# Patient Record
Sex: FEMALE | Race: WHITE | NOT HISPANIC OR LATINO
[De-identification: names, ages, dates, MRNs, and addresses within clinical notes are randomized per-mention and may not be internally consistent; named-entity substitution may affect disease eponyms.]

---

## 2017-05-15 ENCOUNTER — TRANSCRIPTION ENCOUNTER (OUTPATIENT)
Age: 52
End: 2017-05-15

## 2018-04-03 ENCOUNTER — APPOINTMENT (OUTPATIENT)
Dept: ORTHOPEDIC SURGERY | Facility: CLINIC | Age: 53
End: 2018-04-03

## 2018-08-03 ENCOUNTER — EMERGENCY (EMERGENCY)
Facility: HOSPITAL | Age: 53
LOS: 0 days | Discharge: HOME | End: 2018-08-04
Attending: FAMILY MEDICINE | Admitting: FAMILY MEDICINE
Payer: COMMERCIAL

## 2018-08-03 VITALS
SYSTOLIC BLOOD PRESSURE: 114 MMHG | OXYGEN SATURATION: 96 % | WEIGHT: 128.09 LBS | RESPIRATION RATE: 18 BRPM | DIASTOLIC BLOOD PRESSURE: 85 MMHG | TEMPERATURE: 99 F | HEART RATE: 82 BPM

## 2018-08-03 DIAGNOSIS — F10.129 ALCOHOL ABUSE WITH INTOXICATION, UNSPECIFIED: ICD-10-CM

## 2018-08-03 DIAGNOSIS — R11.2 NAUSEA WITH VOMITING, UNSPECIFIED: ICD-10-CM
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
## 2018-08-03 DIAGNOSIS — Z79.899 OTHER LONG TERM (CURRENT) DRUG THERAPY: ICD-10-CM

## 2018-08-03 DIAGNOSIS — F17.200 NICOTINE DEPENDENCE, UNSPECIFIED, UNCOMPLICATED: ICD-10-CM

## 2018-08-03 LAB
ABO RH CONFIRMATION: SIGNIFICANT CHANGE UP
ALBUMIN SERPL ELPH-MCNC: 3.4 G/DL — SIGNIFICANT CHANGE UP (ref 3.3–5)
ALP SERPL-CCNC: 127 U/L — HIGH (ref 40–120)
ALT FLD-CCNC: 55 U/L — SIGNIFICANT CHANGE UP (ref 12–78)
AMPHET UR-MCNC: NEGATIVE — SIGNIFICANT CHANGE UP
ANION GAP SERPL CALC-SCNC: 15 MMOL/L — SIGNIFICANT CHANGE UP (ref 5–17)
APPEARANCE UR: CLEAR — SIGNIFICANT CHANGE UP
APTT BLD: 24.4 SEC — LOW (ref 27.5–37.4)
AST SERPL-CCNC: 96 U/L — HIGH (ref 15–37)
BACTERIA # UR AUTO: ABNORMAL
BARBITURATES UR SCN-MCNC: NEGATIVE — SIGNIFICANT CHANGE UP
BASOPHILS # BLD AUTO: 0.04 K/UL — SIGNIFICANT CHANGE UP (ref 0–0.2)
BASOPHILS NFR BLD AUTO: 0.6 % — SIGNIFICANT CHANGE UP (ref 0–2)
BENZODIAZ UR-MCNC: NEGATIVE — SIGNIFICANT CHANGE UP
BILIRUB SERPL-MCNC: 0.5 MG/DL — SIGNIFICANT CHANGE UP (ref 0.2–1.2)
BILIRUB UR-MCNC: NEGATIVE — SIGNIFICANT CHANGE UP
BLD GP AB SCN SERPL QL: SIGNIFICANT CHANGE UP
BUN SERPL-MCNC: 13 MG/DL — SIGNIFICANT CHANGE UP (ref 7–23)
CALCIUM SERPL-MCNC: 8.8 MG/DL — SIGNIFICANT CHANGE UP (ref 8.5–10.1)
CHLORIDE SERPL-SCNC: 105 MMOL/L — SIGNIFICANT CHANGE UP (ref 96–108)
CO2 SERPL-SCNC: 23 MMOL/L — SIGNIFICANT CHANGE UP (ref 22–31)
COCAINE METAB.OTHER UR-MCNC: NEGATIVE — SIGNIFICANT CHANGE UP
COLOR SPEC: YELLOW — SIGNIFICANT CHANGE UP
CREAT SERPL-MCNC: 0.79 MG/DL — SIGNIFICANT CHANGE UP (ref 0.5–1.3)
DIFF PNL FLD: ABNORMAL
EOSINOPHIL # BLD AUTO: 0.04 K/UL — SIGNIFICANT CHANGE UP (ref 0–0.5)
EOSINOPHIL NFR BLD AUTO: 0.6 % — SIGNIFICANT CHANGE UP (ref 0–6)
EPI CELLS # UR: SIGNIFICANT CHANGE UP
ETHANOL SERPL-MCNC: 174 MG/DL — HIGH (ref 0–10)
ETHANOL SERPL-MCNC: 368 MG/DL — HIGH (ref 0–10)
GLUCOSE SERPL-MCNC: 152 MG/DL — HIGH (ref 70–99)
GLUCOSE UR QL: NEGATIVE MG/DL — SIGNIFICANT CHANGE UP
HCT VFR BLD CALC: 43.8 % — SIGNIFICANT CHANGE UP (ref 34.5–45)
HGB BLD-MCNC: 15.3 G/DL — SIGNIFICANT CHANGE UP (ref 11.5–15.5)
IMM GRANULOCYTES NFR BLD AUTO: 0.4 % — SIGNIFICANT CHANGE UP (ref 0–1.5)
INR BLD: 0.94 RATIO — SIGNIFICANT CHANGE UP (ref 0.88–1.16)
KETONES UR-MCNC: NEGATIVE — SIGNIFICANT CHANGE UP
LEUKOCYTE ESTERASE UR-ACNC: NEGATIVE — SIGNIFICANT CHANGE UP
LYMPHOCYTES # BLD AUTO: 3.22 K/UL — SIGNIFICANT CHANGE UP (ref 1–3.3)
LYMPHOCYTES # BLD AUTO: 46.5 % — HIGH (ref 13–44)
MCHC RBC-ENTMCNC: 31.5 PG — SIGNIFICANT CHANGE UP (ref 27–34)
MCHC RBC-ENTMCNC: 34.9 GM/DL — SIGNIFICANT CHANGE UP (ref 32–36)
MCV RBC AUTO: 90.1 FL — SIGNIFICANT CHANGE UP (ref 80–100)
METHADONE UR-MCNC: NEGATIVE — SIGNIFICANT CHANGE UP
MONOCYTES # BLD AUTO: 0.44 K/UL — SIGNIFICANT CHANGE UP (ref 0–0.9)
MONOCYTES NFR BLD AUTO: 6.4 % — SIGNIFICANT CHANGE UP (ref 2–14)
NEUTROPHILS # BLD AUTO: 3.15 K/UL — SIGNIFICANT CHANGE UP (ref 1.8–7.4)
NEUTROPHILS NFR BLD AUTO: 45.5 % — SIGNIFICANT CHANGE UP (ref 43–77)
NITRITE UR-MCNC: NEGATIVE — SIGNIFICANT CHANGE UP
NRBC # BLD: 0 /100 WBCS — SIGNIFICANT CHANGE UP (ref 0–0)
OPIATES UR-MCNC: NEGATIVE — SIGNIFICANT CHANGE UP
PCP SPEC-MCNC: SIGNIFICANT CHANGE UP
PCP UR-MCNC: NEGATIVE — SIGNIFICANT CHANGE UP
PH UR: 5 — SIGNIFICANT CHANGE UP (ref 5–8)
PLATELET # BLD AUTO: 326 K/UL — SIGNIFICANT CHANGE UP (ref 150–400)
POTASSIUM SERPL-MCNC: 3.4 MMOL/L — LOW (ref 3.5–5.3)
POTASSIUM SERPL-SCNC: 3.4 MMOL/L — LOW (ref 3.5–5.3)
PROT SERPL-MCNC: 7.7 GM/DL — SIGNIFICANT CHANGE UP (ref 6–8.3)
PROT UR-MCNC: 30 MG/DL
PROTHROM AB SERPL-ACNC: 10.1 SEC — SIGNIFICANT CHANGE UP (ref 9.8–12.7)
RBC # BLD: 4.86 M/UL — SIGNIFICANT CHANGE UP (ref 3.8–5.2)
RBC # FLD: 13 % — SIGNIFICANT CHANGE UP (ref 10.3–14.5)
RBC CASTS # UR COMP ASSIST: SIGNIFICANT CHANGE UP /HPF (ref 0–4)
SODIUM SERPL-SCNC: 143 MMOL/L — SIGNIFICANT CHANGE UP (ref 135–145)
SP GR SPEC: 1.01 — SIGNIFICANT CHANGE UP (ref 1.01–1.02)
THC UR QL: NEGATIVE — SIGNIFICANT CHANGE UP
TROPONIN I SERPL-MCNC: <0.015 NG/ML — SIGNIFICANT CHANGE UP (ref 0.01–0.04)
TYPE + AB SCN PNL BLD: SIGNIFICANT CHANGE UP
UROBILINOGEN FLD QL: NEGATIVE MG/DL — SIGNIFICANT CHANGE UP
WBC # BLD: 6.92 K/UL — SIGNIFICANT CHANGE UP (ref 3.8–10.5)
WBC # FLD AUTO: 6.92 K/UL — SIGNIFICANT CHANGE UP (ref 3.8–10.5)
WBC UR QL: SIGNIFICANT CHANGE UP

## 2018-08-03 PROCEDURE — 99285 EMERGENCY DEPT VISIT HI MDM: CPT

## 2018-08-03 PROCEDURE — 93010 ELECTROCARDIOGRAM REPORT: CPT

## 2018-08-03 RX ORDER — PANTOPRAZOLE SODIUM 20 MG/1
40 TABLET, DELAYED RELEASE ORAL ONCE
Qty: 0 | Refills: 0 | Status: COMPLETED | OUTPATIENT
Start: 2018-08-03 | End: 2018-08-03

## 2018-08-03 RX ORDER — SODIUM CHLORIDE 9 MG/ML
1000 INJECTION INTRAMUSCULAR; INTRAVENOUS; SUBCUTANEOUS ONCE
Qty: 0 | Refills: 0 | Status: COMPLETED | OUTPATIENT
Start: 2018-08-03 | End: 2018-08-03

## 2018-08-03 RX ORDER — THIAMINE MONONITRATE (VIT B1) 100 MG
100 TABLET ORAL ONCE
Qty: 0 | Refills: 0 | Status: COMPLETED | OUTPATIENT
Start: 2018-08-03 | End: 2018-08-03

## 2018-08-03 RX ORDER — ONDANSETRON 8 MG/1
4 TABLET, FILM COATED ORAL ONCE
Qty: 0 | Refills: 0 | Status: COMPLETED | OUTPATIENT
Start: 2018-08-03 | End: 2018-08-03

## 2018-08-03 RX ADMIN — SODIUM CHLORIDE 1000 MILLILITER(S): 9 INJECTION INTRAMUSCULAR; INTRAVENOUS; SUBCUTANEOUS at 17:08

## 2018-08-03 RX ADMIN — ONDANSETRON 4 MILLIGRAM(S): 8 TABLET, FILM COATED ORAL at 17:08

## 2018-08-03 RX ADMIN — SODIUM CHLORIDE 1000 MILLILITER(S): 9 INJECTION INTRAMUSCULAR; INTRAVENOUS; SUBCUTANEOUS at 17:32

## 2018-08-03 RX ADMIN — Medication 100 MILLIGRAM(S): at 17:08

## 2018-08-03 RX ADMIN — PANTOPRAZOLE SODIUM 40 MILLIGRAM(S): 20 TABLET, DELAYED RELEASE ORAL at 17:08

## 2018-08-03 RX ADMIN — Medication 1 MILLIGRAM(S): at 17:08

## 2018-08-03 NOTE — SBIRT NOTE. - NSSBIRTSERVICES_GEN_A_ED_FT
Provided SBIRT services: Full screen positive. Referral to Treatment Performed. Screening results were reviewed with the patient and patient was provided information about healthy guidelines and potential negative consequences associated with level of risk. Motivation and readiness to reduce or stop use was discussed and goals and activities to make changes were suggested/offered.    Referral for complete assessment and level of care determination at a certified treatment facility was completed by contacting the treatment facility via phone, [facility], bed available pending medical clearance.  Dr. Henderson aware of details regarding potential transfer/admit with a bed waiting.    Audit Score: 36  DAST Score: 0

## 2018-08-03 NOTE — ED ADULT TRIAGE NOTE - CHIEF COMPLAINT QUOTE
pt presents to ED with complaints of relapse of ETOH pt was clean for 6 weeks went to [place] for rehab and relapsed 2 days ago tearful

## 2018-08-03 NOTE — ED ADULT NURSE NOTE - INTERVENTIONS DEFINITIONS
Provide visual cue, wrist band, yellow gown, etc./Monitor gait and stability/Review medications for side effects contributing to fall risk/[place] to call system/Instruct patient to call for assistance/Physically safe environment: no spills, clutter or unnecessary equipment/Stretcher in lowest position, wheels locked, appropriate side rails in place/Monitor for mental status changes and reorient to person, place, and time/Reinforce activity limits and safety measures with patient and family/Call bell, personal items and telephone within reach/Provide visual clues: red socks

## 2018-08-03 NOTE — ED PROVIDER NOTE - OBJECTIVE STATEMENT
52 y/o female with a PMHx of alcohol abuse, anxiety and depression on Lexapro presents to the ED c/o since n/v due to alcohol withdrawal 2 night ago. +seizure (2 nights ago due to alcohol withdrawal) +n/v/d, +abd pain. Denies LOC. During pt's seizure there was no biting of her tongue. Pt reports she had the seizure after she started drinking again. She states that her vomit was all red. Pt denies having had wine or grape juice to cause the vomit to be red. Pt states her diarrhea was brown. Pt had a pint of alcohol today. Former smoker. Pt uses vape. Denies suicidal ideation. She reports that she has been in rehab 2 times since April. Pt wants to get back into a rehab program. She reports  called the ambulance. Pt states that she needs to go back to work next week or else she will lose her job again. Pt works as a mammographer. Pt's last emesis episode was 2 hours ago. Pt's rehab was in [place] and she states that while she was drunk in [place] she fell and scraped her chin. The longest the pt has been sober is 46 days. Pt's  states that his wife has been drinking since 7/27/18 1/5th of vodka every 6 hours.   PCP: Dr. Karen Gross

## 2018-08-03 NOTE — ED ADULT NURSE NOTE - OBJECTIVE STATEMENT
pt presents with alcohol intoxication. as per pt, she was in alcohol rehab and was discharged 3 days ago. was sober for 45 days while there. has started drinking upon [place] at the airport; approx 1 pint vodka per day. states that shes upset to come home because shes worried that her  is leaving her. pt now tearful.

## 2018-08-03 NOTE — ED PROVIDER NOTE - MEDICAL DECISION MAKING DETAILS
Pt with hx of alcohol abuse s/p rehab x3 here with alcohol intoxication wanting to return to detox rehab. Pt also having vomiting. Plan for labs, IV fluids, Thymin, Zofran, Protonics and SBIRT.

## 2018-08-03 NOTE — ED PROVIDER NOTE - PROGRESS NOTE DETAILS
Joanneibe Statement (Attending): I Tameka Lemus attest that this documentation has been prepared under the direction and in the presence of Doctor Carmen Henderson Called [facility] and spoke to Clarissa who stated that they are short staffed and cannot take the pt tonight. Requested that we recall at 8 am. Santiago YOUNG CC:  Pt seen this AM by Case Management.  Pt declines transfer to . [place], has outpt appt. 8/7 Tuesday w/ addiction specialist.  Pt clinically stable, no active withdrawal, will D/C.

## 2018-08-03 NOTE — ED ADULT NURSE NOTE - ED STAT RN HANDOFF DETAILS
Pt received from prior RN sleeping and awaiting placement and transfer at this time. Pt easily awakened.  Will cont to monitor.

## 2018-08-04 VITALS
TEMPERATURE: 98 F | DIASTOLIC BLOOD PRESSURE: 79 MMHG | SYSTOLIC BLOOD PRESSURE: 119 MMHG | OXYGEN SATURATION: 95 % | RESPIRATION RATE: 18 BRPM | HEART RATE: 94 BPM

## 2018-08-04 RX ADMIN — Medication 25 MILLIGRAM(S): at 03:23

## 2018-08-04 NOTE — ED ADULT NURSE REASSESSMENT NOTE - NS ED NURSE REASSESS COMMENT FT1
Pt seen by  and reports that she is going to follow up outpt. Dr. Boateng confirms that pt is to be d/c at this time.  Pt ambulating with steady gait. Tolerated po intake. IVL removed. Pt being picked up by family.

## 2018-08-04 NOTE — ED ADULT NURSE REASSESSMENT NOTE - NS ED NURSE REASSESS COMMENT FT1
Case management contacted and is aware of case.  Pt currently resting comfortably in bed.  Declines breakfast at this time.

## 2018-08-09 ENCOUNTER — INPATIENT (INPATIENT)
Facility: HOSPITAL | Age: 53
LOS: 4 days | Discharge: NURSING, REHAB OR LONG-TERM CARE | End: 2018-08-14
Attending: HOSPITALIST | Admitting: HOSPITALIST
Payer: COMMERCIAL

## 2018-08-09 VITALS — WEIGHT: 130.07 LBS | HEIGHT: 67 IN

## 2018-08-09 PROBLEM — F10.10 ALCOHOL ABUSE, UNCOMPLICATED: Chronic | Status: ACTIVE | Noted: 2018-08-07

## 2018-08-09 LAB
ALBUMIN SERPL ELPH-MCNC: 3 G/DL — LOW (ref 3.3–5)
ALBUMIN SERPL ELPH-MCNC: 3.3 G/DL — SIGNIFICANT CHANGE UP (ref 3.3–5)
ALP SERPL-CCNC: 115 U/L — SIGNIFICANT CHANGE UP (ref 40–120)
ALP SERPL-CCNC: 122 U/L — HIGH (ref 40–120)
ALT FLD-CCNC: 38 U/L — SIGNIFICANT CHANGE UP (ref 12–78)
ALT FLD-CCNC: 44 U/L — SIGNIFICANT CHANGE UP (ref 12–78)
ANION GAP SERPL CALC-SCNC: 11 MMOL/L — SIGNIFICANT CHANGE UP (ref 5–17)
ANION GAP SERPL CALC-SCNC: 15 MMOL/L — SIGNIFICANT CHANGE UP (ref 5–17)
APPEARANCE UR: CLEAR — SIGNIFICANT CHANGE UP
AST SERPL-CCNC: 58 U/L — HIGH (ref 15–37)
AST SERPL-CCNC: 68 U/L — HIGH (ref 15–37)
BASOPHILS # BLD AUTO: 0.04 K/UL — SIGNIFICANT CHANGE UP (ref 0–0.2)
BASOPHILS NFR BLD AUTO: 0.5 % — SIGNIFICANT CHANGE UP (ref 0–2)
BILIRUB DIRECT SERPL-MCNC: 0.2 MG/DL — SIGNIFICANT CHANGE UP (ref 0–0.2)
BILIRUB INDIRECT FLD-MCNC: 0.8 MG/DL — SIGNIFICANT CHANGE UP (ref 0.2–1)
BILIRUB SERPL-MCNC: 0.9 MG/DL — SIGNIFICANT CHANGE UP (ref 0.2–1.2)
BILIRUB SERPL-MCNC: 1 MG/DL — SIGNIFICANT CHANGE UP (ref 0.2–1.2)
BILIRUB UR-MCNC: NEGATIVE — SIGNIFICANT CHANGE UP
BUN SERPL-MCNC: 11 MG/DL — SIGNIFICANT CHANGE UP (ref 7–23)
BUN SERPL-MCNC: 15 MG/DL — SIGNIFICANT CHANGE UP (ref 7–23)
CALCIUM SERPL-MCNC: 8 MG/DL — LOW (ref 8.5–10.1)
CALCIUM SERPL-MCNC: 8.4 MG/DL — LOW (ref 8.5–10.1)
CHLORIDE SERPL-SCNC: 102 MMOL/L — SIGNIFICANT CHANGE UP (ref 96–108)
CHLORIDE SERPL-SCNC: 98 MMOL/L — SIGNIFICANT CHANGE UP (ref 96–108)
CO2 SERPL-SCNC: 23 MMOL/L — SIGNIFICANT CHANGE UP (ref 22–31)
CO2 SERPL-SCNC: 25 MMOL/L — SIGNIFICANT CHANGE UP (ref 22–31)
COLOR SPEC: YELLOW — SIGNIFICANT CHANGE UP
CREAT SERPL-MCNC: 0.6 MG/DL — SIGNIFICANT CHANGE UP (ref 0.5–1.3)
CREAT SERPL-MCNC: 0.7 MG/DL — SIGNIFICANT CHANGE UP (ref 0.5–1.3)
DIFF PNL FLD: ABNORMAL
EOSINOPHIL # BLD AUTO: 0.05 K/UL — SIGNIFICANT CHANGE UP (ref 0–0.5)
EOSINOPHIL NFR BLD AUTO: 0.6 % — SIGNIFICANT CHANGE UP (ref 0–6)
ETHANOL SERPL-MCNC: <10 MG/DL — SIGNIFICANT CHANGE UP (ref 0–10)
GLUCOSE SERPL-MCNC: 112 MG/DL — HIGH (ref 70–99)
GLUCOSE SERPL-MCNC: 88 MG/DL — SIGNIFICANT CHANGE UP (ref 70–99)
GLUCOSE UR QL: NEGATIVE MG/DL — SIGNIFICANT CHANGE UP
HCT VFR BLD CALC: 37.9 % — SIGNIFICANT CHANGE UP (ref 34.5–45)
HGB BLD-MCNC: 13.4 G/DL — SIGNIFICANT CHANGE UP (ref 11.5–15.5)
IMM GRANULOCYTES NFR BLD AUTO: 0.9 % — SIGNIFICANT CHANGE UP (ref 0–1.5)
KETONES UR-MCNC: ABNORMAL
LEUKOCYTE ESTERASE UR-ACNC: NEGATIVE — SIGNIFICANT CHANGE UP
LYMPHOCYTES # BLD AUTO: 1.07 K/UL — SIGNIFICANT CHANGE UP (ref 1–3.3)
LYMPHOCYTES # BLD AUTO: 13.9 % — SIGNIFICANT CHANGE UP (ref 13–44)
MAGNESIUM SERPL-MCNC: 1.6 MG/DL — SIGNIFICANT CHANGE UP (ref 1.6–2.6)
MAGNESIUM SERPL-MCNC: 1.6 MG/DL — SIGNIFICANT CHANGE UP (ref 1.6–2.6)
MCHC RBC-ENTMCNC: 31.8 PG — SIGNIFICANT CHANGE UP (ref 27–34)
MCHC RBC-ENTMCNC: 35.4 GM/DL — SIGNIFICANT CHANGE UP (ref 32–36)
MCV RBC AUTO: 90 FL — SIGNIFICANT CHANGE UP (ref 80–100)
MONOCYTES # BLD AUTO: 0.44 K/UL — SIGNIFICANT CHANGE UP (ref 0–0.9)
MONOCYTES NFR BLD AUTO: 5.7 % — SIGNIFICANT CHANGE UP (ref 2–14)
NEUTROPHILS # BLD AUTO: 6.05 K/UL — SIGNIFICANT CHANGE UP (ref 1.8–7.4)
NEUTROPHILS NFR BLD AUTO: 78.4 % — HIGH (ref 43–77)
NITRITE UR-MCNC: NEGATIVE — SIGNIFICANT CHANGE UP
PH UR: 6.5 — SIGNIFICANT CHANGE UP (ref 5–8)
PHOSPHATE SERPL-MCNC: 2.5 MG/DL — SIGNIFICANT CHANGE UP (ref 2.5–4.5)
PHOSPHATE SERPL-MCNC: 2.8 MG/DL — SIGNIFICANT CHANGE UP (ref 2.5–4.5)
PLATELET # BLD AUTO: 131 K/UL — LOW (ref 150–400)
POTASSIUM SERPL-MCNC: 3.9 MMOL/L — SIGNIFICANT CHANGE UP (ref 3.5–5.3)
POTASSIUM SERPL-MCNC: 4 MMOL/L — SIGNIFICANT CHANGE UP (ref 3.5–5.3)
POTASSIUM SERPL-SCNC: 3.9 MMOL/L — SIGNIFICANT CHANGE UP (ref 3.5–5.3)
POTASSIUM SERPL-SCNC: 4 MMOL/L — SIGNIFICANT CHANGE UP (ref 3.5–5.3)
PROT SERPL-MCNC: 7 GM/DL — SIGNIFICANT CHANGE UP (ref 6–8.3)
PROT SERPL-MCNC: 7.7 GM/DL — SIGNIFICANT CHANGE UP (ref 6–8.3)
PROT UR-MCNC: NEGATIVE MG/DL — SIGNIFICANT CHANGE UP
RBC # BLD: 4.21 M/UL — SIGNIFICANT CHANGE UP (ref 3.8–5.2)
RBC # FLD: 12.9 % — SIGNIFICANT CHANGE UP (ref 10.3–14.5)
SODIUM SERPL-SCNC: 136 MMOL/L — SIGNIFICANT CHANGE UP (ref 135–145)
SODIUM SERPL-SCNC: 138 MMOL/L — SIGNIFICANT CHANGE UP (ref 135–145)
SP GR SPEC: 1.01 — SIGNIFICANT CHANGE UP (ref 1.01–1.02)
TROPONIN I SERPL-MCNC: <0.015 NG/ML — SIGNIFICANT CHANGE UP (ref 0.01–0.04)
UROBILINOGEN FLD QL: NEGATIVE MG/DL — SIGNIFICANT CHANGE UP
WBC # BLD: 7.72 K/UL — SIGNIFICANT CHANGE UP (ref 3.8–10.5)
WBC # FLD AUTO: 7.72 K/UL — SIGNIFICANT CHANGE UP (ref 3.8–10.5)

## 2018-08-09 PROCEDURE — 99285 EMERGENCY DEPT VISIT HI MDM: CPT

## 2018-08-09 PROCEDURE — 93010 ELECTROCARDIOGRAM REPORT: CPT

## 2018-08-09 PROCEDURE — 71045 X-RAY EXAM CHEST 1 VIEW: CPT | Mod: 26

## 2018-08-09 RX ORDER — NICOTINE POLACRILEX 2 MG
1 GUM BUCCAL DAILY
Qty: 0 | Refills: 0 | Status: DISCONTINUED | OUTPATIENT
Start: 2018-08-09 | End: 2018-08-14

## 2018-08-09 RX ORDER — ENOXAPARIN SODIUM 100 MG/ML
30 INJECTION SUBCUTANEOUS EVERY 24 HOURS
Qty: 0 | Refills: 0 | Status: DISCONTINUED | OUTPATIENT
Start: 2018-08-09 | End: 2018-08-14

## 2018-08-09 RX ORDER — SODIUM CHLORIDE 9 MG/ML
1000 INJECTION INTRAMUSCULAR; INTRAVENOUS; SUBCUTANEOUS
Qty: 0 | Refills: 0 | Status: COMPLETED | OUTPATIENT
Start: 2018-08-09 | End: 2018-08-10

## 2018-08-09 RX ORDER — ONDANSETRON 8 MG/1
4 TABLET, FILM COATED ORAL EVERY 6 HOURS
Qty: 0 | Refills: 0 | Status: DISCONTINUED | OUTPATIENT
Start: 2018-08-09 | End: 2018-08-14

## 2018-08-09 RX ORDER — SODIUM CHLORIDE 9 MG/ML
2000 INJECTION INTRAMUSCULAR; INTRAVENOUS; SUBCUTANEOUS ONCE
Qty: 0 | Refills: 0 | Status: COMPLETED | OUTPATIENT
Start: 2018-08-09 | End: 2018-08-09

## 2018-08-09 RX ORDER — DOCUSATE SODIUM 100 MG
100 CAPSULE ORAL THREE TIMES A DAY
Qty: 0 | Refills: 0 | Status: DISCONTINUED | OUTPATIENT
Start: 2018-08-09 | End: 2018-08-14

## 2018-08-09 RX ORDER — THIAMINE MONONITRATE (VIT B1) 100 MG
100 TABLET ORAL DAILY
Qty: 0 | Refills: 0 | Status: DISCONTINUED | OUTPATIENT
Start: 2018-08-09 | End: 2018-08-14

## 2018-08-09 RX ORDER — IBUPROFEN 200 MG
400 TABLET ORAL EVERY 6 HOURS
Qty: 0 | Refills: 0 | Status: DISCONTINUED | OUTPATIENT
Start: 2018-08-09 | End: 2018-08-14

## 2018-08-09 RX ORDER — ESCITALOPRAM OXALATE 10 MG/1
20 TABLET, FILM COATED ORAL DAILY
Qty: 0 | Refills: 0 | Status: DISCONTINUED | OUTPATIENT
Start: 2018-08-10 | End: 2018-08-14

## 2018-08-09 RX ORDER — FOLIC ACID 0.8 MG
1 TABLET ORAL DAILY
Qty: 0 | Refills: 0 | Status: DISCONTINUED | OUTPATIENT
Start: 2018-08-09 | End: 2018-08-14

## 2018-08-09 RX ORDER — SODIUM CHLORIDE 9 MG/ML
1000 INJECTION, SOLUTION INTRAVENOUS
Qty: 0 | Refills: 0 | Status: DISCONTINUED | OUTPATIENT
Start: 2018-08-09 | End: 2018-08-10

## 2018-08-09 RX ADMIN — Medication 1 MILLIGRAM(S): at 15:52

## 2018-08-09 RX ADMIN — SODIUM CHLORIDE 2000 MILLILITER(S): 9 INJECTION INTRAMUSCULAR; INTRAVENOUS; SUBCUTANEOUS at 10:19

## 2018-08-09 RX ADMIN — Medication 2 MILLIGRAM(S): at 10:23

## 2018-08-09 RX ADMIN — SODIUM CHLORIDE 2000 MILLILITER(S): 9 INJECTION INTRAMUSCULAR; INTRAVENOUS; SUBCUTANEOUS at 13:04

## 2018-08-09 RX ADMIN — ENOXAPARIN SODIUM 30 MILLIGRAM(S): 100 INJECTION SUBCUTANEOUS at 15:54

## 2018-08-09 RX ADMIN — SODIUM CHLORIDE 100 MILLILITER(S): 9 INJECTION, SOLUTION INTRAVENOUS at 20:59

## 2018-08-09 RX ADMIN — Medication 1 MILLIGRAM(S): at 21:00

## 2018-08-09 NOTE — ED PROVIDER NOTE - NS ED ROS FT
Constitutional: No fever or chills  Eyes: No visual changes  HEENT: No throat pain  CV: No chest pain. +diaphoresis   Resp: No SOB no cough  GI: No abd pain. No vomiting. +ND.   : No dysuria  MSK: No musculoskeletal pain  Skin: No rash  Neuro: No headache. +Shaky +weakness

## 2018-08-09 NOTE — ED PROVIDER NOTE - OBJECTIVE STATEMENT
54 y/o female presents to the ED s/p ETOH relapse, last drink being 2 days ago now with signs of withdrawal. Pt is c/o shakiness, weakness, diaphoresis, ND. Pt recently d/c from rehab last week. Normally drinks 1pint of vodka a day. States she is experiencing the same sx as prior withdrawal. Pt reports 2 seizures at home since leaving rehab, no LOC or head trauma. Denies CP, SOB Vomiting. Denies SI/HI. Pt states she is prepared to be admitted to the hospital today.

## 2018-08-09 NOTE — ED PROVIDER NOTE - PHYSICAL EXAMINATION
Constitutional: mild distress AAOx3  Eyes: PERRLA EOMI  Head: Normocephalic atraumatic  Mouth: MMM  Cardiac: Tachycardic rate   Resp: Lungs CTAB  GI: Abd s/nt/nd  Neuro: CN2-12 intact. +Mild tremor  Skin: No rashes Constitutional: mild distress AAOx3  Eyes: PERRLA EOMI  Head: Normocephalic atraumatic  Mouth: MMM  Cardiac: Tachycardic rate   Resp: Lungs CTAB  GI: Abd s/nt/nd  Neuro: CN2-12 intact. +Mild tremor  Skin: No rashes  psych: no si/hi

## 2018-08-09 NOTE — PATIENT PROFILE ADULT. - NS TRANSFER PATIENT BELONGINGS
Clothing/Cell Phone/PDA (specify)/pocket book Clothing/pocket book with glasses papers and notebook, patient states she lost cell phone prior to admission.

## 2018-08-09 NOTE — H&P ADULT - HISTORY OF PRESENT ILLNESS
54 y/o female presents to the ED s/p ETOH relapse, last drink being 2 days ago now with signs of withdrawal. Pt is c/o shakiness, weakness, diaphoresis, ND. Pt recently d/c from rehab last week. Normally drinks 1pint of vodka a day. States she is experiencing the same sx as prior withdrawal. Pt reports 2 seizures at home since leaving rehab, no LOC or head trauma. Denies CP, SOB Vomiting. Denies SI/HI. Pt states she is prepared to be admitted to the hospital today. 52 y/o female presents to the ED  for ETOH related withdrawl symptoms. Patient states she was recently at rehab for ETOH use, discharged last week.  last drink being 2 days ago. In Er patient c/o  shakiness, weakness, diaphoresis. Normally drinks 1pint of vodka a day. Pt reports 2 seizures at home since leaving rehab, no LOC or head trauma.  Denies CP, fevers, chills, hallucinations, n/v/d.

## 2018-08-09 NOTE — ED PROVIDER NOTE - NS_ ATTENDINGSCRIBEDETAILS _ED_A_ED_FT
I, Navjot Goodson MD,  performed the initial face to face bedside interview with this patient regarding history of present illness, review of symptoms and relevant past medical, social and family history.  I completed an independent physical examination.  I was the initial provider who evaluated this patient.  The history, relevant review of systems, past medical and surgical history, medical decision making, and physical examination was documented by the scribe in my presence and I attest to the accuracy of the documentation.

## 2018-08-09 NOTE — H&P ADULT - NSHPLABSRESULTS_GEN_ALL_CORE
Urinalysis Basic - ( 09 Aug 2018 12:11 )    Color: Yellow / Appearance: Clear / S.010 / pH: x  Gluc: x / Ketone: Moderate  / Bili: Negative / Urobili: Negative mg/dL   Blood: x / Protein: Negative mg/dL / Nitrite: Negative   Leuk Esterase: Negative / RBC: x / WBC x   Sq Epi: x / Non Sq Epi: x / Bacteria: x    09 Aug 2018 10:15    136    |  98     |  15     ----------------------------<  112    3.9     |  23     |  0.70     Ca    8.4        09 Aug 2018 10:15  Phos  2.8       09 Aug 2018 10:15  Mg     1.6       09 Aug 2018 10:15    TPro  7.7    /  Alb  3.3    /  TBili  0.9    /  DBili  x      /  AST  68     /  ALT  44     /  AlkPhos  122    09 Aug 2018 10:15  LIVER FUNCTIONS - ( 09 Aug 2018 10:15 )  Alb: 3.3 g/dL / Pro: 7.7 gm/dL / ALK PHOS: 122 U/L / ALT: 44 U/L / AST: 68 U/L / GGT: x         CBC Full  -  ( 09 Aug 2018 10:15 )  WBC Count : 7.72 K/uL  Hemoglobin : 13.4 g/dL  Hematocrit : 37.9 %  Platelet Count - Automated : 131 K/uL  Mean Cell Volume : 90.0 fl  Mean Cell Hemoglobin : 31.8 pg  Mean Cell Hemoglobin Concentration : 35.4 gm/dL  Auto Neutrophil # : 6.05 K/uL  Auto Lymphocyte # : 1.07 K/uL  Auto Monocyte # : 0.44 K/uL  Auto Eosinophil # : 0.05 K/uL  Auto Basophil # : 0.04 K/uL  Auto Neutrophil % : 78.4 %  Auto Lymphocyte % : 13.9 %  Auto Monocyte % : 5.7 %  Auto Eosinophil % : 0.6 %  Auto Basophil % : 0.5 %  CARDIAC MARKERS ( 09 Aug 2018 10:15 )  <0.015 ng/mL / x     / x     / x     / x

## 2018-08-09 NOTE — H&P ADULT - ASSESSMENT
52 y/o female presents to the ED  for ETOH related withdrawl symptoms. Patient states she was recently at rehab for ETOH use, discharged last week.  last drink being 2 days ago. In Er patient c/o  shakiness, weakness, diaphoresis. Normally drinks 1pint of vodka a day. Pt reports 2 seizures at home since leaving rehab, no LOC or head trauma.  Denies CP, fevers, chills, hallucinations, n/v/d.     Admit to medsurg  Start standing ativan 1mg TID w/ CIWA for breakthrough  will give one bag of banana bag  overnight fluids for 12 hours.     dvt proph- sc lovenox

## 2018-08-09 NOTE — ED PROVIDER NOTE - MEDICAL DECISION MAKING DETAILS
54y/o female hx of ETOH abuse, recent detox, now with etoh intake again 2 days ago. Normally drinks 1 pint of vodka a day. Presents with Sx of withdrawal . Pt feels shaky, ND, sx similar to prior withdrawal. No other sx. Concern for ETOH withdraw. Plan for labs, sx control, NERY gutiérrez, admit. 54y/o female hx of ETOH abuse, recent detox, now with etoh intake again 2 days ago. Normally drinks 1 pint of vodka a day. Presents with Sx of withdrawal . Pt feels shaky, ND, sx similar to prior withdrawal. No other sx. Concern for ETOH withdraw. Plan for labs, sx control, CIWA protocol, admit.

## 2018-08-09 NOTE — H&P ADULT - NSHPPHYSICALEXAM_GEN_ALL_CORE
Vital Signs Last 24 Hrs  T(C): 36.8 (09 Aug 2018 09:13), Max: 36.8 (09 Aug 2018 09:13)  T(F): 98.2 (09 Aug 2018 09:13), Max: 98.2 (09 Aug 2018 09:13)  HR: 133 (09 Aug 2018 09:13) (133 - 133)  BP: 137/96 (09 Aug 2018 09:13) (137/96 - 137/96)  BP(mean): --  RR: 18 (09 Aug 2018 09:13) (18 - 18)  SpO2: 98% (09 Aug 2018 09:13) (98% - 98%)    HEENT:   pupils equal and reactive, EOMI, no oropharyngeal lesions, erythema, exudates, oral thrush    NECK:   supple, no carotid bruits, no palpable lymph nodes, no thyromegaly    CV:  +S1, +S2, regular, no murmurs or rubs    RESP:   lungs clear to auscultation bilaterally, no wheezing, rales, rhonchi, good air entry bilaterally    BREAST:  not examined    GI:  abdomen soft, non-tender, non-distended, normal BS, no bruits, no abdominal masses, no palpable masses    RECTAL:  not examined    :  not examined    MSK:   normal muscle tone, no atrophy, no rigidity, no contractions    EXT:   no clubbing, no cyanosis, no edema, no calf pain, swelling or erythema    VASCULAR:  pulses equal and symmetric in the upper and lower extremities    NEURO:  AAOX3, no focal neurological deficits, follows all commands, able to move extremities spontaneously    SKIN:  no ulcers, lesions or rashes

## 2018-08-10 DIAGNOSIS — F32.9 MAJOR DEPRESSIVE DISORDER, SINGLE EPISODE, UNSPECIFIED: ICD-10-CM

## 2018-08-10 DIAGNOSIS — F10.10 ALCOHOL ABUSE, UNCOMPLICATED: ICD-10-CM

## 2018-08-10 LAB
ANION GAP SERPL CALC-SCNC: 11 MMOL/L — SIGNIFICANT CHANGE UP (ref 5–17)
BASOPHILS # BLD AUTO: 0.02 K/UL — SIGNIFICANT CHANGE UP (ref 0–0.2)
BASOPHILS NFR BLD AUTO: 0.4 % — SIGNIFICANT CHANGE UP (ref 0–2)
BUN SERPL-MCNC: 13 MG/DL — SIGNIFICANT CHANGE UP (ref 7–23)
CALCIUM SERPL-MCNC: 8.5 MG/DL — SIGNIFICANT CHANGE UP (ref 8.5–10.1)
CHLORIDE SERPL-SCNC: 103 MMOL/L — SIGNIFICANT CHANGE UP (ref 96–108)
CO2 SERPL-SCNC: 23 MMOL/L — SIGNIFICANT CHANGE UP (ref 22–31)
CREAT SERPL-MCNC: 0.67 MG/DL — SIGNIFICANT CHANGE UP (ref 0.5–1.3)
EOSINOPHIL # BLD AUTO: 0.08 K/UL — SIGNIFICANT CHANGE UP (ref 0–0.5)
EOSINOPHIL NFR BLD AUTO: 1.7 % — SIGNIFICANT CHANGE UP (ref 0–6)
GLUCOSE SERPL-MCNC: 135 MG/DL — HIGH (ref 70–99)
HCT VFR BLD CALC: 36.7 % — SIGNIFICANT CHANGE UP (ref 34.5–45)
HGB BLD-MCNC: 12.7 G/DL — SIGNIFICANT CHANGE UP (ref 11.5–15.5)
IMM GRANULOCYTES NFR BLD AUTO: 0.7 % — SIGNIFICANT CHANGE UP (ref 0–1.5)
LYMPHOCYTES # BLD AUTO: 1.25 K/UL — SIGNIFICANT CHANGE UP (ref 1–3.3)
LYMPHOCYTES # BLD AUTO: 27.3 % — SIGNIFICANT CHANGE UP (ref 13–44)
MAGNESIUM SERPL-MCNC: 1.8 MG/DL — SIGNIFICANT CHANGE UP (ref 1.6–2.6)
MCHC RBC-ENTMCNC: 31.4 PG — SIGNIFICANT CHANGE UP (ref 27–34)
MCHC RBC-ENTMCNC: 34.6 GM/DL — SIGNIFICANT CHANGE UP (ref 32–36)
MCV RBC AUTO: 90.6 FL — SIGNIFICANT CHANGE UP (ref 80–100)
MONOCYTES # BLD AUTO: 0.36 K/UL — SIGNIFICANT CHANGE UP (ref 0–0.9)
MONOCYTES NFR BLD AUTO: 7.9 % — SIGNIFICANT CHANGE UP (ref 2–14)
NEUTROPHILS # BLD AUTO: 2.84 K/UL — SIGNIFICANT CHANGE UP (ref 1.8–7.4)
NEUTROPHILS NFR BLD AUTO: 62 % — SIGNIFICANT CHANGE UP (ref 43–77)
NRBC # BLD: 0 /100 WBCS — SIGNIFICANT CHANGE UP (ref 0–0)
PHOSPHATE SERPL-MCNC: 2.7 MG/DL — SIGNIFICANT CHANGE UP (ref 2.5–4.5)
PLATELET # BLD AUTO: 104 K/UL — LOW (ref 150–400)
POTASSIUM SERPL-MCNC: 3.6 MMOL/L — SIGNIFICANT CHANGE UP (ref 3.5–5.3)
POTASSIUM SERPL-SCNC: 3.6 MMOL/L — SIGNIFICANT CHANGE UP (ref 3.5–5.3)
RBC # BLD: 4.05 M/UL — SIGNIFICANT CHANGE UP (ref 3.8–5.2)
RBC # FLD: 12.9 % — SIGNIFICANT CHANGE UP (ref 10.3–14.5)
SODIUM SERPL-SCNC: 137 MMOL/L — SIGNIFICANT CHANGE UP (ref 135–145)
WBC # BLD: 4.58 K/UL — SIGNIFICANT CHANGE UP (ref 3.8–10.5)
WBC # FLD AUTO: 4.58 K/UL — SIGNIFICANT CHANGE UP (ref 3.8–10.5)

## 2018-08-10 PROCEDURE — 93010 ELECTROCARDIOGRAM REPORT: CPT

## 2018-08-10 RX ADMIN — Medication 1 MILLIGRAM(S): at 06:30

## 2018-08-10 RX ADMIN — Medication 1 MILLIGRAM(S): at 21:03

## 2018-08-10 RX ADMIN — Medication 100 MILLIGRAM(S): at 11:05

## 2018-08-10 RX ADMIN — Medication 1 MILLIGRAM(S): at 11:05

## 2018-08-10 RX ADMIN — ESCITALOPRAM OXALATE 20 MILLIGRAM(S): 10 TABLET, FILM COATED ORAL at 11:04

## 2018-08-10 RX ADMIN — ENOXAPARIN SODIUM 30 MILLIGRAM(S): 100 INJECTION SUBCUTANEOUS at 13:03

## 2018-08-10 RX ADMIN — SODIUM CHLORIDE 80 MILLILITER(S): 9 INJECTION INTRAMUSCULAR; INTRAVENOUS; SUBCUTANEOUS at 06:30

## 2018-08-10 RX ADMIN — Medication 1 MILLIGRAM(S): at 13:03

## 2018-08-10 NOTE — BEHAVIORAL HEALTH ASSESSMENT NOTE - HPI (INCLUDE ILLNESS QUALITY, SEVERITY, DURATION, TIMING, CONTEXT, MODIFYING FACTORS, ASSOCIATED SIGNS AND SYMPTOMS)
PT is a53 DARBY with lng hx of EtOH abuse, multiple detox and rehabs, last one 1 week ago in [place], pt is not sure about the name and hx pf depression./anxiety, on lexapro 20mg , followed by Maya Hanson NP from [facility] [place]. PT also has therapist Kimi Denny   PT morgan not have their phone numbers.   Reportedly, pt expressed suicidal thought to her . PT denies having SI, she is future oriented , she wants help  and she wants to live.   When intoxicated she felt hopeless and said that she can not go like this , because she is relapsing all the time.     To-day she denies being depressed or anxious Slept well, appetite is good. Denies SI and HI. Denies PI.   NO Ah and VH.   PT is interested  in being refereed to another rehab program.

## 2018-08-10 NOTE — BEHAVIORAL HEALTH ASSESSMENT NOTE - SUMMARY
53 YOWMW with EtOH abuse and dependance and depression on lexapro,  currently in detox, multiple detox and rahabs. most recent one 1 week ago in [place]. Relapsed few days later,   PT is not a imminent risk to harm self and others and she does not need inpatient psychiatry treatment,.    She is interested in cee to rehab.   Continue lexapro 20mg po qd.

## 2018-08-10 NOTE — BEHAVIORAL HEALTH ASSESSMENT NOTE - NSBHCHARTREVIEWVS_PSY_A_CORE FT
Vital Signs Last 24 Hrs  T(C): 36.7 (10 Aug 2018 10:50), Max: 36.7 (09 Aug 2018 21:00)  T(F): 98.1 (10 Aug 2018 10:50), Max: 98.1 (10 Aug 2018 10:50)  HR: 94 (10 Aug 2018 10:50) (80 - 107)  BP: 101/68 (10 Aug 2018 10:50) (101/68 - 127/88)  BP(mean): --  RR: 19 (10 Aug 2018 10:50) (18 - 19)  SpO2: 97% (10 Aug 2018 10:50) (95% - 100%)

## 2018-08-10 NOTE — BEHAVIORAL HEALTH ASSESSMENT NOTE - RISK ASSESSMENT
Pt is not at imminent risk to harm self and others,. Long term she is EtOH abuser, por comitance and noncompliance with EtOH treatement, impulsive, gets depressed and hopeless when intoxicated.  She si at low to moderate long term risk. Protective factors include supportive family and future oriented.

## 2018-08-10 NOTE — BEHAVIORAL HEALTH ASSESSMENT NOTE - NSBHCHARTREVIEWLAB_PSY_A_CORE FT
13.4   7.72  )-----------( 131      ( 09 Aug 2018 10:15 )             37.9   08-10    137  |  103  |  13  ----------------------------<  135<H>  3.6   |  23  |  0.67    Ca    8.5      10 Aug 2018 09:25  Phos  2.7     08-10  Mg     1.8     08-10    TPro  7.0  /  Alb  3.0<L>  /  TBili  1.0  /  DBili  0.2  /  AST  58<H>  /  ALT  38  /  AlkPhos  115  08-09

## 2018-08-10 NOTE — PROGRESS NOTE ADULT - ASSESSMENT
54 y/o female presents to the ED  for ETOH related withdrawl symptoms. Patient states she was recently at rehab for ETOH use, discharged last week.  last drink being 2 days ago. In Er patient c/o  shakiness, weakness, diaphoresis. Normally drinks 1pint of vodka a day. Pt reports 2 seizures at home since leaving rehab, no LOC or head trauma.  Denies CP, fevers, chills, hallucinations, n/v/d.         A:  ETOH abuse      P:  Continue with ativan protocol  Fall / seizure precuations  MVI/Thiamine/Folate for suspected etoh related thiamine / vit deficiency  SW consult for rehab upon discharge  dvt px    totaltime during encounter: 35 min

## 2018-08-10 NOTE — BEHAVIORAL HEALTH ASSESSMENT NOTE - DETAILS
per pt had 2 withdrawal seizures brother , uncles Pt-s  has a gun that is locked and pt has no access

## 2018-08-10 NOTE — PROGRESS NOTE ADULT - SUBJECTIVE AND OBJECTIVE BOX
Patient is a 53y old  Female who presents with a chief complaint of etoh related withdrawls (09 Aug 2018 12:47)      SUBJECTIVE:   HPI:  8/8: feels some anxiety but denies A/V hallucinations. Had some Nausea thsi am        REVIEW OF SYSTEMS:    CONSTITUTIONAL: No weakness, fevers or chills  EYES/ENT: No visual changes;  No vertigo or throat pain   NECK: No pain or stiffness  RESPIRATORY: No cough, wheezing, hemoptysis; No shortness of breath  CARDIOVASCULAR: No chest pain or palpitations  GASTROINTESTINAL: No abdominal or epigastric pain. No nausea, vomiting, or hematemesis; No diarrhea or constipation. No melena or hematochezia.  GENITOURINARY: No dysuria, frequency or hematuria  NEUROLOGICAL: No numbness or weakness  SKIN: No itching, burning, rashes, or lesions   All other review of systems is negative unless indicated above        Vital Signs Last 24 Hrs  T(C): 37.1 (10 Aug 2018 12:50), Max: 37.1 (10 Aug 2018 12:50)  T(F): 98.7 (10 Aug 2018 12:50), Max: 98.7 (10 Aug 2018 12:50)  HR: 100 (10 Aug 2018 12:50) (80 - 100)  BP: 114/82 (10 Aug 2018 12:50) (101/68 - 127/88)  BP(mean): --  RR: 20 (10 Aug 2018 12:50) (18 - 20)  SpO2: 98% (10 Aug 2018 12:50) (96% - 100%)    I&O's Summary    09 Aug 2018 07:01  -  10 Aug 2018 07:00  --------------------------------------------------------  IN: 50 mL / OUT: 0 mL / NET: 50 mL        CAPILLARY BLOOD GLUCOSE          PHYSICAL EXAM:    Constitutional: NAD, awake and alert, well-developed  HEENT: PERR, EOMI, Normal Hearing, MMM  Neck: Soft and supple, No LAD, No JVD  Respiratory: Breath sounds are clear bilaterally, No wheezing, rales or rhonchi  Cardiovascular: S1 and S2, regular rate and rhythm, no Murmurs, gallops or rubs  Gastrointestinal: Bowel Sounds present, soft, nontender, nondistended, no guarding, no rebound  Extremities: No peripheral edema  Vascular: 2+ peripheral pulses  Neurological: A/O x 3, no focal deficits  Musculoskeletal: 5/5 strength b/l upper and lower extremities  Skin: No rashes    MEDICATIONS:  MEDICATIONS  (STANDING):  docusate sodium 100 milliGRAM(s) Oral three times a day  enoxaparin Injectable 30 milliGRAM(s) SubCutaneous every 24 hours  escitalopram 20 milliGRAM(s) Oral daily  folic acid 1 milliGRAM(s) Oral daily  LORazepam     Tablet 1 milliGRAM(s) Oral three times a day  multivitamin/thiamine/folic acid in sodium chloride 0.9% 1000 milliLiter(s) (100 mL/Hr) IV Continuous <Continuous>  nicotine -  14 mG/24Hr(s) Patch 1 patch Transdermal daily  thiamine 100 milliGRAM(s) Oral daily      LABS: All Labs Reviewed:                        12.7   4.58  )-----------( 104      ( 10 Aug 2018 09:25 )             36.7     08-10    137  |  103  |  13  ----------------------------<  135<H>  3.6   |  23  |  0.67    Ca    8.5      10 Aug 2018 09:25  Phos  2.7     08-10  Mg     1.8     08-10    TPro  7.0  /  Alb  3.0<L>  /  TBili  1.0  /  DBili  0.2  /  AST  58<H>  /  ALT  38  /  AlkPhos  115  08-09      CARDIAC MARKERS ( 09 Aug 2018 10:15 )  <0.015 ng/mL / x     / x     / x     / x              Blood Culture:     RADIOLOGY/EKG:    reviewed

## 2018-08-11 RX ORDER — LANOLIN ALCOHOL/MO/W.PET/CERES
3 CREAM (GRAM) TOPICAL AT BEDTIME
Qty: 0 | Refills: 0 | Status: DISCONTINUED | OUTPATIENT
Start: 2018-08-11 | End: 2018-08-14

## 2018-08-11 RX ADMIN — Medication 1 MILLIGRAM(S): at 21:48

## 2018-08-11 RX ADMIN — Medication 400 MILLIGRAM(S): at 08:05

## 2018-08-11 RX ADMIN — Medication 100 MILLIGRAM(S): at 13:20

## 2018-08-11 RX ADMIN — Medication 1 PATCH: at 11:54

## 2018-08-11 RX ADMIN — Medication 3 MILLIGRAM(S): at 21:48

## 2018-08-11 RX ADMIN — ENOXAPARIN SODIUM 30 MILLIGRAM(S): 100 INJECTION SUBCUTANEOUS at 14:52

## 2018-08-11 RX ADMIN — Medication 1 MILLIGRAM(S): at 11:53

## 2018-08-11 RX ADMIN — ESCITALOPRAM OXALATE 20 MILLIGRAM(S): 10 TABLET, FILM COATED ORAL at 11:54

## 2018-08-11 RX ADMIN — Medication 100 MILLIGRAM(S): at 11:54

## 2018-08-11 RX ADMIN — Medication 1 MILLIGRAM(S): at 13:20

## 2018-08-11 RX ADMIN — Medication 1 MILLIGRAM(S): at 05:56

## 2018-08-11 NOTE — PROGRESS NOTE ADULT - SUBJECTIVE AND OBJECTIVE BOX
Patient is a 53y old  Female who presents with a chief complaint of etoh related withdrawls (09 Aug 2018 12:47)      SUBJECTIVE:   HPI:  8/8: feels some anxiety but denies A/V hallucinations. Had some Nausea thsi am  8/11: no complaints.         REVIEW OF SYSTEMS:    CONSTITUTIONAL: No weakness, fevers or chills  EYES/ENT: No visual changes;  No vertigo or throat pain   NECK: No pain or stiffness  RESPIRATORY: No cough, wheezing, hemoptysis; No shortness of breath  CARDIOVASCULAR: No chest pain or palpitations  GASTROINTESTINAL: No abdominal or epigastric pain. No nausea, vomiting, or hematemesis; No diarrhea or constipation. No melena or hematochezia.  GENITOURINARY: No dysuria, frequency or hematuria  NEUROLOGICAL: No numbness or weakness  SKIN: No itching, burning, rashes, or lesions   All other review of systems is negative unless indicated above        ICU Vital Signs Last 24 Hrs  T(C): 36.7 (11 Aug 2018 10:54), Max: 36.8 (10 Aug 2018 20:27)  T(F): 98.1 (11 Aug 2018 10:54), Max: 98.2 (10 Aug 2018 20:27)  HR: 92 (11 Aug 2018 10:54) (89 - 110)  BP: 105/67 (11 Aug 2018 10:54) (103/79 - 124/92)  BP(mean): --  ABP: --  ABP(mean): --  RR: 18 (11 Aug 2018 10:54) (18 - 18)  SpO2: 96% (11 Aug 2018 10:54) (95% - 99%)        CAPILLARY BLOOD GLUCOSE          PHYSICAL EXAM:    Constitutional: NAD, awake and alert, well-developed  HEENT: PERR, EOMI, Normal Hearing, MMM  Neck: Soft and supple, No LAD, No JVD  Respiratory: Breath sounds are clear bilaterally, No wheezing, rales or rhonchi  Cardiovascular: S1 and S2, regular rate and rhythm, no Murmurs, gallops or rubs  Gastrointestinal: Bowel Sounds present, soft, nontender, nondistended, no guarding, no rebound  Extremities: No peripheral edema  Vascular: 2+ peripheral pulses  Neurological: A/O x 3, no focal deficits  Musculoskeletal: 5/5 strength b/l upper and lower extremities  Skin: No rashes    MEDICATIONS:  MEDICATIONS  (STANDING):  docusate sodium 100 milliGRAM(s) Oral three times a day  enoxaparin Injectable 30 milliGRAM(s) SubCutaneous every 24 hours  escitalopram 20 milliGRAM(s) Oral daily  folic acid 1 milliGRAM(s) Oral daily  LORazepam     Tablet 1 milliGRAM(s) Oral three times a day  multivitamin/thiamine/folic acid in sodium chloride 0.9% 1000 milliLiter(s) (100 mL/Hr) IV Continuous <Continuous>  nicotine -  14 mG/24Hr(s) Patch 1 patch Transdermal daily  thiamine 100 milliGRAM(s) Oral daily      LABS: All Labs Reviewed:                        12.7   4.58  )-----------( 104      ( 10 Aug 2018 09:25 )             36.7     08-10    137  |  103  |  13  ----------------------------<  135<H>  3.6   |  23  |  0.67    Ca    8.5      10 Aug 2018 09:25  Phos  2.7     08-10  Mg     1.8     08-10    TPro  7.0  /  Alb  3.0<L>  /  TBili  1.0  /  DBili  0.2  /  AST  58<H>  /  ALT  38  /  AlkPhos  115  08-09      CARDIAC MARKERS ( 09 Aug 2018 10:15 )  <0.015 ng/mL / x     / x     / x     / x              Blood Culture:     RADIOLOGY/EKG:    reviewed

## 2018-08-11 NOTE — PROGRESS NOTE ADULT - ASSESSMENT
52 y/o female presents to the ED  for ETOH related withdrawl symptoms. Patient states she was recently at rehab for ETOH use, discharged last week.  last drink being 2 days ago. In Er patient c/o  shakiness, weakness, diaphoresis. Normally drinks 1pint of vodka a day. Pt reports 2 seizures at home since leaving rehab, no LOC or head trauma.  Denies CP, fevers, chills, hallucinations, n/v/d.         A:  ETOH abuse      P:  Continue with ativan protocol. Plan for inpt rehab monday Fall / seizure precuations  MVI/Thiamine/Folate for suspected etoh related thiamine / vit deficiency  SW consult for rehab upon discharge  dvt px    totaltime during encounter: 35 min

## 2018-08-12 ENCOUNTER — TRANSCRIPTION ENCOUNTER (OUTPATIENT)
Age: 53
End: 2018-08-12

## 2018-08-12 LAB
AMYLASE P1 CFR SERPL: 55 U/L — SIGNIFICANT CHANGE UP (ref 25–115)
HCT VFR BLD CALC: 41.3 % — SIGNIFICANT CHANGE UP (ref 34.5–45)
HGB BLD-MCNC: 14.1 G/DL — SIGNIFICANT CHANGE UP (ref 11.5–15.5)
LIDOCAIN IGE QN: 480 U/L — HIGH (ref 73–393)
MCHC RBC-ENTMCNC: 31.2 PG — SIGNIFICANT CHANGE UP (ref 27–34)
MCHC RBC-ENTMCNC: 34.1 GM/DL — SIGNIFICANT CHANGE UP (ref 32–36)
MCV RBC AUTO: 91.4 FL — SIGNIFICANT CHANGE UP (ref 80–100)
NRBC # BLD: 0 /100 WBCS — SIGNIFICANT CHANGE UP (ref 0–0)
PLATELET # BLD AUTO: 132 K/UL — LOW (ref 150–400)
RBC # BLD: 4.52 M/UL — SIGNIFICANT CHANGE UP (ref 3.8–5.2)
RBC # FLD: 12.7 % — SIGNIFICANT CHANGE UP (ref 10.3–14.5)
WBC # BLD: 6.08 K/UL — SIGNIFICANT CHANGE UP (ref 3.8–10.5)
WBC # FLD AUTO: 6.08 K/UL — SIGNIFICANT CHANGE UP (ref 3.8–10.5)

## 2018-08-12 PROCEDURE — 71100 X-RAY EXAM RIBS UNI 2 VIEWS: CPT | Mod: 26,LT

## 2018-08-12 RX ORDER — FOLIC ACID 0.8 MG
1 TABLET ORAL
Qty: 0 | Refills: 0 | DISCHARGE
Start: 2018-08-12

## 2018-08-12 RX ORDER — NICOTINE POLACRILEX 2 MG
1 GUM BUCCAL
Qty: 0 | Refills: 0 | COMMUNITY
Start: 2018-08-12

## 2018-08-12 RX ORDER — THIAMINE MONONITRATE (VIT B1) 100 MG
1 TABLET ORAL
Qty: 0 | Refills: 0 | COMMUNITY
Start: 2018-08-12

## 2018-08-12 RX ORDER — SODIUM CHLORIDE 9 MG/ML
1000 INJECTION INTRAMUSCULAR; INTRAVENOUS; SUBCUTANEOUS
Qty: 0 | Refills: 0 | Status: DISCONTINUED | OUTPATIENT
Start: 2018-08-12 | End: 2018-08-14

## 2018-08-12 RX ORDER — ESCITALOPRAM OXALATE 10 MG/1
1 TABLET, FILM COATED ORAL
Qty: 0 | Refills: 0 | COMMUNITY
Start: 2018-08-12

## 2018-08-12 RX ADMIN — Medication 1 MILLIGRAM(S): at 11:13

## 2018-08-12 RX ADMIN — Medication 100 MILLIGRAM(S): at 14:13

## 2018-08-12 RX ADMIN — Medication 100 MILLIGRAM(S): at 11:13

## 2018-08-12 RX ADMIN — Medication 1 MILLIGRAM(S): at 21:30

## 2018-08-12 RX ADMIN — Medication 100 MILLIGRAM(S): at 05:24

## 2018-08-12 RX ADMIN — Medication 100 MILLIGRAM(S): at 21:30

## 2018-08-12 RX ADMIN — Medication 1 MILLIGRAM(S): at 05:23

## 2018-08-12 RX ADMIN — Medication 3 MILLIGRAM(S): at 21:30

## 2018-08-12 RX ADMIN — Medication 1 MILLIGRAM(S): at 14:13

## 2018-08-12 RX ADMIN — SODIUM CHLORIDE 100 MILLILITER(S): 9 INJECTION INTRAMUSCULAR; INTRAVENOUS; SUBCUTANEOUS at 16:05

## 2018-08-12 RX ADMIN — ENOXAPARIN SODIUM 30 MILLIGRAM(S): 100 INJECTION SUBCUTANEOUS at 14:13

## 2018-08-12 RX ADMIN — ESCITALOPRAM OXALATE 20 MILLIGRAM(S): 10 TABLET, FILM COATED ORAL at 11:13

## 2018-08-12 NOTE — DISCHARGE NOTE ADULT - PATIENT PORTAL LINK FT
You can access the [facility] Portal, offered by [facility], by registering with the following website: [email or web address]

## 2018-08-12 NOTE — DISCHARGE NOTE ADULT - MEDICATION SUMMARY - MEDICATIONS TO TAKE
I will START or STAY ON the medications listed below when I get home from the hospital:    escitalopram 20 mg oral tablet  -- 1 tab(s) by mouth once a day  -- Indication: For Depression, unspecified depression type    nicotine 14 mg/24 hr transdermal film, extended release  -- 1 patch by transdermal patch once a day  -- Indication: For smoking    folic acid 1 mg oral tablet  -- 1 tab(s) by mouth once a day  -- Indication: For supplement    thiamine 100 mg oral tablet  -- 1 tab(s) by mouth once a day  -- Indication: For supplement

## 2018-08-12 NOTE — DISCHARGE NOTE ADULT - HOSPITAL COURSE
· Subjective and Objective: 	  Patient is a 53y old  Female who presents with a chief complaint of etoh related withdrawls       SUBJECTIVE:   HPI:  8/8: feels some anxiety but denies A/V hallucinations. Had some Nausea this am  8/11: no complaints.   8/12: sustained fall several days ago PTA-> today c/o left rib pain.   REVIEW OF SYSTEMS:    CONSTITUTIONAL: No weakness, fevers or chills  EYES/ENT: No visual changes;  No vertigo or throat pain   NECK: No pain or stiffness  RESPIRATORY: No cough, wheezing, hemoptysis; No shortness of breath  CARDIOVASCULAR: No chest pain or palpitations  GASTROINTESTINAL: No abdominal or epigastric pain. No nausea, vomiting, or hematemesis; No diarrhea or constipation. No melena or hematochezia.  GENITOURINARY: No dysuria, frequency or hematuria  NEUROLOGICAL: No numbness or weakness  SKIN: No itching, burning, rashes, or lesions   All other review of systems is negative unless indicated above  ICU Vital Signs Last 24 Hrs  T(C): 36.7 (12 Aug 2018 11:19), Max: 37.1 (11 Aug 2018 20:45)  T(F): 98.1 (12 Aug 2018 11:19), Max: 98.7 (11 Aug 2018 20:45)  HR: 97 (12 Aug 2018 14:03) (86 - 106)  BP: 107/68 (12 Aug 2018 14:03) (91/60 - 123/73)  BP(mean): --  ABP: --  ABP(mean): --  RR: 18 (12 Aug 2018 14:03) (16 - 18)  SpO2: 97% (12 Aug 2018 14:03) (96% - 99%)    PHYSICAL EXAM:    Constitutional: NAD, awake and alert, well-developed  HEENT: PERR, EOMI, Normal Hearing, MMM  Neck: Soft and supple, No LAD, No JVD  Respiratory: Breath sounds are clear bilaterally, No wheezing, rales or rhonchi  Cardiovascular: S1 and S2, regular rate and rhythm, no Murmurs, gallops or rubs  Gastrointestinal: Bowel Sounds present, soft, nontender, nondistended, no guarding, no rebound  Extremities: No peripheral edema  Vascular: 2+ peripheral pulses  Neurological: A/O x 3, no focal deficits  Musculoskeletal: 5/5 strength b/l upper and lower extremities  Skin: No rashes; small resolving 1cm bruise at L breast.     MEDICATIONS:  RADIOLOGY/EKG:    reviewed  · Assessment		  54 y/o female presents to the ED  for ETOH related withdrawl symptoms. Patient states she was recently at rehab for ETOH use, discharged last week.  last drink being 2 days ago. In Er patient c/o  shakiness, weakness, diaphoresis. Normally drinks 1pint of vodka a day. Pt reports 2 seizures at home since leaving rehab, no LOC or head trauma.  Denies CP, fevers, chills, hallucinations, n/v/d.         A:  ETOH abuse  Rib/abd pain s/p fall      P:  Continue with ativan protocol. Plan for inpt rehab monday  Fall / seizure precautions  MVI/Thiamine/Folate for suspected etoh related thiamine / vit deficiency  SW consult for rehab upon discharge  Obtain amylase/lipase. Rib xray and CBC. Incentive spirometer. Tylenol prn for pain  dvt px    total time during encounter: 35 min

## 2018-08-12 NOTE — PROGRESS NOTE ADULT - ASSESSMENT
54 y/o female presents to the ED  for ETOH related withdrawl symptoms. Patient states she was recently at rehab for ETOH use, discharged last week.  last drink being 2 days ago. In Er patient c/o  shakiness, weakness, diaphoresis. Normally drinks 1pint of vodka a day. Pt reports 2 seizures at home since leaving rehab, no LOC or head trauma.  Denies CP, fevers, chills, hallucinations, n/v/d.         A:  ETOH abuse  Rib/abd pain s/p fall      P:  Continue with ativan protocol. Plan for inpt rehab monday  Fall / seizure precuations  MVI/Thiamine/Folate for suspected etoh related thiamine / vit deficiency  SW consult for rehab upon discharge  Obtain amylase/lipase. Rib xray and CBC. Incentive spirometer. Tylenol prn for pain  dvt px    totaltime during encounter: 35 min

## 2018-08-12 NOTE — DISCHARGE NOTE ADULT - CARE PLAN
Principal Discharge DX:	Alcohol abuse  Goal:	inpt rehab  Assessment and plan of treatment:	meds as indicated

## 2018-08-12 NOTE — PROGRESS NOTE ADULT - SUBJECTIVE AND OBJECTIVE BOX
Patient is a 53y old  Female who presents with a chief complaint of etoh related withdrawls (09 Aug 2018 12:47)      SUBJECTIVE:   HPI:  8/8: feels some anxiety but denies A/V hallucinations. Had some Nausea thsi am  8/11: no complaints.   8/12: sustained fall several days ago PTA-> today c/o left rib pain.         REVIEW OF SYSTEMS:    CONSTITUTIONAL: No weakness, fevers or chills  EYES/ENT: No visual changes;  No vertigo or throat pain   NECK: No pain or stiffness  RESPIRATORY: No cough, wheezing, hemoptysis; No shortness of breath  CARDIOVASCULAR: No chest pain or palpitations  GASTROINTESTINAL: No abdominal or epigastric pain. No nausea, vomiting, or hematemesis; No diarrhea or constipation. No melena or hematochezia.  GENITOURINARY: No dysuria, frequency or hematuria  NEUROLOGICAL: No numbness or weakness  SKIN: No itching, burning, rashes, or lesions   All other review of systems is negative unless indicated above      ICU Vital Signs Last 24 Hrs  T(C): 36.7 (12 Aug 2018 11:19), Max: 37.1 (11 Aug 2018 20:45)  T(F): 98.1 (12 Aug 2018 11:19), Max: 98.7 (11 Aug 2018 20:45)  HR: 88 (12 Aug 2018 11:19) (86 - 107)  BP: 101/69 (12 Aug 2018 11:19) (91/60 - 123/73)  BP(mean): --  ABP: --  ABP(mean): --  RR: 16 (12 Aug 2018 11:19) (16 - 18)  SpO2: 98% (12 Aug 2018 11:19) (96% - 99%)        CAPILLARY BLOOD GLUCOSE          PHYSICAL EXAM:    Constitutional: NAD, awake and alert, well-developed  HEENT: PERR, EOMI, Normal Hearing, MMM  Neck: Soft and supple, No LAD, No JVD  Respiratory: Breath sounds are clear bilaterally, No wheezing, rales or rhonchi  Cardiovascular: S1 and S2, regular rate and rhythm, no Murmurs, gallops or rubs  Gastrointestinal: Bowel Sounds present, soft, nontender, nondistended, no guarding, no rebound  Extremities: No peripheral edema  Vascular: 2+ peripheral pulses  Neurological: A/O x 3, no focal deficits  Musculoskeletal: 5/5 strength b/l upper and lower extremities  Skin: No rashes; small resolving 1cm bruise at L breast.     MEDICATIONS:  MEDICATIONS  (STANDING):  docusate sodium 100 milliGRAM(s) Oral three times a day  enoxaparin Injectable 30 milliGRAM(s) SubCutaneous every 24 hours  escitalopram 20 milliGRAM(s) Oral daily  folic acid 1 milliGRAM(s) Oral daily  LORazepam     Tablet 1 milliGRAM(s) Oral three times a day  multivitamin/thiamine/folic acid in sodium chloride 0.9% 1000 milliLiter(s) (100 mL/Hr) IV Continuous <Continuous>  nicotine -  14 mG/24Hr(s) Patch 1 patch Transdermal daily  thiamine 100 milliGRAM(s) Oral daily      LABS: All Labs Reviewed:                        12.7   4.58  )-----------( 104      ( 10 Aug 2018 09:25 )             36.7     08-10    137  |  103  |  13  ----------------------------<  135<H>  3.6   |  23  |  0.67    Ca    8.5      10 Aug 2018 09:25  Phos  2.7     08-10  Mg     1.8     08-10    TPro  7.0  /  Alb  3.0<L>  /  TBili  1.0  /  DBili  0.2  /  AST  58<H>  /  ALT  38  /  AlkPhos  115  08-09      CARDIAC MARKERS ( 09 Aug 2018 10:15 )  <0.015 ng/mL / x     / x     / x     / x              Blood Culture:     RADIOLOGY/EKG:    reviewed

## 2018-08-13 DIAGNOSIS — F33.41 MAJOR DEPRESSIVE DISORDER, RECURRENT, IN PARTIAL REMISSION: ICD-10-CM

## 2018-08-13 PROCEDURE — 99232 SBSQ HOSP IP/OBS MODERATE 35: CPT

## 2018-08-13 RX ORDER — PANTOPRAZOLE SODIUM 20 MG/1
40 TABLET, DELAYED RELEASE ORAL
Qty: 0 | Refills: 0 | Status: DISCONTINUED | OUTPATIENT
Start: 2018-08-13 | End: 2018-08-14

## 2018-08-13 RX ADMIN — Medication 100 MILLIGRAM(S): at 20:48

## 2018-08-13 RX ADMIN — Medication 1 MILLIGRAM(S): at 20:47

## 2018-08-13 RX ADMIN — SODIUM CHLORIDE 100 MILLILITER(S): 9 INJECTION INTRAMUSCULAR; INTRAVENOUS; SUBCUTANEOUS at 05:26

## 2018-08-13 RX ADMIN — Medication 100 MILLIGRAM(S): at 05:26

## 2018-08-13 RX ADMIN — ESCITALOPRAM OXALATE 20 MILLIGRAM(S): 10 TABLET, FILM COATED ORAL at 12:04

## 2018-08-13 RX ADMIN — Medication 1 MILLIGRAM(S): at 12:04

## 2018-08-13 RX ADMIN — Medication 3 MILLIGRAM(S): at 20:48

## 2018-08-13 RX ADMIN — Medication 1 MILLIGRAM(S): at 05:26

## 2018-08-13 RX ADMIN — Medication 100 MILLIGRAM(S): at 12:04

## 2018-08-13 RX ADMIN — ENOXAPARIN SODIUM 30 MILLIGRAM(S): 100 INJECTION SUBCUTANEOUS at 15:29

## 2018-08-13 RX ADMIN — Medication 100 MILLIGRAM(S): at 14:06

## 2018-08-13 RX ADMIN — Medication 400 MILLIGRAM(S): at 07:01

## 2018-08-13 RX ADMIN — Medication 400 MILLIGRAM(S): at 05:27

## 2018-08-13 RX ADMIN — Medication 1 MILLIGRAM(S): at 14:06

## 2018-08-13 NOTE — PROGRESS NOTE BEHAVIORAL HEALTH - NSBHCHARTREVIEWVS_PSY_A_CORE FT
Vital Signs Last 24 Hrs  T(C): 36.4 (13 Aug 2018 11:25), Max: 37 (12 Aug 2018 21:00)  T(F): 97.5 (13 Aug 2018 11:25), Max: 98.6 (12 Aug 2018 21:00)  HR: 99 (13 Aug 2018 11:25) (87 - 104)  BP: 114/78 (13 Aug 2018 11:25) (97/67 - 114/78)  BP(mean): --  RR: 18 (13 Aug 2018 11:25) (16 - 18)  SpO2: 99% (13 Aug 2018 11:25) (98% - 99%)

## 2018-08-13 NOTE — PROGRESS NOTE ADULT - SUBJECTIVE AND OBJECTIVE BOX
CHIEF COMPLAINT/Diagnosis: etoh related withdralws/ fall    SUBJECTIVE: no complaints    REVIEW OF SYSTEMS:    CONSTITUTIONAL: No weakness, fevers or chills  EYES/ENT: No visual changes;  No vertigo or throat pain   NECK: No pain or stiffness  RESPIRATORY: No cough, wheezing, hemoptysis; No shortness of breath  CARDIOVASCULAR: No chest pain or palpitations  GASTROINTESTINAL: No abdominal or epigastric pain. No nausea, vomiting, or hematemesis; No diarrhea or constipation. No melena or hematochezia.  GENITOURINARY: No dysuria, frequency or hematuria  NEUROLOGICAL: No numbness or weakness  SKIN: No itching, burning, rashes, or lesions   All other review of systems is negative unless indicated above    Vital Signs Last 24 Hrs  T(C): 36.4 (13 Aug 2018 11:25), Max: 37 (12 Aug 2018 21:00)  T(F): 97.5 (13 Aug 2018 11:25), Max: 98.6 (12 Aug 2018 21:00)  HR: 99 (13 Aug 2018 11:25) (87 - 104)  BP: 114/78 (13 Aug 2018 11:25) (97/67 - 114/78)  BP(mean): --  RR: 18 (13 Aug 2018 11:25) (16 - 18)  SpO2: 99% (13 Aug 2018 11:25) (98% - 99%)    I&O's Summary      CAPILLARY BLOOD GLUCOSE          PHYSICAL EXAM:    Constitutional: NAD, awake and alert, well-developed  HEENT: PERR, EOMI, Normal Hearing, MMM  Neck: Soft and supple, No LAD, No JVD  Respiratory: Breath sounds are clear bilaterally, No wheezing, rales or rhonchi  Cardiovascular: S1 and S2, regular rate and rhythm, no Murmurs, gallops or rubs  Gastrointestinal: Bowel Sounds present, soft, nontender, nondistended, no guarding, no rebound  Extremities: No peripheral edema  Vascular: 2+ peripheral pulses  Neurological: A/O x 3, no focal deficits  Musculoskeletal: 5/5 strength b/l upper and lower extremities  Skin: No rashes    MEDICATIONS:  MEDICATIONS  (STANDING):  docusate sodium 100 milliGRAM(s) Oral three times a day  enoxaparin Injectable 30 milliGRAM(s) SubCutaneous every 24 hours  escitalopram 20 milliGRAM(s) Oral daily  folic acid 1 milliGRAM(s) Oral daily  LORazepam     Tablet 1 milliGRAM(s) Oral three times a day  melatonin 3 milliGRAM(s) Oral at bedtime  nicotine -  14 mG/24Hr(s) Patch 1 patch Transdermal daily  sodium chloride 0.9%. 1000 milliLiter(s) (100 mL/Hr) IV Continuous <Continuous>  thiamine 100 milliGRAM(s) Oral daily      LABS: All Labs Reviewed:                        14.1   6.08  )-----------( 132      ( 12 Aug 2018 12:26 )             41.3       Assessment	  54 y/o female presents to the ED  for ETOH related withdrawl symptoms.  In Er patient c/o  shakiness, weakness, diaphoresis. Normally drinks 1pint of vodka a day. Pt reports 2 seizures at home since leaving rehab, no LOC or head trauma.        Continue with ativan protocol. Plan for inpt rehab to [facility] when bed available  Fall / seizure precuations  MVI/Thiamine/Folate for suspected etoh related thiamine / vit deficiency  rib pain, related to fall; c/w prn pain meds    dvt proph- sc lovenox

## 2018-08-13 NOTE — PROGRESS NOTE BEHAVIORAL HEALTH - SUMMARY
53 YOWMW with EtOH abuse and depression on lexapro,  currently in detox, multiple detox and rahabs. most recent one 1 week prior to admission  in [place]. Relapsed few days later,   PT is not a imminent risk to harm self and others and she does not need inpatient psychiatry treatment,.    She is interested in going to rehab.   Continue lexapro 20mg po qd.

## 2018-08-13 NOTE — PROGRESS NOTE BEHAVIORAL HEALTH - NSBHFUPINTERVALHXFT_PSY_A_CORE
PT reports feeling better. She agreed to start rehab in [facility]. Mod is "good". Appetite good, sleep good. NO SI, HI< AH and Vh.

## 2018-08-14 VITALS
DIASTOLIC BLOOD PRESSURE: 65 MMHG | OXYGEN SATURATION: 97 % | RESPIRATION RATE: 17 BRPM | TEMPERATURE: 97 F | HEART RATE: 99 BPM | SYSTOLIC BLOOD PRESSURE: 104 MMHG

## 2018-08-14 LAB
ALBUMIN SERPL ELPH-MCNC: 3.1 G/DL — LOW (ref 3.3–5)
ALP SERPL-CCNC: 122 U/L — HIGH (ref 40–120)
ALT FLD-CCNC: 66 U/L — SIGNIFICANT CHANGE UP (ref 12–78)
AMYLASE P1 CFR SERPL: 51 U/L — SIGNIFICANT CHANGE UP (ref 25–115)
ANION GAP SERPL CALC-SCNC: 8 MMOL/L — SIGNIFICANT CHANGE UP (ref 5–17)
AST SERPL-CCNC: 77 U/L — HIGH (ref 15–37)
BILIRUB SERPL-MCNC: 0.5 MG/DL — SIGNIFICANT CHANGE UP (ref 0.2–1.2)
BUN SERPL-MCNC: 10 MG/DL — SIGNIFICANT CHANGE UP (ref 7–23)
CALCIUM SERPL-MCNC: 8.9 MG/DL — SIGNIFICANT CHANGE UP (ref 8.5–10.1)
CHLORIDE SERPL-SCNC: 105 MMOL/L — SIGNIFICANT CHANGE UP (ref 96–108)
CO2 SERPL-SCNC: 27 MMOL/L — SIGNIFICANT CHANGE UP (ref 22–31)
CREAT SERPL-MCNC: 0.73 MG/DL — SIGNIFICANT CHANGE UP (ref 0.5–1.3)
GLUCOSE SERPL-MCNC: 85 MG/DL — SIGNIFICANT CHANGE UP (ref 70–99)
LIDOCAIN IGE QN: 398 U/L — HIGH (ref 73–393)
POTASSIUM SERPL-MCNC: 4.3 MMOL/L — SIGNIFICANT CHANGE UP (ref 3.5–5.3)
POTASSIUM SERPL-SCNC: 4.3 MMOL/L — SIGNIFICANT CHANGE UP (ref 3.5–5.3)
PROT SERPL-MCNC: 7.5 GM/DL — SIGNIFICANT CHANGE UP (ref 6–8.3)
SODIUM SERPL-SCNC: 140 MMOL/L — SIGNIFICANT CHANGE UP (ref 135–145)

## 2018-08-14 RX ADMIN — Medication 100 MILLIGRAM(S): at 14:24

## 2018-08-14 RX ADMIN — PANTOPRAZOLE SODIUM 40 MILLIGRAM(S): 20 TABLET, DELAYED RELEASE ORAL at 05:14

## 2018-08-14 RX ADMIN — Medication 1 MILLIGRAM(S): at 14:24

## 2018-08-14 RX ADMIN — Medication 100 MILLIGRAM(S): at 05:14

## 2018-08-14 RX ADMIN — ESCITALOPRAM OXALATE 20 MILLIGRAM(S): 10 TABLET, FILM COATED ORAL at 12:15

## 2018-08-14 RX ADMIN — Medication 1 MILLIGRAM(S): at 12:15

## 2018-08-14 RX ADMIN — Medication 1 MILLIGRAM(S): at 05:14

## 2018-08-14 RX ADMIN — Medication 100 MILLIGRAM(S): at 12:15

## 2018-08-14 NOTE — PROGRESS NOTE ADULT - SUBJECTIVE AND OBJECTIVE BOX
CHIEF COMPLAINT/Diagnosis: etoh withdralw/ abuse and dependence    SUBJECTIVE: no complaints    REVIEW OF SYSTEMS:    CONSTITUTIONAL: No weakness, fevers or chills  EYES/ENT: No visual changes;  No vertigo or throat pain   NECK: No pain or stiffness  RESPIRATORY: No cough, wheezing, hemoptysis; No shortness of breath  CARDIOVASCULAR: No chest pain or palpitations  GASTROINTESTINAL: No abdominal or epigastric pain. No nausea, vomiting, or hematemesis; No diarrhea or constipation. No melena or hematochezia.  GENITOURINARY: No dysuria, frequency or hematuria  NEUROLOGICAL: No numbness or weakness  SKIN: No itching, burning, rashes, or lesions   All other review of systems is negative unless indicated above    Vital Signs Last 24 Hrs  T(C): 36.3 (14 Aug 2018 12:26), Max: 36.9 (13 Aug 2018 17:10)  T(F): 97.4 (14 Aug 2018 12:26), Max: 98.4 (13 Aug 2018 17:10)  HR: 99 (14 Aug 2018 12:26) (91 - 99)  BP: 104/65 (14 Aug 2018 12:26) (101/68 - 108/74)  BP(mean): --  RR: 17 (14 Aug 2018 12:26) (17 - 18)  SpO2: 97% (14 Aug 2018 12:26) (97% - 98%)    I&O's Summary      CAPILLARY BLOOD GLUCOSE          PHYSICAL EXAM:    Constitutional: NAD, awake and alert, well-developed  HEENT: PERR, EOMI, Normal Hearing, MMM  Neck: Soft and supple, No LAD, No JVD  Respiratory: Breath sounds are clear bilaterally, No wheezing, rales or rhonchi  Cardiovascular: S1 and S2, regular rate and rhythm, no Murmurs, gallops or rubs  Gastrointestinal: Bowel Sounds present, soft, nontender, nondistended, no guarding, no rebound  Extremities: No peripheral edema  Vascular: 2+ peripheral pulses  Neurological: A/O x 3, no focal deficits  Musculoskeletal: 5/5 strength b/l upper and lower extremities  Skin: No rashes    MEDICATIONS:  MEDICATIONS  (STANDING):  docusate sodium 100 milliGRAM(s) Oral three times a day  enoxaparin Injectable 30 milliGRAM(s) SubCutaneous every 24 hours  escitalopram 20 milliGRAM(s) Oral daily  folic acid 1 milliGRAM(s) Oral daily  LORazepam     Tablet 1 milliGRAM(s) Oral three times a day  melatonin 3 milliGRAM(s) Oral at bedtime  nicotine -  14 mG/24Hr(s) Patch 1 patch Transdermal daily  pantoprazole    Tablet 40 milliGRAM(s) Oral before breakfast  sodium chloride 0.9%. 1000 milliLiter(s) (100 mL/Hr) IV Continuous <Continuous>  thiamine 100 milliGRAM(s) Oral daily      LABS: All Labs Reviewed:    08-14    140  |  105  |  10  ----------------------------<  85  4.3   |  27  |  0.73    Ca    8.9      14 Aug 2018 07:29    TPro  7.5  /  Alb  3.1<L>  /  TBili  0.5  /  DBili  x   /  AST  77<H>  /  ALT  66  /  AlkPhos  122<H>  08-14        Assessment	  52 y/o female presents to the ED  for ETOH related withdrawl symptoms.  In Er patient c/o  shakiness, weakness, diaphoresis. Normally drinks 1pint of vodka a day. Pt reports 2 seizures at home since leaving rehab, no LOC or head trauma.        Continue with ativan protocol. Plan for inpt rehab to [facility] when bed available  Fall / seizure precuations  MVI/Thiamine/Folate for suspected etoh related thiamine / vit deficiency  rib pain, related to fall; c/w prn pain meds    dvt proph- sc lovenox    Patient is for discharge today to rehab for etoh [facility]

## 2018-08-21 DIAGNOSIS — E51.8 OTHER MANIFESTATIONS OF THIAMINE DEFICIENCY: ICD-10-CM

## 2018-08-21 DIAGNOSIS — F33.41 MAJOR DEPRESSIVE DISORDER, RECURRENT, IN PARTIAL REMISSION: ICD-10-CM

## 2018-08-21 DIAGNOSIS — F10.229 ALCOHOL DEPENDENCE WITH INTOXICATION, UNSPECIFIED: ICD-10-CM

## 2018-08-21 DIAGNOSIS — F10.239 ALCOHOL DEPENDENCE WITH WITHDRAWAL, UNSPECIFIED: ICD-10-CM
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
## 2018-09-10 ENCOUNTER — APPOINTMENT (OUTPATIENT)
Dept: OBGYN | Facility: CLINIC | Age: 53
End: 2018-09-10
Payer: COMMERCIAL

## 2018-09-10 VITALS
SYSTOLIC BLOOD PRESSURE: 110 MMHG | DIASTOLIC BLOOD PRESSURE: 70 MMHG | HEIGHT: 67 IN | WEIGHT: 134.25 LBS | BODY MASS INDEX: 21.07 KG/M2

## 2018-09-10 DIAGNOSIS — Z87.891 PERSONAL HISTORY OF NICOTINE DEPENDENCE: ICD-10-CM

## 2018-09-10 DIAGNOSIS — Z85.828 PERSONAL HISTORY OF OTHER MALIGNANT NEOPLASM OF SKIN: ICD-10-CM

## 2018-09-10 DIAGNOSIS — Z86.59 PERSONAL HISTORY OF OTHER MENTAL AND BEHAVIORAL DISORDERS: ICD-10-CM

## 2018-09-10 DIAGNOSIS — Z82.49 FAMILY HISTORY OF ISCHEMIC HEART DISEASE AND OTHER DISEASES OF THE CIRCULATORY SYSTEM: ICD-10-CM

## 2018-09-10 DIAGNOSIS — Z78.9 OTHER SPECIFIED HEALTH STATUS: ICD-10-CM

## 2018-09-10 DIAGNOSIS — Z82.3 FAMILY HISTORY OF STROKE: ICD-10-CM

## 2018-09-10 PROCEDURE — 82270 OCCULT BLOOD FECES: CPT

## 2018-09-10 PROCEDURE — 99386 PREV VISIT NEW AGE 40-64: CPT

## 2018-09-11 LAB — HPV HIGH+LOW RISK DNA PNL CVX: NOT DETECTED

## 2018-09-14 LAB — CYTOLOGY CVX/VAG DOC THIN PREP: NORMAL

## 2018-12-31 ENCOUNTER — EMERGENCY (EMERGENCY)
Facility: HOSPITAL | Age: 53
LOS: 0 days | Discharge: HOME | End: 2019-01-01
Attending: EMERGENCY MEDICINE | Admitting: EMERGENCY MEDICINE
Payer: COMMERCIAL

## 2018-12-31 VITALS — WEIGHT: 128.97 LBS | HEIGHT: 67 IN
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
## 2018-12-31 DIAGNOSIS — R19.7 DIARRHEA, UNSPECIFIED: ICD-10-CM

## 2018-12-31 DIAGNOSIS — F10.129 ALCOHOL ABUSE WITH INTOXICATION, UNSPECIFIED: ICD-10-CM

## 2018-12-31 DIAGNOSIS — R10.32 LEFT LOWER QUADRANT PAIN: ICD-10-CM

## 2018-12-31 DIAGNOSIS — R10.30 LOWER ABDOMINAL PAIN, UNSPECIFIED: ICD-10-CM

## 2018-12-31 LAB
ALBUMIN SERPL ELPH-MCNC: 3.4 G/DL — SIGNIFICANT CHANGE UP (ref 3.3–5)
ALP SERPL-CCNC: 155 U/L — HIGH (ref 40–120)
ALT FLD-CCNC: 71 U/L — SIGNIFICANT CHANGE UP (ref 12–78)
ANION GAP SERPL CALC-SCNC: 9 MMOL/L — SIGNIFICANT CHANGE UP (ref 5–17)
AST SERPL-CCNC: 136 U/L — HIGH (ref 15–37)
BASOPHILS # BLD AUTO: 0.02 K/UL — SIGNIFICANT CHANGE UP (ref 0–0.2)
BASOPHILS NFR BLD AUTO: 0.3 % — SIGNIFICANT CHANGE UP (ref 0–2)
BILIRUB SERPL-MCNC: 0.2 MG/DL — SIGNIFICANT CHANGE UP (ref 0.2–1.2)
BUN SERPL-MCNC: 18 MG/DL — SIGNIFICANT CHANGE UP (ref 7–23)
CALCIUM SERPL-MCNC: 8.3 MG/DL — LOW (ref 8.5–10.1)
CHLORIDE SERPL-SCNC: 107 MMOL/L — SIGNIFICANT CHANGE UP (ref 96–108)
CO2 SERPL-SCNC: 25 MMOL/L — SIGNIFICANT CHANGE UP (ref 22–31)
CREAT SERPL-MCNC: 0.73 MG/DL — SIGNIFICANT CHANGE UP (ref 0.5–1.3)
EOSINOPHIL # BLD AUTO: 0.06 K/UL — SIGNIFICANT CHANGE UP (ref 0–0.5)
EOSINOPHIL NFR BLD AUTO: 0.9 % — SIGNIFICANT CHANGE UP (ref 0–6)
ETHANOL SERPL-MCNC: 335 MG/DL — HIGH (ref 0–10)
GLUCOSE SERPL-MCNC: 133 MG/DL — HIGH (ref 70–99)
HCT VFR BLD CALC: 38.2 % — SIGNIFICANT CHANGE UP (ref 34.5–45)
HGB BLD-MCNC: 13.1 G/DL — SIGNIFICANT CHANGE UP (ref 11.5–15.5)
IMM GRANULOCYTES NFR BLD AUTO: 0.3 % — SIGNIFICANT CHANGE UP (ref 0–1.5)
LIDOCAIN IGE QN: 517 U/L — HIGH (ref 73–393)
LYMPHOCYTES # BLD AUTO: 2.88 K/UL — SIGNIFICANT CHANGE UP (ref 1–3.3)
LYMPHOCYTES # BLD AUTO: 43.6 % — SIGNIFICANT CHANGE UP (ref 13–44)
MCHC RBC-ENTMCNC: 30.5 PG — SIGNIFICANT CHANGE UP (ref 27–34)
MCHC RBC-ENTMCNC: 34.3 GM/DL — SIGNIFICANT CHANGE UP (ref 32–36)
MCV RBC AUTO: 89 FL — SIGNIFICANT CHANGE UP (ref 80–100)
MONOCYTES # BLD AUTO: 0.7 K/UL — SIGNIFICANT CHANGE UP (ref 0–0.9)
MONOCYTES NFR BLD AUTO: 10.6 % — SIGNIFICANT CHANGE UP (ref 2–14)
NEUTROPHILS # BLD AUTO: 2.93 K/UL — SIGNIFICANT CHANGE UP (ref 1.8–7.4)
NEUTROPHILS NFR BLD AUTO: 44.3 % — SIGNIFICANT CHANGE UP (ref 43–77)
NRBC # BLD: 0 /100 WBCS — SIGNIFICANT CHANGE UP (ref 0–0)
PLATELET # BLD AUTO: 163 K/UL — SIGNIFICANT CHANGE UP (ref 150–400)
POTASSIUM SERPL-MCNC: 4.8 MMOL/L — SIGNIFICANT CHANGE UP (ref 3.5–5.3)
POTASSIUM SERPL-SCNC: 4.8 MMOL/L — SIGNIFICANT CHANGE UP (ref 3.5–5.3)
PROT SERPL-MCNC: 7.8 GM/DL — SIGNIFICANT CHANGE UP (ref 6–8.3)
RBC # BLD: 4.29 M/UL — SIGNIFICANT CHANGE UP (ref 3.8–5.2)
RBC # FLD: 14.9 % — HIGH (ref 10.3–14.5)
SODIUM SERPL-SCNC: 141 MMOL/L — SIGNIFICANT CHANGE UP (ref 135–145)
WBC # BLD: 6.61 K/UL — SIGNIFICANT CHANGE UP (ref 3.8–10.5)
WBC # FLD AUTO: 6.61 K/UL — SIGNIFICANT CHANGE UP (ref 3.8–10.5)

## 2018-12-31 PROCEDURE — 93010 ELECTROCARDIOGRAM REPORT: CPT

## 2018-12-31 PROCEDURE — 99285 EMERGENCY DEPT VISIT HI MDM: CPT | Mod: 25

## 2018-12-31 PROCEDURE — 76857 US EXAM PELVIC LIMITED: CPT | Mod: 26

## 2018-12-31 PROCEDURE — 76830 TRANSVAGINAL US NON-OB: CPT | Mod: 26

## 2018-12-31 RX ORDER — MORPHINE SULFATE 50 MG/1
4 CAPSULE, EXTENDED RELEASE ORAL ONCE
Qty: 0 | Refills: 0 | Status: DISCONTINUED | OUTPATIENT
Start: 2018-12-31 | End: 2018-12-31

## 2018-12-31 RX ORDER — SODIUM CHLORIDE 9 MG/ML
1000 INJECTION INTRAMUSCULAR; INTRAVENOUS; SUBCUTANEOUS ONCE
Qty: 0 | Refills: 0 | Status: COMPLETED | OUTPATIENT
Start: 2018-12-31 | End: 2018-12-31

## 2018-12-31 RX ORDER — ONDANSETRON 8 MG/1
4 TABLET, FILM COATED ORAL ONCE
Qty: 0 | Refills: 0 | Status: COMPLETED | OUTPATIENT
Start: 2018-12-31 | End: 2018-12-31

## 2018-12-31 RX ORDER — SODIUM CHLORIDE 9 MG/ML
3 INJECTION INTRAMUSCULAR; INTRAVENOUS; SUBCUTANEOUS ONCE
Qty: 0 | Refills: 0 | Status: COMPLETED | OUTPATIENT
Start: 2018-12-31 | End: 2018-12-31

## 2018-12-31 RX ADMIN — SODIUM CHLORIDE 3 MILLILITER(S): 9 INJECTION INTRAMUSCULAR; INTRAVENOUS; SUBCUTANEOUS at 22:23

## 2018-12-31 RX ADMIN — SODIUM CHLORIDE 1000 MILLILITER(S): 9 INJECTION INTRAMUSCULAR; INTRAVENOUS; SUBCUTANEOUS at 23:23

## 2018-12-31 RX ADMIN — MORPHINE SULFATE 4 MILLIGRAM(S): 50 CAPSULE, EXTENDED RELEASE ORAL at 22:23

## 2018-12-31 RX ADMIN — ONDANSETRON 4 MILLIGRAM(S): 8 TABLET, FILM COATED ORAL at 22:23

## 2018-12-31 RX ADMIN — SODIUM CHLORIDE 1000 MILLILITER(S): 9 INJECTION INTRAMUSCULAR; INTRAVENOUS; SUBCUTANEOUS at 22:22

## 2018-12-31 RX ADMIN — MORPHINE SULFATE 4 MILLIGRAM(S): 50 CAPSULE, EXTENDED RELEASE ORAL at 22:38

## 2018-12-31 NOTE — ED ADULT TRIAGE NOTE - NS ED NURSE BANDS TYPE
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

## 2018-12-31 NOTE — ED PROVIDER NOTE - OBJECTIVE STATEMENT
54 y/o female with a PMHx of alcohol abuse presents to the ED c/o L sided pelvic pain x2 weeks, worsening over the past 3 days. Pt states the pain is sharp, rated 10/10 in severity, and radiates around to the rectum. Pt states pain improves when she pushes into the painful area, but remains constant and not exacerbated with movement. Pt reports h/o hemorrhoids; states 2 hemorrhoids were banded 3 weeks ago. At time of eval, pt reports intermittent constipation and diarrhea over the past few weeks, weight loss, constant urgency to defecate. Denies hematuria, dysuria, coughing, SOB, fever. Pt is a recovering alcoholic, relapse 2 hours ago. Pt is post-menopausal. PCP: Dr. Karen Cowan.

## 2018-12-31 NOTE — ED PROVIDER NOTE - GASTROINTESTINAL, MLM
Abdomen is soft, nontender. Pt reports positive focal area of pain to L lateral pelvis/inguinal area but no focal tenderness there, no mass/hernia. Bowel sounds positive. No CVA tenderness.

## 2018-12-31 NOTE — ED PROVIDER NOTE - MEDICAL DECISION MAKING DETAILS
54 y/o female +alcoholic, 2 weeks of L LLQ/pelvic/groin pain, worse x2-3 days. No nausea, vomiting, fever, chills, urine complaints. No focal tenderness on exam. Plan for labs including urine, lipase, alcohol, IV fluids, IV morphine/Zofran, CT abdomen/pelvis, pelvic sono. Observe, monitor, and reassess.

## 2018-12-31 NOTE — ED ADULT NURSE NOTE - CHIEF COMPLAINT QUOTE
left pelvic pain x 2 weeks. Took Motrin with no relief. (+) LLQ pain. denies nausea, vomiting, fever

## 2018-12-31 NOTE — ED PROVIDER NOTE - PROGRESS NOTE DETAILS
Dr. Boateng:  EtOH 335, lipase 517, LFTs elevated, WBC normal.  Case signed out to Dr. SAUNDERS to f/u [facility] A/P & pelvic u/s, reassess, dispo. pt with negative ct, continues to have pain will give toradol

## 2018-12-31 NOTE — ED STATDOCS - PROGRESS NOTE DETAILS
RASHEED Scribe for Attending Claus: 52 y/o F with a PMHx of alcohol abuse presents to the ED c/o severe L pelvic pain radiates to rectum x two weeks; worsening pain x 3 days and today is in severe pain. Pt states she does not regularly see her PMD.  PMD . FMHx: Mother w/ pancreatic CA. Will send patient to the main section of the department for further evaluation.

## 2018-12-31 NOTE — ED ADULT NURSE NOTE - OBJECTIVE STATEMENT
Pt presented to ED c/o left pelvic pain radiating down to left side of groin x 2-3 days. Pt has been self-medicating with Motrin and alcohol, history of being a recovering alcoholic. Denies fever, chills, diarrhea, abd pain, dysuria, hematuria. Maintained NPO at this time, pending CT. Pain medication to be given with fluid bolus.

## 2019-01-01 VITALS
HEART RATE: 95 BPM | RESPIRATION RATE: 16 BRPM | OXYGEN SATURATION: 100 % | SYSTOLIC BLOOD PRESSURE: 100 MMHG | DIASTOLIC BLOOD PRESSURE: 59 MMHG | TEMPERATURE: 97 F

## 2019-01-01 LAB
APPEARANCE UR: CLEAR — SIGNIFICANT CHANGE UP
APTT BLD: 27.5 SEC — SIGNIFICANT CHANGE UP (ref 27.5–36.3)
BACTERIA # UR AUTO: ABNORMAL
BILIRUB UR-MCNC: NEGATIVE — SIGNIFICANT CHANGE UP
COLOR SPEC: YELLOW — SIGNIFICANT CHANGE UP
DIFF PNL FLD: ABNORMAL
EPI CELLS # UR: SIGNIFICANT CHANGE UP
GLUCOSE UR QL: NEGATIVE MG/DL — SIGNIFICANT CHANGE UP
INR BLD: 0.89 RATIO — SIGNIFICANT CHANGE UP (ref 0.88–1.16)
KETONES UR-MCNC: NEGATIVE — SIGNIFICANT CHANGE UP
LEUKOCYTE ESTERASE UR-ACNC: NEGATIVE — SIGNIFICANT CHANGE UP
NITRITE UR-MCNC: NEGATIVE — SIGNIFICANT CHANGE UP
PH UR: 5 — SIGNIFICANT CHANGE UP (ref 5–8)
PROT UR-MCNC: 15 MG/DL
PROTHROM AB SERPL-ACNC: 9.8 SEC — LOW (ref 10–12.9)
RBC CASTS # UR COMP ASSIST: SIGNIFICANT CHANGE UP /HPF (ref 0–4)
SP GR SPEC: 1.01 — SIGNIFICANT CHANGE UP (ref 1.01–1.02)
UROBILINOGEN FLD QL: NEGATIVE MG/DL — SIGNIFICANT CHANGE UP
WBC UR QL: SIGNIFICANT CHANGE UP

## 2019-01-01 PROCEDURE — 74177 CT ABD & PELVIS W/CONTRAST: CPT | Mod: 26

## 2019-01-01 RX ORDER — KETOROLAC TROMETHAMINE 30 MG/ML
30 SYRINGE (ML) INJECTION ONCE
Qty: 0 | Refills: 0 | Status: DISCONTINUED | OUTPATIENT
Start: 2019-01-01 | End: 2019-01-01

## 2019-01-01 RX ADMIN — Medication 30 MILLIGRAM(S): at 01:21

## 2019-01-01 RX ADMIN — Medication 30 MILLIGRAM(S): at 01:36

## 2019-02-19 ENCOUNTER — INPATIENT (INPATIENT)
Facility: HOSPITAL | Age: 54
LOS: 4 days | Discharge: HOME | End: 2019-02-24
Payer: COMMERCIAL

## 2019-02-19 VITALS — WEIGHT: 126.1 LBS | HEIGHT: 66 IN

## 2019-02-19 LAB
ALBUMIN SERPL ELPH-MCNC: 3.6 G/DL — SIGNIFICANT CHANGE UP (ref 3.3–5)
ALP SERPL-CCNC: 119 U/L — SIGNIFICANT CHANGE UP (ref 40–120)
ALT FLD-CCNC: 72 U/L — SIGNIFICANT CHANGE UP (ref 12–78)
ANION GAP SERPL CALC-SCNC: 14 MMOL/L — SIGNIFICANT CHANGE UP (ref 5–17)
APTT BLD: 25.9 SEC — LOW (ref 27.5–36.3)
AST SERPL-CCNC: 155 U/L — HIGH (ref 15–37)
BILIRUB SERPL-MCNC: 0.4 MG/DL — SIGNIFICANT CHANGE UP (ref 0.2–1.2)
BLD GP AB SCN SERPL QL: SIGNIFICANT CHANGE UP
BUN SERPL-MCNC: 14 MG/DL — SIGNIFICANT CHANGE UP (ref 7–23)
CALCIUM SERPL-MCNC: 8.3 MG/DL — LOW (ref 8.5–10.1)
CHLORIDE SERPL-SCNC: 101 MMOL/L — SIGNIFICANT CHANGE UP (ref 96–108)
CO2 SERPL-SCNC: 23 MMOL/L — SIGNIFICANT CHANGE UP (ref 22–31)
CREAT SERPL-MCNC: 0.74 MG/DL — SIGNIFICANT CHANGE UP (ref 0.5–1.3)
ETHANOL SERPL-MCNC: 172 MG/DL — HIGH (ref 0–10)
GLUCOSE SERPL-MCNC: 82 MG/DL — SIGNIFICANT CHANGE UP (ref 70–99)
HCT VFR BLD CALC: 33.1 % — LOW (ref 34.5–45)
HCT VFR BLD CALC: 33.3 % — LOW (ref 34.5–45)
HGB BLD-MCNC: 11.1 G/DL — LOW (ref 11.5–15.5)
HGB BLD-MCNC: 11.2 G/DL — LOW (ref 11.5–15.5)
INR BLD: 0.91 RATIO — SIGNIFICANT CHANGE UP (ref 0.88–1.16)
MCHC RBC-ENTMCNC: 31.6 PG — SIGNIFICANT CHANGE UP (ref 27–34)
MCHC RBC-ENTMCNC: 31.6 PG — SIGNIFICANT CHANGE UP (ref 27–34)
MCHC RBC-ENTMCNC: 33.5 GM/DL — SIGNIFICANT CHANGE UP (ref 32–36)
MCHC RBC-ENTMCNC: 33.6 GM/DL — SIGNIFICANT CHANGE UP (ref 32–36)
MCV RBC AUTO: 94.1 FL — SIGNIFICANT CHANGE UP (ref 80–100)
MCV RBC AUTO: 94.3 FL — SIGNIFICANT CHANGE UP (ref 80–100)
NRBC # BLD: 0 /100 WBCS — SIGNIFICANT CHANGE UP (ref 0–0)
NRBC # BLD: 0 /100 WBCS — SIGNIFICANT CHANGE UP (ref 0–0)
PLATELET # BLD AUTO: 73 K/UL — LOW (ref 150–400)
PLATELET # BLD AUTO: 79 K/UL — LOW (ref 150–400)
POTASSIUM SERPL-MCNC: 3.8 MMOL/L — SIGNIFICANT CHANGE UP (ref 3.5–5.3)
POTASSIUM SERPL-SCNC: 3.8 MMOL/L — SIGNIFICANT CHANGE UP (ref 3.5–5.3)
PROT SERPL-MCNC: 7.4 GM/DL — SIGNIFICANT CHANGE UP (ref 6–8.3)
PROTHROM AB SERPL-ACNC: 10.1 SEC — SIGNIFICANT CHANGE UP (ref 10–12.9)
RBC # BLD: 3.51 M/UL — LOW (ref 3.8–5.2)
RBC # BLD: 3.54 M/UL — LOW (ref 3.8–5.2)
RBC # FLD: 15.5 % — HIGH (ref 10.3–14.5)
RBC # FLD: 15.7 % — HIGH (ref 10.3–14.5)
SODIUM SERPL-SCNC: 138 MMOL/L — SIGNIFICANT CHANGE UP (ref 135–145)
TYPE + AB SCN PNL BLD: SIGNIFICANT CHANGE UP
WBC # BLD: 5.4 K/UL — SIGNIFICANT CHANGE UP (ref 3.8–10.5)
WBC # BLD: 6.09 K/UL — SIGNIFICANT CHANGE UP (ref 3.8–10.5)
WBC # FLD AUTO: 5.4 K/UL — SIGNIFICANT CHANGE UP (ref 3.8–10.5)
WBC # FLD AUTO: 6.09 K/UL — SIGNIFICANT CHANGE UP (ref 3.8–10.5)

## 2019-02-19 PROCEDURE — 99285 EMERGENCY DEPT VISIT HI MDM: CPT

## 2019-02-19 RX ORDER — THIAMINE MONONITRATE (VIT B1) 100 MG
100 TABLET ORAL DAILY
Qty: 0 | Refills: 0 | Status: COMPLETED | OUTPATIENT
Start: 2019-02-19 | End: 2019-02-22

## 2019-02-19 RX ORDER — PANTOPRAZOLE SODIUM 20 MG/1
8 TABLET, DELAYED RELEASE ORAL
Qty: 80 | Refills: 0 | Status: DISCONTINUED | OUTPATIENT
Start: 2019-02-19 | End: 2019-02-20

## 2019-02-19 RX ORDER — SODIUM CHLORIDE 9 MG/ML
1000 INJECTION INTRAMUSCULAR; INTRAVENOUS; SUBCUTANEOUS ONCE
Qty: 0 | Refills: 0 | Status: COMPLETED | OUTPATIENT
Start: 2019-02-19 | End: 2019-02-19

## 2019-02-19 RX ORDER — SODIUM CHLORIDE 9 MG/ML
1000 INJECTION INTRAMUSCULAR; INTRAVENOUS; SUBCUTANEOUS
Qty: 0 | Refills: 0 | Status: DISCONTINUED | OUTPATIENT
Start: 2019-02-19 | End: 2019-02-21

## 2019-02-19 RX ORDER — HYDROXYZINE HCL 10 MG
50 TABLET ORAL DAILY
Qty: 0 | Refills: 0 | Status: DISCONTINUED | OUTPATIENT
Start: 2019-02-19 | End: 2019-02-24

## 2019-02-19 RX ORDER — LANOLIN ALCOHOL/MO/W.PET/CERES
0 CREAM (GRAM) TOPICAL
Qty: 0 | Refills: 0 | COMMUNITY

## 2019-02-19 RX ORDER — OCTREOTIDE ACETATE 200 UG/ML
50 INJECTION, SOLUTION INTRAVENOUS; SUBCUTANEOUS
Qty: 500 | Refills: 0 | Status: DISCONTINUED | OUTPATIENT
Start: 2019-02-19 | End: 2019-02-20

## 2019-02-19 RX ORDER — FOLIC ACID 0.8 MG
1 TABLET ORAL DAILY
Qty: 0 | Refills: 0 | Status: DISCONTINUED | OUTPATIENT
Start: 2019-02-19 | End: 2019-02-24

## 2019-02-19 RX ORDER — ESCITALOPRAM OXALATE 10 MG/1
20 TABLET, FILM COATED ORAL DAILY
Qty: 0 | Refills: 0 | Status: DISCONTINUED | OUTPATIENT
Start: 2019-02-19 | End: 2019-02-24

## 2019-02-19 RX ADMIN — SODIUM CHLORIDE 2000 MILLILITER(S): 9 INJECTION INTRAMUSCULAR; INTRAVENOUS; SUBCUTANEOUS at 08:59

## 2019-02-19 RX ADMIN — Medication 1 MILLIGRAM(S): at 13:44

## 2019-02-19 RX ADMIN — PANTOPRAZOLE SODIUM 10 MG/HR: 20 TABLET, DELAYED RELEASE ORAL at 22:18

## 2019-02-19 RX ADMIN — SODIUM CHLORIDE 1000 MILLILITER(S): 9 INJECTION INTRAMUSCULAR; INTRAVENOUS; SUBCUTANEOUS at 10:30

## 2019-02-19 RX ADMIN — OCTREOTIDE ACETATE 10 MICROGRAM(S)/HR: 200 INJECTION, SOLUTION INTRAVENOUS; SUBCUTANEOUS at 22:18

## 2019-02-19 RX ADMIN — Medication 1 MILLIGRAM(S): at 15:44

## 2019-02-19 RX ADMIN — Medication 2 MILLIGRAM(S): at 20:54

## 2019-02-19 NOTE — H&P ADULT - HISTORY OF PRESENT ILLNESS
52 y/o F with PMH of etoh abuse w/ withdrawal seizures, p/w rectal bleeding. Patient states she has had rectal bleeding on and off for last year. She has had 7 hemorrhoids banded previously, and she saw colorectal today who stated she will be scheduled for more hemorrhoid surgeries soon, but referred to ED due to significant bleeding. Patient states that for the last 3 weeks or so, she has been bleeding with almost every bowel movement. States that toilet bowl has bright red bleed, and sometimes she even notices blood clots when she wipes. States she has also been dizzy and lightheaded occasionally, but denies LOC. Denies hematochezia, but does feel nausea at times. +Intermittent lower abdominal pain. States she had flu about 3 weeks ago, and lost 13 lbs while she was sick, but gained 5 lbs back since then.     Patient states she drinks a pint of vodka a day, and last drink was last night. Feels tremulous, but denies HA, tactile / auditory / visual hallucinations, diaphoresis Patient states she is going through a tough time due to divorce, but denies wanting to hurt herself, denies any suicide intent.     PSH:      Social Hx: tobacco - denies, etoh - 1 pink of vodka daily, drugs - denies, lives at home with family     Family Hx: Mother - pancreatic cancer, father -  of heart disease at 61 y/o

## 2019-02-19 NOTE — ED ADULT TRIAGE NOTE - CHIEF COMPLAINT QUOTE
patient reports h/o rectal bleeding x many years worse recently.  sent by dr romero for blood work.  hemorrhoid was injected this morning.  patient had colonoscopy on 2/8/2019,  saw pcp on thursday, blood work obtained, results not known, however patient was started on Iron supplements.  patient reports diarrhea, denies fever, +chills and morgan, denies cp

## 2019-02-19 NOTE — ED ADULT NURSE REASSESSMENT NOTE - NS ED NURSE REASSESS COMMENT FT1
report given to Rimma CAMPBELL. vss. iv medications infusing as ordered. no s/s of acute distress noted. awaiting transport

## 2019-02-19 NOTE — H&P ADULT - ASSESSMENT
54 y/o F with PMH of etoh abuse w/ withdrawal seizures, p/w rectal bleeding    *Hematochezia w/ anemia - r/o GI bleed vs hemorrhoids  -Trend H/H  and transfuse PRN as per guidlines  -IVF  -NPO  -Protonix drip  -Octreotide drip given h/o heavy etoh use  -GI consult  -Iron panel / ferritin / B12 / folate   -Bedrest    *Etoh withdrawal  -MVI / Thiamine folic acid  -Seizure / fall precautions  -CIWA protocol     *Transaminitis  -Likely 2/2 etoh use  -Counseled on etoh cessation  -Hepatitis panel  -Lipid panel  -Advised patient that it needs to be followed up    Thrombocytopenia  -Possibly 2/2 bone marrow suppression from etoh abuse. Will need to monitor closely, especially in the setting of GI bleed.  -If thrombocytopenia stays stable, can f/u outpatient for further work up    *DVT ppx  -SCDs

## 2019-02-19 NOTE — ED ADULT NURSE REASSESSMENT NOTE - NS ED NURSE REASSESS COMMENT FT1
Patient discharged however noted to be having tremors from alcohol withdrawal. Escalated concern to Dr. WRIGHT and to Kaylin EPPS. Patient is planning on driving home. Advised she needs to find a ride home. Will medicate her with ativan and watch her until able to find a ride.

## 2019-02-19 NOTE — ED ADULT NURSE NOTE - NSIMPLEMENTINTERV_GEN_ALL_ED
Implemented All Universal Safety Interventions:  [place] to call system. Call bell, personal items and telephone within reach. Instruct patient to call for assistance. Room bathroom lighting operational. Non-slip footwear when patient is off stretcher. Physically safe environment: no spills, clutter or unnecessary equipment. Stretcher in lowest position, wheels locked, appropriate side rails in place.

## 2019-02-19 NOTE — ED PROVIDER NOTE - OBJECTIVE STATEMENT
54 y/o female with a PMHx of alcohol abuse presents to the ED c/o rectal bleeding and generalized weakness today. Pt has a hemorrhoid that is being treated by her GI. Went to her GI today, who sent her to the ED for concern of loss of blood and evaluation of crit levels. In ED, pt state she has had some generalized weakness today. Denies dizziness, chest pain, SOB. Last drink was 1 week ago. Former Smoker. NKDA. GI: Dr. Sandoval.

## 2019-02-19 NOTE — ED PROVIDER NOTE - PROGRESS NOTE DETAILS
patient with worsening ciwa score - feels shaky and weak and wants to stay in hospital. will admit for alcohol withdrawal. Navjot Goodson M.D., Attending Physician

## 2019-02-20 DIAGNOSIS — F10.10 ALCOHOL ABUSE, UNCOMPLICATED: ICD-10-CM

## 2019-02-20 DIAGNOSIS — Z01.810 ENCOUNTER FOR PREPROCEDURAL CARDIOVASCULAR EXAMINATION: ICD-10-CM

## 2019-02-20 LAB
ALBUMIN SERPL ELPH-MCNC: 3.2 G/DL — LOW (ref 3.3–5)
ALP SERPL-CCNC: 112 U/L — SIGNIFICANT CHANGE UP (ref 40–120)
ALT FLD-CCNC: 62 U/L — SIGNIFICANT CHANGE UP (ref 12–78)
ANION GAP SERPL CALC-SCNC: 11 MMOL/L — SIGNIFICANT CHANGE UP (ref 5–17)
APTT BLD: 25.6 SEC — LOW (ref 27.5–36.3)
AST SERPL-CCNC: 101 U/L — HIGH (ref 15–37)
BASOPHILS # BLD AUTO: 0.01 K/UL — SIGNIFICANT CHANGE UP (ref 0–0.2)
BASOPHILS NFR BLD AUTO: 0.2 % — SIGNIFICANT CHANGE UP (ref 0–2)
BILIRUB SERPL-MCNC: 1.2 MG/DL — SIGNIFICANT CHANGE UP (ref 0.2–1.2)
BUN SERPL-MCNC: 12 MG/DL — SIGNIFICANT CHANGE UP (ref 7–23)
CALCIUM SERPL-MCNC: 8.4 MG/DL — LOW (ref 8.5–10.1)
CHLORIDE SERPL-SCNC: 96 MMOL/L — SIGNIFICANT CHANGE UP (ref 96–108)
CO2 SERPL-SCNC: 24 MMOL/L — SIGNIFICANT CHANGE UP (ref 22–31)
CREAT SERPL-MCNC: 0.66 MG/DL — SIGNIFICANT CHANGE UP (ref 0.5–1.3)
EOSINOPHIL # BLD AUTO: 0.08 K/UL — SIGNIFICANT CHANGE UP (ref 0–0.5)
EOSINOPHIL NFR BLD AUTO: 1.5 % — SIGNIFICANT CHANGE UP (ref 0–6)
FERRITIN SERPL-MCNC: 136 NG/ML — SIGNIFICANT CHANGE UP (ref 15–150)
FOLATE SERPL-MCNC: 10.3 NG/ML — SIGNIFICANT CHANGE UP
GLUCOSE SERPL-MCNC: 130 MG/DL — HIGH (ref 70–99)
HCT VFR BLD CALC: 31.8 % — LOW (ref 34.5–45)
HGB BLD-MCNC: 10.7 G/DL — LOW (ref 11.5–15.5)
IMM GRANULOCYTES NFR BLD AUTO: 0.6 % — SIGNIFICANT CHANGE UP (ref 0–1.5)
INR BLD: 0.93 RATIO — SIGNIFICANT CHANGE UP (ref 0.88–1.16)
LYMPHOCYTES # BLD AUTO: 0.93 K/UL — LOW (ref 1–3.3)
LYMPHOCYTES # BLD AUTO: 17.8 % — SIGNIFICANT CHANGE UP (ref 13–44)
MAGNESIUM SERPL-MCNC: 1.8 MG/DL — SIGNIFICANT CHANGE UP (ref 1.6–2.6)
MCHC RBC-ENTMCNC: 31.7 PG — SIGNIFICANT CHANGE UP (ref 27–34)
MCHC RBC-ENTMCNC: 33.6 GM/DL — SIGNIFICANT CHANGE UP (ref 32–36)
MCV RBC AUTO: 94.1 FL — SIGNIFICANT CHANGE UP (ref 80–100)
MONOCYTES # BLD AUTO: 0.47 K/UL — SIGNIFICANT CHANGE UP (ref 0–0.9)
MONOCYTES NFR BLD AUTO: 9 % — SIGNIFICANT CHANGE UP (ref 2–14)
NEUTROPHILS # BLD AUTO: 3.7 K/UL — SIGNIFICANT CHANGE UP (ref 1.8–7.4)
NEUTROPHILS NFR BLD AUTO: 70.9 % — SIGNIFICANT CHANGE UP (ref 43–77)
NRBC # BLD: 0 /100 WBCS — SIGNIFICANT CHANGE UP (ref 0–0)
PHOSPHATE SERPL-MCNC: 3.3 MG/DL — SIGNIFICANT CHANGE UP (ref 2.5–4.5)
PLATELET # BLD AUTO: 51 K/UL — LOW (ref 150–400)
POTASSIUM SERPL-MCNC: 4.9 MMOL/L — SIGNIFICANT CHANGE UP (ref 3.5–5.3)
POTASSIUM SERPL-SCNC: 4.9 MMOL/L — SIGNIFICANT CHANGE UP (ref 3.5–5.3)
PROT SERPL-MCNC: 6.8 GM/DL — SIGNIFICANT CHANGE UP (ref 6–8.3)
PROTHROM AB SERPL-ACNC: 10.3 SEC — SIGNIFICANT CHANGE UP (ref 10–12.9)
RBC # BLD: 3.38 M/UL — LOW (ref 3.8–5.2)
RBC # FLD: 14.9 % — HIGH (ref 10.3–14.5)
SODIUM SERPL-SCNC: 131 MMOL/L — LOW (ref 135–145)
VIT B12 SERPL-MCNC: 522 PG/ML — SIGNIFICANT CHANGE UP (ref 232–1245)
WBC # BLD: 5.22 K/UL — SIGNIFICANT CHANGE UP (ref 3.8–10.5)
WBC # FLD AUTO: 5.22 K/UL — SIGNIFICANT CHANGE UP (ref 3.8–10.5)

## 2019-02-20 PROCEDURE — 99223 1ST HOSP IP/OBS HIGH 75: CPT

## 2019-02-20 PROCEDURE — 93010 ELECTROCARDIOGRAM REPORT: CPT

## 2019-02-20 RX ORDER — PANTOPRAZOLE SODIUM 20 MG/1
40 TABLET, DELAYED RELEASE ORAL
Qty: 0 | Refills: 0 | Status: DISCONTINUED | OUTPATIENT
Start: 2019-02-20 | End: 2019-02-24

## 2019-02-20 RX ADMIN — Medication 20 MILLIGRAM(S): at 11:51

## 2019-02-20 RX ADMIN — Medication 1 TABLET(S): at 11:52

## 2019-02-20 RX ADMIN — Medication 1 MILLIGRAM(S): at 11:52

## 2019-02-20 RX ADMIN — PANTOPRAZOLE SODIUM 40 MILLIGRAM(S): 20 TABLET, DELAYED RELEASE ORAL at 18:07

## 2019-02-20 RX ADMIN — ESCITALOPRAM OXALATE 20 MILLIGRAM(S): 10 TABLET, FILM COATED ORAL at 11:51

## 2019-02-20 RX ADMIN — Medication 20 MILLIGRAM(S): at 18:02

## 2019-02-20 RX ADMIN — Medication 2 MILLIGRAM(S): at 13:32

## 2019-02-20 RX ADMIN — OCTREOTIDE ACETATE 10 MICROGRAM(S)/HR: 200 INJECTION, SOLUTION INTRAVENOUS; SUBCUTANEOUS at 08:39

## 2019-02-20 RX ADMIN — Medication 2 MILLIGRAM(S): at 01:08

## 2019-02-20 RX ADMIN — PANTOPRAZOLE SODIUM 10 MG/HR: 20 TABLET, DELAYED RELEASE ORAL at 08:35

## 2019-02-20 RX ADMIN — SODIUM CHLORIDE 100 MILLILITER(S): 9 INJECTION INTRAMUSCULAR; INTRAVENOUS; SUBCUTANEOUS at 08:45

## 2019-02-20 RX ADMIN — Medication 2 MILLIGRAM(S): at 22:32

## 2019-02-20 RX ADMIN — Medication 100 MILLIGRAM(S): at 11:51

## 2019-02-20 RX ADMIN — Medication 1.5 MILLIGRAM(S): at 21:07

## 2019-02-20 RX ADMIN — Medication 2 MILLIGRAM(S): at 18:06

## 2019-02-20 NOTE — CONSULT NOTE ADULT - SUBJECTIVE AND OBJECTIVE BOX
PCP:    REQUESTING PHYSICIAN:    REASON FOR CONSULT:    CHIEF COMPLAINT:    HPI:  52 y/o F with PMH of etoh abuse w/ withdrawal seizures, p/w rectal bleeding. Patient states she has had rectal bleeding on and off for last year. She has had 7 hemorrhoids banded previously, and she saw colorectal today who stated she will be scheduled for more hemorrhoid surgeries soon, but referred to ED due to significant bleeding. Patient states that for the last 3 weeks or so, she has been bleeding with almost every bowel movement. . States she has also been dizzy and lightheaded occasionally, but denies LOC.   Patient states she drinks a pint of vodka a day, and last drink was last night. Feels tremulous, but denies HA, tactile / auditory / visual hallucinations, diaphoresis   Cardiology requested to evaluate cardiac status prior to EGD. Pt reports mildly increased dyspnea with exertion lately. She is followed by a local FP and saw her recently. She denies chest pain. She had a cardiac workup >5 years ago which was negative. She denies palpitations at this time.     Social Hx: tobacco - denies, etoh - 1 pink of vodka daily, drugs - denies, lives at home with family     Family Hx: Mother - pancreatic cancer, father -  of heart disease at 59 y/o (2019 21:17)      PAST MEDICAL & SURGICAL HISTORY:  Alcohol abuse  No significant past surgical history      Allergies    No Known Allergies    Intolerances        SOCIAL HISTORY:    FAMILY HISTORY:  No pertinent family history in first degree relatives      MEDICATIONS:  MEDICATIONS  (STANDING):  escitalopram 20 milliGRAM(s) Oral daily  folic acid 1 milliGRAM(s) Oral daily  LORazepam   Injectable 2 milliGRAM(s) IV Push every 4 hours  LORazepam   Injectable 1.5 milliGRAM(s) IV Push every 4 hours  LORazepam   Injectable   IV Push   multivitamin 1 Tablet(s) Oral daily  octreotide  Infusion 50 MICROgram(s)/Hr (10 mL/Hr) IV Continuous <Continuous>  pantoprazole Infusion 8 mG/Hr (10 mL/Hr) IV Continuous <Continuous>  sodium chloride 0.9%. 1000 milliLiter(s) (100 mL/Hr) IV Continuous <Continuous>  thiamine 100 milliGRAM(s) Oral daily    MEDICATIONS  (PRN):  artificial  tears Solution 1 Drop(s) Both EYES two times a day PRN for dry eyes  hydrOXYzine hydrochloride 50 milliGRAM(s) Oral daily PRN Anxiety  LORazepam   Injectable 2 milliGRAM(s) IV Push every 1 hour PRN Symptom-triggered: each CIWA -Ar score 8 or GREATER      REVIEW OF SYSTEMS:    CONSTITUTIONAL: No weakness, fevers or chills  EYES/ENT: No visual changes;  No vertigo or throat pain   NECK: No pain or stiffness  RESPIRATORY: mild shortness of breath  CARDIOVASCULAR: No chest pain or palpitations  GASTROINTESTINAL: No abdominal or epigastric pain. No nausea, vomiting, or hematemesis; No diarrhea or constipation. No melena or hematochezia.  GENITOURINARY: No dysuria, frequency or hematuria  NEUROLOGICAL: No numbness or weakness  SKIN: No itching, burning, rashes, or lesions   All other review of systems is negative unless indicated above    Vital Signs Last 24 Hrs  T(C): 36.9 (2019 08:31), Max: 37 (2019 01:25)  T(F): 98.5 (2019 08:31), Max: 98.6 (2019 01:25)  HR: 90 (2019 08:31) (88 - 108)  BP: 130/83 (2019 08:31) (117/84 - 140/83)  BP(mean): --  RR: 20 (2019 08:31) (18 - 28)  SpO2: 99% (2019 08:31) (94% - 100%)    I&O's Summary      PHYSICAL EXAM:    Constitutional: NAD, awake and alert, well-developed  HEENT: PERR, EOMI,  No oral cyananosis.  Neck:  supple,  No JVD  Respiratory: Breath sounds are clear bilaterally, No wheezing, rales or rhonchi  Cardiovascular: S1 and S2, regular rate and rhythm, no Murmurs, gallops or rubs  Gastrointestinal: Bowel Sounds present, soft, nontender.   Extremities: No peripheral edema. No clubbing or cyanosis.  Vascular: 2+ peripheral pulses  Neurological: A/O x 3, no focal deficits  Musculoskeletal: no calf tenderness.  Skin: No rashes.      LABS: All Labs Reviewed:                        10.7   5.22  )-----------( 51       ( 2019 05:34 )             31.8                         11.1   6.09  )-----------( 73       ( 2019 22:29 )             33.1                         11.2   5.40  )-----------( 79       ( 2019 08:57 )             33.3     2019 05:34    131    |  96     |  12     ----------------------------<  130    4.9     |  24     |  0.66   2019 08:57    138    |  101    |  14     ----------------------------<  82     3.8     |  23     |  0.74     Ca    8.4        2019 05:34  Ca    8.3        2019 08:57  Phos  3.3       2019 05:34  Mg     1.8       2019 05:34    TPro  6.8    /  Alb  3.2    /  TBili  1.2    /  DBili  x      /  AST  101    /  ALT  62     /  AlkPhos  112    2019 05:34  TPro  7.4    /  Alb  3.6    /  TBili  0.4    /  DBili  x      /  AST  155    /  ALT  72     /  AlkPhos  119    2019 08:57    PT/INR - ( 2019 05:34 )   PT: 10.3 sec;   INR: 0.93 ratio         PTT - ( 2019 05:34 )  PTT:25.6 sec      Blood Culture:         RADIOLOGY/EKG: NSR with PVC. No acute change from previous ECG in .

## 2019-02-20 NOTE — CONSULT NOTE ADULT - PROBLEM SELECTOR RECOMMENDATION 2
No absolute cardiac contraindication for planned endoscopic procedure. Should have updated cardiac workup as opt. Will follow prn.

## 2019-02-20 NOTE — CONSULT NOTE ADULT - SUBJECTIVE AND OBJECTIVE BOX
Patient is a 53y old  Female who presents with a chief complaint of rectal bleeding (2019 21:17)    HPI:  54 y/o F with PMH of etoh abuse w/ withdrawal seizures, p/w rectal bleeding. Patient states she has had rectal bleeding on and off for last year. She has had 7 hemorrhoids banded previously, and she saw colorectal today who stated she will be scheduled for more hemorrhoid surgeries soon, but referred to ED due to significant bleeding. Patient states that for the last 3 weeks or so, she has been bleeding with almost every bowel movement. States that toilet bowl has bright red bleed, and sometimes she even notices blood clots when she wipes. States she has also been dizzy and lightheaded occasionally, but denies LOC. Denies hematochezia, but does feel nausea at times. +Intermittent lower abdominal pain. States she had flu about 3 weeks ago, and lost 13 lbs while she was sick, but gained 5 lbs back since then.     Patient states she drinks a pint of vodka a day, and last drink was last night. Feels tremulous, but denies HA, tactile / auditory / visual hallucinations, diaphoresis Patient states she is going through a tough time due to divorce, but denies wanting to hurt herself, denies any suicide intent.     2019-  Pt reports persistent "bright red" rectal bleeding for the past several months 2/2 hemorrhoids.   Last BM was yesterday AM prior to seeing Dr. Radha Sandoval for hemorrhoidal injections.   At this time, pt reports the stool was bright red, which is typical due to her hemorrhoids.   Pt reports persistent, lower abdominal cramping for the past several months.   Recent colonoscopy was Dr. Guevara on 2019 did not note any active colitis per Dr. Sandoval.  Denies any n/v, melena.     PSH:      Social Hx: tobacco - denies, etoh - 1 pink of vodka daily, drugs - denies, lives at home with family     Family Hx: Mother - pancreatic cancer, father -  of heart disease at 61 y/o (2019 21:17)    PAST MEDICAL & SURGICAL HISTORY:  Alcohol abuse  No significant past surgical history    MEDICATIONS  (STANDING):  escitalopram 20 milliGRAM(s) Oral daily  folic acid 1 milliGRAM(s) Oral daily  LORazepam   Injectable 2 milliGRAM(s) IV Push every 4 hours  LORazepam   Injectable 1.5 milliGRAM(s) IV Push every 4 hours  LORazepam   Injectable   IV Push   multivitamin 1 Tablet(s) Oral daily  octreotide  Infusion 50 MICROgram(s)/Hr (10 mL/Hr) IV Continuous <Continuous>  pantoprazole Infusion 8 mG/Hr (10 mL/Hr) IV Continuous <Continuous>  sodium chloride 0.9%. 1000 milliLiter(s) (100 mL/Hr) IV Continuous <Continuous>  thiamine 100 milliGRAM(s) Oral daily    MEDICATIONS  (PRN):  artificial  tears Solution 1 Drop(s) Both EYES two times a day PRN for dry eyes  hydrOXYzine hydrochloride 50 milliGRAM(s) Oral daily PRN Anxiety  LORazepam   Injectable 2 milliGRAM(s) IV Push every 1 hour PRN Symptom-triggered: each CIWA -Ar score 8 or GREATER    Allergies  No Known Allergies    FAMILY HISTORY:  No pertinent family history in first degree relatives    REVIEW OF SYSTEMS:  CONSTITUTIONAL: + weakness/fatigue.  No fevers or chills  RESPIRATORY: No cough, wheezing, hemoptysis; No shortness of breath  CARDIOVASCULAR: No chest pain or palpitations  GASTROINTESTINAL: Per HPI.   GENITOURINARY: No dysuria, frequency or hematuria  NEUROLOGICAL: No numbness or weakness  SKIN: No itching, burning, rashes, or lesions   PSYCH: Normal mood and affect  All other review of systems is negative unless indicated above.  Vital Signs Last 24 Hrs  T(C): 36.9 (2019 08:31), Max: 37 (2019 01:25)  T(F): 98.5 (2019 08:31), Max: 98.6 (2019 01:25)  HR: 90 (2019 08:31) (88 - 108)  BP: 130/83 (2019 08:31) (117/84 - 140/83)  BP(mean): --  RR: 20 (2019 08:31) (18 - 28)  SpO2: 99% (2019 08:31) (94% - 100%)    PHYSICAL EXAM:  Constitutional: NAD, well-developed  Respiratory: CTAB  Cardiovascular: S1 and S2, RRR, no M/G/R  Gastrointestinal: mild lower abdominal cramping, ND, +BS.   Extremities: No peripheral edema  Vascular: 2+ peripheral pulses  Neurological: A/O x 3, no focal deficits  Psychiatric: Normal mood, normal affect  Skin: No rashes    LABS:                        10.7   5.22  )-----------( 51       ( 2019 05:34 )             31.8         131<L>  |  96  |  12  ----------------------------<  130<H>  4.9   |  24  |  0.66    Ca    8.4<L>      2019 05:34  Phos  3.3       Mg     1.8         TPro  6.8  /  Alb  3.2<L>  /  TBili  1.2  /  DBili  x   /  AST  101<H>  /  ALT  62  /  AlkPhos  112      PT/INR - ( 2019 05:34 )   PT: 10.3 sec;   INR: 0.93 ratio         PTT - ( 2019 05:34 )  PTT:25.6 sec  LIVER FUNCTIONS - ( 2019 05:34 )  Alb: 3.2 g/dL / Pro: 6.8 gm/dL / ALK PHOS: 112 U/L / ALT: 62 U/L / AST: 101 U/L / GGT: x             RADIOLOGY & ADDITIONAL STUDIES: Patient is a 53y old  Female who presents with a chief complaint of rectal bleeding (2019 21:17)    HPI:  54 y/o F with PMH of etoh abuse w/ withdrawal seizures, p/w rectal bleeding. Patient states she has had rectal bleeding on and off for last year. She has had 7 hemorrhoids banded previously, and she saw colorectal today who stated she will be scheduled for more hemorrhoid surgeries soon, but referred to ED due to significant bleeding. Patient states that for the last 3 weeks or so, she has been bleeding with almost every bowel movement. States that toilet bowl has bright red bleed, and sometimes she even notices blood clots when she wipes. States she has also been dizzy and lightheaded occasionally, but denies LOC. Denies hematochezia, but does feel nausea at times. +Intermittent lower abdominal pain. States she had flu about 3 weeks ago, and lost 13 lbs while she was sick, but gained 5 lbs back since then.     Patient states she drinks a pint of vodka a day, and last drink was last night. Feels tremulous, but denies HA, tactile / auditory / visual hallucinations, diaphoresis Patient states she is going through a tough time due to divorce, but denies wanting to hurt herself, denies any suicide intent.     2019-  Pt reports persistent "bright red" rectal bleeding for the past several months 2/2 hemorrhoids.   Last BM that was bloody was yesterday AM prior to seeing Dr. Radha Sandoval for hemorrhoidal injections.   Small BM just now-no rectal bleeding noted.   At this time, pt reports the stool was bright red, which is typical due to her hemorrhoids.   Pt reports persistent, lower abdominal cramping for the past several months.   Recent colonoscopy was Dr. Guevara on 2019 did not note any active colitis per Dr. Sandoval.  Denies any n/v, melena.     PSH:      Social Hx: tobacco - denies, etoh - 1 pink of vodka daily, drugs - denies, lives at home with family     Family Hx: Mother - pancreatic cancer, father -  of heart disease at 59 y/o (2019 21:17)    PAST MEDICAL & SURGICAL HISTORY:  Alcohol abuse  No significant past surgical history    MEDICATIONS  (STANDING):  escitalopram 20 milliGRAM(s) Oral daily  folic acid 1 milliGRAM(s) Oral daily  LORazepam   Injectable 2 milliGRAM(s) IV Push every 4 hours  LORazepam   Injectable 1.5 milliGRAM(s) IV Push every 4 hours  LORazepam   Injectable   IV Push   multivitamin 1 Tablet(s) Oral daily  octreotide  Infusion 50 MICROgram(s)/Hr (10 mL/Hr) IV Continuous <Continuous>  pantoprazole Infusion 8 mG/Hr (10 mL/Hr) IV Continuous <Continuous>  sodium chloride 0.9%. 1000 milliLiter(s) (100 mL/Hr) IV Continuous <Continuous>  thiamine 100 milliGRAM(s) Oral daily    MEDICATIONS  (PRN):  artificial  tears Solution 1 Drop(s) Both EYES two times a day PRN for dry eyes  hydrOXYzine hydrochloride 50 milliGRAM(s) Oral daily PRN Anxiety  LORazepam   Injectable 2 milliGRAM(s) IV Push every 1 hour PRN Symptom-triggered: each CIWA -Ar score 8 or GREATER    Allergies  No Known Allergies    FAMILY HISTORY:  No pertinent family history in first degree relatives    REVIEW OF SYSTEMS:  CONSTITUTIONAL: + weakness/fatigue.  No fevers or chills  RESPIRATORY: No cough, wheezing, hemoptysis; No shortness of breath  CARDIOVASCULAR: No chest pain or palpitations  GASTROINTESTINAL: Per HPI.   GENITOURINARY: No dysuria, frequency or hematuria  NEUROLOGICAL: No numbness or weakness  SKIN: No itching, burning, rashes, or lesions   PSYCH: Normal mood and affect  All other review of systems is negative unless indicated above.  Vital Signs Last 24 Hrs  T(C): 36.9 (2019 08:31), Max: 37 (2019 01:25)  T(F): 98.5 (2019 08:31), Max: 98.6 (2019 01:25)  HR: 90 (2019 08:31) (88 - 108)  BP: 130/83 (2019 08:31) (117/84 - 140/83)  BP(mean): --  RR: 20 (2019 08:31) (18 - 28)  SpO2: 99% (2019 08:31) (94% - 100%)    PHYSICAL EXAM:  Constitutional: NAD, well-developed  Respiratory: CTAB  Cardiovascular: S1 and S2, RRR, no M/G/R  Gastrointestinal: mild lower abdominal cramping, ND, +BS.   Extremities: No peripheral edema  Vascular: 2+ peripheral pulses  Neurological: A/O x 3, no focal deficits  Psychiatric: Normal mood, normal affect  Skin: No rashes    LABS:                        10.7   5.22  )-----------( 51       ( 2019 05:34 )             31.8     -    131<L>  |  96  |  12  ----------------------------<  130<H>  4.9   |  24  |  0.66    Ca    8.4<L>      2019 05:34  Phos  3.3     02-20  Mg     1.8         TPro  6.8  /  Alb  3.2<L>  /  TBili  1.2  /  DBili  x   /  AST  101<H>  /  ALT  62  /  AlkPhos  112  02-20    PT/INR - ( 2019 05:34 )   PT: 10.3 sec;   INR: 0.93 ratio         PTT - ( 2019 05:34 )  PTT:25.6 sec  LIVER FUNCTIONS - ( 2019 05:34 )  Alb: 3.2 g/dL / Pro: 6.8 gm/dL / ALK PHOS: 112 U/L / ALT: 62 U/L / AST: 101 U/L / GGT: x             RADIOLOGY & ADDITIONAL STUDIES:

## 2019-02-20 NOTE — CONSULT NOTE ADULT - ASSESSMENT
52 y/o female admitted with weakness/fatigue 2/2 hematochezia.   Pt with extensive history of hemorrhoids, and ETOH abuse.   Reports drinking 1 pint of vodka per day, last drink was Monday night.     Impression:  Hematochezia likely due to hemorrhoids, doubt colitis as recent colonoscopy in 2/2019 did not show active colitis.     Plan:  Start clears now and advance diet later tonight if tolerating.   Will d/c IV PPI and 54 y/o female admitted with weakness/fatigue 2/2 hematochezia.   Pt with extensive history of hemorrhoids, and ETOH abuse.   Reports drinking 1 pint of vodka per day, last drink was Monday night.     Impression:  Hematochezia likely due to hemorrhoids, doubt colitis as recent colonoscopy in 2/2019 did not show active colitis.     Plan:  Start clears now and advance diet later tonight if tolerating.   Will d/c IV PPI and octreotide drip as I do not feel as though this is an UGIB, but patient at risk for due to ETOH abuse.    Start PO PPI and bentyl now.   CIWA protocol, ETOH cessation.  Continue IVFs, and transfuse if needed.   Do not feel as though pt will need a repeat colonoscopy at this time, however if she continues to bleed and h/h continues to drop will need to reconsider.   Pt will f/u next week with Dr. Sandoval for hemorrhoidectomy vs. banding?  Dr. Goodson to see patient tomorrow and I will resume care on Friday.   Discussed with pt, nurse, Dr. Sandoval, Dr. Watts.

## 2019-02-21 LAB
CHOLEST SERPL-MCNC: 302 MG/DL — HIGH (ref 10–199)
HCT VFR BLD CALC: 33.4 % — LOW (ref 34.5–45)
HCV AB S/CO SERPL IA: 0.19 S/CO — SIGNIFICANT CHANGE UP (ref 0–0.79)
HCV AB SERPL-IMP: SIGNIFICANT CHANGE UP
HDLC SERPL-MCNC: 144 MG/DL — SIGNIFICANT CHANGE UP
HGB BLD-MCNC: 11.4 G/DL — LOW (ref 11.5–15.5)
IRON SATN MFR SERPL: 254 UG/DL — HIGH (ref 30–160)
IRON SATN MFR SERPL: SIGNIFICANT CHANGE UP % (ref 14–50)
LIPID PNL WITH DIRECT LDL SERPL: 140 MG/DL — HIGH
MCHC RBC-ENTMCNC: 32 PG — SIGNIFICANT CHANGE UP (ref 27–34)
MCHC RBC-ENTMCNC: 34.1 GM/DL — SIGNIFICANT CHANGE UP (ref 32–36)
MCV RBC AUTO: 93.8 FL — SIGNIFICANT CHANGE UP (ref 80–100)
NRBC # BLD: 0 /100 WBCS — SIGNIFICANT CHANGE UP (ref 0–0)
PLATELET # BLD AUTO: 78 K/UL — LOW (ref 150–400)
RBC # BLD: 3.56 M/UL — LOW (ref 3.8–5.2)
RBC # FLD: 14.6 % — HIGH (ref 10.3–14.5)
TIBC SERPL-MCNC: SIGNIFICANT CHANGE UP UG/DL (ref 220–430)
TOTAL CHOLESTEROL/HDL RATIO MEASUREMENT: 2.1 RATIO — LOW (ref 3.3–7.1)
TRIGL SERPL-MCNC: 90 MG/DL — SIGNIFICANT CHANGE UP (ref 10–149)
UIBC SERPL-MCNC: <20 UG/DL — LOW (ref 110–370)
WBC # BLD: 4.93 K/UL — SIGNIFICANT CHANGE UP (ref 3.8–10.5)
WBC # FLD AUTO: 4.93 K/UL — SIGNIFICANT CHANGE UP (ref 3.8–10.5)

## 2019-02-21 RX ADMIN — Medication 1.5 MILLIGRAM(S): at 10:59

## 2019-02-21 RX ADMIN — PANTOPRAZOLE SODIUM 40 MILLIGRAM(S): 20 TABLET, DELAYED RELEASE ORAL at 17:40

## 2019-02-21 RX ADMIN — Medication 1.5 MILLIGRAM(S): at 17:40

## 2019-02-21 RX ADMIN — Medication 1 TABLET(S): at 11:04

## 2019-02-21 RX ADMIN — Medication 1.5 MILLIGRAM(S): at 01:21

## 2019-02-21 RX ADMIN — Medication 1 MILLIGRAM(S): at 23:05

## 2019-02-21 RX ADMIN — ESCITALOPRAM OXALATE 20 MILLIGRAM(S): 10 TABLET, FILM COATED ORAL at 11:02

## 2019-02-21 RX ADMIN — Medication 1 MILLIGRAM(S): at 11:04

## 2019-02-21 RX ADMIN — Medication 20 MILLIGRAM(S): at 04:35

## 2019-02-21 RX ADMIN — PANTOPRAZOLE SODIUM 40 MILLIGRAM(S): 20 TABLET, DELAYED RELEASE ORAL at 04:35

## 2019-02-21 RX ADMIN — Medication 20 MILLIGRAM(S): at 17:40

## 2019-02-21 RX ADMIN — Medication 20 MILLIGRAM(S): at 11:01

## 2019-02-21 RX ADMIN — Medication 2 MILLIGRAM(S): at 04:34

## 2019-02-21 RX ADMIN — Medication 100 MILLIGRAM(S): at 11:02

## 2019-02-22 LAB
ANION GAP SERPL CALC-SCNC: 6 MMOL/L — SIGNIFICANT CHANGE UP (ref 5–17)
BUN SERPL-MCNC: 7 MG/DL — SIGNIFICANT CHANGE UP (ref 7–23)
CALCIUM SERPL-MCNC: 9.1 MG/DL — SIGNIFICANT CHANGE UP (ref 8.5–10.1)
CHLORIDE SERPL-SCNC: 104 MMOL/L — SIGNIFICANT CHANGE UP (ref 96–108)
CO2 SERPL-SCNC: 29 MMOL/L — SIGNIFICANT CHANGE UP (ref 22–31)
CREAT SERPL-MCNC: 0.78 MG/DL — SIGNIFICANT CHANGE UP (ref 0.5–1.3)
GLUCOSE SERPL-MCNC: 96 MG/DL — SIGNIFICANT CHANGE UP (ref 70–99)
HCT VFR BLD CALC: 34.3 % — LOW (ref 34.5–45)
HGB BLD-MCNC: 11.1 G/DL — LOW (ref 11.5–15.5)
MCHC RBC-ENTMCNC: 31 PG — SIGNIFICANT CHANGE UP (ref 27–34)
MCHC RBC-ENTMCNC: 32.4 GM/DL — SIGNIFICANT CHANGE UP (ref 32–36)
MCV RBC AUTO: 95.8 FL — SIGNIFICANT CHANGE UP (ref 80–100)
NRBC # BLD: 0 /100 WBCS — SIGNIFICANT CHANGE UP (ref 0–0)
PLATELET # BLD AUTO: 69 K/UL — LOW (ref 150–400)
POTASSIUM SERPL-MCNC: 4 MMOL/L — SIGNIFICANT CHANGE UP (ref 3.5–5.3)
POTASSIUM SERPL-SCNC: 4 MMOL/L — SIGNIFICANT CHANGE UP (ref 3.5–5.3)
RBC # BLD: 3.58 M/UL — LOW (ref 3.8–5.2)
RBC # FLD: 14.8 % — HIGH (ref 10.3–14.5)
SODIUM SERPL-SCNC: 139 MMOL/L — SIGNIFICANT CHANGE UP (ref 135–145)
WBC # BLD: 4.88 K/UL — SIGNIFICANT CHANGE UP (ref 3.8–10.5)
WBC # FLD AUTO: 4.88 K/UL — SIGNIFICANT CHANGE UP (ref 3.8–10.5)

## 2019-02-22 RX ADMIN — Medication 20 MILLIGRAM(S): at 18:26

## 2019-02-22 RX ADMIN — Medication 20 MILLIGRAM(S): at 11:27

## 2019-02-22 RX ADMIN — Medication 20 MILLIGRAM(S): at 06:54

## 2019-02-22 RX ADMIN — Medication 1 MILLIGRAM(S): at 11:27

## 2019-02-22 RX ADMIN — PANTOPRAZOLE SODIUM 40 MILLIGRAM(S): 20 TABLET, DELAYED RELEASE ORAL at 18:26

## 2019-02-22 RX ADMIN — Medication 1 MILLIGRAM(S): at 18:26

## 2019-02-22 RX ADMIN — Medication 100 MILLIGRAM(S): at 11:27

## 2019-02-22 RX ADMIN — PANTOPRAZOLE SODIUM 40 MILLIGRAM(S): 20 TABLET, DELAYED RELEASE ORAL at 06:54

## 2019-02-22 RX ADMIN — ESCITALOPRAM OXALATE 20 MILLIGRAM(S): 10 TABLET, FILM COATED ORAL at 11:27

## 2019-02-22 RX ADMIN — Medication 1 TABLET(S): at 11:27

## 2019-02-23 LAB
HAV IGM SER-ACNC: SIGNIFICANT CHANGE UP
HBV CORE IGM SER-ACNC: SIGNIFICANT CHANGE UP
HBV SURFACE AG SER-ACNC: SIGNIFICANT CHANGE UP
HCT VFR BLD CALC: 34.1 % — LOW (ref 34.5–45)
HCV AB S/CO SERPL IA: 0.2 S/CO — SIGNIFICANT CHANGE UP (ref 0–0.79)
HCV AB SERPL-IMP: SIGNIFICANT CHANGE UP
HGB BLD-MCNC: 11.3 G/DL — LOW (ref 11.5–15.5)
MCHC RBC-ENTMCNC: 32 PG — SIGNIFICANT CHANGE UP (ref 27–34)
MCHC RBC-ENTMCNC: 33.1 GM/DL — SIGNIFICANT CHANGE UP (ref 32–36)
MCV RBC AUTO: 96.6 FL — SIGNIFICANT CHANGE UP (ref 80–100)
NRBC # BLD: 0 /100 WBCS — SIGNIFICANT CHANGE UP (ref 0–0)
PLATELET # BLD AUTO: 90 K/UL — LOW (ref 150–400)
RBC # BLD: 3.53 M/UL — LOW (ref 3.8–5.2)
RBC # FLD: 14.8 % — HIGH (ref 10.3–14.5)
WBC # BLD: 4.63 K/UL — SIGNIFICANT CHANGE UP (ref 3.8–10.5)
WBC # FLD AUTO: 4.63 K/UL — SIGNIFICANT CHANGE UP (ref 3.8–10.5)

## 2019-02-23 RX ADMIN — PANTOPRAZOLE SODIUM 40 MILLIGRAM(S): 20 TABLET, DELAYED RELEASE ORAL at 05:45

## 2019-02-23 RX ADMIN — PANTOPRAZOLE SODIUM 40 MILLIGRAM(S): 20 TABLET, DELAYED RELEASE ORAL at 17:02

## 2019-02-23 RX ADMIN — Medication 1 MILLIGRAM(S): at 10:35

## 2019-02-23 RX ADMIN — Medication 1 MILLIGRAM(S): at 17:02

## 2019-02-23 RX ADMIN — Medication 20 MILLIGRAM(S): at 17:00

## 2019-02-23 RX ADMIN — Medication 20 MILLIGRAM(S): at 05:45

## 2019-02-23 RX ADMIN — ESCITALOPRAM OXALATE 20 MILLIGRAM(S): 10 TABLET, FILM COATED ORAL at 10:35

## 2019-02-23 RX ADMIN — Medication 20 MILLIGRAM(S): at 10:34

## 2019-02-23 RX ADMIN — Medication 1 TABLET(S): at 10:35

## 2019-02-23 NOTE — PROGRESS NOTE ADULT - SUBJECTIVE AND OBJECTIVE BOX
INTERVAL HPI/OVERNIGHT EVENTS:   52 y/o F with PMH of etoh abuse w/ withdrawal seizures, p/w rectal bleeding. Patient states she has had rectal bleeding on and off for last year. She has had 7 hemorrhoids banded previously, and she saw colorectal today who stated she will be scheduled for more hemorrhoid surgeries soon, but referred to ED due to significant bleeding. Patient states that for the last 3 weeks or so, she has been bleeding with almost every bowel movement. States that toilet bowl has bright red bleed, and sometimes she even notices blood clots when she wipes. States she has also been dizzy and lightheaded occasionally, but denies LOC. Denies hematochezia, but does feel nausea at times. +Intermittent lower abdominal pain. States she had flu about 3 weeks ago, and lost 13 lbs while she was sick, but gained 5 lbs back since then.     Patient states she drinks a pint of vodka a day, and last drink was last night. Feels tremulous, but denies HA, tactile / auditory / visual hallucinations, diaphoresis Patient states she is going through a tough time due to divorce, but denies wanting to hurt herself, denies any suicide intent.     2/20/19- Patient seen and examined at bedside. States she feels well, no complaints at this time.  2/21/19- Patient seen and examined at bedside. Patient anxious, tremulous, withdrawing. Denies any CP, SOB, palpitations, abd pain, blood in stools  2/22/19- Patient seen and examined at bedside. States she feels well, less tremulous today, no complaints at this time. No more blood in stools or melena.  2/23/19- Patient seen and examined at bedside. States she feels well, no tremors, denies blood in stool.    MEDICATIONS  (STANDING):  dicyclomine 20 milliGRAM(s) Oral three times a day before meals  escitalopram 20 milliGRAM(s) Oral daily  folic acid 1 milliGRAM(s) Oral daily  LORazepam     Tablet   Oral   LORazepam     Tablet 1 milliGRAM(s) Oral every 12 hours  multivitamin 1 Tablet(s) Oral daily  pantoprazole    Tablet 40 milliGRAM(s) Oral two times a day    MEDICATIONS  (PRN):  artificial  tears Solution 1 Drop(s) Both EYES two times a day PRN for dry eyes  hydrOXYzine hydrochloride 50 milliGRAM(s) Oral daily PRN Anxiety  LORazepam   Injectable 2 milliGRAM(s) IV Push every 1 hour PRN Symptom-triggered: each CIWA -Ar score 8 or GREATER      Allergies    No Known Allergies    Intolerances      ROS:  CONSTITUTIONAL: No weakness, fevers or chills  EYES/ENT: No visual changes;  No vertigo or throat pain   NECK: No pain or stiffness  RESPIRATORY: No cough, wheezing, hemoptysis; No shortness of breath  CARDIOVASCULAR: No chest pain or palpitations  GASTROINTESTINAL: No abdominal or epigastric pain. No nausea, vomiting, or hematemesis  GENITOURINARY: No dysuria, frequency or hematuria  NEUROLOGICAL: No numbness or weakness  SKIN: No itching, burning, rashes, or lesions   All other review of systems is negative unless indicated above.    Vital Signs Last 24 Hrs  T(C): 37.1 (23 Feb 2019 17:18), Max: 37.1 (22 Feb 2019 19:13)  T(F): 98.7 (23 Feb 2019 17:18), Max: 98.8 (22 Feb 2019 19:13)  HR: 101 (23 Feb 2019 17:18) (76 - 101)  BP: 130/79 (23 Feb 2019 17:18) (100/69 - 130/79)  BP(mean): --  RR: 17 (23 Feb 2019 17:18) (16 - 18)  SpO2: 97% (23 Feb 2019 17:18) (97% - 99%)      Physical Exam:  General: WN/WD NAD, tremulous  Neurology: A&Ox3, nonfocal, DELA CRUZ x 4  Respiratory: CTA B/L  CV: RRR, S1S2, no murmurs, rubs or gallops  Abdominal: Soft, NT, ND +BS  Extremities: No edema, + peripheral pulses      LABS:                        11.3   4.63  )-----------( 90       ( 23 Feb 2019 05:30 )             34.1     02-22    139  |  104  |  7   ----------------------------<  96  4.0   |  29  |  0.78    Ca    9.1      22 Feb 2019 06:35            RADIOLOGY & ADDITIONAL TESTS:
INTERVAL HPI/OVERNIGHT EVENTS:  52 y/o F with PMH of etoh abuse w/ withdrawal seizures, p/w rectal bleeding. Patient states she has had rectal bleeding on and off for last year. She has had 7 hemorrhoids banded previously, and she saw colorectal today who stated she will be scheduled for more hemorrhoid surgeries soon, but referred to ED due to significant bleeding. Patient states that for the last 3 weeks or so, she has been bleeding with almost every bowel movement. States that toilet bowl has bright red bleed, and sometimes she even notices blood clots when she wipes. States she has also been dizzy and lightheaded occasionally, but denies LOC. Denies hematochezia, but does feel nausea at times. +Intermittent lower abdominal pain. States she had flu about 3 weeks ago, and lost 13 lbs while she was sick, but gained 5 lbs back since then.     Patient states she drinks a pint of vodka a day, and last drink was last night. Feels tremulous, but denies HA, tactile / auditory / visual hallucinations, diaphoresis Patient states she is going through a tough time due to divorce, but denies wanting to hurt herself, denies any suicide intent.     2/20/19- Patient seen and examined at bedside. States she feels well, no complaints at this time.  2/21/19- Patient seen and examined at bedside. Patient anxious, tremulous, withdrawing. Denies any CP, SOB, palpitations, abd pain, blood in stools  2/22/19- Patient seen and examined at bedside. States she feels well, less tremulous today, no complaints at this time. No more blood in stools or melena.    MEDICATIONS  (STANDING):  dicyclomine 20 milliGRAM(s) Oral three times a day before meals  escitalopram 20 milliGRAM(s) Oral daily  folic acid 1 milliGRAM(s) Oral daily  LORazepam     Tablet   Oral   LORazepam     Tablet 1 milliGRAM(s) Oral every 6 hours  multivitamin 1 Tablet(s) Oral daily  pantoprazole    Tablet 40 milliGRAM(s) Oral two times a day    MEDICATIONS  (PRN):  artificial  tears Solution 1 Drop(s) Both EYES two times a day PRN for dry eyes  hydrOXYzine hydrochloride 50 milliGRAM(s) Oral daily PRN Anxiety  LORazepam   Injectable 2 milliGRAM(s) IV Push every 1 hour PRN Symptom-triggered: each CIWA -Ar score 8 or GREATER      Allergies    No Known Allergies    Intolerances      ROS:  CONSTITUTIONAL: No weakness, fevers or chills  EYES/ENT: No visual changes;  No vertigo or throat pain   NECK: No pain or stiffness  RESPIRATORY: No cough, wheezing, hemoptysis; No shortness of breath  CARDIOVASCULAR: No chest pain or palpitations  GASTROINTESTINAL: No abdominal or epigastric pain. No nausea, vomiting, or hematemesis  GENITOURINARY: No dysuria, frequency or hematuria  NEUROLOGICAL: No numbness or weakness  SKIN: No itching, burning, rashes, or lesions   All other review of systems is negative unless indicated above.    Vital Signs Last 24 Hrs  T(C): 36.7 (22 Feb 2019 10:52), Max: 36.9 (21 Feb 2019 17:16)  T(F): 98 (22 Feb 2019 10:52), Max: 98.4 (21 Feb 2019 17:16)  HR: 88 (22 Feb 2019 10:52) (78 - 112)  BP: 94/72 (22 Feb 2019 10:52) (93/52 - 106/81)  BP(mean): --  RR: 18 (22 Feb 2019 10:52) (16 - 18)  SpO2: 97% (22 Feb 2019 10:52) (96% - 99%)    02-21 @ 07:01  -  02-22 @ 07:00  --------------------------------------------------------  IN: 480 mL / OUT: 0 mL / NET: 480 mL        Physical Exam:  General: WN/WD NAD, tremulous  Neurology: A&Ox3, nonfocal, DELA CRUZ x 4  Respiratory: CTA B/L  CV: RRR, S1S2, no murmurs, rubs or gallops  Abdominal: Soft, NT, ND +BS  Extremities: No edema, + peripheral pulses      LABS:                        11.1   4.88  )-----------( 69       ( 22 Feb 2019 06:35 )             34.3     02-22    139  |  104  |  7   ----------------------------<  96  4.0   |  29  |  0.78    Ca    9.1      22 Feb 2019 06:35            RADIOLOGY & ADDITIONAL TESTS:
INTERVAL HPI/OVERNIGHT EVENTS:    54 y/o F with PMH of etoh abuse w/ withdrawal seizures, p/w rectal bleeding. Patient states she has had rectal bleeding on and off for last year. She has had 7 hemorrhoids banded previously, and she saw colorectal today who stated she will be scheduled for more hemorrhoid surgeries soon, but referred to ED due to significant bleeding. Patient states that for the last 3 weeks or so, she has been bleeding with almost every bowel movement. States that toilet bowl has bright red bleed, and sometimes she even notices blood clots when she wipes. States she has also been dizzy and lightheaded occasionally, but denies LOC. Denies hematochezia, but does feel nausea at times. +Intermittent lower abdominal pain. States she had flu about 3 weeks ago, and lost 13 lbs while she was sick, but gained 5 lbs back since then.     Patient states she drinks a pint of vodka a day, and last drink was last night. Feels tremulous, but denies HA, tactile / auditory / visual hallucinations, diaphoresis Patient states she is going through a tough time due to divorce, but denies wanting to hurt herself, denies any suicide intent.     2/20/19- Patient seen and examined at bedside. States she feels well, no complaints at this time.  2/21/19- Patient seen and examined at bedside. Patient anxious, tremulous, withdrawing. Denies any CP, SOB, palpitations, abd pain, blood in stools    MEDICATIONS  (STANDING):  dicyclomine 20 milliGRAM(s) Oral three times a day before meals  escitalopram 20 milliGRAM(s) Oral daily  folic acid 1 milliGRAM(s) Oral daily  LORazepam   Injectable 1.5 milliGRAM(s) IV Push every 4 hours  LORazepam   Injectable 1 milliGRAM(s) IV Push every 4 hours  LORazepam   Injectable   IV Push   multivitamin 1 Tablet(s) Oral daily  pantoprazole    Tablet 40 milliGRAM(s) Oral two times a day  sodium chloride 0.9%. 1000 milliLiter(s) (100 mL/Hr) IV Continuous <Continuous>  thiamine 100 milliGRAM(s) Oral daily    MEDICATIONS  (PRN):  artificial  tears Solution 1 Drop(s) Both EYES two times a day PRN for dry eyes  hydrOXYzine hydrochloride 50 milliGRAM(s) Oral daily PRN Anxiety  LORazepam   Injectable 2 milliGRAM(s) IV Push every 1 hour PRN Symptom-triggered: each CIWA -Ar score 8 or GREATER      Allergies    No Known Allergies    Intolerances      ROS:  CONSTITUTIONAL: No weakness, fevers or chills  EYES/ENT: No visual changes;  No vertigo or throat pain   NECK: No pain or stiffness  RESPIRATORY: No cough, wheezing, hemoptysis; No shortness of breath  CARDIOVASCULAR: No chest pain or palpitations  GASTROINTESTINAL: No abdominal or epigastric pain. No nausea, vomiting, or hematemesis  GENITOURINARY: No dysuria, frequency or hematuria  NEUROLOGICAL: No numbness or weakness  SKIN: No itching, burning, rashes, or lesions   All other review of systems is negative unless indicated above.    Vital Signs Last 24 Hrs  T(C): 36.8 (21 Feb 2019 15:10), Max: 37.4 (20 Feb 2019 17:01)  T(F): 98.3 (21 Feb 2019 15:10), Max: 99.4 (20 Feb 2019 17:01)  HR: 112 (21 Feb 2019 15:10) (92 - 112)  BP: 93/52 (21 Feb 2019 15:10) (93/52 - 134/90)  BP(mean): --  RR: 16 (21 Feb 2019 15:10) (16 - 20)  SpO2: 96% (21 Feb 2019 15:10) (96% - 99%)    02-20 @ 07:01  -  02-21 @ 07:00  --------------------------------------------------------  IN: 20 mL / OUT: 0 mL / NET: 20 mL        Physical Exam:  General: WN/WD NAD, tremulous  Neurology: A&Ox3, nonfocal, DELA CRUZ x 4  Respiratory: CTA B/L  CV: RRR, S1S2, no murmurs, rubs or gallops  Abdominal: Soft, NT, ND +BS  Extremities: No edema, + peripheral pulses      LABS:                        11.4   4.93  )-----------( 78       ( 21 Feb 2019 09:06 )             33.4     02-20    131<L>  |  96  |  12  ----------------------------<  130<H>  4.9   |  24  |  0.66    Ca    8.4<L>      20 Feb 2019 05:34  Phos  3.3     02-20  Mg     1.8     02-20    TPro  6.8  /  Alb  3.2<L>  /  TBili  1.2  /  DBili  x   /  AST  101<H>  /  ALT  62  /  AlkPhos  112  02-20    PT/INR - ( 20 Feb 2019 05:34 )   PT: 10.3 sec;   INR: 0.93 ratio         PTT - ( 20 Feb 2019 05:34 )  PTT:25.6 sec      RADIOLOGY & ADDITIONAL TESTS:
INTERVAL HPI/OVERNIGHT EVENTS:  52 y/o F with PMH of etoh abuse w/ withdrawal seizures, p/w rectal bleeding. Patient states she has had rectal bleeding on and off for last year. She has had 7 hemorrhoids banded previously, and she saw colorectal today who stated she will be scheduled for more hemorrhoid surgeries soon, but referred to ED due to significant bleeding. Patient states that for the last 3 weeks or so, she has been bleeding with almost every bowel movement. States that toilet bowl has bright red bleed, and sometimes she even notices blood clots when she wipes. States she has also been dizzy and lightheaded occasionally, but denies LOC. Denies hematochezia, but does feel nausea at times. +Intermittent lower abdominal pain. States she had flu about 3 weeks ago, and lost 13 lbs while she was sick, but gained 5 lbs back since then.     Patient states she drinks a pint of vodka a day, and last drink was last night. Feels tremulous, but denies HA, tactile / auditory / visual hallucinations, diaphoresis Patient states she is going through a tough time due to divorce, but denies wanting to hurt herself, denies any suicide intent.     2/20/19- Patient seen and examined at bedside. States she feels well, no complaints at this time.    MEDICATIONS  (STANDING):  dicyclomine 20 milliGRAM(s) Oral three times a day before meals  escitalopram 20 milliGRAM(s) Oral daily  folic acid 1 milliGRAM(s) Oral daily  LORazepam   Injectable 2 milliGRAM(s) IV Push every 4 hours  LORazepam   Injectable 1.5 milliGRAM(s) IV Push every 4 hours  LORazepam   Injectable   IV Push   multivitamin 1 Tablet(s) Oral daily  pantoprazole    Tablet 40 milliGRAM(s) Oral two times a day  sodium chloride 0.9%. 1000 milliLiter(s) (100 mL/Hr) IV Continuous <Continuous>  thiamine 100 milliGRAM(s) Oral daily    MEDICATIONS  (PRN):  artificial  tears Solution 1 Drop(s) Both EYES two times a day PRN for dry eyes  hydrOXYzine hydrochloride 50 milliGRAM(s) Oral daily PRN Anxiety  LORazepam   Injectable 2 milliGRAM(s) IV Push every 1 hour PRN Symptom-triggered: each CIWA -Ar score 8 or GREATER      Allergies    No Known Allergies    Intolerances      ROS:  CONSTITUTIONAL: No weakness, fevers or chills  EYES/ENT: No visual changes;  No vertigo or throat pain   NECK: No pain or stiffness  RESPIRATORY: No cough, wheezing, hemoptysis; No shortness of breath  CARDIOVASCULAR: No chest pain or palpitations  GASTROINTESTINAL: No abdominal or epigastric pain. No nausea, vomiting, or hematemesis.  GENITOURINARY: No dysuria, frequency or hematuria  NEUROLOGICAL: No numbness or weakness  SKIN: No itching, burning, rashes, or lesions   All other review of systems is negative unless indicated above.    Vital Signs Last 24 Hrs  T(C): 37.4 (20 Feb 2019 13:23), Max: 37.4 (20 Feb 2019 13:23)  T(F): 99.3 (20 Feb 2019 13:23), Max: 99.3 (20 Feb 2019 13:23)  HR: 102 (20 Feb 2019 13:23) (88 - 108)  BP: 117/86 (20 Feb 2019 13:23) (117/84 - 140/83)  BP(mean): --  RR: 18 (20 Feb 2019 13:23) (18 - 20)  SpO2: 98% (20 Feb 2019 13:23) (95% - 100%)    02-20 @ 07:01  -  02-20 @ 15:46  --------------------------------------------------------  IN: 20 mL / OUT: 0 mL / NET: 20 mL      Physical Exam:  General: WN/WD NAD  Neurology: A&Ox3, nonfocal, DELA CRUZ x 4  Respiratory: CTA B/L  CV: RRR, S1S2, no murmurs, rubs or gallops  Abdominal: Soft, NT, ND +BS  Extremities: No edema, + peripheral pulses      LABS:                        10.7   5.22  )-----------( 51       ( 20 Feb 2019 05:34 )             31.8     02-20    131<L>  |  96  |  12  ----------------------------<  130<H>  4.9   |  24  |  0.66    Ca    8.4<L>      20 Feb 2019 05:34  Phos  3.3     02-20  Mg     1.8     02-20    TPro  6.8  /  Alb  3.2<L>  /  TBili  1.2  /  DBili  x   /  AST  101<H>  /  ALT  62  /  AlkPhos  112  02-20    PT/INR - ( 20 Feb 2019 05:34 )   PT: 10.3 sec;   INR: 0.93 ratio         PTT - ( 20 Feb 2019 05:34 )  PTT:25.6 sec      RADIOLOGY & ADDITIONAL TESTS:
Patient is a 53y old  Female who presents with a chief complaint of rectal bleeding (20 Feb 2019 15:45)      Subective:  Shaky. No bleeding.    PAST MEDICAL & SURGICAL HISTORY:  Alcohol abuse  No significant past surgical history      MEDICATIONS  (STANDING):  dicyclomine 20 milliGRAM(s) Oral three times a day before meals  escitalopram 20 milliGRAM(s) Oral daily  folic acid 1 milliGRAM(s) Oral daily  LORazepam   Injectable 1.5 milliGRAM(s) IV Push every 4 hours  LORazepam   Injectable 1 milliGRAM(s) IV Push every 4 hours  LORazepam   Injectable   IV Push   multivitamin 1 Tablet(s) Oral daily  pantoprazole    Tablet 40 milliGRAM(s) Oral two times a day  sodium chloride 0.9%. 1000 milliLiter(s) (100 mL/Hr) IV Continuous <Continuous>  thiamine 100 milliGRAM(s) Oral daily    MEDICATIONS  (PRN):  artificial  tears Solution 1 Drop(s) Both EYES two times a day PRN for dry eyes  hydrOXYzine hydrochloride 50 milliGRAM(s) Oral daily PRN Anxiety  LORazepam   Injectable 2 milliGRAM(s) IV Push every 1 hour PRN Symptom-triggered: each CIWA -Ar score 8 or GREATER      REVIEW OF SYSTEMS:    RESPIRATORY: No shortness of breath  CARDIOVASCULAR: No chest pain  All other review of systems is negative unless indicated above.    Vital Signs Last 24 Hrs  T(C): 37.2 (21 Feb 2019 11:15), Max: 37.4 (20 Feb 2019 13:23)  T(F): 98.9 (21 Feb 2019 11:15), Max: 99.4 (20 Feb 2019 17:01)  HR: 94 (21 Feb 2019 11:15) (92 - 104)  BP: 112/85 (21 Feb 2019 11:15) (108/82 - 134/90)  BP(mean): --  RR: 16 (21 Feb 2019 11:15) (16 - 20)  SpO2: 98% (21 Feb 2019 11:15) (96% - 99%)    PHYSICAL EXAM:    Constitutional: NAD, well-developed  Respiratory: CTAB  Cardiovascular: S1 and S2, RRR  Gastrointestinal: BS+, soft, NT/ND  Extremities: No peripheral edema  Psychiatric: Normal mood, normal affect    LABS:                        11.4   4.93  )-----------( 78       ( 21 Feb 2019 09:06 )             33.4     02-20    131<L>  |  96  |  12  ----------------------------<  130<H>  4.9   |  24  |  0.66    Ca    8.4<L>      20 Feb 2019 05:34  Phos  3.3     02-20  Mg     1.8     02-20    TPro  6.8  /  Alb  3.2<L>  /  TBili  1.2  /  DBili  x   /  AST  101<H>  /  ALT  62  /  AlkPhos  112  02-20    PT/INR - ( 20 Feb 2019 05:34 )   PT: 10.3 sec;   INR: 0.93 ratio         PTT - ( 20 Feb 2019 05:34 )  PTT:25.6 sec  LIVER FUNCTIONS - ( 20 Feb 2019 05:34 )  Alb: 3.2 g/dL / Pro: 6.8 gm/dL / ALK PHOS: 112 U/L / ALT: 62 U/L / AST: 101 U/L / GGT: x             RADIOLOGY & ADDITIONAL STUDIES:
Patient is a 53y old  Female who presents with a chief complaint of rectal bleeding (2019 15:58)    HPI:  52 y/o F with PMH of etoh abuse w/ withdrawal seizures, p/w rectal bleeding. Patient states she has had rectal bleeding on and off for last year. She has had 7 hemorrhoids banded previously, and she saw colorectal today who stated she will be scheduled for more hemorrhoid surgeries soon, but referred to ED due to significant bleeding. Patient states that for the last 3 weeks or so, she has been bleeding with almost every bowel movement. States that toilet bowl has bright red bleed, and sometimes she even notices blood clots when she wipes. States she has also been dizzy and lightheaded occasionally, but denies LOC. Denies hematochezia, but does feel nausea at times. +Intermittent lower abdominal pain. States she had flu about 3 weeks ago, and lost 13 lbs while she was sick, but gained 5 lbs back since then.     Patient states she drinks a pint of vodka a day, and last drink was last night. Feels tremulous, but denies HA, tactile / auditory / visual hallucinations, diaphoresis Patient states she is going through a tough time due to divorce, but denies wanting to hurt herself, denies any suicide intent.     2019-  Pt reports feeling fine, no complaints besides unsteady during ambulation.    Still denies any rectal bleeding.   CIWA protocol in place.   Denies any abdominal pain, n/v.     PSH:      Social Hx: tobacco - denies, etoh - 1 pink of vodka daily, drugs - denies, lives at home with family     Family Hx: Mother - pancreatic cancer, father -  of heart disease at 59 y/o (2019 21:17)    PAST MEDICAL & SURGICAL HISTORY:  Alcohol abuse  No significant past surgical history    MEDICATIONS  (STANDING):  dicyclomine 20 milliGRAM(s) Oral three times a day before meals  escitalopram 20 milliGRAM(s) Oral daily  folic acid 1 milliGRAM(s) Oral daily  LORazepam   Injectable 1 milliGRAM(s) IV Push every 4 hours  LORazepam   Injectable   IV Push   LORazepam   Injectable 0.5 milliGRAM(s) IV Push every 4 hours  multivitamin 1 Tablet(s) Oral daily  pantoprazole    Tablet 40 milliGRAM(s) Oral two times a day  thiamine 100 milliGRAM(s) Oral daily    MEDICATIONS  (PRN):  artificial  tears Solution 1 Drop(s) Both EYES two times a day PRN for dry eyes  hydrOXYzine hydrochloride 50 milliGRAM(s) Oral daily PRN Anxiety  LORazepam   Injectable 2 milliGRAM(s) IV Push every 1 hour PRN Symptom-triggered: each CIWA -Ar score 8 or GREATER    Allergies  No Known Allergies    FAMILY HISTORY:  No pertinent family history in first degree relatives    REVIEW OF SYSTEMS:  CONSTITUTIONAL: No weakness, fevers or chills, mild shakiness while ambulating.   RESPIRATORY: No cough, wheezing, hemoptysis; No shortness of breath  CARDIOVASCULAR: No chest pain or palpitations  GASTROINTESTINAL: Per HPI.     Vital Signs Last 24 Hrs  T(C): 36.8 (2019 06:49), Max: 37.2 (2019 11:15)  T(F): 98.3 (2019 06:49), Max: 98.9 (2019 11:15)  HR: 78 (2019 06:49) (78 - 112)  BP: 100/70 (2019 06:49) (93/52 - 116/83)  BP(mean): --  RR: 16 (2019 06:49) (16 - 18)  SpO2: 99% (2019 06:49) (96% - 99%)    PHYSICAL EXAM:  Constitutional: NAD, well-developed  Respiratory: CTAB  Cardiovascular: S1 and S2, RRR, no M/G/R  Gastrointestinal: BS+, soft, NT/ND    LABS:                        11.1   4.88  )-----------( 69       ( 2019 06:35 )             34.3     02-    139  |  104  |  7   ----------------------------<  96  4.0   |  29  |  0.78    Ca    9.1      2019 06:35            RADIOLOGY & ADDITIONAL STUDIES:

## 2019-02-23 NOTE — PROGRESS NOTE ADULT - ASSESSMENT
52 y/o F with PMH of etoh abuse w/ withdrawal seizures, p/w rectal bleeding    *Hematochezia w/ anemia - r/o GI bleed vs hemorrhoids  -No more bleeding  -advance diet to regular  -Protonix   -Octreotide drip given h/o heavy etoh use- stopped by GI  -GI consult  -Iron panel / ferritin / B12 / folate     *Etoh withdrawal  -MVI / Thiamine folic acid  -Seizure / fall precautions  -CIWA protocol- highest score 4 past 24 hours  -changed to PO ativan taper from IV- last dose 2/24 AM    *Transaminitis- improving  -Likely 2/2 etoh use  -Counseled on etoh cessation  -Hepatitis panel- neg  -Lipid panel  -Advised patient that it needs to be followed up    Thrombocytopenia  -Possibly 2/2 bone marrow suppression from etoh abuse. Will need to monitor closely, especially in the setting of GI bleed.  -If thrombocytopenia stays stable, can f/u outpatient for further work up- Plt 90k today, improving    *DVT ppx  -SCDs
54 y/o F with PMH of etoh abuse w/ withdrawal seizures, p/w rectal bleeding    *Hematochezia w/ anemia - r/o GI bleed vs hemorrhoids  -Trend H/H and transfuse PRN as per guidlines  -advance diet to regular  -Protonix   -Octreotide drip given h/o heavy etoh use- stopped by GI  -GI consult  -Iron panel / ferritin / B12 / folate     *Etoh withdrawal  -MVI / Thiamine folic acid  -Seizure / fall precautions  -CIWA protocol- highest score 5 past 24 hours  -changed to PO ativan taper from IV    *Transaminitis  -Likely 2/2 etoh use  -Counseled on etoh cessation  -Hepatitis panel  -Lipid panel  -Advised patient that it needs to be followed up    Thrombocytopenia  -Possibly 2/2 bone marrow suppression from etoh abuse. Will need to monitor closely, especially in the setting of GI bleed.  -If thrombocytopenia stays stable, can f/u outpatient for further work up    *DVT ppx  -SCDs
52 y/o F with PMH of etoh abuse w/ withdrawal seizures, p/w rectal bleeding    *Hematochezia w/ anemia - r/o GI bleed vs hemorrhoids  -Trend H/H and transfuse PRN as per guidlines  -IVF  -advance diet to clears  -Protonix   -Octreotide drip given h/o heavy etoh use- stopped by GI  -GI consult  -Iron panel / ferritin / B12 / folate     *Etoh withdrawal  -MVI / Thiamine folic acid  -Seizure / fall precautions  -CIWA protocol     *Transaminitis  -Likely 2/2 etoh use  -Counseled on etoh cessation  -Hepatitis panel  -Lipid panel  -Advised patient that it needs to be followed up    Thrombocytopenia  -Possibly 2/2 bone marrow suppression from etoh abuse. Will need to monitor closely, especially in the setting of GI bleed.  -If thrombocytopenia stays stable, can f/u outpatient for further work up    *DVT ppx  -SCDs
52 y/o F with PMH of etoh abuse w/ withdrawal seizures, p/w rectal bleeding    *Hematochezia w/ anemia - r/o GI bleed vs hemorrhoids  -Trend H/H and transfuse PRN as per guidlines  -advance diet to regular  -Protonix   -Octreotide drip given h/o heavy etoh use- stopped by GI  -GI consult  -Iron panel / ferritin / B12 / folate     *Etoh withdrawal  -MVI / Thiamine folic acid  -Seizure / fall precautions  -CIWA protocol- highest score 8 past 24 hours    *Transaminitis  -Likely 2/2 etoh use  -Counseled on etoh cessation  -Hepatitis panel  -Lipid panel  -Advised patient that it needs to be followed up    Thrombocytopenia  -Possibly 2/2 bone marrow suppression from etoh abuse. Will need to monitor closely, especially in the setting of GI bleed.  -If thrombocytopenia stays stable, can f/u outpatient for further work up    *DVT ppx  -SCDs
52 y/o female admitted with weakness/fatigue 2/2 hematochezia.   Pt with extensive history of hemorrhoids, and ETOH abuse.   Reports drinking 1 pint of vodka per day, last drink was Monday night.     Impression:  Hematochezia likely due to hemorrhoids, doubt colitis as recent colonoscopy in 2/2019 did not show active colitis.     Plan:  No rectal bleeding overnight.  Tolerating regular diet.   Continue PO PPI/bentyl.  H/H stable.  Thrombocytopenia in the setting of ETOH abuse ?bone marrow suppression, follow inpt vs. outpt?  CIWA protocol, ETOH cessation.  Pt will f/u next week with Dr. Radha Sandoval for hemorrhoidectomy vs. banding?  Discussed with pt, nurse.
Imp:  Probably hemorrhoidal bleeding    Rec:  Advance diet

## 2019-02-24 ENCOUNTER — TRANSCRIPTION ENCOUNTER (OUTPATIENT)
Age: 54
End: 2019-02-24

## 2019-02-24 VITALS
TEMPERATURE: 98 F | HEART RATE: 85 BPM | OXYGEN SATURATION: 99 % | SYSTOLIC BLOOD PRESSURE: 97 MMHG | DIASTOLIC BLOOD PRESSURE: 69 MMHG | RESPIRATION RATE: 16 BRPM

## 2019-02-24 RX ORDER — FOLIC ACID 0.8 MG
1 TABLET ORAL
Qty: 0 | Refills: 0 | DISCHARGE
Start: 2019-02-24

## 2019-02-24 RX ORDER — PANTOPRAZOLE SODIUM 20 MG/1
1 TABLET, DELAYED RELEASE ORAL
Qty: 30 | Refills: 0 | OUTPATIENT
Start: 2019-02-24 | End: 2019-03-10

## 2019-02-24 RX ADMIN — Medication 20 MILLIGRAM(S): at 05:12

## 2019-02-24 RX ADMIN — Medication 1 TABLET(S): at 10:40

## 2019-02-24 RX ADMIN — Medication 1 MILLIGRAM(S): at 05:11

## 2019-02-24 RX ADMIN — Medication 20 MILLIGRAM(S): at 10:39

## 2019-02-24 RX ADMIN — ESCITALOPRAM OXALATE 20 MILLIGRAM(S): 10 TABLET, FILM COATED ORAL at 10:40

## 2019-02-24 RX ADMIN — Medication 1 MILLIGRAM(S): at 10:40

## 2019-02-24 RX ADMIN — PANTOPRAZOLE SODIUM 40 MILLIGRAM(S): 20 TABLET, DELAYED RELEASE ORAL at 05:12

## 2019-02-24 NOTE — DISCHARGE NOTE ADULT - PLAN OF CARE
resolution -Likely 2/2 hemorrhoids  -Follow up with GI within 1 week for further workup (hemorrhoidectomy vs. banding) -Recommend abstinence from alcohol  -Resources given for outpatient rehab  -Follow up with PCP within 2 weeks of discharge -Likely 2/2 EtOh abuse, advised EtOH cessation  -Improving, follow up with PCP for further workup -Likely 2/2 hemorrhoids  -Follow up with GI within 1 week for further workup (hemorrhoidectomy vs. banding)  -If feeling lightheaded, dizzy, noticing dark stools, blood in stool, please call GI or come to ED

## 2019-02-24 NOTE — DISCHARGE NOTE ADULT - CARE PLAN
Principal Discharge DX:	GI bleed  Goal:	resolution  Assessment and plan of treatment:	-Likely 2/2 hemorrhoids  -Follow up with GI within 1 week for further workup (hemorrhoidectomy vs. banding)  Secondary Diagnosis:	Alcohol abuse  Assessment and plan of treatment:	-Recommend abstinence from alcohol  -Resources given for outpatient rehab  -Follow up with PCP within 2 weeks of discharge  Secondary Diagnosis:	Thrombocytopenia  Assessment and plan of treatment:	-Likely 2/2 EtOh abuse, advised EtOH cessation  -Improving, follow up with PCP for further workup  Secondary Diagnosis:	Elevated liver enzymes  Assessment and plan of treatment:	-Likely 2/2 EtOh abuse, advised EtOH cessation  -Improving, follow up with PCP for further workup Principal Discharge DX:	GI bleed  Goal:	resolution  Assessment and plan of treatment:	-Likely 2/2 hemorrhoids  -Follow up with GI within 1 week for further workup (hemorrhoidectomy vs. banding)  -If feeling lightheaded, dizzy, noticing dark stools, blood in stool, please call GI or come to ED  Secondary Diagnosis:	Alcohol abuse  Assessment and plan of treatment:	-Recommend abstinence from alcohol  -Resources given for outpatient rehab  -Follow up with PCP within 2 weeks of discharge  Secondary Diagnosis:	Thrombocytopenia  Assessment and plan of treatment:	-Likely 2/2 EtOh abuse, advised EtOH cessation  -Improving, follow up with PCP for further workup  Secondary Diagnosis:	Elevated liver enzymes  Assessment and plan of treatment:	-Likely 2/2 EtOh abuse, advised EtOH cessation  -Improving, follow up with PCP for further workup

## 2019-02-24 NOTE — DISCHARGE NOTE ADULT - MEDICATION SUMMARY - MEDICATIONS TO TAKE
I will START or STAY ON the medications listed below when I get home from the hospital:    escitalopram 20 mg oral tablet  -- 1 tab(s) by mouth once a day  -- Indication: For home med    hydrOXYzine hydrochloride 50 mg oral tablet  -- orally once a day (at bedtime)  -- Indication: For home  med    dicyclomine 10 mg oral capsule  -- 2 cap(s) by mouth 3 times a day (before meals)  -- Indication: For gi cramping    lysine 1000 mg oral tablet  -- 1 tab(s) by mouth once a day  -- Indication: For home med    ocular lubricant ophthalmic solution  -- 1 drop(s) to each affected eye 2 times a day, As Needed - for dry eyes  -- Indication: For home med    pantoprazole 40 mg oral delayed release tablet  -- 1 tab(s) by mouth 2 times a day  -- Indication: For gi bleed    Multiple Vitamins oral tablet  -- 1 tab(s) by mouth once a day  -- Indication: For vitmain    folic acid 1 mg oral tablet  -- 1 tab(s) by mouth once a day  -- Indication: For vitamin

## 2019-02-24 NOTE — DISCHARGE NOTE ADULT - PATIENT PORTAL LINK FT
You can access the [facility] Portal, offered by [facility], by registering with the following website: [email or web address]

## 2019-02-24 NOTE — DISCHARGE NOTE ADULT - PROVIDER TOKENS
PROVIDER:[TOKEN:[[id number]],FOLLOWUP:[2 weeks]],FREE:[LAST:[Dr. Boswell- ],PHONE:[(   )    -],FAX:[(   )    -],FOLLOWUP:[1 week]]

## 2019-02-24 NOTE — DISCHARGE NOTE ADULT - HOSPITAL COURSE
54 y/o F with PMH of etoh abuse w/ withdrawal seizures, p/w rectal bleeding. Patient states she has had rectal bleeding on and off for last year. She has had 7 hemorrhoids banded previously, and she saw colorectal today who stated she will be scheduled for more hemorrhoid surgeries soon, but referred to ED due to significant bleeding. Patient states that for the last 3 weeks or so, she has been bleeding with almost every bowel movement. States that toilet bowl has bright red bleed, and sometimes she even notices blood clots when she wipes. States she has also been dizzy and lightheaded occasionally, but denies LOC. Denies hematochezia, but does feel nausea at times. +Intermittent lower abdominal pain. States she had flu about 3 weeks ago, and lost 13 lbs while she was sick, but gained 5 lbs back since then.     Patient states she drinks a pint of vodka a day, and last drink was last night. Feels tremulous, but denies HA, tactile / auditory / visual hallucinations, diaphoresis Patient states she is going through a tough time due to divorce, but denies wanting to hurt herself, denies any suicide intent.     2/20/19- Patient seen and examined at bedside. States she feels well, no complaints at this time.  2/21/19- Patient seen and examined at bedside. Patient anxious, tremulous, withdrawing. Denies any CP, SOB, palpitations, abd pain, blood in stools  2/22/19- Patient seen and examined at bedside. States she feels well, less tremulous today, no complaints at this time. No more blood in stools or melena.  2/23/19- Patient seen and examined at bedside. States she feels well, no tremors, denies blood in stool.  2/24/19- Patient seen and examined at bedside. States she feels well, wants to go home, no more bleeding.    Physical Exam:  General: WN/WD NAD, tremulous  Neurology: A&Ox3, nonfocal, DELA CRUZ x 4  Respiratory: CTA B/L  CV: RRR, S1S2, no murmurs, rubs or gallops  Abdominal: Soft, NT, ND +BS  Extremities: No edema, + peripheral pulses    54 y/o F with PMH of etoh abuse w/ withdrawal seizures, p/w rectal bleeding    *Hematochezia w/ anemia - r/o GI bleed vs hemorrhoids  -No more bleeding  -advance diet to regular  -Protonix   -Octreotide drip given h/o heavy etoh use- stopped by GI  -GI consult  -Iron panel / ferritin / B12 / folate     *Etoh withdrawal  -MVI / Thiamine folic acid  -Seizure / fall precautions  -CIWA protocol- highest score 1 past 24 hours  -changed to PO ativan taper from IV- last dose 2/24 AM- resources given for outpatient rehab follow up    *Transaminitis- improving  -Likely 2/2 etoh use  -Counseled on etoh cessation  -Hepatitis panel- neg  -Lipid panel  -Advised patient that it needs to be followed up    Thrombocytopenia  -Possibly 2/2 bone marrow suppression from etoh abuse. Will need to monitor closely, especially in the setting of GI bleed.  -If thrombocytopenia stays stable, can f/u outpatient for further work up- Plt 90k- improving    Total time spent on discharge: 38 minutes

## 2019-02-24 NOTE — DISCHARGE NOTE ADULT - CARE PROVIDER_API CALL
Karen Gross)  Family Medicine  [address] [place] [address]  Phone: [phone]  Fax: [phone]  Follow Up Time: 2 weeks    Dr. Sacha COUGHLIN,   Phone: (   )    -  Fax: (   )    -  Follow Up Time: 1 week

## 2019-02-28 DIAGNOSIS — D64.9 ANEMIA, UNSPECIFIED: ICD-10-CM

## 2019-02-28 DIAGNOSIS — Z63.5 DISRUPTION OF FAMILY BY SEPARATION AND DIVORCE: ICD-10-CM

## 2019-02-28 DIAGNOSIS — D69.6 THROMBOCYTOPENIA, UNSPECIFIED: ICD-10-CM

## 2019-02-28 DIAGNOSIS — K64.9 UNSPECIFIED HEMORRHOIDS: ICD-10-CM
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
## 2019-02-28 DIAGNOSIS — F10.230 ALCOHOL DEPENDENCE WITH WITHDRAWAL, UNCOMPLICATED: ICD-10-CM

## 2019-02-28 DIAGNOSIS — K92.1 MELENA: ICD-10-CM

## 2019-02-28 SDOH — SOCIAL STABILITY - SOCIAL INSECURITY: DISRUPTION OF FAMILY BY SEPARATION AND DIVORCE: Z63.5

## 2019-03-11 ENCOUNTER — EMERGENCY (EMERGENCY)
Facility: HOSPITAL | Age: 54
LOS: 0 days | Discharge: HOME | End: 2019-03-11
Attending: EMERGENCY MEDICINE | Admitting: EMERGENCY MEDICINE
Payer: COMMERCIAL

## 2019-03-11 VITALS — WEIGHT: 126.1 LBS | HEIGHT: 67 IN

## 2019-03-11 VITALS
TEMPERATURE: 98 F | RESPIRATION RATE: 17 BRPM | OXYGEN SATURATION: 97 % | SYSTOLIC BLOOD PRESSURE: 125 MMHG | HEART RATE: 102 BPM | DIASTOLIC BLOOD PRESSURE: 83 MMHG

## 2019-03-11 DIAGNOSIS — R20.0 ANESTHESIA OF SKIN: ICD-10-CM

## 2019-03-11 DIAGNOSIS — F10.129 ALCOHOL ABUSE WITH INTOXICATION, UNSPECIFIED: ICD-10-CM

## 2019-03-11 DIAGNOSIS — F32.9 MAJOR DEPRESSIVE DISORDER, SINGLE EPISODE, UNSPECIFIED: ICD-10-CM

## 2019-03-11 DIAGNOSIS — Z79.899 OTHER LONG TERM (CURRENT) DRUG THERAPY: ICD-10-CM
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
## 2019-03-11 LAB
ALBUMIN SERPL ELPH-MCNC: 4 G/DL — SIGNIFICANT CHANGE UP (ref 3.3–5)
ALP SERPL-CCNC: 171 U/L — HIGH (ref 40–120)
ALT FLD-CCNC: 118 U/L — HIGH (ref 12–78)
ANION GAP SERPL CALC-SCNC: 17 MMOL/L — SIGNIFICANT CHANGE UP (ref 5–17)
AST SERPL-CCNC: 264 U/L — HIGH (ref 15–37)
BASOPHILS # BLD AUTO: 0.03 K/UL — SIGNIFICANT CHANGE UP (ref 0–0.2)
BASOPHILS NFR BLD AUTO: 0.4 % — SIGNIFICANT CHANGE UP (ref 0–2)
BILIRUB SERPL-MCNC: 0.3 MG/DL — SIGNIFICANT CHANGE UP (ref 0.2–1.2)
BUN SERPL-MCNC: 14 MG/DL — SIGNIFICANT CHANGE UP (ref 7–23)
CALCIUM SERPL-MCNC: 8.5 MG/DL — SIGNIFICANT CHANGE UP (ref 8.5–10.1)
CHLORIDE SERPL-SCNC: 102 MMOL/L — SIGNIFICANT CHANGE UP (ref 96–108)
CO2 SERPL-SCNC: 20 MMOL/L — LOW (ref 22–31)
CREAT SERPL-MCNC: 0.69 MG/DL — SIGNIFICANT CHANGE UP (ref 0.5–1.3)
EOSINOPHIL # BLD AUTO: 0.1 K/UL — SIGNIFICANT CHANGE UP (ref 0–0.5)
EOSINOPHIL NFR BLD AUTO: 1.3 % — SIGNIFICANT CHANGE UP (ref 0–6)
ETHANOL SERPL-MCNC: 416 MG/DL — HIGH (ref 0–10)
GLUCOSE SERPL-MCNC: 67 MG/DL — LOW (ref 70–99)
HCT VFR BLD CALC: 39.4 % — SIGNIFICANT CHANGE UP (ref 34.5–45)
HGB BLD-MCNC: 13.1 G/DL — SIGNIFICANT CHANGE UP (ref 11.5–15.5)
IMM GRANULOCYTES NFR BLD AUTO: 0.5 % — SIGNIFICANT CHANGE UP (ref 0–1.5)
INR BLD: 0.89 RATIO — SIGNIFICANT CHANGE UP (ref 0.88–1.16)
LYMPHOCYTES # BLD AUTO: 2.64 K/UL — SIGNIFICANT CHANGE UP (ref 1–3.3)
LYMPHOCYTES # BLD AUTO: 34.6 % — SIGNIFICANT CHANGE UP (ref 13–44)
MCHC RBC-ENTMCNC: 31.9 PG — SIGNIFICANT CHANGE UP (ref 27–34)
MCHC RBC-ENTMCNC: 33.2 GM/DL — SIGNIFICANT CHANGE UP (ref 32–36)
MCV RBC AUTO: 95.9 FL — SIGNIFICANT CHANGE UP (ref 80–100)
MONOCYTES # BLD AUTO: 0.39 K/UL — SIGNIFICANT CHANGE UP (ref 0–0.9)
MONOCYTES NFR BLD AUTO: 5.1 % — SIGNIFICANT CHANGE UP (ref 2–14)
NEUTROPHILS # BLD AUTO: 4.43 K/UL — SIGNIFICANT CHANGE UP (ref 1.8–7.4)
NEUTROPHILS NFR BLD AUTO: 58.1 % — SIGNIFICANT CHANGE UP (ref 43–77)
NRBC # BLD: 0 /100 WBCS — SIGNIFICANT CHANGE UP (ref 0–0)
PLATELET # BLD AUTO: 199 K/UL — SIGNIFICANT CHANGE UP (ref 150–400)
POTASSIUM SERPL-MCNC: 3.9 MMOL/L — SIGNIFICANT CHANGE UP (ref 3.5–5.3)
POTASSIUM SERPL-SCNC: 3.9 MMOL/L — SIGNIFICANT CHANGE UP (ref 3.5–5.3)
PROT SERPL-MCNC: 8.2 GM/DL — SIGNIFICANT CHANGE UP (ref 6–8.3)
PROTHROM AB SERPL-ACNC: 9.9 SEC — LOW (ref 10–12.9)
RBC # BLD: 4.11 M/UL — SIGNIFICANT CHANGE UP (ref 3.8–5.2)
RBC # FLD: 15.9 % — HIGH (ref 10.3–14.5)
SODIUM SERPL-SCNC: 139 MMOL/L — SIGNIFICANT CHANGE UP (ref 135–145)
WBC # BLD: 7.63 K/UL — SIGNIFICANT CHANGE UP (ref 3.8–10.5)
WBC # FLD AUTO: 7.63 K/UL — SIGNIFICANT CHANGE UP (ref 3.8–10.5)

## 2019-03-11 PROCEDURE — 70450 CT HEAD/BRAIN W/O DYE: CPT | Mod: 26

## 2019-03-11 PROCEDURE — 99285 EMERGENCY DEPT VISIT HI MDM: CPT

## 2019-03-11 RX ORDER — SODIUM CHLORIDE 9 MG/ML
1000 INJECTION INTRAMUSCULAR; INTRAVENOUS; SUBCUTANEOUS ONCE
Qty: 0 | Refills: 0 | Status: COMPLETED | OUTPATIENT
Start: 2019-03-11 | End: 2019-03-11

## 2019-03-11 RX ORDER — SODIUM CHLORIDE 9 MG/ML
3 INJECTION INTRAMUSCULAR; INTRAVENOUS; SUBCUTANEOUS ONCE
Qty: 0 | Refills: 0 | Status: COMPLETED | OUTPATIENT
Start: 2019-03-11 | End: 2019-03-11

## 2019-03-11 RX ADMIN — SODIUM CHLORIDE 1000 MILLILITER(S): 9 INJECTION INTRAMUSCULAR; INTRAVENOUS; SUBCUTANEOUS at 15:32

## 2019-03-11 RX ADMIN — SODIUM CHLORIDE 3 MILLILITER(S): 9 INJECTION INTRAMUSCULAR; INTRAVENOUS; SUBCUTANEOUS at 15:32

## 2019-03-11 NOTE — ED STATDOCS - OBJECTIVE STATEMENT
54 y/o female with a PMHx of depression, EtOH abuse presents to the ED c/o right hand weakness x2 hours. Denies any trauma or causes. Did not drink today. Family hx of stroke. No other complaints at this time.

## 2019-03-11 NOTE — ED ADULT NURSE NOTE - OBJECTIVE STATEMENT
pt presents to ED c/o L hand numbness and weakness for the last few hours. states she drove herself to work and then turned right around and decided to come to ER. 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 stroke r/o done before patient came to main. pt with 2+ bilateral radial pulses. L hand  > R hand . CT of head negative. labs back. hx of ETOH abuse. pt currently sleeping in leyva spot on stretcher. no active distress. will ctm

## 2019-03-11 NOTE — ED STATDOCS - MUSCULOSKELETAL, MLM
range of motion is not limited and there is no muscle tenderness. Unable to extend wrist of right hand. Pulses equal b/l.

## 2019-03-11 NOTE — ED ADULT TRIAGE NOTE - CHIEF COMPLAINT QUOTE
c/o numbness and unable to move right hand for past hour, pt states she is unable to move fingers, pt evaluate by dr rice in triage to r/o stroke, which was r/o, pt states she drinks alcohol, last drink at 5 minutes prior to arrival to ER HX: etoh abuse,

## 2019-03-11 NOTE — ED PROVIDER NOTE - CLINICAL SUMMARY MEDICAL DECISION MAKING FREE TEXT BOX
CT head negative.  Pt intoxicated based on exam and labs, with isolated right radial nerve palsy likely related to drinking and immobilization.  Wrist splinted (repeat exam improving, able to extend wrist), and f/u with neurology.  Pt ambulated well, clinically sober prior to discharge.

## 2019-03-11 NOTE — ED STATDOCS - NS_ATTENDINGSCRIBE_ED_ALL_ED
[facility] Preoperative Instructions Surgery Date: Friday 9/28/18          Time of Arrival: 11:00 a.m. 
 
1. On the day of your surgery, please report to the Surgical Services Registration Desk and sign in at your designated time. The Surgery Center is located to the right of the Emergency Room. 2. You must have someone with you to drive you home. You should not drive a car for 24 hours following surgery. Please make arrangements for a friend or family member to stay with you for the first 24 hours after your surgery. 3. Do not have anything to eat or drink (including water, gum, mints, coffee, juice) after midnight. ?This may not apply to medications prescribed by your physician. ?(Please note below the special instructions with medications to take the morning of your procedure.) 4. We recommend you do not drink any alcoholic beverages for 24 hours before and after your surgery. 5. Contact your surgeons office for instructions on the following medications: non-steroidal anti-inflammatory drugs (i.e. Advil, Aleve), vitamins, and supplements. (Some surgeons will want you to stop these medications prior to surgery and others may allow you to take them) **If you are currently taking Plavix, Coumadin, Aspirin and/or other blood-thinning agents, contact your surgeon for instructions. ** Your surgeon will partner with the physician prescribing these medications to determine if it is safe to stop or if you need to continue taking. Please do not stop taking these medications without instructions from your surgeon 6. Wear comfortable clothes. Wear glasses instead of contacts. Do not bring any money or jewelry. Please bring picture ID, insurance card, and any prearranged co-payment or hospital payment. Do not wear make-up, particularly mascara the morning of your surgery. Do not wear nail polish, particularly if you are having foot /hand surgery.   Wear your hair loose or down, no ponytails, buns, mariposa pins or clips. All body piercings must be removed. Please shower with antibacterial soap for three consecutive days before and on the morning of surgery, but do not apply any lotions, powders or deodorants after the shower on the day of surgery. Please use a fresh towels after each shower. Please sleep in clean clothes and change bed linens the night before surgery. Please do not shave for 48 hours prior to surgery. Shaving of the face is acceptable. 7. You should understand that if you do not follow these instructions your surgery may be cancelled. If your physical condition changes (I.e. fever, cold or flu) please contact your surgeon as soon as possible. 8. It is important that you be on time. If a situation occurs where you may be late, please call [phone] (OR Holding Area). 9. If you have any questions and or problems, please call [phone] (Pre-admission Testing). 10. Your surgery time may be subject to change. You will receive a phone call the evening prior if your time changes. 11.  If having outpatient surgery, you must have someone to drive you here, stay with you during the duration of your stay, and to drive you home at time of discharge. 12.   In an effort to improve the efficiency, privacy, and safety for all of our Pre-op patients visitors are not allowed in the Holding area. Once you arrive and are registered your family/visitors will be asked to remain in the waiting room. The Pre-op staff will get you from the Surgical Waiting Area and will explain to you and your family/visitors that the Pre-op phase is beginning. The staff will answer any questions and provide instructions for tracking of the patient, by use of the existing tracking number and color-coded status board in the waiting room.   At this time the staff will also ask for your designated spokesperson information in the event that the physician or staff need to provide an update or obtain any pertinent information. The designated spokesperson will be notified if the physician needs to speak to family during the pre-operative phase. If at any time your family/visitors has questions or concerns they may approach the volunteer desk in the waiting area for assistance. Special Instructions: None. MEDICATIONS TO TAKE THE MORNING OF SURGERY WITH A SIP OF WATER: None. I understand a pre-operative phone call will be made to verify my surgery time. In the event that I am not available, I give permission for a message to be left on my answering service and/or with another person? Yes 
 
 
 
 ___________________      __________   _________ 
  (Signature of Patient)             (Witness)                (Date and Time) I personally performed the service described in the documentation recorded by the scribe in my presence, and it accurately and completely records my words and actions.

## 2019-03-11 NOTE — ED PROVIDER NOTE - OBJECTIVE STATEMENT
52 y/o female with a PMHx of depression, EtOH abuse presents to the ED c/o right hand weakness x2 hours. Denies any trauma or causes. Did not drink today. Family hx of stroke. No other complaints at this time.

## 2019-03-11 NOTE — ED ADULT NURSE NOTE - NSIMPLEMENTINTERV_GEN_ALL_ED
Implemented All Universal Safety Interventions:  [place] to call system. Call bell, personal items and telephone within reach. Instruct patient to call for assistance. Room bathroom lighting operational. Non-slip footwear when patient is off stretcher. Physically safe environment: no spills, clutter or unnecessary equipment. Stretcher in lowest position, wheels locked, appropriate side rails in place.

## 2019-03-14 ENCOUNTER — INPATIENT (INPATIENT)
Facility: HOSPITAL | Age: 54
LOS: 4 days | Discharge: PSYCHIATRIC OR SUBSTANCE USE FACILITY | End: 2019-03-19
Attending: INTERNAL MEDICINE | Admitting: FAMILY MEDICINE
Payer: COMMERCIAL

## 2019-03-14 VITALS
WEIGHT: 149.91 LBS | SYSTOLIC BLOOD PRESSURE: 149 MMHG | OXYGEN SATURATION: 90 % | RESPIRATION RATE: 20 BRPM | HEART RATE: 121 BPM | TEMPERATURE: 98 F | HEIGHT: 66 IN | DIASTOLIC BLOOD PRESSURE: 85 MMHG

## 2019-03-14 LAB
ADD ON TEST-SPECIMEN IN LAB: SIGNIFICANT CHANGE UP
ALBUMIN SERPL ELPH-MCNC: 3.7 G/DL — SIGNIFICANT CHANGE UP (ref 3.3–5)
ALP SERPL-CCNC: 184 U/L — HIGH (ref 40–120)
ALT FLD-CCNC: 144 U/L — HIGH (ref 12–78)
ANION GAP SERPL CALC-SCNC: 12 MMOL/L — SIGNIFICANT CHANGE UP (ref 5–17)
APAP SERPL-MCNC: < 2 UG/ML (ref 10–30)
AST SERPL-CCNC: 303 U/L — HIGH (ref 15–37)
BASOPHILS # BLD AUTO: 0.03 K/UL — SIGNIFICANT CHANGE UP (ref 0–0.2)
BASOPHILS NFR BLD AUTO: 0.7 % — SIGNIFICANT CHANGE UP (ref 0–2)
BILIRUB SERPL-MCNC: 0.3 MG/DL — SIGNIFICANT CHANGE UP (ref 0.2–1.2)
BUN SERPL-MCNC: 11 MG/DL — SIGNIFICANT CHANGE UP (ref 7–23)
CALCIUM SERPL-MCNC: 8.4 MG/DL — LOW (ref 8.5–10.1)
CHLORIDE SERPL-SCNC: 102 MMOL/L — SIGNIFICANT CHANGE UP (ref 96–108)
CO2 SERPL-SCNC: 27 MMOL/L — SIGNIFICANT CHANGE UP (ref 22–31)
CREAT SERPL-MCNC: 0.6 MG/DL — SIGNIFICANT CHANGE UP (ref 0.5–1.3)
EOSINOPHIL # BLD AUTO: 0.12 K/UL — SIGNIFICANT CHANGE UP (ref 0–0.5)
EOSINOPHIL NFR BLD AUTO: 2.7 % — SIGNIFICANT CHANGE UP (ref 0–6)
ETHANOL SERPL-MCNC: 375 MG/DL — HIGH (ref 0–10)
GLUCOSE SERPL-MCNC: 102 MG/DL — HIGH (ref 70–99)
HCT VFR BLD CALC: 41.6 % — SIGNIFICANT CHANGE UP (ref 34.5–45)
HGB BLD-MCNC: 13.7 G/DL — SIGNIFICANT CHANGE UP (ref 11.5–15.5)
IMM GRANULOCYTES NFR BLD AUTO: 0.4 % — SIGNIFICANT CHANGE UP (ref 0–1.5)
LYMPHOCYTES # BLD AUTO: 2.04 K/UL — SIGNIFICANT CHANGE UP (ref 1–3.3)
LYMPHOCYTES # BLD AUTO: 45.3 % — HIGH (ref 13–44)
MAGNESIUM SERPL-MCNC: 2.2 MG/DL — SIGNIFICANT CHANGE UP (ref 1.6–2.6)
MANUAL SMEAR VERIFICATION: SIGNIFICANT CHANGE UP
MCHC RBC-ENTMCNC: 31.5 PG — SIGNIFICANT CHANGE UP (ref 27–34)
MCHC RBC-ENTMCNC: 32.9 GM/DL — SIGNIFICANT CHANGE UP (ref 32–36)
MCV RBC AUTO: 95.6 FL — SIGNIFICANT CHANGE UP (ref 80–100)
MONOCYTES # BLD AUTO: 0.38 K/UL — SIGNIFICANT CHANGE UP (ref 0–0.9)
MONOCYTES NFR BLD AUTO: 8.4 % — SIGNIFICANT CHANGE UP (ref 2–14)
NEUTROPHILS # BLD AUTO: 1.91 K/UL — SIGNIFICANT CHANGE UP (ref 1.8–7.4)
NEUTROPHILS NFR BLD AUTO: 42.5 % — LOW (ref 43–77)
NRBC # BLD: 0 /100 WBCS — SIGNIFICANT CHANGE UP (ref 0–0)
PLAT MORPH BLD: NORMAL — SIGNIFICANT CHANGE UP
PLATELET # BLD AUTO: 108 K/UL — LOW (ref 150–400)
POTASSIUM SERPL-MCNC: 3.4 MMOL/L — LOW (ref 3.5–5.3)
POTASSIUM SERPL-SCNC: 3.4 MMOL/L — LOW (ref 3.5–5.3)
PROT SERPL-MCNC: 7.9 GM/DL — SIGNIFICANT CHANGE UP (ref 6–8.3)
RBC # BLD: 4.35 M/UL — SIGNIFICANT CHANGE UP (ref 3.8–5.2)
RBC # FLD: 15.6 % — HIGH (ref 10.3–14.5)
RBC BLD AUTO: NORMAL — SIGNIFICANT CHANGE UP
SALICYLATES SERPL-MCNC: <1.7 MG/DL — LOW (ref 2.8–20)
SODIUM SERPL-SCNC: 141 MMOL/L — SIGNIFICANT CHANGE UP (ref 135–145)
WBC # BLD: 4.5 K/UL — SIGNIFICANT CHANGE UP (ref 3.8–10.5)
WBC # FLD AUTO: 4.5 K/UL — SIGNIFICANT CHANGE UP (ref 3.8–10.5)

## 2019-03-14 PROCEDURE — 93010 ELECTROCARDIOGRAM REPORT: CPT | Mod: 76

## 2019-03-14 PROCEDURE — 99285 EMERGENCY DEPT VISIT HI MDM: CPT

## 2019-03-14 RX ORDER — CALCIUM GLUCONATE 100 MG/ML
1 VIAL (ML) INTRAVENOUS ONCE
Qty: 0 | Refills: 0 | Status: COMPLETED | OUTPATIENT
Start: 2019-03-14 | End: 2019-03-14

## 2019-03-14 RX ORDER — MAGNESIUM SULFATE 500 MG/ML
2 VIAL (ML) INJECTION ONCE
Qty: 0 | Refills: 0 | Status: COMPLETED | OUTPATIENT
Start: 2019-03-14 | End: 2019-03-14

## 2019-03-14 RX ORDER — SODIUM CHLORIDE 9 MG/ML
1000 INJECTION INTRAMUSCULAR; INTRAVENOUS; SUBCUTANEOUS ONCE
Qty: 0 | Refills: 0 | Status: COMPLETED | OUTPATIENT
Start: 2019-03-14 | End: 2019-03-14

## 2019-03-14 RX ADMIN — Medication 1 GRAM(S): at 23:30

## 2019-03-14 RX ADMIN — SODIUM CHLORIDE 1000 MILLILITER(S): 9 INJECTION INTRAMUSCULAR; INTRAVENOUS; SUBCUTANEOUS at 22:13

## 2019-03-14 RX ADMIN — Medication 200 GRAM(S): at 23:20

## 2019-03-14 RX ADMIN — Medication 50 GRAM(S): at 22:12

## 2019-03-14 RX ADMIN — SODIUM CHLORIDE 1000 MILLILITER(S): 9 INJECTION INTRAMUSCULAR; INTRAVENOUS; SUBCUTANEOUS at 23:20

## 2019-03-14 RX ADMIN — Medication 2 GRAM(S): at 23:20

## 2019-03-14 NOTE — ED ADULT NURSE NOTE - NSIMPLEMENTINTERV_GEN_ALL_ED
Implemented All Fall Risk Interventions:  [place] to call system. Call bell, personal items and telephone within reach. Instruct patient to call for assistance. Room bathroom lighting operational. Non-slip footwear when patient is off stretcher. Physically safe environment: no spills, clutter or unnecessary equipment. Stretcher in lowest position, wheels locked, appropriate side rails in place. Provide visual cue, wrist band, yellow gown, etc. Monitor gait and stability. Monitor for mental status changes and reorient to person, place, and time. Review medications for side effects contributing to fall risk. Reinforce activity limits and safety measures with patient and family.

## 2019-03-14 NOTE — ED ADULT NURSE NOTE - OBJECTIVE STATEMENT
pt to ed after taking a full bottle of home medication and drinking alcohol expressing suicidal ideation pt to ed after taking a full bottle of home medication and drinking alcohol expressing suicidal ideation. Pt states having hx of depression that has become worse lately. Pt denies trying to hurt herself in the past. Denies HI. PT complains of not getting along with her  and son saying that "they do not love her". Pt admits to drinking daily. Pt cooperative. PT is a&ox3, but drowsy. OVerall Appearance is well. Pt placed on 11 for safety. Will continue to monitor.

## 2019-03-14 NOTE — ED PROVIDER NOTE - PROGRESS NOTE DETAILS
Demetrio TALLEY for Dr. Leal: Spoke with [facility] toxicology fellow who states that due to increased QTC will give 2g magnesium and 1g calcium since low calcium. If QTC is not improved recommends admission for 24hrs.

## 2019-03-14 NOTE — ED PROVIDER NOTE - MUSCULOSKELETAL, MLM
Spine appears normal, range of motion is not limited, no muscle or joint tenderness. +right wrist splint in place

## 2019-03-14 NOTE — ED ADULT NURSE REASSESSMENT NOTE - NS ED NURSE REASSESS COMMENT FT1
pt remains droswy but responsive. Hr rate in 130's. Yuko Batista aware. Pt administered fluids , magnesium and calcium gluconate as per order. Pt remains on 1:1. PT resting safe and comfortable. Will continue to monitor.

## 2019-03-14 NOTE — ED PROVIDER NOTE - CLINICAL SUMMARY MEDICAL DECISION MAKING FREE TEXT BOX
Pt with drug overdose with intent of suicide.  Displaying anticholinergic syndrome, dry mouth, dilated pupils, confusion, and tachycardia, urinary retention; as well as alcohol intoxication.  Did also have prolonged QTc to 611.  Discussed with toxicology, given Magnesium, Calcium with improvement in QTc.  However pt still anticholinergic, and now displaying some alcohol withdrawal with CIWA 8.  Given IVF, ativan.  Admit to tele after discussion with Dr. Chavez.

## 2019-03-14 NOTE — ED PROVIDER NOTE - CARE PLAN
Principal Discharge DX:	Drug overdose  Secondary Diagnosis:	Anticholinergic syndrome  Secondary Diagnosis:	Alcohol intoxication  Secondary Diagnosis:	Alcohol withdrawal

## 2019-03-14 NOTE — ED ADULT NURSE NOTE - ED STAT RN HANDOFF DETAILS
Recvd care of pt from SHANNAN GONZÁLES. Pt awake, alert & oriented, IV fluids continuing & pt provided with menu. Pt has no complaints, VSS, will monitor.

## 2019-03-14 NOTE — ED PROVIDER NOTE - OBJECTIVE STATEMENT
52 y/o female with PMHX of EtOH abuse with hx of withdrawal seizure and depression on Lexapro (20mg daily, did not take it today's dose) presents to the ED for SI with attempt today. Pt states she took 30 mirtazapine and 0.5 pint of vodka. Pt states she does not want to live, because she hates her life and her  and son don't care about her. Sees psychiatrist but has not been to see her recently. Pt was seen recently in the ED and dx with right wrist radial nerve palsy.

## 2019-03-15 DIAGNOSIS — R09.89 OTHER SPECIFIED SYMPTOMS AND SIGNS INVOLVING THE CIRCULATORY AND RESPIRATORY SYSTEMS: ICD-10-CM

## 2019-03-15 DIAGNOSIS — Z98.891 HISTORY OF UTERINE SCAR FROM PREVIOUS SURGERY: Chronic | ICD-10-CM

## 2019-03-15 DIAGNOSIS — Z98.890 OTHER SPECIFIED POSTPROCEDURAL STATES: Chronic | ICD-10-CM

## 2019-03-15 LAB
ALBUMIN SERPL ELPH-MCNC: 3.9 G/DL — SIGNIFICANT CHANGE UP (ref 3.3–5)
ALP SERPL-CCNC: 162 U/L — HIGH (ref 40–120)
ALT FLD-CCNC: 118 U/L — HIGH (ref 12–78)
AMPHET UR-MCNC: NEGATIVE — SIGNIFICANT CHANGE UP
ANION GAP SERPL CALC-SCNC: 11 MMOL/L — SIGNIFICANT CHANGE UP (ref 5–17)
APPEARANCE UR: CLEAR — SIGNIFICANT CHANGE UP
AST SERPL-CCNC: 199 U/L — HIGH (ref 15–37)
BACTERIA # UR AUTO: ABNORMAL
BARBITURATES UR SCN-MCNC: NEGATIVE — SIGNIFICANT CHANGE UP
BENZODIAZ UR-MCNC: NEGATIVE — SIGNIFICANT CHANGE UP
BILIRUB DIRECT SERPL-MCNC: 0.3 MG/DL — HIGH (ref 0–0.2)
BILIRUB INDIRECT FLD-MCNC: 0.7 MG/DL — SIGNIFICANT CHANGE UP (ref 0.2–1)
BILIRUB SERPL-MCNC: 1 MG/DL — SIGNIFICANT CHANGE UP (ref 0.2–1.2)
BILIRUB UR-MCNC: NEGATIVE — SIGNIFICANT CHANGE UP
BUN SERPL-MCNC: 7 MG/DL — SIGNIFICANT CHANGE UP (ref 7–23)
CALCIUM SERPL-MCNC: 8.7 MG/DL — SIGNIFICANT CHANGE UP (ref 8.5–10.1)
CHLORIDE SERPL-SCNC: 102 MMOL/L — SIGNIFICANT CHANGE UP (ref 96–108)
CK SERPL-CCNC: 221 U/L — HIGH (ref 26–192)
CK SERPL-CCNC: 230 U/L — HIGH (ref 26–192)
CO2 SERPL-SCNC: 28 MMOL/L — SIGNIFICANT CHANGE UP (ref 22–31)
COCAINE METAB.OTHER UR-MCNC: NEGATIVE — SIGNIFICANT CHANGE UP
COLOR SPEC: YELLOW — SIGNIFICANT CHANGE UP
CREAT SERPL-MCNC: 0.6 MG/DL — SIGNIFICANT CHANGE UP (ref 0.5–1.3)
D DIMER BLD IA.RAPID-MCNC: 825 NG/ML DDU — HIGH
DIFF PNL FLD: ABNORMAL
EPI CELLS # UR: ABNORMAL
ETHANOL SERPL-MCNC: <10 MG/DL — SIGNIFICANT CHANGE UP (ref 0–10)
GLUCOSE SERPL-MCNC: 103 MG/DL — HIGH (ref 70–99)
GLUCOSE UR QL: NEGATIVE MG/DL — SIGNIFICANT CHANGE UP
KETONES UR-MCNC: NEGATIVE — SIGNIFICANT CHANGE UP
LEUKOCYTE ESTERASE UR-ACNC: NEGATIVE — SIGNIFICANT CHANGE UP
MAGNESIUM SERPL-MCNC: 2 MG/DL — SIGNIFICANT CHANGE UP (ref 1.6–2.6)
METHADONE UR-MCNC: NEGATIVE — SIGNIFICANT CHANGE UP
NITRITE UR-MCNC: NEGATIVE — SIGNIFICANT CHANGE UP
NT-PROBNP SERPL-SCNC: 155 PG/ML — HIGH (ref 0–125)
OPIATES UR-MCNC: NEGATIVE — SIGNIFICANT CHANGE UP
PCP SPEC-MCNC: SIGNIFICANT CHANGE UP
PCP UR-MCNC: NEGATIVE — SIGNIFICANT CHANGE UP
PH UR: 6 — SIGNIFICANT CHANGE UP (ref 5–8)
PHOSPHATE SERPL-MCNC: 2.9 MG/DL — SIGNIFICANT CHANGE UP (ref 2.5–4.5)
POTASSIUM SERPL-MCNC: 3.5 MMOL/L — SIGNIFICANT CHANGE UP (ref 3.5–5.3)
POTASSIUM SERPL-SCNC: 3.5 MMOL/L — SIGNIFICANT CHANGE UP (ref 3.5–5.3)
PROT SERPL-MCNC: 7.7 GM/DL — SIGNIFICANT CHANGE UP (ref 6–8.3)
PROT UR-MCNC: 100 MG/DL
RBC CASTS # UR COMP ASSIST: ABNORMAL /HPF (ref 0–4)
SODIUM SERPL-SCNC: 141 MMOL/L — SIGNIFICANT CHANGE UP (ref 135–145)
SP GR SPEC: 1.01 — SIGNIFICANT CHANGE UP (ref 1.01–1.02)
THC UR QL: NEGATIVE — SIGNIFICANT CHANGE UP
TROPONIN I SERPL-MCNC: <0.015 NG/ML — SIGNIFICANT CHANGE UP (ref 0.01–0.04)
TROPONIN I SERPL-MCNC: <0.015 NG/ML — SIGNIFICANT CHANGE UP (ref 0.01–0.04)
UROBILINOGEN FLD QL: NEGATIVE MG/DL — SIGNIFICANT CHANGE UP
WBC UR QL: SIGNIFICANT CHANGE UP

## 2019-03-15 PROCEDURE — 76700 US EXAM ABDOM COMPLETE: CPT | Mod: 26

## 2019-03-15 PROCEDURE — 72125 CT NECK SPINE W/O DYE: CPT | Mod: 26

## 2019-03-15 PROCEDURE — 93010 ELECTROCARDIOGRAM REPORT: CPT

## 2019-03-15 PROCEDURE — 70551 MRI BRAIN STEM W/O DYE: CPT | Mod: 26

## 2019-03-15 PROCEDURE — 71045 X-RAY EXAM CHEST 1 VIEW: CPT | Mod: 26

## 2019-03-15 PROCEDURE — 71275 CT ANGIOGRAPHY CHEST: CPT | Mod: 26

## 2019-03-15 RX ORDER — POTASSIUM CHLORIDE 20 MEQ
40 PACKET (EA) ORAL ONCE
Qty: 0 | Refills: 0 | Status: COMPLETED | OUTPATIENT
Start: 2019-03-15 | End: 2019-03-15

## 2019-03-15 RX ORDER — SODIUM CHLORIDE 9 MG/ML
1000 INJECTION INTRAMUSCULAR; INTRAVENOUS; SUBCUTANEOUS
Qty: 0 | Refills: 0 | Status: COMPLETED | OUTPATIENT
Start: 2019-03-15 | End: 2019-03-15

## 2019-03-15 RX ORDER — HYDROXYZINE HCL 10 MG
0 TABLET ORAL
Qty: 0 | Refills: 0 | COMMUNITY

## 2019-03-15 RX ORDER — ONDANSETRON 8 MG/1
4 TABLET, FILM COATED ORAL EVERY 6 HOURS
Qty: 0 | Refills: 0 | Status: DISCONTINUED | OUTPATIENT
Start: 2019-03-15 | End: 2019-03-19

## 2019-03-15 RX ORDER — FOLIC ACID 0.8 MG
1 TABLET ORAL DAILY
Qty: 0 | Refills: 0 | Status: DISCONTINUED | OUTPATIENT
Start: 2019-03-15 | End: 2019-03-16

## 2019-03-15 RX ORDER — PANTOPRAZOLE SODIUM 20 MG/1
40 TABLET, DELAYED RELEASE ORAL
Qty: 0 | Refills: 0 | Status: DISCONTINUED | OUTPATIENT
Start: 2019-03-15 | End: 2019-03-19

## 2019-03-15 RX ORDER — DEXTROSE MONOHYDRATE, SODIUM CHLORIDE, AND POTASSIUM CHLORIDE 50; .745; 4.5 G/1000ML; G/1000ML; G/1000ML
1000 INJECTION, SOLUTION INTRAVENOUS
Qty: 0 | Refills: 0 | Status: COMPLETED | OUTPATIENT
Start: 2019-03-15 | End: 2019-03-16

## 2019-03-15 RX ORDER — THIAMINE MONONITRATE (VIT B1) 100 MG
100 TABLET ORAL ONCE
Qty: 0 | Refills: 0 | Status: DISCONTINUED | OUTPATIENT
Start: 2019-03-15 | End: 2019-03-16

## 2019-03-15 RX ORDER — CALCIUM CARBONATE 500(1250)
3 TABLET ORAL EVERY 6 HOURS
Qty: 0 | Refills: 0 | Status: DISCONTINUED | OUTPATIENT
Start: 2019-03-15 | End: 2019-03-19

## 2019-03-15 RX ORDER — SENNA PLUS 8.6 MG/1
2 TABLET ORAL AT BEDTIME
Qty: 0 | Refills: 0 | Status: DISCONTINUED | OUTPATIENT
Start: 2019-03-15 | End: 2019-03-19

## 2019-03-15 RX ORDER — LANOLIN ALCOHOL/MO/W.PET/CERES
1 CREAM (GRAM) TOPICAL
Qty: 0 | Refills: 0 | COMMUNITY

## 2019-03-15 RX ORDER — SODIUM CHLORIDE 9 MG/ML
1000 INJECTION INTRAMUSCULAR; INTRAVENOUS; SUBCUTANEOUS ONCE
Qty: 0 | Refills: 0 | Status: COMPLETED | OUTPATIENT
Start: 2019-03-15 | End: 2019-03-15

## 2019-03-15 RX ORDER — THIAMINE MONONITRATE (VIT B1) 100 MG
100 TABLET ORAL DAILY
Qty: 0 | Refills: 0 | Status: DISCONTINUED | OUTPATIENT
Start: 2019-03-15 | End: 2019-03-16

## 2019-03-15 RX ADMIN — Medication 2 MILLIGRAM(S): at 12:15

## 2019-03-15 RX ADMIN — Medication 1 MILLIGRAM(S): at 01:37

## 2019-03-15 RX ADMIN — SODIUM CHLORIDE 150 MILLILITER(S): 9 INJECTION INTRAMUSCULAR; INTRAVENOUS; SUBCUTANEOUS at 06:50

## 2019-03-15 RX ADMIN — Medication 2 MILLIGRAM(S): at 17:22

## 2019-03-15 RX ADMIN — SODIUM CHLORIDE 1000 MILLILITER(S): 9 INJECTION INTRAMUSCULAR; INTRAVENOUS; SUBCUTANEOUS at 01:39

## 2019-03-15 RX ADMIN — Medication 1 TABLET(S): at 14:04

## 2019-03-15 RX ADMIN — Medication 100 MILLIGRAM(S): at 17:20

## 2019-03-15 RX ADMIN — Medication 40 MILLIEQUIVALENT(S): at 14:05

## 2019-03-15 RX ADMIN — Medication 1 MILLIGRAM(S): at 22:56

## 2019-03-15 RX ADMIN — DEXTROSE MONOHYDRATE, SODIUM CHLORIDE, AND POTASSIUM CHLORIDE 100 MILLILITER(S): 50; .745; 4.5 INJECTION, SOLUTION INTRAVENOUS at 21:42

## 2019-03-15 RX ADMIN — ONDANSETRON 4 MILLIGRAM(S): 8 TABLET, FILM COATED ORAL at 13:09

## 2019-03-15 RX ADMIN — Medication 1 MILLIGRAM(S): at 14:05

## 2019-03-15 RX ADMIN — Medication 2 MILLIGRAM(S): at 21:15

## 2019-03-15 NOTE — H&P ADULT - NSICDXPASTSURGICALHX_GEN_ALL_CORE_FT
PAST SURGICAL HISTORY:  H/O basal cell carcinoma excision     H/O:      History of ear, nose, and throat (ENT) surgery

## 2019-03-15 NOTE — H&P ADULT - ASSESSMENT
54 y/o female with PMHX of EtOH abuse with hx of withdrawal seizure and depression on Lexapro, h/o BCC and SCC on the skin, former smoker  presents to the  for SI with attempt today. as per chart pt stated she took 30 mirtazapine and 0.5 pint of vodka, pt stated she does not want to live, because she hates her life and her  and son don't care about her, she going through divorce.  Sees psychiatrist but has not been to see her recently. Pt was seen recently in the ED and dx with right wrist radial nerve palsy.  Patient reports fall approximately 3 days ago ( pt unsure, poor historian) , tripped, hit her head, after that reports bilateral arm weakness, numbness in the right hand . Patient feels nauseous, + palpitations, dizziness, generalized weakness. Denies cp, abdominal pain, fever, cough, chills, urinary or BM problems.    In ED -  T(F): 98.5 Max: 98.5  HR: 122 (121 - 128)BP: 140/92  (139/89 - 149/85)RR: 20(18 - 20)SpO2: 94%  (90% - 94%)  EKG - sinus tachy 148 with  with T wave inversion in lateral leads CT head on 3/11/19 was negative , cbc wnl, Cr 0.6, K 3.4, , ,  alcohol level 375 s/p 2 L NS ativan, calcium gluconate 1 gm and Magnesium 2 gm in ED with improvement of QT to 521    * Suicidal attempt  * Depression   * Tachycardia, Prolonged QT likely due to drug intoxication ( mirtazapine overdose)   - psychiatry consult  - will hold SSRI due to prolonged QT  - serial troponin, EKG   - cardiology evaluation   - tele monitoring     * ETOH abuse with h/o withdrawal seizure in acute intoxication  * Thiamine deficiency due to ETOH abuse  - [facility] protocol  - start standing ativan  - monitor  - IV fluids, vitamins    * Recent Fall   * Bilateral arm weakness and numbness in right hand  - CT head 3/11/19- neg  - CT cervical spine - C5C6 disk osteophyte with narrowing neural foramen  - ortho spine Dr. Grant consult  - MRI brain to r/o stroke     * Transaminitis   * Hepatic steatosis  - check hepatitis panel  - US abd - noted    Advanced directives  - spend 15 min  - FULL code    DVT proph - IPC  IMPROVE VTE Individual Risk Assessment    RISK                                                                Points    [  ] Previous VTE                                                  3    [  ] Thrombophilia                                               2    [  ] Lower limb paralysis                                      2        (unable to hold up >15 seconds)      [  ] Current Cancer                                              2         (within 6 months)    [  ] Immobilization > 24 hrs                                1    [  ] ICU/CCU stay > 24 hours                              1    [  ] Age > 60                                                      1    IMPROVE VTE Score ______0___    Time spend 77 minutes

## 2019-03-15 NOTE — ED ADULT NURSE REASSESSMENT NOTE - NS ED NURSE REASSESS COMMENT FT1
Pt remains sinus tach on monitor, 's. Dr. Barnhart made aware. 0.9NS 150ml/hr ordered and administered to pt. Dr. Barnhart Instructed to monitor pt and HR. PT remains on 1:1. Pt safe and comfortable w/o distress.

## 2019-03-15 NOTE — ED ADULT NURSE REASSESSMENT NOTE - NS ED NURSE REASSESS COMMENT FT1
PT remains on 1:1. Alert and responsive. Urine sample obtained and sent to [facility]. Pt voided approximitely 300ML . No complaints of pain or discomfort. No distress. Pt  in sinus tach on monitor. Hr in 120's. Dr. Leal aware.

## 2019-03-15 NOTE — ED ADULT NURSE REASSESSMENT NOTE - NS ED NURSE REASSESS COMMENT FT1
Hospitalist phone called and message left to have diet order placed. Pt resting safe and comfortable. W/o distress.

## 2019-03-15 NOTE — H&P ADULT - NSICDXPASTMEDICALHX_GEN_ALL_CORE_FT
PAST MEDICAL HISTORY:  Alcohol abuse     Depression     History of basal cell carcinoma excision     Squamous cell skin cancer     Withdrawal seizures

## 2019-03-15 NOTE — H&P ADULT - HISTORY OF PRESENT ILLNESS
52 y/o female with PMHX of EtOH abuse with hx of withdrawal seizure and depression on Lexapro (20mg daily, did not take it today's dose) presents to the ED for SI with attempt today. Pt states she took 30 mirtazapine and 0.5 pint of vodka. Pt states she does not want to live, because she hates her life and her  and son don't care about her. Sees psychiatrist but has not been to see her recently. Pt was seen recently in the ED and dx with right wrist radial nerve palsy. 54 y/o female with PMHX of EtOH abuse with hx of withdrawal seizure and depression on Lexapro, h/o BCC and SCC on the skin, former smoker  presents to the  for SI with attempt today. as per chart pt stated she took 30 mirtazapine and 0.5 pint of vodka, pt stated she does not want to live, because she hates her life and her  and son don't care about her, she going through divorce.  Sees psychiatrist but has not been to see her recently. Pt was seen recently in the ED and dx with right wrist radial nerve palsy.  Patient reports fall approximately 3 days ago ( pt unsure, poor historian) , tripped, hit her head, after that reports bilateral arm weakness, numbness in the right hand . Patient feels nauseous, + palpitations, dizziness, generalized weakness. Denies cp, abdominal pain, fever, cough, chills, urinary or BM problems.    In ED -  T(F): 98.5 Max: 98.5  HR: 122 (121 - 128)BP: 140/92  (139/89 - 149/85)RR: 20(18 - 20)SpO2: 94%  (90% - 94%)  EKG - sinus tachy 148 with  with T wave inversion in lateral leads CT head on 3/11/19 was negative , cbc wnl, Cr 0.6, K 3.4, , ,  alcohol level 375 s/p 2 L NS ativan, calcium gluconate 1 gm and Magnesium 2 gm in ED with improvement of QT to 521

## 2019-03-15 NOTE — H&P ADULT - NSHPREVIEWOFSYSTEMS_GEN_ALL_CORE
REVIEW OF SYSTEMS:    CONSTITUTIONAL: + weakness, no fevers or chills  EYES/ENT: No visual changes;  No vertigo or throat pain   NECK: No pain or stiffness  RESPIRATORY: No cough, wheezing, hemoptysis; No shortness of breath  CARDIOVASCULAR: No chest pain , + palpitations  GASTROINTESTINAL: No abdominal or epigastric pain. + nausea, + vomiting, no hematemesis; No diarrhea or constipation. No melena or hematochezia.  GENITOURINARY: No dysuria, frequency or hematuria  NEUROLOGICAL: +  numbness , +  weakness  SKIN: No itching, burning, rashes, or lesions   All other review of systems is negative unless indicated above.

## 2019-03-15 NOTE — PROVIDER CONTACT NOTE (OTHER) - NAME OF MD/NP/PA/DO NOTIFIED:
Cardio consult Dr. Gonzalez left msg with Yuly/office
SHELIA Grant. [phone]. He asked I SHELIA ortho resident. Paged & LM on VM to please see PT.

## 2019-03-16 DIAGNOSIS — F10.10 ALCOHOL ABUSE, UNCOMPLICATED: ICD-10-CM

## 2019-03-16 DIAGNOSIS — F32.2 MAJOR DEPRESSIVE DISORDER, SINGLE EPISODE, SEVERE WITHOUT PSYCHOTIC FEATURES: ICD-10-CM

## 2019-03-16 LAB
ALBUMIN SERPL ELPH-MCNC: 3.4 G/DL — SIGNIFICANT CHANGE UP (ref 3.3–5)
ALP SERPL-CCNC: 153 U/L — HIGH (ref 40–120)
ALT FLD-CCNC: 96 U/L — HIGH (ref 12–78)
AMMONIA BLD-MCNC: 43 UMOL/L — HIGH (ref 11–32)
ANION GAP SERPL CALC-SCNC: 11 MMOL/L — SIGNIFICANT CHANGE UP (ref 5–17)
AST SERPL-CCNC: 156 U/L — HIGH (ref 15–37)
BILIRUB SERPL-MCNC: 0.9 MG/DL — SIGNIFICANT CHANGE UP (ref 0.2–1.2)
BUN SERPL-MCNC: 8 MG/DL — SIGNIFICANT CHANGE UP (ref 7–23)
CALCIUM SERPL-MCNC: 8.7 MG/DL — SIGNIFICANT CHANGE UP (ref 8.5–10.1)
CHLORIDE SERPL-SCNC: 104 MMOL/L — SIGNIFICANT CHANGE UP (ref 96–108)
CHOLEST SERPL-MCNC: 264 MG/DL — HIGH (ref 10–199)
CK SERPL-CCNC: 200 U/L — HIGH (ref 26–192)
CO2 SERPL-SCNC: 26 MMOL/L — SIGNIFICANT CHANGE UP (ref 22–31)
CREAT SERPL-MCNC: 0.56 MG/DL — SIGNIFICANT CHANGE UP (ref 0.5–1.3)
GLUCOSE SERPL-MCNC: 126 MG/DL — HIGH (ref 70–99)
HAV IGM SER-ACNC: SIGNIFICANT CHANGE UP
HBV CORE IGM SER-ACNC: SIGNIFICANT CHANGE UP
HBV SURFACE AG SER-ACNC: SIGNIFICANT CHANGE UP
HCV AB S/CO SERPL IA: 0.2 S/CO — SIGNIFICANT CHANGE UP (ref 0–0.79)
HCV AB SERPL-IMP: SIGNIFICANT CHANGE UP
HDLC SERPL-MCNC: 158 MG/DL — SIGNIFICANT CHANGE UP
LIPID PNL WITH DIRECT LDL SERPL: 93 MG/DL — SIGNIFICANT CHANGE UP
MAGNESIUM SERPL-MCNC: 2.1 MG/DL — SIGNIFICANT CHANGE UP (ref 1.6–2.6)
PHOSPHATE SERPL-MCNC: 3.3 MG/DL — SIGNIFICANT CHANGE UP (ref 2.5–4.5)
POTASSIUM SERPL-MCNC: 3.7 MMOL/L — SIGNIFICANT CHANGE UP (ref 3.5–5.3)
POTASSIUM SERPL-SCNC: 3.7 MMOL/L — SIGNIFICANT CHANGE UP (ref 3.5–5.3)
PROT SERPL-MCNC: 7.1 GM/DL — SIGNIFICANT CHANGE UP (ref 6–8.3)
SODIUM SERPL-SCNC: 141 MMOL/L — SIGNIFICANT CHANGE UP (ref 135–145)
TOTAL CHOLESTEROL/HDL RATIO MEASUREMENT: 1.7 RATIO — LOW (ref 3.3–7.1)
TRIGL SERPL-MCNC: 67 MG/DL — SIGNIFICANT CHANGE UP (ref 10–149)
TROPONIN I SERPL-MCNC: <0.015 NG/ML — SIGNIFICANT CHANGE UP (ref 0.01–0.04)
TSH SERPL-MCNC: 2.19 UU/ML — SIGNIFICANT CHANGE UP (ref 0.34–4.82)

## 2019-03-16 PROCEDURE — 99253 IP/OBS CNSLTJ NEW/EST LOW 45: CPT

## 2019-03-16 PROCEDURE — 73562 X-RAY EXAM OF KNEE 3: CPT | Mod: 26,RT

## 2019-03-16 PROCEDURE — 93970 EXTREMITY STUDY: CPT | Mod: 26

## 2019-03-16 RX ORDER — DEXMEDETOMIDINE HYDROCHLORIDE IN 0.9% SODIUM CHLORIDE 4 UG/ML
68 INJECTION INTRAVENOUS ONCE
Qty: 0 | Refills: 0 | Status: COMPLETED | OUTPATIENT
Start: 2019-03-16 | End: 2019-03-16

## 2019-03-16 RX ORDER — SODIUM CHLORIDE 9 MG/ML
1000 INJECTION, SOLUTION INTRAVENOUS
Qty: 0 | Refills: 0 | Status: DISCONTINUED | OUTPATIENT
Start: 2019-03-16 | End: 2019-03-17

## 2019-03-16 RX ORDER — DEXMEDETOMIDINE HYDROCHLORIDE IN 0.9% SODIUM CHLORIDE 4 UG/ML
0.7 INJECTION INTRAVENOUS
Qty: 200 | Refills: 0 | Status: DISCONTINUED | OUTPATIENT
Start: 2019-03-16 | End: 2019-03-17

## 2019-03-16 RX ADMIN — DEXMEDETOMIDINE HYDROCHLORIDE IN 0.9% SODIUM CHLORIDE 11.9 MICROGRAM(S)/KG/HR: 4 INJECTION INTRAVENOUS at 20:30

## 2019-03-16 RX ADMIN — DEXMEDETOMIDINE HYDROCHLORIDE IN 0.9% SODIUM CHLORIDE 106.08 MICROGRAM(S): 4 INJECTION INTRAVENOUS at 01:52

## 2019-03-16 RX ADMIN — DEXTROSE MONOHYDRATE, SODIUM CHLORIDE, AND POTASSIUM CHLORIDE 100 MILLILITER(S): 50; .745; 4.5 INJECTION, SOLUTION INTRAVENOUS at 20:41

## 2019-03-16 RX ADMIN — Medication 2 MILLIGRAM(S): at 22:34

## 2019-03-16 RX ADMIN — Medication 2 MILLIGRAM(S): at 00:06

## 2019-03-16 RX ADMIN — Medication 2 MILLIGRAM(S): at 02:06

## 2019-03-16 RX ADMIN — Medication 1 MILLIGRAM(S): at 01:17

## 2019-03-16 RX ADMIN — SODIUM CHLORIDE 125 MILLILITER(S): 9 INJECTION, SOLUTION INTRAVENOUS at 09:30

## 2019-03-16 RX ADMIN — DEXMEDETOMIDINE HYDROCHLORIDE IN 0.9% SODIUM CHLORIDE 11.9 MICROGRAM(S)/KG/HR: 4 INJECTION INTRAVENOUS at 11:00

## 2019-03-16 RX ADMIN — DEXMEDETOMIDINE HYDROCHLORIDE IN 0.9% SODIUM CHLORIDE 11.9 MICROGRAM(S)/KG/HR: 4 INJECTION INTRAVENOUS at 06:39

## 2019-03-16 RX ADMIN — Medication 2 MILLIGRAM(S): at 06:48

## 2019-03-16 RX ADMIN — DEXMEDETOMIDINE HYDROCHLORIDE IN 0.9% SODIUM CHLORIDE 11.9 MICROGRAM(S)/KG/HR: 4 INJECTION INTRAVENOUS at 01:48

## 2019-03-16 NOTE — PROGRESS NOTE ADULT - ASSESSMENT
Patient with hx. of alcoholisms with hx. withdrawal seizure  now in severe withdrawal  will give additional ativan  leave on precedex  hydration thiamine, folate  will need psych consult for possible suicide attempt when through dts  ortho evaluating right hand weakness, difficult to further access while in [facility] dts  will transfer to ccu

## 2019-03-16 NOTE — BEHAVIORAL HEALTH ASSESSMENT NOTE - NSBHADMITIPSTRENGTH_PSY_A_CORE
Attempting to realize his/her potential/In good physical health/Has access to housing/residential stability/Knowledge of medications

## 2019-03-16 NOTE — CDI QUERY NOTE - NSCDIOTHERTXTBX_GEN_ALL_CORE_HH
Dear Dr. Ny                                                                                           03-16-19 @ 12:37      Clinical documentation indicates that this patient has __hallucinations__________.  In order to accurately capture the diagnosis to the greatest degree of specificity reflecting the patient’s actual severity of illness, the documentation in this patient’s medical record requires additional clarification.  Please include more specific documentation, either known or suspected, of the acuity, type and underlying cause of this condition in your Progress Note and/or Discharge Summary.    CLINICAL INDICATORS:  history of alcohol abuse documented as in withdrawal, presented with mirtazapine overdose.  As per medical record: "Gen pt in bed agitated and very restless...Neuro/Psych: Constant visual and auditory hallucinations present. Oriented to place but not time and date. + confabulations."    A. metabolic encephalopathy present  B. metabolic encephalopathy not present  C. other/not clinically significant    Present on Admission:  Was the severity of the condition present on admission?  If so, please document in the chart that “(the condition) was present on admission.”    In responding to this request, please exercise your independent professional judgment.  The fact that a question is asked does not imply that any particular answer is desired or expected.    Documentation clarification is required for compliance, accuracy in coding and billing, and reporting severity of illness, quality data   and risk of mortality.

## 2019-03-16 NOTE — PROVIDER CONTACT NOTE (CHANGE IN STATUS NOTIFICATION) - BACKGROUND
Patient previously scoring 3-4 on CIWA scale. 2 standing doses of Ativan given during the day. Additional standing dose of Ativan given at 9PM.

## 2019-03-16 NOTE — PROVIDER CONTACT NOTE (CHANGE IN STATUS NOTIFICATION) - ASSESSMENT
Patient became increasingly restless around 11PM. 1mg IVP given at this time for a score of 10 with no relief of symptoms. As of 11:30PM, patient scoring 23.

## 2019-03-16 NOTE — CHART NOTE - NSCHARTNOTEFT_GEN_A_CORE
Notified by RN, pt with high CIWA and progressively worsening    54 y/o F with hx of alcohol abuse with withdrawal seizure and depression was brought to the hospital for suicidal ideation with attempt. Pt swallowed 30 mirtazapine and drank 0.5 pint of Vodka. Pt DRISS on admission was <10.   Pt was seen and examined. Pt is very agitated, hallucinating visual and auditory, severe tremors and restlessness. Pt scored a 9 on CIWA and an hour later scored 26. Pt has received a total of 9mg ativan (Po/IV) since 5pm yesterday and still scoring a 28 on CIWA.     Vital Signs Last 24 Hrs  T(C): 37.1 (15 Mar 2019 21:00), Max: 37.4 (15 Mar 2019 17:18)  T(F): 98.7 (15 Mar 2019 21:00), Max: 99.4 (15 Mar 2019 17:18)  HR: 130 (15 Mar 2019 21:00) (101 - 130)  BP: 131/82 (15 Mar 2019 21:00) (130/90 - 154/82)  RR: 18 (15 Mar 2019 21:00) (16 - 20)  SpO2: 95% (15 Mar 2019 21:00) (94% - 96%)    Gen pt in bed agitated and very restless  Pulm: cta b/l  CVS: tachycardic  Extre/Muscu: Restless, tremors.   Neuro/Psych: Constant visual and auditory hallucinations present. Oriented to place but not time and date. + confabulations.    A/P:   Alcohol Withdrawal  - Continue CIWA protocol   - Case discussed with intensivist (Dr. Sandy). Intensivist will evaluate pt.   - Continue to monitor pt clinically and vitals.     d/w Dr. Sandy, Intensivist and Dr. Sandoval, PGY3

## 2019-03-16 NOTE — PROVIDER CONTACT NOTE (CHANGE IN STATUS NOTIFICATION) - SITUATION
Patient admitted s/p suicide attempt and detox. CIWA protocol is already in progress. 1:1 for safety.

## 2019-03-16 NOTE — PATIENT PROFILE ADULT - NSASFALLNEEDSASSISTWITH_GEN_A_NUR
Chief Complaint:  Aarti Douglass is here today for   Chief Complaint   Patient presents with   • Physical     No concerns         Medications: medications verified, no change  Refills needed today?  NO  denies Latex allergy or sensitivity  Tobacco history: verified  Health Maintenance Due   Topic Date Due   • DTaP/Tdap/Td Vaccine (2 - Td) 05/23/2018     Patient is due for topics as listed above, she wishes to discuss with provider.  Health Maintenance Summary     Topic Due On Due Status Completed On    MAMMOGRAM - BREAST CANCER SCREENING Oct 4, 2019 Not Due Oct 4, 2017    Colorectal Cancer Screening - Colonoscopy Oct 7, 2019 Not Due Oct 7, 2009    Pap Smear - Cervical Cancer Screening  Jun 19, 2020 Not Due Jun 19, 2015    Immunization - TDAP Pregnancy  Hidden     IMMUNIZATION - DTaP/Tdap/Td May 23, 2018 Overdue May 23, 2008    Immunization-Influenza Sep 1, 2018 Not Due     Depression Screening Jun 27, 2019 Not Due Jun 27, 2018    Hepatitis C Screening  Completed Jun 22, 2016          Patient would like communication of their results via:      [facility]      Order for 3-D Mammogram placed per MD's instructions.   standing/walking/toileting

## 2019-03-16 NOTE — BEHAVIORAL HEALTH ASSESSMENT NOTE - HPI (INCLUDE ILLNESS QUALITY, SEVERITY, DURATION, TIMING, CONTEXT, MODIFYING FACTORS, ASSOCIATED SIGNS AND SYMPTOMS)
Patient a 52 y/o   female, domiciled with , and 13 y/o son, employed as a Mammography Tech for 25 years, currently employed at [employer], Past psychiatric hx of MDD, ETOH abuse, medically has S/P BCC and SCC was admitted at CCU for suspected OD on 30 Mirtazapine Pills.    Patient endorsed that she is depressed for more than 5 years, and is under care of PCP who prescribes her Anti-depressants and takes Lexapro 20 mg with Remeron 45 mg HS was admitted due to suspected OD on Remeron pills. As per patient she endorsed that she took 3 pills only. She is under 1:1 for safety and is depressed as her personal situation is getting clumsy, and worse. She is upset as her  filed for divorce and her relation with her son is OK now, but continues to mention as her situation is getting worse personally and seems is her greatest trigger for relapse of ETOH. She was in [facility], from January' 2019, but she relapsed as her  filed for divorce recently and was unable to handle her pressure leading to relapse, and drank 1/2 pint of Vodka and OD on pills. Currently, depressed , has no SI, but continues to feel depressed with good sleep/appetite. She denied A/h, denied DT, had a seizure from alcohol withdrawal and also has tremors/shakes from alcohol. She is willing to go for in-patient admission for stability as she feels that her meds are not working and also would like to go for Out-patient therapy psychiatrically. She denied A/H or paranoid beliefs or any perceptual; experiences.

## 2019-03-16 NOTE — PROGRESS NOTE ADULT - SUBJECTIVE AND OBJECTIVE BOX
Critical care consult     asked to see patient by FP resident for DTS.       HPI:  54 y/o female with PMHX of EtOH abuse , hx of withdrawal seizure and depression on Lexapro,   presented to the  on 3/14  SI with attempt today. As per chart pt stated she took 30 mirtazapine and 0.5 pint of vodka   Patient had stated in ED she had fallen a few days earlier and had weakness in right hand, felt to be possible radial nerve Palsy  In ED had proponged qt which improved,  Had negative MRI of head  CT cervical spine negative  had sinus tach, slightly elevated ddimer, ct was negative  In ed had alcohol level of 375.  Over course of today has had increasing tremulous, confusion   as had increasing doses of ativan    In ED -  T(F): 98.5 Max: 98.5  HR: 122 (121 - 128)BP: 140/92  (139/89 - 149/85)RR: 20(18 - 20)SpO2: 94%  (90% - 94%)  EKG - sinus tachy 148 with  with T wave inversion in lateral leads CT head on 3/11/19 was negative , cbc wnl, Cr 0.6, K 3.4, , ,  alcohol level 375 s/p 2 L NS ativan, calcium gluconate 1 gm and Magnesium 2 gm in ED with improvement of QT to 521 (15 Mar 2019 10:07)      PAST MEDICAL & SURGICAL HISTORY:  Squamous cell skin cancer  History of basal cell carcinoma excision  Withdrawal seizures  Depression  Alcohol abuse  H/O:   H/O basal cell carcinoma excision  History of ear, nose, and throat (ENT) surgery      FAMILY HISTORY:  Family history of heart attack (Father)  FH: pancreatic cancer (Mother)      MEDICATIONS  (STANDING):  folic acid 1 milliGRAM(s) Oral daily  LORazepam     Tablet   Oral   LORazepam     Tablet 2 milliGRAM(s) Oral every 4 hours  LORazepam     Tablet 1.5 milliGRAM(s) Oral every 4 hours  multivitamin 1 Tablet(s) Oral daily  pantoprazole    Tablet 40 milliGRAM(s) Oral before breakfast  sodium chloride 0.9% with potassium chloride 20 mEq/L 1000 milliLiter(s) (100 mL/Hr) IV Continuous <Continuous>  thiamine 100 milliGRAM(s) Oral daily  thiamine Injectable 100 milliGRAM(s) IntraMuscular once    MEDICATIONS  (PRN):  calcium carbonate    500 mG (Tums) Chewable 3 Tablet(s) Chew every 6 hours PRN Dyspepsia  LORazepam     Tablet 1 milliGRAM(s) Oral every 2 hours PRN CIWA-Ar score increase by 2 points and a total score of 7 or less  LORazepam   Injectable 1 milliGRAM(s) IV Push every 1 hour PRN CIWA-Ar score 8 or greater  ondansetron Injectable 4 milliGRAM(s) IV Push every 6 hours PRN Nausea  senna 2 Tablet(s) Oral at bedtime PRN Constipation    ROS - cant obtain, patient is confused, no specific complaints      Vital Signs Last 24 Hrs  T(C): 37.1 (15 Mar 2019 21:00), Max: 37.4 (15 Mar 2019 17:18)  T(F): 98.7 (15 Mar 2019 21:00), Max: 99.4 (15 Mar 2019 17:18)  HR: 130 (15 Mar 2019 21:00) (101 - 130)  BP: 131/82 (15 Mar 2019 21:00) (130/90 - 154/82)  BP(mean): --  RR: 18 (15 Mar 2019 21:00) (16 - 20)  SpO2: 95% (15 Mar 2019 21:00) (94% - 96%)  Daily     Daily     PHYSICAL EXAM:    General:   anxious, agitated    Neuro:    confused, tremulous, confabulating,  HEENT:  No JVD,  anicteric    Cardiovascular:  Regular rate and rhythm, with normal S1 and S2.     Lungs:  Lungs clear. no rales, no wheezing, .    Abdomen:  Normoactive bowel sounds. Soft, flat, non-tender, and non-distended.  No hepatosplenomegaly, positive bowel sounds    Skin:  Warm, dry, well-perfused. No rashes or other lesions.     Extremities:   warm, weakness right hand, difficult to access.    LABS:                        13.7   4.50  )-----------( 108      ( 14 Mar 2019 20:06 )             41.6     03-15    141  |  102  |  7   ----------------------------<  103<H>  3.5   |  28  |  0.60    Ca    8.7      15 Mar 2019 11:58  Phos  2.9     03-15  Mg     2.0     03-15    TPro  7.7  /  Alb  3.9  /  TBili  1.0  /  DBili  0.3<H>  /  AST  199<H>  /  ALT  118<H>  /  AlkPhos  162<H>  03-15      Urinalysis Basic - ( 15 Mar 2019 01:47 )    Color: Yellow / Appearance: Clear / S.015 / pH: x  Gluc: x / Ketone: Negative  / Bili: Negative / Urobili: Negative mg/dL   Blood: x / Protein: 100 mg/dL / Nitrite: Negative   Leuk Esterase: Negative / RBC: 3-5 /HPF / WBC 3-5   Sq Epi: x / Non Sq Epi: Moderate / Bacteria: Occasional Critical care consult     asked to see patient by FP resident for DTS.       HPI:  54 y/o female with PMHX of EtOH abuse , hx of withdrawal seizure and depression on Lexapro,   presented to the  on 3/14  SI with attempt today. As per chart pt stated she took 30 mirtazapine and 0.5 pint of vodka   Patient had stated in ED she had fallen a few days earlier and had weakness in right hand, felt to be possible radial nerve Palsy  In ED had proponged qt which improved,  Had negative MRI of head  CT cervical spine negative  had sinus tach, slightly elevated ddimer, ct was negative  In ed had alcohol level of 375.  Over course of today has had increasing tremulous, confusion   as had increasing doses of ativan    In ED -  T(F): 98.5 Max: 98.5  HR: 122 (121 - 128)BP: 140/92  (139/89 - 149/85)RR: 20(18 - 20)SpO2: 94%  (90% - 94%)  EKG - sinus tachy 148 with  with T wave inversion in lateral leads CT head on 3/11/19 was negative , cbc wnl, Cr 0.6, K 3.4, , ,  alcohol level 375 s/p 2 L NS ativan, calcium gluconate 1 gm and Magnesium 2 gm in ED with improvement of QT to 521 (15 Mar 2019 10:07)      PAST MEDICAL & SURGICAL HISTORY:  Squamous cell skin cancer  History of basal cell carcinoma excision  Withdrawal seizures  Depression  Alcohol abuse  H/O:   H/O basal cell carcinoma excision  History of ear, nose, and throat (ENT) surgery      FAMILY HISTORY:  Family history of heart attack (Father)  FH: pancreatic cancer (Mother)      MEDICATIONS  (STANDING):  folic acid 1 milliGRAM(s) Oral daily  LORazepam     Tablet   Oral   LORazepam     Tablet 2 milliGRAM(s) Oral every 4 hours  LORazepam     Tablet 1.5 milliGRAM(s) Oral every 4 hours  multivitamin 1 Tablet(s) Oral daily  pantoprazole    Tablet 40 milliGRAM(s) Oral before breakfast  sodium chloride 0.9% with potassium chloride 20 mEq/L 1000 milliLiter(s) (100 mL/Hr) IV Continuous <Continuous>  thiamine 100 milliGRAM(s) Oral daily  thiamine Injectable 100 milliGRAM(s) IntraMuscular once    MEDICATIONS  (PRN):  calcium carbonate    500 mG (Tums) Chewable 3 Tablet(s) Chew every 6 hours PRN Dyspepsia  LORazepam     Tablet 1 milliGRAM(s) Oral every 2 hours PRN CIWA-Ar score increase by 2 points and a total score of 7 or less  LORazepam   Injectable 1 milliGRAM(s) IV Push every 1 hour PRN CIWA-Ar score 8 or greater  ondansetron Injectable 4 milliGRAM(s) IV Push every 6 hours PRN Nausea  senna 2 Tablet(s) Oral at bedtime PRN Constipation    ROS - cant obtain, patient is confused, no specific complaints      Vital Signs Last 24 Hrs  T(C): 37.1 (15 Mar 2019 21:00), Max: 37.4 (15 Mar 2019 17:18)  T(F): 98.7 (15 Mar 2019 21:00), Max: 99.4 (15 Mar 2019 17:18)  HR: 130 (15 Mar 2019 21:00) (101 - 130)  BP: 131/82 (15 Mar 2019 21:00) (130/90 - 154/82)  BP(mean): --  RR: 18 (15 Mar 2019 21:00) (16 - 20)  SpO2: 95% (15 Mar 2019 21:00) (94% - 96%)  Daily     Daily     PHYSICAL EXAM:    General:   anxious, agitated    Neuro:    confused, tremulous, confabulating,  HEENT:  No JVD,  anicteric    Cardiovascular:  Regular rate and rhythm, with normal S1 and S2.     Lungs:  Lungs clear. no rales, no wheezing, .    Abdomen:  Normoactive bowel sounds. Soft, flat, non-tender, and non-distended.  No hepatosplenomegaly, positive bowel sounds    Skin:  Warm, dry, well-perfused. No rashes or other lesions.     Extremities:   warm, weakness right hand, difficult to access. ecchymotic right knee    LABS:                        13.7   4.50  )-----------( 108      ( 14 Mar 2019 20:06 )             41.6     03-15    141  |  102  |  7   ----------------------------<  103<H>  3.5   |  28  |  0.60    Ca    8.7      15 Mar 2019 11:58  Phos  2.9     03-15  Mg     2.0     03-15    TPro  7.7  /  Alb  3.9  /  TBili  1.0  /  DBili  0.3<H>  /  AST  199<H>  /  ALT  118<H>  /  AlkPhos  162<H>  03-15      Urinalysis Basic - ( 15 Mar 2019 01:47 )    Color: Yellow / Appearance: Clear / S.015 / pH: x  Gluc: x / Ketone: Negative  / Bili: Negative / Urobili: Negative mg/dL   Blood: x / Protein: 100 mg/dL / Nitrite: Negative   Leuk Esterase: Negative / RBC: 3-5 /HPF / WBC 3-5   Sq Epi: x / Non Sq Epi: Moderate / Bacteria: Occasional

## 2019-03-16 NOTE — BEHAVIORAL HEALTH ASSESSMENT NOTE - SUMMARY
Patient a 54 y/o   female, domiciled with , and 15 y/o son, employed as a [employer] for 25 years, currently employed at [employer], Past psychiatric hx of MDD, ETOH abuse, medically has S/P BCC and SCC was admitted at CCU for suspected OD on 30 Mirtazapine Pills.    Patient endorsed that she is depressed for more than 5 years, and is under care of PCP who prescribes her Anti-depressants and takes Lexapro 20 mg with Remeron 45 mg HS was admitted due to suspected OD on Remeron pills. As per patient she endorsed that she took 3 pills only. She is under 1:1 for safety and is depressed as her personal situation is getting clumsy, and worse. She is upset as her  filed for divorce and her relation with her son is OK now, but continues to mention as her situation is getting worse personally and seems is her greatest trigger for relapse of ETOH. She was in [facility], from January' 2019, but she relapsed as her  filed for divorce recently and was unable to handle her pressure leading to relapse, and drank 1/2 pint of Vodka and OD on pills. Currently, depressed , has no SI, but continues to feel depressed with good sleep/appetite. She denied A/h, denied DT, had a seizure from alcohol withdrawal and also has tremors/shakes from alcohol. She is willing to go for in-patient admission for stability as she feels that her meds are not working and also would like to go for Out-patient therapy psychiatrically. She denied A/H or paranoid beliefs or any perceptual; experiences.    Plan: Need admission in-patient Psych once medically cleared         Rehab--as per SW referral

## 2019-03-16 NOTE — CONSULT NOTE ADULT - ASSESSMENT
A/P: 53y Female with R radial nerve palsy  Pain control  WBAT with assistive devices as needed  FU Labs/imaging  FU MRI C Spine to r/o any myelopathy or spinal cord pathology  SCDs  PT/OT  Cont care per primary team  No plan for orthopedic surgery intervention at this time  Discussed with attending  Will FU MRI results
Prolonged QT interval - resolved.  Close monitoring for now.  No arrythmias noted on tele.  No further workup needed at this time.     Suicidal attempt- Management per primary team.     b/l arm weakness- Management per primary team.     Other medical issues- Management per primary team.   Thank you for allowing me to participate in the care of this patient. Please feel free to contact me with any questions.

## 2019-03-16 NOTE — CONSULT NOTE ADULT - SUBJECTIVE AND OBJECTIVE BOX
Patient is a 53y Female who presents c/o bilateral upper extremity weakness. Pt states she had a fall one week ago and has had weakness in her arms since. Pt admitted to  for suicide attempt by OD. On exam, Pt believes she is in her home and is confused. Pt was recently evaluated in ED a few days before admission and diagnosed with R radial nerve palsy likely 2/2 compression from passing out/intoxication. Denies pain/injury elsewhere. Pt also states she has weakness when she walks. Denies numbness/tingling/paresthesias. Denies bowel/bladder incontinence. No other complaints at this time.    PAST MEDICAL & SURGICAL HISTORY:  Squamous cell skin cancer  History of basal cell carcinoma excision  Withdrawal seizures  Depression  Alcohol abuse  H/O:   H/O basal cell carcinoma excision  History of ear, nose, and throat (ENT) surgery      HEALTH ISSUES - PROBLEM Dx:  Suspected pulmonary embolism          MEDICATIONS  (STANDING):  folic acid 1 milliGRAM(s) Oral daily  LORazepam     Tablet   Oral   LORazepam     Tablet 2 milliGRAM(s) Oral every 4 hours  LORazepam     Tablet 1.5 milliGRAM(s) Oral every 4 hours  multivitamin 1 Tablet(s) Oral daily  pantoprazole    Tablet 40 milliGRAM(s) Oral before breakfast  sodium chloride 0.9% with potassium chloride 20 mEq/L 1000 milliLiter(s) IV Continuous <Continuous>  thiamine 100 milliGRAM(s) Oral daily  thiamine Injectable 100 milliGRAM(s) IntraMuscular once      Allergies    No Known Allergies    Intolerances                          13.7   4.50  )-----------( 108      ( 14 Mar 2019 20:06 )             41.6       15 Mar 2019 11:58    141    |  102    |  7      ----------------------------<  103    3.5     |  28     |  0.60     Ca    8.7        15 Mar 2019 11:58  Phos  2.9       15 Mar 2019 11:58  Mg     2.0       15 Mar 2019 11:58    TPro  7.7    /  Alb  3.9    /  TBili  1.0    /  DBili  0.3    /  AST  199    /  ALT  118    /  AlkPhos  162    15 Mar 2019 11:58          Urinalysis Basic - ( 15 Mar 2019 01:47 )    Color: Yellow / Appearance: Clear / S.015 / pH: x  Gluc: x / Ketone: Negative  / Bili: Negative / Urobili: Negative mg/dL   Blood: x / Protein: 100 mg/dL / Nitrite: Negative   Leuk Esterase: Negative / RBC: 3-5 /HPF / WBC 3-5   Sq Epi: x / Non Sq Epi: Moderate / Bacteria: Occasional        Vital Signs Last 24 Hrs  T(C): 37.1 (03-15-19 @ 21:00), Max: 37.4 (03-15-19 @ 17:18)  T(F): 98.7 (03-15-19 @ 21:00), Max: 99.4 (03-15-19 @ 17:18)  HR: 130 (03-15-19 @ 21:00) (101 - 130)  BP: 131/82 (03-15-19 @ 21:00) (130/90 - 154/82)  BP(mean): --  RR: 18 (03-15-19 @ 21:00) (16 - 20)  SpO2: 95% (03-15-19 @ 21:00) (94% - 96%)    Gen: NAD    Spine PE:  Skin intact  No gross deformity  No midline TTP C/T/L/S spine  No bony step offs  No paraspinal muscle ttp/hypertonicity   Negative clonus  Negative babinski  Negative carvalho      Motor:                   C5                C6              C7               C8           T1   R            5/5                4/5            5/5             5/5          5/5  L             5/5               5/5             5/5             5/5          5/5                L2             L3             L4               L5            S1  R         5/5           5/5          5/5             5/5           5/5  L          5/5          5/5           5/5             5/5           5/5    Sensory:            C5         C6         C7      C8       T1        (0=absent, 1=impaired, 2=normal, NT=not testable)  R         2            2           2        2         2  L          2            2           2        2         2               L2          L3         L4      L5       S1         (0=absent, 1=impaired, 2=normal, NT=not testable)  R         2            2            2        2        2  L          2            2           2        2         2    Imaging: CT Csp: Small disc osteophyte complex at C5-C6  MRI C Spine: Pending
Patient is a 53y old  Female who presents with a chief complaint of " I felt 3 days ago" my arm is weak.       HPI:  52 y/o female with PMHX of EtOH abuse with hx of withdrawal seizure and depression on Lexapro, h/o BCC and SCC on the skin, former smoker  presents to the  for SI with attempt.    as per chart pt stated she took 30 mirtazapine and 0.5 pint of vodka, pt stated she does not want to live, because she hates her life and her  and son don't care about her, she going through divorce.  Sees psychiatrist but has not been to see her recently.   Patient reports fall approximately 3 days ago ( pt unsure, poor historian) , tripped, hit her head, after that reports bilateral arm weakness, numbness in the right hand .      In ED -  T(F): 98.5 Max: 98.5  HR: 122 (121 - 128)BP: 140/92  (139/89 - 149/85)RR: 20(18 - 20)SpO2: 94%  (90% - 94%)  EKG - sinus tachy 148 with  with T wave inversion in lateral leads CT head on 3/11/19 was negative , cbc wnl, Cr 0.6, K 3.4, , ,  alcohol level 375 s/p 2 L NS ativan, calcium gluconate 1 gm and Magnesium 2 gm in ED with improvement of QT to 521.     Cardiology team consulted for evaluation for prolonged QT interval.  Pt denies any prior cardiac history.  She denies any CP or pressure or SOB.    Currently tremulous and on 1:1 supervision and denies taking any remeron and she asked me where I was getting that info from.       PAST MEDICAL & SURGICAL HISTORY:  Squamous cell skin cancer  History of basal cell carcinoma excision  Withdrawal seizures  Depression  Alcohol abuse  H/O:   H/O basal cell carcinoma excision  History of ear, nose, and throat (ENT) surgery      MEDICATIONS  (STANDING):  folic acid 1 milliGRAM(s) Oral daily  LORazepam     Tablet   Oral   LORazepam     Tablet 2 milliGRAM(s) Oral every 4 hours  multivitamin 1 Tablet(s) Oral daily  pantoprazole    Tablet 40 milliGRAM(s) Oral before breakfast  sodium chloride 0.9% with potassium chloride 20 mEq/L 1000 milliLiter(s) (100 mL/Hr) IV Continuous <Continuous>  thiamine 100 milliGRAM(s) Oral daily  thiamine Injectable 100 milliGRAM(s) IntraMuscular once    MEDICATIONS  (PRN):  calcium carbonate    500 mG (Tums) Chewable 3 Tablet(s) Chew every 6 hours PRN Dyspepsia  LORazepam     Tablet 1 milliGRAM(s) Oral every 2 hours PRN CIWA-Ar score increase by 2 points and a total score of 7 or less  LORazepam   Injectable 1 milliGRAM(s) IV Push every 1 hour PRN CIWA-Ar score 8 or greater  ondansetron Injectable 4 milliGRAM(s) IV Push every 6 hours PRN Nausea  senna 2 Tablet(s) Oral at bedtime PRN Constipation      FAMILY HISTORY:  Family history of heart attack (Father)  FH: pancreatic cancer (Mother)      SOCIAL HISTORY:  quit 5 yrs ago    REVIEW OF SYSTEMS:  CONSTITUTIONAL:    No fatigue, malaise, lethargy.  No fever or chills.  HEENT:  Eyes:  No visual changes.     ENT:  No epistaxis.  No sinus pain.    RESPIRATORY:  No cough.  No wheeze.  No hemoptysis.  No shortness of breath.  CARDIOVASCULAR:  No chest pains.  No palpitations. No shortness of breath, No orthopnea or PND.  GASTROINTESTINAL:  No abdominal pain.  No nausea or vomiting.    GENITOURINARY:    No hematuria.    MUSCULOSKELETAL:  No musculoskeletal pain.  No joint swelling.  No arthritis.  NEUROLOGICAL: c/o b/l UE weakness and unable to eat food   PSYCHIATRIC:  No confusion        Vital Signs Last 24 Hrs  T(C): 37.1 (15 Mar 2019 13:44), Max: 37.1 (15 Mar 2019 13:44)  T(F): 98.7 (15 Mar 2019 13:44), Max: 98.7 (15 Mar 2019 13:44)  HR: 106 (15 Mar 2019 13:44) (101 - 128)  BP: 154/82 (15 Mar 2019 13:44) (130/90 - 154/82)  BP(mean): --  RR: 16 (15 Mar 2019 13:44) (16 - 20)  SpO2: 95% (15 Mar 2019 13:44) (90% - 96%)    PHYSICAL EXAM-    Constitutional: no acute distress    Head: Head is normocephalic and atraumatic.      Neck: No jugular venous distention. No audible carotid bruits.     Cardiovascular: Regular rate and rhythm without S3, S4. No murmurs or rubs are appreciated.      Respiratory: Breathsounds are normal. No rales. No wheezing.    Abdomen: Soft, nontender, nondistended with positive bowel sounds.      Extremity: No tenderness. No  pitting edema     Neurologic: The patient is alert and oriented.      Skin: No rash, no obvious lesions noted.      Psychiatric: The patient appears to be emotionally stable.      INTERPRETATION OF TELEMETRY: sinus rythm.    ECG: Sinus rythm , normal axis, no ST T changes. Prolonged QT interval noted in the initial EKG but EKG from midnight showed normal QT interval.     I&O's Detail      LABS:                        13.7   4.50  )-----------( 108      ( 14 Mar 2019 20:06 )             41.6     03-15    141  |  102  |  7   ----------------------------<  103<H>  3.5   |  28  |  0.60    Ca    8.7      15 Mar 2019 11:58  Phos  2.9     03-15  Mg     2.0     03-15    TPro  7.7  /  Alb  3.9  /  TBili  1.0  /  DBili  0.3<H>  /  AST  199<H>  /  ALT  118<H>  /  AlkPhos  162<H>  03-15    CARDIAC MARKERS ( 15 Mar 2019 11:58 )  <0.015 ng/mL / x     / 230 U/L / x     / x            Urinalysis Basic - ( 15 Mar 2019 01:47 )    Color: Yellow / Appearance: Clear / S.015 / pH: x  Gluc: x / Ketone: Negative  / Bili: Negative / Urobili: Negative mg/dL   Blood: x / Protein: 100 mg/dL / Nitrite: Negative   Leuk Esterase: Negative / RBC: 3-5 /HPF / WBC 3-5   Sq Epi: x / Non Sq Epi: Moderate / Bacteria: Occasional      I&O's Summary    BNPSerum Pro-Brain Natriuretic Peptide: 155 pg/mL (03-15 @ 11:58)    RADIOLOGY & ADDITIONAL STUDIES:  < from: Xray Chest 1 View-PORTABLE IMMEDIATE (03.15.19 @ 11:48) >  Impression:    No acute pulmonary process demonstrated.                      JAZLYN MENDEZ M.D., ATTENDING RADIOLOGIST  This document has been electronically signed. Mar 15 2019 11:50AM        < end of copied text >

## 2019-03-16 NOTE — PROVIDER CONTACT NOTE (CHANGE IN STATUS NOTIFICATION) - RECOMMENDATIONS
Patient worsening at a rapid rate. Change PO doses of Ativan to IVP and transfer to higher level of care.

## 2019-03-17 RX ORDER — ENOXAPARIN SODIUM 100 MG/ML
40 INJECTION SUBCUTANEOUS EVERY 24 HOURS
Qty: 0 | Refills: 0 | Status: DISCONTINUED | OUTPATIENT
Start: 2019-03-17 | End: 2019-03-19

## 2019-03-17 RX ORDER — THIAMINE MONONITRATE (VIT B1) 100 MG
100 TABLET ORAL DAILY
Qty: 0 | Refills: 0 | Status: DISCONTINUED | OUTPATIENT
Start: 2019-03-17 | End: 2019-03-19

## 2019-03-17 RX ORDER — FOLIC ACID 0.8 MG
1 TABLET ORAL DAILY
Qty: 0 | Refills: 0 | Status: DISCONTINUED | OUTPATIENT
Start: 2019-03-17 | End: 2019-03-19

## 2019-03-17 RX ADMIN — Medication 2 MILLIGRAM(S): at 22:20

## 2019-03-17 RX ADMIN — PANTOPRAZOLE SODIUM 40 MILLIGRAM(S): 20 TABLET, DELAYED RELEASE ORAL at 09:05

## 2019-03-17 RX ADMIN — ENOXAPARIN SODIUM 40 MILLIGRAM(S): 100 INJECTION SUBCUTANEOUS at 09:04

## 2019-03-17 RX ADMIN — Medication 100 MILLIGRAM(S): at 09:03

## 2019-03-17 RX ADMIN — Medication 1 MILLIGRAM(S): at 09:04

## 2019-03-17 RX ADMIN — Medication 2 MILLIGRAM(S): at 09:04

## 2019-03-17 RX ADMIN — DEXMEDETOMIDINE HYDROCHLORIDE IN 0.9% SODIUM CHLORIDE 11.9 MICROGRAM(S)/KG/HR: 4 INJECTION INTRAVENOUS at 06:26

## 2019-03-17 RX ADMIN — DEXMEDETOMIDINE HYDROCHLORIDE IN 0.9% SODIUM CHLORIDE 11.9 MICROGRAM(S)/KG/HR: 4 INJECTION INTRAVENOUS at 01:30

## 2019-03-17 RX ADMIN — Medication 2 MILLIGRAM(S): at 13:46

## 2019-03-17 RX ADMIN — Medication 2 MILLIGRAM(S): at 17:40

## 2019-03-17 NOTE — PROGRESS NOTE ADULT - SUBJECTIVE AND OBJECTIVE BOX
24 Hr Events:  Pt seen and examined. Pt states she is feeling better, pain well controlled. No acute complaints. No acute events overnight.     Vital Signs Last 24 Hrs  T(C): 37.3 (17 Mar 2019 06:41), Max: 37.3 (17 Mar 2019 06:41)  T(F): 99.2 (17 Mar 2019 06:41), Max: 99.2 (17 Mar 2019 06:41)  HR: 78 (17 Mar 2019 06:00) (70 - 84)  BP: 120/93 (17 Mar 2019 05:00) (93/60 - 157/90)  BP(mean): 100 (17 Mar 2019 05:00) (67 - 107)  RR: 16 (17 Mar 2019 06:00) (12 - 23)  SpO2: 99% (17 Mar 2019 06:00) (99% - 100%)    Gen: NAD    Spine PE:  Skin intact  No gross deformity  No midline TTP C/T/L/S spine  No bony step offs  No paraspinal muscle ttp/hypertonicity   Negative clonus  Negative babinski  Negative carvalho      Motor:                   C5                C6              C7               C8           T1   R            5/5                4/5            5/5             5/5          5/5  L             5/5               5/5             5/5             5/5          5/5                L2             L3             L4               L5            S1  R         5/5           5/5          5/5             5/5           5/5  L          5/5          5/5           5/5             5/5           5/5    Sensory:            C5         C6         C7      C8       T1        (0=absent, 1=impaired, 2=normal, NT=not testable)  R         2            2           2        2         2  L          2            2           2        2         2               L2          L3         L4      L5       S1         (0=absent, 1=impaired, 2=normal, NT=not testable)  R         2            2            2        2        2  L          2            2           2        2         2    Imaging: CT Csp: Small disc osteophyte complex at C5-C6  MRI C Spine: Pending

## 2019-03-17 NOTE — PROGRESS NOTE ADULT - ASSESSMENT
IMP:    52 y/o female EtOH abuse with hx of withdrawal seizure and depression admitted with DT and SA  Resolved encephalopathy related to ETOH WD/DT  Possible thiamine def    Plan:    DC precedex gtt  DC banana bag  PO diet  Cont with ativan today  CO  Psych follow up--will need inpt admission   OOB  DVT prophy--LMWH and SCD    CCU care--d/w CCU staff and pt-- All concerns addressed

## 2019-03-17 NOTE — PROGRESS NOTE ADULT - SUBJECTIVE AND OBJECTIVE BOX
CC:  DT and SA    HPI:    52 y/o female EtOH abuse with hx of withdrawal seizure and depression on Lexapro, h/o BCC and SCC on the skin, former smoker  presents to the  for SA--admitted to CCU for DT treatment    3/17:  CCU D # 1.  Pt seen and examined in CCU--awake and alert--calm--admits to SA.        PMH:  As above.     PSH:  As above.     FH: Non Contributory other than those listed in HPI    Social History:      MEDICATIONS  (STANDING):  folic acid 1 milliGRAM(s) Oral daily  LORazepam   Injectable 2 milliGRAM(s) IV Push every 4 hours  LORazepam   Injectable 2 milliGRAM(s) IV Push once  pantoprazole    Tablet 40 milliGRAM(s) Oral before breakfast  thiamine 100 milliGRAM(s) Oral daily    MEDICATIONS  (PRN):  calcium carbonate    500 mG (Tums) Chewable 3 Tablet(s) Chew every 6 hours PRN Dyspepsia  ondansetron Injectable 4 milliGRAM(s) IV Push every 6 hours PRN Nausea  senna 2 Tablet(s) Oral at bedtime PRN Constipation      Allergies: NKDA    ROS:  SEE BELOW        ICU Vital Signs Last 24 Hrs  T(C): 37.3 (17 Mar 2019 06:41), Max: 37.3 (17 Mar 2019 06:41)  T(F): 99.2 (17 Mar 2019 06:41), Max: 99.2 (17 Mar 2019 06:41)  HR: 78 (17 Mar 2019 06:00) (70 - 84)  BP: 120/93 (17 Mar 2019 05:00) (93/60 - 157/90)  BP(mean): 100 (17 Mar 2019 05:00) (67 - 107)  ABP: --  ABP(mean): --  RR: 16 (17 Mar 2019 06:00) (12 - 23)  SpO2: 99% (17 Mar 2019 06:00) (99% - 100%)          I&O's Summary    16 Mar 2019 07:01  -  17 Mar 2019 07:00  --------------------------------------------------------  IN: 3091.4 mL / OUT: 1750 mL / NET: 1341.4 mL        Physical Exam:  SEE BELOW        03-16    141  |  104  |  8   ----------------------------<  126<H>  3.7   |  26  |  0.56    Ca    8.7      16 Mar 2019 06:29  Phos  3.3     03-16  Mg     2.1     03-16    TPro  7.1  /  Alb  3.4  /  TBili  0.9  /  DBili  x   /  AST  156<H>  /  ALT  96<H>  /  AlkPhos  153<H>  03-16      CARDIAC MARKERS ( 16 Mar 2019 06:29 )  <0.015 ng/mL / x     / 200 U/L / x     / x      CARDIAC MARKERS ( 15 Mar 2019 18:44 )  <0.015 ng/mL / x     / 221 U/L / x     / x      CARDIAC MARKERS ( 15 Mar 2019 11:58 )  <0.015 ng/mL / x     / 230 U/L / x     / x                    DVT Prophylaxis:                                                            Contraindication:     Advanced Directives:    Discussed with:    Visit Information:  Time spent excluding procedure:  30 cc mins    ** Time is exclusive of billed procedures and/or teaching and/or routine family updates.

## 2019-03-17 NOTE — PROGRESS NOTE ADULT - ASSESSMENT
A/P: 53y Female with R radial nerve palsy  Pain control  WBAT with assistive devices as needed  FU Labs/imaging  FU MRI C Spine to r/o any myelopathy or spinal cord pathology  SCDs  PT/OT  Cont care per primary team  No plan for orthopedic surgery intervention at this time  Discussed with attending  Will FU MRI results

## 2019-03-18 ENCOUNTER — TRANSCRIPTION ENCOUNTER (OUTPATIENT)
Age: 54
End: 2019-03-18

## 2019-03-18 LAB
ANION GAP SERPL CALC-SCNC: 6 MMOL/L — SIGNIFICANT CHANGE UP (ref 5–17)
BUN SERPL-MCNC: 11 MG/DL — SIGNIFICANT CHANGE UP (ref 7–23)
CALCIUM SERPL-MCNC: 8.8 MG/DL — SIGNIFICANT CHANGE UP (ref 8.5–10.1)
CHLORIDE SERPL-SCNC: 100 MMOL/L — SIGNIFICANT CHANGE UP (ref 96–108)
CO2 SERPL-SCNC: 28 MMOL/L — SIGNIFICANT CHANGE UP (ref 22–31)
CREAT SERPL-MCNC: 0.77 MG/DL — SIGNIFICANT CHANGE UP (ref 0.5–1.3)
GLUCOSE SERPL-MCNC: 107 MG/DL — HIGH (ref 70–99)
HCT VFR BLD CALC: 39.9 % — SIGNIFICANT CHANGE UP (ref 34.5–45)
HGB BLD-MCNC: 13.1 G/DL — SIGNIFICANT CHANGE UP (ref 11.5–15.5)
MAGNESIUM SERPL-MCNC: 1.8 MG/DL — SIGNIFICANT CHANGE UP (ref 1.6–2.6)
MCHC RBC-ENTMCNC: 32 PG — SIGNIFICANT CHANGE UP (ref 27–34)
MCHC RBC-ENTMCNC: 32.8 GM/DL — SIGNIFICANT CHANGE UP (ref 32–36)
MCV RBC AUTO: 97.6 FL — SIGNIFICANT CHANGE UP (ref 80–100)
NRBC # BLD: 0 /100 WBCS — SIGNIFICANT CHANGE UP (ref 0–0)
PHOSPHATE SERPL-MCNC: 4 MG/DL — SIGNIFICANT CHANGE UP (ref 2.5–4.5)
PLATELET # BLD AUTO: 89 K/UL — LOW (ref 150–400)
POTASSIUM SERPL-MCNC: 3.6 MMOL/L — SIGNIFICANT CHANGE UP (ref 3.5–5.3)
POTASSIUM SERPL-SCNC: 3.6 MMOL/L — SIGNIFICANT CHANGE UP (ref 3.5–5.3)
RBC # BLD: 4.09 M/UL — SIGNIFICANT CHANGE UP (ref 3.8–5.2)
RBC # FLD: 14.7 % — HIGH (ref 10.3–14.5)
SODIUM SERPL-SCNC: 134 MMOL/L — LOW (ref 135–145)
WBC # BLD: 4.34 K/UL — SIGNIFICANT CHANGE UP (ref 3.8–10.5)
WBC # FLD AUTO: 4.34 K/UL — SIGNIFICANT CHANGE UP (ref 3.8–10.5)

## 2019-03-18 PROCEDURE — 72141 MRI NECK SPINE W/O DYE: CPT | Mod: 26

## 2019-03-18 PROCEDURE — 99232 SBSQ HOSP IP/OBS MODERATE 35: CPT

## 2019-03-18 RX ORDER — IBUPROFEN 200 MG
400 TABLET ORAL ONCE
Qty: 0 | Refills: 0 | Status: COMPLETED | OUTPATIENT
Start: 2019-03-18 | End: 2019-03-18

## 2019-03-18 RX ADMIN — Medication 1 MILLIGRAM(S): at 23:14

## 2019-03-18 RX ADMIN — Medication 1 MILLIGRAM(S): at 17:52

## 2019-03-18 RX ADMIN — Medication 400 MILLIGRAM(S): at 22:20

## 2019-03-18 RX ADMIN — Medication 400 MILLIGRAM(S): at 21:53

## 2019-03-18 RX ADMIN — Medication 100 MILLIGRAM(S): at 10:11

## 2019-03-18 RX ADMIN — Medication 2 MILLIGRAM(S): at 06:25

## 2019-03-18 RX ADMIN — ENOXAPARIN SODIUM 40 MILLIGRAM(S): 100 INJECTION SUBCUTANEOUS at 10:10

## 2019-03-18 RX ADMIN — Medication 2 MILLIGRAM(S): at 02:08

## 2019-03-18 RX ADMIN — Medication 1 MILLIGRAM(S): at 10:11

## 2019-03-18 RX ADMIN — PANTOPRAZOLE SODIUM 40 MILLIGRAM(S): 20 TABLET, DELAYED RELEASE ORAL at 10:11

## 2019-03-18 NOTE — DISCHARGE NOTE PROVIDER - CARE PROVIDER_API CALL
Karen Gross)  Family Medicine  [address] [place] [address]  Phone: [phone]  Fax: [phone]  Follow Up Time:

## 2019-03-18 NOTE — PHYSICAL THERAPY INITIAL EVALUATION ADULT - PLANNED THERAPY INTERVENTIONS, PT EVAL
Retina ATTACHED and doing well today. gait training/bed mobility training/strengthening/transfer training

## 2019-03-18 NOTE — DISCHARGE NOTE PROVIDER - NSDCFUADDINST_GEN_ALL_CORE_FT
Will need to f/u with PMD once discharged from .  Additional f/u needs to be determined by psychiatry team.

## 2019-03-18 NOTE — CHART NOTE - NSCHARTNOTEFT_GEN_A_CORE
Pt NVI pre procedure  Pt NVI post procedure  She was placed in a plaster wrist splint placed in intrinsic plus hand position  To help with radial nerve palsy, better  strength with intrinsic plus hand position  Tolerated procedure well

## 2019-03-18 NOTE — PROGRESS NOTE ADULT - SUBJECTIVE AND OBJECTIVE BOX
52 y/o female EtOH abuse with hx of withdrawal seizure and depression on Lexapro, h/o BCC and SCC on the skin, former smoker.    Admitted 3/15 to CCU for treatment of DT's and 'anticholinergic symptoms' following suicide attempt having taken 30 tabs of Mirtazapine and ingested large volume of alcohol.    Pt was treated with ativan/precedex and doing well thus far.      Evaluated by psychiatry who suggest admission to  when medically clear to do so.    At present pts symptoms are well controlled and hemodynamics and respiratory function are reasonable.    Case d/w pt, mother, and  and all questions answered.      PAST MEDICAL & SURGICAL HISTORY:  Squamous cell skin cancer  History of basal cell carcinoma excision  Withdrawal seizures  Depression  Alcohol abuse  H/O:   H/O basal cell carcinoma excision  History of ear, nose, and throat (ENT) surgery      Allergies    No Known Allergies        ICU Vital Signs Last 24 Hrs  T(C): 36.8 (17 Mar 2019 23:00), Max: 37.6 (17 Mar 2019 15:49)  T(F): 98.2 (17 Mar 2019 23:00), Max: 99.6 (17 Mar 2019 15:49)  HR: 104 (18 Mar 2019 12:00) (81 - 138)  BP: 95/74 (18 Mar 2019 12:00) (92/72 - 127/87)  BP(mean): 79 (18 Mar 2019 12:00) (67 - 98)  ABP: --  ABP(mean): --  RR: 18 (18 Mar 2019 12:00) (17 - 26)  SpO2: 100% (18 Mar 2019 12:00) (93% - 100%)          I&O's Summary    17 Mar 2019 07:  -  18 Mar 2019 07:00  --------------------------------------------------------  IN: 888 mL / OUT: 0 mL / NET: -1162 mL    18 Mar 2019 07:  -  18 Mar 2019 13:35  --------------------------------------------------------  IN: 250 mL / OUT: 0 mL / NET: 250 mL                              13.1   4.34  )-----------( 89       ( 18 Mar 2019 06:36 )             39.9       -18    134<L>  |  100  |  11  ----------------------------<  107<H>  3.6   |  28  |  0.77    Ca    8.8      18 Mar 2019 06:36  Phos  4.0     03-18  Mg     1.8           MEDICATIONS  (STANDING):  enoxaparin Injectable 40 milliGRAM(s) SubCutaneous every 24 hours  folic acid 1 milliGRAM(s) Oral daily  LORazepam     Tablet 1 milliGRAM(s) Oral every 6 hours  pantoprazole    Tablet 40 milliGRAM(s) Oral before breakfast  thiamine 100 milliGRAM(s) Oral daily    MEDICATIONS  (PRN):  calcium carbonate    500 mG (Tums) Chewable 3 Tablet(s) Chew every 6 hours PRN Dyspepsia  ondansetron Injectable 4 milliGRAM(s) IV Push every 6 hours PRN Nausea  senna 2 Tablet(s) Oral at bedtime PRN Constipation          DVT Prophylaxis: lovenox    Advanced Directives:  FULL  Discussed with:  - (pt cannot make own decisions due to suicide attempt)    Visit Information: SSQ    ** Time is exclusive of billed procedures and/or teaching and/or routine family updates.

## 2019-03-18 NOTE — PROGRESS NOTE ADULT - ASSESSMENT
52 y/o female EtOH abuse with hx of withdrawal seizure and depression admitted with DTs and suicide attempt (Mirtazapine OD and alcohol ingestion)   Possible thiamine def due to chronic alcohol abuse  anticholinergic symptoms from Mirtazapine overdose now resolved  Resolved encephalopathy related to ETOH WD/DT  hemodynamics and respiratory function reasonable.    Plan     DC precedex gtt  DC banana bag  PO diet  Cont with ativan today  CO  Psych follow up--will need inpt admission   OOB  DVT prophy--LMWH and SCD 54 y/o female EtOH abuse with hx of withdrawal seizure and depression admitted with DTs and suicide attempt (Mirtazapine OD and alcohol ingestion)   Possible thiamine def due to chronic alcohol abuse  anticholinergic symptoms from Mirtazapine overdose now resolved  Resolved encephalopathy related to ETOH WD/DT  hemodynamics and respiratory function reasonable.    Plan for discharge to [facility] when bed available.  in the meantime,   continue constant observation due to suicide attempt  ativan 1mg PO q6h  psych f/u as appropriate  OOB, mobilize  diet  PT eval  MRI C-spine/ortho f/u as appropriate  DVT prophylaxis   supportive care 54 y/o female EtOH abuse with hx of withdrawal seizure and depression admitted with DTs and suicide attempt (Mirtazapine OD and alcohol ingestion)   Possible thiamine def due to chronic alcohol abuse  anticholinergic symptoms from Mirtazapine overdose now resolved  Resolved encephalopathy related to ETOH WD/DT  hemodynamics and respiratory function reasonable.    Plan for discharge to [facility] when bed available.  in the meantime,   continue constant observation due to suicide attempt  ativan 1mg PO q6h  psych f/u as appropriate  OOB, mobilize  diet  PT eval  MRI C-spine/ortho f/u as appropriate  DVT prophylaxis   supportive care    Case d/w Dr CHRISSY Mcknight of the hospitalist service who will assume care for the patient.

## 2019-03-18 NOTE — PHYSICAL THERAPY INITIAL EVALUATION ADULT - PERTINENT HX OF CURRENT PROBLEM, REHAB EVAL
54 y/o female EtOH abuse with hx of withdrawal seizure and depression on Lexapro, h/o BCC and SCC on the skin, former smoker  presents to the  for SA--admitted to CCU for DT treatment.

## 2019-03-18 NOTE — PHYSICAL THERAPY INITIAL EVALUATION ADULT - GENERAL OBSERVATIONS, REHAB EVAL
Pt rec'd sleeping in bed with 1:1 CO present, easily aroused to verbal and tactile stim. Pt with no c/o pain, but endorses general fatigue and weakness in her extremities. Pt cooperative with PT.

## 2019-03-18 NOTE — DISCHARGE NOTE PROVIDER - HOSPITAL COURSE
52 y/o female EtOH abuse with hx of withdrawal seizure and depression on Lexapro, h/o BCC and SCC on the skin, former smoker.        Admitted 3/15 to CCU for treatment of DT's and 'anticholinergic symptoms' following suicide attempt having taken 30 tabs of Mirtazapine and ingested large volume of alcohol.        Pt was treated with ativan/precedex and doing well thus far.          Evaluated by psychiatry who suggest admission to  when medically clear to do so.        Hemodynamics and respiratory function reasonable. 52 y/o female EtOH abuse with hx of withdrawal seizure and depression on Lexapro, h/o BCC and SCC on the skin, former smoker.        Admitted 3/15 to CCU for treatment of DT's and 'anticholinergic symptoms' following suicide attempt having taken 30 tabs of Mirtazapine and ingested large volume of alcohol.        Pt was treated with ativan/precedex and doing well thus far.          Evaluated by psychiatry who suggest admission to  when medically clear to do so.        Hemodynamics and respiratory function reasonable.        Tolerating PO diet and mobilization.        No overt evidence of withdrawal at this time. 54 y/o female EtOH abuse with hx of withdrawal seizure and depression on Lexapro, h/o BCC and SCC on the skin, former smoker.        Admitted 3/15 to CCU for treatment of DT's and 'anticholinergic symptoms' following suicide attempt having taken 30 tabs of Mirtazapine and ingested large volume of alcohol.        Pt was treated with ativan/precedex and doing well thus far.          Evaluated by psychiatry who suggest admission to  when medically clear to do so.        Hemodynamics and respiratory function reasonable.        Tolerating PO diet and mobilization.        No overt evidence of withdrawal at this time.                Time for this discharge > 30 minutes.

## 2019-03-18 NOTE — PROGRESS NOTE ADULT - SUBJECTIVE AND OBJECTIVE BOX
Pt S/E at bedside, no acute events overnight, pain controlled. Pt remains in CCU for monitoring for ETOH withdrawal/DTs. No new complaints.    Vital Signs Last 24 Hrs  T(C): 36.8 (17 Mar 2019 23:00), Max: 37.6 (17 Mar 2019 15:49)  T(F): 98.2 (17 Mar 2019 23:00), Max: 99.6 (17 Mar 2019 15:49)  HR: 87 (18 Mar 2019 04:00) (81 - 138)  BP: 101/76 (18 Mar 2019 04:00) (92/73 - 142/78)  BP(mean): 80 (18 Mar 2019 04:00) (67 - 98)  RR: 18 (18 Mar 2019 04:00) (17 - 26)  SpO2: 97% (18 Mar 2019 04:00) (93% - 100%)    Gen: NAD, Awake/alert    Spine PE:  Skin intact  No gross deformity  No midline TTP C/T/L/S spine  No bony step offs  No paraspinal muscle ttp/hypertonicity   Negative clonus  Negative babinski  Negative carvalho      Motor:                   C5                C6              C7               C8           T1   R            5/5                4/5            5/5             5/5          5/5  L             5/5               5/5             5/5             5/5          5/5                L2             L3             L4               L5            S1  R         5/5           5/5          5/5             5/5           5/5  L          5/5          5/5           5/5             5/5           5/5    Sensory:            C5         C6         C7      C8       T1        (0=absent, 1=impaired, 2=normal, NT=not testable)  R         2            2           2        2         2  L          2            2           2        2         2               L2          L3         L4      L5       S1         (0=absent, 1=impaired, 2=normal, NT=not testable)  R         2            2            2        2        2  L          2            2           2        2         2

## 2019-03-18 NOTE — PROGRESS NOTE ADULT - ASSESSMENT
53F with R radial nerve palsy  Pain control  WBAT with assistive devices as needed  FU MRI C Spine  SCDs  PT/OT  Cont care per primary team  No plan for orthopedic surgery intervention at this time  Discussed with attending

## 2019-03-19 ENCOUNTER — TRANSCRIPTION ENCOUNTER (OUTPATIENT)
Age: 54
End: 2019-03-19

## 2019-03-19 ENCOUNTER — INPATIENT (INPATIENT)
Facility: HOSPITAL | Age: 54
LOS: 8 days | Discharge: HOME | End: 2019-03-28
Attending: PSYCHIATRY & NEUROLOGY | Admitting: PSYCHIATRY & NEUROLOGY
Payer: COMMERCIAL

## 2019-03-19 VITALS
RESPIRATION RATE: 16 BRPM | OXYGEN SATURATION: 94 % | DIASTOLIC BLOOD PRESSURE: 67 MMHG | SYSTOLIC BLOOD PRESSURE: 94 MMHG

## 2019-03-19 VITALS — RESPIRATION RATE: 14 BRPM | HEIGHT: 67 IN | TEMPERATURE: 98 F | WEIGHT: 130.07 LBS

## 2019-03-19 DIAGNOSIS — Z98.891 HISTORY OF UTERINE SCAR FROM PREVIOUS SURGERY: Chronic | ICD-10-CM

## 2019-03-19 DIAGNOSIS — Z98.890 OTHER SPECIFIED POSTPROCEDURAL STATES: Chronic | ICD-10-CM

## 2019-03-19 PROBLEM — F32.9 MAJOR DEPRESSIVE DISORDER, SINGLE EPISODE, UNSPECIFIED: Chronic | Status: ACTIVE | Noted: 2019-03-14

## 2019-03-19 PROBLEM — F19.239 OTHER PSYCHOACTIVE SUBSTANCE DEPENDENCE WITH WITHDRAWAL, UNSPECIFIED: Chronic | Status: ACTIVE | Noted: 2019-03-15

## 2019-03-19 PROBLEM — C44.92 SQUAMOUS CELL CARCINOMA OF SKIN, UNSPECIFIED: Chronic | Status: ACTIVE | Noted: 2019-03-15

## 2019-03-19 LAB
ANION GAP SERPL CALC-SCNC: 4 MMOL/L — LOW (ref 5–17)
BUN SERPL-MCNC: 14 MG/DL — SIGNIFICANT CHANGE UP (ref 7–23)
CALCIUM SERPL-MCNC: 8.4 MG/DL — LOW (ref 8.5–10.1)
CHLORIDE SERPL-SCNC: 102 MMOL/L — SIGNIFICANT CHANGE UP (ref 96–108)
CO2 SERPL-SCNC: 29 MMOL/L — SIGNIFICANT CHANGE UP (ref 22–31)
CREAT SERPL-MCNC: 0.76 MG/DL — SIGNIFICANT CHANGE UP (ref 0.5–1.3)
GLUCOSE SERPL-MCNC: 99 MG/DL — SIGNIFICANT CHANGE UP (ref 70–99)
HCT VFR BLD CALC: 38.3 % — SIGNIFICANT CHANGE UP (ref 34.5–45)
HGB BLD-MCNC: 12.5 G/DL — SIGNIFICANT CHANGE UP (ref 11.5–15.5)
MAGNESIUM SERPL-MCNC: 1.9 MG/DL — SIGNIFICANT CHANGE UP (ref 1.6–2.6)
MCHC RBC-ENTMCNC: 32 PG — SIGNIFICANT CHANGE UP (ref 27–34)
MCHC RBC-ENTMCNC: 32.6 GM/DL — SIGNIFICANT CHANGE UP (ref 32–36)
MCV RBC AUTO: 98 FL — SIGNIFICANT CHANGE UP (ref 80–100)
NRBC # BLD: 0 /100 WBCS — SIGNIFICANT CHANGE UP (ref 0–0)
PHOSPHATE SERPL-MCNC: 4.3 MG/DL — SIGNIFICANT CHANGE UP (ref 2.5–4.5)
PLATELET # BLD AUTO: 103 K/UL — LOW (ref 150–400)
POTASSIUM SERPL-MCNC: 3.9 MMOL/L — SIGNIFICANT CHANGE UP (ref 3.5–5.3)
POTASSIUM SERPL-SCNC: 3.9 MMOL/L — SIGNIFICANT CHANGE UP (ref 3.5–5.3)
RBC # BLD: 3.91 M/UL — SIGNIFICANT CHANGE UP (ref 3.8–5.2)
RBC # FLD: 14.8 % — HIGH (ref 10.3–14.5)
SODIUM SERPL-SCNC: 135 MMOL/L — SIGNIFICANT CHANGE UP (ref 135–145)
WBC # BLD: 4.57 K/UL — SIGNIFICANT CHANGE UP (ref 3.8–10.5)
WBC # FLD AUTO: 4.57 K/UL — SIGNIFICANT CHANGE UP (ref 3.8–10.5)

## 2019-03-19 PROCEDURE — 99232 SBSQ HOSP IP/OBS MODERATE 35: CPT

## 2019-03-19 RX ORDER — FOLIC ACID 0.8 MG
1 TABLET ORAL DAILY
Qty: 0 | Refills: 0 | Status: DISCONTINUED | OUTPATIENT
Start: 2019-03-19 | End: 2019-03-28

## 2019-03-19 RX ORDER — PANTOPRAZOLE SODIUM 20 MG/1
40 TABLET, DELAYED RELEASE ORAL
Qty: 0 | Refills: 0 | Status: DISCONTINUED | OUTPATIENT
Start: 2019-03-19 | End: 2019-03-28

## 2019-03-19 RX ORDER — THIAMINE MONONITRATE (VIT B1) 100 MG
100 TABLET ORAL DAILY
Qty: 0 | Refills: 0 | Status: DISCONTINUED | OUTPATIENT
Start: 2019-03-19 | End: 2019-03-28

## 2019-03-19 RX ORDER — POLYETHYLENE GLYCOL 3350 17 G/17G
17 POWDER, FOR SOLUTION ORAL DAILY
Qty: 0 | Refills: 0 | Status: DISCONTINUED | OUTPATIENT
Start: 2019-03-19 | End: 2019-03-19

## 2019-03-19 RX ORDER — IBUPROFEN 200 MG
1 TABLET ORAL
Qty: 0 | Refills: 0 | COMMUNITY
Start: 2019-03-19

## 2019-03-19 RX ORDER — ESCITALOPRAM OXALATE 10 MG/1
20 TABLET, FILM COATED ORAL DAILY
Qty: 0 | Refills: 0 | Status: DISCONTINUED | OUTPATIENT
Start: 2019-03-19 | End: 2019-03-20

## 2019-03-19 RX ORDER — HALOPERIDOL DECANOATE 100 MG/ML
5 INJECTION INTRAMUSCULAR ONCE
Qty: 0 | Refills: 0 | Status: DISCONTINUED | OUTPATIENT
Start: 2019-03-19 | End: 2019-03-28

## 2019-03-19 RX ORDER — DIPHENHYDRAMINE HCL 50 MG
25 CAPSULE ORAL ONCE
Qty: 0 | Refills: 0 | Status: DISCONTINUED | OUTPATIENT
Start: 2019-03-19 | End: 2019-03-28

## 2019-03-19 RX ORDER — THIAMINE MONONITRATE (VIT B1) 100 MG
1 TABLET ORAL
Qty: 0 | Refills: 0 | COMMUNITY
Start: 2019-03-19

## 2019-03-19 RX ORDER — IBUPROFEN 200 MG
400 TABLET ORAL EVERY 8 HOURS
Qty: 0 | Refills: 0 | Status: DISCONTINUED | OUTPATIENT
Start: 2019-03-19 | End: 2019-03-23

## 2019-03-19 RX ORDER — CALCIUM CARBONATE 500(1250)
1 TABLET ORAL EVERY 6 HOURS
Qty: 0 | Refills: 0 | Status: DISCONTINUED | OUTPATIENT
Start: 2019-03-19 | End: 2019-03-28

## 2019-03-19 RX ORDER — CALCIUM CARBONATE 500(1250)
3 TABLET ORAL
Qty: 0 | Refills: 0 | DISCHARGE
Start: 2019-03-19

## 2019-03-19 RX ORDER — IBUPROFEN 200 MG
600 TABLET ORAL EVERY 4 HOURS
Qty: 0 | Refills: 0 | Status: DISCONTINUED | OUTPATIENT
Start: 2019-03-19 | End: 2019-03-19

## 2019-03-19 RX ORDER — LANOLIN ALCOHOL/MO/W.PET/CERES
3 CREAM (GRAM) TOPICAL AT BEDTIME
Qty: 0 | Refills: 0 | Status: DISCONTINUED | OUTPATIENT
Start: 2019-03-19 | End: 2019-03-22

## 2019-03-19 RX ORDER — FOLIC ACID 0.8 MG
1 TABLET ORAL
Qty: 0 | Refills: 0 | COMMUNITY
Start: 2019-03-19

## 2019-03-19 RX ADMIN — Medication 400 MILLIGRAM(S): at 20:37

## 2019-03-19 RX ADMIN — ENOXAPARIN SODIUM 40 MILLIGRAM(S): 100 INJECTION SUBCUTANEOUS at 09:46

## 2019-03-19 RX ADMIN — PANTOPRAZOLE SODIUM 40 MILLIGRAM(S): 20 TABLET, DELAYED RELEASE ORAL at 09:45

## 2019-03-19 RX ADMIN — Medication 1 MILLIGRAM(S): at 09:45

## 2019-03-19 RX ADMIN — Medication 600 MILLIGRAM(S): at 11:34

## 2019-03-19 RX ADMIN — Medication 1 MILLIGRAM(S): at 06:08

## 2019-03-19 RX ADMIN — Medication 400 MILLIGRAM(S): at 21:00

## 2019-03-19 RX ADMIN — POLYETHYLENE GLYCOL 3350 17 GRAM(S): 17 POWDER, FOR SOLUTION ORAL at 11:35

## 2019-03-19 RX ADMIN — Medication 3 MILLIGRAM(S): at 20:38

## 2019-03-19 RX ADMIN — Medication 100 MILLIGRAM(S): at 09:46

## 2019-03-19 NOTE — PROGRESS NOTE BEHAVIORAL HEALTH - NSBHCHARTREVIEWVS_PSY_A_CORE FT
ICU Vital Signs Last 24 Hrs  T(C): 36.4 (19 Mar 2019 09:34), Max: 36.7 (18 Mar 2019 17:04)  T(F): 97.5 (19 Mar 2019 09:34), Max: 98 (18 Mar 2019 17:04)  HR: 98 (19 Mar 2019 11:00) (89 - 106)  BP: 94/67 (19 Mar 2019 15:00) (93/65 - 110/60)  BP(mean): 81 (19 Mar 2019 11:00) (71 - 81)  ABP: --  ABP(mean): --  RR: 16 (19 Mar 2019 15:00) (16 - 18)  SpO2: 94% (19 Mar 2019 15:00) (94% - 99%)
ICU Vital Signs Last 24 Hrs  T(C): 36.8 (17 Mar 2019 23:00), Max: 37.6 (17 Mar 2019 15:49)  T(F): 98.2 (17 Mar 2019 23:00), Max: 99.6 (17 Mar 2019 15:49)  HR: 104 (18 Mar 2019 12:00) (81 - 138)  BP: 95/74 (18 Mar 2019 12:00) (92/72 - 127/87)  BP(mean): 79 (18 Mar 2019 12:00) (67 - 98)  ABP: --  ABP(mean): --  RR: 18 (18 Mar 2019 12:00) (17 - 26)  SpO2: 100% (18 Mar 2019 12:00) (93% - 100%)

## 2019-03-19 NOTE — PROGRESS NOTE ADULT - ASSESSMENT
# Suicidal Attempt - OD on Mirtazapine and ETOH.   - s/p ICU management for DT's, completed detox now on prn ativan.   - 1:1 and inpatient psych transfer today.     # Constipation - added daily Miralax.     # Prolonged Qtc - normalized.   - appreciate cardiac eval.     # Right radial nerve palsy  - injury 2/2 fall prior to arrival.   - seen by Ortho, WBAT w/ assistive devices as needed.   - continue splint for comfort.     Dispo: transfer to .     Discussed w/ RN and SW.

## 2019-03-19 NOTE — PROGRESS NOTE BEHAVIORAL HEALTH - SUMMARY
Patient a 54 y/o   female, domiciled with , and 13 y/o son, employed as a [employer] Tech for 25 years, currently employed at [employer], Past psychiatric hx of MDD, ETOH abuse, medically has S/P BCC and SCC was admitted at CCU for suspected OD on 30 Mirtazapine Pills.    Patient endorsed that she is depressed for more than 5 years, and is under care of PCP who prescribes her Anti-depressants and takes Lexapro 20 mg with Remeron 45 mg HS was admitted due to suspected OD on Remeron pills. As per patient she endorsed that she took 3 pills only. She is under 1:1 for safety and is depressed as her personal situation is getting clumsy, and worse. She is upset as her  filed for divorce and her relation with her son is OK now, but continues to mention as her situation is getting worse personally and seems is her greatest trigger for relapse of ETOH. She was in [facility], from January' 2019, but she relapsed as her  filed for divorce recently and was unable to handle her pressure leading to relapse, and drank 1/2 pint of Vodka and OD on pills. Currently, depressed , has no SI, but continues to feel depressed with good sleep/appetite. She denied A/h, denied DT, had a seizure from alcohol withdrawal and also has tremors/shakes from alcohol. She is willing to go for in-patient admission for stability as she feels that her meds are not working and also would like to go for Out-patient therapy psychiatrically. She denied A/H or paranoid beliefs or any perceptual; experiences. (Copy and pasted from 3/16  Note)    Pt is feeling better, while on detox protocol.  Has been receiving 2 mg q4h ARC which was discontinued today.  Her HR is in 100's  Plan: Pt to be tx to in pt psych once medically cleared and off Benzo and scoring for 24 hr.  Continue 1 to 1         Rehab--as per  referral
Patient a 54 y/o   female, domiciled with , and 13 y/o son, employed as a [employer] Tech for 25 years, currently employed at [employer], Past psychiatric hx of MDD, ETOH abuse, medically has S/P BCC and SCC was admitted at CCU for suspected OD on 30 Mirtazapine Pills.    Patient endorsed that she is depressed for more than 5 years, and is under care of PCP who prescribes her Anti-depressants and takes Lexapro 20 mg with Remeron 45 mg HS was admitted due to suspected OD on Remeron pills. As per patient she endorsed that she took 3 pills only. She is under 1:1 for safety and is depressed as her personal situation is getting clumsy, and worse. She is upset as her  filed for divorce and her relation with her son is OK now, but continues to mention as her situation is getting worse personally and seems is her greatest trigger for relapse of ETOH. She was in [facility], from January' 2019, but she relapsed as her  filed for divorce recently and was unable to handle her pressure leading to relapse, and drank 1/2 pint of Vodka and OD on pills. Currently, depressed , has no SI, but continues to feel depressed with good sleep/appetite. She denied A/h, denied DT, had a seizure from alcohol withdrawal and also has tremors/shakes from alcohol. She is willing to go for in-patient admission for stability as she feels that her meds are not working and also would like to go for Out-patient therapy psychiatrically. She denied A/H or paranoid beliefs or any perceptual; experiences.  (above copy and past from admission  omega)  Plan: Need admission in-patient Psych once medically cleared        3/19.  Pt to be transferred to in pt psych s/p SA of adequate means for safety and meds adjustment.  Pt is Voluntary admission to , to Dr Levine.

## 2019-03-19 NOTE — PROGRESS NOTE ADULT - ASSESSMENT
Prolonged QT interval - resolved.  Close monitoring for now.  No arrythmias noted on tele.  No further workup needed at this time.   Pt asymptomatic.   In ccu for observation for DT.    Suicidal attempt- Management per primary team. on 1:1 observation.     b/l arm weakness- pt denies any symptoms now.    Other medical issues- Management per primary team.   Thank you for allowing me to participate in the care of this patient. Please feel free to contact me with any questions.

## 2019-03-19 NOTE — PATIENT PROFILE BEHAVIORAL HEALTH - FAMILY HISTORY
Mother  Still living? Unknown  FH: pancreatic cancer, Age at diagnosis: Age Unknown     Father  Still living? Unknown  Family history of heart attack, Age at diagnosis: Age Unknown

## 2019-03-19 NOTE — DISCHARGE NOTE NURSING/CASE MANAGEMENT/SOCIAL WORK - NSDCPEWEB_GEN_ALL_CORE
[place] website --- [email or web address] website --- [email or web address]

## 2019-03-19 NOTE — DISCHARGE NOTE NURSING/CASE MANAGEMENT/SOCIAL WORK - NSDCPEEMAIL_GEN_ALL_CORE
[facility] email [email or web address]

## 2019-03-19 NOTE — PROGRESS NOTE BEHAVIORAL HEALTH - PRIMARY DX
Current severe episode of major depressive disorder without psychotic features without prior episode
Current severe episode of major depressive disorder without psychotic features without prior episode

## 2019-03-19 NOTE — PROGRESS NOTE BEHAVIORAL HEALTH - NSBHFUPINTERVALHXFT_PSY_A_CORE
Pt reevaluated on CCU unit for psych admission.  Pt detox completed today and is agreeable to transfer to in psych on Voluntary status s/p OD Remeron x 30 pills  in SA.  Pt mood is reported as better since detox from Alcohol and is denying acute psychiatric symptoms at this time, but verbalizes multiple stressors.  Pt report no symptoms of withdrawal and last dose of Ativan was 6am, 1 mg and HR is <100.  Pt only c/o is pain from tendon injurt and fall for which she was relieved with Motrin.   Pt will be maintained on low CIWA on 5N to ensure safety from withdrawal syndrome.  Pt to be transferred to 5N today to Dr Levine.
Pt evaluated in private are in CCU, endorsing depression and SI with attempt on admission.  Reports feeling better and calmer while on Ativan.  Precedex has been d/c yesterday and Ativan was d/c today after receiving 6mg today and 12 mg yesterday (was ordered ATC).  HR in 100's.  Mood is improved denies SI/HI no evidence of psychotic symptoms or stacey.  Pt agreeable to 5N admission (once off Ativan and not scoring for 24hr..) s/p SA of adequate means.

## 2019-03-19 NOTE — PATIENT PROFILE BEHAVIORAL HEALTH - NSALCOHOLQUITREADY_GEN_A_CORE_SD
Patient back from CT. Patient ambulatory with steady gait to bathroom to obtain urine sample. thinking about quitting

## 2019-03-19 NOTE — PROGRESS NOTE ADULT - SUBJECTIVE AND OBJECTIVE BOX
Pt S/E at bedside, no acute events overnight, pain controlled. Pt remains in CCU for monitoring for ETOH withdrawal/DTs. No new complaints. Pt placed in splint.     Vital Signs Last 24 Hrs  T(C): 36.2 (19 Mar 2019 06:00), Max: 36.7 (18 Mar 2019 17:04)  T(F): 97.1 (19 Mar 2019 06:00), Max: 98 (18 Mar 2019 17:04)  HR: 89 (19 Mar 2019 06:00) (89 - 106)  BP: 93/65 (19 Mar 2019 04:00) (92/72 - 108/64)  BP(mean): 71 (19 Mar 2019 04:00) (69 - 87)  RR: 27 (18 Mar 2019 14:00) (18 - 27)  SpO2: 98% (19 Mar 2019 06:00) (93% - 100%)    Gen: NAD, Awake/alert    Spine PE:  Skin intact  No gross deformity  No midline TTP C/T/L/S spine  No bony step offs  No paraspinal muscle ttp/hypertonicity   Negative clonus  Negative babinski  Negative carvalho      Motor:                   C5                C6              C7               C8           T1   R            5/5                4/5            5/5             5/5          5/5  L             5/5               5/5             5/5             5/5          5/5                L2             L3             L4               L5            S1  R         5/5           5/5          5/5             5/5           5/5  L          5/5          5/5           5/5             5/5           5/5    Sensory:            C5         C6         C7      C8       T1        (0=absent, 1=impaired, 2=normal, NT=not testable)  R         2            2           2        2         2  L          2            2           2        2         2               L2          L3         L4      L5       S1         (0=absent, 1=impaired, 2=normal, NT=not testable)  R         2            2            2        2        2  L          2            2           2        2         2

## 2019-03-19 NOTE — PROGRESS NOTE ADULT - SUBJECTIVE AND OBJECTIVE BOX
CC: suicidal attempt    Subj: seen this AM in CCU, + constipation otherwise denies tremors or withdrawal. Amendable to inpatient psych for prior suicidal attempt. On 1:1.     ROS: neg unless stated above.       Vital Signs Last 24 Hrs  T(C): 36.4 (19 Mar 2019 09:34), Max: 36.7 (18 Mar 2019 17:04)  T(F): 97.5 (19 Mar 2019 09:34), Max: 98 (18 Mar 2019 17:04)  HR: 98 (19 Mar 2019 11:00) (89 - 106)  BP: 94/67 (19 Mar 2019 15:00) (93/65 - 110/60)  BP(mean): 81 (19 Mar 2019 11:00) (71 - 81)  RR: 16 (19 Mar 2019 15:00) (16 - 18)  SpO2: 94% (19 Mar 2019 15:00) (94% - 99%)    PHYSICAL EXAM:  Constitutional: NAD, awake and alert, well-developed  HEENT: PERR, EOMI, Normal Hearing, MMM  Neck: Soft and supple, No LAD, No JVD  Respiratory: Breath sounds are clear bilaterally, No wheezing, rales or rhonchi  Cardiovascular: S1 and S2, regular rate and rhythm, no Murmurs, gallops or rubs  Gastrointestinal: Bowel Sounds present, soft, nontender, nondistended, no guarding, no rebound  Extremities: No peripheral edema, right wrist in splint.   Vascular: 2+ peripheral pulses  Neurological: A/O x 3, no focal deficits  Musculoskeletal: 5/5 strength b/l upper and lower extremities  Skin: No rashes    MEDICATIONS  (STANDING):  enoxaparin Injectable 40 milliGRAM(s) SubCutaneous every 24 hours  folic acid 1 milliGRAM(s) Oral daily  pantoprazole    Tablet 40 milliGRAM(s) Oral before breakfast  polyethylene glycol 3350 17 Gram(s) Oral daily  thiamine 100 milliGRAM(s) Oral daily      LABS: All Labs Reviewed:                        12.5   4.57  )-----------( 103      ( 19 Mar 2019 06:14 )             38.3     03-19    135  |  102  |  14  ----------------------------<  99  3.9   |  29  |  0.76    Ca    8.4<L>      19 Mar 2019 06:14  Phos  4.3     03-19  Mg     1.9     03-19

## 2019-03-19 NOTE — PROGRESS NOTE ADULT - SUBJECTIVE AND OBJECTIVE BOX
Patient is a 53y old  Female who presents with a chief complaint of " I felt 3 days ago" my arm is weak.       HPI:  54 y/o female with PMHX of EtOH abuse with hx of withdrawal seizure and depression on Lexapro, h/o BCC and SCC on the skin, former smoker  presents to the  for SI with attempt.    as per chart pt stated she took 30 mirtazapine and 0.5 pint of vodka, pt stated she does not want to live, because she hates her life and her  and son don't care about her, she going through divorce.  Sees psychiatrist but has not been to see her recently.   Patient reports fall approximately 3 days ago ( pt unsure, poor historian) , tripped, hit her head, after that reports bilateral arm weakness, numbness in the right hand .      In ED -  T(F): 98.5 Max: 98.5  HR: 122 (121 - 128)BP: 140/92  (139/89 - 149/85)RR: 20(18 - 20)SpO2: 94%  (90% - 94%)  EKG - sinus tachy 148 with  with T wave inversion in lateral leads CT head on 3/11/19 was negative , cbc wnl, Cr 0.6, K 3.4, , ,  alcohol level 375 s/p 2 L NS ativan, calcium gluconate 1 gm and Magnesium 2 gm in ED with improvement of QT to 521.     Cardiology team consulted for evaluation for prolonged QT interval.  Pt denies any prior cardiac history.  She denies any CP or pressure or SOB.    3/19- pt seen and examined by me today.  Pt denies any symptoms now. On 1:1 observation.       PAST MEDICAL & SURGICAL HISTORY:  Squamous cell skin cancer  History of basal cell carcinoma excision  Withdrawal seizures  Depression  Alcohol abuse  H/O:   H/O basal cell carcinoma excision  History of ear, nose, and throat (ENT) surgery      MEDICATIONS  (STANDING):  folic acid 1 milliGRAM(s) Oral daily  LORazepam     Tablet   Oral   LORazepam     Tablet 2 milliGRAM(s) Oral every 4 hours  multivitamin 1 Tablet(s) Oral daily  pantoprazole    Tablet 40 milliGRAM(s) Oral before breakfast  sodium chloride 0.9% with potassium chloride 20 mEq/L 1000 milliLiter(s) (100 mL/Hr) IV Continuous <Continuous>  thiamine 100 milliGRAM(s) Oral daily  thiamine Injectable 100 milliGRAM(s) IntraMuscular once    MEDICATIONS  (PRN):  calcium carbonate    500 mG (Tums) Chewable 3 Tablet(s) Chew every 6 hours PRN Dyspepsia  LORazepam     Tablet 1 milliGRAM(s) Oral every 2 hours PRN CIWA-Ar score increase by 2 points and a total score of 7 or less  LORazepam   Injectable 1 milliGRAM(s) IV Push every 1 hour PRN CIWA-Ar score 8 or greater  ondansetron Injectable 4 milliGRAM(s) IV Push every 6 hours PRN Nausea  senna 2 Tablet(s) Oral at bedtime PRN Constipation      FAMILY HISTORY:  Family history of heart attack (Father)  FH: pancreatic cancer (Mother)      SOCIAL HISTORY:  quit 5 yrs ago    REVIEW OF SYSTEMS:  CONSTITUTIONAL:    No fatigue, malaise, lethargy.  No fever or chills.  HEENT:  Eyes:  No visual changes.     ENT:  No epistaxis.  No sinus pain.    RESPIRATORY:  No cough.  No wheeze.  No hemoptysis.  No shortness of breath.  CARDIOVASCULAR:  No chest pains.  No palpitations. No shortness of breath, No orthopnea or PND.  GASTROINTESTINAL:  No abdominal pain.  No nausea or vomiting.    GENITOURINARY:    No hematuria.    MUSCULOSKELETAL:  No musculoskeletal pain.  No joint swelling.  No arthritis.  PSYCHIATRIC:  No confusion        Vital Signs Last 24 Hrs  T(C): 37.1 (15 Mar 2019 13:44), Max: 37.1 (15 Mar 2019 13:44)  T(F): 98.7 (15 Mar 2019 13:44), Max: 98.7 (15 Mar 2019 13:44)  HR: 106 (15 Mar 2019 13:44) (101 - 128)  BP: 154/82 (15 Mar 2019 13:44) (130/90 - 154/82)  BP(mean): --  RR: 16 (15 Mar 2019 13:44) (16 - 20)  SpO2: 95% (15 Mar 2019 13:44) (90% - 96%)    PHYSICAL EXAM-    Constitutional: no acute distress    Head: Head is normocephalic and atraumatic.      Neck: No jugular venous distention. No audible carotid bruits.     Cardiovascular: Regular rate and rhythm without S3, S4. No murmurs or rubs are appreciated.      Respiratory: Breathsounds are normal. No rales. No wheezing.    Abdomen: Soft, nontender, nondistended with positive bowel sounds.      Extremity: No tenderness. No  pitting edema     Neurologic: The patient is alert and oriented.      Skin: No rash, no obvious lesions noted.      Psychiatric: The patient appears to be emotionally stable.      INTERPRETATION OF TELEMETRY: sinus rythm.    ECG: Sinus rythm , normal axis, no ST T changes. Prolonged QT interval noted in the initial EKG but EKG from midnight showed normal QT interval.     I&O's Detail      LABS:                        13.7   4.50  )-----------( 108      ( 14 Mar 2019 20:06 )             41.6     -15    141  |  102  |  7   ----------------------------<  103<H>  3.5   |  28  |  0.60    Ca    8.7      15 Mar 2019 11:58  Phos  2.9     03-15  Mg     2.0     03-15    TPro  7.7  /  Alb  3.9  /  TBili  1.0  /  DBili  0.3<H>  /  AST  199<H>  /  ALT  118<H>  /  AlkPhos  162<H>  03-15    CARDIAC MARKERS ( 15 Mar 2019 11:58 )  <0.015 ng/mL / x     / 230 U/L / x     / x            Urinalysis Basic - ( 15 Mar 2019 01:47 )    Color: Yellow / Appearance: Clear / S.015 / pH: x  Gluc: x / Ketone: Negative  / Bili: Negative / Urobili: Negative mg/dL   Blood: x / Protein: 100 mg/dL / Nitrite: Negative   Leuk Esterase: Negative / RBC: 3-5 /HPF / WBC 3-5   Sq Epi: x / Non Sq Epi: Moderate / Bacteria: Occasional      I&O's Summary    BNPSerum Pro-Brain Natriuretic Peptide: 155 pg/mL (03-15 @ 11:58)    RADIOLOGY & ADDITIONAL STUDIES:  < from: Xray Chest 1 View-PORTABLE IMMEDIATE (03.15.19 @ 11:48) >  Impression:    No acute pulmonary process demonstrated.                      JAZLYN MENDEZ M.D., ATTENDING RADIOLOGIST  This document has been electronically signed. Mar 15 2019 11:50AM        < end of copied text >

## 2019-03-19 NOTE — DISCHARGE NOTE NURSING/CASE MANAGEMENT/SOCIAL WORK - NSDCDPATPORTLINK_GEN_ALL_CORE
You can access the [facility] Patient Portal, offered by [facility], by registering with the following website: [email or web address] [facility]

## 2019-03-19 NOTE — PROGRESS NOTE ADULT - SUBJECTIVE AND OBJECTIVE BOX
52 y/o female EtOH abuse with hx of withdrawal seizure and depression on Lexapro, h/o BCC and SCC on the skin, former smoker.    Admitted 3/15 to CCU for treatment of DT's and 'anticholinergic symptoms' following suicide attempt having taken 30 tabs of Mirtazapine and ingested large volume of alcohol.    Pt was treated with ativan/precedex and doing well thus far.      Evaluated by psychiatry who suggest admission to  when medically clear to do so.    At present pts symptoms are well controlled and hemodynamics and respiratory function are reasonable.    Case d/w pt, mother, and  and all questions answered.    3/19: continues to do well - MRI c-spine with only minimal abnormalities.  Hemodynamics and respiratory function reasonable, tolerating diet and mobilization.  No evidence of active withdrawal at this time and offers no new complaints.      Allergies    No Known Allergies        ICU Vital Signs Last 24 Hrs  T(C): 36.4 (19 Mar 2019 09:34), Max: 36.7 (18 Mar 2019 17:04)  T(F): 97.5 (19 Mar 2019 09:34), Max: 98 (18 Mar 2019 17:04)  HR: 98 (19 Mar 2019 11:00) (89 - 106)  BP: 104/74 (19 Mar 2019 11:00) (93/65 - 110/60)  BP(mean): 81 (19 Mar 2019 11:00) (71 - 87)  ABP: --  ABP(mean): --  RR: 18 (19 Mar 2019 11:00) (16 - 18)  SpO2: 98% (19 Mar 2019 11:00) (95% - 99%)          I&O's Summary    18 Mar 2019 07:01  -  19 Mar 2019 07:00  --------------------------------------------------------  IN: 750 mL / OUT: 0 mL / NET: 750 mL    19 Mar 2019 07:01  -  19 Mar 2019 14:51  --------------------------------------------------------  IN: 708 mL / OUT: 400 mL / NET: 308 mL                              12.5   4.57  )-----------( 103      ( 19 Mar 2019 06:14 )             38.3       03-19    135  |  102  |  14  ----------------------------<  99  3.9   |  29  |  0.76    Ca    8.4<L>      19 Mar 2019 06:14  Phos  4.3     03-19  Mg     1.9     03-19        MEDICATIONS  (STANDING):  enoxaparin Injectable 40 milliGRAM(s) SubCutaneous every 24 hours  folic acid 1 milliGRAM(s) Oral daily  pantoprazole    Tablet 40 milliGRAM(s) Oral before breakfast  polyethylene glycol 3350 17 Gram(s) Oral daily  thiamine 100 milliGRAM(s) Oral daily    MEDICATIONS  (PRN):  calcium carbonate    500 mG (Tums) Chewable 3 Tablet(s) Chew every 6 hours PRN Dyspepsia  ibuprofen  Tablet. 600 milliGRAM(s) Oral every 4 hours PRN Mild Pain (1 - 3)  LORazepam     Tablet 1 milliGRAM(s) Oral two times a day PRN Anxiety  ondansetron Injectable 4 milliGRAM(s) IV Push every 6 hours PRN Nausea  senna 2 Tablet(s) Oral at bedtime PRN Constipation    Review of Systems:   · Negative General Symptoms	no fever; no chills	  · Negative Skin Symptoms	no rash; no itching	  · Negative Ophthalmologic Symptoms	no diplopia; no photophobia	  · Ophthalmologic Symptoms	diplopia; photophobia	  · Negative ENMT Symptoms	no dysphagia	  · Negative Respiratory and Thorax Symptoms	no wheezing; no dyspnea; no cough	  · Negative Cardiovascular Symptoms	no chest pain; no palpitations	  · Negative Gastrointestinal Symptoms	no nausea; no vomiting; no diarrhea; no change in bowel habits	  · Negative General Genitourinary Symptoms	no dysuria	  · Negative Musculoskeletal Symptoms	no muscle weakness	  · Negative Neurological Symptoms	no headache; no loss of consciousness	  · Psychiatric Symptoms	suicidal ideation; depression	  · Negative Hematology Symptoms	no gum bleeding; no nose bleeding	  · Negative Allergic Reactions	no urticaria	  · Additional ROS	ALL other ROS are NEGATIVE.	    Physical Exam:   · Constitutional	detailed exam	  · Constitutional Details	no distress	  · Eyes	detailed exam	  · Eyes Details	PERRL; EOMI	  · ENMT	detailed exam	  · ENMT Details	mouth	  · Mouth	moist	  · Neck	detailed exam	  · Neck Details	supple; no JVD	  · Back	detailed exam	  · Back Details	normal shape	  · Respiratory	detailed exam	  · Respiratory Details	airway patent; breath sounds equal; good air movement; respirations non-labored	  · Cardiovascular	detailed exam	  · Cardiovascular Details	regular rate and rhythm	  · Gastrointestinal	detailed exam	  · GI Normal	soft; nontender; no distention; bowel sounds normal	  · Extremities	detailed exam	  · Extremities Details	no cyanosis; no pedal edema	  · Vascular	Equal and normal pulses (carotid, femoral, dorsalis pedis)	  · Neurological	detailed exam	  · Neurological Details	alert and oriented x 3	  · Cranial Nerve	NO CN II-XII deficits	  · Motor	DELA CRUZ spont, RIGHT radial n palsy with weakness (previously diagnosed)	  · Sensory	Grossly intact	  · Skin	detailed exam	  · Skin Details	warm and dry	  · Musculoskeletal	detailed exam	  · Musculoskeletal Details	normal strength	  · Psychiatric	detailed exam	  · Psychiatric Details	normal affect; normal behavior	        DVT Prophylaxis: lovenox    Advanced Directives: FULL  Discussed with: pt /family    Visit Information: Discharge > 30 min    ** Time is exclusive of billed procedures and/or teaching and/or routine family updates.

## 2019-03-19 NOTE — PROGRESS NOTE BEHAVIORAL HEALTH - NSBHATTESTSEENBY_PSY_A_CORE
NP with telephonic supervision from Attending Psychiatrist
NP with telephonic supervision from Attending Psychiatrist

## 2019-03-19 NOTE — PROGRESS NOTE BEHAVIORAL HEALTH - NSBHCHARTREVIEWINVESTIGATE_PSY_A_CORE FT
Ventricular Rate 114 BPM    Atrial Rate 114 BPM    P-R Interval 156 ms    QRS Duration 72 ms    Q-T Interval 338 ms    QTC Calculation(Bezet) 465 ms    P Axis 36 degrees    R Axis 26 degrees    T Axis 21 degrees    Diagnosis Line Sinus tachycardia  Nonspecific ST abnormality  Abnormal ECG  When compared with ECG of 14-MAR-2019 21:31,  Nonspecific T wave abnormality now evident in Inferior leads  Confirmed by Dominguez YOUNG, Dilip ([id number]) on 3/15/2019 9:26:43 PM
Ventricular Rate 114 BPM    Atrial Rate 114 BPM    P-R Interval 156 ms    QRS Duration 72 ms    Q-T Interval 338 ms    QTC Calculation(Bezet) 465 ms    P Axis 36 degrees    R Axis 26 degrees    T Axis 21 degrees    Diagnosis Line Sinus tachycardia  Nonspecific ST abnormality  Abnormal ECG  When compared with ECG of 14-MAR-2019 21:31,  Nonspecific T wave abnormality now evident in Inferior leads  Confirmed by Dominguez YOUNG, Dilip ([id number]) on 3/15/2019 9:26:43 PM

## 2019-03-19 NOTE — PATIENT PROFILE BEHAVIORAL HEALTH - PMH
Alcohol abuse    Depression    History of basal cell carcinoma excision    Squamous cell skin cancer    Withdrawal seizures

## 2019-03-19 NOTE — PROGRESS NOTE BEHAVIORAL HEALTH - RISK ASSESSMENT
Pt is a elevated suciide risk following intentional OD of psych meds and substance abuse and maladaptive coping skills.  Pt is able to work and is willing to engage in tx and abstinence from substances
Pt is a mod risk secondary to stressors, divorce and recent SA.  Pt has comorbid alcohol abuse with dependence and has 15 yo child at home.  Protective factors, are Pt is willing to get treatment and to in pt psych admission.  Pt is employed.

## 2019-03-19 NOTE — PROGRESS NOTE BEHAVIORAL HEALTH - NSBHCHARTREVIEWLAB_PSY_A_CORE FT
Basic Metabolic Panel in AM (03.19.19 @ 06:14)    Sodium, Serum: 135 mmol/L    Potassium, Serum: 3.9 mmol/L    Chloride, Serum: 102 mmol/L    Carbon Dioxide, Serum: 29 mmol/L    Anion Gap, Serum: 4 mmol/L    Blood Urea Nitrogen, Serum: 14 mg/dL    Creatinine, Serum: 0.76 mg/dL    Glucose, Serum: 99 mg/dL    Calcium, Total Serum: 8.4 mg/dL
Basic Metabolic Panel (03.18.19 @ 06:36)    Sodium, Serum: 134 mmol/L    Potassium, Serum: 3.6 mmol/L    Chloride, Serum: 100 mmol/L    Carbon Dioxide, Serum: 28 mmol/L    Anion Gap, Serum: 6 mmol/L    Blood Urea Nitrogen, Serum: 11 mg/dL    Creatinine, Serum: 0.77 mg/dL    Glucose, Serum: 107 mg/dL

## 2019-03-19 NOTE — PROGRESS NOTE ADULT - ASSESSMENT
52 y/o female EtOH abuse with hx of withdrawal seizure and depression admitted with DTs and suicide attempt (Mirtazapine OD and alcohol ingestion)   Possible thiamine def due to chronic alcohol abuse  anticholinergic symptoms from Mirtazapine overdose now resolved  Resolved encephalopathy related to ETOH WD/DT  hemodynamics and respiratory function reasonable.  Tolerating PO diet and mobilization.  No evidence of active withdrawal at this time.    Plan for discharge to 5N Psychiatry unit when bed available.  continue constant observation due to suicide attempt  ativan 1mg po BID PRN anxiety  psych f/u as appropriate  OOB, mobilize  diet  PT eval  DVT prophylaxis   supportive care

## 2019-03-19 NOTE — PROGRESS NOTE ADULT - ASSESSMENT
53F with R radial nerve palsy  Pain control  WBAT with assistive devices as needed  SCDs  PT/OT  Cont care per primary team  No plan for orthopedic surgery intervention at this time  Discussed with attending

## 2019-03-20 ENCOUNTER — APPOINTMENT (OUTPATIENT)
Dept: NEUROLOGY | Facility: CLINIC | Age: 54
End: 2019-03-20

## 2019-03-20 DIAGNOSIS — F32.2 MAJOR DEPRESSIVE DISORDER, SINGLE EPISODE, SEVERE WITHOUT PSYCHOTIC FEATURES: ICD-10-CM

## 2019-03-20 DIAGNOSIS — F10.10 ALCOHOL ABUSE, UNCOMPLICATED: ICD-10-CM

## 2019-03-20 LAB
ALBUMIN SERPL ELPH-MCNC: 3.1 G/DL — LOW (ref 3.3–5)
ALP SERPL-CCNC: 160 U/L — HIGH (ref 40–120)
ALT FLD-CCNC: 88 U/L — HIGH (ref 12–78)
ANION GAP SERPL CALC-SCNC: 9 MMOL/L — SIGNIFICANT CHANGE UP (ref 5–17)
AST SERPL-CCNC: 97 U/L — HIGH (ref 15–37)
BILIRUB DIRECT SERPL-MCNC: 0.1 MG/DL — SIGNIFICANT CHANGE UP (ref 0–0.2)
BILIRUB INDIRECT FLD-MCNC: 0.2 MG/DL — SIGNIFICANT CHANGE UP (ref 0.2–1)
BILIRUB SERPL-MCNC: 0.3 MG/DL — SIGNIFICANT CHANGE UP (ref 0.2–1.2)
BUN SERPL-MCNC: 13 MG/DL — SIGNIFICANT CHANGE UP (ref 7–23)
CALCIUM SERPL-MCNC: 8.9 MG/DL — SIGNIFICANT CHANGE UP (ref 8.5–10.1)
CHLORIDE SERPL-SCNC: 103 MMOL/L — SIGNIFICANT CHANGE UP (ref 96–108)
CHOLEST SERPL-MCNC: 212 MG/DL — HIGH (ref 10–199)
CO2 SERPL-SCNC: 26 MMOL/L — SIGNIFICANT CHANGE UP (ref 22–31)
CREAT SERPL-MCNC: 0.67 MG/DL — SIGNIFICANT CHANGE UP (ref 0.5–1.3)
GLUCOSE SERPL-MCNC: 108 MG/DL — HIGH (ref 70–99)
HBA1C BLD-MCNC: 4.8 % — SIGNIFICANT CHANGE UP (ref 4–5.6)
HDLC SERPL-MCNC: 93 MG/DL — SIGNIFICANT CHANGE UP
LIPID PNL WITH DIRECT LDL SERPL: 101 MG/DL — HIGH
MAGNESIUM SERPL-MCNC: 2.1 MG/DL — SIGNIFICANT CHANGE UP (ref 1.6–2.6)
PHOSPHATE SERPL-MCNC: 3.7 MG/DL — SIGNIFICANT CHANGE UP (ref 2.5–4.5)
POTASSIUM SERPL-MCNC: 4.1 MMOL/L — SIGNIFICANT CHANGE UP (ref 3.5–5.3)
POTASSIUM SERPL-SCNC: 4.1 MMOL/L — SIGNIFICANT CHANGE UP (ref 3.5–5.3)
PROT SERPL-MCNC: 7.3 GM/DL — SIGNIFICANT CHANGE UP (ref 6–8.3)
SODIUM SERPL-SCNC: 138 MMOL/L — SIGNIFICANT CHANGE UP (ref 135–145)
TOTAL CHOLESTEROL/HDL RATIO MEASUREMENT: 2.3 RATIO — LOW (ref 3.3–7.1)
TRIGL SERPL-MCNC: 90 MG/DL — SIGNIFICANT CHANGE UP (ref 10–149)
TSH SERPL-MCNC: 4.49 UU/ML — SIGNIFICANT CHANGE UP (ref 0.34–4.82)

## 2019-03-20 PROCEDURE — 99232 SBSQ HOSP IP/OBS MODERATE 35: CPT

## 2019-03-20 RX ORDER — NICOTINE POLACRILEX 2 MG
2 GUM BUCCAL
Qty: 0 | Refills: 0 | Status: DISCONTINUED | OUTPATIENT
Start: 2019-03-20 | End: 2019-03-21

## 2019-03-20 RX ORDER — MIRTAZAPINE 45 MG/1
7.5 TABLET, ORALLY DISINTEGRATING ORAL AT BEDTIME
Qty: 0 | Refills: 0 | Status: DISCONTINUED | OUTPATIENT
Start: 2019-03-20 | End: 2019-03-28

## 2019-03-20 RX ORDER — ESCITALOPRAM OXALATE 10 MG/1
10 TABLET, FILM COATED ORAL DAILY
Qty: 0 | Refills: 0 | Status: DISCONTINUED | OUTPATIENT
Start: 2019-03-21 | End: 2019-03-21

## 2019-03-20 RX ADMIN — Medication 400 MILLIGRAM(S): at 22:00

## 2019-03-20 RX ADMIN — MIRTAZAPINE 7.5 MILLIGRAM(S): 45 TABLET, ORALLY DISINTEGRATING ORAL at 20:56

## 2019-03-20 RX ADMIN — Medication 400 MILLIGRAM(S): at 08:51

## 2019-03-20 RX ADMIN — PANTOPRAZOLE SODIUM 40 MILLIGRAM(S): 20 TABLET, DELAYED RELEASE ORAL at 08:44

## 2019-03-20 RX ADMIN — Medication 100 MILLIGRAM(S): at 08:45

## 2019-03-20 RX ADMIN — Medication 400 MILLIGRAM(S): at 14:30

## 2019-03-20 RX ADMIN — Medication 400 MILLIGRAM(S): at 13:33

## 2019-03-20 RX ADMIN — Medication 400 MILLIGRAM(S): at 21:00

## 2019-03-20 RX ADMIN — ESCITALOPRAM OXALATE 20 MILLIGRAM(S): 10 TABLET, FILM COATED ORAL at 08:45

## 2019-03-20 RX ADMIN — Medication 1 MILLIGRAM(S): at 08:45

## 2019-03-20 RX ADMIN — Medication 2 MILLIGRAM(S): at 20:57

## 2019-03-20 RX ADMIN — Medication 400 MILLIGRAM(S): at 09:45

## 2019-03-20 NOTE — PROGRESS NOTE BEHAVIORAL HEALTH - NSBHFUPSTRENGTHS_PSY_A_CORE
Able to set and pursue goals/Financial stability/Attempting to realize his/her potential/Intelligent/Motivated and ready for change/Has access to housing/residential stability/Steady employment

## 2019-03-20 NOTE — PROGRESS NOTE BEHAVIORAL HEALTH - NSBHADDHXPSYSOCFT_PSY_A_CORE
Patient reports living in the house with her  during the impending divorce is a significant stressor. Per Patient, she and her  jointly own the home, which will be sold when the divorce proceeding are complete. Patient states she would like to move out of the home and rent a small apartment when discharged from the hospital. Per Patient her son Hansel is 14 years of age and is a 8th grader at [employer] in [place]. Per Patient Her son will remain w/ dad in the marital home  for the time being. 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 Per Patient she enjoys working at her current place of employment and is concerned about the possibility of jeopardizing her employment there due to this hospitalization.

## 2019-03-20 NOTE — PROGRESS NOTE BEHAVIORAL HEALTH - NSBHFUPINTERVALHXFT_PSY_A_CORE
Patient was admitted to medicine s/p suicide attempt via overdose with Remeron x 30 pills taken with 0.5 pint of vodka. Patient cleared by medicine transfer from CCU to Inpatient Psychiatry 5N via Voluntary legal status. Patient seen in privacy in day room, she reports improved mood but is noted w/ tears at times, she rates her current mood as a 6/10 w/ 10 representing the best mood possible, denies diurnal mood variation. Patient reports several stressors at home which include; upcoming divorce proceedings w/ her , Patient currently resides w/ soon to be ex- which is a significant stressor for her. Patient states she would like to move out of the home and rent a small apartment. 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 Pt w/ c/o of generalized pain from a tendon injury s/p fall. No s/s of withdrawal, CIWA protocol in place, not scoring since transfer to  on 3.19. Patient currently denies any thoughts of suicidal ideations/plan/intent. She reports hopeful for the future and cites her son as a reason to live. Patient currently denies any thoughts of homicidal ideation/plan/intent. Patient denies any psychotic symptoms including paranoia, ideas of reference, thought insertion/broadcasting, or auditory/visual/olfactory/tactile/gustatory hallucinations, none elicited. Patient was admitted to medicine 3.15.19 s/p suicide attempt via overdose with Remeron x 30 pills taken with 0.5 pint of vodka. Patient cleared by medicine for transfer from CCU to Inpatient Psychiatry 5N via Voluntary legal status on 3.19.   Today patient seen in privacy in day room, she reports improved mood, but is noted w/ tears at times. Patient rates her current mood as a 6/10 w/ 10 representing the best mood possible, denies diurnal mood variation. Patient reports continued anxiety, with symptoms of nervousness and impaired sleep. Patient endorses having multiple stressors at home which include divorce proceedings w/ her , Patient reports after returning from [facility] in January her  filed for divorce. Patient reports with the news of husbands plans for divorce she relapsed and began to use alcohol again. Patient currently resides w/ her soon to be ex- which is reported as a significant stressor for her. Patient states she would like to move out of the home and rent a small apartment. 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 She reports leaving her prior place of employment after 4-5 months as she found it intolerable. Currently no s/s of withdrawal noted, CIWA protocol in place, Patient not scoring since she was transfer to  on 3.19. Patient currently denies any thoughts of suicidal ideations/plan/intent. She reports hopeful for the future and cites her son as a reason to live. Patient currently denies any thoughts of homicidal ideation/plan/intent. Patient denies any psychotic symptoms including paranoia, ideas of reference, thought insertion/broadcasting, or auditory/visual/olfactory/tactile/gustatory hallucinations, none elicited. Patient denies manic symptoms including elevated mood, increased irritability, mood lability, distractibility, grandiosity, pressured speech, increase in goal-directed activity, or decreased need for sleep.

## 2019-03-20 NOTE — PROGRESS NOTE BEHAVIORAL HEALTH - NSBHCHARTREVIEWVS_PSY_A_CORE FT
Vital Signs Last 24 Hrs  T(C): 36.9 (20 Mar 2019 07:33), Max: 36.9 (19 Mar 2019 16:45)  T(F): 98.4 (20 Mar 2019 07:33), Max: 98.4 (19 Mar 2019 16:45)  HR: 96 (20 Mar 2019 05:51) (92 - 96)  BP: 110/76 (20 Mar 2019 05:51) (94/67 - 110/76)  BP(mean): --  RR: 16 (20 Mar 2019 07:33) (14 - 16)  SpO2: 100% (20 Mar 2019 07:33) (94% - 100%)

## 2019-03-20 NOTE — PROGRESS NOTE BEHAVIORAL HEALTH - NSBHFUPADDHPIFT_PSY_A_CORE
Patient reports hx of prior treatment with therapist "Gustavo" at [facility] in [place], [place]. Per Patient her PCP prescribed her Lexapro and Remeron.

## 2019-03-20 NOTE — PROGRESS NOTE BEHAVIORAL HEALTH - SUMMARY
Patient a 54 y/o   female, domiciled with , and 15 y/o son, employed as a "[employer] Tech for 25 years, currently employed at [employer], Past psychiatric hx of MDD, ETOH abuse, medically has S/P BCC and SCC was admitted at CCU for suspected OD on 30 Mirtazapine Pills.    Patient endorsed that she is depressed for more than 5 years, and is under care of PCP who prescribes her Anti-depressants and takes Lexapro 20 mg with Remeron 45 mg HS was admitted due to suspected OD on Remeron pills. As per patient she endorsed that she took 3 pills only. She is under 1:1 for safety and is depressed as her personal situation is getting clumsy, and worse. She is upset as her  filed for divorce and her relation with her son is OK now, but continues to mention as her situation is getting worse personally and seems is her greatest trigger for relapse of ETOH. She was in [facility], from January' 2019, but she relapsed as her  filed for divorce recently and was unable to handle her pressure leading to relapse, and drank 1/2 pint of Vodka and OD on pills. Currently, depressed , has no SI, but continues to feel depressed with good sleep/appetite. She denied A/h, denied DT, had a seizure from alcohol withdrawal and also has tremors/shakes from alcohol. She is willing to go for in-patient admission for stability as she feels that her meds are not working and also would like to go for Out-patient therapy psychiatrically. She denied A/H or paranoid beliefs or any perceptual; experiences.  (above copy and past from admission  omega)  Plan: Need admission in-patient Psych once medically cleared  3/19.  Pt to be transferred to in pt psych s/p SA of adequate means for safety and meds adjustment.  Pt is Voluntary admission to , to Dr Levine.  Plan: 1. Low CIWA with symptom triggered PRN ativan  2. Re-start Lexapro at 10 mg for depression, re-check sodium level. Patient a 54 y/o   female, domiciled with , and 13 y/o son, employed as a [employer] Tech for 25 years, currently employed at [employer], Past psychiatric hx of MDD, ETOH abuse, medically has S/P BCC and SCC was admitted at CCU for suspected OD on 30 Mirtazapine Pills.    Patient endorsed that she is depressed for more than 5 years, and is under care of PCP who prescribes her Anti-depressants and takes Lexapro 20 mg with Remeron 45 mg HS was admitted due to suspected OD on Remeron pills. As per patient she endorsed that she took 3 pills only. She is under 1:1 for safety and is depressed as her personal situation is getting clumsy, and worse. She is upset as her  filed for divorce and her relation with her son is OK now, but continues to mention as her situation is getting worse personally and seems is her greatest trigger for relapse of ETOH. She was in [facility], from January' 2019, but she relapsed as her  filed for divorce recently and was unable to handle her pressure leading to relapse, and drank 1/2 pint of Vodka and OD on pills. Currently, depressed , has no SI, but continues to feel depressed with good sleep/appetite. She denied A/h, denied DT, had a seizure from alcohol withdrawal and also has tremors/shakes from alcohol. She is willing to go for in-patient admission for stability as she feels that her meds are not working and also would like to go for Out-patient therapy psychiatrically. She denied A/H or paranoid beliefs or any perceptual; experiences.  (above copy and past from admission  omega)  Plan: Need admission in-patient Psych once medically cleared  3/19.  Pt to be transferred to in pt psych s/p SA of adequate means for safety and meds adjustment.  Pt is Voluntary admission to , to Dr Levine.  Plan: 1. Low CIWA with symptom triggered PRN ativan  2. Re-start Lexapro at 10 mg for depression, re-check sodium level.  3. Restart Mirtazapine 7.5 mg po hs for anxiety, mood and sleep disturbances

## 2019-03-20 NOTE — PROGRESS NOTE BEHAVIORAL HEALTH - PROBLEM SELECTOR PLAN 1
Plan: 1. Re-start Lexapro 10 mg po daily  2. Patient encouraged to attend therapy groups  3. Disposition planning ongoing Plan: 1. Re-start Lexapro 10 mg po daily  2. Patient encouraged to attend therapy groups  3. Disposition planning ongoing  4. Restart Mirtazapine 7.5 mg po hs for anxiety, mood and sleep disturbances  5. Patient encouraged to attend therapy groups

## 2019-03-20 NOTE — PROGRESS NOTE BEHAVIORAL HEALTH - PROBLEM SELECTOR PLAN 2
Plan: 1. [facility] protocol for continued assessment for s/s of complicated withdrawal  2. Psychoeducation provided regarding the importance of refraining from alcohol use  3. Patient encouraged to attend NA/AA groups when offered on the unit Plan: 1. [facility] protocol for continued assessment for s/s of complicated withdrawal  2. Psychoeducation provided regarding the importance of refraining from alcohol use  3. Patient encouraged to attend NA/AA groups when offered on the unit.

## 2019-03-20 NOTE — PROGRESS NOTE BEHAVIORAL HEALTH - NSBHCHARTREVIEWINVESTIGATE_PSY_A_CORE FT
Ventricular Rate 114 BPM    Atrial Rate 114 BPM    P-R Interval 156 ms    QRS Duration 72 ms    Q-T Interval 338 ms    QTC Calculation(Bezet) 465 ms    P Axis 36 degrees    R Axis 26 degrees    T Axis 21 degrees    Diagnosis Line Sinus tachycardia  Nonspecific ST abnormality  Abnormal ECG  When compared with ECG of 14-MAR-2019 21:31,  Nonspecific T wave abnormality now evident in Inferior leads  Confirmed by Dominguez YOUNG, Dilip ([id number]) on 3/15/2019 9:26:43 PM < from: 12 Lead ECG (03.15.19 @ 17:07) >    Ventricular Rate 114 BPM    Atrial Rate 114 BPM    P-R Interval 156 ms    QRS Duration 72 ms    Q-T Interval 338 ms    QTC Calculation(Bezet) 465 ms    P Axis 36 degrees    R Axis 26 degrees    T Axis 21 degrees    Diagnosis Line Sinus tachycardia  Nonspecific ST abnormality  Abnormal ECG  When compared with ECG of 14-MAR-2019 21:31,  Nonspecific T wave abnormality now evident in Inferior leads  Confirmed by Dominguez YOUNG, Dilip ([id number]) on 3/15/2019 9:26:43 PM    < end of copied text >

## 2019-03-20 NOTE — H&P ADULT - HISTORY OF PRESENT ILLNESS
This is a pleasant 54 y/o female EtOH abuse with hx of withdrawal seizure and depression on Lexapro, h/o BCC and SCC on the skin admitted to the ICU for DT's in the setting of suicidal overdose. Patient reported overdosing on 30 tabs of Mirtazapine along with ETOH abuse in an attempt to commit suicide. She was found to have prolonged Qtc >600 and received medical management in the ICU with improvement in EKG QTC and resolution of DT's with Ativan/ PrecedexOf note, patient had mild right radial nerve injury from falling forward at home, no fractures noted and was seen by Orthopedic. She has been transferred out of the ICU unto 5N inpatient psychiatry.    Patient seen on 5N. No complaints, participated in group therapy today. No longer constipated, add normal BM today. No CP/ SOB.     ROS: neg unless stated above.     PAST MEDICAL HISTORY:  Alcohol abuse   Depression   History of basal cell carcinoma excision   Squamous cell skin cancer   Withdrawal seizures.     PAST SURGICAL HISTORY:  H/O basal cell carcinoma excision   H/O:    History of ear, nose, and throat (ENT) surgery.     FAMILY HISTORY:  Father  Still living? Unknown  Family history of heart attack, Age at diagnosis: Age Unknown    Mother  Still living? Unknown  FH: pancreatic cancer, Age at diagnosis: Age Unknown.    NKDA  Social Hx: lives at home with 14 year old son. Former smoker, no IVDU.       Vital Signs Last 24 Hrs  T(C): 36.9 (20 Mar 2019 07:33), Max: 36.9 (19 Mar 2019 16:45)  T(F): 98.4 (20 Mar 2019 07:33), Max: 98.4 (19 Mar 2019 16:45)  HR: 96 (20 Mar 2019 05:51) (92 - 96)  BP: 110/76 (20 Mar 2019 05:51) (100/60 - 110/76)  BP(mean): --  RR: 16 (20 Mar 2019 07:33) (14 - 16)  SpO2: 100% (20 Mar 2019 07:33) (100% - 100%)    PHYSICAL EXAM:  Constitutional: NAD, awake and alert, well-developed middle aged female.   HEENT: PERR, EOMI, Normal Hearing, MMM  Neck: Soft and supple, No LAD, No JVD  Respiratory: Breath sounds are clear bilaterally, No wheezing, rales or rhonchi  Cardiovascular: S1 and S2, regular rate and rhythm, no Murmurs, gallops or rubs  Gastrointestinal: Bowel Sounds present, soft, nontender, nondistended, no guarding, no rebound  Extremities: No peripheral edema  Vascular: 2+ peripheral pulses  Neurological: A/O x 3, no focal deficits  Musculoskeletal: 5/5 strength b/l upper and lower extremities  Skin: No rashes    MEDICATIONS  (STANDING):  folic acid 1 milliGRAM(s) Oral daily  ibuprofen  Tablet. 400 milliGRAM(s) Oral every 8 hours  mirtazapine 7.5 milliGRAM(s) Oral at bedtime  nicotine  Polacrilex Gum 2 milliGRAM(s) Oral every 2 hours  pantoprazole    Tablet 40 milliGRAM(s) Oral before breakfast  thiamine 100 milliGRAM(s) Oral daily      LABS: All Labs Reviewed:                        12.5   4.57  )-----------( 103      ( 19 Mar 2019 06:14 )             38.3     03-20    138  |  103  |  13  ----------------------------<  108<H>  4.1   |  26  |  0.67    Ca    8.9      20 Mar 2019 07:30  Phos  3.7     03-20  Mg     2.1     -20    TPro  7.3  /  Alb  3.1<L>  /  TBili  0.3  /  DBili  0.1  /  AST  97<H>  /  ALT  88<H>  /  AlkPhos  160<H>  -      Assessment and Plan:   · Assessment		  # Suicidal Attempt - OD on Mirtazapine and ETOH.   - s/p ICU management for DT's, completed detox now management per psych. a    # Constipation - added daily Miralax and now resolved.     # Prolonged Qtc - normalized.   - seen by cardiologist.     # Right radial nerve palsy  - injury 2/2 fall prior to arrival.   - seen by Ortho, WBAT w/ assistive devices as needed.   - continue splint for comfort as needed.     Dispo: admitted to N inpatient psychiatry.

## 2019-03-21 PROCEDURE — 99232 SBSQ HOSP IP/OBS MODERATE 35: CPT

## 2019-03-21 RX ORDER — NICOTINE POLACRILEX 2 MG
2 GUM BUCCAL
Qty: 0 | Refills: 0 | Status: DISCONTINUED | OUTPATIENT
Start: 2019-03-21 | End: 2019-03-28

## 2019-03-21 RX ORDER — VENLAFAXINE HCL 75 MG
37.5 CAPSULE, EXT RELEASE 24 HR ORAL DAILY
Qty: 0 | Refills: 0 | Status: DISCONTINUED | OUTPATIENT
Start: 2019-03-22 | End: 2019-03-24

## 2019-03-21 RX ADMIN — Medication 400 MILLIGRAM(S): at 11:30

## 2019-03-21 RX ADMIN — Medication 400 MILLIGRAM(S): at 22:06

## 2019-03-21 RX ADMIN — PANTOPRAZOLE SODIUM 40 MILLIGRAM(S): 20 TABLET, DELAYED RELEASE ORAL at 09:34

## 2019-03-21 RX ADMIN — Medication 400 MILLIGRAM(S): at 21:38

## 2019-03-21 RX ADMIN — MIRTAZAPINE 7.5 MILLIGRAM(S): 45 TABLET, ORALLY DISINTEGRATING ORAL at 21:38

## 2019-03-21 RX ADMIN — Medication 400 MILLIGRAM(S): at 10:39

## 2019-03-21 RX ADMIN — ESCITALOPRAM OXALATE 10 MILLIGRAM(S): 10 TABLET, FILM COATED ORAL at 10:39

## 2019-03-21 RX ADMIN — Medication 1 MILLIGRAM(S): at 10:39

## 2019-03-21 NOTE — PROGRESS NOTE BEHAVIORAL HEALTH - NSBHFUPINTERVALHXFT_PSY_A_CORE
Today patient seen in privacy in day room, she is calm but w/ tears at times. Patient rates her current mood as a 6/10 w/ 10 representing the best mood possible, denies diurnal mood variation. Patient reports continued anxiety, with symptoms of nervousness. Sleep was improved overnight, appetite is "fair". No symptoms suggestive of stacey, no psychotic symptoms. Patient currently denies any thoughts of suicidal ideations/plan/intent. Currently denies any thoughts of homicidal ideation/plan/intent. Patient is visible on the unit, social w/ peers.   Discontinued Lexapro, started added venlafaxine XR 37.5 mg, risk and benefits discussed w/ patient. Tolerating mirtazapine 7.5 mg po at bedtime for anxiety, mood and sleep disturbances w/o side effects or adverse reactions. No s/s of withdrawal, not scoring on CIWA.

## 2019-03-21 NOTE — PROGRESS NOTE BEHAVIORAL HEALTH - PROBLEM SELECTOR PLAN 2
Plan: 1. WA protocol   2. Psychoeducation provided regarding the importance of refraining from alcohol use  3. Patient encouraged to attend NA/AA groups on 5N when offered.   3. Patient encouraged to attend NA/AA groups when offered on the unit.

## 2019-03-21 NOTE — PROGRESS NOTE BEHAVIORAL HEALTH - NSBHCHARTREVIEWVS_PSY_A_CORE FT
Vital Signs Last 24 Hrs  T(C): 36.9 (20 Mar 2019 07:33), Max: 36.9 (19 Mar 2019 16:45)  T(F): 98.4 (20 Mar 2019 07:33), Max: 98.4 (19 Mar 2019 16:45)  HR: 96 (20 Mar 2019 05:51) (92 - 96)  BP: 110/76 (20 Mar 2019 05:51) (94/67 - 110/76)  BP(mean): --  RR: 16 (20 Mar 2019 07:33) (14 - 16)  SpO2: 100% (20 Mar 2019 07:33) (94% - 100%) 3.21.19 Orthostatics:  Sitting 100/73 , Standing 114/77     Vital Signs Last 24 Hrs  T(C): 37.1 (21 Mar 2019 07:22), Max: 37.1 (21 Mar 2019 07:22)  T(F): 98.7 (21 Mar 2019 07:22), Max: 98.7 (21 Mar 2019 07:22)  HR: 103 (21 Mar 2019 05:46) (103 - 103)  BP: 93/77 (21 Mar 2019 05:46) (93/77 - 93/77)  RR: 16 (21 Mar 2019 07:22) (16 - 16)  SpO2: 99% (21 Mar 2019 07:22) (99% - 99%)

## 2019-03-21 NOTE — CHART NOTE - NSCHARTNOTEFT_GEN_A_CORE
SHELIA called in concurrent review w/ Pao FAROOQ of Count includes the [facility] (ph. [phone]). Authorized for 5 days 3/22-3/26 with next review scheduled at 10:30am 3/26. Pao FAROOQ to call psych SW for review. SW to provide updates on pt.'s medication management for mood and coping skills during next review.

## 2019-03-21 NOTE — PROGRESS NOTE BEHAVIORAL HEALTH - PROBLEM SELECTOR PLAN 1
Plan: 1. Lexapro discontinued, added Venlafaxine XR 37.5 mg,   2. Patient encouraged to attend therapy groups as tolerated.  3. Disposition planning ongoing  4. Restart Mirtazapine 7.5 mg po hs for anxiety, mood and sleep disturbances  5. Patient encouraged to attend therapy groups

## 2019-03-21 NOTE — PROGRESS NOTE BEHAVIORAL HEALTH - SUMMARY
Patient a 54 y/o   female, domiciled with , and 15 y/o son, employed as a [employer] Tech for 25 years, currently employed at [employer], Past psychiatric hx of MDD, ETOH abuse, medically has S/P BCC and SCC was admitted at CCU for suspected OD on 30 Mirtazapine Pills.    Patient endorsed that she is depressed for more than 5 years, and is under care of PCP who prescribes her Anti-depressants and takes Lexapro 20 mg with Remeron 45 mg HS was admitted due to suspected OD on Remeron pills. As per patient she endorsed that she took 3 pills only. She is under 1:1 for safety and is depressed as her personal situation is getting clumsy, and worse. She is upset as her  filed for divorce and her relation with her son is OK now, but continues to mention as her situation is getting worse personally and seems is her greatest trigger for relapse of ETOH. She was in [facility], from January' 2019, but she relapsed as her  filed for divorce recently and was unable to handle her pressure leading to relapse, and drank 1/2 pint of Vodka and OD on pills. Currently, depressed , has no SI, but continues to feel depressed with good sleep/appetite. She denied A/h, denied DT, had a seizure from alcohol withdrawal and also has tremors/shakes from alcohol. She is willing to go for in-patient admission for stability as she feels that her meds are not working and also would like to go for Out-patient therapy psychiatrically. She denied A/H or paranoid beliefs or any perceptual; experiences (above copy and past from admission [facility]).     Today patient seen in privacy in day room, she is calm but w/ tears at times. Patient rates her current mood as a 6/10 w/ 10 representing the best mood possible, denies diurnal mood variation. Patient reports continued anxiety, with symptoms of nervousness. Sleep was improved overnight, appetite is "fair". No symptoms suggestive of stacey, no psychotic symptoms. Patient currently denies any thoughts of suicidal ideations/plan/intent. Currently denies any thoughts of homicidal ideation/plan/intent. Patient is visible on the unit, social w/ peers.     3.21 Discontinued Lexapro, added Venlafaxine XR 37.5 mg, risk and benefits discussed w/ patient. Tolerating mirtazapine 7.5 mg po at bedtime for anxiety, mood and sleep disturbances w/o side effects or adverse reactions. No s/s of withdrawal, not scoring on CIWA.

## 2019-03-21 NOTE — PROGRESS NOTE BEHAVIORAL HEALTH - NSBHCHARTREVIEWINVESTIGATE_PSY_A_CORE FT
< from: 12 Lead ECG (03.15.19 @ 17:07) >    Ventricular Rate 114 BPM    Atrial Rate 114 BPM    P-R Interval 156 ms    QRS Duration 72 ms    Q-T Interval 338 ms    QTC Calculation(Bezet) 465 ms    P Axis 36 degrees    R Axis 26 degrees    T Axis 21 degrees    Diagnosis Line Sinus tachycardia  Nonspecific ST abnormality  Abnormal ECG  When compared with ECG of 14-MAR-2019 21:31,  Nonspecific T wave abnormality now evident in Inferior leads  Confirmed by Dominguez YOUNG, Dilip ([id number]) on 3/15/2019 9:26:43 PM    < end of copied text >

## 2019-03-22 DIAGNOSIS — Z87.891 PERSONAL HISTORY OF NICOTINE DEPENDENCE: ICD-10-CM

## 2019-03-22 DIAGNOSIS — F32.9 MAJOR DEPRESSIVE DISORDER, SINGLE EPISODE, UNSPECIFIED: ICD-10-CM

## 2019-03-22 DIAGNOSIS — F10.229 ALCOHOL DEPENDENCE WITH INTOXICATION, UNSPECIFIED: ICD-10-CM

## 2019-03-22 DIAGNOSIS — G56.31 LESION OF RADIAL NERVE, RIGHT UPPER LIMB: ICD-10-CM

## 2019-03-22 DIAGNOSIS — T43.022A POISONING BY TETRACYCLIC ANTIDEPRESSANTS, INTENTIONAL SELF-HARM, INITIAL ENCOUNTER: ICD-10-CM

## 2019-03-22 DIAGNOSIS — K76.0 FATTY (CHANGE OF) LIVER, NOT ELSEWHERE CLASSIFIED: ICD-10-CM

## 2019-03-22 DIAGNOSIS — K59.00 CONSTIPATION, UNSPECIFIED: ICD-10-CM
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
## 2019-03-22 DIAGNOSIS — F10.231 ALCOHOL DEPENDENCE WITH WITHDRAWAL DELIRIUM: ICD-10-CM

## 2019-03-22 DIAGNOSIS — I45.81 LONG QT SYNDROME: ICD-10-CM

## 2019-03-22 DIAGNOSIS — R00.0 TACHYCARDIA, UNSPECIFIED: ICD-10-CM

## 2019-03-22 DIAGNOSIS — E51.9 THIAMINE DEFICIENCY, UNSPECIFIED: ICD-10-CM

## 2019-03-22 DIAGNOSIS — Z85.89 PERSONAL HISTORY OF MALIGNANT NEOPLASM OF OTHER ORGANS AND SYSTEMS: ICD-10-CM

## 2019-03-22 LAB
ANION GAP SERPL CALC-SCNC: 10 MMOL/L — SIGNIFICANT CHANGE UP (ref 5–17)
BUN SERPL-MCNC: 17 MG/DL — SIGNIFICANT CHANGE UP (ref 7–23)
CALCIUM SERPL-MCNC: 9.1 MG/DL — SIGNIFICANT CHANGE UP (ref 8.5–10.1)
CHLORIDE SERPL-SCNC: 106 MMOL/L — SIGNIFICANT CHANGE UP (ref 96–108)
CO2 SERPL-SCNC: 22 MMOL/L — SIGNIFICANT CHANGE UP (ref 22–31)
CREAT SERPL-MCNC: 0.71 MG/DL — SIGNIFICANT CHANGE UP (ref 0.5–1.3)
GLUCOSE SERPL-MCNC: 132 MG/DL — HIGH (ref 70–99)
POTASSIUM SERPL-MCNC: 3.9 MMOL/L — SIGNIFICANT CHANGE UP (ref 3.5–5.3)
POTASSIUM SERPL-SCNC: 3.9 MMOL/L — SIGNIFICANT CHANGE UP (ref 3.5–5.3)
SODIUM SERPL-SCNC: 138 MMOL/L — SIGNIFICANT CHANGE UP (ref 135–145)

## 2019-03-22 PROCEDURE — 99232 SBSQ HOSP IP/OBS MODERATE 35: CPT

## 2019-03-22 RX ADMIN — Medication 400 MILLIGRAM(S): at 09:09

## 2019-03-22 RX ADMIN — Medication 2 MILLIGRAM(S): at 18:31

## 2019-03-22 RX ADMIN — Medication 2 MILLIGRAM(S): at 09:10

## 2019-03-22 RX ADMIN — Medication 2 MILLIGRAM(S): at 12:37

## 2019-03-22 RX ADMIN — Medication 100 MILLIGRAM(S): at 09:09

## 2019-03-22 RX ADMIN — PANTOPRAZOLE SODIUM 40 MILLIGRAM(S): 20 TABLET, DELAYED RELEASE ORAL at 09:09

## 2019-03-22 RX ADMIN — Medication 400 MILLIGRAM(S): at 21:06

## 2019-03-22 RX ADMIN — Medication 400 MILLIGRAM(S): at 10:00

## 2019-03-22 RX ADMIN — Medication 1 MILLIGRAM(S): at 09:09

## 2019-03-22 RX ADMIN — Medication 2 MILLIGRAM(S): at 21:06

## 2019-03-22 RX ADMIN — Medication 37.5 MILLIGRAM(S): at 09:09

## 2019-03-22 RX ADMIN — MIRTAZAPINE 7.5 MILLIGRAM(S): 45 TABLET, ORALLY DISINTEGRATING ORAL at 21:06

## 2019-03-22 RX ADMIN — Medication 400 MILLIGRAM(S): at 22:24

## 2019-03-22 NOTE — PROGRESS NOTE BEHAVIORAL HEALTH - NSBHCHARTREVIEWVS_PSY_A_CORE FT
Vital Signs Last 24 Hrs  T(C): 36.7 (22 Mar 2019 09:35), Max: 37.1 (21 Mar 2019 22:20)  T(F): 98 (22 Mar 2019 09:35), Max: 98.7 (21 Mar 2019 22:20)  HR: 92 (22 Mar 2019 05:35) (92 - 92)  BP: 119/86 (22 Mar 2019 05:35) (119/86 - 119/86)  BP(mean): --  RR: 14 (22 Mar 2019 09:35) (14 - 16)  SpO2: 100% (22 Mar 2019 09:35) (100% - 100%)

## 2019-03-22 NOTE — PROGRESS NOTE BEHAVIORAL HEALTH - SUMMARY
Patient a 52 y/o   female, domiciled with , and 15 y/o son, employed as a [employer] Tech for 25 years, currently employed at [employer], Past psychiatric hx of MDD, ETOH abuse, medically has S/P BCC and SCC was admitted at CCU for suspected OD on 30 Mirtazapine Pills.    Patient endorsed that she is depressed for more than 5 years, and is under care of PCP who prescribes her Anti-depressants and takes Lexapro 20 mg with Remeron 45 mg HS was admitted due to suspected OD on Remeron pills. As per patient she endorsed that she took 3 pills only. She is under 1:1 for safety and is depressed as her personal situation is getting clumsy, and worse. She is upset as her  filed for divorce and her relation with her son is OK now, but continues to mention as her situation is getting worse personally and seems is her greatest trigger for relapse of ETOH. She was in [facility], from January' 2019, but she relapsed as her  filed for divorce recently and was unable to handle her pressure leading to relapse, and drank 1/2 pint of Vodka and OD on pills. Currently, depressed , has no SI, but continues to feel depressed with good sleep/appetite. She denied A/h, denied DT, had a seizure from alcohol withdrawal and also has tremors/shakes from alcohol. She is willing to go for in-patient admission for stability as she feels that her meds are not working and also would like to go for Out-patient therapy psychiatrically. She denied A/H or paranoid beliefs or any perceptual; experiences (above copy and past from admission [facility]).     Today patient seen in privacy in day room, she is calm but w/ tears at times. Patient rates her current mood as a 6/10 w/ 10 representing the best mood possible, denies diurnal mood variation. Patient reports continued anxiety, with symptoms of nervousness. Sleep was improved overnight, appetite is "fair". No symptoms suggestive of stacey, no psychotic symptoms. Patient currently denies any thoughts of suicidal ideations/plan/intent. Currently denies any thoughts of homicidal ideation/plan/intent. Patient is visible on the unit, social w/ peers.     Continue increasing Efferox XR as tolerated.

## 2019-03-22 NOTE — PROGRESS NOTE BEHAVIORAL HEALTH - NSBHFUPINTERVALHXFT_PSY_A_CORE
Pt continues ot be depressed with tearfulness. Dressed causally and visible on the unit. Cooperative. Mood is depressed, pt denies having SI and HI at this time. Denies Ah and Vh. Tolerates low dose of effectors well and agrees to Continue up-titrating the dose .   MEDICATIONS  (STANDING):  folic acid 1 milliGRAM(s) Oral daily  ibuprofen  Tablet. 400 milliGRAM(s) Oral every 8 hours  mirtazapine 7.5 milliGRAM(s) Oral at bedtime  pantoprazole    Tablet 40 milliGRAM(s) Oral before breakfast  thiamine 100 milliGRAM(s) Oral daily  venlafaxine 37.5 milliGRAM(s) Oral daily    MEDICATIONS  (PRN):  calcium carbonate    500 mG (Tums) Chewable 1 Tablet(s) Chew every 6 hours PRN Heartburn  diphenhydrAMINE   Injectable 25 milliGRAM(s) IntraMuscular once PRN Agitation  haloperidol    Injectable 5 milliGRAM(s) IntraMuscular once PRN Agitation  LORazepam     Tablet 2 milliGRAM(s) Oral every 2 hours PRN Symptom-triggered 2 point increase in CIWA-Ar  LORazepam   Injectable 1 milliGRAM(s) IntraMuscular once PRN Agitation  nicotine  Polacrilex Gum 2 milliGRAM(s) Oral every 2 hours PRN nicotine cravings

## 2019-03-23 RX ORDER — IBUPROFEN 200 MG
400 TABLET ORAL EVERY 6 HOURS
Qty: 0 | Refills: 0 | Status: DISCONTINUED | OUTPATIENT
Start: 2019-03-23 | End: 2019-03-28

## 2019-03-23 RX ADMIN — MIRTAZAPINE 7.5 MILLIGRAM(S): 45 TABLET, ORALLY DISINTEGRATING ORAL at 21:11

## 2019-03-23 RX ADMIN — Medication 2 MILLIGRAM(S): at 17:10

## 2019-03-23 RX ADMIN — Medication 2 MILLIGRAM(S): at 13:13

## 2019-03-23 RX ADMIN — Medication 37.5 MILLIGRAM(S): at 09:11

## 2019-03-23 RX ADMIN — Medication 100 MILLIGRAM(S): at 09:11

## 2019-03-23 RX ADMIN — Medication 2 MILLIGRAM(S): at 06:01

## 2019-03-23 RX ADMIN — Medication 2 MILLIGRAM(S): at 21:11

## 2019-03-23 RX ADMIN — PANTOPRAZOLE SODIUM 40 MILLIGRAM(S): 20 TABLET, DELAYED RELEASE ORAL at 06:01

## 2019-03-23 RX ADMIN — Medication 400 MILLIGRAM(S): at 06:01

## 2019-03-23 RX ADMIN — Medication 2 MILLIGRAM(S): at 09:12

## 2019-03-23 RX ADMIN — Medication 1 MILLIGRAM(S): at 09:11

## 2019-03-23 NOTE — PROGRESS NOTE BEHAVIORAL HEALTH - NSBHFUPINTERVALHXFT_PSY_A_CORE
Pt attending groups for insight and coping skills  Reviewed the labs and pt with gradual thrombocytopenia possibly from the Motrin .  Pt was also asking for the Motrin to be changed to prn as she was hoping to decrease the use of the medication .

## 2019-03-23 NOTE — PROGRESS NOTE BEHAVIORAL HEALTH - SUMMARY
Patient a 54 y/o   female, domiciled with , and 13 y/o son, employed as a [employer] Tech for 25 years, currently employed at [employer], Past psychiatric hx of MDD, ETOH abuse, medically has S/P BCC and SCC was admitted at CCU for suspected OD on 30 Mirtazapine Pills.    Patient endorsed that she is depressed for more than 5 years, and is under care of PCP who prescribes her Anti-depressants and takes Lexapro 20 mg with Remeron 45 mg HS was admitted due to suspected OD on Remeron pills. As per patient she endorsed that she took 3 pills only. She is under 1:1 for safety and is depressed as her personal situation is getting clumsy, and worse. She is upset as her  filed for divorce and her relation with her son is OK now, but continues to mention as her situation is getting worse personally and seems is her greatest trigger for relapse of ETOH. She was in [facility], from January' 2019, but she relapsed as her  filed for divorce recently and was unable to handle her pressure leading to relapse, and drank 1/2 pint of Vodka and OD on pills. Currently, depressed , has no SI, but continues to feel depressed with good sleep/appetite. She denied A/h, denied DT, had a seizure from alcohol withdrawal and also has tremors/shakes from alcohol. She is willing to go for in-patient admission for stability as she feels that her meds are not working and also would like to go for Out-patient therapy psychiatrically. She denied A/H or paranoid beliefs or any perceptual; experiences (above copy and past from admission [facility]).     Today patient seen in privacy in day room, she is calm but w/ tears at times. Patient rates her current mood as a 6/10 w/ 10 representing the best mood possible, denies diurnal mood variation. Patient reports continued anxiety, with symptoms of nervousness. Sleep was improved overnight, appetite is "fair". No symptoms suggestive of stacey, no psychotic symptoms. Patient currently denies any thoughts of suicidal ideations/plan/intent. Currently denies any thoughts of homicidal ideation/plan/intent. Patient is visible on the unit, social w/ peers.     Continue increasing Efferox XR as tolerated.

## 2019-03-23 NOTE — PROGRESS NOTE BEHAVIORAL HEALTH - SUMMARY
Patient a 54 y/o   female, domiciled with , and 13 y/o son, employed as a [employer] Tech for 25 years, currently employed at [employer], Past psychiatric hx of MDD, ETOH abuse, medically has S/P BCC and SCC was admitted at CCU for suspected OD on 30 Mirtazapine Pills.    Patient endorsed that she is depressed for more than 5 years, and is under care of PCP who prescribes her Anti-depressants and takes Lexapro 20 mg with Remeron 45 mg HS was admitted due to suspected OD on Remeron pills. As per patient she endorsed that she took 3 pills only. She is under 1:1 for safety and is depressed as her personal situation is getting clumsy, and worse. She is upset as her  filed for divorce and her relation with her son is OK now, but continues to mention as her situation is getting worse personally and seems is her greatest trigger for relapse of ETOH. She was in [facility], from January' 2019, but she relapsed as her  filed for divorce recently and was unable to handle her pressure leading to relapse, and drank 1/2 pint of Vodka and OD on pills. Currently, depressed , has no SI, but continues to feel depressed with good sleep/appetite. She denied A/h, denied DT, had a seizure from alcohol withdrawal and also has tremors/shakes from alcohol. She is willing to go for in-patient admission for stability as she feels that her meds are not working and also would like to go for Out-patient therapy psychiatrically. She denied A/H or paranoid beliefs or any perceptual; experiences (above copy and past from admission [facility]).     Today patient seen in privacy in day room, she is calm but w/ tears at times. Patient rates her current mood as a 6/10 w/ 10 representing the best mood possible, denies diurnal mood variation. Patient reports continued anxiety, with symptoms of nervousness. Sleep was improved overnight, appetite is "fair". No symptoms suggestive of stacey, no psychotic symptoms. Patient currently denies any thoughts of suicidal ideations/plan/intent. Currently denies any thoughts of homicidal ideation/plan/intent. Patient is visible on the unit, social w/ peers.     Continue increasing Efferox XR as tolerated.

## 2019-03-23 NOTE — ED PROVIDER NOTE - SKIN, MLM
March 24, 2019      To Whom It May Concern:      Tylor Mcgarry was seen in our Emergency Department today, 03/24/19.  I expect his condition to improve over the next few days.  He may return to work when improved but no sooner than 3/25/2019.    Sincerely,        Agusto Chambers RN         Skin normal color for race, warm, dry and intact. No evidence of rash.

## 2019-03-23 NOTE — PROGRESS NOTE BEHAVIORAL HEALTH - NSBHCHARTREVIEWVS_PSY_A_CORE FT
Vital Signs Last 24 Hrs  T(C): 36.9 (23 Mar 2019 07:32), Max: 37.1 (22 Mar 2019 21:47)  T(F): 98.5 (23 Mar 2019 07:32), Max: 98.7 (22 Mar 2019 21:47)  HR: 95 (23 Mar 2019 06:05) (95 - 109)  BP: 111/77 (23 Mar 2019 06:05) (111/77 - 113/71)  BP(mean): --  RR: 16 (23 Mar 2019 07:32) (16 - 16)  SpO2: 99% (23 Mar 2019 07:32) (99% - 99%)

## 2019-03-24 RX ORDER — VENLAFAXINE HCL 75 MG
75 CAPSULE, EXT RELEASE 24 HR ORAL DAILY
Qty: 0 | Refills: 0 | Status: DISCONTINUED | OUTPATIENT
Start: 2019-03-24 | End: 2019-03-25

## 2019-03-24 RX ADMIN — Medication 2 MILLIGRAM(S): at 09:14

## 2019-03-24 RX ADMIN — Medication 1 MILLIGRAM(S): at 09:14

## 2019-03-24 RX ADMIN — Medication 37.5 MILLIGRAM(S): at 09:14

## 2019-03-24 RX ADMIN — Medication 100 MILLIGRAM(S): at 09:14

## 2019-03-24 RX ADMIN — Medication 2 MILLIGRAM(S): at 11:25

## 2019-03-24 RX ADMIN — Medication 2 MILLIGRAM(S): at 17:55

## 2019-03-24 RX ADMIN — PANTOPRAZOLE SODIUM 40 MILLIGRAM(S): 20 TABLET, DELAYED RELEASE ORAL at 09:14

## 2019-03-24 RX ADMIN — MIRTAZAPINE 7.5 MILLIGRAM(S): 45 TABLET, ORALLY DISINTEGRATING ORAL at 22:07

## 2019-03-24 NOTE — PROGRESS NOTE BEHAVIORAL HEALTH - NSBHCHARTREVIEWVS_PSY_A_CORE FT
Vital Signs Last 24 Hrs  T(C): 36.9 (24 Mar 2019 07:23), Max: 36.9 (24 Mar 2019 07:23)  T(F): 98.4 (24 Mar 2019 07:23), Max: 98.4 (24 Mar 2019 07:23)  HR: --  BP: --  BP(mean): --  RR: 16 (24 Mar 2019 07:23) (16 - 16)  SpO2: 99% (24 Mar 2019 07:23) (99% - 99%)

## 2019-03-24 NOTE — PROGRESS NOTE BEHAVIORAL HEALTH - NSBHCHARTREVIEWLAB_PSY_A_CORE FT
Basic Metabolic Panel in AM (03.19.19 @ 06:14)    Sodium, Serum: 135 mmol/L    Potassium, Serum: 3.9 mmol/L    Chloride, Serum: 102 mmol/L    Carbon Dioxide, Serum: 29 mmol/L    Anion Gap, Serum: 4 mmol/L    Blood Urea Nitrogen, Serum: 14 mg/dL    Creatinine, Serum: 0.76 mg/dL    Glucose, Serum: 99 mg/dL    Calcium, Total Serum: 8.4 mg/dL
03-22    138  |  106  |  17  ----------------------------<  132<H>  3.9   |  22  |  0.71    Ca    9.1      22 Mar 2019 07:36
Basic Metabolic Panel in AM (03.19.19 @ 06:14)    Sodium, Serum: 135 mmol/L    Potassium, Serum: 3.9 mmol/L    Chloride, Serum: 102 mmol/L    Carbon Dioxide, Serum: 29 mmol/L    Anion Gap, Serum: 4 mmol/L    Blood Urea Nitrogen, Serum: 14 mg/dL    Creatinine, Serum: 0.76 mg/dL    Glucose, Serum: 99 mg/dL    Calcium, Total Serum: 8.4 mg/dL
03-22    138  |  106  |  17  ----------------------------<  132<H>  3.9   |  22  |  0.71    Ca    9.1      22 Mar 2019 07:36

## 2019-03-24 NOTE — PROGRESS NOTE BEHAVIORAL HEALTH - SUMMARY
Patient a 52 y/o   female, domiciled with , and 15 y/o son, employed as a [employer] Tech for 25 years, currently employed at [employer], Past psychiatric hx of MDD, ETOH abuse, medically has S/P BCC and SCC was admitted at CCU for suspected OD on 30 Mirtazapine Pills.    Patient endorsed that she is depressed for more than 5 years, and is under care of PCP who prescribes her Anti-depressants and takes Lexapro 20 mg with Remeron 45 mg HS was admitted due to suspected OD on Remeron pills. As per patient she endorsed that she took 3 pills only. She is under 1:1 for safety and is depressed as her personal situation is getting clumsy, and worse. She is upset as her  filed for divorce and her relation with her son is OK now, but continues to mention as her situation is getting worse personally and seems is her greatest trigger for relapse of ETOH. She was in [facility], from January' 2019, but she relapsed as her  filed for divorce recently and was unable to handle her pressure leading to relapse, and drank 1/2 pint of Vodka and OD on pills. Currently, depressed , has no SI, but continues to feel depressed with good sleep/appetite. She denied A/h, denied DT, had a seizure from alcohol withdrawal and also has tremors/shakes from alcohol. She is willing to go for in-patient admission for stability as she feels that her meds are not working and also would like to go for Out-patient therapy psychiatrically. She denied A/H or paranoid beliefs or any perceptual; experiences (above copy and past from admission [facility]).     Today patient seen in privacy in day room, she is calm but w/ tears at times. Patient rates her current mood as a 6/10 w/ 10 representing the best mood possible, denies diurnal mood variation. Patient reports continued anxiety, with symptoms of nervousness. Sleep was improved overnight, appetite is "fair". No symptoms suggestive of stacey, no psychotic symptoms. Patient currently denies any thoughts of suicidal ideations/plan/intent. Currently denies any thoughts of homicidal ideation/plan/intent. Patient is visible on the unit, social w/ peers.     Continue increasing Efferox XR as tolerated.

## 2019-03-24 NOTE — PROGRESS NOTE BEHAVIORAL HEALTH - NSBHFUPINTERVALHXFT_PSY_A_CORE
Pt with good eye contact Alert .  She denies any side effects from the Effexor and was feeling less anxious. Pt still willing to have increase in the medication to cover for depressive mood .  Reviewed the medication  with her and pt willing to have an increase in the dose and preferred to take the medication in single dose in the morning.  She denies any suicidal ideation or plan

## 2019-03-25 PROCEDURE — 99232 SBSQ HOSP IP/OBS MODERATE 35: CPT

## 2019-03-25 RX ORDER — VENLAFAXINE HCL 75 MG
112.5 CAPSULE, EXT RELEASE 24 HR ORAL DAILY
Qty: 0 | Refills: 0 | Status: DISCONTINUED | OUTPATIENT
Start: 2019-03-25 | End: 2019-03-26

## 2019-03-25 RX ADMIN — MIRTAZAPINE 7.5 MILLIGRAM(S): 45 TABLET, ORALLY DISINTEGRATING ORAL at 21:32

## 2019-03-25 RX ADMIN — Medication 2 MILLIGRAM(S): at 21:33

## 2019-03-25 RX ADMIN — PANTOPRAZOLE SODIUM 40 MILLIGRAM(S): 20 TABLET, DELAYED RELEASE ORAL at 09:19

## 2019-03-25 RX ADMIN — Medication 100 MILLIGRAM(S): at 09:19

## 2019-03-25 RX ADMIN — Medication 2 MILLIGRAM(S): at 12:59

## 2019-03-25 RX ADMIN — Medication 75 MILLIGRAM(S): at 09:19

## 2019-03-25 RX ADMIN — Medication 2 MILLIGRAM(S): at 09:20

## 2019-03-25 RX ADMIN — Medication 1 MILLIGRAM(S): at 09:18

## 2019-03-25 RX ADMIN — Medication 2 MILLIGRAM(S): at 19:25

## 2019-03-25 NOTE — PROGRESS NOTE BEHAVIORAL HEALTH - NSBHFUPINTERVALHXFT_PSY_A_CORE
Pt states that her depression is slowly improving, she is less tearful. denies crying yesterday. Denies anxiety. tolerates well Effexor. No side effects reported or observed. Sleep and appetite are better. Denies SI, HI< Ah, VH, PI. Compliant with treatments.   MEDICATIONS  (STANDING):  folic acid 1 milliGRAM(s) Oral daily  mirtazapine 7.5 milliGRAM(s) Oral at bedtime  pantoprazole    Tablet 40 milliGRAM(s) Oral before breakfast  thiamine 100 milliGRAM(s) Oral daily  venlafaxine .5 milliGRAM(s) Oral daily    MEDICATIONS  (PRN):  calcium carbonate    500 mG (Tums) Chewable 1 Tablet(s) Chew every 6 hours PRN Heartburn  diphenhydrAMINE   Injectable 25 milliGRAM(s) IntraMuscular once PRN Agitation  haloperidol    Injectable 5 milliGRAM(s) IntraMuscular once PRN Agitation  ibuprofen  Tablet. 400 milliGRAM(s) Oral every 6 hours PRN Moderate Pain (4 - 6)  nicotine  Polacrilex Gum 2 milliGRAM(s) Oral every 2 hours PRN nicotine cravings

## 2019-03-25 NOTE — PROGRESS NOTE BEHAVIORAL HEALTH - SUMMARY
Patient a 52 y/o   female, domiciled with , and 13 y/o son, employed as a [employer] Tech for 25 years, currently employed at [employer], Past psychiatric hx of MDD, ETOH abuse, medically has S/P BCC and SCC was admitted at CCU for suspected OD on 30 Mirtazapine Pills.    Patient endorsed that she is depressed for more than 5 years, and is under care of PCP who prescribes her Anti-depressants and takes Lexapro 20 mg with Remeron 45 mg HS was admitted due to suspected OD on Remeron pills. As per patient she endorsed that she took 3 pills only. She is under 1:1 for safety and is depressed as her personal situation is getting clumsy, and worse. She is upset as her  filed for divorce and her relation with her son is OK now, but continues to mention as her situation is getting worse personally and seems is her greatest trigger for relapse of ETOH. She was in [facility], from January' 2019, but she relapsed as her  filed for divorce recently and was unable to handle her pressure leading to relapse, and drank 1/2 pint of Vodka and OD on pills. Currently, depressed , has no SI, but continues to feel depressed with good sleep/appetite. She denied A/h, denied DT, had a seizure from alcohol withdrawal and also has tremors/shakes from alcohol. She is willing to go for in-patient admission for stability as she feels that her meds are not working and also would like to go for Out-patient therapy psychiatrically. She denied A/H or paranoid beliefs or any perceptual; experiences (above copy and past from [facility]).       Continue increasing Efferox XR as tolerated.

## 2019-03-25 NOTE — PROGRESS NOTE BEHAVIORAL HEALTH - NSBHCHARTREVIEWVS_PSY_A_CORE FT
Vital Signs Last 24 Hrs  T(C): 36.9 (25 Mar 2019 08:36), Max: 36.9 (25 Mar 2019 08:36)  T(F): 98.5 (25 Mar 2019 08:36), Max: 98.5 (25 Mar 2019 08:36)  HR: --85  BP: --118/75   BP(mean): --  RR: 14 (25 Mar 2019 08:36) (14 - 14)  SpO2: 100% (25 Mar 2019 08:36) (100% - 100%)

## 2019-03-25 NOTE — PROGRESS NOTE BEHAVIORAL HEALTH - PROBLEM SELECTOR PLAN 1
Plan: 1. Lexapro discontinued, increased the  Venlafaxine .5 mg,   2. Patient encouraged to attend therapy groups as tolerated.  3. Disposition planning ongoing  4. Restart Mirtazapine 7.5 mg po hs for anxiety, mood and sleep disturbances  5. Patient encouraged to attend therapy groups

## 2019-03-26 PROCEDURE — 99232 SBSQ HOSP IP/OBS MODERATE 35: CPT

## 2019-03-26 RX ORDER — NICOTINE POLACRILEX 2 MG
1 GUM BUCCAL
Qty: 0 | Refills: 0 | COMMUNITY
Start: 2019-03-26

## 2019-03-26 RX ORDER — VENLAFAXINE HCL 75 MG
1 CAPSULE, EXT RELEASE 24 HR ORAL
Qty: 15 | Refills: 0
Start: 2019-03-26 | End: 2019-04-09

## 2019-03-26 RX ORDER — ESCITALOPRAM OXALATE 10 MG/1
1 TABLET, FILM COATED ORAL
Qty: 15 | Refills: 0
Start: 2019-03-26 | End: 2019-04-09

## 2019-03-26 RX ORDER — MIRTAZAPINE 45 MG/1
1 TABLET, ORALLY DISINTEGRATING ORAL
Qty: 15 | Refills: 0
Start: 2019-03-26 | End: 2019-04-09

## 2019-03-26 RX ORDER — VENLAFAXINE HCL 75 MG
150 CAPSULE, EXT RELEASE 24 HR ORAL DAILY
Qty: 0 | Refills: 0 | Status: DISCONTINUED | OUTPATIENT
Start: 2019-03-26 | End: 2019-03-28

## 2019-03-26 RX ADMIN — Medication 2 MILLIGRAM(S): at 08:20

## 2019-03-26 RX ADMIN — MIRTAZAPINE 7.5 MILLIGRAM(S): 45 TABLET, ORALLY DISINTEGRATING ORAL at 21:02

## 2019-03-26 RX ADMIN — Medication 100 MILLIGRAM(S): at 09:11

## 2019-03-26 RX ADMIN — Medication 1 MILLIGRAM(S): at 09:11

## 2019-03-26 RX ADMIN — Medication 2 MILLIGRAM(S): at 18:53

## 2019-03-26 RX ADMIN — Medication 112.5 MILLIGRAM(S): at 09:11

## 2019-03-26 RX ADMIN — PANTOPRAZOLE SODIUM 40 MILLIGRAM(S): 20 TABLET, DELAYED RELEASE ORAL at 06:30

## 2019-03-26 RX ADMIN — Medication 2 MILLIGRAM(S): at 21:03

## 2019-03-26 RX ADMIN — Medication 2 MILLIGRAM(S): at 12:59

## 2019-03-26 NOTE — DISCHARGE NOTE BEHAVIORAL HEALTH - NSBHDCSUICFCTRSFT_PSY_A_CORE
Pt is a mod risk secondary to stressors, divorce and recent SA.  Pt has comorbid alcohol use with dependence and has 13 yo child at home.

## 2019-03-26 NOTE — DISCHARGE NOTE BEHAVIORAL HEALTH - HPI (INCLUDE ILLNESS QUALITY, SEVERITY, DURATION, TIMING, CONTEXT, MODIFYING FACTORS, ASSOCIATED SIGNS AND SYMPTOMS)
HPI (include illness quality, severity, duration, timing, context, modifying factors, associated signs and symptoms): PT is a53 DARBY with lng hx of EtOH abuse, multiple detox and rehabs, last one 1 week ago in [place], pt is not sure about the name and hx pf depression./anxiety, on lexapro 20mg , followed by Maya Hanson NP from [facility]. PT also has therapist Kimi Denny   	PT morgan not have their phone numbers.   	Reportedly, pt expressed suicidal thought to her . PT denies having SI, she is future oriented , she wants help  and she wants to live.   	When intoxicated she felt hopeless and said that she can not go like this , because she is relapsing all the time.     	To-day she denies being depressed or anxious Slept well, appetite is good. Denies SI and HI. Denies PI.   	NO Ah and VH.   PT is interested  in being refereed to another rehab program. PT is a53 DARBY with lng hx of EtOH abuse, multiple detox and rehabs, last one 1 week ago in [place], pt is not sure about the name and hx pf depression./anxiety, on lexapro 20mg , followed by Maya Hanson NP from [facility]. PT also has therapist Kimi ?   	PT does not have their phone numbers.   	Reportedly, pt expressed suicidal thought to her . PT denies having SI, she is future oriented , she wants help  and she wants to live.   	When intoxicated she felt hopeless and said that she can not go like this , because she is relapsing all the time.     	To-day she denies being depressed or anxious Slept well, appetite is good. Denies SI and HI. Denies PI.   	NO Ah and VH.   PT is interested  in being refereed to another rehab program. PT is a53 DARBY with lng hx of EtOH abuse, multiple detox and rehabs, last one 1 week ago in [place], pt is not sure about the name and hx pf depression./anxiety, on lexapro 20mg , followed by Maya Hanson NP from [facility]. PT also has therapist Kimi ?   	PT does not have their phone numbers.   	Reportedly, pt expressed suicidal thought to her . PT denies having SI, she is future oriented , she wants help  and she wants to live.   	When intoxicated she felt hopeless and said that she can not go like this , because she is relapsing all the time.     	To-day she denies being depressed or anxious Slept well, appetite is good. Denies SI and HI. Denies PI.   	NO Ah and VH.   PT is interested  in being refereed to another rehab program.      During the hospitalization pt completed detox with CIWA protocol and responded well to meds change. She tolerated Effexor XR increase very well and no side effect have been reported or observed.

## 2019-03-26 NOTE — DISCHARGE NOTE BEHAVIORAL HEALTH - NSBHDCALCOHOLREFERFT_PSY_A_CORE
Pt has been referred to [facility] for alcohol treatment.  See above for contact information and appointment.

## 2019-03-26 NOTE — DISCHARGE NOTE BEHAVIORAL HEALTH - MEDICATION SUMMARY - MEDICATIONS TO TAKE
I will START or STAY ON the medications listed below when I get home from the hospital:    escitalopram 20 mg oral tablet  -- 1 tab(s) by mouth once a day  -- Indication: For Current severe episode of major depressive disorder without psychotic features, unspecified whether recurrent    mirtazapine 7.5 mg oral tablet  -- 1 tab(s) by mouth once a day (at bedtime)  -- Indication: For Current severe episode of major depressive disorder without psychotic features, unspecified whether recurrent    Effexor  mg oral capsule, extended release  -- 1 cap(s) by mouth once a day  -- Indication: For Current severe episode of major depressive disorder without psychotic features, unspecified whether recurrent    nicotine  -- 1  buccally    PT can contineu using her own chewing gum     -- Indication: For nicotine adiction I will START or STAY ON the medications listed below when I get home from the hospital:    escitalopram 20 mg oral tablet  -- 1 tab(s) by mouth once a day  -- Indication: For Current severe episode of major depressive disorder without psychotic features, unspecified whether recurrent    mirtazapine 7.5 mg oral tablet  -- 1 tab(s) by mouth once a day (at bedtime)  -- Indication: For Current severe episode of major depressive disorder without psychotic features, unspecified whether recurrent    Effexor  mg oral capsule, extended release  -- 1 cap(s) by mouth once a day  -- Indication: For Current severe episode of major depressive disorder without psychotic features, unspecified whether recurrent    nicotine  -- 1  buccally    PT can continue using her own chewing gum     -- Indication: For nicotine addiction I will START or STAY ON the medications listed below when I get home from the hospital:    mirtazapine 7.5 mg oral tablet  -- 1 tab(s) by mouth once a day (at bedtime)  -- Indication: For Current severe episode of major depressive disorder without psychotic features, unspecified whether recurrent    Effexor  mg oral capsule, extended release  -- 1 cap(s) by mouth once a day  -- Indication: For Current severe episode of major depressive disorder without psychotic features, unspecified whether recurrent

## 2019-03-26 NOTE — DISCHARGE NOTE BEHAVIORAL HEALTH - NSBHDCSWCOMMENTSFT_PSY_A_CORE
Pt educated on the signs and symptoms of depression and addiction, relapse prevention and the need for continuing in appropriate level of psychiatric and alcohol dependence treatment upon discharge from . Pt also educated to continue taking the above prescribed medication unless directed otherwise by her outpatient provider.

## 2019-03-26 NOTE — DISCHARGE NOTE BEHAVIORAL HEALTH - NSBHDCCRISISPROB2FT_PSY_A_CORE
I have the urge to relapse in my alcohol dependence  Talk to a trusted friend, family member, or my sponsor and ask for help.  Go to an AA meeting  Bring my feelings and concerns to treatment at [facility].

## 2019-03-26 NOTE — DISCHARGE NOTE BEHAVIORAL HEALTH - NSBHDCPURPOSE1FT_PSY_A_CORE
Pt has an appt for intake for mental health and substance abuse treatment on Monday,  4/1/19 at 11:00AM with Pao. Please bring photo ID, Insurance card and intake fee of $150. Intake fee will be reimbursed once insurance payment is received by [facility]. Pt has an appt for intake for mental health and substance abuse treatment on Friday, 3/29/19 at 12:45PM with Abhilahs. Please bring photo ID, Insurance card and intake fee of $150. Intake fee will be reimbursed once insurance payment is received by [facility].

## 2019-03-26 NOTE — DISCHARGE NOTE BEHAVIORAL HEALTH - NSBHDCCRISISPLAN1FT_PSY_A_CORE
Talk to a trusted friend or family member and ask for help  Call the Suicide Hotline at [phone]  Call 911 or go to my nearest hospital emergency room

## 2019-03-26 NOTE — DISCHARGE NOTE BEHAVIORAL HEALTH - MEDICATION SUMMARY - MEDICATIONS TO STOP TAKING
I will STOP taking the medications listed below when I get home from the hospital:    escitalopram 20 mg oral tablet  -- 1 tab(s) by mouth once a day    mirtazapine 7.5 mg oral tablet  -- 1 tab(s) by mouth once a day (at bedtime)    nicotine  -- 1  buccally    PT can contineu using her own chewing gum    Effexor  mg oral capsule, extended release  -- 1 cap(s) by mouth once a day I will STOP taking the medications listed below when I get home from the hospital:    escitalopram 20 mg oral tablet  -- 1 tab(s) by mouth once a day    mirtazapine 7.5 mg oral tablet  -- 1 tab(s) by mouth once a day (at bedtime)    nicotine  -- 1  buccally    PT can continue using her own chewing gum    Effexor  mg oral capsule, extended release  -- 1 cap(s) by mouth once a day I will STOP taking the medications listed below when I get home from the hospital:    escitalopram 20 mg oral tablet  -- 1 tab(s) by mouth once a day    nicotine  -- 1  buccally    PT can contineu using her own chewing gum    Effexor  mg oral capsule, extended release  -- 1 cap(s) by mouth once a day

## 2019-03-26 NOTE — CHART NOTE - NSCHARTNOTEFT_GEN_A_CORE
Met with pt this AM for discharge planning. 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 Dr. Levine agreeable. Pt denying any suicidal thinking, feel the medicine is helping her. She still feels a bit depressed and anxious, but has a lot on her plate. Pt is requesting alternative referral for treatment as most of the people in her group at [facility] are in their 20's and she has little in common with them. Explored options, pt agreeable with referral to [facility]. Called her insurance co. and [facility] is a preferred provider. Made referral and scheduled intake appt for Mon. 4/1/2019 at 11:00 with Ascencion TYLER Tel. [phone]/ fax. [phone],

## 2019-03-26 NOTE — DISCHARGE NOTE BEHAVIORAL HEALTH - NSBHDCSUBSTHXFT_PSY_A_CORE
Pt has struggled with alcohol dependence for years, actively participates in treatment and attends AA

## 2019-03-26 NOTE — DISCHARGE NOTE BEHAVIORAL HEALTH - NSTOBACCOREFERRAL_GEN_A_CS
Patient declined information/NON SMOKER Pt declined referral for smoking cessation at time of admission and at discharge./Patient declined information Patient declined information/Currently non smoker.  Former smoker but not in past 30 days.  Pt was prescribed smoking cessation gum while on the unit and prescribed at discharge to prevent returning to smoking.

## 2019-03-26 NOTE — DISCHARGE NOTE BEHAVIORAL HEALTH - NSBHDCTHERAPYFT_PSY_A_CORE
Pt provided with individual, group therapies, milieu therapy, psychoeducational groups related to her depression and alcohol dependence as well as safety and discharge planning.

## 2019-03-26 NOTE — DISCHARGE NOTE BEHAVIORAL HEALTH - NSBHDCADDR1FT_A_CORE
[address]  [place] [id number] [address]  [place]  [address]

## 2019-03-26 NOTE — PROGRESS NOTE BEHAVIORAL HEALTH - NSBHCHARTREVIEWVS_PSY_A_CORE FT
Vital Signs Last 24 Hrs  T(C): 36.8 (26 Mar 2019 07:32), Max: 36.8 (26 Mar 2019 07:32)  T(F): 98.2 (26 Mar 2019 07:32), Max: 98.2 (26 Mar 2019 07:32)  HR: -- 99 sit to 103 stand   BP: -- 100/82 sit ajhd 106/87 stand  BP(mean): --  RR: 16 (26 Mar 2019 07:32) (16 - 16)  SpO2: 99% (26 Mar 2019 07:32) (99% - 99%)

## 2019-03-26 NOTE — DISCHARGE NOTE BEHAVIORAL HEALTH - NSBHDCMEDSFT_PSY_A_CORE
Remeron 7.5 mg HS  Effexor XR 150mg  Patient educated to not stop any medication unless otherwise told to do so by outpatient provider  Positive response to meds Remeron 7.5 mg HS  Effexor XR 150mg  Patient educated to not stop any medication unless otherwise told to do so by outpatient provider  Positive response to meds. NO side effects reported or observed.

## 2019-03-26 NOTE — PROGRESS NOTE BEHAVIORAL HEALTH - SUMMARY
Patient a 54 y/o   female, domiciled with , and 13 y/o son, employed as a [employer] for 25 years, currently employed at [employer], Past psychiatric hx of MDD, ETOH abuse, medically has S/P BCC and SCC was admitted at CCU for suspected OD on 30 Mirtazapine Pills.    Patient endorsed that she is depressed for more than 5 years, and is under care of PCP who prescribes her Anti-depressants and takes Lexapro 20 mg with Remeron 45 mg HS was admitted due to suspected OD on Remeron pills. As per patient she endorsed that she took 3 pills only. She is under 1:1 for safety and is depressed as her personal situation is getting clumsy, and worse. She is upset as her  filed for divorce and her relation with her son is OK now, but continues to mention as her situation is getting worse personally and seems is her greatest trigger for relapse of ETOH. She was in [facility], from January' 2019, but she relapsed as her  filed for divorce recently and was unable to handle her pressure leading to relapse, and drank 1/2 pint of Vodka and OD on pills. Currently, depressed , has no SI, but continues to feel depressed with good sleep/appetite. She denied A/h, denied DT, had a seizure from alcohol withdrawal and also has tremors/shakes from alcohol. She is willing to go for in-patient admission for stability as she feels that her meds are not working and also would like to go for Out-patient therapy psychiatrically. She denied A/H or paranoid beliefs or any perceptual; experiences (above copy and past from admission  [facility]).       Continue increasing Efferox XR as tolerated.  d/c planning

## 2019-03-26 NOTE — PROGRESS NOTE BEHAVIORAL HEALTH - NSBHFUPINTERVALHXFT_PSY_A_CORE
Pt states that her depression is slowly improving, denies crying spells.  Denies anxiety. Tolerates well Effexor. Agree to increase it to 150mg prior to d/c. No side effects reported or observed. Sleep and appetite are better. Denies SI, HI< Ah, VH, PI. Compliant with treatments.   MEDICATIONS  (STANDING):  folic acid 1 milliGRAM(s) Oral daily  mirtazapine 7.5 milliGRAM(s) Oral at bedtime  pantoprazole    Tablet 40 milliGRAM(s) Oral before breakfast  thiamine 100 milliGRAM(s) Oral daily  venlafaxine .5 milliGRAM(s) Oral daily    MEDICATIONS  (PRN):  calcium carbonate    500 mG (Tums) Chewable 1 Tablet(s) Chew every 6 hours PRN Heartburn  diphenhydrAMINE   Injectable 25 milliGRAM(s) IntraMuscular once PRN Agitation  haloperidol    Injectable 5 milliGRAM(s) IntraMuscular once PRN Agitation  ibuprofen  Tablet. 400 milliGRAM(s) Oral every 6 hours PRN Moderate Pain (4 - 6)  nicotine  Polacrilex Gum 2 milliGRAM(s) Oral every 2 hours PRN nicotine cravings

## 2019-03-26 NOTE — DISCHARGE NOTE BEHAVIORAL HEALTH - NSBHDCSUICSAFETYFT_PSY_A_CORE
Advised to return to hospital or go to nearest ED or call 911 or [phone] [phone] or [phone] hotlines for any severe, worsening or persistent symptoms including suicidal/homicidal ideations, intent or plans. Patient verbalized understanding of instructions.

## 2019-03-26 NOTE — DISCHARGE NOTE BEHAVIORAL HEALTH - NSBHDCRESPONSEFT_PSY_A_CORE
good response to treatment Good response to treatment. There is reduction of symptom  " towards the end pt was not anxious and not depressed . No insomnia and no SI.

## 2019-03-26 NOTE — DISCHARGE NOTE BEHAVIORAL HEALTH - NSBHDCCONDITIONFT_PSY_A_CORE
Pt states that her depression better. Today her mood is good without diurnal variations or crying spells.  Denies anxiety. Tolerates well Effexor. No side effects reported or observed. Sleep is good and appetite is good. Denies SI, HI, AH, VH, PI. Compliant with treatments.

## 2019-03-26 NOTE — DISCHARGE NOTE BEHAVIORAL HEALTH - CONDITIONS AT DISCHARGE
Patient alert, oriented x3, pleasant and cooperative.  Pt denies S/H IIP currently.  Nurse and  reviewed discharge plan with patient who agrees and accepts plan.  Pt provided with a copy of discharge paperwork.  Pt appropriately dressed for discharge and is transported home by family to ensure safe arrival home.

## 2019-03-27 PROCEDURE — 99232 SBSQ HOSP IP/OBS MODERATE 35: CPT

## 2019-03-27 RX ADMIN — MIRTAZAPINE 7.5 MILLIGRAM(S): 45 TABLET, ORALLY DISINTEGRATING ORAL at 20:51

## 2019-03-27 RX ADMIN — PANTOPRAZOLE SODIUM 40 MILLIGRAM(S): 20 TABLET, DELAYED RELEASE ORAL at 09:05

## 2019-03-27 RX ADMIN — Medication 1 MILLIGRAM(S): at 09:05

## 2019-03-27 RX ADMIN — Medication 2 MILLIGRAM(S): at 12:38

## 2019-03-27 RX ADMIN — Medication 2 MILLIGRAM(S): at 09:06

## 2019-03-27 RX ADMIN — Medication 100 MILLIGRAM(S): at 09:05

## 2019-03-27 RX ADMIN — Medication 150 MILLIGRAM(S): at 09:04

## 2019-03-27 RX ADMIN — Medication 2 MILLIGRAM(S): at 16:22

## 2019-03-27 RX ADMIN — Medication 2 MILLIGRAM(S): at 20:50

## 2019-03-27 NOTE — PROGRESS NOTE BEHAVIORAL HEALTH - SUMMARY
Patient a 54 y/o   female, domiciled with , and 13 y/o son, employed as a [employer] for 25 years, currently employed at [employer], Past psychiatric hx of MDD, ETOH abuse, medically has S/P BCC and SCC was admitted at CCU for suspected OD on 30 Mirtazapine Pills.    Patient endorsed that she is depressed for more than 5 years, and is under care of PCP who prescribes her Anti-depressants and takes Lexapro 20 mg with Remeron 45 mg HS was admitted due to suspected OD on Remeron pills. As per patient she endorsed that she took 3 pills only. She is under 1:1 for safety and is depressed as her personal situation is getting clumsy, and worse. She is upset as her  filed for divorce and her relation with her son is OK now, but continues to mention as her situation is getting worse personally and seems is her greatest trigger for relapse of ETOH. She was in [facility], from January' 2019, but she relapsed as her  filed for divorce recently and was unable to handle her pressure leading to relapse, and drank 1/2 pint of Vodka and OD on pills. Currently, depressed , has no SI, but continues to feel depressed with good sleep/appetite. She denied A/h, denied DT, had a seizure from alcohol withdrawal and also has tremors/shakes from alcohol. She is willing to go for in-patient admission for stability as she feels that her meds are not working and also would like to go for Out-patient therapy psychiatrically. She denied A/H or paranoid beliefs or any perceptual; experiences (above copy and past from admission  [facility]).     Continue  Efferox XR as tolerated.  PT is not at imminent risk to harm self and others and he can be d/c-ed.   d/c planning

## 2019-03-27 NOTE — PROGRESS NOTE BEHAVIORAL HEALTH - NSBHCHARTREVIEWVS_PSY_A_CORE FT
Vital Signs Last 24 Hrs  T(C): 36.8 (27 Mar 2019 07:32), Max: 36.8 (27 Mar 2019 07:32)  T(F): 98.3 (27 Mar 2019 07:32), Max: 98.3 (27 Mar 2019 07:32)  BP and HR : 114/76 sit with HR 80 and 99/74 stand with 95.     RR: 16 (27 Mar 2019 07:32) (16 - 16)  SpO2: 99% (27 Mar 2019 07:32) (99% - 99%)

## 2019-03-27 NOTE — CHART NOTE - NSCHARTNOTEFT_GEN_A_CORE
Met with pt to review discharge plan. She states she works long hours and cannot keep the appt on Monday morning. She asked if I could refer her to the [place] office of [facility] and she requested a Friday intake appt if possible as she is off this Friday. Called [facility] in [place] and was able to schedule an intake appt for Friday 3/29/19 at 12:45 PM with Abhilash. Will fax d/c clinical upon pt discharge. Offered pt referral for smoking cessation treatment. She stated even tho she is using the nicorette gum, she quit smoking 5 yrs ago and occasionally vapes and occasionally uses nicorette gum, but does not want or need ref for smoking cessation.

## 2019-03-27 NOTE — PROGRESS NOTE BEHAVIORAL HEALTH - NSBHFUPINTERVALHXFT_PSY_A_CORE
Pt states that her depression better. Today her mood is good without diurnal variations or crying spells.  Denies anxiety. Tolerates well Effexor. No side effects reported or observed. Sleep is good and appetite is good. Denies SI, HI, AH, VH, PI. Compliant with treatments.     MEDICATIONS  (STANDING):  folic acid 1 milliGRAM(s) Oral daily  mirtazapine 7.5 milliGRAM(s) Oral at bedtime  pantoprazole    Tablet 40 milliGRAM(s) Oral before breakfast  thiamine 100 milliGRAM(s) Oral daily  venlafaxine  milliGRAM(s) Oral daily    MEDICATIONS  (PRN):  calcium carbonate    500 mG (Tums) Chewable 1 Tablet(s) Chew every 6 hours PRN Heartburn  diphenhydrAMINE   Injectable 25 milliGRAM(s) IntraMuscular once PRN Agitation  haloperidol    Injectable 5 milliGRAM(s) IntraMuscular once PRN Agitation  ibuprofen  Tablet. 400 milliGRAM(s) Oral every 6 hours PRN Moderate Pain (4 - 6)  nicotine  Polacrilex Gum 2 milliGRAM(s) Oral every 2 hours PRN nicotine cravings

## 2019-03-27 NOTE — PROGRESS NOTE BEHAVIORAL HEALTH - PROBLEM SELECTOR PLAN 1
Plan: 1. Effexor XR 150mgf PO QD   2. Patient encouraged to attend therapy groups as tolerated.  3. Disposition planning ongoing  4. Restart Mirtazapine 7.5 mg po hs for anxiety, mood and sleep disturbances  5. Patient encouraged to attend therapy groups

## 2019-03-28 VITALS — RESPIRATION RATE: 16 BRPM | TEMPERATURE: 98 F | OXYGEN SATURATION: 100 %

## 2019-03-28 RX ADMIN — Medication 1 MILLIGRAM(S): at 09:27

## 2019-03-28 RX ADMIN — Medication 100 MILLIGRAM(S): at 09:27

## 2019-03-28 RX ADMIN — Medication 150 MILLIGRAM(S): at 09:27

## 2019-03-28 RX ADMIN — Medication 2 MILLIGRAM(S): at 08:20

## 2019-03-28 RX ADMIN — Medication 2 MILLIGRAM(S): at 11:19

## 2019-03-28 NOTE — PROGRESS NOTE BEHAVIORAL HEALTH - PRIMARY DX
Current severe episode of major depressive disorder without psychotic features without prior episode

## 2019-03-28 NOTE — PROGRESS NOTE BEHAVIORAL HEALTH - NS ED BHA REVIEW OF ED CHART AVAILABLE INVESTIGATIONS REVIEWED
Yes
None available
Yes
None available
None available

## 2019-03-28 NOTE — PROGRESS NOTE BEHAVIORAL HEALTH - ESTIMATED DISCHARGE DATE
25-Mar-2019
31-Mar-2019
25-Mar-2019
25-Mar-2019
31-Mar-2019
25-Mar-2019

## 2019-03-28 NOTE — PROGRESS NOTE BEHAVIORAL HEALTH - SUMMARY
Patient a 54 y/o   female, domiciled with , and 13 y/o son, employed as a [employer] for 25 years, currently employed at [employer], Past psychiatric hx of MDD, ETOH abuse, medically has S/P BCC and SCC was admitted at CCU for suspected OD on 30 Mirtazapine Pills.    Patient endorsed that she is depressed for more than 5 years, and is under care of PCP who prescribes her Anti-depressants and takes Lexapro 20 mg with Remeron 45 mg HS was admitted due to suspected OD on Remeron pills. As per patient she endorsed that she took 3 pills only. She is under 1:1 for safety and is depressed as her personal situation is getting clumsy, and worse. She is upset as her  filed for divorce and her relation with her son is OK now, but continues to mention as her situation is getting worse personally and seems is her greatest trigger for relapse of ETOH. She was in [facility], from January' 2019, but she relapsed as her  filed for divorce recently and was unable to handle her pressure leading to relapse, and drank 1/2 pint of Vodka and OD on pills. Currently, depressed , has no SI, but continues to feel depressed with good sleep/appetite. She denied A/h, denied DT, had a seizure from alcohol withdrawal and also has tremors/shakes from alcohol. She is willing to go for in-patient admission for stability as she feels that her meds are not working and also would like to go for Out-patient therapy psychiatrically. She denied A/H or paranoid beliefs or any perceptual; experiences (above copy and past from admission  [facility]).     Continue  Efferox XR as tolerated.  PT is not at imminent risk to harm self and others and he can be d/c-ed.   d/c planning. Pt can be d/mehrdad today.

## 2019-03-28 NOTE — PROGRESS NOTE BEHAVIORAL HEALTH - RISK ASSESSMENT
Pt is a mod risk secondary to stressors, divorce and recent SA.  Pt has comorbid alcohol use with dependence and has 13 yo child at home.  Protective factors, are Pt is willing to get treatment and to in pt psych admission.  Pt is employed.
Pt is a mod risk secondary to stressors, divorce and recent SA.  Pt has comorbid alcohol use with dependence and has 15 yo child at home.  Protective factors, are Pt is willing to get treatment and to in pt psych admission.  Pt is employed.
Pt is a mod risk secondary to stressors, divorce and recent SA.  Pt has comorbid alcohol use with dependence and has 13 yo child at home.  Protective factors, are Pt is willing to get treatment and to in pt psych admission.  Pt is employed.
Pt is a mod risk secondary to stressors, divorce and recent SA.  Pt has comorbid alcohol use with dependence and has 15 yo child at home.  Protective factors, are Pt is willing to get treatment and to in pt psych admission.  Pt is employed.
Pt is a mod risk secondary to stressors, divorce and recent SA.  Pt has comorbid alcohol use with dependence and has 15 yo child at home.  Protective factors, are Pt is willing to get treatment and to in pt psych admission.  Pt is employed.
Pt is a mod risk secondary to stressors, divorce and recent SA.  Pt has comorbid alcohol use with dependence and has 13 yo child at home.  Protective factors, are Pt is willing to get treatment and to in pt psych admission.  Pt is employed.

## 2019-03-28 NOTE — PROGRESS NOTE BEHAVIORAL HEALTH - NS ED BHA MED ROS SKIN
No complaints
Yes
Yes
No complaints
No complaints

## 2019-03-28 NOTE — PROGRESS NOTE BEHAVIORAL HEALTH - NSBHLEGALSTATUS_PSY_A_CORE
9.13 (Voluntary)

## 2019-03-28 NOTE — PROGRESS NOTE BEHAVIORAL HEALTH - NSBHFUPINTERVALHXFT_PSY_A_CORE
PT is improved, verbalizes feeling good and being ready for d/c.   No interval changes.   Pt states that her depression improved. Today her mood is "good" without diurnal variations or crying spells.  Denies anxiety. Tolerates well Effexor. No side effects reported or observed. Sleep is good and appetite is good. Denies SI, HI, AH, VH, PI. Compliant with treatments.     MEDICATIONS  (STANDING):  folic acid 1 milliGRAM(s) Oral daily  mirtazapine 7.5 milliGRAM(s) Oral at bedtime  pantoprazole    Tablet 40 milliGRAM(s) Oral before breakfast  thiamine 100 milliGRAM(s) Oral daily  venlafaxine  milliGRAM(s) Oral daily    MEDICATIONS  (PRN):  calcium carbonate    500 mG (Tums) Chewable 1 Tablet(s) Chew every 6 hours PRN Heartburn  diphenhydrAMINE   Injectable 25 milliGRAM(s) IntraMuscular once PRN Agitation  haloperidol    Injectable 5 milliGRAM(s) IntraMuscular once PRN Agitation  ibuprofen  Tablet. 400 milliGRAM(s) Oral every 6 hours PRN Moderate Pain (4 - 6)  nicotine  Polacrilex Gum 2 milliGRAM(s) Oral every 2 hours PRN nicotine cravings

## 2019-03-28 NOTE — PROGRESS NOTE BEHAVIORAL HEALTH - PROBLEM SELECTOR PROBLEM 1
Current severe episode of major depressive disorder without psychotic features, unspecified whether recurrent

## 2019-03-28 NOTE — PROGRESS NOTE BEHAVIORAL HEALTH - PRN MEDS
Nicotine Gun
Nicotine Gum
Nicotine Gun
Nicotine Gum
Nicotine Gum

## 2019-03-28 NOTE — PROGRESS NOTE BEHAVIORAL HEALTH - AFFECT QUALITY
Depressed/Anxious
Anxious/Depressed
Depressed/Anxious

## 2019-03-28 NOTE — PROGRESS NOTE BEHAVIORAL HEALTH - SECONDARY DX1
ETOH abuse

## 2019-03-28 NOTE — PROGRESS NOTE BEHAVIORAL HEALTH - PROBLEM SELECTOR PROBLEM 2
Alcohol abuse

## 2019-03-28 NOTE — PROGRESS NOTE BEHAVIORAL HEALTH - NSBHATTESTSEENBY_PSY_A_CORE
attending Psychiatrist without NP/Trainee
NP with telephonic supervision from Attending Psychiatrist
attending Psychiatrist without NP/Trainee
NP with telephonic supervision from Attending Psychiatrist

## 2019-03-28 NOTE — PROGRESS NOTE BEHAVIORAL HEALTH - NSBHCHARTREVIEWVS_PSY_A_CORE FT
Vital Signs Last 24 Hrs  T(C): 36.7 (28 Mar 2019 07:39), Max: 36.7 (28 Mar 2019 07:39)  T(F): 98.1 (28 Mar 2019 07:39), Max: 98.1 (28 Mar 2019 07:39)    RR: 16 (28 Mar 2019 07:39) (16 - 16)  SpO2: 100% (28 Mar 2019 07:39) (100% - 100%)

## 2019-03-28 NOTE — PROGRESS NOTE BEHAVIORAL HEALTH - THOUGHT CONTENT
Unremarkable/Hopelessness
Hopelessness/Unremarkable
Unremarkable/Hopelessness
Hopelessness/Unremarkable
Suicidality/Unremarkable
Unremarkable/Hopelessness

## 2019-03-28 NOTE — PROGRESS NOTE BEHAVIORAL HEALTH - NSBHADMITIPOBSFT_PSY_A_CORE
Not indicated
pt is not at imminent risk on inpatient unit.
Not indicated

## 2019-03-28 NOTE — PROGRESS NOTE BEHAVIORAL HEALTH - NSBHFUPINTERVALCCFT_PSY_A_CORE
"I am ok"    Patient was admitted to medicine 3.15.19 s/p suicide attempt via overdose with Remeron x 30 pills taken with 0.5 pint of vodka. Patient cleared by medicine for transfer from CCU to Inpatient Psychiatry 5[facility] via Voluntary legal status on 3.19.
"I feel alright"
"I feel better"
"I feel good".
"I feel good". "I feel ready to go home".
"when am I going to go home?"
"I'm actually feeling a little bit better"
"I am motivated for treatment"
"I am ok"    Patient was admitted to medicine 3.15.19 s/p suicide attempt via overdose with Remeron x 30 pills taken with 0.5 pint of vodka. Patient cleared by medicine for transfer from CCU to Inpatient Psychiatry 5[facility] via Voluntary legal status on 3.19.
"I'm actually feeling a little bit better"

## 2019-03-29 NOTE — CHART NOTE - NSCHARTNOTEFT_GEN_A_CORE
Patient arrived home safely.  Denies suicidal ideation and has picked up her medication.  Down in lobby now to get a letter to return to work on Monday.

## 2019-04-02 DIAGNOSIS — K59.00 CONSTIPATION, UNSPECIFIED: ICD-10-CM

## 2019-04-02 DIAGNOSIS — T43.022A POISONING BY TETRACYCLIC ANTIDEPRESSANTS, INTENTIONAL SELF-HARM, INITIAL ENCOUNTER: ICD-10-CM

## 2019-04-02 DIAGNOSIS — G56.31 LESION OF RADIAL NERVE, RIGHT UPPER LIMB: ICD-10-CM

## 2019-04-02 DIAGNOSIS — F10.231 ALCOHOL DEPENDENCE WITH WITHDRAWAL DELIRIUM: ICD-10-CM

## 2019-04-02 DIAGNOSIS — F33.41 MAJOR DEPRESSIVE DISORDER, RECURRENT, IN PARTIAL REMISSION: ICD-10-CM

## 2019-04-02 DIAGNOSIS — Z79.899 OTHER LONG TERM (CURRENT) DRUG THERAPY: ICD-10-CM

## 2019-04-02 DIAGNOSIS — Z87.891 PERSONAL HISTORY OF NICOTINE DEPENDENCE: ICD-10-CM

## 2019-04-02 DIAGNOSIS — I45.81 LONG QT SYNDROME: ICD-10-CM

## 2019-08-02 ENCOUNTER — APPOINTMENT (OUTPATIENT)
Dept: ELECTROPHYSIOLOGY | Facility: CLINIC | Age: 54
End: 2019-08-02

## 2019-08-20 ENCOUNTER — APPOINTMENT (OUTPATIENT)
Dept: ELECTROPHYSIOLOGY | Facility: CLINIC | Age: 54
End: 2019-08-20
Payer: COMMERCIAL

## 2019-08-20 ENCOUNTER — NON-APPOINTMENT (OUTPATIENT)
Age: 54
End: 2019-08-20

## 2019-08-20 VITALS
DIASTOLIC BLOOD PRESSURE: 92 MMHG | HEART RATE: 112 BPM | SYSTOLIC BLOOD PRESSURE: 123 MMHG | BODY MASS INDEX: 21.03 KG/M2 | WEIGHT: 134 LBS | HEIGHT: 67 IN

## 2019-08-20 DIAGNOSIS — Z80.0 FAMILY HISTORY OF MALIGNANT NEOPLASM OF DIGESTIVE ORGANS: ICD-10-CM

## 2019-08-20 DIAGNOSIS — Z82.49 FAMILY HISTORY OF ISCHEMIC HEART DISEASE AND OTHER DISEASES OF THE CIRCULATORY SYSTEM: ICD-10-CM

## 2019-08-20 DIAGNOSIS — Z78.9 OTHER SPECIFIED HEALTH STATUS: ICD-10-CM

## 2019-08-20 PROCEDURE — 99205 OFFICE O/P NEW HI 60 MIN: CPT

## 2019-08-20 RX ORDER — HYDROXYZINE HYDROCHLORIDE 50 MG/1
50 TABLET ORAL
Refills: 0 | Status: DISCONTINUED | COMMUNITY
End: 2019-08-20

## 2019-08-20 RX ORDER — ESCITALOPRAM OXALATE 20 MG/1
20 TABLET, FILM COATED ORAL
Refills: 0 | Status: DISCONTINUED | COMMUNITY
End: 2019-08-20

## 2019-09-06 ENCOUNTER — OUTPATIENT (OUTPATIENT)
Dept: OUTPATIENT SERVICES | Facility: HOSPITAL | Age: 54
LOS: 1 days | End: 2019-09-06
Payer: COMMERCIAL

## 2019-09-06 DIAGNOSIS — R55 SYNCOPE AND COLLAPSE: ICD-10-CM

## 2019-09-06 DIAGNOSIS — Z98.890 OTHER SPECIFIED POSTPROCEDURAL STATES: Chronic | ICD-10-CM

## 2019-09-06 DIAGNOSIS — Z98.891 HISTORY OF UTERINE SCAR FROM PREVIOUS SURGERY: Chronic | ICD-10-CM

## 2019-09-06 PROCEDURE — 93660 TILT TABLE EVALUATION: CPT

## 2019-09-06 PROCEDURE — 93660 TILT TABLE EVALUATION: CPT | Mod: 26

## 2019-09-07 DIAGNOSIS — R55 SYNCOPE AND COLLAPSE: ICD-10-CM

## 2019-09-23 NOTE — DISCUSSION/SUMMARY
[Not Responding to Treatment] : not responding to treatment [Orthostatic Hypotension] : orthostatic hypotension [Situational Syncope] : situational syncope [Syncope of Unknown Origin] : syncope of unknown origin [Vasovagal Syncope] : vasovagal syncope [Patient] : the patient [de-identified] : Tilt Table Test

## 2019-09-23 NOTE — ASSESSMENT
[FreeTextEntry1] : This is a 54 year old woman with sinus tachycardia and episodes of near syncope. \par Holter monitor demonstrates inappropriate sinus tachycardia. \par \par

## 2019-09-23 NOTE — PHYSICAL EXAM
[General Appearance - Well Developed] : well developed [Normal Appearance] : normal appearance [Well Groomed] : well groomed [General Appearance - Well Nourished] : well nourished [No Deformities] : no deformities [General Appearance - In No Acute Distress] : no acute distress [Normal Conjunctiva] : the conjunctiva exhibited no abnormalities [Eyelids - No Xanthelasma] : the eyelids demonstrated no xanthelasmas [Normal Oral Mucosa] : normal oral mucosa [No Oral Pallor] : no oral pallor [No Oral Cyanosis] : no oral cyanosis [Normal Jugular Venous A Waves Present] : normal jugular venous A waves present [Normal Jugular Venous V Waves Present] : normal jugular venous V waves present [No Jugular Venous Fischer A Waves] : no jugular venous fischer A waves [Heart Rate And Rhythm] : heart rate and rhythm were normal [Heart Sounds] : normal S1 and S2 [Murmurs] : no murmurs present [Respiration, Rhythm And Depth] : normal respiratory rhythm and effort [Exaggerated Use Of Accessory Muscles For Inspiration] : no accessory muscle use [Auscultation Breath Sounds / Voice Sounds] : lungs were clear to auscultation bilaterally [Abdomen Soft] : soft [Abdomen Tenderness] : non-tender [Abdomen Mass (___ Cm)] : no abdominal mass palpated [Abnormal Walk] : normal gait [Gait - Sufficient For Exercise Testing] : the gait was sufficient for exercise testing [Nail Clubbing] : no clubbing of the fingernails [Cyanosis, Localized] : no localized cyanosis [Petechial Hemorrhages (___cm)] : no petechial hemorrhages [Skin Color & Pigmentation] : normal skin color and pigmentation [] : no rash [No Venous Stasis] : no venous stasis [Skin Lesions] : no skin lesions [No Skin Ulcers] : no skin ulcer [No Xanthoma] : no  xanthoma was observed [Oriented To Time, Place, And Person] : oriented to person, place, and time [Affect] : the affect was normal [Mood] : the mood was normal [No Anxiety] : not feeling anxious

## 2019-09-23 NOTE — HISTORY OF PRESENT ILLNESS
[FreeTextEntry1] : This is a 54 year old woman who presents for evaluation of syncope, tachycardia and palpitations.   She reports her pulse is constantly elevated associated with tightness in her chest. She reports 3 episodes of near syncope and one episode of syncope about a month ago. She was standing felt lightheaded lost consciousness briefly without injury. \par She was admitted with a psychiatric admission Mar 2019. \par \par Holter monitor demonstrated sinus to sinus tachycardia 78 to 163 BPM average heart rate 110 Bpm. Mean heart rates throughout monitor ( Both during sleep and wake) elevated.

## 2019-10-04 ENCOUNTER — APPOINTMENT (OUTPATIENT)
Dept: ELECTROPHYSIOLOGY | Facility: CLINIC | Age: 54
End: 2019-10-04

## 2019-11-20 ENCOUNTER — APPOINTMENT (OUTPATIENT)
Dept: OBGYN | Facility: CLINIC | Age: 54
End: 2019-11-20
Payer: COMMERCIAL

## 2019-11-20 VITALS — SYSTOLIC BLOOD PRESSURE: 150 MMHG | DIASTOLIC BLOOD PRESSURE: 80 MMHG

## 2019-11-20 PROCEDURE — 99396 PREV VISIT EST AGE 40-64: CPT

## 2019-11-20 NOTE — HISTORY OF PRESENT ILLNESS
[___ Year(s) Ago] : [unfilled] year(s) ago [Good] : being in good health [Last Mammogram ___] : Last Mammogram was [unfilled] [Last Colonoscopy ___] : Last colonoscopy [unfilled] [Last Pap ___] : Last cervical pap smear was [unfilled] [Postmenopausal] : is postmenopausal [Sexually Active] : is sexually active

## 2019-11-20 NOTE — CHIEF COMPLAINT
[Annual Visit] : annual visit [FreeTextEntry1] : C/O lumps, pea size and large lump in right mass when she squeezes her breast while sitting, noticed yesterday, last mammo 2017. Hx of dense breasts.\par Getting . In a new relationship, very SA currently. Former pt of Ritika Mckeon.\par Hx of etoh abuse, still sober. SHe also had psych admission 3/19. On effexor now and doing well.\par No vb, not taking vit d except in MVI

## 2019-11-20 NOTE — PHYSICAL EXAM
[Awake] : awake [Alert] : alert [Acute Distress] : no acute distress [Mass] : no breast mass [Nipple Discharge] : no nipple discharge [Axillary LAD] : no axillary lymphadenopathy [Soft] : soft [Breast Palpation Diffuse Fibrous Tissue Bilateral] : fibrocystic changes [Tender] : non tender [Oriented x3] : oriented to person, place, and time [Normal] : uterus [No Bleeding] : there was no active vaginal bleeding [Pap Obtained] : a Pap smear was performed [Normal Position] : in a normal position [Tenderness] : nontender [Enlarged ___ wks] : not enlarged [Mass ___ cm] : no uterine mass was palpated [Uterine Adnexae] : were not tender and not enlarged [Adnexa Tenderness] : were not tender [Ovarian Mass (___ Cm)] : there were no adnexal masses

## 2019-11-21 LAB — HPV HIGH+LOW RISK DNA PNL CVX: NOT DETECTED

## 2019-11-24 ENCOUNTER — FORM ENCOUNTER (OUTPATIENT)
Age: 54
End: 2019-11-24

## 2019-11-25 ENCOUNTER — APPOINTMENT (OUTPATIENT)
Dept: MAMMOGRAPHY | Facility: CLINIC | Age: 54
End: 2019-11-25
Payer: COMMERCIAL

## 2019-11-25 ENCOUNTER — APPOINTMENT (OUTPATIENT)
Dept: ULTRASOUND IMAGING | Facility: CLINIC | Age: 54
End: 2019-11-25
Payer: COMMERCIAL

## 2019-11-25 ENCOUNTER — APPOINTMENT (OUTPATIENT)
Dept: RADIOLOGY | Facility: CLINIC | Age: 54
End: 2019-11-25
Payer: COMMERCIAL

## 2019-11-25 ENCOUNTER — OUTPATIENT (OUTPATIENT)
Dept: OUTPATIENT SERVICES | Facility: HOSPITAL | Age: 54
LOS: 1 days | End: 2019-11-25
Payer: COMMERCIAL

## 2019-11-25 DIAGNOSIS — Z98.891 HISTORY OF UTERINE SCAR FROM PREVIOUS SURGERY: Chronic | ICD-10-CM

## 2019-11-25 DIAGNOSIS — Z98.890 OTHER SPECIFIED POSTPROCEDURAL STATES: Chronic | ICD-10-CM

## 2019-11-25 DIAGNOSIS — Z00.8 ENCOUNTER FOR OTHER GENERAL EXAMINATION: ICD-10-CM

## 2019-11-25 PROCEDURE — 77063 BREAST TOMOSYNTHESIS BI: CPT | Mod: 26

## 2019-11-25 PROCEDURE — 77067 SCR MAMMO BI INCL CAD: CPT

## 2019-11-25 PROCEDURE — 77080 DXA BONE DENSITY AXIAL: CPT | Mod: 26

## 2019-11-25 PROCEDURE — 76641 ULTRASOUND BREAST COMPLETE: CPT

## 2019-11-25 PROCEDURE — 77067 SCR MAMMO BI INCL CAD: CPT | Mod: 26

## 2019-11-25 PROCEDURE — 77063 BREAST TOMOSYNTHESIS BI: CPT

## 2019-11-25 PROCEDURE — 76641 ULTRASOUND BREAST COMPLETE: CPT | Mod: 26,50

## 2019-11-25 PROCEDURE — 77080 DXA BONE DENSITY AXIAL: CPT

## 2019-12-16 ENCOUNTER — NON-APPOINTMENT (OUTPATIENT)
Age: 54
End: 2019-12-16

## 2019-12-16 ENCOUNTER — APPOINTMENT (OUTPATIENT)
Dept: ELECTROPHYSIOLOGY | Facility: CLINIC | Age: 54
End: 2019-12-16
Payer: COMMERCIAL

## 2019-12-16 VITALS
HEIGHT: 67 IN | SYSTOLIC BLOOD PRESSURE: 138 MMHG | DIASTOLIC BLOOD PRESSURE: 100 MMHG | HEART RATE: 120 BPM | OXYGEN SATURATION: 97 % | BODY MASS INDEX: 20.4 KG/M2 | WEIGHT: 130 LBS

## 2019-12-16 PROCEDURE — 93000 ELECTROCARDIOGRAM COMPLETE: CPT

## 2019-12-16 PROCEDURE — 99214 OFFICE O/P EST MOD 30 MIN: CPT

## 2019-12-16 NOTE — PHYSICAL EXAM
[Normal Appearance] : normal appearance [General Appearance - Well Developed] : well developed [Well Groomed] : well groomed [General Appearance - Well Nourished] : well nourished [No Deformities] : no deformities [General Appearance - In No Acute Distress] : no acute distress [Normal Conjunctiva] : the conjunctiva exhibited no abnormalities [Eyelids - No Xanthelasma] : the eyelids demonstrated no xanthelasmas [Normal Oral Mucosa] : normal oral mucosa [No Oral Pallor] : no oral pallor [No Oral Cyanosis] : no oral cyanosis [Normal Jugular Venous A Waves Present] : normal jugular venous A waves present [Normal Jugular Venous V Waves Present] : normal jugular venous V waves present [No Jugular Venous Fischer A Waves] : no jugular venous fischer A waves [Respiration, Rhythm And Depth] : normal respiratory rhythm and effort [Auscultation Breath Sounds / Voice Sounds] : lungs were clear to auscultation bilaterally [Exaggerated Use Of Accessory Muscles For Inspiration] : no accessory muscle use [Heart Rate And Rhythm] : heart rate and rhythm were normal [Heart Sounds] : normal S1 and S2 [Abdomen Soft] : soft [Murmurs] : no murmurs present [Abdomen Tenderness] : non-tender [Abdomen Mass (___ Cm)] : no abdominal mass palpated [Abnormal Walk] : normal gait [Gait - Sufficient For Exercise Testing] : the gait was sufficient for exercise testing [Nail Clubbing] : no clubbing of the fingernails [Cyanosis, Localized] : no localized cyanosis [Petechial Hemorrhages (___cm)] : no petechial hemorrhages [Skin Color & Pigmentation] : normal skin color and pigmentation [] : no rash [No Venous Stasis] : no venous stasis [Skin Lesions] : no skin lesions [No Skin Ulcers] : no skin ulcer [No Xanthoma] : no  xanthoma was observed [Oriented To Time, Place, And Person] : oriented to person, place, and time [Mood] : the mood was normal [Affect] : the affect was normal [No Anxiety] : not feeling anxious

## 2019-12-28 NOTE — ASSESSMENT
[FreeTextEntry1] : This is a 54 year old woman with sinus tachycardia and episodes of near syncope. Tilt demonstrated sinus tachycardia. \par Holter monitor demonstrates inappropriate sinus tachycardia. \par \par

## 2019-12-28 NOTE — HISTORY OF PRESENT ILLNESS
[FreeTextEntry1] : This is a 54 year old woman who presents for evaluation of syncope, tachycardia and palpitations.   She reports her pulse is constantly elevated associated with tightness in her chest. She reports 3 episodes of near syncope and one episode of syncope about a month ago. She was standing felt lightheaded lost consciousness briefly without injury. \par \par Tilt table test on Sept 6 2019 was negative for syncope she had an initial heart rate of 93 that increased to 112 and peaked at 124 bpm. \par \par Holter monitor demonstrated sinus to sinus tachycardia 78 to 163 BPM average heart rate 110 Bpm. Mean heart rates throughout monitor ( Both during sleep and wake) elevated.

## 2020-02-04 ENCOUNTER — APPOINTMENT (OUTPATIENT)
Dept: ELECTROPHYSIOLOGY | Facility: CLINIC | Age: 55
End: 2020-02-04
Payer: COMMERCIAL

## 2020-02-04 ENCOUNTER — NON-APPOINTMENT (OUTPATIENT)
Age: 55
End: 2020-02-04

## 2020-02-04 VITALS
DIASTOLIC BLOOD PRESSURE: 85 MMHG | HEART RATE: 89 BPM | OXYGEN SATURATION: 100 % | BODY MASS INDEX: 21.97 KG/M2 | WEIGHT: 140 LBS | HEIGHT: 67 IN | SYSTOLIC BLOOD PRESSURE: 127 MMHG

## 2020-02-04 PROCEDURE — 93000 ELECTROCARDIOGRAM COMPLETE: CPT

## 2020-02-04 PROCEDURE — 99214 OFFICE O/P EST MOD 30 MIN: CPT

## 2020-02-15 ENCOUNTER — TRANSCRIPTION ENCOUNTER (OUTPATIENT)
Age: 55
End: 2020-02-15

## 2020-02-19 NOTE — ASSESSMENT
[FreeTextEntry1] : This is a 54 year old woman with sinus tachycardia and episodes of near syncope. Tilt demonstrated sinus tachycardia. \par \par She is tolerating atenolol with control of symptoms. \par Will continue current dose. \par \par \par

## 2020-02-19 NOTE — PHYSICAL EXAM
[General Appearance - Well Developed] : well developed [Normal Appearance] : normal appearance [Well Groomed] : well groomed [No Deformities] : no deformities [General Appearance - Well Nourished] : well nourished [General Appearance - In No Acute Distress] : no acute distress [Normal Conjunctiva] : the conjunctiva exhibited no abnormalities [Normal Oral Mucosa] : normal oral mucosa [Eyelids - No Xanthelasma] : the eyelids demonstrated no xanthelasmas [No Oral Pallor] : no oral pallor [No Oral Cyanosis] : no oral cyanosis [Normal Jugular Venous A Waves Present] : normal jugular venous A waves present [Normal Jugular Venous V Waves Present] : normal jugular venous V waves present [No Jugular Venous Fischer A Waves] : no jugular venous fischer A waves [Respiration, Rhythm And Depth] : normal respiratory rhythm and effort [Exaggerated Use Of Accessory Muscles For Inspiration] : no accessory muscle use [Auscultation Breath Sounds / Voice Sounds] : lungs were clear to auscultation bilaterally [Heart Rate And Rhythm] : heart rate and rhythm were normal [Heart Sounds] : normal S1 and S2 [Murmurs] : no murmurs present [Abdomen Soft] : soft [Abdomen Tenderness] : non-tender [Abdomen Mass (___ Cm)] : no abdominal mass palpated [Abnormal Walk] : normal gait [Gait - Sufficient For Exercise Testing] : the gait was sufficient for exercise testing [Cyanosis, Localized] : no localized cyanosis [Nail Clubbing] : no clubbing of the fingernails [Petechial Hemorrhages (___cm)] : no petechial hemorrhages [Skin Color & Pigmentation] : normal skin color and pigmentation [] : no rash [No Venous Stasis] : no venous stasis [Skin Lesions] : no skin lesions [No Skin Ulcers] : no skin ulcer [No Xanthoma] : no  xanthoma was observed [Oriented To Time, Place, And Person] : oriented to person, place, and time [Affect] : the affect was normal [Mood] : the mood was normal [No Anxiety] : not feeling anxious

## 2020-02-19 NOTE — HISTORY OF PRESENT ILLNESS
[FreeTextEntry1] : This is a 54 year old woman who presents for evaluation of syncope, tachycardia and palpitations.   She reports her pulse is constantly elevated associated with tightness in her chest. She reports 3 episodes of near syncope and one episode of syncope about a month ago. She was standing felt lightheaded lost consciousness briefly without injury. \par \par Tilt table test on Sept 6 2019 was negative for syncope she had an initial heart rate of 93 that increased to 112 and peaked at 124 bpm. \par \par Holter monitor demonstrated sinus to sinus tachycardia 78 to 163 BPM average heart rate 110 Bpm. Mean heart rates throughout monitor ( Both during sleep and wake) elevated. \par \par She was started on atenolol and has had control of her symptoms. ABRAHAN GOODWIN  denies chest pain, chest pressure, shortness of breath, lightheadedness, dizziness, palpitations, syncope, presyncope, orthopnea, PND, or edema.

## 2020-02-24 ENCOUNTER — TRANSCRIPTION ENCOUNTER (OUTPATIENT)
Age: 55
End: 2020-02-24

## 2020-03-14 ENCOUNTER — EMERGENCY (EMERGENCY)
Facility: HOSPITAL | Age: 55
LOS: 0 days | Discharge: HOME | End: 2020-03-15
Attending: EMERGENCY MEDICINE
Payer: COMMERCIAL

## 2020-03-14 VITALS
HEART RATE: 116 BPM | SYSTOLIC BLOOD PRESSURE: 174 MMHG | HEIGHT: 67 IN | WEIGHT: 134.92 LBS | OXYGEN SATURATION: 95 % | TEMPERATURE: 98 F | DIASTOLIC BLOOD PRESSURE: 108 MMHG | RESPIRATION RATE: 20 BRPM

## 2020-03-14 DIAGNOSIS — Z98.890 OTHER SPECIFIED POSTPROCEDURAL STATES: Chronic | ICD-10-CM

## 2020-03-14 DIAGNOSIS — Z87.891 PERSONAL HISTORY OF NICOTINE DEPENDENCE: ICD-10-CM

## 2020-03-14 DIAGNOSIS — Z85.828 PERSONAL HISTORY OF OTHER MALIGNANT NEOPLASM OF SKIN: ICD-10-CM

## 2020-03-14 DIAGNOSIS — Z98.891 HISTORY OF UTERINE SCAR FROM PREVIOUS SURGERY: Chronic | ICD-10-CM

## 2020-03-14 DIAGNOSIS — F41.9 ANXIETY DISORDER, UNSPECIFIED: ICD-10-CM

## 2020-03-14 DIAGNOSIS — M79.632 PAIN IN LEFT FOREARM: ICD-10-CM

## 2020-03-14 DIAGNOSIS — F32.9 MAJOR DEPRESSIVE DISORDER, SINGLE EPISODE, UNSPECIFIED: ICD-10-CM

## 2020-03-14 DIAGNOSIS — R20.2 PARESTHESIA OF SKIN: ICD-10-CM

## 2020-03-14 DIAGNOSIS — M79.89 OTHER SPECIFIED SOFT TISSUE DISORDERS: ICD-10-CM

## 2020-03-14 PROCEDURE — 71045 X-RAY EXAM CHEST 1 VIEW: CPT

## 2020-03-14 PROCEDURE — 99284 EMERGENCY DEPT VISIT MOD MDM: CPT

## 2020-03-14 PROCEDURE — 93971 EXTREMITY STUDY: CPT | Mod: LT

## 2020-03-14 PROCEDURE — 99284 EMERGENCY DEPT VISIT MOD MDM: CPT | Mod: 25

## 2020-03-14 NOTE — ED ADULT TRIAGE NOTE - CHIEF COMPLAINT QUOTE
pt states left arm swelling and numbness to left forearm and fingers 30mins PTA, pt denies taking any medications PTA, left radial pulse present and strong +2, capillary refill less than two seconds

## 2020-03-15 VITALS
TEMPERATURE: 98 F | HEART RATE: 100 BPM | OXYGEN SATURATION: 98 % | SYSTOLIC BLOOD PRESSURE: 134 MMHG | DIASTOLIC BLOOD PRESSURE: 94 MMHG | RESPIRATION RATE: 18 BRPM

## 2020-03-15 PROCEDURE — 71045 X-RAY EXAM CHEST 1 VIEW: CPT | Mod: 26

## 2020-03-15 PROCEDURE — 93971 EXTREMITY STUDY: CPT | Mod: 26,LT

## 2020-03-15 RX ORDER — IBUPROFEN 200 MG
600 TABLET ORAL ONCE
Refills: 0 | Status: COMPLETED | OUTPATIENT
Start: 2020-03-15 | End: 2020-03-15

## 2020-03-15 RX ADMIN — Medication 600 MILLIGRAM(S): at 01:02

## 2020-03-15 NOTE — ED PROVIDER NOTE - CLINICAL SUMMARY MEDICAL DECISION MAKING FREE TEXT BOX
localized arm pain and swelling, likely localized in the brachioradialis muscle based on exam.  patient very anxious she has a blood clot, without clear risk factors.  will US the arm.  Will xray chest r/o compressive mass.  Will give motrin

## 2020-03-15 NOTE — ED ADULT NURSE NOTE - OBJECTIVE STATEMENT
pt p/w c/o left forearm swelling associated with numbness.  neurologically intact, neurovascular intact, +2 pulses.  no redness, heat, or loss of function noted. no trauma to affected area.

## 2020-03-15 NOTE — ED PROVIDER NOTE - NSFOLLOWUPINSTRUCTIONS_ED_ALL_ED_FT
Follow up with your Medical Doctor Monday  Rest, elevate the arm and use Ice  Ibuprofen 600mg every 6 hours as needed for the pain and inflammation  Return to the ER for any increased pain or swelling, change in color or temperature of the arm, or any other concerning symptoms

## 2020-03-15 NOTE — ED ADULT NURSE NOTE - NSFALLRSKINDICATORS_ED_ALL_ED
no
The Caprini score indicates this patient is at risk for a VTE event (score 3-5).  Most surgical patients in this group would benefit from pharmacologic prophylaxis. The surgical team will determine the balance between VTE risk and bleeding risk

## 2020-03-15 NOTE — ED PROVIDER NOTE - PATIENT PORTAL LINK FT
You can access the FollowMyHealth Patient Portal offered by [facility] by registering at the following website: [email or web address]. By joining [employer]’s FollowMyHealth portal, you will also be able to view your health information using other applications (apps) compatible with our system.

## 2020-03-15 NOTE — ED PROVIDER NOTE - OBJECTIVE STATEMENT
55 yo female with h/o anxiety/depression (on effexor), palpitations (on atenolol) c/o sudden onset of pain and swelling in the left forearm.  Patient reports that she was just watching tv when she noticed the forearm on the left arm had localized swelling and pain.  Symptoms started about 30min PTA and she took nothing for the pain.  No change with ROM.  Feels tingling in the entire hand (but also reports decades of intermittent numbness in the hands or feet and has been worked up and 'ruled out' for ALS and MS).  She c/o over a month of a 'sinus infection', was on steroids and abx but continues to feel congestion.  Went to  3 weeks ago, had a negative chest xray at that time.  Has had subjective fevers on and off for that time.  No recent travel.  No injuries/trauma.  No heavy lifting.  No statins.  nonsmoker, drinker in recovery.  Family hx CAD and CVAs

## 2020-03-15 NOTE — ED PROVIDER NOTE - MUSCULOSKELETAL, MLM
Spine appears normal, range of motion is not limited, Localized swelling to left forearm in the area of the brachioradialis.  Tender to palpation, no ecchymosis, no erythema.  FROM to arm, elbow, hand, fingers.  Radial 2+, brisk cap refill.

## 2020-07-02 NOTE — PROGRESS NOTE BEHAVIORAL HEALTH - NS ED B[facility] MED ROS GENITOURINARY
Post-Care Instructions: I reviewed with the patient in detail post-care instructions. Patient is to wear sunprotection, and avoid picking at any of the treated lesions. Pt may apply Vaseline to crusted or scabbing areas.
Detail Level: Detailed
No complaints
Consent: The patient's consent was obtained including but not limited to risks of crusting, scabbing, blistering, scarring, darker or lighter pigmentary change, recurrence, incomplete removal and infection.
Render Post-Care Instructions In Note?: no
Duration Of Freeze Thaw-Cycle (Seconds): 0
No complaints

## 2020-11-19 ENCOUNTER — NON-APPOINTMENT (OUTPATIENT)
Age: 55
End: 2020-11-19

## 2020-11-19 ENCOUNTER — APPOINTMENT (OUTPATIENT)
Dept: ELECTROPHYSIOLOGY | Facility: CLINIC | Age: 55
End: 2020-11-19
Payer: COMMERCIAL

## 2020-11-19 VITALS
SYSTOLIC BLOOD PRESSURE: 118 MMHG | HEIGHT: 67 IN | DIASTOLIC BLOOD PRESSURE: 81 MMHG | OXYGEN SATURATION: 99 % | HEART RATE: 102 BPM | BODY MASS INDEX: 22.76 KG/M2 | WEIGHT: 145 LBS

## 2020-11-19 PROCEDURE — 93000 ELECTROCARDIOGRAM COMPLETE: CPT

## 2020-11-19 PROCEDURE — 99214 OFFICE O/P EST MOD 30 MIN: CPT

## 2020-11-19 NOTE — ASSESSMENT
[FreeTextEntry1] : This is a 55 year old woman with sinus tachycardia and episodes of near syncope. Tilt demonstrated sinus tachycardia. \par \par She is tolerating atenolol with control of symptoms. \par Will continue current dose. \par \par Discussed tobacco cessation. \par \par

## 2020-11-19 NOTE — HISTORY OF PRESENT ILLNESS
[FreeTextEntry1] : This is a 55 year old woman who presents for evaluation of syncope, tachycardia and palpitations.   She reports her pulse is constantly elevated associated with tightness in her chest. She reports 3 episodes of near syncope and one episode of syncope about a month ago. She was standing felt lightheaded lost consciousness briefly without injury. \par \par Tilt table test on 2019 was negative for syncope she had an initial heart rate of 93 that increased to 112 and peaked at 124 bpm. \par \par Holter monitor demonstrated sinus to sinus tachycardia 78 to 163 BPM average heart rate 110 Bpm. Mean heart rates throughout monitor ( Both during sleep and wake) elevated. \par \par She has been under a great deal of stress due to the recent death of her mother. She was started on atenolol and has had control of her symptoms.\par \tony Has started smoking since her mothers . \par \par ABRAHAN GOODWIN  denies chest pain, chest pressure, shortness of breath, lightheadedness, dizziness, palpitations, syncope, presyncope, orthopnea, PND, or edema.

## 2020-11-19 NOTE — PHYSICAL EXAM
[General Appearance - Well Developed] : well developed [Normal Appearance] : normal appearance [Well Groomed] : well groomed [General Appearance - Well Nourished] : well nourished [No Deformities] : no deformities [General Appearance - In No Acute Distress] : no acute distress [Normal Conjunctiva] : the conjunctiva exhibited no abnormalities [Eyelids - No Xanthelasma] : the eyelids demonstrated no xanthelasmas [Normal Oral Mucosa] : normal oral mucosa [No Oral Pallor] : no oral pallor [No Oral Cyanosis] : no oral cyanosis [Normal Jugular Venous A Waves Present] : normal jugular venous A waves present [Normal Jugular Venous V Waves Present] : normal jugular venous V waves present [No Jugular Venous Fischer A Waves] : no jugular venous fischer A waves [Respiration, Rhythm And Depth] : normal respiratory rhythm and effort [Exaggerated Use Of Accessory Muscles For Inspiration] : no accessory muscle use [Auscultation Breath Sounds / Voice Sounds] : lungs were clear to auscultation bilaterally [Heart Rate And Rhythm] : heart rate and rhythm were normal [Heart Sounds] : normal S1 and S2 [Murmurs] : no murmurs present [Abdomen Soft] : soft [Abdomen Tenderness] : non-tender [Abdomen Mass (___ Cm)] : no abdominal mass palpated [Abnormal Walk] : normal gait [Gait - Sufficient For Exercise Testing] : the gait was sufficient for exercise testing [Nail Clubbing] : no clubbing of the fingernails [Cyanosis, Localized] : no localized cyanosis [Petechial Hemorrhages (___cm)] : no petechial hemorrhages [Skin Color & Pigmentation] : normal skin color and pigmentation [] : no rash [No Venous Stasis] : no venous stasis [Skin Lesions] : no skin lesions [No Skin Ulcers] : no skin ulcer [No Xanthoma] : no  xanthoma was observed [Oriented To Time, Place, And Person] : oriented to person, place, and time [Affect] : the affect was normal [Mood] : the mood was normal [No Anxiety] : not feeling anxious

## 2021-01-20 NOTE — PATIENT PROFILE ADULT - NSASFALLATTEMPTOOB_GEN_A_NUR
Odomzo Counseling- I discussed with the patient the risks of Odomzo including but not limited to nausea, vomiting, diarrhea, constipation, weight loss, changes in the sense of taste, decreased appetite, muscle spasms, and hair loss.  The patient verbalized understanding of the proper use and possible adverse effects of Odomzo.  All of the patient's questions and concerns were addressed. yes

## 2021-01-31 ENCOUNTER — OUTPATIENT (OUTPATIENT)
Dept: OUTPATIENT SERVICES | Facility: HOSPITAL | Age: 56
LOS: 1 days | End: 2021-01-31
Payer: COMMERCIAL

## 2021-01-31 DIAGNOSIS — Z98.891 HISTORY OF UTERINE SCAR FROM PREVIOUS SURGERY: Chronic | ICD-10-CM

## 2021-01-31 DIAGNOSIS — Z20.828 CONTACT WITH AND (SUSPECTED) EXPOSURE TO OTHER VIRAL COMMUNICABLE DISEASES: ICD-10-CM

## 2021-01-31 DIAGNOSIS — Z98.890 OTHER SPECIFIED POSTPROCEDURAL STATES: Chronic | ICD-10-CM

## 2021-01-31 LAB — SARS-COV-2 RNA SPEC QL NAA+PROBE: SIGNIFICANT CHANGE UP

## 2021-01-31 PROCEDURE — C9803: CPT

## 2021-01-31 PROCEDURE — U0005: CPT

## 2021-01-31 PROCEDURE — U0003: CPT

## 2021-02-01 DIAGNOSIS — Z20.828 CONTACT WITH AND (SUSPECTED) EXPOSURE TO OTHER VIRAL COMMUNICABLE DISEASES: ICD-10-CM

## 2021-02-08 ENCOUNTER — OUTPATIENT (OUTPATIENT)
Dept: OUTPATIENT SERVICES | Facility: HOSPITAL | Age: 56
LOS: 1 days | End: 2021-02-08
Payer: COMMERCIAL

## 2021-02-08 DIAGNOSIS — Z98.890 OTHER SPECIFIED POSTPROCEDURAL STATES: Chronic | ICD-10-CM

## 2021-02-08 DIAGNOSIS — Z98.891 HISTORY OF UTERINE SCAR FROM PREVIOUS SURGERY: Chronic | ICD-10-CM

## 2021-02-08 DIAGNOSIS — Z20.828 CONTACT WITH AND (SUSPECTED) EXPOSURE TO OTHER VIRAL COMMUNICABLE DISEASES: ICD-10-CM

## 2021-02-08 LAB — SARS-COV-2 RNA SPEC QL NAA+PROBE: SIGNIFICANT CHANGE UP

## 2021-02-08 PROCEDURE — U0003: CPT

## 2021-02-08 PROCEDURE — C9803: CPT

## 2021-02-08 PROCEDURE — U0005: CPT

## 2021-02-09 DIAGNOSIS — Z20.828 CONTACT WITH AND (SUSPECTED) EXPOSURE TO OTHER VIRAL COMMUNICABLE DISEASES: ICD-10-CM

## 2021-02-16 ENCOUNTER — OUTPATIENT (OUTPATIENT)
Dept: OUTPATIENT SERVICES | Facility: HOSPITAL | Age: 56
LOS: 1 days | End: 2021-02-16
Payer: COMMERCIAL

## 2021-02-16 DIAGNOSIS — Z20.828 CONTACT WITH AND (SUSPECTED) EXPOSURE TO OTHER VIRAL COMMUNICABLE DISEASES: ICD-10-CM

## 2021-02-16 DIAGNOSIS — Z98.890 OTHER SPECIFIED POSTPROCEDURAL STATES: Chronic | ICD-10-CM

## 2021-02-16 DIAGNOSIS — Z98.891 HISTORY OF UTERINE SCAR FROM PREVIOUS SURGERY: Chronic | ICD-10-CM

## 2021-02-16 LAB — SARS-COV-2 RNA SPEC QL NAA+PROBE: SIGNIFICANT CHANGE UP

## 2021-02-16 PROCEDURE — U0005: CPT

## 2021-02-16 PROCEDURE — U0003: CPT

## 2021-02-16 PROCEDURE — C9803: CPT

## 2021-02-17 DIAGNOSIS — Z20.828 CONTACT WITH AND (SUSPECTED) EXPOSURE TO OTHER VIRAL COMMUNICABLE DISEASES: ICD-10-CM

## 2021-06-01 ENCOUNTER — TRANSCRIPTION ENCOUNTER (OUTPATIENT)
Age: 56
End: 2021-06-01

## 2021-06-03 NOTE — PATIENT PROFILE BEHAVIORAL HEALTH - PSH
Full Full H/O basal cell carcinoma excision    H/O:     History of ear, nose, and throat (ENT) surgery Full

## 2021-06-21 NOTE — PATIENT PROFILE ADULT - ABILITY TO HEAR (WITH HEARING AID OR HEARING APPLIANCE IF NORMALLY USED):
What Type Of Note Output Would You Prefer (Optional)?: Bullet Format Hpi Title: Evaluation of Skin Lesions Adequate: hears normal conversation without difficulty How Severe Are Your Spot(S)?: mild Have Your Spot(S) Been Treated In The Past?: has not been treated Additional History: Pt present for FBSE with concerns of HS flares

## 2021-08-16 ENCOUNTER — EMERGENCY (EMERGENCY)
Facility: HOSPITAL | Age: 56
LOS: 0 days | Discharge: HOME | End: 2021-08-16
Attending: EMERGENCY MEDICINE
Payer: COMMERCIAL

## 2021-08-16 VITALS
SYSTOLIC BLOOD PRESSURE: 93 MMHG | HEART RATE: 91 BPM | TEMPERATURE: 98 F | OXYGEN SATURATION: 95 % | WEIGHT: 139.99 LBS | RESPIRATION RATE: 17 BRPM | HEIGHT: 67 IN | DIASTOLIC BLOOD PRESSURE: 69 MMHG

## 2021-08-16 DIAGNOSIS — F32.9 MAJOR DEPRESSIVE DISORDER, SINGLE EPISODE, UNSPECIFIED: ICD-10-CM

## 2021-08-16 DIAGNOSIS — Z20.822 CONTACT WITH AND (SUSPECTED) EXPOSURE TO COVID-19: ICD-10-CM

## 2021-08-16 DIAGNOSIS — F10.129 ALCOHOL ABUSE WITH INTOXICATION, UNSPECIFIED: ICD-10-CM

## 2021-08-16 DIAGNOSIS — Z98.891 HISTORY OF UTERINE SCAR FROM PREVIOUS SURGERY: ICD-10-CM

## 2021-08-16 DIAGNOSIS — Z98.890 OTHER SPECIFIED POSTPROCEDURAL STATES: Chronic | ICD-10-CM

## 2021-08-16 DIAGNOSIS — Z85.828 PERSONAL HISTORY OF OTHER MALIGNANT NEOPLASM OF SKIN: ICD-10-CM

## 2021-08-16 DIAGNOSIS — Z98.891 HISTORY OF UTERINE SCAR FROM PREVIOUS SURGERY: Chronic | ICD-10-CM
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
## 2021-08-16 DIAGNOSIS — R29.898 OTHER SYMPTOMS AND SIGNS INVOLVING THE MUSCULOSKELETAL SYSTEM: ICD-10-CM

## 2021-08-16 LAB
ALBUMIN SERPL ELPH-MCNC: 3.4 G/DL — SIGNIFICANT CHANGE UP (ref 3.3–5)
ALP SERPL-CCNC: 429 U/L — HIGH (ref 40–120)
ALT FLD-CCNC: 207 U/L — HIGH (ref 12–78)
ANION GAP SERPL CALC-SCNC: 13 MMOL/L — SIGNIFICANT CHANGE UP (ref 5–17)
AST SERPL-CCNC: 588 U/L — HIGH (ref 15–37)
BASOPHILS # BLD AUTO: 0.05 K/UL — SIGNIFICANT CHANGE UP (ref 0–0.2)
BASOPHILS NFR BLD AUTO: 0.7 % — SIGNIFICANT CHANGE UP (ref 0–2)
BILIRUB SERPL-MCNC: 0.6 MG/DL — SIGNIFICANT CHANGE UP (ref 0.2–1.2)
BUN SERPL-MCNC: 19 MG/DL — SIGNIFICANT CHANGE UP (ref 7–23)
CALCIUM SERPL-MCNC: 8.5 MG/DL — SIGNIFICANT CHANGE UP (ref 8.5–10.1)
CHLORIDE SERPL-SCNC: 101 MMOL/L — SIGNIFICANT CHANGE UP (ref 96–108)
CO2 SERPL-SCNC: 23 MMOL/L — SIGNIFICANT CHANGE UP (ref 22–31)
CREAT SERPL-MCNC: 0.95 MG/DL — SIGNIFICANT CHANGE UP (ref 0.5–1.3)
EOSINOPHIL # BLD AUTO: 0.2 K/UL — SIGNIFICANT CHANGE UP (ref 0–0.5)
EOSINOPHIL NFR BLD AUTO: 2.8 % — SIGNIFICANT CHANGE UP (ref 0–6)
ETHANOL SERPL-MCNC: 399 MG/DL — HIGH (ref 0–10)
GLUCOSE SERPL-MCNC: 93 MG/DL — SIGNIFICANT CHANGE UP (ref 70–99)
HCT VFR BLD CALC: 38.9 % — SIGNIFICANT CHANGE UP (ref 34.5–45)
HGB BLD-MCNC: 13.7 G/DL — SIGNIFICANT CHANGE UP (ref 11.5–15.5)
IMM GRANULOCYTES NFR BLD AUTO: 1.7 % — HIGH (ref 0–1.5)
LACTATE SERPL-SCNC: 2 MMOL/L — SIGNIFICANT CHANGE UP (ref 0.7–2)
LIDOCAIN IGE QN: 494 U/L — HIGH (ref 73–393)
LYMPHOCYTES # BLD AUTO: 2.58 K/UL — SIGNIFICANT CHANGE UP (ref 1–3.3)
LYMPHOCYTES # BLD AUTO: 36.1 % — SIGNIFICANT CHANGE UP (ref 13–44)
MCHC RBC-ENTMCNC: 35.2 GM/DL — SIGNIFICANT CHANGE UP (ref 32–36)
MCHC RBC-ENTMCNC: 36.2 PG — HIGH (ref 27–34)
MCV RBC AUTO: 102.9 FL — HIGH (ref 80–100)
MONOCYTES # BLD AUTO: 0.73 K/UL — SIGNIFICANT CHANGE UP (ref 0–0.9)
MONOCYTES NFR BLD AUTO: 10.2 % — SIGNIFICANT CHANGE UP (ref 2–14)
NEUTROPHILS # BLD AUTO: 3.46 K/UL — SIGNIFICANT CHANGE UP (ref 1.8–7.4)
NEUTROPHILS NFR BLD AUTO: 48.5 % — SIGNIFICANT CHANGE UP (ref 43–77)
PLATELET # BLD AUTO: 146 K/UL — LOW (ref 150–400)
POTASSIUM SERPL-MCNC: 3.5 MMOL/L — SIGNIFICANT CHANGE UP (ref 3.5–5.3)
POTASSIUM SERPL-SCNC: 3.5 MMOL/L — SIGNIFICANT CHANGE UP (ref 3.5–5.3)
PROT SERPL-MCNC: 7.9 GM/DL — SIGNIFICANT CHANGE UP (ref 6–8.3)
RBC # BLD: 3.78 M/UL — LOW (ref 3.8–5.2)
RBC # FLD: 13.2 % — SIGNIFICANT CHANGE UP (ref 10.3–14.5)
SARS-COV-2 RNA SPEC QL NAA+PROBE: SIGNIFICANT CHANGE UP
SODIUM SERPL-SCNC: 137 MMOL/L — SIGNIFICANT CHANGE UP (ref 135–145)
TROPONIN I SERPL-MCNC: <0.015 NG/ML — SIGNIFICANT CHANGE UP (ref 0.01–0.04)
WBC # BLD: 7.14 K/UL — SIGNIFICANT CHANGE UP (ref 3.8–10.5)
WBC # FLD AUTO: 7.14 K/UL — SIGNIFICANT CHANGE UP (ref 3.8–10.5)

## 2021-08-16 PROCEDURE — 84484 ASSAY OF TROPONIN QUANT: CPT

## 2021-08-16 PROCEDURE — 85025 COMPLETE CBC W/AUTO DIFF WBC: CPT

## 2021-08-16 PROCEDURE — 71045 X-RAY EXAM CHEST 1 VIEW: CPT | Mod: 26

## 2021-08-16 PROCEDURE — 82962 GLUCOSE BLOOD TEST: CPT

## 2021-08-16 PROCEDURE — U0005: CPT

## 2021-08-16 PROCEDURE — 71045 X-RAY EXAM CHEST 1 VIEW: CPT

## 2021-08-16 PROCEDURE — 36415 COLL VENOUS BLD VENIPUNCTURE: CPT

## 2021-08-16 PROCEDURE — 83605 ASSAY OF LACTIC ACID: CPT

## 2021-08-16 PROCEDURE — 80053 COMPREHEN METABOLIC PANEL: CPT

## 2021-08-16 PROCEDURE — U0003: CPT

## 2021-08-16 PROCEDURE — 80307 DRUG TEST PRSMV CHEM ANLYZR: CPT

## 2021-08-16 PROCEDURE — 83690 ASSAY OF LIPASE: CPT

## 2021-08-16 PROCEDURE — 99285 EMERGENCY DEPT VISIT HI MDM: CPT

## 2021-08-16 PROCEDURE — 70450 CT HEAD/BRAIN W/O DYE: CPT

## 2021-08-16 PROCEDURE — 70450 CT HEAD/BRAIN W/O DYE: CPT | Mod: 26,MA

## 2021-08-16 PROCEDURE — 99285 EMERGENCY DEPT VISIT HI MDM: CPT | Mod: 25

## 2021-08-16 RX ORDER — SODIUM CHLORIDE 9 MG/ML
1000 INJECTION INTRAMUSCULAR; INTRAVENOUS; SUBCUTANEOUS ONCE
Refills: 0 | Status: COMPLETED | OUTPATIENT
Start: 2021-08-16 | End: 2021-08-16

## 2021-08-16 RX ADMIN — SODIUM CHLORIDE 2000 MILLILITER(S): 9 INJECTION INTRAMUSCULAR; INTRAVENOUS; SUBCUTANEOUS at 17:53

## 2021-08-16 NOTE — ED PROVIDER NOTE - NSICDXFAMILYHX_GEN_ALL_CORE_FT
FAMILY HISTORY:  Father  Still living? Unknown  Family history of heart attack, Age at diagnosis: Age Unknown    Mother  Still living? Unknown  FH: pancreatic cancer, Age at diagnosis: Age Unknown

## 2021-08-16 NOTE — ED PROVIDER NOTE - PATIENT PORTAL LINK FT
You can access the FollowMyHealth Patient Portal offered by [facility] by registering at the following website: [email or web address]. By joining [employer]’s FollowMyHealth portal, you will also be able to view your health information using other applications (apps) compatible with our system.

## 2021-08-16 NOTE — ED PROVIDER NOTE - OBJECTIVE STATEMENT
57 y/o female with a PMHx of ETOH abuse, depression, withdrawal seizures presents to the ED s/p drinking episode with slurred speech. Pt think she is having a stroke. MD Flores triaged her on arrival. No code stroke to be called at this time. Pt is c/o about lower extremity weakness below the knees to both legs, believes this started this morning, admits to drinking PTA. No other acute complaints at this time.

## 2021-08-16 NOTE — ED ADULT TRIAGE NOTE - CHIEF COMPLAINT QUOTE
Patient comes in with alcohol intoxication and inability to move her legs from the knee down. Patient moving legs upon arrival to triage. Patient thinks shes having a stroke. MD Flores assessed patient at triage, no code stroke to be called at this time. Patient comes in with slurred speech s/p drinking alcoholic beverages today, inability to move her legs from the knee down, and diarrhea. Patient moving legs upon arrival to triage. Patient thinks shes having a stroke. Denies chest pain, sob. MD Flores assessed patient at triage, no code stroke to be called at this time.

## 2021-08-16 NOTE — ED PROVIDER NOTE - NSFOLLOWUPINSTRUCTIONS_ED_ALL_ED_FT
Alcohol Use Disorder    WHAT YOU NEED TO KNOW:    Alcohol use disorder (AUD) is problem drinking. AUD includes alcohol abuse and alcohol dependency.     DISCHARGE INSTRUCTIONS:    Seek care immediately if:     Your heart is beating faster than usual.      You have hallucinations.      You cannot remember what happens while you are drinking.      You have seizures.    Contact your healthcare provider if:     You are anxious and have nausea.      Your hands are shaky and you are sweating heavily.      You have questions or concerns about your condition or care.    Follow up with your healthcare provider as directed: Do not try to stop drinking on your own. Your healthcare provider may need to help you withdraw from alcohol safely. He may need to admit you to the hospital. You may also need any of the following treatments:    Medicines to decrease your craving for alcohol      Support groups such as Alcoholics Anonymous       Therapy from a psychiatrist or psychologist       Admission to an inpatient facility for treatment for severe AUD    Interested in discussing options to reduce your alcohol or drug use?      [facility]: [phone]   [facility] Substance Abuse Services: [phone], option #2   Methadone Maintenance & Ambulatory Opiate Detox: [phone]  [facility]: [phone]  [facility]: [phone]  [facility]: [phone]    [facility]: [phone], option #2   [facility]: [phone]    [facility]: [phone]    [facility] Central Intake: [phone]  [facility] Chemical Dependency/Ancillary Withdrawal: [phone]  [facility] Methadone Maintenance: [phone]    [facility]: [phone]  [facility] Addiction Treatment Services: [phone]    [facility]: 0-515-6-HOPENY    [facility] Office of Alcoholism and Substance Abuse Services (OASAS): https://www.oasas.[place].gov/providerdirectory/  [facility] for Addiction Services and Psychotherapy Interventions Research (CASPIR)  www.[facility].org     Interested in discussing options to reduce your tobacco use?    [facility] for Tobacco Control:  [phone]  [facility] QUITLINE: 3-530-OC-QUITS ([phone])    Interested in learning more about substance use?      http://rethinkingdrinking.niaaa.nih.gov   https://www.drugabuse.gov/patients-families     Learn more about opioid overdose prevention programs in [facility]:  http://www.[facility].ny.[facility]/diseases/aids/general/opioid_overdose_prevention/

## 2021-08-16 NOTE — ED PROVIDER NOTE - CLINICAL SUMMARY MEDICAL DECISION MAKING FREE TEXT BOX
Story and exam is not consistent wt acute stroke. Patient is out of window for TPA in any event. Patient is able to move LE without issue. Pt is noted to have high alcohol level. CT head is  unremarkable, will evaluate for possible infection, likely related to intoxication, anticipate discharge one sober. Story and exam is not consistent wt acute stroke. Patient is out of window for TPA in any event. Patient is able to move LE without issue. Pt is intoxicated as well. CT head is  unremarkable, will evaluate for possible infection, likely related to intoxication, anticipate discharge once sober.

## 2021-08-16 NOTE — ED ADULT NURSE NOTE - OBJECTIVE STATEMENT
PT to ED, ETOH , c/o b/l LE weakness and difficulty walking. PT denies pain and distress. PT a&ox4. GCS 15. Denies SOB and chest pain.

## 2021-08-16 NOTE — ED PROVIDER NOTE - NEUROLOGICAL, MLM
Alert and oriented, no focal deficits, no motor or sensory deficits. Symmetric smile. No slurring of words.

## 2021-08-16 NOTE — ED ADULT NURSE REASSESSMENT NOTE - NS ED NURSE REASSESS COMMENT FT1
pt left AMA. observed removing her IV and ambulating to exit. dr abarca aware. pt aggressive and refusing assistance from staff at this time.

## 2021-08-16 NOTE — ED PROVIDER NOTE - PROGRESS NOTE DETAILS
Pt ambulating on reassessment.  Reports that symptoms are to bilateral lower extremities.  No other acute neuro symptoms.  Is able to walk with  stable gait.  Difficult to elicit DTRs to b/l LEs.  Discussed possibility of Guillain-[place], but patient has otherwise good strength.  Ambulating without issue.  Wants to leave and is clinically sober and displays adequate decision making capacity.  Understands risks and would like to s/o AMA.  Eloped prior to signing papers after pulling IV.

## 2021-08-16 NOTE — ED ADULT NURSE NOTE - CHIEF COMPLAINT QUOTE
Patient comes in with slurred speech s/p drinking alcoholic beverages today, inability to move her legs from the knee down, and diarrhea. Patient moving legs upon arrival to triage. Patient thinks shes having a stroke. Denies chest pain, sob. MD Flores assessed patient at triage, no code stroke to be called at this time.

## 2021-09-04 ENCOUNTER — RX RENEWAL (OUTPATIENT)
Age: 56
End: 2021-09-04

## 2021-09-23 ENCOUNTER — APPOINTMENT (OUTPATIENT)
Dept: FAMILY MEDICINE | Facility: CLINIC | Age: 56
End: 2021-09-23
Payer: COMMERCIAL

## 2021-09-23 VITALS
BODY MASS INDEX: 21.82 KG/M2 | DIASTOLIC BLOOD PRESSURE: 90 MMHG | TEMPERATURE: 97.2 F | OXYGEN SATURATION: 97 % | HEIGHT: 67 IN | SYSTOLIC BLOOD PRESSURE: 142 MMHG | HEART RATE: 81 BPM | WEIGHT: 139 LBS

## 2021-09-23 PROCEDURE — G0008: CPT

## 2021-09-23 PROCEDURE — 99213 OFFICE O/P EST LOW 20 MIN: CPT | Mod: 25

## 2021-09-23 PROCEDURE — 90686 IIV4 VACC NO PRSV 0.5 ML IM: CPT

## 2021-09-23 NOTE — PLAN
[FreeTextEntry1] : BPs reviewed today and within normal range. Advised to continue medication , diet , exercise and BP monitoring and followups.\par Advised to continue with effexor , PE form completed for DMV\par Advised she is due for PE and and fasting labs , Flu shot administered today

## 2021-09-23 NOTE — HISTORY OF PRESENT ILLNESS
[de-identified] : Patient is here for BP and Anxiety followup.   FOrm completion for DMV \par And she is requesting Flu shot today \par

## 2021-11-03 ENCOUNTER — RESULT CHARGE (OUTPATIENT)
Age: 56
End: 2021-11-03

## 2021-11-05 ENCOUNTER — LABORATORY RESULT (OUTPATIENT)
Age: 56
End: 2021-11-05

## 2021-11-05 ENCOUNTER — APPOINTMENT (OUTPATIENT)
Dept: FAMILY MEDICINE | Facility: CLINIC | Age: 56
End: 2021-11-05
Payer: COMMERCIAL

## 2021-11-05 ENCOUNTER — NON-APPOINTMENT (OUTPATIENT)
Age: 56
End: 2021-11-05

## 2021-11-05 VITALS
SYSTOLIC BLOOD PRESSURE: 130 MMHG | DIASTOLIC BLOOD PRESSURE: 82 MMHG | TEMPERATURE: 97.9 F | BODY MASS INDEX: 21.97 KG/M2 | OXYGEN SATURATION: 98 % | HEART RATE: 89 BPM | WEIGHT: 140 LBS | HEIGHT: 67 IN

## 2021-11-05 DIAGNOSIS — F41.9 ANXIETY DISORDER, UNSPECIFIED: ICD-10-CM

## 2021-11-05 DIAGNOSIS — Z63.5 DISRUPTION OF FAMILY BY SEPARATION AND DIVORCE: ICD-10-CM

## 2021-11-05 DIAGNOSIS — F10.21 ALCOHOL DEPENDENCE, IN REMISSION: ICD-10-CM

## 2021-11-05 PROCEDURE — 36415 COLL VENOUS BLD VENIPUNCTURE: CPT

## 2021-11-05 PROCEDURE — 99396 PREV VISIT EST AGE 40-64: CPT | Mod: 25

## 2021-11-05 PROCEDURE — 93000 ELECTROCARDIOGRAM COMPLETE: CPT

## 2021-11-05 RX ORDER — VENLAFAXINE HYDROCHLORIDE 150 MG/1
150 CAPSULE, EXTENDED RELEASE ORAL DAILY
Refills: 0 | Status: DISCONTINUED | COMMUNITY
End: 2021-11-05

## 2021-11-05 SDOH — SOCIAL STABILITY - SOCIAL INSECURITY: DISRUPTION OF FAMILY BY SEPARATION AND DIVORCE: Z63.5

## 2021-11-07 PROBLEM — Z63.5 DIVORCED: Status: ACTIVE | Noted: 2021-11-07

## 2021-11-07 PROBLEM — F10.21 HISTORY OF ALCOHOLISM: Status: RESOLVED | Noted: 2021-11-07 | Resolved: 2021-11-07

## 2021-11-07 NOTE — REVIEW OF SYSTEMS
[Negative] : Heme/Lymph [Hearing Loss] : hearing loss [Postnasal Drip] : postnasal drip [Dizziness] : dizziness [FreeTextEntry4] : tinnitus [de-identified] : vertigo

## 2021-11-07 NOTE — HISTORY OF PRESENT ILLNESS
[de-identified] : Patient is here for yearly physical. She is UTD with ENT for her ongoing Vertigo and Tinnitus , she has been diagosed with some hearing loss and is scheduled to undergo PT for BPPV \par She is requesting PE form completion for job application and requires viral screening  . She is Not  UTD with all preventative testing , dentist, Optho (glasses)  and Derm .\par Patient is eating healthy  and exercising . Patient is Not Covid Vaccinated. \par Patient is fasting for physical labs today\par

## 2021-11-07 NOTE — HEALTH RISK ASSESSMENT
[Good] : ~his/her~  mood as  good [No] : No [No falls in past year] : Patient reported no falls in the past year [Patient reported mammogram was normal] : Patient reported mammogram was normal [Patient reported PAP Smear was normal] : Patient reported PAP Smear was normal [Patient reported bone density results were normal] : Patient reported bone density results were normal [Patient reported colonoscopy was normal] : Patient reported colonoscopy was normal [# of Members in Household ___] :  household currently consist of [unfilled] member(s) [Employed] : employed [College] : College [] :  [Significant Other] : lives with significant other [# Of Children ___] : has [unfilled] children [Sexually Active] : sexually active [Feels Safe at Home] : Feels safe at home [Reports changes in hearing] : Reports changes in hearing [Reports normal functional visual acuity (ie: able to read med bottle)] : Reports normal functional visual acuity [Smoke Detector] : smoke detector [Carbon Monoxide Detector] : carbon monoxide detector [Safety elements used in home] : safety elements used in home [Seat Belt] :  uses seat belt [Sunscreen] : uses sunscreen [de-identified] : Member of AA [] : No [de-identified] : active daily  [de-identified] : healthy diet  [Reports changes in vision] : Reports no changes in vision [Reports changes in dental health] : Reports no changes in dental health [MammogramDate] : 2020 [PapSmearDate] : 2020 [BoneDensityDate] : 2019  [ColonoscopyComments] : hemmorhoidectomy  [ColonoscopyDate] : 9/2020 [FreeTextEntry2] : [facility]  [de-identified] : tinnitus ,vertigo

## 2021-11-11 ENCOUNTER — FORM ENCOUNTER (OUTPATIENT)
Age: 56
End: 2021-11-11

## 2021-11-15 ENCOUNTER — RESULT REVIEW (OUTPATIENT)
Age: 56
End: 2021-11-15

## 2021-11-15 LAB
25(OH)D3 SERPL-MCNC: 12.7 NG/ML
ALBUMIN SERPL ELPH-MCNC: 3.8 G/DL
ALP BLD-CCNC: 748 U/L
ALP BLD-CCNC: 749 IU/L
ALP BONE CFR SERPL: 27 %
ALP INTEST CFR SERPL: 3 %
ALP LIVER CFR SERPL: 70 %
ALT SERPL-CCNC: 45 U/L
ANION GAP SERPL CALC-SCNC: 16 MMOL/L
AST SERPL-CCNC: 195 U/L
BASOPHILS # BLD AUTO: 0.07 K/UL
BASOPHILS NFR BLD AUTO: 0.7 %
BILIRUB SERPL-MCNC: 1 MG/DL
BUN SERPL-MCNC: 7 MG/DL
CALCIUM SERPL-MCNC: 9.3 MG/DL
CHLORIDE SERPL-SCNC: 97 MMOL/L
CHOLEST SERPL-MCNC: 277 MG/DL
CO2 SERPL-SCNC: 24 MMOL/L
CREAT SERPL-MCNC: 0.44 MG/DL
EOSINOPHIL # BLD AUTO: 0.16 K/UL
EOSINOPHIL NFR BLD AUTO: 1.6 %
ESTIMATED AVERAGE GLUCOSE: 117 MG/DL
FOLATE SERPL-MCNC: 5.6 NG/ML
GLUCOSE SERPL-MCNC: 108 MG/DL
HBA1C MFR BLD HPLC: 5.7 %
HBV SURFACE AB SER QL: REACTIVE
HCT VFR BLD CALC: 40.1 %
HDLC SERPL-MCNC: 30 MG/DL
HEPATITIS A IGG ANTIBODY: NONREACTIVE
HGB BLD-MCNC: 12.6 G/DL
IMM GRANULOCYTES NFR BLD AUTO: 0.7 %
LDLC SERPL CALC-MCNC: 198 MG/DL
LYMPHOCYTES # BLD AUTO: 1.31 K/UL
LYMPHOCYTES NFR BLD AUTO: 13.4 %
M TB IFN-G BLD-IMP: NEGATIVE
MAN DIFF?: NORMAL
MCHC RBC-ENTMCNC: 31.4 GM/DL
MCHC RBC-ENTMCNC: 34.1 PG
MCV RBC AUTO: 108.4 FL
MEV IGG FLD QL IA: 169 AU/ML
MEV IGG+IGM SER-IMP: POSITIVE
MONOCYTES # BLD AUTO: 0.74 K/UL
MONOCYTES NFR BLD AUTO: 7.6 %
MUV AB SER-ACNC: POSITIVE
MUV IGG SER QL IA: 29.5 AU/ML
NEUTROPHILS # BLD AUTO: 7.4 K/UL
NEUTROPHILS NFR BLD AUTO: 76 %
NONHDLC SERPL-MCNC: 246 MG/DL
PLATELET # BLD AUTO: 211 K/UL
POTASSIUM SERPL-SCNC: 5.1 MMOL/L
PROT SERPL-MCNC: 7.5 G/DL
QUANTIFERON TB PLUS MITOGEN MINUS NIL: 3.87 IU/ML
QUANTIFERON TB PLUS NIL: 0.05 IU/ML
QUANTIFERON TB PLUS TB1 MINUS NIL: 0 IU/ML
QUANTIFERON TB PLUS TB2 MINUS NIL: 0 IU/ML
RBC # BLD: 3.7 M/UL
RBC # FLD: 13.5 %
RUBV IGG FLD-ACNC: 10.8 INDEX
RUBV IGG SER-IMP: POSITIVE
SODIUM SERPL-SCNC: 138 MMOL/L
T3RU NFR SERPL: 1.1 TBI
TRIGL SERPL-MCNC: 241 MG/DL
TSH SERPL-ACNC: 3.32 UIU/ML
VIT B12 SERPL-MCNC: 479 PG/ML
VZV AB TITR SER: POSITIVE
VZV IGG SER IF-ACNC: 1206 INDEX
WBC # FLD AUTO: 9.75 K/UL

## 2021-11-16 ENCOUNTER — FORM ENCOUNTER (OUTPATIENT)
Age: 56
End: 2021-11-16

## 2021-11-16 ENCOUNTER — NON-APPOINTMENT (OUTPATIENT)
Age: 56
End: 2021-11-16

## 2021-11-23 ENCOUNTER — OUTPATIENT (OUTPATIENT)
Dept: OUTPATIENT SERVICES | Facility: HOSPITAL | Age: 56
LOS: 1 days | End: 2021-11-23
Payer: COMMERCIAL

## 2021-11-23 ENCOUNTER — APPOINTMENT (OUTPATIENT)
Dept: CT IMAGING | Facility: CLINIC | Age: 56
End: 2021-11-23
Payer: COMMERCIAL

## 2021-11-23 DIAGNOSIS — Z98.890 OTHER SPECIFIED POSTPROCEDURAL STATES: Chronic | ICD-10-CM

## 2021-11-23 DIAGNOSIS — Z98.891 HISTORY OF UTERINE SCAR FROM PREVIOUS SURGERY: Chronic | ICD-10-CM

## 2021-11-23 DIAGNOSIS — Z00.8 ENCOUNTER FOR OTHER GENERAL EXAMINATION: ICD-10-CM

## 2021-11-23 DIAGNOSIS — R79.89 OTHER SPECIFIED ABNORMAL FINDINGS OF BLOOD CHEMISTRY: ICD-10-CM

## 2021-11-23 PROCEDURE — 74177 CT ABD & PELVIS W/CONTRAST: CPT | Mod: 26

## 2021-11-23 PROCEDURE — 74177 CT ABD & PELVIS W/CONTRAST: CPT

## 2021-11-29 ENCOUNTER — NON-APPOINTMENT (OUTPATIENT)
Age: 56
End: 2021-11-29

## 2021-11-30 ENCOUNTER — NON-APPOINTMENT (OUTPATIENT)
Age: 56
End: 2021-11-30

## 2021-12-21 ENCOUNTER — APPOINTMENT (OUTPATIENT)
Dept: ELECTROPHYSIOLOGY | Facility: CLINIC | Age: 56
End: 2021-12-21

## 2021-12-27 ENCOUNTER — APPOINTMENT (OUTPATIENT)
Dept: FAMILY MEDICINE | Facility: CLINIC | Age: 56
End: 2021-12-27
Payer: COMMERCIAL

## 2021-12-27 ENCOUNTER — NON-APPOINTMENT (OUTPATIENT)
Age: 56
End: 2021-12-27

## 2021-12-27 PROCEDURE — 99213 OFFICE O/P EST LOW 20 MIN: CPT | Mod: 95

## 2021-12-29 ENCOUNTER — OUTPATIENT (OUTPATIENT)
Dept: OUTPATIENT SERVICES | Facility: HOSPITAL | Age: 56
LOS: 1 days | End: 2021-12-29
Payer: COMMERCIAL

## 2021-12-29 ENCOUNTER — APPOINTMENT (OUTPATIENT)
Dept: RADIOLOGY | Facility: CLINIC | Age: 56
End: 2021-12-29
Payer: COMMERCIAL

## 2021-12-29 DIAGNOSIS — Z98.891 HISTORY OF UTERINE SCAR FROM PREVIOUS SURGERY: Chronic | ICD-10-CM

## 2021-12-29 DIAGNOSIS — Z98.890 OTHER SPECIFIED POSTPROCEDURAL STATES: Chronic | ICD-10-CM

## 2021-12-29 DIAGNOSIS — Z00.8 ENCOUNTER FOR OTHER GENERAL EXAMINATION: ICD-10-CM

## 2021-12-29 PROCEDURE — 71046 X-RAY EXAM CHEST 2 VIEWS: CPT | Mod: 26

## 2021-12-29 PROCEDURE — 71046 X-RAY EXAM CHEST 2 VIEWS: CPT

## 2021-12-29 NOTE — PLAN
[FreeTextEntry1] : Counseled on symptomatic management (rest, hydrate, Tylenol/Advil as needed, nasal saline spray, warm tea, raw honey). F/U if no improvement noted.\par

## 2021-12-29 NOTE — HISTORY OF PRESENT ILLNESS
[Home] : at home, [unfilled] , at the time of the visit. [Medical Office: ([facility])___] : at the medical office located in  [Verbal consent obtained from patient] : the patient, [unfilled] [FreeTextEntry8] : Patient presenting over telehealth with respiratory symptoms. \par Ongoing fevers for 1 week. Max Temp 102. Has taken Motrin. Wet cough. No chest pain or shortness of breath \par Not vaccinated for COVID. Tested negative for COVID. Patient quit smoking 8 years ago.

## 2021-12-29 NOTE — PHYSICAL EXAM
[No JVD] : no jugular venous distention [No Lymphadenopathy] : no lymphadenopathy [No Respiratory Distress] : no respiratory distress  [No Accessory Muscle Use] : no accessory muscle use [Normal] : affect was normal and insight and judgment were intact

## 2021-12-30 NOTE — PROGRESS NOTE BEHAVIORAL HEALTH - NSBHFUPTYPE_PSY_A_CORE
Inpatient
Inpatient-On Service Note
Inpatient
soft/nontender/no distention/no masses palpable/bowel sounds normal

## 2022-01-01 NOTE — DISCHARGE NOTE PROVIDER - NSDCCPCAREPLAN_GEN_ALL_CORE_FT
Circumcision wnl, post cares discussed with mother, hand out is given and explained; discharge education completed, follow up discussed in 7 days with PCP, questions answered. Parents got an Rx for donor milk and will by buying some and supplementing until mother's milk comes in.   PRINCIPAL DISCHARGE DIAGNOSIS  Diagnosis: Drug overdose  Assessment and Plan of Treatment: abstinence      SECONDARY DISCHARGE DIAGNOSES  Diagnosis: Attempted suicide  Assessment and Plan of Treatment: psychiatric counselling and admission to psychiatric [facility]    Diagnosis: Delirium tremens  Assessment and Plan of Treatment: abstinence from alcohol    Diagnosis: Alcohol withdrawal  Assessment and Plan of Treatment: abstinence from alcohol    Diagnosis: Alcohol intoxication  Assessment and Plan of Treatment: abstinence from alcohol    Diagnosis: Anticholinergic syndrome  Assessment and Plan of Treatment:

## 2022-01-04 ENCOUNTER — NON-APPOINTMENT (OUTPATIENT)
Age: 57
End: 2022-01-04

## 2022-01-10 NOTE — PROGRESS NOTE BEHAVIORAL HEALTH - NSBHFUPVIOL_PSY_A_CORE
January 10, 2022     Patient: Kush Wallace   YOB: 1975   Date of Visit: 1/10/2022       To Whom it May Concern:    Kush Wallace was seen in my clinic on 1/10/2022 at 3:00 pm.    Please excuse Kush for his absence from work on the date listed above to be able to make his appointment.    Per CDC guidelines, it is recommended that they remain off work until:    24 hours have passed since last fever without the use of fever-reducing medications and respiratory and other symptoms have improved (e.g. cough, shortness of breath) and 5 days since symptoms first appeared.     The earliest return to work date is 01/14/2022  but may change based on results of COVID-19 test if it was completed.       Sincerely,         Yuli Hernandez CNP    Medical information is confidential and cannot be disclosed without the written consent of the patient or his representative.      
none known
none known

## 2022-01-24 RX ORDER — AZITHROMYCIN 500 MG/1
0 TABLET, FILM COATED ORAL
Qty: 0 | Refills: 0 | DISCHARGE
Start: 2022-01-24 | End: 2022-01-28

## 2022-01-25 ENCOUNTER — OUTPATIENT (OUTPATIENT)
Dept: OUTPATIENT SERVICES | Facility: HOSPITAL | Age: 57
LOS: 1 days | End: 2022-01-25
Payer: COMMERCIAL

## 2022-01-25 ENCOUNTER — APPOINTMENT (OUTPATIENT)
Dept: RADIOLOGY | Facility: CLINIC | Age: 57
End: 2022-01-25
Payer: COMMERCIAL

## 2022-01-25 DIAGNOSIS — Z98.891 HISTORY OF UTERINE SCAR FROM PREVIOUS SURGERY: Chronic | ICD-10-CM

## 2022-01-25 DIAGNOSIS — Z98.890 OTHER SPECIFIED POSTPROCEDURAL STATES: Chronic | ICD-10-CM

## 2022-01-25 DIAGNOSIS — Z20.828 CONTACT WITH AND (SUSPECTED) EXPOSURE TO OTHER VIRAL COMMUNICABLE DISEASES: ICD-10-CM

## 2022-01-25 PROCEDURE — 71046 X-RAY EXAM CHEST 2 VIEWS: CPT

## 2022-01-25 PROCEDURE — 71046 X-RAY EXAM CHEST 2 VIEWS: CPT | Mod: 26

## 2022-02-04 ENCOUNTER — INPATIENT (INPATIENT)
Facility: HOSPITAL | Age: 57
LOS: 3 days | Discharge: HOME | DRG: 432 | End: 2022-02-08
Attending: INTERNAL MEDICINE | Admitting: FAMILY MEDICINE
Payer: COMMERCIAL

## 2022-02-04 VITALS
TEMPERATURE: 98 F | SYSTOLIC BLOOD PRESSURE: 127 MMHG | RESPIRATION RATE: 16 BRPM | OXYGEN SATURATION: 98 % | HEART RATE: 113 BPM | DIASTOLIC BLOOD PRESSURE: 76 MMHG

## 2022-02-04 DIAGNOSIS — Z98.890 OTHER SPECIFIED POSTPROCEDURAL STATES: Chronic | ICD-10-CM

## 2022-02-04 DIAGNOSIS — D64.9 ANEMIA, UNSPECIFIED: ICD-10-CM

## 2022-02-04 DIAGNOSIS — Z98.891 HISTORY OF UTERINE SCAR FROM PREVIOUS SURGERY: Chronic | ICD-10-CM

## 2022-02-04 LAB
ADD ON TEST-SPECIMEN IN LAB: SIGNIFICANT CHANGE UP
ALBUMIN SERPL ELPH-MCNC: 2 G/DL — LOW (ref 3.3–5)
ALP SERPL-CCNC: 615 U/L — HIGH (ref 40–120)
ALT FLD-CCNC: 33 U/L — SIGNIFICANT CHANGE UP (ref 12–78)
ANION GAP SERPL CALC-SCNC: 8 MMOL/L — SIGNIFICANT CHANGE UP (ref 5–17)
APPEARANCE UR: CLEAR — SIGNIFICANT CHANGE UP
APTT BLD: 41 SEC — HIGH (ref 27.5–35.5)
AST SERPL-CCNC: 148 U/L — HIGH (ref 15–37)
BACTERIA # UR AUTO: NEGATIVE — SIGNIFICANT CHANGE UP
BASOPHILS # BLD AUTO: 0.07 K/UL — SIGNIFICANT CHANGE UP (ref 0–0.2)
BASOPHILS NFR BLD AUTO: 0.6 % — SIGNIFICANT CHANGE UP (ref 0–2)
BILIRUB SERPL-MCNC: 1.5 MG/DL — HIGH (ref 0.2–1.2)
BILIRUB UR-MCNC: NEGATIVE — SIGNIFICANT CHANGE UP
BUN SERPL-MCNC: 5 MG/DL — LOW (ref 7–23)
CALCIUM SERPL-MCNC: 8.4 MG/DL — LOW (ref 8.5–10.1)
CHLORIDE SERPL-SCNC: 103 MMOL/L — SIGNIFICANT CHANGE UP (ref 96–108)
CO2 SERPL-SCNC: 24 MMOL/L — SIGNIFICANT CHANGE UP (ref 22–31)
COLOR SPEC: YELLOW — SIGNIFICANT CHANGE UP
CREAT SERPL-MCNC: 0.46 MG/DL — LOW (ref 0.5–1.3)
DIFF PNL FLD: ABNORMAL
EOSINOPHIL # BLD AUTO: 0.1 K/UL — SIGNIFICANT CHANGE UP (ref 0–0.5)
EOSINOPHIL NFR BLD AUTO: 0.9 % — SIGNIFICANT CHANGE UP (ref 0–6)
EPI CELLS # UR: SIGNIFICANT CHANGE UP
FLUAV AG NPH QL: SIGNIFICANT CHANGE UP
FLUBV AG NPH QL: SIGNIFICANT CHANGE UP
GLUCOSE SERPL-MCNC: 117 MG/DL — HIGH (ref 70–99)
GLUCOSE UR QL: NEGATIVE — SIGNIFICANT CHANGE UP
HCT VFR BLD CALC: 23.6 % — LOW (ref 34.5–45)
HCT VFR BLD CALC: 24.6 % — LOW (ref 34.5–45)
HGB BLD-MCNC: 6.9 G/DL — CRITICAL LOW (ref 11.5–15.5)
HGB BLD-MCNC: 7.2 G/DL — LOW (ref 11.5–15.5)
IMM GRANULOCYTES NFR BLD AUTO: 0.5 % — SIGNIFICANT CHANGE UP (ref 0–1.5)
INR BLD: 1.18 RATIO — HIGH (ref 0.88–1.16)
KETONES UR-MCNC: NEGATIVE — SIGNIFICANT CHANGE UP
LACTATE SERPL-SCNC: 1.2 MMOL/L — SIGNIFICANT CHANGE UP (ref 0.7–2)
LEUKOCYTE ESTERASE UR-ACNC: ABNORMAL
LIDOCAIN IGE QN: 266 U/L — SIGNIFICANT CHANGE UP (ref 73–393)
LYMPHOCYTES # BLD AUTO: 1.35 K/UL — SIGNIFICANT CHANGE UP (ref 1–3.3)
LYMPHOCYTES # BLD AUTO: 12.1 % — LOW (ref 13–44)
MCHC RBC-ENTMCNC: 29.2 GM/DL — LOW (ref 32–36)
MCHC RBC-ENTMCNC: 29.3 GM/DL — LOW (ref 32–36)
MCHC RBC-ENTMCNC: 29.7 PG — SIGNIFICANT CHANGE UP (ref 27–34)
MCHC RBC-ENTMCNC: 29.8 PG — SIGNIFICANT CHANGE UP (ref 27–34)
MCV RBC AUTO: 101.7 FL — HIGH (ref 80–100)
MCV RBC AUTO: 101.7 FL — HIGH (ref 80–100)
MONOCYTES # BLD AUTO: 0.98 K/UL — HIGH (ref 0–0.9)
MONOCYTES NFR BLD AUTO: 8.8 % — SIGNIFICANT CHANGE UP (ref 2–14)
NEUTROPHILS # BLD AUTO: 8.6 K/UL — HIGH (ref 1.8–7.4)
NEUTROPHILS NFR BLD AUTO: 77.1 % — HIGH (ref 43–77)
NITRITE UR-MCNC: NEGATIVE — SIGNIFICANT CHANGE UP
PH UR: 8 — SIGNIFICANT CHANGE UP (ref 5–8)
PLATELET # BLD AUTO: 205 K/UL — SIGNIFICANT CHANGE UP (ref 150–400)
PLATELET # BLD AUTO: 223 K/UL — SIGNIFICANT CHANGE UP (ref 150–400)
POTASSIUM SERPL-MCNC: 3.5 MMOL/L — SIGNIFICANT CHANGE UP (ref 3.5–5.3)
POTASSIUM SERPL-SCNC: 3.5 MMOL/L — SIGNIFICANT CHANGE UP (ref 3.5–5.3)
PROT SERPL-MCNC: 7.2 GM/DL — SIGNIFICANT CHANGE UP (ref 6–8.3)
PROT UR-MCNC: SIGNIFICANT CHANGE UP
PROTHROM AB SERPL-ACNC: 13.7 SEC — HIGH (ref 10.6–13.6)
RBC # BLD: 2.32 M/UL — LOW (ref 3.8–5.2)
RBC # BLD: 2.42 M/UL — LOW (ref 3.8–5.2)
RBC # FLD: 17.3 % — HIGH (ref 10.3–14.5)
RBC # FLD: 17.4 % — HIGH (ref 10.3–14.5)
RBC CASTS # UR COMP ASSIST: SIGNIFICANT CHANGE UP /HPF (ref 0–4)
RSV RNA NPH QL NAA+NON-PROBE: SIGNIFICANT CHANGE UP
SARS-COV-2 RNA SPEC QL NAA+PROBE: SIGNIFICANT CHANGE UP
SODIUM SERPL-SCNC: 135 MMOL/L — SIGNIFICANT CHANGE UP (ref 135–145)
SP GR SPEC: 1.01 — SIGNIFICANT CHANGE UP (ref 1.01–1.02)
UROBILINOGEN FLD QL: 1
WBC # BLD: 11.16 K/UL — HIGH (ref 3.8–10.5)
WBC # BLD: 11.75 K/UL — HIGH (ref 3.8–10.5)
WBC # FLD AUTO: 11.16 K/UL — HIGH (ref 3.8–10.5)
WBC # FLD AUTO: 11.75 K/UL — HIGH (ref 3.8–10.5)
WBC UR QL: SIGNIFICANT CHANGE UP

## 2022-02-04 PROCEDURE — 82728 ASSAY OF FERRITIN: CPT

## 2022-02-04 PROCEDURE — 36415 COLL VENOUS BLD VENIPUNCTURE: CPT

## 2022-02-04 PROCEDURE — 88312 SPECIAL STAINS GROUP 1: CPT

## 2022-02-04 PROCEDURE — 86708 HEPATITIS A ANTIBODY: CPT

## 2022-02-04 PROCEDURE — 86923 COMPATIBILITY TEST ELECTRIC: CPT

## 2022-02-04 PROCEDURE — 85027 COMPLETE CBC AUTOMATED: CPT

## 2022-02-04 PROCEDURE — 99223 1ST HOSP IP/OBS HIGH 75: CPT

## 2022-02-04 PROCEDURE — 83550 IRON BINDING TEST: CPT

## 2022-02-04 PROCEDURE — 86900 BLOOD TYPING SEROLOGIC ABO: CPT

## 2022-02-04 PROCEDURE — 74177 CT ABD & PELVIS W/CONTRAST: CPT | Mod: 26,MA

## 2022-02-04 PROCEDURE — 87635 SARS-COV-2 COVID-19 AMP PRB: CPT

## 2022-02-04 PROCEDURE — 87040 BLOOD CULTURE FOR BACTERIA: CPT

## 2022-02-04 PROCEDURE — 86901 BLOOD TYPING SEROLOGIC RH(D): CPT

## 2022-02-04 PROCEDURE — 82607 VITAMIN B-12: CPT

## 2022-02-04 PROCEDURE — 86704 HEP B CORE ANTIBODY TOTAL: CPT

## 2022-02-04 PROCEDURE — 82746 ASSAY OF FOLIC ACID SERUM: CPT

## 2022-02-04 PROCEDURE — 76705 ECHO EXAM OF ABDOMEN: CPT | Mod: 26

## 2022-02-04 PROCEDURE — 84443 ASSAY THYROID STIM HORMONE: CPT

## 2022-02-04 PROCEDURE — 86255 FLUORESCENT ANTIBODY SCREEN: CPT

## 2022-02-04 PROCEDURE — 86706 HEP B SURFACE ANTIBODY: CPT

## 2022-02-04 PROCEDURE — 86850 RBC ANTIBODY SCREEN: CPT

## 2022-02-04 PROCEDURE — 80053 COMPREHEN METABOLIC PANEL: CPT

## 2022-02-04 PROCEDURE — 80048 BASIC METABOLIC PNL TOTAL CA: CPT

## 2022-02-04 PROCEDURE — 93010 ELECTROCARDIOGRAM REPORT: CPT

## 2022-02-04 PROCEDURE — 84466 ASSAY OF TRANSFERRIN: CPT

## 2022-02-04 PROCEDURE — 88305 TISSUE EXAM BY PATHOLOGIST: CPT

## 2022-02-04 PROCEDURE — 74183 MRI ABD W/O CNTR FLWD CNTR: CPT

## 2022-02-04 PROCEDURE — 87493 C DIFF AMPLIFIED PROBE: CPT

## 2022-02-04 PROCEDURE — 83540 ASSAY OF IRON: CPT

## 2022-02-04 PROCEDURE — 82105 ALPHA-FETOPROTEIN SERUM: CPT

## 2022-02-04 PROCEDURE — 87507 IADNA-DNA/RNA PROBE TQ 12-25: CPT

## 2022-02-04 PROCEDURE — 99285 EMERGENCY DEPT VISIT HI MDM: CPT

## 2022-02-04 PROCEDURE — 36430 TRANSFUSION BLD/BLD COMPNT: CPT

## 2022-02-04 PROCEDURE — 87086 URINE CULTURE/COLONY COUNT: CPT

## 2022-02-04 PROCEDURE — 81001 URINALYSIS AUTO W/SCOPE: CPT

## 2022-02-04 PROCEDURE — A9579: CPT

## 2022-02-04 PROCEDURE — 83605 ASSAY OF LACTIC ACID: CPT

## 2022-02-04 PROCEDURE — P9016: CPT

## 2022-02-04 RX ORDER — CHOLECALCIFEROL (VITAMIN D3) 125 MCG
2000 CAPSULE ORAL DAILY
Refills: 0 | Status: DISCONTINUED | OUTPATIENT
Start: 2022-02-04 | End: 2022-02-08

## 2022-02-04 RX ORDER — ATENOLOL 25 MG/1
25 TABLET ORAL DAILY
Refills: 0 | Status: DISCONTINUED | OUTPATIENT
Start: 2022-02-04 | End: 2022-02-08

## 2022-02-04 RX ORDER — ONDANSETRON 8 MG/1
4 TABLET, FILM COATED ORAL EVERY 8 HOURS
Refills: 0 | Status: DISCONTINUED | OUTPATIENT
Start: 2022-02-04 | End: 2022-02-08

## 2022-02-04 RX ORDER — LANOLIN ALCOHOL/MO/W.PET/CERES
3 CREAM (GRAM) TOPICAL AT BEDTIME
Refills: 0 | Status: DISCONTINUED | OUTPATIENT
Start: 2022-02-04 | End: 2022-02-08

## 2022-02-04 RX ORDER — VENLAFAXINE HCL 75 MG
150 CAPSULE, EXT RELEASE 24 HR ORAL DAILY
Refills: 0 | Status: DISCONTINUED | OUTPATIENT
Start: 2022-02-04 | End: 2022-02-08

## 2022-02-04 RX ORDER — FERROUS SULFATE 325(65) MG
325 TABLET ORAL DAILY
Refills: 0 | Status: DISCONTINUED | OUTPATIENT
Start: 2022-02-04 | End: 2022-02-06

## 2022-02-04 RX ORDER — ENOXAPARIN SODIUM 100 MG/ML
40 INJECTION SUBCUTANEOUS DAILY
Refills: 0 | Status: DISCONTINUED | OUTPATIENT
Start: 2022-02-04 | End: 2022-02-04

## 2022-02-04 NOTE — ED ADULT TRIAGE NOTE - CHIEF COMPLAINT QUOTE
pt presents to ED due top distended abdomen elevated bilirubin sent by her MD pt states she stopped drinking 2 years ago was dx with COVID in december and has been sick since

## 2022-02-04 NOTE — ED ADULT NURSE REASSESSMENT NOTE - NS ED NURSE REASSESS COMMENT FT1
Received pt from prior RN, pt is a&0x4, airway patent, does not show any s/s of respiratory distress, breathing is unlabored, color Is pink but sclera around the eyes is yellow, pt abd is distended pt reports abd distention for "2 months", GCS is 15, vs are WNL, cardiac monitor reads NS

## 2022-02-04 NOTE — ED ADULT NURSE REASSESSMENT NOTE - NS ED NURSE REASSESS COMMENT FT1
Break coverage for RN with pt alert and oriented and awaiting further dispo at this time.  Denies any needs at this time.

## 2022-02-04 NOTE — H&P ADULT - NSHPSOCIALHISTORY_GEN_ALL_CORE
Lives at home  Former smoker  Former EtOH abuse Lives at home  Smokes a few cigarettes weekly  Former EtOH abuse for many years, denies current use Lives at home    Smokes a few cigarettes weekly  Former EtOH abuse for many years, denies current use

## 2022-02-04 NOTE — ED PROVIDER NOTE - CARE PLAN
1 Principal Discharge DX:	Anemia  Secondary Diagnosis:	Cirrhosis  Secondary Diagnosis:	Cholelithiasis  Secondary Diagnosis:	Abdominal pain

## 2022-02-04 NOTE — H&P ADULT - NSICDXPASTMEDICALHX_GEN_ALL_CORE_FT
PAST MEDICAL HISTORY:  Alcohol abuse     Depression     Hemorrhoids     History of basal cell carcinoma excision     Squamous cell skin cancer     Withdrawal seizures      PAST MEDICAL HISTORY:  2019 novel coronavirus disease (COVID-19)     Alcohol abuse     Depression     Hemorrhoids     History of basal cell carcinoma excision     Squamous cell skin cancer     Withdrawal seizures

## 2022-02-04 NOTE — ED PROVIDER NOTE - OBJECTIVE STATEMENT
55 y/o female with PMHx of squamous cell skin CA, withdraw seizures, depression, hemorrhoids, EtOH abuse presents to the ED for abd pain. Pt tested positive for COVID on 12/29/21 and was put on cpap and steroids for cough. Pt went to f/u with PCP Dr. Grijalva today . Pt states she has diarrhea, distended abd, decreased PO intake, and nausea. Pt quit EtOH use 2 years ago. Pt denies any hx of cirrhosis. Pt had a CT scan done which showed a fatty liver and UA which showed bilirubin present. No other complaints at this time. 57 y/o female with PMHx of squamous cell skin CA, withdraw seizures, depression, hemorrhoids, EtOH abuse presents to the ED for abd pain. Pt tested positive for COVID on 12/29/21 and was put on zpack and steroids for cough. Pt went to f/u with PCP Dr. Grijalva today . Pt states she has diarrhea, distended abd, decreased PO intake, and nausea. Pt quit EtOH use 2 years ago. Pt denies any hx of cirrhosis. No other complaints at this time.

## 2022-02-04 NOTE — H&P ADULT - HISTORY OF PRESENT ILLNESS
55 y/o F PMHx significant for alcohol abuse (c/b withdrawal seizures), basal cell carcinoma, depression, hemorrhoids (s/p hemorrhoidectomy), squamous cell skin cancer, and COVID-19 diagnosed on 12/29/2021who was referred to  by her PCP for further evaluation and management of abnormal outpatient labs (elevated bilirubin), and ongoing profuse watery diarrhea associated with abdominal distention. Of note the patient recently completed a course of antibiotics => "ZPack and steroids" for outpatient management of a sinus infection. In the ED the patient c/o generalized abdominal distention and mild pain to palpation. She also states that she has had decreased appetite and weight loss over the past few months.  Labs => WBC 11.16, Hgb/Hct 7.2/24.6, , BUN/Cr 5/0.46, Alb 2.0, TBili 1.5, , . CT ABD/Pelvis => Cirrhosis and portal hypertension. Small amount of ascites. Small bilateral pleural effusions.

## 2022-02-04 NOTE — ED PROVIDER NOTE - NS_ATTENDINGSCRIBE_ED_ALL_ED
0 I personally performed the service described in the documentation recorded by the scribe in my presence, and it accurately and completely records my words and actions.

## 2022-02-04 NOTE — H&P ADULT - NSICDXFAMILYHX_GEN_ALL_CORE_FT
FAMILY HISTORY:  Father  Still living? Unknown  Family history of heart attack, Age at diagnosis: Age Unknown    Mother  Still living? Unknown  FH: pancreatic cancer, Age at diagnosis: Age Unknown     FAMILY HISTORY:  Father  Still living? Unknown  Family history of CVA, Age at diagnosis: Age Unknown  Family history of heart attack, Age at diagnosis: Age Unknown    Mother  Still living? Unknown  FH: pancreatic cancer, Age at diagnosis: Age Unknown

## 2022-02-04 NOTE — PATIENT PROFILE ADULT - FALL HARM RISK - HARM RISK INTERVENTIONS

## 2022-02-04 NOTE — ED ADULT NURSE NOTE - OBJECTIVE STATEMENT
Pt presented to the ER with c/o abdominal pain. Pt noted to have a distended abdominal and elevated bilirubin per PCP. Pt stopped drinking 2 years ago. Pt noted to have jaundice in her eyes. Pt denies any SOB, N/V or diarrhea .

## 2022-02-04 NOTE — H&P ADULT - NSHPPHYSICALEXAM_GEN_ALL_CORE
Vital Signs Last 24 Hrs  T(C): 36.9 (04 Feb 2022 14:57), Max: 36.9 (04 Feb 2022 14:57)  T(F): 98.5 (04 Feb 2022 14:57), Max: 98.5 (04 Feb 2022 14:57)  HR: 90 (04 Feb 2022 14:57) (90 - 113)  BP: 103/72 (04 Feb 2022 14:57) (103/72 - 127/76)  BP(mean): 82 (04 Feb 2022 14:57) (82 - 92)  RR: 23 (04 Feb 2022 14:57) (16 - 23)  SpO2: 99% (04 Feb 2022 14:57) (98% - 99%)

## 2022-02-04 NOTE — H&P ADULT - ASSESSMENT
57 y/o F PMHx significant for alcohol abuse (c/b withdrawal seizures), basal cell carcinoma, depression, hemorrhoids (s/p hemorrhoidectomy), squamous cell skin cancer, and COVID-19 diagnosed on 12/29/2021who was referred to  by her PCP for further evaluation and management of abnormal outpatient labs (elevated bilirubin), and ongoing profuse watery diarrhea associated with abdominal distention. Of note the patient recently completed a course of antibiotics => "ZPack and steroids" for outpatient management of a sinus infection. In the ED the patient c/o generalized abdominal distention and mild pain to palpation. She also states that she has had decreased appetite and weight loss over the past few months.    #Abdominal Pain/Distention  #Profuse Watery Diarrhea  ~admit to Medicine  ~f/u w/ GI evaluation   ~contact isolation  ~f/u stool studies including c. diff, and GI PCR  ~f/u PAN C+S    #Acute Anemia   ~patient with Hemorrhoidal Bleeding  ~Surgery consultation appreciated  ~serial CBCs  ~transfuse prn    #Depression  ~cont. Effexor XR 150mg po daily    #Hypertension  ~cont. Atenolol 25mg po daily    #Vte ppx  ~SCDs for now as patient w/ notable anemia

## 2022-02-04 NOTE — ED PROVIDER NOTE - NSICDXPASTMEDICALHX_GEN_ALL_CORE_FT
PAST MEDICAL HISTORY:  Alcohol abuse     Depression     History of basal cell carcinoma excision     Squamous cell skin cancer     Withdrawal seizures       Hemorrhoids

## 2022-02-05 LAB
ALBUMIN SERPL ELPH-MCNC: 1.8 G/DL — LOW (ref 3.3–5)
ALP SERPL-CCNC: 541 U/L — HIGH (ref 40–120)
ALT FLD-CCNC: 29 U/L — SIGNIFICANT CHANGE UP (ref 12–78)
ANION GAP SERPL CALC-SCNC: 6 MMOL/L — SIGNIFICANT CHANGE UP (ref 5–17)
APPEARANCE UR: CLEAR — SIGNIFICANT CHANGE UP
AST SERPL-CCNC: 145 U/L — HIGH (ref 15–37)
BILIRUB SERPL-MCNC: 1.7 MG/DL — HIGH (ref 0.2–1.2)
BILIRUB UR-MCNC: NEGATIVE — SIGNIFICANT CHANGE UP
BUN SERPL-MCNC: 5 MG/DL — LOW (ref 7–23)
C DIFF BY PCR RESULT: SIGNIFICANT CHANGE UP
C DIFF TOX GENS STL QL NAA+PROBE: SIGNIFICANT CHANGE UP
CALCIUM SERPL-MCNC: 8.2 MG/DL — LOW (ref 8.5–10.1)
CHLORIDE SERPL-SCNC: 105 MMOL/L — SIGNIFICANT CHANGE UP (ref 96–108)
CO2 SERPL-SCNC: 25 MMOL/L — SIGNIFICANT CHANGE UP (ref 22–31)
COLOR SPEC: YELLOW — SIGNIFICANT CHANGE UP
CREAT SERPL-MCNC: 0.39 MG/DL — LOW (ref 0.5–1.3)
CULTURE RESULTS: SIGNIFICANT CHANGE UP
CULTURE RESULTS: SIGNIFICANT CHANGE UP
DIFF PNL FLD: ABNORMAL
FERRITIN SERPL-MCNC: 59 NG/ML — SIGNIFICANT CHANGE UP (ref 15–150)
FOLATE SERPL-MCNC: 8.2 NG/ML — SIGNIFICANT CHANGE UP
GLUCOSE SERPL-MCNC: 94 MG/DL — SIGNIFICANT CHANGE UP (ref 70–99)
GLUCOSE UR QL: NEGATIVE — SIGNIFICANT CHANGE UP
HAV IGM SER-ACNC: SIGNIFICANT CHANGE UP
HBV CORE IGM SER-ACNC: SIGNIFICANT CHANGE UP
HBV SURFACE AG SER-ACNC: SIGNIFICANT CHANGE UP
HCT VFR BLD CALC: 24.9 % — LOW (ref 34.5–45)
HCT VFR BLD CALC: 26.2 % — LOW (ref 34.5–45)
HCV AB S/CO SERPL IA: 0.17 S/CO — SIGNIFICANT CHANGE UP (ref 0–0.99)
HCV AB SERPL-IMP: SIGNIFICANT CHANGE UP
HGB BLD-MCNC: 7.6 G/DL — LOW (ref 11.5–15.5)
HGB BLD-MCNC: 8.1 G/DL — LOW (ref 11.5–15.5)
IRON SATN MFR SERPL: 11 % — LOW (ref 14–50)
IRON SATN MFR SERPL: 26 UG/DL — LOW (ref 30–160)
KETONES UR-MCNC: ABNORMAL
LEUKOCYTE ESTERASE UR-ACNC: NEGATIVE — SIGNIFICANT CHANGE UP
MCHC RBC-ENTMCNC: 29.7 PG — SIGNIFICANT CHANGE UP (ref 27–34)
MCHC RBC-ENTMCNC: 30.1 PG — SIGNIFICANT CHANGE UP (ref 27–34)
MCHC RBC-ENTMCNC: 30.5 GM/DL — LOW (ref 32–36)
MCHC RBC-ENTMCNC: 30.9 GM/DL — LOW (ref 32–36)
MCV RBC AUTO: 97.3 FL — SIGNIFICANT CHANGE UP (ref 80–100)
MCV RBC AUTO: 97.4 FL — SIGNIFICANT CHANGE UP (ref 80–100)
NITRITE UR-MCNC: NEGATIVE — SIGNIFICANT CHANGE UP
PH UR: 7 — SIGNIFICANT CHANGE UP (ref 5–8)
PLATELET # BLD AUTO: 177 K/UL — SIGNIFICANT CHANGE UP (ref 150–400)
PLATELET # BLD AUTO: 194 K/UL — SIGNIFICANT CHANGE UP (ref 150–400)
POTASSIUM SERPL-MCNC: 4.3 MMOL/L — SIGNIFICANT CHANGE UP (ref 3.5–5.3)
POTASSIUM SERPL-SCNC: 4.3 MMOL/L — SIGNIFICANT CHANGE UP (ref 3.5–5.3)
PROT SERPL-MCNC: 6.6 GM/DL — SIGNIFICANT CHANGE UP (ref 6–8.3)
PROT UR-MCNC: SIGNIFICANT CHANGE UP
RBC # BLD: 2.56 M/UL — LOW (ref 3.8–5.2)
RBC # BLD: 2.69 M/UL — LOW (ref 3.8–5.2)
RBC # FLD: 18.7 % — HIGH (ref 10.3–14.5)
RBC # FLD: 18.8 % — HIGH (ref 10.3–14.5)
SODIUM SERPL-SCNC: 136 MMOL/L — SIGNIFICANT CHANGE UP (ref 135–145)
SP GR SPEC: 1 — LOW (ref 1.01–1.02)
SPECIMEN SOURCE: SIGNIFICANT CHANGE UP
SPECIMEN SOURCE: SIGNIFICANT CHANGE UP
TIBC SERPL-MCNC: 240 UG/DL — SIGNIFICANT CHANGE UP (ref 220–430)
TRANSFERRIN SERPL-MCNC: 191 MG/DL — LOW (ref 200–360)
TSH SERPL-MCNC: 5 UU/ML — HIGH (ref 0.34–4.82)
UIBC SERPL-MCNC: 215 UG/DL — SIGNIFICANT CHANGE UP (ref 110–370)
UROBILINOGEN FLD QL: 4
VIT B12 SERPL-MCNC: 908 PG/ML — SIGNIFICANT CHANGE UP (ref 232–1245)
WBC # BLD: 10.2 K/UL — SIGNIFICANT CHANGE UP (ref 3.8–10.5)
WBC # BLD: 9.07 K/UL — SIGNIFICANT CHANGE UP (ref 3.8–10.5)
WBC # FLD AUTO: 10.2 K/UL — SIGNIFICANT CHANGE UP (ref 3.8–10.5)
WBC # FLD AUTO: 9.07 K/UL — SIGNIFICANT CHANGE UP (ref 3.8–10.5)

## 2022-02-05 PROCEDURE — 99233 SBSQ HOSP IP/OBS HIGH 50: CPT

## 2022-02-05 PROCEDURE — 99223 1ST HOSP IP/OBS HIGH 75: CPT

## 2022-02-05 RX ORDER — IRON SUCROSE 20 MG/ML
200 INJECTION, SOLUTION INTRAVENOUS
Refills: 0 | Status: DISCONTINUED | OUTPATIENT
Start: 2022-02-05 | End: 2022-02-08

## 2022-02-05 RX ADMIN — Medication 325 MILLIGRAM(S): at 10:30

## 2022-02-05 RX ADMIN — IRON SUCROSE 200 MILLIGRAM(S): 20 INJECTION, SOLUTION INTRAVENOUS at 21:21

## 2022-02-05 RX ADMIN — Medication 3 MILLIGRAM(S): at 21:22

## 2022-02-05 RX ADMIN — ATENOLOL 25 MILLIGRAM(S): 25 TABLET ORAL at 10:30

## 2022-02-05 RX ADMIN — Medication 2000 UNIT(S): at 10:30

## 2022-02-05 RX ADMIN — Medication 150 MILLIGRAM(S): at 10:30

## 2022-02-05 NOTE — DIETITIAN INITIAL EVALUATION ADULT. - ADD RECOMMEND
1) advance po diet to regular when feasible and encourage protein enriched foods with all meals 2) monitor daily weights track trends 3) MVI w/minerals daily and Thiamine, Folic acid for history of ETOH 4) monitor lytes and hydration replete prn 5) Ensure enlive 8oz po TID (in between meals)

## 2022-02-05 NOTE — DIETITIAN INITIAL EVALUATION ADULT. - PERTINENT MEDS FT
MEDICATIONS  (STANDING):  ATENolol  Tablet 25 milliGRAM(s) Oral daily  cholecalciferol 2000 Unit(s) Oral daily  ferrous    sulfate 325 milliGRAM(s) Oral daily  venlafaxine XR. 150 milliGRAM(s) Oral daily    MEDICATIONS  (PRN):  aluminum hydroxide/magnesium hydroxide/simethicone Suspension 30 milliLiter(s) Oral every 4 hours PRN Dyspepsia  artificial  tears Solution 1 Drop(s) Both EYES four times a day PRN Dry Eyes  melatonin 3 milliGRAM(s) Oral at bedtime PRN Insomnia  ondansetron Injectable 4 milliGRAM(s) IV Push every 8 hours PRN Nausea and/or Vomiting

## 2022-02-05 NOTE — DIETITIAN NUTRITION RISK NOTIFICATION - ADDITIONAL COMMENTS/DIETITIAN RECOMMENDATIONS
· Additional Recommendations  1) advance po diet to regular when feasible and encourage protein enriched foods with all meals 2) monitor daily weights track trends 3) MVI w/minerals daily and Thiamine, Folic acid for history of ETOH 4) monitor lytes and hydration replete prn 5) Ensure enlive 8oz po TID (in between meals)

## 2022-02-05 NOTE — DIETITIAN INITIAL EVALUATION ADULT. - MALNUTRITION
Severe protein-calorie malnutrition in context of acute illness related to inability to consume adequate nutrition to meet needs 2/2 altered GI AEB significant weight loss x 2 months (9%) and po intake <50% x > 5 days

## 2022-02-05 NOTE — DIETITIAN INITIAL EVALUATION ADULT. - PHYSCIAL ASSESSMENT
underweight Jesus scale- 22  Skin intact  Last BM documented- none since admission x 1 day (no bowel meds at this time)

## 2022-02-05 NOTE — PROGRESS NOTE ADULT - SUBJECTIVE AND OBJECTIVE BOX
History of Present Illness:   57 y/o F PMHx significant for alcohol abuse (c/b withdrawal seizures), basal cell carcinoma, depression, hemorrhoids (s/p hemorrhoidectomy), squamous cell skin cancer, cirrhosis, and COVID-19 diagnosed on 12/29/2021who was referred to  by her PCP for further evaluation and management of abnormal outpatient labs (elevated bilirubin), and ongoing profuse watery diarrhea associated with abdominal distention. Of note the patient recently completed a course of antibiotics => "ZPack and steroids" for outpatient management of a sinus infection. In the ED the patient c/o generalized abdominal distention and mild pain to palpation. She also states that she has had decreased appetite and weight loss over the past few months.  Labs => WBC 11.16, Hgb/Hct 7.2/24.6, , BUN/Cr 5/0.46, Alb 2.0, TBili 1.5, , .   CT ABD/Pelvis => Cirrhosis and portal hypertension. Small amount of ascites. Small bilateral pleural effusions.      2.5: no distress, diarrhea better today               REVIEW OF SYSTEMS:    CONSTITUTIONAL: No weakness, No fevers or chills  ENT: No ear ache, No sorethroat  NECK: No pain, No stiffness  RESPIRATORY: No cough, No wheezing, No hemoptysis; No dyspnea  CARDIOVASCULAR: No chest pain, No palpitations  GASTROINTESTINAL: No abd pain, No nausea, No vomiting, No hematemesis, No diarrhea or constipation. No melena, No hematochezia.  GENITOURINARY: No dysuria, No  hematuria  NEUROLOGICAL: No diplopia, No paresthesia, No motor dysfunction  MUSCULOSKELETAL: No arthralgia, No myalgia  SKIN: No rashes, or lesions   PSYCH: no anxiety, no suicidal ideation    All other review of systems is negative unless indicated above    Vital Signs Last 24 Hrs  T(C): 37 (05 Feb 2022 10:12), Max: 37.4 (05 Feb 2022 00:17)  T(F): 98.6 (05 Feb 2022 10:12), Max: 99.4 (05 Feb 2022 00:17)  HR: 101 (05 Feb 2022 10:12) (88 - 101)  BP: 108/63 (05 Feb 2022 10:12) (102/64 - 115/73)  BP(mean): 85 (04 Feb 2022 21:41) (82 - 85)  RR: 18 (05 Feb 2022 10:12) (18 - 23)  SpO2: 96% (05 Feb 2022 10:12) (96% - 99%)    PHYSICAL EXAM:    GENERAL: NAD  HEENT:  NC/AT, EOMI, PERRLA, No scleral icterus, Moist mucous membranes  NECK: Supple, No JVD  CNS:  Alert & Oriented X3, Motor Strength 5/5 B/L upper and lower extremities; DTRs 2+ intact   LUNG: Normal Breath sounds, Clear to auscultation bilaterally, No rales, No rhonchi, No wheezing  HEART: RRR; No murmurs, No rubs  ABDOMEN: +BS, ST/ND/NT  GENITOURINARY: Voiding, Bladder not distended  EXTREMITIES:  2+ Peripheral Pulses, No clubbing, No cyanosis, No tibial edema  MUSCULOSKELTAL: Joints normal ROM, No TTP, No effusion  VAGINAL: deferred  SKIN: no rashes  RECTAL: deferred, not indicated  BREAST: deferred                          8.1    10.20 )-----------( 194      ( 05 Feb 2022 06:30 )             26.2     02-05    136  |  105  |  5<L>  ----------------------------<  94  4.3   |  25  |  0.39<L>    Ca    8.2<L>      05 Feb 2022 06:30    TPro  6.6  /  Alb  1.8<L>  /  TBili  1.7<H>  /  DBili  x   /  AST  145<H>  /  ALT  29  /  AlkPhos  541<H>  02-05    Vancomycin levels:   Cultures:     MEDICATIONS  (STANDING):  ATENolol  Tablet 25 milliGRAM(s) Oral daily  cholecalciferol 2000 Unit(s) Oral daily  ferrous    sulfate 325 milliGRAM(s) Oral daily  venlafaxine XR. 150 milliGRAM(s) Oral daily    MEDICATIONS  (PRN):  aluminum hydroxide/magnesium hydroxide/simethicone Suspension 30 milliLiter(s) Oral every 4 hours PRN Dyspepsia  artificial  tears Solution 1 Drop(s) Both EYES four times a day PRN Dry Eyes  melatonin 3 milliGRAM(s) Oral at bedtime PRN Insomnia  ondansetron Injectable 4 milliGRAM(s) IV Push every 8 hours PRN Nausea and/or Vomiting      all labs reviewed  all imaging reviewed    A/P:      #Abdominal Pain/Distention:  likely due to cirrhosis/ascites    #Diarrhea: improving  f/u Cdiff, f/u Stool Cx    #Acute Anemia   ~patient with Hemorrhoidal Bleeding  ~Surgery consultation appreciated  ~serial CBCs  ~transfuse prn  Hb stable    #Depression  ~cont. Effexor XR 150mg po daily    #Hypertension  ~cont. Atenolol 25mg po daily    #Vte ppx  ~SCDs for now as patient w/ notable anemia   History of Present Illness:   57 y/o F PMHx significant for alcohol abuse (c/b withdrawal seizures), basal cell carcinoma, depression, hemorrhoids (s/p hemorrhoidectomy), squamous cell skin cancer, cirrhosis, and COVID-19 diagnosed on 12/29/2021who was referred to  by her PCP for further evaluation and management of abnormal outpatient labs (elevated bilirubin), and ongoing profuse watery diarrhea associated with abdominal distention. Of note the patient recently completed a course of antibiotics => "ZPack and steroids" for outpatient management of a sinus infection. In the ED the patient c/o generalized abdominal distention and mild pain to palpation. She also states that she has had decreased appetite and weight loss over the past few months.  Labs => WBC 11.16, Hgb/Hct 7.2/24.6, , BUN/Cr 5/0.46, Alb 2.0, TBili 1.5, , .   CT ABD/Pelvis => Cirrhosis and portal hypertension. Small amount of ascites. Small bilateral pleural effusions.      2.5: no distress, diarrhea better today               REVIEW OF SYSTEMS:    CONSTITUTIONAL: No weakness, No fevers or chills  ENT: No ear ache, No sorethroat  NECK: No pain, No stiffness  RESPIRATORY: No cough, No wheezing, No hemoptysis; No dyspnea  CARDIOVASCULAR: No chest pain, No palpitations  GASTROINTESTINAL: No abd pain, No nausea, No vomiting, No hematemesis, No diarrhea or constipation. No melena, No hematochezia.  GENITOURINARY: No dysuria, No  hematuria  NEUROLOGICAL: No diplopia, No paresthesia, No motor dysfunction  MUSCULOSKELETAL: No arthralgia, No myalgia  SKIN: No rashes, or lesions   PSYCH: no anxiety, no suicidal ideation    All other review of systems is negative unless indicated above    Vital Signs Last 24 Hrs  T(C): 37 (05 Feb 2022 10:12), Max: 37.4 (05 Feb 2022 00:17)  T(F): 98.6 (05 Feb 2022 10:12), Max: 99.4 (05 Feb 2022 00:17)  HR: 101 (05 Feb 2022 10:12) (88 - 101)  BP: 108/63 (05 Feb 2022 10:12) (102/64 - 115/73)  BP(mean): 85 (04 Feb 2022 21:41) (82 - 85)  RR: 18 (05 Feb 2022 10:12) (18 - 23)  SpO2: 96% (05 Feb 2022 10:12) (96% - 99%)    PHYSICAL EXAM:    GENERAL: NAD  HEENT:  NC/AT, EOMI, PERRLA, No scleral icterus, Moist mucous membranes  NECK: Supple, No JVD  CNS:  Alert & Oriented X3, Motor Strength 5/5 B/L upper and lower extremities; DTRs 2+ intact   LUNG: Normal Breath sounds, Clear to auscultation bilaterally, No rales, No rhonchi, No wheezing  HEART: RRR; No murmurs, No rubs  ABDOMEN: +BS, ST/ND/NT  GENITOURINARY: Voiding, Bladder not distended  EXTREMITIES:  2+ Peripheral Pulses, No clubbing, No cyanosis, No tibial edema  MUSCULOSKELTAL: Joints normal ROM, No TTP, No effusion  VAGINAL: deferred  SKIN: no rashes  RECTAL: deferred, not indicated  BREAST: deferred                          8.1    10.20 )-----------( 194      ( 05 Feb 2022 06:30 )             26.2     02-05    136  |  105  |  5<L>  ----------------------------<  94  4.3   |  25  |  0.39<L>    Ca    8.2<L>      05 Feb 2022 06:30    TPro  6.6  /  Alb  1.8<L>  /  TBili  1.7<H>  /  DBili  x   /  AST  145<H>  /  ALT  29  /  AlkPhos  541<H>  02-05    Vancomycin levels:   Cultures:     MEDICATIONS  (STANDING):  ATENolol  Tablet 25 milliGRAM(s) Oral daily  cholecalciferol 2000 Unit(s) Oral daily  ferrous    sulfate 325 milliGRAM(s) Oral daily  venlafaxine XR. 150 milliGRAM(s) Oral daily    MEDICATIONS  (PRN):  aluminum hydroxide/magnesium hydroxide/simethicone Suspension 30 milliLiter(s) Oral every 4 hours PRN Dyspepsia  artificial  tears Solution 1 Drop(s) Both EYES four times a day PRN Dry Eyes  melatonin 3 milliGRAM(s) Oral at bedtime PRN Insomnia  ondansetron Injectable 4 milliGRAM(s) IV Push every 8 hours PRN Nausea and/or Vomiting      all labs reviewed  all imaging reviewed    A/P:      #Abdominal Pain/Distention:  likely due to cirrhosis/ascites    #Diarrhea: improving  f/u Cdiff, f/u Stool Cx    #Acute Anemia of blood loss  Iron def anemia  ~patient with Hemorrhoidal Bleeding  s/p 2u RBC  ~Surgery consultation appreciated  ~serial CBCs  ~transfuse prn  Hb stable  start IV Iron supplements    # Severe protein calorie malnutrition   #Depression  ~cont. Effexor XR 150mg po daily    #Hypertension  ~cont. Atenolol 25mg po daily    #Vte ppx  ~SCDs for now as patient w/ notable anemia

## 2022-02-05 NOTE — CONSULT NOTE ADULT - SUBJECTIVE AND OBJECTIVE BOX
Pt is a 56F presenting with a PMH of ETOH abuse, depression, Anemia, + COVID , recent sinus infection ,  CC of Abd pain and distension > 2 mos. Pt referred To ED from her primary care MD who she saw today. + diarrhea. No Fever, Pt often feels cold/chills since she has been anemic. Pain is a dull pain across upper abdomen R> L. Decreased appetite and weight loss over the past few mos + cough and solid food intolerance limiting diet to primarily protein shakes and liquids. No CP or dyspnea. Pt saw Dr Byers for ? tachycardia and place  did not follow up due to COVID related illness.     Allergies:    Zithromax (Unknown)    Intolerances    morphine (Nausea)      PAST MEDICAL & SURGICAL HISTORY:  Alcohol abuse    Depression    Withdrawal seizures    History of basal cell carcinoma excision    Squamous cell skin cancer    Hemorrhoids    History of ear, nose, and throat (ENT) surgery    H/O basal cell carcinoma excision    H/O:     Home Medications:  atenolol 25 mg oral tablet: 1 tab(s) orally once a day (2022 17:24)  Azithromycin 5 Day Dose Pack 250 mg oral tablet: ******Course Completed**** (2022 17:24)  cholecalciferol 50 mcg (2000 intl units) oral tablet: 1 tab(s) orally once a day (2022 17:24)  ferrous sulfate 325 mg (65 mg elemental iron) oral tablet: 1 tab(s) orally once a day (2022 17:24)  MethylPREDNISolone Dose Pack 4 mg oral tablet: *****Course Completed**** (2022 17:24)  Refresh ophthalmic solution: 1 drop(s) in each eye 4 times a day, As Needed - for dry eyes (2022 17:24)    Social Hx; Smokes a few cigarettes weekly, ETOH abuse for many years, denies current use, had a few "slip ups" but has not used ETOH regularly x 2 years    FAMILY HISTORY:  FH: pancreatic cancer (Mother)    Family history of heart attack (Father)      PE:     Gen: NAD    Vital Signs Last 24 Hrs  T(C): 36.9 (2022 14:57), Max: 36.9 (2022 14:57)  T(F): 98.5 (2022 14:57), Max: 98.5 (2022 14:57)  HR: 90 (2022 14:57) (90 - 113)  BP: 103/72 (2022 14:57) (103/72 - 127/76)  BP(mean): 82 (2022 14:57) (82 - 92)  RR: 23 (2022 14:57) (16 - 23)  SpO2: 99% (2022 14:57) (98% - 99%)    Eyes: PERRL/EOMI + Scleral Icterus    ENT: No erythema, exudates    Heart: S1S2 RRR    Lungs: CTA B/L    Abdomen: distended, + BS, soft, <ild tenderness to palpation of upper abdomen R>L, no rebound or guarding    Extremeties: No edema, NT                               7.2    11.16 )-----------( 223      ( 2022 13:36 )             24.6         135  |  103  |  5<L>  ----------------------------<  117<H>  3.5   |  24  |  0.46<L>    Ca    8.4<L>      2022 13:36    TPro  7.2  /  Alb  2.0<L>  /  TBili  1.5<H>  /  DBili  x   /  AST  148<H>  /  ALT  33  /  AlkPhos  615<H>  -        LIVER FUNCTIONS - ( 2022 13:36 )  Alb: 2.0 g/dL / Pro: 7.2 gm/dL / ALK PHOS: 615 U/L / ALT: 33 U/L / AST: 148 U/L / GGT: x           PT/INR - ( 2022 13:36 )   PT: 13.7 sec;   INR: 1.18 ratio         PTT - ( 2022 13:36 )  PTT:41.0 sec      < from: CT Abdomen and Pelvis w/ IV Cont (22 @ 14:44) >    ACC: [id number] EXAM:  CT ABDOMEN AND PELVIS IC                          PROCEDURE DATE:  2022          INTERPRETATION:  CLINICAL INFORMATION: Abdominal pain, worsening ascites    COMPARISON: 2021, 2019    CONTRAST/COMPLICATIONS:  IV Contrast: Omnipaque 350  Oral Contrast: NONE  Complications: None reported at time of study completion    PROCEDURE:  CT of the Abdomen and Pelvis was performed.  Sagittal and coronal reformats were performed.    FINDINGS:  LOWER CHEST:Small bilateral pleural effusions, left side greater than   right.    LIVER: Enlarged liver with morphologic changes suspect for cirrhosis.   Multiple areas of ill-defined low-density throughout the liver, most   likely representing steatosis. No focal lesion.  BILE DUCTS: Normal caliber.  GALLBLADDER: Cholelithiasis.  SPLEEN: Within normal limits.  PANCREAS: Within normal limits.  ADRENALS: 1.6 x 1.3 cm indeterminate left adrenal nodule, unchanged.  KIDNEYS/URETERS: Within normal limits.    BLADDER: Within normal limits.  REPRODUCTIVE ORGANS: Uterus and adnexa within normal limits.    BOWEL: No bowel obstruction. Appendix is normal. Small hiatal hernia.  PERITONEUM: Small amount of abdominal/pelvic ascites.  VESSELS: Multiple small vessels in the anterior abdomen likely   representing portosystemic shunts. Hemorrhoidal varices noted (2; 104).  RETROPERITONEUM/LYMPH NODES: No lymphadenopathy.  ABDOMINAL WALL: Within normal limits.  BONES: No lytic or blastic bony lesion.    IMPRESSION:  Cirrhosis and portal hypertension.  Small amount of ascites.  Small bilateral pleural effusions.          < end of copied text >    < from: US Abdomen Upper Quadrant Right (22 @ 14:32) >    ACC: [id number] EXAM:  US ABDOMEN RT UPR QUADRANT                          PROCEDURE DATE:  2022          INTERPRETATION:  CLINICAL INFORMATION: Right upper quadrant abdominal   pain.    COMPARISON: CT abdomen/pelvis 2021.    TECHNIQUE: Sonography of the right upper quadrant.    FINDINGS:    Liver: Increased in size measuring 19.3 cm. Increased echotexture of the   hepatic parenchyma suggests hepatic steatosis. Nodular hepatic   parenchymal contour may indicate underlying hepatocellular   disease/cirrhosis. No focal hepatic masses.  Bile ducts: No intrahepatic biliary ductal dilatation. Common bile duct   measures 5 mm.  Gallbladder: Gallstones. Sludge. Gallbladder wall thickening identified   measuring up to 5 mm, which may be related to the presence of   intraperitoneal fluid. Clinical correlation is suggested.  Pancreas: Echogenic.  Right kidney: 11.3 cm. No hydronephrosis. No renal calculi. No   space-occupying lesion.  Ascites: Small volume.  IVC: Visualized portions are within normal limits.  Note made of left pleural effusion.    IMPRESSION: Gallstones. Gallbladder sludge. Gallbladder wall thickening   identified measuring up to 5 mm. Intraperitoneal free fluid. The liver is   increased in size measuring 19.3 cm. Increased echotexture of the hepatic   parenchyma suggests hepatic steatosis. Nodular hepatic parenchymal   contour may indicate underlying hepatocellular disease/cirrhosis.    --- End of Report ---            GREGORIO THOMAS MD; Attending Radiologist  This document has been electronically signed. 2022  2:51PM    < end of copied text >    A/P:   Admit to Medicine  Cirrhosis, Portal HTN, ascites  Anemia  Abdominal pain, distension  diarrhea, food intolerance, weight loss  scleral Icterus  Tachycardia of unknown etiology  GB wall thickening likely related to Intraperitoneal fluid vs acute Susanne  GI, cardiology evaluations  consider underlying malignancy  No acute Surgical Intervention  Above as discussed with Dr Alvarado      
Patient is a 56y old  Female who presents with a chief complaint of Nausea, Abdominal pain, Diarrhea, Decreased PO intake (2022 19:05)      HPI:  55 y/o F PMHx significant for alcohol abuse (c/b withdrawal seizures), basal cell carcinoma, depression, hemorrhoids (s/p hemorrhoidectomy), squamous cell skin cancer, and COVID-19 diagnosed on 2021 who was referred to  by her PCP for further evaluation and management of abnormal outpatient labs (elevated bilirubin), and ongoing profuse watery diarrhea associated with abdominal distention. Of note the patient recently completed a course of antibiotics => "ZPack and steroids" for outpatient management of a sinus infection. In the ED the patient c/o generalized abdominal distention and mild pain to palpation. She also states that she has had decreased appetite and weight loss over the past few months.  Labs => WBC 11.16, Hgb/Hct 7.2/24.6, , BUN/Cr 5/0.46, Alb 2.0, TBili 1.5, , . CT ABD/Pelvis => Cirrhosis and portal hypertension. Small amount of ascites. Small bilateral pleural effusions.  She states she is feeling ok this am  Still with diarrhea and decreased appetite  Last colonoscopy about 18mths ago with ?Dr Cartwright      PAST MEDICAL & SURGICAL HISTORY:  Alcohol abuse    Depression    Withdrawal seizures    History of basal cell carcinoma excision    Squamous cell skin cancer    Hemorrhoids    2019 novel coronavirus disease (COVID-19)    History of ear, nose, and throat (ENT) surgery    H/O basal cell carcinoma excision    H/O:         MEDICATIONS  (STANDING):  ATENolol  Tablet 25 milliGRAM(s) Oral daily  cholecalciferol 2000 Unit(s) Oral daily  ferrous    sulfate 325 milliGRAM(s) Oral daily  venlafaxine XR. 150 milliGRAM(s) Oral daily    MEDICATIONS  (PRN):  aluminum hydroxide/magnesium hydroxide/simethicone Suspension 30 milliLiter(s) Oral every 4 hours PRN Dyspepsia  artificial  tears Solution 1 Drop(s) Both EYES four times a day PRN Dry Eyes  melatonin 3 milliGRAM(s) Oral at bedtime PRN Insomnia  ondansetron Injectable 4 milliGRAM(s) IV Push every 8 hours PRN Nausea and/or Vomiting      Allergies    Zithromax (Unknown)    Intolerances    morphine (Nausea)      SOCIAL HISTORY:  hx etoh abuse  she says nothing significant last few months  heavy up until 2 years ago     FAMILY HISTORY:  FH: pancreatic cancer (Mother)    Family history of heart attack (Father)    Family history of CVA (Father)        REVIEW OF SYSTEMS:    CONSTITUTIONAL: as above  EYES/ENT: No visual changes;  No vertigo or throat pain   NECK: No pain or stiffness  RESPIRATORY: No cough, wheezing, hemoptysis; No shortness of breath  CARDIOVASCULAR: No chest pain or palpitations  GASTROINTESTINAL: as above  GENITOURINARY: No dysuria, frequency or hematuria  NEUROLOGICAL: No numbness or weakness  SKIN: No itching, burning, rashes, or lesions   PSYCH: Normal mood and affect  All other review of systems is negative unless indicated above.    Vital Signs Last 24 Hrs  T(C): 37 (2022 02:28), Max: 37.4 (2022 00:17)  T(F): 98.6 (2022 02:28), Max: 99.4 (2022 00:17)  HR: 94 (2022 02:28) (88 - 113)  BP: 114/70 (2022 02:28) (102/64 - 127/76)  BP(mean): 85 (2022 21:41) (82 - 92)  RR: 18 (2022 02:28) (16 - 23)  SpO2: 96% (2022 02:28) (96% - 99%)    PHYSICAL EXAM:  Constitutional: NAD  HEENT: MMM  Neck: No LAD  Respiratory: CTAB  Cardiovascular: S1 and S2, RRR, no M/G/R  Gastrointestinal: BS+, soft, NT/ND  Extremities: No peripheral edema  Vascular: 2+ peripheral pulses  Neurological: A/O x 3, no focal deficits  Psychiatric: Normal mood, normal affect  Skin: No rashes    LABS:                        8.1    10.20 )-----------( 194      ( 2022 06:30 )             26.2     02-    136  |  105  |  5<L>  ----------------------------<  94  4.3   |  25  |  0.39<L>    Ca    8.2<L>      2022 06:30    TPro  6.6  /  Alb  1.8<L>  /  TBili  1.7<H>  /  DBili  x   /  AST  145<H>  /  ALT  29  /  AlkPhos  541<H>  02-05    PT/INR - ( 2022 13:36 )   PT: 13.7 sec;   INR: 1.18 ratio         PTT - ( 2022 13:36 )  PTT:41.0 sec  LIVER FUNCTIONS - ( 2022 06:30 )  Alb: 1.8 g/dL / Pro: 6.6 gm/dL / ALK PHOS: 541 U/L / ALT: 29 U/L / AST: 145 U/L / GGT: x             RADIOLOGY & ADDITIONAL STUDIES:

## 2022-02-05 NOTE — DIETITIAN INITIAL EVALUATION ADULT. - OTHER INFO
55 y/o F PMHx significant for alcohol abuse (c/b withdrawal seizures), basal cell carcinoma, depression, hemorrhoids (s/p hemorrhoidectomy), squamous cell skin cancer, and COVID-19 diagnosed on 12/29/2021who was referred to  by her PCP for further evaluation and management of abnormal outpatient labs (elevated bilirubin), and ongoing profuse watery diarrhea associated with abdominal distention. Of note the patient recently completed a course of antibiotics => "ZPack and steroids" for outpatient management of a sinus infection. In the ED the patient c/o generalized abdominal distention and mild pain to palpation. She also states that she has had decreased appetite and weight loss over the past few months. As per GI evaluation: Anemia-eventual egd/colonoscopy evaluate for varices - likely as outpt pending clinical course. Pt reports po intake poor x 2 months resulting in a significant weight loss of 9% of total body weight. Currently tolerating clear liquid diet consumed 100% of meal. Stated she is hungry and would like diet advanced. +Diarrhea x 2 months (pending CDIFF results). Pt consumes ensure at home due to poor intake. Criteria met for severe malnutrition. Will continue to monitor and follow up prn. See below for recommendations.

## 2022-02-05 NOTE — DIETITIAN INITIAL EVALUATION ADULT. - PERTINENT LABORATORY DATA
02-05    136  |  105  |  5<L>  ----------------------------<  94  4.3   |  25  |  0.39<L>    Ca    8.2<L>      05 Feb 2022 06:30    TPro  6.6  /  Alb  1.8<L>  /  TBili  1.7<H>  /  DBili  x   /  AST  145<H>  /  ALT  29  /  AlkPhos  541<H>  02-05  BMI: BMI (kg/m2): 21.9 (02-04-22 @ 12:30)  HbA1c:   Glucose: POCT Blood Glucose.: 99 mg/dL (08-16-21 @ 15:49)    BP: 108/63 (02-05-22 @ 10:12) (102/64 - 127/76)  Lipid Panel:

## 2022-02-05 NOTE — CONSULT NOTE ADULT - ASSESSMENT
57yo female with diarrhea for months (since covid) likely etoh cirrhosis    diarrhea - r/o infectious, cdiff given recent abx  ?due to cvoid  ?due to hx chronic etoh use - though she denies recent use    cirrhosis - likely due to etoh  will need to rule out other causes of chronic liver disease - will send serologic evaluation    anemia - eventual egd/colonoscopy evaluate for varices - likely as outpt pending clinical course    dr raymundo to assume care in Am

## 2022-02-06 LAB
ANION GAP SERPL CALC-SCNC: 7 MMOL/L — SIGNIFICANT CHANGE UP (ref 5–17)
BILIRUB SERPL-MCNC: 1.3 MG/DL — HIGH (ref 0.2–1.2)
BUN SERPL-MCNC: 4 MG/DL — LOW (ref 7–23)
CALCIUM SERPL-MCNC: 8.1 MG/DL — LOW (ref 8.5–10.1)
CHLORIDE SERPL-SCNC: 107 MMOL/L — SIGNIFICANT CHANGE UP (ref 96–108)
CO2 SERPL-SCNC: 25 MMOL/L — SIGNIFICANT CHANGE UP (ref 22–31)
CREAT SERPL-MCNC: 0.42 MG/DL — LOW (ref 0.5–1.3)
FERRITIN SERPL-MCNC: 66 NG/ML — SIGNIFICANT CHANGE UP (ref 15–150)
GLUCOSE SERPL-MCNC: 91 MG/DL — SIGNIFICANT CHANGE UP (ref 70–99)
HCT VFR BLD CALC: 27.9 % — LOW (ref 34.5–45)
HGB BLD-MCNC: 8.5 G/DL — LOW (ref 11.5–15.5)
INR BLD: 1.36 RATIO — HIGH (ref 0.88–1.16)
MCHC RBC-ENTMCNC: 29.7 PG — SIGNIFICANT CHANGE UP (ref 27–34)
MCHC RBC-ENTMCNC: 30.5 GM/DL — LOW (ref 32–36)
MCV RBC AUTO: 97.6 FL — SIGNIFICANT CHANGE UP (ref 80–100)
MELD SCORE WITH DIALYSIS: 24 POINTS — SIGNIFICANT CHANGE UP
MELD SCORE WITHOUT DIALYSIS: 11 POINTS — SIGNIFICANT CHANGE UP
PLATELET # BLD AUTO: 202 K/UL — SIGNIFICANT CHANGE UP (ref 150–400)
POTASSIUM SERPL-MCNC: 3.7 MMOL/L — SIGNIFICANT CHANGE UP (ref 3.5–5.3)
POTASSIUM SERPL-SCNC: 3.7 MMOL/L — SIGNIFICANT CHANGE UP (ref 3.5–5.3)
PROTHROM AB SERPL-ACNC: 15.7 SEC — HIGH (ref 10.6–13.6)
RBC # BLD: 2.86 M/UL — LOW (ref 3.8–5.2)
RBC # FLD: 18.2 % — HIGH (ref 10.3–14.5)
SODIUM SERPL-SCNC: 139 MMOL/L — SIGNIFICANT CHANGE UP (ref 135–145)
TRANSFERRIN SERPL-MCNC: 219 MG/DL — SIGNIFICANT CHANGE UP (ref 200–360)
WBC # BLD: 8.59 K/UL — SIGNIFICANT CHANGE UP (ref 3.8–10.5)
WBC # FLD AUTO: 8.59 K/UL — SIGNIFICANT CHANGE UP (ref 3.8–10.5)

## 2022-02-06 PROCEDURE — 99232 SBSQ HOSP IP/OBS MODERATE 35: CPT

## 2022-02-06 RX ADMIN — Medication 3 MILLIGRAM(S): at 21:22

## 2022-02-06 RX ADMIN — Medication 2000 UNIT(S): at 09:46

## 2022-02-06 RX ADMIN — Medication 325 MILLIGRAM(S): at 09:46

## 2022-02-06 RX ADMIN — IRON SUCROSE 200 MILLIGRAM(S): 20 INJECTION, SOLUTION INTRAVENOUS at 21:22

## 2022-02-06 RX ADMIN — Medication 150 MILLIGRAM(S): at 09:46

## 2022-02-06 NOTE — PROGRESS NOTE ADULT - NUTRITIONAL ASSESSMENT
This patient has been assessed with a concern for Malnutrition and has been determined to have a diagnosis/diagnoses of Severe protein-calorie malnutrition.    This patient is being managed with:   Diet Low Fiber-  Entered: Feb 6 2022 12:55PM

## 2022-02-06 NOTE — PROGRESS NOTE ADULT - SUBJECTIVE AND OBJECTIVE BOX
History of Present Illness:   57 y/o F PMHx significant for alcohol abuse (c/b withdrawal seizures), basal cell carcinoma, depression, hemorrhoids (s/p hemorrhoidectomy), squamous cell skin cancer, cirrhosis, and COVID-19 diagnosed on 12/29/2021who was referred to  by her PCP for further evaluation and management of abnormal outpatient labs (elevated bilirubin), and ongoing profuse watery diarrhea associated with abdominal distention. Of note the patient recently completed a course of antibiotics => "ZPack and steroids" for outpatient management of a sinus infection. In the ED the patient c/o generalized abdominal distention and mild pain to palpation. She also states that she has had decreased appetite and weight loss over the past few months.  Labs => WBC 11.16, Hgb/Hct 7.2/24.6, , BUN/Cr 5/0.46, Alb 2.0, TBili 1.5, , .   CT ABD/Pelvis => Cirrhosis and portal hypertension. Small amount of ascites. Small bilateral pleural effusions.      2.5: no distress, diarrhea better today  2.6: still has some diarrhea                REVIEW OF SYSTEMS:    CONSTITUTIONAL: No weakness, No fevers or chills  ENT: No ear ache, No sorethroat  NECK: No pain, No stiffness  RESPIRATORY: No cough, No wheezing, No hemoptysis; No dyspnea  CARDIOVASCULAR: No chest pain, No palpitations  GASTROINTESTINAL: No abd pain, No nausea, No vomiting, No hematemesis, + diarrhea or constipation. No melena, No hematochezia.  GENITOURINARY: No dysuria, No  hematuria  NEUROLOGICAL: No diplopia, No paresthesia, No motor dysfunction  MUSCULOSKELETAL: No arthralgia, No myalgia  SKIN: No rashes, or lesions   PSYCH: no anxiety, no suicidal ideation    All other review of systems is negative unless indicated above    Vital Signs Last 24 Hrs  T(C): 37.1 (06 Feb 2022 09:29), Max: 37.1 (06 Feb 2022 09:29)  T(F): 98.7 (06 Feb 2022 09:29), Max: 98.7 (06 Feb 2022 09:29)  HR: 97 (06 Feb 2022 09:29) (84 - 97)  BP: 100/66 (06 Feb 2022 09:29) (100/66 - 110/72)  RR: 18 (06 Feb 2022 09:29) (18 - 18)  SpO2: 96% (06 Feb 2022 09:29) (96% - 97%)    PHYSICAL EXAM:    GENERAL: NAD  HEENT:  NC/AT, EOMI, PERRLA, No scleral icterus, Moist mucous membranes  NECK: Supple, No JVD  CNS:  Alert & Oriented X3, Motor Strength 5/5 B/L upper and lower extremities; DTRs 2+ intact   LUNG: Normal Breath sounds, Clear to auscultation bilaterally, No rales, No rhonchi, No wheezing  HEART: RRR; No murmurs, No rubs  ABDOMEN: +BS, ST/ND/NT  GENITOURINARY: Voiding, Bladder not distended  EXTREMITIES:  2+ Peripheral Pulses, No clubbing, No cyanosis, No tibial edema  MUSCULOSKELTAL: Joints normal ROM, No TTP, No effusion  VAGINAL: deferred  SKIN: no rashes  RECTAL: deferred, not indicated  BREAST: deferred               Vital Signs Last 24 Hrs  T(C): 37.1 (06 Feb 2022 09:29), Max: 37.1 (06 Feb 2022 09:29)  T(F): 98.7 (06 Feb 2022 09:29), Max: 98.7 (06 Feb 2022 09:29)  HR: 97 (06 Feb 2022 09:29) (84 - 97)  BP: 100/66 (06 Feb 2022 09:29) (100/66 - 110/72)  BP(mean): --  RR: 18 (06 Feb 2022 09:29) (18 - 18)  SpO2: 96% (06 Feb 2022 09:29) (96% - 97%)    MEDICATIONS  (STANDING):  ATENolol  Tablet 25 milliGRAM(s) Oral daily  cholecalciferol 2000 Unit(s) Oral daily  iron sucrose Injectable 200 milliGRAM(s) IV Push <User Schedule>  venlafaxine XR. 150 milliGRAM(s) Oral daily      Labs:                        8.5    8.59  )-----------( 202      ( 06 Feb 2022 06:26 )             27.9     02-06    139  |  107  |  4<L>  ----------------------------<  91  3.7   |  25  |  0.42<L>    Ca    8.1<L>      06 Feb 2022 06:26    TPro  x   /  Alb  x   /  TBili  1.3<H>  /  DBili  x   /  AST  x   /  ALT  x   /  AlkPhos  x   02-06           Cultures:     A/P:      #Abdominal Pain/Distention:  likely due to cirrhosis/ascites    #Diarrhea: unclear etiology  Cdiff neg, Stool PCR negative  for colonoscopy    #Acute Anemia of blood loss  Iron def anemia  ~patient with Hemorrhoidal Bleeding  s/p 2u RBC  GI eval noted: for EGD and Colonoscopy   Hb stable  start IV Iron supplements    # Severe protein calorie malnutrition     #Cirrhosis:   for EGD to eval for esophageal varices     #Depression  ~cont. Effexor XR 150mg po daily    #Hypertension  ~cont. Atenolol 25mg po daily    #Vte ppx  ~SCDs for now as patient w/ notable anemia

## 2022-02-06 NOTE — PROGRESS NOTE ADULT - SUBJECTIVE AND OBJECTIVE BOX
Patient is a 56y old  Female who presents with a chief complaint of Nausea, Abdominal pain, Diarrhea, Decreased PO intake (2022 13:05)      Subective:  No complaints    PAST MEDICAL & SURGICAL HISTORY:  Alcohol abuse    Depression    Withdrawal seizures    History of basal cell carcinoma excision    Squamous cell skin cancer    Hemorrhoids    2019 novel coronavirus disease (COVID-19)    History of ear, nose, and throat (ENT) surgery    H/O basal cell carcinoma excision    H/O:         MEDICATIONS  (STANDING):  ATENolol  Tablet 25 milliGRAM(s) Oral daily  cholecalciferol 2000 Unit(s) Oral daily  iron sucrose Injectable 200 milliGRAM(s) IV Push <User Schedule>  venlafaxine XR. 150 milliGRAM(s) Oral daily    MEDICATIONS  (PRN):  aluminum hydroxide/magnesium hydroxide/simethicone Suspension 30 milliLiter(s) Oral every 4 hours PRN Dyspepsia  artificial  tears Solution 1 Drop(s) Both EYES four times a day PRN Dry Eyes  melatonin 3 milliGRAM(s) Oral at bedtime PRN Insomnia  ondansetron Injectable 4 milliGRAM(s) IV Push every 8 hours PRN Nausea and/or Vomiting      REVIEW OF SYSTEMS:    RESPIRATORY: No shortness of breath  CARDIOVASCULAR: No chest pain  All other review of systems is negative unless indicated above.    Vital Signs Last 24 Hrs  T(C): 37.1 (2022 09:29), Max: 37.1 (2022 09:29)  T(F): 98.7 (2022 09:29), Max: 98.7 (2022 09:29)  HR: 97 (2022 09:29) (84 - 97)  BP: 100/66 (2022 09:29) (100/66 - 110/72)  BP(mean): --  RR: 18 (2022 09:29) (18 - 18)  SpO2: 96% (2022 09:29) (96% - 97%)    PHYSICAL EXAM:    Constitutional: NAD, well-developed  Respiratory: CTAB  Cardiovascular: S1 and S2, RRR  Gastrointestinal: BS+, soft, NT/ND  Extremities: No peripheral edema  Psychiatric: Normal mood, normal affect    LABS:                        8.5    8.59  )-----------( 202      ( 2022 06:26 )             27.9     02-    139  |  107  |  4<L>  ----------------------------<  91  3.7   |  25  |  0.42<L>    Ca    8.1<L>      2022 06:26    TPro  x   /  Alb  x   /  TBili  1.3<H>  /  DBili  x   /  AST  x   /  ALT  x   /  AlkPhos  x       PT/INR - ( 2022 06:26 )   PT: 15.7 sec;   INR: 1.36 ratio         PTT - ( 2022 13:36 )  PTT:41.0 sec  LIVER FUNCTIONS - ( 2022 06:30 )  Alb: 1.8 g/dL / Pro: 6.6 gm/dL / ALK PHOS: 541 U/L / ALT: 29 U/L / AST: 145 U/L / GGT: x             RADIOLOGY & ADDITIONAL STUDIES:

## 2022-02-07 LAB
AFP-TM SERPL-MCNC: 3.8 NG/ML — SIGNIFICANT CHANGE UP
HAV IGG SER QL IA: SIGNIFICANT CHANGE UP
HBV CORE AB SER-ACNC: SIGNIFICANT CHANGE UP
HBV SURFACE AB SER-ACNC: REACTIVE
SARS-COV-2 RNA SPEC QL NAA+PROBE: SIGNIFICANT CHANGE UP

## 2022-02-07 PROCEDURE — 74183 MRI ABD W/O CNTR FLWD CNTR: CPT | Mod: 26

## 2022-02-07 PROCEDURE — 99232 SBSQ HOSP IP/OBS MODERATE 35: CPT

## 2022-02-07 RX ORDER — SOD SULF/SODIUM/NAHCO3/KCL/PEG
2000 SOLUTION, RECONSTITUTED, ORAL ORAL
Refills: 0 | Status: COMPLETED | OUTPATIENT
Start: 2022-02-07 | End: 2022-02-07

## 2022-02-07 RX ADMIN — Medication 150 MILLIGRAM(S): at 09:54

## 2022-02-07 RX ADMIN — ATENOLOL 25 MILLIGRAM(S): 25 TABLET ORAL at 09:54

## 2022-02-07 RX ADMIN — Medication 2000 MILLILITER(S): at 16:07

## 2022-02-07 RX ADMIN — Medication 2000 UNIT(S): at 09:56

## 2022-02-07 RX ADMIN — IRON SUCROSE 200 MILLIGRAM(S): 20 INJECTION, SOLUTION INTRAVENOUS at 21:57

## 2022-02-07 RX ADMIN — Medication 3 MILLIGRAM(S): at 21:57

## 2022-02-07 NOTE — PROVIDER CONTACT NOTE (OTHER) - SITUATION
Informed surgery blue team of consult
notified Dr Leggett's office of admission
Informed Jaye at answering service of consult
answering service aware of consult.

## 2022-02-07 NOTE — PROGRESS NOTE ADULT - SUBJECTIVE AND OBJECTIVE BOX
Patient is a 56y old  Female who presents with a chief complaint of Nausea, Abdominal pain, Diarrhea, Decreased PO intake (2022 13:18)      Subective:  No complaiknts  Feels good    PAST MEDICAL & SURGICAL HISTORY:  Alcohol abuse    Depression    Withdrawal seizures    History of basal cell carcinoma excision    Squamous cell skin cancer    Hemorrhoids    2019 novel coronavirus disease (COVID-19)    History of ear, nose, and throat (ENT) surgery    H/O basal cell carcinoma excision    H/O:         MEDICATIONS  (STANDING):  ATENolol  Tablet 25 milliGRAM(s) Oral daily  cholecalciferol 2000 Unit(s) Oral daily  iron sucrose Injectable 200 milliGRAM(s) IV Push <User Schedule>  polyethylene glycol/electrolyte Solution 2000 milliLiter(s) Oral once  venlafaxine XR. 150 milliGRAM(s) Oral daily    MEDICATIONS  (PRN):  aluminum hydroxide/magnesium hydroxide/simethicone Suspension 30 milliLiter(s) Oral every 4 hours PRN Dyspepsia  artificial  tears Solution 1 Drop(s) Both EYES four times a day PRN Dry Eyes  melatonin 3 milliGRAM(s) Oral at bedtime PRN Insomnia  ondansetron Injectable 4 milliGRAM(s) IV Push every 8 hours PRN Nausea and/or Vomiting      REVIEW OF SYSTEMS:    RESPIRATORY: No shortness of breath  CARDIOVASCULAR: No chest pain  All other review of systems is negative unless indicated above.    Vital Signs Last 24 Hrs  T(C): 37.2 (2022 08:15), Max: 37.2 (2022 08:15)  T(F): 98.9 (2022 08:15), Max: 98.9 (2022 08:15)  HR: 106 (2022 08:15) (65 - 106)  BP: 113/64 (2022 08:15) (103/67 - 113/64)  BP(mean): --  RR: 17 (2022 08:15) (16 - 18)  SpO2: 97% (2022 08:15) (97% - 97%)    PHYSICAL EXAM:    Constitutional: NAD, well-developed  Respiratory: CTAB  Cardiovascular: S1 and S2, RRR  Gastrointestinal: BS+, soft, NT/ND  Extremities: No peripheral edema  Psychiatric: Normal mood, normal affect    LABS:                        8.5    8.59  )-----------( 202      ( 2022 06:26 )             27.9     -    139  |  107  |  4<L>  ----------------------------<  91  3.7   |  25  |  0.42<L>    Ca    8.1<L>      2022 06:26    TPro  x   /  Alb  x   /  TBili  1.3<H>  /  DBili  x   /  AST  x   /  ALT  x   /  AlkPhos  x       PT/INR - ( 2022 06:26 )   PT: 15.7 sec;   INR: 1.36 ratio               RADIOLOGY & ADDITIONAL STUDIES:

## 2022-02-07 NOTE — PROGRESS NOTE ADULT - ASSESSMENT
Imp:  Alcohol related cirrhosis  Anemia, probably iron defeciency, although MCV high-normal  Elevated alk phos    Rec:  MRI/MRCP given high alk phos to r/o CBD stones  Tentative plan for EGD and colonoscopy tuesday to eval cirrhosis, screen for varices
Imp:  Alcohol related liver disease  Diarrhea  anemia    Plan:  EGD and colonoscopy tomorrow. Possible banding. Risks and benefits reviewed.

## 2022-02-07 NOTE — PROGRESS NOTE ADULT - SUBJECTIVE AND OBJECTIVE BOX
History of Present Illness:   55 y/o F PMHx significant for alcohol abuse (c/b withdrawal seizures), basal cell carcinoma, depression, hemorrhoids (s/p hemorrhoidectomy), squamous cell skin cancer, cirrhosis, and COVID-19 diagnosed on 12/29/2021who was referred to  by her PCP for further evaluation and management of abnormal outpatient labs (elevated bilirubin), and ongoing profuse watery diarrhea associated with abdominal distention. Of note the patient recently completed a course of antibiotics => "ZPack and steroids" for outpatient management of a sinus infection. In the ED the patient c/o generalized abdominal distention and mild pain to palpation. She also states that she has had decreased appetite and weight loss over the past few months.  Labs => WBC 11.16, Hgb/Hct 7.2/24.6, , BUN/Cr 5/0.46, Alb 2.0, TBili 1.5, , .   CT ABD/Pelvis => Cirrhosis and portal hypertension. Small amount of ascites. Small bilateral pleural effusions.      2.5: no distress, diarrhea better today  2.6: still has some diarrhea   2.7: no distress, reported diarrhea this am  on clears                REVIEW OF SYSTEMS:    CONSTITUTIONAL: No weakness, No fevers or chills  ENT: No ear ache, No sorethroat  NECK: No pain, No stiffness  RESPIRATORY: No cough, No wheezing, No hemoptysis; No dyspnea  CARDIOVASCULAR: No chest pain, No palpitations  GASTROINTESTINAL: No abd pain, No nausea, No vomiting, No hematemesis, + diarrhea or constipation. No melena, No hematochezia.  GENITOURINARY: No dysuria, No  hematuria  NEUROLOGICAL: No diplopia, No paresthesia, No motor dysfunction  MUSCULOSKELETAL: No arthralgia, No myalgia  SKIN: No rashes, or lesions   PSYCH: no anxiety, no suicidal ideation    All other review of systems is negative unless indicated above    Vital Signs Last 24 Hrs  T(C): 37.2 (07 Feb 2022 08:15), Max: 37.2 (07 Feb 2022 08:15)  T(F): 98.9 (07 Feb 2022 08:15), Max: 98.9 (07 Feb 2022 08:15)  HR: 106 (07 Feb 2022 08:15) (94 - 106)  BP: 113/64 (07 Feb 2022 08:15) (108/68 - 113/64)  RR: 17 (07 Feb 2022 08:15) (16 - 18)  SpO2: 97% (07 Feb 2022 08:15) (97% - 97%)    PHYSICAL EXAM:    GENERAL: NAD  HEENT:  NC/AT, EOMI, PERRLA, No scleral icterus, Moist mucous membranes  NECK: Supple, No JVD  CNS:  Alert & Oriented X3, Motor Strength 5/5 B/L upper and lower extremities; DTRs 2+ intact   LUNG: Normal Breath sounds, Clear to auscultation bilaterally, No rales, No rhonchi, No wheezing  HEART: RRR; No murmurs, No rubs  ABDOMEN: +BS, ST/ND/NT  GENITOURINARY: Voiding, Bladder not distended  EXTREMITIES:  2+ Peripheral Pulses, No clubbing, No cyanosis, No tibial edema  MUSCULOSKELTAL: Joints normal ROM, No TTP, No effusion  VAGINAL: deferred  SKIN: no rashes  RECTAL: deferred, not indicated  BREAST: deferred                 MEDICATIONS  (STANDING):  ATENolol  Tablet 25 milliGRAM(s) Oral daily  cholecalciferol 2000 Unit(s) Oral daily  iron sucrose Injectable 200 milliGRAM(s) IV Push <User Schedule>  venlafaxine XR. 150 milliGRAM(s) Oral daily      Labs:                        8.5    8.59  )-----------( 202      ( 06 Feb 2022 06:26 )             27.9     02-06    139  |  107  |  4<L>  ----------------------------<  91  3.7   |  25  |  0.42<L>    Ca    8.1<L>      06 Feb 2022 06:26    TPro  x   /  Alb  x   /  TBili  1.3<H>  /  DBili  x   /  AST  x   /  ALT  x   /  AlkPhos  x   02-06           Cultures:     A/P:      #Abdominal Pain/Distention:  likely due to cirrhosis/ascites    #Diarrhea: unclear etiology  Cdiff neg, Stool PCR negative  for colonoscopy  on clears     #Acute Anemia of blood loss  Iron def anemia  ~patient with Hemorrhoidal Bleeding  s/p 2u RBC  GI eval noted: for EGD and Colonoscopy   Hb stable  start IV Iron supplements x 5days    # Severe protein calorie malnutrition     #Cirrhosis:   EGD to eval for esophageal varices    #Depression  ~cont. Effexor XR 150mg po daily    #Hypertension  ~cont. Atenolol 25mg po daily    #Vte ppx  ~SCDs for now as patient w/ notable anemia

## 2022-02-07 NOTE — PROGRESS NOTE ADULT - NUTRITIONAL ASSESSMENT
This patient has been assessed with a concern for Malnutrition and has been determined to have a diagnosis/diagnoses of Severe protein-calorie malnutrition.    This patient is being managed with:   Diet NPO after Midnight-     NPO Start Date: 07-Feb-2022   NPO Start Time: 23:59  Except Medications  Entered: Feb 7 2022 10:51AM    Diet Clear Liquid-  Entered: Feb 7 2022 10:50AM

## 2022-02-08 ENCOUNTER — TRANSCRIPTION ENCOUNTER (OUTPATIENT)
Age: 57
End: 2022-02-08

## 2022-02-08 ENCOUNTER — RESULT REVIEW (OUTPATIENT)
Age: 57
End: 2022-02-08

## 2022-02-08 VITALS
SYSTOLIC BLOOD PRESSURE: 112 MMHG | RESPIRATION RATE: 18 BRPM | DIASTOLIC BLOOD PRESSURE: 76 MMHG | OXYGEN SATURATION: 99 % | TEMPERATURE: 98 F | HEART RATE: 95 BPM

## 2022-02-08 LAB
ALBUMIN SERPL ELPH-MCNC: 1.7 G/DL — LOW (ref 3.3–5)
ALP SERPL-CCNC: 434 U/L — HIGH (ref 40–120)
ALT FLD-CCNC: 30 U/L — SIGNIFICANT CHANGE UP (ref 12–78)
ANION GAP SERPL CALC-SCNC: 8 MMOL/L — SIGNIFICANT CHANGE UP (ref 5–17)
AST SERPL-CCNC: 147 U/L — HIGH (ref 15–37)
BILIRUB SERPL-MCNC: 1.2 MG/DL — SIGNIFICANT CHANGE UP (ref 0.2–1.2)
BUN SERPL-MCNC: 3 MG/DL — LOW (ref 7–23)
CALCIUM SERPL-MCNC: 8 MG/DL — LOW (ref 8.5–10.1)
CHLORIDE SERPL-SCNC: 110 MMOL/L — HIGH (ref 96–108)
CO2 SERPL-SCNC: 22 MMOL/L — SIGNIFICANT CHANGE UP (ref 22–31)
CREAT SERPL-MCNC: 0.43 MG/DL — LOW (ref 0.5–1.3)
GLUCOSE SERPL-MCNC: 101 MG/DL — HIGH (ref 70–99)
HCT VFR BLD CALC: 27.9 % — LOW (ref 34.5–45)
HGB BLD-MCNC: 8.3 G/DL — LOW (ref 11.5–15.5)
MCHC RBC-ENTMCNC: 29.7 GM/DL — LOW (ref 32–36)
MCHC RBC-ENTMCNC: 29.7 PG — SIGNIFICANT CHANGE UP (ref 27–34)
MCV RBC AUTO: 100 FL — SIGNIFICANT CHANGE UP (ref 80–100)
MITOCHONDRIA AB SER-ACNC: SIGNIFICANT CHANGE UP
PLATELET # BLD AUTO: 204 K/UL — SIGNIFICANT CHANGE UP (ref 150–400)
POTASSIUM SERPL-MCNC: 3.3 MMOL/L — LOW (ref 3.5–5.3)
POTASSIUM SERPL-SCNC: 3.3 MMOL/L — LOW (ref 3.5–5.3)
PROT SERPL-MCNC: 6.2 GM/DL — SIGNIFICANT CHANGE UP (ref 6–8.3)
RBC # BLD: 2.79 M/UL — LOW (ref 3.8–5.2)
RBC # FLD: 18.1 % — HIGH (ref 10.3–14.5)
SMOOTH MUSCLE AB SER-ACNC: ABNORMAL
SODIUM SERPL-SCNC: 140 MMOL/L — SIGNIFICANT CHANGE UP (ref 135–145)
WBC # BLD: 9.22 K/UL — SIGNIFICANT CHANGE UP (ref 3.8–10.5)
WBC # FLD AUTO: 9.22 K/UL — SIGNIFICANT CHANGE UP (ref 3.8–10.5)

## 2022-02-08 PROCEDURE — 88305 TISSUE EXAM BY PATHOLOGIST: CPT | Mod: 26

## 2022-02-08 PROCEDURE — 88312 SPECIAL STAINS GROUP 1: CPT | Mod: 26

## 2022-02-08 PROCEDURE — 99239 HOSP IP/OBS DSCHRG MGMT >30: CPT

## 2022-02-08 RX ORDER — CHOLESTYRAMINE 4 G/9G
4 POWDER, FOR SUSPENSION ORAL DAILY
Refills: 0 | Status: DISCONTINUED | OUTPATIENT
Start: 2022-02-08 | End: 2022-02-08

## 2022-02-08 RX ORDER — CHOLESTYRAMINE 4 G/9G
4 POWDER, FOR SUSPENSION ORAL
Qty: 240 | Refills: 0
Start: 2022-02-08

## 2022-02-08 RX ORDER — CHOLESTYRAMINE 4 G/9G
4 POWDER, FOR SUSPENSION ORAL
Qty: 240 | Refills: 0
Start: 2022-02-08 | End: 2022-03-09

## 2022-02-08 RX ORDER — SPIRONOLACTONE 25 MG/1
1 TABLET, FILM COATED ORAL
Qty: 30 | Refills: 0
Start: 2022-02-08

## 2022-02-08 RX ORDER — FERROUS SULFATE 325(65) MG
1 TABLET ORAL
Qty: 0 | Refills: 0 | DISCHARGE

## 2022-02-08 RX ADMIN — Medication 2000 UNIT(S): at 09:37

## 2022-02-08 RX ADMIN — CHOLESTYRAMINE 4 GRAM(S): 4 POWDER, FOR SUSPENSION ORAL at 09:37

## 2022-02-08 RX ADMIN — Medication 150 MILLIGRAM(S): at 09:37

## 2022-02-08 RX ADMIN — ATENOLOL 25 MILLIGRAM(S): 25 TABLET ORAL at 09:37

## 2022-02-08 NOTE — DISCHARGE NOTE PROVIDER - DETAILS OF MALNUTRITION DIAGNOSIS/DIAGNOSES
This patient has been assessed with a concern for Malnutrition and was treated during this hospitalization for the following Nutrition diagnosis/diagnoses:     -  02/05/2022: Severe protein-calorie malnutrition

## 2022-02-08 NOTE — PROGRESS NOTE ADULT - REASON FOR ADMISSION
Nausea, Abdominal pain, Diarrhea, Decreased PO intake

## 2022-02-08 NOTE — DISCHARGE NOTE PROVIDER - CARE PROVIDER_API CALL
Brian Goodson (MD)  Gastroenterology; Internal Medicine  [address] [facility], [address] [place] [address]  Phone: [phone]  Fax: [phone]  Follow Up Time: 1 week

## 2022-02-08 NOTE — DISCHARGE NOTE PROVIDER - HOSPITAL COURSE
History of Present Illness:   55 y/o F PMHx significant for alcohol abuse (c/b withdrawal seizures), basal cell carcinoma, depression, hemorrhoids (s/p hemorrhoidectomy), squamous cell skin cancer, cirrhosis, and COVID-19 diagnosed on 12/29/2021who was referred to  by her PCP for further evaluation and management of abnormal outpatient labs (elevated bilirubin), and ongoing profuse watery diarrhea associated with abdominal distention. Of note the patient recently completed a course of antibiotics => "ZPack and steroids" for outpatient management of a sinus infection. In the ED the patient c/o generalized abdominal distention and mild pain to palpation. She also states that she has had decreased appetite and weight loss over the past few months.  Labs => WBC 11.16, Hgb/Hct 7.2/24.6, , BUN/Cr 5/0.46, Alb 2.0, TBili 1.5, , .   CT ABD/Pelvis => Cirrhosis and portal hypertension. Small amount of ascites. Small bilateral pleural effusions.      2.5: no distress, diarrhea better today  2.6: still has some diarrhea   2.7: no distress, reported diarrhea this am  on clears   2.8: no distress, s/p EGD/Colonoscopy        REVIEW OF SYSTEMS:    CONSTITUTIONAL: No weakness, No fevers or chills  ENT: No ear ache, No sorethroat  NECK: No pain, No stiffness  RESPIRATORY: No cough, No wheezing, No hemoptysis; No dyspnea  CARDIOVASCULAR: No chest pain, No palpitations  GASTROINTESTINAL: No abd pain, No nausea, No vomiting, No hematemesis, + diarrhea or constipation. No melena, No hematochezia.  GENITOURINARY: No dysuria, No  hematuria  NEUROLOGICAL: No diplopia, No paresthesia, No motor dysfunction  MUSCULOSKELETAL: No arthralgia, No myalgia  SKIN: No rashes, or lesions   PSYCH: no anxiety, no suicidal ideation    All other review of systems is negative unless indicated above    Vital Signs Last 24 Hrs  T(C): 36.8 (08 Feb 2022 09:15), Max: 37 (07 Feb 2022 23:00)  T(F): 98.3 (08 Feb 2022 09:15), Max: 98.6 (07 Feb 2022 23:00)  HR: 95 (08 Feb 2022 09:15) (95 - 106)  BP: 112/76 (08 Feb 2022 09:15) (103/63 - 112/76)  BP(mean): --  RR: 18 (08 Feb 2022 09:15) (17 - 18)  SpO2: 99% (08 Feb 2022 09:15) (98% - 99%)    PHYSICAL EXAM:    GENERAL: NAD  HEENT:  NC/AT, EOMI, PERRLA, No scleral icterus, Moist mucous membranes  NECK: Supple, No JVD  CNS:  Alert & Oriented X3, Motor Strength 5/5 B/L upper and lower extremities; DTRs 2+ intact   LUNG: Normal Breath sounds, Clear to auscultation bilaterally, No rales, No rhonchi, No wheezing  HEART: RRR; No murmurs, No rubs  ABDOMEN: +BS, ST/ND/NT  GENITOURINARY: Voiding, Bladder not distended  EXTREMITIES:  2+ Peripheral Pulses, No clubbing, No cyanosis, No tibial edema  MUSCULOSKELTAL: Joints normal ROM, No TTP, No effusion  VAGINAL: deferred  SKIN: no rashes  RECTAL: deferred, not indicated  BREAST: deferred    A/P:      #Abdominal Pain/Distention:  likely due to cirrhosis/ascites    #Diarrhea: unclear etiology  C diff neg, Stool PCR negative  for colonoscopy  on clears     #Acute Anemia of blood loss  Iron def anemia  ~patient with Hemorrhoidal Bleeding  s/p 2u RBC  GI eval noted: s/p EGD/Colonoscopy, no varices in esophagus, no bleeding source found, no etiology for diarrhea   Questran recommended   Hb stable  s/p IV Iron supplements x 5days    # Moderate protein calorie malnutrition     #Cirrhosis:   no varices on EGD  outpatient f/u  start Aldactone for ascites     #Depression  ~cont. Effexor XR 150mg po daily    #Hypertension  ~cont. Atenolol 25mg po daily

## 2022-02-08 NOTE — DISCHARGE NOTE PROVIDER - NSDCCPCAREPLAN_GEN_ALL_CORE_FT
PRINCIPAL DISCHARGE DIAGNOSIS  Diagnosis: Anemia  Assessment and Plan of Treatment:       SECONDARY DISCHARGE DIAGNOSES  Diagnosis: Cirrhosis  Assessment and Plan of Treatment:     Diagnosis: Abdominal pain  Assessment and Plan of Treatment:

## 2022-02-08 NOTE — DISCHARGE NOTE NURSING/CASE MANAGEMENT/SOCIAL WORK - NSDCPEFALRISK_GEN_ALL_CORE
For information on Fall & Injury Prevention, visit: [email or web address] OR  [email or web address] OR  https://www.cdc.gov/steadi/patient.html

## 2022-02-08 NOTE — DISCHARGE NOTE PROVIDER - NSDCMRMEDTOKEN_GEN_ALL_CORE_FT
Aldactone 25 mg oral tablet: 1 tab(s) orally once a day   atenolol 25 mg oral tablet: 1 tab(s) orally once a day  cholecalciferol 50 mcg (2000 intl units) oral tablet: 1 tab(s) orally once a day  cholestyramine 4 g/4.8 g oral powder for reconstitution: 4 gram(s) orally 2 times a day   Effexor  mg oral capsule, extended release: 1 cap(s) orally once a day  Refresh ophthalmic solution: 1 drop(s) in each eye 4 times a day, As Needed - for dry eyes

## 2022-02-08 NOTE — PROGRESS NOTE ADULT - SUBJECTIVE AND OBJECTIVE BOX
EGD:  Portal gastropathy  small hiatal hernia    Colonoscopy  Diverticulosis  Hemorrhoids    Rec:  Advance diet  Add questran for diarrhea  Outpt follow up

## 2022-02-09 ENCOUNTER — NON-APPOINTMENT (OUTPATIENT)
Age: 57
End: 2022-02-09

## 2022-02-09 LAB — ANA TITR SER: NEGATIVE — SIGNIFICANT CHANGE UP

## 2022-02-10 LAB
CULTURE RESULTS: SIGNIFICANT CHANGE UP
CULTURE RESULTS: SIGNIFICANT CHANGE UP
SPECIMEN SOURCE: SIGNIFICANT CHANGE UP
SPECIMEN SOURCE: SIGNIFICANT CHANGE UP

## 2022-02-11 DIAGNOSIS — I10 ESSENTIAL (PRIMARY) HYPERTENSION: ICD-10-CM

## 2022-02-11 DIAGNOSIS — F10.10 ALCOHOL ABUSE, UNCOMPLICATED: ICD-10-CM

## 2022-02-11 DIAGNOSIS — F32.A DEPRESSION, UNSPECIFIED: ICD-10-CM

## 2022-02-11 DIAGNOSIS — K64.9 UNSPECIFIED HEMORRHOIDS: ICD-10-CM

## 2022-02-11 DIAGNOSIS — E43 UNSPECIFIED SEVERE PROTEIN-CALORIE MALNUTRITION: ICD-10-CM

## 2022-02-11 DIAGNOSIS — D62 ACUTE POSTHEMORRHAGIC ANEMIA: ICD-10-CM

## 2022-02-11 DIAGNOSIS — K70.31 ALCOHOLIC CIRRHOSIS OF LIVER WITH ASCITES: ICD-10-CM

## 2022-02-11 DIAGNOSIS — D50.9 IRON DEFICIENCY ANEMIA, UNSPECIFIED: ICD-10-CM

## 2022-02-11 DIAGNOSIS — R19.7 DIARRHEA, UNSPECIFIED: ICD-10-CM

## 2022-02-11 DIAGNOSIS — Z86.16 PERSONAL HISTORY OF COVID-19: ICD-10-CM

## 2022-02-11 DIAGNOSIS — K76.6 PORTAL HYPERTENSION: ICD-10-CM

## 2022-03-10 PROBLEM — U07.1 COVID-19: Chronic | Status: ACTIVE | Noted: 2022-02-04

## 2022-03-10 PROBLEM — K64.9 UNSPECIFIED HEMORRHOIDS: Chronic | Status: ACTIVE | Noted: 2022-02-04

## 2022-03-21 ENCOUNTER — OUTPATIENT (OUTPATIENT)
Dept: OUTPATIENT SERVICES | Facility: HOSPITAL | Age: 57
LOS: 1 days | End: 2022-03-21
Payer: COMMERCIAL

## 2022-03-21 ENCOUNTER — APPOINTMENT (OUTPATIENT)
Dept: ULTRASOUND IMAGING | Facility: CLINIC | Age: 57
End: 2022-03-21
Payer: COMMERCIAL

## 2022-03-21 DIAGNOSIS — Z00.00 ENCOUNTER FOR GENERAL ADULT MEDICAL EXAMINATION WITHOUT ABNORMAL FINDINGS: ICD-10-CM

## 2022-03-21 DIAGNOSIS — Z98.890 OTHER SPECIFIED POSTPROCEDURAL STATES: Chronic | ICD-10-CM

## 2022-03-21 DIAGNOSIS — Z98.891 HISTORY OF UTERINE SCAR FROM PREVIOUS SURGERY: Chronic | ICD-10-CM

## 2022-03-21 PROCEDURE — 76981 USE PARENCHYMA: CPT

## 2022-03-21 PROCEDURE — 76700 US EXAM ABDOM COMPLETE: CPT | Mod: 26

## 2022-03-21 PROCEDURE — 76700 US EXAM ABDOM COMPLETE: CPT

## 2022-03-22 NOTE — PHYSICAL THERAPY INITIAL EVALUATION ADULT - SITTING BALANCE: DYNAMIC
good minus Terbinafine Counseling: Patient counseling regarding adverse effects of terbinafine including but not limited to headache, diarrhea, rash, upset stomach, liver function test abnormalities, itching, taste/smell disturbance, nausea, abdominal pain, and flatulence.  There is a rare possibility of liver failure that can occur when taking terbinafine.  The patient understands that a baseline LFT and kidney function test may be required. The patient verbalized understanding of the proper use and possible adverse effects of terbinafine.  All of the patient's questions and concerns were addressed. Peng Advancement Flap Text: The defect edges were debeveled with a #15 scalpel blade.  Given the location of the defect, shape of the defect and the proximity to free margins a Peng advancement flap was deemed most appropriate.  Using a sterile surgical marker, an appropriate advancement flap was drawn incorporating the defect and placing the expected incisions within the relaxed skin tension lines where possible. The area thus outlined was incised deep to adipose tissue with a #15 scalpel blade.  The skin margins were undermined to an appropriate distance in all directions utilizing iris scissors.

## 2022-03-23 NOTE — PROGRESS NOTE ADULT - RS GEN PE MLT RESP DETAILS PC
good air movement/breath sounds equal/airway patent
If you are a smoker, it is important for your health to stop smoking. Please be aware that second hand smoke is also harmful.
breath sounds equal/good air movement/respirations non-labored/airway patent

## 2022-04-28 NOTE — PROGRESS NOTE ADULT - NSHPATTENDINGPLANDISCUSS_GEN_ALL_CORE
patient, nursing, , 
patient, nursing, , 
patient, nursing staff
There are no Wet Read(s) to document.

## 2022-05-27 ENCOUNTER — OUTPATIENT (OUTPATIENT)
Dept: OUTPATIENT SERVICES | Facility: HOSPITAL | Age: 57
LOS: 1 days | End: 2022-05-27

## 2022-05-27 ENCOUNTER — APPOINTMENT (OUTPATIENT)
Dept: ULTRASOUND IMAGING | Facility: CLINIC | Age: 57
End: 2022-05-27
Payer: COMMERCIAL

## 2022-05-27 ENCOUNTER — OUTPATIENT (OUTPATIENT)
Dept: OUTPATIENT SERVICES | Facility: HOSPITAL | Age: 57
LOS: 1 days | End: 2022-05-27
Payer: COMMERCIAL

## 2022-05-27 ENCOUNTER — APPOINTMENT (OUTPATIENT)
Dept: RADIOLOGY | Facility: CLINIC | Age: 57
End: 2022-05-27
Payer: COMMERCIAL

## 2022-05-27 DIAGNOSIS — Z98.890 OTHER SPECIFIED POSTPROCEDURAL STATES: Chronic | ICD-10-CM

## 2022-05-27 DIAGNOSIS — Z98.891 HISTORY OF UTERINE SCAR FROM PREVIOUS SURGERY: Chronic | ICD-10-CM

## 2022-05-27 DIAGNOSIS — Z00.8 ENCOUNTER FOR OTHER GENERAL EXAMINATION: ICD-10-CM

## 2022-05-27 DIAGNOSIS — M25.571 PAIN IN RIGHT ANKLE AND JOINTS OF RIGHT FOOT: ICD-10-CM

## 2022-05-27 PROCEDURE — 73600 X-RAY EXAM OF ANKLE: CPT | Mod: 26,RT

## 2022-05-27 PROCEDURE — 73600 X-RAY EXAM OF ANKLE: CPT

## 2022-05-27 PROCEDURE — 76700 US EXAM ABDOM COMPLETE: CPT | Mod: 26

## 2022-05-27 PROCEDURE — 76700 US EXAM ABDOM COMPLETE: CPT

## 2022-05-31 ENCOUNTER — APPOINTMENT (OUTPATIENT)
Dept: ELECTROPHYSIOLOGY | Facility: CLINIC | Age: 57
End: 2022-05-31

## 2022-05-31 ENCOUNTER — NON-APPOINTMENT (OUTPATIENT)
Age: 57
End: 2022-05-31

## 2022-05-31 VITALS
BODY MASS INDEX: 20.25 KG/M2 | RESPIRATION RATE: 14 BRPM | OXYGEN SATURATION: 98 % | DIASTOLIC BLOOD PRESSURE: 80 MMHG | WEIGHT: 129 LBS | HEART RATE: 99 BPM | SYSTOLIC BLOOD PRESSURE: 115 MMHG | HEIGHT: 67 IN

## 2022-05-31 DIAGNOSIS — R00.0 TACHYCARDIA, UNSPECIFIED: ICD-10-CM

## 2022-05-31 PROCEDURE — 93000 ELECTROCARDIOGRAM COMPLETE: CPT

## 2022-05-31 PROCEDURE — 99214 OFFICE O/P EST MOD 30 MIN: CPT

## 2022-05-31 RX ORDER — PANTOPRAZOLE SODIUM 40 MG/10ML
40 INJECTION, POWDER, FOR SOLUTION INTRAVENOUS
Refills: 0 | Status: ACTIVE | COMMUNITY

## 2022-05-31 RX ORDER — SPIRONOLACTONE 25 MG/1
25 TABLET ORAL
Qty: 90 | Refills: 0 | Status: ACTIVE | COMMUNITY

## 2022-06-28 PROBLEM — R00.0 SINUS TACHYCARDIA SEEN ON CARDIAC MONITOR: Status: ACTIVE | Noted: 2019-08-20

## 2022-06-28 NOTE — ASSESSMENT
[FreeTextEntry1] : This is a 56 year old woman with sinus tachycardia and episodes of near syncope. Tilt demonstrated sinus tachycardia. \par \par She is tolerating atenolol with control of symptoms. \par Will continue current dose. \par \par Notes from Dr. Flores and Hospital discharge reviewed.

## 2022-06-28 NOTE — HISTORY OF PRESENT ILLNESS
[FreeTextEntry1] : This is a 56 year old woman who presents for evaluation of syncope, tachycardia and palpitations.   She reports her pulse is constantly elevated associated with tightness in her chest. She reports 3 episodes of near syncope and one episode of syncope about a month ago. She was standing felt lightheaded lost consciousness briefly without injury. \par \par Tilt table test on Sept 6 2019 was negative for syncope she had an initial heart rate of 93 that increased to 112 and peaked at 124 bpm. \par \par Holter monitor demonstrated sinus to sinus tachycardia 78 to 163 BPM average heart rate 110 Bpm. Mean heart rates throughout monitor ( Both during sleep and wake) elevated. \par \par \par \par ABRAHAN GOODWIN  denies chest pain, chest pressure, shortness of breath, lightheadedness, dizziness, palpitations, syncope, presyncope, orthopnea, PND, or edema.

## 2022-07-10 ENCOUNTER — INPATIENT (INPATIENT)
Facility: HOSPITAL | Age: 57
LOS: 2 days | Discharge: HOME | DRG: 369 | End: 2022-07-13
Attending: HOSPITALIST | Admitting: INTERNAL MEDICINE
Payer: COMMERCIAL

## 2022-07-10 VITALS
TEMPERATURE: 98 F | WEIGHT: 139.99 LBS | HEIGHT: 67 IN | HEART RATE: 113 BPM | RESPIRATION RATE: 18 BRPM | SYSTOLIC BLOOD PRESSURE: 98 MMHG | OXYGEN SATURATION: 100 % | DIASTOLIC BLOOD PRESSURE: 45 MMHG

## 2022-07-10 DIAGNOSIS — Z98.890 OTHER SPECIFIED POSTPROCEDURAL STATES: Chronic | ICD-10-CM

## 2022-07-10 DIAGNOSIS — Z98.891 HISTORY OF UTERINE SCAR FROM PREVIOUS SURGERY: Chronic | ICD-10-CM

## 2022-07-10 DIAGNOSIS — K92.2 GASTROINTESTINAL HEMORRHAGE, UNSPECIFIED: ICD-10-CM

## 2022-07-10 LAB
ALBUMIN SERPL ELPH-MCNC: 2.3 G/DL — LOW (ref 3.3–5)
ALP SERPL-CCNC: 206 U/L — HIGH (ref 40–120)
ALT FLD-CCNC: 48 U/L — SIGNIFICANT CHANGE UP (ref 12–78)
ANION GAP SERPL CALC-SCNC: 12 MMOL/L — SIGNIFICANT CHANGE UP (ref 5–17)
APTT BLD: 27.4 SEC — LOW (ref 27.5–35.5)
AST SERPL-CCNC: 133 U/L — HIGH (ref 15–37)
BASOPHILS # BLD AUTO: 0.02 K/UL — SIGNIFICANT CHANGE UP (ref 0–0.2)
BASOPHILS NFR BLD AUTO: 0.1 % — SIGNIFICANT CHANGE UP (ref 0–2)
BILIRUB SERPL-MCNC: 0.9 MG/DL — SIGNIFICANT CHANGE UP (ref 0.2–1.2)
BUN SERPL-MCNC: 25 MG/DL — HIGH (ref 7–23)
CALCIUM SERPL-MCNC: 9 MG/DL — SIGNIFICANT CHANGE UP (ref 8.5–10.1)
CHLORIDE SERPL-SCNC: 102 MMOL/L — SIGNIFICANT CHANGE UP (ref 96–108)
CO2 SERPL-SCNC: 20 MMOL/L — LOW (ref 22–31)
CREAT SERPL-MCNC: 0.86 MG/DL — SIGNIFICANT CHANGE UP (ref 0.5–1.3)
EGFR: 78 ML/MIN/1.73M2 — SIGNIFICANT CHANGE UP
EOSINOPHIL # BLD AUTO: 0.15 K/UL — SIGNIFICANT CHANGE UP (ref 0–0.5)
EOSINOPHIL NFR BLD AUTO: 0.9 % — SIGNIFICANT CHANGE UP (ref 0–6)
GLUCOSE SERPL-MCNC: 126 MG/DL — HIGH (ref 70–99)
HCT VFR BLD CALC: 13.4 % — CRITICAL LOW (ref 34.5–45)
HCT VFR BLD CALC: 26.9 % — LOW (ref 34.5–45)
HGB BLD-MCNC: 4 G/DL — CRITICAL LOW (ref 11.5–15.5)
HGB BLD-MCNC: 8.7 G/DL — LOW (ref 11.5–15.5)
IMM GRANULOCYTES NFR BLD AUTO: 1.9 % — HIGH (ref 0–1.5)
INR BLD: 1.5 RATIO — HIGH (ref 0.88–1.16)
LIDOCAIN IGE QN: 407 U/L — HIGH (ref 73–393)
LYMPHOCYTES # BLD AUTO: 18.4 % — SIGNIFICANT CHANGE UP (ref 13–44)
LYMPHOCYTES # BLD AUTO: 3.14 K/UL — SIGNIFICANT CHANGE UP (ref 1–3.3)
MCHC RBC-ENTMCNC: 29 PG — SIGNIFICANT CHANGE UP (ref 27–34)
MCHC RBC-ENTMCNC: 29.1 PG — SIGNIFICANT CHANGE UP (ref 27–34)
MCHC RBC-ENTMCNC: 29.9 GM/DL — LOW (ref 32–36)
MCHC RBC-ENTMCNC: 32.3 GM/DL — SIGNIFICANT CHANGE UP (ref 32–36)
MCV RBC AUTO: 90 FL — SIGNIFICANT CHANGE UP (ref 80–100)
MCV RBC AUTO: 97.1 FL — SIGNIFICANT CHANGE UP (ref 80–100)
MONOCYTES # BLD AUTO: 2 K/UL — HIGH (ref 0–0.9)
MONOCYTES NFR BLD AUTO: 11.7 % — SIGNIFICANT CHANGE UP (ref 2–14)
NEUTROPHILS # BLD AUTO: 11.41 K/UL — HIGH (ref 1.8–7.4)
NEUTROPHILS NFR BLD AUTO: 67 % — SIGNIFICANT CHANGE UP (ref 43–77)
PLATELET # BLD AUTO: 179 K/UL — SIGNIFICANT CHANGE UP (ref 150–400)
PLATELET # BLD AUTO: 276 K/UL — SIGNIFICANT CHANGE UP (ref 150–400)
POTASSIUM SERPL-MCNC: 3.5 MMOL/L — SIGNIFICANT CHANGE UP (ref 3.5–5.3)
POTASSIUM SERPL-SCNC: 3.5 MMOL/L — SIGNIFICANT CHANGE UP (ref 3.5–5.3)
PROT SERPL-MCNC: 6.3 GM/DL — SIGNIFICANT CHANGE UP (ref 6–8.3)
PROTHROM AB SERPL-ACNC: 17.5 SEC — HIGH (ref 10.5–13.4)
RBC # BLD: 1.38 M/UL — LOW (ref 3.8–5.2)
RBC # BLD: 2.99 M/UL — LOW (ref 3.8–5.2)
RBC # FLD: 14.9 % — HIGH (ref 10.3–14.5)
RBC # FLD: 17.3 % — HIGH (ref 10.3–14.5)
SODIUM SERPL-SCNC: 134 MMOL/L — LOW (ref 135–145)
TROPONIN I, HIGH SENSITIVITY RESULT: 4.28 NG/L — SIGNIFICANT CHANGE UP
WBC # BLD: 17.05 K/UL — HIGH (ref 3.8–10.5)
WBC # BLD: 9.41 K/UL — SIGNIFICANT CHANGE UP (ref 3.8–10.5)
WBC # FLD AUTO: 17.05 K/UL — HIGH (ref 3.8–10.5)
WBC # FLD AUTO: 9.41 K/UL — SIGNIFICANT CHANGE UP (ref 3.8–10.5)

## 2022-07-10 PROCEDURE — 80076 HEPATIC FUNCTION PANEL: CPT

## 2022-07-10 PROCEDURE — 36430 TRANSFUSION BLD/BLD COMPNT: CPT

## 2022-07-10 PROCEDURE — 43255 EGD CONTROL BLEEDING ANY: CPT | Mod: GC

## 2022-07-10 PROCEDURE — 85025 COMPLETE CBC W/AUTO DIFF WBC: CPT

## 2022-07-10 PROCEDURE — 93005 ELECTROCARDIOGRAM TRACING: CPT

## 2022-07-10 PROCEDURE — 71045 X-RAY EXAM CHEST 1 VIEW: CPT | Mod: 26

## 2022-07-10 PROCEDURE — 84100 ASSAY OF PHOSPHORUS: CPT

## 2022-07-10 PROCEDURE — C1889: CPT

## 2022-07-10 PROCEDURE — 99254 IP/OBS CNSLTJ NEW/EST MOD 60: CPT | Mod: 25

## 2022-07-10 PROCEDURE — 86923 COMPATIBILITY TEST ELECTRIC: CPT

## 2022-07-10 PROCEDURE — 83550 IRON BINDING TEST: CPT

## 2022-07-10 PROCEDURE — 99291 CRITICAL CARE FIRST HOUR: CPT

## 2022-07-10 PROCEDURE — 76700 US EXAM ABDOM COMPLETE: CPT

## 2022-07-10 PROCEDURE — 85027 COMPLETE CBC AUTOMATED: CPT

## 2022-07-10 PROCEDURE — P9016: CPT

## 2022-07-10 PROCEDURE — 80307 DRUG TEST PRSMV CHEM ANLYZR: CPT

## 2022-07-10 PROCEDURE — 93975 VASCULAR STUDY: CPT

## 2022-07-10 PROCEDURE — 83540 ASSAY OF IRON: CPT

## 2022-07-10 PROCEDURE — 83735 ASSAY OF MAGNESIUM: CPT

## 2022-07-10 PROCEDURE — 80048 BASIC METABOLIC PNL TOTAL CA: CPT

## 2022-07-10 PROCEDURE — 80053 COMPREHEN METABOLIC PANEL: CPT

## 2022-07-10 PROCEDURE — 82728 ASSAY OF FERRITIN: CPT

## 2022-07-10 PROCEDURE — 99285 EMERGENCY DEPT VISIT HI MDM: CPT

## 2022-07-10 PROCEDURE — 36415 COLL VENOUS BLD VENIPUNCTURE: CPT

## 2022-07-10 PROCEDURE — 93010 ELECTROCARDIOGRAM REPORT: CPT

## 2022-07-10 RX ORDER — SODIUM CHLORIDE 9 MG/ML
1000 INJECTION INTRAMUSCULAR; INTRAVENOUS; SUBCUTANEOUS ONCE
Refills: 0 | Status: COMPLETED | OUTPATIENT
Start: 2022-07-10 | End: 2022-07-10

## 2022-07-10 RX ORDER — CEFTRIAXONE 500 MG/1
1000 INJECTION, POWDER, FOR SOLUTION INTRAMUSCULAR; INTRAVENOUS EVERY 24 HOURS
Refills: 0 | Status: DISCONTINUED | OUTPATIENT
Start: 2022-07-10 | End: 2022-07-13

## 2022-07-10 RX ORDER — OCTREOTIDE ACETATE 200 UG/ML
50 INJECTION, SOLUTION INTRAVENOUS; SUBCUTANEOUS ONCE
Refills: 0 | Status: COMPLETED | OUTPATIENT
Start: 2022-07-10 | End: 2022-07-10

## 2022-07-10 RX ORDER — OCTREOTIDE ACETATE 200 UG/ML
50 INJECTION, SOLUTION INTRAVENOUS; SUBCUTANEOUS
Qty: 500 | Refills: 0 | Status: DISCONTINUED | OUTPATIENT
Start: 2022-07-10 | End: 2022-07-12

## 2022-07-10 RX ORDER — CHOLECALCIFEROL (VITAMIN D3) 125 MCG
1 CAPSULE ORAL
Qty: 0 | Refills: 0 | DISCHARGE

## 2022-07-10 RX ORDER — PANTOPRAZOLE SODIUM 20 MG/1
40 TABLET, DELAYED RELEASE ORAL ONCE
Refills: 0 | Status: COMPLETED | OUTPATIENT
Start: 2022-07-10 | End: 2022-07-10

## 2022-07-10 RX ORDER — CHLORHEXIDINE GLUCONATE 213 G/1000ML
1 SOLUTION TOPICAL
Refills: 0 | Status: DISCONTINUED | OUTPATIENT
Start: 2022-07-10 | End: 2022-07-13

## 2022-07-10 RX ORDER — ONDANSETRON 8 MG/1
4 TABLET, FILM COATED ORAL ONCE
Refills: 0 | Status: COMPLETED | OUTPATIENT
Start: 2022-07-10 | End: 2022-07-10

## 2022-07-10 RX ADMIN — SODIUM CHLORIDE 1000 MILLILITER(S): 9 INJECTION INTRAMUSCULAR; INTRAVENOUS; SUBCUTANEOUS at 11:41

## 2022-07-10 RX ADMIN — SODIUM CHLORIDE 1000 MILLILITER(S): 9 INJECTION INTRAMUSCULAR; INTRAVENOUS; SUBCUTANEOUS at 08:25

## 2022-07-10 RX ADMIN — PANTOPRAZOLE SODIUM 40 MILLIGRAM(S): 20 TABLET, DELAYED RELEASE ORAL at 08:26

## 2022-07-10 RX ADMIN — OCTREOTIDE ACETATE 10 MICROGRAM(S)/HR: 200 INJECTION, SOLUTION INTRAVENOUS; SUBCUTANEOUS at 22:49

## 2022-07-10 RX ADMIN — OCTREOTIDE ACETATE 50 MICROGRAM(S): 200 INJECTION, SOLUTION INTRAVENOUS; SUBCUTANEOUS at 14:16

## 2022-07-10 RX ADMIN — ONDANSETRON 4 MILLIGRAM(S): 8 TABLET, FILM COATED ORAL at 08:26

## 2022-07-10 RX ADMIN — CEFTRIAXONE 100 MILLIGRAM(S): 500 INJECTION, POWDER, FOR SOLUTION INTRAMUSCULAR; INTRAVENOUS at 11:33

## 2022-07-10 RX ADMIN — OCTREOTIDE ACETATE 10 MICROGRAM(S)/HR: 200 INJECTION, SOLUTION INTRAVENOUS; SUBCUTANEOUS at 16:51

## 2022-07-10 NOTE — ED PROVIDER NOTE - NSICDXFAMILYHX_GEN_ALL_CORE_FT
FAMILY HISTORY:  Father  Still living? Unknown  Family history of CVA, Age at diagnosis: Age Unknown  Family history of heart attack, Age at diagnosis: Age Unknown    Mother  Still living? Unknown  FH: pancreatic cancer, Age at diagnosis: Age Unknown

## 2022-07-10 NOTE — H&P ADULT - ASSESSMENT
Problems  -GI Bleed  -Anemia  -Hypotension  -Elevated INR    Plan:  -Volume resuscitation w/ PRBC transfusion for hypotension. Holding anti-hypertensives  -Recent EGD 2/22 w/ portal gastropathy. Ordered for Octreotide IVP followed by gtt  -Protonix 40mg IVP BID  -NPO  -Dr. Sullivan consulted, plan for EGD today.  -Severe Anemia Hgb 4.0. Will transfuse 2u PRBC  -Elevated INR likely secondary to Cirrhosis  -Empiric Ceftriaxone  -DVT PPX: SCD    Discussed case w/ Intensivist Dr. Garcia    Critical care time: 40 minutes including time spent reviewing chart, ordering tests/labs, discussing with interdisciplinary time. Not including time spent performing procedures

## 2022-07-10 NOTE — H&P ADULT - HISTORY OF PRESENT ILLNESS
Patient is a 57y old  Female who presents with a chief complaint of hematemsis    HPI:  58 yo M pmhx alcohol abuse (c/b withdrawal seizures), basal cell carcinoma, depression, hemorrhoids (s/p hemorrhoidectomy), squamous cell skin cancer, cirrhosis presented to ED with hematemesis Patient states started vomiting blood and having dark stools for past week. Initially attributed it to eating beets and pizza she had Friday but vomiting continued over weekend worsening overnight with associated weakness. Emesis described as dark with streaks of bright red. Admits to chest pain friday for which she took 'baby aspirin' at home. Previously seen at  for diarrhea 2/22 by Dr. Goodson. EGD demonstrated portal gastropathy and hiatal hernia. Colonoscopy showed diverticulosis and hemorrhoids    In ED patient noted to be hypotensive to 80's systolic and tachycardic. Feels symptoms improving w/ blood transfusion.

## 2022-07-10 NOTE — ED PROVIDER NOTE - CONSTITUTIONAL, MLM
pale, Well appearing, awake, alert, oriented to person, place, time/situation and in no apparent distress. normal...

## 2022-07-10 NOTE — ED PROVIDER NOTE - NSICDXPASTMEDICALHX_GEN_ALL_CORE_FT
PAST MEDICAL HISTORY:  2019 novel coronavirus disease (COVID-19)     Alcohol abuse     Depression     Hemorrhoids     History of basal cell carcinoma excision     Squamous cell skin cancer     Withdrawal seizures

## 2022-07-10 NOTE — H&P ADULT - NSHPPHYSICALEXAM_GEN_ALL_CORE
Gen: Palle, ill appearing  CV: Tachycardic. +S1S2  Resp: CTA  GI: Soft, NT, ND  Extremites; Warm, dry, no edema  Skin: Well perfused

## 2022-07-10 NOTE — PATIENT PROFILE ADULT - FALL HARM RISK - HARM RISK INTERVENTIONS
Assistance with ambulation/Communicate Risk of Fall with Harm to all staff/Monitor gait and stability/Provide patient with walking aids - walker, cane, crutches/Reinforce activity limits and safety measures with patient and family/Review medications for side effects contributing to fall risk/Sit up slowly, dangle for a short time, stand at bedside before walking/Tailored Fall Risk Interventions/Use of alarms - bed, chair and/or voice tab/Visual Cue: Yellow wristband and red socks/Bed in lowest position, wheels locked, appropriate side rails in place/Call bell, personal items and telephone in reach/Instruct patient to call for assistance before getting out of bed or chair/Non-slip footwear when patient is out of bed/[place] to call system/Physically safe environment - no spills, clutter or unnecessary equipment/Purposeful Proactive Rounding/Room/bathroom lighting operational, light cord in reach

## 2022-07-10 NOTE — H&P ADULT - NSHPLABSRESULTS_GEN_ALL_CORE
4.0    17.05 )-----------( 276      ( 10 Jul 2022 07:59 )             13.4       07-10    134<L>  |  102  |  25<H>  ----------------------------<  126<H>  3.5   |  20<L>  |  0.86    Ca    9.0      10 Jul 2022 07:59    TPro  6.3  /  Alb  2.3<L>  /  TBili  0.9  /  DBili  x   /  AST  133<H>  /  ALT  48  /  AlkPhos  206<H>  07-10          PT/INR - ( 10 Jul 2022 07:59 )   PT: 17.5 sec;   INR: 1.50 ratio         PTT - ( 10 Jul 2022 07:59 )  PTT:27.4 sec

## 2022-07-10 NOTE — ED PROVIDER NOTE - OBJECTIVE STATEMENT
57-year-old female presents the ED with over a week of coffee-ground emesis and melena.  She has a history of alcoholic cirrhosis.  She did not use anticoagulation.  She had an episode of chest tightness yesterday which she took 181 mg aspirin 4.  She is complaining of dizziness and lightheadedness.  She also shortness of breath.  Denies history of varices.  Does not currently drink alcohol.  No fevers no abdominal pain no LOC

## 2022-07-10 NOTE — ED PROVIDER NOTE - PROGRESS NOTE DETAILS
Demetrio Cr for attending Dr. Anders: icu is coming to see patient I paged GI. Patient is consented for blood

## 2022-07-10 NOTE — CONSULT NOTE ADULT - SUBJECTIVE AND OBJECTIVE BOX
Patient is a 57y old  Female who presents with a chief complaint of     HPI:    cirrhosis with hematemsis, low hgb.     rescucitate, ppi, octreotide bolus and drip (50mcg), ceftriaxone,   going to icu, discussed [facility] ICU          PAST MEDICAL & SURGICAL HISTORY:  Alcohol abuse      Depression      Withdrawal seizures      History of basal cell carcinoma excision      Squamous cell skin cancer      Hemorrhoids      2019 novel coronavirus disease (COVID-19)      History of ear, nose, and throat (ENT) surgery      H/O basal cell carcinoma excision      H/O:           MEDICATIONS  (STANDING):  chlorhexidine 4% Liquid 1 Application(s) Topical <User Schedule>  octreotide  Infusion 50 MICROgram(s)/Hr (10 mL/Hr) IV Continuous <Continuous>  octreotide  Injectable 50 MICROGram(s) IV Push once    MEDICATIONS  (PRN):      Allergies    Zithromax (Unknown)    Intolerances    morphine (Nausea)      SOCIAL HISTORY:    FAMILY HISTORY:  FH: pancreatic cancer (Mother)    Family history of heart attack (Father)    Family history of CVA (Father)        REVIEW OF SYSTEMS:    CONSTITUTIONAL: No weakness, fevers or chills  EYES/ENT: No visual changes;  No vertigo or throat pain   NECK: No pain or stiffness  RESPIRATORY: No cough, wheezing, hemoptysis; No shortness of breath  CARDIOVASCULAR: No chest pain or palpitations  GASTROINTESTINAL: No abdominal or epigastric pain. No nausea, vomiting, or hematemesis; No diarrhea or constipation. No melena or hematochezia.  GENITOURINARY: No dysuria, frequency or hematuria  NEUROLOGICAL: No numbness or weakness  SKIN: No itching, burning, rashes, or lesions   PSYCH: Normal mood and affect  All other review of systems is negative unless indicated above.    Vital Signs Last 24 Hrs  T(C): 36.7 (10 Jul 2022 09:45), Max: 36.8 (10 Jul 2022 07:34)  T(F): 98 (10 Jul 2022 09:45), Max: 98.3 (10 Jul 2022 07:34)  HR: 110 (10 Jul 2022 09:45) (110 - 113)  BP: 99/59 (10 Jul 2022 09:45) (98/45 - 99/59)  BP(mean): 72 (10 Jul 2022 09:45) (58 - 72)  RR: 18 (10 Jul 2022 09:45) (18 - 18)  SpO2: 100% (10 Jul 2022 09:45) (100% - 100%)    Parameters below as of 10 Jul 2022 09:45  Patient On (Oxygen Delivery Method): room air        PHYSICAL EXAM:    Constitutional: No acute distress, well-developed, non-toxic appearing  HEENT: masked, good phonation, not icteric  Neck: supple, no lymphadenopathy  Respiratory: clear to ascultation bilaterally, no wheezing  Cardiovascular: S1 and S2, regular rate and rhythm, no murmurs rubs or gallops  Gastrointestinal: soft, non-tender, non-distended, +bowel sounds, no rebound or guarding, no surgical scars, no drains  Extremities: No peripheral edema, no cyanosis or clubbing  Vascular: 2+ peripheral pulses, no venous stasis  Neurological: A/O x 3, no focal deficits, no asterixis  Psychiatric: Normal mood, normal affect  Skin: No rashes, not jaundiced    LABS:                        4.0    17.05 )-----------( 276      ( 10 Jul 2022 07:59 )             13.4     07-10    134<L>  |  102  |  25<H>  ----------------------------<  126<H>  3.5   |  20<L>  |  0.86    Ca    9.0      10 Jul 2022 07:59    TPro  6.3  /  Alb  2.3<L>  /  TBili  0.9  /  DBili  x   /  AST  133<H>  /  ALT  48  /  AlkPhos  206<H>  07-10    PT/INR - ( 10 Jul 2022 07:59 )   PT: 17.5 sec;   INR: 1.50 ratio         PTT - ( 10 Jul 2022 07:59 )  PTT:27.4 sec  LIVER FUNCTIONS - ( 10 Jul 2022 07:59 )  Alb: 2.3 g/dL / Pro: 6.3 gm/dL / ALK PHOS: 206 U/L / ALT: 48 U/L / AST: 133 U/L / GGT: x             RADIOLOGY & ADDITIONAL STUDIES:

## 2022-07-10 NOTE — ED ADULT NURSE NOTE - OBJECTIVE STATEMENT
Pt received with report of dark stool and dark emesis over past two weeks. Pt reports that she has been increasingly fatigued and dyspneic with exertion. Pt pale with nail beds and lips also pale. Pt reports hx of anemia in past. Hx of cirrhosis. Report given to floor and pt to be transferred.  Pt has first unit pRBC infusing at this time with ICU RN at bedside for transport.

## 2022-07-10 NOTE — ED PROVIDER NOTE - CLINICAL SUMMARY MEDICAL DECISION MAKING FREE TEXT BOX
Adult female with suspected upper GI bleed anemic to 4.0.  Tachycardic with a soft blood pressure but not hypotensive.  To be admitted to the ICU for further work-up.  Consulting GI for endoscopy.  Patient is not on anticoagulation.  She is receiving PRBCs and Protonix.

## 2022-07-10 NOTE — BRIEF OPERATIVE NOTE - COMMENTS
Continue octreotide drip at 50 mcg/hr  1 g ceftriaxone daily  F/u cbc, do not over transfuse (keep around 7-8) to not increase portal pressures.   Clears today ok.   Dr. Goodson to resume care tomorrow.

## 2022-07-10 NOTE — PROGRESS NOTE ADULT - ASSESSMENT
57F PMH alcoholic cirrhosis and portal gastropathy now with upper GI bleed, acute blood loss anemia and hypotension.     No hematemesis episode since 1 am per patient  Received 2 units PRBCs, BP low normal but MAP>70 and mentation normal, no longer lightheaded       Plan:    ICU   Transfuse 1 more PRBC as d/w GI  EGD in pm today   Octreotide drip   Ceftriaxone   PPI   NPO   Trend H/H       DVT ppx: SCDs  Nutrition: npo  Central line: no  Arterial line: no  Delaney: no  Restraints: no  Activity: bedrest    Code status: full     Disposition: ICU

## 2022-07-10 NOTE — ED ADULT TRIAGE NOTE - CHIEF COMPLAINT QUOTE
pt presents to ED due to complaints of vomiting blood x 1 week progressively worsening over the week pt states shes had a hx 4 mos ago for GI bleeding and was admitted and had a full work up with negative findings pt feels weak and nearly passing out due vomiting up blood

## 2022-07-11 LAB
ALBUMIN SERPL ELPH-MCNC: 2.3 G/DL — LOW (ref 3.3–5)
ALP SERPL-CCNC: 167 U/L — HIGH (ref 40–120)
ALT FLD-CCNC: 63 U/L — SIGNIFICANT CHANGE UP (ref 12–78)
ANION GAP SERPL CALC-SCNC: 6 MMOL/L — SIGNIFICANT CHANGE UP (ref 5–17)
AST SERPL-CCNC: 177 U/L — HIGH (ref 15–37)
BILIRUB SERPL-MCNC: 1.4 MG/DL — HIGH (ref 0.2–1.2)
BUN SERPL-MCNC: 14 MG/DL — SIGNIFICANT CHANGE UP (ref 7–23)
CALCIUM SERPL-MCNC: 8.1 MG/DL — LOW (ref 8.5–10.1)
CHLORIDE SERPL-SCNC: 107 MMOL/L — SIGNIFICANT CHANGE UP (ref 96–108)
CO2 SERPL-SCNC: 26 MMOL/L — SIGNIFICANT CHANGE UP (ref 22–31)
CREAT SERPL-MCNC: 0.61 MG/DL — SIGNIFICANT CHANGE UP (ref 0.5–1.3)
EGFR: 103 ML/MIN/1.73M2 — SIGNIFICANT CHANGE UP
GLUCOSE SERPL-MCNC: 102 MG/DL — HIGH (ref 70–99)
HCT VFR BLD CALC: 25.3 % — LOW (ref 34.5–45)
HGB BLD-MCNC: 8.4 G/DL — LOW (ref 11.5–15.5)
MAGNESIUM SERPL-MCNC: 1.9 MG/DL — SIGNIFICANT CHANGE UP (ref 1.6–2.6)
MCHC RBC-ENTMCNC: 29.7 PG — SIGNIFICANT CHANGE UP (ref 27–34)
MCHC RBC-ENTMCNC: 33.2 GM/DL — SIGNIFICANT CHANGE UP (ref 32–36)
MCV RBC AUTO: 89.4 FL — SIGNIFICANT CHANGE UP (ref 80–100)
PHOSPHATE SERPL-MCNC: 3.5 MG/DL — SIGNIFICANT CHANGE UP (ref 2.5–4.5)
PLATELET # BLD AUTO: 177 K/UL — SIGNIFICANT CHANGE UP (ref 150–400)
POTASSIUM SERPL-MCNC: 5 MMOL/L — SIGNIFICANT CHANGE UP (ref 3.5–5.3)
POTASSIUM SERPL-SCNC: 5 MMOL/L — SIGNIFICANT CHANGE UP (ref 3.5–5.3)
PROT SERPL-MCNC: 6.1 GM/DL — SIGNIFICANT CHANGE UP (ref 6–8.3)
RBC # BLD: 2.83 M/UL — LOW (ref 3.8–5.2)
RBC # FLD: 15.5 % — HIGH (ref 10.3–14.5)
SODIUM SERPL-SCNC: 139 MMOL/L — SIGNIFICANT CHANGE UP (ref 135–145)
WBC # BLD: 9.01 K/UL — SIGNIFICANT CHANGE UP (ref 3.8–10.5)
WBC # FLD AUTO: 9.01 K/UL — SIGNIFICANT CHANGE UP (ref 3.8–10.5)

## 2022-07-11 PROCEDURE — 99232 SBSQ HOSP IP/OBS MODERATE 35: CPT

## 2022-07-11 RX ADMIN — OCTREOTIDE ACETATE 10 MICROGRAM(S)/HR: 200 INJECTION, SOLUTION INTRAVENOUS; SUBCUTANEOUS at 22:07

## 2022-07-11 RX ADMIN — CHLORHEXIDINE GLUCONATE 1 APPLICATION(S): 213 SOLUTION TOPICAL at 11:02

## 2022-07-11 RX ADMIN — OCTREOTIDE ACETATE 10 MICROGRAM(S)/HR: 200 INJECTION, SOLUTION INTRAVENOUS; SUBCUTANEOUS at 11:53

## 2022-07-11 RX ADMIN — CEFTRIAXONE 100 MILLIGRAM(S): 500 INJECTION, POWDER, FOR SOLUTION INTRAMUSCULAR; INTRAVENOUS at 11:01

## 2022-07-11 NOTE — PROGRESS NOTE ADULT - ASSESSMENT
Imp:  S/P variceal bleed    Rec:  Cont octreotide and abx  check abdominal sono with dopplers  Clears today, soft diet tomorrow

## 2022-07-11 NOTE — PROGRESS NOTE ADULT - ASSESSMENT
IMP:    58 y/o female with h/o alcohol abuse (c/b withdrawal seizures), basal cell carcinoma, depression, hemorrhoids (s/p hemorrhoidectomy), squamous cell skin cancer and cirrhosis presents with several days of coffee ground emesis and dark stool found to have variceal bleeding S/P Banding X 2.   Acute blood loss anemia secondary to above  Stable H/H    Plan:    Oct gtt for total of 5 days as per Dr Galeana  CTX for total of 5 days  Clears  OOB  Abd sono as per Dr Goodson  DVT prophy--SCD     Stable for floor tx-- d/w ICU staff on multi disciplinary rounds and pt-- All concerns addressed including but not limited to diagnosis, treatment plan and overall prognosis  Pt signed out and care tx to hospitalist service.  Voicemail left on admit phone at [phone]

## 2022-07-11 NOTE — PROGRESS NOTE ADULT - NEGATIVE GASTROINTESTINAL SYMPTOMS
Prior Authorization Form:      DEMOGRAPHICS:                     Patient Name:  Tiara Murphy  Patient :  1978            Insurance:  Payor: [employer] / Plan: [employer] LISA - EXCHANGE / Product Type: *No Product type* /   Insurance ID Number:    Payor/Plan Subscr  Sex Relation Sub. Ins. ID Effective Group Num   1.  [facility] 1978 Female Self [id number] 20 [id number]                                   [address]         DIAGNOSIS & PROCEDURE:                       Procedure/Operation: EGD & Colonoscopy           CPT Code: 074418 & 25240    Diagnosis:  abd pain & diarrhea    ICD10 Code: K10.9 & K52.9    Location:  [facility]    Surgeon:  Zion Alvares INFORMATION:                          Date: 21    Time: 9am              Anesthesia:  MAC/TIVA                                                       Status:  Outpatient        Special Comments:  N/A       Electronically signed by Ritika Casanova MA on 2021 at 8:58 AM no nausea/no vomiting/no abdominal pain/no jaundice

## 2022-07-11 NOTE — DIETITIAN INITIAL EVALUATION ADULT - OTHER INFO
56 yo M pmhx alcohol abuse (c/b withdrawal seizures), basal cell carcinoma, depression, hemorrhoids (s/p hemorrhoidectomy), squamous cell skin cancer, cirrhosis presented to ED with hematemesis Patient states started vomiting blood and having dark stools for past week. Initially attributed it to eating beets and pizza she had Friday but vomiting continued over weekend worsening overnight with associated weakness. Emesis described as dark with streaks of bright red. Admits to chest pain friday for which she took 'baby aspirin' at home. Previously seen at  for diarrhea 2/22 by Dr. Goodson. EGD demonstrated portal gastropathy and hiatal hernia. Colonoscopy showed diverticulosis and hemorrhoids 58 yo M pmhx alcohol abuse (c/b withdrawal seizures), basal cell carcinoma, depression, hemorrhoids (s/p hemorrhoidectomy), squamous cell skin cancer, cirrhosis presented to ED with hematemesis Patient states started vomiting blood and having dark stools for past week. Initially attributed it to eating beets and pizza she had Friday but vomiting continued over weekend worsening overnight with associated weakness. Emesis described as dark with streaks of bright red. Admits to chest pain friday for which she took 'baby aspirin' at home. Previously seen at  for diarrhea 2/22 by Dr. Goodson. EGD demonstrated portal gastropathy and hiatal hernia. Colonoscopy showed diverticulosis and hemorrhoids    This RD visited with pt.  Pt reports minimal  Intake for past 2 days, now on clear liquids.  Pt admitted in February for GI bleed, shows weight gain from that time.    UBW on 12/5         146 lbs   on 2/5 pt weighed   133#   current wt 7/11     139 #  Wt loss from  12/5, clinically insignificant  Elevated LFTs  Pt w/  nausea and vomiting in past few days, resolved, she is now hungry, n/v resolved  Pt shows overall moderate muscle wasting, with severe fat wasting at triceps  Patient meets criteria for moderate protein-calorie malnutrition in context of acute on chronic condition  aeb decreased PO intake x 2 days, moderate and severe muscle wasting (see malnutrition assessment).  Pt on Ensure Max, she drinks this at home.    56 yo M pmhx alcohol abuse (c/b withdrawal seizures), basal cell carcinoma, depression, hemorrhoids (s/p hemorrhoidectomy), squamous cell skin cancer, cirrhosis presented to ED with hematemesis Patient states started vomiting blood and having dark stools for past week. Initially attributed it to eating beets and pizza she had Friday but vomiting continued over weekend worsening overnight with associated weakness. Emesis described as dark with streaks of bright red. Admits to chest pain friday for which she took 'baby aspirin' at home. Previously seen at  for diarrhea 2/22 by Dr. Goodson. EGD demonstrated portal gastropathy and hiatal hernia. Colonoscopy showed diverticulosis and hemorrhoids    This RD visited with pt.  Pt reports minimal  Intake for past 2 days, now on clear liquids.  Pt admitted in February for GI bleed, shows weight gain from that time.    UBW on 12/5         146 lbs   on 2/5 pt weighed   133#   current wt 7/11     139 #  Wt loss from  12/5, clinically insignificant  Elevated LFTs  Pt w/  nausea and vomiting in past few days, resolved, she is now hungry, n/v resolved  Pt shows overall moderate muscle wasting, with severe fat wasting at triceps  Patient meets criteria for moderate protein-calorie malnutrition in context of acute on chronic condition  aeb decreased PO intake x 2 days, moderate and severe muscle wasting (see malnutrition assessment).

## 2022-07-11 NOTE — DIETITIAN INITIAL EVALUATION ADULT - NAME AND PHONE
Makenna Bedoya RDN, CDN, [facility]      [phone]   [email or web address]

## 2022-07-11 NOTE — DIETITIAN INITIAL EVALUATION ADULT - MALNUTRITION
P· Malnutrition  Moderate protein-calorie malnutrition in context of acute illness related to inability to consume adequate nutrition to meet needs 2/2 altered GI AEB moderate and severe muscle wasting, decreased PO intake x 2 days

## 2022-07-11 NOTE — DIETITIAN INITIAL EVALUATION ADULT - ADD RECOMMEND
ADAT. Record PO intake in EMR after each meal (nursing.) Pt receives Ensure Max.  Weekly weight. Tray set-up, open all containers, encourage po intake Monitor PO intake, tolerance, labs and weight.  ADAT. Record PO intake in EMR after each meal (nursing.)  Weekly weight. Tray set-up, open all containers, encourage po intake Monitor PO intake, tolerance, labs and weight.

## 2022-07-11 NOTE — DIETITIAN INITIAL EVALUATION ADULT - PERTINENT MEDS FT
MEDICATIONS  (STANDING):  cefTRIAXone   IVPB 1000 milliGRAM(s) IV Intermittent every 24 hours  chlorhexidine 4% Liquid 1 Application(s) Topical <User Schedule>  octreotide  Infusion 50 MICROgram(s)/Hr (10 mL/Hr) IV Continuous <Continuous>    MEDICATIONS  (PRN):

## 2022-07-11 NOTE — PROGRESS NOTE ADULT - RESPIRATORY
airway patent/breath sounds equal
clear to auscultation bilaterally/no wheezes/no respiratory distress/breath sounds equal/good air movement

## 2022-07-11 NOTE — PROGRESS NOTE ADULT - ASSESSMENT
ASSESSMENT   58 yo female with PMHx alcohol abuse (c/b withdrawal seizures) quit 1 year ago, hx cirrhosis with ascites, basal cell carcinoma, depression, hemorrhoids (s/p hemorrhoidectomy), squamous cell skin cancer presented to ED with hematemesis and melena x 1 week with associated weakness and SOB. Admitted for:    1. Acute blood loss anemia 2/2  2. Upper GIB due to esophageal varices, portal HTN gastropathy  3. hx cirrhosis with ascites  4. hx ETOH abuse    PLAN  Neuro: no active issues  CV: Monitor BP, maintain MAP >65.   Pulm: on room air with sat >94%  GI: cont octreotide infusion @50/hr x 5 days, cont clear liquid diet. f/u abd US to evaluate liver for thrombosis as pt had no varices noted on prior EGD in Feb 2022. on ceftriaxone 1g qd #2 for SBP prophylaxis. s/p EGD with banding of esophageal varices. Monitor H/H, goal hgb 7-8. Avoid over transfusion to avoid increased portal pressure. Nutrition consulted for protein calorie malnutrition.  ID: on ceftriaxone 1gm qd for SBP ppx  Heme: hold chemical DVT ppx in setting of UGIB, cont SCDs. PT eval    Will discuss with Dr. Cordero    *THIS NOTE IS FOR ACADEMIC PURPOSES ONLY*

## 2022-07-11 NOTE — DIETITIAN INITIAL EVALUATION ADULT - PERTINENT LABORATORY DATA
07-11    139  |  107  |  14  ----------------------------<  102<H>  5.0   |  26  |  0.61    Ca    8.1<L>      11 Jul 2022 05:39  Phos  3.5     07-11  Mg     1.9     07-11    TPro  6.1  /  Alb  2.3<L>  /  TBili  1.4<H>  /  DBili  x   /  AST  177<H>  /  ALT  63  /  AlkPhos  167<H>  07-11

## 2022-07-12 LAB
ALBUMIN SERPL ELPH-MCNC: 2.2 G/DL — LOW (ref 3.3–5)
ALP SERPL-CCNC: 153 U/L — HIGH (ref 40–120)
ALT FLD-CCNC: 87 U/L — HIGH (ref 12–78)
ANION GAP SERPL CALC-SCNC: 6 MMOL/L — SIGNIFICANT CHANGE UP (ref 5–17)
AST SERPL-CCNC: 209 U/L — HIGH (ref 15–37)
BILIRUB DIRECT SERPL-MCNC: 0.6 MG/DL — HIGH (ref 0–0.3)
BILIRUB INDIRECT FLD-MCNC: 0.6 MG/DL — SIGNIFICANT CHANGE UP (ref 0.2–1)
BILIRUB SERPL-MCNC: 1.2 MG/DL — SIGNIFICANT CHANGE UP (ref 0.2–1.2)
BUN SERPL-MCNC: 6 MG/DL — LOW (ref 7–23)
CALCIUM SERPL-MCNC: 8.4 MG/DL — LOW (ref 8.5–10.1)
CHLORIDE SERPL-SCNC: 103 MMOL/L — SIGNIFICANT CHANGE UP (ref 96–108)
CO2 SERPL-SCNC: 25 MMOL/L — SIGNIFICANT CHANGE UP (ref 22–31)
CREAT SERPL-MCNC: 0.63 MG/DL — SIGNIFICANT CHANGE UP (ref 0.5–1.3)
EGFR: 102 ML/MIN/1.73M2 — SIGNIFICANT CHANGE UP
GLUCOSE SERPL-MCNC: 151 MG/DL — HIGH (ref 70–99)
HCT VFR BLD CALC: 25.3 % — LOW (ref 34.5–45)
HGB BLD-MCNC: 8.1 G/DL — LOW (ref 11.5–15.5)
MAGNESIUM SERPL-MCNC: 1.9 MG/DL — SIGNIFICANT CHANGE UP (ref 1.6–2.6)
MCHC RBC-ENTMCNC: 29.2 PG — SIGNIFICANT CHANGE UP (ref 27–34)
MCHC RBC-ENTMCNC: 32 GM/DL — SIGNIFICANT CHANGE UP (ref 32–36)
MCV RBC AUTO: 91.3 FL — SIGNIFICANT CHANGE UP (ref 80–100)
PHOSPHATE SERPL-MCNC: 2.8 MG/DL — SIGNIFICANT CHANGE UP (ref 2.5–4.5)
PLATELET # BLD AUTO: 160 K/UL — SIGNIFICANT CHANGE UP (ref 150–400)
POTASSIUM SERPL-MCNC: 3.8 MMOL/L — SIGNIFICANT CHANGE UP (ref 3.5–5.3)
POTASSIUM SERPL-SCNC: 3.8 MMOL/L — SIGNIFICANT CHANGE UP (ref 3.5–5.3)
PROT SERPL-MCNC: 5.8 GM/DL — LOW (ref 6–8.3)
RBC # BLD: 2.77 M/UL — LOW (ref 3.8–5.2)
RBC # FLD: 15.7 % — HIGH (ref 10.3–14.5)
SODIUM SERPL-SCNC: 134 MMOL/L — LOW (ref 135–145)
WBC # BLD: 7.96 K/UL — SIGNIFICANT CHANGE UP (ref 3.8–10.5)
WBC # FLD AUTO: 7.96 K/UL — SIGNIFICANT CHANGE UP (ref 3.8–10.5)

## 2022-07-12 PROCEDURE — 93975 VASCULAR STUDY: CPT | Mod: 26

## 2022-07-12 PROCEDURE — 76700 US EXAM ABDOM COMPLETE: CPT | Mod: 26,59

## 2022-07-12 PROCEDURE — 99232 SBSQ HOSP IP/OBS MODERATE 35: CPT

## 2022-07-12 RX ORDER — PANTOPRAZOLE SODIUM 20 MG/1
40 TABLET, DELAYED RELEASE ORAL
Refills: 0 | Status: DISCONTINUED | OUTPATIENT
Start: 2022-07-12 | End: 2022-07-13

## 2022-07-12 RX ORDER — PHENOL/SODIUM PHENOLATE
1 AEROSOL, SPRAY (ML) MUCOUS MEMBRANE
Refills: 0 | Status: DISCONTINUED | OUTPATIENT
Start: 2022-07-12 | End: 2022-07-13

## 2022-07-12 RX ORDER — BENZOCAINE AND MENTHOL 5; 1 G/100ML; G/100ML
1 LIQUID ORAL EVERY 4 HOURS
Refills: 0 | Status: DISCONTINUED | OUTPATIENT
Start: 2022-07-12 | End: 2022-07-12

## 2022-07-12 RX ORDER — SUCRALFATE 1 G
1 TABLET ORAL
Refills: 0 | Status: DISCONTINUED | OUTPATIENT
Start: 2022-07-12 | End: 2022-07-13

## 2022-07-12 RX ADMIN — Medication 1 GRAM(S): at 17:10

## 2022-07-12 RX ADMIN — OCTREOTIDE ACETATE 10 MICROGRAM(S)/HR: 200 INJECTION, SOLUTION INTRAVENOUS; SUBCUTANEOUS at 10:14

## 2022-07-12 RX ADMIN — CHLORHEXIDINE GLUCONATE 1 APPLICATION(S): 213 SOLUTION TOPICAL at 06:22

## 2022-07-12 RX ADMIN — CEFTRIAXONE 100 MILLIGRAM(S): 500 INJECTION, POWDER, FOR SOLUTION INTRAMUSCULAR; INTRAVENOUS at 10:13

## 2022-07-12 NOTE — PROGRESS NOTE ADULT - ASSESSMENT
ASSESSMENT/PLAN    Upper GIB from Esophageal Varices with associated Acute blood loss anemia and Hypotension POA  Liver Cirrhosis from Prior EtOH abuse with associated coagulopathy and elevated LFTs  Former EtOH abuse (in remission)  -Admitted to ICU (7/10). Downgraded from ICU (7/11)  -s/p EGD with esophageal banding (7/10)  -Hgb stable in 8s. Continue to monitor and transfuse accordingly  -Rocephin for SBP prophylaxis  -Elevated LFTs. Continue to monitor. Ab US pending  - GI recs  -PPI  -Clear liquid diet. Advance as tolerated.    ASSESSMENT/PLAN    Upper GIB from Esophageal Varices with associated Acute blood loss anemia and Hypotension POA  Liver Cirrhosis from Prior EtOH abuse with associated coagulopathy and elevated LFTs  Former EtOH abuse (in remission)  -Admitted to ICU (7/10). Downgraded from ICU (7/11)  -s/p EGD with esophageal banding (7/10)  -Hgb stable in 8s. Continue to monitor and transfuse accordingly  -Rocephin for SBP prophylaxis  -Elevated LFTs. Continue to monitor. Ab US pending  - GI recs  -PPI  -Clear liquid diet. Advance as per GI    DVT Prophylaxis: SCDs in the setting of acute GIB bleed

## 2022-07-12 NOTE — PROGRESS NOTE ADULT - NUTRITIONAL ASSESSMENT
This patient has been assessed with a concern for Malnutrition and has been determined to have a diagnosis/diagnoses of Moderate protein-calorie malnutrition.    This patient is being managed with:   Diet Clear Liquid-  Entered: Jul 10 2022  4:26PM    
This patient has been assessed with a concern for Malnutrition and has been determined to have a diagnosis/diagnoses of Moderate protein-calorie malnutrition.    This patient is being managed with:   Diet Soft and Bite Sized-  Entered: Jul 12 2022  1:03PM

## 2022-07-13 ENCOUNTER — TRANSCRIPTION ENCOUNTER (OUTPATIENT)
Age: 57
End: 2022-07-13

## 2022-07-13 ENCOUNTER — NON-APPOINTMENT (OUTPATIENT)
Age: 57
End: 2022-07-13

## 2022-07-13 VITALS
SYSTOLIC BLOOD PRESSURE: 99 MMHG | TEMPERATURE: 99 F | HEART RATE: 94 BPM | OXYGEN SATURATION: 97 % | RESPIRATION RATE: 18 BRPM | DIASTOLIC BLOOD PRESSURE: 58 MMHG

## 2022-07-13 LAB
ADD ON TEST-SPECIMEN IN LAB: SIGNIFICANT CHANGE UP
ALBUMIN SERPL ELPH-MCNC: 2.1 G/DL — LOW (ref 3.3–5)
ALP SERPL-CCNC: 197 U/L — HIGH (ref 40–120)
ALT FLD-CCNC: 78 U/L — SIGNIFICANT CHANGE UP (ref 12–78)
ANION GAP SERPL CALC-SCNC: 4 MMOL/L — LOW (ref 5–17)
AST SERPL-CCNC: 155 U/L — HIGH (ref 15–37)
BASOPHILS # BLD AUTO: 0.06 K/UL — SIGNIFICANT CHANGE UP (ref 0–0.2)
BASOPHILS NFR BLD AUTO: 0.8 % — SIGNIFICANT CHANGE UP (ref 0–2)
BILIRUB DIRECT SERPL-MCNC: 0.5 MG/DL — HIGH (ref 0–0.3)
BILIRUB INDIRECT FLD-MCNC: 0.5 MG/DL — SIGNIFICANT CHANGE UP (ref 0.2–1)
BILIRUB SERPL-MCNC: 1 MG/DL — SIGNIFICANT CHANGE UP (ref 0.2–1.2)
BUN SERPL-MCNC: 5 MG/DL — LOW (ref 7–23)
CALCIUM SERPL-MCNC: 8.1 MG/DL — LOW (ref 8.5–10.1)
CHLORIDE SERPL-SCNC: 105 MMOL/L — SIGNIFICANT CHANGE UP (ref 96–108)
CO2 SERPL-SCNC: 27 MMOL/L — SIGNIFICANT CHANGE UP (ref 22–31)
CREAT SERPL-MCNC: 0.51 MG/DL — SIGNIFICANT CHANGE UP (ref 0.5–1.3)
EGFR: 107 ML/MIN/1.73M2 — SIGNIFICANT CHANGE UP
EOSINOPHIL # BLD AUTO: 0.19 K/UL — SIGNIFICANT CHANGE UP (ref 0–0.5)
EOSINOPHIL NFR BLD AUTO: 2.5 % — SIGNIFICANT CHANGE UP (ref 0–6)
FERRITIN SERPL-MCNC: 28 NG/ML — SIGNIFICANT CHANGE UP (ref 15–150)
GLUCOSE SERPL-MCNC: 130 MG/DL — HIGH (ref 70–99)
HCT VFR BLD CALC: 26.5 % — LOW (ref 34.5–45)
HGB BLD-MCNC: 8.3 G/DL — LOW (ref 11.5–15.5)
IMM GRANULOCYTES NFR BLD AUTO: 0.8 % — SIGNIFICANT CHANGE UP (ref 0–1.5)
IRON SATN MFR SERPL: 13 UG/DL — LOW (ref 30–160)
IRON SATN MFR SERPL: 4 % — LOW (ref 14–50)
LYMPHOCYTES # BLD AUTO: 1.23 K/UL — SIGNIFICANT CHANGE UP (ref 1–3.3)
LYMPHOCYTES # BLD AUTO: 16.3 % — SIGNIFICANT CHANGE UP (ref 13–44)
MCHC RBC-ENTMCNC: 29 PG — SIGNIFICANT CHANGE UP (ref 27–34)
MCHC RBC-ENTMCNC: 31.3 GM/DL — LOW (ref 32–36)
MCV RBC AUTO: 92.7 FL — SIGNIFICANT CHANGE UP (ref 80–100)
MONOCYTES # BLD AUTO: 0.64 K/UL — SIGNIFICANT CHANGE UP (ref 0–0.9)
MONOCYTES NFR BLD AUTO: 8.5 % — SIGNIFICANT CHANGE UP (ref 2–14)
NEUTROPHILS # BLD AUTO: 5.38 K/UL — SIGNIFICANT CHANGE UP (ref 1.8–7.4)
NEUTROPHILS NFR BLD AUTO: 71.1 % — SIGNIFICANT CHANGE UP (ref 43–77)
PLATELET # BLD AUTO: 170 K/UL — SIGNIFICANT CHANGE UP (ref 150–400)
POTASSIUM SERPL-MCNC: 3.6 MMOL/L — SIGNIFICANT CHANGE UP (ref 3.5–5.3)
POTASSIUM SERPL-SCNC: 3.6 MMOL/L — SIGNIFICANT CHANGE UP (ref 3.5–5.3)
PROT SERPL-MCNC: 6 GM/DL — SIGNIFICANT CHANGE UP (ref 6–8.3)
RBC # BLD: 2.86 M/UL — LOW (ref 3.8–5.2)
RBC # FLD: 15.8 % — HIGH (ref 10.3–14.5)
SODIUM SERPL-SCNC: 136 MMOL/L — SIGNIFICANT CHANGE UP (ref 135–145)
TIBC SERPL-MCNC: 330 UG/DL — SIGNIFICANT CHANGE UP (ref 220–430)
UIBC SERPL-MCNC: 317 UG/DL — SIGNIFICANT CHANGE UP (ref 110–370)
WBC # BLD: 7.56 K/UL — SIGNIFICANT CHANGE UP (ref 3.8–10.5)
WBC # FLD AUTO: 7.56 K/UL — SIGNIFICANT CHANGE UP (ref 3.8–10.5)

## 2022-07-13 PROCEDURE — 99239 HOSP IP/OBS DSCHRG MGMT >30: CPT

## 2022-07-13 RX ORDER — ATENOLOL 25 MG/1
1 TABLET ORAL
Qty: 0 | Refills: 0 | DISCHARGE

## 2022-07-13 RX ORDER — POLYETHYLENE GLYCOL 3350 17 G/17G
17 POWDER, FOR SOLUTION ORAL DAILY
Refills: 0 | Status: DISCONTINUED | OUTPATIENT
Start: 2022-07-13 | End: 2022-07-13

## 2022-07-13 RX ORDER — PANTOPRAZOLE SODIUM 20 MG/1
1 TABLET, DELAYED RELEASE ORAL
Qty: 30 | Refills: 0
Start: 2022-07-13 | End: 2022-08-11

## 2022-07-13 RX ORDER — IRON SUCROSE 20 MG/ML
200 INJECTION, SOLUTION INTRAVENOUS ONCE
Refills: 0 | Status: COMPLETED | OUTPATIENT
Start: 2022-07-13 | End: 2022-07-13

## 2022-07-13 RX ORDER — SUCRALFATE 1 G
10 TABLET ORAL
Qty: 1200 | Refills: 0
Start: 2022-07-13 | End: 2022-08-11

## 2022-07-13 RX ORDER — POLYETHYLENE GLYCOL 3350 17 G/17G
17 POWDER, FOR SOLUTION ORAL
Qty: 510 | Refills: 0
Start: 2022-07-13 | End: 2022-08-11

## 2022-07-13 RX ORDER — CIPROFLOXACIN LACTATE 400MG/40ML
1 VIAL (ML) INTRAVENOUS
Qty: 6 | Refills: 0
Start: 2022-07-13 | End: 2022-07-15

## 2022-07-13 RX ADMIN — IRON SUCROSE 200 MILLIGRAM(S): 20 INJECTION, SOLUTION INTRAVENOUS at 13:24

## 2022-07-13 RX ADMIN — Medication 1 GRAM(S): at 00:05

## 2022-07-13 RX ADMIN — POLYETHYLENE GLYCOL 3350 17 GRAM(S): 17 POWDER, FOR SOLUTION ORAL at 11:21

## 2022-07-13 RX ADMIN — CHLORHEXIDINE GLUCONATE 1 APPLICATION(S): 213 SOLUTION TOPICAL at 05:55

## 2022-07-13 RX ADMIN — Medication 1 GRAM(S): at 11:21

## 2022-07-13 RX ADMIN — Medication 1 GRAM(S): at 04:43

## 2022-07-13 RX ADMIN — Medication 1 SPRAY(S): at 11:23

## 2022-07-13 RX ADMIN — CEFTRIAXONE 100 MILLIGRAM(S): 500 INJECTION, POWDER, FOR SOLUTION INTRAMUSCULAR; INTRAVENOUS at 11:22

## 2022-07-13 RX ADMIN — PANTOPRAZOLE SODIUM 40 MILLIGRAM(S): 20 TABLET, DELAYED RELEASE ORAL at 05:54

## 2022-07-13 NOTE — PROGRESS NOTE ADULT - ASSESSMENT
58 y/o female presents with a chief complaint of Hematemesis (12 Jul 2022 13:54). PMHX: cirrhosis 2/2 ETOH abuse, basal cell carcinoma, depression, hemorrhoids (s/p hemorrhoidectomy), squamous cell skin cancer, presented to [facility] ED with hematemesis and c/o dark stools X1 week. F/w active GIB w/ Hgb 4.0; s/p EGD  with portal HTN gastropathy, column varices s/p cauterization and banding x2 (2/2022). H&H stable 8/26 today. s/p abd sono (07.12.22 @ 12:58)which demonstrated: Cholelithiasis, Gallbladder wall thickening and intramural edema, which is nonspecific in the setting of cirrhosis, Hepatomegaly, Hepatic steatosis. Mild splenomegaly, Trace perihepatic ascites.      Assessment/Plan: GIB, now resolved s/p esophageal variceal banding     -c/w Soft diet as tolerated  -PETH urine still pending  -Start Miralax 17gm po daily for constipation  -c/w Pantoprazole 40mg po daily  -Will need f/u EGD in ~2 weeks-office will schedule  -Okay to d/c and f/u outpatient      Above d/w GI attending Dr. Keith Biswas, MSN, NP-C

## 2022-07-13 NOTE — DISCHARGE NOTE PROVIDER - CARE PROVIDERS DIRECT ADDRESSES
,DirectAddress_Unknown,DirectAddress_Unknown ,DirectAddress_Unknown,DirectAddress_Unknown,[email or web address]

## 2022-07-13 NOTE — DISCHARGE NOTE PROVIDER - NSDCCPCAREPLAN_GEN_ALL_CORE_FT
PRINCIPAL DISCHARGE DIAGNOSIS  Diagnosis: Upper GI bleed  Assessment and Plan of Treatment: You had a bleed in your upper GI tract. Continue to take medications as prescribed and recommened on discharge summary and follow up closely with your gastroenterologist and primary medical doctor.   Avoid any medications classified as NSAIDS (examples: ibuprofen, aleve, motrin, naproxen)       PRINCIPAL DISCHARGE DIAGNOSIS  Diagnosis: Upper GI bleed  Assessment and Plan of Treatment: You had a bleed in your upper GI tract. Continue to take medications as prescribed and recommened on discharge summary and follow up closely with your gastroenterologist and primary medical doctor.   Avoid any medications classified as NSAIDS (examples: ibuprofen, aleve, motrin, naproxen)  Two home medications held at discharge include aldactone (diurectic) and atenolol (heart rate medication). This is because your blood pressure was still running on the low side. Follow up with your primary medical doctor and heart doctor to decide on when to restart those medications.      SECONDARY DISCHARGE DIAGNOSES  Diagnosis: Acute anemia  Assessment and Plan of Treatment: From the GI bleed you may have lost some iron stores. Those labs are pending at the time of discharge. Follow up with your primary medical doctor on the results to decide on wheter to resume oral iron supplementation.

## 2022-07-13 NOTE — PROGRESS NOTE ADULT - SUBJECTIVE AND OBJECTIVE BOX
56 y/o female presents with a chief complaint of Hematemesis (2022 13:54). PMHX: cirrhosis 2/2 ETOH abuse, basal cell carcinoma, depression, hemorrhoids (s/p hemorrhoidectomy), squamous cell skin cancer, presented to [facility] ED with hematemesis and c/o dark stools X1 week. s/p EGD 2022 by Dr. Goodson which demonstrated portal gastropathy and hiatal hernia and colonoscopy which demonstrated diverticulosis and hemorrhoids. Hospital course w/ admit to ICU 2/2 active GIB w/ Hgb 4.0; s/p EGD with portal HTN gastropathy, column varices s/p cauterization and banding x2. H&H stable  today. No NV or dysphagia. Tolerating soft diet. No BM since Friday, although + flatus. s/p abd sono (22 @ 12:58)which demonstrated: Cholelithiasis, Gallbladder wall thickening and intramural edema, which is nonspecific in the setting of cirrhosis, Hepatomegaly, Hepatic steatosis.  Trace nodularity of the liver   surface, compatible with cirrhosis. Mild splenomegaly, Trace perihepatic ascites, and Normal duplex ultrasound of the portal veins, hepatic veins and hepatic artery.  No evidence of portal vein or hepatic vein thrombosis. s/p MR Abdomen w/wo IV Cont (22 @ 10:02) demonstrated: Nodular contour of the liver again noted, likely representing cirrhosis. No suspicious hepatic lesion is identified suggest a hepatocellular carcinoma. Splenomegaly, varices, and ascites, indicative of portal hypertension and small gallstones. No fever or chills.         PAST MEDICAL & SURGICAL HISTORY:    Alcohol abuse      Depression      Withdrawal seizures      History of basal cell carcinoma excision      Squamous cell skin cancer      Hemorrhoids      2019 novel coronavirus disease (COVID-19)      History of ear, nose, and throat (ENT) surgery      H/O basal cell carcinoma excision      H/O:           MEDICATIONS  (STANDING):  cefTRIAXone   IVPB 1000 milliGRAM(s) IV Intermittent every 24 hours  chlorhexidine 4% Liquid 1 Application(s) Topical <User Schedule>  pantoprazole    Tablet 40 milliGRAM(s) Oral before breakfast  sucralfate suspension 1 Gram(s) Oral four times a day    MEDICATIONS  (PRN):  phenol 1.4% (CHLORASEPTIC) Oral Spray 1 Spray(s) Topical every 2 hours PRN sore throat      REVIEW OF SYSTEMS:    RESPIRATORY: No shortness of breath  CARDIOVASCULAR: No chest pain  All other review of systems is negative unless indicated above.    Vital Signs Last 24 Hrs  T(C): 36.8 (2022 04:00), Max: 37.7 (2022 20:00)  T(F): 98.2 (2022 04:00), Max: 99.9 (2022 20:00)  HR: 72 (2022 04:00) (72 - 101)  BP: 109/67 (2022 04:00) (101/62 - 119/79)  BP(mean): 75 (:00) (75 - 86)  RR: 20 (:00) (14 - 21)  SpO2: 98% (:00) (96% - 99%)    Parameters below as of 2022 04:00  Patient On (Oxygen Delivery Method): room air        PHYSICAL EXAM:    Constitutional: NAD, well-developed  Respiratory: CTAB  Cardiovascular: S1 and S2, RRR  Gastrointestinal: BS+, soft, NT/ND, mild abd distention, + hepatomegaly.  Extremities: No peripheral edema  Psychiatric: Normal mood, normal affect    LABS:                        8.3    7.56  )-----------( 170      ( 2022 05:46 )             26.5     07-13    136  |  105  |  5<L>  ----------------------------<  130<H>  3.6   |  27  |  0.51    Ca    8.1<L>      2022 05:46  Phos  2.8     07-12  Mg     1.9     07-12    TPro  6.0  /  Alb  2.1<L>  /  TBili  1.0  /  DBili  0.5<H>  /  AST  155<H>  /  ALT  78  /  AlkPhos  197<H>  07-13      LIVER FUNCTIONS - ( 2022 05:46 )  Alb: 2.1 g/dL / Pro: 6.0 gm/dL / ALK PHOS: 197 U/L / ALT: 78 U/L / AST: 155 U/L / GGT: x             RADIOLOGY & ADDITIONAL STUDIES:
CHIEF COMPLAINT: Weakness/Fatigue/Melena/Hematemesis    SUBJECTIVE: Tolerating clears. Weakness/fatigue improved. Denies fever, chills, headache, chest pain, SOB, nausea, vomiting.     SIGNIFICANT INTERVAL EVENTS/OVERNIGHT EVENTS:  7/10: Admitted to ICU with Active GIB with Hgb 4.0; s/p EGD with portal HTN gastropathy, column varices s/p cauterization and banding x2  7/11: Downgraded from ICU    Review of Systems: 14 Point review of systems reviewed and reported as negative unless otherwise stated in HPI    FROM H&P:  "56 yo M pmhx alcohol abuse (c/b withdrawal seizures), basal cell carcinoma, depression, hemorrhoids (s/p hemorrhoidectomy), squamous cell skin cancer, cirrhosis presented to ED with hematemesis Patient states started vomiting blood and having dark stools for past week. Initially attributed it to eating beets and pizza she had Friday but vomiting continued over weekend worsening overnight with associated weakness. Emesis described as dark with streaks of bright red. Admits to chest pain friday for which she took 'baby aspirin' at home. Previously seen at  for diarrhea 2/22 by Dr. Goodson. EGD demonstrated portal gastropathy and hiatal hernia. Colonoscopy showed diverticulosis and hemorrhoids    In ED patient noted to be hypotensive to 80's systolic and tachycardic. Feels symptoms improving w/ blood transfusion. "    PHYSICAL EXAM:    T(C): 36.7 (07-12-22 @ 08:00), Max: 37.6 (07-11-22 @ 17:00)  HR: 89 (07-12-22 @ 12:00) (84 - 100)  BP: 119/79 (07-12-22 @ 12:00) (94/68 - 120/70)  RR: 21 (07-12-22 @ 12:00) (15 - 25)  SpO2: 98% (07-12-22 @ 12:00) (97% - 100%)    General: AAOx3; NAD  Head: AT/NC  ENT: Moist Mucous Membranes; No Injury  Eyes: EOMI; PERRL  Neck: Non-tender; No JVD  CVS: RRR, S1&S2, No murmur, No edema  Respiratory: Lungs CTA B/L; Normal Respiratory Effort  Abdomen/GI: Soft, non-tender, non-distended, no guarding, no rebound, normal bowel sounds  : No bladder distention, No Delaney  Extremities: No cyanosis, No clubbing, No edema  MSK: No CVA tenderness, Normal ROM, No injury  Neuro: AAOx3, CNII-XII grossly intact, non-focal  Psych: Appropriate, Cooperative,  Skin: Clean, Dry and Intact      LABS:                          8.1    7.96  )-----------( 160      ( 12 Jul 2022 05:45 )             25.3     07-12    134<L>  |  103  |  6<L>  ----------------------------<  151<H>  3.8   |  25  |  0.63    Ca    8.4<L>      12 Jul 2022 05:45  Phos  2.8     07-12  Mg     1.9     07-12    TPro  5.8<L>  /  Alb  2.2<L>  /  TBili  1.2  /  DBili  0.6<H>  /  AST  209<H>  /  ALT  87<H>  /  AlkPhos  153<H>  07-12    COVID-19 PCR: NotDetec (07 Feb 2022 11:40)    CAPILLARY BLOOD GLUCOSE    Troponin I, High Sensitivity Result: 4.28: Hemoglobin: 8.1 g/dL (07.12.22 @ 05:45)   Hemoglobin: 8.4 g/dL (07.11.22 @ 05:39)   Hemoglobin: 8.7 g/dL (07.10.22 @ 17:34)   Hemoglobin: 4.0:           EKG:  < from: 12 Lead ECG (07.10.22 @ 11:51) >  Diagnosis Line Sinus tachycardia  Otherwise normal ECG  When compared with ECG of 04-FEB-2022 16:10,  No significant change was found    < end of copied text >          PROCEDURES:  7/10: EGD with Portal HTN gastropathy + columnar varices s/p cauterization and banding x 2      I personally reviewed labs, imaging, ekg, orders and vitals.    Discussed case with:  [X]RN  []CM/SW  [X]Patient  []Family  []Specialist:        MEDICATIONS  (STANDING):  cefTRIAXone   IVPB 1000 milliGRAM(s) IV Intermittent every 24 hours  chlorhexidine 4% Liquid 1 Application(s) Topical <User Schedule>  pantoprazole    Tablet 40 milliGRAM(s) Oral before breakfast  sucralfate suspension 1 Gram(s) Oral four times a day    MEDICATIONS  (PRN):    
Patient is a 57y old  Female who presents with a chief complaint of Hematemesis (2022 12:17)      Subective:  Seen earlier today, feeling good and no further bleeding  Denies alcohol but notes that she takes a nyqil type medication for sleep and isn't sure if it has alcohol    PAST MEDICAL & SURGICAL HISTORY:  Alcohol abuse      Depression      Withdrawal seizures      History of basal cell carcinoma excision      Squamous cell skin cancer      Hemorrhoids      2019 novel coronavirus disease (COVID-19)      History of ear, nose, and throat (ENT) surgery      H/O basal cell carcinoma excision      H/O:           MEDICATIONS  (STANDING):  cefTRIAXone   IVPB 1000 milliGRAM(s) IV Intermittent every 24 hours  chlorhexidine 4% Liquid 1 Application(s) Topical <User Schedule>  octreotide  Infusion 50 MICROgram(s)/Hr (10 mL/Hr) IV Continuous <Continuous>    MEDICATIONS  (PRN):      REVIEW OF SYSTEMS:    RESPIRATORY: No shortness of breath  CARDIOVASCULAR: No chest pain  All other review of systems is negative unless indicated above.    Vital Signs Last 24 Hrs  T(C): 37.4 (2022 13:00), Max: 37.4 (2022 13:00)  T(F): 99.4 (2022 13:00), Max: 99.4 (2022 13:00)  HR: 84 (2022 15:00) (75 - 102)  BP: 101/62 (2022 15:00) (94/52 - 115/65)  BP(mean): 71 (2022 15:00) (62 - 80)  RR: 15 (2022 15:00) (15 - 31)  SpO2: 100% (2022 15:00) (92% - 100%)    Parameters below as of 2022 12:00  Patient On (Oxygen Delivery Method): room air        PHYSICAL EXAM:    Constitutional: NAD, well-developed  Respiratory: CTAB  Cardiovascular: S1 and S2, RRR  Gastrointestinal: BS+, soft, NT/ND  Extremities: No peripheral edema  Psychiatric: Normal mood, normal affect    LABS:                        8.4    9.01  )-----------( 177      ( 2022 05:39 )             25.3     07-11    139  |  107  |  14  ----------------------------<  102<H>  5.0   |  26  |  0.61    Ca    8.1<L>      2022 05:39  Phos  3.5       Mg     1.9         TPro  6.1  /  Alb  2.3<L>  /  TBili  1.4<H>  /  DBili  x   /  AST  177<H>  /  ALT  63  /  AlkPhos  167<H>      PT/INR - ( 10 Jul 2022 07:59 )   PT: 17.5 sec;   INR: 1.50 ratio         PTT - ( 10 Jul 2022 07:59 )  PTT:27.4 sec  LIVER FUNCTIONS - ( 2022 05:39 )  Alb: 2.3 g/dL / Pro: 6.1 gm/dL / ALK PHOS: 167 U/L / ALT: 63 U/L / AST: 177 U/L / GGT: x             RADIOLOGY & ADDITIONAL STUDIES:
Patient is a 57y old  Female who presents with a chief complaint of Hematemesis (2022 15:56)      Subective:  Feels good  No bleeding    PAST MEDICAL & SURGICAL HISTORY:  Alcohol abuse      Depression      Withdrawal seizures      History of basal cell carcinoma excision      Squamous cell skin cancer      Hemorrhoids      2019 novel coronavirus disease (COVID-19)      History of ear, nose, and throat (ENT) surgery      H/O basal cell carcinoma excision      H/O:           MEDICATIONS  (STANDING):  cefTRIAXone   IVPB 1000 milliGRAM(s) IV Intermittent every 24 hours  chlorhexidine 4% Liquid 1 Application(s) Topical <User Schedule>    MEDICATIONS  (PRN):      REVIEW OF SYSTEMS:    RESPIRATORY: No shortness of breath  CARDIOVASCULAR: No chest pain  All other review of systems is negative unless indicated above.    Vital Signs Last 24 Hrs  T(C): 36.7 (2022 08:00), Max: 37.6 (2022 17:00)  T(F): 98 (2022 08:00), Max: 99.7 (2022 17:00)  HR: 89 (2022 12:00) (84 - 100)  BP: 119/79 (2022 12:00) (94/59 - 120/70)  BP(mean): 86 (2022 12:00) (67 - 86)  RR: 21 (2022 12:00) (15 - 25)  SpO2: 98% (2022 12:00) (97% - 100%)    Parameters below as of 2022 12:00  Patient On (Oxygen Delivery Method): room air        PHYSICAL EXAM:    Constitutional: NAD, well-developed  Respiratory: CTAB  Cardiovascular: S1 and S2, RRR  Gastrointestinal: BS+, soft, NT/ND  Extremities: No peripheral edema  Psychiatric: Normal mood, normal affect    LABS:                        8.1    7.96  )-----------( 160      ( 2022 05:45 )             25.3     07-12    134<L>  |  103  |  6<L>  ----------------------------<  151<H>  3.8   |  25  |  0.63    Ca    8.4<L>      2022 05:45  Phos  2.8     07-12  Mg     1.9     07-12    TPro  5.8<L>  /  Alb  2.2<L>  /  TBili  1.2  /  DBili  0.6<H>  /  AST  209<H>  /  ALT  87<H>  /  AlkPhos  153<H>        LIVER FUNCTIONS - ( 2022 05:45 )  Alb: 2.2 g/dL / Pro: 5.8 gm/dL / ALK PHOS: 153 U/L / ALT: 87 U/L / AST: 209 U/L / GGT: x             RADIOLOGY & ADDITIONAL STUDIES:
Patient is a 57y old  Female who presents with a chief complaint of GI bleeding (10 Jul 2022 10:56)    24 hour events: presented with hematemesis, lightheadedness, weakness  found to have a Hb of 4  hypotensive  admitted to ICU    PAST MEDICAL & SURGICAL HISTORY:  Alcohol abuse      Depression      Withdrawal seizures      History of basal cell carcinoma excision      Squamous cell skin cancer      Hemorrhoids      2019 novel coronavirus disease (COVID-19)      History of ear, nose, and throat (ENT) surgery      H/O basal cell carcinoma excision      H/O:           Allergies    Zithromax (Unknown)    Intolerances    morphine (Nausea)    REVIEW OF SYSTEMS: SEE BELOW       ICU Vital Signs Last 24 Hrs  T(C): 37.2 (10 Jul 2022 14:10), Max: 37.6 (10 Jul 2022 12:17)  T(F): 99 (10 Jul 2022 14:10), Max: 99.6 (10 Jul 2022 12:17)  HR: 95 (10 Jul 2022 14:10) (95 - 113)  BP: 106/65 (10 Jul 2022 14:10) (98/45 - 116/75)  BP(mean): 75 (10 Jul 2022 14:10) (58 - 75)  ABP: --  ABP(mean): --  RR: 27 (10 Jul 2022 14:10) (14 - 27)  SpO2: 98% (10 Jul 2022 14:10) (98% - 100%)    O2 Parameters below as of 10 Jul 2022 14:10  Patient On (Oxygen Delivery Method): room air            CAPILLARY BLOOD GLUCOSE          I&O's Summary    10 Jul 2022 07:01  -  10 Jul 2022 14:55  --------------------------------------------------------  IN: 690 mL / OUT: 0 mL / NET: 690 mL            MEDICATIONS  (STANDING):  cefTRIAXone   IVPB 1000 milliGRAM(s) IV Intermittent every 24 hours  chlorhexidine 4% Liquid 1 Application(s) Topical <User Schedule>  octreotide  Infusion 50 MICROgram(s)/Hr (10 mL/Hr) IV Continuous <Continuous>      MEDICATIONS  (PRN):      PHYSICAL EXAM: SEE BELOW                          4.0    17.05 )-----------( 276      ( 10 Jul 2022 07:59 )             13.4       07-10    134<L>  |  102  |  25<H>  ----------------------------<  126<H>  3.5   |  20<L>  |  0.86    Ca    9.0      10 Jul 2022 07:59    TPro  6.3  /  Alb  2.3<L>  /  TBili  0.9  /  DBili  x   /  AST  133<H>  /  ALT  48  /  AlkPhos  206<H>  0710          PT/INR - ( 10 Jul 2022 07:59 )   PT: 17.5 sec;   INR: 1.50 ratio         PTT - ( 10 Jul 2022 07:59 )  PTT:27.4 sec            
Patient is a 57y old  Female who presents with a chief complaint of Hematemesis (2022 12:17)    BRIEF HOSPITAL COURSE: 58 yo female with PMHx alcohol abuse (c/b withdrawal seizures) quit 1 year ago, hx cirrhosis with ascites, basal cell carcinoma, depression, hemorrhoids (s/p hemorrhoidectomy), squamous cell skin cancer, presented to ED with hematemesis and melena x 1 week with associated weakness and SOB x 3 days ago.  Patient states started vomiting with coffee ground appearance and having dark stools for past week. Initially attributed it to eating beets she had Friday but vomiting continued over weekend worsening overnight with associated weakness. Emesis described as dark with streaks of bright red. Previously seen at  for diarrhea 2022 by Dr. Goodson. EGD demonstrated portal gastropathy and hiatal hernia. Colonoscopy showed diverticulosis and hemorrhoids  In ED patient noted to be hypotensive to 80's systolic and tachycardic, Hgb ~4.     Events last 24 hours: Pt assessed at bedside during IDRs, feeling better. tolerating clear liquids without nausea, abd pain. No episodes melena or emesis overnight. s/p 3 units pRBCs with appropriate response in H/H. s/p EGD with Dr. Littlejohn 7/10 revealing portal HTN gastropathy and large column varices with eradication and banding x 2.     PAST MEDICAL & SURGICAL HISTORY:  Alcohol abuse  Depression  Withdrawal seizures  History of basal cell carcinoma excision  Squamous cell skin cancer  Hemorrhoids  2019 novel coronavirus disease (COVID-19)  History of ear, nose, and throat (ENT) surgery  H/O basal cell carcinoma excision  H/O:     Medications:  cefTRIAXone   IVPB 1000 milliGRAM(s) IV Intermittent every 24 hours  octreotide  Infusion 50 MICROgram(s)/Hr IV Continuous <Continuous>  chlorhexidine 4% Liquid 1 Application(s) Topical <User Schedule>      ICU Vital Signs Last 24 Hrs  T(C): 36.8 (2022 08:00), Max: 37.4 (10 Jul 2022 13:02)  T(F): 98.2 (2022 08:00), Max: 99.3 (10 Jul 2022 13:02)  HR: 83 (2022 11:00) (75 - 102)  BP: 100/59 (2022 11:00) (94/52 - 115/65)  BP(mean): 69 (2022 11:00) (62 - 80)  ABP: --  ABP(mean): --  RR: 18 (2022 11:00) (16 - 31)  SpO2: 95% (2022 11:00) (92% - 100%)    O2 Parameters below as of 2022 08:00  Patient On (Oxygen Delivery Method): room air    I&O's Detail    10 Jul 2022 07:01  -  2022 07:00  --------------------------------------------------------  IN:    Octreotide: 141 mL    Oral Fluid: 60 mL    PRBCs (Packed Red Blood Cells): 1051 mL  Total IN: 1252 mL    OUT:    Voided (mL): 650 mL  Total OUT: 650 mL    Total NET: 602 mL      2022 07:01  -  2022 12:19  --------------------------------------------------------  IN:  Total IN: 0 mL    OUT:    Voided (mL): 825 mL  Total OUT: 825 mL    Total NET: -825 mL    LABS:                        8.4    9.01  )-----------( 177      ( 2022 05:39 )             25.3     11    139  |  107  |  14  ----------------------------<  102<H>  5.0   |  26  |  0.61    Ca    8.1<L>      2022 05:39  Phos  3.5       Mg     1.9         TPro  6.1  /  Alb  2.3<L>  /  TBili  1.4<H>  /  DBili  x   /  AST  177<H>  /  ALT  63  /  AlkPhos  167<H>  11    PT/INR - ( 10 Jul 2022 07:59 )   PT: 17.5 sec;   INR: 1.50 ratio    PTT - ( 10 Jul 2022 07:59 )  PTT:27.4 sec    CULTURES:    Physical Examination:  General: No acute distress.  comfortable in bed  HEENT: Pupils equal, reactive to light.  Symmetric.  PULM: Clear to auscultation bilaterally, no wheezing  NECK: Supple, no lymphadenopathy, trachea midline  CVS: Regular rate and rhythm, no murmurs  ABD: Soft, globular, appears mildly distended, nontender, normoactive bowel sounds  EXT: No edema b/l, calves nontender  SKIN: Warm and well perfused, no rashes noted.  NEURO: Alert, oriented, interactive, nonfocal    RADIOLOGY:       
  Hospital D # 1  ICU # 1    CC: GIB    HPI:    56 y/o female with h/o alcohol abuse (c/b withdrawal seizures), basal cell carcinoma, depression, hemorrhoids (s/p hemorrhoidectomy), squamous cell skin cancer and cirrhosis presented to ED with several days of coffee grounds and dark stool. + weakness and SOB.   Emesis described as dark with streaks of bright red. Admits to chest pain friday for which she took 'baby aspirin' at home. Previously seen at  for diarrhea 2/22 by Dr. Goodson. EGD demonstrated portal gastropathy and hiatal hernia. Colonoscopy showed diverticulosis and hemorrhoids    7/11:  Pt seen and examined in ICU during IDR.  S/P EGD and banding X 2.  H/H stable.  On clears.  Awake and alert.  Tm 99.6  WBC 9  Hb 8.4      PMH:  As above.     PSH:  As above.     FH: Non Contributory other than those listed in HPI    Social History:  As above     MEDICATIONS  (STANDING):  cefTRIAXone   IVPB 1000 milliGRAM(s) IV Intermittent every 24 hours  chlorhexidine 4% Liquid 1 Application(s) Topical <User Schedule>  octreotide  Infusion 50 MICROgram(s)/Hr (10 mL/Hr) IV Continuous <Continuous>    MEDICATIONS  (PRN):      Allergies: NKDA    ROS:  SEE BELOW    Height (cm): 170.2 (07-10 @ 13:02)  Weight (kg): 63 (07-10 @ 12:17)  BMI (kg/m2): 21.7 (07-10 @ 13:02)    ICU Vital Signs Last 24 Hrs  T(C): 36.8 (11 Jul 2022 08:00), Max: 37.6 (10 Jul 2022 12:17)  T(F): 98.2 (11 Jul 2022 08:00), Max: 99.6 (10 Jul 2022 12:17)  HR: 87 (11 Jul 2022 09:00) (75 - 109)  BP: 109/66 (11 Jul 2022 09:00) (94/52 - 116/75)  BP(mean): 77 (11 Jul 2022 09:00) (62 - 80)  ABP: --  ABP(mean): --  RR: 28 (11 Jul 2022 09:00) (14 - 31)  SpO2: 98% (11 Jul 2022 09:00) (92% - 100%)    O2 Parameters below as of 11 Jul 2022 08:00  Patient On (Oxygen Delivery Method): room air                I&O's Summary    10 Jul 2022 07:01  -  11 Jul 2022 07:00  --------------------------------------------------------  IN: 1252 mL / OUT: 650 mL / NET: 602 mL    11 Jul 2022 07:01  -  11 Jul 2022 09:58  --------------------------------------------------------  IN: 0 mL / OUT: 825 mL / NET: -825 mL        Physical Exam:  SEE BELOW                          8.4    9.01  )-----------( 177      ( 11 Jul 2022 05:39 )             25.3       07-11    139  |  107  |  14  ----------------------------<  102<H>  5.0   |  26  |  0.61    Ca    8.1<L>      11 Jul 2022 05:39  Phos  3.5     07-11  Mg     1.9     07-11    TPro  6.1  /  Alb  2.3<L>  /  TBili  1.4<H>  /  DBili  x   /  AST  177<H>  /  ALT  63  /  AlkPhos  167<H>  07-11                    DVT Prophylaxis:                                                            Contraindication:     Advanced Directives:    Discussed with:    Visit Information:  Time spent excluding procedure:      ** Time is exclusive of billed procedures and/or teaching and/or routine family updates.

## 2022-07-13 NOTE — DISCHARGE NOTE PROVIDER - HOSPITAL COURSE
FROM H&P:    "58 yo M pmhx alcohol abuse (c/b withdrawal seizures), basal cell carcinoma, depression, hemorrhoids (s/p hemorrhoidectomy), squamous cell skin cancer, cirrhosis presented to ED with hematemesis Patient states started vomiting blood and having dark stools for past week. Initially attributed it to eating beets and pizza she had Friday but vomiting continued over weekend worsening overnight with associated weakness. Emesis described as dark with streaks of bright red. Admits to chest pain friday for which she took 'baby aspirin' at home. Previously seen at  for diarrhea 2/22 by Dr. Goodson. EGD demonstrated portal gastropathy and hiatal hernia. Colonoscopy showed diverticulosis and hemorrhoids    In ED patient noted to be hypotensive to 80's systolic and tachycardic. Feels symptoms improving w/ blood transfusion."    Upper GIB from Esophageal Varices with associated Acute blood loss anemia and Hypotension POA  Liver Cirrhosis from Prior EtOH abuse with associated coagulopathy and elevated LFTs  Former EtOH abuse (in remission)  -Admitted to ICU (7/10). Downgraded from ICU (7/11)  -s/p EGD with esophageal banding (7/10)  -Hgb stable in 8s. Continue to monitor and transfuse accordingly  -Rocephin for SBP prophylaxis  -Elevated LFTs. Continue to monitor. Ab US pending  - GI recs  -PPI  -Clear liquid diet. Advance as per GI    DVT Prophylaxis: SCDs in the setting of acute GIB bleed    Hgb remained stable. GI cleared for discharge. To complete 7 days for SBP prophylaxis (changed to PO at discharge). Blood pressure soft so atenolol and aldactone continue to be held at discharge. Discharge home in stable condition and close outpatient follow up.     PHYSICAL EXAM:    T(C): 37.3 (07-13-22 @ 08:00), Max: 37.7 (07-12-22 @ 20:00)  HR: 76 (07-13-22 @ 08:00) (72 - 101)  BP: 105/66 (07-13-22 @ 08:00) (101/62 - 119/79)  RR: 19 (07-13-22 @ 08:00) (14 - 21)  SpO2: 96% (07-13-22 @ 08:00) (96% - 99%)  General: AAOx3; NAD  Head: AT/NC  ENT: Moist Mucous Membranes; No Injury  Eyes: EOMI; PERRL  Neck: Non-tender; No JVD  CVS: RRR, S1&S2, No murmur, No edema  Respiratory: Lungs CTA B/L; Normal Respiratory Effort  Abdomen/GI: Soft, non-tender, non-distended, no guarding, no rebound, normal bowel sounds  : No bladder distention, No Delaney  Extremities: No cyanosis, No clubbing, No edema  MSK: No CVA tenderness, Normal ROM, No injury  Neuro: AAOx3, CNII-XII grossly intact, non-focal  Psych: Appropriate, Cooperative,  Skin: Clean, Dry and Intact  Discharge Management: 39 minutes  Date of Discharge/Service: 7/13/2022

## 2022-07-13 NOTE — DISCHARGE NOTE NURSING/CASE MANAGEMENT/SOCIAL WORK - [email or web address]_GEN_ALL_CORE
For information on Fall & Injury Prevention, visit: [email or web address]  [email or web address] OR  https://www.cdc.gov/steadi/patient.html

## 2022-07-13 NOTE — DISCHARGE NOTE PROVIDER - DETAILS OF MALNUTRITION DIAGNOSIS/DIAGNOSES
This patient has been assessed with a concern for Malnutrition and was treated during this hospitalization for the following Nutrition diagnosis/diagnoses:     -  07/11/2022: Moderate protein-calorie malnutrition

## 2022-07-13 NOTE — PROGRESS NOTE ADULT - PROVIDER SPECIALTY LIST ADULT
Critical Care
Hospitalist
Gastroenterology
Critical Care
Critical Care

## 2022-07-13 NOTE — DISCHARGE NOTE PROVIDER - PROVIDER TOKENS
PROVIDER:[TOKEN:[[id number]],FOLLOWUP:[2 weeks],ESTABLISHEDPATIENT:[T]],PROVIDER:[TOKEN:[[id number]],FOLLOWUP:[1 week],ESTABLISHEDPATIENT:[T]] PROVIDER:[TOKEN:[[id number]],FOLLOWUP:[2 weeks],ESTABLISHEDPATIENT:[T]],PROVIDER:[TOKEN:[[id number]],FOLLOWUP:[1 week],ESTABLISHEDPATIENT:[T]],PROVIDER:[TOKEN:[[id number]],FOLLOWUP:[2 weeks],ESTABLISHEDPATIENT:[T]]

## 2022-07-13 NOTE — DISCHARGE NOTE PROVIDER - NSDCDCMDCOMP_GEN_ALL_CORE
This document is complete and the patient is ready for discharge. Unable to assess due to medical condition

## 2022-07-13 NOTE — DISCHARGE NOTE PROVIDER - CARE PROVIDER_API CALL
Brian Goodson)  Gastroenterology; Internal Medicine  [address], [address]  [place] [address]  Phone: [phone]  Fax: [phone]  Established Patient  Follow Up Time: 2 weeks    Karen Gross)  Family Medicine  [address] [place] [address]  Phone: [phone]  Fax: [phone]  Established Patient  Follow Up Time: 1 week   Brian Goodson)  Gastroenterology; Internal Medicine  [address], [address]  [place] [address]  Phone: [phone]  Fax: [phone]  Established Patient  Follow Up Time: 2 weeks    Karen Gross)  Family Medicine  [address] [place] [address]  Phone: [phone]  Fax: [phone]  Established Patient  Follow Up Time: 1 week    Adan Byers)  Cardiac Electrophysiology; Cardiovascular Disease  [address] [place] [address]  Phone: [phone]  Fax: [phone]  Established Patient  Follow Up Time: 2 weeks

## 2022-07-13 NOTE — DISCHARGE NOTE NURSING/CASE MANAGEMENT/SOCIAL WORK - PATIENT PORTAL LINK FT
You can access the FollowMyHealth Patient Portal offered by [facility] by registering at the following website: [email or web address]. By joining [facility]’s FollowMyHealth portal, you will also be able to view your health information using other applications (apps) compatible with our system.

## 2022-07-13 NOTE — DISCHARGE NOTE PROVIDER - NSDCMRMEDTOKEN_GEN_ALL_CORE_FT
cholecalciferol 125 mcg (5000 intl units) oral capsule: 1 cap(s) orally once a day  ciprofloxacin 500 mg oral tablet: 1 tab(s) orally every 12 hours: next dose morning 7/14/2022  Effexor  mg oral capsule, extended release: 1 cap(s) orally once a day  ferrous sulfate 325 mg (65 mg elemental iron) oral tablet: 1 tab(s) orally once a day  pantoprazole 40 mg oral delayed release tablet: 1 tab(s) orally once a day (before a meal)  polyethylene glycol 3350 oral powder for reconstitution: 17 gram(s) orally once a day; Hold if greater than 2 bowel movements in 24 hours  Refresh ophthalmic solution: 1 drop(s) in each eye 4 times a day, As Needed - for dry eyes  sucralfate 1 g/10 mL oral suspension: 10 milliliter(s) orally 4 times a day  ursodiol 300 mg oral capsule: 1 cap(s) orally 2 times a day   cholecalciferol 125 mcg (5000 intl units) oral capsule: 1 cap(s) orally once a day  ciprofloxacin 500 mg oral tablet: 1 tab(s) orally every 12 hours: next dose morning 2022  Effexor  mg oral capsule, extended release: 1 cap(s) orally once a day  ferrous sulfate 325 mg (65 mg elemental iron) oral tablet: 1 tab(s) orally once a day  Hospitalization Letter: Patient Dinah Flores ( 1965) underwent medical care at [facility]. She was admitted on 7/10/2022 and subqeuently discharged on 2022 in stable condition. Kindly excuse her from any work/activities missed. She is medically cleared to return to work without restrictions on 2022.   pantoprazole 40 mg oral delayed release tablet: 1 tab(s) orally once a day (before a meal)  polyethylene glycol 3350 oral powder for reconstitution: 17 gram(s) orally once a day; Hold if greater than 2 bowel movements in 24 hours  Refresh ophthalmic solution: 1 drop(s) in each eye 4 times a day, As Needed - for dry eyes  sucralfate 1 g/10 mL oral suspension: 10 milliliter(s) orally 4 times a day  ursodiol 300 mg oral capsule: 1 cap(s) orally 2 times a day

## 2022-07-16 LAB — ETHYL GLUCURONIDE UR QL SCN: NEGATIVE — SIGNIFICANT CHANGE UP

## 2022-07-19 DIAGNOSIS — F32.A DEPRESSION, UNSPECIFIED: ICD-10-CM

## 2022-07-19 DIAGNOSIS — F10.11 ALCOHOL ABUSE, IN REMISSION: ICD-10-CM

## 2022-07-19 DIAGNOSIS — K31.89 OTHER DISEASES OF STOMACH AND DUODENUM: ICD-10-CM

## 2022-07-19 DIAGNOSIS — K76.6 PORTAL HYPERTENSION: ICD-10-CM

## 2022-07-19 DIAGNOSIS — Z86.73 PERSONAL HISTORY OF TRANSIENT ISCHEMIC ATTACK (TIA), AND CEREBRAL INFARCTION WITHOUT RESIDUAL DEFICITS: ICD-10-CM

## 2022-07-19 DIAGNOSIS — I85.11 SECONDARY ESOPHAGEAL VARICES WITH BLEEDING: ICD-10-CM

## 2022-07-19 DIAGNOSIS — E44.0 MODERATE PROTEIN-CALORIE MALNUTRITION: ICD-10-CM

## 2022-07-19 DIAGNOSIS — I95.9 HYPOTENSION, UNSPECIFIED: ICD-10-CM

## 2022-07-19 DIAGNOSIS — K70.31 ALCOHOLIC CIRRHOSIS OF LIVER WITH ASCITES: ICD-10-CM

## 2022-07-19 DIAGNOSIS — Z86.16 PERSONAL HISTORY OF COVID-19: ICD-10-CM

## 2022-07-19 DIAGNOSIS — Z85.07 PERSONAL HISTORY OF MALIGNANT NEOPLASM OF PANCREAS: ICD-10-CM

## 2022-07-19 DIAGNOSIS — D62 ACUTE POSTHEMORRHAGIC ANEMIA: ICD-10-CM

## 2022-07-19 DIAGNOSIS — Z85.828 PERSONAL HISTORY OF OTHER MALIGNANT NEOPLASM OF SKIN: ICD-10-CM

## 2022-07-19 DIAGNOSIS — Z41.8 ENCOUNTER FOR OTHER PROCEDURES FOR PURPOSES OTHER THAN REMEDYING HEALTH STATE: ICD-10-CM

## 2022-08-12 ENCOUNTER — NON-APPOINTMENT (OUTPATIENT)
Age: 57
End: 2022-08-12

## 2022-08-16 ENCOUNTER — OUTPATIENT (OUTPATIENT)
Dept: OUTPATIENT SERVICES | Facility: HOSPITAL | Age: 57
LOS: 1 days | Discharge: HOME | End: 2022-08-16
Payer: COMMERCIAL

## 2022-08-16 ENCOUNTER — RESULT REVIEW (OUTPATIENT)
Age: 57
End: 2022-08-16

## 2022-08-16 VITALS
HEIGHT: 67 IN | RESPIRATION RATE: 21 BRPM | DIASTOLIC BLOOD PRESSURE: 81 MMHG | WEIGHT: 130.07 LBS | SYSTOLIC BLOOD PRESSURE: 125 MMHG | TEMPERATURE: 97 F | OXYGEN SATURATION: 100 % | HEART RATE: 70 BPM

## 2022-08-16 DIAGNOSIS — Z98.890 OTHER SPECIFIED POSTPROCEDURAL STATES: Chronic | ICD-10-CM

## 2022-08-16 DIAGNOSIS — K70.30 ALCOHOLIC CIRRHOSIS OF LIVER WITHOUT ASCITES: ICD-10-CM

## 2022-08-16 DIAGNOSIS — Z98.891 HISTORY OF UTERINE SCAR FROM PREVIOUS SURGERY: Chronic | ICD-10-CM

## 2022-08-16 PROCEDURE — 88305 TISSUE EXAM BY PATHOLOGIST: CPT | Mod: 26

## 2022-08-16 PROCEDURE — 88312 SPECIAL STAINS GROUP 1: CPT | Mod: 26

## 2022-08-16 PROCEDURE — 88312 SPECIAL STAINS GROUP 1: CPT

## 2022-08-16 PROCEDURE — 88305 TISSUE EXAM BY PATHOLOGIST: CPT

## 2022-08-16 RX ORDER — CHOLECALCIFEROL (VITAMIN D3) 125 MCG
1 CAPSULE ORAL
Qty: 0 | Refills: 0 | DISCHARGE

## 2022-08-16 NOTE — ASU PATIENT PROFILE, ADULT - FALL HARM RISK - HARM RISK INTERVENTIONS

## 2022-08-18 DIAGNOSIS — F17.210 NICOTINE DEPENDENCE, CIGARETTES, UNCOMPLICATED: ICD-10-CM

## 2022-08-18 DIAGNOSIS — K31.7 POLYP OF STOMACH AND DUODENUM: ICD-10-CM

## 2022-08-18 DIAGNOSIS — Z80.0 FAMILY HISTORY OF MALIGNANT NEOPLASM OF DIGESTIVE ORGANS: ICD-10-CM

## 2022-08-18 DIAGNOSIS — K70.30 ALCOHOLIC CIRRHOSIS OF LIVER WITHOUT ASCITES: ICD-10-CM

## 2022-08-18 DIAGNOSIS — Z85.820 PERSONAL HISTORY OF MALIGNANT MELANOMA OF SKIN: ICD-10-CM

## 2022-08-18 DIAGNOSIS — I10 ESSENTIAL (PRIMARY) HYPERTENSION: ICD-10-CM

## 2022-08-18 DIAGNOSIS — K57.90 DIVERTICULOSIS OF INTESTINE, PART UNSPECIFIED, WITHOUT PERFORATION OR ABSCESS WITHOUT BLEEDING: ICD-10-CM

## 2022-08-18 DIAGNOSIS — Z88.5 ALLERGY STATUS TO NARCOTIC AGENT: ICD-10-CM

## 2022-08-18 DIAGNOSIS — Z88.1 ALLERGY STATUS TO OTHER ANTIBIOTIC AGENTS STATUS: ICD-10-CM

## 2022-08-18 DIAGNOSIS — K29.80 DUODENITIS WITHOUT BLEEDING: ICD-10-CM

## 2022-08-18 DIAGNOSIS — Z80.1 FAMILY HISTORY OF MALIGNANT NEOPLASM OF TRACHEA, BRONCHUS AND LUNG: ICD-10-CM

## 2022-10-05 NOTE — H&P ADULT - NSHPSOURCEINFORD_GEN_ALL_CORE
Chart(s)/Patient Preparation Of Recipient Site - Flap: The eschar was removed surgically with sharp dissection to facilitate appropriate wound healing of the following adjacent tissue rearrangement.

## 2022-12-07 ENCOUNTER — NON-APPOINTMENT (OUTPATIENT)
Age: 57
End: 2022-12-07

## 2023-04-24 NOTE — ED PROVIDER NOTE - NS HIV RISK FACTOR YES
Declined Consent (Spinal Accessory)/Introductory Paragraph: The rationale for Mohs was explained to the patient and consent was obtained. The risks, benefits and alternatives to therapy were discussed in detail. Specifically, the risks of damage to the spinal accessory nerve, infection, scarring, bleeding, prolonged wound healing, incomplete removal, allergy to anesthesia, and recurrence were addressed. Prior to the procedure, the treatment site was clearly identified and confirmed by the patient. All components of Universal Protocol/PAUSE Rule completed.

## 2023-05-08 ENCOUNTER — INPATIENT (INPATIENT)
Facility: HOSPITAL | Age: 58
LOS: 2 days | Discharge: HOME | DRG: 377 | End: 2023-05-11
Attending: INTERNAL MEDICINE | Admitting: STUDENT IN AN ORGANIZED HEALTH CARE EDUCATION/TRAINING PROGRAM
Payer: COMMERCIAL

## 2023-05-08 VITALS — OXYGEN SATURATION: 98 % | RESPIRATION RATE: 16 BRPM | HEART RATE: 106 BPM | WEIGHT: 130.07 LBS | HEIGHT: 66 IN

## 2023-05-08 DIAGNOSIS — S20.222A CONTUSION OF LEFT BACK WALL OF THORAX, INITIAL ENCOUNTER: ICD-10-CM

## 2023-05-08 DIAGNOSIS — Z98.891 HISTORY OF UTERINE SCAR FROM PREVIOUS SURGERY: Chronic | ICD-10-CM
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
## 2023-05-08 DIAGNOSIS — Z88.5 ALLERGY STATUS TO NARCOTIC AGENT: ICD-10-CM

## 2023-05-08 DIAGNOSIS — Z98.891 HISTORY OF UTERINE SCAR FROM PREVIOUS SURGERY: ICD-10-CM

## 2023-05-08 DIAGNOSIS — K70.30 ALCOHOLIC CIRRHOSIS OF LIVER WITHOUT ASCITES: ICD-10-CM
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
## 2023-05-08 DIAGNOSIS — F10.10 ALCOHOL ABUSE, UNCOMPLICATED: ICD-10-CM

## 2023-05-08 DIAGNOSIS — K92.0 HEMATEMESIS: ICD-10-CM
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
## 2023-05-08 DIAGNOSIS — Z98.890 OTHER SPECIFIED POSTPROCEDURAL STATES: Chronic | ICD-10-CM

## 2023-05-08 DIAGNOSIS — I85.11 SECONDARY ESOPHAGEAL VARICES WITH BLEEDING: ICD-10-CM

## 2023-05-08 DIAGNOSIS — R00.0 TACHYCARDIA, UNSPECIFIED: ICD-10-CM

## 2023-05-08 DIAGNOSIS — I95.9 HYPOTENSION, UNSPECIFIED: ICD-10-CM

## 2023-05-08 DIAGNOSIS — Z85.828 PERSONAL HISTORY OF OTHER MALIGNANT NEOPLASM OF SKIN: ICD-10-CM

## 2023-05-08 DIAGNOSIS — Z86.16 PERSONAL HISTORY OF COVID-19: ICD-10-CM
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
## 2023-05-08 DIAGNOSIS — Z88.8 ALLERGY STATUS TO OTHER DRUGS, MEDICAMENTS AND BIOLOGICAL SUBSTANCES: ICD-10-CM

## 2023-05-08 DIAGNOSIS — Z98.890 OTHER SPECIFIED POSTPROCEDURAL STATES: ICD-10-CM

## 2023-05-08 DIAGNOSIS — F32.A DEPRESSION, UNSPECIFIED: ICD-10-CM

## 2023-05-08 DIAGNOSIS — Z63.4 DISAPPEARANCE AND DEATH OF FAMILY MEMBER: ICD-10-CM

## 2023-05-08 PROCEDURE — 99291 CRITICAL CARE FIRST HOUR: CPT

## 2023-05-08 PROCEDURE — 93010 ELECTROCARDIOGRAM REPORT: CPT

## 2023-05-08 SDOH — SOCIAL STABILITY - SOCIAL INSECURITY: DISSAPEARANCE AND DEATH OF FAMILY MEMBER: Z63.4

## 2023-05-08 NOTE — ED ADULT TRIAGE NOTE - CHIEF COMPLAINT QUOTE
pt ambulatory to ED c/o bloody emesis. pt states her stool has been dark x2 weeks, had dark emesis today. recent abx for vertigo, endorses current dizziness, "heart is racing". EKG in progress

## 2023-05-09 DIAGNOSIS — K92.2 GASTROINTESTINAL HEMORRHAGE, UNSPECIFIED: ICD-10-CM

## 2023-05-09 LAB
ADD ON TEST-SPECIMEN IN LAB: SIGNIFICANT CHANGE UP
ALBUMIN SERPL ELPH-MCNC: 2.3 G/DL — LOW (ref 3.3–5)
ALBUMIN SERPL ELPH-MCNC: 2.5 G/DL — LOW (ref 3.3–5)
ALP SERPL-CCNC: 224 U/L — HIGH (ref 40–120)
ALP SERPL-CCNC: 258 U/L — HIGH (ref 40–120)
ALT FLD-CCNC: 32 U/L — SIGNIFICANT CHANGE UP (ref 12–78)
ALT FLD-CCNC: 38 U/L — SIGNIFICANT CHANGE UP (ref 12–78)
ANION GAP SERPL CALC-SCNC: 13 MMOL/L — SIGNIFICANT CHANGE UP (ref 5–17)
ANION GAP SERPL CALC-SCNC: 5 MMOL/L — SIGNIFICANT CHANGE UP (ref 5–17)
APPEARANCE UR: CLEAR — SIGNIFICANT CHANGE UP
APTT BLD: 30.6 SEC — SIGNIFICANT CHANGE UP (ref 27.5–35.5)
AST SERPL-CCNC: 53 U/L — HIGH (ref 15–37)
AST SERPL-CCNC: 65 U/L — HIGH (ref 15–37)
BASOPHILS # BLD AUTO: 0.06 K/UL — SIGNIFICANT CHANGE UP (ref 0–0.2)
BASOPHILS NFR BLD AUTO: 0.5 % — SIGNIFICANT CHANGE UP (ref 0–2)
BILIRUB DIRECT SERPL-MCNC: 0.4 MG/DL — HIGH (ref 0–0.3)
BILIRUB INDIRECT FLD-MCNC: 0.3 MG/DL — SIGNIFICANT CHANGE UP (ref 0.2–1)
BILIRUB SERPL-MCNC: 0.7 MG/DL — SIGNIFICANT CHANGE UP (ref 0.2–1.2)
BILIRUB SERPL-MCNC: 1 MG/DL — SIGNIFICANT CHANGE UP (ref 0.2–1.2)
BILIRUB UR-MCNC: NEGATIVE — SIGNIFICANT CHANGE UP
BLD GP AB SCN SERPL QL: SIGNIFICANT CHANGE UP
BUN SERPL-MCNC: 39 MG/DL — HIGH (ref 7–23)
BUN SERPL-MCNC: 41 MG/DL — HIGH (ref 7–23)
CALCIUM SERPL-MCNC: 7.7 MG/DL — LOW (ref 8.5–10.1)
CALCIUM SERPL-MCNC: 8.4 MG/DL — LOW (ref 8.5–10.1)
CHLORIDE SERPL-SCNC: 108 MMOL/L — SIGNIFICANT CHANGE UP (ref 96–108)
CHLORIDE SERPL-SCNC: 109 MMOL/L — HIGH (ref 96–108)
CO2 SERPL-SCNC: 21 MMOL/L — LOW (ref 22–31)
CO2 SERPL-SCNC: 26 MMOL/L — SIGNIFICANT CHANGE UP (ref 22–31)
COLOR SPEC: YELLOW — SIGNIFICANT CHANGE UP
CREAT SERPL-MCNC: 0.55 MG/DL — SIGNIFICANT CHANGE UP (ref 0.5–1.3)
CREAT SERPL-MCNC: 0.73 MG/DL — SIGNIFICANT CHANGE UP (ref 0.5–1.3)
DIFF PNL FLD: NEGATIVE — SIGNIFICANT CHANGE UP
EGFR: 106 ML/MIN/1.73M2 — SIGNIFICANT CHANGE UP
EGFR: 95 ML/MIN/1.73M2 — SIGNIFICANT CHANGE UP
EOSINOPHIL # BLD AUTO: 0.06 K/UL — SIGNIFICANT CHANGE UP (ref 0–0.5)
EOSINOPHIL NFR BLD AUTO: 0.5 % — SIGNIFICANT CHANGE UP (ref 0–6)
ETHANOL SERPL-MCNC: <10 MG/DL — SIGNIFICANT CHANGE UP (ref 0–10)
GLUCOSE SERPL-MCNC: 138 MG/DL — HIGH (ref 70–99)
GLUCOSE SERPL-MCNC: 94 MG/DL — SIGNIFICANT CHANGE UP (ref 70–99)
GLUCOSE UR QL: NEGATIVE — SIGNIFICANT CHANGE UP
HCG SERPL-ACNC: 3 MIU/ML — SIGNIFICANT CHANGE UP
HCT VFR BLD CALC: 28.9 % — LOW (ref 34.5–45)
HCT VFR BLD CALC: 34.5 % — SIGNIFICANT CHANGE UP (ref 34.5–45)
HGB BLD-MCNC: 11.1 G/DL — LOW (ref 11.5–15.5)
HGB BLD-MCNC: 9.5 G/DL — LOW (ref 11.5–15.5)
IMM GRANULOCYTES NFR BLD AUTO: 2.6 % — HIGH (ref 0–0.9)
INR BLD: 1.33 RATIO — HIGH (ref 0.88–1.16)
KETONES UR-MCNC: ABNORMAL
LACTATE SERPL-SCNC: 1.3 MMOL/L — SIGNIFICANT CHANGE UP (ref 0.7–2)
LACTATE SERPL-SCNC: 2.8 MMOL/L — HIGH (ref 0.7–2)
LEUKOCYTE ESTERASE UR-ACNC: NEGATIVE — SIGNIFICANT CHANGE UP
LIDOCAIN IGE QN: 230 U/L — SIGNIFICANT CHANGE UP (ref 73–393)
LYMPHOCYTES # BLD AUTO: 18.8 % — SIGNIFICANT CHANGE UP (ref 13–44)
LYMPHOCYTES # BLD AUTO: 2.44 K/UL — SIGNIFICANT CHANGE UP (ref 1–3.3)
MAGNESIUM SERPL-MCNC: 1.8 MG/DL — SIGNIFICANT CHANGE UP (ref 1.6–2.6)
MAGNESIUM SERPL-MCNC: 1.9 MG/DL — SIGNIFICANT CHANGE UP (ref 1.6–2.6)
MCHC RBC-ENTMCNC: 32.2 GM/DL — SIGNIFICANT CHANGE UP (ref 32–36)
MCHC RBC-ENTMCNC: 32.8 PG — SIGNIFICANT CHANGE UP (ref 27–34)
MCHC RBC-ENTMCNC: 32.9 GM/DL — SIGNIFICANT CHANGE UP (ref 32–36)
MCHC RBC-ENTMCNC: 33.8 PG — SIGNIFICANT CHANGE UP (ref 27–34)
MCV RBC AUTO: 102.1 FL — HIGH (ref 80–100)
MCV RBC AUTO: 102.8 FL — HIGH (ref 80–100)
MONOCYTES # BLD AUTO: 1.32 K/UL — HIGH (ref 0–0.9)
MONOCYTES NFR BLD AUTO: 10.2 % — SIGNIFICANT CHANGE UP (ref 2–14)
NEUTROPHILS # BLD AUTO: 8.77 K/UL — HIGH (ref 1.8–7.4)
NEUTROPHILS NFR BLD AUTO: 67.4 % — SIGNIFICANT CHANGE UP (ref 43–77)
NITRITE UR-MCNC: NEGATIVE — SIGNIFICANT CHANGE UP
PH UR: 5 — SIGNIFICANT CHANGE UP (ref 5–8)
PHOSPHATE SERPL-MCNC: 2.6 MG/DL — SIGNIFICANT CHANGE UP (ref 2.5–4.5)
PHOSPHATE SERPL-MCNC: 3.6 MG/DL — SIGNIFICANT CHANGE UP (ref 2.5–4.5)
PLATELET # BLD AUTO: 148 K/UL — LOW (ref 150–400)
PLATELET # BLD AUTO: 232 K/UL — SIGNIFICANT CHANGE UP (ref 150–400)
POTASSIUM SERPL-MCNC: 5.1 MMOL/L — SIGNIFICANT CHANGE UP (ref 3.5–5.3)
POTASSIUM SERPL-MCNC: 5.2 MMOL/L — SIGNIFICANT CHANGE UP (ref 3.5–5.3)
POTASSIUM SERPL-SCNC: 5.1 MMOL/L — SIGNIFICANT CHANGE UP (ref 3.5–5.3)
POTASSIUM SERPL-SCNC: 5.2 MMOL/L — SIGNIFICANT CHANGE UP (ref 3.5–5.3)
PROT SERPL-MCNC: 6.1 GM/DL — SIGNIFICANT CHANGE UP (ref 6–8.3)
PROT SERPL-MCNC: 6.7 GM/DL — SIGNIFICANT CHANGE UP (ref 6–8.3)
PROT UR-MCNC: NEGATIVE — SIGNIFICANT CHANGE UP
PROTHROM AB SERPL-ACNC: 15.5 SEC — HIGH (ref 10.5–13.4)
RBC # BLD: 2.81 M/UL — LOW (ref 3.8–5.2)
RBC # BLD: 3.38 M/UL — LOW (ref 3.8–5.2)
RBC # FLD: 15 % — HIGH (ref 10.3–14.5)
RBC # FLD: 15.3 % — HIGH (ref 10.3–14.5)
SODIUM SERPL-SCNC: 140 MMOL/L — SIGNIFICANT CHANGE UP (ref 135–145)
SODIUM SERPL-SCNC: 142 MMOL/L — SIGNIFICANT CHANGE UP (ref 135–145)
SP GR SPEC: 1.01 — SIGNIFICANT CHANGE UP (ref 1.01–1.02)
TROPONIN I, HIGH SENSITIVITY RESULT: 3.71 NG/L — SIGNIFICANT CHANGE UP
UROBILINOGEN FLD QL: 1
WBC # BLD: 12.99 K/UL — HIGH (ref 3.8–10.5)
WBC # BLD: 8.68 K/UL — SIGNIFICANT CHANGE UP (ref 3.8–10.5)
WBC # FLD AUTO: 12.99 K/UL — HIGH (ref 3.8–10.5)
WBC # FLD AUTO: 8.68 K/UL — SIGNIFICANT CHANGE UP (ref 3.8–10.5)

## 2023-05-09 PROCEDURE — 74177 CT ABD & PELVIS W/CONTRAST: CPT | Mod: 26,MA

## 2023-05-09 PROCEDURE — C9113: CPT

## 2023-05-09 PROCEDURE — 80048 BASIC METABOLIC PNL TOTAL CA: CPT

## 2023-05-09 PROCEDURE — 76705 ECHO EXAM OF ABDOMEN: CPT | Mod: 26

## 2023-05-09 PROCEDURE — 83605 ASSAY OF LACTIC ACID: CPT

## 2023-05-09 PROCEDURE — 71260 CT THORAX DX C+: CPT | Mod: 26

## 2023-05-09 PROCEDURE — 99223 1ST HOSP IP/OBS HIGH 75: CPT

## 2023-05-09 PROCEDURE — 84100 ASSAY OF PHOSPHORUS: CPT

## 2023-05-09 PROCEDURE — 76705 ECHO EXAM OF ABDOMEN: CPT

## 2023-05-09 PROCEDURE — 83735 ASSAY OF MAGNESIUM: CPT

## 2023-05-09 PROCEDURE — 85027 COMPLETE CBC AUTOMATED: CPT

## 2023-05-09 PROCEDURE — 36415 COLL VENOUS BLD VENIPUNCTURE: CPT

## 2023-05-09 PROCEDURE — 70450 CT HEAD/BRAIN W/O DYE: CPT | Mod: 26,MA

## 2023-05-09 PROCEDURE — 80076 HEPATIC FUNCTION PANEL: CPT

## 2023-05-09 PROCEDURE — 72125 CT NECK SPINE W/O DYE: CPT | Mod: 26,MA

## 2023-05-09 PROCEDURE — 80307 DRUG TEST PRSMV CHEM ANLYZR: CPT

## 2023-05-09 RX ORDER — SODIUM CHLORIDE 9 MG/ML
1000 INJECTION INTRAMUSCULAR; INTRAVENOUS; SUBCUTANEOUS ONCE
Refills: 0 | Status: COMPLETED | OUTPATIENT
Start: 2023-05-09 | End: 2023-05-09

## 2023-05-09 RX ORDER — CARVEDILOL PHOSPHATE 80 MG/1
6.25 CAPSULE, EXTENDED RELEASE ORAL EVERY 12 HOURS
Refills: 0 | Status: DISCONTINUED | OUTPATIENT
Start: 2023-05-09 | End: 2023-05-11

## 2023-05-09 RX ORDER — CEFTRIAXONE 500 MG/1
1000 INJECTION, POWDER, FOR SOLUTION INTRAMUSCULAR; INTRAVENOUS ONCE
Refills: 0 | Status: COMPLETED | OUTPATIENT
Start: 2023-05-09 | End: 2023-05-09

## 2023-05-09 RX ORDER — SPIRONOLACTONE 25 MG/1
25 TABLET, FILM COATED ORAL DAILY
Refills: 0 | Status: DISCONTINUED | OUTPATIENT
Start: 2023-05-09 | End: 2023-05-11

## 2023-05-09 RX ORDER — URSODIOL 250 MG/1
1 TABLET, FILM COATED ORAL
Qty: 0 | Refills: 0 | DISCHARGE

## 2023-05-09 RX ORDER — ONDANSETRON 8 MG/1
4 TABLET, FILM COATED ORAL ONCE
Refills: 0 | Status: COMPLETED | OUTPATIENT
Start: 2023-05-09 | End: 2023-05-09

## 2023-05-09 RX ORDER — OCTREOTIDE ACETATE 200 UG/ML
50 INJECTION, SOLUTION INTRAVENOUS; SUBCUTANEOUS
Qty: 500 | Refills: 0 | Status: DISCONTINUED | OUTPATIENT
Start: 2023-05-09 | End: 2023-05-10

## 2023-05-09 RX ORDER — FERROUS SULFATE 325(65) MG
1 TABLET ORAL
Qty: 0 | Refills: 0 | DISCHARGE

## 2023-05-09 RX ORDER — PANTOPRAZOLE SODIUM 20 MG/1
1 TABLET, DELAYED RELEASE ORAL
Qty: 0 | Refills: 0 | DISCHARGE

## 2023-05-09 RX ORDER — VENLAFAXINE HCL 75 MG
150 CAPSULE, EXT RELEASE 24 HR ORAL DAILY
Refills: 0 | Status: DISCONTINUED | OUTPATIENT
Start: 2023-05-09 | End: 2023-05-11

## 2023-05-09 RX ORDER — CARVEDILOL PHOSPHATE 80 MG/1
0 CAPSULE, EXTENDED RELEASE ORAL
Qty: 0 | Refills: 0 | DISCHARGE

## 2023-05-09 RX ORDER — PANTOPRAZOLE SODIUM 20 MG/1
80 TABLET, DELAYED RELEASE ORAL ONCE
Refills: 0 | Status: COMPLETED | OUTPATIENT
Start: 2023-05-09 | End: 2023-05-09

## 2023-05-09 RX ORDER — PANTOPRAZOLE SODIUM 20 MG/1
8 TABLET, DELAYED RELEASE ORAL
Qty: 80 | Refills: 0 | Status: DISCONTINUED | OUTPATIENT
Start: 2023-05-09 | End: 2023-05-10

## 2023-05-09 RX ORDER — SODIUM CHLORIDE 9 MG/ML
1000 INJECTION INTRAMUSCULAR; INTRAVENOUS; SUBCUTANEOUS
Refills: 0 | Status: DISCONTINUED | OUTPATIENT
Start: 2023-05-09 | End: 2023-05-10

## 2023-05-09 RX ORDER — CEFTRIAXONE 500 MG/1
1000 INJECTION, POWDER, FOR SOLUTION INTRAMUSCULAR; INTRAVENOUS ONCE
Refills: 0 | Status: DISCONTINUED | OUTPATIENT
Start: 2023-05-09 | End: 2023-05-09

## 2023-05-09 RX ORDER — FERROUS FUMARATE 350(115)MG
1 TABLET ORAL
Refills: 0 | DISCHARGE

## 2023-05-09 RX ORDER — URSODIOL 250 MG/1
300 TABLET, FILM COATED ORAL
Refills: 0 | Status: DISCONTINUED | OUTPATIENT
Start: 2023-05-09 | End: 2023-05-09

## 2023-05-09 RX ADMIN — SODIUM CHLORIDE 100 MILLILITER(S): 9 INJECTION INTRAMUSCULAR; INTRAVENOUS; SUBCUTANEOUS at 17:06

## 2023-05-09 RX ADMIN — PANTOPRAZOLE SODIUM 10 MG/HR: 20 TABLET, DELAYED RELEASE ORAL at 00:42

## 2023-05-09 RX ADMIN — PANTOPRAZOLE SODIUM 80 MILLIGRAM(S): 20 TABLET, DELAYED RELEASE ORAL at 00:40

## 2023-05-09 RX ADMIN — SODIUM CHLORIDE 1000 MILLILITER(S): 9 INJECTION INTRAMUSCULAR; INTRAVENOUS; SUBCUTANEOUS at 00:59

## 2023-05-09 RX ADMIN — OCTREOTIDE ACETATE 10 MICROGRAM(S)/HR: 200 INJECTION, SOLUTION INTRAVENOUS; SUBCUTANEOUS at 17:02

## 2023-05-09 RX ADMIN — OCTREOTIDE ACETATE 10 MICROGRAM(S)/HR: 200 INJECTION, SOLUTION INTRAVENOUS; SUBCUTANEOUS at 05:57

## 2023-05-09 RX ADMIN — PANTOPRAZOLE SODIUM 10 MG/HR: 20 TABLET, DELAYED RELEASE ORAL at 10:55

## 2023-05-09 RX ADMIN — ONDANSETRON 4 MILLIGRAM(S): 8 TABLET, FILM COATED ORAL at 00:40

## 2023-05-09 RX ADMIN — CEFTRIAXONE 1000 MILLIGRAM(S): 500 INJECTION, POWDER, FOR SOLUTION INTRAMUSCULAR; INTRAVENOUS at 05:51

## 2023-05-09 RX ADMIN — SODIUM CHLORIDE 100 MILLILITER(S): 9 INJECTION INTRAMUSCULAR; INTRAVENOUS; SUBCUTANEOUS at 16:09

## 2023-05-09 RX ADMIN — SODIUM CHLORIDE 100 MILLILITER(S): 9 INJECTION INTRAMUSCULAR; INTRAVENOUS; SUBCUTANEOUS at 05:52

## 2023-05-09 NOTE — H&P ADULT - NSHPPHYSICALEXAM_GEN_ALL_CORE
T(C): 37.1 (05-09-23 @ 08:52), Max: 37.1 (05-09-23 @ 08:52)  HR: 91 (05-09-23 @ 08:52) (88 - 106)  BP: 111/75 (05-09-23 @ 08:52) (92/61 - 122/78)  RR: 18 (05-09-23 @ 08:52) (16 - 19)  SpO2: 94% (05-09-23 @ 08:52) (94% - 98%)    CONSTITUTIONAL: Well groomed, no apparent distress  EYES: PERRLA and symmetric, EOMI, No conjunctival or scleral injection, non-icteric  ENMT: Oral mucosa with moist membranes. Normal dentition; no pharyngeal injection or exudates             NECK: Supple, symmetric and without tracheal deviation   RESP: No respiratory distress, no use of accessory muscles; CTA b/l, no WRR  CV: RRR, +S1S2, no MRG; no JVD; no peripheral edema  GI: Soft, NT, ND, no rebound, no guarding; no palpable masses; no hepatosplenomegaly; no hernia palpated  LYMPH: No cervical LAD or tenderness; no axillary LAD or tenderness; no inguinal LAD or tenderness  MSK: Normal gait; No digital clubbing or cyanosis; examination of the (head/neck/spine/ribs/pelvis, RUE, LUE, RLE, LLE) without misalignment,            Normal ROM without pain, no spinal tenderness, normal muscle strength/tone  SKIN: No rashes or ulcers noted; no subcutaneous nodules or induration palpable  NEURO: CN II-XII intact; normal reflexes in upper and lower extremities, sensation intact in upper and lower extremities b/l to light touch   PSYCH: Appropriate insight/judgment; A+O x 3, mood and affect appropriate, recent/remote memory intact

## 2023-05-09 NOTE — H&P ADULT - ASSESSMENT
57 year old female with hematemesis    1. hematemesis due to varices   - HB stable   - will keep Npo  - NS at 100  - protonix drip  - oceteotride drip   - CIWA scale   - continue spironolactone  - continue coreg     2. depression  - continue effexor    dvt ppx : SCD    code: FULL

## 2023-05-09 NOTE — ED ADULT NURSE REASSESSMENT NOTE - NS ED NURSE REASSESS COMMENT FT1
Received HOC from overnight RN, pt resting comfortably in stretcher at this time on pantoprazole and octreotide gtt.

## 2023-05-09 NOTE — PATIENT PROFILE ADULT - FALL HARM RISK - HARM RISK INTERVENTIONS
Assistance with ambulation/Assistance OOB with selected safe patient handling equipment/Communicate Risk of Fall with Harm to all staff/Monitor gait and stability/Reinforce activity limits and safety measures with patient and family/Sit up slowly, dangle for a short time, stand at bedside before walking/Tailored Fall Risk Interventions/Visual Cue: Yellow wristband and red socks/Bed in lowest position, wheels locked, appropriate side rails in place/Call bell, personal items and telephone in reach/Instruct patient to call for assistance before getting out of bed or chair/Non-slip footwear when patient is out of bed/[place] to call system/Physically safe environment - no spills, clutter or unnecessary equipment/Purposeful Proactive Rounding/Room/bathroom lighting operational, light cord in reach

## 2023-05-09 NOTE — PATIENT PROFILE ADULT - FUNCTIONAL ASSESSMENT - DAILY ACTIVITY SCORE.
Psychotherapy Provided: Family Therapy     Length of time in session: 90 minutes, follow up in 1 week    Goals addressed in session: Goal 1     Pain:  No    none    0    Current suicide risk : Low       DATA:  Saritha Pabon and his girlfriend were in session  Had their baby two weeks ago  Have been navigating [facility] and working supportively together- child was in NICU for a week  Discussed some  areas of concern - mostly girlfriend who is very emotional  Explored concept of expectations and thoughts versus feelings  Saritha Pabon is very blunt and direct , girlfriend wants more affirmation  She took the risk to state that she wants them to stay together but is holding onto old resentments from statements he has made in past  Suggested ways that they could address concerns and make some adjustments to support the other partner  Observed that they are working together more collaboratively and with less tension this week  ASSESSMENT:  Jose Alfredo Callejas presents as  calm, attentive and relaxed  His  mood is euthymic with no signs of depression or elevated mood or manic process  His speech is normal in rate, volume and articulation and language skills are intact  Suicidal or self injurious ideas or impulses are denied, as are assaultive or homicidal ideas or intentions  He  denies hallucinations and delusions and there is no apparent thought disorder  Associations are intact , thinking is  generally logical, and thought content is appropriate  His cognitive functioning, based on vocabulary and fund of knowledge, is intact and age appropriate and he  is fully oriented  There are signs of anxiety  There are no attentional or hyperactive difficulties  Insight and judgement appear intact  PLAN:   Saritha Pabon and his girlfriend will focus on reframing suggested language as discussed during session              [facility] Therapist Treatment Plan [facility]: Diagnosis and Treatment Plan explained to Domenica ying understanding diagnosis and is agreeable to Treatment Plan  Yes   Virtual Regular Visit      Assessment/Plan:    Problem List Items Addressed This Visit        Other    Adjustment disorder with anxiety - Primary               Reason for visit is   Chief Complaint   Patient presents with    Follow-up    Relationship Issues        [facility]    Provider [facility] [address]  [address]  [facility]  [phone]      Recent Visits  No visits were found meeting these conditions  Showing recent visits within past 7 days and meeting all other requirements     Today's Visits  Date Type Provider Dept   03/31/21 Wade Kuo [facility] Therapist [place]   Showing today's visits and meeting all other requirements     Future Appointments  No visits were found meeting these conditions  Showing future appointments within next 150 days and meeting all other requirements        The patient was identified by name and date of birth  Chichi Merrill was informed that this is a telemedicine visit and that the visit is being conducted through [employer] and patient was informed that this is a secure, HIPAA-compliant platform  He agrees to proceed     My office door was closed  No one else was in the room  He acknowledged consent and understanding of privacy and security of the video platform  The patient has agreed to participate and understands they can discontinue the visit at any time  Patient is aware this is a billable service  Kj Fairbanks is a 30 y o  male   See above for data   HPI     No past medical history on file  No past surgical history on file  No current outpatient medications on file  No current facility-administered medications for this visit           Not on File    Review of Systems    Video Exam    There were no vitals filed for this visit  Physical Exam     I spent 45 minutes directly with the patient during this visit      Magui acknowledges that he has consented to an online visit or consultation  He understands that the online visit is based solely on information provided by him, and that, in the absence of a face-to-face physical evaluation by the physician, the diagnosis he receives is both limited and provisional in terms of accuracy and completeness  This is not intended to replace a full medical face-to-face evaluation by the physician  Chichi Merrill understands and accepts these terms  24

## 2023-05-09 NOTE — ED PROVIDER NOTE - CLINICAL SUMMARY MEDICAL DECISION MAKING FREE TEXT BOX
pt with ho cirrhosis, alcohol use disorder with withdrawls including seizures in icu, esophageal varices and internal hemorrhoids with rupture requiring icu stay and  blood transfusion pw dark vomitus tonight. will get labs, including inr, and type and screen, troponin. pan scan as pt fell the other day and has a bump onher head and brusiing of left flank r/o retroperitoneal blor traumatic injury, ich etc    0400 pt with cts negative for traumatic injury, admit to hospitalist spoke to bari will gomez culture as pt with tachy and low bp 90/60's, guiaic positive, octreotide for esophageal varices,will also cover empirically with ceftriaxone for gm neg sepsis. admit to zena Devlin DO

## 2023-05-09 NOTE — PHARMACOTHERAPY INTERVENTION NOTE - COMMENTS
Med history complete, reviewed medications and allergies with patient via phone and confirmed medication list with doctor first med profile, all medication related questions answered

## 2023-05-09 NOTE — ED PROVIDER NOTE - PHYSICAL EXAMINATION
Gen:  Wechronically ill appearing  Head:  NC, occipital area with tender swelling  HEENT: pupils perrl,no pharyngeal erythema, uvula midline  Cardiac: S1S2, tachy no murmur  Abd: Softly distended, ruq ttp  Resp: No distress, CTA   musculoskeletal:: no deformities, no swelling, strength +5/+5  Skin: warm and dry as visualized, no rashes, left flank and butt with purple bruising  Neuro: LANIE, aao x 4  Psych:alert, cooperative, appropriate mood and affect for situation

## 2023-05-09 NOTE — H&P ADULT - HISTORY OF PRESENT ILLNESS
57 year old female with pmhx of ETOH abuse / varices / depression who presents with vomitting blood    Patient claims that she has history of varices and follows up with Dr Littlejohn. She is not supposed to be drinking ETOH however her brother recently passed so she was drinking a few sips here and there. Her last endoscopy was 1 year ago with Dr Baldwin     She also has been having black tarry stools and vomitting dark coca cola colored blood. She denies any light headedness. The last time she vomitted was last night. She has been having some small bouts of diarrhea. She deneis any fever / abdominal pain.    Patient denies any drug use.     In the ER she was place don protonix drip / octeotride.     She will need admission for vomitting blood.

## 2023-05-09 NOTE — ED ADULT NURSE NOTE - NSIMPLEMENTINTERV_GEN_ALL_ED
Implemented All Fall Risk Interventions:  [place] to call system. Call bell, personal items and telephone within reach. Instruct patient to call for assistance. Room bathroom lighting operational. Non-slip footwear when patient is off stretcher. Physically safe environment: no spills, clutter or unnecessary equipment. Stretcher in lowest position, wheels locked, appropriate side rails in place. Provide visual cue, wrist band, yellow gown, etc. Monitor gait and stability. Monitor for mental status changes and reorient to person, place, and time. Review medications for side effects contributing to fall risk. Reinforce activity limits and safety measures with patient and family.

## 2023-05-09 NOTE — ED PROVIDER NOTE - OBJECTIVE STATEMENT
57-year-old female complaining dark blackish colored in the cyst tonight.  Patient with history of esophageal varices with variceal bleed requiring blood transfusion history of alcohol use with alcohol withdrawal in the ICU with seizures currently denies drinking and no evidence of alcohol on her breath tonight she is awake alert and oriented with nausea.  She denies diarrhea but says that her stool is black she also takes an iron supplement.  She states that she fell a couple days ago she has a bump on her occipital lobe and she has a left flank and left buttocks bruise she denies loss of consciousness.  Her  was at the bedside for the history and physical

## 2023-05-09 NOTE — ED PROVIDER NOTE - CRITICAL CARE ATTENDING CONTRIBUTION TO CARE
direct patient care (not related to procedure), additional history taking, interpretation of diagnostic studies, documentation, consultation with other physicians, consult w/ pt's family directly relating to pts condition  B Claus JUNE

## 2023-05-10 LAB
ANION GAP SERPL CALC-SCNC: 6 MMOL/L — SIGNIFICANT CHANGE UP (ref 5–17)
BUN SERPL-MCNC: 25 MG/DL — HIGH (ref 7–23)
CALCIUM SERPL-MCNC: 7.7 MG/DL — LOW (ref 8.5–10.1)
CHLORIDE SERPL-SCNC: 110 MMOL/L — HIGH (ref 96–108)
CO2 SERPL-SCNC: 26 MMOL/L — SIGNIFICANT CHANGE UP (ref 22–31)
CREAT SERPL-MCNC: 0.61 MG/DL — SIGNIFICANT CHANGE UP (ref 0.5–1.3)
CULTURE RESULTS: SIGNIFICANT CHANGE UP
EGFR: 103 ML/MIN/1.73M2 — SIGNIFICANT CHANGE UP
GLUCOSE SERPL-MCNC: 102 MG/DL — HIGH (ref 70–99)
MAGNESIUM SERPL-MCNC: 2.1 MG/DL — SIGNIFICANT CHANGE UP (ref 1.6–2.6)
PHOSPHATE SERPL-MCNC: 3.2 MG/DL — SIGNIFICANT CHANGE UP (ref 2.5–4.5)
POTASSIUM SERPL-MCNC: 4.1 MMOL/L — SIGNIFICANT CHANGE UP (ref 3.5–5.3)
POTASSIUM SERPL-SCNC: 4.1 MMOL/L — SIGNIFICANT CHANGE UP (ref 3.5–5.3)
SODIUM SERPL-SCNC: 142 MMOL/L — SIGNIFICANT CHANGE UP (ref 135–145)
SPECIMEN SOURCE: SIGNIFICANT CHANGE UP

## 2023-05-10 PROCEDURE — 99232 SBSQ HOSP IP/OBS MODERATE 35: CPT

## 2023-05-10 RX ORDER — PANTOPRAZOLE SODIUM 20 MG/1
40 TABLET, DELAYED RELEASE ORAL
Refills: 0 | Status: DISCONTINUED | OUTPATIENT
Start: 2023-05-10 | End: 2023-05-11

## 2023-05-10 RX ADMIN — CARVEDILOL PHOSPHATE 6.25 MILLIGRAM(S): 80 CAPSULE, EXTENDED RELEASE ORAL at 10:07

## 2023-05-10 RX ADMIN — SPIRONOLACTONE 25 MILLIGRAM(S): 25 TABLET, FILM COATED ORAL at 10:07

## 2023-05-10 RX ADMIN — CARVEDILOL PHOSPHATE 6.25 MILLIGRAM(S): 80 CAPSULE, EXTENDED RELEASE ORAL at 22:18

## 2023-05-10 RX ADMIN — Medication 150 MILLIGRAM(S): at 10:07

## 2023-05-10 RX ADMIN — PANTOPRAZOLE SODIUM 40 MILLIGRAM(S): 20 TABLET, DELAYED RELEASE ORAL at 22:19

## 2023-05-10 NOTE — PROGRESS NOTE ADULT - SUBJECTIVE AND OBJECTIVE BOX
57 year old female with pmhx of ETOH abuse / varices / depression who presents with vomitting blood    Patient claims that she has history of varices and follows up with Dr Littlejohn. She is not supposed to be drinking ETOH however her brother recently passed so she was drinking a few sips here and there. Her last endoscopy was 1 year ago with Dr Baldwin     5/10 : Patient has no more episodes of vomitting. Will place on liquid diet and advance.

## 2023-05-10 NOTE — PROGRESS NOTE ADULT - ASSESSMENT
57 year old female with hematemesis    1. hematemesis due to varices   - HB stable  - liquid diet will advance  - discontinue octreotide  - DC protonix drip  - started PO protonix     2. depression  - continue effexor    dvt ppx : SCD    code: FULL

## 2023-05-11 ENCOUNTER — TRANSCRIPTION ENCOUNTER (OUTPATIENT)
Age: 58
End: 2023-05-11

## 2023-05-11 VITALS
RESPIRATION RATE: 18 BRPM | HEART RATE: 73 BPM | OXYGEN SATURATION: 97 % | SYSTOLIC BLOOD PRESSURE: 103 MMHG | TEMPERATURE: 98 F | DIASTOLIC BLOOD PRESSURE: 69 MMHG

## 2023-05-11 LAB
HCT VFR BLD CALC: 26.6 % — LOW (ref 34.5–45)
HGB BLD-MCNC: 8.9 G/DL — LOW (ref 11.5–15.5)
MCHC RBC-ENTMCNC: 33.5 GM/DL — SIGNIFICANT CHANGE UP (ref 32–36)
MCHC RBC-ENTMCNC: 34 PG — SIGNIFICANT CHANGE UP (ref 27–34)
MCV RBC AUTO: 101.5 FL — HIGH (ref 80–100)
PLATELET # BLD AUTO: 104 K/UL — LOW (ref 150–400)
RBC # BLD: 2.62 M/UL — LOW (ref 3.8–5.2)
RBC # FLD: 14.7 % — HIGH (ref 10.3–14.5)
WBC # BLD: 6.62 K/UL — SIGNIFICANT CHANGE UP (ref 3.8–10.5)
WBC # FLD AUTO: 6.62 K/UL — SIGNIFICANT CHANGE UP (ref 3.8–10.5)

## 2023-05-11 PROCEDURE — 99239 HOSP IP/OBS DSCHRG MGMT >30: CPT

## 2023-05-11 RX ORDER — CARVEDILOL PHOSPHATE 80 MG/1
1 CAPSULE, EXTENDED RELEASE ORAL
Refills: 0 | DISCHARGE

## 2023-05-11 RX ORDER — CARVEDILOL PHOSPHATE 80 MG/1
1 CAPSULE, EXTENDED RELEASE ORAL
Qty: 60 | Refills: 0
Start: 2023-05-11

## 2023-05-11 RX ORDER — PANTOPRAZOLE SODIUM 20 MG/1
1 TABLET, DELAYED RELEASE ORAL
Qty: 60 | Refills: 0
Start: 2023-05-11

## 2023-05-11 RX ADMIN — SPIRONOLACTONE 25 MILLIGRAM(S): 25 TABLET, FILM COATED ORAL at 10:03

## 2023-05-11 RX ADMIN — CARVEDILOL PHOSPHATE 6.25 MILLIGRAM(S): 80 CAPSULE, EXTENDED RELEASE ORAL at 10:03

## 2023-05-11 RX ADMIN — Medication 150 MILLIGRAM(S): at 10:03

## 2023-05-11 RX ADMIN — PANTOPRAZOLE SODIUM 40 MILLIGRAM(S): 20 TABLET, DELAYED RELEASE ORAL at 10:03

## 2023-05-11 NOTE — DISCHARGE NOTE NURSING/CASE MANAGEMENT/SOCIAL WORK - PATIENT PORTAL LINK FT
You can access the FollowMyHealth Patient Portal offered by [facility] by registering at the following website: [email or web address]. By joining [email or web address]’s FollowMyHealth portal, you will also be able to view your health information using other applications (apps) compatible with our system.

## 2023-05-11 NOTE — DISCHARGE NOTE PROVIDER - HOSPITAL COURSE
57 year old female with pmhx of ETOH abuse / varices / depression who presents with vomitting blood    Patient claims that she has history of varices and follows up with Dr Littlejohn. She is not supposed to be drinking ETOH however her brother recently passed so she was drinking a few sips here and there. Her last endoscopy was 1 year ago with Dr Baldwin     5/10 : Patient has no more episodes of vomitting. Will place on liquid diet and advance   57 year old female with pmhx of ETOH abuse / varices / depression who presents with vomitting blood    Patient claims that she has history of varices and follows up with Dr Littlejohn. She is not supposed to be drinking ETOH however her brother recently passed so she was drinking a few sips here and there. Her last endoscopy was 1 year ago with Dr Baldwin     5/10 : Patient has no more episodes of vomitting. Will place on liquid diet and advance    Patient during her stay had no events of hematemesis. She was placed on protonix drip and kept npo.    Her diet was advanced to liquids than solids.     Her hb remained stable.    She will be discharged on protonix bid.        57 year old female with pmhx of ETOH abuse / varices / depression who presents with vomitting blood    Patient claims that she has history of varices and follows up with Dr Littlejohn. She is not supposed to be drinking ETOH however her brother recently passed so she was drinking a few sips here and there. Her last endoscopy was 1 year ago with Dr Baldwin     5/10 : Patient has no more episodes of vomitting. Will place on liquid diet and advance    Patient during her stay had no events of hematemesis. She was placed on protonix drip and kept npo.    Her diet was advanced to liquids than solids.     Her hb remained stable.    She will be discharged on protonix bid.     Discharge took 45 minutes   57 year old female with pmhx of ETOH abuse / varices / depression who presents with vomitting blood which was present upon admission     Patient claims that she has history of varices due to her ETOH abuse and follows up with Dr Littlejohn. She is not supposed to be drinking ETOH however her brother recently passed so she was drinking a few sips here and there. Her last endoscopy was 1 year ago with Dr Baldwin     5/10 : Patient has no more episodes of vomitting. Will place on liquid diet and advance    Patient during her stay had no events of hematemesis. She was placed on protonix drip and kept npo.    Her diet was advanced to liquids than solids.     Her hb remained stable.    She will be discharged on protonix bid.     Discharge took 45 minutes

## 2023-05-11 NOTE — DISCHARGE NOTE PROVIDER - NSDCMRMEDTOKEN_GEN_ALL_CORE_FT
Subjective   Chief Complaint   Patient presents with   • Annual Exam   • Dyspareunia   • Urinary Incontinence     Sri Alegre is a 55 y.o. year old  menopausal female presenting to be seen for her annual exam.  There has not been vaginal bleeding in the last 12 months.  Hot flashes and night sweats are not a significant problem.  She is having some urinary leakage.  She thought this only happens little late 80s.  Discussed that could be combination of prior pregnancies it even though  delivery.  Family history and activity plus post menopausal status.    [place]  [place] about 8 years ago.  She and her  may have been on the house but was can cause quite a bit of money to remodel.  She has now made a bit on house and then [facility] Dr. Julio Rubio he's retired.  [employer]  [place] and She Can Get in [place].  Wants to Get a Bigger House Because She Has a Grandchild Coming    SEXUAL Hx:  She is sexually active.  Vaginal dryness is a problem.  She has tried over-the-counter medications are not effective.  She tried Coconut oil suppositories or friend former college roommate mentioned not effective.  She had been prescribed an Estring but has not brought her cell to put this in yet.  She did bring that to the office today so we'll show her how to use this.  Discussed that if she is uncomfortable placing this or removing it that we could try Vagifem or estrogen cream 2-3 times weekly.    HEALTH Hx:  She exercises regularly: yes.  She wears her seat belt:yes.  She has concerns about domestic violence: no.  She has noticed changes in height: no.              Calcium intake is not adequate    The following portions of the patient's history were reviewed and updated as appropriate:problem list, current medications, allergies, past family history, past medical history, past social history and past surgical  "history.    Smoking status: Never Smoker                                                              Smokeless tobacco: Never Used                        Review of Systems   Her bowels and bladder are normal     Objective   /70   Ht 167.6 cm (66\")   Wt 66.7 kg (147 lb)   BMI 23.73 kg/m²      General:  well developed; well nourished  no acute distress  appears stated age   Skin:  No suspicious lesions seen   Thyroid: not examined   Breasts:  Examined in supine position  Symmetric without masses or skin dimpling  Nipples normal without inversion, lesions or discharge  There are no palpable axillary nodes   Abdomen: soft, non-tender; no masses  no umbilical or inginual hernias are present  no hepato-splenomegaly   Pelvis: Clinical staff was present for exam  External genitalia:  normal appearance of the external genitalia including Bartholin's and [place]'s glands.  :  urethral meatus normal;  Vaginal:  atrophic mucosal changes are present;  Cervix:  normal appearance.  Uterus:  normal size, shape and consistency.  Adnexa:  non palpable bilaterally.  Rectal:  digital rectal exam not performed; anus visually normal appearing.   Strong Kegel response upon request  I placed the Estring in the vagina 4.  I tried to explain to her how to remove and replace this.  If she is not interested in that would rather leave it in and 3 months and then see if she can remove it and replace it at that time.  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  Mention that she could googled this morning her maybe a you tube video         Assessment   1. Dyspareunia associated with vaginal atrophy we will try this Estring for 3 months.  Options discussed.  2. Stress urinary incontinence which is mild currently discussed timed voiding and Kegel exercises whenever needed she gave a, strong response today.  3. Strong family history " breast cancer.  She had been on tamoxifen but this was stopped because it was only for preventive measures and may been causing some vaginal atrophy  4. Upper respiratory infection.  Suggested over-the-counter Zyrtec or Claritin, Flonase nasal spray and Mucinex.  We'll call and Tessalon Perles     Plan   1. Calcium discussed  1200 mg daily in divided doses ideally in diet  2. Regular weight bearing exercise  3. Breast self awareness, mammograms discussed alternating with MRI.  4. Call for any problems with the Estring we could call in either permanent cream or Vagifem    New Medications Ordered This Visit   Medications   • estradiol (ESTRING) 2 MG vaginal ring     Sig: Insert 2 mg into the vagina Every 3 (Three) Months. follow package directions     Dispense:  1 each     Refill:  2   • benzonatate (TESSALON PERLES) 100 MG capsule     Sig: Take 1 capsule by mouth 3 (Three) Times a Day As Needed for Cough.     Dispense:  30 capsule     Refill:  1           This note was electronically signed.    Salvador Cheng M.D.  October 11, 2018   carvedilol 6.25 mg oral tablet: 1 tab(s) orally once a day Patient states she ran out of this medication about 2 weeks ago  ferrous sulfate 324 mg (65 mg elemental iron) oral tablet: 1 tab(s) orally once a day  fluticasone 50 mcg/inh inhalation powder: 1 spray(s) inhaled once a day  methylPREDNISolone 4 mg oral tablet: orally 1 week dose pack, course complete  spironolactone 25 mg oral tablet: 1 tab(s) orally once a day  venlafaxine 150 mg oral tablet, extended release: 1 tab(s) orally once a day   carvedilol 6.25 mg oral tablet: 1 tab(s) orally once a day Patient states she ran out of this medication about 2 weeks ago  ferrous sulfate 324 mg (65 mg elemental iron) oral tablet: 1 tab(s) orally once a day  fluticasone 50 mcg/inh inhalation powder: 1 spray(s) inhaled once a day  pantoprazole 40 mg oral delayed release tablet: 1 tab(s) orally 2 times a day  spironolactone 25 mg oral tablet: 1 tab(s) orally once a day  venlafaxine 150 mg oral tablet, extended release: 1 tab(s) orally once a day

## 2023-05-11 NOTE — CHART NOTE - NSCHARTNOTEFT_GEN_A_CORE
Patient was admitted from 5/9/23 - 5/11/23.     She can return to work on 5/15/23.    If you have any questions feel free to call    Dr Niko Hanks  [phone]

## 2023-05-11 NOTE — DISCHARGE NOTE NURSING/CASE MANAGEMENT/SOCIAL WORK - NSDCPEFALRISK_GEN_ALL_CORE
For information on Fall & Injury Prevention, visit: [email or web address] OR  [email or web address] OR  https://www.cdc.gov/steadi/patient.html

## 2023-05-11 NOTE — DISCHARGE NOTE PROVIDER - NSDCCAREPROVSEEN_GEN_ALL_CORE_FT
57 year old female with pmhx of ETOH abuse / varices / depression who presents with vomitting blood    Patient claims that she has history of varices and follows up with Dr Littlejohn. She is not supposed to be drinking ETOH however her brother recently passed so she was drinking a few sips here and there. Her last endoscopy was 1 year ago with Dr Baldwin     Patient was treated for hematemesis.     Patient during her stay was never seen to have hematemesis.     She was placed on protonix and kept NPO and her diet was advanced.     Patient was continued on effexor for depression    She was continued on her spirnolactone / coreg due to her history of liver cirrhosis / esophageal varices.     She is overall improved and stable for DC    Discharge took 45

## 2023-05-11 NOTE — CDI QUERY NOTE - NSCDIOTHERTXTBX_GEN_ALL_CORE_HH
Per PN 5/10" 57 year old female with pmhx of ETOH abuse / varices / depression who presents with vomitting blood"  "hematemesis due to varices "    Per ER provider: " pt with ho cirrhosis, alcohol use disorder "   " Patient with history of esophageal varices with variceal bleed "  "pan culture as pt with tachy and low bp 90/60's, guiaic positive, octreotide for esophageal varices"     Please clarify the etiology of the esophageal varices  - Esophageal Varices due to alcoholic cirrhosis of the liver  - Esophageal Varices not related to alcoholic cirrhosis of the liver  - Other etiology of Esophageal Varices please clarify

## 2023-06-27 NOTE — DISCHARGE NOTE NURSING/CASE MANAGEMENT/SOCIAL WORK - PATIENT PORTAL LINK FT
You can access the FollowMyHealth Patient Portal offered by [facility] by registering at the following website: [email or web address]. By joining [employer]s FollowMyHealth portal, you will also be able to view your health information using other applications (apps) compatible with our system. Negative

## 2023-09-08 ENCOUNTER — APPOINTMENT (OUTPATIENT)
Age: 58
End: 2023-09-08

## 2023-10-07 ENCOUNTER — INPATIENT (INPATIENT)
Facility: HOSPITAL | Age: 58
LOS: 5 days | Discharge: HOME | DRG: 432 | End: 2023-10-13
Attending: FAMILY MEDICINE | Admitting: INTERNAL MEDICINE
Payer: COMMERCIAL

## 2023-10-07 VITALS
RESPIRATION RATE: 19 BRPM | HEIGHT: 68 IN | HEART RATE: 119 BPM | WEIGHT: 119.93 LBS | OXYGEN SATURATION: 99 % | SYSTOLIC BLOOD PRESSURE: 123 MMHG | DIASTOLIC BLOOD PRESSURE: 84 MMHG | TEMPERATURE: 98 F

## 2023-10-07 DIAGNOSIS — Z88.5 ALLERGY STATUS TO NARCOTIC AGENT: ICD-10-CM

## 2023-10-07 DIAGNOSIS — K70.30 ALCOHOLIC CIRRHOSIS OF LIVER WITHOUT ASCITES: ICD-10-CM

## 2023-10-07 DIAGNOSIS — K92.2 GASTROINTESTINAL HEMORRHAGE, UNSPECIFIED: ICD-10-CM

## 2023-10-07 DIAGNOSIS — K72.90 HEPATIC FAILURE, UNSPECIFIED WITHOUT COMA: ICD-10-CM

## 2023-10-07 DIAGNOSIS — E43 UNSPECIFIED SEVERE PROTEIN-CALORIE MALNUTRITION: ICD-10-CM

## 2023-10-07 DIAGNOSIS — Z88.1 ALLERGY STATUS TO OTHER ANTIBIOTIC AGENTS STATUS: ICD-10-CM

## 2023-10-07 DIAGNOSIS — K76.6 PORTAL HYPERTENSION: ICD-10-CM

## 2023-10-07 DIAGNOSIS — D68.4 ACQUIRED COAGULATION FACTOR DEFICIENCY: ICD-10-CM

## 2023-10-07 DIAGNOSIS — Z85.828 PERSONAL HISTORY OF OTHER MALIGNANT NEOPLASM OF SKIN: ICD-10-CM

## 2023-10-07 DIAGNOSIS — I95.9 HYPOTENSION, UNSPECIFIED: ICD-10-CM

## 2023-10-07 DIAGNOSIS — Z23 ENCOUNTER FOR IMMUNIZATION: ICD-10-CM

## 2023-10-07 DIAGNOSIS — D62 ACUTE POSTHEMORRHAGIC ANEMIA: ICD-10-CM

## 2023-10-07 DIAGNOSIS — Z98.891 HISTORY OF UTERINE SCAR FROM PREVIOUS SURGERY: Chronic | ICD-10-CM

## 2023-10-07 DIAGNOSIS — Z98.890 OTHER SPECIFIED POSTPROCEDURAL STATES: Chronic | ICD-10-CM

## 2023-10-07 DIAGNOSIS — F32.A DEPRESSION, UNSPECIFIED: ICD-10-CM

## 2023-10-07 DIAGNOSIS — F10.21 ALCOHOL DEPENDENCE, IN REMISSION: ICD-10-CM

## 2023-10-07 DIAGNOSIS — I85.11 SECONDARY ESOPHAGEAL VARICES WITH BLEEDING: ICD-10-CM

## 2023-10-07 DIAGNOSIS — K31.89 OTHER DISEASES OF STOMACH AND DUODENUM: ICD-10-CM

## 2023-10-07 DIAGNOSIS — Z86.16 PERSONAL HISTORY OF COVID-19: ICD-10-CM

## 2023-10-07 LAB
ALBUMIN SERPL ELPH-MCNC: 2.6 G/DL — LOW (ref 3.3–5)
ALP SERPL-CCNC: 441 U/L — HIGH (ref 40–120)
ALT FLD-CCNC: 49 U/L — SIGNIFICANT CHANGE UP (ref 12–78)
ANION GAP SERPL CALC-SCNC: 8 MMOL/L — SIGNIFICANT CHANGE UP (ref 5–17)
APTT BLD: 33.8 SEC — SIGNIFICANT CHANGE UP (ref 24.5–35.6)
APTT BLD: 35.8 SEC — HIGH (ref 24.5–35.6)
AST SERPL-CCNC: 137 U/L — HIGH (ref 15–37)
BASOPHILS # BLD AUTO: 0.04 K/UL — SIGNIFICANT CHANGE UP (ref 0–0.2)
BASOPHILS NFR BLD AUTO: 0.5 % — SIGNIFICANT CHANGE UP (ref 0–2)
BILIRUB SERPL-MCNC: 3.3 MG/DL — HIGH (ref 0.2–1.2)
BLD GP AB SCN SERPL QL: SIGNIFICANT CHANGE UP
BUN SERPL-MCNC: 30 MG/DL — HIGH (ref 7–23)
CALCIUM SERPL-MCNC: 9.1 MG/DL — SIGNIFICANT CHANGE UP (ref 8.5–10.1)
CHLORIDE SERPL-SCNC: 102 MMOL/L — SIGNIFICANT CHANGE UP (ref 96–108)
CO2 SERPL-SCNC: 28 MMOL/L — SIGNIFICANT CHANGE UP (ref 22–31)
CREAT SERPL-MCNC: 0.74 MG/DL — SIGNIFICANT CHANGE UP (ref 0.5–1.3)
EGFR: 93 ML/MIN/1.73M2 — SIGNIFICANT CHANGE UP
EOSINOPHIL # BLD AUTO: 0.04 K/UL — SIGNIFICANT CHANGE UP (ref 0–0.5)
EOSINOPHIL NFR BLD AUTO: 0.5 % — SIGNIFICANT CHANGE UP (ref 0–6)
GLUCOSE SERPL-MCNC: 151 MG/DL — HIGH (ref 70–99)
HCT VFR BLD CALC: 28.6 % — LOW (ref 34.5–45)
HCT VFR BLD CALC: 29.3 % — LOW (ref 34.5–45)
HCT VFR BLD CALC: 29.5 % — LOW (ref 34.5–45)
HGB BLD-MCNC: 10 G/DL — LOW (ref 11.5–15.5)
HGB BLD-MCNC: 10.1 G/DL — LOW (ref 11.5–15.5)
HGB BLD-MCNC: 10.2 G/DL — LOW (ref 11.5–15.5)
IMM GRANULOCYTES NFR BLD AUTO: 0.8 % — SIGNIFICANT CHANGE UP (ref 0–0.9)
INR BLD: 1.38 RATIO — HIGH (ref 0.85–1.18)
INR BLD: 1.51 RATIO — HIGH (ref 0.85–1.18)
LYMPHOCYTES # BLD AUTO: 1.62 K/UL — SIGNIFICANT CHANGE UP (ref 1–3.3)
LYMPHOCYTES # BLD AUTO: 18.8 % — SIGNIFICANT CHANGE UP (ref 13–44)
MCHC RBC-ENTMCNC: 33 PG — SIGNIFICANT CHANGE UP (ref 27–34)
MCHC RBC-ENTMCNC: 33.6 PG — SIGNIFICANT CHANGE UP (ref 27–34)
MCHC RBC-ENTMCNC: 34.5 GM/DL — SIGNIFICANT CHANGE UP (ref 32–36)
MCHC RBC-ENTMCNC: 34.6 GM/DL — SIGNIFICANT CHANGE UP (ref 32–36)
MCHC RBC-ENTMCNC: 35 GM/DL — SIGNIFICANT CHANGE UP (ref 32–36)
MCHC RBC-ENTMCNC: 36.2 PG — HIGH (ref 27–34)
MCV RBC AUTO: 105 FL — HIGH (ref 80–100)
MCV RBC AUTO: 95.5 FL — SIGNIFICANT CHANGE UP (ref 80–100)
MCV RBC AUTO: 96 FL — SIGNIFICANT CHANGE UP (ref 80–100)
MONOCYTES # BLD AUTO: 0.99 K/UL — HIGH (ref 0–0.9)
MONOCYTES NFR BLD AUTO: 11.5 % — SIGNIFICANT CHANGE UP (ref 2–14)
NEUTROPHILS # BLD AUTO: 5.88 K/UL — SIGNIFICANT CHANGE UP (ref 1.8–7.4)
NEUTROPHILS NFR BLD AUTO: 67.9 % — SIGNIFICANT CHANGE UP (ref 43–77)
PLATELET # BLD AUTO: 115 K/UL — LOW (ref 150–400)
PLATELET # BLD AUTO: 80 K/UL — LOW (ref 150–400)
PLATELET # BLD AUTO: 86 K/UL — LOW (ref 150–400)
POTASSIUM SERPL-MCNC: 4 MMOL/L — SIGNIFICANT CHANGE UP (ref 3.5–5.3)
POTASSIUM SERPL-SCNC: 4 MMOL/L — SIGNIFICANT CHANGE UP (ref 3.5–5.3)
PROT SERPL-MCNC: 6.8 GM/DL — SIGNIFICANT CHANGE UP (ref 6–8.3)
PROTHROM AB SERPL-ACNC: 15.4 SEC — HIGH (ref 9.5–13)
PROTHROM AB SERPL-ACNC: 16.9 SEC — HIGH (ref 9.5–13)
RBC # BLD: 2.79 M/UL — LOW (ref 3.8–5.2)
RBC # BLD: 2.98 M/UL — LOW (ref 3.8–5.2)
RBC # BLD: 3.09 M/UL — LOW (ref 3.8–5.2)
RBC # FLD: 13.4 % — SIGNIFICANT CHANGE UP (ref 10.3–14.5)
RBC # FLD: 20.2 % — HIGH (ref 10.3–14.5)
RBC # FLD: 20.8 % — HIGH (ref 10.3–14.5)
SODIUM SERPL-SCNC: 138 MMOL/L — SIGNIFICANT CHANGE UP (ref 135–145)
WBC # BLD: 6.06 K/UL — SIGNIFICANT CHANGE UP (ref 3.8–10.5)
WBC # BLD: 6.23 K/UL — SIGNIFICANT CHANGE UP (ref 3.8–10.5)
WBC # BLD: 8.64 K/UL — SIGNIFICANT CHANGE UP (ref 3.8–10.5)
WBC # FLD AUTO: 6.06 K/UL — SIGNIFICANT CHANGE UP (ref 3.8–10.5)
WBC # FLD AUTO: 6.23 K/UL — SIGNIFICANT CHANGE UP (ref 3.8–10.5)
WBC # FLD AUTO: 8.64 K/UL — SIGNIFICANT CHANGE UP (ref 3.8–10.5)

## 2023-10-07 PROCEDURE — 93010 ELECTROCARDIOGRAM REPORT: CPT

## 2023-10-07 PROCEDURE — 36430 TRANSFUSION BLD/BLD COMPNT: CPT

## 2023-10-07 PROCEDURE — 85027 COMPLETE CBC AUTOMATED: CPT

## 2023-10-07 PROCEDURE — 90471 IMMUNIZATION ADMIN: CPT

## 2023-10-07 PROCEDURE — 83735 ASSAY OF MAGNESIUM: CPT

## 2023-10-07 PROCEDURE — 76700 US EXAM ABDOM COMPLETE: CPT

## 2023-10-07 PROCEDURE — 76700 US EXAM ABDOM COMPLETE: CPT | Mod: 26,59

## 2023-10-07 PROCEDURE — P9059: CPT

## 2023-10-07 PROCEDURE — 80076 HEPATIC FUNCTION PANEL: CPT

## 2023-10-07 PROCEDURE — 93975 VASCULAR STUDY: CPT

## 2023-10-07 PROCEDURE — 84100 ASSAY OF PHOSPHORUS: CPT

## 2023-10-07 PROCEDURE — 99291 CRITICAL CARE FIRST HOUR: CPT

## 2023-10-07 PROCEDURE — 93975 VASCULAR STUDY: CPT | Mod: 26

## 2023-10-07 PROCEDURE — C9113: CPT

## 2023-10-07 PROCEDURE — 90686 IIV4 VACC NO PRSV 0.5 ML IM: CPT

## 2023-10-07 PROCEDURE — P9016: CPT

## 2023-10-07 PROCEDURE — 80048 BASIC METABOLIC PNL TOTAL CA: CPT

## 2023-10-07 PROCEDURE — 36415 COLL VENOUS BLD VENIPUNCTURE: CPT

## 2023-10-07 PROCEDURE — 85730 THROMBOPLASTIN TIME PARTIAL: CPT

## 2023-10-07 PROCEDURE — C1889: CPT

## 2023-10-07 PROCEDURE — 85610 PROTHROMBIN TIME: CPT

## 2023-10-07 RX ORDER — OXYCODONE HYDROCHLORIDE 5 MG/1
5 TABLET ORAL ONCE
Refills: 0 | Status: DISCONTINUED | OUTPATIENT
Start: 2023-10-07 | End: 2023-10-07

## 2023-10-07 RX ORDER — PREGABALIN 225 MG/1
1000 CAPSULE ORAL DAILY
Refills: 0 | Status: DISCONTINUED | OUTPATIENT
Start: 2023-10-07 | End: 2023-10-13

## 2023-10-07 RX ORDER — CEFTRIAXONE 500 MG/1
1000 INJECTION, POWDER, FOR SOLUTION INTRAMUSCULAR; INTRAVENOUS ONCE
Refills: 0 | Status: COMPLETED | OUTPATIENT
Start: 2023-10-07 | End: 2023-10-07

## 2023-10-07 RX ORDER — OCTREOTIDE ACETATE 200 UG/ML
50 INJECTION, SOLUTION INTRAVENOUS; SUBCUTANEOUS
Qty: 500 | Refills: 0 | Status: DISCONTINUED | OUTPATIENT
Start: 2023-10-07 | End: 2023-10-09

## 2023-10-07 RX ORDER — SODIUM CHLORIDE 9 MG/ML
1000 INJECTION, SOLUTION INTRAVENOUS
Refills: 0 | Status: DISCONTINUED | OUTPATIENT
Start: 2023-10-07 | End: 2023-10-07

## 2023-10-07 RX ORDER — CEFTRIAXONE 500 MG/1
1000 INJECTION, POWDER, FOR SOLUTION INTRAMUSCULAR; INTRAVENOUS ONCE
Refills: 0 | Status: DISCONTINUED | OUTPATIENT
Start: 2023-10-07 | End: 2023-10-07

## 2023-10-07 RX ORDER — FENTANYL CITRATE 50 UG/ML
50 INJECTION INTRAVENOUS
Refills: 0 | Status: DISCONTINUED | OUTPATIENT
Start: 2023-10-07 | End: 2023-10-07

## 2023-10-07 RX ORDER — PANTOPRAZOLE SODIUM 20 MG/1
40 TABLET, DELAYED RELEASE ORAL ONCE
Refills: 0 | Status: COMPLETED | OUTPATIENT
Start: 2023-10-07 | End: 2023-10-07

## 2023-10-07 RX ORDER — CEFTRIAXONE 500 MG/1
1000 INJECTION, POWDER, FOR SOLUTION INTRAMUSCULAR; INTRAVENOUS EVERY 24 HOURS
Refills: 0 | Status: DISCONTINUED | OUTPATIENT
Start: 2023-10-08 | End: 2023-10-10

## 2023-10-07 RX ORDER — ONDANSETRON 8 MG/1
4 TABLET, FILM COATED ORAL ONCE
Refills: 0 | Status: COMPLETED | OUTPATIENT
Start: 2023-10-07 | End: 2023-10-07

## 2023-10-07 RX ORDER — OCTREOTIDE ACETATE 200 UG/ML
50 INJECTION, SOLUTION INTRAVENOUS; SUBCUTANEOUS ONCE
Refills: 0 | Status: COMPLETED | OUTPATIENT
Start: 2023-10-07 | End: 2023-10-07

## 2023-10-07 RX ORDER — CEFTRIAXONE 500 MG/1
1000 INJECTION, POWDER, FOR SOLUTION INTRAMUSCULAR; INTRAVENOUS EVERY 24 HOURS
Refills: 0 | Status: DISCONTINUED | OUTPATIENT
Start: 2023-10-07 | End: 2023-10-07

## 2023-10-07 RX ORDER — PANTOPRAZOLE SODIUM 20 MG/1
40 TABLET, DELAYED RELEASE ORAL DAILY
Refills: 0 | Status: DISCONTINUED | OUTPATIENT
Start: 2023-10-08 | End: 2023-10-09

## 2023-10-07 RX ORDER — SODIUM CHLORIDE 9 MG/ML
1000 INJECTION INTRAMUSCULAR; INTRAVENOUS; SUBCUTANEOUS
Refills: 0 | Status: DISCONTINUED | OUTPATIENT
Start: 2023-10-07 | End: 2023-10-08

## 2023-10-07 RX ORDER — FOLIC ACID 0.8 MG
1 TABLET ORAL DAILY
Refills: 0 | Status: COMPLETED | OUTPATIENT
Start: 2023-10-07 | End: 2023-10-10

## 2023-10-07 RX ADMIN — PANTOPRAZOLE SODIUM 40 MILLIGRAM(S): 20 TABLET, DELAYED RELEASE ORAL at 10:01

## 2023-10-07 RX ADMIN — Medication 1 MILLIGRAM(S): at 18:33

## 2023-10-07 RX ADMIN — OXYCODONE HYDROCHLORIDE 5 MILLIGRAM(S): 5 TABLET ORAL at 13:49

## 2023-10-07 RX ADMIN — CEFTRIAXONE 1000 MILLIGRAM(S): 500 INJECTION, POWDER, FOR SOLUTION INTRAMUSCULAR; INTRAVENOUS at 10:02

## 2023-10-07 RX ADMIN — OCTREOTIDE ACETATE 10 MICROGRAM(S)/HR: 200 INJECTION, SOLUTION INTRAVENOUS; SUBCUTANEOUS at 10:14

## 2023-10-07 RX ADMIN — OXYCODONE HYDROCHLORIDE 5 MILLIGRAM(S): 5 TABLET ORAL at 14:00

## 2023-10-07 RX ADMIN — PREGABALIN 1000 MICROGRAM(S): 225 CAPSULE ORAL at 18:33

## 2023-10-07 RX ADMIN — ONDANSETRON 4 MILLIGRAM(S): 8 TABLET, FILM COATED ORAL at 14:00

## 2023-10-07 RX ADMIN — SODIUM CHLORIDE 100 MILLILITER(S): 9 INJECTION INTRAMUSCULAR; INTRAVENOUS; SUBCUTANEOUS at 12:52

## 2023-10-07 RX ADMIN — OCTREOTIDE ACETATE 50 MICROGRAM(S): 200 INJECTION, SOLUTION INTRAVENOUS; SUBCUTANEOUS at 10:10

## 2023-10-07 NOTE — PATIENT PROFILE ADULT - FALL HARM RISK - HARM RISK INTERVENTIONS
Assistance with ambulation/Assistance OOB with selected safe patient handling equipment/Communicate Risk of Fall with Harm to all staff/Monitor gait and stability/Reinforce activity limits and safety measures with patient and family/Sit up slowly, dangle for a short time, stand at bedside before walking/Tailored Fall Risk Interventions/Visual Cue: Yellow wristband and red socks/Bed in lowest position, wheels locked, appropriate side rails in place/Call bell, personal items and telephone in reach/Instruct patient to call for assistance before getting out of bed or chair/Non-slip footwear when patient is out of bed/[place] to call system/Physically safe environment - no spills, clutter or unnecessary equipment/Purposeful Proactive Rounding/Room/bathroom lighting operational, light cord in reach

## 2023-10-07 NOTE — PROGRESS NOTE ADULT - ASSESSMENT
A/P: 58 female with known ETOh cirrhosis who presented with an hematemesis from a variceal bleed      Plan:  ICU  NPO  CBC at 22:00  Octotride  Protonix daily  Rocephin  Abdominal US  NS at 100  MELD labs daily  Venodynes

## 2023-10-07 NOTE — ED ADULT TRIAGE NOTE - CHIEF COMPLAINT QUOTE
Pt presents to ED c/o abdominal pain, nausea, vomiting blood and dark stools since last night. Denies use of blood thinner. Endorses lightheadedness at this time. Pt actively vomiting in triage.

## 2023-10-07 NOTE — ED ADULT TRIAGE NOTE - GLASGOW COMA SCALE: BEST VERBAL RESPONSE, MLM
Patient attended Phase 2 Cardiac Rehab Exercise Session. Further documentation will be scanned into the medical record upon discharge.  
(V5) oriented

## 2023-10-07 NOTE — PROGRESS NOTE ADULT - SUBJECTIVE AND OBJECTIVE BOX
HPI: 58 F with alcohol related cirrhosis with history of variceal bleed in 2022 who presents with episodes of hematemesis and melena beginning at 1800 on 10/6 on her way home from work. Patient taken to the OR an was banded.  Patient now in the ICU on a Octotride drip        PAST MEDICAL & SURGICAL HISTORY:  Alcohol abuse      Depression      Withdrawal seizures      History of basal cell carcinoma excision      Squamous cell skin cancer      Hemorrhoids      2019 novel coronavirus disease (COVID-19)      History of ear, nose, and throat (ENT) surgery      H/O basal cell carcinoma excision      H/O:           FAMILY HISTORY:  FH: pancreatic cancer (Mother)    Family history of heart attack (Father)    Family history of CVA (Father)        Social Hx:    Allergies    Zithromax (Unknown)    Intolerances    morphine (Nausea)      Height (cm): 172.7 (10-07 @ 07:41)  Weight (kg): 54.4 (10-07 @ 07:41)  BMI (kg/m2): 18.2 (10-07 @ 07:41)    ICU Vital Signs Last 24 Hrs  T(C): 37.2 (07 Oct 2023 14:19), Max: 37.2 (07 Oct 2023 14:19)  T(F): 98.9 (07 Oct 2023 14:19), Max: 98.9 (07 Oct 2023 14:19)  HR: 95 (07 Oct 2023 15:00) (77 - 120)  BP: 129/87 (07 Oct 2023 15:00) (93/73 - 129/87)  BP(mean): 100 (07 Oct 2023 15:00) (81 - 100)  ABP: --  ABP(mean): --  RR: 19 (07 Oct 2023 15:00) (16 - 25)  SpO2: 95% (07 Oct 2023 15:00) (95% - 100%)    O2 Parameters below as of 07 Oct 2023 14:19  Patient On (Oxygen Delivery Method): room air                I&O's Summary    07 Oct 2023 07:01  -  07 Oct 2023 16:14  --------------------------------------------------------  IN: 1190 mL / OUT: 0 mL / NET: 1190 mL                              10.0   6.06  )-----------( 86       ( 07 Oct 2023 14:33 )             28.6       10-07    138  |  102  |  30<H>  ----------------------------<  151<H>  4.0   |  28  |  0.74    Ca    9.1      07 Oct 2023 09:01    TPro  6.8  /  Alb  2.6<L>  /  TBili  3.3<H>  /  DBili  x   /  AST  137<H>  /  ALT  49  /  AlkPhos  441<H>  10-07                Urinalysis Basic - ( 07 Oct 2023 09:01 )    Color: x / Appearance: x / SG: x / pH: x  Gluc: 151 mg/dL / Ketone: x  / Bili: x / Urobili: x   Blood: x / Protein: x / Nitrite: x   Leuk Esterase: x / RBC: x / WBC x   Sq Epi: x / Non Sq Epi: x / Bacteria: x        MEDICATIONS  (STANDING):  cyanocobalamin Injectable 1000 MICROGram(s) SubCutaneous daily  folic acid Injectable 1 milliGRAM(s) IV Push daily  octreotide  Infusion 50 MICROgram(s)/Hr (10 mL/Hr) IV Continuous <Continuous>  pantoprazole  Injectable 40 milliGRAM(s) IV Push daily  sodium chloride 0.9%. 1000 milliLiter(s) (100 mL/Hr) IV Continuous <Continuous>    MEDICATIONS  (PRN):      DVT Prophylaxis: V    Advanced Directives:  Discussed with:    Visit Information: 30 min    ** Time is exclusive of billed procedures and/or teaching and/or routine family updates.

## 2023-10-07 NOTE — ED PROVIDER NOTE - CRITICAL CARE ATTENDING CONTRIBUTION TO CARE
Upon my evaluation, this patient had a high probability of imminent or life-threatening deterioration due to variceal bleed , which required my direct attention, intervention, and personal management.  The patient has a  medical condition that impairs one or more vital organ systems.  Frequent personal assessment and adjustment of medical interventions was performed.      I have personally provided 35 minutes of critical care time exclusive of time spent on separately billable procedures. Time includes review of laboratory data, radiology results, discussion with consultants, patient and family; monitoring for potential decompensation, as well as time spent retrieving data and reviewing the chart and documenting the visit. Interventions were performed as documented above.

## 2023-10-07 NOTE — H&P ADULT - NSHPPHYSICALEXAM_GEN_ALL_CORE
PE:    Adult lying in bed  No JVD trachea midline  Normocephalic, atraumatic  S1S2+  CTA B/L  Abd soft NTND  No leg swelling/edema noted  Awake and alert  Skin pink, warm

## 2023-10-07 NOTE — H&P ADULT - HISTORY OF PRESENT ILLNESS
ICU Admit    S:    Pt seen and examined  PMHx of hemorrhoids, SCC, BCC, depression, EtOH abuse, and withdrawal seizures presents to the ED c/o abd pain and n/v.   Pt reports multiple episodes of bright red blood with black stools. History of variceal bleeds in the past.  Patient was previously a drinker but no longer drinks.  Patient follows with Dr. Goodson.  No fevers or chills.    10/7 AM: Hematemesis x 3. Blood ordered. Seen by GI, plan for emergent EGD.

## 2023-10-07 NOTE — ED PROVIDER NOTE - OBJECTIVE STATEMENT
59 y/o female w/ a PMHx of hemorrhoids, SCC, BCC, depression, EtOH abuse, and withdrawal seizures presents to the ED c/o abd pain and n/v. Pt reports multiple episodes of bright red blood with black stools. History of variceal bleeds in the past.  Patient was previously a drinker but no longer drinks.  Patient follows with Dr. Goodson.  No fevers or chills.

## 2023-10-07 NOTE — H&P ADULT - NSHPROSALLOTHERNEGRD_GEN_ALL_CORE
Orders for admission, patient is aware of plan and ready to go upstairs. Any questions, please call ED RN Mary Webster at extension [phone].      Patient Covid vaccination status: Fully vaccinated and immunocompromised     COVID Test Ordered in ED: Rapid SARS-CoV-2 by PCR    COVID Suspicion at Admission: Low clinical suspicion for COVID    Running Infusions:  None    Mental Status/LOC at time of transport: A&Ox4    Other pertinent information:   CIWA score: N/A   NIH score:  N/A All other review of systems negative, except as noted in HPI

## 2023-10-07 NOTE — H&P ADULT - ASSESSMENT
A:    58F  HD # 1  FULL CODE    Here for:    1. UGIB  2. ABLA  3. Liver cirrhosis  4. Coagulopathy    This patient requires critical care for support of one or more vital organ systems with a high probability of imminent or life threatening deterioration in his/her condition    P:    Neuro: GCS 15. Monitor for delirium.  Continue to optimize pain control. Serial Neurologic assessments.    HEENT: No issues.    CV: Continue hemodynamic monitoring    Pulm: Pulmonary toilet.  Continue incentive spirometer.  Chest PT.  Encourage OOB to chair and ambulation. Nebs. f/u ABG, CXR.    GI/Nutrition: Cont diet, bowel regimen.    /Renal: Monitor UOP. Monitor BMP.  Replete Lytes as needed.    HEME- Chemical and mechanical DVT ppx. f/u CBC. f/u coags as needed.    ID:  Cont abx. f/u Cx's.    Lines/Tubes:     Endo: Maintain euglycemia.    Skin:  Cont skin care, pressure ulcer prevention.    Dispo: Cont critical care.    TOTAL CRITICAL CARE TIME:   (EXCLUSIVE of any non bundled procedures)    Note: This time spent INCLUDES time spent directly as this patient's bedside with evaluation, review of chart including review of laboratory and imaging studies, interpretation of vital signs and cardiac output measurements, any necessary ventilator management, and time spent discussing plan of care with patient and family, including goals of care discussion.   A:    58F  HD # 1  FULL CODE    Here for:    1. UGIB  2. ABLA  3. Liver cirrhosis  4. Coagulopathy    This patient requires critical care for support of one or more vital organ systems with a high probability of imminent or life threatening deterioration in his/her condition    P:    UGIB, active  Hx of varices and variceal bleeding  Liver cirrhosis 2/2 alcohol, former drinker    This is a highly morbid condition and Pt requires active resuscitation and EGD    2u pRBC now  2u FFP for coagulopathy  Trend CBC, INR  GI consult; to EGD urgently  Octreotide  PPI  NPO  IVF  I/O's  Coags abnormal suspect 2/2 liver failure; giving plasma now, trend      Dispo: Admit to critical care. Ongoing active resuscitation. Urgent EGD. Trend endpoints.     TOTAL CRITICAL CARE TIME: 110 minutes  (EXCLUSIVE of any non bundled procedures)    Note: This time spent INCLUDES time spent directly as this patient's bedside with evaluation, review of chart including review of laboratory and imaging studies, interpretation of vital signs and cardiac output measurements, any necessary ventilator management, and time spent discussing plan of care with patient and family, including goals of care discussion.   A:    58F  HD # 1  FULL CODE    Here for:    1. UGIB  2. ABLA  3. Liver cirrhosis  4. Coagulopathy    This patient requires critical care for support of one or more vital organ systems with a high probability of imminent or life threatening deterioration in his/her condition    P:    UGIB, active  Hx of varices and variceal bleeding  Liver cirrhosis 2/2 alcohol, former drinker    This is a highly morbid condition and Pt requires active resuscitation and EGD    2u pRBC now  2u FFP for coagulopathy  Trend CBC, INR  GI consult; to EGD urgently  Octreotide  Empiric CTX  PPI  NPO  IVF  I/O's  Coags abnormal suspect 2/2 liver failure; giving plasma now, trend    Dispo: Admit to critical care. Ongoing active resuscitation. Urgent EGD. Trend endpoints.     TOTAL CRITICAL CARE TIME: 110 minutes  (EXCLUSIVE of any non bundled procedures)    Note: This time spent INCLUDES time spent directly as this patient's bedside with evaluation, review of chart including review of laboratory and imaging studies, interpretation of vital signs and cardiac output measurements, any necessary ventilator management, and time spent discussing plan of care with patient and family, including goals of care discussion.

## 2023-10-07 NOTE — H&P ADULT - NSHPLABSRESULTS_GEN_ALL_CORE
LABS:    CBC Full  -  ( 07 Oct 2023 09:01 )  WBC Count : 8.64 K/uL  RBC Count : 2.79 M/uL  Hemoglobin : 10.1 g/dL  Hematocrit : 29.3 %  Platelet Count - Automated : 115 K/uL  Mean Cell Volume : 105.0 fl  Mean Cell Hemoglobin : 36.2 pg  Mean Cell Hemoglobin Concentration : 34.5 gm/dL  Auto Neutrophil # : 5.88 K/uL  Auto Lymphocyte # : 1.62 K/uL  Auto Monocyte # : 0.99 K/uL  Auto Eosinophil # : 0.04 K/uL  Auto Basophil # : 0.04 K/uL  Auto Neutrophil % : 67.9 %  Auto Lymphocyte % : 18.8 %  Auto Monocyte % : 11.5 %  Auto Eosinophil % : 0.5 %  Auto Basophil % : 0.5 %    10-07    138  |  102  |  30<H>  ----------------------------<  151<H>  4.0   |  28  |  0.74    Ca    9.1      07 Oct 2023 09:01    TPro  6.8  /  Alb  2.6<L>  /  TBili  3.3<H>  /  DBili  x   /  AST  137<H>  /  ALT  49  /  AlkPhos  441<H>  10-07    PT/INR - ( 07 Oct 2023 09:01 )   PT: 16.9 sec;   INR: 1.51 ratio         PTT - ( 07 Oct 2023 09:01 )  PTT:35.8 sec  Urinalysis Basic - ( 07 Oct 2023 09:01 )    Color: x / Appearance: x / SG: x / pH: x  Gluc: 151 mg/dL / Ketone: x  / Bili: x / Urobili: x   Blood: x / Protein: x / Nitrite: x   Leuk Esterase: x / RBC: x / WBC x   Sq Epi: x / Non Sq Epi: x / Bacteria: x      CAPILLARY BLOOD GLUCOSE            LIVER FUNCTIONS - ( 07 Oct 2023 09:01 )  Alb: 2.6 g/dL / Pro: 6.8 gm/dL / ALK PHOS: 441 U/L / ALT: 49 U/L / AST: 137 U/L / GGT: x

## 2023-10-07 NOTE — CONSULT NOTE ADULT - ASSESSMENT
1. alcohol related cirrhosis with hematemesis with history of variceal bleed (MELD-na 16)    Recommendation  1. NPO for EGD   2. octreotide and ppi gtt  3. Ceftriaxone 1 g q24  4. Recommend us abdomen with doppler  5. Trend LFTs and INR daily   6. Check utox

## 2023-10-07 NOTE — ED PROVIDER NOTE - CLINICAL SUMMARY MEDICAL DECISION MAKING FREE TEXT BOX
Patient with concern for variceal bleed discussed with GI Marvelicer will start on Protonix octreotide and ceftriaxone.  Plan for admission.

## 2023-10-07 NOTE — ED PROVIDER NOTE - PHYSICAL EXAMINATION
GEN - NAD; well appearing; A+O x3  HEAD - NC/AT    EYES - EOMI, + conjunctival pallor, no scleral icterus  ENT -   mucous membranes  moist , no discharge  NECK - Neck supple  PULM - CTA b/l,  symmetric breath sounds  COR -  RRR, S1 S2, no murmurs  ABD - ND, NT, soft, no guarding, no rebound, no masses    BACK - no CVA tenderness, nontender spine     EXTREMS -no edema, no deformity, warm and well perfused   SKIN -pale appearing  NEUROLOGIC - alert, sensation nl, motor 5/5 RUE/LUE/RLE/LLE

## 2023-10-07 NOTE — CONSULT NOTE ADULT - SUBJECTIVE AND OBJECTIVE BOX
HPI: 58 F with alcohol related cirrhosis with history of variceal bleed in 2022 who presents with episodes of hematemesis and melena beginning at 1800 on 10/6. Symptoms began without inciting factor and persisted prompting ED visit. Follows with Dr. Goodson as outpatient.     Currently hemodynamically stable though tachycardic 120s. Hgb 10.1, INR 1.51, Plt 115, BUN/ Cr 30/0.74, Tbili 3.3, alkP 441,     PAST MEDICAL & SURGICAL HISTORY:  Alcohol abuse      Depression      Withdrawal seizures      History of basal cell carcinoma excision      Squamous cell skin cancer      Hemorrhoids      2019 novel coronavirus disease (COVID-19)      History of ear, nose, and throat (ENT) surgery      H/O basal cell carcinoma excision      H/O:           Home Medications:  ferrous sulfate 324 mg (65 mg elemental iron) oral tablet: 1 tab(s) orally once a day (09 May 2023 11:31)  fluticasone 50 mcg/inh inhalation powder: 1 spray(s) inhaled once a day (09 May 2023 11:29)  spironolactone 25 mg oral tablet: 1 tab(s) orally once a day (09 May 2023 11:28)  venlafaxine 150 mg oral tablet, extended release: 1 tab(s) orally once a day (09 May 2023 11:30)      MEDICATIONS  (STANDING):  octreotide  Infusion 50 MICROgram(s)/Hr (10 mL/Hr) IV Continuous <Continuous>    MEDICATIONS  (PRN):      Allergies    Zithromax (Unknown)    Intolerances    morphine (Nausea)      SOCIAL HISTORY:    FAMILY HISTORY:  FH: pancreatic cancer (Mother)    Family history of heart attack (Father)    Family history of CVA (Father)        ROS  As above  Otherwise unremarkable    Vital Signs Last 24 Hrs  T(C): 36.9 (07 Oct 2023 07:41), Max: 36.9 (07 Oct 2023 07:41)  T(F): 98.4 (07 Oct 2023 07:41), Max: 98.4 (07 Oct 2023 07:41)  HR: 120 (07 Oct 2023 10:28) (119 - 120)  BP: 109/67 (07 Oct 2023 10:28) (109/67 - 123/84)  BP(mean): 94 (07 Oct 2023 07:41) (94 - 94)  RR: 19 (07 Oct 2023 07:41) (19 - 19)  SpO2: 99% (07 Oct 2023 07:41) (99% - 99%)    Parameters below as of 07 Oct 2023 07:41  Patient On (Oxygen Delivery Method): room air        Constitutional: NAD,  Respiratory: CTAB  Cardiovascular: S1 and S2, RRR  Gastrointestinal: BS+, soft, NT/ND  Extremities: No peripheral edema  Psychiatric: Normal mood, normal affect  Skin: No rashes    LABS:                        10.1   8.64  )-----------( 115      ( 07 Oct 2023 09:01 )             29.3     10-07    138  |  102  |  30<H>  ----------------------------<  151<H>  4.0   |  28  |  0.74    Ca    9.1      07 Oct 2023 09:01    TPro  6.8  /  Alb  2.6<L>  /  TBili  3.3<H>  /  DBili  x   /  AST  137<H>  /  ALT  49  /  AlkPhos  441<H>  1007    PT/INR - ( 07 Oct 2023 09:01 )   PT: 16.9 sec;   INR: 1.51 ratio         PTT - ( 07 Oct 2023 09: )  PTT:35.8 sec  LIVER FUNCTIONS - ( 07 Oct 2023 09: )  Alb: 2.6 g/dL / Pro: 6.8 gm/dL / ALK PHOS: 441 U/L / ALT: 49 U/L / AST: 137 U/L / GGT: x             RADIOLOGY & ADDITIONAL STUDIES:

## 2023-10-07 NOTE — ED ADULT NURSE NOTE - OBJECTIVE STATEMENT
Patient presents to ED c/o vomiting blood since around 6pm yesterday and dark stool starting this morning. Pt endorses abdominal pain prior to vomiting. Denies chest pain, fever, SOB. Endorses weakness. Pt hx of esophageal varices, states that she has had blood transfusions in the past. Endores hx of cirrhosis as well.

## 2023-10-08 LAB
ANION GAP SERPL CALC-SCNC: 3 MMOL/L — LOW (ref 5–17)
APTT BLD: 35.1 SEC — SIGNIFICANT CHANGE UP (ref 24.5–35.6)
BILIRUB SERPL-MCNC: 1.6 MG/DL — HIGH (ref 0.2–1.2)
BUN SERPL-MCNC: 21 MG/DL — SIGNIFICANT CHANGE UP (ref 7–23)
CALCIUM SERPL-MCNC: 8.4 MG/DL — LOW (ref 8.5–10.1)
CHLORIDE SERPL-SCNC: 106 MMOL/L — SIGNIFICANT CHANGE UP (ref 96–108)
CO2 SERPL-SCNC: 32 MMOL/L — HIGH (ref 22–31)
CREAT SERPL-MCNC: 0.5 MG/DL — SIGNIFICANT CHANGE UP (ref 0.5–1.3)
EGFR: 107 ML/MIN/1.73M2 — SIGNIFICANT CHANGE UP
GLUCOSE SERPL-MCNC: 150 MG/DL — HIGH (ref 70–99)
HCT VFR BLD CALC: 27.1 % — LOW (ref 34.5–45)
HGB BLD-MCNC: 9.1 G/DL — LOW (ref 11.5–15.5)
INR BLD: 1.34 RATIO — HIGH (ref 0.85–1.18)
MAGNESIUM SERPL-MCNC: 1.8 MG/DL — SIGNIFICANT CHANGE UP (ref 1.6–2.6)
MCHC RBC-ENTMCNC: 32.9 PG — SIGNIFICANT CHANGE UP (ref 27–34)
MCHC RBC-ENTMCNC: 33.6 GM/DL — SIGNIFICANT CHANGE UP (ref 32–36)
MCV RBC AUTO: 97.8 FL — SIGNIFICANT CHANGE UP (ref 80–100)
MELD SCORE WITH DIALYSIS: 25 POINTS — SIGNIFICANT CHANGE UP
MELD SCORE WITHOUT DIALYSIS: 11 POINTS — SIGNIFICANT CHANGE UP
PHOSPHATE SERPL-MCNC: 3.3 MG/DL — SIGNIFICANT CHANGE UP (ref 2.5–4.5)
PLATELET # BLD AUTO: 79 K/UL — LOW (ref 150–400)
POTASSIUM SERPL-MCNC: 4.8 MMOL/L — SIGNIFICANT CHANGE UP (ref 3.5–5.3)
POTASSIUM SERPL-SCNC: 4.8 MMOL/L — SIGNIFICANT CHANGE UP (ref 3.5–5.3)
PROTHROM AB SERPL-ACNC: 15 SEC — HIGH (ref 9.5–13)
RBC # BLD: 2.77 M/UL — LOW (ref 3.8–5.2)
RBC # FLD: 21.1 % — HIGH (ref 10.3–14.5)
SODIUM SERPL-SCNC: 141 MMOL/L — SIGNIFICANT CHANGE UP (ref 135–145)
WBC # BLD: 6.97 K/UL — SIGNIFICANT CHANGE UP (ref 3.8–10.5)
WBC # FLD AUTO: 6.97 K/UL — SIGNIFICANT CHANGE UP (ref 3.8–10.5)

## 2023-10-08 PROCEDURE — 99232 SBSQ HOSP IP/OBS MODERATE 35: CPT

## 2023-10-08 PROCEDURE — 99233 SBSQ HOSP IP/OBS HIGH 50: CPT

## 2023-10-08 RX ADMIN — OCTREOTIDE ACETATE 10 MICROGRAM(S)/HR: 200 INJECTION, SOLUTION INTRAVENOUS; SUBCUTANEOUS at 20:35

## 2023-10-08 RX ADMIN — PREGABALIN 1000 MICROGRAM(S): 225 CAPSULE ORAL at 12:51

## 2023-10-08 RX ADMIN — OCTREOTIDE ACETATE 10 MICROGRAM(S)/HR: 200 INJECTION, SOLUTION INTRAVENOUS; SUBCUTANEOUS at 03:39

## 2023-10-08 RX ADMIN — PANTOPRAZOLE SODIUM 40 MILLIGRAM(S): 20 TABLET, DELAYED RELEASE ORAL at 10:48

## 2023-10-08 RX ADMIN — OCTREOTIDE ACETATE 10 MICROGRAM(S)/HR: 200 INJECTION, SOLUTION INTRAVENOUS; SUBCUTANEOUS at 12:51

## 2023-10-08 RX ADMIN — CEFTRIAXONE 1000 MILLIGRAM(S): 500 INJECTION, POWDER, FOR SOLUTION INTRAMUSCULAR; INTRAVENOUS at 10:48

## 2023-10-08 RX ADMIN — Medication 1 MILLIGRAM(S): at 10:49

## 2023-10-08 NOTE — PROGRESS NOTE ADULT - SUBJECTIVE AND OBJECTIVE BOX
HPI: 58 F with alcohol related cirrhosis with history of variceal bleed in 2022 who presents with episodes of hematemesis and melena beginning at 1800 on 10/6 on her way home from work. Patient taken to the OR an was banded.  Patient now in the ICU on a Octotride drip    10/8: No Bleeding, no pain, no bloody DBMs       PAST MEDICAL & SURGICAL HISTORY:  Alcohol abuse      Depression      Withdrawal seizures      History of basal cell carcinoma excision      Squamous cell skin cancer      Hemorrhoids      2019 novel coronavirus disease (COVID-19)      History of ear, nose, and throat (ENT) surgery      H/O basal cell carcinoma excision      H/O:           FAMILY HISTORY:  FH: pancreatic cancer (Mother)    Family history of heart attack (Father)    Family history of CVA (Father)        Social Hx:    Allergies    Zithromax (Unknown)    Intolerances    morphine (Nausea)          ICU Vital Signs Last 24 Hrs  T(C): 36.4 (08 Oct 2023 06:00), Max: 37.2 (07 Oct 2023 14:19)  T(F): 97.5 (08 Oct 2023 06:00), Max: 98.9 (07 Oct 2023 14:19)  HR: 75 (08 Oct 2023 08:00) (72 - 120)  BP: 94/64 (08 Oct 2023 08:00) (93/73 - 134/90)  BP(mean): 74 (08 Oct 2023 08:00) (72 - 104)  ABP: --  ABP(mean): --  RR: 14 (08 Oct 2023 08:00) (14 - 25)  SpO2: 93% (08 Oct 2023 08:00) (91% - 100%)    O2 Parameters below as of 08 Oct 2023 08:00  Patient On (Oxygen Delivery Method): room air                I&O's Summary    07 Oct 2023 07:01  -  08 Oct 2023 07:00  --------------------------------------------------------  IN: 1969 mL / OUT: 450 mL / NET: 1519 mL                              9.1    6.97  )-----------( 79       ( 08 Oct 2023 05:32 )             27.1       10    141  |  106  |  21  ----------------------------<  150<H>  4.8   |  32<H>  |  0.50    Ca    8.4<L>      08 Oct 2023 05:32  Phos  3.3     10-08  Mg     1.8     10-08    TPro  x   /  Alb  x   /  TBili  1.6<H>  /  DBili  x   /  AST  x   /  ALT  x   /  AlkPhos  x   10-08                Urinalysis Basic - ( 08 Oct 2023 05:32 )    Color: x / Appearance: x / SG: x / pH: x  Gluc: 150 mg/dL / Ketone: x  / Bili: x / Urobili: x   Blood: x / Protein: x / Nitrite: x   Leuk Esterase: x / RBC: x / WBC x   Sq Epi: x / Non Sq Epi: x / Bacteria: x        MEDICATIONS  (STANDING):  cefTRIAXone Injectable. 1000 milliGRAM(s) IV Push every 24 hours  cyanocobalamin Injectable 1000 MICROGram(s) SubCutaneous daily  folic acid Injectable 1 milliGRAM(s) IV Push daily  octreotide  Infusion 50 MICROgram(s)/Hr (10 mL/Hr) IV Continuous <Continuous>  pantoprazole  Injectable 40 milliGRAM(s) IV Push daily    MEDICATIONS  (PRN):      DVT Prophylaxis: V    Advanced Directives:  Discussed with:    Visit Information:    ** Time is exclusive of billed procedures and/or teaching and/or routine family updates.

## 2023-10-08 NOTE — PROGRESS NOTE ADULT - ASSESSMENT
A/P: 58 female with known ETOh cirrhosis who presented with an hematemesis from a variceal bleed      Plan:  ICU  Clears  Continue Octotride  Protonix daily  Rocephin  Abdominal US noted  MELD labs daily  T-Bili improving  OOB  Venodynes

## 2023-10-08 NOTE — PROGRESS NOTE ADULT - SUBJECTIVE AND OBJECTIVE BOX
Patient is a 58y old  Female who presents with a chief complaint of UGIB (08 Oct 2023 10:12)      HPI:  ICU Admit    S:  pt feeling ok  is hungry    PAST MEDICAL & SURGICAL HISTORY:  Alcohol abuse      Depression      Withdrawal seizures      History of basal cell carcinoma excision      Squamous cell skin cancer      Hemorrhoids      2019 novel coronavirus disease (COVID-19)      History of ear, nose, and throat (ENT) surgery      H/O basal cell carcinoma excision      H/O:           MEDICATIONS  (STANDING):  cefTRIAXone Injectable. 1000 milliGRAM(s) IV Push every 24 hours  cyanocobalamin Injectable 1000 MICROGram(s) SubCutaneous daily  folic acid Injectable 1 milliGRAM(s) IV Push daily  octreotide  Infusion 50 MICROgram(s)/Hr (10 mL/Hr) IV Continuous <Continuous>  pantoprazole  Injectable 40 milliGRAM(s) IV Push daily    MEDICATIONS  (PRN):      Allergies    Zithromax (Unknown)    Intolerances    morphine (Nausea)      REVIEW OF SYSTEMS:    CONSTITUTIONAL: No weakness, fevers or chills  RESPIRATORY: No cough, wheezing, hemoptysis; No shortness of breath  CARDIOVASCULAR: No chest pain or palpitations  GASTROINTESTINAL: decreased melena  All other review of systems is negative unless indicated above.    Vital Signs Last 24 Hrs  T(C): 36.4 (08 Oct 2023 06:00), Max: 37.2 (07 Oct 2023 14:19)  T(F): 97.5 (08 Oct 2023 06:00), Max: 98.9 (07 Oct 2023 14:19)  HR: 75 (08 Oct 2023 08:00) (72 - 107)  BP: 94/64 (08 Oct 2023 08:00) (93/73 - 134/90)  BP(mean): 74 (08 Oct 2023 08:00) (72 - 104)  RR: 14 (08 Oct 2023 08:00) (14 - 25)  SpO2: 93% (08 Oct 2023 08:00) (91% - 100%)    Parameters below as of 08 Oct 2023 08:00  Patient On (Oxygen Delivery Method): room air        PHYSICAL EXAM:  Constitutional: NAD  Respiratory: CTAB  Cardiovascular: S1 and S2, RRR, no M/G/R  Gastrointestinal: BS+, soft, NT/ND      LABS:                        9.1    6.97  )-----------( 79       ( 08 Oct 2023 05:32 )             27.1     10-08    141  |  106  |  21  ----------------------------<  150<H>  4.8   |  32<H>  |  0.50    Ca    8.4<L>      08 Oct 2023 05:32  Phos  3.3     10-08  Mg     1.8     10-    TPro  x   /  Alb  x   /  TBili  1.6<H>  /  DBili  x   /  AST  x   /  ALT  x   /  AlkPhos  x   10-08    PT/INR - ( 08 Oct 2023 05:32 )   PT: 15.0 sec;   INR: 1.34 ratio         PTT - ( 08 Oct 2023 05:32 )  PTT:35.1 sec  LIVER FUNCTIONS - ( 07 Oct 2023 09:01 )  Alb: 2.6 g/dL / Pro: 6.8 gm/dL / ALK PHOS: 441 U/L / ALT: 49 U/L / AST: 137 U/L / GGT: x             RADIOLOGY & ADDITIONAL STUDIES:

## 2023-10-08 NOTE — PROGRESS NOTE ADULT - ASSESSMENT
57yo female with ugi bleed  s/p band    h/h down a bit this am but no signs of significant active bleeding  will start clear liquid diet  continue octreotide for now and anticipate d/c tomorrow if stable

## 2023-10-09 LAB
ADD ON TEST-SPECIMEN IN LAB: SIGNIFICANT CHANGE UP
ALBUMIN SERPL ELPH-MCNC: 2.3 G/DL — LOW (ref 3.3–5)
ALP SERPL-CCNC: 263 U/L — HIGH (ref 40–120)
ALT FLD-CCNC: 92 U/L — HIGH (ref 12–78)
AMPHETAMINES UR QL SCN: NEGATIVE NG/ML — SIGNIFICANT CHANGE UP
ANION GAP SERPL CALC-SCNC: 6 MMOL/L — SIGNIFICANT CHANGE UP (ref 5–17)
AST SERPL-CCNC: 331 U/L — HIGH (ref 15–37)
BARBITURATES UR QL SCN: NEGATIVE NG/ML — SIGNIFICANT CHANGE UP
BARBITURATES UR-MCNC: NEGATIVE NG/ML — SIGNIFICANT CHANGE UP
BENZODIAZ UR QL: NEGATIVE NG/ML — SIGNIFICANT CHANGE UP
BILIRUB DIRECT SERPL-MCNC: 0.9 MG/DL — HIGH (ref 0–0.3)
BILIRUB INDIRECT FLD-MCNC: 0.5 MG/DL — SIGNIFICANT CHANGE UP (ref 0.2–1)
BILIRUB SERPL-MCNC: 1.4 MG/DL — HIGH (ref 0.2–1.2)
BUN SERPL-MCNC: 13 MG/DL — SIGNIFICANT CHANGE UP (ref 7–23)
BZE UR QL: NEGATIVE NG/ML — SIGNIFICANT CHANGE UP
CALCIUM SERPL-MCNC: 8.1 MG/DL — LOW (ref 8.5–10.1)
CANNABINOIDS UR QL SCN: NEGATIVE NG/ML — SIGNIFICANT CHANGE UP
CHLORIDE SERPL-SCNC: 105 MMOL/L — SIGNIFICANT CHANGE UP (ref 96–108)
CO2 SERPL-SCNC: 27 MMOL/L — SIGNIFICANT CHANGE UP (ref 22–31)
CREAT SERPL-MCNC: 0.54 MG/DL — SIGNIFICANT CHANGE UP (ref 0.5–1.3)
EGFR: 105 ML/MIN/1.73M2 — SIGNIFICANT CHANGE UP
ETHANOL UR-MCNC: NEGATIVE % — SIGNIFICANT CHANGE UP
GLUCOSE SERPL-MCNC: 137 MG/DL — HIGH (ref 70–99)
HCT VFR BLD CALC: 27.8 % — LOW (ref 34.5–45)
HGB BLD-MCNC: 9.5 G/DL — LOW (ref 11.5–15.5)
MAGNESIUM SERPL-MCNC: 1.7 MG/DL — SIGNIFICANT CHANGE UP (ref 1.6–2.6)
MCHC RBC-ENTMCNC: 33.2 PG — SIGNIFICANT CHANGE UP (ref 27–34)
MCHC RBC-ENTMCNC: 34.2 GM/DL — SIGNIFICANT CHANGE UP (ref 32–36)
MCV RBC AUTO: 97.2 FL — SIGNIFICANT CHANGE UP (ref 80–100)
METHADONE UR QL SCN: NEGATIVE NG/ML — SIGNIFICANT CHANGE UP
METHAQUALONE UR QL: NEGATIVE NG/ML — SIGNIFICANT CHANGE UP
METHAQUALONE UR-MCNC: NEGATIVE NG/ML — SIGNIFICANT CHANGE UP
OPIATES UR QL: NEGATIVE NG/ML — SIGNIFICANT CHANGE UP
PCP UR QL: NEGATIVE NG/ML — SIGNIFICANT CHANGE UP
PHOSPHATE SERPL-MCNC: 1.8 MG/DL — LOW (ref 2.5–4.5)
PLATELET # BLD AUTO: 85 K/UL — LOW (ref 150–400)
POTASSIUM SERPL-MCNC: 3.5 MMOL/L — SIGNIFICANT CHANGE UP (ref 3.5–5.3)
POTASSIUM SERPL-SCNC: 3.5 MMOL/L — SIGNIFICANT CHANGE UP (ref 3.5–5.3)
PROPOXYPH UR QL: NEGATIVE NG/ML — SIGNIFICANT CHANGE UP
PROT SERPL-MCNC: 6 GM/DL — SIGNIFICANT CHANGE UP (ref 6–8.3)
RBC # BLD: 2.86 M/UL — LOW (ref 3.8–5.2)
RBC # FLD: 20 % — HIGH (ref 10.3–14.5)
SODIUM SERPL-SCNC: 138 MMOL/L — SIGNIFICANT CHANGE UP (ref 135–145)
WBC # BLD: 5.78 K/UL — SIGNIFICANT CHANGE UP (ref 3.8–10.5)
WBC # FLD AUTO: 5.78 K/UL — SIGNIFICANT CHANGE UP (ref 3.8–10.5)

## 2023-10-09 PROCEDURE — 99232 SBSQ HOSP IP/OBS MODERATE 35: CPT

## 2023-10-09 RX ORDER — SUCRALFATE 1 G
1 TABLET ORAL
Refills: 0 | Status: DISCONTINUED | OUTPATIENT
Start: 2023-10-09 | End: 2023-10-13

## 2023-10-09 RX ORDER — BENZOCAINE AND MENTHOL 5; 1 G/100ML; G/100ML
1 LIQUID ORAL ONCE
Refills: 0 | Status: COMPLETED | OUTPATIENT
Start: 2023-10-09 | End: 2023-10-09

## 2023-10-09 RX ORDER — ACETAMINOPHEN 500 MG
650 TABLET ORAL EVERY 6 HOURS
Refills: 0 | Status: DISCONTINUED | OUTPATIENT
Start: 2023-10-09 | End: 2023-10-09

## 2023-10-09 RX ORDER — CARVEDILOL PHOSPHATE 80 MG/1
3.12 CAPSULE, EXTENDED RELEASE ORAL EVERY 12 HOURS
Refills: 0 | Status: DISCONTINUED | OUTPATIENT
Start: 2023-10-09 | End: 2023-10-10

## 2023-10-09 RX ORDER — PANTOPRAZOLE SODIUM 20 MG/1
40 TABLET, DELAYED RELEASE ORAL
Refills: 0 | Status: DISCONTINUED | OUTPATIENT
Start: 2023-10-09 | End: 2023-10-13

## 2023-10-09 RX ADMIN — PANTOPRAZOLE SODIUM 40 MILLIGRAM(S): 20 TABLET, DELAYED RELEASE ORAL at 08:27

## 2023-10-09 RX ADMIN — CARVEDILOL PHOSPHATE 3.12 MILLIGRAM(S): 80 CAPSULE, EXTENDED RELEASE ORAL at 21:11

## 2023-10-09 RX ADMIN — Medication 1 GRAM(S): at 18:01

## 2023-10-09 RX ADMIN — Medication 1 GRAM(S): at 23:06

## 2023-10-09 RX ADMIN — PREGABALIN 1000 MICROGRAM(S): 225 CAPSULE ORAL at 11:31

## 2023-10-09 RX ADMIN — CEFTRIAXONE 1000 MILLIGRAM(S): 500 INJECTION, POWDER, FOR SOLUTION INTRAMUSCULAR; INTRAVENOUS at 10:03

## 2023-10-09 RX ADMIN — Medication 650 MILLIGRAM(S): at 05:42

## 2023-10-09 RX ADMIN — OCTREOTIDE ACETATE 10 MICROGRAM(S)/HR: 200 INJECTION, SOLUTION INTRAVENOUS; SUBCUTANEOUS at 01:01

## 2023-10-09 RX ADMIN — BENZOCAINE AND MENTHOL 1 LOZENGE: 5; 1 LIQUID ORAL at 01:23

## 2023-10-09 RX ADMIN — Medication 1 MILLIGRAM(S): at 11:32

## 2023-10-09 NOTE — PROGRESS NOTE ADULT - SUBJECTIVE AND OBJECTIVE BOX
CHIEF COMPLAINT/INTERVAL HISTORY:    Patient is a 58y old  Female who presents with a chief complaint of UGIB (09 Oct 2023 08:00)      Patient transferred from ICU admitted for upper GI bleed secondary to variceal bleed      ICU Vital Signs Last 24 Hrs  T(C): 36.4 (08 Oct 2023 20:22), Max: 37.1 (08 Oct 2023 18:00)  T(F): 97.6 (08 Oct 2023 20:22), Max: 98.8 (08 Oct 2023 18:00)  HR: 88 (08 Oct 2023 20:22) (75 - 88)  BP: 129/78 (08 Oct 2023 20:22) (98/63 - 129/78)  BP(mean): 90 (08 Oct 2023 20:00) (74 - 90)  ABP: --  ABP(mean): --  RR: 18 (08 Oct 2023 20:22) (17 - 27)  SpO2: 97% (08 Oct 2023 20:22) (87% - 100%)    O2 Parameters below as of 08 Oct 2023 20:22  Patient On (Oxygen Delivery Method): room air              MEDICATIONS  (STANDING):  carvedilol 3.125 milliGRAM(s) Oral every 12 hours  cefTRIAXone Injectable. 1000 milliGRAM(s) IV Push every 24 hours  cyanocobalamin Injectable 1000 MICROGram(s) SubCutaneous daily  folic acid Injectable 1 milliGRAM(s) IV Push daily  pantoprazole    Tablet 40 milliGRAM(s) Oral before breakfast    MEDICATIONS  (PRN):  acetaminophen     Tablet .. 650 milliGRAM(s) Oral every 6 hours PRN Temp greater or equal to 38C (100.4F), Mild Pain (1 - 3)        PHYSICAL EXAM:    GENERAL: NAD, well-groomed, well-developed  NERVOUS SYSTEM:  Alert & Oriented X3, Motor Strength 5/5 B/L upper and lower extremities; DTRs 2+ intact and symmetric  CHEST/LUNG: Clear to auscultation bilaterally; No rales, rhonchi, wheezing, or rubs  HEART: Regular rate and rhythm; No murmurs, rubs, or gallops  ABDOMEN: Soft, Nontender, Nondistended; Bowel sounds present  EXTREMITIES:  2+ Peripheral Pulses, No clubbing, cyanosis, or edema    LABS:                        9.1    6.97  )-----------( 79       ( 08 Oct 2023 05:32 )             27.1     10-08    141  |  106  |  21  ----------------------------<  150<H>  4.8   |  32<H>  |  0.50    Ca    8.4<L>      08 Oct 2023 05:32  Phos  3.3     10-08  Mg     1.8     10-08    TPro  x   /  Alb  x   /  TBili  1.6<H>  /  DBili  x   /  AST  x   /  ALT  x   /  AlkPhos  x   10-08    PT/INR - ( 08 Oct 2023 05:32 )   PT: 15.0 sec;   INR: 1.34 ratio          Assessment     UGIB  Variceal bleed  Livercirrhosis      Plan  trend HH  PPI  empiric abx

## 2023-10-09 NOTE — PROGRESS NOTE ADULT - ASSESSMENT
Imp:  S/P upper GI bleed, presumably variceal    Rec:  Advance diet  D/C octreotide  Start carvedilol  Add carafate x 10 days (post banding)  Repeat LFTs to ensure coming down  Discussed need for outpatient follow up, repeat EGD etc.

## 2023-10-09 NOTE — PROGRESS NOTE ADULT - SUBJECTIVE AND OBJECTIVE BOX
Patient is a 58y old  Female who presents with a chief complaint of UGIB (09 Oct 2023 08:41)      Subective:  Feels good today, no complaints    PAST MEDICAL & SURGICAL HISTORY:  Alcohol abuse      Depression      Withdrawal seizures      History of basal cell carcinoma excision      Squamous cell skin cancer      Hemorrhoids      2019 novel coronavirus disease (COVID-19)      History of ear, nose, and throat (ENT) surgery      H/O basal cell carcinoma excision      H/O:           MEDICATIONS  (STANDING):  carvedilol 3.125 milliGRAM(s) Oral every 12 hours  cefTRIAXone Injectable. 1000 milliGRAM(s) IV Push every 24 hours  cyanocobalamin Injectable 1000 MICROGram(s) SubCutaneous daily  folic acid Injectable 1 milliGRAM(s) IV Push daily  pantoprazole    Tablet 40 milliGRAM(s) Oral before breakfast    MEDICATIONS  (PRN):  acetaminophen     Tablet .. 650 milliGRAM(s) Oral every 6 hours PRN Temp greater or equal to 38C (100.4F), Mild Pain (1 - 3)      REVIEW OF SYSTEMS:    RESPIRATORY: No shortness of breath  CARDIOVASCULAR: No chest pain  All other review of systems is negative unless indicated above.    Vital Signs Last 24 Hrs  T(C): 36.7 (09 Oct 2023 10:00), Max: 37.1 (08 Oct 2023 18:00)  T(F): 98 (09 Oct 2023 10:00), Max: 98.8 (08 Oct 2023 18:00)  HR: 83 (09 Oct 2023 10:00) (75 - 94)  BP: 99/62 (09 Oct 2023 10:00) (94/57 - 129/78)  BP(mean): 90 (08 Oct 2023 20:00) (76 - 90)  RR: 18 (09 Oct 2023 10:00) (17 - 27)  SpO2: 95% (09 Oct 2023 10:00) (93% - 100%)    Parameters below as of 09 Oct 2023 10:00  Patient On (Oxygen Delivery Method): room air        PHYSICAL EXAM:    Constitutional: NAD, well-developed  Respiratory: CTAB  Cardiovascular: S1 and S2, RRR  Gastrointestinal: BS+, soft, NT/ND  Extremities: No peripheral edema  Psychiatric: Normal mood, normal affect    LABS:                        9.5    5.78  )-----------( 85       ( 09 Oct 2023 08:41 )             27.8     10-09    138  |  105  |  13  ----------------------------<  137<H>  3.5   |  27  |  0.54    Ca    8.1<L>      09 Oct 2023 08:41  Phos  1.8     10-09  Mg     1.7     10-09    TPro  x   /  Alb  x   /  TBili  1.6<H>  /  DBili  x   /  AST  x   /  ALT  x   /  AlkPhos  x   10-08    PT/INR - ( 08 Oct 2023 05:32 )   PT: 15.0 sec;   INR: 1.34 ratio         PTT - ( 08 Oct 2023 05:32 )  PTT:35.1 sec      RADIOLOGY & ADDITIONAL STUDIES:

## 2023-10-10 ENCOUNTER — TRANSCRIPTION ENCOUNTER (OUTPATIENT)
Age: 58
End: 2023-10-10

## 2023-10-10 LAB
ALBUMIN SERPL ELPH-MCNC: 2.2 G/DL — LOW (ref 3.3–5)
ALP SERPL-CCNC: 300 U/L — HIGH (ref 40–120)
ALT FLD-CCNC: 78 U/L — SIGNIFICANT CHANGE UP (ref 12–78)
AST SERPL-CCNC: 217 U/L — HIGH (ref 15–37)
BILIRUB DIRECT SERPL-MCNC: 1 MG/DL — HIGH (ref 0–0.3)
BILIRUB INDIRECT FLD-MCNC: 0.5 MG/DL — SIGNIFICANT CHANGE UP (ref 0.2–1)
BILIRUB SERPL-MCNC: 1.5 MG/DL — HIGH (ref 0.2–1.2)
HCT VFR BLD CALC: 27.7 % — LOW (ref 34.5–45)
HGB BLD-MCNC: 9.3 G/DL — LOW (ref 11.5–15.5)
MCHC RBC-ENTMCNC: 33.3 PG — SIGNIFICANT CHANGE UP (ref 27–34)
MCHC RBC-ENTMCNC: 33.6 GM/DL — SIGNIFICANT CHANGE UP (ref 32–36)
MCV RBC AUTO: 99.3 FL — SIGNIFICANT CHANGE UP (ref 80–100)
PLATELET # BLD AUTO: 78 K/UL — LOW (ref 150–400)
PROT SERPL-MCNC: 5.9 GM/DL — LOW (ref 6–8.3)
RBC # BLD: 2.79 M/UL — LOW (ref 3.8–5.2)
RBC # FLD: 19.7 % — HIGH (ref 10.3–14.5)
WBC # BLD: 6.01 K/UL — SIGNIFICANT CHANGE UP (ref 3.8–10.5)
WBC # FLD AUTO: 6.01 K/UL — SIGNIFICANT CHANGE UP (ref 3.8–10.5)

## 2023-10-10 PROCEDURE — 99233 SBSQ HOSP IP/OBS HIGH 50: CPT

## 2023-10-10 RX ORDER — SODIUM CHLORIDE 9 MG/ML
500 INJECTION INTRAMUSCULAR; INTRAVENOUS; SUBCUTANEOUS
Refills: 0 | Status: DISCONTINUED | OUTPATIENT
Start: 2023-10-10 | End: 2023-10-13

## 2023-10-10 RX ORDER — SUCRALFATE 1 G
10 TABLET ORAL
Qty: 1200 | Refills: 0
Start: 2023-10-10 | End: 2023-11-08

## 2023-10-10 RX ORDER — INFLUENZA VIRUS VACCINE 15; 15; 15; 15 UG/.5ML; UG/.5ML; UG/.5ML; UG/.5ML
0.5 SUSPENSION INTRAMUSCULAR ONCE
Refills: 0 | Status: COMPLETED | OUTPATIENT
Start: 2023-10-10 | End: 2023-10-13

## 2023-10-10 RX ORDER — MIDODRINE HYDROCHLORIDE 2.5 MG/1
5 TABLET ORAL THREE TIMES A DAY
Refills: 0 | Status: DISCONTINUED | OUTPATIENT
Start: 2023-10-10 | End: 2023-10-11

## 2023-10-10 RX ADMIN — MIDODRINE HYDROCHLORIDE 5 MILLIGRAM(S): 2.5 TABLET ORAL at 17:15

## 2023-10-10 RX ADMIN — Medication 1 GRAM(S): at 17:15

## 2023-10-10 RX ADMIN — Medication 1 MILLIGRAM(S): at 09:56

## 2023-10-10 RX ADMIN — PREGABALIN 1000 MICROGRAM(S): 225 CAPSULE ORAL at 09:57

## 2023-10-10 RX ADMIN — Medication 1 GRAM(S): at 05:53

## 2023-10-10 RX ADMIN — PANTOPRAZOLE SODIUM 40 MILLIGRAM(S): 20 TABLET, DELAYED RELEASE ORAL at 05:53

## 2023-10-10 RX ADMIN — SODIUM CHLORIDE 100 MILLILITER(S): 9 INJECTION INTRAMUSCULAR; INTRAVENOUS; SUBCUTANEOUS at 08:54

## 2023-10-10 RX ADMIN — Medication 1 GRAM(S): at 11:46

## 2023-10-10 NOTE — DISCHARGE NOTE PROVIDER - CARE PROVIDER_API CALL
Brian Goodson  Gastroenterology  [address] [facility] [address], [place] [address]  Phone: [phone]  Fax: [phone]  Follow Up Time: 2 weeks

## 2023-10-10 NOTE — PROGRESS NOTE ADULT - SUBJECTIVE AND OBJECTIVE BOX
CHIEF COMPLAINT/INTERVAL HISTORY:    Patient is a 58y old  Female who presents with a chief complaint of UGIB (09 Oct 2023 08:00)      Patient transferred from ICU admitted for upper GI bleed secondary to variceal bleed  orthostatic today    Vital Signs Last 24 Hrs  T(C): 36.7 (10 Oct 2023 08:28), Max: 37.5 (09 Oct 2023 20:49)  T(F): 98.1 (10 Oct 2023 08:28), Max: 99.5 (09 Oct 2023 20:49)  HR: 82 (10 Oct 2023 08:28) (82 - 94)  BP: 84/58 (10 Oct 2023 10:52) (83/61 - 130/59)  BP(mean): --  RR: 16 (10 Oct 2023 08:28) (16 - 18)  SpO2: 98% (10 Oct 2023 08:28) (93% - 98%)    Parameters below as of 10 Oct 2023 08:28  Patient On (Oxygen Delivery Method): room air        MEDICATIONS  (STANDING):  cyanocobalamin Injectable 1000 MICROGram(s) SubCutaneous daily  influenza   Vaccine 0.5 milliLiter(s) IntraMuscular once  pantoprazole    Tablet 40 milliGRAM(s) Oral before breakfast  sodium chloride 0.9%. 500 milliLiter(s) (100 mL/Hr) IV Continuous <Continuous>  sucralfate suspension 1 Gram(s) Oral four times a day          PHYSICAL EXAM:    GENERAL: NAD, well-groomed, well-developed  NERVOUS SYSTEM:  Alert & Oriented X3, Motor Strength 5/5 B/L upper and lower extremities; DTRs 2+ intact and symmetric  CHEST/LUNG: Clear to auscultation bilaterally; No rales, rhonchi, wheezing, or rubs  HEART: Regular rate and rhythm; No murmurs, rubs, or gallops  ABDOMEN: Soft, Nontender, Nondistended; Bowel sounds present  EXTREMITIES:  2+ Peripheral Pulses, No clubbing, cyanosis, or edema    LABS:                                   9.3    6.01  )-----------( 78       ( 10 Oct 2023 08:10 )             27.7   10-09    138  |  105  |  13  ----------------------------<  137<H>  3.5   |  27  |  0.54    Ca    8.1<L>      09 Oct 2023 08:41  Phos  1.8     10-09  Mg     1.7     10-09    TPro  5.9<L>  /  Alb  2.2<L>  /  TBili  1.5<H>  /  DBili  1.0<H>  /  AST  217<H>  /  ALT  78  /  AlkPhos  300<H>  10-10          Assessment     UGIB  Variceal bleed  Liver cirrhosis  Iatrogenic hypotension      dc Coreg  IVF might need midodrine  plus low dose Propranolol on dc

## 2023-10-10 NOTE — PROGRESS NOTE ADULT - ASSESSMENT
Imp:  S/P UGI bleed and variceal banding    Rec:  OK for d/c by me off coreg (hypotension)  F/U with me [id number]  Cont ppi and carafate

## 2023-10-10 NOTE — PROGRESS NOTE ADULT - SUBJECTIVE AND OBJECTIVE BOX
Patient is a 58y old  Female who presents with a chief complaint of UGIB (10 Oct 2023 07:00)      Subective:  No complaints    PAST MEDICAL & SURGICAL HISTORY:  Alcohol abuse      Depression      Withdrawal seizures      History of basal cell carcinoma excision      Squamous cell skin cancer      Hemorrhoids      2019 novel coronavirus disease (COVID-19)      History of ear, nose, and throat (ENT) surgery      H/O basal cell carcinoma excision      H/O:           MEDICATIONS  (STANDING):  cyanocobalamin Injectable 1000 MICROGram(s) SubCutaneous daily  influenza   Vaccine 0.5 milliLiter(s) IntraMuscular once  pantoprazole    Tablet 40 milliGRAM(s) Oral before breakfast  sodium chloride 0.9%. 500 milliLiter(s) (100 mL/Hr) IV Continuous <Continuous>  sucralfate suspension 1 Gram(s) Oral four times a day    MEDICATIONS  (PRN):      REVIEW OF SYSTEMS:    RESPIRATORY: No shortness of breath  CARDIOVASCULAR: No chest pain  All other review of systems is negative unless indicated above.    Vital Signs Last 24 Hrs  T(C): 36.7 (10 Oct 2023 08:28), Max: 37.5 (09 Oct 2023 20:49)  T(F): 98.1 (10 Oct 2023 08:28), Max: 99.5 (09 Oct 2023 20:49)  HR: 82 (10 Oct 2023 08:28) (82 - 94)  BP: 83/61 (10 Oct 2023 08:28) (83/61 - 130/59)  BP(mean): --  RR: 16 (10 Oct 2023 08:28) (16 - 18)  SpO2: 98% (10 Oct 2023 08:28) (93% - 98%)    Parameters below as of 10 Oct 2023 08:28  Patient On (Oxygen Delivery Method): room air        PHYSICAL EXAM:    Constitutional: NAD, well-developed  Respiratory: CTAB  Cardiovascular: S1 and S2, RRR  Gastrointestinal: BS+, soft, NT/ND  Extremities: No peripheral edema  Psychiatric: Normal mood, normal affect    LABS:                        9.3    6.01  )-----------( 78       ( 10 Oct 2023 08:10 )             27.7     10-09    138  |  105  |  13  ----------------------------<  137<H>  3.5   |  27  |  0.54    Ca    8.1<L>      09 Oct 2023 08:41  Phos  1.8     10-  Mg     1.7     10-09    TPro  5.9<L>  /  Alb  2.2<L>  /  TBili  1.5<H>  /  DBili  1.0<H>  /  AST  217<H>  /  ALT  78  /  AlkPhos  300<H>  10-10      LIVER FUNCTIONS - ( 10 Oct 2023 08:10 )  Alb: 2.2 g/dL / Pro: 5.9 gm/dL / ALK PHOS: 300 U/L / ALT: 78 U/L / AST: 217 U/L / GGT: x             RADIOLOGY & ADDITIONAL STUDIES:

## 2023-10-10 NOTE — DISCHARGE NOTE PROVIDER - ATTENDING DISCHARGE PHYSICAL EXAMINATION:
Vital Signs Last 24 Hrs  T(C): 37.1 (13 Oct 2023 15:55), Max: 37.5 (13 Oct 2023 05:28)  T(F): 98.8 (13 Oct 2023 15:55), Max: 99.5 (13 Oct 2023 05:28)  HR: 81 (13 Oct 2023 15:55) (78 - 90)  BP: 100/59 (13 Oct 2023 15:55) (87/56 - 106/69)  BP(mean): --  RR: 17 (13 Oct 2023 15:55) (16 - 18)  SpO2: 95% (13 Oct 2023 15:55) (95% - 98%)    Parameters below as of 13 Oct 2023 15:55  Patient On (Oxygen Delivery Method): room air    CONSTITUTIONAL: not well-developed,  no apparent distress  EYES: EOMI, No conjunctival or scleral injection, non-icteric  ENMT: Oral mucosa with moist membranes. Normal dentition; no pharyngeal injection or exudates             NECK: Supple, symmetric and without tracheal deviation   RESP: No respiratory distress, no use of accessory muscles; CTA b/l, no WRR  CV: RRR, +S1S2, no MRG; no JVD; no peripheral edema  GI: Soft, NT, distended, no rebound, hepatosplenomegaly  LYMPH: No cervical LAD   PSYCH: Appropriate insight/judgment; A+O x 3, mood and affect appropriate, recent/remote memory intact

## 2023-10-10 NOTE — DISCHARGE NOTE PROVIDER - NSDCMRMEDTOKEN_GEN_ALL_CORE_FT
carvedilol 6.25 mg oral tablet: 1 tab(s) orally once a day Patient states she ran out of this medication about 2 weeks ago  ferrous sulfate 324 mg (65 mg elemental iron) oral tablet: 1 tab(s) orally once a day  fluticasone 50 mcg/inh inhalation powder: 1 spray(s) inhaled once a day  pantoprazole 40 mg oral delayed release tablet: 1 tab(s) orally 2 times a day  spironolactone 25 mg oral tablet: 1 tab(s) orally once a day  sucralfate 1 g/10 mL oral suspension: 10 milliliter(s) orally 4 times a day  venlafaxine 150 mg oral tablet, extended release: 1 tab(s) orally once a day   ferrous sulfate 324 mg (65 mg elemental iron) oral tablet: 1 tab(s) orally once a day  fluticasone 50 mcg/inh inhalation powder: 1 spray(s) inhaled once a day  midodrine 10 mg oral tablet: 1 tab(s) orally 3 times a day  Multiple Vitamins with Minerals oral tablet: 1 tab(s) orally once a day  pantoprazole 40 mg oral delayed release tablet: 1 tab(s) orally 2 times a day  propranolol 10 mg oral tablet: 1 tab(s) orally 2 times a day  spironolactone 25 mg oral tablet: 1 tab(s) orally once a day  sucralfate 1 g/10 mL oral suspension: 10 milliliter(s) orally 4 times a day  thiamine 100 mg oral tablet: 1 tab(s) orally once a day  venlafaxine 150 mg oral tablet, extended release: 1 tab(s) orally once a day

## 2023-10-10 NOTE — DISCHARGE NOTE PROVIDER - DETAILS OF MALNUTRITION DIAGNOSIS/DIAGNOSES
This patient has been assessed with a concern for Malnutrition and was treated during this hospitalization for the following Nutrition diagnosis/diagnoses:     -  10/11/2023: Severe protein-calorie malnutrition

## 2023-10-10 NOTE — DISCHARGE NOTE PROVIDER - HOSPITAL COURSE
PMHx of hemorrhoids, SCC, BCC, depression, EtOH abuse, and withdrawal seizures presents to the ED c/o abd pain and n/v.   Pt reports multiple episodes of bright red blood with black stools. History of variceal bleeds in the past.  Patient was previously a drinker but no longer drinks.        UGIB  Variceal bleed  Liver cirrhosis    · Operative Findings  One column of varices with white nipple sign s/p two bands with complete eradiction.    Specimens/Blood Loss/IV/Output/Protocol/VTE:   Specimens/Blood Loss/IV/Output/Protocol/VTE:  · Specimens  none  · Estimated Blood Loss  1 milliLiter(s)      Other Details/Condition Post op/Disposition:  · Comments/Other Details  Continue octreotide drip at 50 mcg/hr  1 g ceftriaxone daily  F/u cbc, do not over transfuse (keep around 7-8) to not increase portal pressures.       Plan  op f/up with GI           PMHx of hemorrhoids, SCC, BCC, depression, EtOH abuse, and withdrawal seizures presents to the ED c/o abd pain and n/v.   Pt reports multiple episodes of bright red blood with black stools. History of variceal bleeds in the past.  Patient was previously a drinker but no longer drinks.        UGIB  Variceal bleed  Liver cirrhosis    · Operative Findings  One column of varices with white nipple sign s/p two bands with complete eradiction.    Specimens/Blood Loss/IV/Output/Protocol/VTE:   Specimens/Blood Loss/IV/Output/Protocol/VTE:  · Specimens  none  · Estimated Blood Loss  1 milliLiter(s)      Other Details/Condition Post op/Disposition:  · Comments/Other Details  Continue octreotide drip at 50 mcg/hr  1 g ceftriaxone daily  F/u cbc, do not over transfuse (keep around 7-8) to not increase portal pressures.       Plan  op f/up with GI in the next week and repeat EGD for banding in 5 weeks.    MELD Score without Dialysis: 11 points (10.08.23 @ 05:32)   Patient was not well-developed and was found to have severe protein-calorie

## 2023-10-10 NOTE — DISCHARGE NOTE PROVIDER - NSDCCPCAREPLAN_GEN_ALL_CORE_FT
PRINCIPAL DISCHARGE DIAGNOSIS  Diagnosis: Upper GI bleed  Assessment and Plan of Treatment: Carafate added f/up with Dr Goodson

## 2023-10-11 PROCEDURE — 99232 SBSQ HOSP IP/OBS MODERATE 35: CPT

## 2023-10-11 RX ORDER — MIDODRINE HYDROCHLORIDE 2.5 MG/1
10 TABLET ORAL THREE TIMES A DAY
Refills: 0 | Status: DISCONTINUED | OUTPATIENT
Start: 2023-10-11 | End: 2023-10-13

## 2023-10-11 RX ORDER — MULTIVIT-MIN/FERROUS GLUCONATE 9 MG/15 ML
1 LIQUID (ML) ORAL DAILY
Refills: 0 | Status: CANCELLED | OUTPATIENT
Start: 2023-10-11 | End: 2023-10-13

## 2023-10-11 RX ORDER — THIAMINE MONONITRATE (VIT B1) 100 MG
100 TABLET ORAL DAILY
Refills: 0 | Status: CANCELLED | OUTPATIENT
Start: 2023-10-11 | End: 2023-10-13

## 2023-10-11 RX ADMIN — MIDODRINE HYDROCHLORIDE 10 MILLIGRAM(S): 2.5 TABLET ORAL at 11:24

## 2023-10-11 RX ADMIN — Medication 1 GRAM(S): at 11:24

## 2023-10-11 RX ADMIN — Medication 1 GRAM(S): at 17:35

## 2023-10-11 RX ADMIN — Medication 1 GRAM(S): at 22:18

## 2023-10-11 RX ADMIN — MIDODRINE HYDROCHLORIDE 5 MILLIGRAM(S): 2.5 TABLET ORAL at 05:28

## 2023-10-11 RX ADMIN — MIDODRINE HYDROCHLORIDE 10 MILLIGRAM(S): 2.5 TABLET ORAL at 17:34

## 2023-10-11 RX ADMIN — Medication 1 GRAM(S): at 05:30

## 2023-10-11 RX ADMIN — PANTOPRAZOLE SODIUM 40 MILLIGRAM(S): 20 TABLET, DELAYED RELEASE ORAL at 05:29

## 2023-10-11 RX ADMIN — PREGABALIN 1000 MICROGRAM(S): 225 CAPSULE ORAL at 11:25

## 2023-10-11 NOTE — DIETITIAN NUTRITION RISK NOTIFICATION - ADDITIONAL COMMENTS/DIETITIAN RECOMMENDATIONS
1) C/w regular diet and will add Ensure + HP shake TID to optimize nutritional needs (provides 350 kcal, 20g protein/ shake)   2) Encourage protein-rich foods, maximize food preferences  3) Closely monitor bowel movements, maintain adequate hydration  4) Suggest to provide 100 mg thiamine, 1 mg folic acid, MVI w/ minerals daily to ensure 100% RDA met 2/2 ETOH abuse and malnutrition   5) Monitor daily lytes/min and replete prn   6) Obtain weekly wt to track/trend changes   7) Confirm goals of care regarding nutrition support   8) F/u w/ GI upon d/c for further management   RD will continue to monitor PO intake/tolerance, labs, hydration, and wt prn.

## 2023-10-11 NOTE — PROGRESS NOTE ADULT - SUBJECTIVE AND OBJECTIVE BOX
CHIEF COMPLAINT/INTERVAL HISTORY:    Patient is a 58y old  Female who presents with a chief complaint of UGIB (09 Oct 2023 08:00)      Patient transferred from ICU admitted for upper GI bleed secondary to variceal bleed  orthostatic started 10/10; ivf given and low dose Mido started, Coreg was dc she remains hypotensive    Vital Signs Last 24 Hrs  T(C): 37.5 (11 Oct 2023 08:18), Max: 37.5 (11 Oct 2023 08:18)  T(F): 99.5 (11 Oct 2023 08:18), Max: 99.5 (11 Oct 2023 08:18)  HR: 86 (11 Oct 2023 08:18) (86 - 98)  BP: 87/52 (11 Oct 2023 08:18) (84/58 - 98/67)  BP(mean): --  RR: 18 (11 Oct 2023 08:18) (18 - 18)  SpO2: 96% (11 Oct 2023 08:18) (93% - 96%)    Parameters below as of 11 Oct 2023 08:18  Patient On (Oxygen Delivery Method): room air          MEDICATIONS  (STANDING):  cyanocobalamin Injectable 1000 MICROGram(s) SubCutaneous daily  influenza   Vaccine 0.5 milliLiter(s) IntraMuscular once  midodrine. 10 milliGRAM(s) Oral three times a day  pantoprazole    Tablet 40 milliGRAM(s) Oral before breakfast  sodium chloride 0.9%. 500 milliLiter(s) (100 mL/Hr) IV Continuous <Continuous>  sucralfate suspension 1 Gram(s) Oral four times a day        PHYSICAL EXAM:    GENERAL: NAD, well-groomed, well-developed  NERVOUS SYSTEM:  Alert & Oriented X3, Motor Strength 5/5 B/L upper and lower extremities; DTRs 2+ intact and symmetric  CHEST/LUNG: Clear to auscultation bilaterally; No rales, rhonchi, wheezing, or rubs  HEART: Regular rate and rhythm; No murmurs, rubs, or gallops  ABDOMEN: Soft, Nontender, Nondistended; Bowel sounds present  EXTREMITIES:  2+ Peripheral Pulses, No clubbing, cyanosis, or edema    LABS:                                              9.3    6.01  )-----------( 78       ( 10 Oct 2023 08:10 )             27.7       TPro  5.9<L>  /  Alb  2.2<L>  /  TBili  1.5<H>  /  DBili  1.0<H>  /  AST  217<H>  /  ALT  78  /  AlkPhos  300<H>  10-10    Operative Findings:  · Operative Findings	One column of varices with white nipple sign s/p two bands with complete eradiction.          Assessment     UGIB  Variceal bleed  Liver cirrhosis  Iatrogenic hypotension      off Coreg  IVF   started on midodrine  5 increase to 10  need BB to reduce portal htn but at this time not possible

## 2023-10-11 NOTE — DIETITIAN INITIAL EVALUATION ADULT - OTHER INFO
59 y/o female w/ a PMHx of hemorrhoids, SCC, BCC, depression, EtOH abuse, and withdrawal seizures presents to the ED c/o abd pain and n/v. Pt reports multiple episodes of bright red blood with black stools. History of variceal bleeds in the past. Patient was previously a drinker but no longer drinks. Patient follows with Dr. Goodson.  FULL CODE.    Pt known to nutr services. Upon RD visit, consumed 100% breakfast tray (bagel); on regular diet. Endorses continued poor to fair appetite since admission. Wt hx reviewed: 133# on 2/5/22; 139# on 7/11/22. Bed scale wt of 130# taken by RD on 10/10 (mild edema may be skewing wt appearance); 9# wt loss/ 6.47% - not clinically significant. NFPE reveals moderate to severe muscle/fat wasting - appeared thin. Pt willing to trial Ensure + HP shake TID to optimize nutritional needs (provides 350 kcal, 20g protein/ shake). D/t h/o etoh abuse, suggest adding in addition to folic acid 100 mg thiamine and MVI w/ minerals daily to ensure 100% RDA met. See additional recommendations below.

## 2023-10-11 NOTE — DIETITIAN INITIAL EVALUATION ADULT - PERTINENT MEDS FT
MEDICATIONS  (STANDING):  cyanocobalamin Injectable 1000 MICROGram(s) SubCutaneous daily  influenza   Vaccine 0.5 milliLiter(s) IntraMuscular once  midodrine. 10 milliGRAM(s) Oral three times a day  pantoprazole    Tablet 40 milliGRAM(s) Oral before breakfast  propranolol 10 milliGRAM(s) Oral two times a day  sodium chloride 0.9%. 500 milliLiter(s) (100 mL/Hr) IV Continuous <Continuous>  sucralfate suspension 1 Gram(s) Oral four times a day    MEDICATIONS  (PRN):

## 2023-10-11 NOTE — DIETITIAN INITIAL EVALUATION ADULT - ORAL INTAKE PTA/DIET HISTORY
Lives at home w/ boyfriend, denies difficulties w/ food procurement at home. Reports of "so-so" appetite PTA. Consumes minimally during the day however consumes bigger meal at night w/ her boyfriend; meeting <75% ENN chronically. C/o N/V and abdominal pain w/ blood stools 1 day PTA. H/o etoh abuse.

## 2023-10-11 NOTE — DIETITIAN INITIAL EVALUATION ADULT - ADD RECOMMEND
1) C/w regular diet and will add Ensure + HP shake TID to optimize nutritional needs (provides 350 kcal, 20g protein/ shake)   2) Encourage protein-rich foods, maximize food preferences  3) Closely monitor bowel movements, maintain adequate hydration  4) Suggest to provide 100 mg thiamine, 1 mg folic acid, MVI w/ minerals daily to ensure 100% RDA met 2/2 ETOH abuse and malnutrition   5) Monitor daily lytes/min and replete prn   6) Obtain weekly wt to track/trend changes   7) Confirm goals of care regarding nutrition support   RD will continue to monitor PO intake/tolerance, labs, hydration, and wt prn.  1) C/w regular diet and will add Ensure + HP shake TID to optimize nutritional needs (provides 350 kcal, 20g protein/ shake)   2) Encourage protein-rich foods, maximize food preferences  3) Closely monitor bowel movements, maintain adequate hydration  4) Suggest to provide 100 mg thiamine, 1 mg folic acid, MVI w/ minerals daily to ensure 100% RDA met 2/2 ETOH abuse and malnutrition   5) Monitor daily lytes/min and replete prn   6) Obtain weekly wt to track/trend changes   7) Confirm goals of care regarding nutrition support   8) F/u w/ GI upon d/c for further management   RD will continue to monitor PO intake/tolerance, labs, hydration, and wt prn.

## 2023-10-11 NOTE — DIETITIAN INITIAL EVALUATION ADULT - PERTINENT LABORATORY DATA
TPro  5.9<L>  /  Alb  2.2<L>  /  TBili  1.5<H>  /  DBili  1.0<H>  /  AST  217<H>  /  ALT  78  /  AlkPhos  300<H>  10-10

## 2023-10-12 LAB
ALBUMIN SERPL ELPH-MCNC: 2.4 G/DL — LOW (ref 3.3–5)
ALP SERPL-CCNC: 374 U/L — HIGH (ref 40–120)
ALT FLD-CCNC: 55 U/L — SIGNIFICANT CHANGE UP (ref 12–78)
ANION GAP SERPL CALC-SCNC: 4 MMOL/L — LOW (ref 5–17)
AST SERPL-CCNC: 109 U/L — HIGH (ref 15–37)
BILIRUB DIRECT SERPL-MCNC: 1.1 MG/DL — HIGH (ref 0–0.3)
BILIRUB INDIRECT FLD-MCNC: 0.7 MG/DL — SIGNIFICANT CHANGE UP (ref 0.2–1)
BILIRUB SERPL-MCNC: 1.8 MG/DL — HIGH (ref 0.2–1.2)
BUN SERPL-MCNC: 7 MG/DL — SIGNIFICANT CHANGE UP (ref 7–23)
CALCIUM SERPL-MCNC: 8.1 MG/DL — LOW (ref 8.5–10.1)
CHLORIDE SERPL-SCNC: 103 MMOL/L — SIGNIFICANT CHANGE UP (ref 96–108)
CO2 SERPL-SCNC: 27 MMOL/L — SIGNIFICANT CHANGE UP (ref 22–31)
CREAT SERPL-MCNC: 0.45 MG/DL — LOW (ref 0.5–1.3)
CULTURE RESULTS: SIGNIFICANT CHANGE UP
CULTURE RESULTS: SIGNIFICANT CHANGE UP
EGFR: 110 ML/MIN/1.73M2 — SIGNIFICANT CHANGE UP
GLUCOSE SERPL-MCNC: 105 MG/DL — HIGH (ref 70–99)
HCT VFR BLD CALC: 28.1 % — LOW (ref 34.5–45)
HGB BLD-MCNC: 9.4 G/DL — LOW (ref 11.5–15.5)
MCHC RBC-ENTMCNC: 33.5 GM/DL — SIGNIFICANT CHANGE UP (ref 32–36)
MCHC RBC-ENTMCNC: 33.5 PG — SIGNIFICANT CHANGE UP (ref 27–34)
MCV RBC AUTO: 100 FL — SIGNIFICANT CHANGE UP (ref 80–100)
PLATELET # BLD AUTO: 106 K/UL — LOW (ref 150–400)
POTASSIUM SERPL-MCNC: 3.4 MMOL/L — LOW (ref 3.5–5.3)
POTASSIUM SERPL-SCNC: 3.4 MMOL/L — LOW (ref 3.5–5.3)
PROT SERPL-MCNC: 6 GM/DL — SIGNIFICANT CHANGE UP (ref 6–8.3)
RBC # BLD: 2.81 M/UL — LOW (ref 3.8–5.2)
RBC # FLD: 18.6 % — HIGH (ref 10.3–14.5)
SODIUM SERPL-SCNC: 134 MMOL/L — LOW (ref 135–145)
SPECIMEN SOURCE: SIGNIFICANT CHANGE UP
SPECIMEN SOURCE: SIGNIFICANT CHANGE UP
WBC # BLD: 8 K/UL — SIGNIFICANT CHANGE UP (ref 3.8–10.5)
WBC # FLD AUTO: 8 K/UL — SIGNIFICANT CHANGE UP (ref 3.8–10.5)

## 2023-10-12 PROCEDURE — 99233 SBSQ HOSP IP/OBS HIGH 50: CPT

## 2023-10-12 RX ORDER — POTASSIUM CHLORIDE 20 MEQ
20 PACKET (EA) ORAL THREE TIMES A DAY
Refills: 0 | Status: DISCONTINUED | OUTPATIENT
Start: 2023-10-12 | End: 2023-10-13

## 2023-10-12 RX ORDER — MAGNESIUM OXIDE 400 MG ORAL TABLET 241.3 MG
400 TABLET ORAL
Refills: 0 | Status: DISCONTINUED | OUTPATIENT
Start: 2023-10-12 | End: 2023-10-13

## 2023-10-12 RX ADMIN — MAGNESIUM OXIDE 400 MG ORAL TABLET 400 MILLIGRAM(S): 241.3 TABLET ORAL at 22:20

## 2023-10-12 RX ADMIN — Medication 1 GRAM(S): at 05:09

## 2023-10-12 RX ADMIN — MIDODRINE HYDROCHLORIDE 10 MILLIGRAM(S): 2.5 TABLET ORAL at 05:09

## 2023-10-12 RX ADMIN — Medication 1 GRAM(S): at 22:16

## 2023-10-12 RX ADMIN — PREGABALIN 1000 MICROGRAM(S): 225 CAPSULE ORAL at 11:53

## 2023-10-12 RX ADMIN — Medication 1 GRAM(S): at 17:05

## 2023-10-12 RX ADMIN — Medication 1 GRAM(S): at 11:53

## 2023-10-12 RX ADMIN — PANTOPRAZOLE SODIUM 40 MILLIGRAM(S): 20 TABLET, DELAYED RELEASE ORAL at 05:09

## 2023-10-12 RX ADMIN — MIDODRINE HYDROCHLORIDE 10 MILLIGRAM(S): 2.5 TABLET ORAL at 17:05

## 2023-10-12 RX ADMIN — Medication 20 MILLIEQUIVALENT(S): at 22:16

## 2023-10-12 NOTE — PROGRESS NOTE ADULT - ASSESSMENT
Imp:  BP improved by holding coreg and with midodrine    Rec:  OK for d/c by me  Emphasized need for follow up to schedule another EGD and banding

## 2023-10-12 NOTE — PROGRESS NOTE ADULT - ASSESSMENT
UGIB  Variceal bleed  Liver cirrhosis  Iatrogenic hypotension      off Coreg  IVF   started on midodrine  5 increase to 10  need BB to reduce portal htn but at this time not possible  GI: OK for d/c, Emphasized need for follow up to schedule another EGD and banding   UGIB  Variceal bleed  Liver cirrhosis  Iatrogenic hypotension  -Patient did not feel comfortable being discharged on 10/12 as she still feels weak      off Coreg  IVF   started on midodrine  5 increase to 10  need BB to reduce portal htn but at this time not possible  GI: OK for d/c, Emphasized need for follow up to schedule another EGD and banding    Disposition: Discharge planning likely dc 10/13

## 2023-10-12 NOTE — PROGRESS NOTE ADULT - SUBJECTIVE AND OBJECTIVE BOX
Patient is a 58y old  Female who presents with a chief complaint of Gastrointestinal hemorrhage     (11 Oct 2023 14:45)      Subective:  coreg held for low BP which is better now    PAST MEDICAL & SURGICAL HISTORY:  Alcohol abuse      Depression      Withdrawal seizures      History of basal cell carcinoma excision      Squamous cell skin cancer      Hemorrhoids      2019 novel coronavirus disease (COVID-19)      History of ear, nose, and throat (ENT) surgery      H/O basal cell carcinoma excision      H/O:           MEDICATIONS  (STANDING):  cyanocobalamin Injectable 1000 MICROGram(s) SubCutaneous daily  influenza   Vaccine 0.5 milliLiter(s) IntraMuscular once  midodrine. 10 milliGRAM(s) Oral three times a day  pantoprazole    Tablet 40 milliGRAM(s) Oral before breakfast  propranolol 10 milliGRAM(s) Oral two times a day  sodium chloride 0.9%. 500 milliLiter(s) (100 mL/Hr) IV Continuous <Continuous>  sucralfate suspension 1 Gram(s) Oral four times a day    MEDICATIONS  (PRN):      REVIEW OF SYSTEMS:    RESPIRATORY: No shortness of breath  CARDIOVASCULAR: No chest pain  All other review of systems is negative unless indicated above.    Vital Signs Last 24 Hrs  T(C): 36.8 (11 Oct 2023 20:48), Max: 37.7 (11 Oct 2023 15:16)  T(F): 98.2 (11 Oct 2023 20:48), Max: 99.9 (11 Oct 2023 15:16)  HR: 90 (12 Oct 2023 05:15) (86 - 100)  BP: 107/65 (12 Oct 2023 05:15) (87/52 - 107/65)  BP(mean): --  RR: 18 (12 Oct 2023 05:15) (18 - 18)  SpO2: 95% (12 Oct 2023 05:15) (95% - 96%)    Parameters below as of 12 Oct 2023 05:15  Patient On (Oxygen Delivery Method): room air        PHYSICAL EXAM:    Constitutional: NAD, well-developed  Respiratory: CTAB  Cardiovascular: S1 and S2, RRR  Gastrointestinal: BS+, soft, NT/ND  Extremities: No peripheral edema  Psychiatric: Normal mood, normal affect    LABS:                RADIOLOGY & ADDITIONAL STUDIES:

## 2023-10-12 NOTE — PROGRESS NOTE ADULT - SUBJECTIVE AND OBJECTIVE BOX
Patient is a 58y old  Female who presents with a chief complaint of UGIB (09 Oct 2023 08:00)      Patient transferred from ICU admitted for upper GI bleed secondary to variceal bleed  orthostatic started 10/10; ivf given and low dose Mido started, Coreg was dc she remains hypotensive      10/12/23 Patient is a 58y old  Female who presents with a chief complaint of UGIB (09 Oct 2023 08:00)      Patient transferred from ICU admitted for upper GI bleed secondary to variceal bleed  orthostatic started 10/10; ivf given and low dose Mido started, Coreg was dc she remains hypotensive      10/12/23  Patient seen and examined at bedside. States she has been getting up to go the bathroom and reports no sob or dizziness with this.  Does feel tired, but no cp, no n/v.  States that BM's are still dark and a little tarry with no bright red blood.  Lives at home with boyfriend who is supportive.  States has not drank alcohol in over a year.  Patient is a 58y old  Female who presents with a chief complaint of UGIB (09 Oct 2023 08:00)      Patient transferred from ICU admitted for upper GI bleed secondary to variceal bleed  orthostatic started 10/10; ivf given and low dose Mido started, Coreg was dc she remains hypotensive      10/12/23  Patient seen and examined at bedside. States she has been getting up to go the bathroom and reports no sob or dizziness with this.  Does feel tired, but no cp, no n/v.  States that BM's are still dark and a little tarry with no bright red blood.  Lives at home with boyfriend who is supportive.  States has not drank alcohol in over a year.     T(C): 37 (10-12-23 @ 08:28), Max: 37.7 (10-11-23 @ 15:16)  HR: 80 (10-12-23 @ 12:00) (80 - 100)  BP: 105/70 (10-12-23 @ 12:00) (90/52 - 107/65)  RR: 18 (10-12-23 @ 08:28) (18 - 18)  SpO2: 98% (10-12-23 @ 08:28) (95% - 98%)    CONSTITUTIONAL: Well groomed, no apparent distress  EYES: EOMI, No conjunctival or scleral injection, non-icteric  ENMT: Oral mucosa with moist membranes. Normal dentition; no pharyngeal injection or exudates             NECK: Supple, symmetric and without tracheal deviation   RESP: No respiratory distress, no use of accessory muscles; CTA b/l, no WRR  CV: RRR, +S1S2, no MRG; no JVD; no peripheral edema  GI: Soft, NT, distended, no rebound, hepatosplenomegaly  LYMPH: No cervical LAD   PSYCH: Appropriate insight/judgment; A+O x 3, mood and affect appropriate, recent/remote memory intact   Patient is a 58y old  Female who presents with a chief complaint of UGIB (09 Oct 2023 08:00)      Patient transferred from ICU admitted for upper GI bleed secondary to variceal bleed  orthostatic started 10/10; ivf given and low dose Mido started, Coreg was dc she remains hypotensive      10/12/23  Patient seen and examined at bedside. States she has been getting up to go the bathroom and reports no sob or dizziness with this.  Does feel tired, but no cp, no n/v.  States that BM's are still dark and a little tarry with no bright red blood.  Lives at home with boyfriend who is supportive.  States has not drank alcohol in over a year.     T(C): 37 (10-12-23 @ 08:28), Max: 37.7 (10-11-23 @ 15:16)  HR: 80 (10-12-23 @ 12:00) (80 - 100)  BP: 105/70 (10-12-23 @ 12:00) (90/52 - 107/65)  RR: 18 (10-12-23 @ 08:28) (18 - 18)  SpO2: 98% (10-12-23 @ 08:28) (95% - 98%)    CONSTITUTIONAL: Well groomed, no apparent distress  EYES: EOMI, No conjunctival or scleral injection, non-icteric  ENMT: Oral mucosa with moist membranes. Normal dentition; no pharyngeal injection or exudates             NECK: Supple, symmetric and without tracheal deviation   RESP: No respiratory distress, no use of accessory muscles; CTA b/l, no WRR  CV: RRR, +S1S2, no MRG; no JVD; no peripheral edema  GI: Soft, NT, distended, no rebound, hepatosplenomegaly  LYMPH: No cervical LAD   PSYCH: Appropriate insight/judgment; A+O x 3, mood and affect appropriate, recent/remote memory intact                          9.4    8.00  )-----------( 106      ( 12 Oct 2023 08:41 )             28.1   10-12    134<L>  |  103  |  7   ----------------------------<  105<H>  3.4<L>   |  27  |  0.45<L>    Ca    8.1<L>      12 Oct 2023 08:41    TPro  6.0  /  Alb  2.4<L>  /  TBili  1.8<H>  /  DBili  1.1<H>  /  AST  109<H>  /  ALT  55  /  AlkPhos  374<H>  10-12

## 2023-10-13 ENCOUNTER — TRANSCRIPTION ENCOUNTER (OUTPATIENT)
Age: 58
End: 2023-10-13

## 2023-10-13 VITALS
RESPIRATION RATE: 17 BRPM | HEART RATE: 81 BPM | TEMPERATURE: 99 F | OXYGEN SATURATION: 95 % | DIASTOLIC BLOOD PRESSURE: 59 MMHG | SYSTOLIC BLOOD PRESSURE: 100 MMHG

## 2023-10-13 LAB
ANION GAP SERPL CALC-SCNC: 6 MMOL/L — SIGNIFICANT CHANGE UP (ref 5–17)
BUN SERPL-MCNC: 5 MG/DL — LOW (ref 7–23)
CALCIUM SERPL-MCNC: 8.2 MG/DL — LOW (ref 8.5–10.1)
CHLORIDE SERPL-SCNC: 105 MMOL/L — SIGNIFICANT CHANGE UP (ref 96–108)
CO2 SERPL-SCNC: 26 MMOL/L — SIGNIFICANT CHANGE UP (ref 22–31)
CREAT SERPL-MCNC: 0.44 MG/DL — LOW (ref 0.5–1.3)
EGFR: 111 ML/MIN/1.73M2 — SIGNIFICANT CHANGE UP
GLUCOSE SERPL-MCNC: 93 MG/DL — SIGNIFICANT CHANGE UP (ref 70–99)
MAGNESIUM SERPL-MCNC: 1.8 MG/DL — SIGNIFICANT CHANGE UP (ref 1.6–2.6)
POTASSIUM SERPL-MCNC: 3.9 MMOL/L — SIGNIFICANT CHANGE UP (ref 3.5–5.3)
POTASSIUM SERPL-SCNC: 3.9 MMOL/L — SIGNIFICANT CHANGE UP (ref 3.5–5.3)
SODIUM SERPL-SCNC: 137 MMOL/L — SIGNIFICANT CHANGE UP (ref 135–145)

## 2023-10-13 PROCEDURE — 99238 HOSP IP/OBS DSCHRG MGMT 30/<: CPT

## 2023-10-13 RX ORDER — THIAMINE MONONITRATE (VIT B1) 100 MG
1 TABLET ORAL
Qty: 30 | Refills: 0
Start: 2023-10-13 | End: 2023-11-11

## 2023-10-13 RX ORDER — SPIRONOLACTONE 25 MG/1
1 TABLET, FILM COATED ORAL
Qty: 30 | Refills: 0
Start: 2023-10-13

## 2023-10-13 RX ORDER — MULTIVIT-MIN/FERROUS GLUCONATE 9 MG/15 ML
1 LIQUID (ML) ORAL
Qty: 30 | Refills: 0
Start: 2023-10-13 | End: 2023-11-11

## 2023-10-13 RX ORDER — SPIRONOLACTONE 25 MG/1
1 TABLET, FILM COATED ORAL
Qty: 30 | Refills: 0
Start: 2023-10-13 | End: 2023-11-11

## 2023-10-13 RX ORDER — MIDODRINE HYDROCHLORIDE 2.5 MG/1
1 TABLET ORAL
Qty: 30 | Refills: 0
Start: 2023-10-13

## 2023-10-13 RX ORDER — PROPRANOLOL HCL 160 MG
1 CAPSULE, EXTENDED RELEASE 24HR ORAL
Qty: 60 | Refills: 0
Start: 2023-10-13

## 2023-10-13 RX ADMIN — MIDODRINE HYDROCHLORIDE 10 MILLIGRAM(S): 2.5 TABLET ORAL at 11:42

## 2023-10-13 RX ADMIN — MAGNESIUM OXIDE 400 MG ORAL TABLET 400 MILLIGRAM(S): 241.3 TABLET ORAL at 11:42

## 2023-10-13 RX ADMIN — PANTOPRAZOLE SODIUM 40 MILLIGRAM(S): 20 TABLET, DELAYED RELEASE ORAL at 05:34

## 2023-10-13 RX ADMIN — Medication 20 MILLIEQUIVALENT(S): at 05:35

## 2023-10-13 RX ADMIN — Medication 20 MILLIEQUIVALENT(S): at 11:42

## 2023-10-13 RX ADMIN — INFLUENZA VIRUS VACCINE 0.5 MILLILITER(S): 15; 15; 15; 15 SUSPENSION INTRAMUSCULAR at 11:44

## 2023-10-13 RX ADMIN — MAGNESIUM OXIDE 400 MG ORAL TABLET 400 MILLIGRAM(S): 241.3 TABLET ORAL at 09:11

## 2023-10-13 RX ADMIN — PREGABALIN 1000 MICROGRAM(S): 225 CAPSULE ORAL at 09:11

## 2023-10-13 RX ADMIN — Medication 1 GRAM(S): at 11:44

## 2023-10-13 RX ADMIN — Medication 1 GRAM(S): at 05:35

## 2023-10-13 RX ADMIN — MIDODRINE HYDROCHLORIDE 10 MILLIGRAM(S): 2.5 TABLET ORAL at 05:34

## 2023-10-13 NOTE — PROGRESS NOTE ADULT - NUTRITIONAL ASSESSMENT
This patient has been assessed with a concern for Malnutrition and has been determined to have a diagnosis/diagnoses of Severe protein-calorie malnutrition.    This patient is being managed with:   Diet Regular-  Supplement Feeding Modality:  Oral  Ensure Plus High Protein Cans or Servings Per Day:  1       Frequency:  Three Times a day  Entered: Oct 11 2023  3:11PM    Diet Regular-  Entered: Oct  9 2023  7:59AM    The following pending diet order is being considered for treatment of Severe protein-calorie malnutrition:null
This patient has been assessed with a concern for Malnutrition and has been determined to have a diagnosis/diagnoses of Severe protein-calorie malnutrition.    This patient is being managed with:   Diet Regular-  Supplement Feeding Modality:  Oral  Ensure Plus High Protein Cans or Servings Per Day:  1       Frequency:  Three Times a day  Entered: Oct 11 2023  3:11PM    Diet Regular-  Entered: Oct  9 2023  7:59AM    The following pending diet order is being considered for treatment of Severe protein-calorie malnutrition:null

## 2023-10-13 NOTE — PROGRESS NOTE ADULT - SUBJECTIVE AND OBJECTIVE BOX
58y old  Female who presents with a chief complaint of UGIB (09 Oct 2023 08:00)      Patient transferred from ICU admitted for upper GI bleed secondary to variceal bleed  orthostatic started 10/10; ivf given and low dose Mido started, Coreg was dc she remains hypotensive      10/12/23  Patient seen and examined at bedside. States she has been getting up to go the bathroom and reports no sob or dizziness with this.  Does feel tired, but no cp, no n/v.  States that BM's are still dark and a little tarry with no bright red blood.  Lives at home with boyfriend who is supportive.  States has not drank alcohol in over a year.     10/13  Patient eager to go home. States she feels improvement in her strength. No current dizziness, but states she has vertigo often and always has a sense of fogginess in her head, with no vertiginous symptoms currently. no cp, no sob, no n/v    Vital Signs Last 24 Hrs  T(C): 37.1 (13 Oct 2023 15:55), Max: 37.5 (13 Oct 2023 05:28)  T(F): 98.8 (13 Oct 2023 15:55), Max: 99.5 (13 Oct 2023 05:28)  HR: 81 (13 Oct 2023 15:55) (78 - 90)  BP: 100/59 (13 Oct 2023 15:55) (87/56 - 106/69)  BP(mean): --  RR: 17 (13 Oct 2023 15:55) (16 - 18)  SpO2: 95% (13 Oct 2023 15:55) (95% - 98%)    Parameters below as of 13 Oct 2023 15:55  Patient On (Oxygen Delivery Method): room air    CONSTITUTIONAL: Well groomed, no apparent distress  EYES: EOMI, No conjunctival or scleral injection, non-icteric  ENMT: Oral mucosa with moist membranes. Normal dentition; no pharyngeal injection or exudates             NECK: Supple, symmetric and without tracheal deviation   RESP: No respiratory distress, no use of accessory muscles; CTA b/l, no WRR  CV: RRR, +S1S2, no MRG; no JVD; no peripheral edema  GI: Soft, NT, distended, no rebound, hepatosplenomegaly  LYMPH: No cervical LAD   PSYCH: Appropriate insight/judgment; A+O x 3, mood and affect appropriate, recent/remote memory intact                          9.4    8.00  )-----------( 106      ( 12 Oct 2023 08:41 )             28.1     10-13    137  |  105  |  5<L>  ----------------------------<  93  3.9   |  26  |  0.44<L>    Ca    8.2<L>      13 Oct 2023 07:24  Mg     1.8     10-13    TPro  6.0  /  Alb  2.4<L>  /  TBili  1.8<H>  /  DBili  1.1<H>  /  AST  109<H>  /  ALT  55  /  AlkPhos  374<H>  10-12

## 2023-10-13 NOTE — DISCHARGE NOTE NURSING/CASE MANAGEMENT/SOCIAL WORK - PATIENT PORTAL LINK FT
You can access the FollowMyHealth Patient Portal offered by [facility] by registering at the following website: [email or web address]. By joining [email or web address]’s FollowMyHealth portal, you will also be able to view your health information using other applications (apps) compatible with our system.

## 2023-10-13 NOTE — DISCHARGE NOTE NURSING/CASE MANAGEMENT/SOCIAL WORK - NSDCVIVACCINE_GEN_ALL_CORE_FT
influenza, injectable, quadrivalent, preservative free; 13-Oct-2023 11:44; Kristina Renee (RN); [employer]; [id number] (Exp. Date: 13-Jun-2024); IntraMuscular; Deltoid Left.; 0.5 milliLiter(s); VIS (VIS Published: 06-Aug-2021, VIS Presented: 13-Oct-2023);

## 2023-10-13 NOTE — PROGRESS NOTE ADULT - PROVIDER SPECIALTY LIST ADULT
Gastroenterology
Hospitalist
Gastroenterology
Gastroenterology
Hospitalist
Gastroenterology
Gastroenterology
Hospitalist
Critical Care
Critical Care

## 2023-10-13 NOTE — PROGRESS NOTE ADULT - ASSESSMENT
UGIB  Variceal bleed  Liver cirrhosis  Iatrogenic hypotension  -Patient did not feel comfortable being discharged on 10/12 as she still feels weak      off Coreg  IVF   started on midodrine  5 increase to 10  Discharge on propranolol  GI: OK for d/c, Emphasized need for follow up to schedule another EGD and banding in 5 weeks    Disposition: Discharge planning dc today

## 2023-10-25 NOTE — CDI QUERY NOTE - NSCDIOTHERTXTBX_GEN_ALL_CORE_HH
There is conflicting documentation regarding the patient's nutrition status.  The patient received a nutrition assessment by a Registered Dietician on 10-11-23.  The documentation of this assessment states: Severe protein-calorie malnutrition.  Chart documentation also states the patient is well developed.       Can the nutrition diagnosis and criteria be clarified, after study?    - Patient was not well-developed and was found to have severe protein-calorie malnutrition  (please also refer to the Dietician Assessment for further details)  -Other (please specify)  -Not clinically significant      Chart Documentation:    Dietitian Nutrition Risk Notification 10-11-23 @ 15:09  Upon Nutritional Assessment by the Registered Dietitian Your Patient was Determined to Meet Criteria/Has Evidence of the Following Diagnosis:  Severe protein-calorie malnutrition    Progress Note Adult Hospitalist Attending 10-11-23 @ 10:07  GENERAL: NAD, well-groomed, well-developed    Progress Note Adult Hospitalist Attending 10-10-23 @ 13:22  GENERAL: NAD, well-groomed, well-developed
This patient was admitted with alcoholic cirrhosis and found to have esophageal varices. Your clarification is needed regarding the etiology of the varices:    Discharge Note Provider 10-10-23 @ 07:00  Pt reports multiple episodes of bright red blood with black stools. History of variceal bleeds in the past.  Patient was previously a drinker but no longer drinks.      Progress Note Adult Hospitalist Attending 10-13-23 @ 19:09  Patient transferred from ICU admitted for upper GI bleed secondary to variceal bleed    Can the etiology of the variceal Bleed be specified?  -Variceal Bleed secondary to past alcohol use.  -Variceal Bleed unrelated to past alcohol use.  -Other, please specify:

## 2023-10-25 NOTE — CHART NOTE - NSCHARTNOTEFT_GEN_A_CORE
EGD  - two columns of nonbleeding esophageal varices s/p banding with eradication  - Blood and clot in stomach sunctioned and irrigated with no active bleeding visualized  - Suspected gastric varices without bleeding  - Normal duodenum    Recommendation  1. NPO  2. Continue ceftriaxone, PPI and octreotide gtt  3. Monitor for overt bleeding; if rebleds recommend IR consultation  4. daily MELD labs, US abdomen with doppler and Utox
Will transfer to med Surg    Called into the Hospitalist at 4:50PM    Awaiting a call back
Variceal Bleed secondary to past alcohol use.

## 2023-11-09 ENCOUNTER — INPATIENT (INPATIENT)
Facility: HOSPITAL | Age: 58
LOS: 6 days | Discharge: HOME | DRG: 347 | End: 2023-11-16
Attending: INTERNAL MEDICINE | Admitting: INTERNAL MEDICINE
Payer: COMMERCIAL

## 2023-11-09 VITALS — HEIGHT: 68 IN | WEIGHT: 130.07 LBS

## 2023-11-09 DIAGNOSIS — Z98.890 OTHER SPECIFIED POSTPROCEDURAL STATES: Chronic | ICD-10-CM

## 2023-11-09 DIAGNOSIS — Z98.891 HISTORY OF UTERINE SCAR FROM PREVIOUS SURGERY: Chronic | ICD-10-CM

## 2023-11-09 DIAGNOSIS — K92.2 GASTROINTESTINAL HEMORRHAGE, UNSPECIFIED: ICD-10-CM

## 2023-11-09 LAB
ALBUMIN SERPL ELPH-MCNC: 1.9 G/DL — LOW (ref 3.3–5)
ALBUMIN SERPL ELPH-MCNC: 1.9 G/DL — LOW (ref 3.3–5)
ALP SERPL-CCNC: 219 U/L — HIGH (ref 40–120)
ALP SERPL-CCNC: 219 U/L — HIGH (ref 40–120)
ALT FLD-CCNC: 23 U/L — SIGNIFICANT CHANGE UP (ref 12–78)
ALT FLD-CCNC: 23 U/L — SIGNIFICANT CHANGE UP (ref 12–78)
AMMONIA BLD-MCNC: 14 UMOL/L — SIGNIFICANT CHANGE UP (ref 11–32)
AMMONIA BLD-MCNC: 14 UMOL/L — SIGNIFICANT CHANGE UP (ref 11–32)
ANION GAP SERPL CALC-SCNC: 16 MMOL/L — SIGNIFICANT CHANGE UP (ref 5–17)
ANION GAP SERPL CALC-SCNC: 16 MMOL/L — SIGNIFICANT CHANGE UP (ref 5–17)
ANION GAP SERPL CALC-SCNC: 9 MMOL/L — SIGNIFICANT CHANGE UP (ref 5–17)
ANION GAP SERPL CALC-SCNC: 9 MMOL/L — SIGNIFICANT CHANGE UP (ref 5–17)
ANISOCYTOSIS BLD QL: SIGNIFICANT CHANGE UP
ANISOCYTOSIS BLD QL: SIGNIFICANT CHANGE UP
APTT BLD: 30.4 SEC — SIGNIFICANT CHANGE UP (ref 24.5–35.6)
APTT BLD: 30.4 SEC — SIGNIFICANT CHANGE UP (ref 24.5–35.6)
AST SERPL-CCNC: 60 U/L — HIGH (ref 15–37)
AST SERPL-CCNC: 60 U/L — HIGH (ref 15–37)
BASOPHILS # BLD AUTO: 0 K/UL — SIGNIFICANT CHANGE UP (ref 0–0.2)
BASOPHILS # BLD AUTO: 0 K/UL — SIGNIFICANT CHANGE UP (ref 0–0.2)
BASOPHILS NFR BLD AUTO: 0 % — SIGNIFICANT CHANGE UP (ref 0–2)
BASOPHILS NFR BLD AUTO: 0 % — SIGNIFICANT CHANGE UP (ref 0–2)
BILIRUB SERPL-MCNC: 1.9 MG/DL — HIGH (ref 0.2–1.2)
BILIRUB SERPL-MCNC: 1.9 MG/DL — HIGH (ref 0.2–1.2)
BLD GP AB SCN SERPL QL: SIGNIFICANT CHANGE UP
BLD GP AB SCN SERPL QL: SIGNIFICANT CHANGE UP
BUN SERPL-MCNC: 13 MG/DL — SIGNIFICANT CHANGE UP (ref 7–23)
CALCIUM SERPL-MCNC: 6.8 MG/DL — LOW (ref 8.5–10.1)
CALCIUM SERPL-MCNC: 6.8 MG/DL — LOW (ref 8.5–10.1)
CALCIUM SERPL-MCNC: 7.5 MG/DL — LOW (ref 8.5–10.1)
CALCIUM SERPL-MCNC: 7.5 MG/DL — LOW (ref 8.5–10.1)
CHLORIDE SERPL-SCNC: 103 MMOL/L — SIGNIFICANT CHANGE UP (ref 96–108)
CHLORIDE SERPL-SCNC: 103 MMOL/L — SIGNIFICANT CHANGE UP (ref 96–108)
CHLORIDE SERPL-SCNC: 99 MMOL/L — SIGNIFICANT CHANGE UP (ref 96–108)
CHLORIDE SERPL-SCNC: 99 MMOL/L — SIGNIFICANT CHANGE UP (ref 96–108)
CO2 SERPL-SCNC: 17 MMOL/L — LOW (ref 22–31)
CO2 SERPL-SCNC: 17 MMOL/L — LOW (ref 22–31)
CO2 SERPL-SCNC: 20 MMOL/L — LOW (ref 22–31)
CO2 SERPL-SCNC: 20 MMOL/L — LOW (ref 22–31)
CREAT SERPL-MCNC: 0.92 MG/DL — SIGNIFICANT CHANGE UP (ref 0.5–1.3)
CREAT SERPL-MCNC: 0.92 MG/DL — SIGNIFICANT CHANGE UP (ref 0.5–1.3)
CREAT SERPL-MCNC: 0.96 MG/DL — SIGNIFICANT CHANGE UP (ref 0.5–1.3)
CREAT SERPL-MCNC: 0.96 MG/DL — SIGNIFICANT CHANGE UP (ref 0.5–1.3)
DACRYOCYTES BLD QL SMEAR: SLIGHT — SIGNIFICANT CHANGE UP
DACRYOCYTES BLD QL SMEAR: SLIGHT — SIGNIFICANT CHANGE UP
EGFR: 68 ML/MIN/1.73M2 — SIGNIFICANT CHANGE UP
EGFR: 68 ML/MIN/1.73M2 — SIGNIFICANT CHANGE UP
EGFR: 71 ML/MIN/1.73M2 — SIGNIFICANT CHANGE UP
EGFR: 71 ML/MIN/1.73M2 — SIGNIFICANT CHANGE UP
ELLIPTOCYTES BLD QL SMEAR: SLIGHT — SIGNIFICANT CHANGE UP
ELLIPTOCYTES BLD QL SMEAR: SLIGHT — SIGNIFICANT CHANGE UP
EOSINOPHIL # BLD AUTO: 0 K/UL — SIGNIFICANT CHANGE UP (ref 0–0.5)
EOSINOPHIL # BLD AUTO: 0 K/UL — SIGNIFICANT CHANGE UP (ref 0–0.5)
EOSINOPHIL NFR BLD AUTO: 0 % — SIGNIFICANT CHANGE UP (ref 0–6)
EOSINOPHIL NFR BLD AUTO: 0 % — SIGNIFICANT CHANGE UP (ref 0–6)
FIBRINOGEN PPP-MCNC: 238 MG/DL — SIGNIFICANT CHANGE UP (ref 200–435)
FIBRINOGEN PPP-MCNC: 238 MG/DL — SIGNIFICANT CHANGE UP (ref 200–435)
GLUCOSE SERPL-MCNC: 126 MG/DL — HIGH (ref 70–99)
GLUCOSE SERPL-MCNC: 126 MG/DL — HIGH (ref 70–99)
GLUCOSE SERPL-MCNC: 253 MG/DL — HIGH (ref 70–99)
GLUCOSE SERPL-MCNC: 253 MG/DL — HIGH (ref 70–99)
HCT VFR BLD CALC: 13.3 % — CRITICAL LOW (ref 34.5–45)
HCT VFR BLD CALC: 13.3 % — CRITICAL LOW (ref 34.5–45)
HCT VFR BLD CALC: 17 % — CRITICAL LOW (ref 34.5–45)
HCT VFR BLD CALC: 17 % — CRITICAL LOW (ref 34.5–45)
HCT VFR BLD CALC: 21.7 % — LOW (ref 34.5–45)
HCT VFR BLD CALC: 21.7 % — LOW (ref 34.5–45)
HGB BLD-MCNC: 3.9 G/DL — CRITICAL LOW (ref 11.5–15.5)
HGB BLD-MCNC: 3.9 G/DL — CRITICAL LOW (ref 11.5–15.5)
HGB BLD-MCNC: 5.4 G/DL — CRITICAL LOW (ref 11.5–15.5)
HGB BLD-MCNC: 5.4 G/DL — CRITICAL LOW (ref 11.5–15.5)
HGB BLD-MCNC: 7 G/DL — CRITICAL LOW (ref 11.5–15.5)
HGB BLD-MCNC: 7 G/DL — CRITICAL LOW (ref 11.5–15.5)
HYPOCHROMIA BLD QL: SIGNIFICANT CHANGE UP
HYPOCHROMIA BLD QL: SIGNIFICANT CHANGE UP
INR BLD: 1.41 RATIO — HIGH (ref 0.85–1.18)
INR BLD: 1.41 RATIO — HIGH (ref 0.85–1.18)
LACTATE SERPL-SCNC: 3.1 MMOL/L — HIGH (ref 0.7–2)
LACTATE SERPL-SCNC: 3.1 MMOL/L — HIGH (ref 0.7–2)
LACTATE SERPL-SCNC: 6.7 MMOL/L — CRITICAL HIGH (ref 0.7–2)
LACTATE SERPL-SCNC: 6.7 MMOL/L — CRITICAL HIGH (ref 0.7–2)
LIDOCAIN IGE QN: 82 U/L — HIGH (ref 13–75)
LIDOCAIN IGE QN: 82 U/L — HIGH (ref 13–75)
LYMPHOCYTES # BLD AUTO: 13 % — SIGNIFICANT CHANGE UP (ref 13–44)
LYMPHOCYTES # BLD AUTO: 13 % — SIGNIFICANT CHANGE UP (ref 13–44)
LYMPHOCYTES # BLD AUTO: 2.06 K/UL — SIGNIFICANT CHANGE UP (ref 1–3.3)
LYMPHOCYTES # BLD AUTO: 2.06 K/UL — SIGNIFICANT CHANGE UP (ref 1–3.3)
MAGNESIUM SERPL-MCNC: 1.9 MG/DL — SIGNIFICANT CHANGE UP (ref 1.6–2.6)
MAGNESIUM SERPL-MCNC: 1.9 MG/DL — SIGNIFICANT CHANGE UP (ref 1.6–2.6)
MANUAL SMEAR VERIFICATION: SIGNIFICANT CHANGE UP
MANUAL SMEAR VERIFICATION: SIGNIFICANT CHANGE UP
MCHC RBC-ENTMCNC: 25.5 PG — LOW (ref 27–34)
MCHC RBC-ENTMCNC: 25.5 PG — LOW (ref 27–34)
MCHC RBC-ENTMCNC: 27.8 PG — SIGNIFICANT CHANGE UP (ref 27–34)
MCHC RBC-ENTMCNC: 27.8 PG — SIGNIFICANT CHANGE UP (ref 27–34)
MCHC RBC-ENTMCNC: 28.5 PG — SIGNIFICANT CHANGE UP (ref 27–34)
MCHC RBC-ENTMCNC: 28.5 PG — SIGNIFICANT CHANGE UP (ref 27–34)
MCHC RBC-ENTMCNC: 29.3 GM/DL — LOW (ref 32–36)
MCHC RBC-ENTMCNC: 29.3 GM/DL — LOW (ref 32–36)
MCHC RBC-ENTMCNC: 31.8 GM/DL — LOW (ref 32–36)
MCHC RBC-ENTMCNC: 31.8 GM/DL — LOW (ref 32–36)
MCHC RBC-ENTMCNC: 32.3 GM/DL — SIGNIFICANT CHANGE UP (ref 32–36)
MCHC RBC-ENTMCNC: 32.3 GM/DL — SIGNIFICANT CHANGE UP (ref 32–36)
MCV RBC AUTO: 86.9 FL — SIGNIFICANT CHANGE UP (ref 80–100)
MCV RBC AUTO: 86.9 FL — SIGNIFICANT CHANGE UP (ref 80–100)
MCV RBC AUTO: 87.6 FL — SIGNIFICANT CHANGE UP (ref 80–100)
MCV RBC AUTO: 87.6 FL — SIGNIFICANT CHANGE UP (ref 80–100)
MCV RBC AUTO: 88.2 FL — SIGNIFICANT CHANGE UP (ref 80–100)
MCV RBC AUTO: 88.2 FL — SIGNIFICANT CHANGE UP (ref 80–100)
MICROCYTES BLD QL: SLIGHT — SIGNIFICANT CHANGE UP
MICROCYTES BLD QL: SLIGHT — SIGNIFICANT CHANGE UP
MONOCYTES # BLD AUTO: 0.48 K/UL — SIGNIFICANT CHANGE UP (ref 0–0.9)
MONOCYTES # BLD AUTO: 0.48 K/UL — SIGNIFICANT CHANGE UP (ref 0–0.9)
MONOCYTES NFR BLD AUTO: 3 % — SIGNIFICANT CHANGE UP (ref 2–14)
MONOCYTES NFR BLD AUTO: 3 % — SIGNIFICANT CHANGE UP (ref 2–14)
NEUTROPHILS # BLD AUTO: 13.34 K/UL — HIGH (ref 1.8–7.4)
NEUTROPHILS # BLD AUTO: 13.34 K/UL — HIGH (ref 1.8–7.4)
NEUTROPHILS NFR BLD AUTO: 84 % — HIGH (ref 43–77)
NEUTROPHILS NFR BLD AUTO: 84 % — HIGH (ref 43–77)
NRBC # BLD: 1 /100 — HIGH (ref 0–0)
NRBC # BLD: 1 /100 — HIGH (ref 0–0)
NRBC # BLD: SIGNIFICANT CHANGE UP /100 WBCS (ref 0–0)
NRBC # BLD: SIGNIFICANT CHANGE UP /100 WBCS (ref 0–0)
NT-PROBNP SERPL-SCNC: 425 PG/ML — HIGH (ref 0–125)
NT-PROBNP SERPL-SCNC: 425 PG/ML — HIGH (ref 0–125)
PHOSPHATE SERPL-MCNC: 2.9 MG/DL — SIGNIFICANT CHANGE UP (ref 2.5–4.5)
PHOSPHATE SERPL-MCNC: 2.9 MG/DL — SIGNIFICANT CHANGE UP (ref 2.5–4.5)
PLAT MORPH BLD: NORMAL — SIGNIFICANT CHANGE UP
PLAT MORPH BLD: NORMAL — SIGNIFICANT CHANGE UP
PLATELET # BLD AUTO: 173 K/UL — SIGNIFICANT CHANGE UP (ref 150–400)
PLATELET # BLD AUTO: 173 K/UL — SIGNIFICANT CHANGE UP (ref 150–400)
PLATELET # BLD AUTO: 176 K/UL — SIGNIFICANT CHANGE UP (ref 150–400)
PLATELET # BLD AUTO: 176 K/UL — SIGNIFICANT CHANGE UP (ref 150–400)
PLATELET # BLD AUTO: 327 K/UL — SIGNIFICANT CHANGE UP (ref 150–400)
PLATELET # BLD AUTO: 327 K/UL — SIGNIFICANT CHANGE UP (ref 150–400)
POIKILOCYTOSIS BLD QL AUTO: SIGNIFICANT CHANGE UP
POIKILOCYTOSIS BLD QL AUTO: SIGNIFICANT CHANGE UP
POLYCHROMASIA BLD QL SMEAR: SLIGHT — SIGNIFICANT CHANGE UP
POLYCHROMASIA BLD QL SMEAR: SLIGHT — SIGNIFICANT CHANGE UP
POTASSIUM SERPL-MCNC: 3.7 MMOL/L — SIGNIFICANT CHANGE UP (ref 3.5–5.3)
POTASSIUM SERPL-MCNC: 3.7 MMOL/L — SIGNIFICANT CHANGE UP (ref 3.5–5.3)
POTASSIUM SERPL-MCNC: 3.8 MMOL/L — SIGNIFICANT CHANGE UP (ref 3.5–5.3)
POTASSIUM SERPL-MCNC: 3.8 MMOL/L — SIGNIFICANT CHANGE UP (ref 3.5–5.3)
POTASSIUM SERPL-SCNC: 3.7 MMOL/L — SIGNIFICANT CHANGE UP (ref 3.5–5.3)
POTASSIUM SERPL-SCNC: 3.7 MMOL/L — SIGNIFICANT CHANGE UP (ref 3.5–5.3)
POTASSIUM SERPL-SCNC: 3.8 MMOL/L — SIGNIFICANT CHANGE UP (ref 3.5–5.3)
POTASSIUM SERPL-SCNC: 3.8 MMOL/L — SIGNIFICANT CHANGE UP (ref 3.5–5.3)
PROT SERPL-MCNC: 5.8 GM/DL — LOW (ref 6–8.3)
PROT SERPL-MCNC: 5.8 GM/DL — LOW (ref 6–8.3)
PROTHROM AB SERPL-ACNC: 15.8 SEC — HIGH (ref 9.5–13)
PROTHROM AB SERPL-ACNC: 15.8 SEC — HIGH (ref 9.5–13)
RAPID RVP RESULT: SIGNIFICANT CHANGE UP
RAPID RVP RESULT: SIGNIFICANT CHANGE UP
RBC # BLD: 1.53 M/UL — LOW (ref 3.8–5.2)
RBC # BLD: 1.53 M/UL — LOW (ref 3.8–5.2)
RBC # BLD: 1.94 M/UL — LOW (ref 3.8–5.2)
RBC # BLD: 1.94 M/UL — LOW (ref 3.8–5.2)
RBC # BLD: 2.46 M/UL — LOW (ref 3.8–5.2)
RBC # BLD: 2.46 M/UL — LOW (ref 3.8–5.2)
RBC # FLD: 15.7 % — HIGH (ref 10.3–14.5)
RBC # FLD: 15.7 % — HIGH (ref 10.3–14.5)
RBC # FLD: 16.3 % — HIGH (ref 10.3–14.5)
RBC # FLD: 16.3 % — HIGH (ref 10.3–14.5)
RBC # FLD: 19.3 % — HIGH (ref 10.3–14.5)
RBC # FLD: 19.3 % — HIGH (ref 10.3–14.5)
RBC BLD AUTO: ABNORMAL
RBC BLD AUTO: ABNORMAL
SARS-COV-2 RNA SPEC QL NAA+PROBE: SIGNIFICANT CHANGE UP
SARS-COV-2 RNA SPEC QL NAA+PROBE: SIGNIFICANT CHANGE UP
SODIUM SERPL-SCNC: 132 MMOL/L — LOW (ref 135–145)
TARGETS BLD QL SMEAR: SLIGHT — SIGNIFICANT CHANGE UP
TARGETS BLD QL SMEAR: SLIGHT — SIGNIFICANT CHANGE UP
TROPONIN I, HIGH SENSITIVITY RESULT: 5.13 NG/L — SIGNIFICANT CHANGE UP
TROPONIN I, HIGH SENSITIVITY RESULT: 5.13 NG/L — SIGNIFICANT CHANGE UP
TROPONIN I, HIGH SENSITIVITY RESULT: 5.29 NG/L — SIGNIFICANT CHANGE UP
TROPONIN I, HIGH SENSITIVITY RESULT: 5.29 NG/L — SIGNIFICANT CHANGE UP
WBC # BLD: 15.88 K/UL — HIGH (ref 3.8–10.5)
WBC # BLD: 15.88 K/UL — HIGH (ref 3.8–10.5)
WBC # BLD: 7.74 K/UL — SIGNIFICANT CHANGE UP (ref 3.8–10.5)
WBC # BLD: 7.74 K/UL — SIGNIFICANT CHANGE UP (ref 3.8–10.5)
WBC # BLD: 7.97 K/UL — SIGNIFICANT CHANGE UP (ref 3.8–10.5)
WBC # BLD: 7.97 K/UL — SIGNIFICANT CHANGE UP (ref 3.8–10.5)
WBC # FLD AUTO: 15.88 K/UL — HIGH (ref 3.8–10.5)
WBC # FLD AUTO: 15.88 K/UL — HIGH (ref 3.8–10.5)
WBC # FLD AUTO: 7.74 K/UL — SIGNIFICANT CHANGE UP (ref 3.8–10.5)
WBC # FLD AUTO: 7.74 K/UL — SIGNIFICANT CHANGE UP (ref 3.8–10.5)
WBC # FLD AUTO: 7.97 K/UL — SIGNIFICANT CHANGE UP (ref 3.8–10.5)
WBC # FLD AUTO: 7.97 K/UL — SIGNIFICANT CHANGE UP (ref 3.8–10.5)

## 2023-11-09 PROCEDURE — 84100 ASSAY OF PHOSPHORUS: CPT

## 2023-11-09 PROCEDURE — C9113: CPT

## 2023-11-09 PROCEDURE — C1889: CPT

## 2023-11-09 PROCEDURE — 74177 CT ABD & PELVIS W/CONTRAST: CPT | Mod: 26,MA

## 2023-11-09 PROCEDURE — 71045 X-RAY EXAM CHEST 1 VIEW: CPT | Mod: 26

## 2023-11-09 PROCEDURE — 93306 TTE W/DOPPLER COMPLETE: CPT

## 2023-11-09 PROCEDURE — 85027 COMPLETE CBC AUTOMATED: CPT

## 2023-11-09 PROCEDURE — 88312 SPECIAL STAINS GROUP 1: CPT

## 2023-11-09 PROCEDURE — 83605 ASSAY OF LACTIC ACID: CPT

## 2023-11-09 PROCEDURE — 88304 TISSUE EXAM BY PATHOLOGIST: CPT

## 2023-11-09 PROCEDURE — 85610 PROTHROMBIN TIME: CPT

## 2023-11-09 PROCEDURE — 80048 BASIC METABOLIC PNL TOTAL CA: CPT

## 2023-11-09 PROCEDURE — 85730 THROMBOPLASTIN TIME PARTIAL: CPT

## 2023-11-09 PROCEDURE — 84484 ASSAY OF TROPONIN QUANT: CPT

## 2023-11-09 PROCEDURE — P9016: CPT

## 2023-11-09 PROCEDURE — 86901 BLOOD TYPING SEROLOGIC RH(D): CPT

## 2023-11-09 PROCEDURE — 86850 RBC ANTIBODY SCREEN: CPT

## 2023-11-09 PROCEDURE — P9059: CPT

## 2023-11-09 PROCEDURE — 93010 ELECTROCARDIOGRAM REPORT: CPT

## 2023-11-09 PROCEDURE — 86900 BLOOD TYPING SEROLOGIC ABO: CPT

## 2023-11-09 PROCEDURE — 83735 ASSAY OF MAGNESIUM: CPT

## 2023-11-09 PROCEDURE — 93306 TTE W/DOPPLER COMPLETE: CPT | Mod: 26

## 2023-11-09 PROCEDURE — 36415 COLL VENOUS BLD VENIPUNCTURE: CPT

## 2023-11-09 PROCEDURE — 99291 CRITICAL CARE FIRST HOUR: CPT

## 2023-11-09 PROCEDURE — 88305 TISSUE EXAM BY PATHOLOGIST: CPT

## 2023-11-09 PROCEDURE — C9290: CPT

## 2023-11-09 PROCEDURE — 80053 COMPREHEN METABOLIC PANEL: CPT

## 2023-11-09 PROCEDURE — 85384 FIBRINOGEN ACTIVITY: CPT

## 2023-11-09 PROCEDURE — 86923 COMPATIBILITY TEST ELECTRIC: CPT

## 2023-11-09 PROCEDURE — 36430 TRANSFUSION BLD/BLD COMPNT: CPT

## 2023-11-09 RX ORDER — OCTREOTIDE ACETATE 200 UG/ML
50 INJECTION, SOLUTION INTRAVENOUS; SUBCUTANEOUS ONCE
Refills: 0 | Status: COMPLETED | OUTPATIENT
Start: 2023-11-09 | End: 2023-11-09

## 2023-11-09 RX ORDER — PANTOPRAZOLE SODIUM 20 MG/1
8 TABLET, DELAYED RELEASE ORAL
Qty: 80 | Refills: 0 | Status: DISCONTINUED | OUTPATIENT
Start: 2023-11-09 | End: 2023-11-10

## 2023-11-09 RX ORDER — PANTOPRAZOLE SODIUM 20 MG/1
80 TABLET, DELAYED RELEASE ORAL ONCE
Refills: 0 | Status: COMPLETED | OUTPATIENT
Start: 2023-11-09 | End: 2023-11-09

## 2023-11-09 RX ORDER — SPIRONOLACTONE 25 MG/1
1 TABLET, FILM COATED ORAL
Qty: 0 | Refills: 0 | DISCHARGE

## 2023-11-09 RX ORDER — PIPERACILLIN AND TAZOBACTAM 4; .5 G/20ML; G/20ML
3.38 INJECTION, POWDER, LYOPHILIZED, FOR SOLUTION INTRAVENOUS ONCE
Refills: 0 | Status: COMPLETED | OUTPATIENT
Start: 2023-11-09 | End: 2023-11-09

## 2023-11-09 RX ORDER — ACETAMINOPHEN 500 MG
650 TABLET ORAL ONCE
Refills: 0 | Status: COMPLETED | OUTPATIENT
Start: 2023-11-09 | End: 2023-11-09

## 2023-11-09 RX ORDER — FENTANYL CITRATE 50 UG/ML
25 INJECTION INTRAVENOUS ONCE
Refills: 0 | Status: DISCONTINUED | OUTPATIENT
Start: 2023-11-09 | End: 2023-11-09

## 2023-11-09 RX ORDER — FERROUS SULFATE 325(65) MG
1 TABLET ORAL
Refills: 0 | DISCHARGE

## 2023-11-09 RX ORDER — SODIUM CHLORIDE 9 MG/ML
2000 INJECTION INTRAMUSCULAR; INTRAVENOUS; SUBCUTANEOUS ONCE
Refills: 0 | Status: COMPLETED | OUTPATIENT
Start: 2023-11-09 | End: 2023-11-09

## 2023-11-09 RX ORDER — CEFTRIAXONE 500 MG/1
2000 INJECTION, POWDER, FOR SOLUTION INTRAMUSCULAR; INTRAVENOUS EVERY 24 HOURS
Refills: 0 | Status: DISCONTINUED | OUTPATIENT
Start: 2023-11-09 | End: 2023-11-09

## 2023-11-09 RX ORDER — OCTREOTIDE ACETATE 200 UG/ML
50 INJECTION, SOLUTION INTRAVENOUS; SUBCUTANEOUS
Qty: 500 | Refills: 0 | Status: DISCONTINUED | OUTPATIENT
Start: 2023-11-09 | End: 2023-11-10

## 2023-11-09 RX ORDER — VENLAFAXINE HCL 75 MG
1 CAPSULE, EXT RELEASE 24 HR ORAL
Refills: 0 | DISCHARGE

## 2023-11-09 RX ORDER — CEFTRIAXONE 500 MG/1
2000 INJECTION, POWDER, FOR SOLUTION INTRAMUSCULAR; INTRAVENOUS EVERY 24 HOURS
Refills: 0 | Status: DISCONTINUED | OUTPATIENT
Start: 2023-11-09 | End: 2023-11-12

## 2023-11-09 RX ORDER — SOD SULF/SODIUM/NAHCO3/KCL/PEG
2000 SOLUTION, RECONSTITUTED, ORAL ORAL ONCE
Refills: 0 | Status: COMPLETED | OUTPATIENT
Start: 2023-11-09 | End: 2023-11-09

## 2023-11-09 RX ORDER — FLUTICASONE PROPIONATE 220 MCG
1 AEROSOL WITH ADAPTER (GRAM) INHALATION
Refills: 0 | DISCHARGE

## 2023-11-09 RX ORDER — THIAMINE MONONITRATE (VIT B1) 100 MG
100 TABLET ORAL DAILY
Refills: 0 | Status: DISCONTINUED | OUTPATIENT
Start: 2023-11-09 | End: 2023-11-16

## 2023-11-09 RX ADMIN — OCTREOTIDE ACETATE 10 MICROGRAM(S)/HR: 200 INJECTION, SOLUTION INTRAVENOUS; SUBCUTANEOUS at 10:56

## 2023-11-09 RX ADMIN — Medication 650 MILLIGRAM(S): at 07:15

## 2023-11-09 RX ADMIN — OCTREOTIDE ACETATE 10 MICROGRAM(S)/HR: 200 INJECTION, SOLUTION INTRAVENOUS; SUBCUTANEOUS at 18:16

## 2023-11-09 RX ADMIN — CEFTRIAXONE 2000 MILLIGRAM(S): 500 INJECTION, POWDER, FOR SOLUTION INTRAMUSCULAR; INTRAVENOUS at 21:30

## 2023-11-09 RX ADMIN — FENTANYL CITRATE 25 MICROGRAM(S): 50 INJECTION INTRAVENOUS at 11:00

## 2023-11-09 RX ADMIN — SODIUM CHLORIDE 2000 MILLILITER(S): 9 INJECTION INTRAMUSCULAR; INTRAVENOUS; SUBCUTANEOUS at 07:15

## 2023-11-09 RX ADMIN — PANTOPRAZOLE SODIUM 80 MILLIGRAM(S): 20 TABLET, DELAYED RELEASE ORAL at 07:15

## 2023-11-09 RX ADMIN — OCTREOTIDE ACETATE 50 MICROGRAM(S): 200 INJECTION, SOLUTION INTRAVENOUS; SUBCUTANEOUS at 10:56

## 2023-11-09 RX ADMIN — Medication 2000 MILLILITER(S): at 19:45

## 2023-11-09 RX ADMIN — PANTOPRAZOLE SODIUM 10 MG/HR: 20 TABLET, DELAYED RELEASE ORAL at 08:39

## 2023-11-09 RX ADMIN — PIPERACILLIN AND TAZOBACTAM 200 GRAM(S): 4; .5 INJECTION, POWDER, LYOPHILIZED, FOR SOLUTION INTRAVENOUS at 08:12

## 2023-11-09 RX ADMIN — FENTANYL CITRATE 25 MICROGRAM(S): 50 INJECTION INTRAVENOUS at 09:51

## 2023-11-09 RX ADMIN — PANTOPRAZOLE SODIUM 10 MG/HR: 20 TABLET, DELAYED RELEASE ORAL at 18:15

## 2023-11-09 NOTE — H&P ADULT - NSHPLABSRESULTS_GEN_ALL_CORE
Lab Results:  CBC  CBC Full  -  ( 09 Nov 2023 06:43 )  WBC Count : 15.88 K/uL  RBC Count : 1.53 M/uL  Hemoglobin : 3.9 g/dL  Hematocrit : 13.3 %  Platelet Count - Automated : 327 K/uL  Mean Cell Volume : 86.9 fl  Mean Cell Hemoglobin : 25.5 pg  Mean Cell Hemoglobin Concentration : 29.3 gm/dL  Auto Neutrophil # : 13.34 K/uL  Auto Lymphocyte # : 2.06 K/uL  Auto Monocyte # : 0.48 K/uL  Auto Eosinophil # : 0.00 K/uL  Auto Basophil # : 0.00 K/uL  Auto Neutrophil % : 84.0 %  Auto Lymphocyte % : 13.0 %  Auto Monocyte % : 3.0 %  Auto Eosinophil % : 0.0 %  Auto Basophil % : 0.0 %    .		Differential:	[] Automated		[] Manual  Chemistry                        3.9    15.88 )-----------( 327      ( 09 Nov 2023 06:43 )             13.3     11-09    132<L>  |  99  |  13  ----------------------------<  126<H>  3.7   |  17<L>  |  0.92    Ca    7.5<L>      09 Nov 2023 06:43    TPro  5.8<L>  /  Alb  1.9<L>  /  TBili  1.9<H>  /  DBili  x   /  AST  60<H>  /  ALT  23  /  AlkPhos  219<H>  11-09    LIVER FUNCTIONS - ( 09 Nov 2023 06:43 )  Alb: 1.9 g/dL / Pro: 5.8 gm/dL / ALK PHOS: 219 U/L / ALT: 23 U/L / AST: 60 U/L / GGT: x           PT/INR - ( 09 Nov 2023 06:43 )   PT: 15.8 sec;   INR: 1.41 ratio         PTT - ( 09 Nov 2023 06:43 )  PTT:30.4 sec  Urinalysis Basic - ( 09 Nov 2023 06:43 )    Color: x / Appearance: x / SG: x / pH: x  Gluc: 126 mg/dL / Ketone: x  / Bili: x / Urobili: x   Blood: x / Protein: x / Nitrite: x   Leuk Esterase: x / RBC: x / WBC x   Sq Epi: x / Non Sq Epi: x / Bacteria: x      MICROBIOLOGY/CULTURES:  Pending     RADIOLOGY RESULTS:  < from: CT Abdomen and Pelvis w/ IV Cont (11.09.23 @ 07:40) >    FINDINGS:  LOWER CHEST: Lower esophageal varices..    LIVER: Cirrhosis.  BILE DUCTS: Normal caliber.  GALLBLADDER: Cholelithiasis.  SPLEEN: Within normal limits.  PANCREAS: Within normal limits.  ADRENALS: Stable bilateral adenomas measuring 1.3 cm on the right and 1.5   cm on the left.  KIDNEYS/URETERS: Within normal limits.    BLADDER: Within normal limits.  REPRODUCTIVE ORGANS: Uterus and adnexa within normal limits.    BOWEL: No evidence for active GI bleed. No bowel obstruction. Mild   sigmoid diverticulosis. Appendix is normal.  PERITONEUM: Mild volume ascites.  VESSELS: Atherosclerotic changes. Patent portal, superior mesenteric, and   splenic veins. Left abdominal collateral vessels.  RETROPERITONEUM/LYMPH NODES: No lymphadenopathy.  ABDOMINAL WALL: Within normal limits.  BONES: Mild chronic T8 compression deformity. Straightening of the lumbar   lordosis. Mild degenerative change at L4-5..    < end of copied text >

## 2023-11-09 NOTE — ED ADULT NURSE NOTE - NS ED NURSE TRANSPORT WITH
ICU RN W/ PATIENT DURING TRANSPORT/Cardiac Monitor/Defib/ACLS/Rescue Kit/O2/BVM/IV pump/Blood Products

## 2023-11-09 NOTE — ED ADULT NURSE NOTE - OBJECTIVE STATEMENT
The patient is a 58y Female complaining of rectal bleeding. Pt has a hx of varriceal bleed. Pt endorses bright red blood in rectum. Pt denies CP. Pt endorses dizzessiness SOB.

## 2023-11-09 NOTE — H&P ADULT - HISTORY OF PRESENT ILLNESS
59 yo F PMH ETOH cirrhosis; recent variceal bleed/UGIB,  hx of alcohol abuse- Reports no alcohol since prior to last hospitalization  p/w 3 days of BPBPR, abdominal distention, dizziness,  lightheadedness, chest pain; cough. Pt states she was at home bleeding when she went to the bathroom (even when she urinated), pretty consistently for 3 days. Pt states she knew it was hemorrhoids and "thought that it would stop like it has in the past." Pt started to feel CP and dizziness and then went to the ER     In the ER the pt was noted to have a hbg 3.9 and was hypotensive with SBP in 80-90's. CT scan with no active bleed noted  Pt was given 2 units of PRBC's with ER and ICU staff.  No bleeding reported or noted from below since admission to the ICU     GI c/s on patient and recommended starting octreotide gtt and protonix gtt as well and will plan for colonoscopy and EGD tm AM      OF NOTE   Had an upper GI bleed last month -- esophageal varices banded  However, this time there's no N/V. Blood is bright red but also with some brown stool.    Will admit to the ICU for LGIB ABLA and hemorraghic shock

## 2023-11-09 NOTE — PHARMACOTHERAPY INTERVENTION NOTE - COMMENTS
Med history complete, reviewed medications and allergies with patient and confirmed medication list with doctor first med profile, all medication related questions answered.    Patient states not currently taking any medications. Per prior visit patient was started on sucralfate, midodrine, propranolol, and spironolactone. Patient confused on which medications should be continued for maintenance, and is looking to be reevaluated. Patient stopped taking venlafaxine on her own stating that she feels okay without the effexor.

## 2023-11-09 NOTE — ED ADULT TRIAGE NOTE - CHIEF COMPLAINT QUOTE
complains of ongoing bright red rectal bleeding x 3 days, worse over past day. recent admit for variceal gi bleed. history of hemmorhoids, and rectal prolapse. thinks bleeding is due to hemmorhoids but unsure.

## 2023-11-09 NOTE — CONSULT NOTE ADULT - ASSESSMENT
Imp:  GI bleed  CT prelim shows esophageal and rectal varices but no active bleeding  I favor hemorrhoidal bleeding vs rectal variceal bleeding    Rec:  Octreotide  Protonix  Clears today  EGD and colon tomorrow. Risks and benefits reviewed, patient agreeable  Antibiotics should be started for prophylaxis -- will defer to primary team

## 2023-11-09 NOTE — ED ADULT TRIAGE NOTE - HEIGHT IN CM
Subjective:  HPI                    Objective:  System    Physical Exam    General      ROS    Assessment/Plan:    DISCHARGE REPORT    Progress reporting period is from 10-16-18 to 11-6-18 .     SUBJECTIVE      Current Pain level: 0/10            ;   ,     The subjective and objective information are from the last SOAP note on this patient.    OBJECTIVE         ASSESSMENT/PLAN  Updated problem list and treatment plan: Diagnosis 1: shoulder pain/impingement **  Pain -  manual therapy, self management, education, directional preference exercise and home program  Decreased ROM/flexibility - manual therapy, therapeutic exercise and home program  Decreased strength - therapeutic exercise, therapeutic activities and home program  Impaired muscle performance - neuro re-education and home program  Decreased function - therapeutic activities and home program  Impaired posture - neuro re-education and home program  STG/LTGs have been met or progress has been made towards goals:    Assessment of Progress: The patient's condition is improving.  The patient has not returned to therapy. Current status is unknown.  Self Management Plans:  Patient is independent in a home treatment program.  Patient is independent in self management of symptoms.    Emiliano continues to require the following intervention to meet STG and LTG's:   We will discharge this patient from PT.  Patient returned to work and doing well.   Recommendations:  This patient is ready to be discharged from therapy and continue their home treatment program.    Please refer to the daily flowsheet for treatment today, total treatment time and time spent performing 1:1 timed codes.     172.72

## 2023-11-09 NOTE — PATIENT PROFILE ADULT - FALL HARM RISK - HARM RISK INTERVENTIONS
Assistance with ambulation/Assistance OOB with selected safe patient handling equipment/Communicate Risk of Fall with Harm to all staff/Monitor gait and stability/Reinforce activity limits and safety measures with patient and family/Sit up slowly, dangle for a short time, stand at bedside before walking/Tailored Fall Risk Interventions/Visual Cue: Yellow wristband and red socks/Bed in lowest position, wheels locked, appropriate side rails in place/Call bell, personal items and telephone in reach/Instruct patient to call for assistance before getting out of bed or chair/Non-slip footwear when patient is out of bed/[place] to call system/Physically safe environment - no spills, clutter or unnecessary equipment/Purposeful Proactive Rounding/Room/bathroom lighting operational, light cord in reach

## 2023-11-09 NOTE — ED PROVIDER NOTE - OBJECTIVE STATEMENT
Patient is a 59 yo female with hx of cirrhosis; recent variceal bleed/UGIB; hx of alcohol abuse; with 3 days of bright red blood per rectum; abdominal distention; dizziness; lightheadedness; (+) sob; chest pain; palpitations; cough; required my immediate response.

## 2023-11-09 NOTE — H&P ADULT - NSHPPHYSICALEXAM_GEN_ALL_CORE
GENERAL: NAD, well-groomed, well-developed  HEAD:  Atraumatic, Normocephalic  EYES: EOMI, PERRLA, conjunctiva and sclera clear  ENMT: ; Dry mucous membranes, Good dentition, No lesions  NECK: Supple, No JVD, Normal thyroid  NERVOUS SYSTEM:  Alert & Oriented X3, Good concentration; Motor Strength 5/5 B/L upper and lower extremities; DTRs 2+ intact and symmetric  CHEST/LUNG: CTA bilaterally; No rales, rhonchi, wheezing, or rubs  HEART: Regular rate and rhythm; No murmurs, rubs, or gallops  ABDOMEN: + distended abdomen, + tender to palpation throughout mostly in +LLQ and epigastric area   EXTREMITIES:  2+ Peripheral Pulses, No clubbing, cyanosis, or edema  SKIN: pale throughout   SKIN: No rashes or lesions

## 2023-11-09 NOTE — H&P ADULT - ASSESSMENT
***This patient requires critical care for support of one or more vital organ systems with a high probability of imminent or life threatening deterioration in his/her condition        ====================ASSESSMENT ==============  1. LGIB likely 2/2 hemorrhoids   2. ABLA severe hemorrhagic shock   3. Abdominal ascites 2/2 ETOH cirrhosis   4. lactic acidosis   5. esophageal varices   6. R/o SBP ?    Plan:  ====================== NEUROLOGY=====================    ==================== RESPIRATORY======================  Mechanical Ventilation:      ====================CARDIOVASCULAR==================    ===================HEMATOLOGIC/ONC ===================    ===================== RENAL =========================  Continue monitoring urine output    ==================== GASTROINTESTINAL===================  pantoprazole Infusion 8 mG/Hr (10 mL/Hr) IV Continuous <Continuous>  polyethylene glycol/electrolyte Solution 2000 milliLiter(s) Oral Once  thiamine 100 milliGRAM(s) Oral daily    =======================    ENDOCRINE  =====================  octreotide  Infusion 50 MICROgram(s)/Hr (10 mL/Hr) IV Continuous <Continuous>  octreotide  Injectable 50 MICROGram(s) IV Push Once    ========================INFECTIOUS DISEASE================      TOTAL CRITICAL CARE TIME: 75 minutes (EXCLUSIVE of any non bundled procedures)    Critical Care time: 75 mins assessing presenting problems of acute illness that poses high probability of life threatening deterioration or end organ damage/dysfunction.  Medical decision making including Initiating plan of care, reviewing data, reviewing radiology, direct patient bedside evaluation and interpretation of vital signs, any necessary ventilator management , discussion with multidisciplinary team, discussing goals of care with patient/family, all non inclusive of procedures        ***This patient requires critical care for support of one or more vital organ systems with a high probability of imminent or life threatening deterioration in his/her condition        ====================ASSESSMENT ==============  1. LGIB likely 2/2 hemorrhoids   2. ABLA severe hemorrhagic shock   3. Abdominal ascites 2/2 ETOH cirrhosis   4. lactic acidosis   5. esophageal varices   6. R/o SBP ?    Plan:    -Pt is awake alert and oriented X4 at this time   -No active issues with mental status, pt does have dizziness with is consistent with her severe anemia   -Pt does have low grade fever and admits to having fever at home, will give tylenol   -Pt is SpO2 well on RA at this time JORDIN   -Severe acute blood loss anemia is noted on labs with hbg 3.9 baseline seems to be in 9-10 range based on previous admissions)   -LA 6.7 lkely 2/2 to blood loss and hypotension   -Pending 2 units PRBC and then repeat CBC and LA for 1500, pt will likely nee another unit   -Pt states that she was bleeding continuously  for about 3 days BRBPR and states she though it would stop and she knew it was her hemorrhoids  -will continue to monitor CBC's q6h and resuscitate with blood products as needed   -CT abdomen without active bleed, but noted to have cirrhosis and ascites with distended abdomen on CT, varices with portal hypertension reported as well   -Pt does have history of previous UGIB 2/2 to bleeding varices with recent variceal bands, cirrhosis with pulmonary HTN and hepatic ascites 2/2 to ETOH abuse   -Pt was seen by GI and there is a plan for EGD and colonoscopy for tm   -pt was started on octreotide gtt and Protonix gtt as well as per GI team   -Pt approved to be started on CLD and prep ordered for colonoscopy tm   -it appears pt is on spironolactone and propanolol for varices and ascites at home, will hold at this time, until Pt's BP is stable   -Hold all chemical DVT ppx and will order SCD's    -with fevers at home and on admission will assess if there is a window to do diagnostic tap to r/o  SBP   -PPx Ceftriaxone ordered, will trend fever curve and WBC       TOTAL CRITICAL CARE TIME: 90 minutes (EXCLUSIVE of any non bundled procedures)    Critical Care time: 90 mins assessing presenting problems of acute illness that poses high probability of life threatening deterioration or end organ damage/dysfunction.  Medical decision making including Initiating plan of care, reviewing data, reviewing radiology, direct patient bedside evaluation and interpretation of vital signs, any necessary ventilator management , discussion with multidisciplinary team, discussing goals of care with patient/family, all non inclusive of procedures     DATE OF ENTRY OF THIS NOTE IS EQUAL TO THE DATE OF SERVICES RENDERED ****

## 2023-11-09 NOTE — H&P ADULT - NSHPREVIEWOFSYSTEMS_GEN_ALL_CORE
CONSTITUTIONAL: + fever at home, + weakness + fatigue + dizziness   EYES: No  visual disturbances  ENMT:  No sinus or throat pain  NECK: No pain or stiffness  RESPIRATORY: + dry cough, NO wheezing, chills or hemoptysis; No shortness of breath  CARDIOVASCULAR: No chest pain, palpitations,  leg swelling  GASTROINTESTINAL: + abdominal pain epigastric and left side. No nausea, vomiting, or hematemesis; +BRBPR X3 days  GENITOURINARY: No dysuria, frequency, hematuria, or incontinence  NEUROLOGICAL: No headaches, memory loss, loss of strength, numbness, or tremors  MUSCULOSKELETAL: No joint pain or swelling; No muscle, back, or extremity pain

## 2023-11-09 NOTE — ED ADULT NURSE REASSESSMENT NOTE - NS ED NURSE REASSESS COMMENT FT1
Report received from SHANNAN Gamboa. Pt resting comfortably in bed, continues to c/o abdominal pain radiating up to her chest and jaw, pt states "I've had this pain for weeks. I came with it. I normally take tylenol which I already got but it still hurts. I think I want something else right now." MD Nguyen made aware at this time, awaiting ICU C/S.

## 2023-11-09 NOTE — PROGRESS NOTE ADULT - ASSESSMENT
57 y/o female PMH alcoholic cirrhosis with ascites, portal HTN      Now admitted for:   GI bleed   Acute blood loss anemia   Hypovolemia       Plan:     Neuro - daily reorientation. Denied abd pain later in the day     CV - BP improved with fluid resus, avoid antihypertensives     Resp - aspiration precautions     GI - PPI and octreotide drips, clears today, EGD and colonoscopy tomorrow. Patient has a hx hemorrhoidal bleeding. Prophylactic ceftriaxone. Abd exam was benign when I examined her and ascites is minimal, hold off on paracentesis     Renal - monitor renal fn, replete lytes prn     ID - prophylactic ceftriaxone     Heme - received 2 PRBCs with improvement in Hb, will give 2 more along with a unit of FFP, repeat labs in pm       Patient critically ill

## 2023-11-09 NOTE — H&P ADULT - NSHPPOADEEPVENOUSTHROMB_GEN_A_CORE
First Episode Psychosis Program    Incoming/Outgoing Email  [facility]    Correspondence with: Perla, Joanie Anderson; Janel Thomson  Cc: Anil Velasquez  Subject: Faby Nair and Kaitlynn, Perla Mckeon       Please call or EPIC message with any questions or concerns.    KAYLEE Mary, [facility]  [phone]            
no

## 2023-11-10 LAB
ALBUMIN SERPL ELPH-MCNC: 1.8 G/DL — LOW (ref 3.3–5)
ALBUMIN SERPL ELPH-MCNC: 1.8 G/DL — LOW (ref 3.3–5)
ALP SERPL-CCNC: 167 U/L — HIGH (ref 40–120)
ALP SERPL-CCNC: 167 U/L — HIGH (ref 40–120)
ALT FLD-CCNC: 33 U/L — SIGNIFICANT CHANGE UP (ref 12–78)
ALT FLD-CCNC: 33 U/L — SIGNIFICANT CHANGE UP (ref 12–78)
ANION GAP SERPL CALC-SCNC: 7 MMOL/L — SIGNIFICANT CHANGE UP (ref 5–17)
ANION GAP SERPL CALC-SCNC: 7 MMOL/L — SIGNIFICANT CHANGE UP (ref 5–17)
ANION GAP SERPL CALC-SCNC: 8 MMOL/L — SIGNIFICANT CHANGE UP (ref 5–17)
ANION GAP SERPL CALC-SCNC: 8 MMOL/L — SIGNIFICANT CHANGE UP (ref 5–17)
APTT BLD: 31.2 SEC — SIGNIFICANT CHANGE UP (ref 24.5–35.6)
APTT BLD: 31.2 SEC — SIGNIFICANT CHANGE UP (ref 24.5–35.6)
AST SERPL-CCNC: 154 U/L — HIGH (ref 15–37)
AST SERPL-CCNC: 154 U/L — HIGH (ref 15–37)
BILIRUB SERPL-MCNC: 2.6 MG/DL — HIGH (ref 0.2–1.2)
BILIRUB SERPL-MCNC: 2.6 MG/DL — HIGH (ref 0.2–1.2)
BUN SERPL-MCNC: 7 MG/DL — SIGNIFICANT CHANGE UP (ref 7–23)
BUN SERPL-MCNC: 7 MG/DL — SIGNIFICANT CHANGE UP (ref 7–23)
BUN SERPL-MCNC: 8 MG/DL — SIGNIFICANT CHANGE UP (ref 7–23)
BUN SERPL-MCNC: 8 MG/DL — SIGNIFICANT CHANGE UP (ref 7–23)
CALCIUM SERPL-MCNC: 7.1 MG/DL — LOW (ref 8.5–10.1)
CALCIUM SERPL-MCNC: 7.1 MG/DL — LOW (ref 8.5–10.1)
CALCIUM SERPL-MCNC: 7.3 MG/DL — LOW (ref 8.5–10.1)
CALCIUM SERPL-MCNC: 7.3 MG/DL — LOW (ref 8.5–10.1)
CHLORIDE SERPL-SCNC: 106 MMOL/L — SIGNIFICANT CHANGE UP (ref 96–108)
CHLORIDE SERPL-SCNC: 106 MMOL/L — SIGNIFICANT CHANGE UP (ref 96–108)
CHLORIDE SERPL-SCNC: 109 MMOL/L — HIGH (ref 96–108)
CHLORIDE SERPL-SCNC: 109 MMOL/L — HIGH (ref 96–108)
CO2 SERPL-SCNC: 21 MMOL/L — LOW (ref 22–31)
CO2 SERPL-SCNC: 21 MMOL/L — LOW (ref 22–31)
CO2 SERPL-SCNC: 23 MMOL/L — SIGNIFICANT CHANGE UP (ref 22–31)
CO2 SERPL-SCNC: 23 MMOL/L — SIGNIFICANT CHANGE UP (ref 22–31)
CREAT SERPL-MCNC: 0.59 MG/DL — SIGNIFICANT CHANGE UP (ref 0.5–1.3)
CREAT SERPL-MCNC: 0.59 MG/DL — SIGNIFICANT CHANGE UP (ref 0.5–1.3)
CREAT SERPL-MCNC: 0.71 MG/DL — SIGNIFICANT CHANGE UP (ref 0.5–1.3)
CREAT SERPL-MCNC: 0.71 MG/DL — SIGNIFICANT CHANGE UP (ref 0.5–1.3)
EGFR: 103 ML/MIN/1.73M2 — SIGNIFICANT CHANGE UP
EGFR: 103 ML/MIN/1.73M2 — SIGNIFICANT CHANGE UP
EGFR: 97 ML/MIN/1.73M2 — SIGNIFICANT CHANGE UP
EGFR: 97 ML/MIN/1.73M2 — SIGNIFICANT CHANGE UP
GLUCOSE SERPL-MCNC: 94 MG/DL — SIGNIFICANT CHANGE UP (ref 70–99)
GLUCOSE SERPL-MCNC: 94 MG/DL — SIGNIFICANT CHANGE UP (ref 70–99)
GLUCOSE SERPL-MCNC: 98 MG/DL — SIGNIFICANT CHANGE UP (ref 70–99)
GLUCOSE SERPL-MCNC: 98 MG/DL — SIGNIFICANT CHANGE UP (ref 70–99)
HCT VFR BLD CALC: 24.6 % — LOW (ref 34.5–45)
HCT VFR BLD CALC: 24.6 % — LOW (ref 34.5–45)
HCT VFR BLD CALC: 25.7 % — LOW (ref 34.5–45)
HCT VFR BLD CALC: 25.7 % — LOW (ref 34.5–45)
HGB BLD-MCNC: 8.4 G/DL — LOW (ref 11.5–15.5)
INR BLD: 1.37 RATIO — HIGH (ref 0.85–1.18)
INR BLD: 1.37 RATIO — HIGH (ref 0.85–1.18)
LACTATE SERPL-SCNC: 1.5 MMOL/L — SIGNIFICANT CHANGE UP (ref 0.7–2)
LACTATE SERPL-SCNC: 1.5 MMOL/L — SIGNIFICANT CHANGE UP (ref 0.7–2)
MAGNESIUM SERPL-MCNC: 1.9 MG/DL — SIGNIFICANT CHANGE UP (ref 1.6–2.6)
MAGNESIUM SERPL-MCNC: 1.9 MG/DL — SIGNIFICANT CHANGE UP (ref 1.6–2.6)
MCHC RBC-ENTMCNC: 28.6 PG — SIGNIFICANT CHANGE UP (ref 27–34)
MCHC RBC-ENTMCNC: 28.6 PG — SIGNIFICANT CHANGE UP (ref 27–34)
MCHC RBC-ENTMCNC: 29.2 PG — SIGNIFICANT CHANGE UP (ref 27–34)
MCHC RBC-ENTMCNC: 29.2 PG — SIGNIFICANT CHANGE UP (ref 27–34)
MCHC RBC-ENTMCNC: 32.7 GM/DL — SIGNIFICANT CHANGE UP (ref 32–36)
MCHC RBC-ENTMCNC: 32.7 GM/DL — SIGNIFICANT CHANGE UP (ref 32–36)
MCHC RBC-ENTMCNC: 34.1 GM/DL — SIGNIFICANT CHANGE UP (ref 32–36)
MCHC RBC-ENTMCNC: 34.1 GM/DL — SIGNIFICANT CHANGE UP (ref 32–36)
MCV RBC AUTO: 85.4 FL — SIGNIFICANT CHANGE UP (ref 80–100)
MCV RBC AUTO: 85.4 FL — SIGNIFICANT CHANGE UP (ref 80–100)
MCV RBC AUTO: 87.4 FL — SIGNIFICANT CHANGE UP (ref 80–100)
MCV RBC AUTO: 87.4 FL — SIGNIFICANT CHANGE UP (ref 80–100)
PHOSPHATE SERPL-MCNC: 2 MG/DL — LOW (ref 2.5–4.5)
PHOSPHATE SERPL-MCNC: 2 MG/DL — LOW (ref 2.5–4.5)
PHOSPHATE SERPL-MCNC: 2.7 MG/DL — SIGNIFICANT CHANGE UP (ref 2.5–4.5)
PHOSPHATE SERPL-MCNC: 2.7 MG/DL — SIGNIFICANT CHANGE UP (ref 2.5–4.5)
PLATELET # BLD AUTO: 156 K/UL — SIGNIFICANT CHANGE UP (ref 150–400)
PLATELET # BLD AUTO: 156 K/UL — SIGNIFICANT CHANGE UP (ref 150–400)
PLATELET # BLD AUTO: 157 K/UL — SIGNIFICANT CHANGE UP (ref 150–400)
PLATELET # BLD AUTO: 157 K/UL — SIGNIFICANT CHANGE UP (ref 150–400)
POTASSIUM SERPL-MCNC: 3.1 MMOL/L — LOW (ref 3.5–5.3)
POTASSIUM SERPL-MCNC: 3.1 MMOL/L — LOW (ref 3.5–5.3)
POTASSIUM SERPL-MCNC: 3.5 MMOL/L — SIGNIFICANT CHANGE UP (ref 3.5–5.3)
POTASSIUM SERPL-MCNC: 3.5 MMOL/L — SIGNIFICANT CHANGE UP (ref 3.5–5.3)
POTASSIUM SERPL-SCNC: 3.1 MMOL/L — LOW (ref 3.5–5.3)
POTASSIUM SERPL-SCNC: 3.1 MMOL/L — LOW (ref 3.5–5.3)
POTASSIUM SERPL-SCNC: 3.5 MMOL/L — SIGNIFICANT CHANGE UP (ref 3.5–5.3)
POTASSIUM SERPL-SCNC: 3.5 MMOL/L — SIGNIFICANT CHANGE UP (ref 3.5–5.3)
PROT SERPL-MCNC: 5.2 GM/DL — LOW (ref 6–8.3)
PROT SERPL-MCNC: 5.2 GM/DL — LOW (ref 6–8.3)
PROTHROM AB SERPL-ACNC: 15.3 SEC — HIGH (ref 9.5–13)
PROTHROM AB SERPL-ACNC: 15.3 SEC — HIGH (ref 9.5–13)
RBC # BLD: 2.88 M/UL — LOW (ref 3.8–5.2)
RBC # BLD: 2.88 M/UL — LOW (ref 3.8–5.2)
RBC # BLD: 2.94 M/UL — LOW (ref 3.8–5.2)
RBC # BLD: 2.94 M/UL — LOW (ref 3.8–5.2)
RBC # FLD: 15.1 % — HIGH (ref 10.3–14.5)
RBC # FLD: 15.1 % — HIGH (ref 10.3–14.5)
RBC # FLD: 15.9 % — HIGH (ref 10.3–14.5)
RBC # FLD: 15.9 % — HIGH (ref 10.3–14.5)
SODIUM SERPL-SCNC: 137 MMOL/L — SIGNIFICANT CHANGE UP (ref 135–145)
WBC # BLD: 6.2 K/UL — SIGNIFICANT CHANGE UP (ref 3.8–10.5)
WBC # BLD: 6.2 K/UL — SIGNIFICANT CHANGE UP (ref 3.8–10.5)
WBC # BLD: 6.79 K/UL — SIGNIFICANT CHANGE UP (ref 3.8–10.5)
WBC # BLD: 6.79 K/UL — SIGNIFICANT CHANGE UP (ref 3.8–10.5)
WBC # FLD AUTO: 6.2 K/UL — SIGNIFICANT CHANGE UP (ref 3.8–10.5)
WBC # FLD AUTO: 6.2 K/UL — SIGNIFICANT CHANGE UP (ref 3.8–10.5)
WBC # FLD AUTO: 6.79 K/UL — SIGNIFICANT CHANGE UP (ref 3.8–10.5)
WBC # FLD AUTO: 6.79 K/UL — SIGNIFICANT CHANGE UP (ref 3.8–10.5)

## 2023-11-10 PROCEDURE — 88305 TISSUE EXAM BY PATHOLOGIST: CPT | Mod: 26

## 2023-11-10 PROCEDURE — 99291 CRITICAL CARE FIRST HOUR: CPT

## 2023-11-10 PROCEDURE — 88312 SPECIAL STAINS GROUP 1: CPT | Mod: 26

## 2023-11-10 RX ORDER — POTASSIUM PHOSPHATE, MONOBASIC POTASSIUM PHOSPHATE, DIBASIC 236; 224 MG/ML; MG/ML
15 INJECTION, SOLUTION INTRAVENOUS ONCE
Refills: 0 | Status: COMPLETED | OUTPATIENT
Start: 2023-11-10 | End: 2023-11-10

## 2023-11-10 RX ORDER — POTASSIUM CHLORIDE 20 MEQ
40 PACKET (EA) ORAL ONCE
Refills: 0 | Status: COMPLETED | OUTPATIENT
Start: 2023-11-10 | End: 2023-11-10

## 2023-11-10 RX ORDER — POTASSIUM CHLORIDE 20 MEQ
10 PACKET (EA) ORAL
Refills: 0 | Status: COMPLETED | OUTPATIENT
Start: 2023-11-10 | End: 2023-11-10

## 2023-11-10 RX ADMIN — POTASSIUM PHOSPHATE, MONOBASIC POTASSIUM PHOSPHATE, DIBASIC 62.5 MILLIMOLE(S): 236; 224 INJECTION, SOLUTION INTRAVENOUS at 08:01

## 2023-11-10 RX ADMIN — Medication 40 MILLIEQUIVALENT(S): at 21:27

## 2023-11-10 RX ADMIN — OCTREOTIDE ACETATE 10 MICROGRAM(S)/HR: 200 INJECTION, SOLUTION INTRAVENOUS; SUBCUTANEOUS at 15:33

## 2023-11-10 RX ADMIN — OCTREOTIDE ACETATE 10 MICROGRAM(S)/HR: 200 INJECTION, SOLUTION INTRAVENOUS; SUBCUTANEOUS at 04:21

## 2023-11-10 RX ADMIN — Medication 100 MILLIEQUIVALENT(S): at 08:01

## 2023-11-10 RX ADMIN — Medication 100 MILLIEQUIVALENT(S): at 06:41

## 2023-11-10 RX ADMIN — PANTOPRAZOLE SODIUM 10 MG/HR: 20 TABLET, DELAYED RELEASE ORAL at 15:33

## 2023-11-10 RX ADMIN — PANTOPRAZOLE SODIUM 10 MG/HR: 20 TABLET, DELAYED RELEASE ORAL at 04:21

## 2023-11-10 RX ADMIN — Medication 100 MILLIEQUIVALENT(S): at 09:07

## 2023-11-10 RX ADMIN — CEFTRIAXONE 2000 MILLIGRAM(S): 500 INJECTION, POWDER, FOR SOLUTION INTRAMUSCULAR; INTRAVENOUS at 21:27

## 2023-11-10 NOTE — PROGRESS NOTE ADULT - ASSESSMENT
Assessment/Plan  59 yo F PMH ETOH cirrhosis; recent variceal bleed/UGIB,  hx of alcohol abuse remains in ICU for active management of   1. Hemm shock -- resolved  2. GI bleed suspected LGIB  3. ABLA  4. TEREZA    Neuro: At baseline mental status, non-focal.  Cardio: Hemm shock state resolved with blood products, now HD stable, maintaining MAP > 65. Non-tachycardic. Formal TTE reviewed, normal EF 60-65%   Pulm: Maintaing o2 sat > 92 % on minimal O2 requiremetns.    Assessment/Plan  57 yo F PMH ETOH cirrhosis; recent variceal bleed/UGIB,  hx of alcohol abuse remains in ICU for active management of   1. Hemm shock -- resolved  2. GI bleed suspected LGIB  3. ABLA  4. TEREZA    Neuro: At baseline mental status, non-focal.  Cardio: Hemm shock state resolved with blood products, now HD stable, maintaining MAP > 65. Non-tachycardic. Formal TTE reviewed, normal EF 60-65%   Pulm: Maintaing o2 sat > 92 % on minimal O2 requirements  GI: Presented with days of BRB consistent with LGI bleed. CTA negative for active bleed, known esophageal varices seen. Protonix and ocreotide gtt. Currently on bowel prep, plan for EGD in colonscopy in AM  Renal: TEREZA with Scr 0.96 (baseline around 0.45) likely prerenal in setting of hemm shock. S/p blood products. Strict I/o's, monitor uop. Avoid nephrotoxic agents.  Heme: ABLA with original Hb 3.3 s/p 4 units of PRBC and 1 FFP with repeat HB 7.0. Ordered another unit of PRBC. Will taransfuse for goal Hb > 7. Q 6 hour CBC. Fibrinogen level sent off in setting of cirrhosis, wnl. Multiple episodes of large bowel movements only blood tinged. Mechanical SCD  ID: CTX for SBP prophylaxis  Endo: No active issues  dispo: Remains in ICU for active management of LGI bleed        Time spent on this patient encounter, which includes documenting this note in the electronic medical record, was 58 minutes including assessing the presenting problems with associated risks, reviewing the medical record to prepare for the encounter, and meeting face to face with patient to obtain additional history. I have also performed an appropriate physical exam, made interventions listed and ordered and interpreted appropriate diagnostic studies as documented. To improve communication and patient safety, I have coordinated care with the multidisciplinary team including the bedside nurse, appropriate attending of record and consultants as needed.   Date of entry of this note is equal to the date of services rendered     Assessment/Plan  59 yo F PMH ETOH cirrhosis; recent variceal bleed/UGIB,  hx of alcohol abuse remains in ICU for active management of   1. Hemm shock -- resolved  2. GI bleed suspected LGIB  3. ABLA  4. TEREZA    Neuro: At baseline mental status, non-focal.  Cardio: Hemm shock state resolved with blood products, now HD stable, maintaining MAP > 65. Non-tachycardic. Formal TTE reviewed, normal EF 60-65%. Lactate cleared, monitoring end points of perfusion   Pulm: Maintaing o2 sat > 92 % on minimal O2 requirements  GI: Presented with days of BRB consistent with LGI bleed. CTA negative for active bleed, known esophageal varices seen. Protonix and ocreotide gtt. Currently on bowel prep, plan for EGD in colonscopy in AM  Renal: TEREZA with Scr 0.96 (baseline around 0.45) likely prerenal in setting of hemm shock. S/p blood products. Strict I/o's, monitor uop. Avoid nephrotoxic agents.  Heme: ABLA with original Hb 3.3 s/p 4 units of PRBC and 1 FFP with repeat HB 7.0. Ordered another unit of PRBC. Will taransfuse for goal Hb > 7. Q 6 hour CBC. Fibrinogen level sent off in setting of cirrhosis, wnl. Multiple episodes of large bowel movements only blood tinged. Mechanical SCD  ID: CTX for SBP prophylaxis  Endo: No active issues  dispo: Remains in ICU for active management of LGI bleed        Time spent on this patient encounter, which includes documenting this note in the electronic medical record, was 58 minutes including assessing the presenting problems with associated risks, reviewing the medical record to prepare for the encounter, and meeting face to face with patient to obtain additional history. I have also performed an appropriate physical exam, made interventions listed and ordered and interpreted appropriate diagnostic studies as documented. To improve communication and patient safety, I have coordinated care with the multidisciplinary team including the bedside nurse, appropriate attending of record and consultants as needed.   Date of entry of this note is equal to the date of services rendered

## 2023-11-10 NOTE — PROGRESS NOTE ADULT - ASSESSMENT
57 y/o female PMH alcoholic cirrhosis with ascites, portal HTN      Now admitted for:   GI bleed   Acute blood loss anemia   Hypovolemia       Plan:     Neuro - daily reorientation    CV - BP improved with fluid resus, avoid antihypertensives     Resp - aspiration precautions     GI - PPI and octreotide drips, NPO today for EGD, colonoscopy. Prophylactic ceftriaxone. Abd exam benign and ascites is minimal, hold off on paracentesis     Renal - monitor renal fn, replete lytes prn     ID - prophylactic ceftriaxone     Heme - received 5 PRBCs and 1 FFP so far, Hb is stable today, INR improved        Patient at risk for decompensation  59 y/o female PMH alcoholic cirrhosis with ascites, portal HTN      Now admitted for:   GI bleed   Acute blood loss anemia   Hypovolemia       Plan:     Neuro - daily reorientation    CV - BP improved with fluid resus, avoid antihypertensives     Resp - aspiration precautions     GI - PPI and octreotide drips, NPO today for EGD, colonoscopy. Prophylactic ceftriaxone. Abd exam benign and ascites is minimal, hold off on paracentesis     Renal - monitor renal fn, replete lytes prn     ID - prophylactic ceftriaxone     Heme - received 5 PRBCs and 1 FFP so far, Hb is stable today, INR improved        Patient at risk for decompensation       NO MEDICAL CONTRAINDICATION FOR EGD, COLONOSCOPY TODAY

## 2023-11-11 LAB
ANION GAP SERPL CALC-SCNC: 8 MMOL/L — SIGNIFICANT CHANGE UP (ref 5–17)
ANION GAP SERPL CALC-SCNC: 8 MMOL/L — SIGNIFICANT CHANGE UP (ref 5–17)
BUN SERPL-MCNC: 6 MG/DL — LOW (ref 7–23)
BUN SERPL-MCNC: 6 MG/DL — LOW (ref 7–23)
CALCIUM SERPL-MCNC: 7.2 MG/DL — LOW (ref 8.5–10.1)
CALCIUM SERPL-MCNC: 7.2 MG/DL — LOW (ref 8.5–10.1)
CHLORIDE SERPL-SCNC: 109 MMOL/L — HIGH (ref 96–108)
CHLORIDE SERPL-SCNC: 109 MMOL/L — HIGH (ref 96–108)
CO2 SERPL-SCNC: 19 MMOL/L — LOW (ref 22–31)
CO2 SERPL-SCNC: 19 MMOL/L — LOW (ref 22–31)
CREAT SERPL-MCNC: 0.64 MG/DL — SIGNIFICANT CHANGE UP (ref 0.5–1.3)
CREAT SERPL-MCNC: 0.64 MG/DL — SIGNIFICANT CHANGE UP (ref 0.5–1.3)
EGFR: 101 ML/MIN/1.73M2 — SIGNIFICANT CHANGE UP
EGFR: 101 ML/MIN/1.73M2 — SIGNIFICANT CHANGE UP
GLUCOSE SERPL-MCNC: 102 MG/DL — HIGH (ref 70–99)
GLUCOSE SERPL-MCNC: 102 MG/DL — HIGH (ref 70–99)
HCT VFR BLD CALC: 24.8 % — LOW (ref 34.5–45)
HCT VFR BLD CALC: 24.8 % — LOW (ref 34.5–45)
HGB BLD-MCNC: 8.2 G/DL — LOW (ref 11.5–15.5)
HGB BLD-MCNC: 8.2 G/DL — LOW (ref 11.5–15.5)
MAGNESIUM SERPL-MCNC: 1.9 MG/DL — SIGNIFICANT CHANGE UP (ref 1.6–2.6)
MAGNESIUM SERPL-MCNC: 1.9 MG/DL — SIGNIFICANT CHANGE UP (ref 1.6–2.6)
MCHC RBC-ENTMCNC: 28.9 PG — SIGNIFICANT CHANGE UP (ref 27–34)
MCHC RBC-ENTMCNC: 28.9 PG — SIGNIFICANT CHANGE UP (ref 27–34)
MCHC RBC-ENTMCNC: 33.1 GM/DL — SIGNIFICANT CHANGE UP (ref 32–36)
MCHC RBC-ENTMCNC: 33.1 GM/DL — SIGNIFICANT CHANGE UP (ref 32–36)
MCV RBC AUTO: 87.3 FL — SIGNIFICANT CHANGE UP (ref 80–100)
MCV RBC AUTO: 87.3 FL — SIGNIFICANT CHANGE UP (ref 80–100)
PHOSPHATE SERPL-MCNC: 2.4 MG/DL — LOW (ref 2.5–4.5)
PHOSPHATE SERPL-MCNC: 2.4 MG/DL — LOW (ref 2.5–4.5)
PLATELET # BLD AUTO: 163 K/UL — SIGNIFICANT CHANGE UP (ref 150–400)
PLATELET # BLD AUTO: 163 K/UL — SIGNIFICANT CHANGE UP (ref 150–400)
POTASSIUM SERPL-MCNC: 4 MMOL/L — SIGNIFICANT CHANGE UP (ref 3.5–5.3)
POTASSIUM SERPL-MCNC: 4 MMOL/L — SIGNIFICANT CHANGE UP (ref 3.5–5.3)
POTASSIUM SERPL-SCNC: 4 MMOL/L — SIGNIFICANT CHANGE UP (ref 3.5–5.3)
POTASSIUM SERPL-SCNC: 4 MMOL/L — SIGNIFICANT CHANGE UP (ref 3.5–5.3)
RBC # BLD: 2.84 M/UL — LOW (ref 3.8–5.2)
RBC # BLD: 2.84 M/UL — LOW (ref 3.8–5.2)
RBC # FLD: 16 % — HIGH (ref 10.3–14.5)
RBC # FLD: 16 % — HIGH (ref 10.3–14.5)
SODIUM SERPL-SCNC: 136 MMOL/L — SIGNIFICANT CHANGE UP (ref 135–145)
SODIUM SERPL-SCNC: 136 MMOL/L — SIGNIFICANT CHANGE UP (ref 135–145)
WBC # BLD: 6.31 K/UL — SIGNIFICANT CHANGE UP (ref 3.8–10.5)
WBC # BLD: 6.31 K/UL — SIGNIFICANT CHANGE UP (ref 3.8–10.5)
WBC # FLD AUTO: 6.31 K/UL — SIGNIFICANT CHANGE UP (ref 3.8–10.5)
WBC # FLD AUTO: 6.31 K/UL — SIGNIFICANT CHANGE UP (ref 3.8–10.5)

## 2023-11-11 PROCEDURE — 99233 SBSQ HOSP IP/OBS HIGH 50: CPT

## 2023-11-11 PROCEDURE — 99222 1ST HOSP IP/OBS MODERATE 55: CPT

## 2023-11-11 RX ORDER — SPIRONOLACTONE 25 MG/1
50 TABLET, FILM COATED ORAL DAILY
Refills: 0 | Status: DISCONTINUED | OUTPATIENT
Start: 2023-11-11 | End: 2023-11-16

## 2023-11-11 RX ORDER — ONDANSETRON 8 MG/1
4 TABLET, FILM COATED ORAL EVERY 8 HOURS
Refills: 0 | Status: DISCONTINUED | OUTPATIENT
Start: 2023-11-11 | End: 2023-11-16

## 2023-11-11 RX ORDER — FUROSEMIDE 40 MG
20 TABLET ORAL DAILY
Refills: 0 | Status: DISCONTINUED | OUTPATIENT
Start: 2023-11-11 | End: 2023-11-16

## 2023-11-11 RX ORDER — TRAMADOL HYDROCHLORIDE 50 MG/1
25 TABLET ORAL ONCE
Refills: 0 | Status: DISCONTINUED | OUTPATIENT
Start: 2023-11-11 | End: 2023-11-11

## 2023-11-11 RX ADMIN — TRAMADOL HYDROCHLORIDE 25 MILLIGRAM(S): 50 TABLET ORAL at 22:24

## 2023-11-11 RX ADMIN — Medication 100 MILLIGRAM(S): at 09:29

## 2023-11-11 RX ADMIN — Medication 20 MILLIGRAM(S): at 13:36

## 2023-11-11 RX ADMIN — CEFTRIAXONE 2000 MILLIGRAM(S): 500 INJECTION, POWDER, FOR SOLUTION INTRAMUSCULAR; INTRAVENOUS at 22:24

## 2023-11-11 NOTE — CONSULT NOTE ADULT - ASSESSMENT
59 yo F w/ cirrhosis, h/o hemorrhoids s/p banding x 8+ & hemorrhoidectomy of 2 piles, who is admitted with GIB likely 2/2 Grade III hemorrhoids that would benefit from resection prior to discharge    -Recommend medical clearance for hemorrhoidectomy, likely Monday 11/13  -Bowel regimen  -High fiber diet  -Tranfuse PRN    Seen & discussed with Dr. Jose 59 yo F w/ cirrhosis, h/o hemorrhoids s/p banding x 8+ & hemorrhoidectomy of 2 piles, who is admitted with GIB likely 2/2 Grade III hemorrhoids that would benefit from resection prior to discharge    -Recommend medical clearance for hemorrhoidectomy  -Bowel regimen  -High fiber diet  -Tranfuse PRN    Seen & discussed with Dr. Jose

## 2023-11-11 NOTE — CONSULT NOTE ADULT - SUBJECTIVE AND OBJECTIVE BOX
HPI:  Patient is a 59 yo female with hx of cirrhosis; recent variceal bleed/UGIB; hx of alcohol abuse; with 3 days of bright red blood per rectum; abdominal distention; dizziness; lightheadedness; (+) sob; chest pain; palpitations; cough; required my immediate response.  --------------  Had an upper GI bleed last month -- esophageal varices banded  However, this time there's no N/V. Blood is bright red but also with some brown stool.  Patient feels this is similar to hemorrhoidal bleeding she had years ago.  Reports no alcohol since prior to last hospitalization    PAST MEDICAL & SURGICAL HISTORY:  Alcohol abuse      Depression      Withdrawal seizures      History of basal cell carcinoma excision      Squamous cell skin cancer      Hemorrhoids      2019 novel coronavirus disease (COVID-19)      History of ear, nose, and throat (ENT) surgery      H/O basal cell carcinoma excision      H/O:           Home Medications:  ferrous sulfate 324 mg (65 mg elemental iron) oral tablet: 1 tab(s) orally once a day (09 May 2023 11:31)  fluticasone 50 mcg/inh inhalation powder: 1 spray(s) inhaled once a day (09 May 2023 11:29)  spironolactone 25 mg oral tablet: 1 tab(s) orally once a day (09 May 2023 11:28)  venlafaxine 150 mg oral tablet, extended release: 1 tab(s) orally once a day (09 May 2023 11:30)      MEDICATIONS  (STANDING):  octreotide  Infusion 50 MICROgram(s)/Hr (10 mL/Hr) IV Continuous <Continuous>  octreotide  Injectable 50 MICROGram(s) IV Push Once  pantoprazole Infusion 8 mG/Hr (10 mL/Hr) IV Continuous <Continuous>  polyethylene glycol/electrolyte Solution 2000 milliLiter(s) Oral Once    MEDICATIONS  (PRN):      Allergies    Zithromax (Unknown)    Intolerances    morphine (Nausea)      SOCIAL HISTORY:    FAMILY HISTORY:  FH: pancreatic cancer (Mother)    Family history of heart attack (Father)    Family history of CVA (Father)        ROS  As above  Otherwise unremarkable    Vital Signs Last 24 Hrs  T(C): 37.8 (2023 07:45), Max: 38 (2023 06:19)  T(F): 100 (2023 07:45), Max: 100.4 (2023 06:19)  HR: 123 (2023 07:45) (123 - 132)  BP: 111/54 (2023 07:45) (95/56 - 111/54)  BP(mean): 70 (2023 07:45) (70 - 87)  RR: 17 (2023 07:45) (17 - 17)  SpO2: 99% (2023 07:45) (94% - 99%)    Parameters below as of 2023 07:45  Patient On (Oxygen Delivery Method): room air        Constitutional: NAD, well-developed  Respiratory: CTAB  Cardiovascular: S1 and S2, RRR  Gastrointestinal: BS+, soft, NT/ND  Extremities: No peripheral edema  Psychiatric: Normal mood, normal affect  Skin: No rashes    LABS:                        3.9    15.88 )-----------( 327      ( 2023 06:43 )             13.3     11-    132<L>  |  99  |  13  ----------------------------<  126<H>  3.7   |  17<L>  |  0.92    Ca    7.5<L>      2023 06:43    TPro  5.8<L>  /  Alb  1.9<L>  /  TBili  1.9<H>  /  DBili  x   /  AST  60<H>  /  ALT  23  /  AlkPhos  219<H>  11-09    PT/INR - ( 2023 06:43 )   PT: 15.8 sec;   INR: 1.41 ratio         PTT - ( 2023 06:43 )  PTT:30.4 sec  LIVER FUNCTIONS - ( 2023 06:43 )  Alb: 1.9 g/dL / Pro: 5.8 gm/dL / ALK PHOS: 219 U/L / ALT: 23 U/L / AST: 60 U/L / GGT: x             RADIOLOGY & ADDITIONAL STUDIES:
HPI:  59 yo F w/ cirrhosis, h/o hemorrhoids s/p banding x 8+ & hemorrhoidectomy of 2 piles, who is admitted with GIB requiring 5 u pRBC for admission Hgb of 3.9. Colonoscopy yesterday revealed trace mid esophageal varices, mild portal gastropathy, large prolapsing hemorrhoids (vs. rectal varices) and no other bleeding in the colon. This morning she states she generally has soft stools, sometimes with assistance of stool softeners, but she did had a harder one several days ago causing hemorrhoidal prolapse that lasted several hours until she manually reduced them, applied some gauze, and taped her bottom. States she hasn't had a bloody BM since before she drank the prep for colonoscopy.    ROS: 14 systems reviewed with pertinent positives and negatives as above.    PAST MEDICAL & SURGICAL HISTORY:  Alcohol abuse      Depression      Withdrawal seizures      History of basal cell carcinoma excision      Squamous cell skin cancer      Hemorrhoids      2019 novel coronavirus disease (COVID-19)      History of ear, nose, and throat (ENT) surgery      H/O basal cell carcinoma excision      H/O:           MEDICATIONS  (STANDING):  cefTRIAXone Injectable. 2000 milliGRAM(s) IV Push every 24 hours  furosemide    Tablet 20 milliGRAM(s) Oral daily  spironolactone 50 milliGRAM(s) Oral daily  thiamine 100 milliGRAM(s) Oral daily    MEDICATIONS  (PRN):  ondansetron Injectable 4 milliGRAM(s) IV Push every 8 hours PRN Nausea and/or Vomiting      Allergies    Zithromax (Unknown)    Intolerances    morphine (Nausea)      SOCIAL HISTORY: Former EtOH abuse, denies illicits    FAMILY HISTORY:  FH: pancreatic cancer (Mother)    Family history of heart attack (Father)    Family history of CVA (Father)      Physical Exam:  GENERAL: NAD, well developed, temporal wasting  HEAD: Atraumatic, normocephalic  EYES: EOMI, PERRLA, conjunctiva clear, sclera icteric  ENT: moist mucous membrane  NECK: supple, No JVD, midline trachea  CHEST/LUNG: No increased WOB, symmetric excursions  Heart: RRR ppp, no peripheral edema  ABDOMEN: no caput medusa, round, mildly distended, soft, nontender  Rectal: Redundant anterior tissue from prior external hemorrhoid, normal rectal tone, prominent internal right posterior and left lateral piles, no gross blood in vault  EXTREMITIES: +2 peripheral pulses, brisk cap refill. no clubbing, cyanosis or edema  NERVOUS SYSTEM: AOx4, speech clear, no neuro-deficits  MSK: full ROM, no deformities  SKIN: warm to touch, no rash or lesions      Vital Signs Last 24 Hrs  T(C): 36.9 (2023 17:52), Max: 36.9 (2023 04:00)  T(F): 98.4 (:52), Max: 98.5 (2023 04:00)  HR: 107 (2023 17:52) (82 - 107)  BP: 102/69 (2023 17:52) (102/69 - 122/83)  BP(mean): 96 (2023 13:00) (77 - 96)  RR: 18 (:52) (15 - 27)  SpO2: 95% (:) (92% - 100%)    Parameters below as of 2023 17:52  Patient On (Oxygen Delivery Method): room air        I&O's Summary    10 Nov 2023 07:01  -  2023 07:00  --------------------------------------------------------  IN: 1750 mL / OUT: 0 mL / NET: 1750 mL            LABS:                        8.2    6.31  )-----------( 163      ( 2023 05:27 )             24.8         136  |  109<H>  |  6<L>  ----------------------------<  102<H>  4.0   |  19<L>  |  0.64    Ca    7.2<L>      2023 05:27  Phos  2.4       Mg     1.9         TPro  5.2<L>  /  Alb  1.8<L>  /  TBili  2.6<H>  /  DBili  x   /  AST  154<H>  /  ALT  33  /  AlkPhos  167<H>  11-10    PT/INR - ( 10 Nov 2023 05:13 )   PT: 15.3 sec;   INR: 1.37 ratio         PTT - ( 10 Nov 2023 05:13 )  PTT:31.2 sec  Urinalysis Basic - ( 2023 05:27 )    Color: x / Appearance: x / SG: x / pH: x  Gluc: 102 mg/dL / Ketone: x  / Bili: x / Urobili: x   Blood: x / Protein: x / Nitrite: x   Leuk Esterase: x / RBC: x / WBC x   Sq Epi: x / Non Sq Epi: x / Bacteria: x      CAPILLARY BLOOD GLUCOSE        LIVER FUNCTIONS - ( 10 Nov 2023 05:13 )  Alb: 1.8 g/dL / Pro: 5.2 gm/dL / ALK PHOS: 167 U/L / ALT: 33 U/L / AST: 154 U/L / GGT: x               RADIOLOGY & ADDITIONAL STUDIES:    23 CT A/P w/ contrast  FINDINGS:  LOWER CHEST: Lower esophageal varices..    LIVER: Cirrhosis.  BILE DUCTS: Normal caliber.  GALLBLADDER: Cholelithiasis.  SPLEEN: Within normal limits.  PANCREAS: Within normal limits.  ADRENALS: Stable bilateral adenomas measuring 1.3 cm on the right and 1.5   cm on the left.  KIDNEYS/URETERS: Within normal limits.    BLADDER: Within normal limits.  REPRODUCTIVE ORGANS: Uterus and adnexa within normal limits.    BOWEL: No evidence for active GI bleed. No bowel obstruction. Mild   sigmoid diverticulosis. Appendix is normal.  PERITONEUM: Mild volume ascites.  VESSELS: Atherosclerotic changes. Patent portal, superior mesenteric, and   splenic veins. Left abdominal collateral vessels.  RETROPERITONEUM/LYMPH NODES: No lymphadenopathy.  ABDOMINAL WALL: Within normal limits.  BONES: Mild chronic T8 compression deformity. Straightening of the lumbar   lordosis. Mild degenerative change at L4-5..    IMPRESSION:  Cirrhosis with sequela of portal hypertension. Mild ascites.    No evidence for active GI bleed.

## 2023-11-11 NOTE — PROGRESS NOTE ADULT - ASSESSMENT
Imp:  Hemorrhoidal bleed    Rec:  Colorectal eval pending Imp:  Hemorrhoidal bleed    Rec:  Colorectal eval pending  Resume diuretics for ascites

## 2023-11-11 NOTE — PROGRESS NOTE ADULT - ASSESSMENT
57 y/o female PMH alcoholic cirrhosis with ascites, portal HTN      Now admitted for:   GI bleed   Acute blood loss anemia   Hypovolemia       Plan:     Neuro - daily reorientation    CV - BP improved with fluid resus, avoid antihypertensives for now    Resp - aspiration precautions     GI - PPI, Spironolactone and Lasix.  Surg for Hemmorrhoids H & H Stable, Prophylactic ceftriaxone. Abd exam benign and ascites is minimal, hold off on paracentesis     Renal - monitor renal fn, replete lytes prn     ID - prophylactic ceftriaxone     Heme - received 5 PRBCs and 1 FFP so far, Hb is stable today, INR improved      Transfer to Med Surg  Called into the Hospitalist at 1:09PM

## 2023-11-11 NOTE — CONSULT NOTE ADULT - ATTENDING COMMENTS
History, exam, labs and imaging reviewed.  58-year-old with history of alcoholism and liver cirrhosis with portal hypertension who presented with acute on chronic anemia due to acute blood loss from rectal bleeding.  Colonoscopy and endoscopy are ports reviewed.  She has large prolapsing hemorrhoids that are likely from her rectal varices.  She has undergone hemorrhoidectomy approximately Dr. Sandoval for similar problem.  She has been transfused with appropriate response and hemoglobin is holding steady.  I recommend hemorrhoidectomy once she is medically optimized.  Consider TIPS for her portal hypertension.

## 2023-11-12 LAB
ANION GAP SERPL CALC-SCNC: 7 MMOL/L — SIGNIFICANT CHANGE UP (ref 5–17)
ANION GAP SERPL CALC-SCNC: 7 MMOL/L — SIGNIFICANT CHANGE UP (ref 5–17)
BLD GP AB SCN SERPL QL: SIGNIFICANT CHANGE UP
BLD GP AB SCN SERPL QL: SIGNIFICANT CHANGE UP
BUN SERPL-MCNC: 5 MG/DL — LOW (ref 7–23)
BUN SERPL-MCNC: 5 MG/DL — LOW (ref 7–23)
CALCIUM SERPL-MCNC: 7.3 MG/DL — LOW (ref 8.5–10.1)
CALCIUM SERPL-MCNC: 7.3 MG/DL — LOW (ref 8.5–10.1)
CHLORIDE SERPL-SCNC: 106 MMOL/L — SIGNIFICANT CHANGE UP (ref 96–108)
CHLORIDE SERPL-SCNC: 106 MMOL/L — SIGNIFICANT CHANGE UP (ref 96–108)
CO2 SERPL-SCNC: 23 MMOL/L — SIGNIFICANT CHANGE UP (ref 22–31)
CO2 SERPL-SCNC: 23 MMOL/L — SIGNIFICANT CHANGE UP (ref 22–31)
CREAT SERPL-MCNC: 0.6 MG/DL — SIGNIFICANT CHANGE UP (ref 0.5–1.3)
CREAT SERPL-MCNC: 0.6 MG/DL — SIGNIFICANT CHANGE UP (ref 0.5–1.3)
EGFR: 103 ML/MIN/1.73M2 — SIGNIFICANT CHANGE UP
EGFR: 103 ML/MIN/1.73M2 — SIGNIFICANT CHANGE UP
GLUCOSE SERPL-MCNC: 113 MG/DL — HIGH (ref 70–99)
GLUCOSE SERPL-MCNC: 113 MG/DL — HIGH (ref 70–99)
HCT VFR BLD CALC: 24.5 % — LOW (ref 34.5–45)
HCT VFR BLD CALC: 24.5 % — LOW (ref 34.5–45)
HGB BLD-MCNC: 7.8 G/DL — LOW (ref 11.5–15.5)
HGB BLD-MCNC: 7.8 G/DL — LOW (ref 11.5–15.5)
MAGNESIUM SERPL-MCNC: 1.8 MG/DL — SIGNIFICANT CHANGE UP (ref 1.6–2.6)
MAGNESIUM SERPL-MCNC: 1.8 MG/DL — SIGNIFICANT CHANGE UP (ref 1.6–2.6)
MCHC RBC-ENTMCNC: 28.3 PG — SIGNIFICANT CHANGE UP (ref 27–34)
MCHC RBC-ENTMCNC: 28.3 PG — SIGNIFICANT CHANGE UP (ref 27–34)
MCHC RBC-ENTMCNC: 31.8 GM/DL — LOW (ref 32–36)
MCHC RBC-ENTMCNC: 31.8 GM/DL — LOW (ref 32–36)
MCV RBC AUTO: 88.8 FL — SIGNIFICANT CHANGE UP (ref 80–100)
MCV RBC AUTO: 88.8 FL — SIGNIFICANT CHANGE UP (ref 80–100)
PHOSPHATE SERPL-MCNC: 2.9 MG/DL — SIGNIFICANT CHANGE UP (ref 2.5–4.5)
PHOSPHATE SERPL-MCNC: 2.9 MG/DL — SIGNIFICANT CHANGE UP (ref 2.5–4.5)
PLATELET # BLD AUTO: 152 K/UL — SIGNIFICANT CHANGE UP (ref 150–400)
PLATELET # BLD AUTO: 152 K/UL — SIGNIFICANT CHANGE UP (ref 150–400)
POTASSIUM SERPL-MCNC: 3.7 MMOL/L — SIGNIFICANT CHANGE UP (ref 3.5–5.3)
POTASSIUM SERPL-MCNC: 3.7 MMOL/L — SIGNIFICANT CHANGE UP (ref 3.5–5.3)
POTASSIUM SERPL-SCNC: 3.7 MMOL/L — SIGNIFICANT CHANGE UP (ref 3.5–5.3)
POTASSIUM SERPL-SCNC: 3.7 MMOL/L — SIGNIFICANT CHANGE UP (ref 3.5–5.3)
RBC # BLD: 2.76 M/UL — LOW (ref 3.8–5.2)
RBC # BLD: 2.76 M/UL — LOW (ref 3.8–5.2)
RBC # FLD: 17.1 % — HIGH (ref 10.3–14.5)
RBC # FLD: 17.1 % — HIGH (ref 10.3–14.5)
SODIUM SERPL-SCNC: 136 MMOL/L — SIGNIFICANT CHANGE UP (ref 135–145)
SODIUM SERPL-SCNC: 136 MMOL/L — SIGNIFICANT CHANGE UP (ref 135–145)
WBC # BLD: 6.79 K/UL — SIGNIFICANT CHANGE UP (ref 3.8–10.5)
WBC # BLD: 6.79 K/UL — SIGNIFICANT CHANGE UP (ref 3.8–10.5)
WBC # FLD AUTO: 6.79 K/UL — SIGNIFICANT CHANGE UP (ref 3.8–10.5)
WBC # FLD AUTO: 6.79 K/UL — SIGNIFICANT CHANGE UP (ref 3.8–10.5)

## 2023-11-12 PROCEDURE — 99233 SBSQ HOSP IP/OBS HIGH 50: CPT

## 2023-11-12 PROCEDURE — 99232 SBSQ HOSP IP/OBS MODERATE 35: CPT

## 2023-11-12 RX ORDER — SODIUM CHLORIDE 9 MG/ML
1000 INJECTION, SOLUTION INTRAVENOUS
Refills: 0 | Status: DISCONTINUED | OUTPATIENT
Start: 2023-11-12 | End: 2023-11-13

## 2023-11-12 RX ADMIN — Medication 20 MILLIGRAM(S): at 10:47

## 2023-11-12 RX ADMIN — SPIRONOLACTONE 50 MILLIGRAM(S): 25 TABLET, FILM COATED ORAL at 10:47

## 2023-11-12 RX ADMIN — Medication 100 MILLIGRAM(S): at 10:47

## 2023-11-12 NOTE — PROGRESS NOTE ADULT - ASSESSMENT
59 yo F w/ cirrhosis, h/o hemorrhoids s/p banding x 8+ & hemorrhoidectomy of 2 piles, who is admitted with GIB likely 2/2 Grade III hemorrhoids that would benefit from resection prior to discharge    -Needs medical clearance for hemorrhoidectomy, likely Monday 11/13  -Bowel regimen  -High fiber diet  -Tranfuse PRN    Seen & discussed with Dr. Jose

## 2023-11-13 LAB
ANION GAP SERPL CALC-SCNC: 5 MMOL/L — SIGNIFICANT CHANGE UP (ref 5–17)
ANION GAP SERPL CALC-SCNC: 5 MMOL/L — SIGNIFICANT CHANGE UP (ref 5–17)
BUN SERPL-MCNC: 4 MG/DL — LOW (ref 7–23)
BUN SERPL-MCNC: 4 MG/DL — LOW (ref 7–23)
CALCIUM SERPL-MCNC: 7.3 MG/DL — LOW (ref 8.5–10.1)
CALCIUM SERPL-MCNC: 7.3 MG/DL — LOW (ref 8.5–10.1)
CHLORIDE SERPL-SCNC: 107 MMOL/L — SIGNIFICANT CHANGE UP (ref 96–108)
CHLORIDE SERPL-SCNC: 107 MMOL/L — SIGNIFICANT CHANGE UP (ref 96–108)
CO2 SERPL-SCNC: 25 MMOL/L — SIGNIFICANT CHANGE UP (ref 22–31)
CO2 SERPL-SCNC: 25 MMOL/L — SIGNIFICANT CHANGE UP (ref 22–31)
CREAT SERPL-MCNC: 0.44 MG/DL — LOW (ref 0.5–1.3)
CREAT SERPL-MCNC: 0.44 MG/DL — LOW (ref 0.5–1.3)
EGFR: 111 ML/MIN/1.73M2 — SIGNIFICANT CHANGE UP
EGFR: 111 ML/MIN/1.73M2 — SIGNIFICANT CHANGE UP
GLUCOSE SERPL-MCNC: 99 MG/DL — SIGNIFICANT CHANGE UP (ref 70–99)
GLUCOSE SERPL-MCNC: 99 MG/DL — SIGNIFICANT CHANGE UP (ref 70–99)
HCT VFR BLD CALC: 26.8 % — LOW (ref 34.5–45)
HCT VFR BLD CALC: 26.8 % — LOW (ref 34.5–45)
HGB BLD-MCNC: 9 G/DL — LOW (ref 11.5–15.5)
HGB BLD-MCNC: 9 G/DL — LOW (ref 11.5–15.5)
INR BLD: 1.49 RATIO — HIGH (ref 0.85–1.18)
INR BLD: 1.49 RATIO — HIGH (ref 0.85–1.18)
MAGNESIUM SERPL-MCNC: 1.6 MG/DL — SIGNIFICANT CHANGE UP (ref 1.6–2.6)
MAGNESIUM SERPL-MCNC: 1.6 MG/DL — SIGNIFICANT CHANGE UP (ref 1.6–2.6)
MCHC RBC-ENTMCNC: 28.8 PG — SIGNIFICANT CHANGE UP (ref 27–34)
MCHC RBC-ENTMCNC: 28.8 PG — SIGNIFICANT CHANGE UP (ref 27–34)
MCHC RBC-ENTMCNC: 33.6 GM/DL — SIGNIFICANT CHANGE UP (ref 32–36)
MCHC RBC-ENTMCNC: 33.6 GM/DL — SIGNIFICANT CHANGE UP (ref 32–36)
MCV RBC AUTO: 85.6 FL — SIGNIFICANT CHANGE UP (ref 80–100)
MCV RBC AUTO: 85.6 FL — SIGNIFICANT CHANGE UP (ref 80–100)
PHOSPHATE SERPL-MCNC: 2.8 MG/DL — SIGNIFICANT CHANGE UP (ref 2.5–4.5)
PHOSPHATE SERPL-MCNC: 2.8 MG/DL — SIGNIFICANT CHANGE UP (ref 2.5–4.5)
PLATELET # BLD AUTO: 131 K/UL — LOW (ref 150–400)
PLATELET # BLD AUTO: 131 K/UL — LOW (ref 150–400)
POTASSIUM SERPL-MCNC: 3.1 MMOL/L — LOW (ref 3.5–5.3)
POTASSIUM SERPL-MCNC: 3.1 MMOL/L — LOW (ref 3.5–5.3)
POTASSIUM SERPL-SCNC: 3.1 MMOL/L — LOW (ref 3.5–5.3)
POTASSIUM SERPL-SCNC: 3.1 MMOL/L — LOW (ref 3.5–5.3)
PROTHROM AB SERPL-ACNC: 16.6 SEC — HIGH (ref 9.5–13)
PROTHROM AB SERPL-ACNC: 16.6 SEC — HIGH (ref 9.5–13)
RBC # BLD: 3.13 M/UL — LOW (ref 3.8–5.2)
RBC # BLD: 3.13 M/UL — LOW (ref 3.8–5.2)
RBC # FLD: 16.9 % — HIGH (ref 10.3–14.5)
RBC # FLD: 16.9 % — HIGH (ref 10.3–14.5)
SODIUM SERPL-SCNC: 137 MMOL/L — SIGNIFICANT CHANGE UP (ref 135–145)
SODIUM SERPL-SCNC: 137 MMOL/L — SIGNIFICANT CHANGE UP (ref 135–145)
WBC # BLD: 6.85 K/UL — SIGNIFICANT CHANGE UP (ref 3.8–10.5)
WBC # BLD: 6.85 K/UL — SIGNIFICANT CHANGE UP (ref 3.8–10.5)
WBC # FLD AUTO: 6.85 K/UL — SIGNIFICANT CHANGE UP (ref 3.8–10.5)
WBC # FLD AUTO: 6.85 K/UL — SIGNIFICANT CHANGE UP (ref 3.8–10.5)

## 2023-11-13 PROCEDURE — 99232 SBSQ HOSP IP/OBS MODERATE 35: CPT

## 2023-11-13 PROCEDURE — 99232 SBSQ HOSP IP/OBS MODERATE 35: CPT | Mod: 57

## 2023-11-13 PROCEDURE — 99221 1ST HOSP IP/OBS SF/LOW 40: CPT

## 2023-11-13 RX ORDER — POTASSIUM CHLORIDE 20 MEQ
40 PACKET (EA) ORAL ONCE
Refills: 0 | Status: COMPLETED | OUTPATIENT
Start: 2023-11-13 | End: 2023-11-13

## 2023-11-13 RX ADMIN — ONDANSETRON 4 MILLIGRAM(S): 8 TABLET, FILM COATED ORAL at 21:16

## 2023-11-13 RX ADMIN — ONDANSETRON 4 MILLIGRAM(S): 8 TABLET, FILM COATED ORAL at 00:38

## 2023-11-13 RX ADMIN — SODIUM CHLORIDE 100 MILLILITER(S): 9 INJECTION, SOLUTION INTRAVENOUS at 09:57

## 2023-11-13 RX ADMIN — SPIRONOLACTONE 50 MILLIGRAM(S): 25 TABLET, FILM COATED ORAL at 09:58

## 2023-11-13 RX ADMIN — Medication 100 MILLIGRAM(S): at 10:00

## 2023-11-13 RX ADMIN — Medication 40 MILLIEQUIVALENT(S): at 09:57

## 2023-11-13 RX ADMIN — Medication 20 MILLIGRAM(S): at 09:58

## 2023-11-13 NOTE — PROGRESS NOTE ADULT - ASSESSMENT
59 yo F w/ cirrhosis, h/o hemorrhoids s/p banding x 8+ & hemorrhoidectomy of 2 piles, who is admitted with GIB likely 2/2 Grade III hemorrhoids that would benefit from resection prior to discharge  bleeding hemorrhoids  h/h  7.8/24.5, am labs pending    Recommendation:    -cont diet  -cont high fiber diet  -Consult IR for possible TIPS  -medical clearance appreciated for hemorrhoidectomy, likely this week  -cont Bowel regimen  -trend h/h Tranfuse PRN  -rest of plan as per primary team    Plan d/w CRS team 59 yo F w/ cirrhosis, h/o hemorrhoids s/p banding x 8+ & hemorrhoidectomy of 2 piles, who is admitted with GIB likely 2/2 Grade III hemorrhoids that would benefit from resection prior to discharge  bleeding hemorrhoids  h/h  7.8/24.5, am labs pending    Recommendation:    -cont diet  -cont high fiber diet  -Consult IR for possible TIPS  -OR for hemorrhoidectomy tomorrow 11/14  -cont Bowel regimen  -trend h/h Tranfuse PRN  -rest of plan as per primary team    Plan d/w CRS team

## 2023-11-13 NOTE — DIETITIAN INITIAL EVALUATION ADULT - OTHER INFO
57 y/o F PMH ETOH cirrhosis; recent variceal bleed/UGIB, hx of alcohol abuse- Reports no alcohol since prior to last hospitalization  p/w 3 days of BPBPR, abdominal distention, dizziness,  lightheadedness, chest pain; cough. Pt states she was at home bleeding when she went to the bathroom (even when she urinated), pretty consistently for 3 days. Pt states she knew it was hemorrhoids and "thought that it would stop like it has in the past." Pt started to feel CP and dizziness and then went to the ER.    Pt known to RD service, met criteria for PCM on previous admits. Currently NPO, awaiting eval for TIPS procedure w/ IR and plan for OR Wednesday for hemorrhoidectomy; likely meeting <50% ENN since admission. IR recommended to f/u outpatient for further eval of procedure. Colorectal following, recommend bowel regimen (reviewed meds and not on bowel regimen) and high fiber diet. H/H remains low, received total 7 units PRBC over last couple days. Wt hx: 130# taken by RD on 10/10 (mild edema may be skewing wt appearance); 133# on 2/5/22; 139# on 7/11/22. Bed scale wt of 136# taken by RD on 11/13; 6# wt gain/ 4.61% x 1 month. NFPE reveals moderate to severe muscle/fat wasting. RD provided education on high fiber diet and pt also requesting education on high iron foods - receptive and thankful for RD visit; left NCM handouts at bed side and addressed all questions/concerns at this time. ADAT per surgery/MD and will add ensure + HP when able. See recommendations below.

## 2023-11-13 NOTE — PROGRESS NOTE ADULT - TIME BILLING
Patient seen and examined, chart reviewed.  No acute issues overnight.  Denies further episodes of bleeding overnight/this morning.  Per Medicine note yesterday patient optimized for procedure.  Exam as above.    -- Timing of surgery TBD - possibly Wednesday, will keep patient and team updated once timing finalized and place NPO orders as appropriate  -- IR consult - will inquire whether TIPS possible   -- Supportive care, transfuse as needed  -- Blood to be ordered on hold for OR  -- Daily labs including coags  -- Will continue to follow
See above

## 2023-11-13 NOTE — DIETITIAN INITIAL EVALUATION ADULT - NSFNSGIIOFT_GEN_A_CORE
I&O's Detail    12 Nov 2023 07:01  -  13 Nov 2023 07:00  --------------------------------------------------------  IN:    PRBCs (Packed Red Blood Cells): 331 mL  Total IN: 331 mL    OUT:  Total OUT: 0 mL    Total NET: 331 mL

## 2023-11-13 NOTE — DIETITIAN INITIAL EVALUATION ADULT - ADD RECOMMEND
1) ADAT as per MD to high fiber and will add Ensure + HP shake BID when able to optimize nutritional needs (provides 350 kcal, 20g protein/ shake)   2) Encourage protein-rich foods, maximize food preferences   3) Monitor bowel movements, consider implementing bowel regimen as per surgery recommendations   4) MVI w/ minerals daily to ensure 100% RDA met   5) C/w thiamine 100 mg daily 2/2 poor PO intake/ malnutrition   6) Monitor daily lytes/min and replete prn. Trend H/H  7) Obtain weekly wt to track/trend changes   8) Confirm goals of care regarding nutrition support   9) F/u w/ [facility] IR outpatient for further management/ eval for TIPS procedure   RD will continue to monitor NPO status, PO tolerance when diet advanced, labs, hydration, and wt prn.

## 2023-11-13 NOTE — DIETITIAN INITIAL EVALUATION ADULT - ORAL INTAKE PTA/DIET HISTORY
Lives at home w/ her boyfriend, endorses no problems w/ cooking and shopping. States "fair" appetite PTA, consumes small frequent meals throughout the day, avoids fried foods, consumes ensure daily to increase protein intake. States she has lactose intolerance, consumes minimal dairy products. C/o irregular BMs.

## 2023-11-13 NOTE — DIETITIAN NUTRITION RISK NOTIFICATION - ADDITIONAL COMMENTS/DIETITIAN RECOMMENDATIONS
1) ADAT as per MD to high fiber and will add Ensure + HP shake BID when able to optimize nutritional needs (provides 350 kcal, 20g protein/ shake)   2) Encourage protein-rich foods, maximize food preferences   3) Monitor bowel movements, consider implementing bowel regimen as per surgery recommendations   4) MVI w/ minerals daily to ensure 100% RDA met   5) C/w thiamine 100 mg daily 2/2 poor PO intake/ malnutrition   6) Monitor daily lytes/min and replete prn. Trend H/H  7) Obtain weekly wt to track/trend changes   8) Confirm goals of care regarding nutrition support   9) F/u w/ [facility] IR outpatient for further management/ eval for TIPS procedure   RD will continue to monitor NPO status, PO tolerance when diet advanced, labs, hydration, and wt prn.

## 2023-11-13 NOTE — CONSULT NOTE ADULT - ASSESSMENT
Interventional Radiology    Evaluate for Procedure: TIPS    HPI: 59 yo F w/ cirrhosis and recurrent hemorrhoid bleed s/p banding x 8 & hemorrhoidectomy 3 years ago admitted with GIB likely 2/2 Grade III hemorrhoids Planning for hemorrhoidectomy.  IR consulted for TIPS procedure.  s/p 7 units of PRBC.      Allergies: Zithromax (Unknown)    Medications (Abx/Cardiac/Anticoagulation/Blood Products)    cefTRIAXone Injectable.: 2000 milliGRAM(s) IV Push (11-11 @ 22:24)  furosemide    Tablet: 20 milliGRAM(s) Oral (11-13 @ 09:58)  spironolactone: 50 milliGRAM(s) Oral (11-13 @ 09:58)    Data:    T(C): 36.9  HR: 80  BP: 99/70  RR: 18  SpO2: 99%    -WBC 6.85 / HgB 9.0 / Hct 26.8 / Plt 131  -Na 137 / Cl 107 / BUN 4 / Glucose 99  -K 3.1 / CO2 25 / Cr 0.44  -ALT -- / Alk Phos -- / T.Bili --  -INR 1.49 / PTT --    < from: CT Abdomen and Pelvis w/ IV Cont (11.09.23 @ 07:40) >    IMPRESSION:  Cirrhosis with sequela of portal hypertension. Mild ascites.    No evidence for active GI bleed.    < end of copied text >    < from: TTE Echo Complete w/o Contrast w/ Doppler (11.09.23 @ 16:06) >   The left ventricle is normal in size, wall thickness, wall motion and   contractility as seen in limited views. Estimated left ventricular   ejection fraction is 55-60 %.    < end of copied text >       Assessment/Plan:  59 yo F w/ cirrhosis and recurrent hemorrhoid bleed s/p banding x 8 & hemorrhoidectomy 3 years ago admitted with GIB likely 2/2 Grade III hemorrhoids Planning for hemorrhoidectomy.  IR consulted for TIPS procedure.  s/p 7 units of PRBC.    - Pt stable at this time. Recommend Follow up with [facility] IR for TIPS evaluation.  - Call to make appt at [phone].  - D/w pt and Dr. Garcia and Dr. Doyle.  Interventional Radiology    Evaluate for Procedure: TIPS    HPI: 57 yo F w/ cirrhosis and recurrent hemorrhoid bleed s/p banding x 8 & hemorrhoidectomy 3 years ago admitted with GIB likely 2/2 Grade III hemorrhoids Planning for hemorrhoidectomy.  IR consulted for TIPS procedure.  s/p 7 units of PRBC.      Allergies: Zithromax (Unknown)    Medications (Abx/Cardiac/Anticoagulation/Blood Products)    cefTRIAXone Injectable.: 2000 milliGRAM(s) IV Push (11-11 @ 22:24)  furosemide    Tablet: 20 milliGRAM(s) Oral (11-13 @ 09:58)  spironolactone: 50 milliGRAM(s) Oral (11-13 @ 09:58)    Data:    T(C): 36.9  HR: 80  BP: 99/70  RR: 18  SpO2: 99%    -WBC 6.85 / HgB 9.0 / Hct 26.8 / Plt 131  -Na 137 / Cl 107 / BUN 4 / Glucose 99  -K 3.1 / CO2 25 / Cr 0.44  -ALT -- / Alk Phos -- / T.Bili --  -INR 1.49 / PTT --    < from: CT Abdomen and Pelvis w/ IV Cont (11.09.23 @ 07:40) >    IMPRESSION:  Cirrhosis with sequela of portal hypertension. Mild ascites.    No evidence for active GI bleed.    < end of copied text >    < from: TTE Echo Complete w/o Contrast w/ Doppler (11.09.23 @ 16:06) >   The left ventricle is normal in size, wall thickness, wall motion and   contractility as seen in limited views. Estimated left ventricular   ejection fraction is 55-60 %.    < end of copied text >       Assessment/Plan:  57 yo F w/ cirrhosis and recurrent hemorrhoid bleed s/p banding x 8 & hemorrhoidectomy 3 years ago admitted with GIB likely 2/2 Grade III hemorrhoids Planning for hemorrhoidectomy.  IR consulted for TIPS procedure.  MELD 15  s/p 7 units of PRBC.    - Pt stable at this time. Recommend Follow up with [facility] IR for TIPS evaluation.  - Call to make appt at [phone].  - D/w pt and Dr. Garcia and Dr. Doyle.  Interventional Radiology    Evaluate for Procedure: TIPS    HPI: 59 yo F w/ cirrhosis and recurrent hemorrhoid bleed s/p banding x 8 & hemorrhoidectomy 3 years ago admitted with GIB likely 2/2 Grade III hemorrhoids Planning for hemorrhoidectomy.  IR consulted for TIPS procedure. No hx of hepatic encephalopathy.   s/p 7 units of PRBC.        Allergies: Zithromax (Unknown)    Medications (Abx/Cardiac/Anticoagulation/Blood Products)    cefTRIAXone Injectable.: 2000 milliGRAM(s) IV Push (11-11 @ 22:24)  furosemide    Tablet: 20 milliGRAM(s) Oral (11-13 @ 09:58)  spironolactone: 50 milliGRAM(s) Oral (11-13 @ 09:58)    Data:    T(C): 36.9  HR: 80  BP: 99/70  RR: 18  SpO2: 99%    -WBC 6.85 / HgB 9.0 / Hct 26.8 / Plt 131  -Na 137 / Cl 107 / BUN 4 / Glucose 99  -K 3.1 / CO2 25 / Cr 0.44  -ALT -- / Alk Phos -- / T.Bili --  -INR 1.49 / PTT --    General:AOX3. NAD    < from: CT Abdomen and Pelvis w/ IV Cont (11.09.23 @ 07:40) >    IMPRESSION:  Cirrhosis with sequela of portal hypertension. Mild ascites.    No evidence for active GI bleed.    < end of copied text >    < from: TTE Echo Complete w/o Contrast w/ Doppler (11.09.23 @ 16:06) >   The left ventricle is normal in size, wall thickness, wall motion and   contractility as seen in limited views. Estimated left ventricular   ejection fraction is 55-60 %.    < end of copied text >       Assessment/Plan:  59 yo F w/ cirrhosis and recurrent hemorrhoid bleed s/p banding x 8 & hemorrhoidectomy 3 years ago admitted with GIB likely 2/2 Grade III hemorrhoids Planning for hemorrhoidectomy.  IR consulted for TIPS procedure.  No hx of hepatic encephalopathy.   MELD 15  s/p 7 units of PRBC.    - Pt stable at this time. Recommend Follow up with [facility] IR for TIPS evaluation.  - Call to make appt at [phone].  - D/w pt and Dr. Garcia and Dr. Doyle.  Interventional Radiology    Evaluate for Procedure: TIPS    HPI: 57 yo F w/ cirrhosis and recurrent hemorrhoid bleed s/p banding x 8 & hemorrhoidectomy 3 years ago admitted with GIB likely 2/2 Grade III hemorrhoids Planning for hemorrhoidectomy.  IR consulted for TIPS procedure. No hx of hepatic encephalopathy.   s/p 7 units of PRBC.        Allergies: Zithromax (Unknown)    Medications (Abx/Cardiac/Anticoagulation/Blood Products)    cefTRIAXone Injectable.: 2000 milliGRAM(s) IV Push (11-11 @ 22:24)  furosemide    Tablet: 20 milliGRAM(s) Oral (11-13 @ 09:58)  spironolactone: 50 milliGRAM(s) Oral (11-13 @ 09:58)    Data:    T(C): 36.9  HR: 80  BP: 99/70  RR: 18  SpO2: 99%    -WBC 6.85 / HgB 9.0 / Hct 26.8 / Plt 131  -Na 137 / Cl 107 / BUN 4 / Glucose 99  -K 3.1 / CO2 25 / Cr 0.44  -ALT -- / Alk Phos -- / T.Bili --  -INR 1.49 / PTT --    General:AOX3. NAD    < from: CT Abdomen and Pelvis w/ IV Cont (11.09.23 @ 07:40) >    IMPRESSION:  Cirrhosis with sequela of portal hypertension. Mild ascites.    No evidence for active GI bleed.    < end of copied text >    < from: TTE Echo Complete w/o Contrast w/ Doppler (11.09.23 @ 16:06) >   The left ventricle is normal in size, wall thickness, wall motion and   contractility as seen in limited views. Estimated left ventricular   ejection fraction is 55-60 %.    < end of copied text >       Assessment/Plan:  57 yo F w/ cirrhosis and recurrent hemorrhoid bleed s/p banding x 8 & hemorrhoidectomy 3 years ago admitted with GIB likely 2/2 Grade III hemorrhoids Planning for hemorrhoidectomy.  IR consulted for TIPS procedure.  No hx of hepatic encephalopathy.   MELD 15  s/p 7 units of PRBC.    - Pt stable at this time. No urgent indication for TIPS  - Before elective TIPS/direct portocaval shunt is indicated, all other conservative measures to address hemorrhoidal bleeding must be taken. Defer to GI/CRS on that end.   - If TIPS is the only option after above, recommend outpatient followup with elective TIPS center - [facility] IR for TIPS evaluation.  - Call to make appt at [phone].  - D/w pt and Dr. Garcia and Dr. Doyle.

## 2023-11-13 NOTE — DIETITIAN INITIAL EVALUATION ADULT - PERTINENT MEDS FT
MEDICATIONS  (STANDING):  furosemide    Tablet 20 milliGRAM(s) Oral daily  lactated ringers. 1000 milliLiter(s) (100 mL/Hr) IV Continuous <Continuous>  spironolactone 50 milliGRAM(s) Oral daily  thiamine 100 milliGRAM(s) Oral daily    MEDICATIONS  (PRN):  ondansetron Injectable 4 milliGRAM(s) IV Push every 8 hours PRN Nausea and/or Vomiting  oxycodone    5 mG/acetaminophen 325 mG 1 Tablet(s) Oral every 6 hours PRN Severe Pain (7 - 10)

## 2023-11-13 NOTE — DIETITIAN INITIAL EVALUATION ADULT - PERTINENT LABORATORY DATA
11-13    137  |  107  |  4<L>  ----------------------------<  99  3.1<L>   |  25  |  0.44<L>    Ca    7.3<L>      13 Nov 2023 04:52  Phos  2.8     11-13  Mg     1.6     11-13

## 2023-11-14 ENCOUNTER — APPOINTMENT (OUTPATIENT)
Dept: COLORECTAL SURGERY | Facility: HOSPITAL | Age: 58
End: 2023-11-14

## 2023-11-14 LAB
ANION GAP SERPL CALC-SCNC: 8 MMOL/L — SIGNIFICANT CHANGE UP (ref 5–17)
ANION GAP SERPL CALC-SCNC: 8 MMOL/L — SIGNIFICANT CHANGE UP (ref 5–17)
APTT BLD: 34.9 SEC — SIGNIFICANT CHANGE UP (ref 24.5–35.6)
APTT BLD: 34.9 SEC — SIGNIFICANT CHANGE UP (ref 24.5–35.6)
BUN SERPL-MCNC: 5 MG/DL — LOW (ref 7–23)
BUN SERPL-MCNC: 5 MG/DL — LOW (ref 7–23)
CALCIUM SERPL-MCNC: 7.8 MG/DL — LOW (ref 8.5–10.1)
CALCIUM SERPL-MCNC: 7.8 MG/DL — LOW (ref 8.5–10.1)
CHLORIDE SERPL-SCNC: 105 MMOL/L — SIGNIFICANT CHANGE UP (ref 96–108)
CHLORIDE SERPL-SCNC: 105 MMOL/L — SIGNIFICANT CHANGE UP (ref 96–108)
CO2 SERPL-SCNC: 23 MMOL/L — SIGNIFICANT CHANGE UP (ref 22–31)
CO2 SERPL-SCNC: 23 MMOL/L — SIGNIFICANT CHANGE UP (ref 22–31)
CREAT SERPL-MCNC: 0.5 MG/DL — SIGNIFICANT CHANGE UP (ref 0.5–1.3)
CREAT SERPL-MCNC: 0.5 MG/DL — SIGNIFICANT CHANGE UP (ref 0.5–1.3)
CULTURE RESULTS: SIGNIFICANT CHANGE UP
EGFR: 107 ML/MIN/1.73M2 — SIGNIFICANT CHANGE UP
EGFR: 107 ML/MIN/1.73M2 — SIGNIFICANT CHANGE UP
GLUCOSE SERPL-MCNC: 100 MG/DL — HIGH (ref 70–99)
GLUCOSE SERPL-MCNC: 100 MG/DL — HIGH (ref 70–99)
HCT VFR BLD CALC: 28.2 % — LOW (ref 34.5–45)
HCT VFR BLD CALC: 28.2 % — LOW (ref 34.5–45)
HGB BLD-MCNC: 9.2 G/DL — LOW (ref 11.5–15.5)
HGB BLD-MCNC: 9.2 G/DL — LOW (ref 11.5–15.5)
INR BLD: 1.39 RATIO — HIGH (ref 0.85–1.18)
INR BLD: 1.39 RATIO — HIGH (ref 0.85–1.18)
MAGNESIUM SERPL-MCNC: 1.6 MG/DL — SIGNIFICANT CHANGE UP (ref 1.6–2.6)
MAGNESIUM SERPL-MCNC: 1.6 MG/DL — SIGNIFICANT CHANGE UP (ref 1.6–2.6)
MCHC RBC-ENTMCNC: 28.2 PG — SIGNIFICANT CHANGE UP (ref 27–34)
MCHC RBC-ENTMCNC: 28.2 PG — SIGNIFICANT CHANGE UP (ref 27–34)
MCHC RBC-ENTMCNC: 32.6 GM/DL — SIGNIFICANT CHANGE UP (ref 32–36)
MCHC RBC-ENTMCNC: 32.6 GM/DL — SIGNIFICANT CHANGE UP (ref 32–36)
MCV RBC AUTO: 86.5 FL — SIGNIFICANT CHANGE UP (ref 80–100)
MCV RBC AUTO: 86.5 FL — SIGNIFICANT CHANGE UP (ref 80–100)
PHOSPHATE SERPL-MCNC: 2.7 MG/DL — SIGNIFICANT CHANGE UP (ref 2.5–4.5)
PHOSPHATE SERPL-MCNC: 2.7 MG/DL — SIGNIFICANT CHANGE UP (ref 2.5–4.5)
PLATELET # BLD AUTO: 144 K/UL — LOW (ref 150–400)
PLATELET # BLD AUTO: 144 K/UL — LOW (ref 150–400)
POTASSIUM SERPL-MCNC: 3.3 MMOL/L — LOW (ref 3.5–5.3)
POTASSIUM SERPL-MCNC: 3.3 MMOL/L — LOW (ref 3.5–5.3)
POTASSIUM SERPL-SCNC: 3.3 MMOL/L — LOW (ref 3.5–5.3)
POTASSIUM SERPL-SCNC: 3.3 MMOL/L — LOW (ref 3.5–5.3)
PROTHROM AB SERPL-ACNC: 15.6 SEC — HIGH (ref 9.5–13)
PROTHROM AB SERPL-ACNC: 15.6 SEC — HIGH (ref 9.5–13)
RBC # BLD: 3.26 M/UL — LOW (ref 3.8–5.2)
RBC # BLD: 3.26 M/UL — LOW (ref 3.8–5.2)
RBC # FLD: 17.1 % — HIGH (ref 10.3–14.5)
RBC # FLD: 17.1 % — HIGH (ref 10.3–14.5)
SODIUM SERPL-SCNC: 136 MMOL/L — SIGNIFICANT CHANGE UP (ref 135–145)
SODIUM SERPL-SCNC: 136 MMOL/L — SIGNIFICANT CHANGE UP (ref 135–145)
SPECIMEN SOURCE: SIGNIFICANT CHANGE UP
WBC # BLD: 6.9 K/UL — SIGNIFICANT CHANGE UP (ref 3.8–10.5)
WBC # BLD: 6.9 K/UL — SIGNIFICANT CHANGE UP (ref 3.8–10.5)
WBC # FLD AUTO: 6.9 K/UL — SIGNIFICANT CHANGE UP (ref 3.8–10.5)
WBC # FLD AUTO: 6.9 K/UL — SIGNIFICANT CHANGE UP (ref 3.8–10.5)

## 2023-11-14 PROCEDURE — 99232 SBSQ HOSP IP/OBS MODERATE 35: CPT

## 2023-11-14 PROCEDURE — 88304 TISSUE EXAM BY PATHOLOGIST: CPT | Mod: 26

## 2023-11-14 PROCEDURE — 46946 INT HRHC LIG 2+HROID W/O IMG: CPT

## 2023-11-14 PROCEDURE — 46255 REMOVE INT/EXT HEM 1 GROUP: CPT

## 2023-11-14 RX ORDER — FENTANYL CITRATE 50 UG/ML
50 INJECTION INTRAVENOUS
Refills: 0 | Status: DISCONTINUED | OUTPATIENT
Start: 2023-11-14 | End: 2023-11-14

## 2023-11-14 RX ORDER — OXYCODONE HYDROCHLORIDE 5 MG/1
5 TABLET ORAL ONCE
Refills: 0 | Status: DISCONTINUED | OUTPATIENT
Start: 2023-11-14 | End: 2023-11-14

## 2023-11-14 RX ORDER — SODIUM CHLORIDE 9 MG/ML
1000 INJECTION, SOLUTION INTRAVENOUS
Refills: 0 | Status: DISCONTINUED | OUTPATIENT
Start: 2023-11-14 | End: 2023-11-14

## 2023-11-14 RX ORDER — HYDROMORPHONE HYDROCHLORIDE 2 MG/ML
0.5 INJECTION INTRAMUSCULAR; INTRAVENOUS; SUBCUTANEOUS
Refills: 0 | Status: DISCONTINUED | OUTPATIENT
Start: 2023-11-14 | End: 2023-11-16

## 2023-11-14 RX ORDER — SODIUM CHLORIDE 9 MG/ML
1000 INJECTION, SOLUTION INTRAVENOUS
Refills: 0 | Status: DISCONTINUED | OUTPATIENT
Start: 2023-11-14 | End: 2023-11-16

## 2023-11-14 RX ORDER — ONDANSETRON 8 MG/1
4 TABLET, FILM COATED ORAL EVERY 6 HOURS
Refills: 0 | Status: DISCONTINUED | OUTPATIENT
Start: 2023-11-14 | End: 2023-11-14

## 2023-11-14 RX ORDER — POTASSIUM CHLORIDE 20 MEQ
10 PACKET (EA) ORAL
Refills: 0 | Status: COMPLETED | OUTPATIENT
Start: 2023-11-14 | End: 2023-11-14

## 2023-11-14 RX ORDER — POLYETHYLENE GLYCOL 3350 17 G/17G
17 POWDER, FOR SOLUTION ORAL AT BEDTIME
Refills: 0 | Status: DISCONTINUED | OUTPATIENT
Start: 2023-11-14 | End: 2023-11-16

## 2023-11-14 RX ADMIN — ONDANSETRON 4 MILLIGRAM(S): 8 TABLET, FILM COATED ORAL at 18:20

## 2023-11-14 RX ADMIN — HYDROMORPHONE HYDROCHLORIDE 0.5 MILLIGRAM(S): 2 INJECTION INTRAMUSCULAR; INTRAVENOUS; SUBCUTANEOUS at 13:10

## 2023-11-14 RX ADMIN — Medication 100 MILLIEQUIVALENT(S): at 15:42

## 2023-11-14 RX ADMIN — FENTANYL CITRATE 50 MICROGRAM(S): 50 INJECTION INTRAVENOUS at 13:15

## 2023-11-14 RX ADMIN — Medication 100 MILLIEQUIVALENT(S): at 18:12

## 2023-11-14 RX ADMIN — FENTANYL CITRATE 50 MICROGRAM(S): 50 INJECTION INTRAVENOUS at 13:01

## 2023-11-14 RX ADMIN — POLYETHYLENE GLYCOL 3350 17 GRAM(S): 17 POWDER, FOR SOLUTION ORAL at 22:02

## 2023-11-14 RX ADMIN — Medication 100 MILLIEQUIVALENT(S): at 13:22

## 2023-11-14 RX ADMIN — OXYCODONE HYDROCHLORIDE 5 MILLIGRAM(S): 5 TABLET ORAL at 13:50

## 2023-11-14 RX ADMIN — OXYCODONE HYDROCHLORIDE 5 MILLIGRAM(S): 5 TABLET ORAL at 14:01

## 2023-11-14 RX ADMIN — HYDROMORPHONE HYDROCHLORIDE 0.5 MILLIGRAM(S): 2 INJECTION INTRAMUSCULAR; INTRAVENOUS; SUBCUTANEOUS at 13:30

## 2023-11-14 NOTE — PROGRESS NOTE ADULT - ASSESSMENT
57 yo F w/ cirrhosis, h/o hemorrhoids s/p banding x 8+ & hemorrhoidectomy of 2 piles, who is admitted with GIB likely 2/2 Grade III hemorrhoids that would benefit from resection prior to discharge  bleeding hemorrhoids  h/h 9/26.8 from 7.8/24.5, am labs pending    Recommendation:    -NPO  -IV fluids  - IR consult reccs seen no TIPS  -OR for hemorrhoidectomy today 11/14  -cont Bowel regimen  -trend h/h Tranfuse PRN  -rest of plan as per primary team    Plan d/w CRS team

## 2023-11-14 NOTE — BRIEF OPERATIVE NOTE - OPERATION/FINDINGS
large prolapsed internal hemorrhoids, one column removed with ligasure, the others oversewn. Friable mucosa with difficulty controlling bleeding despite suture ligation. Surgicel, gelfoam and thrombin and surgicel powder utilized.

## 2023-11-14 NOTE — BRIEF OPERATIVE NOTE - NSICDXBRIEFPREOP_GEN_ALL_CORE_FT
PRE-OP DIAGNOSIS:  Prolapsed hemorrhoids 14-Nov-2023 12:35:56  Vera Cordero  Rectal varices 14-Nov-2023 12:36:05  Vera Cordero

## 2023-11-14 NOTE — BRIEF OPERATIVE NOTE - NSICDXBRIEFPOSTOP_GEN_ALL_CORE_FT
POST-OP DIAGNOSIS:  Rectal varices 14-Nov-2023 12:36:16  Vera Cordero  Prolapsed hemorrhoids 14-Nov-2023 12:36:10  Vera Cordero

## 2023-11-14 NOTE — BRIEF OPERATIVE NOTE - NSICDXBRIEFPROCEDURE_GEN_ALL_CORE_FT
PROCEDURES:  Internal hemorrhoidectomy of multiple columns by simple ligature 14-Nov-2023 12:37:04  Vera Cordero  Simple hemorrhoidectomy, internal and external 14-Nov-2023 12:37:36  Vera Cordero

## 2023-11-15 ENCOUNTER — TRANSCRIPTION ENCOUNTER (OUTPATIENT)
Age: 58
End: 2023-11-15

## 2023-11-15 PROCEDURE — 99232 SBSQ HOSP IP/OBS MODERATE 35: CPT

## 2023-11-15 RX ORDER — THIAMINE MONONITRATE (VIT B1) 100 MG
1 TABLET ORAL
Qty: 0 | Refills: 0 | DISCHARGE
Start: 2023-11-15

## 2023-11-15 RX ORDER — FUROSEMIDE 40 MG
1 TABLET ORAL
Qty: 30 | Refills: 0
Start: 2023-11-15 | End: 2023-12-14

## 2023-11-15 RX ORDER — POLYETHYLENE GLYCOL 3350 17 G/17G
17 POWDER, FOR SOLUTION ORAL
Qty: 1 | Refills: 0
Start: 2023-11-15 | End: 2023-12-14

## 2023-11-15 RX ADMIN — Medication 100 MILLIGRAM(S): at 09:45

## 2023-11-15 RX ADMIN — SPIRONOLACTONE 50 MILLIGRAM(S): 25 TABLET, FILM COATED ORAL at 09:46

## 2023-11-15 RX ADMIN — Medication 20 MILLIGRAM(S): at 09:45

## 2023-11-15 NOTE — CDI QUERY NOTE - NSCDIOTHERTXTBX_GEN_ALL_CORE_HH
There is conflicting documentation regarding the patient's nutrition status. The patient received a nutrition assessment by a Registered Dietician on 11/13/2023. The documentation of this assessment states: severe protein-calorie malnutrition.  Chart documentation also states the patient is well-developed.    Can the nutrition diagnosis and criteria be clarified, after study?    - Patient was not well-developed and was found to have severe protein-calorie malnutrition   - Other (please specify)  - Not clinically significant    Chart Documentation:    Dietitian Initial Evaluation 11/13/2023   Body Mass Index: 20.6  ASSESSMENT:  Change in Usual Weight Prior to Admission: yes  Was weight change intentional or unintentional? intentional  Reason for Weight Change: increased PO intake?  Usual Weight Prior to Admission (lbs): 130 [place](s)  Date of Usual Weight (prior to admission): 11-Oct-2023  Weight Change: Loss  Pounds Lost/Gained: 6 Pound(s)  % Change: 4.61 %  Time Frame: 1 mo  Ideal Body Weight (lbs): 140  Physical Assessment: thin  Nutrition Focused Physical Exam: yes...  Muscle Mass (Loss of Muscle): Temples...  Clavicles...  Thigh...  Calf...  Temples Depletion is mild  Clavicles Depletion is moderate  Thigh Depletion is severe  Calf Depletion is severe   Body Fat (loss of subcutaneous fat): Orbital...  Triceps...  Orbital Depletion is moderate  Triceps Depletion is severe  Pt meets criteria for severe protein-calorie malnutrition in context of severe disease r/t decreased ability to meet increased nutrient needs 2/2 GIB AEB mod-severe muscle/fat wasting1)   ADAT as per MD to high fiber and will add Ensure + HP shake BID when able to optimize nutritional needs (provides 350 kcal, 20g protein/ shake)     Adult-Hospitalist progress note 11/14/2023   GENERAL: well-developed

## 2023-11-15 NOTE — DISCHARGE NOTE PROVIDER - ATTENDING DISCHARGE PHYSICAL EXAMINATION:
Patient is seen and examined at bedside. Her HH repeated and stable, no need for transfusion today. Sitz baths recommended by CRS team. Patient medically stable for discharge, she will need to f/u her CBC with PCP a week after Thanksgiving.

## 2023-11-15 NOTE — PROGRESS NOTE ADULT - ASSESSMENT
#LGIB 2/2 large internal hemorrhoids  #Anemia acute blood loss due to above  #Hemorrhagic shock resolved  S/p total 5U PRBC transfusion and 1U FFP  Repeat labs in AM prior to OR- transfuse as needed to keep above 8.0 for OR  GI following- s/p EGD/ colonoscopy  CRS eval noted  s/p OR today for hemorrhoidectomy.   ECHO- EF 55-60%  EKG- sinus  advance diet per CRS     #Alcoholic liver cirrhosis with ascites  GI following  TIPS out pt when medically stable or during admission if unstable.   Cont PO Lasix and Aldactone  DC Rocephin- low suspicion of SBP    #DVT proph- venodynes    #Dispo- s/p hemorrhoidectomy on 11/14 if stable H&H and tolerating low fiber diet, clear for dc

## 2023-11-15 NOTE — PROGRESS NOTE ADULT - NUTRITIONAL ASSESSMENT
This patient has been assessed with a concern for Malnutrition and has been determined to have a diagnosis/diagnoses of Severe protein-calorie malnutrition.    This patient is being managed with:   Diet Low Fiber-  Entered: Nov 15 2023  1:50PM  
This patient has been assessed with a concern for Malnutrition and has been determined to have a diagnosis/diagnoses of Severe protein-calorie malnutrition.    This patient is being managed with:   Diet Full Liquid-  Entered: Nov 14 2023 12:40PM

## 2023-11-15 NOTE — DISCHARGE NOTE PROVIDER - CARE PROVIDER_API CALL
Negrita Jose  Colon/Rectal Surgery  [address], [place] [address]  Phone: [phone]  Fax: [phone]  Follow Up Time:     Brian Goodson  Gastroenterology  [address], [address]  [place] [address]  Phone: [phone]  Fax: [phone]  Follow Up Time:

## 2023-11-15 NOTE — DISCHARGE NOTE PROVIDER - CARE PROVIDERS DIRECT ADDRESSES
[email or web address]

## 2023-11-15 NOTE — PROGRESS NOTE ADULT - ASSESSMENT
57 yo F w/ cirrhosis, h/o hemorrhoids s/p banding x 8+ & hemorrhoidectomy of 2 piles, who is admitted with GIB likely 2/2 Grade III hemorrhoids that would benefit from resection prior to discharge  bleeding hemorrhoids s/p hemorrhoidectomy 11/14  doing well    Recommendation:    -cont diet  -pain control  -cont Bowel regimen  -trend h/h Tranfuse PRN  -rest of plan as per primary team    Plan d/w CRS team

## 2023-11-15 NOTE — DISCHARGE NOTE PROVIDER - NSDCPNSUBOBJ_GEN_ALL_CORE
All 10 systems reviewed and found to be negative with the exception of what has been described above.    Vital Signs Last 24 Hrs  T(C): 36.9 (15 Nov 2023 12:00), Max: 36.9 (15 Nov 2023 12:00)  T(F): 98.4 (15 Nov 2023 12:00), Max: 98.4 (15 Nov 2023 12:00)  HR: 108 (15 Nov 2023 12:00) (94 - 108)  BP: 111/64 (15 Nov 2023 12:00) (90/50 - 120/74)  BP(mean): --  RR: 16 (15 Nov 2023 12:00) (12 - 16)  SpO2: 97% (15 Nov 2023 12:00) (94% - 100%)    Parameters below as of 15 Nov 2023 12:00  Patient On (Oxygen Delivery Method): room air                              9.2    6.90  )-----------( 144      ( 14 Nov 2023 05:53 )             28.2     11-14    136  |  105  |  5<L>  ----------------------------<  100<H>  3.3<L>   |  23  |  0.50    Ca    7.8<L>      14 Nov 2023 05:53  Phos  2.7     11-14  Mg     1.6     11-14            PT/INR - ( 14 Nov 2023 05:53 )   PT: 15.6 sec;   INR: 1.39 ratio    PTT - ( 14 Nov 2023 05:53 )  PTT:34.9 sec        PHYSICAL EXAM:  GENERAL: NAD, well-groomed, well-developed  HEAD:  Atraumatic, Normocephalic  EYES: EOMI, PERRLA, conjunctiva and sclera clear  HEENT: Moist mucous membranes  NECK: Supple, No JVD  NERVOUS SYSTEM:  Alert & Oriented X3, Motor Strength 5/5 B/L upper and lower extremities; DTRs 2+ intact and symmetric  CHEST/LUNG: Clear to auscultation bilaterally, No rales, rhonchi, wheezing, or rubs  HEART: Regular rate and rhythm; No murmurs, rubs, or gallops  ABDOMEN: Soft, distended with ascites  GENITOURINARY- Voiding, no palpable bladder  EXTREMITIES:  2+ Peripheral Pulses, No clubbing, cyanosis, or edema, thin wasted extremities.   MUSCULOSKELETAL- No muscle tenderness, Muscle tone normal, No joint tenderness, no Joint swelling, Joint range of motion-normal  SKIN-no rash, no lesion  CNS- alert, oriented X3, non focal           < from: CT Abdomen and Pelvis w/ IV Cont (11.09.23 @ 07:40) >      BOWEL: No evidence for active GI bleed. No bowel obstruction. Mild   sigmoid diverticulosis. Appendix is normal.  PERITONEUM: Mild volume ascites.  VESSELS: Atherosclerotic changes. Patent portal, superior mesenteric, and   splenic veins. Left abdominal collateral vessels.  RETROPERITONEUM/LYMPH NODES: No lymphadenopathy.  ABDOMINAL WALL: Within normal limits.  BONES: Mild chronic T8 compression deformity. Straightening of the lumbar   lordosis. Mild degenerative change at L4-5..    IMPRESSION:  Cirrhosis with sequela of portal hypertension. Mild ascites.    No evidence for active GI bleed.        --- End of Report ---    JEFFERSON ROSSI MD; Attending Radiologist  This document has been electronically signed. Nov 9 2023  8:02AM    < end of copied text >     All 10 systems reviewed and found to be negative with the exception of what has been described above.    Vital Signs Last 24 Hrs  T(C): 36.8 (16 Nov 2023 08:00), Max: 37 (16 Nov 2023 00:05)  T(F): 98.2 (16 Nov 2023 08:00), Max: 98.6 (16 Nov 2023 00:05)  HR: 93 (16 Nov 2023 08:00) (93 - 99)  BP: 103/63 (16 Nov 2023 08:00) (96/64 - 103/63)  BP(mean): --  RR: 16 (16 Nov 2023 08:00) (16 - 16)  SpO2: 100% (16 Nov 2023 08:00) (98% - 100%)    Parameters below as of 16 Nov 2023 08:00  Patient On (Oxygen Delivery Method): room air                                8.4    11.68 )-----------( 183      ( 16 Nov 2023 10:40 )             26.0       PHYSICAL EXAM:  GENERAL: NAD, well-groomed, thin  HEAD:  Atraumatic, Normocephalic  EYES: EOMI, PERRLA, conjunctiva and sclera clear  HEENT: Moist mucous membranes  NECK: Supple, No JVD  NERVOUS SYSTEM:  Alert & Oriented X3, Motor Strength 5/5 B/L upper and lower extremities; DTRs 2+ intact and symmetric  CHEST/LUNG: Clear to auscultation bilaterally, No rales, rhonchi, wheezing, or rubs  HEART: Regular rate and rhythm; No murmurs, rubs, or gallops  ABDOMEN: Soft, distended with ascites  GENITOURINARY- Voiding, no palpable bladder  EXTREMITIES:  2+ Peripheral Pulses, No clubbing, cyanosis, or edema, thin wasted extremities.   MUSCULOSKELETAL- No muscle tenderness, Muscle tone normal, No joint tenderness, no Joint swelling, Joint range of motion-normal  SKIN-no rash, no lesion  CNS- alert, oriented X3, non focal           < from: CT Abdomen and Pelvis w/ IV Cont (11.09.23 @ 07:40) >      BOWEL: No evidence for active GI bleed. No bowel obstruction. Mild   sigmoid diverticulosis. Appendix is normal.  PERITONEUM: Mild volume ascites.  VESSELS: Atherosclerotic changes. Patent portal, superior mesenteric, and   splenic veins. Left abdominal collateral vessels.  RETROPERITONEUM/LYMPH NODES: No lymphadenopathy.  ABDOMINAL WALL: Within normal limits.  BONES: Mild chronic T8 compression deformity. Straightening of the lumbar   lordosis. Mild degenerative change at L4-5..    IMPRESSION:  Cirrhosis with sequela of portal hypertension. Mild ascites.    No evidence for active GI bleed.        --- End of Report ---    JEFFERSON ROSSI MD; Attending Radiologist  This document has been electronically signed. Nov 9 2023  8:02AM    < end of copied text >

## 2023-11-15 NOTE — DISCHARGE NOTE PROVIDER - HOSPITAL COURSE
57 yo F PMH ETOH cirrhosis; recent variceal bleed/UGIB,  hx of alcohol abuse- Reports no alcohol since prior to last hospitalization  p/w 3 days of BPBPR, abdominal distention, dizziness,  lightheadedness, chest pain; cough. Pt states she was at home bleeding when she went to the bathroom (even when she urinated), pretty consistently for 3 days. Pt states she knew it was hemorrhoids and "thought that it would stop like it has in the past." Pt started to feel CP and dizziness and then went to the ER   In the ER the pt was noted to have a hbg 3.9 and was hypotensive with SBP in 80-90's. CT scan with no active bleed noted    Patient admitted to ICU with LGIB/ HG 3.9/ hemorrhagic shock. Received total 5U PRBC transfusion and 1FFP.   Underwent EGD that showed hiatal hernia and portal gastropathy and colonoscopy that showed large bleeding internal hemorrhoids.  Patient hemodynamically stabilized after transfusion and transferred to   CRS eval noted and patient planned for hemorrhoidectomy on 11/13/23    seen and examined on  laying in bed s/p hemorrhoidectomy. reporting pain to buttock radiating to lower abd/pelvis. states this morning she had a small formed bowel movement. no bleeding. pain is acceptable at present.      #LGIB 2/2 large internal hemorrhoids  #Anemia acute blood loss due to above  #Hemorrhagic shock resolved  S/p total 5U PRBC transfusion and 1U FFP  Repeat labs in AM prior to OR- transfuse as needed to keep above 8.0 for OR  GI following- s/p EGD/ colonoscopy  CRS eval noted  s/p OR today for hemorrhoidectomy.   ECHO- EF 55-60%  EKG- sinus  advance diet per CRS     #Alcoholic liver cirrhosis with ascites  GI following  TIPS out pt when medically stable or during admission if unstable.   Cont PO Lasix and Aldactone  DC Rocephin- low suspicion of SBP    #DVT proph- venodynes    #Dispo- s/p hemorrhoidectomy today. if stable H&H clear for dc        57 yo F PMH ETOH cirrhosis; recent variceal bleed/UGIB,  hx of alcohol abuse- Reports no alcohol since prior to last hospitalization  p/w 3 days of BPBPR, abdominal distention, dizziness,  lightheadedness, chest pain; cough. Pt states she was at home bleeding when she went to the bathroom (even when she urinated), pretty consistently for 3 days. Pt states she knew it was hemorrhoids and "thought that it would stop like it has in the past." Pt started to feel CP and dizziness and then went to the ER   In the ER the pt was noted to have a hbg 3.9 and was hypotensive with SBP in 80-90's. CT scan with no active bleed noted    Patient admitted to ICU with LGIB/ HG 3.9/ hemorrhagic shock. Received total 5U PRBC transfusion and 1FFP.   Underwent EGD that showed hiatal hernia and portal gastropathy and colonoscopy that showed large bleeding internal hemorrhoids.  Patient hemodynamically stabilized after transfusion and transferred to   CRS eval noted and patient planned for hemorrhoidectomy on 11/13/23    seen and examined on  laying in bed s/p hemorrhoidectomy. reporting pain to buttock radiating to lower abd/pelvis. states this morning she had a small formed bowel movement. no bleeding. pain is acceptable at present.      #LGIB 2/2 large internal hemorrhoids  #Anemia acute blood loss due to above  #Hemorrhagic shock resolved  S/p total 5U PRBC transfusion and 1U FFP  Repeat labs in AM prior to OR- transfuse as needed to keep above 8.0 for OR  GI following- s/p EGD/ colonoscopy  CRS eval noted  s/p OR today for hemorrhoidectomy.   ECHO- EF 55-60%  EKG- sinus  advance diet per CRS       #severe protein calorie malnutrition:  -diet liberalized to regular  -encouraged oral intake  -oral vitamins recommended upon discharge  -nutrition eval appreciated    #Alcoholic liver cirrhosis with ascites  GI following  TIPS out pt when medically stable or during admission if unstable.   Cont PO Lasix and Aldactone  DC Rocephin- low suspicion of SBP    #DVT proph- s/p venodynes    #Dispo- dc home with significant other today        59 yo F PMH ETOH cirrhosis; recent variceal bleed/UGIB,  hx of alcohol abuse- Reports no alcohol since prior to last hospitalization  p/w 3 days of BPBPR, abdominal distention, dizziness,  lightheadedness, chest pain; cough. Pt states she was at home bleeding when she went to the bathroom (even when she urinated), pretty consistently for 3 days. Pt states she knew it was hemorrhoids and "thought that it would stop like it has in the past." Pt started to feel CP and dizziness and then went to the ER   In the ER the pt was noted to have a hbg 3.9 and was hypotensive with SBP in 80-90's. CT scan with no active bleed noted    Patient admitted to ICU with LGIB/ HG 3.9/ hemorrhagic shock. Received total 5U PRBC transfusion and 1FFP.   Underwent EGD that showed hiatal hernia and portal gastropathy and colonoscopy that showed large bleeding internal hemorrhoids.  Patient hemodynamically stabilized after transfusion and transferred to   CRS eval noted and patient planned for hemorrhoidectomy on 11/13/23    seen and examined on  laying in bed s/p hemorrhoidectomy. reporting pain to buttock radiating to lower abd/pelvis. states this morning she had a small formed bowel movement. no bleeding. pain is acceptable at present.      #LGIB 2/2 large internal hemorrhoids  #Anemia acute blood loss due to above  #Hemorrhagic shock resolved  S/p total 5U PRBC transfusion and 1U FFP  Repeat labs in AM prior to OR- transfuse as needed to keep above 8.0 for OR  GI following- s/p EGD/ colonoscopy  CRS eval noted  s/p OR today for hemorrhoidectomy.   ECHO- EF 55-60%  EKG- sinus  advance diet per CRS       #severe protein calorie malnutrition:  -diet liberalized to regular  -encouraged oral intake  -oral vitamins recommended upon discharge  -nutrition eval appreciated    #Alcoholic liver cirrhosis with ascites  GI following  TIPS out pt when medically stable or during admission if unstable.   Cont PO Lasix and Aldactone  DC Rocephin- low suspicion of SBP    #DVT proph- s/p venodynes    #Dispo- dc home with significant other today   spent ___57_ mins preparing dc.   above plan discussed with pt,   bedside RN, and MD Rosado 59 yo F PMH ETOH cirrhosis; recent variceal bleed/UGIB,  hx of alcohol abuse- Reports no alcohol since prior to last hospitalization  p/w 3 days of BPBPR, abdominal distention, dizziness,  lightheadedness, chest pain; cough. Pt states she was at home bleeding when she went to the bathroom (even when she urinated), pretty consistently for 3 days. Pt states she knew it was hemorrhoids and "thought that it would stop like it has in the past." Pt started to feel CP and dizziness and then went to the ER   In the ER the pt was noted to have a hbg 3.9 and was hypotensive with SBP in 80-90's. CT scan with no active bleed noted    Patient admitted to ICU with LGIB/ HG 3.9/ hemorrhagic shock. Received total 5U PRBC transfusion and 1FFP.   Underwent EGD that showed hiatal hernia and portal gastropathy and colonoscopy that showed large bleeding internal hemorrhoids.  Seen by CRS team and underwent hemorrhoidectomy on 11/14/23  Postop recovered without significant complications  HH repeated and stable on discharge    #LGIB 2/2 large internal hemorrhoids  #Anemia acute blood loss due to above  #Hemorrhagic shock resolved  S/p total 6U PRBC transfusion and 1U FFP  GI following- s/p EGD/ colonoscopy  CRS eval noted  s/p hemorrhoidectomy on 11/14/23  ECHO- EF 55-60%  EKG- sinus  diet advanced, tolerated well  Pt recommended to monitor outpatient HH with PCP the week after Thanksgiving    #severe protein calorie malnutrition:  -diet liberalized to regular  -encouraged oral intake  -oral vitamins recommended upon discharge  -nutrition eval appreciated    #Alcoholic liver cirrhosis with ascites  GI following  To be evaluated for TOPS outpatient at tertiary [facility]  Cont PO Lasix and Aldactone    #DVT proph- s/p venodynes    #Dispo- dc home with significant other today   spent 57mins preparing dc.   above plan discussed with pt,   bedside RN, and MD Rosado

## 2023-11-15 NOTE — DISCHARGE NOTE PROVIDER - NSDCCAREPROVSEEN_GEN_ALL_CORE_FT
Vivek, Carroll Patton, Arlet Judd, Yumiko Goodson, Brian Gold, Casimiro Cordero, Vera Garcia, Tyrel Zepeda, Simin Granados, Rubia Tomlinson, Antonio Vance, Debbie Maharaj, Kalyn Almonte, Hawk Knox, Michelle Okeefe, Latrell Garay, Sadia Sun, Joyce Rosado, Nguyen VILLA

## 2023-11-15 NOTE — DISCHARGE NOTE PROVIDER - NSDCMRMEDTOKEN_GEN_ALL_CORE_FT
furosemide 20 mg oral tablet: 1 tab(s) orally once a day  MiraLax oral powder for reconstitution: 17 gram(s) orally once a day (at bedtime)  propranolol 10 mg oral tablet: 1 tab(s) orally 2 times a day  spironolactone 25 mg oral tablet: 1 tab(s) orally once a day  sucralfate 1 g/10 mL oral suspension: 10 milliliter(s) orally 4 times a day  thiamine 100 mg oral tablet: 1 tab(s) orally once a day

## 2023-11-15 NOTE — DISCHARGE NOTE PROVIDER - NSDCCPCAREPLAN_GEN_ALL_CORE_FT
PRINCIPAL DISCHARGE DIAGNOSIS  Diagnosis: GIB (gastrointestinal bleeding)  Assessment and Plan of Treatment:   Gastrointestinal (GI) bleeding is bleeding somewhere along the path that food travels through the body (digestive tract). This path is anywhere between the mouth and the opening of the butt (anus). You may have blood in your poop (stool) or have black poop. If you throw up (vomit), there may be blood in it.  This condition can be mild, serious, or even life-threatening. If you have a lot of bleeding, you may need to stay in the hospital.  What are the causes?  This condition may be caused by:  •Irritation and swelling of the esophagus (esophagitis). The esophagus is part of the body that moves food from your mouth to your stomach.  •Swollen veins in the butt (hemorrhoids)  •Areas of painful tearing in the opening of the butt (anal fissures). These are often caused by passing hard poop.  •Pouches that form on the colon over time (diverticulosis).  •Irritation and swelling (diverticulitis) in areas where pouches have formed on the colon.  •Growths (polyps) or cancer. Colon cancer often starts out as growths that are not cancer.  •Irritation of the stomach lining (gastritis).  •Sores (ulcers) in the stomach  What increases the risk?  You are more likely to develop this condition if you:  •Have a certain type of infection in your stomach (Helicobacter pylori infection).  •Take certain medicines.  •Smoke.   •Drink alcohol.  **Monitor for the following signs/symptoms and contact your primary care provider if they occur or go to the Emergency Room if they are severe***  •Throwing up (vomiting) material that has bright red blood in it. It may look like coffee grounds.  •Changes in your poop. The poop may:  •Have red blood in it.   •Be black, look like tar, and smell stronger than normal.  •Be red.  •Pain or cramping in the belly (abdomen).

## 2023-11-16 ENCOUNTER — TRANSCRIPTION ENCOUNTER (OUTPATIENT)
Age: 58
End: 2023-11-16

## 2023-11-16 ENCOUNTER — NON-APPOINTMENT (OUTPATIENT)
Age: 58
End: 2023-11-16

## 2023-11-16 VITALS
RESPIRATION RATE: 17 BRPM | TEMPERATURE: 99 F | SYSTOLIC BLOOD PRESSURE: 99 MMHG | HEART RATE: 102 BPM | OXYGEN SATURATION: 94 % | DIASTOLIC BLOOD PRESSURE: 62 MMHG

## 2023-11-16 LAB
HCT VFR BLD CALC: 25.5 % — LOW (ref 34.5–45)
HCT VFR BLD CALC: 25.5 % — LOW (ref 34.5–45)
HCT VFR BLD CALC: 26 % — LOW (ref 34.5–45)
HCT VFR BLD CALC: 26 % — LOW (ref 34.5–45)
HGB BLD-MCNC: 8 G/DL — LOW (ref 11.5–15.5)
HGB BLD-MCNC: 8 G/DL — LOW (ref 11.5–15.5)
HGB BLD-MCNC: 8.4 G/DL — LOW (ref 11.5–15.5)
HGB BLD-MCNC: 8.4 G/DL — LOW (ref 11.5–15.5)
MCHC RBC-ENTMCNC: 27.6 PG — SIGNIFICANT CHANGE UP (ref 27–34)
MCHC RBC-ENTMCNC: 27.6 PG — SIGNIFICANT CHANGE UP (ref 27–34)
MCHC RBC-ENTMCNC: 28.7 PG — SIGNIFICANT CHANGE UP (ref 27–34)
MCHC RBC-ENTMCNC: 28.7 PG — SIGNIFICANT CHANGE UP (ref 27–34)
MCHC RBC-ENTMCNC: 31.4 GM/DL — LOW (ref 32–36)
MCHC RBC-ENTMCNC: 31.4 GM/DL — LOW (ref 32–36)
MCHC RBC-ENTMCNC: 32.3 GM/DL — SIGNIFICANT CHANGE UP (ref 32–36)
MCHC RBC-ENTMCNC: 32.3 GM/DL — SIGNIFICANT CHANGE UP (ref 32–36)
MCV RBC AUTO: 87.9 FL — SIGNIFICANT CHANGE UP (ref 80–100)
MCV RBC AUTO: 87.9 FL — SIGNIFICANT CHANGE UP (ref 80–100)
MCV RBC AUTO: 88.7 FL — SIGNIFICANT CHANGE UP (ref 80–100)
MCV RBC AUTO: 88.7 FL — SIGNIFICANT CHANGE UP (ref 80–100)
PLATELET # BLD AUTO: 156 K/UL — SIGNIFICANT CHANGE UP (ref 150–400)
PLATELET # BLD AUTO: 156 K/UL — SIGNIFICANT CHANGE UP (ref 150–400)
PLATELET # BLD AUTO: 183 K/UL — SIGNIFICANT CHANGE UP (ref 150–400)
PLATELET # BLD AUTO: 183 K/UL — SIGNIFICANT CHANGE UP (ref 150–400)
RBC # BLD: 2.9 M/UL — LOW (ref 3.8–5.2)
RBC # BLD: 2.9 M/UL — LOW (ref 3.8–5.2)
RBC # BLD: 2.93 M/UL — LOW (ref 3.8–5.2)
RBC # BLD: 2.93 M/UL — LOW (ref 3.8–5.2)
RBC # FLD: 16.9 % — HIGH (ref 10.3–14.5)
RBC # FLD: 16.9 % — HIGH (ref 10.3–14.5)
RBC # FLD: 17 % — HIGH (ref 10.3–14.5)
RBC # FLD: 17 % — HIGH (ref 10.3–14.5)
SURGICAL PATHOLOGY STUDY: SIGNIFICANT CHANGE UP
SURGICAL PATHOLOGY STUDY: SIGNIFICANT CHANGE UP
WBC # BLD: 11.68 K/UL — HIGH (ref 3.8–10.5)
WBC # BLD: 11.68 K/UL — HIGH (ref 3.8–10.5)
WBC # BLD: 9.09 K/UL — SIGNIFICANT CHANGE UP (ref 3.8–10.5)
WBC # BLD: 9.09 K/UL — SIGNIFICANT CHANGE UP (ref 3.8–10.5)
WBC # FLD AUTO: 11.68 K/UL — HIGH (ref 3.8–10.5)
WBC # FLD AUTO: 11.68 K/UL — HIGH (ref 3.8–10.5)
WBC # FLD AUTO: 9.09 K/UL — SIGNIFICANT CHANGE UP (ref 3.8–10.5)
WBC # FLD AUTO: 9.09 K/UL — SIGNIFICANT CHANGE UP (ref 3.8–10.5)

## 2023-11-16 PROCEDURE — 99239 HOSP IP/OBS DSCHRG MGMT >30: CPT

## 2023-11-16 RX ORDER — OXYCODONE AND ACETAMINOPHEN 5; 325 MG/1; MG/1
5 TABLET ORAL
Refills: 0
Start: 2023-11-16

## 2023-11-16 RX ORDER — OXYCODONE AND ACETAMINOPHEN 5; 325 MG/1; MG/1
1 TABLET ORAL
Refills: 0
Start: 2023-11-16

## 2023-11-16 RX ORDER — OXYCODONE AND ACETAMINOPHEN 5; 325 MG/1; MG/1
1 TABLET ORAL
Qty: 5 | Refills: 0
Start: 2023-11-16 | End: 2023-11-20

## 2023-11-16 RX ADMIN — Medication 100 MILLIGRAM(S): at 10:04

## 2023-11-16 RX ADMIN — Medication 20 MILLIGRAM(S): at 10:04

## 2023-11-16 RX ADMIN — SPIRONOLACTONE 50 MILLIGRAM(S): 25 TABLET, FILM COATED ORAL at 10:04

## 2023-11-16 NOTE — PROGRESS NOTE ADULT - PROVIDER SPECIALTY LIST ADULT
Gastroenterology
Hospitalist
Hospitalist
Colorectal Surgery
Colorectal Surgery
Critical Care
Gastroenterology
Hospitalist
Colorectal Surgery
Hospitalist
Critical Care

## 2023-11-16 NOTE — PROGRESS NOTE ADULT - SUBJECTIVE AND OBJECTIVE BOX
SURGERY DAILY PROGRESS NOTE:     Subjective:  Patient seen and examined at bedside during  rounds. PT reports she still has some bleeding around her anal canal. AVSS. Denies any fevers, chills, n/v/d, chest pain or shortness of breath    Objective:    MEDICATIONS  (STANDING):  furosemide    Tablet 20 milliGRAM(s) Oral daily  lactated ringers. 1000 milliLiter(s) (100 mL/Hr) IV Continuous <Continuous>  spironolactone 50 milliGRAM(s) Oral daily  thiamine 100 milliGRAM(s) Oral daily    MEDICATIONS  (PRN):  ondansetron Injectable 4 milliGRAM(s) IV Push every 8 hours PRN Nausea and/or Vomiting  oxycodone    5 mG/acetaminophen 325 mG 1 Tablet(s) Oral every 6 hours PRN Severe Pain (7 - 10)      Vital Signs Last 24 Hrs  T(C): 36.6 (13 Nov 2023 00:00), Max: 36.7 (12 Nov 2023 08:00)  T(F): 97.9 (13 Nov 2023 00:00), Max: 98.1 (12 Nov 2023 08:00)  HR: 90 (13 Nov 2023 00:00) (90 - 103)  BP: 108/73 (13 Nov 2023 00:00) (101/67 - 121/76)  BP(mean): --  RR: 18 (13 Nov 2023 00:00) (18 - 18)  SpO2: 97% (13 Nov 2023 00:00) (92% - 97%)    Parameters below as of 13 Nov 2023 00:00  Patient On (Oxygen Delivery Method): room air          PHYSICAL EXAM   Gen: well-appearing, in no acute distress  CV: pulse regularly present   Resp: airway patent, non-labored breathing  ABDOMEN: no caput medusa, round, mildly distended, soft, nontender  Rectal: Redundant anterior tissue from prior external hemorrhoid, no blood leaking  Ext. no cyanosis or edema    I&O's Detail    12 Nov 2023 07:01  -  13 Nov 2023 02:27  --------------------------------------------------------  IN:    PRBCs (Packed Red Blood Cells): 331 mL  Total IN: 331 mL    OUT:  Total OUT: 0 mL    Total NET: 331 mL          Daily     Daily     LABS:                        7.8    6.79  )-----------( 152      ( 12 Nov 2023 07:16 )             24.5     11-12    136  |  106  |  5<L>  ----------------------------<  113<H>  3.7   |  23  |  0.60    Ca    7.3<L>      12 Nov 2023 07:16  Phos  2.9     11-12  Mg     1.8     11-12        Urinalysis Basic - ( 12 Nov 2023 07:16 )    Color: x / Appearance: x / SG: x / pH: x  Gluc: 113 mg/dL / Ketone: x  / Bili: x / Urobili: x   Blood: x / Protein: x / Nitrite: x   Leuk Esterase: x / RBC: x / WBC x   Sq Epi: x / Non Sq Epi: x / Bacteria: x             
59 yo F PMH ETOH cirrhosis; recent variceal bleed/UGIB,  hx of alcohol abuse- Reports no alcohol since prior to last hospitalization  p/w 3 days of BPBPR, abdominal distention, dizziness,  lightheadedness, chest pain; cough. Pt states she was at home bleeding when she went to the bathroom (even when she urinated), pretty consistently for 3 days. Pt states she knew it was hemorrhoids and "thought that it would stop like it has in the past." Pt started to feel CP and dizziness and then went to the ER   In the ER the pt was noted to have a hbg 3.9 and was hypotensive with SBP in 80-90's. CT scan with no active bleed noted    Patient admitted to ICU with LGIB/ HG 3.9/ hemorrhagic shock. Received total 5U PRBC transfusion and 1FFP.   Underwent EGD that showed hiatal hernia and portal gastropathy and colonoscopy that showed large bleeding internal hemorrhoids.  Patient hemodynamically stabilized after transfusion and transferred to   CRS eval noted and patient planned for hemorrhoidectomy on 11/13/23    seen and examined on 2N laying in bed s/p hemorrhoidectomy. reporting pain to buttock radiating to lower abd/pelvis. states this morning she had a small formed bowel movement. no bleeding. pain is acceptable at present.    11/15: seen and examined this morning laying in bed on 2n. had 2 loose Bm today with scant blood. denies pain weakness or discomfort. plan to advance diet and monitor AM labs discussed.        All 10 systems reviewed and found to be negative with the exception of what has been described above.    Vital Signs Last 24 Hrs  T(C): 36.9 (15 Nov 2023 12:00), Max: 36.9 (15 Nov 2023 12:00)  T(F): 98.4 (15 Nov 2023 12:00), Max: 98.4 (15 Nov 2023 12:00)  HR: 108 (15 Nov 2023 12:00) (94 - 108)  BP: 111/64 (15 Nov 2023 12:00) (90/50 - 120/74)  BP(mean): --  RR: 16 (15 Nov 2023 12:00) (12 - 16)  SpO2: 97% (15 Nov 2023 12:00) (94% - 100%)    Parameters below as of 15 Nov 2023 12:00  Patient On (Oxygen Delivery Method): room air                              9.2    6.90  )-----------( 144      ( 14 Nov 2023 05:53 )             28.2     11-14    136  |  105  |  5<L>  ----------------------------<  100<H>  3.3<L>   |  23  |  0.50    Ca    7.8<L>      14 Nov 2023 05:53  Phos  2.7     11-14  Mg     1.6     11-14            PT/INR - ( 14 Nov 2023 05:53 )   PT: 15.6 sec;   INR: 1.39 ratio    PTT - ( 14 Nov 2023 05:53 )  PTT:34.9 sec        PHYSICAL EXAM:  GENERAL: NAD, well-groomed, well-developed  HEAD:  Atraumatic, Normocephalic  EYES: EOMI, PERRLA, conjunctiva and sclera clear  HEENT: Moist mucous membranes  NECK: Supple, No JVD  NERVOUS SYSTEM:  Alert & Oriented X3, Motor Strength 5/5 B/L upper and lower extremities; DTRs 2+ intact and symmetric  CHEST/LUNG: Clear to auscultation bilaterally, No rales, rhonchi, wheezing, or rubs  HEART: Regular rate and rhythm; No murmurs, rubs, or gallops  ABDOMEN: Soft, distended with ascites  GENITOURINARY- Voiding, no palpable bladder  EXTREMITIES:  2+ Peripheral Pulses, No clubbing, cyanosis, or edema, thin wasted extremities.   MUSCULOSKELETAL- No muscle tenderness, Muscle tone normal, No joint tenderness, no Joint swelling, Joint range of motion-normal  SKIN-no rash, no lesion  CNS- alert, oriented X3, non focal           < from: CT Abdomen and Pelvis w/ IV Cont (11.09.23 @ 07:40) >      BOWEL: No evidence for active GI bleed. No bowel obstruction. Mild   sigmoid diverticulosis. Appendix is normal.  PERITONEUM: Mild volume ascites.  VESSELS: Atherosclerotic changes. Patent portal, superior mesenteric, and   splenic veins. Left abdominal collateral vessels.  RETROPERITONEUM/LYMPH NODES: No lymphadenopathy.  ABDOMINAL WALL: Within normal limits.  BONES: Mild chronic T8 compression deformity. Straightening of the lumbar   lordosis. Mild degenerative change at L4-5..    IMPRESSION:  Cirrhosis with sequela of portal hypertension. Mild ascites.    No evidence for active GI bleed.        --- End of Report ---    JEFFERSON ROSSI MD; Attending Radiologist  This document has been electronically signed. Nov 9 2023  8:02AM    < end of copied text >      
CC-  GIB (12 Nov 2023 05:05)    HPI:  57 yo F PMH ETOH cirrhosis; recent variceal bleed/UGIB,  hx of alcohol abuse- Reports no alcohol since prior to last hospitalization  p/w 3 days of BPBPR, abdominal distention, dizziness,  lightheadedness, chest pain; cough. Pt states she was at home bleeding when she went to the bathroom (even when she urinated), pretty consistently for 3 days. Pt states she knew it was hemorrhoids and "thought that it would stop like it has in the past." Pt started to feel CP and dizziness and then went to the ER   In the ER the pt was noted to have a hbg 3.9 and was hypotensive with SBP in 80-90's. CT scan with no active bleed noted    Patient admitted to ICU with LGIB/ HG 3.9/ hemorrhagic shock. Received total 5U PRBC transfusion and 1FFP.   Underwent EGD that showed hiatal hernia and portal gastropathy and colonoscopy that showed large bleeding internal hemorrhoids.  Patient hemodynamically stabilized after transfusion and transferred to [facility] eval noted and patient planned for hemorrhoidectomy on 11/13/23 11/12/23- feels relatively good today, denies further bleeding. No N/V. Tolerating her diet    Review of system- All 10 systems reviewed and is as per HPI otherwise negative.     T(C): 36.7 (11-12-23 @ 11:40), Max: 37 (11-12-23 @ 00:30)  HR: 96 (11-12-23 @ 11:40) (92 - 116)  BP: 112/69 (11-12-23 @ 11:40) (101/67 - 124/68)  RR: 18 (11-12-23 @ 11:40) (18 - 18)  SpO2: 96% (11-12-23 @ 11:40) (92% - 96%)  Wt(kg): --    LABS:                        7.8    6.79  )-----------( 152      ( 12 Nov 2023 07:16 )             24.5     11-12    136  |  106  |  5<L>  ----------------------------<  113<H>  3.7   |  23  |  0.60    Ca    7.3<L>      12 Nov 2023 07:16  Phos  2.9     11-12  Mg     1.8     11-12    Urinalysis Basic - ( 12 Nov 2023 07:16 )  Color: x / Appearance: x / SG: x / pH: x  Gluc: 113 mg/dL / Ketone: x  / Bili: x / Urobili: x   Blood: x / Protein: x / Nitrite: x   Leuk Esterase: x / RBC: x / WBC x   Sq Epi: x / Non Sq Epi: x / Bacteria: x      RADIOLOGY & ADDITIONAL TESTS:      PHYSICAL EXAM:  GENERAL: NAD, well-groomed, well-developed  HEAD:  Atraumatic, Normocephalic  EYES: EOMI, PERRLA, conjunctiva and sclera clear  HEENT: Moist mucous membranes  NECK: Supple, No JVD  NERVOUS SYSTEM:  Alert & Oriented X3, Motor Strength 5/5 B/L upper and lower extremities; DTRs 2+ intact and symmetric  CHEST/LUNG: Clear to auscultation bilaterally; No rales, rhonchi, wheezing, or rubs  HEART: Regular rate and rhythm; No murmurs, rubs, or gallops  ABDOMEN: Soft, distended with ascites  GENITOURINARY- Voiding, no palpable bladder  EXTREMITIES:  2+ Peripheral Pulses, No clubbing, cyanosis, or edema  MUSCULOSKELTAL- No muscle tenderness, Muscle tone normal, No joint tenderness, no Joint swelling, Joint range of motion-normal  SKIN-no rash, no lesion  CNS- alert, oriented X3, non focal     MEDICATIONS  (STANDING):  cefTRIAXone Injectable. 2000 milliGRAM(s) IV Push every 24 hours  furosemide    Tablet 20 milliGRAM(s) Oral daily  spironolactone 50 milliGRAM(s) Oral daily  thiamine 100 milliGRAM(s) Oral daily    MEDICATIONS  (PRN):  ondansetron Injectable 4 milliGRAM(s) IV Push every 8 hours PRN Nausea and/or Vomiting  oxycodone    5 mG/acetaminophen 325 mG 1 Tablet(s) Oral every 6 hours PRN Severe Pain (7 - 10)    Assessment/Plan  #LGIB 2/2 large internal hemorrhoids  #Anemia acute blood loss due to above  #Hemorrhagic shock resolved  S/p total 5U PRBC transfusion and 1U FFP  HH remains low today- for additional 1U PRBC today as patient preop for tomorrow  Repeat labs in AM prior to OR- transfuse as needed to keep above 8.0 for OR  GI following- s/p EGD/ colonoscopy  CRS eval noted  OR tomorrow for hemorrhoidectomy  ECHO- EF 55-60%  EKG- sinus  Patient is medically optimized for OR  NPO p MN    #Alcoholic liver cirrhosis with ascites  GI following  Cont PO Lasix and Aldactone  DC Rocephin- low suspicion of SBP    #DVT proph- venodynes    #Dispo- OR tomorrow    
Patient is a 58y old  Female who presents with a chief complaint of   24 hour events:     Allergies    Zithromax (Unknown)    Intolerances    morphine (Nausea)    REVIEW OF SYSTEMS: SEE BELOW       ICU Vital Signs Last 24 Hrs  T(C): 37.1 (09 Nov 2023 16:28), Max: 38 (09 Nov 2023 06:19)  T(F): 98.7 (09 Nov 2023 16:28), Max: 100.4 (09 Nov 2023 06:19)  HR: 98 (09 Nov 2023 16:28) (94 - 132)  BP: 83/51 (09 Nov 2023 16:28) (82/51 - 114/63)  BP(mean): 61 (09 Nov 2023 16:28) (61 - 87)  ABP: --  ABP(mean): --  RR: 33 (09 Nov 2023 16:28) (17 - 33)  SpO2: 100% (09 Nov 2023 16:28) (93% - 100%)    O2 Parameters below as of 09 Nov 2023 15:00  Patient On (Oxygen Delivery Method): room air            CAPILLARY BLOOD GLUCOSE          I&O's Summary    09 Nov 2023 07:01  -  09 Nov 2023 16:42  --------------------------------------------------------  IN: 324 mL / OUT: 300 mL / NET: 24 mL            MEDICATIONS  (STANDING):  cefTRIAXone Injectable. 2000 milliGRAM(s) IV Push every 24 hours  octreotide  Infusion 50 MICROgram(s)/Hr (10 mL/Hr) IV Continuous <Continuous>  pantoprazole Infusion 8 mG/Hr (10 mL/Hr) IV Continuous <Continuous>  polyethylene glycol/electrolyte Solution 2000 milliLiter(s) Oral Once  thiamine 100 milliGRAM(s) Oral daily      MEDICATIONS  (PRN):      PHYSICAL EXAM: SEE BELOW                          5.4    7.97  )-----------( 176      ( 09 Nov 2023 15:09 )             17.0       11-09    132<L>  |  103  |  13  ----------------------------<  253<H>  3.8   |  20<L>  |  0.96    Ca    6.8<L>      09 Nov 2023 15:09  Phos  2.9     11-09  Mg     1.9     11-09    TPro  5.8<L>  /  Alb  1.9<L>  /  TBili  1.9<H>  /  DBili  x   /  AST  60<H>  /  ALT  23  /  AlkPhos  219<H>  11-09    Lactate 3.1           11-09 @ 15:09    Lactate 6.7           11-09 @ 07:01          PT/INR - ( 09 Nov 2023 06:43 )   PT: 15.8 sec;   INR: 1.41 ratio         PTT - ( 09 Nov 2023 06:43 )  PTT:30.4 sec  Urinalysis Basic - ( 09 Nov 2023 15:09 )    Color: x / Appearance: x / SG: x / pH: x  Gluc: 253 mg/dL / Ketone: x  / Bili: x / Urobili: x   Blood: x / Protein: x / Nitrite: x   Leuk Esterase: x / RBC: x / WBC x   Sq Epi: x / Non Sq Epi: x / Bacteria: x          Rapid RVP Result: NotDetec (11-09 @ 07:01)      
SURGERY DAILY PROGRESS NOTE:     Subjective:  Patient seen and examined at bedside during am rounds. Pt reports no further bloody BM but still has anal pain. AVSS. Denies any fevers, chills, n/v/d, chest pain or shortness of breath    Objective:    MEDICATIONS  (STANDING):  furosemide    Tablet 20 milliGRAM(s) Oral daily  lactated ringers. 1000 milliLiter(s) (100 mL/Hr) IV Continuous <Continuous>  spironolactone 50 milliGRAM(s) Oral daily  thiamine 100 milliGRAM(s) Oral daily    MEDICATIONS  (PRN):  ondansetron Injectable 4 milliGRAM(s) IV Push every 8 hours PRN Nausea and/or Vomiting  oxycodone    5 mG/acetaminophen 325 mG 1 Tablet(s) Oral every 6 hours PRN Severe Pain (7 - 10)      Vital Signs Last 24 Hrs  T(C): 36.6 (14 Nov 2023 00:00), Max: 37 (13 Nov 2023 16:09)  T(F): 97.9 (14 Nov 2023 00:00), Max: 98.6 (13 Nov 2023 16:09)  HR: 100 (14 Nov 2023 00:00) (80 - 100)  BP: 100/61 (14 Nov 2023 00:00) (97/61 - 100/61)  BP(mean): --  RR: 16 (14 Nov 2023 00:00) (16 - 18)  SpO2: 95% (14 Nov 2023 00:00) (95% - 99%)    Parameters below as of 14 Nov 2023 00:00  Patient On (Oxygen Delivery Method): room air          PHYSICAL EXAM   Gen: well-appearing, in no acute distress  CV: pulse regularly present   Resp: airway patent, non-labored breathing  Abd: soft, NTND; no rebound or guarding.    Ext. no cyanosis or edema      I&O's Detail    12 Nov 2023 07:01  -  13 Nov 2023 07:00  --------------------------------------------------------  IN:    PRBCs (Packed Red Blood Cells): 331 mL  Total IN: 331 mL    OUT:  Total OUT: 0 mL    Total NET: 331 mL      13 Nov 2023 07:01  -  14 Nov 2023 03:12  --------------------------------------------------------  IN:    Lactated Ringers: 1100 mL  Total IN: 1100 mL    OUT:  Total OUT: 0 mL    Total NET: 1100 mL          Daily     Daily     LABS:                        9.0    6.85  )-----------( 131      ( 13 Nov 2023 04:52 )             26.8     11-13    137  |  107  |  4<L>  ----------------------------<  99  3.1<L>   |  25  |  0.44<L>    Ca    7.3<L>      13 Nov 2023 04:52  Phos  2.8     11-13  Mg     1.6     11-13      PT/INR - ( 13 Nov 2023 04:52 )   PT: 16.6 sec;   INR: 1.49 ratio           Urinalysis Basic - ( 13 Nov 2023 04:52 )    Color: x / Appearance: x / SG: x / pH: x  Gluc: 99 mg/dL / Ketone: x  / Bili: x / Urobili: x   Blood: x / Protein: x / Nitrite: x   Leuk Esterase: x / RBC: x / WBC x   Sq Epi: x / Non Sq Epi: x / Bacteria: x            ASSESSMENT/PLAN:        
CC-  GIB (12 Nov 2023 05:05)    HPI:  57 yo F PMH ETOH cirrhosis; recent variceal bleed/UGIB,  hx of alcohol abuse- Reports no alcohol since prior to last hospitalization  p/w 3 days of BPBPR, abdominal distention, dizziness,  lightheadedness, chest pain; cough. Pt states she was at home bleeding when she went to the bathroom (even when she urinated), pretty consistently for 3 days. Pt states she knew it was hemorrhoids and "thought that it would stop like it has in the past." Pt started to feel CP and dizziness and then went to the ER   In the ER the pt was noted to have a hbg 3.9 and was hypotensive with SBP in 80-90's. CT scan with no active bleed noted    Patient admitted to ICU with LGIB/ HG 3.9/ hemorrhagic shock. Received total 5U PRBC transfusion and 1FFP.   Underwent EGD that showed hiatal hernia and portal gastropathy and colonoscopy that showed large bleeding internal hemorrhoids.  Patient hemodynamically stabilized after transfusion and transferred to [facility] eval noted and patient planned for hemorrhoidectomy on 11/13/23 11/12/23- feels relatively good today, denies further bleeding. No N/V. Tolerating her diet  11/13: seen and examined laying in bed, states she has some dyspnea on exertion but overall significantly improved. awaiting TIPS today.     Review of system- All 10 systems reviewed and is as per HPI otherwise negative.     Vital Signs Last 24 Hrs  T(C): 36.9 (13 Nov 2023 08:05), Max: 36.9 (13 Nov 2023 08:05)  T(F): 98.4 (13 Nov 2023 08:05), Max: 98.4 (13 Nov 2023 08:05)  HR: 80 (13 Nov 2023 08:05) (80 - 98)  BP: 99/70 (13 Nov 2023 08:05) (99/70 - 117/77)  BP(mean): --  RR: 18 (13 Nov 2023 08:05) (18 - 18)  SpO2: 99% (13 Nov 2023 08:05) (93% - 99%)    Parameters below as of 13 Nov 2023 08:05  Patient On (Oxygen Delivery Method): room air        LABS:                            9.0    6.85  )-----------( 131      ( 13 Nov 2023 04:52 )             26.8     11-13    137  |  107  |  4<L>  ----------------------------<  99  3.1<L>   |  25  |  0.44<L>    Ca    7.3<L>      13 Nov 2023 04:52  Phos  2.8     11-13  Mg     1.6     11-13            PT/INR - ( 13 Nov 2023 04:52 )   PT: 16.6 sec;   INR: 1.49 ratio           Urinalysis Basic - ( 13 Nov 2023 04:52 )    Color: x / Appearance: x / SG: x / pH: x  Gluc: 99 mg/dL / Ketone: x  / Bili: x / Urobili: x   Blood: x / Protein: x / Nitrite: x   Leuk Esterase: x / RBC: x / WBC x   Sq Epi: x / Non Sq Epi: x / Bacteria: x      RADIOLOGY & ADDITIONAL TESTS:      PHYSICAL EXAM:  GENERAL: NAD, well-groomed, well-developed  HEAD:  Atraumatic, Normocephalic  EYES: EOMI, PERRLA, conjunctiva and sclera clear  HEENT: Moist mucous membranes  NECK: Supple, No JVD  NERVOUS SYSTEM:  Alert & Oriented X3, Motor Strength 5/5 B/L upper and lower extremities; DTRs 2+ intact and symmetric  CHEST/LUNG: Clear to auscultation bilaterally, diminished at bases.; No rales, rhonchi, wheezing, or rubs  HEART: Regular rate and rhythm; No murmurs, rubs, or gallops  ABDOMEN: Soft, distended with ascites  GENITOURINARY- Voiding, no palpable bladder  EXTREMITIES:  2+ Peripheral Pulses, No clubbing, cyanosis, or edema  MUSCULOSKELTAL- No muscle tenderness, Muscle tone normal, No joint tenderness, no Joint swelling, Joint range of motion-normal  SKIN-no rash, no lesion  CNS- alert, oriented X3, non focal     MEDICATIONS  (STANDING):  cefTRIAXone Injectable. 2000 milliGRAM(s) IV Push every 24 hours  furosemide    Tablet 20 milliGRAM(s) Oral daily  spironolactone 50 milliGRAM(s) Oral daily  thiamine 100 milliGRAM(s) Oral daily    MEDICATIONS  (PRN):  ondansetron Injectable 4 milliGRAM(s) IV Push every 8 hours PRN Nausea and/or Vomiting  oxycodone    5 mG/acetaminophen 325 mG 1 Tablet(s) Oral every 6 hours PRN Severe Pain (7 - 10)    Assessment/Plan  #LGIB 2/2 large internal hemorrhoids  #Anemia acute blood loss due to above  #Hemorrhagic shock resolved  S/p total 5U PRBC transfusion and 1U FFP  HH remains low today- for additional 1U PRBC today as patient preop for tomorrow  Repeat labs in AM prior to OR- transfuse as needed to keep above 8.0 for OR  GI following- s/p EGD/ colonoscopy  CRS eval noted  Pending OR on Wednesday for hemorrhoidectomy.   ECHO- EF 55-60%  EKG- sinus  Patient is medically optimized for OR  NPO p MN    #Alcoholic liver cirrhosis with ascites  pending TIPS today with IR   GI following  Cont PO Lasix and Aldactone  DC Rocephin- low suspicion of SBP    #DVT proph- venodynes    #Dispo- awaiting TIPS today and OR Wednesday for hemorrhoidectomy     
EGD:  Trace mid esophageal varices  Small hiatal hernia  Mild portal gastropathy    Colon:  Large prolapsing hemorrhoids (vs. rectal varices) with blood stain  No blood anywhere else in the colon    Rec:  Advance diet  Colorectal eval
CC:  Patient is a 58y old  Female who presents with a chief complaint of GI bleed    HPI/BRIEF HOSPITAL COURSE:   57 yo F PMH ETOH cirrhosis; recent variceal bleed/UGIB,  hx of alcohol abuse- Reports no alcohol since prior to last hospitalization  p/w 3 days of BPBPR, abdominal distention, dizziness,  lightheadedness, chest pain; cough. CTA negative for active bleed, shows cirrhosis with associated esophageal and rectal varices, no bleeding.  Admitted to ICU for hemm shock 2/2 suspected LGI bleed with course complicated by TEREZA . Treated with 4 units of PRBC, 1 FFP. PLan for EGD and colonoscopy in AM    Events last 24 hours:   Receiving bowel prep had 5 to 7 bowel movements with only small amount/ blood tinged. Repeat hb 7.0, gave 1 unit of PRBC. Fibrinogen level sent off, wnl. HD stable, non-tahycardic.   PAST MEDICAL & SURGICAL HISTORY:  Alcohol abuse      Depression      Withdrawal seizures      History of basal cell carcinoma excision      Squamous cell skin cancer      Hemorrhoids      2019 novel coronavirus disease (COVID-19)      History of ear, nose, and throat (ENT) surgery      H/O basal cell carcinoma excision      H/O:         Allergies    Zithromax (Unknown)    Intolerances    morphine (Nausea)    FAMILY HISTORY:  FH: pancreatic cancer (Mother)    Family history of heart attack (Father)    Family history of CVA (Father)        Review of Systems:  CONSTITUTIONAL: No fever, chills, or fatigue  EYES: No eye pain, visual disturbances, or discharge  ENMT:  No difficulty hearing, tinnitus, vertigo; No sinus or throat pain  NECK: No pain or stiffness  RESPIRATORY: No cough, wheezing, chills or hemoptysis; No shortness of breath  CARDIOVASCULAR: No chest pain, palpitations, dizziness, or leg swelling  GASTROINTESTINAL: + hematochezia.  No abdominal or epigastric pain. No nausea, vomiting, or hematemesis; No diarrhea or constipation.   GENITOURINARY: No dysuria, frequency, hematuria, or incontinence  NEUROLOGICAL: No headaches, memory loss, loss of strength, numbness, or tremors  SKIN: No itching, burning, rashes, or lesions   MUSCULOSKELETAL: No joint pain or swelling; No muscle, back, or extremity pain  PSYCHIATRIC: No depression, anxiety, mood swings, or difficulty sleeping      Medications:  cefTRIAXone Injectable. 2000 milliGRAM(s) IV Push every 24 hours              pantoprazole Infusion 8 mG/Hr IV Continuous <Continuous>      octreotide  Infusion 50 MICROgram(s)/Hr IV Continuous <Continuous>    thiamine 100 milliGRAM(s) Oral daily                ICU Vital Signs Last 24 Hrs  T(C): 36.8 (10 Nov 2023 00:20), Max: 38 (2023 06:19)  T(F): 98.2 (10 Nov 2023 00:20), Max: 100.4 (2023 06:19)  HR: 91 (10 Nov 2023 02:00) (88 - 132)  BP: 108/67 (10 Nov 2023 02:00) (82/51 - 117/79)  BP(mean): 79 (10 Nov 2023 02:00) (61 - 93)  ABP: --  ABP(mean): --  RR: 19 (10 Nov 2023 02:00) (17 - 37)  SpO2: 95% (10 Nov 2023 02:00) (93% - 100%)    O2 Parameters below as of 10 Nov 2023 02:00  Patient On (Oxygen Delivery Method): room air          Vital Signs Last 24 Hrs  T(C): 36.8 (10 Nov 2023 00:20), Max: 38 (2023 06:19)  T(F): 98.2 (10 Nov 2023 00:20), Max: 100.4 (2023 06:19)  HR: 91 (10 Nov 2023 02:00) (88 - 132)  BP: 108/67 (10 Nov 2023 02:00) (82/51 - 117/79)  BP(mean): 79 (10 Nov 2023 02:00) (61 - 93)  RR: 19 (10 Nov 2023 02:00) (17 - 37)  SpO2: 95% (10 Nov 2023 02:00) (93% - 100%)    Parameters below as of 10 Nov 2023 02:00  Patient On (Oxygen Delivery Method): room air            I&O's Detail    2023 07:01  -  10 Nov 2023 02:17  --------------------------------------------------------  IN:    Octreotide: 71 mL    Oral Fluid: 1500 mL    Pantoprazole: 84 mL    PRBCs (Packed Red Blood Cells): 986 mL  Total IN: 2641 mL    OUT:    Voided (mL): 950 mL  Total OUT: 950 mL    Total NET: 1691 mL            LABS:                        7.0    7.74  )-----------( 173      ( 2023 20:45 )             21.7         132<L>  |  103  |  13  ----------------------------<  253<H>  3.8   |  20<L>  |  0.96    Ca    6.8<L>      2023 15:09  Phos  2.9       Mg     1.9         TPro  5.8<L>  /  Alb  1.9<L>  /  TBili  1.9<H>  /  DBili  x   /  AST  60<H>  /  ALT  23  /  AlkPhos  219<H>            CAPILLARY BLOOD GLUCOSE        PT/INR - ( 2023 06:43 )   PT: 15.8 sec;   INR: 1.41 ratio         PTT - ( 2023 06:43 )  PTT:30.4 sec  Urinalysis Basic - ( 2023 15:09 )    Color: x / Appearance: x / SG: x / pH: x  Gluc: 253 mg/dL / Ketone: x  / Bili: x / Urobili: x   Blood: x / Protein: x / Nitrite: x   Leuk Esterase: x / RBC: x / WBC x   Sq Epi: x / Non Sq Epi: x / Bacteria: x      CULTURES:    cefTRIAXone Injectable. 2000 milliGRAM(s) IV Push every 24 hours      Physical Examination:  General: No acute distress.  Alert, oriented, interactive, nonfocal  NEURO: A&O X3, motor function 5/5 BL UE/LE  HEENT: Pupils equal, reactive to light.  Symmetric.  PULM: CTA BL, no significant sputum production, no wheezes, rales, rhonchi  CVS: Regular rate and rhythm, no murmurs, rubs, or gallops  ABD: Soft, nondistended, nontender, normoactive bowel sounds, no masses  EXT: No edema, nontender  SKIN: Warm and well perfused, no rashes noted      RADIOLOGY:   < from: CT Abdomen and Pelvis w/ IV Cont (23 @ 07:40) >    BOWEL: No evidence for active GI bleed. No bowel obstruction. Mild   sigmoid diverticulosis. Appendix is normal.  PERITONEUM: Mild volume ascites.  VESSELS: Atherosclerotic changes. Patent portal, superior mesenteric, and   splenic veins. Left abdominal collateral vessels.  RETROPERITONEUM/LYMPH NODES: No lymphadenopathy.  ABDOMINAL WALL: Within normal limits.  BONES: Mild chronic T8 compression deformity. Straightening of the lumbar   lordosis. Mild degenerative change at L4-5..    IMPRESSION:  Cirrhosis with sequela of portal hypertension. Mild ascites.    No evidence for active GI bleed.        --- End of Report ---      < end of copied text >                  
Patient is a 58y old  Female who presents with a chief complaint of     Subective:  Feels good, no bleeding    PAST MEDICAL & SURGICAL HISTORY:  Alcohol abuse      Depression      Withdrawal seizures      History of basal cell carcinoma excision      Squamous cell skin cancer      Hemorrhoids      2019 novel coronavirus disease (COVID-19)      History of ear, nose, and throat (ENT) surgery      H/O basal cell carcinoma excision      H/O:           MEDICATIONS  (STANDING):  cefTRIAXone Injectable. 2000 milliGRAM(s) IV Push every 24 hours  thiamine 100 milliGRAM(s) Oral daily    MEDICATIONS  (PRN):      REVIEW OF SYSTEMS:    RESPIRATORY: No shortness of breath  CARDIOVASCULAR: No chest pain  All other review of systems is negative unless indicated above.    Vital Signs Last 24 Hrs  T(C): 36.9 (2023 04:00), Max: 37.1 (10 Nov 2023 12:00)  T(F): 98.5 (2023 04:00), Max: 98.7 (10 Nov 2023 12:00)  HR: 91 (2023 10:00) (82 - 97)  BP: 106/75 (2023 10:00) (92/71 - 122/83)  BP(mean): 86 (2023 10:00) (66 - 94)  RR: 25 (2023 10:00) (15 - 31)  SpO2: 92% (2023 10:00) (92% - 100%)    Parameters below as of 2023 10:00  Patient On (Oxygen Delivery Method): room air        PHYSICAL EXAM:    Constitutional: NAD, well-developed  Respiratory: CTAB  Cardiovascular: S1 and S2, RRR  Gastrointestinal: BS+, soft, NT/ND  Extremities: No peripheral edema  Psychiatric: Normal mood, normal affect    LABS:                        8.2    6.31  )-----------( 163      ( 2023 05:27 )             24.8         136  |  109<H>  |  6<L>  ----------------------------<  102<H>  4.0   |  19<L>  |  0.64    Ca    7.2<L>      2023 05:27  Phos  2.4       Mg     1.9         TPro  5.2<L>  /  Alb  1.8<L>  /  TBili  2.6<H>  /  DBili  x   /  AST  154<H>  /  ALT  33  /  AlkPhos  167<H>  11-10    PT/INR - ( 10 Nov 2023 05:13 )   PT: 15.3 sec;   INR: 1.37 ratio         PTT - ( 10 Nov 2023 05:13 )  PTT:31.2 sec  LIVER FUNCTIONS - ( 10 Nov 2023 05:13 )  Alb: 1.8 g/dL / Pro: 5.2 gm/dL / ALK PHOS: 167 U/L / ALT: 33 U/L / AST: 154 U/L / GGT: x             RADIOLOGY & ADDITIONAL STUDIES:
SURGERY DAILY PROGRESS NOTE:     Subjective:  Patient seen and examined at bedside during am rounds. Pt is doing well, tolerating diet and pain. Pt reports no further bloody BM. AVSS. Denies any fevers, chills, n/v/d, chest pain or shortness of breath    Objective:    MEDICATIONS  (STANDING):  furosemide    Tablet 20 milliGRAM(s) Oral daily  lactated ringers. 1000 milliLiter(s) (100 mL/Hr) IV Continuous <Continuous>  polyethylene glycol 3350 17 Gram(s) Oral at bedtime  spironolactone 50 milliGRAM(s) Oral daily  thiamine 100 milliGRAM(s) Oral daily    MEDICATIONS  (PRN):  HYDROmorphone  Injectable 0.5 milliGRAM(s) IV Push every 10 minutes PRN Moderate Pain (4 - 6)  ondansetron Injectable 4 milliGRAM(s) IV Push every 8 hours PRN Nausea and/or Vomiting  oxycodone    5 mG/acetaminophen 325 mG 1 Tablet(s) Oral every 6 hours PRN Severe Pain (7 - 10)      Vital Signs Last 24 Hrs  T(C): 37 (16 Nov 2023 00:05), Max: 37 (16 Nov 2023 00:05)  T(F): 98.6 (16 Nov 2023 00:05), Max: 98.6 (16 Nov 2023 00:05)  HR: 98 (16 Nov 2023 00:05) (98 - 108)  BP: 96/64 (16 Nov 2023 00:05) (90/50 - 111/64)  BP(mean): --  RR: 16 (16 Nov 2023 00:05) (16 - 16)  SpO2: 98% (16 Nov 2023 00:05) (95% - 99%)    Parameters below as of 16 Nov 2023 00:05  Patient On (Oxygen Delivery Method): nasal cannula  O2 Flow (L/min): 2        PHYSICAL EXAM   Gen: well-appearing, in no acute distress  CV: pulse regularly present   Resp: airway patent, non-labored breathing  Abd: soft, NTND; no rebound or guarding.   Ext. no cyanosis or edema      I&O's Detail    14 Nov 2023 07:01  -  15 Nov 2023 07:00  --------------------------------------------------------  IN:    Lactated Ringers: 75 mL    Other (mL): 800 mL  Total IN: 875 mL    OUT:    Voided (mL): 0 mL  Total OUT: 0 mL    Total NET: 875 mL          Daily     Daily     LABS:                        9.2    6.90  )-----------( 144      ( 14 Nov 2023 05:53 )             28.2     11-14    136  |  105  |  5<L>  ----------------------------<  100<H>  3.3<L>   |  23  |  0.50    Ca    7.8<L>      14 Nov 2023 05:53  Phos  2.7     11-14  Mg     1.6     11-14      PT/INR - ( 14 Nov 2023 05:53 )   PT: 15.6 sec;   INR: 1.39 ratio         PTT - ( 14 Nov 2023 05:53 )  PTT:34.9 sec  Urinalysis Basic - ( 14 Nov 2023 05:53 )    Color: x / Appearance: x / SG: x / pH: x  Gluc: 100 mg/dL / Ketone: x  / Bili: x / Urobili: x   Blood: x / Protein: x / Nitrite: x   Leuk Esterase: x / RBC: x / WBC x   Sq Epi: x / Non Sq Epi: x / Bacteria: x            ASSESSMENT/PLAN:        
SURGERY DAILY PROGRESS NOTE:     Subjective:  Patient seen and examined this AM at bedside. No acute events overnight and patient resting comfortably. No longer with bloody BM. Tolerating diet. Denies fever/chills, shortness of breath, chest pain. VS reviewed    Objective:    MEDICATIONS  (STANDING):  cefTRIAXone Injectable. 2000 milliGRAM(s) IV Push every 24 hours  furosemide    Tablet 20 milliGRAM(s) Oral daily  spironolactone 50 milliGRAM(s) Oral daily  thiamine 100 milliGRAM(s) Oral daily    MEDICATIONS  (PRN):  ondansetron Injectable 4 milliGRAM(s) IV Push every 8 hours PRN Nausea and/or Vomiting  oxycodone    5 mG/acetaminophen 325 mG 1 Tablet(s) Oral every 6 hours PRN Severe Pain (7 - 10)      Vital Signs Last 24 Hrs  T(C): 37 (12 Nov 2023 00:30), Max: 37 (12 Nov 2023 00:30)  T(F): 98.6 (12 Nov 2023 00:30), Max: 98.6 (12 Nov 2023 00:30)  HR: 116 (12 Nov 2023 00:30) (82 - 116)  BP: 124/68 (12 Nov 2023 00:30) (102/69 - 124/68)  BP(mean): 96 (11 Nov 2023 13:00) (77 - 96)  RR: 18 (12 Nov 2023 00:30) (15 - 27)  SpO2: 94% (12 Nov 2023 00:30) (92% - 100%)    Parameters below as of 12 Nov 2023 00:30  Patient On (Oxygen Delivery Method): room air          PHYSICAL EXAM   GENERAL: NAD, well developed, temporal wasting  HEAD: Atraumatic, normocephalic  EYES: EOMI, PERRLA, conjunctiva clear, sclera icteric  ENT: moist mucous membrane  NECK: supple, No JVD, midline trachea  CHEST/LUNG: No increased WOB, symmetric excursions  Heart: RRR ppp, no peripheral edema  ABDOMEN: no caput medusa, round, mildly distended, soft, nontender  Rectal: Redundant anterior tissue from prior external hemorrhoid, no blood leaking  EXTREMITIES: +2 peripheral pulses, brisk cap refill. no clubbing, cyanosis or edema  NERVOUS SYSTEM: AOx4, speech clear, no neuro-deficits  MSK: full ROM, no deformities  SKIN: warm to touch, no rash or lesions        I&O's Detail    10 Nov 2023 07:01  -  11 Nov 2023 07:00  --------------------------------------------------------  IN:    IV PiggyBack: 450 mL    Octreotide: 100 mL    Oral Fluid: 1100 mL    Pantoprazole: 100 mL  Total IN: 1750 mL    OUT:  Total OUT: 0 mL    Total NET: 1750 mL          Daily     Daily     LABS:                        8.2    6.31  )-----------( 163      ( 11 Nov 2023 05:27 )             24.8     11-11    136  |  109<H>  |  6<L>  ----------------------------<  102<H>  4.0   |  19<L>  |  0.64    Ca    7.2<L>      11 Nov 2023 05:27  Phos  2.4     11-11  Mg     1.9     11-11    TPro  5.2<L>  /  Alb  1.8<L>  /  TBili  2.6<H>  /  DBili  x   /  AST  154<H>  /  ALT  33  /  AlkPhos  167<H>  11-10    PT/INR - ( 10 Nov 2023 05:13 )   PT: 15.3 sec;   INR: 1.37 ratio         PTT - ( 10 Nov 2023 05:13 )  PTT:31.2 sec  Urinalysis Basic - ( 11 Nov 2023 05:27 )    Color: x / Appearance: x / SG: x / pH: x  Gluc: 102 mg/dL / Ketone: x  / Bili: x / Urobili: x   Blood: x / Protein: x / Nitrite: x   Leuk Esterase: x / RBC: x / WBC x   Sq Epi: x / Non Sq Epi: x / Bacteria: x  
CC-  GIB (12 Nov 2023 05:05)    HPI:  59 yo F PMH ETOH cirrhosis; recent variceal bleed/UGIB,  hx of alcohol abuse- Reports no alcohol since prior to last hospitalization  p/w 3 days of BPBPR, abdominal distention, dizziness,  lightheadedness, chest pain; cough. Pt states she was at home bleeding when she went to the bathroom (even when she urinated), pretty consistently for 3 days. Pt states she knew it was hemorrhoids and "thought that it would stop like it has in the past." Pt started to feel CP and dizziness and then went to the ER   In the ER the pt was noted to have a hbg 3.9 and was hypotensive with SBP in 80-90's. CT scan with no active bleed noted    Patient admitted to ICU with LGIB/ HG 3.9/ hemorrhagic shock. Received total 5U PRBC transfusion and 1FFP.   Underwent EGD that showed hiatal hernia and portal gastropathy and colonoscopy that showed large bleeding internal hemorrhoids.  Patient hemodynamically stabilized after transfusion and transferred to [facility] eval noted and patient planned for hemorrhoidectomy on 11/13/23    seen and examined on  laying in bed s/p hemorrhoidectomy. reporting pain to buttock radiating to lower abd/pelvis. states this morning she had a small formed bowel movement. no bleeding. pain is acceptable at present.      Review of system- All 10 systems reviewed and is as per HPI otherwise negative.       Vital Signs Last 24 Hrs  T(C): 36.8 (14 Nov 2023 14:38), Max: 37.1 (14 Nov 2023 08:00)  T(F): 98.2 (14 Nov 2023 14:38), Max: 98.8 (14 Nov 2023 08:00)  HR: 107 (14 Nov 2023 14:38) (99 - 108)  BP: 120/74 (14 Nov 2023 14:38) (99/67 - 136/86)  BP(mean): --  RR: 16 (14 Nov 2023 14:38) (12 - 19)  SpO2: 94% (14 Nov 2023 14:38) (94% - 100%)    Parameters below as of 14 Nov 2023 14:38  Patient On (Oxygen Delivery Method): nasal cannula  O2 Flow (L/min): 2      LABS:                        9.2    6.90  )-----------( 144      ( 14 Nov 2023 05:53 )             28.2     11-14    136  |  105  |  5<L>  ----------------------------<  100<H>  3.3<L>   |  23  |  0.50    Ca    7.8<L>      14 Nov 2023 05:53  Phos  2.7     11-14  Mg     1.6     11-14      PT/INR - ( 14 Nov 2023 05:53 )   PT: 15.6 sec;   INR: 1.39 ratio  PTT - ( 14 Nov 2023 05:53 )  PTT:34.9 sec      Urinalysis Basic - ( 14 Nov 2023 05:53 )    Color: x / Appearance: x / SG: x / pH: x  Gluc: 100 mg/dL / Ketone: x  / Bili: x / Urobili: x   Blood: x / Protein: x / Nitrite: x   Leuk Esterase: x / RBC: x / WBC x   Sq Epi: x / Non Sq Epi: x / Bacteria: x          PHYSICAL EXAM:  GENERAL: NAD, well-groomed, well-developed  HEAD:  Atraumatic, Normocephalic  EYES: EOMI, PERRLA, conjunctiva and sclera clear  HEENT: Moist mucous membranes  NECK: Supple, No JVD  NERVOUS SYSTEM:  Alert & Oriented X3, Motor Strength 5/5 B/L upper and lower extremities; DTRs 2+ intact and symmetric  CHEST/LUNG: Clear to auscultation bilaterally, No rales, rhonchi, wheezing, or rubs  HEART: Regular rate and rhythm; No murmurs, rubs, or gallops  ABDOMEN: Soft, distended with ascites  GENITOURINARY- Voiding, no palpable bladder  EXTREMITIES:  2+ Peripheral Pulses, No clubbing, cyanosis, or edema  MUSCULOSKELTAL- No muscle tenderness, Muscle tone normal, No joint tenderness, no Joint swelling, Joint range of motion-normal  SKIN-no rash, no lesion  CNS- alert, oriented X3, non focal       MEDICATIONS  (STANDING):  furosemide    Tablet 20 milliGRAM(s) Oral daily  lactated ringers. 1000 milliLiter(s) (100 mL/Hr) IV Continuous <Continuous>  polyethylene glycol 3350 17 Gram(s) Oral at bedtime  potassium chloride  10 mEq/100 mL IVPB 10 milliEquivalent(s) IV Intermittent every 1 hour  spironolactone 50 milliGRAM(s) Oral daily  thiamine 100 milliGRAM(s) Oral daily    MEDICATIONS  (PRN):  HYDROmorphone  Injectable 0.5 milliGRAM(s) IV Push every 10 minutes PRN Moderate Pain (4 - 6)  ondansetron Injectable 4 milliGRAM(s) IV Push every 8 hours PRN Nausea and/or Vomiting  oxycodone    5 mG/acetaminophen 325 mG 1 Tablet(s) Oral every 6 hours PRN Severe Pain (7 - 10)        Assessment/Plan  #LGIB 2/2 large internal hemorrhoids  #Anemia acute blood loss due to above  #Hemorrhagic shock resolved  S/p total 5U PRBC transfusion and 1U FFP  Repeat labs in AM prior to OR- transfuse as needed to keep above 8.0 for OR  GI following- s/p EGD/ colonoscopy  CRS eval noted  s/p OR today for hemorrhoidectomy.   ECHO- EF 55-60%  EKG- sinus  advance diet per CRS     #Alcoholic liver cirrhosis with ascites  GI following  TIPS out pt when medically stable or during admission if unstable.   Cont PO Lasix and Aldactone  DC Rocephin- low suspicion of SBP    #DVT proph- venodynes    #Dispo- s/p hemorrhoidectomy today. if stable H&H clear for dc 
SURGERY DAILY PROGRESS NOTE:     Subjective:  Patient seen and examined at bedside during rounds. pt is s/p hemorrhoidectomy POD 1. Pt is doing well, denies any further bleeding. PT is tolerating pain and diet. AVSS. Denies any fevers, chills, n/v/d, chest pain or shortness of breath    Objective:    MEDICATIONS  (STANDING):  furosemide    Tablet 20 milliGRAM(s) Oral daily  lactated ringers. 1000 milliLiter(s) (100 mL/Hr) IV Continuous <Continuous>  polyethylene glycol 3350 17 Gram(s) Oral at bedtime  spironolactone 50 milliGRAM(s) Oral daily  thiamine 100 milliGRAM(s) Oral daily    MEDICATIONS  (PRN):  HYDROmorphone  Injectable 0.5 milliGRAM(s) IV Push every 10 minutes PRN Moderate Pain (4 - 6)  ondansetron Injectable 4 milliGRAM(s) IV Push every 8 hours PRN Nausea and/or Vomiting  oxycodone    5 mG/acetaminophen 325 mG 1 Tablet(s) Oral every 6 hours PRN Severe Pain (7 - 10)      Vital Signs Last 24 Hrs  T(C): 36.7 (15 Nov 2023 00:13), Max: 37.1 (14 Nov 2023 08:00)  T(F): 98.1 (15 Nov 2023 00:13), Max: 98.8 (14 Nov 2023 08:00)  HR: 99 (15 Nov 2023 00:13) (99 - 108)  BP: 101/73 (15 Nov 2023 00:13) (99/67 - 136/86)  BP(mean): --  RR: 16 (15 Nov 2023 00:13) (12 - 19)  SpO2: 96% (15 Nov 2023 00:13) (94% - 100%)    Parameters below as of 15 Nov 2023 00:13  Patient On (Oxygen Delivery Method): nasal cannula  O2 Flow (L/min): 2        PHYSICAL EXAM   Gen: well-appearing, in no acute distress  CV: pulse regularly present   Resp: airway patent, non-labored breathing  Abd: soft, NTND; no rebound or guarding.    Anus: no sign of bleeding, incision clean  Ext. no cyanosis or edema      I&O's Detail    13 Nov 2023 07:01  -  14 Nov 2023 07:00  --------------------------------------------------------  IN:    Lactated Ringers: 1100 mL  Total IN: 1100 mL    OUT:  Total OUT: 0 mL    Total NET: 1100 mL      14 Nov 2023 07:01  -  15 Nov 2023 02:44  --------------------------------------------------------  IN:    Lactated Ringers: 75 mL    Other (mL): 800 mL  Total IN: 875 mL    OUT:    Voided (mL): 0 mL  Total OUT: 0 mL    Total NET: 875 mL          Daily     Daily     LABS:                        9.2    6.90  )-----------( 144      ( 14 Nov 2023 05:53 )             28.2     11-14    136  |  105  |  5<L>  ----------------------------<  100<H>  3.3<L>   |  23  |  0.50    Ca    7.8<L>      14 Nov 2023 05:53  Phos  2.7     11-14  Mg     1.6     11-14      PT/INR - ( 14 Nov 2023 05:53 )   PT: 15.6 sec;   INR: 1.39 ratio         PTT - ( 14 Nov 2023 05:53 )  PTT:34.9 sec  Urinalysis Basic - ( 14 Nov 2023 05:53 )    Color: x / Appearance: x / SG: x / pH: x  Gluc: 100 mg/dL / Ketone: x  / Bili: x / Urobili: x   Blood: x / Protein: x / Nitrite: x   Leuk Esterase: x / RBC: x / WBC x   Sq Epi: x / Non Sq Epi: x / Bacteria: x            ASSESSMENT/PLAN:        
Patient is a 58y old  Female who presents with a chief complaint of   24 hour events:     Allergies    Zithromax (Unknown)    Intolerances    morphine (Nausea)    REVIEW OF SYSTEMS: SEE BELOW       ICU Vital Signs Last 24 Hrs  T(C): 37.1 (10 Nov 2023 12:00), Max: 37.1 (09 Nov 2023 13:40)  T(F): 98.7 (10 Nov 2023 12:00), Max: 98.8 (09 Nov 2023 18:30)  HR: 88 (10 Nov 2023 12:00) (88 - 109)  BP: 98/69 (10 Nov 2023 12:00) (82/51 - 128/71)  BP(mean): 80 (10 Nov 2023 12:00) (61 - 93)  ABP: --  ABP(mean): --  RR: 26 (10 Nov 2023 12:00) (17 - 37)  SpO2: 95% (10 Nov 2023 12:00) (92% - 100%)    O2 Parameters below as of 10 Nov 2023 11:00  Patient On (Oxygen Delivery Method): room air            CAPILLARY BLOOD GLUCOSE          I&O's Summary    09 Nov 2023 07:01  -  10 Nov 2023 07:00  --------------------------------------------------------  IN: 2968 mL / OUT: 950 mL / NET: 2018 mL    10 Nov 2023 07:01  -  10 Nov 2023 12:29  --------------------------------------------------------  IN: 450 mL / OUT: 0 mL / NET: 450 mL            MEDICATIONS  (STANDING):  cefTRIAXone Injectable. 2000 milliGRAM(s) IV Push every 24 hours  octreotide  Infusion 50 MICROgram(s)/Hr (10 mL/Hr) IV Continuous <Continuous>  pantoprazole Infusion 8 mG/Hr (10 mL/Hr) IV Continuous <Continuous>  thiamine 100 milliGRAM(s) Oral daily      MEDICATIONS  (PRN):      PHYSICAL EXAM: SEE BELOW                          8.4    6.79  )-----------( 156      ( 10 Nov 2023 05:13 )             24.6       11-10    137  |  109<H>  |  8   ----------------------------<  98  3.1<L>   |  21<L>  |  0.59    Ca    7.1<L>      10 Nov 2023 05:13  Phos  2.0     11-10  Mg     1.9     11-10    TPro  5.2<L>  /  Alb  1.8<L>  /  TBili  2.6<H>  /  DBili  x   /  AST  154<H>  /  ALT  33  /  AlkPhos  167<H>  11-10    Lactate 1.5           11-10 @ 05:13    Lactate 3.1           11-09 @ 15:09          PT/INR - ( 10 Nov 2023 05:13 )   PT: 15.3 sec;   INR: 1.37 ratio         PTT - ( 10 Nov 2023 05:13 )  PTT:31.2 sec  Urinalysis Basic - ( 10 Nov 2023 05:13 )    Color: x / Appearance: x / SG: x / pH: x  Gluc: 98 mg/dL / Ketone: x  / Bili: x / Urobili: x   Blood: x / Protein: x / Nitrite: x   Leuk Esterase: x / RBC: x / WBC x   Sq Epi: x / Non Sq Epi: x / Bacteria: x          Rapid RVP Result: Altheatec (11-09 @ 07:01)      
HPI: Patient is a 58y old  Female who presents with a  GIB       PAST MEDICAL & SURGICAL HISTORY:  Alcohol abuse      Depression      Withdrawal seizures      History of basal cell carcinoma excision      Squamous cell skin cancer      Hemorrhoids      2019 novel coronavirus disease (COVID-19)      History of ear, nose, and throat (ENT) surgery      H/O basal cell carcinoma excision      H/O:           FAMILY HISTORY:  FH: pancreatic cancer (Mother)    Family history of heart attack (Father)    Family history of CVA (Father)        Social Hx:    Allergies    Zithromax (Unknown)    Intolerances    morphine (Nausea)          ICU Vital Signs Last 24 Hrs  T(C): 36.9 (2023 04:00), Max: 37 (10 Nov 2023 20:00)  T(F): 98.5 (2023 04:00), Max: 98.6 (10 Nov 2023 20:00)  HR: 89 (2023 12:00) (82 - 97)  BP: 111/78 (2023 12:00) (92/71 - 122/83)  BP(mean): 88 (2023 12:00) (66 - 94)  ABP: --  ABP(mean): --  RR: 27 (2023 12:00) (15 - 31)  SpO2: 96% (2023 12:00) (92% - 100%)    O2 Parameters below as of 2023 12:00  Patient On (Oxygen Delivery Method): room air                I&O's Summary    10 Nov 2023 07:01  -  2023 07:00  --------------------------------------------------------  IN: 1750 mL / OUT: 0 mL / NET: 1750 mL                              8.2    6.31  )-----------( 163      ( 2023 05:27 )             24.8           136  |  109<H>  |  6<L>  ----------------------------<  102<H>  4.0   |  19<L>  |  0.64    Ca    7.2<L>      2023 05:27  Phos  2.4     11-  Mg     1.9     11-    TPro  5.2<L>  /  Alb  1.8<L>  /  TBili  2.6<H>  /  DBili  x   /  AST  154<H>  /  ALT  33  /  AlkPhos  167<H>  11-10                Urinalysis Basic - ( 2023 05:27 )    Color: x / Appearance: x / SG: x / pH: x  Gluc: 102 mg/dL / Ketone: x  / Bili: x / Urobili: x   Blood: x / Protein: x / Nitrite: x   Leuk Esterase: x / RBC: x / WBC x   Sq Epi: x / Non Sq Epi: x / Bacteria: x        MEDICATIONS  (STANDING):  cefTRIAXone Injectable. 2000 milliGRAM(s) IV Push every 24 hours  furosemide    Tablet 20 milliGRAM(s) Oral daily  spironolactone 50 milliGRAM(s) Oral daily  thiamine 100 milliGRAM(s) Oral daily    MEDICATIONS  (PRN):      DVT Prophylaxis: V    Advanced Directives:  Discussed with:    Visit Information:    ** Time is exclusive of billed procedures and/or teaching and/or routine family updates.

## 2023-11-16 NOTE — PROGRESS NOTE ADULT - ASSESSMENT
59 yo F w/ cirrhosis, h/o hemorrhoids s/p banding x 8+ & hemorrhoidectomy of 2 piles, who is admitted with GIB likely 2/2 Grade III hemorrhoids that would benefit from resection prior to discharge  bleeding hemorrhoids s/p hemorrhoidectomy 11/14  doing well    Recommendation:    -cont diet  -pain control  -Sitz bath  -stool softners  -TIPS as outpatient   -cont Bowel regimen  -trend h/h Tranfuse PRN  -rest of plan as per primary team    Plan d/w CRS team 59 yo F w/ cirrhosis, h/o hemorrhoids s/p banding x 8+ & hemorrhoidectomy of 2 piles, who is admitted with GIB likely 2/2 Grade III hemorrhoids that would benefit from resection prior to discharge  bleeding hemorrhoids s/p hemorrhoidectomy 11/14  doing well    Recommendation:    -cont diet  -pain control  -Sitz bath and ice packs to help with pain  -stool softners  -TIPS as outpatient   -cont Bowel regimen  -trend h/h Tranfuse PRN  - CRS will sign off, please have patient follow up outpatient with Dr Cordero after discharge in 2 weeks.   -rest of plan as per primary team    Plan d/w CRS team

## 2023-11-16 NOTE — DISCHARGE NOTE NURSING/CASE MANAGEMENT/SOCIAL WORK - NSDCVIVACCINE_GEN_ALL_CORE_FT
influenza, injectable, quadrivalent, preservative free; 13-Oct-2023 11:44; Kristina Renee (RN); [employer]; [id number] (Exp. Date: 13-Jun-2024); IntraMuscular; Deltoid Left.; 0.5 milliLiter(s); VIS (VIS Published: 06-Aug-2021, VIS Presented: 13-Oct-2023);

## 2023-11-16 NOTE — DISCHARGE NOTE NURSING/CASE MANAGEMENT/SOCIAL WORK - PATIENT PORTAL LINK FT
You can access the FollowMyHealth Patient Portal offered by [facility] by registering at the following website: [email or web address]. By joining [employer]’s FollowMyHealth portal, you will also be able to view your health information using other applications (apps) compatible with our system.

## 2023-11-20 ENCOUNTER — NON-APPOINTMENT (OUTPATIENT)
Age: 58
End: 2023-11-20

## 2023-11-20 DIAGNOSIS — K64.3 FOURTH DEGREE HEMORRHOIDS: ICD-10-CM

## 2023-11-20 DIAGNOSIS — F10.21 ALCOHOL DEPENDENCE, IN REMISSION: ICD-10-CM

## 2023-11-20 DIAGNOSIS — N17.9 ACUTE KIDNEY FAILURE, UNSPECIFIED: ICD-10-CM

## 2023-11-20 DIAGNOSIS — Z86.16 PERSONAL HISTORY OF COVID-19: ICD-10-CM

## 2023-11-20 DIAGNOSIS — Z88.5 ALLERGY STATUS TO NARCOTIC AGENT: ICD-10-CM

## 2023-11-20 DIAGNOSIS — E43 UNSPECIFIED SEVERE PROTEIN-CALORIE MALNUTRITION: ICD-10-CM

## 2023-11-20 DIAGNOSIS — K31.89 OTHER DISEASES OF STOMACH AND DUODENUM: ICD-10-CM

## 2023-11-20 DIAGNOSIS — I85.10 SECONDARY ESOPHAGEAL VARICES WITHOUT BLEEDING: ICD-10-CM

## 2023-11-20 DIAGNOSIS — K76.6 PORTAL HYPERTENSION: ICD-10-CM

## 2023-11-20 DIAGNOSIS — I86.8 VARICOSE VEINS OF OTHER SPECIFIED SITES: ICD-10-CM

## 2023-11-20 DIAGNOSIS — D62 ACUTE POSTHEMORRHAGIC ANEMIA: ICD-10-CM

## 2023-11-20 DIAGNOSIS — R57.8 OTHER SHOCK: ICD-10-CM

## 2023-11-20 DIAGNOSIS — K44.9 DIAPHRAGMATIC HERNIA WITHOUT OBSTRUCTION OR GANGRENE: ICD-10-CM

## 2023-11-20 DIAGNOSIS — Z85.828 PERSONAL HISTORY OF OTHER MALIGNANT NEOPLASM OF SKIN: ICD-10-CM

## 2023-11-20 DIAGNOSIS — K70.31 ALCOHOLIC CIRRHOSIS OF LIVER WITH ASCITES: ICD-10-CM

## 2023-11-21 LAB
SURGICAL PATHOLOGY STUDY: SIGNIFICANT CHANGE UP
SURGICAL PATHOLOGY STUDY: SIGNIFICANT CHANGE UP

## 2023-11-22 ENCOUNTER — INPATIENT (INPATIENT)
Facility: HOSPITAL | Age: 58
LOS: 4 days | Discharge: HOME HEALTH | DRG: 432 | End: 2023-11-27
Attending: STUDENT IN AN ORGANIZED HEALTH CARE EDUCATION/TRAINING PROGRAM | Admitting: STUDENT IN AN ORGANIZED HEALTH CARE EDUCATION/TRAINING PROGRAM
Payer: COMMERCIAL

## 2023-11-22 VITALS — WEIGHT: 130.07 LBS | HEIGHT: 67 IN

## 2023-11-22 DIAGNOSIS — K92.2 GASTROINTESTINAL HEMORRHAGE, UNSPECIFIED: ICD-10-CM

## 2023-11-22 DIAGNOSIS — Z98.890 OTHER SPECIFIED POSTPROCEDURAL STATES: Chronic | ICD-10-CM

## 2023-11-22 DIAGNOSIS — Z98.891 HISTORY OF UTERINE SCAR FROM PREVIOUS SURGERY: Chronic | ICD-10-CM

## 2023-11-22 LAB
ALBUMIN SERPL ELPH-MCNC: 1.8 G/DL — LOW (ref 3.3–5)
ALBUMIN SERPL ELPH-MCNC: 1.8 G/DL — LOW (ref 3.3–5)
ALBUMIN SERPL ELPH-MCNC: 2 G/DL — LOW (ref 3.3–5)
ALBUMIN SERPL ELPH-MCNC: 2 G/DL — LOW (ref 3.3–5)
ALP SERPL-CCNC: 230 U/L — HIGH (ref 40–120)
ALP SERPL-CCNC: 230 U/L — HIGH (ref 40–120)
ALP SERPL-CCNC: 254 U/L — HIGH (ref 40–120)
ALP SERPL-CCNC: 254 U/L — HIGH (ref 40–120)
ALT FLD-CCNC: 29 U/L — SIGNIFICANT CHANGE UP (ref 12–78)
ALT FLD-CCNC: 29 U/L — SIGNIFICANT CHANGE UP (ref 12–78)
ALT FLD-CCNC: 30 U/L — SIGNIFICANT CHANGE UP (ref 12–78)
ALT FLD-CCNC: 30 U/L — SIGNIFICANT CHANGE UP (ref 12–78)
ANION GAP SERPL CALC-SCNC: 6 MMOL/L — SIGNIFICANT CHANGE UP (ref 5–17)
ANION GAP SERPL CALC-SCNC: 6 MMOL/L — SIGNIFICANT CHANGE UP (ref 5–17)
ANION GAP SERPL CALC-SCNC: 7 MMOL/L — SIGNIFICANT CHANGE UP (ref 5–17)
ANION GAP SERPL CALC-SCNC: 7 MMOL/L — SIGNIFICANT CHANGE UP (ref 5–17)
APTT BLD: 34.9 SEC — SIGNIFICANT CHANGE UP (ref 24.5–35.6)
APTT BLD: 34.9 SEC — SIGNIFICANT CHANGE UP (ref 24.5–35.6)
APTT BLD: 35.1 SEC — SIGNIFICANT CHANGE UP (ref 24.5–35.6)
APTT BLD: 35.1 SEC — SIGNIFICANT CHANGE UP (ref 24.5–35.6)
AST SERPL-CCNC: 56 U/L — HIGH (ref 15–37)
AST SERPL-CCNC: 56 U/L — HIGH (ref 15–37)
AST SERPL-CCNC: 65 U/L — HIGH (ref 15–37)
AST SERPL-CCNC: 65 U/L — HIGH (ref 15–37)
BASOPHILS # BLD AUTO: 0.06 K/UL — SIGNIFICANT CHANGE UP (ref 0–0.2)
BASOPHILS # BLD AUTO: 0.06 K/UL — SIGNIFICANT CHANGE UP (ref 0–0.2)
BASOPHILS NFR BLD AUTO: 0.4 % — SIGNIFICANT CHANGE UP (ref 0–2)
BASOPHILS NFR BLD AUTO: 0.4 % — SIGNIFICANT CHANGE UP (ref 0–2)
BILIRUB SERPL-MCNC: 1.5 MG/DL — HIGH (ref 0.2–1.2)
BILIRUB SERPL-MCNC: 1.5 MG/DL — HIGH (ref 0.2–1.2)
BILIRUB SERPL-MCNC: 2.9 MG/DL — HIGH (ref 0.2–1.2)
BILIRUB SERPL-MCNC: 2.9 MG/DL — HIGH (ref 0.2–1.2)
BLD GP AB SCN SERPL QL: SIGNIFICANT CHANGE UP
BLD GP AB SCN SERPL QL: SIGNIFICANT CHANGE UP
BUN SERPL-MCNC: 25 MG/DL — HIGH (ref 7–23)
BUN SERPL-MCNC: 25 MG/DL — HIGH (ref 7–23)
BUN SERPL-MCNC: 34 MG/DL — HIGH (ref 7–23)
BUN SERPL-MCNC: 34 MG/DL — HIGH (ref 7–23)
CALCIUM SERPL-MCNC: 7.7 MG/DL — LOW (ref 8.5–10.1)
CHLORIDE SERPL-SCNC: 107 MMOL/L — SIGNIFICANT CHANGE UP (ref 96–108)
CHLORIDE SERPL-SCNC: 107 MMOL/L — SIGNIFICANT CHANGE UP (ref 96–108)
CHLORIDE SERPL-SCNC: 111 MMOL/L — HIGH (ref 96–108)
CHLORIDE SERPL-SCNC: 111 MMOL/L — HIGH (ref 96–108)
CO2 SERPL-SCNC: 24 MMOL/L — SIGNIFICANT CHANGE UP (ref 22–31)
CO2 SERPL-SCNC: 24 MMOL/L — SIGNIFICANT CHANGE UP (ref 22–31)
CO2 SERPL-SCNC: 26 MMOL/L — SIGNIFICANT CHANGE UP (ref 22–31)
CO2 SERPL-SCNC: 26 MMOL/L — SIGNIFICANT CHANGE UP (ref 22–31)
CREAT SERPL-MCNC: 1.21 MG/DL — SIGNIFICANT CHANGE UP (ref 0.5–1.3)
CREAT SERPL-MCNC: 1.21 MG/DL — SIGNIFICANT CHANGE UP (ref 0.5–1.3)
CREAT SERPL-MCNC: 1.4 MG/DL — HIGH (ref 0.5–1.3)
CREAT SERPL-MCNC: 1.4 MG/DL — HIGH (ref 0.5–1.3)
EGFR: 43 ML/MIN/1.73M2 — LOW
EGFR: 43 ML/MIN/1.73M2 — LOW
EGFR: 51 ML/MIN/1.73M2 — LOW
EGFR: 51 ML/MIN/1.73M2 — LOW
EOSINOPHIL # BLD AUTO: 0.23 K/UL — SIGNIFICANT CHANGE UP (ref 0–0.5)
EOSINOPHIL # BLD AUTO: 0.23 K/UL — SIGNIFICANT CHANGE UP (ref 0–0.5)
EOSINOPHIL NFR BLD AUTO: 1.6 % — SIGNIFICANT CHANGE UP (ref 0–6)
EOSINOPHIL NFR BLD AUTO: 1.6 % — SIGNIFICANT CHANGE UP (ref 0–6)
GLUCOSE SERPL-MCNC: 107 MG/DL — HIGH (ref 70–99)
GLUCOSE SERPL-MCNC: 107 MG/DL — HIGH (ref 70–99)
GLUCOSE SERPL-MCNC: 116 MG/DL — HIGH (ref 70–99)
GLUCOSE SERPL-MCNC: 116 MG/DL — HIGH (ref 70–99)
HCT VFR BLD CALC: 23.6 % — LOW (ref 34.5–45)
HCT VFR BLD CALC: 23.6 % — LOW (ref 34.5–45)
HCT VFR BLD CALC: 24.9 % — LOW (ref 34.5–45)
HCT VFR BLD CALC: 35.4 % — SIGNIFICANT CHANGE UP (ref 34.5–45)
HCT VFR BLD CALC: 35.4 % — SIGNIFICANT CHANGE UP (ref 34.5–45)
HGB BLD-MCNC: 11.7 G/DL — SIGNIFICANT CHANGE UP (ref 11.5–15.5)
HGB BLD-MCNC: 11.7 G/DL — SIGNIFICANT CHANGE UP (ref 11.5–15.5)
HGB BLD-MCNC: 7.2 G/DL — LOW (ref 11.5–15.5)
HGB BLD-MCNC: 7.2 G/DL — LOW (ref 11.5–15.5)
HGB BLD-MCNC: 7.8 G/DL — LOW (ref 11.5–15.5)
HGB BLD-MCNC: 7.8 G/DL — LOW (ref 11.5–15.5)
HGB BLD-MCNC: 7.9 G/DL — LOW (ref 11.5–15.5)
HGB BLD-MCNC: 7.9 G/DL — LOW (ref 11.5–15.5)
IMM GRANULOCYTES NFR BLD AUTO: 1 % — HIGH (ref 0–0.9)
IMM GRANULOCYTES NFR BLD AUTO: 1 % — HIGH (ref 0–0.9)
INR BLD: 1.27 RATIO — HIGH (ref 0.85–1.18)
INR BLD: 1.27 RATIO — HIGH (ref 0.85–1.18)
INR BLD: 1.4 RATIO — HIGH (ref 0.85–1.18)
INR BLD: 1.4 RATIO — HIGH (ref 0.85–1.18)
LACTATE SERPL-SCNC: 2.1 MMOL/L — HIGH (ref 0.7–2)
LACTATE SERPL-SCNC: 2.1 MMOL/L — HIGH (ref 0.7–2)
LACTATE SERPL-SCNC: 3.1 MMOL/L — HIGH (ref 0.7–2)
LACTATE SERPL-SCNC: 3.1 MMOL/L — HIGH (ref 0.7–2)
LACTATE SERPL-SCNC: 3.4 MMOL/L — HIGH (ref 0.7–2)
LACTATE SERPL-SCNC: 3.4 MMOL/L — HIGH (ref 0.7–2)
LIDOCAIN IGE QN: 37 U/L — SIGNIFICANT CHANGE UP (ref 13–75)
LIDOCAIN IGE QN: 37 U/L — SIGNIFICANT CHANGE UP (ref 13–75)
LYMPHOCYTES # BLD AUTO: 15.5 % — SIGNIFICANT CHANGE UP (ref 13–44)
LYMPHOCYTES # BLD AUTO: 15.5 % — SIGNIFICANT CHANGE UP (ref 13–44)
LYMPHOCYTES # BLD AUTO: 2.28 K/UL — SIGNIFICANT CHANGE UP (ref 1–3.3)
LYMPHOCYTES # BLD AUTO: 2.28 K/UL — SIGNIFICANT CHANGE UP (ref 1–3.3)
MAGNESIUM SERPL-MCNC: 1.7 MG/DL — SIGNIFICANT CHANGE UP (ref 1.6–2.6)
MAGNESIUM SERPL-MCNC: 1.7 MG/DL — SIGNIFICANT CHANGE UP (ref 1.6–2.6)
MCHC RBC-ENTMCNC: 26.4 PG — LOW (ref 27–34)
MCHC RBC-ENTMCNC: 26.4 PG — LOW (ref 27–34)
MCHC RBC-ENTMCNC: 27.7 PG — SIGNIFICANT CHANGE UP (ref 27–34)
MCHC RBC-ENTMCNC: 27.7 PG — SIGNIFICANT CHANGE UP (ref 27–34)
MCHC RBC-ENTMCNC: 28.7 PG — SIGNIFICANT CHANGE UP (ref 27–34)
MCHC RBC-ENTMCNC: 28.7 PG — SIGNIFICANT CHANGE UP (ref 27–34)
MCHC RBC-ENTMCNC: 30.5 GM/DL — LOW (ref 32–36)
MCHC RBC-ENTMCNC: 30.5 GM/DL — LOW (ref 32–36)
MCHC RBC-ENTMCNC: 31.3 GM/DL — LOW (ref 32–36)
MCHC RBC-ENTMCNC: 31.3 GM/DL — LOW (ref 32–36)
MCHC RBC-ENTMCNC: 33.1 GM/DL — SIGNIFICANT CHANGE UP (ref 32–36)
MCHC RBC-ENTMCNC: 33.1 GM/DL — SIGNIFICANT CHANGE UP (ref 32–36)
MCV RBC AUTO: 86.4 FL — SIGNIFICANT CHANGE UP (ref 80–100)
MCV RBC AUTO: 86.4 FL — SIGNIFICANT CHANGE UP (ref 80–100)
MCV RBC AUTO: 86.8 FL — SIGNIFICANT CHANGE UP (ref 80–100)
MCV RBC AUTO: 86.8 FL — SIGNIFICANT CHANGE UP (ref 80–100)
MCV RBC AUTO: 88.3 FL — SIGNIFICANT CHANGE UP (ref 80–100)
MCV RBC AUTO: 88.3 FL — SIGNIFICANT CHANGE UP (ref 80–100)
MONOCYTES # BLD AUTO: 1.54 K/UL — HIGH (ref 0–0.9)
MONOCYTES # BLD AUTO: 1.54 K/UL — HIGH (ref 0–0.9)
MONOCYTES NFR BLD AUTO: 10.5 % — SIGNIFICANT CHANGE UP (ref 2–14)
MONOCYTES NFR BLD AUTO: 10.5 % — SIGNIFICANT CHANGE UP (ref 2–14)
NEUTROPHILS # BLD AUTO: 10.46 K/UL — HIGH (ref 1.8–7.4)
NEUTROPHILS # BLD AUTO: 10.46 K/UL — HIGH (ref 1.8–7.4)
NEUTROPHILS NFR BLD AUTO: 71 % — SIGNIFICANT CHANGE UP (ref 43–77)
NEUTROPHILS NFR BLD AUTO: 71 % — SIGNIFICANT CHANGE UP (ref 43–77)
PHOSPHATE SERPL-MCNC: 3.3 MG/DL — SIGNIFICANT CHANGE UP (ref 2.5–4.5)
PHOSPHATE SERPL-MCNC: 3.3 MG/DL — SIGNIFICANT CHANGE UP (ref 2.5–4.5)
PLATELET # BLD AUTO: 138 K/UL — LOW (ref 150–400)
PLATELET # BLD AUTO: 138 K/UL — LOW (ref 150–400)
PLATELET # BLD AUTO: 139 K/UL — LOW (ref 150–400)
PLATELET # BLD AUTO: 139 K/UL — LOW (ref 150–400)
PLATELET # BLD AUTO: 229 K/UL — SIGNIFICANT CHANGE UP (ref 150–400)
PLATELET # BLD AUTO: 229 K/UL — SIGNIFICANT CHANGE UP (ref 150–400)
POTASSIUM SERPL-MCNC: 3.7 MMOL/L — SIGNIFICANT CHANGE UP (ref 3.5–5.3)
POTASSIUM SERPL-MCNC: 3.7 MMOL/L — SIGNIFICANT CHANGE UP (ref 3.5–5.3)
POTASSIUM SERPL-MCNC: 4.5 MMOL/L — SIGNIFICANT CHANGE UP (ref 3.5–5.3)
POTASSIUM SERPL-MCNC: 4.5 MMOL/L — SIGNIFICANT CHANGE UP (ref 3.5–5.3)
POTASSIUM SERPL-SCNC: 3.7 MMOL/L — SIGNIFICANT CHANGE UP (ref 3.5–5.3)
POTASSIUM SERPL-SCNC: 3.7 MMOL/L — SIGNIFICANT CHANGE UP (ref 3.5–5.3)
POTASSIUM SERPL-SCNC: 4.5 MMOL/L — SIGNIFICANT CHANGE UP (ref 3.5–5.3)
POTASSIUM SERPL-SCNC: 4.5 MMOL/L — SIGNIFICANT CHANGE UP (ref 3.5–5.3)
PROT SERPL-MCNC: 5.7 GM/DL — LOW (ref 6–8.3)
PROT SERPL-MCNC: 5.7 GM/DL — LOW (ref 6–8.3)
PROT SERPL-MCNC: 6.2 GM/DL — SIGNIFICANT CHANGE UP (ref 6–8.3)
PROT SERPL-MCNC: 6.2 GM/DL — SIGNIFICANT CHANGE UP (ref 6–8.3)
PROTHROM AB SERPL-ACNC: 14.2 SEC — HIGH (ref 9.5–13)
PROTHROM AB SERPL-ACNC: 14.2 SEC — HIGH (ref 9.5–13)
PROTHROM AB SERPL-ACNC: 15.7 SEC — HIGH (ref 9.5–13)
PROTHROM AB SERPL-ACNC: 15.7 SEC — HIGH (ref 9.5–13)
RBC # BLD: 2.73 M/UL — LOW (ref 3.8–5.2)
RBC # BLD: 2.73 M/UL — LOW (ref 3.8–5.2)
RBC # BLD: 2.82 M/UL — LOW (ref 3.8–5.2)
RBC # BLD: 2.82 M/UL — LOW (ref 3.8–5.2)
RBC # BLD: 4.08 M/UL — SIGNIFICANT CHANGE UP (ref 3.8–5.2)
RBC # BLD: 4.08 M/UL — SIGNIFICANT CHANGE UP (ref 3.8–5.2)
RBC # FLD: 15.6 % — HIGH (ref 10.3–14.5)
RBC # FLD: 15.6 % — HIGH (ref 10.3–14.5)
RBC # FLD: 16.2 % — HIGH (ref 10.3–14.5)
RBC # FLD: 16.2 % — HIGH (ref 10.3–14.5)
RBC # FLD: 17.2 % — HIGH (ref 10.3–14.5)
RBC # FLD: 17.2 % — HIGH (ref 10.3–14.5)
SODIUM SERPL-SCNC: 140 MMOL/L — SIGNIFICANT CHANGE UP (ref 135–145)
SODIUM SERPL-SCNC: 140 MMOL/L — SIGNIFICANT CHANGE UP (ref 135–145)
SODIUM SERPL-SCNC: 141 MMOL/L — SIGNIFICANT CHANGE UP (ref 135–145)
SODIUM SERPL-SCNC: 141 MMOL/L — SIGNIFICANT CHANGE UP (ref 135–145)
TROPONIN I, HIGH SENSITIVITY RESULT: <3 NG/L — SIGNIFICANT CHANGE UP
TROPONIN I, HIGH SENSITIVITY RESULT: <3 NG/L — SIGNIFICANT CHANGE UP
WBC # BLD: 10.29 K/UL — SIGNIFICANT CHANGE UP (ref 3.8–10.5)
WBC # BLD: 10.29 K/UL — SIGNIFICANT CHANGE UP (ref 3.8–10.5)
WBC # BLD: 14.72 K/UL — HIGH (ref 3.8–10.5)
WBC # BLD: 14.72 K/UL — HIGH (ref 3.8–10.5)
WBC # BLD: 9.6 K/UL — SIGNIFICANT CHANGE UP (ref 3.8–10.5)
WBC # BLD: 9.6 K/UL — SIGNIFICANT CHANGE UP (ref 3.8–10.5)
WBC # FLD AUTO: 10.29 K/UL — SIGNIFICANT CHANGE UP (ref 3.8–10.5)
WBC # FLD AUTO: 10.29 K/UL — SIGNIFICANT CHANGE UP (ref 3.8–10.5)
WBC # FLD AUTO: 14.72 K/UL — HIGH (ref 3.8–10.5)
WBC # FLD AUTO: 14.72 K/UL — HIGH (ref 3.8–10.5)
WBC # FLD AUTO: 9.6 K/UL — SIGNIFICANT CHANGE UP (ref 3.8–10.5)
WBC # FLD AUTO: 9.6 K/UL — SIGNIFICANT CHANGE UP (ref 3.8–10.5)

## 2023-11-22 PROCEDURE — 80048 BASIC METABOLIC PNL TOTAL CA: CPT

## 2023-11-22 PROCEDURE — 36430 TRANSFUSION BLD/BLD COMPNT: CPT

## 2023-11-22 PROCEDURE — P9059: CPT

## 2023-11-22 PROCEDURE — 87102 FUNGUS ISOLATION CULTURE: CPT

## 2023-11-22 PROCEDURE — P9047: CPT

## 2023-11-22 PROCEDURE — 80053 COMPREHEN METABOLIC PANEL: CPT

## 2023-11-22 PROCEDURE — 85014 HEMATOCRIT: CPT

## 2023-11-22 PROCEDURE — 82042 OTHER SOURCE ALBUMIN QUAN EA: CPT

## 2023-11-22 PROCEDURE — 93010 ELECTROCARDIOGRAM REPORT: CPT

## 2023-11-22 PROCEDURE — 74177 CT ABD & PELVIS W/CONTRAST: CPT | Mod: 26,MA

## 2023-11-22 PROCEDURE — 83605 ASSAY OF LACTIC ACID: CPT

## 2023-11-22 PROCEDURE — 97163 PT EVAL HIGH COMPLEX 45 MIN: CPT | Mod: GP

## 2023-11-22 PROCEDURE — 83615 LACTATE (LD) (LDH) ENZYME: CPT

## 2023-11-22 PROCEDURE — 87070 CULTURE OTHR SPECIMN AEROBIC: CPT

## 2023-11-22 PROCEDURE — 82945 GLUCOSE OTHER FLUID: CPT

## 2023-11-22 PROCEDURE — 99285 EMERGENCY DEPT VISIT HI MDM: CPT

## 2023-11-22 PROCEDURE — 36415 COLL VENOUS BLD VENIPUNCTURE: CPT

## 2023-11-22 PROCEDURE — P9016: CPT

## 2023-11-22 PROCEDURE — 86923 COMPATIBILITY TEST ELECTRIC: CPT

## 2023-11-22 PROCEDURE — 84100 ASSAY OF PHOSPHORUS: CPT

## 2023-11-22 PROCEDURE — 87075 CULTR BACTERIA EXCEPT BLOOD: CPT

## 2023-11-22 PROCEDURE — 83735 ASSAY OF MAGNESIUM: CPT

## 2023-11-22 PROCEDURE — C9113: CPT

## 2023-11-22 PROCEDURE — 85027 COMPLETE CBC AUTOMATED: CPT

## 2023-11-22 PROCEDURE — 85730 THROMBOPLASTIN TIME PARTIAL: CPT

## 2023-11-22 PROCEDURE — 85610 PROTHROMBIN TIME: CPT

## 2023-11-22 PROCEDURE — 84157 ASSAY OF PROTEIN OTHER: CPT

## 2023-11-22 PROCEDURE — 89051 BODY FLUID CELL COUNT: CPT

## 2023-11-22 PROCEDURE — C1889: CPT

## 2023-11-22 PROCEDURE — 71045 X-RAY EXAM CHEST 1 VIEW: CPT | Mod: 26

## 2023-11-22 PROCEDURE — 85018 HEMOGLOBIN: CPT

## 2023-11-22 RX ORDER — OCTREOTIDE ACETATE 200 UG/ML
50 INJECTION, SOLUTION INTRAVENOUS; SUBCUTANEOUS
Qty: 500 | Refills: 0 | Status: DISCONTINUED | OUTPATIENT
Start: 2023-11-22 | End: 2023-11-23

## 2023-11-22 RX ORDER — SODIUM CHLORIDE 9 MG/ML
1000 INJECTION, SOLUTION INTRAVENOUS
Refills: 0 | Status: DISCONTINUED | OUTPATIENT
Start: 2023-11-22 | End: 2023-11-22

## 2023-11-22 RX ORDER — CEFTRIAXONE 500 MG/1
1000 INJECTION, POWDER, FOR SOLUTION INTRAMUSCULAR; INTRAVENOUS ONCE
Refills: 0 | Status: DISCONTINUED | OUTPATIENT
Start: 2023-11-22 | End: 2023-11-22

## 2023-11-22 RX ORDER — ONDANSETRON 8 MG/1
4 TABLET, FILM COATED ORAL ONCE
Refills: 0 | Status: COMPLETED | OUTPATIENT
Start: 2023-11-22 | End: 2023-11-22

## 2023-11-22 RX ORDER — OXYCODONE HYDROCHLORIDE 5 MG/1
5 TABLET ORAL ONCE
Refills: 0 | Status: DISCONTINUED | OUTPATIENT
Start: 2023-11-22 | End: 2023-11-22

## 2023-11-22 RX ORDER — OCTREOTIDE ACETATE 200 UG/ML
150 INJECTION, SOLUTION INTRAVENOUS; SUBCUTANEOUS ONCE
Refills: 0 | Status: COMPLETED | OUTPATIENT
Start: 2023-11-22 | End: 2023-11-22

## 2023-11-22 RX ORDER — CEFTRIAXONE 500 MG/1
1000 INJECTION, POWDER, FOR SOLUTION INTRAMUSCULAR; INTRAVENOUS ONCE
Refills: 0 | Status: COMPLETED | OUTPATIENT
Start: 2023-11-22 | End: 2023-11-22

## 2023-11-22 RX ORDER — FENTANYL CITRATE 50 UG/ML
25 INJECTION INTRAVENOUS
Refills: 0 | Status: DISCONTINUED | OUTPATIENT
Start: 2023-11-22 | End: 2023-11-22

## 2023-11-22 RX ORDER — MAGNESIUM SULFATE 500 MG/ML
2 VIAL (ML) INJECTION ONCE
Refills: 0 | Status: COMPLETED | OUTPATIENT
Start: 2023-11-22 | End: 2023-11-22

## 2023-11-22 RX ORDER — PANTOPRAZOLE SODIUM 20 MG/1
40 TABLET, DELAYED RELEASE ORAL ONCE
Refills: 0 | Status: COMPLETED | OUTPATIENT
Start: 2023-11-22 | End: 2023-11-22

## 2023-11-22 RX ORDER — SODIUM CHLORIDE 9 MG/ML
500 INJECTION INTRAMUSCULAR; INTRAVENOUS; SUBCUTANEOUS ONCE
Refills: 0 | Status: COMPLETED | OUTPATIENT
Start: 2023-11-22 | End: 2023-11-22

## 2023-11-22 RX ORDER — SODIUM CHLORIDE 9 MG/ML
1000 INJECTION INTRAMUSCULAR; INTRAVENOUS; SUBCUTANEOUS ONCE
Refills: 0 | Status: DISCONTINUED | OUTPATIENT
Start: 2023-11-22 | End: 2023-11-22

## 2023-11-22 RX ORDER — CEFTRIAXONE 500 MG/1
1000 INJECTION, POWDER, FOR SOLUTION INTRAMUSCULAR; INTRAVENOUS EVERY 24 HOURS
Refills: 0 | Status: DISCONTINUED | OUTPATIENT
Start: 2023-11-22 | End: 2023-11-27

## 2023-11-22 RX ORDER — PANTOPRAZOLE SODIUM 20 MG/1
8 TABLET, DELAYED RELEASE ORAL
Qty: 80 | Refills: 0 | Status: DISCONTINUED | OUTPATIENT
Start: 2023-11-22 | End: 2023-11-23

## 2023-11-22 RX ORDER — FENTANYL CITRATE 50 UG/ML
25 INJECTION INTRAVENOUS ONCE
Refills: 0 | Status: DISCONTINUED | OUTPATIENT
Start: 2023-11-22 | End: 2023-11-22

## 2023-11-22 RX ADMIN — OCTREOTIDE ACETATE 150 MICROGRAM(S): 200 INJECTION, SOLUTION INTRAVENOUS; SUBCUTANEOUS at 06:16

## 2023-11-22 RX ADMIN — OCTREOTIDE ACETATE 10 MICROGRAM(S)/HR: 200 INJECTION, SOLUTION INTRAVENOUS; SUBCUTANEOUS at 16:33

## 2023-11-22 RX ADMIN — FENTANYL CITRATE 25 MICROGRAM(S): 50 INJECTION INTRAVENOUS at 05:46

## 2023-11-22 RX ADMIN — ONDANSETRON 4 MILLIGRAM(S): 8 TABLET, FILM COATED ORAL at 05:46

## 2023-11-22 RX ADMIN — PANTOPRAZOLE SODIUM 10 MG/HR: 20 TABLET, DELAYED RELEASE ORAL at 08:34

## 2023-11-22 RX ADMIN — SODIUM CHLORIDE 500 MILLILITER(S): 9 INJECTION INTRAMUSCULAR; INTRAVENOUS; SUBCUTANEOUS at 11:16

## 2023-11-22 RX ADMIN — PANTOPRAZOLE SODIUM 40 MILLIGRAM(S): 20 TABLET, DELAYED RELEASE ORAL at 06:08

## 2023-11-22 RX ADMIN — CEFTRIAXONE 1000 MILLIGRAM(S): 500 INJECTION, POWDER, FOR SOLUTION INTRAMUSCULAR; INTRAVENOUS at 08:33

## 2023-11-22 RX ADMIN — OCTREOTIDE ACETATE 10 MICROGRAM(S)/HR: 200 INJECTION, SOLUTION INTRAVENOUS; SUBCUTANEOUS at 08:34

## 2023-11-22 RX ADMIN — Medication 25 GRAM(S): at 18:24

## 2023-11-22 RX ADMIN — PANTOPRAZOLE SODIUM 10 MG/HR: 20 TABLET, DELAYED RELEASE ORAL at 16:33

## 2023-11-22 NOTE — ED ADULT NURSE NOTE - OBJECTIVE STATEMENT
The patient is a 58y Female complaining of abdominal pain. The patient is a 58y Female complaining of abdominal pain. Pt has a hx of cirrosis. Pt endorses Multiple episodes of melena. Pts abdomen is distended. Pt denies CP, SOB. Pt endorses rectal pain. Pt placed on cardiac, blood pressure an SPO2 monitoring.

## 2023-11-22 NOTE — CONSULT NOTE ADULT - ASSESSMENT
57 yo F with coffee ground emesis and melena stool  h/h 7.2/23.6  s/p 2 PRBC 11/22    P:  no acute colorectal surgery team  f/u GI for EGD today  trend H/h  transfuse as needed  pt will benefit from TIPS  rest of plan as per primary team    plan d/w Dr. Cordero
Imp:  Upper GI bleed    Rec:  Octreotide and protonix  cetriaxone  EGD today. Risks and benefits reviewed

## 2023-11-22 NOTE — ED ADULT NURSE REASSESSMENT NOTE - NS ED NURSE REASSESS COMMENT FT1
Assumed care of pt at this time from Cuca Stephens RN, pt is A&OX4, complains of pain to RUQ at this time that is mild, last bowel movement was 2 hrs ago, plan for emergent endoscopy this morning, pt states she takes Ibuprofen at home for pain for a hemorrhoidectomy, pt states she has a history of liver cirrhosis from chronic alcohol abuse, pt denies having any alcohol use, states last drink was last year, denies use of blood thinners, NSR on CM, respirations even/unlabored, no distress observed at this time, only pain pt is experiencing at this time is pain due to abd distention.

## 2023-11-22 NOTE — H&P ADULT - NSHPPHYSICALEXAM_GEN_ALL_CORE
PE:    Adult  F lying in bed  Appears older then stated age, chronically ill  No JVD trachea midline + NGT in place, dark coffee colored output  Normocephalic, atraumatic  S1S2+  CTA B/L  Abd soft NTND  No leg swelling/edema noted  Awake and alert  Skin pink, warm

## 2023-11-22 NOTE — H&P ADULT - NSHPLABSRESULTS_GEN_ALL_CORE
LABS:    CBC Full  -  ( 22 Nov 2023 10:53 )  WBC Count : x  RBC Count : x  Hemoglobin : 7.9 g/dL  Hematocrit : 24.9 %  Platelet Count - Automated : x  Mean Cell Volume : x  Mean Cell Hemoglobin : x  Mean Cell Hemoglobin Concentration : x  Auto Neutrophil # : x  Auto Lymphocyte # : x  Auto Monocyte # : x  Auto Eosinophil # : x  Auto Basophil # : x  Auto Neutrophil % : x  Auto Lymphocyte % : x  Auto Monocyte % : x  Auto Eosinophil % : x  Auto Basophil % : x    11-22    140  |  107  |  25<H>  ----------------------------<  116<H>  3.7   |  26  |  1.40<H>    Ca    7.7<L>      22 Nov 2023 05:22    TPro  5.7<L>  /  Alb  1.8<L>  /  TBili  1.5<H>  /  DBili  x   /  AST  65<H>  /  ALT  30  /  AlkPhos  254<H>  11-22    PT/INR - ( 22 Nov 2023 05:22 )   PT: 15.7 sec;   INR: 1.40 ratio         PTT - ( 22 Nov 2023 05:22 )  PTT:34.9 sec  Urinalysis Basic - ( 22 Nov 2023 05:22 )    Color: x / Appearance: x / SG: x / pH: x  Gluc: 116 mg/dL / Ketone: x  / Bili: x / Urobili: x   Blood: x / Protein: x / Nitrite: x   Leuk Esterase: x / RBC: x / WBC x   Sq Epi: x / Non Sq Epi: x / Bacteria: x      CAPILLARY BLOOD GLUCOSE            LIVER FUNCTIONS - ( 22 Nov 2023 05:22 )  Alb: 1.8 g/dL / Pro: 5.7 gm/dL / ALK PHOS: 254 U/L / ALT: 30 U/L / AST: 65 U/L / GGT: x

## 2023-11-22 NOTE — CONSULT NOTE ADULT - SUBJECTIVE AND OBJECTIVE BOX
Surgery Consultation    57 yo F with PMH of alcoholic cirrhosis history of varices with banding recent admission for GI bleeding had a hemorrhoidectomy with Dr. Cordero   also seen by Dr. Goodson and had an endoscopy and a colonoscopy which the patient reported was negative, coming in today with worsening distention in her abdomen and abdominal pain nausea dry heaving and having multiple episodes of melena stool and coffee ground emesis. Pt reports she was having nl BM and no blood in the stool. Last night she was vomiting and started to have melanotic stool. Denies fever or chills, n/v now, d/c. Pt also received 2 units PRBC in the ED. GI is aware and will be doing EGD today. No other complaints .      PAST MEDICAL & SURGICAL HISTORY:  Alcohol abuse      Depression      Withdrawal seizures      History of basal cell carcinoma excision      Squamous cell skin cancer      Hemorrhoids      2019 novel coronavirus disease (COVID-19)      History of ear, nose, and throat (ENT) surgery      H/O basal cell carcinoma excision      H/O:         Allergies:  morphine (Nausea)  Zithromax (Unknown)    Home Medications:  thiamine 100 mg oral tablet: 1 tab(s) orally once a day      Family History:  FAMILY HISTORY:  FH: pancreatic cancer (Mother)    Family history of heart attack (Father)    Family history of CVA (Father)        ROS:  Constitutional: Denies fever, fatigue or weight loss.  Skin: Denies rash.  Eyes: Denies recent vision problems or eye pain.  ENT: Denies congestion, ear pain, or sore throat.  Endocrine: Denies thyroid problems.  Cardiovascular: Denies chest pain or palpation.  Respiratory: Denies cough, shortness of breath, congestion, or wheezing.  Gastrointestinal: +abdominal pain, +nausea, +vomiting,denies diarrhea.  Genitourinary: Denies dysuria.  Musculoskeletal: Denies joint swelling.  Neurologic: Denies headache.      Physical Examination:  PHYSICAL EXAM:  GENERAL: No acute distress, well-developed  HEAD:  Atraumatic, Normocephalic  CHEST/LUNG: CTAB; No wheezes, rales, or rhonchi  HEART: Regular rate and rhythm; No murmurs, rubs, or gallops  ABDOMEN: Soft, non-tender, non-distended  KELSEA: did not let me complete the exam, external view no sign of bright blood, brown mucus stool on external verge  NEUROLOGY: responding appropriately, no focal deficits    Data:                        7.2    14.72 )-----------( 229      ( 2023 05:22 )             23.6         140  |  107  |  25<H>  ----------------------------<  116<H>  3.7   |  26  |  1.40<H>    Ca    7.7<L>      2023 05:22    TPro  5.7<L>  /  Alb  1.8<L>  /  TBili  1.5<H>  /  DBili  x   /  AST  65<H>  /  ALT  30  /  AlkPhos  254<H>        LIVER FUNCTIONS - ( 2023 05:22 )  Alb: 1.8 g/dL / Pro: 5.7 gm/dL / ALK PHOS: 254 U/L / ALT: 30 U/L / AST: 65 U/L / GGT: x           Urinalysis Basic - ( 2023 05:22 )    Color: x / Appearance: x / SG: x / pH: x  Gluc: 116 mg/dL / Ketone: x  / Bili: x / Urobili: x   Blood: x / Protein: x / Nitrite: x   Leuk Esterase: x / RBC: x / WBC x   Sq Epi: x / Non Sq Epi: x / Bacteria: x        Radiology:

## 2023-11-22 NOTE — H&P ADULT - NS PANP COMMENT GEN_ALL_CORE FT
58- y/o F with hx of alcohol cirrhosis present with abdominal pain , coffee ground emes, and melena stool.  Patient was admitted early Nov 2023 for lower GI bleed s/p hemorrhoidectomy.    Patient hgb 7.2 on presentation and inappropriately responded to 2 unit pRBC to 7.8, concern for ongoing GI bleed. Patient noted to have history of varices, started on oct reotide and ppi drip, s/p 1 dose of ceftriaxone.  S/p 4 unit pRBC, and 1 unit of plasma pending.   s/p EGD, banded esophageal varices.    Patient admitted to ccu for upper GI bleed from variceal bleed    will repeat labs.  s/p 4 units prbc, 1 FFP pending  continue PPI drip, and Octreotide drip   hold propanol and lasix  given soft BP   when BP tolerates, will resume lasix   may need TIPS   appreciate GI and surgery recs  will continue ceftriaxone   blood culture pending   will admit to ccu for closer monitoring 58- y/o F with hx of alcohol cirrhosis present with abdominal pain , coffee ground emes, and melena stool.  Patient was admitted early Nov 2023 for lower GI bleed s/p hemorrhoidectomy.    Patient hgb 7.2 on presentation and inappropriately responded to 2 unit pRBC to 7.8, concern for ongoing GI bleed. Patient noted to have history of varices, started on oct reotide and ppi drip, s/p 1 dose of ceftriaxone.  S/p 4 unit pRBC, and 1 unit of plasma pending.   s/p EGD, banded esophageal varices.    Patient admitted to ccu for upper GI bleed from variceal bleed    will repeat labs.  s/p 4 units prbc, 1 FFP   continue PPI drip, and Octreotide drip   hold propanol and lasix  given soft BP   when BP tolerates, will resume lasix   may need TIPS   appreciate GI and surgery recs  will continue ceftriaxone   blood culture pending   will admit to ccu for closer monitoring 58- y/o F with hx of alcohol cirrhosis present with abdominal pain , coffee ground emes, and melena stool.  Patient was admitted early Nov 2023 for lower GI bleed s/p hemorrhoidectomy.    Patient hgb 7.2 on presentation and inappropriately responded to 2 unit pRBC to 7.8, concern for ongoing GI bleed. Patient noted to have history of varices, started on oct reotide and ppi drip, s/p 1 dose of ceftriaxone.  S/p 4 unit pRBC, and 1 unit of plasma pending.   s/p EGD, banded esophageal varices.    Patient admitted to ccu for upper GI bleed from variceal bleed    will repeat labs.  s/p 4 units prbc, 1 FFP   continue PPI drip, and Octreotide drip   hold propanol and lasix  given soft BP   when BP tolerates, will resume lasix   may need TIPS   appreciate GI and surgery recs  will continue ceftriaxone   blood culture pending   patient has good abdominal pocket, likely will benefit from paracentesis tomorrow   will admit to ccu for closer monitoring

## 2023-11-22 NOTE — ED PROVIDER NOTE - PROGRESS NOTE DETAILS
CC:  Signout received from Dr. Dsouza: GI/Hamzah & ICU consults; pt V blood X 1 in ED as well as + active melana.  Hgb 7.2 (was 8.4 1 week ago); lactate 3, not believed d/t sepsis, but instead d/t UGIB.  Pt received 2 u PRBCs, BP currently 84/59, no current V, no respir. distress.  Pt also received IV Octreotide/Protonix/Zofran.  CT A/P: no active CT evidence of active GI Bleed.  Dr. Goodson at bedside: arranging for emergent EGD for this AM.  ICU consult still pending. CC:  CCU admission accepted under Dr. Jarrell.  EGD scheduled for after 11 AM.

## 2023-11-22 NOTE — ED ADULT TRIAGE NOTE - CHIEF COMPLAINT QUOTE
Ambulatory to ER with c/o bloody stools; RUQ abdominal pain x 1 day patient had hemorrhoidectomy done x 1 week ago. No medication taken prior to arrival. BP 81/48 in triage. Ambulatory to ER with c/o bloody stools; RUQ abdominal pain x 1 day. Patient had hemorrhoidectomy done x 1 week ago. No medication taken prior to arrival, BP 81/48 in triage. Ambulatory to ER with c/o bloody stools; RUQ abdominal pain x 1 day. Patient had hemorrhoidectomy done x 1 week ago not on blood thinners. No medication taken prior to arrival, BP 81/48 in triage.

## 2023-11-22 NOTE — ED ADULT NURSE NOTE - CHIEF COMPLAINT QUOTE
Ambulatory to ER with c/o bloody stools; RUQ abdominal pain x 1 day. Patient had hemorrhoidectomy done x 1 week ago not on blood thinners. No medication taken prior to arrival, BP 81/48 in triage.

## 2023-11-22 NOTE — PATIENT PROFILE ADULT - FALL HARM RISK - HARM RISK INTERVENTIONS
Assistance with ambulation/Assistance OOB with selected safe patient handling equipment/Communicate Risk of Fall with Harm to all staff/Monitor gait and stability/Reinforce activity limits and safety measures with patient and family/Sit up slowly, dangle for a short time, stand at bedside before walking/Tailored Fall Risk Interventions/Visual Cue: Yellow wristband and red socks/Bed in lowest position, wheels locked, appropriate side rails in place/Call bell, personal items and telephone in reach/Instruct patient to call for assistance before getting out of bed or chair/Non-slip footwear when patient is out of bed/[place] to call system/Physically safe environment - no spills, clutter or unnecessary equipment/Purposeful Proactive Rounding/Room/bathroom lighting operational, light cord in reach Eyes with no visual disturbances.  Ears clean and dry and no hearing difficulties. Nose with pink mucosa and no drainage.  Mouth mucous membranes moist and pink.  No tenderness or swelling to throat or neck.

## 2023-11-22 NOTE — H&P ADULT - ASSESSMENT
A:    58F  HD # 1  FULL CODE    Here for:    1. Hemorrhagic/hypovolemic shock 2/2  2. GIB  3. Alcoholic cirrhosis  4. Esophageal varices  5. NAGMA  6. Protein calorie malnutrition  7. ABLA  8. Pleural effusion    This patient requires critical care for support of one or more vital organ systems with a high probability of imminent or life threatening deterioration in his/her condition    P:    Hypovolemic shock 2/2 GIB, ABLA    s/p 2u pRBC with Hgb 7.2---> 7.8; incomplete response; given hypotension, will order another 2u pRBC now and 1u FFP  Give NS bolus now until blood arrives  PRN vasopressors to maintain MAP > 65  Urgent EGD scheduled for now  NPO   Maintain NGT to sxn  VTE ppx mechanical only  PPI drip  Octreotide drip  GI, CRS consults appreciated    Dispo: EGD, then PACU then admission to critical care service.    Discussed with Dr Jarrell.  Discussed with Pt and family at bedside.     Date of entry of this note is equal to the date of services rendered    TOTAL CRITICAL CARE TIME: 110 minutes  (EXCLUSIVE of any non bundled procedures)    Note: This is a critically ill patient. Time spent has been in salvage of life, limb and vital organ systems. This time INCLUDES time spent directly as this patient's bedside with evaluation and management, review of chart including review of laboratory and imaging studies, interpretation of vital signs and cardiac output measurements, any necessary ventilator management, and time spent discussing plan of care with patient and family, including goals of care discussion. This also includes time spent in multidisciplinary discussion with care team and various consultants to optimize treatment plan. This time may NOT include various procedures, which will be noted seperately.

## 2023-11-22 NOTE — ED PROVIDER NOTE - OBJECTIVE STATEMENT
58-year-old female alcoholic cirrhosis history of varices with banding recent admission for GI bleeding had a hemorrhoidectomy with Dr. Quiñones with colorectal was also seen by Dr. Goodson and had an endoscopy and a colonoscopy which the patient reported was negative, coming in today with worsening distention in her abdomen and abdominal pain nausea dry heaving and having multiple episodes of melena this morning.

## 2023-11-22 NOTE — ED PROVIDER NOTE - CONSTITUTIONAL, MLM
Appears to be in pain, awake, alert, oriented to person, place, time/situation and in no apparent distress. normal...

## 2023-11-22 NOTE — H&P ADULT - HISTORY OF PRESENT ILLNESS
CCU H&P    S:    Pt seen and examined  HD # 1  PMH of alcoholic cirrhosis history of varices with banding recent admission for GI bleeding had a hemorrhoidectomy with Dr. Cordero 11/14  also seen by Dr. Goodson and had an endoscopy and a colonoscopy which the patient reported was negative, coming in today with worsening distention in her abdomen and abdominal pain nausea dry heaving and having multiple episodes of melena stool and coffee ground emesis. Pt reports she was having nl BM and no blood in the stool. Last night she was vomiting and started to have melanotic stool. Denies fever or chills, n/v now, d/c. Pt also received 2 units PRBC in the ED. GI is aware and will be doing EGD today. No other complaints .    11/22: Suboptimal response to pRBC x 2. BP improved but remains marginal. Pending EGD.

## 2023-11-22 NOTE — ED PROVIDER NOTE - CLINICAL SUMMARY MEDICAL DECISION MAKING FREE TEXT BOX
Adult female history of cirrhosis hemorrhoidectomy varices with banding, comes in with multiple episodes of melena hypotension concerning for upper GI bleed.  Her abdomen is distended and tender to palpation.  Labs type and screen coags ordered.  CT abdomen pelvis pending.  Will give fentanyl for pain and Zofran for nausea.  Will monitor closely.  Plan for admission with GI consult.

## 2023-11-22 NOTE — ED ADULT NURSE NOTE - NSFALLRISKINTERV_ED_ALL_ED

## 2023-11-22 NOTE — ED PROVIDER NOTE - MUSCULOSKELETAL, MLM
Called patint at home. He is doing well. Is having weekly chemo with daily radiation through early January.  He reports being tired although denies any other side effects to treatment. He verbalized his current plan of care and expects a scan will be scheduled soon to re-evaluate his cancer.  Stated Dr. Bishop wanted to pursue immunotherapy although his insurance reportedly required chemotherapy first. He reports a good appetite and states his weight is running between 154-155 lbs (was as low as 146).     We discussed supportive care services through the cancer center again. He has met with Vannessa on several occasions. He denies needing to speak with [facility] or Nayeli at this time. He denies anxiety or depression and is optimistic that he will be cured from his cancer. He denies any additional behavioral health or supportive care needs.     I will follow up after completion of chemo/rad; he has my number and will call as needed.  
Spine appears normal, range of motion is not limited, no muscle or joint tenderness

## 2023-11-22 NOTE — CONSULT NOTE ADULT - SUBJECTIVE AND OBJECTIVE BOX
HPI:  58-year-old female alcoholic cirrhosis history of varices with banding recent admission for GI bleeding had a hemorrhoidectomy with Dr. Quiñones with colorectal was also seen by Dr. Goodson and had an endoscopy and a colonoscopy which the patient reported was negative, coming in today with worsening distention in her abdomen and abdominal pain nausea dry heaving and having multiple episodes of melena this morning.  ----------------------  Recent EGD showed portal gastropathy and trace esoph varices    PAST MEDICAL & SURGICAL HISTORY:  Alcohol abuse      Depression      Withdrawal seizures      History of basal cell carcinoma excision      Squamous cell skin cancer      Hemorrhoids      2019 novel coronavirus disease (COVID-19)      History of ear, nose, and throat (ENT) surgery      H/O basal cell carcinoma excision      H/O:           Home Medications:  thiamine 100 mg oral tablet: 1 tab(s) orally once a day (15 Nov 2023 13:45)      MEDICATIONS  (STANDING):  cefTRIAXone Injectable. 1000 milliGRAM(s) IV Push Once  octreotide  Infusion 50 MICROgram(s)/Hr (10 mL/Hr) IV Continuous <Continuous>  pantoprazole Infusion 8 mG/Hr (10 mL/Hr) IV Continuous <Continuous>    MEDICATIONS  (PRN):      Allergies    Zithromax (Unknown)    Intolerances    morphine (Nausea)      SOCIAL HISTORY:    FAMILY HISTORY:  FH: pancreatic cancer (Mother)    Family history of heart attack (Father)    Family history of CVA (Father)        ROS  As above  Otherwise unremarkable    Vital Signs Last 24 Hrs  T(C): 37.2 (2023 07:01), Max: 37.3 (2023 06:58)  T(F): 99 (2023 07:01), Max: 99.1 (2023 06:58)  HR: 89 (2023 07:19) (89 - 106)  BP: 95/70 (2023 07:19) (81/48 - 95/70)  BP(mean): 957 (2023 07:19) (55 - 957)  RR: 19 (2023 07:19) (18 - 23)  SpO2: 100% (2023 07:19) (94% - 100%)    Parameters below as of 2023 07:19  Patient On (Oxygen Delivery Method): room air        Constitutional: NAD,  Respiratory: CTAB  Cardiovascular: S1 and S2, RRR  Gastrointestinal: BS+, soft, NT/ND  Extremities: No peripheral edema  Psychiatric: Normal mood, normal affect  Skin: No rashes    LABS:                        7.2    14.72 )-----------( 229      ( 2023 05:22 )             23.6         140  |  107  |  25<H>  ----------------------------<  116<H>  3.7   |  26  |  1.40<H>    Ca    7.7<L>      2023 05:22    TPro  5.7<L>  /  Alb  1.8<L>  /  TBili  1.5<H>  /  DBili  x   /  AST  65<H>  /  ALT  30  /  AlkPhos  254<H>      PT/INR - ( 2023 05:22 )   PT: 15.7 sec;   INR: 1.40 ratio         PTT - ( 2023 05:22 )  PTT:34.9 sec  LIVER FUNCTIONS - ( 2023 05:22 )  Alb: 1.8 g/dL / Pro: 5.7 gm/dL / ALK PHOS: 254 U/L / ALT: 30 U/L / AST: 65 U/L / GGT: x             RADIOLOGY & ADDITIONAL STUDIES:

## 2023-11-22 NOTE — ED ADULT NURSE REASSESSMENT NOTE - NS ED NURSE REASSESS COMMENT FT1
Pt sent up to Endo at this time for procedure with Demario Colinco, last blood product given to Demario Francisco to infuse upstairs, pt received 2 units PRBC'S in ER prior to going upstairs. Pt denies chest pain, sob, itching, hives, no signs or symptoms of blood transfusion reaction prior to departure of ER.

## 2023-11-22 NOTE — PROGRESS NOTE ADULT - SUBJECTIVE AND OBJECTIVE BOX
EGD:  Small esoph varices -- banded  Portal gastropathy  Small amount of old blood in the stomach    Rec:  Cont protonix and octreotide  Clears only for today

## 2023-11-22 NOTE — PATIENT PROFILE ADULT - FUNCTIONAL ASSESSMENT - DAILY ACTIVITY 1.
SUBJECTIVE:  Patient ID: Maddison Wilson is a 44 y.o. female. Chief Complaint:  Chief Complaint   Patient presents with    Edema     right eyelid puffy x 4 days tender to touch       HPI   44year old female   + contact lenses  ?irritation Rt upper lid  Eye no discharge or watery  Hx Thyroid disorder Graves disease followed by Dr Brad Guerra d/c Rx from Endocrinology  Next f/u 3 month  Hypertension compliant with Rx no side effect    Past Medical History:   Diagnosis Date    Fatigue     History of Graves' disease     Hypertension     Multiple thyroid nodules     Unspecified vitamin D deficiency      Past Surgical History:   Procedure Laterality Date    CARDIOVASCULAR STRESS TEST  2006    neg.     EYE MUSCLE SURGERY      TONSILLECTOMY       Allergies   Allergen Reactions    Sulfa Antibiotics Rash     Family History   Problem Relation Age of Onset    Coronary Art Dis Maternal Grandfather     Diabetes Mother         age 76   9-12 CHF     Cancer Mother         vulvar melanoma    Hypertension Father         age 76     Diabetes Father     Breast Cancer Maternal Aunt     Lung Cancer Maternal Grandmother          Social History     Social History Narrative    Son 21    Daughter 21        Patient Active Problem List   Diagnosis    HTN (hypertension)    Insomnia    Rosacea    Hyperthyroidism    Migraine    Vitamin D deficiency    Fatigue    Thrombocytopenia (HCC)    Neutropenia (HCC)    Vertigo    Meniere disease    Menopause     Current Outpatient Medications   Medication Sig Dispense Refill    indapamide (LOZOL) 1.25 MG tablet Take 1 tablet by mouth every morning 30 tablet 5    lisinopril (PRINIVIL;ZESTRIL) 20 MG tablet Take 1 tablet by mouth daily 30 tablet 5    omeprazole (PRILOSEC) 40 MG delayed release capsule Take 1 capsule by mouth daily 30 capsule 3    methimazole (TAPAZOLE) 5 MG tablet Take 2.5 mg by mouth Twice a Week      terbinafine (LAMISIL) 250 MG
4 = No assist / stand by assistance

## 2023-11-22 NOTE — ED PROVIDER NOTE - IMAGING STUDIES QUESTION 2 - PERFORMED INDEPENDENT VISUALIZATION
Spoke with Zuleika about tacrolimus level. Confirms it was a 12 hour trough, will reduce dose to 3.5 mg BID and recheck 11/1 at next office visit.     She was also concerned about EBV level remaining elevated. Still having fatigue but able to sleep well. She plans on discussing this with provider at next appointment.      Yes

## 2023-11-23 LAB
ALBUMIN FLD-MCNC: 0.2 G/DL — SIGNIFICANT CHANGE UP
ALBUMIN FLD-MCNC: 0.2 G/DL — SIGNIFICANT CHANGE UP
ALBUMIN SERPL ELPH-MCNC: 1.8 G/DL — LOW (ref 3.3–5)
ALBUMIN SERPL ELPH-MCNC: 1.8 G/DL — LOW (ref 3.3–5)
ALP SERPL-CCNC: 201 U/L — HIGH (ref 40–120)
ALP SERPL-CCNC: 201 U/L — HIGH (ref 40–120)
ALT FLD-CCNC: 23 U/L — SIGNIFICANT CHANGE UP (ref 12–78)
ALT FLD-CCNC: 23 U/L — SIGNIFICANT CHANGE UP (ref 12–78)
ANION GAP SERPL CALC-SCNC: 9 MMOL/L — SIGNIFICANT CHANGE UP (ref 5–17)
ANION GAP SERPL CALC-SCNC: 9 MMOL/L — SIGNIFICANT CHANGE UP (ref 5–17)
AST SERPL-CCNC: 52 U/L — HIGH (ref 15–37)
AST SERPL-CCNC: 52 U/L — HIGH (ref 15–37)
BILIRUB SERPL-MCNC: 2.4 MG/DL — HIGH (ref 0.2–1.2)
BILIRUB SERPL-MCNC: 2.4 MG/DL — HIGH (ref 0.2–1.2)
BUN SERPL-MCNC: 39 MG/DL — HIGH (ref 7–23)
BUN SERPL-MCNC: 39 MG/DL — HIGH (ref 7–23)
CALCIUM SERPL-MCNC: 7.7 MG/DL — LOW (ref 8.5–10.1)
CALCIUM SERPL-MCNC: 7.7 MG/DL — LOW (ref 8.5–10.1)
CHLORIDE SERPL-SCNC: 108 MMOL/L — SIGNIFICANT CHANGE UP (ref 96–108)
CHLORIDE SERPL-SCNC: 108 MMOL/L — SIGNIFICANT CHANGE UP (ref 96–108)
CO2 SERPL-SCNC: 22 MMOL/L — SIGNIFICANT CHANGE UP (ref 22–31)
CO2 SERPL-SCNC: 22 MMOL/L — SIGNIFICANT CHANGE UP (ref 22–31)
CREAT SERPL-MCNC: 1.21 MG/DL — SIGNIFICANT CHANGE UP (ref 0.5–1.3)
CREAT SERPL-MCNC: 1.21 MG/DL — SIGNIFICANT CHANGE UP (ref 0.5–1.3)
EGFR: 51 ML/MIN/1.73M2 — LOW
EGFR: 51 ML/MIN/1.73M2 — LOW
GLUCOSE FLD-MCNC: 127 MG/DL — SIGNIFICANT CHANGE UP
GLUCOSE FLD-MCNC: 127 MG/DL — SIGNIFICANT CHANGE UP
GLUCOSE SERPL-MCNC: 143 MG/DL — HIGH (ref 70–99)
GLUCOSE SERPL-MCNC: 143 MG/DL — HIGH (ref 70–99)
GRAM STN FLD: SIGNIFICANT CHANGE UP
GRAM STN FLD: SIGNIFICANT CHANGE UP
HCT VFR BLD CALC: 33.1 % — LOW (ref 34.5–45)
HCT VFR BLD CALC: 33.1 % — LOW (ref 34.5–45)
HGB BLD-MCNC: 10.9 G/DL — LOW (ref 11.5–15.5)
HGB BLD-MCNC: 10.9 G/DL — LOW (ref 11.5–15.5)
LDH SERPL L TO P-CCNC: 33 U/L — SIGNIFICANT CHANGE UP
LDH SERPL L TO P-CCNC: 33 U/L — SIGNIFICANT CHANGE UP
MAGNESIUM SERPL-MCNC: 2.1 MG/DL — SIGNIFICANT CHANGE UP (ref 1.6–2.6)
MAGNESIUM SERPL-MCNC: 2.1 MG/DL — SIGNIFICANT CHANGE UP (ref 1.6–2.6)
MCHC RBC-ENTMCNC: 28.5 PG — SIGNIFICANT CHANGE UP (ref 27–34)
MCHC RBC-ENTMCNC: 28.5 PG — SIGNIFICANT CHANGE UP (ref 27–34)
MCHC RBC-ENTMCNC: 32.9 GM/DL — SIGNIFICANT CHANGE UP (ref 32–36)
MCHC RBC-ENTMCNC: 32.9 GM/DL — SIGNIFICANT CHANGE UP (ref 32–36)
MCV RBC AUTO: 86.4 FL — SIGNIFICANT CHANGE UP (ref 80–100)
MCV RBC AUTO: 86.4 FL — SIGNIFICANT CHANGE UP (ref 80–100)
PHOSPHATE SERPL-MCNC: 3 MG/DL — SIGNIFICANT CHANGE UP (ref 2.5–4.5)
PHOSPHATE SERPL-MCNC: 3 MG/DL — SIGNIFICANT CHANGE UP (ref 2.5–4.5)
PLATELET # BLD AUTO: 146 K/UL — LOW (ref 150–400)
PLATELET # BLD AUTO: 146 K/UL — LOW (ref 150–400)
POTASSIUM SERPL-MCNC: 4 MMOL/L — SIGNIFICANT CHANGE UP (ref 3.5–5.3)
POTASSIUM SERPL-MCNC: 4 MMOL/L — SIGNIFICANT CHANGE UP (ref 3.5–5.3)
POTASSIUM SERPL-SCNC: 4 MMOL/L — SIGNIFICANT CHANGE UP (ref 3.5–5.3)
POTASSIUM SERPL-SCNC: 4 MMOL/L — SIGNIFICANT CHANGE UP (ref 3.5–5.3)
PROT FLD-MCNC: <1 G/DL — SIGNIFICANT CHANGE UP
PROT FLD-MCNC: <1 G/DL — SIGNIFICANT CHANGE UP
PROT SERPL-MCNC: 5.7 GM/DL — LOW (ref 6–8.3)
PROT SERPL-MCNC: 5.7 GM/DL — LOW (ref 6–8.3)
RBC # BLD: 3.83 M/UL — SIGNIFICANT CHANGE UP (ref 3.8–5.2)
RBC # BLD: 3.83 M/UL — SIGNIFICANT CHANGE UP (ref 3.8–5.2)
RBC # FLD: 15.8 % — HIGH (ref 10.3–14.5)
RBC # FLD: 15.8 % — HIGH (ref 10.3–14.5)
SODIUM SERPL-SCNC: 139 MMOL/L — SIGNIFICANT CHANGE UP (ref 135–145)
SODIUM SERPL-SCNC: 139 MMOL/L — SIGNIFICANT CHANGE UP (ref 135–145)
SPECIMEN SOURCE: SIGNIFICANT CHANGE UP
SPECIMEN SOURCE: SIGNIFICANT CHANGE UP
WBC # BLD: 9.56 K/UL — SIGNIFICANT CHANGE UP (ref 3.8–10.5)
WBC # BLD: 9.56 K/UL — SIGNIFICANT CHANGE UP (ref 3.8–10.5)
WBC # FLD AUTO: 9.56 K/UL — SIGNIFICANT CHANGE UP (ref 3.8–10.5)
WBC # FLD AUTO: 9.56 K/UL — SIGNIFICANT CHANGE UP (ref 3.8–10.5)

## 2023-11-23 PROCEDURE — 99291 CRITICAL CARE FIRST HOUR: CPT | Mod: 25

## 2023-11-23 PROCEDURE — 49083 ABD PARACENTESIS W/IMAGING: CPT

## 2023-11-23 RX ORDER — MIDODRINE HYDROCHLORIDE 2.5 MG/1
10 TABLET ORAL EVERY 8 HOURS
Refills: 0 | Status: DISCONTINUED | OUTPATIENT
Start: 2023-11-23 | End: 2023-11-26

## 2023-11-23 RX ORDER — ALBUMIN HUMAN 25 %
100 VIAL (ML) INTRAVENOUS ONCE
Refills: 0 | Status: COMPLETED | OUTPATIENT
Start: 2023-11-23 | End: 2023-11-23

## 2023-11-23 RX ORDER — ALBUMIN HUMAN 25 %
50 VIAL (ML) INTRAVENOUS EVERY 6 HOURS
Refills: 0 | Status: COMPLETED | OUTPATIENT
Start: 2023-11-23 | End: 2023-11-25

## 2023-11-23 RX ORDER — PANTOPRAZOLE SODIUM 20 MG/1
40 TABLET, DELAYED RELEASE ORAL
Refills: 0 | Status: DISCONTINUED | OUTPATIENT
Start: 2023-11-23 | End: 2023-11-27

## 2023-11-23 RX ADMIN — Medication 100 MILLILITER(S): at 21:53

## 2023-11-23 RX ADMIN — CEFTRIAXONE 1000 MILLIGRAM(S): 500 INJECTION, POWDER, FOR SOLUTION INTRAMUSCULAR; INTRAVENOUS at 10:24

## 2023-11-23 RX ADMIN — OCTREOTIDE ACETATE 10 MICROGRAM(S)/HR: 200 INJECTION, SOLUTION INTRAVENOUS; SUBCUTANEOUS at 13:02

## 2023-11-23 RX ADMIN — MIDODRINE HYDROCHLORIDE 10 MILLIGRAM(S): 2.5 TABLET ORAL at 19:16

## 2023-11-23 RX ADMIN — Medication 50 MILLILITER(S): at 16:07

## 2023-11-23 RX ADMIN — OCTREOTIDE ACETATE 10 MICROGRAM(S)/HR: 200 INJECTION, SOLUTION INTRAVENOUS; SUBCUTANEOUS at 16:06

## 2023-11-23 RX ADMIN — Medication 50 MILLILITER(S): at 23:23

## 2023-11-23 RX ADMIN — PANTOPRAZOLE SODIUM 10 MG/HR: 20 TABLET, DELAYED RELEASE ORAL at 06:17

## 2023-11-23 RX ADMIN — OCTREOTIDE ACETATE 10 MICROGRAM(S)/HR: 200 INJECTION, SOLUTION INTRAVENOUS; SUBCUTANEOUS at 04:44

## 2023-11-23 RX ADMIN — PANTOPRAZOLE SODIUM 10 MG/HR: 20 TABLET, DELAYED RELEASE ORAL at 13:03

## 2023-11-23 NOTE — PROGRESS NOTE ADULT - ASSESSMENT
58y Female PMH alcoholic cirrhosis with ascites, recent hemorrhoidectomy       Admitted with:    GI bleed due to esophageal varices   Acute blood loss anemia   Ascites       Plan:     Neuro - daily reorientation. Pain mx prn. Avoid deliriogenic meds. PT, OOB when stable    CV - continue hemodynamic and telemetry monitoring. BP low normal but not on pressor, will add midodrine. Keep K>4, Mg>2    Resp - status stable without ventilatory support. HOB elevation. Aspiration precautions    GI - s/p EGD and banding 11/22, H/H stable, still passing blood likely old. Continue octreotide and PPI drips today, will d/c PPI drip in am. Full liquid diet. Add albumin. Hold off on lasix as BP low normal. S/p paracentesis, will remove catheter tomorrow, f/u fluid analysis. Eventual TIPS     Renal - monitor UO and renal fn. Trend and replete lytes prn. Avoid nephrotoxic meds    ID - prophylactic ceftriaxone x 5 days     Heme - s/p 4 PRBCs and 1 FFP, Hb>10 today, no need for further transfusion     DVT ppx with SCDs       Family updated       Patient is critically ill with one or more organ dysfunction requiring close monitoring of vital signs, physical exam, focused lab work and imaging studies, frequent bedside visits and therapy change are necessary to prevent clinical deterioration.

## 2023-11-23 NOTE — PROGRESS NOTE ADULT - ASSESSMENT
Imp:  Upper GI bleed  cirrhosis and esophageal varices s/p banding    Rec:  Can stop octreotide gtt in AM  Continue protonix BID  ceftriaxone x 5-7 days  Trend CBC  f/u paracentesis studies  soft diet    Dr. Goodson to resume care after weekend.

## 2023-11-23 NOTE — PROGRESS NOTE ADULT - SUBJECTIVE AND OBJECTIVE BOX
Patient is a 58y old  Female who presents with a chief complaint of GIB (22 Nov 2023 13:27)    24 hour events:     Allergies    Zithromax (Unknown)    Intolerances    morphine (Nausea)    REVIEW OF SYSTEMS: SEE BELOW       ICU Vital Signs Last 24 Hrs  T(C): 36.1 (23 Nov 2023 04:30), Max: 36.6 (23 Nov 2023 00:00)  T(F): 97 (23 Nov 2023 04:30), Max: 97.9 (23 Nov 2023 00:00)  HR: 91 (23 Nov 2023 06:49) (91 - 101)  BP: 92/61 (23 Nov 2023 06:49) (89/68 - 105/74)  BP(mean): 73 (23 Nov 2023 06:49) (69 - 85)  ABP: --  ABP(mean): --  RR: 21 (23 Nov 2023 06:49) (14 - 33)  SpO2: 99% (23 Nov 2023 06:49) (90% - 99%)    O2 Parameters below as of 22 Nov 2023 15:37  Patient On (Oxygen Delivery Method): nasal cannula  O2 Flow (L/min): 2          CAPILLARY BLOOD GLUCOSE          I&O's Summary    22 Nov 2023 07:01  -  23 Nov 2023 07:00  --------------------------------------------------------  IN: 1923 mL / OUT: 100 mL / NET: 1823 mL            MEDICATIONS  (STANDING):  cefTRIAXone Injectable. 1000 milliGRAM(s) IV Push every 24 hours  octreotide  Infusion 50 MICROgram(s)/Hr (10 mL/Hr) IV Continuous <Continuous>  oxycodone    5 mG/acetaminophen 325 mG 1 Tablet(s) Oral once  pantoprazole Infusion 8 mG/Hr (10 mL/Hr) IV Continuous <Continuous>      MEDICATIONS  (PRN):  oxycodone    5 mG/acetaminophen 325 mG 1 Tablet(s) Oral every 6 hours PRN Severe Pain (7 - 10)      PHYSICAL EXAM: SEE BELOW                          10.9   9.56  )-----------( 146      ( 23 Nov 2023 05:36 )             33.1       11-23    139  |  108  |  39<H>  ----------------------------<  143<H>  4.0   |  22  |  1.21    Ca    7.7<L>      23 Nov 2023 05:36  Phos  3.0     11-23  Mg     2.1     11-23    TPro  5.7<L>  /  Alb  1.8<L>  /  TBili  2.4<H>  /  DBili  x   /  AST  52<H>  /  ALT  23  /  AlkPhos  201<H>  11-23    Lactate 2.1           11-22 @ 16:25          PT/INR - ( 22 Nov 2023 16:25 )   PT: 14.2 sec;   INR: 1.27 ratio         PTT - ( 22 Nov 2023 16:25 )  PTT:35.1 sec  Urinalysis Basic - ( 23 Nov 2023 05:36 )    Color: x / Appearance: x / SG: x / pH: x  Gluc: 143 mg/dL / Ketone: x  / Bili: x / Urobili: x   Blood: x / Protein: x / Nitrite: x   Leuk Esterase: x / RBC: x / WBC x   Sq Epi: x / Non Sq Epi: x / Bacteria: x      .Blood None   No growth at 24 hours -- 11-22 @ 08:31  .Blood None   No growth at 24 hours -- 11-22 @ 08:22

## 2023-11-23 NOTE — PROGRESS NOTE ADULT - ASSESSMENT
Assessment:  Ms. Flores is a 59 YO F w/pmhx of alcoholic cirrhosis w/hx of varices w/banding w/recent admission for GIB and had hemorrhoidectomy w/Dr. Cordero on 11/14/2023 who is admitted for hypovolemic shock, ABLA 2/2 esophageal varices s/p EGD and banding 11/22/2023. Patient also w/ATN likely 2/2 hypovolemia. Has ascites for which she has a drain. Also has severe protein malnourishment     Plan:  Neuro: Monitor mental status, avoid deliriogenic medications. Pain & fever control as needed    CV: Hypovolemic shock. Improved s/p transfusions and source control. Midodrine. BP soft, likely from low oncotic pressures and large volume paracentesis. Replete albumin     Pulm:  Renal:  GI:  ID:  Heme:  Endo:    CRITICAL CARE TIME SPENT: 32 minutes assessing presenting problems of acute illness, which pose high probability of life threatening deterioration or end organ damage/dysfunction, as well as medical decision making including initiating plan of care, reviewing data, reviewing radiologic exams, discussing with multidisciplinary team,  discussing goals of care with patient/family, and writing this note.  Non-inclusive of procedures performed   Assessment:  Ms. Flores is a 57 YO F w/pmhx of alcoholic cirrhosis w/hx of varices w/banding w/recent admission for GIB and had hemorrhoidectomy w/Dr. Cordero on 11/14/2023 who is admitted for hypovolemic shock, ABLA 2/2 esophageal varices s/p EGD and banding 11/22/2023. Patient also w/ATN likely 2/2 hypovolemia. Has ascites for which she has a drain. Also has severe protein malnourishment     Plan:  Neuro: Monitor mental status, avoid deliriogenic medications. Pain & fever control as needed    CV: Hypovolemic shock. Improved s/p transfusions and source control. Midodrine. BP soft, likely from low oncotic pressures and large volume paracentesis. Replete albumin. Resume BB when BP better to help decrease portal pressures      Pulm: Respiratory status stable     Renal: ATN 2/2 hypovolemia, improving. Albumin for large volume paracentesis. Strict I/Os, goal UOP >0.5cc/kg/hr. Trend renal function and electrolytes, replete as needed. Avoid nephrotoxic agents     GI: CLD. PPI BID. Off Octreotide infusion. Trend LFTs. Follow fluid results       ID: Ceftriaxone. Trend WBC/fevers/procalcitonin     Heme: ABLA. Total 4uPRBC 1FFP. Trend Hgb. SCDs for DVT prophylaxis     Endo: Goal blood glucose <180     CRITICAL CARE TIME SPENT: 35 minutes assessing presenting problems of acute illness, which pose high probability of life threatening deterioration or end organ damage/dysfunction, as well as medical decision making including initiating plan of care, reviewing data, reviewing radiologic exams, discussing with multidisciplinary team,  discussing goals of care with patient/family, and writing this note.  Non-inclusive of procedures performed   Assessment:  Ms. Flores is a 57 YO F w/pmhx of alcoholic cirrhosis w/hx of varices w/banding w/recent admission for GIB and had hemorrhoidectomy w/Dr. Cordero on 11/14/2023 who is admitted for hypovolemic shock, ABLA 2/2 esophageal varices s/p EGD and banding 11/22/2023. Patient also w/ATN likely 2/2 hypovolemia. Has ascites for which she has a drain. Also has severe protein malnourishment     Plan:  Neuro: Monitor mental status, avoid deliriogenic medications. Pain & fever control as needed    CV: Hypovolemic shock. Improved s/p transfusions and source control. Midodrine. BP soft, likely from low oncotic pressures and large volume paracentesis. Replete albumin. Resume BB when BP better to help decrease portal pressures      Pulm: Respiratory status stable     Renal: ATN 2/2 hypovolemia, improving. Albumin for large volume paracentesis. Strict I/Os, goal UOP >0.5cc/kg/hr. Trend renal function and electrolytes, replete as needed. Avoid nephrotoxic agents     GI: CLD. PPI BID. Off Octreotide infusion. Trend LFTs. Follow fluid results       ID: Ceftriaxone. Trend WBC/fevers/procalcitonin     Heme: ABLA. Total 4uPRBC 1FFP. Trend Hgb. SCDs for DVT prophylaxis     Endo: Goal blood glucose <180     CRITICAL CARE TIME SPENT: 35 minutes assessing presenting problems of acute illness, which pose high probability of life threatening deterioration or end organ damage/dysfunction, as well as medical decision making including initiating plan of care, reviewing data, reviewing radiologic exams, discussing with multidisciplinary team,  discussing goals of care with patient/family, and writing this note.  Non-inclusive of procedures performed  Date of entry of this note is equal to the date of the services rendered

## 2023-11-23 NOTE — PROGRESS NOTE ADULT - SUBJECTIVE AND OBJECTIVE BOX
Patient is a 58y old  Female who presents with a chief complaint of GIB (2023 13:05)    BRIEF HOSPITAL COURSE:   Ms. Flores is a 59 YO F w/pmhx of alcoholic cirrhosis w/hx of varices w/banding w/recent admission for GIB and had hemorrhoidectomy w/Dr. Cordero on 2023 who is admitted for hypovolemic shock, ABLA 2/2 esophageal varices s/p EGD and banding 2023. Patient also w/ATN likely 2/2 hypovolemia. Has ascites for which she has a drain. Also has severe protein malnourishment     Events last 24 hours:   - Called to beside by RN because SBP in 70s  - Patient has had around 6L ascites removed today, requires additional albumin around 8grams/L should be given. Another 25grams ordered for a total of 50grams albumin given now. Improvement noted in BP   - Hgb stable      Review of Systems:  CONSTITUTIONAL: No fever, chills, or fatigue  EYES: No eye pain, visual disturbances, or discharge  ENMT:  No difficulty hearing, tinnitus, vertigo; No sinus or throat pain  NECK: No pain or stiffness  RESPIRATORY: No cough, wheezing, chills or hemoptysis; No shortness of breath  CARDIOVASCULAR: No chest pain, palpitations, dizziness, or leg swelling  GASTROINTESTINAL: No abdominal or epigastric pain. No nausea, vomiting, or hematemesis; No diarrhea or constipation. No melena or hematochezia.  GENITOURINARY: No dysuria, frequency, hematuria, or incontinence  NEUROLOGICAL: No headaches, memory loss, loss of strength, numbness, or tremors  SKIN: No itching, burning, rashes, or lesions   MUSCULOSKELETAL: No joint pain or swelling; No muscle, back, or extremity pain  PSYCHIATRIC: No depression, anxiety, mood swings, or difficulty sleeping    PAST MEDICAL & SURGICAL HISTORY:  Alcohol abuse  Depression  Withdrawal seizures  History of basal cell carcinoma excision  Squamous cell skin cancer  Hemorrhoids  2019 novel coronavirus disease (COVID-19)  History of ear, nose, and throat (ENT) surgery  H/O basal cell carcinoma excision  H/O:     Medications:  cefTRIAXone Injectable. 1000 milliGRAM(s) IV Push every 24 hours  midodrine. 10 milliGRAM(s) Oral every 8 hours  oxycodone    5 mG/acetaminophen 325 mG 1 Tablet(s) Oral every 6 hours PRN  pantoprazole Infusion 8 mG/Hr IV Continuous <Continuous>  octreotide  Infusion 50 MICROgram(s)/Hr IV Continuous <Continuous>  albumin human 25% IVPB 50 milliLiter(s) IV Intermittent every 6 hours    ICU Vital Signs Last 24 Hrs  T(C): 36.4 (2023 18:30), Max: 36.6 (2023 00:00)  T(F): 97.5 (2023 18:30), Max: 97.9 (2023 00:00)  HR: 87 (2023 20:00) (84 - 112)  BP: 90/56 (2023 20:00) (85/59 - 104/67)  BP(mean): 67 (2023 20:) (63 - 78)  RR: 15 (2023 20:00) (12 - 28)  SpO2: 97% (2023 20:00) (91% - 99%)    I&O's Detail    2023 07:01  -  2023 07:00  --------------------------------------------------------  IN:    IV PiggyBack: 173 mL    Octreotide: 205 mL    Oral Fluid: 360 mL    Other (mL): 1000 mL    Pantoprazole: 185 mL  Total IN: 1923 mL    OUT:    Voided (mL): 100 mL  Total OUT: 100 mL    Total NET: 1823 mL    2023 07:01  -  2023 22:46  --------------------------------------------------------  IN:  Total IN: 0 mL    OUT:    Drain (mL): 5000 mL  Total OUT: 5000 mL    Total NET: -5000 mL    LABS:                        10.9   9.56  )-----------( 146      ( 2023 05:36 )             33.1     11-23    139  |  108  |  39<H>  ----------------------------<  143<H>  4.0   |  22  |  1.21    Ca    7.7<L>      2023 05:36  Phos  3.0       Mg     2.1         TPro  5.7<L>  /  Alb  1.8<L>  /  TBili  2.4<H>  /  DBili  x   /  AST  52<H>  /  ALT  23  /  AlkPhos  201<H>        PT/INR - ( 2023 16:25 )   PT: 14.2 sec;   INR: 1.27 ratio    PTT - ( 2023 16:25 )  PTT:35.1 sec    Urinalysis Basic - ( 2023 05:36 )  Color: x / Appearance: x / SG: x / pH: x  Gluc: 143 mg/dL / Ketone: x  / Bili: x / Urobili: x   Blood: x / Protein: x / Nitrite: x   Leuk Esterase: x / RBC: x / WBC x   Sq Epi: x / Non Sq Epi: x / Bacteria: x    CULTURES:  Culture Results:   No growth at 24 hours ( @ 08:31)  Culture Results:   No growth at 24 hours ( @ 08:22)    Physical Examination:  General: No acute distress.    HEENT: Pupils equal, reactive to light.  Symmetric.  PULM: Clear to auscultation bilaterally, no significant sputum production  NECK: Supple, no lymphadenopathy, trachea midline  CVS: Regular rate and rhythm, no murmurs, rubs, or gallops  ABD: Soft, distended, nontender, normoactive bowel sounds, no masses  EXT: B/L LE edema   SKIN: Warm and well perfused  NEURO: Alert, oriented, interactive, nonfocal  RADIOLOGY:   < from: CT Abdomen and Pelvis w/ IV Cont (23 @ 06:18) >    ACC: [id number] EXAM:  CT ABDOMEN AND PELVIS IC   ORDERED BY: SANDIE BECKER     PROCEDURE DATE:  2023      INTERPRETATION:  CLINICAL INFORMATION: Abdominal pain. Melena. Cirrhosis.   Upper GI bleed.    COMPARISON: CT abdomen pelvis 2023.    CONTRAST/COMPLICATIONS:  IV Contrast: Omnipaque 350  90 cc administered  Oral Contrast: NONE  Complications: None reported at time of study completion    PROCEDURE:  CT of the Abdomen and Pelvis was performed.  Precontrast, Arterial and Delayed phases were performed.  Sagittal and coronal reformats were performed.    FINDINGS:  LOWER CHEST: Interval small left pleural effusion with adjacent   compressive atelectasis. Hyperemia with esophageal varices reidentified   at the GE junction.    LIVER: Cirrhosis. Heterogeneous liver enhancement containing scattered   vague opacities on arterial phase. Consider nonemergent MR to exclude   underlying neoplasm.  BILE DUCTS: Normal caliber.  GALLBLADDER: Cholelithiasis.  SPLEEN: Within normal limits.  PANCREAS: Within normal limits.  ADRENALS: Stable bilateral adenomas.  KIDNEYS/URETERS: Within normal limits.    BLADDER: Within normal limits.  REPRODUCTIVE ORGANS: Uterus and adnexa within normal limits.    BOWEL: Distended stomach containinghyperdensity on noncontrast study,   ingested material or hemorrhagic products. No CT findings of active GI   bleed. No bowel obstruction. Normal appendix. Mild sigmoid diverticulosis.  PERITONEUM: Mild to moderate ascites, increased compared to prior study.  VESSELS: Atherosclerotic changes. Patent portal, superior mesenteric, and   splenic veins. Left abdominal collateral vessels.  RETROPERITONEUM/LYMPH NODES: No lymphadenopathy.  ABDOMINAL WALL: Within normal limits.  BONES: Advanced degenerative disease at L4-L5. Stable compression   deformity of T8 vertebral body.    IMPRESSION:    Cirrhosis with findings of portal hypertension.    Heterogeneous liver enhancement containing scattered vague opacities on   arterial phase. Consider nonemergent MR to exclude underlying neoplasm.    Mild to moderate ascites, increased compared to prior study.    Distended stomach containing hyperdensity on noncontrast study, ingested   material or hemorrhagic products. No CT findings of active GI bleed.   Hyperemia with esophageal varices reidentified at the GE junction.   Further GI workup is suggested.    Interval small left pleural effusion with adjacent compressive   atelectasis.    --- End of Report ---      BOB MA MD; Attending Radiologist  This document has been electronically signed. 2023  6:37AM    < end of copied text >     Date of entry of this note is equal to the date of the services rendered   Patient is a 58y old  Female who presents with a chief complaint of GIB (2023 13:05)    BRIEF HOSPITAL COURSE:   Ms. Flores is a 57 YO F w/pmhx of alcoholic cirrhosis w/hx of varices w/banding w/recent admission for GIB and had hemorrhoidectomy w/Dr. Cordero on 2023 who is admitted for hypovolemic shock, ABLA 2/2 esophageal varices s/p EGD and banding 2023. Patient also w/ATN likely 2/2 hypovolemia. Has ascites for which she has a drain. Also has severe protein malnourishment     Events last 24 hours:   - Called to beside by RN because SBP in 70s  - Patient has had around 6L ascites removed today, requires additional albumin around 8grams/L should be given. Another 25grams ordered for a total of 50grams albumin given now. Improvement noted in BP   - Hgb stable      Review of Systems:  CONSTITUTIONAL: No fever, chills, or fatigue  EYES: No eye pain, visual disturbances, or discharge  ENMT:  No difficulty hearing, tinnitus, vertigo; No sinus or throat pain  NECK: No pain or stiffness  RESPIRATORY: No cough, wheezing, chills or hemoptysis; No shortness of breath  CARDIOVASCULAR: No chest pain, palpitations, dizziness, or leg swelling  GASTROINTESTINAL: No abdominal or epigastric pain. No nausea, vomiting, or hematemesis; No diarrhea or constipation. No melena or hematochezia.  GENITOURINARY: No dysuria, frequency, hematuria, or incontinence  NEUROLOGICAL: No headaches, memory loss, loss of strength, numbness, or tremors  SKIN: No itching, burning, rashes, or lesions   MUSCULOSKELETAL: No joint pain or swelling; No muscle, back, or extremity pain  PSYCHIATRIC: No depression, anxiety, mood swings, or difficulty sleeping    PAST MEDICAL & SURGICAL HISTORY:  Alcohol abuse  Depression  Withdrawal seizures  History of basal cell carcinoma excision  Squamous cell skin cancer  Hemorrhoids  2019 novel coronavirus disease (COVID-19)  History of ear, nose, and throat (ENT) surgery  H/O basal cell carcinoma excision  H/O:     Medications:  cefTRIAXone Injectable. 1000 milliGRAM(s) IV Push every 24 hours  midodrine. 10 milliGRAM(s) Oral every 8 hours  oxycodone    5 mG/acetaminophen 325 mG 1 Tablet(s) Oral every 6 hours PRN  pantoprazole Infusion 8 mG/Hr IV Continuous <Continuous>  octreotide  Infusion 50 MICROgram(s)/Hr IV Continuous <Continuous>  albumin human 25% IVPB 50 milliLiter(s) IV Intermittent every 6 hours    ICU Vital Signs Last 24 Hrs  T(C): 36.4 (2023 18:30), Max: 36.6 (2023 00:00)  T(F): 97.5 (2023 18:30), Max: 97.9 (2023 00:00)  HR: 87 (2023 20:00) (84 - 112)  BP: 90/56 (2023 20:00) (85/59 - 104/67)  BP(mean): 67 (2023 20:00) (63 - 78)  RR: 15 (2023 20:00) (12 - 28)  SpO2: 97% (2023 20:00) (91% - 99%)    I&O's Detail    2023 07:01  -  2023 07:00  --------------------------------------------------------  IN:    IV PiggyBack: 173 mL    Octreotide: 205 mL    Oral Fluid: 360 mL    Other (mL): 1000 mL    Pantoprazole: 185 mL  Total IN: 1923 mL    OUT:    Voided (mL): 100 mL  Total OUT: 100 mL    Total NET: 1823 mL    2023 07:01  -  2023 22:46  --------------------------------------------------------  IN:  Total IN: 0 mL    OUT:    Drain (mL): 5000 mL  Total OUT: 5000 mL    Total NET: -5000 mL    LABS:                        10.9   9.56  )-----------( 146      ( 2023 05:36 )             33.1     11-23    139  |  108  |  39<H>  ----------------------------<  143<H>  4.0   |  22  |  1.21    Ca    7.7<L>      2023 05:36  Phos  3.0       Mg     2.1         TPro  5.7<L>  /  Alb  1.8<L>  /  TBili  2.4<H>  /  DBili  x   /  AST  52<H>  /  ALT  23  /  AlkPhos  201<H>        PT/INR - ( 2023 16:25 )   PT: 14.2 sec;   INR: 1.27 ratio    PTT - ( 2023 16:25 )  PTT:35.1 sec    Urinalysis Basic - ( 2023 05:36 )  Color: x / Appearance: x / SG: x / pH: x  Gluc: 143 mg/dL / Ketone: x  / Bili: x / Urobili: x   Blood: x / Protein: x / Nitrite: x   Leuk Esterase: x / RBC: x / WBC x   Sq Epi: x / Non Sq Epi: x / Bacteria: x    CULTURES:  Culture Results:   No growth at 24 hours ( @ 08:31)  Culture Results:   No growth at 24 hours ( @ 08:22)    Physical Examination:  General: No acute distress.    HEENT: Pupils equal, reactive to light.  Symmetric.  PULM: Clear to auscultation bilaterally, no significant sputum production  NECK: Supple, no lymphadenopathy, trachea midline  CVS: Regular rate and rhythm, no murmurs, rubs, or gallops  ABD: Soft, distended, nontender, normoactive bowel sounds, no masses  EXT: B/L LE edema   SKIN: Warm and well perfused  NEURO: Alert, oriented, interactive, nonfocal  RADIOLOGY:   < from: CT Abdomen and Pelvis w/ IV Cont (23 @ 06:18) >    ACC: [id number] EXAM:  CT ABDOMEN AND PELVIS IC   ORDERED BY: SANDIE BECKER     PROCEDURE DATE:  2023      INTERPRETATION:  CLINICAL INFORMATION: Abdominal pain. Melena. Cirrhosis.   Upper GI bleed.    COMPARISON: CT abdomen pelvis 2023.    CONTRAST/COMPLICATIONS:  IV Contrast: Omnipaque 350  90 cc administered  Oral Contrast: NONE  Complications: None reported at time of study completion    PROCEDURE:  CT of the Abdomen and Pelvis was performed.  Precontrast, Arterial and Delayed phases were performed.  Sagittal and coronal reformats were performed.    FINDINGS:  LOWER CHEST: Interval small left pleural effusion with adjacent   compressive atelectasis. Hyperemia with esophageal varices reidentified   at the GE junction.    LIVER: Cirrhosis. Heterogeneous liver enhancement containing scattered   vague opacities on arterial phase. Consider nonemergent MR to exclude   underlying neoplasm.  BILE DUCTS: Normal caliber.  GALLBLADDER: Cholelithiasis.  SPLEEN: Within normal limits.  PANCREAS: Within normal limits.  ADRENALS: Stable bilateral adenomas.  KIDNEYS/URETERS: Within normal limits.    BLADDER: Within normal limits.  REPRODUCTIVE ORGANS: Uterus and adnexa within normal limits.    BOWEL: Distended stomach containinghyperdensity on noncontrast study,   ingested material or hemorrhagic products. No CT findings of active GI   bleed. No bowel obstruction. Normal appendix. Mild sigmoid diverticulosis.  PERITONEUM: Mild to moderate ascites, increased compared to prior study.  VESSELS: Atherosclerotic changes. Patent portal, superior mesenteric, and   splenic veins. Left abdominal collateral vessels.  RETROPERITONEUM/LYMPH NODES: No lymphadenopathy.  ABDOMINAL WALL: Within normal limits.  BONES: Advanced degenerative disease at L4-L5. Stable compression   deformity of T8 vertebral body.    IMPRESSION:    Cirrhosis with findings of portal hypertension.    Heterogeneous liver enhancement containing scattered vague opacities on   arterial phase. Consider nonemergent MR to exclude underlying neoplasm.    Mild to moderate ascites, increased compared to prior study.    Distended stomach containing hyperdensity on noncontrast study, ingested   material or hemorrhagic products. No CT findings of active GI bleed.   Hyperemia with esophageal varices reidentified at the GE junction.   Further GI workup is suggested.    Interval small left pleural effusion with adjacent compressive   atelectasis.    --- End of Report ---      BOB MA MD; Attending Radiologist  This document has been electronically signed. 2023  6:37AM    < end of copied text >

## 2023-11-23 NOTE — PROGRESS NOTE ADULT - SUBJECTIVE AND OBJECTIVE BOX
GI  pt of Dr. Goodson  late note entry-pt seen at 330 pm    HPI:  no bleeding  no vomiting  lars PO  no abd pain      ROS  As above  Otherwise unremarkable    Vital Signs Last 24 Hrs  T(C): 36.4 (23 Nov 2023 18:30), Max: 36.6 (23 Nov 2023 00:00)  T(F): 97.5 (23 Nov 2023 18:30), Max: 97.9 (23 Nov 2023 00:00)  HR: 87 (23 Nov 2023 20:00) (84 - 112)  BP: 90/56 (23 Nov 2023 20:00) (85/59 - 104/67)  BP(mean): 67 (23 Nov 2023 20:00) (63 - 78)  RR: 15 (23 Nov 2023 20:00) (12 - 28)  SpO2: 97% (23 Nov 2023 20:00) (91% - 99%)    Constitutional: NAD  Respiratory: CTAB  Cardiovascular: S1 and S2, RRR  Gastrointestinal: BS+, soft, NT/ND  Extremities: No peripheral edema  Psychiatric: Normal mood, normal affect  Skin: No rashes    LABS:                                   10.9   9.56  )-----------( 146      ( 23 Nov 2023 05:36 )             33.1     11-23    139  |  108  |  39<H>  ----------------------------<  143<H>  4.0   |  22  |  1.21    Ca    7.7<L>      23 Nov 2023 05:36  Phos  3.0     11-23  Mg     2.1     11-23    TPro  5.7<L>  /  Alb  1.8<L>  /  TBili  2.4<H>  /  DBili  x   /  AST  52<H>  /  ALT  23  /  AlkPhos  201<H>  11-23

## 2023-11-24 LAB
ANION GAP SERPL CALC-SCNC: 6 MMOL/L — SIGNIFICANT CHANGE UP (ref 5–17)
ANION GAP SERPL CALC-SCNC: 6 MMOL/L — SIGNIFICANT CHANGE UP (ref 5–17)
B PERT IGG+IGM PNL SER: ABNORMAL
B PERT IGG+IGM PNL SER: ABNORMAL
BUN SERPL-MCNC: 23 MG/DL — SIGNIFICANT CHANGE UP (ref 7–23)
BUN SERPL-MCNC: 23 MG/DL — SIGNIFICANT CHANGE UP (ref 7–23)
CALCIUM SERPL-MCNC: 8 MG/DL — LOW (ref 8.5–10.1)
CALCIUM SERPL-MCNC: 8 MG/DL — LOW (ref 8.5–10.1)
CHLORIDE SERPL-SCNC: 110 MMOL/L — HIGH (ref 96–108)
CHLORIDE SERPL-SCNC: 110 MMOL/L — HIGH (ref 96–108)
CO2 SERPL-SCNC: 24 MMOL/L — SIGNIFICANT CHANGE UP (ref 22–31)
CO2 SERPL-SCNC: 24 MMOL/L — SIGNIFICANT CHANGE UP (ref 22–31)
COLOR FLD: YELLOW — SIGNIFICANT CHANGE UP
COLOR FLD: YELLOW — SIGNIFICANT CHANGE UP
CREAT SERPL-MCNC: 0.82 MG/DL — SIGNIFICANT CHANGE UP (ref 0.5–1.3)
CREAT SERPL-MCNC: 0.82 MG/DL — SIGNIFICANT CHANGE UP (ref 0.5–1.3)
EGFR: 82 ML/MIN/1.73M2 — SIGNIFICANT CHANGE UP
EGFR: 82 ML/MIN/1.73M2 — SIGNIFICANT CHANGE UP
EOSINOPHIL # FLD: 0 % — SIGNIFICANT CHANGE UP
EOSINOPHIL # FLD: 0 % — SIGNIFICANT CHANGE UP
FLUID INTAKE SUBSTANCE CLASS: SIGNIFICANT CHANGE UP
FLUID INTAKE SUBSTANCE CLASS: SIGNIFICANT CHANGE UP
FOLATE+VIT B12 SERBLD-IMP: 0 % — SIGNIFICANT CHANGE UP
FOLATE+VIT B12 SERBLD-IMP: 0 % — SIGNIFICANT CHANGE UP
GLUCOSE SERPL-MCNC: 82 MG/DL — SIGNIFICANT CHANGE UP (ref 70–99)
GLUCOSE SERPL-MCNC: 82 MG/DL — SIGNIFICANT CHANGE UP (ref 70–99)
HCT VFR BLD CALC: 30 % — LOW (ref 34.5–45)
HCT VFR BLD CALC: 30 % — LOW (ref 34.5–45)
HGB BLD-MCNC: 9.7 G/DL — LOW (ref 11.5–15.5)
HGB BLD-MCNC: 9.7 G/DL — LOW (ref 11.5–15.5)
LYMPHOCYTES # FLD: 21 % — SIGNIFICANT CHANGE UP
LYMPHOCYTES # FLD: 21 % — SIGNIFICANT CHANGE UP
MAGNESIUM SERPL-MCNC: 2.1 MG/DL — SIGNIFICANT CHANGE UP (ref 1.6–2.6)
MAGNESIUM SERPL-MCNC: 2.1 MG/DL — SIGNIFICANT CHANGE UP (ref 1.6–2.6)
MCHC RBC-ENTMCNC: 28.2 PG — SIGNIFICANT CHANGE UP (ref 27–34)
MCHC RBC-ENTMCNC: 28.2 PG — SIGNIFICANT CHANGE UP (ref 27–34)
MCHC RBC-ENTMCNC: 32.3 GM/DL — SIGNIFICANT CHANGE UP (ref 32–36)
MCHC RBC-ENTMCNC: 32.3 GM/DL — SIGNIFICANT CHANGE UP (ref 32–36)
MCV RBC AUTO: 87.2 FL — SIGNIFICANT CHANGE UP (ref 80–100)
MCV RBC AUTO: 87.2 FL — SIGNIFICANT CHANGE UP (ref 80–100)
MESOTHL CELL # FLD: 3 % — SIGNIFICANT CHANGE UP
MESOTHL CELL # FLD: 3 % — SIGNIFICANT CHANGE UP
MONOS+MACROS # FLD: 48 % — SIGNIFICANT CHANGE UP
MONOS+MACROS # FLD: 48 % — SIGNIFICANT CHANGE UP
NEUTROPHILS-BODY FLUID: 28 % — SIGNIFICANT CHANGE UP
NEUTROPHILS-BODY FLUID: 28 % — SIGNIFICANT CHANGE UP
NRBC # FLD: 0 % — SIGNIFICANT CHANGE UP
NRBC # FLD: 0 % — SIGNIFICANT CHANGE UP
OTHER CELLS FLD MANUAL: 0 % — SIGNIFICANT CHANGE UP
OTHER CELLS FLD MANUAL: 0 % — SIGNIFICANT CHANGE UP
PHOSPHATE SERPL-MCNC: 3.8 MG/DL — SIGNIFICANT CHANGE UP (ref 2.5–4.5)
PHOSPHATE SERPL-MCNC: 3.8 MG/DL — SIGNIFICANT CHANGE UP (ref 2.5–4.5)
PLATELET # BLD AUTO: 117 K/UL — LOW (ref 150–400)
PLATELET # BLD AUTO: 117 K/UL — LOW (ref 150–400)
POTASSIUM SERPL-MCNC: 4.3 MMOL/L — SIGNIFICANT CHANGE UP (ref 3.5–5.3)
POTASSIUM SERPL-MCNC: 4.3 MMOL/L — SIGNIFICANT CHANGE UP (ref 3.5–5.3)
POTASSIUM SERPL-SCNC: 4.3 MMOL/L — SIGNIFICANT CHANGE UP (ref 3.5–5.3)
POTASSIUM SERPL-SCNC: 4.3 MMOL/L — SIGNIFICANT CHANGE UP (ref 3.5–5.3)
RBC # BLD: 3.44 M/UL — LOW (ref 3.8–5.2)
RBC # BLD: 3.44 M/UL — LOW (ref 3.8–5.2)
RBC # FLD: 16.3 % — HIGH (ref 10.3–14.5)
RBC # FLD: 16.3 % — HIGH (ref 10.3–14.5)
RCV VOL RI: 43 /UL — HIGH (ref 0–0)
RCV VOL RI: 43 /UL — HIGH (ref 0–0)
SODIUM SERPL-SCNC: 140 MMOL/L — SIGNIFICANT CHANGE UP (ref 135–145)
SODIUM SERPL-SCNC: 140 MMOL/L — SIGNIFICANT CHANGE UP (ref 135–145)
TOTAL NUCLEATED CELL COUNT, BODY FLUID: 192 /UL — SIGNIFICANT CHANGE UP
TOTAL NUCLEATED CELL COUNT, BODY FLUID: 192 /UL — SIGNIFICANT CHANGE UP
TUBE TYPE: SIGNIFICANT CHANGE UP
TUBE TYPE: SIGNIFICANT CHANGE UP
WBC # BLD: 5.97 K/UL — SIGNIFICANT CHANGE UP (ref 3.8–10.5)
WBC # BLD: 5.97 K/UL — SIGNIFICANT CHANGE UP (ref 3.8–10.5)
WBC # FLD AUTO: 5.97 K/UL — SIGNIFICANT CHANGE UP (ref 3.8–10.5)
WBC # FLD AUTO: 5.97 K/UL — SIGNIFICANT CHANGE UP (ref 3.8–10.5)

## 2023-11-24 PROCEDURE — 99233 SBSQ HOSP IP/OBS HIGH 50: CPT

## 2023-11-24 RX ORDER — SPIRONOLACTONE 25 MG/1
25 TABLET, FILM COATED ORAL DAILY
Refills: 0 | Status: DISCONTINUED | OUTPATIENT
Start: 2023-11-24 | End: 2023-11-26

## 2023-11-24 RX ORDER — ONDANSETRON 8 MG/1
4 TABLET, FILM COATED ORAL EVERY 8 HOURS
Refills: 0 | Status: DISCONTINUED | OUTPATIENT
Start: 2023-11-24 | End: 2023-11-27

## 2023-11-24 RX ADMIN — MIDODRINE HYDROCHLORIDE 10 MILLIGRAM(S): 2.5 TABLET ORAL at 21:50

## 2023-11-24 RX ADMIN — Medication 50 MILLILITER(S): at 16:56

## 2023-11-24 RX ADMIN — PANTOPRAZOLE SODIUM 40 MILLIGRAM(S): 20 TABLET, DELAYED RELEASE ORAL at 10:45

## 2023-11-24 RX ADMIN — MIDODRINE HYDROCHLORIDE 10 MILLIGRAM(S): 2.5 TABLET ORAL at 13:53

## 2023-11-24 RX ADMIN — Medication 50 MILLILITER(S): at 21:56

## 2023-11-24 RX ADMIN — PANTOPRAZOLE SODIUM 40 MILLIGRAM(S): 20 TABLET, DELAYED RELEASE ORAL at 21:59

## 2023-11-24 RX ADMIN — Medication 50 MILLILITER(S): at 06:19

## 2023-11-24 RX ADMIN — Medication 50 MILLILITER(S): at 13:53

## 2023-11-24 RX ADMIN — MIDODRINE HYDROCHLORIDE 10 MILLIGRAM(S): 2.5 TABLET ORAL at 06:20

## 2023-11-24 RX ADMIN — CEFTRIAXONE 1000 MILLIGRAM(S): 500 INJECTION, POWDER, FOR SOLUTION INTRAMUSCULAR; INTRAVENOUS at 10:46

## 2023-11-24 RX ADMIN — SPIRONOLACTONE 25 MILLIGRAM(S): 25 TABLET, FILM COATED ORAL at 13:53

## 2023-11-24 NOTE — PROGRESS NOTE ADULT - SUBJECTIVE AND OBJECTIVE BOX
Patient is a 58y old  Female who presents with a chief complaint of GIB (23 Nov 2023 22:47)    24 hour events:     Allergies    Zithromax (Unknown)    Intolerances    morphine (Nausea)    REVIEW OF SYSTEMS: SEE BELOW       ICU Vital Signs Last 24 Hrs  T(C): 36.6 (24 Nov 2023 08:31), Max: 36.6 (24 Nov 2023 08:31)  T(F): 97.8 (24 Nov 2023 08:31), Max: 97.8 (24 Nov 2023 08:31)  HR: 84 (24 Nov 2023 04:52) (78 - 111)  BP: 103/68 (24 Nov 2023 04:52) (77/48 - 103/68)  BP(mean): 77 (24 Nov 2023 04:52) (59 - 78)  ABP: --  ABP(mean): --  RR: 11 (24 Nov 2023 04:52) (10 - 27)  SpO2: 94% (23 Nov 2023 23:00) (94% - 98%)        CAPILLARY BLOOD GLUCOSE          I&O's Summary    23 Nov 2023 07:01  -  24 Nov 2023 07:00  --------------------------------------------------------  IN: 230 mL / OUT: 6100 mL / NET: -5870 mL            MEDICATIONS  (STANDING):  albumin human 25% IVPB 50 milliLiter(s) IV Intermittent every 6 hours  cefTRIAXone Injectable. 1000 milliGRAM(s) IV Push every 24 hours  midodrine. 10 milliGRAM(s) Oral every 8 hours  pantoprazole    Tablet 40 milliGRAM(s) Oral two times a day      MEDICATIONS  (PRN):  aluminum hydroxide/magnesium hydroxide/simethicone Suspension 30 milliLiter(s) Oral every 4 hours PRN Dyspepsia  ondansetron Injectable 4 milliGRAM(s) IV Push every 8 hours PRN Nausea and/or Vomiting  oxycodone    5 mG/acetaminophen 325 mG 1 Tablet(s) Oral every 6 hours PRN Severe Pain (7 - 10)      PHYSICAL EXAM: SEE BELOW                          9.7    5.97  )-----------( 117      ( 24 Nov 2023 05:33 )             30.0       11-24    140  |  110<H>  |  23  ----------------------------<  82  4.3   |  24  |  0.82    Ca    8.0<L>      24 Nov 2023 05:33  Phos  3.8     11-24  Mg     2.1     11-24    TPro  5.7<L>  /  Alb  1.8<L>  /  TBili  2.4<H>  /  DBili  x   /  AST  52<H>  /  ALT  23  /  AlkPhos  201<H>  11-23          PT/INR - ( 22 Nov 2023 16:25 )   PT: 14.2 sec;   INR: 1.27 ratio         PTT - ( 22 Nov 2023 16:25 )  PTT:35.1 sec  Urinalysis Basic - ( 24 Nov 2023 05:33 )    Color: x / Appearance: x / SG: x / pH: x  Gluc: 82 mg/dL / Ketone: x  / Bili: x / Urobili: x   Blood: x / Protein: x / Nitrite: x   Leuk Esterase: x / RBC: x / WBC x   Sq Epi: x / Non Sq Epi: x / Bacteria: x      Ascites Fl Ascites Fluid --   polymorphonuclear leukocytes  No organisms seen  by cytocentrifuge 11-23 @ 14:00  .Blood None   No growth at 24 hours -- 11-22 @ 08:31  .Blood None   No growth at 24 hours -- 11-22 @ 08:22

## 2023-11-24 NOTE — PROGRESS NOTE ADULT - ASSESSMENT
1. UGIB s/p EGD with variceal banding  2. Decompensated cirrhosis s/p paracentesis negative for SBP    Recommendations  1. low salt diet  2. PPI 40 mg BID  3. Continue ceftriaxone for 5-7 days  4. Monitor for overt bleeding and trend hgb

## 2023-11-24 NOTE — PROGRESS NOTE ADULT - ASSESSMENT
A:    58F  HD # 3  FULL CODE    Here for:    1. Hemorrhagic/hypovolemic shock 2/2  2. GIB  3. Alcoholic cirrhosis  4. Esophageal varices  5. NAGMA  6. Protein calorie malnutrition  7. ABLA  8. Pleural effusion  This patient requires a moderate level of care due to the complexity and severity of their condition which increases the amount and complexity of data to be reviewed and analyzed in addition to the risk of complications, morbidity and mortality of patient management    P:    P:    Hypovolemic shock 2/2 GIB, ABLA    s/p EGD w/o active hemorrhage but stigmata of recent bleeding; varices banded  Tolerating diet  Serial CBC, Hgb stable  s/p 4u pRBC, 1u FFP   Advance diet per GI  PUD from drip to BID dosing  d/c octreotide drip  VTE ppx mechanical only  GI, CRS consults appreciated    Dispo: Cont care.    Time spent on this patient encounter: 35+ minutes    Date of entry of this note is equal to the date of services rendered 11/24/2023    This includes assessment of the presenting problems with associated risks, reviewing the medical record to prepare for the encounter and meeting face to face with the patient to obtain additional history and conduct a focused exmaination. I have also performed an appropiate physical exam, made interventions listed and ordered and interpreted appropiate diagnostic studies as documented. This also included communication and coordination of care with the multidisciplinary team including the bedside nurse, appropriate attending of record and consultants as needed.

## 2023-11-24 NOTE — PROGRESS NOTE ADULT - SUBJECTIVE AND OBJECTIVE BOX
Patient is a 58y old  Female who presents with a chief complaint of GIB (2023 10:59)      Subective: Seen and examined at bedside. No overnight events. tolerating diet. No melena.       PAST MEDICAL & SURGICAL HISTORY:  Alcohol abuse      Depression      Withdrawal seizures      History of basal cell carcinoma excision      Squamous cell skin cancer      Hemorrhoids      2019 novel coronavirus disease (COVID-19)      History of ear, nose, and throat (ENT) surgery      H/O basal cell carcinoma excision      H/O:           MEDICATIONS  (STANDING):  albumin human 25% IVPB 50 milliLiter(s) IV Intermittent every 6 hours  cefTRIAXone Injectable. 1000 milliGRAM(s) IV Push every 24 hours  midodrine. 10 milliGRAM(s) Oral every 8 hours  pantoprazole    Tablet 40 milliGRAM(s) Oral two times a day  spironolactone 25 milliGRAM(s) Oral daily    MEDICATIONS  (PRN):  aluminum hydroxide/magnesium hydroxide/simethicone Suspension 30 milliLiter(s) Oral every 4 hours PRN Dyspepsia  ondansetron Injectable 4 milliGRAM(s) IV Push every 8 hours PRN Nausea and/or Vomiting  oxycodone    5 mG/acetaminophen 325 mG 1 Tablet(s) Oral every 6 hours PRN Severe Pain (7 - 10)      REVIEW OF SYSTEMS:    RESPIRATORY: No shortness of breath  CARDIOVASCULAR: No chest pain  All other review of systems is negative unless indicated above.    Vital Signs Last 24 Hrs  T(C): 36.6 (2023 08:31), Max: 36.6 (2023 08:31)  T(F): 97.8 (2023 08:31), Max: 97.8 (2023 08:31)  HR: 81 (2023 12:13) (76 - 97)  BP: 105/74 (2023 12:13) (77/48 - 105/74)  BP(mean): 77 (2023 04:52) (59 - 78)  RR: 15 (2023 12:13) (9 - 30)  SpO2: 94% (2023 23:00) (94% - 98%)        PHYSICAL EXAM:    Constitutional: NAD  Respiratory: CTAB  Cardiovascular: S1 and S2, RRR  Gastrointestinal: BS+, soft, NT/ND  Extremities: No peripheral edema  Psychiatric: Normal mood, normal affect, no asterixis    LABS:                        9.7    5.97  )-----------( 117      ( 2023 05:33 )             30.0     11-24    140  |  110<H>  |  23  ----------------------------<  82  4.3   |  24  |  0.82    Ca    8.0<L>      2023 05:33  Phos  3.8     11-  Mg     2.1     11-    TPro  5.7<L>  /  Alb  1.8<L>  /  TBili  2.4<H>  /  DBili  x   /  AST  52<H>  /  ALT  23  /  AlkPhos  201<H>  11    PT/INR - ( 2023 16:25 )   PT: 14.2 sec;   INR: 1.27 ratio         PTT - ( 2023 16:25 )  PTT:35.1 sec  LIVER FUNCTIONS - ( 2023 05:36 )  Alb: 1.8 g/dL / Pro: 5.7 gm/dL / ALK PHOS: 201 U/L / ALT: 23 U/L / AST: 52 U/L / GGT: x             RADIOLOGY & ADDITIONAL STUDIES:

## 2023-11-24 NOTE — PROGRESS NOTE ADULT - ASSESSMENT
58y Female PMH alcoholic cirrhosis with ascites, recent hemorrhoidectomy       Admitted with:    GI bleed due to esophageal varices   Acute blood loss anemia   Ascites       Plan:     Neuro - daily reorientation. Pain mx prn. Avoid deliriogenic meds. PT, OOB when stable    CV - low normal BP, MAPs ok, mentating fine, continue midodrine. Keep K>4, Mg>2    Resp - status stable without ventilatory support. HOB elevation. Aspiration precautions    GI - s/p EGD and banding 11/22, H/H stable. Off octreotide and PPI drips. On PPI q12. Avoid AC, AP. Diet advanced. Peritoneal fluid neg for SBP, drained >6L, will remove cath today. Continue albumin. Eventual TIPS     Renal - monitor UO and renal fn. Trend and replete lytes prn. Avoid nephrotoxic meds    ID - prophylactic ceftriaxone x 5 days     Heme - s/p 4 PRBCs and 1 FFP, Hb>10 today, no need for further transfusion     DVT ppx with SCDs       Family updated       Stable for floor, sign out given to Dr Tom  58y Female PMH alcoholic cirrhosis with ascites, recent hemorrhoidectomy       Admitted with:    GI bleed due to esophageal varices   Acute blood loss anemia   Ascites       Plan:     Neuro - daily reorientation. Pain mx prn. Avoid deliriogenic meds. PT, OOB when stable    CV - low normal BP, MAPs ok, mentating fine, continue midodrine. Keep K>4, Mg>2    Resp - status stable without ventilatory support. HOB elevation. Aspiration precautions    GI - s/p EGD and banding 11/22, H/H stable. Off octreotide and PPI drips. On PPI q12. Avoid AC, AP. Diet advanced. Peritoneal fluid neg for SBP, drained >6L, will remove cath today. Continue albumin. Resume propranolol and spironolactone in 1-2 days if BP remains stable. Eventual TIPS     Renal - monitor UO and renal fn. Trend and replete lytes prn. Avoid nephrotoxic meds    ID - prophylactic ceftriaxone x 5 days     Heme - s/p 4 PRBCs and 1 FFP, Hb>10 today, no need for further transfusion     DVT ppx with SCDs       Family updated       Stable for floor, sign out given to Dr Tom

## 2023-11-24 NOTE — PROGRESS NOTE ADULT - SUBJECTIVE AND OBJECTIVE BOX
CCU Progress Note    S:    Pt seen and examined  HD # 3  PMH of alcoholic cirrhosis history of varices with banding recent admission for GI bleeding had a hemorrhoidectomy with Dr. Cordero   also seen by Dr. Goodson and had an endoscopy and a colonoscopy which the patient reported was negative, coming in today with worsening distention in her abdomen and abdominal pain nausea dry heaving and having multiple episodes of melena stool and coffee ground emesis. Pt reports she was having nl BM and no blood in the stool. Last night she was vomiting and started to have melanotic stool. Denies fever or chills, n/v now, d/c. Pt also received 2 units PRBC in the ED. GI is aware and will be doing EGD today. No other complaints .    : Suboptimal response to pRBC x 2. BP improved but remains marginal. Pending EGD.  : s/p EGD, banding of esoph varices, stigmata of recent bleeding. Hgb has responded to transfusion.    ROS: + GIB, improving  Remainder of systems reviewed, negative        Allergies    Zithromax (Unknown)    Intolerances    morphine (Nausea)      MEDICATIONS  (STANDING):  cefTRIAXone Injectable. 1000 milliGRAM(s) IV Push every 24 hours  midodrine. 10 milliGRAM(s) Oral every 8 hours  pantoprazole    Tablet 40 milliGRAM(s) Oral two times a day  propranolol 20 milliGRAM(s) Oral two times a day  spironolactone 25 milliGRAM(s) Oral daily    MEDICATIONS  (PRN):  aluminum hydroxide/magnesium hydroxide/simethicone Suspension 30 milliLiter(s) Oral every 4 hours PRN Dyspepsia  ondansetron Injectable 4 milliGRAM(s) IV Push every 8 hours PRN Nausea and/or Vomiting  oxycodone    5 mG/acetaminophen 325 mG 1 Tablet(s) Oral every 6 hours PRN Severe Pain (7 - 10)      Drug Dosing Weight  Height (cm): 170.2 (2023 05:01)  Weight (kg): 59 (2023 05:01)  BMI (kg/m2): 20.4 (2023 05:01)  BSA (m2): 1.68 (2023 05:01)    PAST MEDICAL & SURGICAL HISTORY:  Alcohol abuse      Depression      Withdrawal seizures      History of basal cell carcinoma excision      Squamous cell skin cancer      Hemorrhoids      2019 novel coronavirus disease (COVID-19)      History of ear, nose, and throat (ENT) surgery      H/O basal cell carcinoma excision      H/O:           FAMILY HISTORY:  FH: pancreatic cancer (Mother)    Family history of heart attack (Father)    Family history of CVA (Father)        UNLESS OTHERWISE NOTED IN HPI above:    Constitutional:  No Weight Change, No Fever, No Chills, No Night Sweats, No Fatigue, No Malaise  ENT/Mouth:  No Hearing Changes, No Ear Pain, No Nasal Congestion, No  Sinus Pain, No Hoarseness, No sore throat, No Rhinorrhea, No Swallowing  Difficulty  Eyes:  No Eye Pain, No Swelling, No Redness, No Foreign Body, No Discharge, No Vision Changes  Cardiovascular:  No Chest Pain, No SOB, No PND, No Dyspnea on Exertion,  No Orthopnea, No Claudication, No Edema, No Palpitations  Respiratory:  No Cough, No Sputum, No Wheezing, No Smoke Exposure, No Dyspnea  Gastrointestinal:  No Nausea, No Vomiting, No Diarrhea, No  Constipation, No Pain, No Heartburn, No Anorexia, No Dysphagia, No  Hematochezia, No Melena, No Flatulence, No Jaundice  Genitourinary:  No Dysmenorrhea, No DUB, No Dyspareunia, No Dysuria, No  Urinary Frequency, No Hematuria, No Urinary Incontinence, No Urgency,  No Flank Pain, No Urinary Flow Changes, No Hesitancy  Musculoskeletal:  No Arthralgias, No Myalgias, No Joint Swelling, No  Joint Stiffness, No Back Pain, No Neck Pain, No Injury History  Skin:  No Skin Lesions, No Pruritis, No Hair Changes, No Breast/Skin Changes, No Nipple Discharge  Neuro:  No Weakness, No Numbness, No Paresthesias, No Loss of  Consciousness, No Syncope, No Dizziness, No Headache, No Coordination  Changes, No Recent Falls  Psych:  No Anxiety/Panic, No Depression, No Insomnia, No Personality  Changes, No Delusions, No Rumination, No SI/HI/AH/VH, No Social Issues,  No Memory Changes, No Violence/Abuse Hx., No Eating Concerns  Heme/Lymph:  No Bruising, No Bleeding, No Transfusions History, No Lymphadenopathy  Endocrine:  No Polyuria, No Polydipsia, No Temperature Intolerance    O:    ICU Vital Signs Last 24 Hrs  T(C): 37.2 (2023 08:32), Max: 37.2 (2023 08:32)  T(F): 99 (2023 08:32), Max: 99 (2023 08:32)  HR: 94 (2023 08:32) (80 - 94)  BP: 101/54 (2023 08:32) (92/54 - 103/64)  BP(mean): --  ABP: --  ABP(mean): --  RR: 16 (2023 08:32) (16 - 18)  SpO2: 94% (2023 08:32) (94% - 96%)    O2 Parameters below as of 2023 08:32  Patient On (Oxygen Delivery Method): room air                I&O's Detail          PE:    Adult  F lying in bed  Appears older then stated age, chronically ill  No JVD trachea midline   Normocephalic, atraumatic  S1S2+  CTA B/L  Abd soft NTND  No leg swelling/edema noted  Awake and alert  Skin pink, warm    LABS:    CBC Full  -  ( 2023 07:18 )  WBC Count : 7.54 K/uL  RBC Count : 3.47 M/uL  Hemoglobin : 9.9 g/dL  Hematocrit : 30.1 %  Platelet Count - Automated : 109 K/uL  Mean Cell Volume : 86.7 fl  Mean Cell Hemoglobin : 28.5 pg  Mean Cell Hemoglobin Concentration : 32.9 gm/dL  Auto Neutrophil # : x  Auto Lymphocyte # : x  Auto Monocyte # : x  Auto Eosinophil # : x  Auto Basophil # : x  Auto Neutrophil % : x  Auto Lymphocyte % : x  Auto Monocyte % : x  Auto Eosinophil % : x  Auto Basophil % : x    -25    139  |  108  |  10  ----------------------------<  92  4.3   |  25  |  0.70    Ca    8.2<L>      2023 07:18  Phos  3.1     -25  Mg     1.8     -25        Urinalysis Basic - ( 2023 07:18 )    Color: x / Appearance: x / SG: x / pH: x  Gluc: 92 mg/dL / Ketone: x  / Bili: x / Urobili: x   Blood: x / Protein: x / Nitrite: x   Leuk Esterase: x / RBC: x / WBC x   Sq Epi: x / Non Sq Epi: x / Bacteria: x      CAPILLARY BLOOD GLUCOSE

## 2023-11-25 LAB
ANION GAP SERPL CALC-SCNC: 6 MMOL/L — SIGNIFICANT CHANGE UP (ref 5–17)
ANION GAP SERPL CALC-SCNC: 6 MMOL/L — SIGNIFICANT CHANGE UP (ref 5–17)
BUN SERPL-MCNC: 10 MG/DL — SIGNIFICANT CHANGE UP (ref 7–23)
BUN SERPL-MCNC: 10 MG/DL — SIGNIFICANT CHANGE UP (ref 7–23)
CALCIUM SERPL-MCNC: 8.2 MG/DL — LOW (ref 8.5–10.1)
CALCIUM SERPL-MCNC: 8.2 MG/DL — LOW (ref 8.5–10.1)
CHLORIDE SERPL-SCNC: 108 MMOL/L — SIGNIFICANT CHANGE UP (ref 96–108)
CHLORIDE SERPL-SCNC: 108 MMOL/L — SIGNIFICANT CHANGE UP (ref 96–108)
CO2 SERPL-SCNC: 25 MMOL/L — SIGNIFICANT CHANGE UP (ref 22–31)
CO2 SERPL-SCNC: 25 MMOL/L — SIGNIFICANT CHANGE UP (ref 22–31)
CREAT SERPL-MCNC: 0.7 MG/DL — SIGNIFICANT CHANGE UP (ref 0.5–1.3)
CREAT SERPL-MCNC: 0.7 MG/DL — SIGNIFICANT CHANGE UP (ref 0.5–1.3)
EGFR: 99 ML/MIN/1.73M2 — SIGNIFICANT CHANGE UP
EGFR: 99 ML/MIN/1.73M2 — SIGNIFICANT CHANGE UP
GLUCOSE SERPL-MCNC: 92 MG/DL — SIGNIFICANT CHANGE UP (ref 70–99)
GLUCOSE SERPL-MCNC: 92 MG/DL — SIGNIFICANT CHANGE UP (ref 70–99)
HCT VFR BLD CALC: 30.1 % — LOW (ref 34.5–45)
HCT VFR BLD CALC: 30.1 % — LOW (ref 34.5–45)
HGB BLD-MCNC: 9.9 G/DL — LOW (ref 11.5–15.5)
HGB BLD-MCNC: 9.9 G/DL — LOW (ref 11.5–15.5)
MAGNESIUM SERPL-MCNC: 1.8 MG/DL — SIGNIFICANT CHANGE UP (ref 1.6–2.6)
MAGNESIUM SERPL-MCNC: 1.8 MG/DL — SIGNIFICANT CHANGE UP (ref 1.6–2.6)
MCHC RBC-ENTMCNC: 28.5 PG — SIGNIFICANT CHANGE UP (ref 27–34)
MCHC RBC-ENTMCNC: 28.5 PG — SIGNIFICANT CHANGE UP (ref 27–34)
MCHC RBC-ENTMCNC: 32.9 GM/DL — SIGNIFICANT CHANGE UP (ref 32–36)
MCHC RBC-ENTMCNC: 32.9 GM/DL — SIGNIFICANT CHANGE UP (ref 32–36)
MCV RBC AUTO: 86.7 FL — SIGNIFICANT CHANGE UP (ref 80–100)
MCV RBC AUTO: 86.7 FL — SIGNIFICANT CHANGE UP (ref 80–100)
PHOSPHATE SERPL-MCNC: 3.1 MG/DL — SIGNIFICANT CHANGE UP (ref 2.5–4.5)
PHOSPHATE SERPL-MCNC: 3.1 MG/DL — SIGNIFICANT CHANGE UP (ref 2.5–4.5)
PLATELET # BLD AUTO: 109 K/UL — LOW (ref 150–400)
PLATELET # BLD AUTO: 109 K/UL — LOW (ref 150–400)
POTASSIUM SERPL-MCNC: 4.3 MMOL/L — SIGNIFICANT CHANGE UP (ref 3.5–5.3)
POTASSIUM SERPL-MCNC: 4.3 MMOL/L — SIGNIFICANT CHANGE UP (ref 3.5–5.3)
POTASSIUM SERPL-SCNC: 4.3 MMOL/L — SIGNIFICANT CHANGE UP (ref 3.5–5.3)
POTASSIUM SERPL-SCNC: 4.3 MMOL/L — SIGNIFICANT CHANGE UP (ref 3.5–5.3)
RBC # BLD: 3.47 M/UL — LOW (ref 3.8–5.2)
RBC # BLD: 3.47 M/UL — LOW (ref 3.8–5.2)
RBC # FLD: 16.7 % — HIGH (ref 10.3–14.5)
RBC # FLD: 16.7 % — HIGH (ref 10.3–14.5)
SODIUM SERPL-SCNC: 139 MMOL/L — SIGNIFICANT CHANGE UP (ref 135–145)
SODIUM SERPL-SCNC: 139 MMOL/L — SIGNIFICANT CHANGE UP (ref 135–145)
WBC # BLD: 7.54 K/UL — SIGNIFICANT CHANGE UP (ref 3.8–10.5)
WBC # BLD: 7.54 K/UL — SIGNIFICANT CHANGE UP (ref 3.8–10.5)
WBC # FLD AUTO: 7.54 K/UL — SIGNIFICANT CHANGE UP (ref 3.8–10.5)
WBC # FLD AUTO: 7.54 K/UL — SIGNIFICANT CHANGE UP (ref 3.8–10.5)

## 2023-11-25 PROCEDURE — 99232 SBSQ HOSP IP/OBS MODERATE 35: CPT

## 2023-11-25 RX ADMIN — MIDODRINE HYDROCHLORIDE 10 MILLIGRAM(S): 2.5 TABLET ORAL at 21:09

## 2023-11-25 RX ADMIN — MIDODRINE HYDROCHLORIDE 10 MILLIGRAM(S): 2.5 TABLET ORAL at 14:07

## 2023-11-25 RX ADMIN — Medication 50 MILLILITER(S): at 21:08

## 2023-11-25 RX ADMIN — Medication 50 MILLILITER(S): at 05:57

## 2023-11-25 RX ADMIN — MIDODRINE HYDROCHLORIDE 10 MILLIGRAM(S): 2.5 TABLET ORAL at 05:57

## 2023-11-25 RX ADMIN — CEFTRIAXONE 1000 MILLIGRAM(S): 500 INJECTION, POWDER, FOR SOLUTION INTRAMUSCULAR; INTRAVENOUS at 09:40

## 2023-11-25 RX ADMIN — PANTOPRAZOLE SODIUM 40 MILLIGRAM(S): 20 TABLET, DELAYED RELEASE ORAL at 09:39

## 2023-11-25 RX ADMIN — PANTOPRAZOLE SODIUM 40 MILLIGRAM(S): 20 TABLET, DELAYED RELEASE ORAL at 21:13

## 2023-11-25 RX ADMIN — Medication 50 MILLILITER(S): at 14:06

## 2023-11-25 RX ADMIN — SPIRONOLACTONE 25 MILLIGRAM(S): 25 TABLET, FILM COATED ORAL at 09:39

## 2023-11-25 NOTE — PROGRESS NOTE ADULT - SUBJECTIVE AND OBJECTIVE BOX
Patient is a 58y old  Female who presents with a chief complaint of GIB (2023 09:54)      HPI:  doing well  feels better after paracentesis  no evidence gi bleeding      PAST MEDICAL & SURGICAL HISTORY:  Alcohol abuse      Depression      Withdrawal seizures      History of basal cell carcinoma excision      Squamous cell skin cancer      Hemorrhoids      2019 novel coronavirus disease (COVID-19)      History of ear, nose, and throat (ENT) surgery      H/O basal cell carcinoma excision      H/O:           MEDICATIONS  (STANDING):  albumin human 25% IVPB 50 milliLiter(s) IV Intermittent every 6 hours  cefTRIAXone Injectable. 1000 milliGRAM(s) IV Push every 24 hours  midodrine. 10 milliGRAM(s) Oral every 8 hours  pantoprazole    Tablet 40 milliGRAM(s) Oral two times a day  spironolactone 25 milliGRAM(s) Oral daily    MEDICATIONS  (PRN):  aluminum hydroxide/magnesium hydroxide/simethicone Suspension 30 milliLiter(s) Oral every 4 hours PRN Dyspepsia  ondansetron Injectable 4 milliGRAM(s) IV Push every 8 hours PRN Nausea and/or Vomiting  oxycodone    5 mG/acetaminophen 325 mG 1 Tablet(s) Oral every 6 hours PRN Severe Pain (7 - 10)      Allergies    Zithromax (Unknown)    Intolerances    morphine (Nausea)      REVIEW OF SYSTEMS:    CONSTITUTIONAL: No weakness, fevers or chills  RESPIRATORY: No cough, wheezing, hemoptysis; No shortness of breath  CARDIOVASCULAR: No chest pain or palpitations  GASTROINTESTINAL: as above  All other review of systems is negative unless indicated above.    Vital Signs Last 24 Hrs  T(C): 36.6 (2023 07:55), Max: 36.8 (2023 16:20)  T(F): 97.9 (2023 07:55), Max: 98.2 (2023 16:20)  HR: 77 (2023 07:55) (77 - 92)  BP: 111/60 (2023 07:55) (91/56 - 111/60)  BP(mean): --  RR: 17 (2023 07:55) (15 - 18)  SpO2: 94% (2023 07:55) (94% - 95%)    Parameters below as of 2023 07:55  Patient On (Oxygen Delivery Method): room air        PHYSICAL EXAM:  Constitutional: NAD  Gastrointestinal: BS+, soft, mildly distended    LABS:                        9.9    7.54  )-----------( 109      ( 2023 07:18 )             30.1         139  |  108  |  10  ----------------------------<  92  4.3   |  25  |  0.70    Ca    8.2<L>      2023 07:18  Phos  3.1       Mg     1.8                 RADIOLOGY & ADDITIONAL STUDIES:

## 2023-11-25 NOTE — PROGRESS NOTE ADULT - SUBJECTIVE AND OBJECTIVE BOX
downgraded from ICU    PMH of alcoholic cirrhosis history of varices with banding recent admission for GI bleeding had a hemorrhoidectomy with Dr. Cordero 11/14  also seen by Dr. Goodson and had an endoscopy and a colonoscopy which the patient reported was negative, coming in today with worsening distention in her abdomen and abdominal pain nausea dry heaving and having multiple episodes of melena stool and coffee ground emesis. Pt reports she was having nl BM and no blood in the stool. Last night she was vomiting and started to have melanotic stool. Denies fever or chills, n/v now, d/c. Pt also received 2 units PRBC in the ED. GI is aware and will be doing EGD today. No other complaints .        ICU Vital Signs Last 24 Hrs  T(C): 36.6 (25 Nov 2023 07:55), Max: 36.8 (24 Nov 2023 16:20)  T(F): 97.9 (25 Nov 2023 07:55), Max: 98.2 (24 Nov 2023 16:20)  HR: 77 (25 Nov 2023 07:55) (77 - 96)  BP: 111/60 (25 Nov 2023 07:55) (91/56 - 111/64)  BP(mean): --  ABP: --  ABP(mean): --  RR: 17 (25 Nov 2023 07:55) (15 - 29)  SpO2: 94% (25 Nov 2023 07:55) (94% - 95%)    O2 Parameters below as of 25 Nov 2023 07:55  Patient On (Oxygen Delivery Method): room air              MEDICATIONS  (STANDING):  albumin human 25% IVPB 50 milliLiter(s) IV Intermittent every 6 hours  cefTRIAXone Injectable. 1000 milliGRAM(s) IV Push every 24 hours  midodrine. 10 milliGRAM(s) Oral every 8 hours  pantoprazole    Tablet 40 milliGRAM(s) Oral two times a day  spironolactone 25 milliGRAM(s) Oral daily    MEDICATIONS  (PRN):  aluminum hydroxide/magnesium hydroxide/simethicone Suspension 30 milliLiter(s) Oral every 4 hours PRN Dyspepsia  ondansetron Injectable 4 milliGRAM(s) IV Push every 8 hours PRN Nausea and/or Vomiting  oxycodone    5 mG/acetaminophen 325 mG 1 Tablet(s) Oral every 6 hours PRN Severe Pain (7 - 10)        PHYSICAL EXAM:      NERVOUS SYSTEM:  Alert & Oriented X3, Motor Strength 5/5 B/L upper and lower extremities; DTRs 2+ intact and symmetric  CHEST/LUNG: Clear to auscultation bilaterally; No rales, rhonchi, wheezing, or rubs  HEART: Regular rate and rhythm; No murmurs, rubs, or gallops  ABDOMEN: Soft, Nontender, Nondistended; Bowel sounds present  EXTREMITIES:  2+ Peripheral Pulses, No clubbing, cyanosis, or edema    LABS:                        9.9    7.54  )-----------( 109      ( 25 Nov 2023 07:18 )             30.1     11-25    139  |  108  |  10  ----------------------------<  92  4.3   |  25  |  0.70    Ca    8.2<L>      25 Nov 2023 07:18  Phos  3.1     11-25  Mg     1.8     11-25        Urinalysis Basic - ( 25 Nov 2023 07:18 )    Color: x / Appearance: x / SG: x / pH: x  Gluc: 92 mg/dL / Ketone: x  / Bili: x / Urobili: x   Blood: x / Protein: x / Nitrite: x   Leuk Esterase: x / RBC: x / WBC x   Sq Epi: x / Non Sq Epi: x / Bacteria: x            RADIOLOGY & ADDITIONAL TESTS: personally reviewed        Assessment:     GI bleed due to esophageal varices   Acute blood loss anemia   Ascites   liver cirrhosis    Plan:  s/p EGD and banding 11/22, H/H stable. Off octreotide and PPI drips. On PPI q12. Avoid AC, AP. Diet advanced. Peritoneal fluid neg for SBP, drained >6L,  Continue albumin. Resume propranolol and spironolactone in 1-2 days if BP remains stable. Eventual TIPS      downgraded from ICU    PMH of alcoholic cirrhosis history of varices with banding recent admission for GI bleeding had a hemorrhoidectomy with Dr. Cordero 11/14  also seen by Dr. Goodson and had an endoscopy and a colonoscopy which the patient reported was negative, coming in today with worsening distention in her abdomen and abdominal pain nausea dry heaving and having multiple episodes of melena stool and coffee ground emesis. Pt reports she was having nl BM and no blood in the stool. Last night she was vomiting and started to have melanotic stool. Denies fever or chills, n/v now, d/c. Pt also received 2 units PRBC in the ED. GI is aware and will be doing EGD today. No other complaints .        ICU Vital Signs Last 24 Hrs  T(C): 36.6 (25 Nov 2023 07:55), Max: 36.8 (24 Nov 2023 16:20)  T(F): 97.9 (25 Nov 2023 07:55), Max: 98.2 (24 Nov 2023 16:20)  HR: 77 (25 Nov 2023 07:55) (77 - 96)  BP: 111/60 (25 Nov 2023 07:55) (91/56 - 111/64)  BP(mean): --  ABP: --  ABP(mean): --  RR: 17 (25 Nov 2023 07:55) (15 - 29)  SpO2: 94% (25 Nov 2023 07:55) (94% - 95%)    O2 Parameters below as of 25 Nov 2023 07:55  Patient On (Oxygen Delivery Method): room air              MEDICATIONS  (STANDING):  albumin human 25% IVPB 50 milliLiter(s) IV Intermittent every 6 hours  cefTRIAXone Injectable. 1000 milliGRAM(s) IV Push every 24 hours  midodrine. 10 milliGRAM(s) Oral every 8 hours  pantoprazole    Tablet 40 milliGRAM(s) Oral two times a day  spironolactone 25 milliGRAM(s) Oral daily    MEDICATIONS  (PRN):  aluminum hydroxide/magnesium hydroxide/simethicone Suspension 30 milliLiter(s) Oral every 4 hours PRN Dyspepsia  ondansetron Injectable 4 milliGRAM(s) IV Push every 8 hours PRN Nausea and/or Vomiting  oxycodone    5 mG/acetaminophen 325 mG 1 Tablet(s) Oral every 6 hours PRN Severe Pain (7 - 10)        PHYSICAL EXAM:      NERVOUS SYSTEM:  Alert & Oriented X3, Motor Strength 5/5 B/L upper and lower extremities; DTRs 2+ intact and symmetric  CHEST/LUNG: Clear to auscultation bilaterally; No rales, rhonchi, wheezing, or rubs  HEART: Regular rate and rhythm; No murmurs, rubs, or gallops  ABDOMEN: Soft, Nontender, Nondistended; Bowel sounds present  EXTREMITIES:  2+ Peripheral Pulses, No clubbing, cyanosis, or edema    LABS:                        9.9    7.54  )-----------( 109      ( 25 Nov 2023 07:18 )             30.1     11-25    139  |  108  |  10  ----------------------------<  92  4.3   |  25  |  0.70    Ca    8.2<L>      25 Nov 2023 07:18  Phos  3.1     11-25  Mg     1.8     11-25        Urinalysis Basic - ( 25 Nov 2023 07:18 )    Color: x / Appearance: x / SG: x / pH: x  Gluc: 92 mg/dL / Ketone: x  / Bili: x / Urobili: x   Blood: x / Protein: x / Nitrite: x   Leuk Esterase: x / RBC: x / WBC x   Sq Epi: x / Non Sq Epi: x / Bacteria: x      Home Medications:  ibuprofen 200 mg oral tablet: 1 tab(s) orally every 6 hours as needed for (22 Nov 2023 09:32)  thiamine 100 mg oral tablet: 1 tab(s) orally once a day (22 Nov 2023 09:33)        RADIOLOGY & ADDITIONAL TESTS: personally reviewed        Assessment:     GI bleed due to esophageal varices   Acute blood loss anemia   Ascites   liver cirrhosis    Plan:  -s/p EGD and banding 11/22, H/H stable. Off octreotide and PPI drips. On PPI q12. Avoid AC, AP. Diet advanced. Peritoneal fluid neg for SBP, drained >6L,  Continue albumin. Resume propranolol and spironolactone in 1-2 days if BP remains stable. Eventual TIPS   - stable HH  - on ceftriaxone ampiric coverage sbp  - PT eval  - ambulate  - add BB varices bleed prevention

## 2023-11-25 NOTE — PROGRESS NOTE ADULT - ASSESSMENT
57yo female with etoh liver disease    stable h/h - no further bleeding  s/p egd with banding    continue PPI    s/p paracentesis

## 2023-11-26 PROCEDURE — 99233 SBSQ HOSP IP/OBS HIGH 50: CPT

## 2023-11-26 RX ADMIN — PANTOPRAZOLE SODIUM 40 MILLIGRAM(S): 20 TABLET, DELAYED RELEASE ORAL at 10:08

## 2023-11-26 RX ADMIN — CEFTRIAXONE 1000 MILLIGRAM(S): 500 INJECTION, POWDER, FOR SOLUTION INTRAMUSCULAR; INTRAVENOUS at 10:08

## 2023-11-26 RX ADMIN — PANTOPRAZOLE SODIUM 40 MILLIGRAM(S): 20 TABLET, DELAYED RELEASE ORAL at 21:48

## 2023-11-26 RX ADMIN — MIDODRINE HYDROCHLORIDE 10 MILLIGRAM(S): 2.5 TABLET ORAL at 14:16

## 2023-11-26 RX ADMIN — MIDODRINE HYDROCHLORIDE 10 MILLIGRAM(S): 2.5 TABLET ORAL at 05:24

## 2023-11-26 RX ADMIN — SPIRONOLACTONE 25 MILLIGRAM(S): 25 TABLET, FILM COATED ORAL at 10:07

## 2023-11-26 NOTE — PROGRESS NOTE ADULT - SUBJECTIVE AND OBJECTIVE BOX
Patient is a 58y old  Female who presents with a chief complaint of GIB (2023 11:57)      Subective: Seen and examined at bedside. No overnight events. No further episodes of bleeding. Tolerating diet.      PAST MEDICAL & SURGICAL HISTORY:  Alcohol abuse      Depression      Withdrawal seizures      History of basal cell carcinoma excision      Squamous cell skin cancer      Hemorrhoids      2019 novel coronavirus disease (COVID-19)      History of ear, nose, and throat (ENT) surgery      H/O basal cell carcinoma excision      H/O:           MEDICATIONS  (STANDING):  cefTRIAXone Injectable. 1000 milliGRAM(s) IV Push every 24 hours  midodrine. 10 milliGRAM(s) Oral every 8 hours  pantoprazole    Tablet 40 milliGRAM(s) Oral two times a day  propranolol 20 milliGRAM(s) Oral two times a day  spironolactone 25 milliGRAM(s) Oral daily    MEDICATIONS  (PRN):  aluminum hydroxide/magnesium hydroxide/simethicone Suspension 30 milliLiter(s) Oral every 4 hours PRN Dyspepsia  ondansetron Injectable 4 milliGRAM(s) IV Push every 8 hours PRN Nausea and/or Vomiting  oxycodone    5 mG/acetaminophen 325 mG 1 Tablet(s) Oral every 6 hours PRN Severe Pain (7 - 10)      REVIEW OF SYSTEMS:    RESPIRATORY: No shortness of breath  CARDIOVASCULAR: No chest pain  All other review of systems is negative unless indicated above.    Vital Signs Last 24 Hrs  T(C): 37.2 (2023 08:32), Max: 37.2 (2023 08:32)  T(F): 99 (2023 08:32), Max: 99 (2023 08:32)  HR: 94 (2023 08:32) (80 - 94)  BP: 101/54 (2023 08:32) (92/54 - 103/64)  BP(mean): --  RR: 16 (2023 08:32) (16 - 18)  SpO2: 94% (2023 08:32) (94% - 96%)    Parameters below as of 2023 08:32  Patient On (Oxygen Delivery Method): room air        PHYSICAL EXAM:    Constitutional: NAD  Respiratory: CTAB  Cardiovascular: S1 and S2, RRR  Gastrointestinal: BS+, soft, NT, mild distension  Extremities: No peripheral edema  Psychiatric: Normal mood, normal affect, no asterixis    LABS:                        9.9    7.54  )-----------( 109      ( 2023 07:18 )             30.1         139  |  108  |  10  ----------------------------<  92  4.3   |  25  |  0.70    Ca    8.2<L>      2023 07:18  Phos  3.1       Mg     1.8                 RADIOLOGY & ADDITIONAL STUDIES:

## 2023-11-26 NOTE — PROGRESS NOTE ADULT - PROVIDER SPECIALTY LIST ADULT
Critical Care
Critical Care
Gastroenterology
Hospitalist
Hospitalist
Critical Care
Gastroenterology
Critical Care

## 2023-11-26 NOTE — PROGRESS NOTE ADULT - ASSESSMENT
1. UGIB s/p EGD with variceal banding  2. Decompensated cirrhosis s/p paracentesis negative for SBP    Recommendations  1. low salt diet  2. PPI 40 mg BID  3. Continue ceftriaxone for 5-7 days  4. Monitor for overt bleeding and trend hgb  5. Continue with aldactone and may need to restart lasix for ascites prior to discharge  6. Dr. Goodson to resume care on 11/27

## 2023-11-26 NOTE — PROGRESS NOTE ADULT - SUBJECTIVE AND OBJECTIVE BOX
CC: GIB  · Subjective and Objective:   downgraded from ICU    PMH of alcoholic cirrhosis history of varices with banding recent admission for GI bleeding had a hemorrhoidectomy with Dr. Cordero 11/14  also seen by Dr. Goodson and had an endoscopy and a colonoscopy which the patient reported was negative, coming in today with worsening distention in her abdomen and abdominal pain nausea dry heaving and having multiple episodes of melena stool and coffee ground emesis. Pt reports she was having nl BM and no blood in the stool. Last night she was vomiting and started to have melanotic stool. Denies fever or chills, n/v now, d/c. Pt also received 2 units PRBC in the ED.    Hospital course:  Pt underwent EGD with banding 11/22, H+H now stable.  Off octreotide and PPI drips.  Pt tolerating diet.  She had paracentesis drained 6L.  Pt received albumin infusion.  BP remains stable, and pt feeling better with out complaints.  She completed course of ceftriaxone for empiric coverage for sbp.    REVIEW OF SYSTEMS: All other review of systems is negative unless indicated above.    Vital Signs Last 24 Hrs  T(C): 37.2 (26 Nov 2023 08:32), Max: 37.2 (26 Nov 2023 08:32)  T(F): 99 (26 Nov 2023 08:32), Max: 99 (26 Nov 2023 08:32)  HR: 96 (26 Nov 2023 14:13) (80 - 96)  BP: 96/58 (26 Nov 2023 14:13) (92/54 - 103/64)  BP(mean): --  RR: 16 (26 Nov 2023 08:32) (16 - 18)  SpO2: 94% (26 Nov 2023 08:32) (94% - 96%)    Parameters below as of 26 Nov 2023 08:32  Patient On (Oxygen Delivery Method): room air      PHYSICAL EXAM:    Constitutional: NAD, awake and alert  HEENT: PERR, EOMI, Normal Hearing, MMM  Neck: Soft and supple  Respiratory: Breath sounds are clear bilaterally, No wheezing, rales or rhonchi  Cardiovascular: S1 and S2, regular rate and rhythm, no Murmurs, gallops or rubs  Gastrointestinal: Bowel Sounds present, soft, nontender, nondistended, no guarding, no rebound  Extremities: No peripheral edema  Neurological: A/O x 3, no focal deficits in my limited exam    MEDICATIONS  (STANDING):  cefTRIAXone Injectable. 1000 milliGRAM(s) IV Push every 24 hours  midodrine. 10 milliGRAM(s) Oral every 8 hours  pantoprazole    Tablet 40 milliGRAM(s) Oral two times a day  propranolol 20 milliGRAM(s) Oral two times a day  spironolactone 25 milliGRAM(s) Oral daily    MEDICATIONS  (PRN):  aluminum hydroxide/magnesium hydroxide/simethicone Suspension 30 milliLiter(s) Oral every 4 hours PRN Dyspepsia  ondansetron Injectable 4 milliGRAM(s) IV Push every 8 hours PRN Nausea and/or Vomiting  oxycodone    5 mG/acetaminophen 325 mG 1 Tablet(s) Oral every 6 hours PRN Severe Pain (7 - 10)                                9.9    7.54  )-----------( 109      ( 25 Nov 2023 07:18 )             30.1     11-25    139  |  108  |  10  ----------------------------<  92  4.3   |  25  |  0.70    Ca    8.2<L>      25 Nov 2023 07:18  Phos  3.1     11-25  Mg     1.8     11-25      CAPILLARY BLOOD GLUCOSE            Urinalysis Basic - ( 25 Nov 2023 07:18 )    Color: x / Appearance: x / SG: x / pH: x  Gluc: 92 mg/dL / Ketone: x  / Bili: x / Urobili: x   Blood: x / Protein: x / Nitrite: x   Leuk Esterase: x / RBC: x / WBC x   Sq Epi: x / Non Sq Epi: x / Bacteria: x        Assessment/Plan:     GI bleed due to esophageal varices and decompensated liver cirrhosis due to alcoholism:  Acute blood loss anemia   Ascites   liver cirrhosis      -s/p EGD and banding 11/22, H/H stable. Off octreotide and PPI drips. On PPI q12. Avoid AC, AP. Diet advanced. Peritoneal fluid neg for SBP, drained >6L,    -s/p albumin.   -BP low not tolerating propranolol and spironolactone at this time  -stable H+H  -tolerating diet  -c/w ceftriaxone ampiric coverage sbp, stop upon d/c home  - PT   -stop midodrine and monitor BP      Dispo:  -d/c home tomorrow with home care and close GI follow up if remains clinically stable

## 2023-11-27 ENCOUNTER — TRANSCRIPTION ENCOUNTER (OUTPATIENT)
Age: 58
End: 2023-11-27

## 2023-11-27 VITALS
RESPIRATION RATE: 18 BRPM | HEART RATE: 91 BPM | OXYGEN SATURATION: 94 % | TEMPERATURE: 98 F | SYSTOLIC BLOOD PRESSURE: 100 MMHG | DIASTOLIC BLOOD PRESSURE: 65 MMHG

## 2023-11-27 LAB
CULTURE RESULTS: SIGNIFICANT CHANGE UP
SPECIMEN SOURCE: SIGNIFICANT CHANGE UP

## 2023-11-27 PROCEDURE — 99239 HOSP IP/OBS DSCHRG MGMT >30: CPT

## 2023-11-27 RX ORDER — IBUPROFEN 200 MG
1 TABLET ORAL
Refills: 0 | DISCHARGE

## 2023-11-27 RX ORDER — PANTOPRAZOLE SODIUM 20 MG/1
1 TABLET, DELAYED RELEASE ORAL
Qty: 60 | Refills: 0
Start: 2023-11-27 | End: 2023-12-26

## 2023-11-27 RX ORDER — SPIRONOLACTONE 25 MG/1
25 TABLET, FILM COATED ORAL DAILY
Refills: 0 | Status: DISCONTINUED | OUTPATIENT
Start: 2023-11-27 | End: 2023-11-27

## 2023-11-27 RX ADMIN — CEFTRIAXONE 1000 MILLIGRAM(S): 500 INJECTION, POWDER, FOR SOLUTION INTRAMUSCULAR; INTRAVENOUS at 10:01

## 2023-11-27 RX ADMIN — PANTOPRAZOLE SODIUM 40 MILLIGRAM(S): 20 TABLET, DELAYED RELEASE ORAL at 10:01

## 2023-11-27 RX ADMIN — SPIRONOLACTONE 25 MILLIGRAM(S): 25 TABLET, FILM COATED ORAL at 11:55

## 2023-11-27 NOTE — DISCHARGE NOTE PROVIDER - NSDCMRMEDTOKEN_GEN_ALL_CORE_FT
pantoprazole 40 mg oral delayed release tablet: 1 tab(s) orally 2 times a day  spironolactone 25 mg oral tablet: 1 tab(s) orally once a day  thiamine 100 mg oral tablet: 1 tab(s) orally once a day

## 2023-11-27 NOTE — DISCHARGE NOTE NURSING/CASE MANAGEMENT/SOCIAL WORK - PATIENT PORTAL LINK FT
You can access the FollowMyHealth Patient Portal offered by [facility] by registering at the following website: [email or web address]. By joining [facility]’s FollowMyHealth portal, you will also be able to view your health information using other applications (apps) compatible with our system.

## 2023-11-27 NOTE — DISCHARGE NOTE PROVIDER - NSDCCPCAREPLAN_GEN_ALL_CORE_FT
PRINCIPAL DISCHARGE DIAGNOSIS  Diagnosis: Upper GI bleed  Assessment and Plan of Treatment: Due to varices, due to underlying cirrhosis  -Avoid alcohol  -Avoid NSAIDs  -take protonix twice a day as prescribed  -follow up with your gastroenterologist to slowly start medications to optimize cirrhosis.  -continue spironolactone

## 2023-11-27 NOTE — DISCHARGE NOTE PROVIDER - HOSPITAL COURSE
CC: GIB  · Subjective and Objective:   downgraded from ICU    PMH of alcoholic cirrhosis history of varices with banding recent admission for GI bleeding had a hemorrhoidectomy with Dr. Cordero 11/14  also seen by Dr. Goodson and had an endoscopy and a colonoscopy which the patient reported was negative, coming in today with worsening distention in her abdomen and abdominal pain nausea dry heaving and having multiple episodes of melena stool and coffee ground emesis. Pt reports she was having nl BM and no blood in the stool. Last night she was vomiting and started to have melanotic stool. Denies fever or chills, n/v now, d/c. Pt also received 2 units PRBC in the ED.    Hospital course:  Pt underwent EGD with banding 11/22, H+H now stable.  Off octreotide and PPI drips.  Pt tolerating diet.  She had paracentesis drained 6L.  Pt received albumin infusion.  BP remains stable, and pt feeling better with out complaints.  She completed course of ceftriaxone for empiric coverage for sbp.    11/27: No new events, awaiting d/c home with home care.  Vitals stable off midodrine.     REVIEW OF SYSTEMS: All other review of systems is negative unless indicated above.    Vital Signs Last 24 Hrs  T(C): 36.9 (27 Nov 2023 08:40), Max: 37.4 (26 Nov 2023 16:34)  T(F): 98.4 (27 Nov 2023 08:40), Max: 99.3 (26 Nov 2023 16:34)  HR: 91 (27 Nov 2023 08:40) (87 - 96)  BP: 100/65 (27 Nov 2023 08:40) (96/58 - 112/66)  BP(mean): 71 (26 Nov 2023 23:02) (71 - 71)  RR: 18 (27 Nov 2023 08:40) (17 - 18)  SpO2: 94% (27 Nov 2023 08:40) (94% - 97%)    Parameters below as of 27 Nov 2023 08:40  Patient On (Oxygen Delivery Method): room air      PHYSICAL EXAM:    Constitutional: NAD, awake and alert  HEENT: PERR, EOMI, Normal Hearing, MMM  Neck: Soft and supple  Respiratory: Breath sounds are clear bilaterally, No wheezing, rales or rhonchi  Cardiovascular: S1 and S2, regular rate and rhythm, no Murmurs, gallops or rubs  Gastrointestinal: Bowel Sounds present, soft, nontender, nondistended, no guarding, no rebound  Extremities: No peripheral edema  Neurological: A/O x 3, no focal deficits in my limited exam    med/labs: Reviewed and interpreted     Assessment/Plan:     GI bleed due to esophageal varices and decompensated liver cirrhosis due to alcoholism:  Acute blood loss anemia   Ascites   liver cirrhosis      -s/p EGD and banding 11/22, H/H stable. Off octreotide and PPI drips. On PPI q12. Avoid AC, AP. Diet advanced. Peritoneal fluid neg for SBP, drained >6L,    -s/p albumin.   -BP low not tolerating propranolol and spironolactone at this time  -stable H+H  -tolerating diet  -c/w ceftriaxone ampiric coverage sbp, stop upon d/c home  - PT   -stopped midodrine, bp stable  -will send on aldactone  -send on oral PPI BID  -does not tolerate Lasix and propranolol at this time, needs to be slowly introduced as out patient.      Dispo:  -d/c home with home care and close GI follow up.    Attending Statement: 40 minutes spent on total encounter and discharge planning.

## 2023-11-27 NOTE — PHYSICAL THERAPY INITIAL EVALUATION ADULT - MODALITIES TREATMENT COMMENTS
pt left in bed supine post Eval @ pt request; callbell in reach; lars well; Abd discomfort persists; no bed alarm necessary as per SHANNAN Lees

## 2023-11-27 NOTE — DISCHARGE NOTE NURSING/CASE MANAGEMENT/SOCIAL WORK - NSDCVIVACCINE_GEN_ALL_CORE_FT
influenza, injectable, quadrivalent, preservative free; 13-Oct-2023 11:44; Kristina Renee (RN); [employer]; [id number] (Exp. Date: 13-Jun-2024); IntraMuscular; Deltoid Left.; 0.5 milliLiter(s); VIS (VIS Published: 06-Aug-2021, VIS Presented: 13-Oct-2023);

## 2023-11-27 NOTE — DISCHARGE NOTE PROVIDER - CARE PROVIDER_API CALL
Brian Goodson  Gastroenterology  [address] [facility], [address] [address]  Phone: [phone]  Fax: [phone]  Follow Up Time: 1 week

## 2023-11-29 ENCOUNTER — APPOINTMENT (OUTPATIENT)
Dept: INTERVENTIONAL RADIOLOGY/VASCULAR | Facility: CLINIC | Age: 58
End: 2023-11-29
Payer: COMMERCIAL

## 2023-11-29 VITALS
TEMPERATURE: 97.5 F | SYSTOLIC BLOOD PRESSURE: 126 MMHG | HEART RATE: 109 BPM | DIASTOLIC BLOOD PRESSURE: 78 MMHG | OXYGEN SATURATION: 98 % | RESPIRATION RATE: 16 BRPM

## 2023-11-29 DIAGNOSIS — Z92.89 PERSONAL HISTORY OF OTHER MEDICAL TREATMENT: ICD-10-CM

## 2023-11-29 DIAGNOSIS — Z02.83 ENCOUNTER FOR BLOOD-ALCOHOL AND BLOOD-DRUG TEST: ICD-10-CM

## 2023-11-29 DIAGNOSIS — K76.6 PORTAL HYPERTENSION: ICD-10-CM

## 2023-11-29 DIAGNOSIS — Z86.69 PERSONAL HISTORY OF OTHER DISEASES OF THE NERVOUS SYSTEM AND SENSE ORGANS: ICD-10-CM

## 2023-11-29 DIAGNOSIS — Z00.00 ENCOUNTER FOR GENERAL ADULT MEDICAL EXAMINATION W/OUT ABNORMAL FINDINGS: ICD-10-CM

## 2023-11-29 DIAGNOSIS — Z86.79 PERSONAL HISTORY OF OTHER DISEASES OF THE CIRCULATORY SYSTEM: ICD-10-CM

## 2023-11-29 DIAGNOSIS — I85.00 ESOPHAGEAL VARICES W/OUT BLEEDING: ICD-10-CM

## 2023-11-29 DIAGNOSIS — Z13.6 ENCOUNTER FOR SCREENING FOR CARDIOVASCULAR DISORDERS: ICD-10-CM

## 2023-11-29 DIAGNOSIS — Z11.59 ENCOUNTER FOR SCREENING FOR OTHER VIRAL DISEASES: ICD-10-CM

## 2023-11-29 DIAGNOSIS — K64.8 OTHER HEMORRHOIDS: ICD-10-CM

## 2023-11-29 DIAGNOSIS — J22 UNSPECIFIED ACUTE LOWER RESPIRATORY INFECTION: ICD-10-CM

## 2023-11-29 DIAGNOSIS — Z23 ENCOUNTER FOR IMMUNIZATION: ICD-10-CM

## 2023-11-29 DIAGNOSIS — Z01.419 ENCOUNTER FOR GYNECOLOGICAL EXAMINATION (GENERAL) (ROUTINE) W/OUT ABNORMAL FINDINGS: ICD-10-CM

## 2023-11-29 DIAGNOSIS — Z87.898 PERSONAL HISTORY OF OTHER SPECIFIED CONDITIONS: ICD-10-CM

## 2023-11-29 DIAGNOSIS — H81.10 BENIGN PAROXYSMAL VERTIGO, UNSPECIFIED EAR: ICD-10-CM

## 2023-11-29 DIAGNOSIS — Z56.0 UNEMPLOYMENT, UNSPECIFIED: ICD-10-CM

## 2023-11-29 DIAGNOSIS — R55 SYNCOPE AND COLLAPSE: ICD-10-CM

## 2023-11-29 PROCEDURE — 99205 OFFICE O/P NEW HI 60 MIN: CPT

## 2023-11-29 RX ORDER — URSODIOL 300 MG/1
300 CAPSULE ORAL
Refills: 0 | Status: DISCONTINUED | COMMUNITY
End: 2023-11-29

## 2023-11-29 RX ORDER — CHOLESTYRAMINE 4 G/9G
4 POWDER, FOR SUSPENSION ORAL TWICE DAILY
Refills: 0 | Status: DISCONTINUED | COMMUNITY
End: 2023-11-29

## 2023-11-29 RX ORDER — IRON/IRON ASP GLY/FA/MV-MIN 38 125-25-1MG
TABLET ORAL
Refills: 0 | Status: DISCONTINUED | COMMUNITY
End: 2023-11-29

## 2023-11-29 RX ORDER — ATENOLOL 25 MG/1
25 TABLET ORAL DAILY
Qty: 90 | Refills: 3 | Status: DISCONTINUED | COMMUNITY
Start: 2019-12-16 | End: 2023-11-29

## 2023-11-29 RX ORDER — VENLAFAXINE HYDROCHLORIDE 150 MG/1
150 CAPSULE, EXTENDED RELEASE ORAL
Qty: 90 | Refills: 1 | Status: DISCONTINUED | COMMUNITY
Start: 2021-09-04 | End: 2023-11-29

## 2023-11-29 SDOH — ECONOMIC STABILITY - INCOME SECURITY: UNEMPLOYMENT, UNSPECIFIED: Z56.0

## 2023-12-01 DIAGNOSIS — R57.1 HYPOVOLEMIC SHOCK: ICD-10-CM

## 2023-12-01 DIAGNOSIS — J90 PLEURAL EFFUSION, NOT ELSEWHERE CLASSIFIED: ICD-10-CM

## 2023-12-01 DIAGNOSIS — F10.10 ALCOHOL ABUSE, UNCOMPLICATED: ICD-10-CM

## 2023-12-01 DIAGNOSIS — E87.20 ACIDOSIS, UNSPECIFIED: ICD-10-CM

## 2023-12-01 DIAGNOSIS — K70.31 ALCOHOLIC CIRRHOSIS OF LIVER WITH ASCITES: ICD-10-CM

## 2023-12-01 DIAGNOSIS — K31.89 OTHER DISEASES OF STOMACH AND DUODENUM: ICD-10-CM

## 2023-12-01 DIAGNOSIS — N17.0 ACUTE KIDNEY FAILURE WITH TUBULAR NECROSIS: ICD-10-CM

## 2023-12-01 DIAGNOSIS — Z88.1 ALLERGY STATUS TO OTHER ANTIBIOTIC AGENTS STATUS: ICD-10-CM

## 2023-12-01 DIAGNOSIS — D62 ACUTE POSTHEMORRHAGIC ANEMIA: ICD-10-CM

## 2023-12-01 DIAGNOSIS — I85.11 SECONDARY ESOPHAGEAL VARICES WITH BLEEDING: ICD-10-CM

## 2024-01-02 ENCOUNTER — APPOINTMENT (OUTPATIENT)
Dept: HEPATOLOGY | Facility: CLINIC | Age: 59
End: 2024-01-02

## 2024-01-08 ENCOUNTER — OUTPATIENT (OUTPATIENT)
Dept: OUTPATIENT SERVICES | Facility: HOSPITAL | Age: 59
LOS: 1 days | End: 2024-01-08
Payer: COMMERCIAL

## 2024-01-08 ENCOUNTER — APPOINTMENT (OUTPATIENT)
Dept: MRI IMAGING | Facility: CLINIC | Age: 59
End: 2024-01-08
Payer: MEDICAID

## 2024-01-08 DIAGNOSIS — Z98.890 OTHER SPECIFIED POSTPROCEDURAL STATES: Chronic | ICD-10-CM

## 2024-01-08 DIAGNOSIS — Z98.891 HISTORY OF UTERINE SCAR FROM PREVIOUS SURGERY: Chronic | ICD-10-CM

## 2024-01-08 DIAGNOSIS — K74.60 UNSPECIFIED CIRRHOSIS OF LIVER: ICD-10-CM

## 2024-01-08 DIAGNOSIS — R93.3 ABNORMAL FINDINGS ON DIAGNOSTIC IMAGING OF OTHER PARTS OF DIGESTIVE TRACT: ICD-10-CM

## 2024-01-08 PROCEDURE — 74183 MRI ABD W/O CNTR FLWD CNTR: CPT | Mod: 26

## 2024-01-08 PROCEDURE — A9585: CPT

## 2024-01-08 PROCEDURE — 74183 MRI ABD W/O CNTR FLWD CNTR: CPT

## 2024-01-10 PROBLEM — K74.60 CIRRHOSIS: Status: ACTIVE | Noted: 2023-11-28

## 2024-01-10 PROBLEM — R79.89 ELEVATED LIVER FUNCTION TESTS: Status: ACTIVE | Noted: 2021-11-15

## 2024-01-10 PROBLEM — I85.01 ESOPHAGEAL VARICES WITH BLEEDING: Status: ACTIVE | Noted: 2023-11-29

## 2024-01-10 PROBLEM — R18.8 ASCITES: Status: ACTIVE | Noted: 2023-11-28

## 2024-01-11 ENCOUNTER — APPOINTMENT (OUTPATIENT)
Dept: HEPATOLOGY | Facility: CLINIC | Age: 59
End: 2024-01-11
Payer: COMMERCIAL

## 2024-01-11 ENCOUNTER — LABORATORY RESULT (OUTPATIENT)
Age: 59
End: 2024-01-11

## 2024-01-11 VITALS
WEIGHT: 128 LBS | HEART RATE: 104 BPM | HEIGHT: 67 IN | SYSTOLIC BLOOD PRESSURE: 152 MMHG | RESPIRATION RATE: 16 BRPM | OXYGEN SATURATION: 98 % | DIASTOLIC BLOOD PRESSURE: 82 MMHG | BODY MASS INDEX: 20.09 KG/M2 | TEMPERATURE: 97.3 F

## 2024-01-11 DIAGNOSIS — I85.01 ESOPHAGEAL VARICES WITH BLEEDING: ICD-10-CM

## 2024-01-11 DIAGNOSIS — R18.8 OTHER ASCITES: ICD-10-CM

## 2024-01-11 DIAGNOSIS — R79.89 OTHER SPECIFIED ABNORMAL FINDINGS OF BLOOD CHEMISTRY: ICD-10-CM

## 2024-01-11 DIAGNOSIS — K74.60 UNSPECIFIED CIRRHOSIS OF LIVER: ICD-10-CM

## 2024-01-11 PROCEDURE — 99214 OFFICE O/P EST MOD 30 MIN: CPT

## 2024-01-11 PROCEDURE — 99204 OFFICE O/P NEW MOD 45 MIN: CPT

## 2024-01-11 NOTE — PHYSICAL EXAM
[Spider Angioma] : Spider angioma(s) was(were) observed [General Appearance - Alert] : alert [General Appearance - In No Acute Distress] : in no acute distress [Sclera] : the sclera and conjunctiva were normal [Extraocular Movements] : extraocular movements were intact [Auscultation Breath Sounds / Voice Sounds] : lungs were clear to auscultation bilaterally [Heart Rate And Rhythm] : heart rate was normal and rhythm regular [Heart Sounds] : normal S1 and S2 [Bowel Sounds] : normal bowel sounds [Abdomen Soft] : soft [Abdomen Tenderness] : non-tender [Abnormal Walk] : normal gait [Nail Clubbing] : no clubbing  or cyanosis of the fingernails [Musculoskeletal - Swelling] : no joint swelling seen [Skin Color & Pigmentation] : normal skin color and pigmentation [] : no rash [Skin Turgor] : normal skin turgor [No Focal Deficits] : no focal deficits [Oriented To Time, Place, And Person] : oriented to person, place, and time [Impaired Insight] : insight and judgment were intact [Affect] : the affect was normal [Jaundice] : Jaundice [Scleral Icterus] : No Scleral Icterus [Abdominal  Ascites] : no ascites [Asterixis] : no asterixis observed [FreeTextEntry6] : slightly

## 2024-01-11 NOTE — ASSESSMENT
[FreeTextEntry1] : Pt is 59y/o lady with a medical history of sHTN, anemia, alcohol associated liver cirrhosis complicated by ascites, EV (s/p banding in October and November, 2023), AUD now sober for ~2 months, bleeding rectal varices versus hemorrhoidal bleeding s/p hemorrhoidectomy (November, 2023) here to establish care.    MELD using labs from Nov was 18. Will repeat MELD labs today. I suspect it will be improved as she appears to be recompensating with sobriety - little to no ascites on exam today.   EVB: She has had at least one suspected episode of bleeding varices in the past 7months - EGD in October with blood in the stomach but no active variceal bleeding noted. Was banded then. Repeat EGDs in Nov showed small varices were medium size (not impressive per endoscopist). Apparently failed BB due to hypotension although weirdly, her BP and heart rate are quite elevated at today's visit. I would like to trial a low dose of coreg - 3.125 BID with BP monitoring at home with low threshold to hold if SBP falls below 100. Will start with 3.125 daily and then uptitrate to BID as tolerated. Will need repeat EGD for further banding given previous history of EVB. If unable to tolerate, coreg then should consider TIPS.  Rectal bleeding: ?Hemorrhoids versus rectal varices. s/p hemorrhoidectomy but was recommended to see IR for possible TIPS. Pt's GI physician tells me the TIPS was recommended by the surgical team. Seen by IR team in November and they referred her to me. ARACELI with no clear RV dysfunction, Abdominal imaging with no PVT. I would recommend holding off on TIPS at this time if the indication is concern for ?rectal varices. She had one episode of rectal bleeding (?hemorrhoids versus rectal varices) and one episode of presumed EVB. I will like to trial her on low dose Coreg and see how she tolerates. If unable to tolerate, then can consider pursing TIPS placement and that time, I will open OLT eval simultaneously in case she decompensates further after TIPS.   Ascites: s/p 6L LVP in November. Off spironolactone and appears to be holding quite well. Discussed the importance of Low salt diet.  HE: No history of HE and no signs on exam today. A bit tangential in her thought process but no HE.  HCC: Heterogeneous liver enhancement containing scattered vague opacities on arterial phase on previous CT. Had MR to exclude underlying neoplasm. Awaiting read.  OLT eval: May need OLT eval if MELD score is elevated on today's labs. She has had two decompensating events - Ascites (much improved now with sobriety), EVB s/p banding. I explained the steps involved in the transplant evaluation process. She is very nervous about it, understandably.   History of bleeding internal hemorrhoids s/p hemorrhoidectomy (11/14/23). No further episodes since.  AUD: Sober since 11/2023. Will check baseline PETH. Will need RPP. Referral to SW to help provide relapse prevention programs.

## 2024-01-11 NOTE — HISTORY OF PRESENT ILLNESS
[FreeTextEntry1] : Pt is 59y/o lady with a medical history of sHTN, anemia, alcohol associated liver cirrhosis complicated by ascites, EV (s/p banding in October and November, 2023), AUD now sober for ~2 months, bleeding rectal varices versus hemorrhoidal bleeding s/p hemorrhoidectomy (November, 2023) here to establish care.   Diagnosed with alcohol associated liver cirrhosis in the spring of 2022. She unfortunately continued to drink alcohol until November, 2023. She tells me that she has attended multiple rehab programs and therapy sessions but continued to relapse.  She was admitted to  in November, 2023 with rectal bleeding requiring multiple transfusions presumed due to hemorrhoidal bleed versus rectal hemorrhoidal bleeding and is s/p EGD with varices but no blood noted (banding done) and colonoscopy with hemorrhoids vs. rectal varices. She is s/p hemorrhoidectomy with colorectal surgery.   She had an earlier episode of UGIB presumed due to variceal bleed in October, 2023 banded at that time as well. I am told that she was unable to tolerate BB as her BP was tenuous.   Seen by Dr. Raffi Rosado (IR) for TIPS evaluation in November, 2023. He recommended that she establish care with a hepatologist hence her visit today. She follows with Dr. Brian Goodson (GI) - [phone] who I discussed with today - he reports that her esophageal varices were not that impressive on her last two EGDs. He also confirms that she was not able to tolerate BB previously. he offers that there was concern that her rectal bleeding was from rectal varices and so the colorectal surgeons had recommended TIPS evaluation.   She feels well and denies any signs of bleeding since discharge. Denies signs of HE. Had Ascites while admitted and underwent LVP (6L), was taking spironolactone and Lasix at some point. Lasix was held for hypotension and spironolactone was just recently held as she has no ascites.. Was on midodirine for low BP at some point as well but that has been discontinued.    Current Medications: Vit D, Pantoprazole, Trazadone. Last alcohol intake was in Nov, 2023 (just before her admission). Lives with her partner who is extremely supportive. has been in a relationship with him for 4 years.  She has 20y/o son who she is estranged from.  No longer working since October, 2023.   EGD 11/22/23 for GIB - Small varices without stigmata of recent bleeding were noted in the distal esophagus. Two bands were applied. - Small amount of old blood in the gastric fundus - Portal gastropathy - Normal examined duodenum.   EGD 11/10/23 for GIB - Small hiatal hernia. - Trace mid-esophageal varices - Mild portal gastropathy - Normal examined duodenum.  Colonoscopy 11/10/23 for GIB - Large, prolapsing, blood-stained internal hemorrhoids (can't r/o rectal varices) - The examination was otherwise normal. - No specimens collected.  EGD 10/07/2023 for GIB - Grade II esophageal varices. Completely eradicated. Banded. - Z-line regular, 40 cm from the incisors. - Gastroesophageal flap valve classified as Hill Grade II (fold present, opens with respiration). - Clotted blood in the entire stomach. - Portal hypertensive gastropathy. - Type 1 gastroesophageal varices (GOV1, esophageal varices which extend along the lesser curvature), without bleeding. - Normal duodenal bulb, second portion of the duodenum and third portion of the duodenum. - No specimens collected.  EGD 08/16/22 for variceal screening - Normal esophagus. - Moderate portal gastropathy - Duodenal polyp

## 2024-01-12 ENCOUNTER — RX RENEWAL (OUTPATIENT)
Age: 59
End: 2024-01-12

## 2024-01-12 LAB
A1AT SERPL-MCNC: 228 MG/DL
AFP-TM SERPL-MCNC: 8.3 NG/ML
ALBUMIN SERPL ELPH-MCNC: 3.5 G/DL
ALP BLD-CCNC: 343 U/L
ALT SERPL-CCNC: 18 U/L
ANA PAT FLD IF-IMP: ABNORMAL
ANA SER IF-ACNC: ABNORMAL
ANION GAP SERPL CALC-SCNC: 11 MMOL/L
AST SERPL-CCNC: 65 U/L
BILIRUB SERPL-MCNC: 1.8 MG/DL
BUN SERPL-MCNC: 7 MG/DL
CALCIUM SERPL-MCNC: 9.2 MG/DL
CHLORIDE SERPL-SCNC: 99 MMOL/L
CO2 SERPL-SCNC: 25 MMOL/L
CREAT SERPL-MCNC: 0.46 MG/DL
EGFR: 109 ML/MIN/1.73M2
FERRITIN SERPL-MCNC: 29 NG/ML
GLUCOSE SERPL-MCNC: 109 MG/DL
HBV SURFACE AB SERPL IA-ACNC: 200.8 MIU/ML
HCT VFR BLD CALC: 33.2 %
HCV AB SER QL: NONREACTIVE
HCV S/CO RATIO: 0.17 S/CO
HEPATITIS A IGG ANTIBODY: REACTIVE
HGB BLD-MCNC: 10.2 G/DL
IGG SER QL IEP: 2044 MG/DL
IRON SATN MFR SERPL: 23 %
IRON SERPL-MCNC: 91 UG/DL
MCHC RBC-ENTMCNC: 25.4 PG
MCHC RBC-ENTMCNC: 30.7 GM/DL
MCV RBC AUTO: 82.8 FL
MITOCHONDRIAL (M2) AB, SERUM: <20 UNITS
PLATELET # BLD AUTO: 111 K/UL
POTASSIUM SERPL-SCNC: 4.5 MMOL/L
PROT SERPL-MCNC: 8 G/DL
RBC # BLD: 4.01 M/UL
RBC # FLD: 18.4 %
SODIUM SERPL-SCNC: 135 MMOL/L
TIBC SERPL-MCNC: 395 UG/DL
UIBC SERPL-MCNC: 304 UG/DL
WBC # FLD AUTO: 5.01 K/UL

## 2024-01-17 ENCOUNTER — RX RENEWAL (OUTPATIENT)
Age: 59
End: 2024-01-17

## 2024-01-17 RX ORDER — CARVEDILOL 3.12 MG/1
3.12 TABLET, FILM COATED ORAL TWICE DAILY
Qty: 180 | Refills: 0 | Status: ACTIVE | COMMUNITY
Start: 2024-01-12 | End: 1900-01-01

## 2024-01-18 LAB
A1AT PHENOTYP SERPL-IMP: NORMAL
A1AT SERPL-MCNC: 236 MG/DL
PETH 16:0/18:1: >400 NG/ML
PETH 16:0/18:2: >400 NG/ML
PETH COMMENTS: NORMAL
SMOOTH MUSCLE AB SER QL IF: ABNORMAL

## 2024-01-23 NOTE — ED PROVIDER NOTE - CHPI ED SYMPTOMS POS
Patient reports fever and body aches x3 days   
+pelvic pain, +urgency to defecate, +weight loss/PAIN/DIARRHEA/CONSTIPATION

## 2024-01-29 ENCOUNTER — NON-APPOINTMENT (OUTPATIENT)
Age: 59
End: 2024-01-29

## 2024-01-31 ENCOUNTER — APPOINTMENT (OUTPATIENT)
Dept: INTERVENTIONAL RADIOLOGY/VASCULAR | Facility: CLINIC | Age: 59
End: 2024-01-31

## 2024-04-01 ENCOUNTER — APPOINTMENT (OUTPATIENT)
Dept: HEPATOLOGY | Facility: CLINIC | Age: 59
End: 2024-04-01

## 2024-04-29 ENCOUNTER — INPATIENT (INPATIENT)
Facility: HOSPITAL | Age: 59
LOS: 0 days | Discharge: HOME | DRG: 253 | End: 2024-04-30
Attending: INTERNAL MEDICINE | Admitting: INTERNAL MEDICINE
Payer: MEDICAID

## 2024-04-29 VITALS
HEART RATE: 121 BPM | OXYGEN SATURATION: 100 % | WEIGHT: 132.06 LBS | TEMPERATURE: 97 F | SYSTOLIC BLOOD PRESSURE: 122 MMHG | DIASTOLIC BLOOD PRESSURE: 72 MMHG | RESPIRATION RATE: 18 BRPM

## 2024-04-29 DIAGNOSIS — Z98.890 OTHER SPECIFIED POSTPROCEDURAL STATES: Chronic | ICD-10-CM

## 2024-04-29 DIAGNOSIS — Z98.891 HISTORY OF UTERINE SCAR FROM PREVIOUS SURGERY: Chronic | ICD-10-CM

## 2024-04-29 DIAGNOSIS — K92.2 GASTROINTESTINAL HEMORRHAGE, UNSPECIFIED: ICD-10-CM

## 2024-04-29 LAB
ALBUMIN SERPL ELPH-MCNC: 2.7 G/DL — LOW (ref 3.3–5)
ALP SERPL-CCNC: 331 U/L — HIGH (ref 40–120)
ALT FLD-CCNC: 43 U/L — SIGNIFICANT CHANGE UP (ref 12–78)
ANION GAP SERPL CALC-SCNC: 9 MMOL/L — SIGNIFICANT CHANGE UP (ref 5–17)
APTT BLD: 29.2 SEC — SIGNIFICANT CHANGE UP (ref 24.5–35.6)
AST SERPL-CCNC: 106 U/L — HIGH (ref 15–37)
BASOPHILS # BLD AUTO: 0.03 K/UL — SIGNIFICANT CHANGE UP (ref 0–0.2)
BASOPHILS NFR BLD AUTO: 0.4 % — SIGNIFICANT CHANGE UP (ref 0–2)
BILIRUB SERPL-MCNC: 2.7 MG/DL — HIGH (ref 0.2–1.2)
BLD GP AB SCN SERPL QL: SIGNIFICANT CHANGE UP
BUN SERPL-MCNC: 41 MG/DL — HIGH (ref 7–23)
CALCIUM SERPL-MCNC: 8.9 MG/DL — SIGNIFICANT CHANGE UP (ref 8.5–10.1)
CHLORIDE SERPL-SCNC: 102 MMOL/L — SIGNIFICANT CHANGE UP (ref 96–108)
CO2 SERPL-SCNC: 25 MMOL/L — SIGNIFICANT CHANGE UP (ref 22–31)
CREAT SERPL-MCNC: 0.79 MG/DL — SIGNIFICANT CHANGE UP (ref 0.5–1.3)
EGFR: 86 ML/MIN/1.73M2 — SIGNIFICANT CHANGE UP
EOSINOPHIL # BLD AUTO: 0.03 K/UL — SIGNIFICANT CHANGE UP (ref 0–0.5)
EOSINOPHIL NFR BLD AUTO: 0.4 % — SIGNIFICANT CHANGE UP (ref 0–6)
GLUCOSE SERPL-MCNC: 172 MG/DL — HIGH (ref 70–99)
HCT VFR BLD CALC: 23.3 % — LOW (ref 34.5–45)
HCT VFR BLD CALC: 24.2 % — LOW (ref 34.5–45)
HCT VFR BLD CALC: 28.6 % — LOW (ref 34.5–45)
HGB BLD-MCNC: 7.7 G/DL — LOW (ref 11.5–15.5)
HGB BLD-MCNC: 7.9 G/DL — LOW (ref 11.5–15.5)
HGB BLD-MCNC: 9.8 G/DL — LOW (ref 11.5–15.5)
HYPOCHROMIA BLD QL: SLIGHT — SIGNIFICANT CHANGE UP
IMM GRANULOCYTES NFR BLD AUTO: 1 % — HIGH (ref 0–0.9)
INR BLD: 1.44 RATIO — HIGH (ref 0.85–1.18)
LACTATE SERPL-SCNC: 2.6 MMOL/L — HIGH (ref 0.7–2)
LACTATE SERPL-SCNC: 4.1 MMOL/L — CRITICAL HIGH (ref 0.7–2)
LYMPHOCYTES # BLD AUTO: 1.84 K/UL — SIGNIFICANT CHANGE UP (ref 1–3.3)
LYMPHOCYTES # BLD AUTO: 23.7 % — SIGNIFICANT CHANGE UP (ref 13–44)
MAGNESIUM SERPL-MCNC: 1.7 MG/DL — SIGNIFICANT CHANGE UP (ref 1.6–2.6)
MANUAL SMEAR VERIFICATION: SIGNIFICANT CHANGE UP
MCHC RBC-ENTMCNC: 32.1 PG — SIGNIFICANT CHANGE UP (ref 27–34)
MCHC RBC-ENTMCNC: 34.3 GM/DL — SIGNIFICANT CHANGE UP (ref 32–36)
MCV RBC AUTO: 93.8 FL — SIGNIFICANT CHANGE UP (ref 80–100)
MONOCYTES # BLD AUTO: 1.53 K/UL — HIGH (ref 0–0.9)
MONOCYTES NFR BLD AUTO: 19.7 % — HIGH (ref 2–14)
NEUTROPHILS # BLD AUTO: 4.26 K/UL — SIGNIFICANT CHANGE UP (ref 1.8–7.4)
NEUTROPHILS NFR BLD AUTO: 54.8 % — SIGNIFICANT CHANGE UP (ref 43–77)
PLAT MORPH BLD: NORMAL — SIGNIFICANT CHANGE UP
PLATELET # BLD AUTO: 107 K/UL — LOW (ref 150–400)
POLYCHROMASIA BLD QL SMEAR: SLIGHT — SIGNIFICANT CHANGE UP
POTASSIUM SERPL-MCNC: 3.8 MMOL/L — SIGNIFICANT CHANGE UP (ref 3.5–5.3)
POTASSIUM SERPL-SCNC: 3.8 MMOL/L — SIGNIFICANT CHANGE UP (ref 3.5–5.3)
PROT SERPL-MCNC: 6.9 GM/DL — SIGNIFICANT CHANGE UP (ref 6–8.3)
PROTHROM AB SERPL-ACNC: 16.1 SEC — HIGH (ref 9.5–13)
RBC # BLD: 3.05 M/UL — LOW (ref 3.8–5.2)
RBC # FLD: 17.7 % — HIGH (ref 10.3–14.5)
RBC BLD AUTO: SIGNIFICANT CHANGE UP
SODIUM SERPL-SCNC: 136 MMOL/L — SIGNIFICANT CHANGE UP (ref 135–145)
WBC # BLD: 7.77 K/UL — SIGNIFICANT CHANGE UP (ref 3.8–10.5)
WBC # FLD AUTO: 7.77 K/UL — SIGNIFICANT CHANGE UP (ref 3.8–10.5)

## 2024-04-29 PROCEDURE — 74177 CT ABD & PELVIS W/CONTRAST: CPT | Mod: 26,MC

## 2024-04-29 PROCEDURE — 36415 COLL VENOUS BLD VENIPUNCTURE: CPT

## 2024-04-29 PROCEDURE — 99223 1ST HOSP IP/OBS HIGH 75: CPT

## 2024-04-29 PROCEDURE — C9113: CPT

## 2024-04-29 PROCEDURE — 36430 TRANSFUSION BLD/BLD COMPNT: CPT

## 2024-04-29 PROCEDURE — 71045 X-RAY EXAM CHEST 1 VIEW: CPT | Mod: 26

## 2024-04-29 PROCEDURE — 83605 ASSAY OF LACTIC ACID: CPT

## 2024-04-29 PROCEDURE — 99285 EMERGENCY DEPT VISIT HI MDM: CPT

## 2024-04-29 PROCEDURE — 80048 BASIC METABOLIC PNL TOTAL CA: CPT

## 2024-04-29 PROCEDURE — 85014 HEMATOCRIT: CPT

## 2024-04-29 PROCEDURE — 86923 COMPATIBILITY TEST ELECTRIC: CPT

## 2024-04-29 PROCEDURE — 85027 COMPLETE CBC AUTOMATED: CPT

## 2024-04-29 PROCEDURE — C1889: CPT

## 2024-04-29 PROCEDURE — P9016: CPT

## 2024-04-29 PROCEDURE — 93005 ELECTROCARDIOGRAM TRACING: CPT

## 2024-04-29 PROCEDURE — 85018 HEMOGLOBIN: CPT

## 2024-04-29 PROCEDURE — 80321 ALCOHOLS BIOMARKERS 1OR 2: CPT

## 2024-04-29 PROCEDURE — 93010 ELECTROCARDIOGRAM REPORT: CPT

## 2024-04-29 RX ORDER — CHOLECALCIFEROL (VITAMIN D3) 125 MCG
1 CAPSULE ORAL
Refills: 0 | DISCHARGE

## 2024-04-29 RX ORDER — CEFTRIAXONE 500 MG/1
INJECTION, POWDER, FOR SOLUTION INTRAMUSCULAR; INTRAVENOUS
Refills: 0 | Status: DISCONTINUED | OUTPATIENT
Start: 2024-04-29 | End: 2024-04-30

## 2024-04-29 RX ORDER — SUCRALFATE 1 G
1 TABLET ORAL
Refills: 0 | Status: DISCONTINUED | OUTPATIENT
Start: 2024-04-29 | End: 2024-04-30

## 2024-04-29 RX ORDER — CEFTRIAXONE 500 MG/1
1000 INJECTION, POWDER, FOR SOLUTION INTRAMUSCULAR; INTRAVENOUS EVERY 24 HOURS
Refills: 0 | Status: DISCONTINUED | OUTPATIENT
Start: 2024-04-30 | End: 2024-04-30

## 2024-04-29 RX ORDER — CARVEDILOL PHOSPHATE 80 MG/1
1 CAPSULE, EXTENDED RELEASE ORAL
Refills: 0 | DISCHARGE

## 2024-04-29 RX ORDER — SODIUM CHLORIDE 9 MG/ML
1000 INJECTION INTRAMUSCULAR; INTRAVENOUS; SUBCUTANEOUS
Refills: 0 | Status: DISCONTINUED | OUTPATIENT
Start: 2024-04-29 | End: 2024-04-30

## 2024-04-29 RX ORDER — CEFTRIAXONE 500 MG/1
1000 INJECTION, POWDER, FOR SOLUTION INTRAMUSCULAR; INTRAVENOUS ONCE
Refills: 0 | Status: COMPLETED | OUTPATIENT
Start: 2024-04-29 | End: 2024-04-29

## 2024-04-29 RX ORDER — CARVEDILOL PHOSPHATE 80 MG/1
3.12 CAPSULE, EXTENDED RELEASE ORAL EVERY 12 HOURS
Refills: 0 | Status: DISCONTINUED | OUTPATIENT
Start: 2024-04-29 | End: 2024-04-30

## 2024-04-29 RX ORDER — HYDROMORPHONE HYDROCHLORIDE 2 MG/ML
0.5 INJECTION INTRAMUSCULAR; INTRAVENOUS; SUBCUTANEOUS ONCE
Refills: 0 | Status: DISCONTINUED | OUTPATIENT
Start: 2024-04-29 | End: 2024-04-29

## 2024-04-29 RX ORDER — PANTOPRAZOLE SODIUM 20 MG/1
40 TABLET, DELAYED RELEASE ORAL
Refills: 0 | Status: DISCONTINUED | OUTPATIENT
Start: 2024-04-30 | End: 2024-04-30

## 2024-04-29 RX ORDER — PANTOPRAZOLE SODIUM 20 MG/1
8 TABLET, DELAYED RELEASE ORAL
Qty: 80 | Refills: 0 | Status: DISCONTINUED | OUTPATIENT
Start: 2024-04-29 | End: 2024-04-29

## 2024-04-29 RX ORDER — ONDANSETRON 8 MG/1
4 TABLET, FILM COATED ORAL ONCE
Refills: 0 | Status: COMPLETED | OUTPATIENT
Start: 2024-04-29 | End: 2024-04-29

## 2024-04-29 RX ORDER — CEFTRIAXONE 500 MG/1
INJECTION, POWDER, FOR SOLUTION INTRAMUSCULAR; INTRAVENOUS
Refills: 0 | Status: DISCONTINUED | OUTPATIENT
Start: 2024-04-29 | End: 2024-04-29

## 2024-04-29 RX ORDER — SODIUM CHLORIDE 9 MG/ML
500 INJECTION INTRAMUSCULAR; INTRAVENOUS; SUBCUTANEOUS ONCE
Refills: 0 | Status: COMPLETED | OUTPATIENT
Start: 2024-04-29 | End: 2024-04-29

## 2024-04-29 RX ORDER — LANOLIN ALCOHOL/MO/W.PET/CERES
3 CREAM (GRAM) TOPICAL AT BEDTIME
Refills: 0 | Status: DISCONTINUED | OUTPATIENT
Start: 2024-04-29 | End: 2024-04-30

## 2024-04-29 RX ORDER — OCTREOTIDE ACETATE 200 UG/ML
50 INJECTION, SOLUTION INTRAVENOUS; SUBCUTANEOUS
Qty: 500 | Refills: 0 | Status: DISCONTINUED | OUTPATIENT
Start: 2024-04-29 | End: 2024-04-29

## 2024-04-29 RX ORDER — OCTREOTIDE ACETATE 200 UG/ML
50 INJECTION, SOLUTION INTRAVENOUS; SUBCUTANEOUS ONCE
Refills: 0 | Status: COMPLETED | OUTPATIENT
Start: 2024-04-29 | End: 2024-04-29

## 2024-04-29 RX ORDER — ACETAMINOPHEN 500 MG
650 TABLET ORAL EVERY 6 HOURS
Refills: 0 | Status: DISCONTINUED | OUTPATIENT
Start: 2024-04-29 | End: 2024-04-30

## 2024-04-29 RX ORDER — ONDANSETRON 8 MG/1
4 TABLET, FILM COATED ORAL EVERY 8 HOURS
Refills: 0 | Status: DISCONTINUED | OUTPATIENT
Start: 2024-04-29 | End: 2024-04-30

## 2024-04-29 RX ORDER — ACETAMINOPHEN 500 MG
1000 TABLET ORAL ONCE
Refills: 0 | Status: COMPLETED | OUTPATIENT
Start: 2024-04-29 | End: 2024-04-29

## 2024-04-29 RX ORDER — PANTOPRAZOLE SODIUM 20 MG/1
40 TABLET, DELAYED RELEASE ORAL ONCE
Refills: 0 | Status: COMPLETED | OUTPATIENT
Start: 2024-04-29 | End: 2024-04-29

## 2024-04-29 RX ADMIN — Medication 40 MILLIGRAM(S): at 13:40

## 2024-04-29 RX ADMIN — Medication 400 MILLIGRAM(S): at 12:25

## 2024-04-29 RX ADMIN — PANTOPRAZOLE SODIUM 40 MILLIGRAM(S): 20 TABLET, DELAYED RELEASE ORAL at 06:37

## 2024-04-29 RX ADMIN — ONDANSETRON 4 MILLIGRAM(S): 8 TABLET, FILM COATED ORAL at 06:51

## 2024-04-29 RX ADMIN — SODIUM CHLORIDE 75 MILLILITER(S): 9 INJECTION INTRAMUSCULAR; INTRAVENOUS; SUBCUTANEOUS at 12:49

## 2024-04-29 RX ADMIN — SODIUM CHLORIDE 500 MILLILITER(S): 9 INJECTION INTRAMUSCULAR; INTRAVENOUS; SUBCUTANEOUS at 08:12

## 2024-04-29 RX ADMIN — CARVEDILOL PHOSPHATE 3.12 MILLIGRAM(S): 80 CAPSULE, EXTENDED RELEASE ORAL at 22:31

## 2024-04-29 RX ADMIN — OCTREOTIDE ACETATE 10 MICROGRAM(S)/HR: 200 INJECTION, SOLUTION INTRAVENOUS; SUBCUTANEOUS at 08:15

## 2024-04-29 RX ADMIN — CEFTRIAXONE 1000 MILLIGRAM(S): 500 INJECTION, POWDER, FOR SOLUTION INTRAMUSCULAR; INTRAVENOUS at 18:31

## 2024-04-29 RX ADMIN — ONDANSETRON 4 MILLIGRAM(S): 8 TABLET, FILM COATED ORAL at 09:48

## 2024-04-29 RX ADMIN — OCTREOTIDE ACETATE 50 MICROGRAM(S): 200 INJECTION, SOLUTION INTRAVENOUS; SUBCUTANEOUS at 08:13

## 2024-04-29 RX ADMIN — Medication 1 GRAM(S): at 18:31

## 2024-04-29 RX ADMIN — Medication 3 MILLIGRAM(S): at 22:32

## 2024-04-29 RX ADMIN — PANTOPRAZOLE SODIUM 10 MG/HR: 20 TABLET, DELAYED RELEASE ORAL at 09:27

## 2024-04-29 RX ADMIN — Medication 1000 MILLIGRAM(S): at 12:49

## 2024-04-29 NOTE — ED ADULT NURSE NOTE - NSFALLHARMRISKINTERV_ED_ALL_ED

## 2024-04-29 NOTE — H&P ADULT - ASSESSMENT
59 y/o female with PMHX of cirrhosis, etoh abuse in the past-- sober x1 year, esophageal varices, depression, portal HTN, ascites, dizziness/ hearing loss recently seen by ENT on prednisone taper who presented to  with CC of hematemesis.      #Upper GI bleed:    Admit to medsurg.    Pt with hx of varices s/p banding NOV 2023.    Patient also recently on high dose prednisone-- r/o gastritis/ PUD.    Protonix gtt.    Octreotide gtt.    Appreciate GI eval.    For EGD @ 4pm.      #Acute on chronic anemia:    Hgb 9.8-- dropped to 7.7.    Transfuse 1 unit pRBC.    Follow H&H q8 hours.    Keep hgb >8.      #HTN:    Cont coreg.      #Hx of Cirrhosis:    S/p paracentesis in the past.  NOV.    Follow.      #Dizziness/ hearing loss:    Patient was started on prednisone taper with ENT.    WAs on 60 and now down to 40 mg daily.    Cont solumedrol 40 here daily.      #ETOH abuse:    In remission.      #DVT Proph:  ambulation.      D/w patient/ Zinkin/ partner at bedside.

## 2024-04-29 NOTE — H&P ADULT - NSHPPHYSICALEXAM_GEN_ALL_CORE
PEx  T(C): 37 (04-29-24 @ 09:50), Max: 37 (04-29-24 @ 09:50)  HR: 110 (04-29-24 @ 09:50) (107 - 121)  BP: 104/67 (04-29-24 @ 09:50) (104/67 - 122/72)  RR: 18 (04-29-24 @ 09:50) (18 - 18)  SpO2: 96% (04-29-24 @ 09:50) (96% - 100%)  Wt(kg): --  General:     Well appearing, well nourished in no distress, no identifying marks , scars, or tattoos.  Skin: no rash or prominent lesions  Head: normocephalic, atraumatic     Sinuses: non-tender  Nose: no external lesions, mucosa non-inflamed, septum and turbinates normal  Throat: no erythema, exudates or lesions.  Neck: Supple without lymphadenopathy. Thyroid no thyromegaly, no palpable thyroid nodules, no palpable nodules or masses, carotid arteries no bruits.   Breasts: No palpable masses or lesions.  Heart: RRR, no murmur or gallop.  Normal S1, S2.  No S3, S4.   Lungs: CTA bilaterally, no wheezes, rhonchi, rales.  Breathing unlabored.   Chest wall: Normal insp   Abdomen:  Soft, NT/ND, normal bowel sounds, no HSM, no masses.  No peritoneal signs.   Back: spine normal without deformity or tenderness.  Normal ROM   : Exam normal.  no inguinal hernias.  Extremities: No deformities, clubbing, cyanosis, or edema.  Musculoskeletal: Normal gait and station. No decreased range of motion, instability, atrophy or abnormal strength or tone in the head, neck, spine, ribs, pelvis or extremities.   Neurologic: CN 2-12 normal. Sensation to pain, touch and proprioception normal. DTRs normal in upper and lower extremities. No pathologic reflexes.  Motor normal.  Psychiatric: Oriented X3, intact recent and remote memory, judgement and insight, normal mood and affect. PEx  T(C): 37 (04-29-24 @ 09:50), Max: 37 (04-29-24 @ 09:50)  HR: 110 (04-29-24 @ 09:50) (107 - 121)  BP: 104/67 (04-29-24 @ 09:50) (104/67 - 122/72)  RR: 18 (04-29-24 @ 09:50) (18 - 18)  SpO2: 96% (04-29-24 @ 09:50) (96% - 100%)  Wt(kg): --  General:    pale.  NAD>    Skin: no rash or prominent lesions  Head: normocephalic, atraumatic     Sinuses: non-tender  Nose: no external lesions, mucosa non-inflamed, septum and turbinates normal  Throat: no erythema, exudates or lesions.  Neck: Supple without lymphadenopathy. Thyroid no thyromegaly, no palpable thyroid nodules, no palpable nodules or masses, carotid arteries no bruits.   Breasts: No palpable masses or lesions.  Heart: RRR, no murmur or gallop.  Normal S1, S2.  No S3, S4.   Lungs: CTA bilaterally, no wheezes, rhonchi, rales.  Breathing unlabored.   Chest wall: Normal insp   Abdomen: mild pain mid abdomen.    Back: spine normal without deformity or tenderness.  Normal ROM   : Exam normal.  no inguinal hernias.  Extremities: No deformities, clubbing, cyanosis, or edema.  Musculoskeletal: Normal gait and station. No decreased range of motion, instability, atrophy or abnormal strength or tone in the head, neck, spine, ribs, pelvis or extremities.   Neurologic: CN 2-12 normal. Sensation to pain, touch and proprioception normal. DTRs normal in upper and lower extremities. No pathologic reflexes.  Motor normal.  Psychiatric: Oriented X3, intact recent and remote memory, judgement and insight, normal mood and affect.

## 2024-04-29 NOTE — CONSULT NOTE ADULT - ASSESSMENT
Imp:  Cirrhosis with hematemesis, probably 2/2 portal gastropathy (based on previous EGDs)    Rec:  F/U CT  Protonix and octreotide  EGD later today

## 2024-04-29 NOTE — H&P ADULT - NSHPREVIEWOFSYSTEMS_GEN_ALL_CORE
dr spears ROS:  General:  No fevers, chills, or unexplained weight loss  Skin: No rash or bothersome skin lesions  Musculoskeletal: No arthalgias, myalgias or joint swelling  Eyes: No visual changes or eye pain  Ears: No hearing loss , otorrhea or ear pain  Nose, Mouth, Throat: No nasal congestion, rhinorrhea, oral lesions, postnasal drip or sore throat  Cardio: No chest pain or palpitations. no lower extremity edema. no syncope. no claudication.   Respiratory: No cough, shortness of breath or wheezing   GI: No diarrhea, constipation, blood in stools, abdominal pain, vomiting or heartburn  : No urinary frequency, hematuria, incontinence, or dysuria  Neurologic: No headaches, parasthesias, confusion, dysarthria or gait instability  Psychiatric:  No anxiety or depression  Lymphatic:  No easy bruising, easy bleeding or swollen glands  Allergic: No itching, sneezing , watery eyes, clear rhinorrhea or recurrent infections ROS:  General:  No fevers, chills, or unexplained weight loss  Skin: No rash or bothersome skin lesions  Musculoskeletal: No arthalgias, myalgias or joint swelling  Eyes: No visual changes or eye pain  Ears: No hearing loss , otorrhea or ear pain  Nose, Mouth, Throat: No nasal congestion, rhinorrhea, oral lesions, postnasal drip or sore throat  Cardio: No chest pain or palpitations. no lower extremity edema. no syncope. no claudication.   Respiratory: No cough, shortness of breath or wheezing   GI: see hpi.    : No urinary frequency, hematuria, incontinence, or dysuria  Neurologic: No headaches, parasthesias, confusion, dysarthria or gait instability  Psychiatric:  No anxiety or depression  Lymphatic:  No easy bruising, easy bleeding or swollen glands  Allergic: No itching, sneezing , watery eyes, clear rhinorrhea or recurrent infections

## 2024-04-29 NOTE — ED PROVIDER NOTE - OBJECTIVE STATEMENT
58-year-old female with history of chronic alcohol abuse, esophageal varices, cirrhosis, ascites, prior admissions for upper GI bleed due to esophageal varices  presents for hematemesis starting at approximately 7 PM last night.  Patient also notes diffuse cramping abdominal pain worse in the epigastric and periumbilical regions.  Patient on arrival to the emergency department also had black tarry stool.  Patient notes some dyspnea on exertion and dizziness after the onset of symptoms.  Patient was  most recently admitted for esophageal variceal bleeding in November 2023 and had banding performed by Dr. Goodson As well as multiple transfusions of PRBCs.  The patient also had a total of 6 L of ascites removed via paracentesis during that admission.  Patient notes that she was off of her beta-blocker for the last couple of days because her blood pressure was "on the low side".  Patient is not currently drinking alcohol and states that she has been sober for over a year.

## 2024-04-29 NOTE — H&P ADULT - NSHPLABSRESULTS_GEN_ALL_CORE
7.7    x     )-----------( x        ( 29 Apr 2024 11:37 )             23.3     04-29    136  |  102  |  41<H>  ----------------------------<  172<H>  3.8   |  25  |  0.79    Ca    8.9      29 Apr 2024 06:26  Mg     1.7     04-29    TPro  6.9  /  Alb  2.7<L>  /  TBili  2.7<H>  /  DBili  x   /  AST  106<H>  /  ALT  43  /  AlkPhos  331<H>  04-29    CAPILLARY BLOOD GLUCOSE        PT/INR - ( 29 Apr 2024 06:26 )   PT: 16.1 sec;   INR: 1.44 ratio         PTT - ( 29 Apr 2024 06:26 )  PTT:29.2 sec  Urinalysis Basic - ( 29 Apr 2024 06:26 )    Color: x / Appearance: x / SG: x / pH: x  Gluc: 172 mg/dL / Ketone: x  / Bili: x / Urobili: x   Blood: x / Protein: x / Nitrite: x   Leuk Esterase: x / RBC: x / WBC x   Sq Epi: x / Non Sq Epi: x / Bacteria: x

## 2024-04-29 NOTE — PATIENT PROFILE ADULT - FALL HARM RISK - HARM RISK INTERVENTIONS
Assistance with ambulation/Assistance OOB with selected safe patient handling equipment/Communicate Risk of Fall with Harm to all staff/Monitor gait and stability/Reinforce activity limits and safety measures with patient and family/Sit up slowly, dangle for a short time, stand at bedside before walking/Tailored Fall Risk Interventions/Visual Cue: Yellow wristband and red socks/Bed in lowest position, wheels locked, appropriate side rails in place/Call bell, personal items and telephone in reach/Instruct patient to call for assistance before getting out of bed or chair/Non-slip footwear when patient is out of bed/[place] to call system/Physically safe environment - no spills, clutter or unnecessary equipment/Purposeful Proactive Rounding/Room/bathroom lighting operational, light cord in reach

## 2024-04-29 NOTE — ED PROVIDER NOTE - PHYSICAL EXAMINATION
CONSTITUTIONAL: Well appearing, awake, alert, oriented to person, place, time/situation and in no apparent distress.  · ENMT: Airway patent, Nasal mucosa clear. Mouth with normal mucosa. Throat has no vesicles, no oropharyngeal exudates and uvula is midline.  · EYES: Clear bilaterally, pupils equal, round and reactive to light.  · CARDIAC: tachycardia,, regular rhythm.  Heart sounds S1, S2.  No murmurs, rubs or gallops.  · RESPIRATORY: Breath sounds clear and equal bilaterally.  · GASTROINTESTINAL: Abdomen soft,   Tenderness in epigastric and periumbilical regions  · MUSCULOSKELETAL: Spine appears normal, range of motion is not limited, no muscle or joint tenderness  · NEUROLOGICAL: Alert and oriented, no focal deficits, no motor or sensory deficits.  · SKIN: Skin normal color for race, warm, dry and intact. No evidence of rash

## 2024-04-29 NOTE — ED PROVIDER NOTE - PROGRESS NOTE DETAILS
Gastroccult positive emesis, Lot #[id number], expiration date September 30, 2024 positive QC Verified with Dr. Goodson  That he is going to take patient for endoscopy and an he feels patient is stable for medical surgical floor.  Bridger Douglas, DO

## 2024-04-29 NOTE — ED ADULT NURSE REASSESSMENT NOTE - NS ED NURSE REASSESS COMMENT FT1
Received pt, A/Ox4, pt with tachycardia s/p vomiting. Pt with c/o abdominal pain and nausea but tolerable currently. Pt states vomitus has been bloody and dark in color. Protonix and ocreotide drip hanging, 500ml bolus given, plans for endoscopy later this afternoon. Family at the bedside, plan of care discussed. Awaiting bed upstairs.

## 2024-04-29 NOTE — ED PROVIDER NOTE - CLINICAL SUMMARY MEDICAL DECISION MAKING FREE TEXT BOX
58-year-old female with history of chronic alcohol abuse, esophageal varices, cirrhosis, ascites, prior admissions for upper GI bleed due to esophageal varices  presents for hematemesis starting at approximately 7 PM last night.  Patient also notes diffuse cramping abdominal pain worse in the epigastric and periumbilical regions.  Patient on arrival to the emergency department also had black tarry stool.  Patient notes some dyspnea on exertion and dizziness after the onset of symptoms.  Patient was  most recently admitted for esophageal variceal bleeding in November 2023 and had banding performed by Dr. Goodson As well as multiple transfusions of PRBCs.  The patient also had a total of 6 L of ascites removed via paracentesis during that admission.  Patient notes that she was off of her beta-blocker for the last couple of days because her blood pressure was "on the low side".  Patient is not currently drinking alcohol and states that she has been sober for over a year.   given patient's history and presentation, her symptoms are most likely due to recurrent esophageal variceal bleeding.  Will closely monitor vital signs, check CBC, type and screen, PT/PTT, initiate Protonix and octreotide drips, transfuse PRBCs if indicated, obtain CT of the abdomen pelvis with IV contrast and admit with gastroenterology consult.

## 2024-04-29 NOTE — H&P ADULT - HISTORY OF PRESENT ILLNESS
57 y/o female with PMHX of cirrhosis, etoh abuse in the past-- sober x1 year, esophageal varices, depression, portal HTN, ascites, dizziness/ hearing loss recently seen by ENT on prednisone taper who presented to  with CC of hematemesis.  Patient was last hospitalized in Nov for GI bleed.  Patient was in ICU and had upper GI bleeding from gastric varices s/p banding with Dr. Goodson.  Patient notes she was feeling well up until last evening.  Around 7:30 pm, she had an episode of hematemesis.  She had several more at home and decided to come to the ER,  9:30pm and then 3am.  In the ER, she had another episode of hematemesis and then had some melena as well.   After that, patient continue with vomiting / dry heaving but no further blood.         PAST MEDICAL & SURGICAL HISTORY:  Alcohol abuse  Depression  Withdrawal seizures  History of basal cell carcinoma excision  Squamous cell skin cancer  Hemorrhoids  2019 novel coronavirus disease (COVID-19)  History of ear, nose, and throat (ENT) surgery  H/O basal cell carcinoma excision  H/O:         FAMILY HISTORY:  FH: pancreatic cancer (Mother)    Family history of heart attack (Father)    Family history of CVA (Father)      Social History:      Allergies:  Zithromax (Unknown)    Intolerances:  morphine (Nausea)     59 y/o female with PMHX of cirrhosis, etoh abuse in the past-- sober x1 year, esophageal varices, depression, portal HTN, ascites, dizziness/ hearing loss recently seen by ENT on prednisone taper who presented to  with CC of hematemesis.  Patient was last hospitalized in Nov for GI bleed.  Patient was in ICU and had upper GI bleeding from gastric varices s/p banding with Dr. Goodson.  Patient notes she was feeling well up until last evening.  Around 7:30 pm, she had an episode of hematemesis.  She had several more at home and decided to come to the ER,  9:30pm and then 3am.  In the ER, she had another episode of hematemesis and then had some melena as well.   After that, patient continue with vomiting / dry heaving but no further blood.   In the ER, patient had CT abd pelvis which was negative for acute GI bleeding.  Hgb was 9.        PAST MEDICAL & SURGICAL HISTORY:  Alcohol abuse  Depression  Withdrawal seizures  History of basal cell carcinoma excision  Squamous cell skin cancer  Hemorrhoids  2019 novel coronavirus disease (COVID-19)  History of ear, nose, and throat (ENT) surgery  H/O basal cell carcinoma excision  H/O:         FAMILY HISTORY:  FH: pancreatic cancer (Mother)  Family history of heart attack (Father)  Family history of CVA (Father)      Social History:    Hx of ETOH in the past.  Stopped.    No smoking.        Allergies:  Zithromax (Unknown)    Intolerances:  morphine (Nausea)

## 2024-04-29 NOTE — PROGRESS NOTE ADULT - SUBJECTIVE AND OBJECTIVE BOX
EGD:  Small esoph varices -- 3 bands applied  small hiatal hernia  Mod portal gastropathy  No bleeding    Rec:  Full liquids today  D/C octreotide and protonix drip  protonix and carafate  Ceftriaxone

## 2024-04-29 NOTE — ED ADULT NURSE NOTE - CHIEF COMPLAINT QUOTE
pt presents to triage c/o vomiting blood since 7pm last night. pt has had multiple episodes of "brownish red" emesis, as well as abdominal pain, headache, and weakness. pt also had black/tarry BM. pt has hx of esophageal varices, with an episode of vomiting blood last year. pt was prescribed a beta blocker to reduce risk of bleeding, has been unable to take due to low BP. allergies to zithromax and morphine.

## 2024-04-29 NOTE — CONSULT NOTE ADULT - SUBJECTIVE AND OBJECTIVE BOX
HPI:  58-year-old female with history of chronic alcohol abuse, esophageal varices, cirrhosis, ascites, prior admissions for upper GI bleed due to esophageal varices  presents for hematemesis starting at approximately 7 PM last night.  Patient also notes diffuse cramping abdominal pain worse in the epigastric and periumbilical regions.  Patient on arrival to the emergency department also had black tarry stool.  Patient notes some dyspnea on exertion and dizziness after the onset of symptoms.  Patient was  most recently admitted for esophageal variceal bleeding in 2023 and had banding performed by Dr. Goodson As well as multiple transfusions of PRBCs.  The patient also had a total of 6 L of ascites removed via paracentesis during that admission.  Patient notes that she was off of her beta-blocker for the last couple of days because her blood pressure was "on the low side".  Patient is not currently drinking alcohol and states that she has been sober for over a year.    PAST MEDICAL & SURGICAL HISTORY:  Alcohol abuse      Depression      Withdrawal seizures      History of basal cell carcinoma excision      Squamous cell skin cancer      Hemorrhoids      2019 novel coronavirus disease (COVID-19)      History of ear, nose, and throat (ENT) surgery      H/O basal cell carcinoma excision      H/O:           Home Medications:  thiamine 100 mg oral tablet: 1 tab(s) orally once a day (2023 09:33)      MEDICATIONS  (STANDING):  octreotide  Infusion 50 MICROgram(s)/Hr (10 mL/Hr) IV Continuous <Continuous>  octreotide  Injectable 50 MICROGram(s) IV Push Once  pantoprazole Infusion 8 mG/Hr (10 mL/Hr) IV Continuous <Continuous>  sodium chloride 0.9% Bolus 500 milliLiter(s) IV Bolus once    MEDICATIONS  (PRN):      Allergies    Zithromax (Unknown)    Intolerances    morphine (Nausea)      SOCIAL HISTORY:    FAMILY HISTORY:  FH: pancreatic cancer (Mother)    Family history of heart attack (Father)    Family history of CVA (Father)        ROS  As above  Otherwise unremarkable    Vital Signs Last 24 Hrs  T(C): 36.6 (2024 06:49), Max: 36.6 (2024 06:49)  T(F): 97.8 (2024 06:49), Max: 97.8 (2024 06:49)  HR: 107 (2024 06:49) (107 - 121)  BP: 104/67 (2024 06:49) (104/67 - 122/72)  BP(mean): 79 (2024 06:49) (79 - 83)  RR: 18 (2024 05:50) (18 - 18)  SpO2: 100% (2024 05:50) (100% - 100%)    Parameters below as of 2024 05:50  Patient On (Oxygen Delivery Method): room air        Constitutional: NAD, well-developed  Respiratory: CTAB  Cardiovascular: S1 and S2, RRR  Gastrointestinal: BS+, soft, NT/ND  Extremities: No peripheral edema  Psychiatric: Normal mood, normal affect  Skin: No rashes    LABS:                        9.8    7.77  )-----------( 107      ( 2024 06:26 )             28.6         136  |  102  |  41<H>  ----------------------------<  172<H>  3.8   |  25  |  0.79    Ca    8.9      2024 06:26  Mg     1.7         TPro  6.9  /  Alb  2.7<L>  /  TBili  2.7<H>  /  DBili  x   /  AST  106<H>  /  ALT  43  /  AlkPhos  331<H>      PT/INR - ( :26 )   PT: 16.1 sec;   INR: 1.44 ratio         PTT - ( :26 )  PTT:29.2 sec  LIVER FUNCTIONS - ( :26 )  Alb: 2.7 g/dL / Pro: 6.9 gm/dL / ALK PHOS: 331 U/L / ALT: 43 U/L / AST: 106 U/L / GGT: x             RADIOLOGY & ADDITIONAL STUDIES:

## 2024-04-29 NOTE — ED ADULT NURSE NOTE - OBJECTIVE STATEMENT
General Surgery:  Daily Progress Note          PATIENT NAME: Anjel Penn     TODAY'S DATE: 5/23/2022, 6:28 AM    SUBJECTIVE:     Patient seen and evaluated at bedside. Afebrile vital signs within normal limits. Patient complains of persistent left lower quadrant pain however states that it is improving. Denies nausea. Passing flatus no bowel movement. Review of Systems   Constitutional: Negative for chills and fatigue. HENT: Negative for congestion. Eyes: Negative for discharge. Respiratory: Negative for chest tightness and shortness of breath. Cardiovascular: Negative for chest pain. Gastrointestinal: Positive for abdominal pain. Negative for diarrhea and nausea. Genitourinary: Negative for hematuria. OBJECTIVE:   VITALS:  /70   Pulse 88   Temp 98.2 °F (36.8 °C) (Oral)   Resp 18   Ht 5' 10\" (1.778 m)   Wt (!) 302 lb 2 oz (137 kg)   SpO2 93%   BMI 43.35 kg/m²      INTAKE/OUTPUT:      Intake/Output Summary (Last 24 hours) at 5/23/2022 5833  Last data filed at 5/22/2022 0377  Gross per 24 hour   Intake 1844.21 ml   Output --   Net 1844.21 ml       PHYSICAL EXAM:  General Appearance: awake, alert, oriented, in no acute distress  HEENT:  Normocephalic, atraumatic, mucus membranes moist   Heart: Regular rate and rhythm  Lungs: Normal respiratory effort  Abdomen: Soft, distended, tender to palpation in lower left side, nonperitoneal no rebound tenderness. Extremities: No cyanosis, pitting edema, rashes noted. Skin: Skin color, texture, turgor normal. No rashes or lesions.       Data:  CBC:   Recent Labs     05/21/22  0940 05/22/22  0641 05/23/22  0536   WBC 18.1* 13.5* 9.4   HGB 15.3 13.9 13.2    205 209     Chemistry:   Recent Labs     05/21/22  0940 05/22/22  0641 05/23/22  0536   * 136 139   K 4.1 3.7 3.9   CL 96* 101 102   CO2 22 24 27   GLUCOSE 196* 139* 127*   BUN 14 16 12   CREATININE 0.80 0.89 0.85   MG  --  2.1 2.2   ANIONGAP 13 11 10   LABGLOM >60 >60 >60 GFRAA >60 >60 >60   CALCIUM 9.3 8.9 8.7   PHOS  --  2.6 3.2   PROBNP 90  --   --    TROPHS 10  --   --      Hepatic:   Recent Labs     05/21/22  0940 05/22/22  0641   AST 11 15   ALT 19 14   ALKPHOS 64 64   BILITOT 1.10 0.72   BILIDIR 0.31*  --      Coagulation:   Recent Labs     05/21/22  0940 05/22/22  0641   PROT 7.7 7.0         Radiology Review:    CT ABDOMEN PELVIS W IV CONTRAST Additional Contrast? None    Result Date: 5/21/2022  EXAMINATION: CT OF THE ABDOMEN AND PELVIS WITH CONTRAST 5/21/2022 10:33 am TECHNIQUE: CT of the abdomen and pelvis was performed with the administration of intravenous contrast. Multiplanar reformatted images are provided for review. Automated exposure control, iterative reconstruction, and/or weight based adjustment of the mA/kV was utilized to reduce the radiation dose to as low as reasonably achievable. COMPARISON: None. HISTORY: ORDERING SYSTEM PROVIDED HISTORY: Pain TECHNOLOGIST PROVIDED HISTORY: Pain Decision Support Exception - unselect if not a suspected or confirmed emergency medical condition->Emergency Medical Condition (MA) Reason for Exam: Pt. C/o abdominal pain x 3 days. Hx hernia, open heart, lung cancer. FINDINGS: Lower Chest: Several granulomatous calcifications visualized lung bases otherwise unremarkable appearance. Organs: Hepatic steatosis. No focal lesion. Spleen pancreas adrenal glands unremarkable. Prior cholecystectomy. Rounded hypodensity inferior left kidney most likely cyst or proximally 3 cm.  5 mm nonobstructing superior pole right nephrolithiasis. Additional subcentimeter probable right renal cyst. GI/Bowel: Stomach is normal.  Small bowel is normal.  Appendix is normal. Numerous diverticula throughout the distal colon. Significant wall thickening at the sigmoid with adjacent fat stranding and a small foci of intraperitoneal air regional to mid sigmoid colon consistent with local perforation. No abscess collection.   No additional free air visualized. Pelvis: Urinary bladder demonstrates mild wall thickening which may be reactive to adjacent inflammation, underdistention or cystitis. Prostate enlarged. Peritoneum/Retroperitoneum: Atherosclerosis abdominal aorta without aneurysm. Otherwise unremarkable. Bones/Soft Tissues: No acute osseous abnormality. 1. Severe acute diverticulitis/colitis sigmoid colon with the adjacent fat stranding and regional intraperitoneal free air consistent with local perforation. No abscess collection at this time. 2. Urinary bladder wall thickening may be underdistention or cystitis which could be related to adjacent inflammatory change. Motion clinical findings and urinalysis required. 3. Hepatic steatosis. 4. Nonobstructing right nephrolithiasis. 5. Bilateral probable renal cysts. 6. Prostatomegaly. Critical results were called by Dr. Fer Ash DO to Dr. Luís Waller on 5/21/2022 at 11:35. ASSESSMENT:  Active Hospital Problems    Diagnosis Date Noted    Diverticulitis of colon with perforation [K57.20] 05/21/2022     Priority: Medium    CAD (coronary artery disease) [I25.10] 05/21/2022     Priority: Medium    Hypertension [I10] 05/21/2022     Priority: Medium    Type 2 diabetes mellitus, without long-term current use of insulin ([facility] [address] [E11.9] 05/21/2022     Priority: Medium    Hyperlipidemia [E78.5] 05/21/2022     Priority: Medium    RUT (obstructive sleep apnea) [G47.33] 10/02/2017     Priority: Medium    Chronic obstructive lung disease ([facility] [address] [J44.9] 05/06/2014     Priority: Medium       77 y.o. male with acute sigmoid diverticulitis with microperforation      Plan:  Continue medical management and supportive care per primary team  Continue conservative management at this time. Patient's abdominal pain is improving as well as his leukocytosis from 18.1-13.5-9.4  Okay to advance to full liquid diet  Continue IV Zosyn  Acute surgical intervention at this time.   We will continue to monitor with serial abdominal examinations. Electronically signed by Farrah Richey MD  on 5/23/2022 at 6:28 AM     General Surgery Attending Attestation Note    Patient was seen and examined. I agree with the above resident's exam, assessment and plan.      Continue conservative management with abx  Pt having flatus and bowel movements  Advance to low fiber diet    Gerson Guevara, DO [facility], [place], [facility]  Office: [phone]  After Hours: Please call answering service Pt presents to the ED from home with  with c/o vomiting blood since 7pm with abdominal pain and weakness. Pt states she has hx esophageal varices and has had previous episodes of bloody emesis with banding and blood transfusions. Pt states she usually takes a beta blocker to help control the bleeding, but has not been able to take it in 2 days because her BP was too low. Pt having bloody emesis upon assessment.

## 2024-04-30 ENCOUNTER — TRANSCRIPTION ENCOUNTER (OUTPATIENT)
Age: 59
End: 2024-04-30

## 2024-04-30 VITALS — TEMPERATURE: 98 F | OXYGEN SATURATION: 98 % | RESPIRATION RATE: 17 BRPM

## 2024-04-30 LAB
ANION GAP SERPL CALC-SCNC: 7 MMOL/L — SIGNIFICANT CHANGE UP (ref 5–17)
BILIRUB SERPL-MCNC: 1.7 MG/DL — HIGH (ref 0.2–1.2)
BUN SERPL-MCNC: 52 MG/DL — HIGH (ref 7–23)
CALCIUM SERPL-MCNC: 7.5 MG/DL — LOW (ref 8.5–10.1)
CHLORIDE SERPL-SCNC: 106 MMOL/L — SIGNIFICANT CHANGE UP (ref 96–108)
CO2 SERPL-SCNC: 23 MMOL/L — SIGNIFICANT CHANGE UP (ref 22–31)
CREAT SERPL-MCNC: 0.85 MG/DL — SIGNIFICANT CHANGE UP (ref 0.5–1.3)
EGFR: 78 ML/MIN/1.73M2 — SIGNIFICANT CHANGE UP
GLUCOSE SERPL-MCNC: 179 MG/DL — HIGH (ref 70–99)
HCT VFR BLD CALC: 22.8 % — LOW (ref 34.5–45)
HCT VFR BLD CALC: 24.2 % — LOW (ref 34.5–45)
HGB BLD-MCNC: 7.6 G/DL — LOW (ref 11.5–15.5)
HGB BLD-MCNC: 8 G/DL — LOW (ref 11.5–15.5)
INR BLD: 1.36 RATIO — HIGH (ref 0.85–1.18)
MCHC RBC-ENTMCNC: 29.8 PG — SIGNIFICANT CHANGE UP (ref 27–34)
MCHC RBC-ENTMCNC: 33.3 GM/DL — SIGNIFICANT CHANGE UP (ref 32–36)
MCV RBC AUTO: 89.4 FL — SIGNIFICANT CHANGE UP (ref 80–100)
MELD SCORE WITH DIALYSIS: 25 POINTS — SIGNIFICANT CHANGE UP
MELD SCORE WITHOUT DIALYSIS: 13 POINTS — SIGNIFICANT CHANGE UP
PLATELET # BLD AUTO: 65 K/UL — LOW (ref 150–400)
POTASSIUM SERPL-MCNC: 3.9 MMOL/L — SIGNIFICANT CHANGE UP (ref 3.5–5.3)
POTASSIUM SERPL-SCNC: 3.9 MMOL/L — SIGNIFICANT CHANGE UP (ref 3.5–5.3)
PROTHROM AB SERPL-ACNC: 15.2 SEC — HIGH (ref 9.5–13)
RBC # BLD: 2.55 M/UL — LOW (ref 3.8–5.2)
RBC # FLD: 23.5 % — HIGH (ref 10.3–14.5)
SODIUM SERPL-SCNC: 136 MMOL/L — SIGNIFICANT CHANGE UP (ref 135–145)
WBC # BLD: 5.8 K/UL — SIGNIFICANT CHANGE UP (ref 3.8–10.5)
WBC # FLD AUTO: 5.8 K/UL — SIGNIFICANT CHANGE UP (ref 3.8–10.5)

## 2024-04-30 PROCEDURE — 99232 SBSQ HOSP IP/OBS MODERATE 35: CPT

## 2024-04-30 RX ORDER — SUCRALFATE 1 G
10 TABLET ORAL
Qty: 1200 | Refills: 0
Start: 2024-04-30 | End: 2024-05-29

## 2024-04-30 RX ORDER — PANTOPRAZOLE SODIUM 20 MG/1
1 TABLET, DELAYED RELEASE ORAL
Qty: 30 | Refills: 0
Start: 2024-04-30 | End: 2024-05-29

## 2024-04-30 RX ORDER — MIDODRINE HYDROCHLORIDE 2.5 MG/1
5 TABLET ORAL THREE TIMES A DAY
Refills: 0 | Status: DISCONTINUED | OUTPATIENT
Start: 2024-04-30 | End: 2024-04-30

## 2024-04-30 RX ORDER — PANTOPRAZOLE SODIUM 20 MG/1
40 TABLET, DELAYED RELEASE ORAL DAILY
Refills: 0 | Status: DISCONTINUED | OUTPATIENT
Start: 2024-04-30 | End: 2024-04-30

## 2024-04-30 RX ORDER — CARVEDILOL PHOSPHATE 80 MG/1
3.12 CAPSULE, EXTENDED RELEASE ORAL EVERY 12 HOURS
Refills: 0 | Status: DISCONTINUED | OUTPATIENT
Start: 2024-04-30 | End: 2024-04-30

## 2024-04-30 RX ADMIN — PANTOPRAZOLE SODIUM 40 MILLIGRAM(S): 20 TABLET, DELAYED RELEASE ORAL at 13:05

## 2024-04-30 RX ADMIN — PANTOPRAZOLE SODIUM 40 MILLIGRAM(S): 20 TABLET, DELAYED RELEASE ORAL at 05:29

## 2024-04-30 RX ADMIN — Medication 1 GRAM(S): at 00:13

## 2024-04-30 RX ADMIN — SODIUM CHLORIDE 75 MILLILITER(S): 9 INJECTION INTRAMUSCULAR; INTRAVENOUS; SUBCUTANEOUS at 05:30

## 2024-04-30 RX ADMIN — Medication 1 GRAM(S): at 13:04

## 2024-04-30 RX ADMIN — Medication 20 MILLIGRAM(S): at 09:40

## 2024-04-30 RX ADMIN — Medication 1 GRAM(S): at 05:29

## 2024-04-30 NOTE — DISCHARGE NOTE PROVIDER - NSDCCPCAREPLAN_GEN_ALL_CORE_FT
PRINCIPAL DISCHARGE DIAGNOSIS  Diagnosis: Upper GI bleeding  Assessment and Plan of Treatment: stable f/up with GI      SECONDARY DISCHARGE DIAGNOSES  Diagnosis: Esophageal varices in cirrhosis  Assessment and Plan of Treatment:

## 2024-04-30 NOTE — DISCHARGE NOTE PROVIDER - CARE PROVIDER_API CALL
Brian Goodson  Gastroenterology  [facility], [address] [address]  Phone: [phone]  Fax: [phone]  Follow Up Time: 2 weeks

## 2024-04-30 NOTE — DISCHARGE NOTE PROVIDER - HOSPITAL COURSE
Upper GI bleed:    change to IV PPI  s/p egd see above  not concerned about ascites volume    #Acute on chronic anemia:    Hgb 9.8-- dropped to 7.7.    s/p  1 unit pRBC.    repeat hh in am no transfusion today  dc ivf and check orthostatics, she is also  on Coreg was hel this am due to borderline BP    #HTN:    Cont coreg.      #Hx of Cirrhosis:    S/p paracentesis in the past.  NOV.    stable small ascites    #Dizziness/ hearing loss:    Patient was started on prednisone taper with ENT.    WAs on 60 and now down to 40 mg daily.    change to PO roids    #ETOH abuse:    Pt wants to go home

## 2024-04-30 NOTE — DISCHARGE NOTE NURSING/CASE MANAGEMENT/SOCIAL WORK - INFLUENZA IMMUNIZATION DATE (APPROXIMATE):
[FreeTextEntry1] : Nuria is here for the F/U.\par She says she made this appointment because of her falls \par She denies having vertigo or lightheadedness before the fall. No pre syncopal  symptoms and no LOC.\par She says she suddenly feels weak in her legs and or her knees buckle and then goes down.\par Fortunately did not have head trauma. \par She says she feels weak in both arms as well. left > right and more distal on the right and proximal on the left.\par she does have her usual issues with her left shoulder and left elbow and spinal and right hip pain.\par There is a plan for her to receive spinal cord stimulator by her pain management team.\par She already had gone through the trial period.\par according to her she does not want to do any surgery and the prior pain management procedures were not as effective as they should have been.\par She has had an MRI of the L-Spine done in Oct.2020\par  that shows Marked L4-5 stenosis and also spondylolisthesis\par She has no bladder issues\par She also called her rehab and is supposed to have an EMG done as well\par Her memory is the same . she says lately her appetite has not been as good as before\par Her mood did not change\par Her tremor  could occasionally making her less good with her hand writing 13-Oct-2023

## 2024-04-30 NOTE — DISCHARGE NOTE NURSING/CASE MANAGEMENT/SOCIAL WORK - PATIENT PORTAL LINK FT
You can access the FollowMyHealth Patient Portal offered by [facility] by registering at the following website: [email or web address]. By joining [facility]s FollowMyHealth portal, you will also be able to view your health information using other applications (apps) compatible with our system.

## 2024-04-30 NOTE — DISCHARGE NOTE PROVIDER - NSDCQMCOGNITION_NEU_ALL_CORE
No difficulties O-T Plasty Text: The defect edges were debeveled with a #15 scalpel blade.  Given the location of the defect, shape of the defect and the proximity to free margins an O-T plasty was deemed most appropriate.  Using a sterile surgical marker, an appropriate O-T plasty was drawn incorporating the defect and placing the expected incisions within the relaxed skin tension lines where possible.    The area thus outlined was incised deep to adipose tissue with a #15 scalpel blade.  The skin margins were undermined to an appropriate distance in all directions utilizing iris scissors.

## 2024-04-30 NOTE — PROGRESS NOTE ADULT - SUBJECTIVE AND OBJECTIVE BOX
Patient is a 58y old  Female who presents with a chief complaint of vomiting blood (2024 08:23)      Subective:  No bleeding  No complaints    PAST MEDICAL & SURGICAL HISTORY:  Alcohol abuse      Depression      Withdrawal seizures      History of basal cell carcinoma excision      Squamous cell skin cancer      Hemorrhoids      2019 novel coronavirus disease (COVID-19)      History of ear, nose, and throat (ENT) surgery      H/O basal cell carcinoma excision      H/O:           MEDICATIONS  (STANDING):  carvedilol 3.125 milliGRAM(s) Oral every 12 hours  pantoprazole  Injectable 40 milliGRAM(s) IV Push daily  predniSONE   Tablet 20 milliGRAM(s) Oral daily  sucralfate suspension 1 Gram(s) Oral four times a day    MEDICATIONS  (PRN):  acetaminophen     Tablet .. 650 milliGRAM(s) Oral every 6 hours PRN Temp greater or equal to 38C (100.4F), Mild Pain (1 - 3)  aluminum hydroxide/magnesium hydroxide/simethicone Suspension 30 milliLiter(s) Oral every 4 hours PRN Dyspepsia  melatonin 3 milliGRAM(s) Oral at bedtime PRN Insomnia  midodrine. 5 milliGRAM(s) Oral three times a day PRN bp<100  ondansetron Injectable 4 milliGRAM(s) IV Push every 8 hours PRN Nausea and/or Vomiting      REVIEW OF SYSTEMS:    RESPIRATORY: No shortness of breath  CARDIOVASCULAR: No chest pain  All other review of systems is negative unless indicated above.    Vital Signs Last 24 Hrs  T(C): 36.9 (2024 07:44), Max: 37.1 (2024 14:35)  T(F): 98.4 (2024 07:44), Max: 98.7 (2024 14:35)  HR: 82 (2024 09:42) (81 - 106)  BP: 104/67 (2024 09:42) (90/55 - 116/72)  BP(mean): 85 (2024 22:30) (85 - 85)  RR: 18 (2024 07:44) (18 - 30)  SpO2: 95% (2024 07:44) (94% - 98%)    Parameters below as of 2024 07:44  Patient On (Oxygen Delivery Method): room air        PHYSICAL EXAM:    Constitutional: NAD, well-developed  Respiratory: CTAB  Cardiovascular: S1 and S2, RRR  Gastrointestinal: BS+, soft, NT/ND  Extremities: No peripheral edema  Psychiatric: Normal mood, normal affect    LABS:                        7.6    5.80  )-----------( 65       ( 2024 08:29 )             22.8         136  |  106  |  52<H>  ----------------------------<  179<H>  3.9   |  23  |  0.85    Ca    7.5<L>      2024 08:29  Mg     1.7         TPro  x   /  Alb  x   /  TBili  1.7<H>  /  DBili  x   /  AST  x   /  ALT  x   /  AlkPhos  x       PT/INR - ( 2024 08:29 )   PT: 15.2 sec;   INR: 1.36 ratio         PTT - ( 2024 06:26 )  PTT:29.2 sec  LIVER FUNCTIONS - ( 2024 06:26 )  Alb: 2.7 g/dL / Pro: 6.9 gm/dL / ALK PHOS: 331 U/L / ALT: 43 U/L / AST: 106 U/L / GGT: x             RADIOLOGY & ADDITIONAL STUDIES:

## 2024-04-30 NOTE — DISCHARGE NOTE PROVIDER - NSDCMRMEDTOKEN_GEN_ALL_CORE_FT
cholecalciferol 50 mcg (2000 intl units) oral tablet: 1 tab(s) orally once a day  Coreg 3.125 mg oral tablet: 1 tab(s) orally 2 times a day  predniSONE 20 mg oral tablet: 1 tab(s) orally once a day  Protonix 40 mg oral delayed release tablet: 1 tab(s) orally once a day  sucralfate 1 g/10 mL oral suspension: 10 milliliter(s) orally 4 times a day

## 2024-04-30 NOTE — DISCHARGE NOTE NURSING/CASE MANAGEMENT/SOCIAL WORK - NSDCVIVACCINE_GEN_ALL_CORE_FT
influenza, injectable, quadrivalent, preservative free; 13-Oct-2023 11:44; Kristina Renee (RN); [employer]; [id number] (Exp. Date: 13-Jun-2024); IntraMuscular; Deltoid Left.; 0.5 milliLiter(s); VIS (VIS Published: 06-Aug-2021, VIS Presented: 13-Oct-2023);

## 2024-04-30 NOTE — PROGRESS NOTE ADULT - SUBJECTIVE AND OBJECTIVE BOX
CHIEF COMPLAINT/INTERVAL HISTORY:    Patient is a 58y old  Female who presents with a chief complaint of vomiting blood (29 Apr 2024 17:00)      HPI:  59 y/o female with PMHX of cirrhosis, etoh abuse in the past-- sober x1 year, esophageal varices, depression, portal HTN, ascites, dizziness/ hearing loss recently seen by ENT on prednisone taper who presented to  with CC of hematemesis.  Patient was last hospitalized in Nov for GI bleed.  Patient was in ICU and had upper GI bleeding from gastric varices s/p banding with Dr. Goodson.  Patient notes she was feeling well up until last evening.  Around 7:30 pm, she had an episode of hematemesis.  She had several more at home and decided to come to the ER,  9:30pm and then 3am.  In the ER, she had another episode of hematemesis and then had some melena as well.   After that, patient continue with vomiting / dry heaving but no further blood.   In the ER, patient had CT abd pelvis which was negative for acute GI bleeding.  Hgb was 9.      EGD 4/29:   Small esoph varices -- 3 bands applied  small hiatal hernia  Mod portal gastropathy  No bleeding        ICU Vital Signs Last 24 Hrs  T(C): 36.9 (30 Apr 2024 07:44), Max: 37.1 (29 Apr 2024 14:35)  T(F): 98.4 (30 Apr 2024 07:44), Max: 98.7 (29 Apr 2024 14:35)  HR: 81 (30 Apr 2024 07:44) (81 - 110)  BP: 116/72 (30 Apr 2024 07:44) (90/55 - 116/72)  BP(mean): 85 (29 Apr 2024 22:30) (85 - 85)  ABP: --  ABP(mean): --  RR: 18 (30 Apr 2024 07:44) (18 - 30)  SpO2: 95% (30 Apr 2024 07:44) (94% - 98%)    O2 Parameters below as of 30 Apr 2024 07:44  Patient On (Oxygen Delivery Method): room air              MEDICATIONS  (STANDING):  carvedilol 3.125 milliGRAM(s) Oral every 12 hours  cefTRIAXone Injectable. 1000 milliGRAM(s) IV Push every 24 hours  cefTRIAXone Injectable.      methylPREDNISolone sodium succinate Injectable 40 milliGRAM(s) IV Push every 24 hours  pantoprazole    Tablet 40 milliGRAM(s) Oral before breakfast  sodium chloride 0.9%. 1000 milliLiter(s) (75 mL/Hr) IV Continuous <Continuous>  sucralfate suspension 1 Gram(s) Oral four times a day    MEDICATIONS  (PRN):  acetaminophen     Tablet .. 650 milliGRAM(s) Oral every 6 hours PRN Temp greater or equal to 38C (100.4F), Mild Pain (1 - 3)  aluminum hydroxide/magnesium hydroxide/simethicone Suspension 30 milliLiter(s) Oral every 4 hours PRN Dyspepsia  melatonin 3 milliGRAM(s) Oral at bedtime PRN Insomnia  ondansetron Injectable 4 milliGRAM(s) IV Push every 8 hours PRN Nausea and/or Vomiting        PHYSICAL EXAM:      NERVOUS SYSTEM:  Alert & Oriented X3, Motor Strength 5/5 B/L upper and lower extremities; DTRs 2+ intact and symmetric  CHEST/LUNG: Clear to auscultation bilaterally; No rales, rhonchi, wheezing, or rubs  HEART: Regular rate and rhythm; No murmurs, rubs, or gallops  ABDOMEN: Soft, Nontender, Nondistended; Bowel sounds present  EXTREMITIES:  2+ Peripheral Pulses, No clubbing, cyanosis, or edema    LABS:                        7.9    x     )-----------( x        ( 29 Apr 2024 19:24 )             24.2     04-29    136  |  102  |  41<H>  ----------------------------<  172<H>  3.8   |  25  |  0.79    Ca    8.9      29 Apr 2024 06:26  Mg     1.7     04-29    TPro  6.9  /  Alb  2.7<L>  /  TBili  2.7<H>  /  DBili  x   /  AST  106<H>  /  ALT  43  /  AlkPhos  331<H>  04-29    PT/INR - ( 29 Apr 2024 06:26 )   PT: 16.1 sec;   INR: 1.44 ratio         PTT - ( 29 Apr 2024 06:26 )  PTT:29.2 sec  Urinalysis Basic - ( 29 Apr 2024 06:26 )    Color: x / Appearance: x / SG: x / pH: x  Gluc: 172 mg/dL / Ketone: x  / Bili: x / Urobili: x   Blood: x / Protein: x / Nitrite: x   Leuk Esterase: x / RBC: x / WBC x   Sq Epi: x / Non Sq Epi: x / Bacteria: x          RADIOLOGY & ADDITIONAL TESTS: personally reviewed    Upper GI bleed:    Admit to [facility].    s/p egd see above    #Acute on chronic anemia:    Hgb 9.8-- dropped to 7.7.    s/p  1 unit pRBC.      #HTN:    Cont coreg.      #Hx of Cirrhosis:    S/p paracentesis in the past.  NOV.    Follow.      #Dizziness/ hearing loss:    Patient was started on prednisone taper with ENT.    WAs on 60 and now down to 40 mg daily.    change to PO roids    #ETOH abuse:    In remission.      #DVT Proph:  ambulation.      D/w patient/ Zinkin/ partner at bedside.                time spent:      CHIEF COMPLAINT/INTERVAL HISTORY:    Patient is a 58y old  Female who presents with a chief complaint of vomiting blood (29 Apr 2024 17:00)      HPI:  59 y/o female with PMHX of cirrhosis, etoh abuse in the past-- sober x1 year, esophageal varices, depression, portal HTN, ascites, dizziness/ hearing loss recently seen by ENT on prednisone taper who presented to  with CC of hematemesis.  Patient was last hospitalized in Nov for GI bleed.  Patient was in ICU and had upper GI bleeding from gastric varices s/p banding with Dr. Goodson.  Patient notes she was feeling well up until last evening.  Around 7:30 pm, she had an episode of hematemesis.  She had several more at home and decided to come to the ER,  9:30pm and then 3am.  In the ER, she had another episode of hematemesis and then had some melena as well.   After that, patient continue with vomiting / dry heaving but no further blood.   In the ER, patient had CT abd pelvis which was negative for acute GI bleeding.  Hgb was 9.      EGD 4/29:   Small esoph varices -- 3 bands applied  small hiatal hernia  Mod portal gastropathy  No bleeding    c/o epigastric discomfort c/w melena probably old blood    ICU Vital Signs Last 24 Hrs  T(C): 36.9 (30 Apr 2024 07:44), Max: 37.1 (29 Apr 2024 14:35)  T(F): 98.4 (30 Apr 2024 07:44), Max: 98.7 (29 Apr 2024 14:35)  HR: 81 (30 Apr 2024 07:44) (81 - 110)  BP: 116/72 (30 Apr 2024 07:44) (90/55 - 116/72)  BP(mean): 85 (29 Apr 2024 22:30) (85 - 85)  ABP: --  ABP(mean): --  RR: 18 (30 Apr 2024 07:44) (18 - 30)  SpO2: 95% (30 Apr 2024 07:44) (94% - 98%)    O2 Parameters below as of 30 Apr 2024 07:44  Patient On (Oxygen Delivery Method): room air              MEDICATIONS  (STANDING):  carvedilol 3.125 milliGRAM(s) Oral every 12 hours  cefTRIAXone Injectable. 1000 milliGRAM(s) IV Push every 24 hours  cefTRIAXone Injectable.      methylPREDNISolone sodium succinate Injectable 40 milliGRAM(s) IV Push every 24 hours  pantoprazole    Tablet 40 milliGRAM(s) Oral before breakfast  sodium chloride 0.9%. 1000 milliLiter(s) (75 mL/Hr) IV Continuous <Continuous>  sucralfate suspension 1 Gram(s) Oral four times a day    MEDICATIONS  (PRN):  acetaminophen     Tablet .. 650 milliGRAM(s) Oral every 6 hours PRN Temp greater or equal to 38C (100.4F), Mild Pain (1 - 3)  aluminum hydroxide/magnesium hydroxide/simethicone Suspension 30 milliLiter(s) Oral every 4 hours PRN Dyspepsia  melatonin 3 milliGRAM(s) Oral at bedtime PRN Insomnia  ondansetron Injectable 4 milliGRAM(s) IV Push every 8 hours PRN Nausea and/or Vomiting        PHYSICAL EXAM:      NERVOUS SYSTEM:  Alert & Oriented X3, Motor Strength 5/5 B/L upper and lower extremities; DTRs 2+ intact and symmetric  CHEST/LUNG: Clear to auscultation bilaterally; No rales, rhonchi, wheezing, or rubs  HEART: Regular rate and rhythm; No murmurs, rubs, or gallops  ABDOMEN: Soft, Nontender, Nondistended; Bowel sounds present  EXTREMITIES:  2+ Peripheral Pulses, No clubbing, cyanosis, or edema    LABS:                         RADIOLOGY & ADDITIONAL TESTS: personally reviewed    Upper GI bleed:    change to IV PPI  s/p egd see above  not concerned about ascites volume    #Acute on chronic anemia:    Hgb 9.8-- dropped to 7.7.    s/p  1 unit pRBC.    repeat hh in am no transfusion today  dc ivf and check orthostatics, she is also  on Coreg was hel this am due to borderline BP    #HTN:    Cont coreg.      #Hx of Cirrhosis:    S/p paracentesis in the past.  NOV.    stable small ascites    #Dizziness/ hearing loss:    Patient was started on prednisone taper with ENT.    WAs on 60 and now down to 40 mg daily.    change to PO roids    #ETOH abuse:    In remission.      #DVT Proph:  ambulation.                  time spent:

## 2024-05-02 ENCOUNTER — APPOINTMENT (OUTPATIENT)
Dept: MRI IMAGING | Facility: CLINIC | Age: 59
End: 2024-05-02

## 2024-05-02 LAB
PETH 16:0/18:1: >400 NG/ML — HIGH
PETH 16:0/18:2: >400 NG/ML — HIGH
PETH COMMENTS: SIGNIFICANT CHANGE UP

## 2024-05-08 DIAGNOSIS — K74.60 UNSPECIFIED CIRRHOSIS OF LIVER: ICD-10-CM

## 2024-05-08 DIAGNOSIS — Z88.1 ALLERGY STATUS TO OTHER ANTIBIOTIC AGENTS STATUS: ICD-10-CM

## 2024-05-08 DIAGNOSIS — F32.A DEPRESSION, UNSPECIFIED: ICD-10-CM

## 2024-05-08 DIAGNOSIS — Z79.52 LONG TERM (CURRENT) USE OF SYSTEMIC STEROIDS: ICD-10-CM

## 2024-05-08 DIAGNOSIS — K44.9 DIAPHRAGMATIC HERNIA WITHOUT OBSTRUCTION OR GANGRENE: ICD-10-CM

## 2024-05-08 DIAGNOSIS — K31.89 OTHER DISEASES OF STOMACH AND DUODENUM: ICD-10-CM

## 2024-05-08 DIAGNOSIS — Z85.828 PERSONAL HISTORY OF OTHER MALIGNANT NEOPLASM OF SKIN: ICD-10-CM

## 2024-05-08 DIAGNOSIS — I85.11 SECONDARY ESOPHAGEAL VARICES WITH BLEEDING: ICD-10-CM

## 2024-05-08 DIAGNOSIS — I10 ESSENTIAL (PRIMARY) HYPERTENSION: ICD-10-CM

## 2024-05-08 DIAGNOSIS — F10.11 ALCOHOL ABUSE, IN REMISSION: ICD-10-CM

## 2024-05-08 DIAGNOSIS — D62 ACUTE POSTHEMORRHAGIC ANEMIA: ICD-10-CM

## 2024-05-08 DIAGNOSIS — K76.6 PORTAL HYPERTENSION: ICD-10-CM

## 2024-05-10 ENCOUNTER — APPOINTMENT (OUTPATIENT)
Dept: MRI IMAGING | Facility: CLINIC | Age: 59
End: 2024-05-10

## 2024-05-10 ENCOUNTER — APPOINTMENT (OUTPATIENT)
Dept: RADIOLOGY | Facility: CLINIC | Age: 59
End: 2024-05-10

## 2024-05-22 NOTE — DISCHARGE NOTE BEHAVIORAL HEALTH - NS MD DC FALL RISK RISK
Continuing therapy, work towards goals  
FBG at goal  Glucoscan  Sliding scale insulin  Monitor fasting blood sugar  
Remeron  Labs pending  
For information on Fall & Injury Prevention, visit [email or web address]

## 2024-05-22 NOTE — ED STATDOCS - CPE ED EYE NORM
05/22/24 [facility] Discharge Instructions    I will be responsible for sharing my medication information including changes as listed below with all my physicians.  I WILL NOT DRIVE OR OPERATE MACHINERY TODAY  I WILL NOT DRINK ALCOHOLIC BEVERAGES TODAY  I WILL AVOID MAKING ANY IMPORTANT DECISIONS TODAY    Please check the applicable procedure and the appropriate instructions.  __ Colonoscopy _x_EGD (Gastroscopy) __ Flexible Sigmoidoscopy   __Esophageal Dilatation __ ERCP    __Other: _________________________    You have received the following medications:  __ Demerol __ Versed _x_Propofol __Fentanyl __Valium __Other:________________    Diet:  _x_ You may eat or drink normally now. However please allow your system to readjust by eating light at your next meal. You may then resume your normal diet eating lightly unless otherwise noted by your physician  __ Do not eat or drink anything for 2 hours. After 2 hours you may take sips of water. In addition, if you feel the water as you swallow you may advance to your regular diet  __ Diet Change:___________________________________________    Post Procedure Instructions:  _x_ You may not remember the endoscopy or the doctor’s explanation of what your exam showed. This is a normal reaction to the medications you may have been given. Today you should plan to go home & rest.  _x_You may have soreness at the IV site. If this occurs, apply warm compress to the area. If the area becomes red, swollen, has drainage, or becomes painful, call your GI physician  _x_ If you have a sore throat, throat lozenges or gargles help to relieve discomfort  _x_ You may have some cramping or gas after the procedure. If you do, try mild activity to expel the air    Call physician’s office to:  __Obtain results of procedure/biopsy from your GI physician  __Polyps were removed. You may have a small amount of rectal bleeding. If greater  than a few drops of blood, call your physician  _x_  Report any signs of severe pain, fever, nausea, vomiting, diarrhea, difficulty in   swallowing, shortness of breath or persistent bloating to your GI physician.       If you experience an emergency situation and you are unable to reach your physician, go to the emergency department or call [phone] and ask for the emergency room.     bilateral normal...

## 2024-06-04 NOTE — ED ADULT NURSE NOTE - CAS TRG GEN SKIN CONDITION
Procedures: Caudal AMBIKA  Tarik Mahajan MD  Interventional Pain Medicine  [facility]    Clinic Number:  [phone]  Call with any questions about your care and for scheduling assistance.   Calls are returned Monday through Friday between 8 AM and 4:30 PM. We usually get back to you within 2 business days depending on the issue/request.    If we are prescribing your medications:  For opioid medication refills, call the clinic or send a [employer] message 7 days in advance.  Please include:  Name of requested medication  Name of the pharmacy.  For non-opioid medications, call your pharmacy directly to request a refill. Please allow 3-4 days to be processed.   Per [place] State Law:  All controlled substance prescriptions must be filled within 30 days of being written.    For those controlled substances allowing refills, pickup must occur within 30 days of last fill.      We believe regular attendance is key to your success in our program!    Any time you are unable to keep your appointment we ask that you call us at least 24 hours in advance to cancel.This will allow us to offer the appointment time to another patient.   Multiple missed appointments may lead to dismissal from the clinic.   
Warm

## 2024-09-05 NOTE — ED ADULT TRIAGE NOTE - CHIEF COMPLAINT QUOTE
[Chaperone Declined] : Patient declined chaperone [Appropriately responsive] : appropriately responsive [Alert] : alert pt presents to triage c/o vomiting blood since 7pm last night. pt has had multiple episodes of "brownish red" emesis, as well as abdominal pain, headache, and weakness. pt also had black/tarry BM. pt has hx of esophageal varices, with an episode of vomiting blood last year. pt was prescribed a beta blocker to reduce risk of bleeding, has been unable to take due to low BP. allergies to zithromax and morphine. [No Acute Distress] : no acute distress [No Lymphadenopathy] : no lymphadenopathy [No Murmurs] : no murmurs [Soft] : soft [Non-tender] : non-tender [Non-distended] : non-distended [No HSM] : No HSM [No Lesions] : no lesions [No Mass] : no mass [Oriented x3] : oriented x3 [Examination Of The Breasts] : a normal appearance [No Masses] : no breast masses were palpable [Labia Majora] : normal [Labia Minora] : normal [Normal] : normal [Retroversion] : retroverted [Uterine Adnexae] : normal [Enlarged ___ wks] : not enlarged [FreeTextEntry5] : + string
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
## 2024-10-04 ENCOUNTER — INPATIENT (INPATIENT)
Facility: HOSPITAL | Age: 59
LOS: 3 days | Discharge: HOME | DRG: 280 | End: 2024-10-08
Attending: HOSPITALIST | Admitting: STUDENT IN AN ORGANIZED HEALTH CARE EDUCATION/TRAINING PROGRAM
Payer: MEDICAID

## 2024-10-04 VITALS
HEART RATE: 128 BPM | OXYGEN SATURATION: 100 % | WEIGHT: 128.53 LBS | SYSTOLIC BLOOD PRESSURE: 89 MMHG | TEMPERATURE: 98 F | DIASTOLIC BLOOD PRESSURE: 63 MMHG | RESPIRATION RATE: 18 BRPM

## 2024-10-04 DIAGNOSIS — K92.0 HEMATEMESIS: ICD-10-CM

## 2024-10-04 DIAGNOSIS — Z98.890 OTHER SPECIFIED POSTPROCEDURAL STATES: Chronic | ICD-10-CM

## 2024-10-04 DIAGNOSIS — Z98.891 HISTORY OF UTERINE SCAR FROM PREVIOUS SURGERY: Chronic | ICD-10-CM

## 2024-10-04 LAB
ADD ON TEST-SPECIMEN IN LAB: SIGNIFICANT CHANGE UP
ALBUMIN SERPL ELPH-MCNC: 2.1 G/DL — LOW (ref 3.3–5)
ALBUMIN SERPL ELPH-MCNC: 2.5 G/DL — LOW (ref 3.3–5)
ALP SERPL-CCNC: 315 U/L — HIGH (ref 40–120)
ALP SERPL-CCNC: 423 U/L — HIGH (ref 40–120)
ALT FLD-CCNC: 35 U/L — SIGNIFICANT CHANGE UP (ref 12–78)
ALT FLD-CCNC: 41 U/L — SIGNIFICANT CHANGE UP (ref 12–78)
ANION GAP SERPL CALC-SCNC: 11 MMOL/L — SIGNIFICANT CHANGE UP (ref 5–17)
ANION GAP SERPL CALC-SCNC: 7 MMOL/L — SIGNIFICANT CHANGE UP (ref 5–17)
ANISOCYTOSIS BLD QL: SLIGHT — SIGNIFICANT CHANGE UP
APTT BLD: 37.6 SEC — HIGH (ref 24.5–35.6)
AST SERPL-CCNC: 120 U/L — HIGH (ref 15–37)
AST SERPL-CCNC: 156 U/L — HIGH (ref 15–37)
BASOPHILS # BLD AUTO: 0.05 K/UL — SIGNIFICANT CHANGE UP (ref 0–0.2)
BASOPHILS NFR BLD AUTO: 0.7 % — SIGNIFICANT CHANGE UP (ref 0–2)
BILIRUB SERPL-MCNC: 4.5 MG/DL — HIGH (ref 0.2–1.2)
BILIRUB SERPL-MCNC: 4.8 MG/DL — HIGH (ref 0.2–1.2)
BLD GP AB SCN SERPL QL: SIGNIFICANT CHANGE UP
BUN SERPL-MCNC: 22 MG/DL — SIGNIFICANT CHANGE UP (ref 7–23)
BUN SERPL-MCNC: 37 MG/DL — HIGH (ref 7–23)
CALCIUM SERPL-MCNC: 8.2 MG/DL — LOW (ref 8.5–10.1)
CALCIUM SERPL-MCNC: 9 MG/DL — SIGNIFICANT CHANGE UP (ref 8.5–10.1)
CHLORIDE SERPL-SCNC: 107 MMOL/L — SIGNIFICANT CHANGE UP (ref 96–108)
CHLORIDE SERPL-SCNC: 109 MMOL/L — HIGH (ref 96–108)
CO2 SERPL-SCNC: 21 MMOL/L — LOW (ref 22–31)
CO2 SERPL-SCNC: 22 MMOL/L — SIGNIFICANT CHANGE UP (ref 22–31)
CREAT SERPL-MCNC: 0.68 MG/DL — SIGNIFICANT CHANGE UP (ref 0.5–1.3)
CREAT SERPL-MCNC: 0.77 MG/DL — SIGNIFICANT CHANGE UP (ref 0.5–1.3)
EGFR: 88 ML/MIN/1.73M2 — SIGNIFICANT CHANGE UP
EGFR: 99 ML/MIN/1.73M2 — SIGNIFICANT CHANGE UP
EOSINOPHIL # BLD AUTO: 0.08 K/UL — SIGNIFICANT CHANGE UP (ref 0–0.5)
EOSINOPHIL NFR BLD AUTO: 1.2 % — SIGNIFICANT CHANGE UP (ref 0–6)
GLUCOSE SERPL-MCNC: 124 MG/DL — HIGH (ref 70–99)
GLUCOSE SERPL-MCNC: 137 MG/DL — HIGH (ref 70–99)
HCT VFR BLD CALC: 28.7 % — LOW (ref 34.5–45)
HCT VFR BLD CALC: 31.7 % — LOW (ref 34.5–45)
HCT VFR BLD CALC: 33.2 % — LOW (ref 34.5–45)
HGB BLD-MCNC: 10.4 G/DL — LOW (ref 11.5–15.5)
HGB BLD-MCNC: 10.8 G/DL — LOW (ref 11.5–15.5)
HGB BLD-MCNC: 9.5 G/DL — LOW (ref 11.5–15.5)
IMM GRANULOCYTES NFR BLD AUTO: 0.7 % — SIGNIFICANT CHANGE UP (ref 0–0.9)
INR BLD: 1.48 RATIO — HIGH (ref 0.85–1.16)
INR BLD: 1.62 RATIO — HIGH (ref 0.85–1.16)
LACTATE SERPL-SCNC: 1.8 MMOL/L — SIGNIFICANT CHANGE UP (ref 0.7–2)
LACTATE SERPL-SCNC: 3.6 MMOL/L — HIGH (ref 0.7–2)
LIDOCAIN IGE QN: 48 U/L — SIGNIFICANT CHANGE UP (ref 13–75)
LYMPHOCYTES # BLD AUTO: 1.14 K/UL — SIGNIFICANT CHANGE UP (ref 1–3.3)
LYMPHOCYTES # BLD AUTO: 17 % — SIGNIFICANT CHANGE UP (ref 13–44)
MACROCYTES BLD QL: SLIGHT — SIGNIFICANT CHANGE UP
MAGNESIUM SERPL-MCNC: 1.5 MG/DL — LOW (ref 1.6–2.6)
MAGNESIUM SERPL-MCNC: 2.4 MG/DL — SIGNIFICANT CHANGE UP (ref 1.6–2.6)
MANUAL SMEAR VERIFICATION: SIGNIFICANT CHANGE UP
MCHC RBC-ENTMCNC: 32.4 PG — SIGNIFICANT CHANGE UP (ref 27–34)
MCHC RBC-ENTMCNC: 32.5 GM/DL — SIGNIFICANT CHANGE UP (ref 32–36)
MCHC RBC-ENTMCNC: 32.8 GM/DL — SIGNIFICANT CHANGE UP (ref 32–36)
MCHC RBC-ENTMCNC: 32.9 PG — SIGNIFICANT CHANGE UP (ref 27–34)
MCHC RBC-ENTMCNC: 33.1 GM/DL — SIGNIFICANT CHANGE UP (ref 32–36)
MCHC RBC-ENTMCNC: 34 PG — SIGNIFICANT CHANGE UP (ref 27–34)
MCV RBC AUTO: 104.4 FL — HIGH (ref 80–100)
MCV RBC AUTO: 98.8 FL — SIGNIFICANT CHANGE UP (ref 80–100)
MCV RBC AUTO: 99.3 FL — SIGNIFICANT CHANGE UP (ref 80–100)
MONOCYTES # BLD AUTO: 0.7 K/UL — SIGNIFICANT CHANGE UP (ref 0–0.9)
MONOCYTES NFR BLD AUTO: 10.4 % — SIGNIFICANT CHANGE UP (ref 2–14)
NEUTROPHILS # BLD AUTO: 4.68 K/UL — SIGNIFICANT CHANGE UP (ref 1.8–7.4)
NEUTROPHILS NFR BLD AUTO: 70 % — SIGNIFICANT CHANGE UP (ref 43–77)
PHOSPHATE SERPL-MCNC: 2.8 MG/DL — SIGNIFICANT CHANGE UP (ref 2.5–4.5)
PHOSPHATE SERPL-MCNC: 3.7 MG/DL — SIGNIFICANT CHANGE UP (ref 2.5–4.5)
PLAT MORPH BLD: NORMAL — SIGNIFICANT CHANGE UP
PLATELET # BLD AUTO: 40 K/UL — LOW (ref 150–400)
PLATELET # BLD AUTO: 49 K/UL — LOW (ref 150–400)
PLATELET # BLD AUTO: 60 K/UL — LOW (ref 150–400)
POTASSIUM SERPL-MCNC: 4.3 MMOL/L — SIGNIFICANT CHANGE UP (ref 3.5–5.3)
POTASSIUM SERPL-MCNC: 5.1 MMOL/L — SIGNIFICANT CHANGE UP (ref 3.5–5.3)
POTASSIUM SERPL-SCNC: 4.3 MMOL/L — SIGNIFICANT CHANGE UP (ref 3.5–5.3)
POTASSIUM SERPL-SCNC: 5.1 MMOL/L — SIGNIFICANT CHANGE UP (ref 3.5–5.3)
PROT SERPL-MCNC: 6.1 GM/DL — SIGNIFICANT CHANGE UP (ref 6–8.3)
PROT SERPL-MCNC: 7.2 GM/DL — SIGNIFICANT CHANGE UP (ref 6–8.3)
PROTHROM AB SERPL-ACNC: 17 SEC — HIGH (ref 9.9–13.4)
PROTHROM AB SERPL-ACNC: 18.5 SEC — HIGH (ref 9.9–13.4)
RBC # BLD: 2.89 M/UL — LOW (ref 3.8–5.2)
RBC # BLD: 3.18 M/UL — LOW (ref 3.8–5.2)
RBC # BLD: 3.21 M/UL — LOW (ref 3.8–5.2)
RBC # FLD: 15.1 % — HIGH (ref 10.3–14.5)
RBC # FLD: 18.2 % — HIGH (ref 10.3–14.5)
RBC # FLD: 19 % — HIGH (ref 10.3–14.5)
RBC BLD AUTO: ABNORMAL
SODIUM SERPL-SCNC: 138 MMOL/L — SIGNIFICANT CHANGE UP (ref 135–145)
SODIUM SERPL-SCNC: 139 MMOL/L — SIGNIFICANT CHANGE UP (ref 135–145)
TROPONIN I, HIGH SENSITIVITY RESULT: 5.77 NG/L — SIGNIFICANT CHANGE UP
TROPONIN I, HIGH SENSITIVITY RESULT: 8.48 NG/L — SIGNIFICANT CHANGE UP
WBC # BLD: 5.77 K/UL — SIGNIFICANT CHANGE UP (ref 3.8–10.5)
WBC # BLD: 6.38 K/UL — SIGNIFICANT CHANGE UP (ref 3.8–10.5)
WBC # BLD: 6.7 K/UL — SIGNIFICANT CHANGE UP (ref 3.8–10.5)
WBC # FLD AUTO: 5.77 K/UL — SIGNIFICANT CHANGE UP (ref 3.8–10.5)
WBC # FLD AUTO: 6.38 K/UL — SIGNIFICANT CHANGE UP (ref 3.8–10.5)
WBC # FLD AUTO: 6.7 K/UL — SIGNIFICANT CHANGE UP (ref 3.8–10.5)

## 2024-10-04 PROCEDURE — 93010 ELECTROCARDIOGRAM REPORT: CPT

## 2024-10-04 PROCEDURE — 74018 RADEX ABDOMEN 1 VIEW: CPT | Mod: 26

## 2024-10-04 PROCEDURE — 71045 X-RAY EXAM CHEST 1 VIEW: CPT

## 2024-10-04 PROCEDURE — 82105 ALPHA-FETOPROTEIN SERUM: CPT

## 2024-10-04 PROCEDURE — 84100 ASSAY OF PHOSPHORUS: CPT

## 2024-10-04 PROCEDURE — 85025 COMPLETE CBC W/AUTO DIFF WBC: CPT

## 2024-10-04 PROCEDURE — 80048 BASIC METABOLIC PNL TOTAL CA: CPT

## 2024-10-04 PROCEDURE — 87641 MR-STAPH DNA AMP PROBE: CPT

## 2024-10-04 PROCEDURE — 90471 IMMUNIZATION ADMIN: CPT

## 2024-10-04 PROCEDURE — 71045 X-RAY EXAM CHEST 1 VIEW: CPT | Mod: 26

## 2024-10-04 PROCEDURE — 99291 CRITICAL CARE FIRST HOUR: CPT

## 2024-10-04 PROCEDURE — 84484 ASSAY OF TROPONIN QUANT: CPT

## 2024-10-04 PROCEDURE — 83605 ASSAY OF LACTIC ACID: CPT

## 2024-10-04 PROCEDURE — 80053 COMPREHEN METABOLIC PANEL: CPT

## 2024-10-04 PROCEDURE — 87640 STAPH A DNA AMP PROBE: CPT

## 2024-10-04 PROCEDURE — 74018 RADEX ABDOMEN 1 VIEW: CPT

## 2024-10-04 PROCEDURE — 93005 ELECTROCARDIOGRAM TRACING: CPT

## 2024-10-04 PROCEDURE — 83735 ASSAY OF MAGNESIUM: CPT

## 2024-10-04 PROCEDURE — C1889: CPT

## 2024-10-04 PROCEDURE — 85730 THROMBOPLASTIN TIME PARTIAL: CPT

## 2024-10-04 PROCEDURE — 85027 COMPLETE CBC AUTOMATED: CPT

## 2024-10-04 PROCEDURE — 76700 US EXAM ABDOM COMPLETE: CPT

## 2024-10-04 PROCEDURE — 90656 IIV3 VACC NO PRSV 0.5 ML IM: CPT

## 2024-10-04 PROCEDURE — 86923 COMPATIBILITY TEST ELECTRIC: CPT

## 2024-10-04 PROCEDURE — 85610 PROTHROMBIN TIME: CPT

## 2024-10-04 PROCEDURE — 80076 HEPATIC FUNCTION PANEL: CPT

## 2024-10-04 PROCEDURE — 36415 COLL VENOUS BLD VENIPUNCTURE: CPT

## 2024-10-04 RX ORDER — THIAMINE HYDROCHLORIDE 100 MG/ML
100 INJECTION, SOLUTION INTRAMUSCULAR; INTRAVENOUS DAILY
Refills: 0 | Status: DISCONTINUED | OUTPATIENT
Start: 2024-10-04 | End: 2024-10-07

## 2024-10-04 RX ORDER — MAGNESIUM SULFATE 500 MG/ML
2 VIAL (ML) INJECTION ONCE
Refills: 0 | Status: COMPLETED | OUTPATIENT
Start: 2024-10-04 | End: 2024-10-04

## 2024-10-04 RX ORDER — MAGNESIUM SULFATE 500 MG/ML
2 VIAL (ML) INJECTION ONCE
Refills: 0 | Status: DISCONTINUED | OUTPATIENT
Start: 2024-10-04 | End: 2024-10-04

## 2024-10-04 RX ORDER — HYDROMORPHONE HYDROCHLORIDE 1 MG/ML
0.25 INJECTION, SOLUTION INTRAMUSCULAR; INTRAVENOUS; SUBCUTANEOUS ONCE
Refills: 0 | Status: DISCONTINUED | OUTPATIENT
Start: 2024-10-04 | End: 2024-10-04

## 2024-10-04 RX ORDER — FOLIC ACID 1 MG/1
1 TABLET ORAL DAILY
Refills: 0 | Status: DISCONTINUED | OUTPATIENT
Start: 2024-10-04 | End: 2024-10-07

## 2024-10-04 RX ORDER — ACETAMINOPHEN 325 MG
1000 TABLET ORAL ONCE
Refills: 0 | Status: COMPLETED | OUTPATIENT
Start: 2024-10-04 | End: 2024-10-04

## 2024-10-04 RX ORDER — CHLORHEXIDINE GLUCONATE ORAL RINSE 1.2 MG/ML
1 SOLUTION DENTAL
Refills: 0 | Status: DISCONTINUED | OUTPATIENT
Start: 2024-10-04 | End: 2024-10-08

## 2024-10-04 RX ORDER — OCTREOTIDE ACETATE 50 UG/ML
50 INJECTION, SOLUTION INTRAVENOUS; SUBCUTANEOUS ONCE
Refills: 0 | Status: COMPLETED | OUTPATIENT
Start: 2024-10-04 | End: 2024-10-04

## 2024-10-04 RX ORDER — OCTREOTIDE ACETATE 50 UG/ML
50 INJECTION, SOLUTION INTRAVENOUS; SUBCUTANEOUS
Qty: 500 | Refills: 0 | Status: DISCONTINUED | OUTPATIENT
Start: 2024-10-04 | End: 2024-10-07

## 2024-10-04 RX ORDER — PANTOPRAZOLE SODIUM 40 MG/1
40 TABLET, DELAYED RELEASE ORAL EVERY 12 HOURS
Refills: 0 | Status: DISCONTINUED | OUTPATIENT
Start: 2024-10-04 | End: 2024-10-07

## 2024-10-04 RX ORDER — ONDANSETRON HCL/PF 4 MG/2 ML
4 VIAL (ML) INJECTION ONCE
Refills: 0 | Status: COMPLETED | OUTPATIENT
Start: 2024-10-04 | End: 2024-10-04

## 2024-10-04 RX ORDER — SODIUM CHLORIDE 0.9 % (FLUSH) 0.9 %
1000 SYRINGE (ML) INJECTION ONCE
Refills: 0 | Status: COMPLETED | OUTPATIENT
Start: 2024-10-04 | End: 2024-10-04

## 2024-10-04 RX ORDER — INFLUENZA VIRUS VACCINE 15; 15; 15; 15 UG/.5ML; UG/.5ML; UG/.5ML; UG/.5ML
0.5 SUSPENSION INTRAMUSCULAR ONCE
Refills: 0 | Status: COMPLETED | OUTPATIENT
Start: 2024-10-04 | End: 2024-10-08

## 2024-10-04 RX ORDER — CHLORHEXIDINE GLUCONATE ORAL RINSE 1.2 MG/ML
1 SOLUTION DENTAL
Refills: 0 | Status: DISCONTINUED | OUTPATIENT
Start: 2024-10-04 | End: 2024-10-04

## 2024-10-04 RX ORDER — CEFTRIAXONE SODIUM 1 G
1000 VIAL (EA) INJECTION EVERY 24 HOURS
Refills: 0 | Status: DISCONTINUED | OUTPATIENT
Start: 2024-10-04 | End: 2024-10-08

## 2024-10-04 RX ORDER — PANTOPRAZOLE SODIUM 40 MG/1
40 TABLET, DELAYED RELEASE ORAL ONCE
Refills: 0 | Status: COMPLETED | OUTPATIENT
Start: 2024-10-04 | End: 2024-10-04

## 2024-10-04 RX ORDER — CEFTRIAXONE SODIUM 1 G
1000 VIAL (EA) INJECTION EVERY 24 HOURS
Refills: 0 | Status: DISCONTINUED | OUTPATIENT
Start: 2024-10-04 | End: 2024-10-04

## 2024-10-04 RX ORDER — MAGNESIUM SULFATE 500 MG/ML
1 VIAL (ML) INJECTION ONCE
Refills: 0 | Status: COMPLETED | OUTPATIENT
Start: 2024-10-04 | End: 2024-10-04

## 2024-10-04 RX ADMIN — HYDROMORPHONE HYDROCHLORIDE 0.25 MILLIGRAM(S): 1 INJECTION, SOLUTION INTRAMUSCULAR; INTRAVENOUS; SUBCUTANEOUS at 19:55

## 2024-10-04 RX ADMIN — PANTOPRAZOLE SODIUM 40 MILLIGRAM(S): 40 TABLET, DELAYED RELEASE ORAL at 17:14

## 2024-10-04 RX ADMIN — Medication 400 MILLIGRAM(S): at 17:14

## 2024-10-04 RX ADMIN — HYDROMORPHONE HYDROCHLORIDE 0.25 MILLIGRAM(S): 1 INJECTION, SOLUTION INTRAMUSCULAR; INTRAVENOUS; SUBCUTANEOUS at 18:19

## 2024-10-04 RX ADMIN — Medication 100 GRAM(S): at 20:15

## 2024-10-04 RX ADMIN — Medication 4 MILLIGRAM(S): at 07:10

## 2024-10-04 RX ADMIN — THIAMINE HYDROCHLORIDE 100 MILLIGRAM(S): 100 INJECTION, SOLUTION INTRAMUSCULAR; INTRAVENOUS at 10:32

## 2024-10-04 RX ADMIN — Medication 1000 MILLIGRAM(S): at 18:14

## 2024-10-04 RX ADMIN — PANTOPRAZOLE SODIUM 40 MILLIGRAM(S): 40 TABLET, DELAYED RELEASE ORAL at 06:54

## 2024-10-04 RX ADMIN — OCTREOTIDE ACETATE 10 MICROGRAM(S)/HR: 50 INJECTION, SOLUTION INTRAVENOUS; SUBCUTANEOUS at 15:22

## 2024-10-04 RX ADMIN — HYDROMORPHONE HYDROCHLORIDE 0.25 MILLIGRAM(S): 1 INJECTION, SOLUTION INTRAMUSCULAR; INTRAVENOUS; SUBCUTANEOUS at 20:25

## 2024-10-04 RX ADMIN — Medication 25 GRAM(S): at 12:22

## 2024-10-04 RX ADMIN — Medication 1000 MILLIGRAM(S): at 07:09

## 2024-10-04 RX ADMIN — HYDROMORPHONE HYDROCHLORIDE 0.25 MILLIGRAM(S): 1 INJECTION, SOLUTION INTRAMUSCULAR; INTRAVENOUS; SUBCUTANEOUS at 19:19

## 2024-10-04 RX ADMIN — OCTREOTIDE ACETATE 50 MICROGRAM(S): 50 INJECTION, SOLUTION INTRAVENOUS; SUBCUTANEOUS at 07:14

## 2024-10-04 RX ADMIN — FOLIC ACID 1 MILLIGRAM(S): 1 TABLET ORAL at 10:31

## 2024-10-04 RX ADMIN — CHLORHEXIDINE GLUCONATE ORAL RINSE 1 APPLICATION(S): 1.2 SOLUTION DENTAL at 09:00

## 2024-10-04 RX ADMIN — Medication 1000 MILLILITER(S): at 06:54

## 2024-10-04 RX ADMIN — OCTREOTIDE ACETATE 10 MICROGRAM(S)/HR: 50 INJECTION, SOLUTION INTRAVENOUS; SUBCUTANEOUS at 07:14

## 2024-10-04 NOTE — ED ADULT TRIAGE NOTE - CHIEF COMPLAINT QUOTE
patient ambulatory to triage with complaints of three episodes vomiting bright red blood with one episode of black stool at 0400 this morning. history of esophageal varices and GI bleed needing transfusion. complains of mild mid lightheadedness and mid epigastric pain. placed in wheelchair at triage. GI: dr raymundo.

## 2024-10-04 NOTE — H&P ADULT - HISTORY OF PRESENT ILLNESS
58 yo female, PMHx  60 yo female, PMHx alcohol use disorder, alcoholic cirrhosis with ascites, portal hypertension and esophageal varices, history of esophageal variceal hemorrhage s/p banding (most recently 3 bands 4/2024), chronic dizziness/hearing loss being evaluated for Meniere's disease, chronic hypotension, who presented to the ED for evaluation of hematemesis. Patient reports feeling unwell yesterday, more dizzy than usual. Had taco bell for dinner following which she had an episode of non-bloody dark brown emesis at approximately 1700. She awoke at 0300 nauseous, developed epigastric abdominal pain and bloating, and then vomited bright red blood. In total, had 3 episodes of bright red bloody emesis and 1 episode of melena at home. She reports associated palpitations, chills, diaphoresis, and generalized weakness. Scleral icterus is at baseline per her significant other. While in ED, she had 2 additional episodes of hematemesis. She was found to be tachycardic and hypotensive with lactate 3.6, ordered to received 2u PRBC and given Zofran with improvement. ICU and GI were consulted.     Of note, patient reports she has relapsed for both alcohol and tobacco due to social stressors - consumes~2 glasses of wine about 3 days per week, and smoking approximately 2 cigarettes daily (formerly 1PPD smoker).

## 2024-10-04 NOTE — ED ADULT NURSE REASSESSMENT NOTE - NS ED NURSE REASSESS COMMENT FT1
Rounding completed. No questions or concerns at this time. Denies pain, reports feeling better than arrival to ED. PT updated and aware of plan. Call bell in reach.

## 2024-10-04 NOTE — H&P ADULT - NSHPSOCIALHISTORY_GEN_ALL_CORE
Lives at home with her significant other, Holden. Domiciled in an apartment, which they need to vacate within the month and has not yet found new housing.

## 2024-10-04 NOTE — ED ADULT NURSE NOTE - NSFALLUNIVINTERV_ED_ALL_ED
Bed/Stretcher in lowest position, wheels locked, appropriate side rails in place/Call bell, personal items and telephone in reach/Instruct patient to call for assistance before getting out of bed/chair/stretcher/Non-slip footwear applied when patient is off stretcher/[place] to call system/Physically safe environment - no spills, clutter or unnecessary equipment/Purposeful proactive rounding/Room/bathroom lighting operational, light cord in reach

## 2024-10-04 NOTE — H&P ADULT - SOCIAL HISTORY: ALCOHOL USE
alcohol use disorder, relapsed due to social stressors drinking several times per week, estimates 2 glasses of wine 3 days per week

## 2024-10-04 NOTE — PROVIDER CONTACT NOTE (OTHER) - SITUATION
@582am Called spoke with [facility] answering service is aware. STAT Consult for Dr.Kavitha Chen. @632am Called spoke with [facility] answering service is aware. STAT Consult for Dr.Kavitha Chen.  @652am Dr. OWENS call spole to .

## 2024-10-04 NOTE — ED PROVIDER NOTE - GASTROINTESTINAL, MLM
Abdomen soft, non-tender, no guarding. Abdomen soft, non-tender, no guarding. + hematemesis of large volume of blood about 200 cc.

## 2024-10-04 NOTE — ED PROVIDER NOTE - CLINICAL SUMMARY MEDICAL DECISION MAKING FREE TEXT BOX
Pt with acute upper GI bleeding likely from esophageal varices given hx of alcohol cirrhosis, previous episodes with transfusion and banding via Dr. Goodson. Pt given 2 units of rapid untyped and un crossed O- to improve BP and perfusion. Pt with improve skin tone, decreased palor, increased vigor and improved BP. Spoke with on call GI and Dr. Goodson at beside. Spoke with ICU team who is also at bedside. Updated pt and family with the results prior to admission.

## 2024-10-04 NOTE — ED PROVIDER NOTE - OBJECTIVE STATEMENT
59 year old female with PMH of ETOH use, depression, skin cancer, hx of esophageal varices with intervention via Dr. Goodson in the past, hx of profound anemia from blood loss, presents with spouse with cc of hematemesis today, 4 episode of 100-200 cc of blood loss and feeling lightheaded and dizzy and pale appearing as per spouse. Pt also states dark stools noted.

## 2024-10-04 NOTE — CONSULT NOTE ADULT - ASSESSMENT
Imp:  Upper GI bleed, presumably variceal although could be M-W tear, ulcer etc    Rec:  Octreotide, protonix and ceftriaxone as ordered  Tentative plan for EGD at 3:30 PM today - However, if bleeding becomes more active, can probably arrange for earlier in the afternoon

## 2024-10-04 NOTE — H&P ADULT - ASSESSMENT
Per patient, she was recently changed from Carvedilol for portal HTN to Metoprolol 25mg daily by her Cardiologist to treat her tachycardia, only taken when SBP >90 60 yo female, PMHx alcohol use disorder, alcoholic cirrhosis with ascites, portal hypertension and esophageal varices, history of esophageal variceal hemorrhage s/p banding (most recently 3 bands 4/2024), chronic dizziness/hearing loss being evaluated for Meniere's disease, chronic hypotension, who presented to the ED for evaluation of hematemesis.    # Upper GI bleed, presumed variceal hemorrhage  # Hypovolemic shock  # Acute blood loss anemia  # Metabolic lactic acidosis  # Hypomagnesemia   # Thrombocytopenia  # Elevated INR  # Alcoholic cirrhosis with ascites  # Portal hypertension  # Alcohol use disorder    Admit to ICU   Given history of cirrhosis with portal HTN and prior variceal bleed requiring banding, presumed variceal bleed   Spoke with GI Dr. Goodson, plan for EGD today  CBC likely hemoconcentrated, resuscitating with 2u PRBC hemodynamics responded appropriately initially  Has chronic hypotension - monitoring end-organ perfusion markers, will start vasopressors if needed while transfusing to keep targeted MAP goal >60 or for concerns for recurrent shock  Trending CBC, coags will continue to transfuse as needed for Hgb <7 or symptomatic anemia. Avoid overtransfusion. Will give plt if ct falls <50 and likely plasma with next PRBC to balance transfusion.  Octreotide gtt and Protonix started  Ceftriaxone for SBP ppx  SCD for DVT ppx    Per patient, she was recently changed from Carvedilol for portal HTN to Metoprolol 25mg daily by her Cardiologist to treat her tachycardia, only to be taken when SBP >90. May be contributing factor to her variceal bleed. Will need to discuss treatment goals with multidisciplinary GI/Cardiology teams.    Discussed need for alcohol and tobacco cessation with patient, she understands that she needs to quit and that continued drinking could kill her. She uses these addictions as vices to cope with her social stressors and is interested in assistance with sobriety.     Case d/w ICU Attending Dr. Camp and GI Dr. Goodson at [facility].      CRITICAL CARE TIME SPENT: 86 minutes assessing presenting problems of acute critical illness, which pose high probability of life threatening deterioration or end organ damage/dysfunction, requiring frequent bedside reassessments and adjustments to plan of care, including medical decision making, initiating plan of care, reviewing data, reviewing radiologic exams, discussing with multidisciplinary team, discussing goals of care. Critical Care time is non-inclusive of independent time spent on procedures performed. Date of note entry represents date of services rendered.

## 2024-10-04 NOTE — PATIENT PROFILE ADULT - FUNCTIONAL ASSESSMENT - BASIC MOBILITY SECTION LABEL
The pharmacy is requesting a refill of the below medication which has been pended for you:     Requested Prescriptions     Pending Prescriptions Disp Refills    rOPINIRole (REQUIP) 1 MG tablet [Pharmacy Med Name: ROPINIROLE 1MG TABLETS] 270 tablet 1     Sig: TAKE 1 TABLET BY MOUTH THREE TIMES DAILY       Last Appointment Date: 9/15/2023  Next Appointment Date: 11/13/2023    Allergies   Allergen Reactions    Isosorbide Nitrate Other (See Comments)
.

## 2024-10-04 NOTE — ED PROVIDER NOTE - CRITICAL CARE ATTENDING CONTRIBUTION TO CARE
Extremely sick patient with hemorrhagic shock requiring rapid transfusion, intensive monitoring, reivew of old documentation, orders, and consultation with ICU and GI.

## 2024-10-04 NOTE — PROGRESS NOTE ADULT - SUBJECTIVE AND OBJECTIVE BOX
EGD:  Large varices - 4 bands applied  Portal gastropathy  No blood or bleeding    Rec:  Cont protonix, octreotide and ceftriaxone  NPO today but advance slowly in AM

## 2024-10-04 NOTE — ED PROVIDER NOTE - DIFFERENTIAL DIAGNOSIS
Pt with acute upper GI bleeding likely from esophageal varices given hx of alcohol cirrhosis, previous episodes with transfusion and banding via Dr. Goodson. Pt given 2 units of rapid untyped and un crossed O- to improve BP and perfusion. Pt with improve skin tone, decreased palor, increased vigor and improved BP. Spoke with on call GI and Dr. Goodson at beside. Spoke with ICU team who is also at bedside. Updated pt and family with the results prior to admission. Differential Diagnosis

## 2024-10-04 NOTE — PATIENT PROFILE ADULT - FALL HARM RISK - UNIVERSAL INTERVENTIONS
Bed in lowest position, wheels locked, appropriate side rails in place/Call bell, personal items and telephone in reach/Instruct patient to call for assistance before getting out of bed or chair/Non-slip footwear when patient is out of bed/[place] to call system/Physically safe environment - no spills, clutter or unnecessary equipment/Purposeful Proactive Rounding/Room/bathroom lighting operational, light cord in reach

## 2024-10-04 NOTE — ED PROVIDER NOTE - PROGRESS NOTE DETAILS
Blossom YOUNG: Pt seen by Dr. Goodson in ER, ICU in ER, ICU to admit pt via Dr. Ryan. ICU and GI consult appreciated.

## 2024-10-04 NOTE — H&P ADULT - GASTROINTESTINAL
details… dull to percussion/soft/nontender/no guarding/no rigidity/distended/bowel sounds hyperactive

## 2024-10-04 NOTE — CONSULT NOTE ADULT - SUBJECTIVE AND OBJECTIVE BOX
HPI:  59 F with h/o variceal bleeding, cirrhosis 2/2 EtOH, has not followed up with any GI since last hospitalization, also has relapsed with EtOH, started with non-bloody emesis last night, then at 3 AM red hematemesis x 3 at home and x 2 here. Had black diarrhea x 1. Feels better with anti-emetics. No apin.    PAST MEDICAL & SURGICAL HISTORY:  Alcohol abuse      Depression      Withdrawal seizures      History of basal cell carcinoma excision      Squamous cell skin cancer      Hemorrhoids      2019 novel coronavirus disease (COVID-19)      History of ear, nose, and throat (ENT) surgery      H/O basal cell carcinoma excision      H/O:           Home Medications:  cholecalciferol 50 mcg (2000 intl units) oral tablet: 1 tab(s) orally once a day (2024 09:33)  Coreg 3.125 mg oral tablet: 1 tab(s) orally 2 times a day (2024 09:33)  predniSONE 20 mg oral tablet: 1 tab(s) orally once a day (2024 16:48)      MEDICATIONS  (STANDING):  cefTRIAXone Injectable. 1000 milliGRAM(s) IV Push every 24 hours  octreotide  Infusion 50 MICROgram(s)/Hr (10 mL/Hr) IV Continuous <Continuous>  pantoprazole  Injectable 40 milliGRAM(s) IV Push every 12 hours    MEDICATIONS  (PRN):      Allergies    Zithromax (Unknown)    Intolerances    morphine (Nausea)      SOCIAL HISTORY:    FAMILY HISTORY:  FH: pancreatic cancer (Mother)    Family history of heart attack (Father)    Family history of CVA (Father)        ROS  As above  Otherwise unremarkable    Vital Signs Last 24 Hrs  T(C): 35.9 (04 Oct 2024 07:24), Max: 36.7 (04 Oct 2024 07:21)  T(F): 96.7 (04 Oct 2024 07:24), Max: 98 (04 Oct 2024 07:21)  HR: 100 (04 Oct 2024 07:24) (97 - 128)  BP: 107/72 (04 Oct 2024 07:24) (76/48 - 107/72)  BP(mean): 83 (04 Oct 2024 07:24) (58 - 83)  RR: 18 (04 Oct 2024 07:24) (15 - 26)  SpO2: 91% (04 Oct 2024 07:24) (91% - 100%)    Parameters below as of 04 Oct 2024 07:24  Patient On (Oxygen Delivery Method): room air        Constitutional: NAD, jaundice  Respiratory: CTAB  Cardiovascular: S1 and S2, RRR  Gastrointestinal: BS+, soft, NT/ND  Extremities: No peripheral edema  Psychiatric: Normal mood, normal affect  Skin: No rashes    LABS:                        10.8   6.70  )-----------( x        ( 04 Oct 2024 06:28 )             33.2     10-04    139  |  107  |  22  ----------------------------<  137[H]  4.3   |  21[L]  |  0.68    Ca    9.0      04 Oct 2024 06:28    TPro  7.2  /  Alb  2.5[L]  /  TBili  4.8[H]  /  DBili  x   /  AST  156[H]  /  ALT  41  /  AlkPhos  423[H]  10-04    PT/INR - ( 04 Oct 2024 06:28 )   PT: 17.0 sec;   INR: 1.48 ratio         PTT - ( 04 Oct 2024 06:28 )  PTT:37.6 sec  LIVER FUNCTIONS - ( 04 Oct 2024 06:28 )  Alb: 2.5 g/dL / Pro: 7.2 gm/dL / ALK PHOS: 423 U/L / ALT: 41 U/L / AST: 156 U/L / GGT: x             RADIOLOGY & ADDITIONAL STUDIES:

## 2024-10-04 NOTE — ED ADULT NURSE NOTE - CHPI ED NUR TIMING2
Subjective:  Ms. Wanda Ling is a pleasant 78-year-old lady, who comes in today with a one week history of nasal congestion, yellowish to greenish nasal drainage, pain over her sinuses, as well as bilateral ear fullness. She denies any shortness of breath, but has had an occasional cough that she attributes to the drainage. She denies any wheezing or hemoptysis. Denies chills or fever. Denies nausea or vomiting. Past Medical History:   Diagnosis Date    Adverse effect of anesthesia     prolonged sleepiness    Arrhythmia     rapid heart rate per patient, many years ago    Arthritis     left hand, right big toe and foot, bursitis in right hip    Calculus of kidney     Constipation, chronic 7/31/2017    Dyspepsia and other specified disorders of function of stomach     Family history of diabetes mellitus 7/31/2017    Family history of hyperlipidemia 7/31/2017    Hypothyroidism 7/31/2017    Ill-defined condition     kidney stones    Ill-defined condition     dry eyes    Vitamin D deficiency 7/31/2017     Past Surgical History:   Procedure Laterality Date    HX CATARACT REMOVAL      bilateral    HX CHOLECYSTECTOMY  10/18/2017    lap dale with grams    HX DILATION AND CURETTAGE      HX HYSTERECTOMY  1972    HX OTHER SURGICAL      colonoscopy    HX UROLOGICAL      lithotripsy       Current Outpatient Medications on File Prior to Visit   Medication Sig Dispense Refill    senna (SENNA) 8.6 mg tablet Take 1 Tab by mouth daily.  levothyroxine (SYNTHROID) 25 mcg tablet Take 1 Tab by mouth Daily (before breakfast). 90 Tab 3    dextran 70-hypromellose (ARTIFICIAL TEARS,[id number],) ophthalmic solution Administer  to both eyes as needed.  POLYETHYLENE GLYCOL 3350 (MIRALAX PO) Take  by mouth as needed.  cholecalciferol, vitamin D3, (VITAMIN D3) 2,000 unit tab Take  by mouth every month.       trimethoprim-sulfamethoxazole (BACTRIM)  mg per tablet Take 2 Tabs by mouth two (2) times a day. Indications: UTI      aspirin delayed-release 81 mg tablet Take 1 Tab by mouth two (2) times a day. 60 Tab 0    docusate sodium (COLACE) 100 mg capsule Take 100 mg by mouth two (2) times daily as needed for Constipation.  raNITIdine (ZANTAC) 150 mg tablet Take 150 mg by mouth two (2) times daily as needed for Indigestion. No current facility-administered medications on file prior to visit. Allergies   Allergen Reactions    Chlorhexidine Towelette Rash   Physical Examination:  GENERAL:  Pleasant, elderly lady in no acute distress. She is alert and oriented. She answers my questions appropriately. VITALS:  Blood pressure:  128/64. Pulse:  69.  Respirations:  16.  Temperature:  98.3. O2 sat:  98%. HEENT:  Normocephalic, atraumatic. TMs normal.  Mouth mucosa pink. Tongue midline. Pharynx erythematous without presence of exudate. She is tender over her maxillary sinuses. NECK:  Supple without adenopathy. CHEST:  Lungs clear to auscultation, no rales or wheezes. CV:  Heart regular rhythm without murmur. EXTREMITIES:  No edema or calf tenderness. Distal pulses were present. Impression:  1. Acute sinusitis. Plan:  1. It was opted to start her on a Z-Mike, along with a Medrol Dosepak. 2. She is to increase fluids and rest.  3. She may use Tylenol every four hours as needed for fever or aches. 4. She certainly will contact us should she fail to improve or if her condition worsens. sudden onset

## 2024-10-04 NOTE — ED ADULT NURSE NOTE - OBJECTIVE STATEMENT
Pt is 59y female, A&Ox4, breathing unlabored, presents ambulatory to ED with c.o vomiting blood and bloody stools. Pt endorses hx of 14 blood transfusions in the last 1.5 year. hx of esophogeal varices, cirrhosis of the liver, and GI bleed. Pt denies pain, endorses N/V / light handedness. Pt endorses eating taco bell last night and thought her vomiting after was from taco [employer].

## 2024-10-04 NOTE — H&P ADULT - NSICDXPASTSURGICALHX_GEN_ALL_CORE_FT
PAST SURGICAL HISTORY:  H/O basal cell carcinoma excision     H/O:      History of ear, nose, and throat (ENT) surgery      No

## 2024-10-05 LAB
ALBUMIN SERPL ELPH-MCNC: 2.1 G/DL — LOW (ref 3.3–5)
ALP SERPL-CCNC: 306 U/L — HIGH (ref 40–120)
ALT FLD-CCNC: 31 U/L — SIGNIFICANT CHANGE UP (ref 12–78)
ANION GAP SERPL CALC-SCNC: 6 MMOL/L — SIGNIFICANT CHANGE UP (ref 5–17)
APTT BLD: 36 SEC — HIGH (ref 24.5–35.6)
AST SERPL-CCNC: 121 U/L — HIGH (ref 15–37)
BASOPHILS # BLD AUTO: 0.04 K/UL — SIGNIFICANT CHANGE UP (ref 0–0.2)
BASOPHILS NFR BLD AUTO: 0.7 % — SIGNIFICANT CHANGE UP (ref 0–2)
BILIRUB DIRECT SERPL-MCNC: 2.7 MG/DL — HIGH (ref 0–0.3)
BILIRUB INDIRECT FLD-MCNC: 1.2 MG/DL — HIGH (ref 0.2–1)
BILIRUB SERPL-MCNC: 3.9 MG/DL — HIGH (ref 0.2–1.2)
BUN SERPL-MCNC: 43 MG/DL — HIGH (ref 7–23)
CALCIUM SERPL-MCNC: 8.1 MG/DL — LOW (ref 8.5–10.1)
CHLORIDE SERPL-SCNC: 107 MMOL/L — SIGNIFICANT CHANGE UP (ref 96–108)
CO2 SERPL-SCNC: 25 MMOL/L — SIGNIFICANT CHANGE UP (ref 22–31)
CREAT SERPL-MCNC: 0.63 MG/DL — SIGNIFICANT CHANGE UP (ref 0.5–1.3)
EGFR: 101 ML/MIN/1.73M2 — SIGNIFICANT CHANGE UP
EOSINOPHIL # BLD AUTO: 0.17 K/UL — SIGNIFICANT CHANGE UP (ref 0–0.5)
EOSINOPHIL NFR BLD AUTO: 3.2 % — SIGNIFICANT CHANGE UP (ref 0–6)
GLUCOSE SERPL-MCNC: 97 MG/DL — SIGNIFICANT CHANGE UP (ref 70–99)
HCT VFR BLD CALC: 27.5 % — LOW (ref 34.5–45)
HCT VFR BLD CALC: 28.9 % — LOW (ref 34.5–45)
HGB BLD-MCNC: 9.1 G/DL — LOW (ref 11.5–15.5)
HGB BLD-MCNC: 9.4 G/DL — LOW (ref 11.5–15.5)
IMM GRANULOCYTES NFR BLD AUTO: 0.7 % — SIGNIFICANT CHANGE UP (ref 0–0.9)
INR BLD: 1.35 RATIO — HIGH (ref 0.85–1.16)
LACTATE SERPL-SCNC: 1.1 MMOL/L — SIGNIFICANT CHANGE UP (ref 0.7–2)
LYMPHOCYTES # BLD AUTO: 1.03 K/UL — SIGNIFICANT CHANGE UP (ref 1–3.3)
LYMPHOCYTES # BLD AUTO: 19.3 % — SIGNIFICANT CHANGE UP (ref 13–44)
MAGNESIUM SERPL-MCNC: 2.4 MG/DL — SIGNIFICANT CHANGE UP (ref 1.6–2.6)
MCHC RBC-ENTMCNC: 32.5 GM/DL — SIGNIFICANT CHANGE UP (ref 32–36)
MCHC RBC-ENTMCNC: 32.9 PG — SIGNIFICANT CHANGE UP (ref 27–34)
MCHC RBC-ENTMCNC: 32.9 PG — SIGNIFICANT CHANGE UP (ref 27–34)
MCHC RBC-ENTMCNC: 33.1 GM/DL — SIGNIFICANT CHANGE UP (ref 32–36)
MCV RBC AUTO: 101 FL — HIGH (ref 80–100)
MCV RBC AUTO: 99.3 FL — SIGNIFICANT CHANGE UP (ref 80–100)
MONOCYTES # BLD AUTO: 0.43 K/UL — SIGNIFICANT CHANGE UP (ref 0–0.9)
MONOCYTES NFR BLD AUTO: 8.1 % — SIGNIFICANT CHANGE UP (ref 2–14)
NEUTROPHILS # BLD AUTO: 3.63 K/UL — SIGNIFICANT CHANGE UP (ref 1.8–7.4)
NEUTROPHILS NFR BLD AUTO: 68 % — SIGNIFICANT CHANGE UP (ref 43–77)
PHOSPHATE SERPL-MCNC: 3.5 MG/DL — SIGNIFICANT CHANGE UP (ref 2.5–4.5)
PLATELET # BLD AUTO: 50 K/UL — LOW (ref 150–400)
PLATELET # BLD AUTO: 56 K/UL — LOW (ref 150–400)
POTASSIUM SERPL-MCNC: 4 MMOL/L — SIGNIFICANT CHANGE UP (ref 3.5–5.3)
POTASSIUM SERPL-SCNC: 4 MMOL/L — SIGNIFICANT CHANGE UP (ref 3.5–5.3)
PROT SERPL-MCNC: 6.3 GM/DL — SIGNIFICANT CHANGE UP (ref 6–8.3)
PROTHROM AB SERPL-ACNC: 15.9 SEC — HIGH (ref 9.9–13.4)
RBC # BLD: 2.77 M/UL — LOW (ref 3.8–5.2)
RBC # BLD: 2.86 M/UL — LOW (ref 3.8–5.2)
RBC # FLD: 18.7 % — HIGH (ref 10.3–14.5)
RBC # FLD: 18.7 % — HIGH (ref 10.3–14.5)
SODIUM SERPL-SCNC: 138 MMOL/L — SIGNIFICANT CHANGE UP (ref 135–145)
WBC # BLD: 4.96 K/UL — SIGNIFICANT CHANGE UP (ref 3.8–10.5)
WBC # BLD: 5.34 K/UL — SIGNIFICANT CHANGE UP (ref 3.8–10.5)
WBC # FLD AUTO: 4.96 K/UL — SIGNIFICANT CHANGE UP (ref 3.8–10.5)
WBC # FLD AUTO: 5.34 K/UL — SIGNIFICANT CHANGE UP (ref 3.8–10.5)

## 2024-10-05 PROCEDURE — 99233 SBSQ HOSP IP/OBS HIGH 50: CPT

## 2024-10-05 PROCEDURE — 76700 US EXAM ABDOM COMPLETE: CPT | Mod: 26

## 2024-10-05 RX ORDER — MAG HYDROX/ALUMINUM HYD/SIMETH 200-200-20
30 SUSPENSION, ORAL (FINAL DOSE FORM) ORAL EVERY 4 HOURS
Refills: 0 | Status: DISCONTINUED | OUTPATIENT
Start: 2024-10-05 | End: 2024-10-08

## 2024-10-05 RX ORDER — COCOA BUTTER, PHENYLEPHRINE HCL 2211; 6.5 MG/1; MG/1
1 SUPPOSITORY RECTAL DAILY
Refills: 0 | Status: DISCONTINUED | OUTPATIENT
Start: 2024-10-05 | End: 2024-10-08

## 2024-10-05 RX ADMIN — THIAMINE HYDROCHLORIDE 100 MILLIGRAM(S): 100 INJECTION, SOLUTION INTRAMUSCULAR; INTRAVENOUS at 10:29

## 2024-10-05 RX ADMIN — Medication 30 MILLILITER(S): at 23:33

## 2024-10-05 RX ADMIN — CHLORHEXIDINE GLUCONATE ORAL RINSE 1 APPLICATION(S): 1.2 SOLUTION DENTAL at 05:30

## 2024-10-05 RX ADMIN — COCOA BUTTER, PHENYLEPHRINE HCL 1 APPLICATION(S): 2211; 6.5 SUPPOSITORY RECTAL at 12:06

## 2024-10-05 RX ADMIN — PANTOPRAZOLE SODIUM 40 MILLIGRAM(S): 40 TABLET, DELAYED RELEASE ORAL at 05:31

## 2024-10-05 RX ADMIN — OCTREOTIDE ACETATE 10 MICROGRAM(S)/HR: 50 INJECTION, SOLUTION INTRAVENOUS; SUBCUTANEOUS at 01:27

## 2024-10-05 RX ADMIN — FOLIC ACID 1 MILLIGRAM(S): 1 TABLET ORAL at 10:29

## 2024-10-05 RX ADMIN — OCTREOTIDE ACETATE 10 MICROGRAM(S)/HR: 50 INJECTION, SOLUTION INTRAVENOUS; SUBCUTANEOUS at 13:58

## 2024-10-05 RX ADMIN — PANTOPRAZOLE SODIUM 40 MILLIGRAM(S): 40 TABLET, DELAYED RELEASE ORAL at 17:44

## 2024-10-05 NOTE — DIETITIAN INITIAL EVALUATION ADULT - ADD RECOMMEND
1) Advance diet to Clear Liquids to Full Liquids to regular, when medically feasible as per GI/ intensivist and as tolerated   2) Once diet advances, will add Ensure + HP shake BID to optimize nutritional needs (provides 350 kcal, 20g protein/ shake)  3) Closely monitor bowel movements. Trend H/H  4) Continue to provide 100 mg thiamine, 1 mg folic acid, MVI w/ minerals daily to ensure 100% RDA met 2/2 ETOH abuse and malnutrition   5) Monitor daily wt to track/trend changes; strict I/Os  6) Consider to obtain vitamin D 25OH level to assess nutriture  7) Confirm goals of care regarding nutrition support  RD will continue to monitor PO intake/ tolerance, labs, hydration, and wt prn.

## 2024-10-05 NOTE — DIETITIAN INITIAL EVALUATION ADULT - NSFNSGIIOFT_GEN_A_CORE
I&O's Detail    04 Oct 2024 07:01  -  05 Oct 2024 07:00  --------------------------------------------------------  IN:    IV PiggyBack: 200 mL    Octreotide: 262 mL  Total IN: 462 mL    OUT:  Total OUT: 0 mL    Total NET: 462 mL

## 2024-10-05 NOTE — PROGRESS NOTE ADULT - ASSESSMENT
1. Hematemesis s/p EGD with large varices s/p banding  2. Alcohol cirrhosis    Recommendation  1. Continue PPI and octreotide gtt for 48 hrs more  2. Trend LFTs and INR daily  3. Okay to start clear liquids  4. Monitor for withdrawal   1. Hematemesis s/p EGD with large varices s/p banding  2. Alcohol cirrhosis    Recommendation  1. Continue PPI and octreotide gtt for 48 hrs more  2. Trend LFTs and INR daily  3. Okay to start clear liquids  4. Monitor for withdrawal  5. Follow up Us abdomen; if evidence of ascites should get diagnostic tap with cell count

## 2024-10-05 NOTE — PROGRESS NOTE ADULT - SUBJECTIVE AND OBJECTIVE BOX
OVERNIGHT EVENTS / SUBJECTIVE: Patient seen and examined at bedside.     Patient    OBJECTIVE:    VITAL SIGNS:  ICU Vital Signs Last 24 Hrs  T(C): 36.4 (05 Oct 2024 04:00), Max: 36.7 (04 Oct 2024 07:21)  T(F): 97.5 (05 Oct 2024 04:00), Max: 98.1 (04 Oct 2024 08:25)  HR: 97 (05 Oct 2024 06:00) (86 - 107)  BP: 91/58 (05 Oct 2024 06:00) (80/60 - 108/79)  BP(mean): 69 (05 Oct 2024 06:00) (60 - 89)  ABP: --  ABP(mean): --  RR: 15 (05 Oct 2024 06:00) (13 - 30)  SpO2: 95% (05 Oct 2024 06:00) (87% - 100%)    O2 Parameters below as of 05 Oct 2024 06:00  Patient On (Oxygen Delivery Method): nasal cannula  O2 Flow (L/min): 2            10-04 @ 07:01  -  10-05 @ 06:54  --------------------------------------------------------  IN: 462 mL / OUT: 0 mL / NET: 462 mL      CAPILLARY BLOOD GLUCOSE          PHYSICAL EXAM:    General: NAD  HEENT: NC/AT; PERRL, clear conjunctiva  Neck: supple  Respiratory: CTA b/l  Cardiovascular: +S1/S2; RRR  Abdomen: soft, NT/ND; +BS x4  Extremities: WWP, 2+ peripheral pulses b/l; no LE edema  Skin: normal color and turgor; no rash  Neurological:    MEDICATIONS:  MEDICATIONS  (STANDING):  cefTRIAXone Injectable. 1000 milliGRAM(s) IV Push every 24 hours  chlorhexidine 4% Liquid 1 Application(s) Topical <User Schedule>  folic acid Injectable 1 milliGRAM(s) IV Push daily  influenza   Vaccine 0.5 milliLiter(s) IntraMuscular once  octreotide  Infusion 50 MICROgram(s)/Hr (10 mL/Hr) IV Continuous <Continuous>  pantoprazole  Injectable 40 milliGRAM(s) IV Push every 12 hours  thiamine Injectable 100 milliGRAM(s) IV Push daily    MEDICATIONS  (PRN):      ALLERGIES:  Allergies    Zithromax (Unknown)    Intolerances    morphine (Nausea)      LABS:                        9.1    5.34  )-----------( 56       ( 05 Oct 2024 05:22 )             27.5     Hemoglobin: 9.1 g/dL (10-05 @ 05:22)  Hemoglobin: 9.5 g/dL (10-04 @ 18:36)  Hemoglobin: 10.4 g/dL (10-04 @ 11:00)  Hemoglobin: 10.8 g/dL (10-04 @ 06:28)    CBC Full  -  ( 05 Oct 2024 05:22 )  WBC Count : 5.34 K/uL  RBC Count : 2.77 M/uL  Hemoglobin : 9.1 g/dL  Hematocrit : 27.5 %  Platelet Count - Automated : 56 K/uL  Mean Cell Volume : 99.3 fl  Mean Cell Hemoglobin : 32.9 pg  Mean Cell Hemoglobin Concentration : 33.1 gm/dL  Auto Neutrophil # : 3.63 K/uL  Auto Lymphocyte # : 1.03 K/uL  Auto Monocyte # : 0.43 K/uL  Auto Eosinophil # : 0.17 K/uL  Auto Basophil # : 0.04 K/uL  Auto Neutrophil % : 68.0 %  Auto Lymphocyte % : 19.3 %  Auto Monocyte % : 8.1 %  Auto Eosinophil % : 3.2 %  Auto Basophil % : 0.7 %    10-05    138  |  107  |  43[H]  ----------------------------<  97  4.0   |  25  |  0.63    Ca    8.1[L]      05 Oct 2024 05:22  Phos  3.5     10-05  Mg     2.4     10-05    TPro  6.3  /  Alb  2.1[L]  /  TBili  3.9[H]  /  DBili  2.7[H]  /  AST  121[H]  /  ALT  31  /  AlkPhos  306[H]  10-05    Creatinine Trend: 0.63<--, 0.77<--, 0.68<--  LIVER FUNCTIONS - ( 05 Oct 2024 05:22 )  Alb: 2.1 g/dL / Pro: 6.3 gm/dL / ALK PHOS: 306 U/L / ALT: 31 U/L / AST: 121 U/L / GGT: x           PT/INR - ( 05 Oct 2024 05:22 )   PT: 15.9 sec;   INR: 1.35 ratio         PTT - ( 05 Oct 2024 05:22 )  PTT:36.0 sec    hs Troponin:    Urinalysis Basic - ( 05 Oct 2024 05:22 )    Color: x / Appearance: x / SG: x / pH: x  Gluc: 97 mg/dL / Ketone: x  / Bili: x / Urobili: x   Blood: x / Protein: x / Nitrite: x   Leuk Esterase: x / RBC: x / WBC x   Sq Epi: x / Non Sq Epi: x / Bacteria: x    CSF:    EKG:   MICROBIOLOGY:    IMAGING:      Labs, imaging, EKG personally reviewed    RADIOLOGY & ADDITIONAL TESTS: Reviewed. OVERNIGHT EVENTS / SUBJECTIVE: Patient seen and examined at bedside.     Patient admitted yesterday for hematemesis in setting of known esophageal varices. S/p EGD w/ 4 bands applied to large varices, no blood or bleeding noted.    OBJECTIVE:    VITAL SIGNS:  ICU Vital Signs Last 24 Hrs  T(C): 36.4 (05 Oct 2024 04:00), Max: 36.7 (04 Oct 2024 07:21)  T(F): 97.5 (05 Oct 2024 04:00), Max: 98.1 (04 Oct 2024 08:25)  HR: 97 (05 Oct 2024 06:00) (86 - 107)  BP: 91/58 (05 Oct 2024 06:00) (80/60 - 108/79)  BP(mean): 69 (05 Oct 2024 06:00) (60 - 89)  ABP: --  ABP(mean): --  RR: 15 (05 Oct 2024 06:00) (13 - 30)  SpO2: 95% (05 Oct 2024 06:00) (87% - 100%)    O2 Parameters below as of 05 Oct 2024 06:00  Patient On (Oxygen Delivery Method): nasal cannula  O2 Flow (L/min): 2            10-04 @ 07:01  -  10-05 @ 06:54  --------------------------------------------------------  IN: 462 mL / OUT: 0 mL / NET: 462 mL      CAPILLARY BLOOD GLUCOSE          PHYSICAL EXAM:    General: NAD  HEENT: NC/AT; PERRL, clear conjunctiva  Neck: supple  Respiratory: CTA b/l  Cardiovascular: +S1/S2; RRR  Abdomen: soft, NT/ND; +BS x4  Extremities: WWP, 2+ peripheral pulses b/l; no LE edema  Skin: normal color and turgor; no rash  Neurological:    MEDICATIONS:  MEDICATIONS  (STANDING):  cefTRIAXone Injectable. 1000 milliGRAM(s) IV Push every 24 hours  chlorhexidine 4% Liquid 1 Application(s) Topical <User Schedule>  folic acid Injectable 1 milliGRAM(s) IV Push daily  influenza   Vaccine 0.5 milliLiter(s) IntraMuscular once  octreotide  Infusion 50 MICROgram(s)/Hr (10 mL/Hr) IV Continuous <Continuous>  pantoprazole  Injectable 40 milliGRAM(s) IV Push every 12 hours  thiamine Injectable 100 milliGRAM(s) IV Push daily    MEDICATIONS  (PRN):      ALLERGIES:  Allergies    Zithromax (Unknown)    Intolerances    morphine (Nausea)      LABS:                        9.1    5.34  )-----------( 56       ( 05 Oct 2024 05:22 )             27.5     Hemoglobin: 9.1 g/dL (10-05 @ 05:22)  Hemoglobin: 9.5 g/dL (10-04 @ 18:36)  Hemoglobin: 10.4 g/dL (10-04 @ 11:00)  Hemoglobin: 10.8 g/dL (10-04 @ 06:28)    CBC Full  -  ( 05 Oct 2024 05:22 )  WBC Count : 5.34 K/uL  RBC Count : 2.77 M/uL  Hemoglobin : 9.1 g/dL  Hematocrit : 27.5 %  Platelet Count - Automated : 56 K/uL  Mean Cell Volume : 99.3 fl  Mean Cell Hemoglobin : 32.9 pg  Mean Cell Hemoglobin Concentration : 33.1 gm/dL  Auto Neutrophil # : 3.63 K/uL  Auto Lymphocyte # : 1.03 K/uL  Auto Monocyte # : 0.43 K/uL  Auto Eosinophil # : 0.17 K/uL  Auto Basophil # : 0.04 K/uL  Auto Neutrophil % : 68.0 %  Auto Lymphocyte % : 19.3 %  Auto Monocyte % : 8.1 %  Auto Eosinophil % : 3.2 %  Auto Basophil % : 0.7 %    10-05    138  |  107  |  43[H]  ----------------------------<  97  4.0   |  25  |  0.63    Ca    8.1[L]      05 Oct 2024 05:22  Phos  3.5     10-05  Mg     2.4     10-05    TPro  6.3  /  Alb  2.1[L]  /  TBili  3.9[H]  /  DBili  2.7[H]  /  AST  121[H]  /  ALT  31  /  AlkPhos  306[H]  10-05    Creatinine Trend: 0.63<--, 0.77<--, 0.68<--  LIVER FUNCTIONS - ( 05 Oct 2024 05:22 )  Alb: 2.1 g/dL / Pro: 6.3 gm/dL / ALK PHOS: 306 U/L / ALT: 31 U/L / AST: 121 U/L / GGT: x           PT/INR - ( 05 Oct 2024 05:22 )   PT: 15.9 sec;   INR: 1.35 ratio         PTT - ( 05 Oct 2024 05:22 )  PTT:36.0 sec    hs Troponin:    Urinalysis Basic - ( 05 Oct 2024 05:22 )    Color: x / Appearance: x / SG: x / pH: x  Gluc: 97 mg/dL / Ketone: x  / Bili: x / Urobili: x   Blood: x / Protein: x / Nitrite: x   Leuk Esterase: x / RBC: x / WBC x   Sq Epi: x / Non Sq Epi: x / Bacteria: x    CSF:    EKG:   MICROBIOLOGY:    IMAGING:      Labs, imaging, EKG personally reviewed    RADIOLOGY & ADDITIONAL TESTS: Reviewed. OVERNIGHT EVENTS / SUBJECTIVE: Patient seen and examined at bedside.     Patient admitted yesterday for hematemesis in setting of known esophageal varices. S/p EGD w/ 4 bands applied to large varices, no blood or bleeding noted. PT noted small amount of melena w/ BM this AM.    OBJECTIVE:    VITAL SIGNS:  ICU Vital Signs Last 24 Hrs  T(C): 36.4 (05 Oct 2024 04:00), Max: 36.7 (04 Oct 2024 07:21)  T(F): 97.5 (05 Oct 2024 04:00), Max: 98.1 (04 Oct 2024 08:25)  HR: 97 (05 Oct 2024 06:00) (86 - 107)  BP: 91/58 (05 Oct 2024 06:00) (80/60 - 108/79)  BP(mean): 69 (05 Oct 2024 06:00) (60 - 89)  ABP: --  ABP(mean): --  RR: 15 (05 Oct 2024 06:00) (13 - 30)  SpO2: 95% (05 Oct 2024 06:00) (87% - 100%)    O2 Parameters below as of 05 Oct 2024 06:00  Patient On (Oxygen Delivery Method): nasal cannula  O2 Flow (L/min): 2            10-04 @ 07:01  -  10-05 @ 06:54  --------------------------------------------------------  IN: 462 mL / OUT: 0 mL / NET: 462 mL      CAPILLARY BLOOD GLUCOSE          PHYSICAL EXAM:    General: NAD  HEENT: NC/AT; PERRL, clear conjunctiva  Neck: supple  Respiratory: CTA b/l  Cardiovascular: +S1/S2; RRR  Abdomen: soft, mild upper abd tenderness, mild distension  Extremities: WWP, 2+ peripheral pulses b/l; no LE edema  Skin: normal color and turgor; no rash  Neurological: A&Ox3, no focal deficits    MEDICATIONS:  MEDICATIONS  (STANDING):  cefTRIAXone Injectable. 1000 milliGRAM(s) IV Push every 24 hours  chlorhexidine 4% Liquid 1 Application(s) Topical <User Schedule>  folic acid Injectable 1 milliGRAM(s) IV Push daily  influenza   Vaccine 0.5 milliLiter(s) IntraMuscular once  octreotide  Infusion 50 MICROgram(s)/Hr (10 mL/Hr) IV Continuous <Continuous>  pantoprazole  Injectable 40 milliGRAM(s) IV Push every 12 hours  thiamine Injectable 100 milliGRAM(s) IV Push daily    MEDICATIONS  (PRN):      ALLERGIES:  Allergies    Zithromax (Unknown)    Intolerances    morphine (Nausea)      LABS:                        9.1    5.34  )-----------( 56       ( 05 Oct 2024 05:22 )             27.5     Hemoglobin: 9.1 g/dL (10-05 @ 05:22)  Hemoglobin: 9.5 g/dL (10-04 @ 18:36)  Hemoglobin: 10.4 g/dL (10-04 @ 11:00)  Hemoglobin: 10.8 g/dL (10-04 @ 06:28)    CBC Full  -  ( 05 Oct 2024 05:22 )  WBC Count : 5.34 K/uL  RBC Count : 2.77 M/uL  Hemoglobin : 9.1 g/dL  Hematocrit : 27.5 %  Platelet Count - Automated : 56 K/uL  Mean Cell Volume : 99.3 fl  Mean Cell Hemoglobin : 32.9 pg  Mean Cell Hemoglobin Concentration : 33.1 gm/dL  Auto Neutrophil # : 3.63 K/uL  Auto Lymphocyte # : 1.03 K/uL  Auto Monocyte # : 0.43 K/uL  Auto Eosinophil # : 0.17 K/uL  Auto Basophil # : 0.04 K/uL  Auto Neutrophil % : 68.0 %  Auto Lymphocyte % : 19.3 %  Auto Monocyte % : 8.1 %  Auto Eosinophil % : 3.2 %  Auto Basophil % : 0.7 %    10-05    138  |  107  |  43[H]  ----------------------------<  97  4.0   |  25  |  0.63    Ca    8.1[L]      05 Oct 2024 05:22  Phos  3.5     10-05  Mg     2.4     10-05    TPro  6.3  /  Alb  2.1[L]  /  TBili  3.9[H]  /  DBili  2.7[H]  /  AST  121[H]  /  ALT  31  /  AlkPhos  306[H]  10-05    Creatinine Trend: 0.63<--, 0.77<--, 0.68<--  LIVER FUNCTIONS - ( 05 Oct 2024 05:22 )  Alb: 2.1 g/dL / Pro: 6.3 gm/dL / ALK PHOS: 306 U/L / ALT: 31 U/L / AST: 121 U/L / GGT: x           PT/INR - ( 05 Oct 2024 05:22 )   PT: 15.9 sec;   INR: 1.35 ratio         PTT - ( 05 Oct 2024 05:22 )  PTT:36.0 sec    hs Troponin:    Urinalysis Basic - ( 05 Oct 2024 05:22 )    Color: x / Appearance: x / SG: x / pH: x  Gluc: 97 mg/dL / Ketone: x  / Bili: x / Urobili: x   Blood: x / Protein: x / Nitrite: x   Leuk Esterase: x / RBC: x / WBC x   Sq Epi: x / Non Sq Epi: x / Bacteria: x    CSF:    EKG:   MICROBIOLOGY:    IMAGING:      Labs, imaging, EKG personally reviewed    RADIOLOGY & ADDITIONAL TESTS: Reviewed.

## 2024-10-05 NOTE — DIETITIAN INITIAL EVALUATION ADULT - OTHER INFO
58 y/o female w/ PMHx alcohol use disorder, alcoholic cirrhosis with ascites, portal hypertension and esophageal varices, history of esophageal variceal hemorrhage s/p banding (most recently 3 bands 4/2024), chronic dizziness/hearing loss being evaluated for Meniere's disease, chronic hypotension, who presented to the ED for evaluation of hematemesis. Patient reports feeling unwell yesterday, more dizzy than usual. Had [employer] for dinner following which she had an episode of non-bloody dark brown emesis at approximately 1700. She awoke at 0300 nauseous, developed epigastric abdominal pain and bloating, and then vomited bright red blood. In total, had 3 episodes of bright red bloody emesis and 1 episode of melena at home. She reports associated palpitations, chills, diaphoresis, and generalized weakness. While in ED, she had 2 additional episodes of hematemesis. She was found to be tachycardic and hypotensive with lactate 3.6, ordered to received 2u PRBC and given Zofran with improvement. ICU and GI were consulted.  S/p EGD (10/4/24): large varices, 4 bands applied, rec keep NPO  x 24 hrs and slowly advance PO diet to possible clears in AM.  Pending US abdomen to determine if ascites present; should get dx tap w/ cell count if ascites present per GI.    Known to nutr services, met criteria for PCM on previous admissions. NPO at time of RD visit this morning, pt reports being hungry and eager to eat. Per GI note can advance to clears today. UBW stated between 123-127#, depending on ascites. Wt hx reviewed in chart: 136# on 11/13/23 (taken by RD); 130# on 10/11/23 (taken by RD). Standing wt of 128.5# taken by RN on 10/4/24. Per wt hx review, no significant wt changes noted at this time. NFPE reveals mild to moderate muscle/fat wasting. C/o bloody diarrhea x 1 day, per GI likely residual from recent bleeding. ADAT per GI/ intensivist from [facility] to regular once medically feasible. Once diet advances will add Ensure + HP shake BID to optimize nutritional needs (provides 350 kcal, 20g protein/ shake) - pt receptive. See below for other recs.

## 2024-10-05 NOTE — PROGRESS NOTE ADULT - ASSESSMENT
58 y/o F, PMH alcohol use disorder, alcoholic cirrhosis with ascites, portal hypertension and esophageal varices, history of esophageal variceal hemorrhage s/p banding (most recently 3 bands 4/2024), chronic dizziness/hearing loss being evaluated for Meniere's disease, chronic hypotension, who presented to the ED for evaluation of hematemesis, given 2u pRBC with stabilization of BP, admitted to ICU for close monitoring and EGD.    Pt is s/p EGD on 10/4 with large varices, 4 bands applied.    Problems:  #Upper GI bleed likely due to varices  #Epigastric pain  #Cirrhosis due to alcohol use    Plan:  - At baseline mental status, pain control prn, monitor for alc withdrawal  - Hemodynamically stable currently, holding home metoprolol in setting of low BP and bleed, likely needs a different beta blocker than can also be preventative for varices  - On 2L NC, CXR clear, titrate as tolerated  - Per GI can advance to clear liquid today, c/w octreotide gtt x48h, PPI IV BID, CTX ppx  - Pt c/o upper abd pain, US abd showing trace ascites, equivocal for acute choley, will continue to monitor closely if abd pain persists or worsens or other clinical signs of infection will pursue HIDA   - Stable renal indices, continue to monitor  - Hold pharm DVT ppx in setting  58 y/o F, PMH alcohol use disorder, alcoholic cirrhosis with ascites, portal hypertension and esophageal varices, history of esophageal variceal hemorrhage s/p banding (most recently 3 bands 4/2024), chronic dizziness/hearing loss being evaluated for Meniere's disease, chronic hypotension, who presented to the ED for evaluation of hematemesis, given 2u pRBC with stabilization of BP, admitted to ICU for close monitoring and EGD.    Pt is s/p EGD on 10/4 with large varices, 4 bands applied.    Problems:  #Upper GI bleed likely due to varices  #Epigastric pain  #Cirrhosis due to alcohol use    Plan:  - At baseline mental status, pain control prn, monitor for alc withdrawal  - Hemodynamically stable currently, holding home metoprolol in setting of low BP and bleed, likely needs a different beta blocker than can also be preventative for varices  - On 2L NC, CXR clear, titrate as tolerated  - Per GI can advance to clear liquid today, c/w octreotide gtt x48h, PPI IV BID, CTX ppx  - Pt c/o upper abd pain, US abd showing trace ascites, equivocal for acute choley, will continue to monitor closely if abd pain persists or worsens or other clinical signs of infection will pursue HIDA   - Stable renal indices, continue to monitor  - Hold pharm DVT ppx in setting of bleed    Dispo: ICU, advance to clears, octreotide x48h, full code

## 2024-10-05 NOTE — DIETITIAN INITIAL EVALUATION ADULT - PERTINENT LABORATORY DATA
10-05    138  |  107  |  43[H]  ----------------------------<  97  4.0   |  25  |  0.63    Ca    8.1[L]      05 Oct 2024 05:22  Phos  3.5     10-05  Mg     2.4     10-05    TPro  6.3  /  Alb  2.1[L]  /  TBili  3.9[H]  /  DBili  2.7[H]  /  AST  121[H]  /  ALT  31  /  AlkPhos  306[H]  10-05

## 2024-10-05 NOTE — DIETITIAN INITIAL EVALUATION ADULT - ORAL INTAKE PTA/DIET HISTORY
Endorses fair to good appetite at home PTA. Consumes small frequent meals throughout the day. Avoids fried foods, consumes ensure shakes daily to increase protein intake. States 1 day PTA w/ hematemesis after eating 1/2 sandwich for lunch and [employer] for dinner. Per H&P: consumes~2 glasses of wine about 3 days per week.

## 2024-10-05 NOTE — PROGRESS NOTE ADULT - SUBJECTIVE AND OBJECTIVE BOX
Patient is a 59y old  Female who presents with a chief complaint of Variceal bleed, Hemorrhagic Shock (05 Oct 2024 06:54)      Subective: Seen and examined at bedside. Complains of mild abdominal discomfort. Had episodes of melena which is likely residual from recent bleeding.       PAST MEDICAL & SURGICAL HISTORY:  Alcohol abuse      Depression      Withdrawal seizures      History of basal cell carcinoma excision      Squamous cell skin cancer      Hemorrhoids      2019 novel coronavirus disease (COVID-19)      History of ear, nose, and throat (ENT) surgery      H/O basal cell carcinoma excision      H/O:           MEDICATIONS  (STANDING):  cefTRIAXone Injectable. 1000 milliGRAM(s) IV Push every 24 hours  chlorhexidine 4% Liquid 1 Application(s) Topical <User Schedule>  folic acid Injectable 1 milliGRAM(s) IV Push daily  hemorrhoidal Ointment 1 Application(s) Rectal daily  influenza   Vaccine 0.5 milliLiter(s) IntraMuscular once  octreotide  Infusion 50 MICROgram(s)/Hr (10 mL/Hr) IV Continuous <Continuous>  pantoprazole  Injectable 40 milliGRAM(s) IV Push every 12 hours  thiamine Injectable 100 milliGRAM(s) IV Push daily    MEDICATIONS  (PRN):      REVIEW OF SYSTEMS:    RESPIRATORY: No shortness of breath  CARDIOVASCULAR: No chest pain  All other review of systems is negative unless indicated above.    Vital Signs Last 24 Hrs  T(C): 36.8 (05 Oct 2024 08:00), Max: 36.8 (05 Oct 2024 08:00)  T(F): 98.2 (05 Oct 2024 08:00), Max: 98.2 (05 Oct 2024 08:00)  HR: 87 (05 Oct 2024 10:35) (85 - 100)  BP: 96/63 (05 Oct 2024 10:35) (83/56 - 106/59)  BP(mean): 74 (05 Oct 2024 10:35) (60 - 79)  RR: 20 (05 Oct 2024 10:35) (13 - 29)  SpO2: 94% (05 Oct 2024 10:35) (87% - 99%)    Parameters below as of 05 Oct 2024 10:35  Patient On (Oxygen Delivery Method): nasal cannula  O2 Flow (L/min): 2      PHYSICAL EXAM:    Constitutional: NAD, well-developed  Respiratory: CTAB  Cardiovascular: S1 and S2, RRR  Gastrointestinal: BS+, soft, mild ttp of abdomen  Extremities: No peripheral edema  Psychiatric: Normal mood, normal affect, no asterixis, AAO x 3    LABS:                        9.1    5.34  )-----------( 56       ( 05 Oct 2024 05:22 )             27.5     10    138  |  107  |  43[H]  ----------------------------<  97  4.0   |  25  |  0.63    Ca    8.1[L]      05 Oct 2024 05:22  Phos  3.5     10-  Mg     2.4     10    TPro  6.3  /  Alb  2.1[L]  /  TBili  3.9[H]  /  DBili  2.7[H]  /  AST  121[H]  /  ALT  31  /  AlkPhos  306[H]  10-05    PT/INR - ( 05 Oct 2024 05:22 )   PT: 15.9 sec;   INR: 1.35 ratio         PTT - ( 05 Oct 2024 05:22 )  PTT:36.0 sec  LIVER FUNCTIONS - ( 05 Oct 2024 05:22 )  Alb: 2.1 g/dL / Pro: 6.3 gm/dL / ALK PHOS: 306 U/L / ALT: 31 U/L / AST: 121 U/L / GGT: x             RADIOLOGY & ADDITIONAL STUDIES:

## 2024-10-05 NOTE — DIETITIAN INITIAL EVALUATION ADULT - NS FNS DIET ORDER
Diet, NPO after Midnight:      NPO Start Date: 04-Oct-2024,   NPO Start Time: 23:59  Except Medications (10-04-24 @ 16:09)

## 2024-10-05 NOTE — DIETITIAN INITIAL EVALUATION ADULT - PERTINENT MEDS FT
MEDICATIONS  (STANDING):  cefTRIAXone Injectable. 1000 milliGRAM(s) IV Push every 24 hours  chlorhexidine 4% Liquid 1 Application(s) Topical <User Schedule>  folic acid Injectable 1 milliGRAM(s) IV Push daily  hemorrhoidal Ointment 1 Application(s) Rectal daily  influenza   Vaccine 0.5 milliLiter(s) IntraMuscular once  octreotide  Infusion 50 MICROgram(s)/Hr (10 mL/Hr) IV Continuous <Continuous>  pantoprazole  Injectable 40 milliGRAM(s) IV Push every 12 hours  thiamine Injectable 100 milliGRAM(s) IV Push daily    MEDICATIONS  (PRN):

## 2024-10-05 NOTE — DIETITIAN INITIAL EVALUATION ADULT - FEEDING ASSISTANCE
VYONNE PLAN    Plan A is [facility]. Plan B is Discharge to [facility]. Pt can attend Outpatient Rehab for PT/OT if given a script at discharge. Medicaid Transport at Discharge: Raghavendra Allred Complete Medicaid via [address] ride. CM will need to look into PCP pending DC plan    Rolling Walker     3:31 PM   CM talked to therapist and they are recommending a RW. I am going to send a referral to [facility] to see if they can deliver a RW to Pt here. MD went ahead and ordered a COVID 19 test.     3 PM   CM called and left a VM for Alize Gil to see if they have reviewed the case with the medical director yet. 2:05 PM   CM talked to Alize Gil and he still needs to review with [facility] Medical Director to determine if Pt can come or not. Alize Gil inquired as to whether or not Pt has had a negative COVID 19 test.  CM checked an no COVID 19 test was administered d/t no symptoms and no reason to administer test.  Alize Gil will check with Director about there policy and get back with me. CM let Hospitalist know that we are talking with [facility]. 10:26 AM  CM informed that referral was sent to [facility] on 4/28/2020 and Chest XRay was completed and sent for review. CM contacted [facility] representative: Laureano Troy: [phone] and had to leave a VM. CM Supervisor looking for a cell phone number for this Pt so that we can determine a plan for this Pt. CM will continue to monitor discharge plan.      Orly Ventura, [address]  Ext [phone] Tray set-up, open all containers; meal encouragement

## 2024-10-06 DIAGNOSIS — E83.42 HYPOMAGNESEMIA: ICD-10-CM

## 2024-10-06 DIAGNOSIS — K92.2 GASTROINTESTINAL HEMORRHAGE, UNSPECIFIED: ICD-10-CM

## 2024-10-06 DIAGNOSIS — E83.39 OTHER DISORDERS OF PHOSPHORUS METABOLISM: ICD-10-CM

## 2024-10-06 DIAGNOSIS — E87.6 HYPOKALEMIA: ICD-10-CM

## 2024-10-06 DIAGNOSIS — K74.60 UNSPECIFIED CIRRHOSIS OF LIVER: ICD-10-CM

## 2024-10-06 DIAGNOSIS — D62 ACUTE POSTHEMORRHAGIC ANEMIA: ICD-10-CM

## 2024-10-06 LAB
ALBUMIN SERPL ELPH-MCNC: 2.2 G/DL — LOW (ref 3.3–5)
ALBUMIN SERPL ELPH-MCNC: 2.2 G/DL — LOW (ref 3.3–5)
ALP SERPL-CCNC: 270 U/L — HIGH (ref 40–120)
ALP SERPL-CCNC: 286 U/L — HIGH (ref 40–120)
ALT FLD-CCNC: 41 U/L — SIGNIFICANT CHANGE UP (ref 12–78)
ALT FLD-CCNC: 51 U/L — SIGNIFICANT CHANGE UP (ref 12–78)
ANION GAP SERPL CALC-SCNC: 7 MMOL/L — SIGNIFICANT CHANGE UP (ref 5–17)
ANION GAP SERPL CALC-SCNC: 8 MMOL/L — SIGNIFICANT CHANGE UP (ref 5–17)
AST SERPL-CCNC: 173 U/L — HIGH (ref 15–37)
AST SERPL-CCNC: 196 U/L — HIGH (ref 15–37)
BASOPHILS # BLD AUTO: 0.03 K/UL — SIGNIFICANT CHANGE UP (ref 0–0.2)
BASOPHILS NFR BLD AUTO: 0.6 % — SIGNIFICANT CHANGE UP (ref 0–2)
BILIRUB SERPL-MCNC: 2.9 MG/DL — HIGH (ref 0.2–1.2)
BILIRUB SERPL-MCNC: 3.6 MG/DL — HIGH (ref 0.2–1.2)
BUN SERPL-MCNC: 11 MG/DL — SIGNIFICANT CHANGE UP (ref 7–23)
BUN SERPL-MCNC: 17 MG/DL — SIGNIFICANT CHANGE UP (ref 7–23)
CALCIUM SERPL-MCNC: 8 MG/DL — LOW (ref 8.5–10.1)
CALCIUM SERPL-MCNC: 8.3 MG/DL — LOW (ref 8.5–10.1)
CHLORIDE SERPL-SCNC: 107 MMOL/L — SIGNIFICANT CHANGE UP (ref 96–108)
CHLORIDE SERPL-SCNC: 109 MMOL/L — HIGH (ref 96–108)
CO2 SERPL-SCNC: 22 MMOL/L — SIGNIFICANT CHANGE UP (ref 22–31)
CO2 SERPL-SCNC: 24 MMOL/L — SIGNIFICANT CHANGE UP (ref 22–31)
CREAT SERPL-MCNC: 0.44 MG/DL — LOW (ref 0.5–1.3)
CREAT SERPL-MCNC: 0.56 MG/DL — SIGNIFICANT CHANGE UP (ref 0.5–1.3)
EGFR: 104 ML/MIN/1.73M2 — SIGNIFICANT CHANGE UP
EGFR: 110 ML/MIN/1.73M2 — SIGNIFICANT CHANGE UP
EOSINOPHIL # BLD AUTO: 0.12 K/UL — SIGNIFICANT CHANGE UP (ref 0–0.5)
EOSINOPHIL NFR BLD AUTO: 2.3 % — SIGNIFICANT CHANGE UP (ref 0–6)
GLUCOSE SERPL-MCNC: 105 MG/DL — HIGH (ref 70–99)
GLUCOSE SERPL-MCNC: 112 MG/DL — HIGH (ref 70–99)
HCT VFR BLD CALC: 25.7 % — LOW (ref 34.5–45)
HCT VFR BLD CALC: 28.3 % — LOW (ref 34.5–45)
HGB BLD-MCNC: 8.5 G/DL — LOW (ref 11.5–15.5)
HGB BLD-MCNC: 9.3 G/DL — LOW (ref 11.5–15.5)
IMM GRANULOCYTES NFR BLD AUTO: 0.6 % — SIGNIFICANT CHANGE UP (ref 0–0.9)
LYMPHOCYTES # BLD AUTO: 0.93 K/UL — LOW (ref 1–3.3)
LYMPHOCYTES # BLD AUTO: 18 % — SIGNIFICANT CHANGE UP (ref 13–44)
MAGNESIUM SERPL-MCNC: 1.6 MG/DL — SIGNIFICANT CHANGE UP (ref 1.6–2.6)
MAGNESIUM SERPL-MCNC: 1.7 MG/DL — SIGNIFICANT CHANGE UP (ref 1.6–2.6)
MCHC RBC-ENTMCNC: 32.8 PG — SIGNIFICANT CHANGE UP (ref 27–34)
MCHC RBC-ENTMCNC: 32.9 GM/DL — SIGNIFICANT CHANGE UP (ref 32–36)
MCHC RBC-ENTMCNC: 33.1 GM/DL — SIGNIFICANT CHANGE UP (ref 32–36)
MCHC RBC-ENTMCNC: 33.5 PG — SIGNIFICANT CHANGE UP (ref 27–34)
MCV RBC AUTO: 101.8 FL — HIGH (ref 80–100)
MCV RBC AUTO: 99.2 FL — SIGNIFICANT CHANGE UP (ref 80–100)
MONOCYTES # BLD AUTO: 0.52 K/UL — SIGNIFICANT CHANGE UP (ref 0–0.9)
MONOCYTES NFR BLD AUTO: 10.1 % — SIGNIFICANT CHANGE UP (ref 2–14)
NEUTROPHILS # BLD AUTO: 3.53 K/UL — SIGNIFICANT CHANGE UP (ref 1.8–7.4)
NEUTROPHILS NFR BLD AUTO: 68.4 % — SIGNIFICANT CHANGE UP (ref 43–77)
PHOSPHATE SERPL-MCNC: 2.2 MG/DL — LOW (ref 2.5–4.5)
PHOSPHATE SERPL-MCNC: 2.4 MG/DL — LOW (ref 2.5–4.5)
PLATELET # BLD AUTO: 51 K/UL — LOW (ref 150–400)
PLATELET # BLD AUTO: 53 K/UL — LOW (ref 150–400)
POTASSIUM SERPL-MCNC: 3.5 MMOL/L — SIGNIFICANT CHANGE UP (ref 3.5–5.3)
POTASSIUM SERPL-MCNC: 3.7 MMOL/L — SIGNIFICANT CHANGE UP (ref 3.5–5.3)
POTASSIUM SERPL-SCNC: 3.5 MMOL/L — SIGNIFICANT CHANGE UP (ref 3.5–5.3)
POTASSIUM SERPL-SCNC: 3.7 MMOL/L — SIGNIFICANT CHANGE UP (ref 3.5–5.3)
PROT SERPL-MCNC: 6.3 GM/DL — SIGNIFICANT CHANGE UP (ref 6–8.3)
PROT SERPL-MCNC: 6.4 GM/DL — SIGNIFICANT CHANGE UP (ref 6–8.3)
RBC # BLD: 2.59 M/UL — LOW (ref 3.8–5.2)
RBC # BLD: 2.78 M/UL — LOW (ref 3.8–5.2)
RBC # FLD: 17.5 % — HIGH (ref 10.3–14.5)
RBC # FLD: 17.9 % — HIGH (ref 10.3–14.5)
SODIUM SERPL-SCNC: 137 MMOL/L — SIGNIFICANT CHANGE UP (ref 135–145)
SODIUM SERPL-SCNC: 140 MMOL/L — SIGNIFICANT CHANGE UP (ref 135–145)
WBC # BLD: 3.86 K/UL — SIGNIFICANT CHANGE UP (ref 3.8–10.5)
WBC # BLD: 5.16 K/UL — SIGNIFICANT CHANGE UP (ref 3.8–10.5)
WBC # FLD AUTO: 3.86 K/UL — SIGNIFICANT CHANGE UP (ref 3.8–10.5)
WBC # FLD AUTO: 5.16 K/UL — SIGNIFICANT CHANGE UP (ref 3.8–10.5)

## 2024-10-06 PROCEDURE — 99232 SBSQ HOSP IP/OBS MODERATE 35: CPT

## 2024-10-06 PROCEDURE — 99291 CRITICAL CARE FIRST HOUR: CPT

## 2024-10-06 RX ORDER — POTASSIUM PHOSPHATE, MONOBASIC POTASSIUM PHOSPHATE, DIBASIC 224; 236 MG/ML; MG/ML
15 INJECTION, SOLUTION, CONCENTRATE INTRAVENOUS ONCE
Refills: 0 | Status: COMPLETED | OUTPATIENT
Start: 2024-10-06 | End: 2024-10-06

## 2024-10-06 RX ORDER — ACETAMINOPHEN 325 MG
1000 TABLET ORAL ONCE
Refills: 0 | Status: COMPLETED | OUTPATIENT
Start: 2024-10-06 | End: 2024-10-06

## 2024-10-06 RX ADMIN — FOLIC ACID 1 MILLIGRAM(S): 1 TABLET ORAL at 10:48

## 2024-10-06 RX ADMIN — CHLORHEXIDINE GLUCONATE ORAL RINSE 1 APPLICATION(S): 1.2 SOLUTION DENTAL at 05:35

## 2024-10-06 RX ADMIN — OCTREOTIDE ACETATE 10 MICROGRAM(S)/HR: 50 INJECTION, SOLUTION INTRAVENOUS; SUBCUTANEOUS at 00:20

## 2024-10-06 RX ADMIN — PANTOPRAZOLE SODIUM 40 MILLIGRAM(S): 40 TABLET, DELAYED RELEASE ORAL at 18:10

## 2024-10-06 RX ADMIN — Medication 1 GRAM(S): at 18:10

## 2024-10-06 RX ADMIN — Medication 30 MILLILITER(S): at 08:31

## 2024-10-06 RX ADMIN — POTASSIUM PHOSPHATE, MONOBASIC POTASSIUM PHOSPHATE, DIBASIC 62.5 MILLIMOLE(S): 224; 236 INJECTION, SOLUTION, CONCENTRATE INTRAVENOUS at 10:46

## 2024-10-06 RX ADMIN — Medication 1000 MILLIGRAM(S): at 06:19

## 2024-10-06 RX ADMIN — Medication 1000 MILLIGRAM(S): at 05:31

## 2024-10-06 RX ADMIN — PANTOPRAZOLE SODIUM 40 MILLIGRAM(S): 40 TABLET, DELAYED RELEASE ORAL at 05:31

## 2024-10-06 RX ADMIN — THIAMINE HYDROCHLORIDE 100 MILLIGRAM(S): 100 INJECTION, SOLUTION INTRAMUSCULAR; INTRAVENOUS at 10:46

## 2024-10-06 RX ADMIN — OCTREOTIDE ACETATE 10 MICROGRAM(S)/HR: 50 INJECTION, SOLUTION INTRAVENOUS; SUBCUTANEOUS at 10:48

## 2024-10-06 RX ADMIN — Medication 400 MILLIGRAM(S): at 05:49

## 2024-10-06 RX ADMIN — COCOA BUTTER, PHENYLEPHRINE HCL 1 APPLICATION(S): 2211; 6.5 SUPPOSITORY RECTAL at 10:46

## 2024-10-06 RX ADMIN — Medication 1 GRAM(S): at 12:15

## 2024-10-06 NOTE — PROGRESS NOTE ADULT - SUBJECTIVE AND OBJECTIVE BOX
Patient is a 59y old  Female who presents with a chief complaint of Hematemesis     (05 Oct 2024 11:07)      HPI:  pt feeling ok  main complaint is burning pain, lower chest upper abdomen  maalox with little help    PAST MEDICAL & SURGICAL HISTORY:  Alcohol abuse      Depression      Withdrawal seizures      History of basal cell carcinoma excision      Squamous cell skin cancer      Hemorrhoids      2019 novel coronavirus disease (COVID-19)      History of ear, nose, and throat (ENT) surgery      H/O basal cell carcinoma excision      H/O:           MEDICATIONS  (STANDING):  cefTRIAXone Injectable. 1000 milliGRAM(s) IV Push every 24 hours  chlorhexidine 4% Liquid 1 Application(s) Topical <User Schedule>  folic acid Injectable 1 milliGRAM(s) IV Push daily  hemorrhoidal Ointment 1 Application(s) Rectal daily  influenza   Vaccine 0.5 milliLiter(s) IntraMuscular once  octreotide  Infusion 50 MICROgram(s)/Hr (10 mL/Hr) IV Continuous <Continuous>  pantoprazole  Injectable 40 milliGRAM(s) IV Push every 12 hours  sucralfate suspension 1 Gram(s) Oral every 6 hours  thiamine Injectable 100 milliGRAM(s) IV Push daily    MEDICATIONS  (PRN):  aluminum hydroxide/magnesium hydroxide/simethicone Suspension 30 milliLiter(s) Oral every 4 hours PRN Dyspepsia      Allergies    Zithromax (Unknown)    Intolerances    morphine (Nausea)      REVIEW OF SYSTEMS:    CONSTITUTIONAL: No weakness, fevers or chills  RESPIRATORY: No cough, wheezing, hemoptysis; No shortness of breath  CARDIOVASCULAR: as above  GASTROINTESTINAL: as above  All other review of systems is negative unless indicated above.    Vital Signs Last 24 Hrs  T(C): 36.3 (06 Oct 2024 08:00), Max: 37.1 (05 Oct 2024 20:00)  T(F): 97.3 (06 Oct 2024 08:00), Max: 98.8 (05 Oct 2024 20:00)  HR: 83 (06 Oct 2024 11:00) (79 - 100)  BP: 91/63 (06 Oct 2024 11:00) (85/54 - 121/67)  BP(mean): 72 (06 Oct 2024 11:00) (64 - 93)  RR: 17 (06 Oct 2024 11:00) (14 - 28)  SpO2: 96% (06 Oct 2024 10:00) (93% - 99%)    Parameters below as of 06 Oct 2024 10:00  Patient On (Oxygen Delivery Method): room air    PHYSICAL EXAM:  Constitutional: NAD  Respiratory: CTA  Cardiovascular: S1 and S2  Gastrointestinal: BS+, soft, NT/ND    LABS:                        8.5    5.16  )-----------( 53       ( 06 Oct 2024 05:22 )             25.7     10-06    137  |  107  |  17  ----------------------------<  112[H]  3.5   |  22  |  0.44[L]    Ca    8.0[L]      06 Oct 2024 05:22  Phos  2.2     10-  Mg     1.6     10-    TPro  6.3  /  Alb  2.2[L]  /  TBili  3.6[H]  /  DBili  x   /  AST  173[H]  /  ALT  41  /  AlkPhos  286[H]  10-06    PT/INR - ( 05 Oct 2024 05:22 )   PT: 15.9 sec;   INR: 1.35 ratio         PTT - ( 05 Oct 2024 05:22 )  PTT:36.0 sec  LIVER FUNCTIONS - ( 06 Oct 2024 05:22 )  Alb: 2.2 g/dL / Pro: 6.3 gm/dL / ALK PHOS: 286 U/L / ALT: 41 U/L / AST: 173 U/L / GGT: x             RADIOLOGY & ADDITIONAL STUDIES:

## 2024-10-06 NOTE — PROGRESS NOTE ADULT - SUBJECTIVE AND OBJECTIVE BOX
58 y/o F, PMH alcohol use disorder, alcoholic cirrhosis with ascites, portal hypertension and esophageal varices, history of esophageal variceal hemorrhage s/p banding (most recently 3 bands 4/2024), chronic dizziness/hearing loss being evaluated for Meniere's disease, chronic hypotension, who presented to the ED for evaluation of hematemesis, given 2u pRBC with stabilization of BP, admitted to ICU for close monitoring and EGD.    Pt is s/p EGD on 10/4 with large varices, 4 bands applied.  10/6 Case discussed on ICU rounds. Clinically stable    ICU Vital Signs Last 24 Hrs  T(C): 36.9 (06 Oct 2024 12:00), Max: 37.1 (05 Oct 2024 20:00)  T(F): 98.4 (06 Oct 2024 12:00), Max: 98.8 (05 Oct 2024 20:00)  HR: 80 (06 Oct 2024 14:00) (79 - 100)  BP: 99/65 (06 Oct 2024 14:00) (85/54 - 121/67)  BP(mean): 75 (06 Oct 2024 14:00) (64 - 93)  RR: 15 (06 Oct 2024 14:00) (14 - 28)  SpO2: 96% (06 Oct 2024 14:00) (95% - 99%)    O2 Parameters below as of 06 Oct 2024 14:00  Patient On (Oxygen Delivery Method): room air                                8.5    5.16  )-----------( 53       ( 06 Oct 2024 05:22 )             25.7         10-06    137  |  107  |  17  ----------------------------<  112[H]  3.5   |  22  |  0.44[L]    Ca    8.0[L]      06 Oct 2024 05:22  Phos  2.2     10-06  Mg     1.6     10-06    TPro  6.3  /  Alb  2.2[L]  /  TBili  3.6[H]  /  DBili  x   /  AST  173[H]  /  ALT  41  /  AlkPhos  286[H]  10-06    LIVER FUNCTIONS - ( 06 Oct 2024 05:22 )  Alb: 2.2 g/dL / Pro: 6.3 gm/dL / ALK PHOS: 286 U/L / ALT: 41 U/L / AST: 173 U/L / GGT: x             PT/INR - ( 05 Oct 2024 05:22 )   PT: 15.9 sec;   INR: 1.35 ratio         PTT - ( 05 Oct 2024 05:22 )  PTT:36.0 sec

## 2024-10-07 LAB
AFP-TM SERPL-MCNC: 8.3 NG/ML — SIGNIFICANT CHANGE UP
ALBUMIN SERPL ELPH-MCNC: 2.1 G/DL — LOW (ref 3.3–5)
ALP SERPL-CCNC: 275 U/L — HIGH (ref 40–120)
ALT FLD-CCNC: 60 U/L — SIGNIFICANT CHANGE UP (ref 12–78)
ANION GAP SERPL CALC-SCNC: 7 MMOL/L — SIGNIFICANT CHANGE UP (ref 5–17)
AST SERPL-CCNC: 219 U/L — HIGH (ref 15–37)
BILIRUB SERPL-MCNC: 3.3 MG/DL — HIGH (ref 0.2–1.2)
BUN SERPL-MCNC: 9 MG/DL — SIGNIFICANT CHANGE UP (ref 7–23)
CALCIUM SERPL-MCNC: 8.4 MG/DL — LOW (ref 8.5–10.1)
CHLORIDE SERPL-SCNC: 108 MMOL/L — SIGNIFICANT CHANGE UP (ref 96–108)
CO2 SERPL-SCNC: 24 MMOL/L — SIGNIFICANT CHANGE UP (ref 22–31)
CREAT SERPL-MCNC: 0.5 MG/DL — SIGNIFICANT CHANGE UP (ref 0.5–1.3)
EGFR: 107 ML/MIN/1.73M2 — SIGNIFICANT CHANGE UP
GLUCOSE SERPL-MCNC: 122 MG/DL — HIGH (ref 70–99)
HCT VFR BLD CALC: 27.3 % — LOW (ref 34.5–45)
HGB BLD-MCNC: 9 G/DL — LOW (ref 11.5–15.5)
INR BLD: 1.43 RATIO — HIGH (ref 0.85–1.16)
MAGNESIUM SERPL-MCNC: 1.6 MG/DL — SIGNIFICANT CHANGE UP (ref 1.6–2.6)
MCHC RBC-ENTMCNC: 33 GM/DL — SIGNIFICANT CHANGE UP (ref 32–36)
MCHC RBC-ENTMCNC: 33.2 PG — SIGNIFICANT CHANGE UP (ref 27–34)
MCV RBC AUTO: 100.7 FL — HIGH (ref 80–100)
MELD SCORE WITH DIALYSIS: 28 POINTS — SIGNIFICANT CHANGE UP
MELD SCORE WITHOUT DIALYSIS: 15 POINTS — SIGNIFICANT CHANGE UP
MRSA PCR RESULT.: SIGNIFICANT CHANGE UP
PHOSPHATE SERPL-MCNC: 2.3 MG/DL — LOW (ref 2.5–4.5)
PLATELET # BLD AUTO: 49 K/UL — LOW (ref 150–400)
POTASSIUM SERPL-MCNC: 3.6 MMOL/L — SIGNIFICANT CHANGE UP (ref 3.5–5.3)
POTASSIUM SERPL-SCNC: 3.6 MMOL/L — SIGNIFICANT CHANGE UP (ref 3.5–5.3)
PROT SERPL-MCNC: 6.4 GM/DL — SIGNIFICANT CHANGE UP (ref 6–8.3)
PROTHROM AB SERPL-ACNC: 16.8 SEC — HIGH (ref 9.9–13.4)
RBC # BLD: 2.71 M/UL — LOW (ref 3.8–5.2)
RBC # FLD: 17.4 % — HIGH (ref 10.3–14.5)
S AUREUS DNA NOSE QL NAA+PROBE: SIGNIFICANT CHANGE UP
SODIUM SERPL-SCNC: 139 MMOL/L — SIGNIFICANT CHANGE UP (ref 135–145)
WBC # BLD: 3.74 K/UL — LOW (ref 3.8–10.5)
WBC # FLD AUTO: 3.74 K/UL — LOW (ref 3.8–10.5)

## 2024-10-07 PROCEDURE — 99232 SBSQ HOSP IP/OBS MODERATE 35: CPT

## 2024-10-07 RX ORDER — PEDIATRIC MULTIVIT 61/D3/VIT K 1500-800
1 CAPSULE ORAL DAILY
Refills: 0 | Status: DISCONTINUED | OUTPATIENT
Start: 2024-10-07 | End: 2024-10-08

## 2024-10-07 RX ORDER — POTASSIUM PHOSPHATE, MONOBASIC POTASSIUM PHOSPHATE, DIBASIC 224; 236 MG/ML; MG/ML
15 INJECTION, SOLUTION, CONCENTRATE INTRAVENOUS ONCE
Refills: 0 | Status: COMPLETED | OUTPATIENT
Start: 2024-10-07 | End: 2024-10-07

## 2024-10-07 RX ORDER — SOD PHOS DI, MONO/K PHOS MONO 250 MG
1 TABLET ORAL ONCE
Refills: 0 | Status: COMPLETED | OUTPATIENT
Start: 2024-10-07 | End: 2024-10-07

## 2024-10-07 RX ORDER — PANTOPRAZOLE SODIUM 40 MG/1
40 TABLET, DELAYED RELEASE ORAL
Refills: 0 | Status: DISCONTINUED | OUTPATIENT
Start: 2024-10-07 | End: 2024-10-08

## 2024-10-07 RX ORDER — MAGNESIUM SULFATE 500 MG/ML
2 VIAL (ML) INJECTION ONCE
Refills: 0 | Status: COMPLETED | OUTPATIENT
Start: 2024-10-07 | End: 2024-10-07

## 2024-10-07 RX ADMIN — PANTOPRAZOLE SODIUM 40 MILLIGRAM(S): 40 TABLET, DELAYED RELEASE ORAL at 05:39

## 2024-10-07 RX ADMIN — Medication 1 PACKET(S): at 00:43

## 2024-10-07 RX ADMIN — FOLIC ACID 1 MILLIGRAM(S): 1 TABLET ORAL at 10:25

## 2024-10-07 RX ADMIN — OCTREOTIDE ACETATE 10 MICROGRAM(S)/HR: 50 INJECTION, SOLUTION INTRAVENOUS; SUBCUTANEOUS at 05:40

## 2024-10-07 RX ADMIN — PANTOPRAZOLE SODIUM 40 MILLIGRAM(S): 40 TABLET, DELAYED RELEASE ORAL at 10:24

## 2024-10-07 RX ADMIN — THIAMINE HYDROCHLORIDE 100 MILLIGRAM(S): 100 INJECTION, SOLUTION INTRAMUSCULAR; INTRAVENOUS at 10:24

## 2024-10-07 RX ADMIN — COCOA BUTTER, PHENYLEPHRINE HCL 1 APPLICATION(S): 2211; 6.5 SUPPOSITORY RECTAL at 10:32

## 2024-10-07 RX ADMIN — Medication 1 GRAM(S): at 18:29

## 2024-10-07 RX ADMIN — Medication 1 GRAM(S): at 00:43

## 2024-10-07 RX ADMIN — Medication 25 GRAM(S): at 10:48

## 2024-10-07 RX ADMIN — Medication 1000 MILLIGRAM(S): at 05:39

## 2024-10-07 RX ADMIN — Medication 1 GRAM(S): at 05:39

## 2024-10-07 RX ADMIN — POTASSIUM PHOSPHATE, MONOBASIC POTASSIUM PHOSPHATE, DIBASIC 62.5 MILLIMOLE(S): 224; 236 INJECTION, SOLUTION, CONCENTRATE INTRAVENOUS at 07:16

## 2024-10-07 RX ADMIN — Medication 1 GRAM(S): at 11:18

## 2024-10-07 RX ADMIN — CHLORHEXIDINE GLUCONATE ORAL RINSE 1 APPLICATION(S): 1.2 SOLUTION DENTAL at 05:39

## 2024-10-07 NOTE — PROGRESS NOTE ADULT - ASSESSMENT
60 y/o female PMH alcoholic cirrhosis with ascites, portal HTN, esophageal varices    Admitted with variceal bleed and acute blood loss anemia     S/p EGD 10/4, large varices found, 4 bands applied     No further bleeding       -clinically stable   -off octreotide drip   -on prophylactic ceftriaxone  -advance diet per GI   -avoid AC, AP   -thrombocytopenia from alcoholism, stable, monitor   -MVI   -DVT ppx with SCDs   -ambulate      Stable for floor, left VM on hospitalist phone  60 y/o female PMH alcoholic cirrhosis with ascites, portal HTN, esophageal varices    Admitted with variceal bleed and acute blood loss anemia     S/p EGD 10/4, large varices found, 4 bands applied     S/p 2 PRBCs  No pressor requirement     No further bleeding       -clinically stable   -off octreotide drip   -on prophylactic ceftriaxone  -advance diet per GI   -avoid AC, AP   -thrombocytopenia from alcoholism, stable, monitor   -MVI   -DVT ppx with SCDs   -ambulate      Stable for floor, left VM on hospitalist phone  60 y/o female PMH alcoholic cirrhosis with ascites, portal HTN, esophageal varices    Admitted with variceal bleed and acute blood loss anemia     S/p EGD 10/4, large varices found, 4 bands applied     S/p 2 PRBCs  No pressor requirement     No further bleeding       -clinically stable   -off octreotide drip   -on prophylactic ceftriaxone  -advance diet per GI   -avoid AC, AP   -thrombocytopenia from alcoholism, stable, monitor   -MVI   -DVT ppx with SCDs   -ambulate      Stable for floor, left VM on hospitalist phone       Addendum: sign out given to Dr. Kaufman

## 2024-10-07 NOTE — PROGRESS NOTE ADULT - ASSESSMENT
Imp:  Alcohol related cirrhosis with variceal bleed, s/p banding    Rec:  Advance diet  Stop octreotide  Change protonix to PO  Cont carafate  Anticipate d/c tomorrow if otherwise stable

## 2024-10-07 NOTE — PROGRESS NOTE ADULT - SUBJECTIVE AND OBJECTIVE BOX
Patient is a 59y old  Female who presents with a chief complaint of Variceal bleed, Hemorrhagic Shock (07 Oct 2024 07:36)    Interval event/s: no further bleeding. Diet advanced by GI. Off drips     Allergies    Zithromax (Unknown)    Intolerances    morphine (Nausea)    REVIEW OF SYSTEMS: SEE BELOW       ICU Vital Signs Last 24 Hrs  T(C): 36.6 (07 Oct 2024 08:00), Max: 37.7 (06 Oct 2024 20:00)  T(F): 97.9 (07 Oct 2024 08:00), Max: 99.8 (06 Oct 2024 20:00)  HR: 84 (07 Oct 2024 12:00) (78 - 91)  BP: 111/64 (07 Oct 2024 11:00) (85/57 - 116/76)  BP(mean): 79 (07 Oct 2024 11:00) (67 - 87)  ABP: --  ABP(mean): --  RR: 20 (07 Oct 2024 11:00) (13 - 27)  SpO2: 96% (07 Oct 2024 11:00) (94% - 97%)    O2 Parameters below as of 07 Oct 2024 11:00  Patient On (Oxygen Delivery Method): room air            CAPILLARY BLOOD GLUCOSE          I&O's Summary    06 Oct 2024 07:01  -  07 Oct 2024 07:00  --------------------------------------------------------  IN: 2006 mL / OUT: 300 mL / NET: 1706 mL    07 Oct 2024 07:01  -  07 Oct 2024 12:46  --------------------------------------------------------  IN: 25 mL / OUT: 0 mL / NET: 25 mL            MEDICATIONS  (STANDING):  cefTRIAXone Injectable. 1000 milliGRAM(s) IV Push every 24 hours  chlorhexidine 4% Liquid 1 Application(s) Topical <User Schedule>  folic acid Injectable 1 milliGRAM(s) IV Push daily  hemorrhoidal Ointment 1 Application(s) Rectal daily  influenza   Vaccine 0.5 milliLiter(s) IntraMuscular once  pantoprazole    Tablet 40 milliGRAM(s) Oral before breakfast  sucralfate suspension 1 Gram(s) Oral every 6 hours      MEDICATIONS  (PRN):  aluminum hydroxide/magnesium hydroxide/simethicone Suspension 30 milliLiter(s) Oral every 4 hours PRN Dyspepsia      PHYSICAL EXAM: SEE BELOW                          9.0    3.74  )-----------( 49       ( 07 Oct 2024 05:11 )             27.3       10-07    139  |  108  |  9   ----------------------------<  122[H]  3.6   |  24  |  0.50    Ca    8.4[L]      07 Oct 2024 05:11  Phos  2.3     10-07  Mg     1.6     10-07    TPro  6.4  /  Alb  2.1[L]  /  TBili  3.3[H]  /  DBili  x   /  AST  219[H]  /  ALT  60  /  AlkPhos  275[H]  10-07          PT/INR - ( 07 Oct 2024 05:11 )   PT: 16.8 sec;   INR: 1.43 ratio           Urinalysis Basic - ( 07 Oct 2024 05:11 )    Color: x / Appearance: x / SG: x / pH: x  Gluc: 122 mg/dL / Ketone: x  / Bili: x / Urobili: x   Blood: x / Protein: x / Nitrite: x   Leuk Esterase: x / RBC: x / WBC x   Sq Epi: x / Non Sq Epi: x / Bacteria: x

## 2024-10-07 NOTE — PROGRESS NOTE ADULT - RESPIRATORY
normal/clear to auscultation bilaterally/no wheezes/no rales/no rhonchi
airway patent/good air movement/respirations non-labored

## 2024-10-07 NOTE — PROGRESS NOTE ADULT - NUTRITIONAL ASSESSMENT
This patient has been assessed with a concern for Malnutrition and has been determined to have a diagnosis/diagnoses of Moderate protein-calorie malnutrition.    This patient is being managed with:   Diet Soft and Bite Sized-  Entered: Oct  7 2024  7:32AM

## 2024-10-07 NOTE — PROGRESS NOTE ADULT - SUBJECTIVE AND OBJECTIVE BOX
Patient is a 59y old  Female who presents with a chief complaint of Variceal bleed, Hemorrhagic Shock (06 Oct 2024 17:06)      Subective:  Feels good, no complaints    PAST MEDICAL & SURGICAL HISTORY:  Alcohol abuse      Depression      Withdrawal seizures      History of basal cell carcinoma excision      Squamous cell skin cancer      Hemorrhoids      2019 novel coronavirus disease (COVID-19)      History of ear, nose, and throat (ENT) surgery      H/O basal cell carcinoma excision      H/O:           MEDICATIONS  (STANDING):  cefTRIAXone Injectable. 1000 milliGRAM(s) IV Push every 24 hours  chlorhexidine 4% Liquid 1 Application(s) Topical <User Schedule>  folic acid Injectable 1 milliGRAM(s) IV Push daily  hemorrhoidal Ointment 1 Application(s) Rectal daily  influenza   Vaccine 0.5 milliLiter(s) IntraMuscular once  pantoprazole    Tablet 40 milliGRAM(s) Oral before breakfast  sucralfate suspension 1 Gram(s) Oral every 6 hours  thiamine Injectable 100 milliGRAM(s) IV Push daily    MEDICATIONS  (PRN):  aluminum hydroxide/magnesium hydroxide/simethicone Suspension 30 milliLiter(s) Oral every 4 hours PRN Dyspepsia      REVIEW OF SYSTEMS:    RESPIRATORY: No shortness of breath  CARDIOVASCULAR: No chest pain  All other review of systems is negative unless indicated above.    Vital Signs Last 24 Hrs  T(C): 37 (07 Oct 2024 04:00), Max: 37.7 (06 Oct 2024 20:00)  T(F): 98.6 (07 Oct 2024 04:00), Max: 99.8 (06 Oct 2024 20:00)  HR: 84 (07 Oct 2024 07:00) (78 - 89)  BP: 98/62 (07 Oct 2024 07:00) (85/54 - 116/76)  BP(mean): 74 (07 Oct 2024 07:00) (64 - 87)  RR: 21 (07 Oct 2024 07:00) (13 - 27)  SpO2: 95% (07 Oct 2024 05:00) (94% - 97%)    Parameters below as of 06 Oct 2024 19:00  Patient On (Oxygen Delivery Method): room air        PHYSICAL EXAM:    Constitutional: NAD, well-developed  Respiratory: CTAB  Cardiovascular: S1 and S2, RRR  Gastrointestinal: BS+, soft, NT/ND  Extremities: No peripheral edema  Psychiatric: Normal mood, normal affect    LABS:                        9.0    3.74  )-----------( 49       ( 07 Oct 2024 05:11 )             27.3     10-07    139  |  108  |  9   ----------------------------<  122[H]  3.6   |  24  |  0.50    Ca    8.4[L]      07 Oct 2024 05:11  Phos  2.3     10-07  Mg     1.6     10-07    TPro  6.4  /  Alb  2.1[L]  /  TBili  3.3[H]  /  DBili  x   /  AST  219[H]  /  ALT  60  /  AlkPhos  275[H]  10-07    PT/INR - ( 07 Oct 2024 05:11 )   PT: 16.8 sec;   INR: 1.43 ratio           LIVER FUNCTIONS - ( 07 Oct 2024 05:11 )  Alb: 2.1 g/dL / Pro: 6.4 gm/dL / ALK PHOS: 275 U/L / ALT: 60 U/L / AST: 219 U/L / GGT: x             RADIOLOGY & ADDITIONAL STUDIES:

## 2024-10-08 ENCOUNTER — TRANSCRIPTION ENCOUNTER (OUTPATIENT)
Age: 59
End: 2024-10-08

## 2024-10-08 VITALS
HEART RATE: 85 BPM | DIASTOLIC BLOOD PRESSURE: 53 MMHG | TEMPERATURE: 98 F | SYSTOLIC BLOOD PRESSURE: 84 MMHG | RESPIRATION RATE: 18 BRPM | OXYGEN SATURATION: 99 %

## 2024-10-08 LAB
ADD ON TEST-SPECIMEN IN LAB: SIGNIFICANT CHANGE UP
ANION GAP SERPL CALC-SCNC: 6 MMOL/L — SIGNIFICANT CHANGE UP (ref 5–17)
BUN SERPL-MCNC: 7 MG/DL — SIGNIFICANT CHANGE UP (ref 7–23)
CALCIUM SERPL-MCNC: 8.1 MG/DL — LOW (ref 8.5–10.1)
CHLORIDE SERPL-SCNC: 106 MMOL/L — SIGNIFICANT CHANGE UP (ref 96–108)
CO2 SERPL-SCNC: 25 MMOL/L — SIGNIFICANT CHANGE UP (ref 22–31)
CREAT SERPL-MCNC: 0.53 MG/DL — SIGNIFICANT CHANGE UP (ref 0.5–1.3)
EGFR: 105 ML/MIN/1.73M2 — SIGNIFICANT CHANGE UP
GLUCOSE SERPL-MCNC: 155 MG/DL — HIGH (ref 70–99)
HCT VFR BLD CALC: 28.4 % — LOW (ref 34.5–45)
HGB BLD-MCNC: 9.3 G/DL — LOW (ref 11.5–15.5)
MAGNESIUM SERPL-MCNC: 1.5 MG/DL — LOW (ref 1.6–2.6)
MCHC RBC-ENTMCNC: 32.7 GM/DL — SIGNIFICANT CHANGE UP (ref 32–36)
MCHC RBC-ENTMCNC: 33.2 PG — SIGNIFICANT CHANGE UP (ref 27–34)
MCV RBC AUTO: 101.4 FL — HIGH (ref 80–100)
PHOSPHATE SERPL-MCNC: 2.6 MG/DL — SIGNIFICANT CHANGE UP (ref 2.5–4.5)
PLATELET # BLD AUTO: 68 K/UL — LOW (ref 150–400)
POTASSIUM SERPL-MCNC: 3.8 MMOL/L — SIGNIFICANT CHANGE UP (ref 3.5–5.3)
POTASSIUM SERPL-SCNC: 3.8 MMOL/L — SIGNIFICANT CHANGE UP (ref 3.5–5.3)
RBC # BLD: 2.8 M/UL — LOW (ref 3.8–5.2)
RBC # FLD: 17.5 % — HIGH (ref 10.3–14.5)
SODIUM SERPL-SCNC: 137 MMOL/L — SIGNIFICANT CHANGE UP (ref 135–145)
WBC # BLD: 4.28 K/UL — SIGNIFICANT CHANGE UP (ref 3.8–10.5)
WBC # FLD AUTO: 4.28 K/UL — SIGNIFICANT CHANGE UP (ref 3.8–10.5)

## 2024-10-08 PROCEDURE — 99239 HOSP IP/OBS DSCHRG MGMT >30: CPT

## 2024-10-08 RX ORDER — CIPROFLOXACIN HCL 750 MG
1 TABLET ORAL
Refills: 0 | DISCHARGE
Start: 2024-10-08 | End: 2024-10-10

## 2024-10-08 RX ORDER — PANTOPRAZOLE SODIUM 40 MG/1
1 TABLET, DELAYED RELEASE ORAL
Qty: 30 | Refills: 0
Start: 2024-10-08 | End: 2024-11-06

## 2024-10-08 RX ORDER — METOPROLOL TARTRATE 50 MG
1 TABLET ORAL
Refills: 0 | DISCHARGE

## 2024-10-08 RX ADMIN — INFLUENZA VIRUS VACCINE 0.5 MILLILITER(S): 15; 15; 15; 15 SUSPENSION INTRAMUSCULAR at 13:53

## 2024-10-08 RX ADMIN — Medication 1 GRAM(S): at 04:51

## 2024-10-08 RX ADMIN — Medication 1000 MILLIGRAM(S): at 06:53

## 2024-10-08 RX ADMIN — PANTOPRAZOLE SODIUM 40 MILLIGRAM(S): 40 TABLET, DELAYED RELEASE ORAL at 04:51

## 2024-10-08 RX ADMIN — Medication 1 TABLET(S): at 09:10

## 2024-10-08 NOTE — DISCHARGE NOTE PROVIDER - NSDCCPCAREPLAN_GEN_ALL_CORE_FT
PRINCIPAL DISCHARGE DIAGNOSIS  Diagnosis: Acute blood loss anemia  Assessment and Plan of Treatment: In setting of bleeding esophageal varices. Follow up with Dr. Goodson.      SECONDARY DISCHARGE DIAGNOSES  Diagnosis: Esophageal varices in cirrhosis  Assessment and Plan of Treatment: Take ciprofloxacin for two more days (to prevent infection). Take protonix and carafate. Follow up with Dr. Goodson to determine if you can resume a beta-blocker (such as propranolol, metoprolol, carvedilol).

## 2024-10-08 NOTE — DISCHARGE NOTE PROVIDER - NSDCMRMEDTOKEN_GEN_ALL_CORE_FT
Cipro 500 mg oral tablet: 1 tab(s) orally 2 times a day  pantoprazole 40 mg oral delayed release tablet: 1 tab(s) orally once a day (before a meal)  sucralfate 1 g/10 mL oral suspension: 10 milliliter(s) orally every 6 hours

## 2024-10-08 NOTE — DISCHARGE NOTE NURSING/CASE MANAGEMENT/SOCIAL WORK - NSDCVIVACCINE_GEN_ALL_CORE_FT
influenza, injectable, quadrivalent, preservative free; 13-Oct-2023 11:44; Kristina Renee (RN); [employer]; [id number] (Exp. Date: 13-Jun-2024); IntraMuscular; Deltoid Left.; 0.5 milliLiter(s); VIS (VIS Published: 06-Aug-2021, VIS Presented: 13-Oct-2023);

## 2024-10-08 NOTE — DISCHARGE NOTE NURSING/CASE MANAGEMENT/SOCIAL WORK - NSDCPEFALRISK_GEN_ALL_CORE
For information on Fall & Injury Prevention, visit: [email or web address] OR  [email or web address] OR  https://www.cdc.gov/steadi/patient.html

## 2024-10-08 NOTE — PHARMACOTHERAPY INTERVENTION NOTE - COMMENTS
Medication reconciliation completed. Patient reports only taking metoprolol succinate 25 mg daily, but mentions that she has been told that will eventually be switched back to carvedilol. Has not been taking sucralfate and mentioned insurance does not cover it. Patients preferred pharmacy updated.

## 2024-10-08 NOTE — DISCHARGE NOTE PROVIDER - DETAILS OF MALNUTRITION DIAGNOSIS/DIAGNOSES
This patient has been assessed with a concern for Malnutrition and was treated during this hospitalization for the following Nutrition diagnosis/diagnoses:     -  10/05/2024: Moderate protein-calorie malnutrition

## 2024-10-08 NOTE — DISCHARGE NOTE NURSING/CASE MANAGEMENT/SOCIAL WORK - PATIENT PORTAL LINK FT
You can access the FollowMyHealth Patient Portal offered by [facility] by registering at the following website: [email or web address]. By joining [facility]’s FollowMyHealth portal, you will also be able to view your health information using other applications (apps) compatible with our system.

## 2024-10-08 NOTE — DISCHARGE NOTE PROVIDER - PROVIDER TOKENS
PROVIDER:[TOKEN:[[id number]],FOLLOWUP:[1 week]],PROVIDER:[TOKEN:[[id number]],FOLLOWUP:[1 week]]

## 2024-10-08 NOTE — PROGRESS NOTE ADULT - ASSESSMENT
Imp:  S/P variceal bleed and banding    Rec:  OK for d/c  Hold beta blockade given lowish BP  Close outpt follow up d/w pt  No drinking!

## 2024-10-08 NOTE — DISCHARGE NOTE PROVIDER - HOSPITAL COURSE
CC:  HPI and Hospital Course:  60 y/o female PMH alcoholic cirrhosis with ascites, portal HTN, esophageal varices, admitted with variceal bleed and acute blood loss anemia. BP improved after 2u pRBC and did not require pressors (not with shock), underwent EGD 10/4, large varices found and 4 bands applied. Monitor in ICU with stable VS and Hgb. Initially on octreotide drip which was d/c'd. On abx for SBP ppx in setting of EV bleed, d/c on oral cipro to complete course. D/c with PPI, carafate. Not on BB given borderline BPs. For close outpt f/u with Dr. Goodson, EtOH abstinence.     D/c to home, no needs. Updated boyfriend at bedside.   I have spent 34 min on day of d/c coordinating care.  See progress note for problem based plan.

## 2024-10-08 NOTE — PROGRESS NOTE ADULT - REASON FOR ADMISSION
Variceal bleed, Hemorrhagic Shock

## 2024-10-08 NOTE — DISCHARGE NOTE PROVIDER - CARE PROVIDER_API CALL
Micaela Grijalva  Physician Assistant Services  [address] [place] [address]  Phone: [phone]  Fax: ([phone]  Follow Up Time: 1 week    Brian Goodson  Gastroenterology  [address] [place] [address], [address]  [place] [address]  Phone: [phone]  Fax: [phone]  Follow Up Time: 1 week

## 2024-10-08 NOTE — PROGRESS NOTE ADULT - PROVIDER SPECIALTY LIST ADULT
Gastroenterology
Critical Care
Gastroenterology
Gastroenterology
Critical Care
Critical Care

## 2024-10-08 NOTE — DISCHARGE NOTE PROVIDER - NSDCPNSUBOBJ_GEN_ALL_CORE
VITALS:  T(F): 98.1 (10-08-24 @ 12:00), Max: 98.7 (10-07-24 @ 16:00)  HR: 85 (10-08-24 @ 12:00) (76 - 89)  BP: 84/53 (10-08-24 @ 12:00) (84/53 - 106/70)  RR: 18 (10-08-24 @ 12:00) (16 - 24)  SpO2: 99% (10-08-24 @ 12:00) (93% - 99%)    PHYSICAL EXAM:  Gen: NAD  HEENT:  pupils equal and reactive, EOMI, no oropharyngeal lesions, erythema, exudates, oral thrush  NECK:   supple, no carotid bruits, no palpable lymph nodes, no thyromegaly  CV:  +S1, +S2, regular, no murmurs or rubs  RESP:   lungs clear to auscultation bilaterally, no wheezing, rales, rhonchi, good air entry bilaterally  BREAST:  not examined  GI:  abdomen soft, non-tender, non-distended, normal BS, no bruits, no abdominal masses, no palpable masses  RECTAL:  not examined  :  not examined  MSK:   normal muscle tone, no atrophy, no rigidity, no contractions  EXT:  no clubbing, no cyanosis, no edema, no calf pain, swelling or erythema  VASCULAR:  pulses equal and symmetric in the upper and lower extremities  NEURO:  AAOX3, no focal neurological deficits, follows all commands, able to move extremities spontaneously  SKIN:  no ulcers, lesions or rashes      #acute blood loss anemia, symptomatic 2/2 bleeding EV  -underwent EGD 10/4 w 4 bands applied  -clinically stable   -off octreotide drip   -on prophylactic ceftriaxone here, d/c on oral cipro for SBP ppx to complete 7d course  -PPI, carafate  -not on BB currently as BP too borderline, to resume as outpt if BP allows    #thrombocytopenia in setting of EtOH use  -stable/improving    #EtOH cirrhosis  -outpt f/u with Dr Goodson  -EtOH abstinence    Home w family support

## 2024-10-08 NOTE — PROGRESS NOTE ADULT - SUBJECTIVE AND OBJECTIVE BOX
Patient is a 59y old  Female who presents with a chief complaint of Variceal bleed, Hemorrhagic Shock (07 Oct 2024 12:46)      Subective:  Feels good, no bleeding    PAST MEDICAL & SURGICAL HISTORY:  Alcohol abuse      Depression      Withdrawal seizures      History of basal cell carcinoma excision      Squamous cell skin cancer      Hemorrhoids      2019 novel coronavirus disease (COVID-19)      History of ear, nose, and throat (ENT) surgery      H/O basal cell carcinoma excision      H/O:           MEDICATIONS  (STANDING):  cefTRIAXone Injectable. 1000 milliGRAM(s) IV Push every 24 hours  chlorhexidine 4% Liquid 1 Application(s) Topical <User Schedule>  hemorrhoidal Ointment 1 Application(s) Rectal daily  influenza   Vaccine 0.5 milliLiter(s) IntraMuscular once  multivitamin/minerals 1 Tablet(s) Oral daily  pantoprazole    Tablet 40 milliGRAM(s) Oral before breakfast  sucralfate suspension 1 Gram(s) Oral every 6 hours    MEDICATIONS  (PRN):  aluminum hydroxide/magnesium hydroxide/simethicone Suspension 30 milliLiter(s) Oral every 4 hours PRN Dyspepsia      REVIEW OF SYSTEMS:    RESPIRATORY: No shortness of breath  CARDIOVASCULAR: No chest pain  All other review of systems is negative unless indicated above.    Vital Signs Last 24 Hrs  T(C): 36.8 (07 Oct 2024 20:00), Max: 37.1 (07 Oct 2024 16:00)  T(F): 98.2 (07 Oct 2024 20:00), Max: 98.7 (07 Oct 2024 16:00)  HR: 77 (08 Oct 2024 04:00) (76 - 91)  BP: 87/57 (08 Oct 2024 04:00) (87/57 - 111/64)  BP(mean): 66 (08 Oct 2024 04:00) (66 - 81)  RR: 18 (08 Oct 2024 04:00) (18 - 24)  SpO2: 98% (08 Oct 2024 04:00) (93% - 98%)    Parameters below as of 08 Oct 2024 04:00  Patient On (Oxygen Delivery Method): room air        PHYSICAL EXAM:    Constitutional: NAD, well-developed  Respiratory: CTAB  Cardiovascular: S1 and S2, RRR  Gastrointestinal: BS+, soft, NT/ND  Extremities: No peripheral edema  Psychiatric: Normal mood, normal affect    LABS:                        9.3    4.28  )-----------( 68       ( 08 Oct 2024 05:38 )             28.4     10-    137  |  106  |  7   ----------------------------<  155[H]  3.8   |  25  |  0.53    Ca    8.1[L]      08 Oct 2024 05:38  Phos  2.6     10-  Mg     1.5     10-    TPro  6.4  /  Alb  2.1[L]  /  TBili  3.3[H]  /  DBili  x   /  AST  219[H]  /  ALT  60  /  AlkPhos  275[H]  10-    PT/INR - ( 07 Oct 2024 05:11 )   PT: 16.8 sec;   INR: 1.43 ratio           LIVER FUNCTIONS - ( 07 Oct 2024 05:11 )  Alb: 2.1 g/dL / Pro: 6.4 gm/dL / ALK PHOS: 275 U/L / ALT: 60 U/L / AST: 219 U/L / GGT: x             RADIOLOGY & ADDITIONAL STUDIES:

## 2024-10-08 NOTE — DISCHARGE NOTE PROVIDER - NSDCFUADDINST_GEN_ALL_CORE_FT
[facility] Emergency Telehealth: Need to see a doctor for your symptoms? Call us anytime. Open 24 hours a day, including holidays. [phone].

## 2024-10-16 DIAGNOSIS — E87.20 ACIDOSIS, UNSPECIFIED: ICD-10-CM

## 2024-10-16 DIAGNOSIS — I85.10 SECONDARY ESOPHAGEAL VARICES WITHOUT BLEEDING: ICD-10-CM

## 2024-10-16 DIAGNOSIS — K70.31 ALCOHOLIC CIRRHOSIS OF LIVER WITH ASCITES: ICD-10-CM

## 2024-10-16 DIAGNOSIS — D62 ACUTE POSTHEMORRHAGIC ANEMIA: ICD-10-CM

## 2024-10-16 DIAGNOSIS — F17.210 NICOTINE DEPENDENCE, CIGARETTES, UNCOMPLICATED: ICD-10-CM

## 2024-10-16 DIAGNOSIS — K31.89 OTHER DISEASES OF STOMACH AND DUODENUM: ICD-10-CM

## 2024-10-16 DIAGNOSIS — D69.59 OTHER SECONDARY THROMBOCYTOPENIA: ICD-10-CM

## 2024-10-16 DIAGNOSIS — Z86.16 PERSONAL HISTORY OF COVID-19: ICD-10-CM

## 2024-10-16 DIAGNOSIS — Z85.828 PERSONAL HISTORY OF OTHER MALIGNANT NEOPLASM OF SKIN: ICD-10-CM

## 2024-10-16 DIAGNOSIS — E44.0 MODERATE PROTEIN-CALORIE MALNUTRITION: ICD-10-CM

## 2024-10-16 DIAGNOSIS — Z88.1 ALLERGY STATUS TO OTHER ANTIBIOTIC AGENTS: ICD-10-CM

## 2024-10-16 DIAGNOSIS — R57.8 OTHER SHOCK: ICD-10-CM

## 2024-10-16 DIAGNOSIS — F10.10 ALCOHOL ABUSE, UNCOMPLICATED: ICD-10-CM

## 2024-10-16 DIAGNOSIS — R57.1 HYPOVOLEMIC SHOCK: ICD-10-CM

## 2024-10-16 DIAGNOSIS — E83.42 HYPOMAGNESEMIA: ICD-10-CM

## 2024-10-16 DIAGNOSIS — E87.6 HYPOKALEMIA: ICD-10-CM

## 2024-10-16 DIAGNOSIS — K76.6 PORTAL HYPERTENSION: ICD-10-CM

## 2024-10-16 DIAGNOSIS — E83.39 OTHER DISORDERS OF PHOSPHORUS METABOLISM: ICD-10-CM

## 2024-10-28 RX ORDER — NITROFURANTOIN 100 MG/1
1 CAPSULE ORAL
Refills: 0 | DISCHARGE
Start: 2024-10-28 | End: 2024-11-11

## 2024-10-30 ENCOUNTER — APPOINTMENT (OUTPATIENT)
Dept: ORTHOPEDIC SURGERY | Facility: CLINIC | Age: 59
End: 2024-10-30

## 2024-10-30 VITALS
WEIGHT: 128 LBS | BODY MASS INDEX: 20.09 KG/M2 | DIASTOLIC BLOOD PRESSURE: 54 MMHG | TEMPERATURE: 98 F | SYSTOLIC BLOOD PRESSURE: 111 MMHG | HEIGHT: 67 IN | HEART RATE: 108 BPM

## 2024-10-30 DIAGNOSIS — M76.32 ILIOTIBIAL BAND SYNDROME, LEFT LEG: ICD-10-CM

## 2024-10-30 DIAGNOSIS — M70.62 TROCHANTERIC BURSITIS, LEFT HIP: ICD-10-CM

## 2024-10-30 PROCEDURE — 20610 DRAIN/INJ JOINT/BURSA W/O US: CPT | Mod: LT

## 2024-10-30 PROCEDURE — 73502 X-RAY EXAM HIP UNI 2-3 VIEWS: CPT | Mod: LT

## 2024-10-30 PROCEDURE — 73560 X-RAY EXAM OF KNEE 1 OR 2: CPT | Mod: LT

## 2024-10-30 PROCEDURE — 99203 OFFICE O/P NEW LOW 30 MIN: CPT | Mod: 25

## 2024-11-04 PROBLEM — M70.62 GREATER TROCHANTERIC BURSITIS, LEFT: Status: ACTIVE | Noted: 2024-10-30

## 2024-11-04 PROBLEM — M76.32 ILIOTIBIAL BAND SYNDROME OF LEFT SIDE: Status: ACTIVE | Noted: 2024-10-30

## 2024-11-05 ENCOUNTER — INPATIENT (INPATIENT)
Facility: HOSPITAL | Age: 59
LOS: 0 days | Discharge: HOME | DRG: 280 | End: 2024-11-06
Attending: STUDENT IN AN ORGANIZED HEALTH CARE EDUCATION/TRAINING PROGRAM | Admitting: STUDENT IN AN ORGANIZED HEALTH CARE EDUCATION/TRAINING PROGRAM
Payer: MEDICAID

## 2024-11-05 VITALS
TEMPERATURE: 97 F | HEIGHT: 66 IN | HEART RATE: 97 BPM | WEIGHT: 123.02 LBS | RESPIRATION RATE: 17 BRPM | SYSTOLIC BLOOD PRESSURE: 142 MMHG | OXYGEN SATURATION: 100 % | DIASTOLIC BLOOD PRESSURE: 83 MMHG

## 2024-11-05 DIAGNOSIS — K70.30 ALCOHOLIC CIRRHOSIS OF LIVER WITHOUT ASCITES: ICD-10-CM

## 2024-11-05 DIAGNOSIS — Z98.890 OTHER SPECIFIED POSTPROCEDURAL STATES: Chronic | ICD-10-CM

## 2024-11-05 DIAGNOSIS — Z98.891 HISTORY OF UTERINE SCAR FROM PREVIOUS SURGERY: Chronic | ICD-10-CM

## 2024-11-05 LAB
ALBUMIN SERPL ELPH-MCNC: 2.5 G/DL — LOW (ref 3.3–5)
ALP SERPL-CCNC: 317 U/L — HIGH (ref 40–120)
ALT FLD-CCNC: 62 U/L — SIGNIFICANT CHANGE UP (ref 12–78)
ANION GAP SERPL CALC-SCNC: 9 MMOL/L — SIGNIFICANT CHANGE UP (ref 5–17)
AST SERPL-CCNC: 96 U/L — HIGH (ref 15–37)
BILIRUB SERPL-MCNC: 2.3 MG/DL — HIGH (ref 0.2–1.2)
BLD GP AB SCN SERPL QL: SIGNIFICANT CHANGE UP
BUN SERPL-MCNC: 12 MG/DL — SIGNIFICANT CHANGE UP (ref 7–23)
CALCIUM SERPL-MCNC: 8.6 MG/DL — SIGNIFICANT CHANGE UP (ref 8.5–10.1)
CHLORIDE SERPL-SCNC: 108 MMOL/L — SIGNIFICANT CHANGE UP (ref 96–108)
CO2 SERPL-SCNC: 21 MMOL/L — LOW (ref 22–31)
CREAT SERPL-MCNC: 0.5 MG/DL — SIGNIFICANT CHANGE UP (ref 0.5–1.3)
EGFR: 107 ML/MIN/1.73M2 — SIGNIFICANT CHANGE UP
GLUCOSE SERPL-MCNC: 144 MG/DL — HIGH (ref 70–99)
HCT VFR BLD CALC: 24.4 % — LOW (ref 34.5–45)
HGB BLD-MCNC: 7.7 G/DL — LOW (ref 11.5–15.5)
POTASSIUM SERPL-MCNC: 3.2 MMOL/L — LOW (ref 3.5–5.3)
POTASSIUM SERPL-SCNC: 3.2 MMOL/L — LOW (ref 3.5–5.3)
PROT SERPL-MCNC: 6.9 GM/DL — SIGNIFICANT CHANGE UP (ref 6–8.3)
SODIUM SERPL-SCNC: 138 MMOL/L — SIGNIFICANT CHANGE UP (ref 135–145)

## 2024-11-05 PROCEDURE — 85027 COMPLETE CBC AUTOMATED: CPT

## 2024-11-05 PROCEDURE — 86923 COMPATIBILITY TEST ELECTRIC: CPT

## 2024-11-05 PROCEDURE — 85014 HEMATOCRIT: CPT

## 2024-11-05 PROCEDURE — 80053 COMPREHEN METABOLIC PANEL: CPT

## 2024-11-05 PROCEDURE — 36415 COLL VENOUS BLD VENIPUNCTURE: CPT

## 2024-11-05 PROCEDURE — 85018 HEMOGLOBIN: CPT

## 2024-11-05 PROCEDURE — 86850 RBC ANTIBODY SCREEN: CPT

## 2024-11-05 PROCEDURE — 36430 TRANSFUSION BLD/BLD COMPNT: CPT

## 2024-11-05 PROCEDURE — 86900 BLOOD TYPING SEROLOGIC ABO: CPT

## 2024-11-05 PROCEDURE — 86901 BLOOD TYPING SEROLOGIC RH(D): CPT

## 2024-11-05 PROCEDURE — 80048 BASIC METABOLIC PNL TOTAL CA: CPT

## 2024-11-05 PROCEDURE — 99223 1ST HOSP IP/OBS HIGH 75: CPT

## 2024-11-05 PROCEDURE — P9016: CPT

## 2024-11-05 RX ORDER — ONDANSETRON HYDROCHLORIDE 2 MG/ML
4 INJECTION, SOLUTION INTRAMUSCULAR; INTRAVENOUS EVERY 8 HOURS
Refills: 0 | Status: DISCONTINUED | OUTPATIENT
Start: 2024-11-05 | End: 2024-11-06

## 2024-11-05 RX ORDER — FERROUS SULFATE 325(65) MG
325 TABLET ORAL DAILY
Refills: 0 | Status: DISCONTINUED | OUTPATIENT
Start: 2024-11-05 | End: 2024-11-06

## 2024-11-05 RX ORDER — SUCRALFATE 1 G/10ML
1 SUSPENSION ORAL EVERY 6 HOURS
Refills: 0 | Status: DISCONTINUED | OUTPATIENT
Start: 2024-11-05 | End: 2024-11-06

## 2024-11-05 RX ORDER — PANTOPRAZOLE SODIUM 40 MG/1
40 TABLET, DELAYED RELEASE ORAL ONCE
Refills: 0 | Status: COMPLETED | OUTPATIENT
Start: 2024-11-05 | End: 2024-11-05

## 2024-11-05 RX ORDER — POTASSIUM CHLORIDE 10 MEQ
10 TABLET, EXTENDED RELEASE ORAL
Refills: 0 | Status: COMPLETED | OUTPATIENT
Start: 2024-11-05 | End: 2024-11-05

## 2024-11-05 RX ORDER — CHOLECALCIFEROL (VITAMIN D3) 625 MCG
0 CAPSULE ORAL
Refills: 0 | DISCHARGE

## 2024-11-05 RX ORDER — SUCRALFATE 1 G/10ML
10 SUSPENSION ORAL EVERY 6 HOURS
Refills: 0 | Status: DISCONTINUED | OUTPATIENT
Start: 2024-11-05 | End: 2024-11-05

## 2024-11-05 RX ORDER — MELATONIN 5 MG
3 TABLET ORAL AT BEDTIME
Refills: 0 | Status: DISCONTINUED | OUTPATIENT
Start: 2024-11-05 | End: 2024-11-06

## 2024-11-05 RX ORDER — PANTOPRAZOLE SODIUM 40 MG/1
40 TABLET, DELAYED RELEASE ORAL
Refills: 0 | Status: DISCONTINUED | OUTPATIENT
Start: 2024-11-05 | End: 2024-11-06

## 2024-11-05 RX ORDER — PANTOPRAZOLE SODIUM 40 MG/1
1 TABLET, DELAYED RELEASE ORAL
Refills: 0 | DISCHARGE

## 2024-11-05 RX ORDER — MAGNESIUM, ALUMINUM HYDROXIDE 200-200 MG
30 TABLET,CHEWABLE ORAL EVERY 4 HOURS
Refills: 0 | Status: DISCONTINUED | OUTPATIENT
Start: 2024-11-05 | End: 2024-11-06

## 2024-11-05 RX ORDER — ACETAMINOPHEN 500 MG
650 TABLET ORAL EVERY 6 HOURS
Refills: 0 | Status: DISCONTINUED | OUTPATIENT
Start: 2024-11-05 | End: 2024-11-06

## 2024-11-05 RX ORDER — TRAZODONE HYDROCHLORIDE 100 MG/1
0 TABLET ORAL
Refills: 0 | DISCHARGE

## 2024-11-05 RX ADMIN — Medication 100 MILLIEQUIVALENT(S): at 22:32

## 2024-11-05 RX ADMIN — Medication 325 MILLIGRAM(S): at 18:29

## 2024-11-05 RX ADMIN — PANTOPRAZOLE SODIUM 40 MILLIGRAM(S): 40 TABLET, DELAYED RELEASE ORAL at 14:31

## 2024-11-05 RX ADMIN — PANTOPRAZOLE SODIUM 40 MILLIGRAM(S): 40 TABLET, DELAYED RELEASE ORAL at 19:53

## 2024-11-05 RX ADMIN — SUCRALFATE 1 GRAM(S): 1 SUSPENSION ORAL at 18:29

## 2024-11-05 RX ADMIN — Medication 100 MILLIEQUIVALENT(S): at 19:13

## 2024-11-05 RX ADMIN — Medication 100 MILLIEQUIVALENT(S): at 20:37

## 2024-11-05 NOTE — ASU PREOP CHECKLIST - BP NONINVASIVE SYSTOLIC (MM HG)
Routing refill request to provider for review/approval because:  Labs out of range:  Cr  Ryann Trujillo BSN, RN          
[phone]

## 2024-11-05 NOTE — H&P ADULT - ASSESSMENT
58 yo female current smoker with  PMHx alcohol use disorder, alcoholic cirrhosis with ascites, portal hypertension and esophageal varices, history of esophageal variceal hemorrhage s/p banding (most recently 3 bands 4/2024), chronic dizziness/hearing loss being evaluated for Meniere's disease, chronic hypotension, s/p EGD variceal  band ligation today, admitted for observation for hemorrhaging varices.       # s/p EGD  variceal  band ligation    2/2 alcoholic cirrhosis with ascites, portal hypertension and esophageal varices  - POD #0  - Some hemorrhaging variceal bleed noticed during EGD s/p banding today   - CMP, CBC ordered   - at home on PPI and Carafate, continue   - STAT PPI IVP   - Admitted for over night observation   -GI following     #  Hx of iron deficiency anemia   - on iron supplement     # Hx of hemorrhoidal bleed with blood transfusions   -  in the past (18 blood transfusions)    # current smoker   - 2 Cigarettes sometimes    counseling provided     # alcohol use disorder,  - reports she quit drinking     # portal HTN   continue  Metoprolol 25mg daily     # Dispo   am home

## 2024-11-05 NOTE — PATIENT PROFILE ADULT - FALL HARM RISK - HARM RISK INTERVENTIONS
Assistance with ambulation/Assistance OOB with selected safe patient handling equipment/Communicate Risk of Fall with Harm to all staff/Discuss with provider need for PT consult/Monitor for mental status changes/Monitor gait and stability/Provide patient with walking aids - walker, cane, crutches/Reinforce activity limits and safety measures with patient and family/Tailored Fall Risk Interventions/Use of alarms - bed, chair and/or voice tab/Visual Cue: Yellow wristband and red socks/Bed in lowest position, wheels locked, appropriate side rails in place/Call bell, personal items and telephone in reach/Instruct patient to call for assistance before getting out of bed or chair/Non-slip footwear when patient is out of bed/[place] to call system/Physically safe environment - no spills, clutter or unnecessary equipment/Purposeful Proactive Rounding/Room/bathroom lighting operational, light cord in reach

## 2024-11-05 NOTE — ASU PATIENT PROFILE, ADULT - NSICDXPASTMEDICALHX_GEN_ALL_CORE_FT
PAST MEDICAL HISTORY:  2019 novel coronavirus disease (COVID-19)     Alcohol abuse     Anemia     Depression     H/O vertigo     Hemorrhoids     History of basal cell carcinoma excision     Squamous cell skin cancer     Withdrawal seizures

## 2024-11-05 NOTE — CONSULT NOTE ADULT - ASSESSMENT
Imp:  Variceal bleeding which started in the setting of routine upper endoscopy for banding.    Plan:  Clears today, advance to soft in AM  Protonix  Carafate  Observation overnight and hopefully home in AM if she's stable

## 2024-11-05 NOTE — PATIENT PROFILE ADULT - FALL HARM RISK - CONCLUSION
How Severe Is Your Skin Discoloration?: mild Additional History: Patient went to a Emergency room for this complaint and was referred to dermatology. He also had a ultrasound done at the emergency room and nothing abnormal was seen. Fall with Harm Risk

## 2024-11-05 NOTE — CONSULT NOTE ADULT - SUBJECTIVE AND OBJECTIVE BOX
HPI:  58 yo female current smoker with  PMHx alcohol use disorder, alcoholic cirrhosis with ascites, portal hypertension and esophageal varices, history of esophageal variceal hemorrhage s/p banding (most recently 3 bands 2024), chronic dizziness/hearing loss being evaluated for Meniere's disease, chronic hypotension, s/p EGD variceal  band ligation today, admitted for observation for hemorrhaging varices.   Reports post nasal drip for past week using Flonase, denies associated fever, cough or URi     Hx of iron deficiency anemia on iron supplement . Hx of hemorrhoidal bleed with blood transfusions  in the past (18 blood transfusions)  Labs ordered post EGD    (2024 10:43)  ------------------  The EGD today was scheduled for routine banding and a small blue bleb was noted which started to bleed with flushing. Two bands applied with cessation of bleeding.    PAST MEDICAL & SURGICAL HISTORY:  Alcohol abuse      Depression      Withdrawal seizures      History of basal cell carcinoma excision      Squamous cell skin cancer      Hemorrhoids      2019 novel coronavirus disease (COVID-19)      Anemia      H/O vertigo      History of ear, nose, and throat (ENT) surgery      H/O basal cell carcinoma excision      H/O:           Home Medications:  ferrous sulfate:  (2024 10:56)  melatonin:  (2024 10:56)  multivitamin:  (2024 10:56)      MEDICATIONS  (STANDING):  ferrous    sulfate 325 milliGRAM(s) Oral daily  pantoprazole    Tablet 40 milliGRAM(s) Oral before breakfast  pantoprazole  Injectable 40 milliGRAM(s) IV Push once  sucralfate suspension 10 milliGRAM(s) Oral every 6 hours    MEDICATIONS  (PRN):  acetaminophen     Tablet .. 650 milliGRAM(s) Oral every 6 hours PRN Temp greater or equal to 38C (100.4F), Mild Pain (1 - 3)  aluminum hydroxide/magnesium hydroxide/simethicone Suspension 30 milliLiter(s) Oral every 4 hours PRN Dyspepsia  melatonin 3 milliGRAM(s) Oral at bedtime PRN Insomnia  ondansetron Injectable 4 milliGRAM(s) IV Push every 8 hours PRN Nausea and/or Vomiting      Allergies    Zithromax (Unknown)    Intolerances    morphine (Nausea)      SOCIAL HISTORY:    FAMILY HISTORY:  FH: pancreatic cancer (Mother)    Family history of heart attack (Father)    Family history of CVA (Father)        ROS  As above  Otherwise unremarkable    Vital Signs Last 24 Hrs  T(C): 36.2 (2024 08:57), Max: 36.2 (2024 08:57)  T(F): 97.1 (2024 08:57), Max: 97.1 (2024 08:57)  HR: 97 (2024 08:57) (97 - 97)  BP: 142/83 (2024 08:57) (142/83 - 142/83)  BP(mean): --  RR: 17 (2024 08:57) (17 - 17)  SpO2: 100% (2024 08:57) (100% - 100%)        Constitutional: NAD, well-developed  Respiratory: CTAB  Cardiovascular: S1 and S2, RRR  Gastrointestinal: BS+, soft, NT/ND  Extremities: No peripheral edema  Psychiatric: Normal mood, normal affect  Skin: No rashes    LABS:                RADIOLOGY & ADDITIONAL STUDIES:

## 2024-11-05 NOTE — PATIENT PROFILE ADULT - FALL HARM RISK - FALLEN IN PAST
Nursing Suicide Assessment Note - Inpatient    Current assessment:    Current C-SSRS score: Low Risk      Protective Factors / Reason for Living:  (Pt denies reasons to live)    Interventions:   · Maintain current plan of care.    Patient observed in bedroom at time of assessment, writer asked her how she felt surviving the suicide attempt overdose, she said she wishes she would've .  She is sad, flat and depressed.  Reports feeling tired however denies additional side effects from the suicide attempt.  She denies current intrusive thoughts, denies current thoughts of self harm and suicidal ideation, pt agrees to seek staff assistance with reviewing safety plan if feeling unsafe.     Accidental fall

## 2024-11-05 NOTE — H&P ADULT - NSHPPHYSICALEXAM_GEN_ALL_CORE
Vital Signs Last 24 Hrs  T(C): 36.2 (05 Nov 2024 08:57), Max: 36.2 (05 Nov 2024 08:57)  T(F): 97.1 (05 Nov 2024 08:57), Max: 97.1 (05 Nov 2024 08:57)  HR: 97 (05 Nov 2024 08:57) (97 - 97)  BP: 142/83 (05 Nov 2024 08:57) (142/83 - 142/83)  BP(mean): --  RR: 17 (05 Nov 2024 08:57) (17 - 17)  SpO2: 100% (05 Nov 2024 08:57) (100% - 100%)    GENERAL:  no acute distress  EYES: sclera clear, EOM intact, PERRLA, no exudates + post nasal drip   ENMT: normocephalic, atraumatic, moist mucous membranes  NECK: supple, soft  LUNGS: good air entry bilaterally, clear to auscultation, symmetric breath sounds, no wheezing or rhonchi appreciated  HEART: soft S1/S2, regular rate and rhythm, no murmurs noted, no lower extremity edema  GASTROINTESTINAL: abdomen is soft, nontender, nondistended, normoactive bowel sounds  INTEGUMENT: good skin turgor, no lesions noted  MUSCULOSKELETAL: no cyanosis, clubbing, edema, calves nontender to palpation b/l  NEUROLOGIC: awake, alert, oriented x3, good muscle tone in 4 extremities, no obvious sensory deficits  PSYCHIATRIC: mood is good, affect is congruent, linear and logical thought process

## 2024-11-05 NOTE — H&P ADULT - TIME BILLING
direct patient care including but not limited to reviewing chart, medications ,laboratory data, imaging reports, discussion of plan of care with consultants on the case, coordination of care with multidisciplinary team involved in the case and discussion of plan with, family ,  family agreeable to plan of care and verbalized understanding the anticipated hospital course and treatment plan.

## 2024-11-05 NOTE — H&P ADULT - HISTORY OF PRESENT ILLNESS
58 yo female, PMHx alcohol use disorder, alcoholic cirrhosis with ascites, portal hypertension and esophageal varices, history of esophageal variceal hemorrhage s/p banding (most recently 3 bands 4/2024), chronic dizziness/hearing loss being evaluated for Meniere's disease, chronic hypotension, s/p EGD variceal  band ligation today, admitted for observation for hemorrhaging varices.   Reports post nasal drip for past week using Flonase, denies associated fever, cough or URi     Hx of iron deficiency anemia on iron supplement . Hx of hemorrhoidal bleed with blood trasnfuions in the past   Labs ordered post EGD    60 yo female current smoker with  PMHx alcohol use disorder, alcoholic cirrhosis with ascites, portal hypertension and esophageal varices, history of esophageal variceal hemorrhage s/p banding (most recently 3 bands 4/2024), chronic dizziness/hearing loss being evaluated for Meniere's disease, chronic hypotension, s/p EGD variceal  band ligation today, admitted for observation for hemorrhaging varices.   Reports post nasal drip for past week using Flonase, denies associated fever, cough or URi     Hx of iron deficiency anemia on iron supplement . Hx of hemorrhoidal bleed with blood transfusions  in the past (18 blood transfusions)  Labs ordered post EGD

## 2024-11-06 ENCOUNTER — TRANSCRIPTION ENCOUNTER (OUTPATIENT)
Age: 59
End: 2024-11-06

## 2024-11-06 VITALS — WEIGHT: 130.07 LBS

## 2024-11-06 LAB
ANION GAP SERPL CALC-SCNC: 7 MMOL/L — SIGNIFICANT CHANGE UP (ref 5–17)
BUN SERPL-MCNC: 8 MG/DL — SIGNIFICANT CHANGE UP (ref 7–23)
CALCIUM SERPL-MCNC: 8.7 MG/DL — SIGNIFICANT CHANGE UP (ref 8.5–10.1)
CHLORIDE SERPL-SCNC: 110 MMOL/L — HIGH (ref 96–108)
CO2 SERPL-SCNC: 21 MMOL/L — LOW (ref 22–31)
CREAT SERPL-MCNC: 0.46 MG/DL — LOW (ref 0.5–1.3)
EGFR: 109 ML/MIN/1.73M2 — SIGNIFICANT CHANGE UP
GLUCOSE SERPL-MCNC: 126 MG/DL — HIGH (ref 70–99)
HCT VFR BLD CALC: 26.7 % — LOW (ref 34.5–45)
HGB BLD-MCNC: 8.6 G/DL — LOW (ref 11.5–15.5)
MCHC RBC-ENTMCNC: 28.9 PG — SIGNIFICANT CHANGE UP (ref 27–34)
MCHC RBC-ENTMCNC: 32.2 G/DL — SIGNIFICANT CHANGE UP (ref 32–36)
MCV RBC AUTO: 89.6 FL — SIGNIFICANT CHANGE UP (ref 80–100)
PLATELET # BLD AUTO: 96 K/UL — LOW (ref 150–400)
POTASSIUM SERPL-MCNC: 3.6 MMOL/L — SIGNIFICANT CHANGE UP (ref 3.5–5.3)
POTASSIUM SERPL-SCNC: 3.6 MMOL/L — SIGNIFICANT CHANGE UP (ref 3.5–5.3)
RBC # BLD: 2.98 M/UL — LOW (ref 3.8–5.2)
RBC # FLD: 17.1 % — HIGH (ref 10.3–14.5)
SODIUM SERPL-SCNC: 138 MMOL/L — SIGNIFICANT CHANGE UP (ref 135–145)
WBC # BLD: 8.17 K/UL — SIGNIFICANT CHANGE UP (ref 3.8–10.5)
WBC # FLD AUTO: 8.17 K/UL — SIGNIFICANT CHANGE UP (ref 3.8–10.5)

## 2024-11-06 PROCEDURE — 99239 HOSP IP/OBS DSCHRG MGMT >30: CPT

## 2024-11-06 RX ORDER — FERROUS SULFATE 325(65) MG
0 TABLET ORAL
Refills: 0 | DISCHARGE

## 2024-11-06 RX ORDER — PANTOPRAZOLE SODIUM 40 MG/1
1 TABLET, DELAYED RELEASE ORAL
Qty: 60 | Refills: 0
Start: 2024-11-06 | End: 2024-12-05

## 2024-11-06 RX ORDER — SUCRALFATE 1 G/10ML
10 SUSPENSION ORAL
Qty: 560 | Refills: 0
Start: 2024-11-06 | End: 2024-11-19

## 2024-11-06 RX ORDER — MELATONIN 5 MG
0 TABLET ORAL
Refills: 0 | DISCHARGE

## 2024-11-06 RX ORDER — MELATONIN 5 MG
1 TABLET ORAL
Qty: 0 | Refills: 0 | DISCHARGE
Start: 2024-11-06

## 2024-11-06 RX ORDER — PANTOPRAZOLE SODIUM 40 MG/1
40 TABLET, DELAYED RELEASE ORAL
Refills: 0 | Status: DISCONTINUED | OUTPATIENT
Start: 2024-11-06 | End: 2024-11-06

## 2024-11-06 RX ORDER — FERROUS SULFATE 325(65) MG
1 TABLET ORAL
Qty: 0 | Refills: 0 | DISCHARGE
Start: 2024-11-06

## 2024-11-06 RX ADMIN — Medication 325 MILLIGRAM(S): at 09:40

## 2024-11-06 RX ADMIN — SUCRALFATE 1 GRAM(S): 1 SUSPENSION ORAL at 11:14

## 2024-11-06 RX ADMIN — PANTOPRAZOLE SODIUM 40 MILLIGRAM(S): 40 TABLET, DELAYED RELEASE ORAL at 05:27

## 2024-11-06 RX ADMIN — SUCRALFATE 1 GRAM(S): 1 SUSPENSION ORAL at 01:18

## 2024-11-06 NOTE — DISCHARGE NOTE PROVIDER - NSDCMRMEDTOKEN_GEN_ALL_CORE_FT
ferrous sulfate 325 mg (65 mg elemental iron) oral tablet: 1 tab(s) orally once a day  melatonin 3 mg oral tablet: 1 tab(s) orally once a day (at bedtime) As needed Insomnia  multivitamin:   pantoprazole 40 mg oral delayed release tablet: 1 tab(s) orally 2 times a day  sucralfate 1 g/10 mL oral suspension: 10 milliliter(s) orally every 6 hours

## 2024-11-06 NOTE — DIETITIAN INITIAL EVALUATION ADULT - PERTINENT LABORATORY DATA
11-05    138  |  108  |  12  ----------------------------<  144[H]  3.2[L]   |  21[L]  |  0.50    Ca    8.6      05 Nov 2024 17:17    TPro  6.9  /  Alb  2.5[L]  /  TBili  2.3[H]  /  DBili  x   /  AST  96[H]  /  ALT  62  /  AlkPhos  317[H]  11-05

## 2024-11-06 NOTE — DIETITIAN INITIAL EVALUATION ADULT - ADD RECOMMEND
Advance diet to Full Liquids to regular, when medically feasible as per GI/ intensivist and as tolerated    On diet advance, will add Ensure + HP shake BID  Monitor bowel movements. Trend H/H  Add 100 mg thiamine, 1 mg folic acid, MVI w/ minerals daily   Confirm goals of care regarding nutrition support  RD will continue to monitor PO intake/ tolerance, labs, hydration, and wt prn.  PT on minced and moist, maintain  will add Ensure + HP shake BID  Monitor bowel movements. Monitor bowel movements, if no BM for >3 days, consider implementing bowel regimen.   Add 100 mg thiamine, 1 mg folic acid, MVI w/ minerals daily   Confirm goals of care regarding nutrition support  RD will continue to monitor PO intake/ tolerance, labs, hydration, and wt prn.

## 2024-11-06 NOTE — DIETITIAN INITIAL EVALUATION ADULT - ORAL INTAKE PTA/DIET HISTORY
PT lives with boyfriend, they both shop and cook.  They cook and eat fresh, whole food, and also order in. PT drinks Ensure at home.  Based on dietary recall,  PO intake estimated < 75% ENN > one month.

## 2024-11-06 NOTE — DISCHARGE NOTE NURSING/CASE MANAGEMENT/SOCIAL WORK - FINANCIAL ASSISTANCE
[facility] provides services at a reduced cost to those who are determined to be eligible through [facility]’s financial assistance program. Information regarding [facility]’s financial assistance program can be found by going to [email or web address] or by calling [phone].

## 2024-11-06 NOTE — DIETITIAN INITIAL EVALUATION ADULT - LAB (SPECIFY)
Consider to obtain vitamin D 25OH level to assess nutriture  Obtain vitamin D 25OH level to assess nutriture

## 2024-11-06 NOTE — DISCHARGE NOTE PROVIDER - NSDCCPCAREPLAN_GEN_ALL_CORE_FT
PRINCIPAL DISCHARGE DIAGNOSIS  Diagnosis: Esophageal varices  Assessment and Plan of Treatment: Admitted for observation after noted with bleeding varices during EGD, received 1 unit blood transfusion, hemoglobin remains stable. Continue with pantoprazole twice daily and carafate 4 times daily, continue to follow up with Dr. Goodson for continued management.

## 2024-11-06 NOTE — DIETITIAN INITIAL EVALUATION ADULT - OTHER INFO
60 yo female current smoker with  PMHx alcohol use disorder, alcoholic cirrhosis with ascites, portal hypertension and esophageal varices, history of esophageal variceal hemorrhage s/p banding (most recently 3 bands 4/2024), chronic dizziness/hearing loss being evaluated for Meniere's disease, chronic hypotension, s/p EGD variceal  band ligation today, admitted for observation for hemorrhaging varices.     Admit dx   Alcoholic cirrhosis of liver without ascites  RD bedscale weight 56 kg   123#  Previous RD bedscale weight 10/5   58 kg  125#  pt had dx of moderate malnutrition  NFPE reveals muscle wasting, fat wasting severe and moderate  PT on minced and moist diet  Add Ensure plus bid  suggest Confirm Goals of Care regarding nutrition support. Will provide nutrition/ hydration within goals of care.   Glucose is elevated, pt reports that she does not have diabetes  Mag 1.5L   K  3.2L  Suggest check Phos  Recommendations to follow in Plan/Intervention

## 2024-11-06 NOTE — PROGRESS NOTE ADULT - ASSESSMENT
Imp:  S/P variceal banding    Rec:  Mechanical soft diet for a few days, then she can advance to normal  Protonix 40 BID  Carafate qid x 2 weeks  OK for d/c assuming H/H is reasonable

## 2024-11-06 NOTE — DIETITIAN NUTRITION RISK NOTIFICATION - ADDITIONAL COMMENTS/DIETITIAN RECOMMENDATIONS
PT on minced and moist, maintain  will add Ensure + HP shake BID  Monitor bowel movements. Monitor bowel movements, if no BM for >3 days, consider implementing bowel regimen.   Add 100 mg thiamine, 1 mg folic acid, MVI w/ minerals daily   Confirm goals of care regarding nutrition support  RD will continue to monitor PO intake/ tolerance, labs, hydration, and wt prn.

## 2024-11-06 NOTE — DISCHARGE NOTE NURSING/CASE MANAGEMENT/SOCIAL WORK - NSDCVIVACCINE_GEN_ALL_CORE_FT
influenza, injectable, quadrivalent, preservative free; 13-Oct-2023 11:44; Kristina Renee (RN); [employer]; [id number] (Exp. Date: 13-Jun-2024); IntraMuscular; Deltoid Left.; 0.5 milliLiter(s); VIS (VIS Published: 06-Aug-2021, VIS Presented: 13-Oct-2023);   Influenza, split virus, trivalent, preservative free; 08-Oct-2024 13:53; Ector Canales (RN); [employer]; [id number] (Exp. Date: 30-Jun-2025); IntraMuscular; Deltoid Left.; 0.5 milliLiter(s); VIS (VIS Published: 06-Aug-2021, VIS Presented: 08-Oct-2024);

## 2024-11-06 NOTE — DIETITIAN INITIAL EVALUATION ADULT - NSICDXPASTMEDICALHX_GEN_ALL_CORE_FT
Post-Anesthesia Evaluation and Assessment    Cardiovascular Function/Vital Signs  Visit Vitals    /62    Pulse 66    Temp 36.6 °C (97.8 °F)    Resp 16    Ht 5' 3\" (1.6 m)    Wt 52.2 kg (115 lb 3 oz)    SpO2 99%    BMI 20.4 kg/m2       Patient is status post Procedure(s):  CATARACT EXTRACTION WITH INTRA OCULAR LENS IMPLANT RIGHT EYE. Nausea/Vomiting: Controlled. Postoperative hydration reviewed and adequate. Pain:  Pain Scale 1: Numeric (0 - 10) (01/24/17 0846)  Pain Intensity 1: 0 (01/24/17 0846)   Managed. Neurological Status:   Neuro (WDL): Within Defined Limits (01/24/17 0628)   At baseline. Mental Status and Level of Consciousness: Arousable. Pulmonary Status:   O2 Device: Room air (01/24/17 0846)   Adequate oxygenation and airway patent. Complications related to anesthesia: None    Post-anesthesia assessment completed. No concerns. Patient has met all discharge requirements.     Signed By: Rikki Rivera MD    January 24, 2017
PAST MEDICAL HISTORY:  2019 novel coronavirus disease (COVID-19)     Alcohol abuse     Anemia     Depression     H/O vertigo     Hemorrhoids     History of basal cell carcinoma excision     Squamous cell skin cancer     Withdrawal seizures

## 2024-11-06 NOTE — DISCHARGE NOTE PROVIDER - DETAILS OF MALNUTRITION DIAGNOSIS/DIAGNOSES
This patient has been assessed with a concern for Malnutrition and was treated during this hospitalization for the following Nutrition diagnosis/diagnoses:     -  11/06/2024: Severe protein-calorie malnutrition

## 2024-11-06 NOTE — DISCHARGE NOTE PROVIDER - HOSPITAL COURSE
60 yo female current smoker with  PMHx alcohol use disorder, alcoholic cirrhosis with ascites, portal hypertension and esophageal varices, history of esophageal variceal hemorrhage s/p banding (most recently 3 bands 4/2024), chronic dizziness/hearing loss being evaluated for Meniere's disease, chronic hypotension, s/p EGD variceal  band ligation today, admitted for observation for hemorrhaging varices. Reports post nasal drip for past week using Flonase, denies associated fever, cough or URi. Hx of iron deficiency anemia on iron supplement . Hx of hemorrhoidal bleed with blood transfusions  in the past (18 blood transfusions)  Labs ordered post EGD     Subjective: pt seen and examined today, feels well, no complaints, h/h stable s/p transfusion, hemodynamically stable     Vital Signs Last 24 Hrs  T(C): 36.7 (06 Nov 2024 08:32), Max: 37.1 (05 Nov 2024 15:50)  T(F): 98 (06 Nov 2024 08:32), Max: 98.8 (05 Nov 2024 15:50)  HR: 81 (06 Nov 2024 08:32) (81 - 100)  BP: 122/74 (06 Nov 2024 08:32) (99/64 - 122/74)  RR: 18 (06 Nov 2024 08:32) (18 - 19)  SpO2: 99% (06 Nov 2024 08:32) (96% - 100%)    Parameters below as of 06 Nov 2024 08:32  Patient On (Oxygen Delivery Method): room air    PHYSICAL EXAM:  GENERAL: NAD, lying in bed comfortably  HEAD:  Atraumatic, Normocephalic  EYES: conjunctiva and sclera clear  ENT: Moist mucous membranes  NECK: Supple, No JVD  CHEST/LUNG: Clear to auscultation bilaterally; No rales, rhonchi, wheezing. Unlabored respirations  HEART: Regular rate and rhythm; No murmurs  ABDOMEN: Bowel sounds present; Soft, Nontender, Nondistended.   EXTREMITIES:  2+ Peripheral Pulses, brisk capillary refill. No clubbing, cyanosis, or edema  NERVOUS SYSTEM:  Alert & Oriented X3, speech clear. No deficits   MSK: FROM all 4 extremities, full and equal strength      # s/p EGD  variceal  band ligation    2/2 alcoholic cirrhosis with ascites, portal hypertension and esophageal varices  - POD #1  - Some hemorrhaging variceal bleed noticed during EGD s/p banding   - CMP, CBC reviewed   - at home on PPI and Carafate, continue   - STAT PPI IVP   - Admitted for over night observation   - GI consult appreciated     #  Hx of iron deficiency anemia   - on iron supplement     # Hx of hemorrhoidal bleed with blood transfusions   -  in the past (18 blood transfusions)    # current smoker   - 2 Cigarettes sometimes    counseling provided     # alcohol use disorder,  - reports she quit drinking     # portal HTN   continue  Metoprolol 25mg daily

## 2024-11-06 NOTE — DISCHARGE NOTE PROVIDER - NSDCFUSCHEDAPPT_GEN_ALL_CORE_FT
[facility]  ORTHOSURG [address] [facility]  Scheduled Appointment: 11/13/2024

## 2024-11-06 NOTE — DIETITIAN INITIAL EVALUATION ADULT - FEEDING ASSISTANCE
Tray set-up, open all containers; meal encouragement  Tray set-up, open all containers, encourage po intake

## 2024-11-06 NOTE — DISCHARGE NOTE NURSING/CASE MANAGEMENT/SOCIAL WORK - PATIENT PORTAL LINK FT
You can access the FollowMyHealth Patient Portal offered by [facility] by registering at the following website: [email or web address]. By joining [facility]’s FollowMyHealth portal, you will also be able to view your health information using other applications (apps) compatible with our system.

## 2024-11-06 NOTE — DIETITIAN INITIAL EVALUATION ADULT - PERTINENT MEDS FT
MEDICATIONS  (STANDING):  ferrous    sulfate 325 milliGRAM(s) Oral daily  pantoprazole    Tablet 40 milliGRAM(s) Oral two times a day  sucralfate suspension 1 Gram(s) Oral every 6 hours    MEDICATIONS  (PRN):  acetaminophen     Tablet .. 650 milliGRAM(s) Oral every 6 hours PRN Temp greater or equal to 38C (100.4F), Mild Pain (1 - 3)  aluminum hydroxide/magnesium hydroxide/simethicone Suspension 30 milliLiter(s) Oral every 4 hours PRN Dyspepsia  melatonin 3 milliGRAM(s) Oral at bedtime PRN Insomnia  ondansetron Injectable 4 milliGRAM(s) IV Push every 8 hours PRN Nausea and/or Vomiting

## 2024-11-06 NOTE — DIETITIAN INITIAL EVALUATION ADULT - NAME AND PHONE
Makenna Bedoya RDN, CDN, [facility]      [phone]   [email or web address]

## 2024-11-06 NOTE — PROGRESS NOTE ADULT - SUBJECTIVE AND OBJECTIVE BOX
Patient is a 59y old  Female who presents with a chief complaint of s/o EGD band ligation (2024 12:41)      Subective:  Feels good today  No bleeding    PAST MEDICAL & SURGICAL HISTORY:  Alcohol abuse      Depression      Withdrawal seizures      History of basal cell carcinoma excision      Squamous cell skin cancer      Hemorrhoids      2019 novel coronavirus disease (COVID-19)      Anemia      H/O vertigo      History of ear, nose, and throat (ENT) surgery      H/O basal cell carcinoma excision      H/O:           MEDICATIONS  (STANDING):  ferrous    sulfate 325 milliGRAM(s) Oral daily  pantoprazole    Tablet 40 milliGRAM(s) Oral two times a day  sucralfate suspension 1 Gram(s) Oral every 6 hours    MEDICATIONS  (PRN):  acetaminophen     Tablet .. 650 milliGRAM(s) Oral every 6 hours PRN Temp greater or equal to 38C (100.4F), Mild Pain (1 - 3)  aluminum hydroxide/magnesium hydroxide/simethicone Suspension 30 milliLiter(s) Oral every 4 hours PRN Dyspepsia  melatonin 3 milliGRAM(s) Oral at bedtime PRN Insomnia  ondansetron Injectable 4 milliGRAM(s) IV Push every 8 hours PRN Nausea and/or Vomiting      REVIEW OF SYSTEMS:    RESPIRATORY: No shortness of breath  CARDIOVASCULAR: No chest pain  All other review of systems is negative unless indicated above.    Vital Signs Last 24 Hrs  T(C): 36.8 (2024 02:50), Max: 37.1 (2024 15:50)  T(F): 98.2 (2024 02:50), Max: 98.8 (2024 15:50)  HR: 83 (2024 02:50) (83 - 100)  BP: 120/76 (2024 02:50) (99/64 - 142/83)  BP(mean): --  RR: 18 (2024 02:50) (17 - 19)  SpO2: 99% (2024 02:50) (96% - 100%)    Parameters below as of 2024 02:50  Patient On (Oxygen Delivery Method): room air        PHYSICAL EXAM:    Constitutional: NAD, well-developed  Respiratory: CTAB  Cardiovascular: S1 and S2, RRR  Gastrointestinal: BS+, soft, NT/ND  Extremities: No peripheral edema  Psychiatric: Normal mood, normal affect    LABS:                        7.7    x     )-----------( x        ( 2024 17:17 )             24.4         138  |  108  |  12  ----------------------------<  144[H]  3.2[L]   |  21[L]  |  0.50    Ca    8.6      2024 17:17    TPro  6.9  /  Alb  2.5[L]  /  TBili  2.3[H]  /  DBili  x   /  AST  96[H]  /  ALT  62  /  AlkPhos  317[H]  11-      LIVER FUNCTIONS - ( 2024 17:17 )  Alb: 2.5 g/dL / Pro: 6.9 gm/dL / ALK PHOS: 317 U/L / ALT: 62 U/L / AST: 96 U/L / GGT: x             RADIOLOGY & ADDITIONAL STUDIES:

## 2024-11-06 NOTE — DISCHARGE NOTE PROVIDER - CARE PROVIDER_API CALL
Brian Goodson  Gastroenterology  [address] [facility], [address] [address]  Phone: [phone]  Fax: [phone]  Follow Up Time:

## 2024-11-13 ENCOUNTER — APPOINTMENT (OUTPATIENT)
Dept: ORTHOPEDIC SURGERY | Facility: CLINIC | Age: 59
End: 2024-11-13

## 2024-11-13 DIAGNOSIS — Z88.1 ALLERGY STATUS TO OTHER ANTIBIOTIC AGENTS: ICD-10-CM

## 2024-11-13 DIAGNOSIS — D50.9 IRON DEFICIENCY ANEMIA, UNSPECIFIED: ICD-10-CM

## 2024-11-13 DIAGNOSIS — Z79.899 OTHER LONG TERM (CURRENT) DRUG THERAPY: ICD-10-CM

## 2024-11-13 DIAGNOSIS — F32.A DEPRESSION, UNSPECIFIED: ICD-10-CM

## 2024-11-13 DIAGNOSIS — Z85.828 PERSONAL HISTORY OF OTHER MALIGNANT NEOPLASM OF SKIN: ICD-10-CM

## 2024-11-13 DIAGNOSIS — F17.210 NICOTINE DEPENDENCE, CIGARETTES, UNCOMPLICATED: ICD-10-CM

## 2024-11-13 DIAGNOSIS — K31.89 OTHER DISEASES OF STOMACH AND DUODENUM: ICD-10-CM

## 2024-11-13 DIAGNOSIS — M76.32 ILIOTIBIAL BAND SYNDROME, LEFT LEG: ICD-10-CM

## 2024-11-13 DIAGNOSIS — I85.11 SECONDARY ESOPHAGEAL VARICES WITH BLEEDING: ICD-10-CM

## 2024-11-13 DIAGNOSIS — K76.6 PORTAL HYPERTENSION: ICD-10-CM

## 2024-11-13 DIAGNOSIS — M70.62 TROCHANTERIC BURSITIS, LEFT HIP: ICD-10-CM

## 2024-11-13 DIAGNOSIS — Z86.16 PERSONAL HISTORY OF COVID-19: ICD-10-CM

## 2024-11-13 DIAGNOSIS — E43 UNSPECIFIED SEVERE PROTEIN-CALORIE MALNUTRITION: ICD-10-CM

## 2024-11-13 DIAGNOSIS — F10.10 ALCOHOL ABUSE, UNCOMPLICATED: ICD-10-CM

## 2024-11-13 DIAGNOSIS — K70.31 ALCOHOLIC CIRRHOSIS OF LIVER WITH ASCITES: ICD-10-CM

## 2024-11-13 PROCEDURE — 99212 OFFICE O/P EST SF 10 MIN: CPT

## 2024-11-14 VITALS — WEIGHT: 128 LBS | BODY MASS INDEX: 20.09 KG/M2 | HEIGHT: 67 IN

## 2024-11-16 ENCOUNTER — INPATIENT (INPATIENT)
Facility: HOSPITAL | Age: 59
LOS: 2 days | Discharge: HOME | DRG: 720 | End: 2024-11-19
Attending: STUDENT IN AN ORGANIZED HEALTH CARE EDUCATION/TRAINING PROGRAM | Admitting: HOSPITALIST
Payer: MEDICAID

## 2024-11-16 VITALS — HEIGHT: 66 IN

## 2024-11-16 DIAGNOSIS — A41.9 SEPSIS, UNSPECIFIED ORGANISM: ICD-10-CM

## 2024-11-16 DIAGNOSIS — Z98.890 OTHER SPECIFIED POSTPROCEDURAL STATES: Chronic | ICD-10-CM

## 2024-11-16 DIAGNOSIS — Z98.891 HISTORY OF UTERINE SCAR FROM PREVIOUS SURGERY: Chronic | ICD-10-CM

## 2024-11-16 PROBLEM — D64.9 ANEMIA, UNSPECIFIED: Chronic | Status: ACTIVE | Noted: 2024-11-05

## 2024-11-16 PROBLEM — Z87.898 PERSONAL HISTORY OF OTHER SPECIFIED CONDITIONS: Chronic | Status: ACTIVE | Noted: 2024-11-05

## 2024-11-16 LAB
ADD ON TEST-SPECIMEN IN LAB: SIGNIFICANT CHANGE UP
ALBUMIN FLD-MCNC: 0.5 G/DL — SIGNIFICANT CHANGE UP
ALBUMIN SERPL ELPH-MCNC: 2.4 G/DL — LOW (ref 3.3–5)
ALP SERPL-CCNC: 291 U/L — HIGH (ref 40–120)
ALT FLD-CCNC: 37 U/L — SIGNIFICANT CHANGE UP (ref 12–78)
ANION GAP SERPL CALC-SCNC: 10 MMOL/L — SIGNIFICANT CHANGE UP (ref 5–17)
ANISOCYTOSIS BLD QL: SLIGHT — SIGNIFICANT CHANGE UP
APPEARANCE UR: CLEAR — SIGNIFICANT CHANGE UP
APTT BLD: 29.2 SEC — SIGNIFICANT CHANGE UP (ref 24.5–35.6)
AST SERPL-CCNC: 73 U/L — HIGH (ref 15–37)
B PERT IGG+IGM PNL SER: CLEAR — SIGNIFICANT CHANGE UP
BASOPHILS # BLD AUTO: 0 K/UL — SIGNIFICANT CHANGE UP (ref 0–0.2)
BASOPHILS NFR BLD AUTO: 0 % — SIGNIFICANT CHANGE UP (ref 0–2)
BILIRUB SERPL-MCNC: 4.2 MG/DL — HIGH (ref 0.2–1.2)
BILIRUB UR-MCNC: NEGATIVE — SIGNIFICANT CHANGE UP
BUN SERPL-MCNC: 8 MG/DL — SIGNIFICANT CHANGE UP (ref 7–23)
CALCIUM SERPL-MCNC: 8.2 MG/DL — LOW (ref 8.5–10.1)
CHLORIDE SERPL-SCNC: 108 MMOL/L — SIGNIFICANT CHANGE UP (ref 96–108)
CO2 SERPL-SCNC: 19 MMOL/L — LOW (ref 22–31)
COLOR FLD: YELLOW — SIGNIFICANT CHANGE UP
COLOR SPEC: SIGNIFICANT CHANGE UP
CREAT SERPL-MCNC: 0.8 MG/DL — SIGNIFICANT CHANGE UP (ref 0.5–1.3)
DIFF PNL FLD: NEGATIVE — SIGNIFICANT CHANGE UP
EGFR: 84 ML/MIN/1.73M2 — SIGNIFICANT CHANGE UP
EOSINOPHIL # BLD AUTO: 0 K/UL — SIGNIFICANT CHANGE UP (ref 0–0.5)
EOSINOPHIL # FLD: 0 % — SIGNIFICANT CHANGE UP
EOSINOPHIL NFR BLD AUTO: 0 % — SIGNIFICANT CHANGE UP (ref 0–6)
FLUAV AG NPH QL: SIGNIFICANT CHANGE UP
FLUBV AG NPH QL: SIGNIFICANT CHANGE UP
FLUID INTAKE SUBSTANCE CLASS: SIGNIFICANT CHANGE UP
FOLATE+VIT B12 SERBLD-IMP: 0 % — SIGNIFICANT CHANGE UP
GLUCOSE FLD-MCNC: 115 MG/DL — SIGNIFICANT CHANGE UP
GLUCOSE SERPL-MCNC: 108 MG/DL — HIGH (ref 70–99)
GLUCOSE UR QL: NEGATIVE MG/DL — SIGNIFICANT CHANGE UP
GRAM STN FLD: SIGNIFICANT CHANGE UP
HCT VFR BLD CALC: 28.8 % — LOW (ref 34.5–45)
HGB BLD-MCNC: 9 G/DL — LOW (ref 11.5–15.5)
INR BLD: 1.44 RATIO — HIGH (ref 0.85–1.16)
KETONES UR-MCNC: NEGATIVE MG/DL — SIGNIFICANT CHANGE UP
LACTATE SERPL-SCNC: 3 MMOL/L — HIGH (ref 0.7–2)
LACTATE SERPL-SCNC: 3.9 MMOL/L — HIGH (ref 0.7–2)
LACTATE SERPL-SCNC: 4.1 MMOL/L — CRITICAL HIGH (ref 0.7–2)
LDH SERPL L TO P-CCNC: 26 U/L — SIGNIFICANT CHANGE UP
LEUKOCYTE ESTERASE UR-ACNC: NEGATIVE — SIGNIFICANT CHANGE UP
LIDOCAIN IGE QN: 71 U/L — SIGNIFICANT CHANGE UP (ref 13–75)
LYMPHOCYTES # BLD AUTO: 0.07 K/UL — LOW (ref 1–3.3)
LYMPHOCYTES # BLD AUTO: 1 % — LOW (ref 13–44)
LYMPHOCYTES # FLD: 23 % — SIGNIFICANT CHANGE UP
MAGNESIUM SERPL-MCNC: 1.4 MG/DL — LOW (ref 1.6–2.6)
MANUAL SMEAR VERIFICATION: SIGNIFICANT CHANGE UP
MCHC RBC-ENTMCNC: 26.9 PG — LOW (ref 27–34)
MCHC RBC-ENTMCNC: 31.3 G/DL — LOW (ref 32–36)
MCV RBC AUTO: 86 FL — SIGNIFICANT CHANGE UP (ref 80–100)
MESOTHL CELL # FLD: 15 % — SIGNIFICANT CHANGE UP
METAMYELOCYTES # FLD: 1 % — HIGH (ref 0–0)
MONOCYTES # BLD AUTO: 0 K/UL — SIGNIFICANT CHANGE UP (ref 0–0.9)
MONOCYTES NFR BLD AUTO: 0 % — LOW (ref 2–14)
MONOS+MACROS # FLD: 34 % — SIGNIFICANT CHANGE UP
NEUTROPHILS # BLD AUTO: 6.65 K/UL — SIGNIFICANT CHANGE UP (ref 1.8–7.4)
NEUTROPHILS NFR BLD AUTO: 89 % — HIGH (ref 43–77)
NEUTROPHILS-BODY FLUID: 28 % — SIGNIFICANT CHANGE UP
NEUTS BAND # BLD: 9 % — HIGH (ref 0–8)
NITRITE UR-MCNC: NEGATIVE — SIGNIFICANT CHANGE UP
NRBC # BLD: 0 /100 WBCS — SIGNIFICANT CHANGE UP (ref 0–0)
NRBC # BLD: SIGNIFICANT CHANGE UP /100 WBCS (ref 0–0)
NRBC # FLD: 0 % — SIGNIFICANT CHANGE UP (ref 0–0)
OTHER CELLS FLD MANUAL: 0 % — SIGNIFICANT CHANGE UP
OVALOCYTES BLD QL SMEAR: SLIGHT — SIGNIFICANT CHANGE UP
PH UR: 6 — SIGNIFICANT CHANGE UP (ref 5–8)
PLAT MORPH BLD: NORMAL — SIGNIFICANT CHANGE UP
PLATELET # BLD AUTO: 113 K/UL — LOW (ref 150–400)
POIKILOCYTOSIS BLD QL AUTO: SLIGHT — SIGNIFICANT CHANGE UP
POLYCHROMASIA BLD QL SMEAR: SLIGHT — SIGNIFICANT CHANGE UP
POTASSIUM SERPL-MCNC: 3.1 MMOL/L — LOW (ref 3.5–5.3)
POTASSIUM SERPL-SCNC: 3.1 MMOL/L — LOW (ref 3.5–5.3)
PROT FLD-MCNC: <1 G/DL — SIGNIFICANT CHANGE UP
PROT SERPL-MCNC: 6.8 GM/DL — SIGNIFICANT CHANGE UP (ref 6–8.3)
PROT UR-MCNC: NEGATIVE MG/DL — SIGNIFICANT CHANGE UP
PROTHROM AB SERPL-ACNC: 16.5 SEC — HIGH (ref 9.9–13.4)
RBC # BLD: 3.35 M/UL — LOW (ref 3.8–5.2)
RBC # FLD: 17.8 % — HIGH (ref 10.3–14.5)
RBC BLD AUTO: ABNORMAL
RCV VOL RI: 447 /UL — HIGH (ref 0–0)
RSV RNA NPH QL NAA+NON-PROBE: SIGNIFICANT CHANGE UP
SARS-COV-2 RNA SPEC QL NAA+PROBE: SIGNIFICANT CHANGE UP
SODIUM SERPL-SCNC: 137 MMOL/L — SIGNIFICANT CHANGE UP (ref 135–145)
SP GR SPEC: >1.03 — HIGH (ref 1–1.03)
SPECIMEN SOURCE: SIGNIFICANT CHANGE UP
TOTAL NUCLEATED CELL COUNT, BODY FLUID: 114 /UL — SIGNIFICANT CHANGE UP
TUBE TYPE: SIGNIFICANT CHANGE UP
UROBILINOGEN FLD QL: 0.2 MG/DL — SIGNIFICANT CHANGE UP (ref 0.2–1)
WBC # BLD: 6.79 K/UL — SIGNIFICANT CHANGE UP (ref 3.8–10.5)
WBC # FLD AUTO: 6.79 K/UL — SIGNIFICANT CHANGE UP (ref 3.8–10.5)

## 2024-11-16 PROCEDURE — 71045 X-RAY EXAM CHEST 1 VIEW: CPT

## 2024-11-16 PROCEDURE — 80053 COMPREHEN METABOLIC PANEL: CPT

## 2024-11-16 PROCEDURE — 85027 COMPLETE CBC AUTOMATED: CPT

## 2024-11-16 PROCEDURE — 94640 AIRWAY INHALATION TREATMENT: CPT

## 2024-11-16 PROCEDURE — 87507 IADNA-DNA/RNA PROBE TQ 12-25: CPT

## 2024-11-16 PROCEDURE — P9047: CPT

## 2024-11-16 PROCEDURE — 99223 1ST HOSP IP/OBS HIGH 75: CPT

## 2024-11-16 PROCEDURE — 93010 ELECTROCARDIOGRAM REPORT: CPT

## 2024-11-16 PROCEDURE — 99285 EMERGENCY DEPT VISIT HI MDM: CPT

## 2024-11-16 PROCEDURE — 49083 ABD PARACENTESIS W/IMAGING: CPT

## 2024-11-16 PROCEDURE — 36415 COLL VENOUS BLD VENIPUNCTURE: CPT

## 2024-11-16 PROCEDURE — 74177 CT ABD & PELVIS W/CONTRAST: CPT | Mod: 26,MC

## 2024-11-16 PROCEDURE — 83605 ASSAY OF LACTIC ACID: CPT

## 2024-11-16 PROCEDURE — 83735 ASSAY OF MAGNESIUM: CPT

## 2024-11-16 PROCEDURE — 97162 PT EVAL MOD COMPLEX 30 MIN: CPT | Mod: GP

## 2024-11-16 PROCEDURE — 71045 X-RAY EXAM CHEST 1 VIEW: CPT | Mod: 26

## 2024-11-16 RX ORDER — CEFTRIAXONE SODIUM 1 G
2000 VIAL (EA) INJECTION ONCE
Refills: 0 | Status: COMPLETED | OUTPATIENT
Start: 2024-11-16 | End: 2024-11-16

## 2024-11-16 RX ORDER — FERROUS SULFATE 325(65) MG
325 TABLET ORAL DAILY
Refills: 0 | Status: DISCONTINUED | OUTPATIENT
Start: 2024-11-16 | End: 2024-11-19

## 2024-11-16 RX ORDER — ACETAMINOPHEN, DIPHENHYDRAMINE HCL, PHENYLEPHRINE HCL 325; 25; 5 MG/1; MG/1; MG/1
3 TABLET ORAL AT BEDTIME
Refills: 0 | Status: DISCONTINUED | OUTPATIENT
Start: 2024-11-16 | End: 2024-11-19

## 2024-11-16 RX ORDER — B-COMPLEX WITH VITAMIN C
0 VIAL (ML) INJECTION
Refills: 0 | DISCHARGE

## 2024-11-16 RX ORDER — METRONIDAZOLE 500 MG/1
TABLET ORAL
Refills: 0 | Status: DISCONTINUED | OUTPATIENT
Start: 2024-11-16 | End: 2024-11-16

## 2024-11-16 RX ORDER — ONDANSETRON HYDROCHLORIDE 4 MG/1
4 TABLET, FILM COATED ORAL EVERY 8 HOURS
Refills: 0 | Status: DISCONTINUED | OUTPATIENT
Start: 2024-11-16 | End: 2024-11-19

## 2024-11-16 RX ORDER — CYANOCOBALAMIN/FOLIC AC/VIT B6 1-2.2-25MG
1 TABLET ORAL DAILY
Refills: 0 | Status: DISCONTINUED | OUTPATIENT
Start: 2024-11-16 | End: 2024-11-19

## 2024-11-16 RX ORDER — METRONIDAZOLE 500 MG/1
500 TABLET ORAL ONCE
Refills: 0 | Status: COMPLETED | OUTPATIENT
Start: 2024-11-16 | End: 2024-11-16

## 2024-11-16 RX ORDER — HYDROMORPHONE HYDROCHLORIDE 2 MG/1
0.2 TABLET ORAL EVERY 8 HOURS
Refills: 0 | Status: DISCONTINUED | OUTPATIENT
Start: 2024-11-16 | End: 2024-11-19

## 2024-11-16 RX ORDER — ACETAMINOPHEN 500MG 500 MG/1
650 TABLET, COATED ORAL EVERY 6 HOURS
Refills: 0 | Status: DISCONTINUED | OUTPATIENT
Start: 2024-11-16 | End: 2024-11-16

## 2024-11-16 RX ORDER — 0.9 % SODIUM CHLORIDE 0.9 %
1000 INTRAVENOUS SOLUTION INTRAVENOUS ONCE
Refills: 0 | Status: COMPLETED | OUTPATIENT
Start: 2024-11-16 | End: 2024-11-16

## 2024-11-16 RX ORDER — ONDANSETRON HYDROCHLORIDE 4 MG/1
4 TABLET, FILM COATED ORAL ONCE
Refills: 0 | Status: COMPLETED | OUTPATIENT
Start: 2024-11-16 | End: 2024-11-16

## 2024-11-16 RX ORDER — 0.9 % SODIUM CHLORIDE 0.9 %
1000 INTRAVENOUS SOLUTION INTRAVENOUS
Refills: 0 | Status: DISCONTINUED | OUTPATIENT
Start: 2024-11-16 | End: 2024-11-17

## 2024-11-16 RX ORDER — ACETAMINOPHEN 500MG 500 MG/1
1000 TABLET, COATED ORAL ONCE
Refills: 0 | Status: DISCONTINUED | OUTPATIENT
Start: 2024-11-16 | End: 2024-11-16

## 2024-11-16 RX ORDER — HYDROMORPHONE HYDROCHLORIDE 2 MG/1
0.5 TABLET ORAL ONCE
Refills: 0 | Status: DISCONTINUED | OUTPATIENT
Start: 2024-11-16 | End: 2024-11-16

## 2024-11-16 RX ORDER — POTASSIUM CHLORIDE 600 MG/1
40 TABLET, EXTENDED RELEASE ORAL EVERY 4 HOURS
Refills: 0 | Status: COMPLETED | OUTPATIENT
Start: 2024-11-16 | End: 2024-11-16

## 2024-11-16 RX ORDER — SUCRALFATE 1 G/1
1 TABLET ORAL EVERY 6 HOURS
Refills: 0 | Status: DISCONTINUED | OUTPATIENT
Start: 2024-11-16 | End: 2024-11-19

## 2024-11-16 RX ORDER — FUROSEMIDE 40 MG/1
40 TABLET ORAL DAILY
Refills: 0 | Status: DISCONTINUED | OUTPATIENT
Start: 2024-11-16 | End: 2024-11-19

## 2024-11-16 RX ORDER — PANTOPRAZOLE SODIUM 40 MG/1
40 TABLET, DELAYED RELEASE ORAL
Refills: 0 | Status: DISCONTINUED | OUTPATIENT
Start: 2024-11-16 | End: 2024-11-19

## 2024-11-16 RX ORDER — CEFTRIAXONE SODIUM 1 G
2000 VIAL (EA) INJECTION ONCE
Refills: 0 | Status: DISCONTINUED | OUTPATIENT
Start: 2024-11-16 | End: 2024-11-16

## 2024-11-16 RX ORDER — MAGNESIUM, ALUMINUM HYDROXIDE 200-225/5
30 SUSPENSION, ORAL (FINAL DOSE FORM) ORAL EVERY 4 HOURS
Refills: 0 | Status: DISCONTINUED | OUTPATIENT
Start: 2024-11-16 | End: 2024-11-19

## 2024-11-16 RX ORDER — METRONIDAZOLE 500 MG/1
500 TABLET ORAL EVERY 12 HOURS
Refills: 0 | Status: DISCONTINUED | OUTPATIENT
Start: 2024-11-16 | End: 2024-11-17

## 2024-11-16 RX ORDER — SPIRONOLACTONE 25 MG
100 TABLET ORAL DAILY
Refills: 0 | Status: DISCONTINUED | OUTPATIENT
Start: 2024-11-16 | End: 2024-11-19

## 2024-11-16 RX ORDER — KETOROLAC TROMETHAMINE 30 MG/ML
15 INJECTION INTRAMUSCULAR; INTRAVENOUS ONCE
Refills: 0 | Status: DISCONTINUED | OUTPATIENT
Start: 2024-11-16 | End: 2024-11-16

## 2024-11-16 RX ADMIN — POTASSIUM CHLORIDE 40 MILLIEQUIVALENT(S): 600 TABLET, EXTENDED RELEASE ORAL at 21:22

## 2024-11-16 RX ADMIN — ONDANSETRON HYDROCHLORIDE 4 MILLIGRAM(S): 4 TABLET, FILM COATED ORAL at 03:42

## 2024-11-16 RX ADMIN — HYDROMORPHONE HYDROCHLORIDE 0.2 MILLIGRAM(S): 2 TABLET ORAL at 19:56

## 2024-11-16 RX ADMIN — Medication 75 MILLILITER(S): at 15:31

## 2024-11-16 RX ADMIN — Medication 25 GRAM(S): at 18:40

## 2024-11-16 RX ADMIN — POTASSIUM CHLORIDE 40 MILLIEQUIVALENT(S): 600 TABLET, EXTENDED RELEASE ORAL at 17:13

## 2024-11-16 RX ADMIN — Medication 50 MILLILITER(S): at 17:05

## 2024-11-16 RX ADMIN — SUCRALFATE 1 GRAM(S): 1 TABLET ORAL at 17:13

## 2024-11-16 RX ADMIN — Medication 1000 MILLILITER(S): at 07:31

## 2024-11-16 RX ADMIN — HYDROMORPHONE HYDROCHLORIDE 0.5 MILLIGRAM(S): 2 TABLET ORAL at 03:42

## 2024-11-16 RX ADMIN — HYDROMORPHONE HYDROCHLORIDE 0.2 MILLIGRAM(S): 2 TABLET ORAL at 20:18

## 2024-11-16 RX ADMIN — POTASSIUM CHLORIDE 40 MILLIEQUIVALENT(S): 600 TABLET, EXTENDED RELEASE ORAL at 08:31

## 2024-11-16 RX ADMIN — Medication 50 MILLILITER(S): at 07:20

## 2024-11-16 RX ADMIN — METRONIDAZOLE 100 MILLIGRAM(S): 500 TABLET ORAL at 17:35

## 2024-11-16 RX ADMIN — Medication 1 TABLET(S): at 17:13

## 2024-11-16 RX ADMIN — METRONIDAZOLE 100 MILLIGRAM(S): 500 TABLET ORAL at 05:52

## 2024-11-16 RX ADMIN — Medication 325 MILLIGRAM(S): at 17:14

## 2024-11-16 RX ADMIN — KETOROLAC TROMETHAMINE 15 MILLIGRAM(S): 30 INJECTION INTRAMUSCULAR; INTRAVENOUS at 08:31

## 2024-11-16 RX ADMIN — Medication 2000 MILLIGRAM(S): at 09:28

## 2024-11-16 RX ADMIN — Medication 2000 MILLIGRAM(S): at 03:42

## 2024-11-16 NOTE — PATIENT PROFILE ADULT - FALL HARM RISK - HARM RISK INTERVENTIONS

## 2024-11-16 NOTE — PATIENT PROFILE ADULT - DO YOU FEEL UNSAFE AT HOME, WORK, OR SCHOOL?
Call 911 for stroke/Need for follow up after discharge/Prescribed medications/Risk factors for stroke/Stroke education booklet/Stroke support groups for patients, families, and friends/Stroke warning signs and symptoms/Signs and symptoms of stroke
no

## 2024-11-16 NOTE — ED PROVIDER NOTE - CLINICAL SUMMARY MEDICAL DECISION MAKING FREE TEXT BOX
Patient with ascites and fever concerning for SBP, will obtain CT diagnostic paracentesis will give antibiotics plan for admission

## 2024-11-16 NOTE — H&P ADULT - HISTORY OF PRESENT ILLNESS
59-year-old female with past medical history of alcoholic cirrhosis complicated by esophageal varices status post recent banding ascites and portal hypertension presenting with increasing abdominal girth over the last 1 week associated with chills and fever.  Patient states that she has noticed that her stomach is started swelling after recent EGD on November 5.  She has been stooling normally with no diarrhea or constipation.  Tolerating her diet well.  She was offered a transplant workup by her outpatient GI doctor thinking which she refused.  She quit drinking in October of this year.  She does not follow-up for outpatient paracentesis on a regular basis.  She has only had 1 paracentesis prior to this admission. Patient complains of generalized abdominal pain and requesting pain medicine    In ED patient was febrile tachycardic hypotensive.  Received ceftriaxone Flagyl 2 L LR bolus and 0.5 IV Dilaudid albumin  Tap performed by ER doctor small sample collected for Testing by lab.  White blood cell count 28However sample was collected after antibiotics

## 2024-11-16 NOTE — CONSULT NOTE ADULT - ASSESSMENT
Patient is a 59yof with pmhx of anemia, ETOH abuse, liver cirrhosis with ascities, portal HTN, and esophageal varices, history of esophageal variceal hemorrhage s/p banding , chronic dizziness/hearing loss being evaluated for Meniere's disease, chronic hypotension presents to the ED with complaints of fever and abdominal pain x1 day. Abdomen profoundly distended likely ascites due to liver cirrhosis. ICU consulted for borderline hypotension and r/o sepsis.     #cirrhosis  #ascites  #R/O SBP  #hypotension    -is not ill in appearance  -neurologically intact, A&Ox4 on no sedation  -BP's are borderline MAPs between 60-66. No IVF given in setting of ascites with concern for 3rd spacing. Note patient states her BP is chronically low with systolics in the 90's  -Ordered albumin, will reassess BP in 1 hour  -tachycardic likely driven by sepsis, possibly SBP. Lactate 3.9, no WBC, febrile  -sp02 >92% on room air  -diagnostic paracentesis done. Will likely need therapeutic paracentesis  -CT abdomen/pelvis sig for L volume ascities and possible collitis  -Given 1 dose CTX in ED to cover for SBP. Will need to continue CTX.   -replete lytes as needed  -needs GI consult  -reassess in 1 hour to decide disposition    Time spent on this patient encounter, which includes documenting this note in the electronic medical record, was 55 minutes including assessing the presenting problems with associated risks, reviewing the medical record to prepare for the encounter, and meeting face to face with the patient to obtain additional history. I have also performed an appropriate physical exam, made interventions listed and ordered and interpreted appropriate diagnostic studies as documented. To improve communication and patient safety, I have coordinated care with the multidisciplinary team including the bedside nurse, appropriate attending of record and consultants as needed.    Date of entry of this note is equal to the date of services rendered    Plan discussed with Dr John to agrees to reassess after albumin administered to decide dispo Patient is a 59yof with pmhx of anemia, ETOH abuse, liver cirrhosis with ascities, portal HTN, and esophageal varices, history of esophageal variceal hemorrhage s/p banding , chronic dizziness/hearing loss being evaluated for Meniere's disease, chronic hypotension presents to the ED with complaints of fever and abdominal pain x1 day. Abdomen profoundly distended likely ascites due to liver cirrhosis. ICU consulted for borderline hypotension and r/o sepsis.     #cirrhosis  #ascites  #R/O SBP  #hypotension    -is not ill in appearance  -neurologically intact, A&Ox4 on no sedation  -BP's are borderline MAPs between 60-66. No IVF given in setting of ascites with concern for 3rd spacing. Note patient states her BP is chronically low with systolics in the 90's  -Ordered albumin and 1 L IVF bolus will reassess BP in 1 hour  -tachycardic likely driven by sepsis, possibly SBP. Lactate 3.9, no WBC, febrile  -sp02 >92% on room air  -diagnostic paracentesis done. Will likely need therapeutic paracentesis  -CT abdomen/pelvis sig for L volume ascities and possible collitis  -Given 1 dose CTX in ED to cover for SBP. Will need to continue CTX.   -replete lytes as needed  -needs GI consult  -reassess in 1 hour to decide disposition    Time spent on this patient encounter, which includes documenting this note in the electronic medical record, was 55 minutes including assessing the presenting problems with associated risks, reviewing the medical record to prepare for the encounter, and meeting face to face with the patient to obtain additional history. I have also performed an appropriate physical exam, made interventions listed and ordered and interpreted appropriate diagnostic studies as documented. To improve communication and patient safety, I have coordinated care with the multidisciplinary team including the bedside nurse, appropriate attending of record and consultants as needed.    Date of entry of this note is equal to the date of services rendered    Plan discussed with Dr John to agrees to reassess after albumin and 1 L IVF bolus administered to decide dispo

## 2024-11-16 NOTE — ED PROVIDER NOTE - PROGRESS NOTE DETAILS
Received on signout from Dr. Mustafa.  Pending ICU reassessment.  ICU is not accepting patient to critical care.  Patient can be admitted to telemetry.

## 2024-11-16 NOTE — H&P ADULT - TIME BILLING
Time spent  coordinating the patient's care. This includes reviewing documentation pertinent to this admission, results and imaging. Further tests, medications, and procedures have been ordered as indicated. Laboratory results and the plan of care were communicated to the patient. Discussed care plan with consultants including GI. Supporting documentation was completed and added to the patient's chart.

## 2024-11-16 NOTE — H&P ADULT - ASSESSMENT
#severe sepsis  #Large volume ascites secondary to EtOH cirrhosis   #concern for SBP  Patient meets sepsis criteria febrile to 102.8 Tachycardic to 142 Lactate of 3.9  Initial concern for septic shock with hypotension however maps remained greater than 65  CCU consulted and recommended LR bolus as well as albumin which were given  Tap performed shows minimal white blood cells but unclear if performed prior to abx  Patient to continue ceftriaxone 2 g IV daily for SBP treatment dose  Follow-up blood cultures - drawn prior to abx  lactate 3.9-->3.0  Repeat lactate this afternoon  will avoid IV diuretics today in the setting of severe sepsis elevated lactate and limited ability to provide additional IVF  if lactate trends down tomorrow consider IV lasix and spirinolactone while pt await para on monday with IR  Toradol 15 x 1  pt is intolerant to morphine - nausea - tolerated dilaudid in ed  pain control with small dose dilaudid q8 PRN hold id MAP <65  IR consult for large volume paracentesis on monday  GI consult    #hypokalemia   repleted    #possible colitis vs colopathy   CT abd: Wall thickening of the ascending colon is again noted, which may   represent portal hypertensive colopathy.  collect gi pcr  s/p flagyl in ed   favor abd pain related more to distention from ascites or portal hypertensive colopathy than infectious colitis   hold off on colitis directed abx for now -   GI consult    #liver cirrhosis  #hx of varices with recent banding   continue with PPI and sucralfate   previous GI records reviewed - beta blockade on hold given baseline low bp     #dvt ppx  SCDs given hx of variceal bleed #severe sepsis  #Large volume ascites secondary to EtOH cirrhosis   #concern for SBP  Patient meets sepsis criteria febrile to 102.8 Tachycardic to 142 Lactate of 3.9  Initial concern for septic shock with hypotension however maps remained greater than 65  CCU consulted and recommended LR bolus as well as albumin which were given  Tap performed shows minimal white blood cells but unclear if performed prior to abx  Patient to continue ceftriaxone 2 g IV daily for SBP treatment dose  Follow-up blood cultures - drawn prior to abx  lactate 3.9-->3.0  Repeat lactate this afternoon  will avoid IV diuretics today in the setting of severe sepsis elevated lactate and limited ability to provide additional IVF  if lactate trends down tomorrow consider IV lasix and spirinolactone while pt await para on monday with IR  Toradol 15 x 1  pt is intolerant to morphine - nausea - tolerated dilaudid in ed  pain control with small dose dilaudid q8 PRN hold id MAP <65  IR consult for large volume paracentesis on monday  GI consult    #hypokalemia   repleted    #possible colitis vs colopathy   CT abd: Wall thickening of the ascending colon is again noted, which may   represent portal hypertensive colopathy.  collect gi pcr  s/p flagyl in ed   favor abd pain related more to distention from ascites or portal hypertensive colopathy than infectious colitis   hold off on colitis directed abx for now   hold cholestyramine  GI consult    #liver cirrhosis  #hx of varices with recent banding   continue with PPI and sucralfate   previous GI records reviewed - beta blockade on hold given baseline low bp     #dvt ppx  SCDs given hx of variceal bleed #severe sepsis  #Large volume ascites secondary to EtOH cirrhosis   #concern for SBP  Patient meets sepsis criteria febrile to 102.8 Tachycardic to 142 Lactate of 3.9  Initial concern for septic shock with hypotension however maps remained greater than 65  CCU consulted and recommended LR bolus as well as albumin which were given  Tap performed shows minimal white blood cells but unclear if performed prior to abx  Patient to continue ceftriaxone 2 g IV daily for SBP treatment dose  Follow-up blood cultures - drawn prior to abx  lactate 3.9-->3.0  Repeat lactate this afternoon  will avoid IV diuretics today in the setting of severe sepsis elevated lactate and limited ability to provide additional IVF  if lactate trends down tomorrow consider IV lasix and spirinolactone while pt await para on monday with IR  Toradol 15 x 1  pt is intolerant to morphine - nausea - tolerated dilaudid in ed  pain control with small dose dilaudid q8 PRN hold id MAP <65  IR consult for large volume paracentesis on monday  GI consult    addendum: tap gm stain reviewed - no organisms seen  - will restart flagyl for concern of colitis on ct abd   ua neg and cxr clear   ID consult    #hypokalemia   repleted    #possible colitis vs colopathy   CT abd: Wall thickening of the ascending colon is again noted, which may   represent portal hypertensive colopathy.  collect gi pcr  s/p flagyl in ed   favor abd pain related more to distention from ascites or portal hypertensive colopathy than infectious colitis   hold off on colitis directed abx for now   hold cholestyramine  GI consult    #liver cirrhosis  #hx of varices with recent banding   continue with PPI and sucralfate   previous GI records reviewed - beta blockade on hold given baseline low bp     #dvt ppx  SCDs given hx of variceal bleed

## 2024-11-16 NOTE — CONSULT NOTE ADULT - ASSESSMENT
Imp:  Worsening ascites  Fever, concerning for SBP but the neg tap makes this unlikely    Rec:  Agree with abx pending cultures  lasix/aldactone  Nutrition eval for low salt diet

## 2024-11-16 NOTE — ED ADULT NURSE NOTE - OBJECTIVE STATEMENT
Pt presents to ED c/o abdominal pain x few days, fever x1 day (Tmax 104.6). Pt endorses PMHx alcoholic cirrhosis with ascites, recent paracentesis. Pt abdomen distended, unable to tolerate PO intake d/t nausea. Pt denies any meds PTA.

## 2024-11-16 NOTE — CONSULT NOTE ADULT - SUBJECTIVE AND OBJECTIVE BOX
Patient is a 59yof with pmhx of anemia, ETOH abuse, liver cirrhosis with ascities, portal HTN, and esophageal varices, history of esophageal variceal hemorrhage s/p banding , chronic dizziness/hearing loss being evaluated for Meniere's disease, chronic hypotension presents to the ED with complaints of fever and abdominal pain x1 day. Denies CP, SOB, hematochezia, hematemesis. In the ED patient febrile, with borderline BP MAPs between 60-65 and tachycardia ~120. Patient found to have profoundly distended abdomen, diagnostic paracentesis done pending results. Treated with 1 dose CTX for ppx of SBP. Not fluid resuscitated due to concern of fluid overload. ED workup sig for elevated lactate of 3.2, CT + for large volume ascities and possible colitis    PAST MEDICAL & SURGICAL HISTORY:  Alcohol abuse      Depression      Withdrawal seizures      History of basal cell carcinoma excision      Squamous cell skin cancer      Hemorrhoids      2019 novel coronavirus disease (COVID-19)      Anemia      H/O vertigo      History of ear, nose, and throat (ENT) surgery      H/O basal cell carcinoma excision      H/O:         Allergies    Zithromax (Unknown)    Intolerances    morphine (Nausea)    FAMILY HISTORY:  FH: pancreatic cancer (Mother)    Family history of heart attack (Father)    Family history of CVA (Father)      SOCIAL HISTORY:     Review of Systems:  CONSTITUTIONAL: No fever, chills, or fatigue.  EYES: No eye pain, visual disturbances, or discharge.  ENMT:  No difficulty hearing, tinnitus, or vertigo. No sinus or throat pain.  NECK: No pain or stiffness.  RESPIRATORY: No shortness of breath, cough, or wheezing.  CARDIOVASCULAR: No chest pain, palpitations, dizziness, or leg swelling.  GASTROINTESTINAL: No abdominal or epigastric pain. No nausea, vomiting,  diarrhea, or constipation. No hematemesis, melena, or hematochezia.  GENITOURINARY: No dysuria, frequency, hematuria, or incontinence.  NEUROLOGICAL: No headaches, memory loss, loss of strength, numbness, or tremors.  SKIN: No itching, burning, rashes, or lesions.  MUSCULOSKELETAL: No joint pain or swelling; No muscle, back, or extremity pain.  PSYCHIATRIC: No depression, anxiety, mood swings, or difficulty sleeping.    Medications:  metroNIDAZOLE  IVPB 500 milliGRAM(s) IV Intermittent Once                    albumin human 25% IVPB 100 milliLiter(s) IV Intermittent Once                ICU Vital Signs Last 24 Hrs  T(C): 38.4 (2024 03:57), Max: 39.3 (2024 01:48)  T(F): 101.1 (2024 03:57), Max: 102.8 (:48)  HR: 121 (2024 05:05) (121 - 142)  BP: 92/49 (2024 05:05) (92/49 - 99/47)  BP(mean): 62 (2024 05:05) (59 - 69)  ABP: --  ABP(mean): --  RR: 20 (2024 05:05) (15 - 20)  SpO2: 95% (:05) (95% - 98%)    O2 Parameters below as of :05  Patient On (Oxygen Delivery Method): room air          Vital Signs Last 24 Hrs  T(C): 38.4 (2024 03:57), Max: 39.3 (2024 01:48)  T(F): 101.1 (2024 03:57), Max: 102.8 (2024 01:48)  HR: 121 (2024 05:05) (121 - 142)  BP: 92/49 (2024 05:05) (92/49 - 99/47)  BP(mean): 62 (2024 05:05) (59 - 69)  RR: 20 (:05) (15 - 20)  SpO2: 95% (:05) (95% - 98%)    Parameters below as of :05  Patient On (Oxygen Delivery Method): room air            I&O's Detail      LABS:                        9.0    6.79  )-----------( 113      ( 44 )             28.8     11-    137  |  108  |  8   ----------------------------<  108[H]  3.1[L]   |  19[L]  |  0.80    Ca    8.2[L]          TPro  6.8  /  Alb  2.4[L]  /  TBili  4.2[H]  /  DBili  x   /  AST  73[H]  /  ALT  37  /  AlkPhos  291[H]  11-16          CAPILLARY BLOOD GLUCOSE        PT/INR - ( 2024 02:44 )   PT: 16.5 sec;   INR: 1.44 ratio         PTT - ( 2024 02:44 )  PTT:29.2 sec  Urinalysis Basic - ( 2024 02:44 )    Color: x / Appearance: x / SG: x / pH: x  Gluc: 108 mg/dL / Ketone: x  / Bili: x / Urobili: x   Blood: x / Protein: x / Nitrite: x   Leuk Esterase: x / RBC: x / WBC x   Sq Epi: x / Non Sq Epi: x / Bacteria: x        CULTURES:      Physical Examination:    VITALS AT TIME OF EXAM:  HR:  BP:  RR:  SPO2:    General: Well appearing, lying in bed in NAD.    HEENT: Pupils equal, reactive to light. Symmetric. No scleral icterus or injection.    PULM: Clear to auscultation B/L. No wheezes, rales, or rhonchi appreciated. No significant sputum production or increased respiratory effort.    NECK: Supple, no lymphadenopathy, trachea midline.    CVS: Regular rate and rhythm, no murmurs appreciated, +s1/s2.    ABD: Soft, nondistended, nontender, normoactive bowel sounds.    EXT: No edema, nontender.    SKIN: Warm and well perfused, no rashes noted.    NEURO: Alert, oriented, interactive, nonfocal.    DEVICES:   LINES:  GUILLERMO:    POCUS:    RADIOLOGY: ***   Patient is a 59yof with pmhx of anemia, ETOH abuse, liver cirrhosis with ascities, portal HTN, and esophageal varices, history of esophageal variceal hemorrhage s/p banding , chronic dizziness/hearing loss being evaluated for Meniere's disease, chronic hypotension presents to the ED with complaints of fever and abdominal pain x1 day. Denies CP, SOB, hematochezia, hematemesis. In the ED patient febrile, with borderline BP MAPs between 60-65 and tachycardia ~120. Patient found to have profoundly distended abdomen, diagnostic paracentesis done pending results.Not fluid resuscitated due to concern of fluid overload. ED workup sig for elevated lactate of 3.2, CT + for large volume ascites and possible colitis.  Treated with 1 dose CTX for ppx of SBP. ICU consulted for borderline hypotension and r/o sepsis. Upon assessment patient well appearing and A&Ox4 without major complaints. Plan to treat with albumin and reassess before deciding disposition    PAST MEDICAL & SURGICAL HISTORY:  Alcohol abuse      Depression      Withdrawal seizures      History of basal cell carcinoma excision      Squamous cell skin cancer      Hemorrhoids      2019 novel coronavirus disease (COVID-19)      Anemia      H/O vertigo      History of ear, nose, and throat (ENT) surgery      H/O basal cell carcinoma excision      H/O:         Allergies    Zithromax (Unknown)    Intolerances    morphine (Nausea)    FAMILY HISTORY:  FH: pancreatic cancer (Mother)    Family history of heart attack (Father)    Family history of CVA (Father)      SOCIAL HISTORY:     Review of Systems:  CONSTITUTIONAL: + fever previously, now resolved. Denies chills  EYES: No eye pain, visual disturbances, or discharge.  ENMT:  No difficulty hearing, tinnitus, or vertigo. No sinus or throat pain.  NECK: No pain or stiffness.  RESPIRATORY: No shortness of breath, cough, or wheezing.  CARDIOVASCULAR: No chest pain, palpitations, dizziness, or leg swelling.  GASTROINTESTINAL:+ abn pain and distension.  No hematemesis, melena, or hematochezia.  GENITOURINARY: No dysuria, frequency, hematuria, or incontinence.  NEUROLOGICAL: No headaches, memory loss, loss of strength, numbness, or tremors.  SKIN: No itching, burning, rashes, or lesions.  MUSCULOSKELETAL: No joint pain or swelling; No muscle, back, or extremity pain.  PSYCHIATRIC: No depression, anxiety, mood swings, or difficulty sleeping.    Medications:  metroNIDAZOLE  IVPB 500 milliGRAM(s) IV Intermittent Once                    albumin human 25% IVPB 100 milliLiter(s) IV Intermittent Once                ICU Vital Signs Last 24 Hrs  T(C): 38.4 (2024 03:57), Max: 39.3 (2024 01:48)  T(F): 101.1 (2024 03:57), Max: 102.8 (2024 01:48)  HR: 121 (2024 05:05) (121 - 142)  BP: 92/49 (2024 05:05) (92/49 - 99/47)  BP(mean): 62 (2024 05:05) (59 - 69)  ABP: --  ABP(mean): --  RR: 20 (2024 05:05) (15 - 20)  SpO2: 95% (2024 05:05) (95% - 98%)    O2 Parameters below as of :05  Patient On (Oxygen Delivery Method): room air          Vital Signs Last 24 Hrs  T(C): 38.4 (2024 03:57), Max: 39.3 (2024 01:48)  T(F): 101.1 (2024 03:57), Max: 102.8 (2024 01:48)  HR: 121 (2024 05:05) (121 - 142)  BP: 92/49 (2024 05:05) (92/49 - 99/47)  BP(mean): 62 (2024 05:05) (59 - 69)  RR: 20 (2024 05:05) (15 - 20)  SpO2: 95% (2024 05:05) (95% - 98%)    Parameters below as of :05  Patient On (Oxygen Delivery Method): room air            I&O's Detail      LABS:                        9.0    6.79  )-----------( 113      ( 2024 02:44 )             28.8     11-16    137  |  108  |  8   ----------------------------<  108[H]  3.1[L]   |  19[L]  |  0.80    Ca    8.2[L]      2024 02:44    TPro  6.8  /  Alb  2.4[L]  /  TBili  4.2[H]  /  DBili  x   /  AST  73[H]  /  ALT  37  /  AlkPhos  291[H]  11-16          CAPILLARY BLOOD GLUCOSE        PT/INR - ( 2024 02:44 )   PT: 16.5 sec;   INR: 1.44 ratio         PTT - ( 2024 02:44 )  PTT:29.2 sec  Urinalysis Basic - ( 2024 02:44 )    Color: x / Appearance: x / SG: x / pH: x  Gluc: 108 mg/dL / Ketone: x  / Bili: x / Urobili: x   Blood: x / Protein: x / Nitrite: x   Leuk Esterase: x / RBC: x / WBC x   Sq Epi: x / Non Sq Epi: x / Bacteria: x        CULTURES:      Physical Examination:        General: Well appearing, lying in bed in NAD. Conversive and interactive  HEENT: Pupils equal, reactive to light. Symmetric. No scleral icterus or injection.  PULM: Clear to auscultation B/L. No wheezes, rales, or rhonchi appreciated. No significant sputum production or increased respiratory effort.  NECK: Supple, no lymphadenopathy, trachea midline.  CVS:+tachycardia, reg rhythm, no murmurs appreciated, +s1/s2.  ABD: +hard, distended, no pain on palpation  EXT: No edema, nontender.  SKIN: Warm and well perfused, no rashes noted.  NEURO: Alert, oriented, interactive, nonfocal.

## 2024-11-16 NOTE — ED PROVIDER NOTE - SEPSIS CONTRAINDICATION FLUID ADMINISTRATION
Benazepril was refilled yesterday by dr Endy Bond  Please take that off as well
LOV: 1/4/21  NOV: 7/7/21    Patient does not need the spironolactone that the pharmacy called over
Simi can you resend this to the pool without the spironolactone
Yes...

## 2024-11-16 NOTE — H&P ADULT - NSHPLABSRESULTS_GEN_ALL_CORE
.  LABS:                         9.0    6.79  )-----------( 113      ( 16 Nov 2024 02:44 )             28.8     11-16    137  |  108  |  8   ----------------------------<  108[H]  3.1[L]   |  19[L]  |  0.80    Ca    8.2[L]      16 Nov 2024 02:44    TPro  6.8  /  Alb  2.4[L]  /  TBili  4.2[H]  /  DBili  x   /  AST  73[H]  /  ALT  37  /  AlkPhos  291[H]  11-16    PT/INR - ( 16 Nov 2024 02:44 )   PT: 16.5 sec;   INR: 1.44 ratio         PTT - ( 16 Nov 2024 02:44 )  PTT:29.2 sec  Urinalysis Basic - ( 16 Nov 2024 09:00 )    Color: Dark Yellow / Appearance: Clear / SG: >1.030 / pH: x  Gluc: x / Ketone: Negative mg/dL  / Bili: Negative / Urobili: 0.2 mg/dL   Blood: x / Protein: Negative mg/dL / Nitrite: Negative   Leuk Esterase: Negative / RBC: x / WBC x   Sq Epi: x / Non Sq Epi: x / Bacteria: x        Lactate, Blood: 3.0 mmol/L (11-16 @ 05:44)  Lactate, Blood: 3.9 mmol/L (11-16 @ 02:44)      RADIOLOGY, EKG & ADDITIONAL TESTS: Reviewed.

## 2024-11-16 NOTE — ED PROVIDER NOTE - PHYSICAL EXAMINATION
Pneumonia GEN - NAD; well appearing; A+O x3  HEAD - NC/AT    EYES - EOMI, no conjunctival pallor, no scleral icterus  ENT -   mucous membranes  moist , no discharge  PULM - CTA b/l,  symmetric breath sounds  COR -  RRR, S1 S2, no murmurs  ABD - Patient abdominal distention positive fluid wave diffusely tender  EXTREMS -no edema, no deformity, warm and well perfused   SKIN - no rash or bruising      NEUROLOGIC - alert, sensation nl, motor 5/5 RUE/LUE/RLE/LLE

## 2024-11-16 NOTE — CONSULT NOTE ADULT - SUBJECTIVE AND OBJECTIVE BOX
HPI:  59-year-old female with past medical history of alcoholic cirrhosis complicated by esophageal varices status post recent banding ascites and portal hypertension presenting with increasing abdominal girth over the last 1 week associated with chills and fever.  Patient states that she has noticed that her stomach is started swelling after recent EGD on .  She has been stooling normally with no diarrhea or constipation.  Tolerating her diet well.  She was offered a transplant workup by her outpatient GI doctor thinking which she refused.  She quit drinking in October of this year.  She does not follow-up for outpatient paracentesis on a regular basis.  She has only had 1 paracentesis prior to this admission. Patient complains of generalized abdominal pain and requesting pain medicine    In ED patient was febrile tachycardic hypotensive.  Received ceftriaxone Flagyl 2 L LR bolus and 0.5 IV Dilaudid albumin  Tap performed by ER doctor small sample collected for Testing by lab.  White blood cell count 28However sample was collected after antibiotics (2024 09:12)      PAST MEDICAL & SURGICAL HISTORY:  Alcohol abuse      Depression      Withdrawal seizures      History of basal cell carcinoma excision      Squamous cell skin cancer      Hemorrhoids      2019 novel coronavirus disease (COVID-19)      Anemia      H/O vertigo      History of ear, nose, and throat (ENT) surgery      H/O basal cell carcinoma excision      H/O:           Home Medications:  Centrum Women&#x27;s oral tablet: 1 tab(s) orally once a day (2024 09:31)  Cholestyramine Light 4 g/5.7 g oral powder for reconstitution: 4 gram(s) orally 2 times a day (:31)  ciprofloxacin 500 mg oral tablet: 1 tab(s) orally 2 times a day x 2 days.  ***COURSE COMPLETED*** (:31)  ferrous sulfate 325 mg (65 mg elemental iron) oral tablet: 1 tab(s) orally once a day (:31)  melatonin 3 mg oral tablet: 1 tab(s) orally once a day (at bedtime) As needed Insomnia (:31)  nitrofurantoin macrocrystals 100 mg oral capsule: 1 cap(s) orally 2 times a day x 7 days.  ***COURSE COMPLETED*** (:)      MEDICATIONS  (STANDING):  ferrous    sulfate 325 milliGRAM(s) Oral daily  furosemide    Tablet 40 milliGRAM(s) Oral daily  Nephro-arcenio 1 Tablet(s) Oral daily  pantoprazole    Tablet 40 milliGRAM(s) Oral before breakfast  potassium chloride    Tablet ER 40 milliEquivalent(s) Oral every 4 hours  spironolactone 100 milliGRAM(s) Oral daily  sucralfate suspension 1 Gram(s) Oral every 6 hours    MEDICATIONS  (PRN):  aluminum hydroxide/magnesium hydroxide/simethicone Suspension 30 milliLiter(s) Oral every 4 hours PRN Dyspepsia  HYDROmorphone  Injectable 0.2 milliGRAM(s) IV Push every 8 hours PRN Moderate Pain (4 - 6)  melatonin 3 milliGRAM(s) Oral at bedtime PRN Insomnia  ondansetron Injectable 4 milliGRAM(s) IV Push every 8 hours PRN Nausea and/or Vomiting      Allergies    Zithromax (Unknown)    Intolerances    morphine (Nausea)      SOCIAL HISTORY:    FAMILY HISTORY:  FH: pancreatic cancer (Mother)    Family history of heart attack (Father)    Family history of CVA (Father)        ROS  As above  Otherwise unremarkable    Vital Signs Last 24 Hrs  T(C): 36.7 (2024 10:25), Max: 39.3 (2024 01:48)  T(F): 98.1 (2024 10:25), Max: 102.8 (2024 01:48)  HR: 113 (2024 10:25) (105 - 142)  BP: 95/57 (2024 10:25) (87/54 - 102/70)  BP(mean): 65 (2024 09:43) (59 - 79)  RR: 18 (2024 10:25) (15 - 20)  SpO2: 98% (2024 10:25) (94% - 98%)    Parameters below as of 2024 10:25  Patient On (Oxygen Delivery Method): room air        Constitutional: NAD, well-developed  Respiratory: CTAB  Cardiovascular: S1 and S2, RRR  Gastrointestinal: BS+, soft, large ascites, not tense  Extremities: No peripheral edema  Psychiatric: Normal mood, normal affect  Skin: No rashes    LABS:                        9.0    6.79  )-----------( 113      ( 2024 02:44 )             28.8     11-16    137  |  108  |  8   ----------------------------<  108[H]  3.1[L]   |  19[L]  |  0.80    Ca    8.2[L]      2024 02:44  Mg     1.4         TPro  6.8  /  Alb  2.4[L]  /  TBili  4.2[H]  /  DBili  x   /  AST  73[H]  /  ALT  37  /  AlkPhos  291[H]      PT/INR - ( 2024 02:44 )   PT: 16.5 sec;   INR: 1.44 ratio         PTT - ( 2024 02:44 )  PTT:29.2 sec  LIVER FUNCTIONS - ( 2024 02:44 )  Alb: 2.4 g/dL / Pro: 6.8 gm/dL / ALK PHOS: 291 U/L / ALT: 37 U/L / AST: 73 U/L / GGT: x             RADIOLOGY & ADDITIONAL STUDIES:

## 2024-11-16 NOTE — PHARMACOTHERAPY INTERVENTION NOTE - COMMENTS
Medication reconciliation complete.  Home medications reviewed with the patient at bedside and confirmed against Dr. [facility].  Patient reports taking melatonin at bedtime but is unsure of the dose.  Patient reports an unsure allergy to zithromax (rash) and intolerance to morphine (nausea).    Home Medications:  Centrum Women&#x27;s oral tablet: 1 tab(s) orally once a day (16 Nov 2024 09:31)  Cholestyramine Light 4 g/5.7 g oral powder for reconstitution: 4 gram(s) orally 2 times a day (16 Nov 2024 09:31)  ciprofloxacin 500 mg oral tablet: 1 tab(s) orally 2 times a day x 2 days.  ***COURSE COMPLETED*** (16 Nov 2024 09:31)  ferrous sulfate 325 mg (65 mg elemental iron) oral tablet: 1 tab(s) orally once a day (16 Nov 2024 09:31)  melatonin 3 mg oral tablet: 1 tab(s) orally once a day (at bedtime) As needed Insomnia (16 Nov 2024 09:31)  nitrofurantoin macrocrystals 100 mg oral capsule: 1 cap(s) orally 2 times a day x 7 days.  ***COURSE COMPLETED*** (16 Nov 2024 09:31)

## 2024-11-16 NOTE — ED PROVIDER NOTE - OBJECTIVE STATEMENT
60 yo female current smoker with  PMHx alcohol use disorder, alcoholic cirrhosis with ascites, portal hypertension and esophageal varices, history of esophageal variceal hemorrhage s/p banding , chronic dizziness/hearing loss being evaluated for Meniere's disease, chronic hypotension, Presents with fever and abdominal pain.  Symptoms x 1 day.  No cough.  Patient does note some nasal discharge a few days ago.  No urinary symptoms.  Mildly constipated.

## 2024-11-16 NOTE — ED ADULT NURSE REASSESSMENT NOTE - NS ED NURSE REASSESS COMMENT FT1
assumed care of patient from overnight RN. patient pending albumin and LR bolus. she is awake and alert, pleasant, lying in stretcher. patient remains on cardiac monitor. she is still tachycardic, MAP >65. states that her HR is usually on the higher side, but not as high as it is right now (114 BPM) and that her BP is usually on the lower side. right now, her BP is 102/70(79).

## 2024-11-16 NOTE — H&P ADULT - NSHPPHYSICALEXAM_GEN_ALL_CORE
VITALS:  T(F): 99.1 (11-16-24 @ 06:07), Max: 102.8 (11-16-24 @ 01:48)  HR: 118 (11-16-24 @ 06:07) (118 - 142)  BP: 102/70 (11-16-24 @ 07:39) (87/54 - 102/70)  RR: 19 (11-16-24 @ 06:07) (15 - 20)  SpO2: 94% (11-16-24 @ 06:07) (94% - 98%)  Wt(kg): --    I&O's Summary      CAPILLARY BLOOD GLUCOSE          PHYSICAL EXAM:  Gen: NAD  HEENT:  pupils equal and reactive, EOMI, no oropharyngeal lesions, erythema, exudates, oral thrush  NECK:   supple, no carotid bruits, no palpable lymph nodes, no thyromegaly  CV:  +S1, +S2, regular, no murmurs or rubs  RESP:   lungs clear to auscultation bilaterally, no wheezing, rales, rhonchi, good air entry bilaterally  BREAST:  not examined  GI:  Very distended large abdomen Out of proportion to patient's weight; Paracentesis site on the left side with bandage over small amount of blood.  generalized tenderness to mild palpation  RECTAL:  not examined  :  not examined  MSK:   normal muscle tone, no atrophy, no rigidity, no contractions  EXT:  no clubbing, no cyanosis, no edema, no calf pain, swelling or erythema  VASCULAR:  pulses equal and symmetric in the upper and lower extremities  NEURO:  AAOX3, no focal neurological deficits, follows all commands, able to move extremities spontaneously  SKIN:  no ulcers, lesions or rashes

## 2024-11-16 NOTE — PROVIDER CONTACT NOTE (EICU) - SITUATION
59yof with pmhx of anemia, ETOH abuse, liver cirrhosis with ascities, portal HTN, and esophageal varices, history of esophageal variceal hemorrhage s/p banding , chronic dizziness/hearing loss being evaluated for Meniere's disease, chronic hypotension presents to the ED with complaints of fever and abdominal pain x1 day. Denies CP, SOB, hematochezia, hematemesis. In the ED patient febrile, with borderline BP MAPs between 60-65 and tachycardia ~120. Patient found to have profoundly distended abdomen, diagnostic paracentesis done pending results. Treated with 1 dose CTX for ppx of SBP. Not fluid resuscitated due to concern of fluid overload. ED workup sig for elevated lactate of 3.2.  Case discussed with the ICU PA.

## 2024-11-16 NOTE — PROVIDER CONTACT NOTE (EICU) - BACKGROUND
labs reviewed  < from: CT Abdomen and Pelvis w/ IV Cont (11.16.24 @ 03:10) >    Enlarged cirrhotic liver with evidence of portal hypertension including   splenomegaly, as seen on prior study, and moderate/large volume ascites,   significant interval worsening.    < end of copied text >

## 2024-11-16 NOTE — ED ADULT NURSE NOTE - NSFALLUNIVINTERV_ED_ALL_ED
Bed/Stretcher in lowest position, wheels locked, appropriate side rails in place/Call bell, personal items and telephone in reach/Instruct patient to call for assistance before getting out of bed/chair/stretcher/Non-slip footwear applied when patient is off stretcher/[place] to call system/Physically safe environment - no spills, clutter or unnecessary equipment/Purposeful proactive rounding/Room/bathroom lighting operational, light cord in reach

## 2024-11-17 LAB
ADD ON TEST-SPECIMEN IN LAB: SIGNIFICANT CHANGE UP
ALBUMIN SERPL ELPH-MCNC: 2.5 G/DL — LOW (ref 3.3–5)
ALP SERPL-CCNC: 224 U/L — HIGH (ref 40–120)
ALT FLD-CCNC: 37 U/L — SIGNIFICANT CHANGE UP (ref 12–78)
ANION GAP SERPL CALC-SCNC: 4 MMOL/L — LOW (ref 5–17)
AST SERPL-CCNC: 67 U/L — HIGH (ref 15–37)
BILIRUB SERPL-MCNC: 2.7 MG/DL — HIGH (ref 0.2–1.2)
BUN SERPL-MCNC: 14 MG/DL — SIGNIFICANT CHANGE UP (ref 7–23)
CALCIUM SERPL-MCNC: 8.4 MG/DL — LOW (ref 8.5–10.1)
CHLORIDE SERPL-SCNC: 111 MMOL/L — HIGH (ref 96–108)
CO2 SERPL-SCNC: 22 MMOL/L — SIGNIFICANT CHANGE UP (ref 22–31)
CREAT SERPL-MCNC: 0.66 MG/DL — SIGNIFICANT CHANGE UP (ref 0.5–1.3)
EGFR: 100 ML/MIN/1.73M2 — SIGNIFICANT CHANGE UP
GI PCR PANEL: SIGNIFICANT CHANGE UP
GLUCOSE SERPL-MCNC: 122 MG/DL — HIGH (ref 70–99)
HCT VFR BLD CALC: 23.7 % — LOW (ref 34.5–45)
HCT VFR BLD CALC: 24.6 % — LOW (ref 34.5–45)
HGB BLD-MCNC: 7.3 G/DL — LOW (ref 11.5–15.5)
HGB BLD-MCNC: 7.6 G/DL — LOW (ref 11.5–15.5)
LACTATE SERPL-SCNC: 2.6 MMOL/L — HIGH (ref 0.7–2)
MAGNESIUM SERPL-MCNC: 1.9 MG/DL — SIGNIFICANT CHANGE UP (ref 1.6–2.6)
MCHC RBC-ENTMCNC: 26.6 PG — LOW (ref 27–34)
MCHC RBC-ENTMCNC: 26.9 PG — LOW (ref 27–34)
MCHC RBC-ENTMCNC: 30.8 G/DL — LOW (ref 32–36)
MCHC RBC-ENTMCNC: 30.9 G/DL — LOW (ref 32–36)
MCV RBC AUTO: 86.5 FL — SIGNIFICANT CHANGE UP (ref 80–100)
MCV RBC AUTO: 86.9 FL — SIGNIFICANT CHANGE UP (ref 80–100)
PLATELET # BLD AUTO: 75 K/UL — LOW (ref 150–400)
PLATELET # BLD AUTO: 89 K/UL — LOW (ref 150–400)
POTASSIUM SERPL-MCNC: 4.4 MMOL/L — SIGNIFICANT CHANGE UP (ref 3.5–5.3)
POTASSIUM SERPL-SCNC: 4.4 MMOL/L — SIGNIFICANT CHANGE UP (ref 3.5–5.3)
PROT SERPL-MCNC: 6.3 GM/DL — SIGNIFICANT CHANGE UP (ref 6–8.3)
RBC # BLD: 2.74 M/UL — LOW (ref 3.8–5.2)
RBC # BLD: 2.83 M/UL — LOW (ref 3.8–5.2)
RBC # FLD: 17.8 % — HIGH (ref 10.3–14.5)
RBC # FLD: 18 % — HIGH (ref 10.3–14.5)
SODIUM SERPL-SCNC: 137 MMOL/L — SIGNIFICANT CHANGE UP (ref 135–145)
WBC # BLD: 11.05 K/UL — HIGH (ref 3.8–10.5)
WBC # BLD: 11.85 K/UL — HIGH (ref 3.8–10.5)
WBC # FLD AUTO: 11.05 K/UL — HIGH (ref 3.8–10.5)
WBC # FLD AUTO: 11.85 K/UL — HIGH (ref 3.8–10.5)

## 2024-11-17 PROCEDURE — 99233 SBSQ HOSP IP/OBS HIGH 50: CPT

## 2024-11-17 PROCEDURE — 99232 SBSQ HOSP IP/OBS MODERATE 35: CPT

## 2024-11-17 PROCEDURE — 71045 X-RAY EXAM CHEST 1 VIEW: CPT | Mod: 26

## 2024-11-17 RX ORDER — CEFEPIME 2 G/1
INJECTION, POWDER, FOR SOLUTION INTRAVENOUS
Refills: 0 | Status: DISCONTINUED | OUTPATIENT
Start: 2024-11-17 | End: 2024-11-17

## 2024-11-17 RX ORDER — CEFTRIAXONE SODIUM 1 G
1000 VIAL (EA) INJECTION EVERY 24 HOURS
Refills: 0 | Status: DISCONTINUED | OUTPATIENT
Start: 2024-11-17 | End: 2024-11-17

## 2024-11-17 RX ORDER — CEFEPIME 2 G/1
1000 INJECTION, POWDER, FOR SOLUTION INTRAVENOUS EVERY 8 HOURS
Refills: 0 | Status: DISCONTINUED | OUTPATIENT
Start: 2024-11-17 | End: 2024-11-19

## 2024-11-17 RX ORDER — CEFTRIAXONE SODIUM 1 G
2000 VIAL (EA) INJECTION EVERY 24 HOURS
Refills: 0 | Status: DISCONTINUED | OUTPATIENT
Start: 2024-11-17 | End: 2024-11-17

## 2024-11-17 RX ORDER — ACETAMINOPHEN 500MG 500 MG/1
650 TABLET, COATED ORAL EVERY 12 HOURS
Refills: 0 | Status: DISCONTINUED | OUTPATIENT
Start: 2024-11-17 | End: 2024-11-19

## 2024-11-17 RX ORDER — CEFEPIME 2 G/1
INJECTION, POWDER, FOR SOLUTION INTRAVENOUS
Refills: 0 | Status: DISCONTINUED | OUTPATIENT
Start: 2024-11-17 | End: 2024-11-19

## 2024-11-17 RX ORDER — IPRATROPIUM BROMIDE AND ALBUTEROL SULFATE 2.5; .5 MG/3ML; MG/3ML
3 SOLUTION RESPIRATORY (INHALATION) EVERY 6 HOURS
Refills: 0 | Status: DISCONTINUED | OUTPATIENT
Start: 2024-11-17 | End: 2024-11-19

## 2024-11-17 RX ORDER — 0.9 % SODIUM CHLORIDE 0.9 %
1000 INTRAVENOUS SOLUTION INTRAVENOUS
Refills: 0 | Status: DISCONTINUED | OUTPATIENT
Start: 2024-11-17 | End: 2024-11-18

## 2024-11-17 RX ORDER — CEFEPIME 2 G/1
1000 INJECTION, POWDER, FOR SOLUTION INTRAVENOUS ONCE
Refills: 0 | Status: COMPLETED | OUTPATIENT
Start: 2024-11-17 | End: 2024-11-17

## 2024-11-17 RX ADMIN — SUCRALFATE 1 GRAM(S): 1 TABLET ORAL at 11:49

## 2024-11-17 RX ADMIN — CEFEPIME 1000 MILLIGRAM(S): 2 INJECTION, POWDER, FOR SOLUTION INTRAVENOUS at 15:45

## 2024-11-17 RX ADMIN — IPRATROPIUM BROMIDE AND ALBUTEROL SULFATE 3 MILLILITER(S): 2.5; .5 SOLUTION RESPIRATORY (INHALATION) at 20:03

## 2024-11-17 RX ADMIN — SUCRALFATE 1 GRAM(S): 1 TABLET ORAL at 23:34

## 2024-11-17 RX ADMIN — Medication 1 TABLET(S): at 08:25

## 2024-11-17 RX ADMIN — METRONIDAZOLE 100 MILLIGRAM(S): 500 TABLET ORAL at 04:42

## 2024-11-17 RX ADMIN — Medication 100 MILLIGRAM(S): at 08:25

## 2024-11-17 RX ADMIN — SUCRALFATE 1 GRAM(S): 1 TABLET ORAL at 05:00

## 2024-11-17 RX ADMIN — Medication 325 MILLIGRAM(S): at 08:25

## 2024-11-17 RX ADMIN — FUROSEMIDE 40 MILLIGRAM(S): 40 TABLET ORAL at 08:25

## 2024-11-17 RX ADMIN — PANTOPRAZOLE SODIUM 40 MILLIGRAM(S): 40 TABLET, DELAYED RELEASE ORAL at 05:00

## 2024-11-17 RX ADMIN — IPRATROPIUM BROMIDE AND ALBUTEROL SULFATE 3 MILLILITER(S): 2.5; .5 SOLUTION RESPIRATORY (INHALATION) at 14:09

## 2024-11-17 RX ADMIN — ACETAMINOPHEN, DIPHENHYDRAMINE HCL, PHENYLEPHRINE HCL 3 MILLIGRAM(S): 325; 25; 5 TABLET ORAL at 23:34

## 2024-11-17 RX ADMIN — Medication 75 MILLILITER(S): at 15:37

## 2024-11-17 RX ADMIN — ACETAMINOPHEN 500MG 650 MILLIGRAM(S): 500 TABLET, COATED ORAL at 11:49

## 2024-11-17 RX ADMIN — CEFEPIME 1000 MILLIGRAM(S): 2 INJECTION, POWDER, FOR SOLUTION INTRAVENOUS at 23:25

## 2024-11-17 RX ADMIN — HYDROMORPHONE HYDROCHLORIDE 0.2 MILLIGRAM(S): 2 TABLET ORAL at 04:51

## 2024-11-17 RX ADMIN — SUCRALFATE 1 GRAM(S): 1 TABLET ORAL at 00:02

## 2024-11-17 NOTE — DIETITIAN INITIAL EVALUATION ADULT - OTHER INFO
58 y/o F with past medical history of alcoholic cirrhosis complicated by esophageal varices status post recent banding ascites and portal hypertension presenting with increasing abdominal girth over the last 1 week associated with chills and fever.  Patient states that she has noticed that her stomach is started swelling after recent EGD on November 5.  She has been stooling normally with no diarrhea or constipation.  Tolerating her diet well.  She was offered a transplant workup by her outpatient GI doctor thinking which she refused.  She quit drinking in October of this year.  She does not follow-up for outpatient paracentesis on a regular basis.  She has only had 1 paracentesis prior to this admission. Patient complains of generalized abdominal pain and requesting pain medicine. With severe sepsis, and large volume ascites 2/2 to ETOH cirrhosis. Per GI concern for SBP but the neg tap makes this unlikely- agree with abx pending cultures. CT abd: Wall thickening of the ascending colon is again noted, which may represent portal hypertensive colopathy. Hypokalemia noted - received repletion. Admitting diagnosis: sepsis    Pt known to nutr services; previously met criteria for PCM on multiple admits - continues to meet criteria. Pt reports little change to appetite/PO intake upon admission. Reports ate a little better yesterday; feeling more distended this am. Had not experienced N/V since admit. Still with soft stools. Reports UBW ~123-130# depending on ascites. Wt hx reviewed in chart: 123# on 11/6/24 (taken by RD); 128.5# 10/4/24 (standing wt taken by RN); 136# on 11/13/23 (taken by RD); 130# on 10/11/23 (taken by RD). RD obtained bed scale wt 135# on 11/17/24 - 1+ edema doc'd with noted ascites possibly skewing/masking additional wt loss. 12# wt gain ? likely due to fluid retention. NFPE reveals mild-mod muscle/fat wasting. Would c/w DASH/TLC diet 2/2 ascites and edema doc'd. Will add ensure plus high protein TID to optimize PO intake (provides 350 kcal, 20g protein/ shake). Discussed w/ pt adhering to lower salt diet to help with the fluid retention. Pt receptive and states she does not add salt to food at this time. Confirm goals of care regarding nutrition support. Nutrition support is not recommended due to overall declining medical status which evidenced based studies indicate EN is not effective in prolonging survival and improving quality of life. It can also increase risk of aspiration pneumonia as well as other related issues (infection, GI complications, and worsening/ non-healing PI's). However, will provide nutrition/ hydration within GOC. Please see additional recommendations below.

## 2024-11-17 NOTE — DIETITIAN INITIAL EVALUATION ADULT - ADD RECOMMEND
1. C/w DASH/TLC diet 2/2 fluid retention (edema and ascites)  2. Add ensure plus high protein TID to optimize PO intake (provides 350 kcal, 20g protein/ shake)   3. Recommend to dc Nepro-arcenio and add MVI w/ minerals daily to ensure 100% RDA met  4. Consider adding thiamine 100 mg daily 2/2 poor PO intake/ malnutrition  5. Monitor daily lytes/min and replete prn   6. Obtain weekly wt to track/trend changes  7. Monitor bowel movements, if no BM for >3 days, consider implementing bowel regimen.   8. Encourage protein-rich foods, maximize food preferences   9. Confirm goals of care regarding nutrition support. Nutrition support is not recommended due to overall declining medical status which evidenced based studies indicate EN is not effective in prolonging survival and improving quality of life. It can also increase risk of aspiration pneumonia as well as other related issues (infection, GI complications, and worsening/ non-healing PI's). However, will provide nutrition/ hydration within GOC.   RD will continue to monitor PO intake, labs, hydration, and wt prn.

## 2024-11-17 NOTE — PROGRESS NOTE ADULT - ASSESSMENT
#?severe sepsis  #Large volume ascites secondary to EtOH cirrhosis   #concern for SBP  Patient meets sepsis criteria febrile to 102.8 Tachycardic to 142 Lactate of 3.9  Initial concern for septic shock with hypotension however maps remained greater than 65  CCU consulted and recommended LR bolus as well as albumin which were given  Tap performed shows minimal white blood cells but unclear if performed prior to abx  s/p ceftriaxone  Follow-up blood cultures - drawn prior to abx  lactate 3.9-->4.1 , repeat  will avoid IV diuretics today in the setting of severe sepsis elevated lactate and limited ability to provide additional IVF  if lactate trends down tomorrow consider IV lasix and spirinolactone while pt await para on monday with IR  Toradol 15 x 1  pt is intolerant to morphine - nausea - tolerated dilaudid in ed  pain control with small dose dilaudid q8 PRN hold id MAP <65  IR consult for large volume paracentesis on monday  GI consult  acetaminophen for pain/fever, max 2000mg/day   ua neg and cxr clear   ID consult  Monitor off Abx.      #hypokalemia/hypomagnesemina   repleted      #possible colitis vs colopathy   CT abd: Wall thickening of the ascending colon is again noted, which may   represent portal hypertensive colopathy.  collect gi pcr  s/p flagyl in ed   favor abd pain related more to distention from ascites or portal hypertensive colopathy than infectious colitis   hold off on colitis directed abx for now   hold cholestyramine  GI consult    #liver cirrhosis  #hx of varices with recent banding   continue with PPI and sucralfate   previous GI records reviewed - beta blockade on hold given baseline low bp     #dvt ppx  SCDs given hx of variceal bleed   #?severe sepsis  #Large volume ascites secondary to EtOH cirrhosis   #concern for SBP  Patient meets sepsis criteria febrile to 102.8 Tachycardic to 142 Lactate of 3.9  Initial concern for septic shock with hypotension however maps remained greater than 65  CCU consulted and recommended LR bolus as well as albumin which were given  Tap performed shows minimal white blood cells but unclear if performed prior to abx  s/p ceftriaxone  Follow-up blood cultures - drawn prior to abx  lactate 3.9-->4.1 , repeat  will avoid IV diuretics today in the setting of severe sepsis elevated lactate and limited ability to provide additional IVF  if lactate trends down tomorrow consider IV lasix and spirinolactone while pt await para on monday with IR  Toradol 15 x 1  pt is intolerant to morphine - nausea - tolerated dilaudid in ed  pain control with small dose dilaudid q8 PRN hold id MAP <65  IR consult for large volume paracentesis on monday  GI consult  acetaminophen for pain/fever, max 2000mg/day   ua neg and cxr clear   ID consult  Monitor off Abx.      addendum: CXR concerning for pneumonia, will start cefepime   Duoneb treatments     #hypokalemia/hypomagnesemina   repleted      #possible colitis vs colopathy   CT abd: Wall thickening of the ascending colon is again noted, which may   represent portal hypertensive colopathy.  collect gi pcr  s/p flagyl in ed   favor abd pain related more to distention from ascites or portal hypertensive colopathy than infectious colitis   hold off on colitis directed abx for now   hold cholestyramine  GI consult    #liver cirrhosis  #hx of varices with recent banding   continue with PPI and sucralfate   previous GI records reviewed - beta blockade on hold given baseline low bp     #dvt ppx  SCDs given hx of variceal bleed   #?severe sepsis  #Large volume ascites secondary to EtOH cirrhosis   #concern for SBP  Patient meets sepsis criteria febrile to 102.8 Tachycardic to 142 Lactate of 3.9  Initial concern for septic shock with hypotension however maps remained greater than 65  CCU consulted and recommended LR bolus as well as albumin which were given  Tap performed shows minimal white blood cells but unclear if performed prior to abx  s/p ceftriaxone  Follow-up blood cultures - drawn prior to abx  lactate 3.9-->4.1 , repeat  will avoid IV diuretics today in the setting of severe sepsis elevated lactate and limited ability to provide additional IVF  if lactate trends down tomorrow consider IV lasix and spirinolactone while pt await para on monday with IR  Toradol 15 x 1  pt is intolerant to morphine - nausea - tolerated dilaudid in ed  pain control with small dose dilaudid q8 PRN hold id MAP <65  IR consult for large volume paracentesis on monday  GI consult  acetaminophen for pain/fever, max 2000mg/day   ua neg and cxr clear   ID consult  Monitor off Abx.      addendum: CXR concerning for pneumonia, will start IV cefepime   Duoneb treatments   O2 therapy, wean as tolerated   Maintain spo2>90%     #hypokalemia/hypomagnesemina   repleted      #possible colitis vs colopathy   CT abd: Wall thickening of the ascending colon is again noted, which may   represent portal hypertensive colopathy.  collect gi pcr  s/p flagyl in ed   favor abd pain related more to distention from ascites or portal hypertensive colopathy than infectious colitis   hold off on colitis directed abx for now   hold cholestyramine  GI consult    #liver cirrhosis  #hx of varices with recent banding   continue with PPI and sucralfate   previous GI records reviewed - beta blockade on hold given baseline low bp     #dvt ppx  SCDs given hx of variceal bleed

## 2024-11-17 NOTE — DIETITIAN INITIAL EVALUATION ADULT - ORAL INTAKE PTA/DIET HISTORY
Pt lives with her boyfriend; they share the food shopping/cooking. Reports she eats foods easy on the gut 2/2 acid reflux - follows a low sodium diet as well. States she doesn't have much appetite. Will typically eat 2 tiffany meals a day sometimes snacks on saltine crackers. On and off N/V PTA. Reports some softer/small stools PTA. Meeting <75% ENN > 1 month.

## 2024-11-17 NOTE — CONSULT NOTE ADULT - SUBJECTIVE AND OBJECTIVE BOX
Patient is a 59y old  Female who presents with a chief complaint of Fever chills increased abdominal girth and distention    HPI:  59-year-old female with h/o alcoholic cirrhosis complicated by esophageal varices status post recent banding ascites and portal hypertension was admitted on 11/16 for increasing abdominal girth over the last 1 week associated with chills and fever. Patient states that she has noticed that her stomach started swelling after recent EGD on November 5.  She has been stooling normally with no diarrhea or constipation. Tolerating her diet well.  She was offered a transplant workup by her outpatient GI doctor thinking which she refused. She quit drinking in October of this year.  She does not follow-up for outpatient paracentesis on a regular basis.  She has only had 1 paracentesis prior to this admission. Patient complains of generalized abdominal pain and requesting pain medicine. In ED patient was febrile tachycardic hypotensive. She received ceftriaxone  and flagyl. She underwent paracentesis by ER doctor small sample collected for Testing by lab.     PMH: as above  PSH: as above  Meds: per reconciliation sheet, noted below  MEDICATIONS  (STANDING):  ferrous    sulfate 325 milliGRAM(s) Oral daily  furosemide    Tablet 40 milliGRAM(s) Oral daily  lactated ringers. 1000 milliLiter(s) (75 mL/Hr) IV Continuous <Continuous>  metroNIDAZOLE  IVPB 500 milliGRAM(s) IV Intermittent every 12 hours  Nephro-arcenio 1 Tablet(s) Oral daily  pantoprazole    Tablet 40 milliGRAM(s) Oral before breakfast  spironolactone 100 milliGRAM(s) Oral daily  sucralfate suspension 1 Gram(s) Oral every 6 hours    MEDICATIONS  (PRN):  aluminum hydroxide/magnesium hydroxide/simethicone Suspension 30 milliLiter(s) Oral every 4 hours PRN Dyspepsia  HYDROmorphone  Injectable 0.2 milliGRAM(s) IV Push every 8 hours PRN Moderate Pain (4 - 6)  melatonin 3 milliGRAM(s) Oral at bedtime PRN Insomnia  ondansetron Injectable 4 milliGRAM(s) IV Push every 8 hours PRN Nausea and/or Vomiting    Allergies    Zithromax (Unknown)    Intolerances    morphine (Nausea)    Social: no smoking, prior alcohol, no illegal drugs; no recent travel, no exposure to TB  FAMILY HISTORY:  FH: pancreatic cancer (Mother)  Family history of heart attack (Father)  Family history of CVA (Father)    ROS: the patient denies fever, no chills, no HA, no seizures, no dizziness, no sore throat, no nasal congestion, no blurry vision, no CP, no palpitations, no SOB, no cough, has abdominal distension and pain, no diarrhea, no N/V, no dysuria, no leg pain, no claudication, no rash, no joint aches, no rectal pain or bleeding, no night sweats  All other systems reviewed and are negative    Vital Signs Last 24 Hrs  T(C): 37.5 (17 Nov 2024 08:24), Max: 37.5 (17 Nov 2024 08:24)  T(F): 99.5 (17 Nov 2024 08:24), Max: 99.5 (17 Nov 2024 08:24)  HR: 110 (17 Nov 2024 08:24) (96 - 110)  BP: 111/69 (17 Nov 2024 08:24) (92/59 - 111/69)  BP(mean): --  RR: 18 (17 Nov 2024 08:24) (17 - 18)  SpO2: 98% (17 Nov 2024 00:16) (95% - 98%)    Parameters below as of 17 Nov 2024 00:16  Patient On (Oxygen Delivery Method): room air    PE:    Constitutional:  No acute distress  HEENT: NC/AT, EOMI, PERRLA, conjunctivae clear; ears and nose atraumatic; pharynx benign  Neck: supple; thyroid not palpable  Back: no tenderness  Respiratory: respiratory effort normal; clear to auscultation  Cardiovascular: S1S2 regular, no murmurs  Abdomen: soft, not tender, distended, positive BS; no liver or spleen organomegaly  Genitourinary: no suprapubic tenderness  Lymphatic: no LN palpable  Musculoskeletal: no muscle tenderness, no joint swelling or tenderness  Extremities: no pedal edema  Neurological/ Psychiatric: AxOx3, judgement and insight normal; moving all extremities  Skin: no rashes; no palpable lesions    Labs: all available labs reviewed                        7.6    11.05 )-----------( 89       ( 17 Nov 2024 06:58 )             24.6     11-17    137  |  111[H]  |  14  ----------------------------<  122[H]  4.4   |  22  |  0.66    Ca    8.4[L]      17 Nov 2024 06:58  Mg     1.4     11-16    TPro  6.3  /  Alb  2.5[L]  /  TBili  2.7[H]  /  DBili  x   /  AST  67[H]  /  ALT  37  /  AlkPhos  224[H]  11-17     LIVER FUNCTIONS - ( 17 Nov 2024 06:58 )  Alb: 2.5 g/dL / Pro: 6.3 gm/dL / ALK PHOS: 224 U/L / ALT: 37 U/L / AST: 67 U/L / GGT: x           Urinalysis with Rflx Culture (collected 16 Nov 2024 09:00)    Culture - Body Fluid with Gram Stain (collected 16 Nov 2024 04:44)  Source: Abdominal Fl  Gram Stain (16 Nov 2024 12:36):    polymorphonuclear leukocytes seen    No organisms seen    by cytocentrifuge    Culture - Blood (collected 16 Nov 2024 03:17)  Source: .Blood BLOOD  Preliminary Report (17 Nov 2024 09:01):    No growth at 24 hours    Culture - Blood (collected 16 Nov 2024 02:44)  Source: .Blood BLOOD  Preliminary Report (17 Nov 2024 09:01):    No growth at 24 hours    Radiology: all available radiological tests reviewed    < from: CT Abdomen and Pelvis w/ IV Cont (11.16.24 @ 03:10) >  Enlarged cirrhotic liver with evidence of portal hypertension including splenomegaly, as seen on prior study, and moderate/large volume ascites, significant interval worsening.  Wall thickening of the ascending colon is again noted, which may represent portal hypertensive colopathy. Differential would include infectious versus inflammatory colitis.  < end of copied text >    Advanced directives addressed: full resuscitation

## 2024-11-17 NOTE — PROGRESS NOTE ADULT - ASSESSMENT
60 yo female with history of ETOH cirrhosis and fever. Had negative diagnostic paracentesis. Now with increasing dyspnea and abdominal distension. Patient receiving increased diuretics. Would recommend theraputic paracentesis in AM.

## 2024-11-17 NOTE — DIETITIAN INITIAL EVALUATION ADULT - FACTORS AFF FOOD INTAKE
distention, acid reflux/persistent diarrhea/persistent lack of appetite/persistent nausea/vomiting/other (specify)

## 2024-11-17 NOTE — CONSULT NOTE ADULT - REASON FOR ADMISSION
Fever chills increased abdominal girth and distention
Fever chills increased abdominal girth and distention

## 2024-11-17 NOTE — PROGRESS NOTE ADULT - SUBJECTIVE AND OBJECTIVE BOX
HOSPITALIST ATTENDING PROGRESS NOTE    Chart and meds reviewed.      Subjective: Patient seen and examined. Resting comfortbaly. patient reports continued malaise. Now on 2L O2 via nasal cannula. Will repeat CXR and start albuterol nebs. Wheezing appreciated. Continue IV diuretics, plan for therapeutic Paracentesis in AM as per GI. Seen by ID, will monitor of abx.       Additional results/Imaging, I have personally reviewed:    LABS:                            7.6    11.05 )-----------( 89       ( 17 Nov 2024 06:58 )             24.6     11-17    137  |  111[H]  |  14  ----------------------------<  122[H]  4.4   |  22  |  0.66    Ca    8.4[L]      17 Nov 2024 06:58  Mg     1.4     11-16    TPro  6.3  /  Alb  2.5[L]  /  TBili  2.7[H]  /  DBili  x   /  AST  67[H]  /  ALT  37  /  AlkPhos  224[H]  11-17        LIVER FUNCTIONS - ( 17 Nov 2024 06:58 )  Alb: 2.5 g/dL / Pro: 6.3 gm/dL / ALK PHOS: 224 U/L / ALT: 37 U/L / AST: 67 U/L / GGT: x           PT/INR - ( 16 Nov 2024 02:44 )   PT: 16.5 sec;   INR: 1.44 ratio         PTT - ( 16 Nov 2024 02:44 )  PTT:29.2 sec  Urinalysis Basic - ( 17 Nov 2024 06:58 )    Color: x / Appearance: x / SG: x / pH: x  Gluc: 122 mg/dL / Ketone: x  / Bili: x / Urobili: x   Blood: x / Protein: x / Nitrite: x   Leuk Esterase: x / RBC: x / WBC x   Sq Epi: x / Non Sq Epi: x / Bacteria: x        Lactate, Blood: 4.1 mmol/L (11-16 @ 14:45)        All other systems reviewed and found to be negative with the exception of what has been described above.    MEDICATIONS  (STANDING):  albuterol/ipratropium for Nebulization 3 milliLiter(s) Nebulizer every 6 hours  ferrous    sulfate 325 milliGRAM(s) Oral daily  furosemide    Tablet 40 milliGRAM(s) Oral daily  lactated ringers. 1000 milliLiter(s) (75 mL/Hr) IV Continuous <Continuous>  Nephro-arcenio 1 Tablet(s) Oral daily  pantoprazole    Tablet 40 milliGRAM(s) Oral before breakfast  spironolactone 100 milliGRAM(s) Oral daily  sucralfate suspension 1 Gram(s) Oral every 6 hours    MEDICATIONS  (PRN):  acetaminophen     Tablet .. 650 milliGRAM(s) Oral every 12 hours PRN Temp greater or equal to 38C (100.4F), Mild Pain (1 - 3)  aluminum hydroxide/magnesium hydroxide/simethicone Suspension 30 milliLiter(s) Oral every 4 hours PRN Dyspepsia  HYDROmorphone  Injectable 0.2 milliGRAM(s) IV Push every 8 hours PRN Moderate Pain (4 - 6)  melatonin 3 milliGRAM(s) Oral at bedtime PRN Insomnia  ondansetron Injectable 4 milliGRAM(s) IV Push every 8 hours PRN Nausea and/or Vomiting      VITALS:  T(F): 99.5 (11-17-24 @ 08:24), Max: 99.5 (11-17-24 @ 08:24)  HR: 110 (11-17-24 @ 08:24) (96 - 110)  BP: 111/69 (11-17-24 @ 08:24) (92/59 - 111/69)  RR: 18 (11-17-24 @ 08:24) (17 - 18)  SpO2: 98% (11-17-24 @ 00:16) (95% - 98%)  Wt(kg): --    I&O's Summary    16 Nov 2024 07:01  -  17 Nov 2024 07:00  --------------------------------------------------------  IN: 1125 mL / OUT: 900 mL / NET: 225 mL        CAPILLARY BLOOD GLUCOSE          PHYSICAL EXAM:  Gen: NAD  HEENT:  pupils equal and reactive, EOMI, no oropharyngeal lesions, erythema, exudates, oral thrush  NECK:   supple, no carotid bruits, no palpable lymph nodes, no thyromegaly  CV:  +S1, +S2, regular, no murmurs or rubs  RESP:   lungs clear to auscultation bilaterally, no wheezing, rales, rhonchi, good air entry bilaterally  BREAST:  not examined  GI:  Very distended large abdomen Out of proportion to patient's weight; Paracentesis site on the left side with bandage over small amount of blood.  generalized tenderness to mild palpation  RECTAL:  not examined  :  not examined  MSK:   normal muscle tone, no atrophy, no rigidity, no contractions  EXT:  no clubbing, no cyanosis, no edema, no calf pain, swelling or erythema  VASCULAR:  pulses equal and symmetric in the upper and lower extremities  NEURO:  AAOX3, no focal neurological deficits, follows all commands, able to move extremities spontaneously  SKIN:  no ulcers, lesions or rashes    CULTURES:      Telemetry, personally reviewed HOSPITALIST ATTENDING PROGRESS NOTE    Chart and meds reviewed.      Subjective: Patient seen and examined. Resting comfortbaly. patient reports continued malaise. Now on 2L O2 via nasal cannula. Will repeat CXR and start albuterol nebs. Wheezing appreciated. Continue IV diuretics, plan for therapeutic Paracentesis in AM as per GI. Seen by ID, will monitor of abx.       Additional results/Imaging, I have personally reviewed:    LABS:                            7.6    11.05 )-----------( 89       ( 17 Nov 2024 06:58 )             24.6     11-17    137  |  111[H]  |  14  ----------------------------<  122[H]  4.4   |  22  |  0.66    Ca    8.4[L]      17 Nov 2024 06:58  Mg     1.4     11-16    TPro  6.3  /  Alb  2.5[L]  /  TBili  2.7[H]  /  DBili  x   /  AST  67[H]  /  ALT  37  /  AlkPhos  224[H]  11-17        LIVER FUNCTIONS - ( 17 Nov 2024 06:58 )  Alb: 2.5 g/dL / Pro: 6.3 gm/dL / ALK PHOS: 224 U/L / ALT: 37 U/L / AST: 67 U/L / GGT: x           PT/INR - ( 16 Nov 2024 02:44 )   PT: 16.5 sec;   INR: 1.44 ratio         PTT - ( 16 Nov 2024 02:44 )  PTT:29.2 sec  Urinalysis Basic - ( 17 Nov 2024 06:58 )    Color: x / Appearance: x / SG: x / pH: x  Gluc: 122 mg/dL / Ketone: x  / Bili: x / Urobili: x   Blood: x / Protein: x / Nitrite: x   Leuk Esterase: x / RBC: x / WBC x   Sq Epi: x / Non Sq Epi: x / Bacteria: x        Lactate, Blood: 4.1 mmol/L (11-16 @ 14:45)        All other systems reviewed and found to be negative with the exception of what has been described above.    MEDICATIONS  (STANDING):  albuterol/ipratropium for Nebulization 3 milliLiter(s) Nebulizer every 6 hours  ferrous    sulfate 325 milliGRAM(s) Oral daily  furosemide    Tablet 40 milliGRAM(s) Oral daily  lactated ringers. 1000 milliLiter(s) (75 mL/Hr) IV Continuous <Continuous>  Nephro-arcenio 1 Tablet(s) Oral daily  pantoprazole    Tablet 40 milliGRAM(s) Oral before breakfast  spironolactone 100 milliGRAM(s) Oral daily  sucralfate suspension 1 Gram(s) Oral every 6 hours    MEDICATIONS  (PRN):  acetaminophen     Tablet .. 650 milliGRAM(s) Oral every 12 hours PRN Temp greater or equal to 38C (100.4F), Mild Pain (1 - 3)  aluminum hydroxide/magnesium hydroxide/simethicone Suspension 30 milliLiter(s) Oral every 4 hours PRN Dyspepsia  HYDROmorphone  Injectable 0.2 milliGRAM(s) IV Push every 8 hours PRN Moderate Pain (4 - 6)  melatonin 3 milliGRAM(s) Oral at bedtime PRN Insomnia  ondansetron Injectable 4 milliGRAM(s) IV Push every 8 hours PRN Nausea and/or Vomiting      VITALS:  T(F): 99.5 (11-17-24 @ 08:24), Max: 99.5 (11-17-24 @ 08:24)  HR: 110 (11-17-24 @ 08:24) (96 - 110)  BP: 111/69 (11-17-24 @ 08:24) (92/59 - 111/69)  RR: 18 (11-17-24 @ 08:24) (17 - 18)  SpO2: 98% (11-17-24 @ 00:16) (95% - 98%)  Wt(kg): --    I&O's Summary    16 Nov 2024 07:01  -  17 Nov 2024 07:00  --------------------------------------------------------  IN: 1125 mL / OUT: 900 mL / NET: 225 mL        CAPILLARY BLOOD GLUCOSE          PHYSICAL EXAM:  Gen: NAD  HEENT:  pupils equal and reactive, EOMI, no oropharyngeal lesions, erythema, exudates, oral thrush  NECK:   supple, no carotid bruits, no palpable lymph nodes, no thyromegaly  CV:  +S1, +S2, regular, no murmurs or rubs  RESP:   wheezing appreciated b/l   GI:  Very distended large abdomen Out of proportion to patient's weight; Paracentesis site on the left side with bandage over small amount of blood.  generalized tenderness to mild palpation  MSK:   normal muscle tone, no atrophy, no rigidity, no contractions  EXT:  no clubbing, no cyanosis, no edema, no calf pain, swelling or erythema  VASCULAR:  pulses equal and symmetric in the upper and lower extremities  NEURO:  AAOX3, no focal neurological deficits, follows all commands, able to move extremities spontaneously  SKIN:  no ulcers, lesions or rashes    CULTURES:      Telemetry, personally reviewed

## 2024-11-17 NOTE — PROGRESS NOTE ADULT - SUBJECTIVE AND OBJECTIVE BOX
Patient is a 59y old  Female who presents with a chief complaint of Sepsis     (2024 08:52)      HPI:  59-year-old female with past medical history of alcoholic cirrhosis complicated by esophageal varices status post recent banding ascites and portal hypertension presenting with increasing abdominal girth over the last 1 week associated with chills and fever.  Patient states that she has noticed that her stomach is started swelling after recent EGD on .  She has been stooling normally with no diarrhea or constipation.  Tolerating her diet well.  She was offered a transplant workup by her outpatient GI doctor thinking which she refused.  She quit drinking in October of this year.  She does not follow-up for outpatient paracentesis on a regular basis.  She has only had 1 paracentesis prior to this admission. Patient complains of generalized abdominal pain and requesting pain medicine    In ED patient was febrile tachycardic hypotensive.  Received ceftriaxone Flagyl 2 L LR bolus and 0.5 IV Dilaudid albumin  Tap performed by ER doctor small sample collected for Testing by lab.  White blood cell count 28However sample was collected after antibiotics (2024 09:12)    Patient is currently c/o dyspnea and increased abdominal girth.       PAST MEDICAL & SURGICAL HISTORY:  Alcohol abuse      Depression      Withdrawal seizures      History of basal cell carcinoma excision      Squamous cell skin cancer      Hemorrhoids      2019 novel coronavirus disease (COVID-19)      Anemia      H/O vertigo      History of ear, nose, and throat (ENT) surgery      H/O basal cell carcinoma excision      H/O:           MEDICATIONS  (STANDING):  ferrous    sulfate 325 milliGRAM(s) Oral daily  furosemide    Tablet 40 milliGRAM(s) Oral daily  lactated ringers. 1000 milliLiter(s) (75 mL/Hr) IV Continuous <Continuous>  metroNIDAZOLE  IVPB 500 milliGRAM(s) IV Intermittent every 12 hours  Nephro-arcenio 1 Tablet(s) Oral daily  pantoprazole    Tablet 40 milliGRAM(s) Oral before breakfast  spironolactone 100 milliGRAM(s) Oral daily  sucralfate suspension 1 Gram(s) Oral every 6 hours    MEDICATIONS  (PRN):  aluminum hydroxide/magnesium hydroxide/simethicone Suspension 30 milliLiter(s) Oral every 4 hours PRN Dyspepsia  HYDROmorphone  Injectable 0.2 milliGRAM(s) IV Push every 8 hours PRN Moderate Pain (4 - 6)  melatonin 3 milliGRAM(s) Oral at bedtime PRN Insomnia  ondansetron Injectable 4 milliGRAM(s) IV Push every 8 hours PRN Nausea and/or Vomiting      Allergies    Zithromax (Unknown)    Intolerances    morphine (Nausea)        Vital Signs Last 24 Hrs  T(C): 37.5 (2024 08:24), Max: 37.5 (2024 08:24)  T(F): 99.5 (2024 08:24), Max: 99.5 (2024 08:24)  HR: 110 (2024 08:24) (96 - 113)  BP: 111/69 (2024 08:24) (92/59 - 111/69)  BP(mean): --  RR: 18 (2024 08:24) (17 - 18)  SpO2: 98% (2024 00:16) (95% - 98%)    Parameters below as of 2024 00:16  Patient On (Oxygen Delivery Method): room air        PHYSICAL EXAM:    Respiratory: CTAB  Cardiovascular: S1 and S2, RRR, no M/G/R  Gastrointestinal: tense ascites  Extremities: No peripheral edema  No asterixis    LABS:                        7.6    11.05 )-----------( 89       ( 2024 06:58 )             24.6     11-    137  |  111[H]  |  14  ----------------------------<  122[H]  4.4   |  22  |  0.66    Ca    8.4[L]      2024 06:58  Mg     1.4     -    TPro  6.3  /  Alb  2.5[L]  /  TBili  2.7[H]  /  DBili  x   /  AST  67[H]  /  ALT  37  /  AlkPhos  224[H]  11-17    PT/INR - ( 2024 02:44 )   PT: 16.5 sec;   INR: 1.44 ratio         PTT - ( 2024 02:44 )  PTT:29.2 sec  LIVER FUNCTIONS - ( 2024 06:58 )  Alb: 2.5 g/dL / Pro: 6.3 gm/dL / ALK PHOS: 224 U/L / ALT: 37 U/L / AST: 67 U/L / GGT: x             RADIOLOGY & ADDITIONAL STUDIES:

## 2024-11-17 NOTE — DIETITIAN INITIAL EVALUATION ADULT - NSFNSGIIOFT_GEN_A_CORE
I&O's Detail    16 Nov 2024 07:01  -  17 Nov 2024 07:00  --------------------------------------------------------  IN:    IV PiggyBack: 150 mL    Lactated Ringers: 975 mL  Total IN: 1125 mL    OUT:    Voided (mL): 900 mL  Total OUT: 900 mL    Total NET: 225 mL

## 2024-11-17 NOTE — DIETITIAN INITIAL EVALUATION ADULT - PERTINENT MEDS FT
MEDICATIONS  (STANDING):  ferrous    sulfate 325 milliGRAM(s) Oral daily  furosemide    Tablet 40 milliGRAM(s) Oral daily  lactated ringers. 1000 milliLiter(s) (75 mL/Hr) IV Continuous <Continuous>  metroNIDAZOLE  IVPB 500 milliGRAM(s) IV Intermittent every 12 hours  Nephro-arcenio 1 Tablet(s) Oral daily  pantoprazole    Tablet 40 milliGRAM(s) Oral before breakfast  spironolactone 100 milliGRAM(s) Oral daily  sucralfate suspension 1 Gram(s) Oral every 6 hours    MEDICATIONS  (PRN):  aluminum hydroxide/magnesium hydroxide/simethicone Suspension 30 milliLiter(s) Oral every 4 hours PRN Dyspepsia  HYDROmorphone  Injectable 0.2 milliGRAM(s) IV Push every 8 hours PRN Moderate Pain (4 - 6)  melatonin 3 milliGRAM(s) Oral at bedtime PRN Insomnia  ondansetron Injectable 4 milliGRAM(s) IV Push every 8 hours PRN Nausea and/or Vomiting

## 2024-11-17 NOTE — DIETITIAN INITIAL EVALUATION ADULT - NSICDXPASTMEDICALHX_GEN_ALL_CORE_FT
Oculoplastic Surgeon Procedure Text (A): After obtaining clear surgical margins the patient was sent to oculoplastics for surgical repair.  The patient understands they will receive post-surgical care and follow-up from the referring physician's office. PAST MEDICAL HISTORY:  2019 novel coronavirus disease (COVID-19)     Alcohol abuse     Anemia     Depression     H/O vertigo     Hemorrhoids     History of basal cell carcinoma excision     Squamous cell skin cancer     Withdrawal seizures

## 2024-11-17 NOTE — DIETITIAN INITIAL EVALUATION ADULT - PERTINENT LABORATORY DATA
11-17    137  |  111[H]  |  14  ----------------------------<  122[H]  4.4   |  22  |  0.66    Ca    8.4[L]      17 Nov 2024 06:58  Mg     1.4     11-16    TPro  6.3  /  Alb  2.5[L]  /  TBili  2.7[H]  /  DBili  x   /  AST  67[H]  /  ALT  37  /  AlkPhos  224[H]  11-17

## 2024-11-17 NOTE — CONSULT NOTE ADULT - ASSESSMENT
59-year-old female with h/o alcoholic cirrhosis complicated by esophageal varices status post recent banding ascites and portal hypertension was admitted on 11/16 for increasing abdominal girth over the last 1 week associated with chills and fever. Patient states that she has noticed that her stomach started swelling after recent EGD on November 5.  She has been stooling normally with no diarrhea or constipation. Tolerating her diet well.  She was offered a transplant workup by her outpatient GI doctor thinking which she refused. She quit drinking in October of this year.  She does not follow-up for outpatient paracentesis on a regular basis.  She has only had 1 paracentesis prior to this admission. Patient complains of generalized abdominal pain and requesting pain medicine. In ED patient was febrile tachycardic hypotensive. She received ceftriaxone  and flagyl. She underwent paracentesis by ER doctor small sample collected for Testing by lab.     #Alcoholic cirrhosis  #Esophageal varices  #Ascites. Doubt SBP  #Portal hypertensive colopathy  #Low grade fever  #Mild leukocytosis  -doubt sepsis  -paracentesis results reviewed  --> small amount of neutrophils  -doubt intraabdominal infection  -would observe off abx therapy for now  -pain management per medicine  -old chart reviewed to assess prior cultures  -monitor temps  -f/u CBC  -supportive care  2. Other issues:   -care per medicine

## 2024-11-18 LAB
ALBUMIN SERPL ELPH-MCNC: 2.4 G/DL — LOW (ref 3.3–5)
ALP SERPL-CCNC: 202 U/L — HIGH (ref 40–120)
ALT FLD-CCNC: 30 U/L — SIGNIFICANT CHANGE UP (ref 12–78)
ANION GAP SERPL CALC-SCNC: 3 MMOL/L — LOW (ref 5–17)
AST SERPL-CCNC: 47 U/L — HIGH (ref 15–37)
BILIRUB SERPL-MCNC: 2.7 MG/DL — HIGH (ref 0.2–1.2)
BUN SERPL-MCNC: 6 MG/DL — LOW (ref 7–23)
CALCIUM SERPL-MCNC: 8.4 MG/DL — LOW (ref 8.5–10.1)
CHLORIDE SERPL-SCNC: 110 MMOL/L — HIGH (ref 96–108)
CO2 SERPL-SCNC: 21 MMOL/L — LOW (ref 22–31)
CREAT SERPL-MCNC: 0.55 MG/DL — SIGNIFICANT CHANGE UP (ref 0.5–1.3)
EGFR: 104 ML/MIN/1.73M2 — SIGNIFICANT CHANGE UP
GLUCOSE SERPL-MCNC: 105 MG/DL — HIGH (ref 70–99)
HCT VFR BLD CALC: 24.1 % — LOW (ref 34.5–45)
HGB BLD-MCNC: 7.5 G/DL — LOW (ref 11.5–15.5)
LACTATE SERPL-SCNC: 2.4 MMOL/L — HIGH (ref 0.7–2)
MCHC RBC-ENTMCNC: 26.5 PG — LOW (ref 27–34)
MCHC RBC-ENTMCNC: 31.1 G/DL — LOW (ref 32–36)
MCV RBC AUTO: 85.2 FL — SIGNIFICANT CHANGE UP (ref 80–100)
PLATELET # BLD AUTO: 82 K/UL — LOW (ref 150–400)
POTASSIUM SERPL-MCNC: 3.4 MMOL/L — LOW (ref 3.5–5.3)
POTASSIUM SERPL-SCNC: 3.4 MMOL/L — LOW (ref 3.5–5.3)
PROT SERPL-MCNC: 6.2 GM/DL — SIGNIFICANT CHANGE UP (ref 6–8.3)
RBC # BLD: 2.83 M/UL — LOW (ref 3.8–5.2)
RBC # FLD: 17.6 % — HIGH (ref 10.3–14.5)
SODIUM SERPL-SCNC: 134 MMOL/L — LOW (ref 135–145)
WBC # BLD: 8.05 K/UL — SIGNIFICANT CHANGE UP (ref 3.8–10.5)
WBC # FLD AUTO: 8.05 K/UL — SIGNIFICANT CHANGE UP (ref 3.8–10.5)

## 2024-11-18 PROCEDURE — 99233 SBSQ HOSP IP/OBS HIGH 50: CPT

## 2024-11-18 PROCEDURE — 49083 ABD PARACENTESIS W/IMAGING: CPT

## 2024-11-18 RX ORDER — POTASSIUM CHLORIDE 600 MG/1
20 TABLET, EXTENDED RELEASE ORAL
Refills: 0 | Status: COMPLETED | OUTPATIENT
Start: 2024-11-18 | End: 2024-11-18

## 2024-11-18 RX ORDER — 0.9 % SODIUM CHLORIDE 0.9 %
1000 INTRAVENOUS SOLUTION INTRAVENOUS
Refills: 0 | Status: DISCONTINUED | OUTPATIENT
Start: 2024-11-18 | End: 2024-11-19

## 2024-11-18 RX ADMIN — Medication 100 MILLIGRAM(S): at 11:19

## 2024-11-18 RX ADMIN — Medication 75 MILLILITER(S): at 15:33

## 2024-11-18 RX ADMIN — Medication 75 MILLILITER(S): at 05:56

## 2024-11-18 RX ADMIN — CEFEPIME 1000 MILLIGRAM(S): 2 INJECTION, POWDER, FOR SOLUTION INTRAVENOUS at 22:14

## 2024-11-18 RX ADMIN — IPRATROPIUM BROMIDE AND ALBUTEROL SULFATE 3 MILLILITER(S): 2.5; .5 SOLUTION RESPIRATORY (INHALATION) at 07:56

## 2024-11-18 RX ADMIN — CEFEPIME 1000 MILLIGRAM(S): 2 INJECTION, POWDER, FOR SOLUTION INTRAVENOUS at 15:27

## 2024-11-18 RX ADMIN — IPRATROPIUM BROMIDE AND ALBUTEROL SULFATE 3 MILLILITER(S): 2.5; .5 SOLUTION RESPIRATORY (INHALATION) at 01:56

## 2024-11-18 RX ADMIN — IPRATROPIUM BROMIDE AND ALBUTEROL SULFATE 3 MILLILITER(S): 2.5; .5 SOLUTION RESPIRATORY (INHALATION) at 14:30

## 2024-11-18 RX ADMIN — POTASSIUM CHLORIDE 20 MILLIEQUIVALENT(S): 600 TABLET, EXTENDED RELEASE ORAL at 11:19

## 2024-11-18 RX ADMIN — SUCRALFATE 1 GRAM(S): 1 TABLET ORAL at 11:19

## 2024-11-18 RX ADMIN — FUROSEMIDE 40 MILLIGRAM(S): 40 TABLET ORAL at 11:19

## 2024-11-18 RX ADMIN — SUCRALFATE 1 GRAM(S): 1 TABLET ORAL at 05:54

## 2024-11-18 RX ADMIN — Medication 325 MILLIGRAM(S): at 11:18

## 2024-11-18 RX ADMIN — Medication 1 TABLET(S): at 11:18

## 2024-11-18 RX ADMIN — SUCRALFATE 1 GRAM(S): 1 TABLET ORAL at 17:50

## 2024-11-18 RX ADMIN — CEFEPIME 1000 MILLIGRAM(S): 2 INJECTION, POWDER, FOR SOLUTION INTRAVENOUS at 05:54

## 2024-11-18 RX ADMIN — POTASSIUM CHLORIDE 20 MILLIEQUIVALENT(S): 600 TABLET, EXTENDED RELEASE ORAL at 17:51

## 2024-11-18 NOTE — PHYSICAL THERAPY INITIAL EVALUATION ADULT - PERTINENT HX OF CURRENT PROBLEM, REHAB EVAL
60yo female with past medical history of alcoholic cirrhosis complicated by esophageal varices status post recent banding ascites and portal hypertension presenting with increasing abdominal girth over the last 1 week associated with chills and fever.     CTAP: Enlarged cirrhotic liver with evidence of portal hypertension including splenomegaly, as seen on prior study, and moderate/large volume ascites, significant interval worsening. Wall thickening of the ascending colon is again noted, which may represent portal hypertensive colopathy. Differential would include infectious versus inflammatory colitis.  CXR: New right lung predominant perihilar opacities may reflect pulmonary edema or infection.

## 2024-11-18 NOTE — PROGRESS NOTE ADULT - SUBJECTIVE AND OBJECTIVE BOX
HOSPITALIST ATTENDING PROGRESS NOTE    Chart and meds reviewed.      Subjective: Patient seen and examined. Ambulating comfortably today. plan for therapeutic paracentesis today. Started on IV abx for pneumonia will continue at this time. WBC WNL.       Additional results/Imaging, I have personally reviewed:    LABS:                            7.5    8.05  )-----------( 82       ( 18 Nov 2024 06:15 )             24.1     11-18    134[L]  |  110[H]  |  6[L]  ----------------------------<  105[H]  3.4[L]   |  21[L]  |  0.55    Ca    8.4[L]      18 Nov 2024 06:15  Mg     1.9     11-17    TPro  6.2  /  Alb  2.4[L]  /  TBili  2.7[H]  /  DBili  x   /  AST  47[H]  /  ALT  30  /  AlkPhos  202[H]  11-18        LIVER FUNCTIONS - ( 18 Nov 2024 06:15 )  Alb: 2.4 g/dL / Pro: 6.2 gm/dL / ALK PHOS: 202 U/L / ALT: 30 U/L / AST: 47 U/L / GGT: x             Urinalysis Basic - ( 18 Nov 2024 06:15 )    Color: x / Appearance: x / SG: x / pH: x  Gluc: 105 mg/dL / Ketone: x  / Bili: x / Urobili: x   Blood: x / Protein: x / Nitrite: x   Leuk Esterase: x / RBC: x / WBC x   Sq Epi: x / Non Sq Epi: x / Bacteria: x        Lactate, Blood: 2.4 mmol/L (11-18 @ 06:15)        All other systems reviewed and found to be negative with the exception of what has been described above.    MEDICATIONS  (STANDING):  albuterol/ipratropium for Nebulization 3 milliLiter(s) Nebulizer every 6 hours  cefepime  Injectable. 1000 milliGRAM(s) IV Push every 8 hours  cefepime  Injectable.      ferrous    sulfate 325 milliGRAM(s) Oral daily  furosemide    Tablet 40 milliGRAM(s) Oral daily  lactated ringers. 1000 milliLiter(s) (75 mL/Hr) IV Continuous <Continuous>  Nephro-arcenio 1 Tablet(s) Oral daily  pantoprazole    Tablet 40 milliGRAM(s) Oral before breakfast  potassium chloride    Tablet ER 20 milliEquivalent(s) Oral every 2 hours  spironolactone 100 milliGRAM(s) Oral daily  sucralfate suspension 1 Gram(s) Oral every 6 hours    MEDICATIONS  (PRN):  acetaminophen     Tablet .. 650 milliGRAM(s) Oral every 12 hours PRN Temp greater or equal to 38C (100.4F), Mild Pain (1 - 3)  aluminum hydroxide/magnesium hydroxide/simethicone Suspension 30 milliLiter(s) Oral every 4 hours PRN Dyspepsia  HYDROmorphone  Injectable 0.2 milliGRAM(s) IV Push every 8 hours PRN Moderate Pain (4 - 6)  melatonin 3 milliGRAM(s) Oral at bedtime PRN Insomnia  ondansetron Injectable 4 milliGRAM(s) IV Push every 8 hours PRN Nausea and/or Vomiting      VITALS:  T(F): 98.8 (11-18-24 @ 07:54), Max: 99.8 (11-17-24 @ 14:11)  HR: 93 (11-18-24 @ 08:39) (93 - 110)  BP: 104/67 (11-18-24 @ 07:54) (99/62 - 108/59)  RR: 18 (11-18-24 @ 07:54) (18 - 18)  SpO2: 93% (11-18-24 @ 08:39) (92% - 100%)  Wt(kg): --    I&O's Summary    17 Nov 2024 07:01  -  18 Nov 2024 07:00  --------------------------------------------------------  IN: 600 mL / OUT: 0 mL / NET: 600 mL        CAPILLARY BLOOD GLUCOSE          PHYSICAL EXAM:  Gen: NAD  HEENT:  pupils equal and reactive, EOMI, no oropharyngeal lesions, erythema, exudates, oral thrush  NECK:   supple, no carotid bruits, no palpable lymph nodes, no thyromegaly  CV:  +S1, +S2, regular, no murmurs or rubs  RESP:   wheezing appreciated b/l   GI:  Very distended large abdomen Out of proportion to patient's weight; Paracentesis site on the left side with bandage over small amount of blood.  generalized tenderness to mild palpation  MSK:   normal muscle tone, no atrophy, no rigidity, no contractions  EXT:  no clubbing, no cyanosis, no edema, no calf pain, swelling or erythema  VASCULAR:  pulses equal and symmetric in the upper and lower extremities  NEURO:  AAOX3, no focal neurological deficits, follows all commands, able to move extremities spontaneously  SKIN:  no ulcers, lesions or rashes      CULTURES:      Telemetry, personally reviewed

## 2024-11-18 NOTE — PROGRESS NOTE ADULT - NUTRITIONAL ASSESSMENT
This patient has been assessed with a concern for Malnutrition and has been determined to have a diagnosis/diagnoses of Moderate protein-calorie malnutrition.    This patient is being managed with:   Diet DASH/TLC-  Sodium & Cholesterol Restricted  Entered: Nov 16 2024  2:18PM  
This patient has been assessed with a concern for Malnutrition and has been determined to have a diagnosis/diagnoses of Moderate protein-calorie malnutrition.    This patient is being managed with:   Diet DASH/TLC-  Sodium & Cholesterol Restricted  Entered: Nov 16 2024  2:18PM

## 2024-11-18 NOTE — PHYSICAL THERAPY INITIAL EVALUATION ADULT - AMBULATION SKILLS, REHAB EVAL
What Type Of Note Output Would You Prefer (Optional)?: Bullet Format Has Your Skin Lesion Been Treated?: not been treated Is This A New Presentation, Or A Follow-Up?: Skin Lesion independent

## 2024-11-18 NOTE — PROGRESS NOTE ADULT - SUBJECTIVE AND OBJECTIVE BOX
Patient is a 59y old  Female who presents with a chief complaint of Fever chills increased abdominal girth and distention (2024 13:28)      Subective:  Feels fine today  No fever    PAST MEDICAL & SURGICAL HISTORY:  Alcohol abuse      Depression      Withdrawal seizures      History of basal cell carcinoma excision      Squamous cell skin cancer      Hemorrhoids      2019 novel coronavirus disease (COVID-19)      Anemia      H/O vertigo      History of ear, nose, and throat (ENT) surgery      H/O basal cell carcinoma excision      H/O:           MEDICATIONS  (STANDING):  albuterol/ipratropium for Nebulization 3 milliLiter(s) Nebulizer every 6 hours  cefepime  Injectable. 1000 milliGRAM(s) IV Push every 8 hours  cefepime  Injectable.      ferrous    sulfate 325 milliGRAM(s) Oral daily  furosemide    Tablet 40 milliGRAM(s) Oral daily  lactated ringers. 1000 milliLiter(s) (75 mL/Hr) IV Continuous <Continuous>  Nephro-arcenio 1 Tablet(s) Oral daily  pantoprazole    Tablet 40 milliGRAM(s) Oral before breakfast  spironolactone 100 milliGRAM(s) Oral daily  sucralfate suspension 1 Gram(s) Oral every 6 hours    MEDICATIONS  (PRN):  acetaminophen     Tablet .. 650 milliGRAM(s) Oral every 12 hours PRN Temp greater or equal to 38C (100.4F), Mild Pain (1 - 3)  aluminum hydroxide/magnesium hydroxide/simethicone Suspension 30 milliLiter(s) Oral every 4 hours PRN Dyspepsia  HYDROmorphone  Injectable 0.2 milliGRAM(s) IV Push every 8 hours PRN Moderate Pain (4 - 6)  melatonin 3 milliGRAM(s) Oral at bedtime PRN Insomnia  ondansetron Injectable 4 milliGRAM(s) IV Push every 8 hours PRN Nausea and/or Vomiting      REVIEW OF SYSTEMS:    RESPIRATORY: No shortness of breath  CARDIOVASCULAR: No chest pain  All other review of systems is negative unless indicated above.    Vital Signs Last 24 Hrs  T(C): 37.1 (2024 16:28), Max: 37.1 (2024 07:54)  T(F): 98.7 (2024 16:28), Max: 98.8 (2024 07:54)  HR: 114 (2024 16:28) (93 - 114)  BP: 97/52 (2024 16:28) (97/52 - 104/67)  BP(mean): --  RR: 18 (2024 16:28) (18 - 18)  SpO2: 94% (2024 16:28) (93% - 100%)    Parameters below as of 2024 16:28  Patient On (Oxygen Delivery Method): nasal cannula  O2 Flow (L/min): 2      PHYSICAL EXAM:    Constitutional: NAD, well-developed  Respiratory: CTAB  Cardiovascular: S1 and S2, RRR  Gastrointestinal: BS+, soft, mod ascites  Extremities: No peripheral edema  Psychiatric: Normal mood, normal affect    LABS:                        7.5    8.05  )-----------( 82       ( 2024 06:15 )             24.1     11-18    134[L]  |  110[H]  |  6[L]  ----------------------------<  105[H]  3.4[L]   |  21[L]  |  0.55    Ca    8.4[L]      2024 06:15  Mg     1.9     11-17    TPro  6.2  /  Alb  2.4[L]  /  TBili  2.7[H]  /  DBili  x   /  AST  47[H]  /  ALT  30  /  AlkPhos  202[H]  11-18      LIVER FUNCTIONS - ( 2024 06:15 )  Alb: 2.4 g/dL / Pro: 6.2 gm/dL / ALK PHOS: 202 U/L / ALT: 30 U/L / AST: 47 U/L / GGT: x             RADIOLOGY & ADDITIONAL STUDIES:

## 2024-11-18 NOTE — PHYSICAL THERAPY INITIAL EVALUATION ADULT - GENERAL OBSERVATIONS, REHAB EVAL
Pt seen for 15min PT Eval. Pt rec'd semi supine in bed in NAD on 3E, +sup O2, +tele. Pt indep with all mobility, amb in room without AD. no acute care PT needs, left semi supine RN aware, pt tearful at times reports having some personal stuff going on.

## 2024-11-18 NOTE — PHYSICAL THERAPY INITIAL EVALUATION ADULT - GAIT DISTANCE, PT EVAL
50 feet Graft Cartilage Fenestration Text: The cartilage was fenestrated with a 2mm punch biopsy to help facilitate graft survival and healing.

## 2024-11-18 NOTE — PROGRESS NOTE ADULT - REASON FOR ADMISSION
Fever chills increased abdominal girth and distention

## 2024-11-18 NOTE — PROGRESS NOTE ADULT - ASSESSMENT
#?severe sepsis  #Large volume ascites secondary to EtOH cirrhosis   #concern for SBP  Patient meets sepsis criteria febrile to 102.8 Tachycardic to 142 Lactate of 3.9  Initial concern for septic shock with hypotension however maps remained greater than 65  CCU consulted and recommended LR bolus as well as albumin which were given  Tap performed shows minimal white blood cells but unclear if performed prior to abx  s/p ceftriaxone  Follow-up blood cultures - drawn prior to abx  will avoid IV diuretics today in the setting of severe sepsis elevated lactate and limited ability to provide additional IVF  if lactate trends down tomorrow consider IV lasix and spirinolactone while pt await para on monday with IR  Toradol 15 x 1  pt is intolerant to morphine - nausea - tolerated dilaudid in ed  pain control with small dose dilaudid q8 PRN hold id MAP <65  IR consult for large volume paracentesis today  GI consult  acetaminophen for pain/fever, max 2000mg/day   ua neg and cxr clear   ID consult      Pneumonia  CXR concerning for pneumonia, Continue IV cefepime   Duoneb treatments   O2 therapy, wean as tolerated   Maintain spo2>90%   LA remains elevated, continue IV fluids, gentle due to ascities.   Repeat LA this afternoon     #hypokalemia/hypomagnesemina   repleted      #possible colitis vs colopathy   CT abd: Wall thickening of the ascending colon is again noted, which may   represent portal hypertensive colopathy.  collect gi pcr  s/p flagyl in ed   favor abd pain related more to distention from ascites or portal hypertensive colopathy than infectious colitis   hold off on colitis directed abx for now   hold cholestyramine  GI consult    #liver cirrhosis  #hx of varices with recent banding   continue with PPI and sucralfate   previous GI records reviewed - beta blockade on hold given baseline low bp     #dvt ppx  SCDs given hx of variceal bleed

## 2024-11-18 NOTE — PROGRESS NOTE ADULT - ASSESSMENT
Imp:  Cirrhosis and ascites  Fever, still negative cultures and paracentesis    Rec:  Cont diuretics  Reasonable to complete a course of antibiotics, but I'm sure there is enough here to support longer term SBP prophylaxis

## 2024-11-19 ENCOUNTER — TRANSCRIPTION ENCOUNTER (OUTPATIENT)
Age: 59
End: 2024-11-19

## 2024-11-19 VITALS
OXYGEN SATURATION: 95 % | DIASTOLIC BLOOD PRESSURE: 59 MMHG | RESPIRATION RATE: 18 BRPM | HEART RATE: 89 BPM | SYSTOLIC BLOOD PRESSURE: 98 MMHG | TEMPERATURE: 98 F

## 2024-11-19 LAB
ALBUMIN SERPL ELPH-MCNC: 2.3 G/DL — LOW (ref 3.3–5)
ALP SERPL-CCNC: 206 U/L — HIGH (ref 40–120)
ALT FLD-CCNC: 29 U/L — SIGNIFICANT CHANGE UP (ref 12–78)
ANION GAP SERPL CALC-SCNC: 5 MMOL/L — SIGNIFICANT CHANGE UP (ref 5–17)
AST SERPL-CCNC: 37 U/L — SIGNIFICANT CHANGE UP (ref 15–37)
BILIRUB SERPL-MCNC: 2.9 MG/DL — HIGH (ref 0.2–1.2)
BUN SERPL-MCNC: 6 MG/DL — LOW (ref 7–23)
CALCIUM SERPL-MCNC: 8.6 MG/DL — SIGNIFICANT CHANGE UP (ref 8.5–10.1)
CHLORIDE SERPL-SCNC: 107 MMOL/L — SIGNIFICANT CHANGE UP (ref 96–108)
CO2 SERPL-SCNC: 26 MMOL/L — SIGNIFICANT CHANGE UP (ref 22–31)
CREAT SERPL-MCNC: 0.45 MG/DL — LOW (ref 0.5–1.3)
EGFR: 109 ML/MIN/1.73M2 — SIGNIFICANT CHANGE UP
GLUCOSE SERPL-MCNC: 96 MG/DL — SIGNIFICANT CHANGE UP (ref 70–99)
HCT VFR BLD CALC: 25.2 % — LOW (ref 34.5–45)
HGB BLD-MCNC: 7.9 G/DL — LOW (ref 11.5–15.5)
LACTATE SERPL-SCNC: 2.5 MMOL/L — HIGH (ref 0.7–2)
MCHC RBC-ENTMCNC: 26.3 PG — LOW (ref 27–34)
MCHC RBC-ENTMCNC: 31.3 G/DL — LOW (ref 32–36)
MCV RBC AUTO: 84 FL — SIGNIFICANT CHANGE UP (ref 80–100)
PLATELET # BLD AUTO: 84 K/UL — LOW (ref 150–400)
POTASSIUM SERPL-MCNC: 3.4 MMOL/L — LOW (ref 3.5–5.3)
POTASSIUM SERPL-SCNC: 3.4 MMOL/L — LOW (ref 3.5–5.3)
PROT SERPL-MCNC: 6.2 GM/DL — SIGNIFICANT CHANGE UP (ref 6–8.3)
RBC # BLD: 3 M/UL — LOW (ref 3.8–5.2)
RBC # FLD: 17.7 % — HIGH (ref 10.3–14.5)
SODIUM SERPL-SCNC: 138 MMOL/L — SIGNIFICANT CHANGE UP (ref 135–145)
WBC # BLD: 4.67 K/UL — SIGNIFICANT CHANGE UP (ref 3.8–10.5)
WBC # FLD AUTO: 4.67 K/UL — SIGNIFICANT CHANGE UP (ref 3.8–10.5)

## 2024-11-19 PROCEDURE — 99239 HOSP IP/OBS DSCHRG MGMT >30: CPT

## 2024-11-19 RX ORDER — FUROSEMIDE 40 MG/1
1 TABLET ORAL
Qty: 30 | Refills: 0
Start: 2024-11-19

## 2024-11-19 RX ORDER — CEFDINIR 250 MG/5ML
1 SUSPENSION, RECONSTITUTED, ORAL (ML) ORAL
Qty: 10 | Refills: 0
Start: 2024-11-19

## 2024-11-19 RX ORDER — CHOLESTYRAMINE 4 G/5.5G
4 POWDER, FOR SUSPENSION ORAL
Refills: 0 | DISCHARGE

## 2024-11-19 RX ORDER — POTASSIUM CHLORIDE 600 MG/1
1 TABLET, EXTENDED RELEASE ORAL
Qty: 10 | Refills: 0
Start: 2024-11-19

## 2024-11-19 RX ORDER — POTASSIUM CHLORIDE 600 MG/1
20 TABLET, EXTENDED RELEASE ORAL
Refills: 0 | Status: DISCONTINUED | OUTPATIENT
Start: 2024-11-19 | End: 2024-11-19

## 2024-11-19 RX ORDER — SPIRONOLACTONE 25 MG
4 TABLET ORAL
Qty: 30 | Refills: 0
Start: 2024-11-19

## 2024-11-19 RX ADMIN — Medication 75 MILLILITER(S): at 05:47

## 2024-11-19 RX ADMIN — Medication 100 MILLIGRAM(S): at 10:18

## 2024-11-19 RX ADMIN — Medication 75 MILLILITER(S): at 09:05

## 2024-11-19 RX ADMIN — SUCRALFATE 1 GRAM(S): 1 TABLET ORAL at 05:46

## 2024-11-19 RX ADMIN — Medication 1 TABLET(S): at 10:18

## 2024-11-19 RX ADMIN — Medication 325 MILLIGRAM(S): at 10:18

## 2024-11-19 RX ADMIN — CEFEPIME 1000 MILLIGRAM(S): 2 INJECTION, POWDER, FOR SOLUTION INTRAVENOUS at 05:47

## 2024-11-19 RX ADMIN — POTASSIUM CHLORIDE 20 MILLIEQUIVALENT(S): 600 TABLET, EXTENDED RELEASE ORAL at 13:02

## 2024-11-19 RX ADMIN — FUROSEMIDE 40 MILLIGRAM(S): 40 TABLET ORAL at 10:18

## 2024-11-19 RX ADMIN — SUCRALFATE 1 GRAM(S): 1 TABLET ORAL at 13:02

## 2024-11-19 RX ADMIN — PANTOPRAZOLE SODIUM 40 MILLIGRAM(S): 40 TABLET, DELAYED RELEASE ORAL at 06:10

## 2024-11-19 NOTE — DISCHARGE NOTE NURSING/CASE MANAGEMENT/SOCIAL WORK - FINANCIAL ASSISTANCE
[facility] provides services at a reduced cost to those who are determined to be eligible through [facility]’s financial assistance program. Information regarding [facility]’s financial assistance program can be found by going to [email or web address] or by calling [phone].

## 2024-11-19 NOTE — DISCHARGE NOTE NURSING/CASE MANAGEMENT/SOCIAL WORK - NSDCPEWEB_GEN_ALL_CORE
[facility] for Tobacco Control website --- [email or web address] website --- [email or web address]

## 2024-11-19 NOTE — DISCHARGE NOTE PROVIDER - ATTENDING DISCHARGE PHYSICAL EXAMINATION:
VITALS:  T(F): 98.3 (11-19-24 @ 07:37), Max: 99.2 (11-18-24 @ 23:53)  HR: 89 (11-19-24 @ 07:37) (89 - 114)  BP: 98/59 (11-19-24 @ 07:37) (97/52 - 103/57)  RR: 18 (11-19-24 @ 07:37) (18 - 18)  SpO2: 95% (11-19-24 @ 07:37) (93% - 99%)  Wt(kg): --    I&O's Summary      CAPILLARY BLOOD GLUCOSE          PHYSICAL EXAM:  Constitutional: NAD, well-developed  Respiratory: CTAB  Cardiovascular: S1 and S2, RRR  Gastrointestinal: BS+, soft, mod ascites  Extremities: No peripheral edema  Psychiatric: Normal mood, normal affect

## 2024-11-19 NOTE — DISCHARGE NOTE NURSING/CASE MANAGEMENT/SOCIAL WORK - NSDCPEEMAIL_GEN_ALL_CORE
[facility] for Tobacco Control email [email or web address]

## 2024-11-19 NOTE — DISCHARGE NOTE PROVIDER - NSDCMRMEDTOKEN_GEN_ALL_CORE_FT
cefdinir 300 mg oral capsule: 1 cap(s) orally 2 times a day  Centrum Women&#x27;s oral tablet: 1 tab(s) orally once a day  ferrous sulfate 325 mg (65 mg elemental iron) oral tablet: 1 tab(s) orally once a day  Lasix 40 mg oral tablet: 1 tab(s) orally once a day  melatonin 3 mg oral tablet: 1 tab(s) orally once a day (at bedtime) As needed Insomnia  pantoprazole 40 mg oral delayed release tablet: 1 tab(s) orally 2 times a day  potassium chloride 20 mEq oral tablet, extended release: 1 tab(s) orally once a day  spironolactone 25 mg oral tablet: 4 tab(s) orally once a day  sucralfate 1 g/10 mL oral suspension: 10 milliliter(s) orally every 6 hours

## 2024-11-19 NOTE — DISCHARGE NOTE PROVIDER - CARE PROVIDER_API CALL
Micaela Grijalva  Physician Assistant Services  [address] [place] [address]  Phone: [phone]  Fax: ([phone]  Follow Up Time: 1 week    Brian Goodson  Gastroenterology  [address] [place] [address], [address]  [place] [address]  Phone: [phone]  Fax: [phone]  Follow Up Time: 1 week

## 2024-11-19 NOTE — DISCHARGE NOTE PROVIDER - PROVIDER TOKENS
PROVIDER:[TOKEN:[[id number]],FOLLOWUP:[1 week]],PROVIDER:[TOKEN:[[id number]],FOLLOWUP:[1 week]]

## 2024-11-19 NOTE — DISCHARGE NOTE PROVIDER - NSDCCPCAREPLAN_GEN_ALL_CORE_FT
PRINCIPAL DISCHARGE DIAGNOSIS  Diagnosis: Sepsis  Assessment and Plan of Treatment: Medications:  Complete the prescribed antibiotics to resolve sepsis.  Take diuretics (like spironolactone, furosemide) as directed to manage fluid retention. Be alert for signs of dehydration or electrolyte imbalance.  Diet:  Follow a low-sodium diet to help manage fluid buildup. Avoid high-salt foods.  If instructed, adhere to a fluid restriction plan.  Discuss protein intake with your healthcare provider, as needs may vary.  Monitoring and Follow-Up:  Weigh yourself daily and keep a log. Report any significant weight changes to your doctor.  Measure abdominal girth if instructed, to monitor ascites.  Attend follow-up appointments for blood tests to monitor liver function and electrolytes.  Symptoms to Watch:  Be alert for worsening ascites, like increased abdominal swelling or breathing difficulties.  Watch for signs of hepatic encephalopathy (confusion, drowsiness).  Immediately report signs of infection such as fever, chills, or increased abdominal pain.  Lifestyle Adjustments:  Abstain from alcohol to prevent further liver damage.  Avoid NSAIDs (like ibuprofen) to prevent kidney issues and fluid retention.  Keep vaccinations current, especially for influenza and pneumococcus.  When to Seek Help:  Seek immediate medical care for severe abdominal pain, confusion, jaundice, severe swelling, or breathing trouble.  Report any signs of bleeding, such as vomiting blood or black stools.  Follow-Up:  Schedule and attend follow-up visits with your GI and primary care doctor.  Consider consulting a dietitian for a personalized dietary plan.      SECONDARY DISCHARGE DIAGNOSES  Diagnosis: Cirrhosis  Assessment and Plan of Treatment:

## 2024-11-19 NOTE — DISCHARGE NOTE NURSING/CASE MANAGEMENT/SOCIAL WORK - NSDCVIVACCINE_GEN_ALL_CORE_FT
influenza, injectable, quadrivalent, preservative free; 13-Oct-2023 11:44; Kristina Renee (RN); [employer]; [id number] (Exp. Date: 13-Jun-2024); IntraMuscular; Deltoid Left.; 0.5 milliLiter(s); VIS (VIS Published: 06-Aug-2021, VIS Presented: 13-Oct-2023);   Influenza, split virus, trivalent, preservative free; 08-Oct-2024 13:53; Ector Canales (RN); [employer]; [id number] (Exp. Date: 30-Jun-2025); IntraMuscular; Deltoid Left.; 0.5 milliLiter(s); VIS (VIS Published: 06-Aug-2021, VIS Presented: 08-Oct-2024);

## 2024-11-19 NOTE — DISCHARGE NOTE PROVIDER - ATTENDING DISCHARGE PHYSICAL EXAM:
Attending Discharge Physical Examination: How Severe Are Your Spot(S)?: mild What Type Of Note Output Would You Prefer (Optional)?: Standard Output What Is The Reason For Today's Visit?: Full Body Skin Examination What Is The Reason For Today's Visit? (Being Monitored For X): concerning skin lesions on an annual basis

## 2024-11-19 NOTE — DISCHARGE NOTE PROVIDER - HOSPITAL COURSE
59-year-old female with past medical history of alcoholic cirrhosis complicated by esophageal varices status post recent banding ascites and portal hypertension presenting with increasing abdominal girth over the last 1 week associated with chills and fever.  Patient states that she has noticed that her stomach is started swelling after recent EGD on November 5.  She has been stooling normally with no diarrhea or constipation.  Tolerating her diet well.  She was offered a transplant workup by her outpatient GI doctor thinking which she refused.  She quit drinking in October of this year.  She does not follow-up for outpatient paracentesis on a regular basis.  She has only had 1 paracentesis prior to this admission. In ED patient was febrile tachycardic hypotensive.  Received ceftriaxone Flagyl 2 L LR bolus and 0.5 IV Dilaudid albumin Tap performed by ER doctor small sample collected for Testing by lab.  White blood cell count 28  Patient meets sepsis criteria febrile to 102.8 Tachycardic to 142 Lactate of 3.9 Initial concern for septic shock with hypotension however maps remained greater than 65 CCU consulted and recommended LR bolus as well as albumin which were given Tap performed shows minimal white blood cells, less likely sbp, pain control with small dose dilaudid q8 PRN hold id MAP <65, didn't tolerate morphine. IR consult for large volume paracentesis 11/18, 1600 milliLiters removed.   CXR concerning for pneumonia, Received IV Cefepime. Complete course of abx x 5 days. Follow up with PCP and GI in 1-2 weeks . Advised to return to ED with any worsening symptoms chest pain shortness of breath fevers chills nausea vomiting diarrhea.

## 2024-11-19 NOTE — DISCHARGE NOTE PROVIDER - DETAILS OF MALNUTRITION DIAGNOSIS/DIAGNOSES
This patient has been assessed with a concern for Malnutrition and was treated during this hospitalization for the following Nutrition diagnosis/diagnoses:     -  11/17/2024: Moderate protein-calorie malnutrition

## 2024-11-19 NOTE — DISCHARGE NOTE NURSING/CASE MANAGEMENT/SOCIAL WORK - PATIENT PORTAL LINK FT
You can access the FollowMyHealth Patient Portal offered by [facility] by registering at the following website: [email or web address]. By joining [facility]’s FollowMyHealth portal, you will also be able to view your health information using other applications (apps) compatible with our system.

## 2024-11-21 LAB
CULTURE RESULTS: SIGNIFICANT CHANGE UP
SPECIMEN SOURCE: SIGNIFICANT CHANGE UP

## 2024-11-25 DIAGNOSIS — F10.10 ALCOHOL ABUSE, UNCOMPLICATED: ICD-10-CM

## 2024-11-25 DIAGNOSIS — E83.42 HYPOMAGNESEMIA: ICD-10-CM

## 2024-11-25 DIAGNOSIS — J18.9 PNEUMONIA, UNSPECIFIED ORGANISM: ICD-10-CM

## 2024-11-25 DIAGNOSIS — Z85.828 PERSONAL HISTORY OF OTHER MALIGNANT NEOPLASM OF SKIN: ICD-10-CM

## 2024-11-25 DIAGNOSIS — F17.210 NICOTINE DEPENDENCE, CIGARETTES, UNCOMPLICATED: ICD-10-CM

## 2024-11-25 DIAGNOSIS — Z88.1 ALLERGY STATUS TO OTHER ANTIBIOTIC AGENTS: ICD-10-CM

## 2024-11-25 DIAGNOSIS — K76.6 PORTAL HYPERTENSION: ICD-10-CM

## 2024-11-25 DIAGNOSIS — I95.89 OTHER HYPOTENSION: ICD-10-CM

## 2024-11-25 DIAGNOSIS — K70.31 ALCOHOLIC CIRRHOSIS OF LIVER WITH ASCITES: ICD-10-CM

## 2024-11-25 DIAGNOSIS — F32.A DEPRESSION, UNSPECIFIED: ICD-10-CM

## 2024-11-25 DIAGNOSIS — E87.6 HYPOKALEMIA: ICD-10-CM

## 2024-11-25 DIAGNOSIS — I85.00 ESOPHAGEAL VARICES WITHOUT BLEEDING: ICD-10-CM

## 2024-11-25 DIAGNOSIS — Z86.16 PERSONAL HISTORY OF COVID-19: ICD-10-CM

## 2024-11-25 DIAGNOSIS — A41.9 SEPSIS, UNSPECIFIED ORGANISM: ICD-10-CM

## 2024-11-25 DIAGNOSIS — E44.0 MODERATE PROTEIN-CALORIE MALNUTRITION: ICD-10-CM

## 2024-11-25 DIAGNOSIS — R65.20 SEVERE SEPSIS WITHOUT SEPTIC SHOCK: ICD-10-CM

## 2024-11-25 DIAGNOSIS — E87.20 ACIDOSIS, UNSPECIFIED: ICD-10-CM

## 2024-11-29 NOTE — ED ADULT NURSE NOTE - NSFALLUNIVINTERV_ED_ALL_ED
1 pair Bed/Stretcher in lowest position, wheels locked, appropriate side rails in place/Call bell, personal items and telephone in reach/Instruct patient to call for assistance before getting out of bed/chair/stretcher/Non-slip footwear applied when patient is off stretcher/[place] to call system/Physically safe environment - no spills, clutter or unnecessary equipment/Purposeful proactive rounding/Room/bathroom lighting operational, light cord in reach

## 2025-01-14 ENCOUNTER — NON-APPOINTMENT (OUTPATIENT)
Age: 60
End: 2025-01-14

## 2025-01-27 ENCOUNTER — INPATIENT (INPATIENT)
Facility: HOSPITAL | Age: 60
LOS: 10 days | Discharge: NURSING, REHAB OR LONG-TERM CARE | DRG: 872 | End: 2025-02-07
Attending: STUDENT IN AN ORGANIZED HEALTH CARE EDUCATION/TRAINING PROGRAM | Admitting: STUDENT IN AN ORGANIZED HEALTH CARE EDUCATION/TRAINING PROGRAM
Payer: SELF-PAY

## 2025-01-27 ENCOUNTER — RESULT REVIEW (OUTPATIENT)
Age: 60
End: 2025-01-27

## 2025-01-27 VITALS
OXYGEN SATURATION: 98 % | RESPIRATION RATE: 32 BRPM | DIASTOLIC BLOOD PRESSURE: 45 MMHG | WEIGHT: 115.08 LBS | HEART RATE: 137 BPM | SYSTOLIC BLOOD PRESSURE: 58 MMHG

## 2025-01-27 DIAGNOSIS — Z71.89 OTHER SPECIFIED COUNSELING: ICD-10-CM

## 2025-01-27 DIAGNOSIS — R57.9 SHOCK, UNSPECIFIED: ICD-10-CM

## 2025-01-27 DIAGNOSIS — Z98.890 OTHER SPECIFIED POSTPROCEDURAL STATES: Chronic | ICD-10-CM

## 2025-01-27 DIAGNOSIS — Z51.5 ENCOUNTER FOR PALLIATIVE CARE: ICD-10-CM

## 2025-01-27 DIAGNOSIS — N17.9 ACUTE KIDNEY FAILURE, UNSPECIFIED: ICD-10-CM

## 2025-01-27 DIAGNOSIS — K70.30 ALCOHOLIC CIRRHOSIS OF LIVER WITHOUT ASCITES: ICD-10-CM

## 2025-01-27 DIAGNOSIS — A49.8 OTHER BACTERIAL INFECTIONS OF UNSPECIFIED SITE: ICD-10-CM

## 2025-01-27 DIAGNOSIS — A41.9 SEPSIS, UNSPECIFIED ORGANISM: ICD-10-CM

## 2025-01-27 DIAGNOSIS — Z98.891 HISTORY OF UTERINE SCAR FROM PREVIOUS SURGERY: Chronic | ICD-10-CM

## 2025-01-27 LAB
ACETONE SERPL-MCNC: NEGATIVE — SIGNIFICANT CHANGE UP
ALBUMIN SERPL ELPH-MCNC: 2 G/DL — LOW (ref 3.3–5.2)
ALBUMIN SERPL ELPH-MCNC: 2 G/DL — LOW (ref 3.3–5.2)
ALBUMIN SERPL ELPH-MCNC: 2.1 G/DL — LOW (ref 3.3–5.2)
ALBUMIN SERPL ELPH-MCNC: 2.5 G/DL — LOW (ref 3.3–5.2)
ALP SERPL-CCNC: 331 U/L — HIGH (ref 40–120)
ALP SERPL-CCNC: 489 U/L — HIGH (ref 40–120)
ALP SERPL-CCNC: 596 U/L — HIGH (ref 40–120)
ALP SERPL-CCNC: 854 U/L — HIGH (ref 40–120)
ALT FLD-CCNC: 37 U/L — HIGH
ALT FLD-CCNC: 40 U/L — HIGH
ALT FLD-CCNC: 42 U/L — HIGH
ALT FLD-CCNC: 45 U/L — HIGH
AMMONIA BLD-MCNC: 56 UMOL/L — HIGH (ref 11–55)
ANION GAP SERPL CALC-SCNC: 25 MMOL/L — HIGH (ref 5–17)
ANION GAP SERPL CALC-SCNC: 26 MMOL/L — HIGH (ref 5–17)
ANION GAP SERPL CALC-SCNC: 27 MMOL/L — HIGH (ref 5–17)
ANION GAP SERPL CALC-SCNC: 27 MMOL/L — HIGH (ref 5–17)
ANION GAP SERPL CALC-SCNC: 28 MMOL/L — HIGH (ref 5–17)
ANISOCYTOSIS BLD QL: SLIGHT — SIGNIFICANT CHANGE UP
APTT BLD: 38.6 SEC — HIGH (ref 24.5–35.6)
APTT BLD: 40.3 SEC — HIGH (ref 24.5–35.6)
AST SERPL-CCNC: 150 U/L — HIGH
AST SERPL-CCNC: 163 U/L — HIGH
AST SERPL-CCNC: 180 U/L — HIGH
AST SERPL-CCNC: 186 U/L — HIGH
BASOPHILS # BLD AUTO: 0 K/UL — SIGNIFICANT CHANGE UP (ref 0–0.2)
BASOPHILS # BLD AUTO: 0 K/UL — SIGNIFICANT CHANGE UP (ref 0–0.2)
BASOPHILS # BLD AUTO: 0.04 K/UL — SIGNIFICANT CHANGE UP (ref 0–0.2)
BASOPHILS NFR BLD AUTO: 0 % — SIGNIFICANT CHANGE UP (ref 0–2)
BASOPHILS NFR BLD AUTO: 0 % — SIGNIFICANT CHANGE UP (ref 0–2)
BASOPHILS NFR BLD AUTO: 0.9 % — SIGNIFICANT CHANGE UP (ref 0–2)
BILIRUB DIRECT SERPL-MCNC: 3 MG/DL — HIGH (ref 0–0.3)
BILIRUB INDIRECT FLD-MCNC: 0.5 MG/DL — SIGNIFICANT CHANGE UP (ref 0.2–1)
BILIRUB SERPL-MCNC: 3.3 MG/DL — HIGH (ref 0.4–2)
BILIRUB SERPL-MCNC: 3.5 MG/DL — HIGH (ref 0.4–2)
BILIRUB SERPL-MCNC: 3.5 MG/DL — HIGH (ref 0.4–2)
BILIRUB SERPL-MCNC: 4.1 MG/DL — HIGH (ref 0.4–2)
BILIRUB SERPL-MCNC: 4.4 MG/DL — HIGH (ref 0.4–2)
BLD GP AB SCN SERPL QL: SIGNIFICANT CHANGE UP
BUN SERPL-MCNC: 18 MG/DL — SIGNIFICANT CHANGE UP (ref 8–20)
BUN SERPL-MCNC: 18.6 MG/DL — SIGNIFICANT CHANGE UP (ref 8–20)
BUN SERPL-MCNC: 18.7 MG/DL — SIGNIFICANT CHANGE UP (ref 8–20)
BUN SERPL-MCNC: 18.9 MG/DL — SIGNIFICANT CHANGE UP (ref 8–20)
BUN SERPL-MCNC: 19.9 MG/DL — SIGNIFICANT CHANGE UP (ref 8–20)
BURR CELLS BLD QL SMEAR: PRESENT — SIGNIFICANT CHANGE UP
C DIFF BY PCR RESULT: DETECTED
CALCIUM SERPL-MCNC: 7.5 MG/DL — LOW (ref 8.4–10.5)
CALCIUM SERPL-MCNC: 7.6 MG/DL — LOW (ref 8.4–10.5)
CALCIUM SERPL-MCNC: 7.7 MG/DL — LOW (ref 8.4–10.5)
CALCIUM SERPL-MCNC: 7.8 MG/DL — LOW (ref 8.4–10.5)
CALCIUM SERPL-MCNC: 7.8 MG/DL — LOW (ref 8.4–10.5)
CHLORIDE SERPL-SCNC: 91 MMOL/L — LOW (ref 96–108)
CHLORIDE SERPL-SCNC: 93 MMOL/L — LOW (ref 96–108)
CHLORIDE SERPL-SCNC: 94 MMOL/L — LOW (ref 96–108)
CHLORIDE SERPL-SCNC: 94 MMOL/L — LOW (ref 96–108)
CHLORIDE SERPL-SCNC: 96 MMOL/L — SIGNIFICANT CHANGE UP (ref 96–108)
CO2 SERPL-SCNC: 10 MMOL/L — CRITICAL LOW (ref 22–29)
CO2 SERPL-SCNC: 10 MMOL/L — CRITICAL LOW (ref 22–29)
CO2 SERPL-SCNC: 11 MMOL/L — LOW (ref 22–29)
CO2 SERPL-SCNC: 8 MMOL/L — CRITICAL LOW (ref 22–29)
CO2 SERPL-SCNC: 9 MMOL/L — CRITICAL LOW (ref 22–29)
CREAT SERPL-MCNC: 0.71 MG/DL — SIGNIFICANT CHANGE UP (ref 0.5–1.3)
CREAT SERPL-MCNC: 0.9 MG/DL — SIGNIFICANT CHANGE UP (ref 0.5–1.3)
CREAT SERPL-MCNC: 1.07 MG/DL — SIGNIFICANT CHANGE UP (ref 0.5–1.3)
CREAT SERPL-MCNC: 1.16 MG/DL — SIGNIFICANT CHANGE UP (ref 0.5–1.3)
CREAT SERPL-MCNC: 1.18 MG/DL — SIGNIFICANT CHANGE UP (ref 0.5–1.3)
E COLI DNA BLD POS QL NAA+NON-PROBE: SIGNIFICANT CHANGE UP
EGFR: 53 ML/MIN/1.73M2 — LOW
EGFR: 54 ML/MIN/1.73M2 — LOW
EGFR: 59 ML/MIN/1.73M2 — LOW
EGFR: 73 ML/MIN/1.73M2 — SIGNIFICANT CHANGE UP
EGFR: 97 ML/MIN/1.73M2 — SIGNIFICANT CHANGE UP
ELLIPTOCYTES BLD QL SMEAR: SLIGHT — SIGNIFICANT CHANGE UP
EOSINOPHIL # BLD AUTO: 0 K/UL — SIGNIFICANT CHANGE UP (ref 0–0.5)
EOSINOPHIL NFR BLD AUTO: 0 % — SIGNIFICANT CHANGE UP (ref 0–6)
GAS PNL BLDA: SIGNIFICANT CHANGE UP
GAS PNL BLDV: SIGNIFICANT CHANGE UP
GAS PNL BLDV: SIGNIFICANT CHANGE UP
GI PCR PANEL: ABNORMAL
GIANT PLATELETS BLD QL SMEAR: PRESENT — SIGNIFICANT CHANGE UP
GLUCOSE SERPL-MCNC: 106 MG/DL — HIGH (ref 70–99)
GLUCOSE SERPL-MCNC: 122 MG/DL — HIGH (ref 70–99)
GLUCOSE SERPL-MCNC: 48 MG/DL — CRITICAL LOW (ref 70–99)
GLUCOSE SERPL-MCNC: 93 MG/DL — SIGNIFICANT CHANGE UP (ref 70–99)
GLUCOSE SERPL-MCNC: 94 MG/DL — SIGNIFICANT CHANGE UP (ref 70–99)
GRAM STN FLD: ABNORMAL
GRAM STN FLD: ABNORMAL
HCT VFR BLD CALC: 24.6 % — LOW (ref 34.5–45)
HCT VFR BLD CALC: 26 % — LOW (ref 34.5–45)
HCT VFR BLD CALC: 26.1 % — LOW (ref 34.5–45)
HCT VFR BLD CALC: 32.4 % — LOW (ref 34.5–45)
HGB BLD-MCNC: 10.5 G/DL — LOW (ref 11.5–15.5)
HGB BLD-MCNC: 8.1 G/DL — LOW (ref 11.5–15.5)
HGB BLD-MCNC: 8.3 G/DL — LOW (ref 11.5–15.5)
HGB BLD-MCNC: 8.7 G/DL — LOW (ref 11.5–15.5)
HYPOCHROMIA BLD QL: SIGNIFICANT CHANGE UP
HYPOCHROMIA BLD QL: SIGNIFICANT CHANGE UP
HYPOCHROMIA BLD QL: SLIGHT — SIGNIFICANT CHANGE UP
INR BLD: 1.78 RATIO — HIGH (ref 0.85–1.16)
INR BLD: 1.97 RATIO — HIGH (ref 0.85–1.16)
LACTATE SERPL-SCNC: 11.3 MMOL/L — CRITICAL HIGH (ref 0.5–2)
LACTATE SERPL-SCNC: 11.8 MMOL/L — CRITICAL HIGH (ref 0.5–2)
LACTATE SERPL-SCNC: 16.2 MMOL/L — CRITICAL HIGH (ref 0.5–2)
LIDOCAIN IGE QN: 168 U/L — HIGH (ref 22–51)
LIDOCAIN IGE QN: 256 U/L — HIGH (ref 22–51)
LYMPHOCYTES # BLD AUTO: 0.54 K/UL — LOW (ref 1–3.3)
LYMPHOCYTES # BLD AUTO: 0.54 K/UL — LOW (ref 1–3.3)
LYMPHOCYTES # BLD AUTO: 0.75 K/UL — LOW (ref 1–3.3)
LYMPHOCYTES # BLD AUTO: 1.8 % — LOW (ref 13–44)
LYMPHOCYTES # BLD AUTO: 13.1 % — SIGNIFICANT CHANGE UP (ref 13–44)
LYMPHOCYTES # BLD AUTO: 2.6 % — LOW (ref 13–44)
MACROCYTES BLD QL: SIGNIFICANT CHANGE UP
MACROCYTES BLD QL: SLIGHT — SIGNIFICANT CHANGE UP
MAGNESIUM SERPL-MCNC: 1.6 MG/DL — LOW (ref 1.8–2.6)
MAGNESIUM SERPL-MCNC: 1.7 MG/DL — SIGNIFICANT CHANGE UP (ref 1.6–2.6)
MANUAL SMEAR VERIFICATION: SIGNIFICANT CHANGE UP
MCHC RBC-ENTMCNC: 25.9 PG — LOW (ref 27–34)
MCHC RBC-ENTMCNC: 26.3 PG — LOW (ref 27–34)
MCHC RBC-ENTMCNC: 26.6 PG — LOW (ref 27–34)
MCHC RBC-ENTMCNC: 26.7 PG — LOW (ref 27–34)
MCHC RBC-ENTMCNC: 31.8 G/DL — LOW (ref 32–36)
MCHC RBC-ENTMCNC: 32.4 G/DL — SIGNIFICANT CHANGE UP (ref 32–36)
MCHC RBC-ENTMCNC: 32.9 G/DL — SIGNIFICANT CHANGE UP (ref 32–36)
MCHC RBC-ENTMCNC: 33.5 G/DL — SIGNIFICANT CHANGE UP (ref 32–36)
MCV RBC AUTO: 79.8 FL — LOW (ref 80–100)
MCV RBC AUTO: 79.8 FL — LOW (ref 80–100)
MCV RBC AUTO: 80.7 FL — SIGNIFICANT CHANGE UP (ref 80–100)
MCV RBC AUTO: 82.6 FL — SIGNIFICANT CHANGE UP (ref 80–100)
METAMYELOCYTES # FLD: 0.9 % — HIGH (ref 0–0)
METAMYELOCYTES # FLD: 2.7 % — HIGH (ref 0–0)
METAMYELOCYTES NFR BLD: 0.9 % — HIGH (ref 0–0)
METAMYELOCYTES NFR BLD: 2.7 % — HIGH (ref 0–0)
METHOD TYPE: SIGNIFICANT CHANGE UP
MICROCYTES BLD QL: SLIGHT — SIGNIFICANT CHANGE UP
MONOCYTES # BLD AUTO: 0.69 K/UL — SIGNIFICANT CHANGE UP (ref 0–0.9)
MONOCYTES # BLD AUTO: 2.17 K/UL — HIGH (ref 0–0.9)
MONOCYTES # BLD AUTO: 2.2 K/UL — HIGH (ref 0–0.9)
MONOCYTES NFR BLD AUTO: 10.4 % — SIGNIFICANT CHANGE UP (ref 2–14)
MONOCYTES NFR BLD AUTO: 16.7 % — HIGH (ref 2–14)
MONOCYTES NFR BLD AUTO: 5.3 % — SIGNIFICANT CHANGE UP (ref 2–14)
MRSA PCR RESULT.: SIGNIFICANT CHANGE UP
MYELOCYTES NFR BLD: 0.9 % — HIGH (ref 0–0)
MYELOCYTES NFR BLD: 1.7 % — HIGH (ref 0–0)
NEUTROPHILS # BLD AUTO: 17.65 K/UL — HIGH (ref 1.8–7.4)
NEUTROPHILS # BLD AUTO: 2.83 K/UL — SIGNIFICANT CHANGE UP (ref 1.8–7.4)
NEUTROPHILS # BLD AUTO: 36.75 K/UL — HIGH (ref 1.8–7.4)
NEUTROPHILS NFR BLD AUTO: 68.4 % — SIGNIFICANT CHANGE UP (ref 43–77)
NEUTROPHILS NFR BLD AUTO: 70.5 % — SIGNIFICANT CHANGE UP (ref 43–77)
NEUTROPHILS NFR BLD AUTO: 83.5 % — HIGH (ref 43–77)
NEUTS BAND # BLD: 0.9 % — SIGNIFICANT CHANGE UP (ref 0–8)
NEUTS BAND # BLD: 17.9 % — HIGH (ref 0–8)
NEUTS BAND NFR BLD: 0.9 % — SIGNIFICANT CHANGE UP (ref 0–8)
NEUTS BAND NFR BLD: 17.9 % — HIGH (ref 0–8)
NOROVIRUS GI+II RNA STL QL NAA+NON-PROBE: ABNORMAL
NRBC # BLD: 3 /100 WBCS — HIGH (ref 0–0)
NRBC BLD-RTO: 3 /100 WBCS — HIGH (ref 0–0)
NT-PROBNP SERPL-SCNC: 427 PG/ML — HIGH (ref 0–300)
OVALOCYTES BLD QL SMEAR: SLIGHT — SIGNIFICANT CHANGE UP
PHOSPHATE SERPL-MCNC: 4.2 MG/DL — SIGNIFICANT CHANGE UP (ref 2.4–4.7)
PHOSPHATE SERPL-MCNC: 4.3 MG/DL — SIGNIFICANT CHANGE UP (ref 2.4–4.7)
PLAT MORPH BLD: NORMAL — SIGNIFICANT CHANGE UP
PLATELET # BLD AUTO: 49 K/UL — LOW (ref 150–400)
PLATELET # BLD AUTO: 52 K/UL — LOW (ref 150–400)
PLATELET # BLD AUTO: 60 K/UL — LOW (ref 150–400)
PLATELET # BLD AUTO: 61 K/UL — LOW (ref 150–400)
POIKILOCYTOSIS BLD QL AUTO: SIGNIFICANT CHANGE UP
POIKILOCYTOSIS BLD QL AUTO: SLIGHT — SIGNIFICANT CHANGE UP
POIKILOCYTOSIS BLD QL AUTO: SLIGHT — SIGNIFICANT CHANGE UP
POLYCHROMASIA BLD QL SMEAR: SIGNIFICANT CHANGE UP
POLYCHROMASIA BLD QL SMEAR: SLIGHT — SIGNIFICANT CHANGE UP
POLYCHROMASIA BLD QL SMEAR: SLIGHT — SIGNIFICANT CHANGE UP
POTASSIUM SERPL-MCNC: 2.3 MMOL/L — CRITICAL LOW (ref 3.5–5.3)
POTASSIUM SERPL-MCNC: 2.4 MMOL/L — CRITICAL LOW (ref 3.5–5.3)
POTASSIUM SERPL-MCNC: 3.3 MMOL/L — LOW (ref 3.5–5.3)
POTASSIUM SERPL-MCNC: 3.5 MMOL/L — SIGNIFICANT CHANGE UP (ref 3.5–5.3)
POTASSIUM SERPL-MCNC: 4.5 MMOL/L — SIGNIFICANT CHANGE UP (ref 3.5–5.3)
POTASSIUM SERPL-SCNC: 2.3 MMOL/L — CRITICAL LOW (ref 3.5–5.3)
POTASSIUM SERPL-SCNC: 2.4 MMOL/L — CRITICAL LOW (ref 3.5–5.3)
POTASSIUM SERPL-SCNC: 3.3 MMOL/L — LOW (ref 3.5–5.3)
POTASSIUM SERPL-SCNC: 3.5 MMOL/L — SIGNIFICANT CHANGE UP (ref 3.5–5.3)
POTASSIUM SERPL-SCNC: 4.5 MMOL/L — SIGNIFICANT CHANGE UP (ref 3.5–5.3)
PROT SERPL-MCNC: 5.6 G/DL — LOW (ref 6.6–8.7)
PROT SERPL-MCNC: 5.6 G/DL — LOW (ref 6.6–8.7)
PROT SERPL-MCNC: 6.1 G/DL — LOW (ref 6.6–8.7)
PROT SERPL-MCNC: 7 G/DL — SIGNIFICANT CHANGE UP (ref 6.6–8.7)
PROTHROM AB SERPL-ACNC: 20.5 SEC — HIGH (ref 9.9–13.4)
PROTHROM AB SERPL-ACNC: 22.7 SEC — HIGH (ref 9.9–13.4)
RAPID RVP RESULT: SIGNIFICANT CHANGE UP
RBC # BLD: 3.05 M/UL — LOW (ref 3.8–5.2)
RBC # BLD: 3.16 M/UL — LOW (ref 3.8–5.2)
RBC # BLD: 3.26 M/UL — LOW (ref 3.8–5.2)
RBC # BLD: 4.06 M/UL — SIGNIFICANT CHANGE UP (ref 3.8–5.2)
RBC # FLD: 24.5 % — HIGH (ref 10.3–14.5)
RBC # FLD: 24.5 % — HIGH (ref 10.3–14.5)
RBC # FLD: 24.6 % — HIGH (ref 10.3–14.5)
RBC # FLD: 25.1 % — HIGH (ref 10.3–14.5)
RBC BLD AUTO: ABNORMAL
S AUREUS DNA NOSE QL NAA+PROBE: SIGNIFICANT CHANGE UP
SARS-COV-2 RNA SPEC QL NAA+PROBE: SIGNIFICANT CHANGE UP
SCHISTOCYTES BLD QL AUTO: SLIGHT — SIGNIFICANT CHANGE UP
SMUDGE CELLS # BLD: PRESENT — SIGNIFICANT CHANGE UP
SMUDGE CELLS # BLD: PRESENT — SIGNIFICANT CHANGE UP
SODIUM SERPL-SCNC: 127 MMOL/L — LOW (ref 135–145)
SODIUM SERPL-SCNC: 127 MMOL/L — LOW (ref 135–145)
SODIUM SERPL-SCNC: 131 MMOL/L — LOW (ref 135–145)
SODIUM SERPL-SCNC: 131 MMOL/L — LOW (ref 135–145)
SODIUM SERPL-SCNC: 132 MMOL/L — LOW (ref 135–145)
SPECIMEN SOURCE: SIGNIFICANT CHANGE UP
SPHEROCYTES BLD QL SMEAR: SLIGHT — SIGNIFICANT CHANGE UP
TARGETS BLD QL SMEAR: SLIGHT — SIGNIFICANT CHANGE UP
TROPONIN T, HIGH SENSITIVITY RESULT: 17 NG/L — SIGNIFICANT CHANGE UP (ref 0–51)
VARIANT LYMPHS # BLD: 0.9 % — SIGNIFICANT CHANGE UP (ref 0–6)
VARIANT LYMPHS # BLD: 0.9 % — SIGNIFICANT CHANGE UP (ref 0–6)
VARIANT LYMPHS NFR BLD MANUAL: 0.9 % — SIGNIFICANT CHANGE UP (ref 0–6)
VARIANT LYMPHS NFR BLD MANUAL: 0.9 % — SIGNIFICANT CHANGE UP (ref 0–6)
WBC # BLD: 20.91 K/UL — HIGH (ref 3.8–10.5)
WBC # BLD: 4.14 K/UL — SIGNIFICANT CHANGE UP (ref 3.8–10.5)
WBC # BLD: 41.57 K/UL — CRITICAL HIGH (ref 3.8–10.5)
WBC # BLD: 62.07 K/UL — CRITICAL HIGH (ref 3.8–10.5)
WBC # FLD AUTO: 20.91 K/UL — HIGH (ref 3.8–10.5)
WBC # FLD AUTO: 4.14 K/UL — SIGNIFICANT CHANGE UP (ref 3.8–10.5)
WBC # FLD AUTO: 41.57 K/UL — CRITICAL HIGH (ref 3.8–10.5)
WBC # FLD AUTO: 62.07 K/UL — CRITICAL HIGH (ref 3.8–10.5)

## 2025-01-27 PROCEDURE — 31500 INSERT EMERGENCY AIRWAY: CPT

## 2025-01-27 PROCEDURE — 71275 CT ANGIOGRAPHY CHEST: CPT | Mod: 26

## 2025-01-27 PROCEDURE — 99497 ADVNCD CARE PLAN 30 MIN: CPT | Mod: 25

## 2025-01-27 PROCEDURE — 36620 INSERTION CATHETER ARTERY: CPT

## 2025-01-27 PROCEDURE — 36556 INSERT NON-TUNNEL CV CATH: CPT | Mod: GC

## 2025-01-27 PROCEDURE — 93010 ELECTROCARDIOGRAM REPORT: CPT

## 2025-01-27 PROCEDURE — 71045 X-RAY EXAM CHEST 1 VIEW: CPT | Mod: 26

## 2025-01-27 PROCEDURE — 99291 CRITICAL CARE FIRST HOUR: CPT

## 2025-01-27 PROCEDURE — 99291 CRITICAL CARE FIRST HOUR: CPT | Mod: GC,25

## 2025-01-27 PROCEDURE — 99223 1ST HOSP IP/OBS HIGH 75: CPT

## 2025-01-27 PROCEDURE — 76705 ECHO EXAM OF ABDOMEN: CPT | Mod: 26

## 2025-01-27 PROCEDURE — 71045 X-RAY EXAM CHEST 1 VIEW: CPT | Mod: 26,77

## 2025-01-27 PROCEDURE — 74177 CT ABD & PELVIS W/CONTRAST: CPT | Mod: 26

## 2025-01-27 PROCEDURE — 74019 RADEX ABDOMEN 2 VIEWS: CPT | Mod: 26

## 2025-01-27 RX ORDER — PROPOFOL 10 MG/ML
20 INJECTION, EMULSION INTRAVENOUS ONCE
Refills: 0 | Status: COMPLETED | OUTPATIENT
Start: 2025-01-27 | End: 2025-01-27

## 2025-01-27 RX ORDER — BUMETANIDE 2 MG/1
1 TABLET ORAL ONCE
Refills: 0 | Status: COMPLETED | OUTPATIENT
Start: 2025-01-27 | End: 2025-01-27

## 2025-01-27 RX ORDER — DM/PSEUDOEPHED/ACETAMINOPHEN 10-30-250
25 CAPSULE ORAL ONCE
Refills: 0 | Status: COMPLETED | OUTPATIENT
Start: 2025-01-27 | End: 2025-01-27

## 2025-01-27 RX ORDER — PANTOPRAZOLE 20 MG/1
40 TABLET, DELAYED RELEASE ORAL
Refills: 0 | Status: DISCONTINUED | OUTPATIENT
Start: 2025-01-27 | End: 2025-02-02

## 2025-01-27 RX ORDER — FOLIC ACID 1 MG
1 TABLET ORAL DAILY
Refills: 0 | Status: DISCONTINUED | OUTPATIENT
Start: 2025-01-27 | End: 2025-01-28

## 2025-01-27 RX ORDER — POTASSIUM CHLORIDE 750 MG/1
10 TABLET, EXTENDED RELEASE ORAL
Refills: 0 | Status: COMPLETED | OUTPATIENT
Start: 2025-01-27 | End: 2025-01-27

## 2025-01-27 RX ORDER — ONDANSETRON 4 MG/1
4 TABLET, ORALLY DISINTEGRATING ORAL ONCE
Refills: 0 | Status: COMPLETED | OUTPATIENT
Start: 2025-01-27 | End: 2025-01-27

## 2025-01-27 RX ORDER — MEROPENEM 500 MG/20ML
1000 INJECTION INTRAVENOUS EVERY 8 HOURS
Refills: 0 | Status: DISCONTINUED | OUTPATIENT
Start: 2025-01-27 | End: 2025-01-28

## 2025-01-27 RX ORDER — POTASSIUM CHLORIDE 750 MG/1
40 TABLET, EXTENDED RELEASE ORAL ONCE
Refills: 0 | Status: COMPLETED | OUTPATIENT
Start: 2025-01-27 | End: 2025-01-27

## 2025-01-27 RX ORDER — VANCOMYCIN HYDROCHLORIDE 50 MG/ML
KIT ORAL
Refills: 0 | Status: DISCONTINUED | OUTPATIENT
Start: 2025-01-27 | End: 2025-01-27

## 2025-01-27 RX ORDER — ROCURONIUM BROMIDE 10 MG/ML
100 INJECTION INTRAVENOUS ONCE
Refills: 0 | Status: COMPLETED | OUTPATIENT
Start: 2025-01-27 | End: 2025-01-27

## 2025-01-27 RX ORDER — ACETAMINOPHEN 160 MG/5ML
650 SUSPENSION ORAL ONCE
Refills: 0 | Status: COMPLETED | OUTPATIENT
Start: 2025-01-27 | End: 2025-01-27

## 2025-01-27 RX ORDER — MECOBAL/LEVOMEFOLAT CA/B6 PHOS 2-3-35 MG
1 TABLET ORAL DAILY
Refills: 0 | Status: DISCONTINUED | OUTPATIENT
Start: 2025-01-27 | End: 2025-01-28

## 2025-01-27 RX ORDER — CEFTRIAXONE 250 MG/1
2000 INJECTION, POWDER, FOR SOLUTION INTRAMUSCULAR; INTRAVENOUS ONCE
Refills: 0 | Status: DISCONTINUED | OUTPATIENT
Start: 2025-01-27 | End: 2025-01-27

## 2025-01-27 RX ORDER — SODIUM CHLORIDE 9 G/ML
2000 INJECTION, SOLUTION INTRAVENOUS ONCE
Refills: 0 | Status: COMPLETED | OUTPATIENT
Start: 2025-01-27 | End: 2025-01-27

## 2025-01-27 RX ORDER — FENTANYL CITRATE 50 UG/ML
100 INJECTION INTRAMUSCULAR; INTRAVENOUS ONCE
Refills: 0 | Status: DISCONTINUED | OUTPATIENT
Start: 2025-01-27 | End: 2025-01-27

## 2025-01-27 RX ORDER — NOREPINEPHRINE BITARTRATE 1 MG/ML
0.05 INJECTION, SOLUTION, CONCENTRATE INTRAVENOUS
Qty: 8 | Refills: 0 | Status: DISCONTINUED | OUTPATIENT
Start: 2025-01-27 | End: 2025-01-28

## 2025-01-27 RX ORDER — ACETAMINOPHEN 160 MG/5ML
1000 SUSPENSION ORAL ONCE
Refills: 0 | Status: COMPLETED | OUTPATIENT
Start: 2025-01-27 | End: 2025-01-27

## 2025-01-27 RX ORDER — THIAMINE HCL 100 MG
500 TABLET ORAL DAILY
Refills: 0 | Status: COMPLETED | OUTPATIENT
Start: 2025-01-27 | End: 2025-01-29

## 2025-01-27 RX ORDER — SODIUM CHLORIDE 9 G/ML
1000 INJECTION, SOLUTION INTRAVENOUS ONCE
Refills: 0 | Status: COMPLETED | OUTPATIENT
Start: 2025-01-27 | End: 2025-01-27

## 2025-01-27 RX ORDER — SODIUM CHLORIDE 9 G/ML
2000 INJECTION, SOLUTION INTRAVENOUS
Refills: 0 | Status: COMPLETED | OUTPATIENT
Start: 2025-01-27 | End: 2025-01-27

## 2025-01-27 RX ORDER — ANTISEPTIC SURGICAL SCRUB 0.04 MG/ML
15 SOLUTION TOPICAL EVERY 12 HOURS
Refills: 0 | Status: DISCONTINUED | OUTPATIENT
Start: 2025-01-27 | End: 2025-02-02

## 2025-01-27 RX ORDER — ANTISEPTIC SURGICAL SCRUB 0.04 MG/ML
1 SOLUTION TOPICAL
Refills: 0 | Status: DISCONTINUED | OUTPATIENT
Start: 2025-01-27 | End: 2025-02-04

## 2025-01-27 RX ORDER — ALBUMIN HUMAN 50 G/1000ML
100 SOLUTION INTRAVENOUS EVERY 6 HOURS
Refills: 0 | Status: COMPLETED | OUTPATIENT
Start: 2025-01-27 | End: 2025-01-28

## 2025-01-27 RX ORDER — PROPOFOL 10 MG/ML
10 INJECTION, EMULSION INTRAVENOUS
Qty: 1000 | Refills: 0 | Status: DISCONTINUED | OUTPATIENT
Start: 2025-01-27 | End: 2025-01-31

## 2025-01-27 RX ORDER — CEFTRIAXONE 250 MG/1
2000 INJECTION, POWDER, FOR SOLUTION INTRAMUSCULAR; INTRAVENOUS ONCE
Refills: 0 | Status: COMPLETED | OUTPATIENT
Start: 2025-01-27 | End: 2025-01-27

## 2025-01-27 RX ORDER — MAGNESIUM SULFATE 0.8 MEQ/ML
2 AMPUL (ML) INJECTION ONCE
Refills: 0 | Status: COMPLETED | OUTPATIENT
Start: 2025-01-27 | End: 2025-01-27

## 2025-01-27 RX ORDER — HEPARIN SODIUM,PORCINE 10000/ML
5000 VIAL (ML) INJECTION EVERY 8 HOURS
Refills: 0 | Status: DISCONTINUED | OUTPATIENT
Start: 2025-01-27 | End: 2025-01-30

## 2025-01-27 RX ORDER — METRONIDAZOLE 500 MG
500 TABLET ORAL EVERY 12 HOURS
Refills: 0 | Status: DISCONTINUED | OUTPATIENT
Start: 2025-01-27 | End: 2025-01-29

## 2025-01-27 RX ORDER — SODIUM BICARBONATE 42 MG/ML
50 INJECTION, SOLUTION INTRAVENOUS ONCE
Refills: 0 | Status: COMPLETED | OUTPATIENT
Start: 2025-01-27 | End: 2025-01-27

## 2025-01-27 RX ORDER — FENTANYL CITRATE 50 UG/ML
0.5 INJECTION INTRAMUSCULAR; INTRAVENOUS
Qty: 2500 | Refills: 0 | Status: DISCONTINUED | OUTPATIENT
Start: 2025-01-27 | End: 2025-01-31

## 2025-01-27 RX ORDER — KETAMINE HCL IN NACL, ISO-OSM 100MG/10ML
100 SYRINGE (ML) INJECTION ONCE
Refills: 0 | Status: DISCONTINUED | OUTPATIENT
Start: 2025-01-27 | End: 2025-01-27

## 2025-01-27 RX ORDER — VANCOMYCIN HYDROCHLORIDE 50 MG/ML
1000 KIT ORAL EVERY 24 HOURS
Refills: 0 | Status: DISCONTINUED | OUTPATIENT
Start: 2025-01-27 | End: 2025-01-27

## 2025-01-27 RX ORDER — SODIUM BICARBONATE 42 MG/ML
150 INJECTION, SOLUTION INTRAVENOUS ONCE
Refills: 0 | Status: COMPLETED | OUTPATIENT
Start: 2025-01-27 | End: 2025-01-27

## 2025-01-27 RX ORDER — SODIUM BICARBONATE 42 MG/ML
0.43 INJECTION, SOLUTION INTRAVENOUS
Qty: 150 | Refills: 0 | Status: DISCONTINUED | OUTPATIENT
Start: 2025-01-27 | End: 2025-01-28

## 2025-01-27 RX ORDER — VANCOMYCIN HYDROCHLORIDE 50 MG/ML
500 KIT ORAL EVERY 6 HOURS
Refills: 0 | Status: DISCONTINUED | OUTPATIENT
Start: 2025-01-27 | End: 2025-01-28

## 2025-01-27 RX ADMIN — SODIUM BICARBONATE 150 MILLIEQUIVALENT(S): 42 INJECTION, SOLUTION INTRAVENOUS at 22:36

## 2025-01-27 RX ADMIN — POTASSIUM CHLORIDE 100 MILLIEQUIVALENT(S): 750 TABLET, EXTENDED RELEASE ORAL at 14:51

## 2025-01-27 RX ADMIN — PROPOFOL 3.13 MICROGRAM(S)/KG/MIN: 10 INJECTION, EMULSION INTRAVENOUS at 22:35

## 2025-01-27 RX ADMIN — POTASSIUM CHLORIDE 100 MILLIEQUIVALENT(S): 750 TABLET, EXTENDED RELEASE ORAL at 16:40

## 2025-01-27 RX ADMIN — SODIUM CHLORIDE 1000 MILLILITER(S): 9 INJECTION, SOLUTION INTRAVENOUS at 03:30

## 2025-01-27 RX ADMIN — POTASSIUM CHLORIDE 100 MILLIEQUIVALENT(S): 750 TABLET, EXTENDED RELEASE ORAL at 09:51

## 2025-01-27 RX ADMIN — PANTOPRAZOLE 40 MILLIGRAM(S): 20 TABLET, DELAYED RELEASE ORAL at 06:06

## 2025-01-27 RX ADMIN — Medication 100 MILLIGRAM(S): at 17:58

## 2025-01-27 RX ADMIN — SODIUM BICARBONATE 50 MILLIEQUIVALENT(S): 42 INJECTION, SOLUTION INTRAVENOUS at 15:08

## 2025-01-27 RX ADMIN — ONDANSETRON 4 MILLIGRAM(S): 4 TABLET, ORALLY DISINTEGRATING ORAL at 11:11

## 2025-01-27 RX ADMIN — CEFTRIAXONE 2000 MILLIGRAM(S): 250 INJECTION, POWDER, FOR SOLUTION INTRAMUSCULAR; INTRAVENOUS at 02:30

## 2025-01-27 RX ADMIN — Medication 25 MILLILITER(S): at 02:50

## 2025-01-27 RX ADMIN — NOREPINEPHRINE BITARTRATE 4.89 MICROGRAM(S)/KG/MIN: 1 INJECTION, SOLUTION, CONCENTRATE INTRAVENOUS at 20:23

## 2025-01-27 RX ADMIN — ACETAMINOPHEN 650 MILLIGRAM(S): 160 SUSPENSION ORAL at 15:08

## 2025-01-27 RX ADMIN — FENTANYL CITRATE 100 MICROGRAM(S): 50 INJECTION INTRAMUSCULAR; INTRAVENOUS at 22:35

## 2025-01-27 RX ADMIN — POTASSIUM CHLORIDE 100 MILLIEQUIVALENT(S): 750 TABLET, EXTENDED RELEASE ORAL at 08:26

## 2025-01-27 RX ADMIN — Medication 50 MILLIGRAM(S): at 17:15

## 2025-01-27 RX ADMIN — Medication 5000 UNIT(S): at 23:34

## 2025-01-27 RX ADMIN — ALBUMIN HUMAN 50 MILLILITER(S): 50 SOLUTION INTRAVENOUS at 23:32

## 2025-01-27 RX ADMIN — ACETAMINOPHEN 400 MILLIGRAM(S): 160 SUSPENSION ORAL at 06:06

## 2025-01-27 RX ADMIN — POTASSIUM CHLORIDE 100 MILLIEQUIVALENT(S): 750 TABLET, EXTENDED RELEASE ORAL at 10:24

## 2025-01-27 RX ADMIN — ALBUMIN HUMAN 50 MILLILITER(S): 50 SOLUTION INTRAVENOUS at 11:01

## 2025-01-27 RX ADMIN — SODIUM BICARBONATE 150 MEQ/KG/HR: 42 INJECTION, SOLUTION INTRAVENOUS at 23:32

## 2025-01-27 RX ADMIN — PROPOFOL 20 MILLIGRAM(S): 10 INJECTION, EMULSION INTRAVENOUS at 22:00

## 2025-01-27 RX ADMIN — ALBUMIN HUMAN 50 MILLILITER(S): 50 SOLUTION INTRAVENOUS at 17:16

## 2025-01-27 RX ADMIN — Medication 100 MILLIGRAM(S): at 06:06

## 2025-01-27 RX ADMIN — FENTANYL CITRATE 2.61 MICROGRAM(S)/KG/HR: 50 INJECTION INTRAMUSCULAR; INTRAVENOUS at 23:33

## 2025-01-27 RX ADMIN — ONDANSETRON 4 MILLIGRAM(S): 4 TABLET, ORALLY DISINTEGRATING ORAL at 20:23

## 2025-01-27 RX ADMIN — POTASSIUM CHLORIDE 100 MILLIEQUIVALENT(S): 750 TABLET, EXTENDED RELEASE ORAL at 17:18

## 2025-01-27 RX ADMIN — POTASSIUM CHLORIDE 100 MILLIEQUIVALENT(S): 750 TABLET, EXTENDED RELEASE ORAL at 23:00

## 2025-01-27 RX ADMIN — ROCURONIUM BROMIDE 100 MILLIGRAM(S): 10 INJECTION INTRAVENOUS at 22:00

## 2025-01-27 RX ADMIN — Medication 6 UNIT(S)/MIN: at 06:06

## 2025-01-27 RX ADMIN — NOREPINEPHRINE BITARTRATE 4.89 MICROGRAM(S)/KG/MIN: 1 INJECTION, SOLUTION, CONCENTRATE INTRAVENOUS at 11:15

## 2025-01-27 RX ADMIN — Medication 105 MILLIGRAM(S): at 11:59

## 2025-01-27 RX ADMIN — Medication 100 MILLIGRAM(S): at 22:00

## 2025-01-27 RX ADMIN — Medication 50 MILLIGRAM(S): at 23:32

## 2025-01-27 RX ADMIN — SODIUM CHLORIDE 2000 MILLILITER(S): 9 INJECTION, SOLUTION INTRAVENOUS at 02:10

## 2025-01-27 RX ADMIN — Medication 6 UNIT(S)/MIN: at 16:40

## 2025-01-27 RX ADMIN — MEROPENEM 1000 MILLIGRAM(S): 500 INJECTION INTRAVENOUS at 23:31

## 2025-01-27 RX ADMIN — ACETAMINOPHEN 650 MILLIGRAM(S): 160 SUSPENSION ORAL at 15:30

## 2025-01-27 RX ADMIN — POTASSIUM CHLORIDE 100 MILLIEQUIVALENT(S): 750 TABLET, EXTENDED RELEASE ORAL at 16:01

## 2025-01-27 RX ADMIN — POTASSIUM CHLORIDE 40 MILLIEQUIVALENT(S): 750 TABLET, EXTENDED RELEASE ORAL at 16:01

## 2025-01-27 RX ADMIN — Medication 100 MILLIGRAM(S): at 20:23

## 2025-01-27 RX ADMIN — Medication 1 MILLIGRAM(S): at 02:35

## 2025-01-27 RX ADMIN — Medication 5000 UNIT(S): at 14:25

## 2025-01-27 RX ADMIN — SODIUM CHLORIDE 1000 MILLILITER(S): 9 INJECTION, SOLUTION INTRAVENOUS at 11:01

## 2025-01-27 RX ADMIN — PROPOFOL 3.13 MICROGRAM(S)/KG/MIN: 10 INJECTION, EMULSION INTRAVENOUS at 23:34

## 2025-01-27 RX ADMIN — PANTOPRAZOLE 40 MILLIGRAM(S): 20 TABLET, DELAYED RELEASE ORAL at 17:57

## 2025-01-27 RX ADMIN — Medication 50 MILLIGRAM(S): at 11:01

## 2025-01-27 RX ADMIN — VANCOMYCIN HYDROCHLORIDE 500 MILLIGRAM(S): KIT at 11:59

## 2025-01-27 RX ADMIN — Medication 25 GRAM(S): at 22:55

## 2025-01-27 RX ADMIN — NOREPINEPHRINE BITARTRATE 4.89 MICROGRAM(S)/KG/MIN: 1 INJECTION, SOLUTION, CONCENTRATE INTRAVENOUS at 06:06

## 2025-01-27 RX ADMIN — Medication 25 GRAM(S): at 10:24

## 2025-01-27 RX ADMIN — Medication 1 MILLIGRAM(S): at 11:01

## 2025-01-27 RX ADMIN — Medication 1 TABLET(S): at 11:01

## 2025-01-27 RX ADMIN — POTASSIUM CHLORIDE 40 MILLIEQUIVALENT(S): 750 TABLET, EXTENDED RELEASE ORAL at 08:27

## 2025-01-27 RX ADMIN — VANCOMYCIN HYDROCHLORIDE 500 MILLIGRAM(S): KIT at 17:21

## 2025-01-27 NOTE — H&P ADULT - ASSESSMENT
ASSESSMENT  BROOKS GOODWIN is a 59y female with PMH *** in the hospital for  transferred to the MICU for ***.    PLAN  =====Neurologic=====  Patient is AOx4  -continue aspiration precautions  -HOB 30 degrees  -Promote normal sleep wake cycles, delirium precautions      =====Pulmonary=====  Patient breathing comfortably on room air    -Continue pulmonary toileting    =====Cardiovascular=====  -Patient does not currently require vasopressors and is normotensive  -Continue to titrate vasopressors for MAP >65  -Trend end-organ perfusion markers    =====GI=====  #PPI prophylaxis  - Protonix 40mg IV QD    #Diet  - Full diet  -NPO except for Tube feeds    =====Renal/=====  #Electrolytes  - Maintain K>4, Phos>3, Mag>2, iCal>1    =====Endocrine=====    - While NPO, FSBG and ENZO q6h  -Goal -200  =====Infectious Disease=====  Patient is febrile      =====Heme/Onc=====  #DVT PPX SCDs [  ], Chemical prophylaxis [  ]      =====Ethics=====  FULL CODE    Case discussed with ICU team and attending     ASSESSMENT  BROOKS GOODWIN is a 59y female with PMH ETOH cirrhosis, multiple paracentesis for ascites, esophageal varices s/p banding in the hospital for <1d  transferred to the MICU for management of undifferentiated shock state. Labs on admission notable for NA of 125, Bicarb of 9, AG of 27, blood glucose of 48, Alk phos 854, , lactate of 15.10, lipase 333.    #Undifferentiated Shock: distributive vs hypovolemic  #HAGMA  #Hypoglycemia   #TEREZA  #Adrenal insufficiency?  #Pancreatitis?    PLAN  =====Neurologic=====  Patient is AOx4  -continue aspiration precautions  -HOB 30 degrees  -Promote normal sleep wake cycles, delirium precautions  -Continue thiamine, folic acid, and multi vitamin  -Patient reports she is no longer actively drinking    =====Pulmonary=====  -Patient with increased WOB likely compensatory to metabolic acidosis, currently compensating from a respiratory stand point, BiPap as needed  -On face mask for supplement O2  -Continue pulmonary toileting  -Duonebs PRN    =====Cardiovascular=====  -Patient currently in a dual pressor shock state s/p 4 L in ED, continue to titrate levophed for MAP of >65, continue fixed dose vaso at 0.04.  -Trend end-organ perfusion markers  -Lactate initially of 15.1, up-trending to 16.2 despite volume resuscitation possibly a component of not only shock but secondary to liver failure and decreased clearance however given LFTs are increasing as well as TEREZA likely secondary to shock state. Will trend.  -Will also give albumin given patients cirrhosis and likely third spacing secondary to that as well as possible sepsis component  -F/U TTE  -Once central line placed will transduce a CVP, add flow trac for further invasive monitoring    =====GI=====  #PPI prophylaxis  - Protonix 40mg IV BID given history of GI bleeding.    #Diet  -NPO except for meds  -Lipase elevated on admission, will repeat, given epigastric tenderness  -F/U RUQ US  -Send direct and indirect bili    =====Renal/=====  #Electrolytes  - Maintain K>4, Phos>3, Mag>2, iCal>1  -TEREZA, baseline of 0.55, now up to 1.18, continue to trend renal indices as well as urine output  -Strict I & Os with indwelling Delaney catheter    =====Endocrine=====  - While NPO, FSBG and ENZO q6h  -Goal -200  -If remains hypoglycemic can consider IV fluids with dextrose  -Continue stress dose steroids for possible adrenal insufficiency vs CIRCI    =====Infectious Disease=====  Patient is febrile, now with leukocytosis on repeat labs without getting steroids  -Flagyl and vanc for empiric coverage  -F/U empiric blood cultures, GI PCR, C.diff PCR, UA and UC  -RVP pending    =====Heme/Onc=====  #DVT PPX SCDs [X], Chemical prophylaxis [X] heparin subcutaneous every 8 hours      =====Ethics=====  FULL CODE    Case discussed with ICU team and attending Dr. Beck.

## 2025-01-27 NOTE — H&P ADULT - HISTORY OF PRESENT ILLNESS
This is a 58 y/o female with PMH of ETOH cirrhosis, multiple paracentesis for ascites, esophageal varices s/p banding,  who presents with x1 week of generalized fatigue, multiple bouts of diarrhea with poor PO intake and intermittent fevers. Of note was diagnosed Flu positive last week. Her boyfriend at bedside [place] gave some collateral information. She reports fatigue, chills, +cough, +abdominal tenderness, and +SOB.

## 2025-01-27 NOTE — ED PROVIDER NOTE - OBJECTIVE STATEMENT
58 y/o female with PMH of ETOH cirrhosis, multiple paracentesis for ascites, esophageal varices s/p banding,  who presents with x1 week of generalized fatigue, multiple bouts of diarrhea with poor PO intake and intermittent fevers. Of note was diagnosed Flu positive last week. Her boyfriend at bedside Holden gave some collateral information. She reports fatigue, chills, +cough, +abdominal tenderness, and +SOB

## 2025-01-27 NOTE — CHART NOTE - NSCHARTNOTEFT_GEN_A_CORE
Called to bedside by RN for patient in acute hypoxic respiratory failure unresponsive to HFNC, Upon arrival patient noted to by tachypneic with increased WOB and abdominal breathing, Chart briefly reviewed, Patient noted to have received 6L IVF throughout day with minimal UOP. CXR revealed congestion. Brief conversation held with patient regarding intubation, patient agreeable to intubation. Patient RSIed for acute hypoxic respiratory failure in the setting of fluid overload, bumex administered. Additionally patient noted ot be severely acidotic and administered 150meq of sodium bicarbonate at bedside, HCO3 gtt initiated thereafter.

## 2025-01-27 NOTE — ED PROVIDER NOTE - CLINICAL SUMMARY MEDICAL DECISION MAKING FREE TEXT BOX
Patient with undifferentiated likely septic shock state multiple bedside reassessments performed for hemodynamic monitoring requiring ICU level of care stevan updated on critical nature patient

## 2025-01-27 NOTE — H&P ADULT - NSHPPHYSICALEXAM_GEN_ALL_CORE
GENERAL: NAD, well-developed  HEAD:  Atraumatic, Normocephalic  EYES: EOMI, PERRL, conjunctiva pink, sclera jaundiced  NECK: Supple, No JVD  CHEST/LUNG: Diminished to auscultation bilaterally; No wheezes or rales  HEART: Rapid rate and regular rhythm; No murmurs, rubs, or gallops  ABDOMEN: Distended, soft, tender, Hyperactive bowel sounds  EXTREMITIES:  2+ Peripheral Pulses, No clubbing or cyanosis  Neurological: AOx3, DELA CRUZ  Skin: Warm and dry  Musculoskeletal: Atraumatic, no joint effusions  Psychiatric: Normal affect

## 2025-01-27 NOTE — H&P ADULT - NSHPLABSRESULTS_GEN_ALL_CORE
10.5   4.14  )-----------( 59       ( 27 Jan 2025 02:07 )             32.4   01-27    132[L]  |  94[L]  |  18.7  ----------------------------<  94  2.3[LL]   |  10.0[LL]  |  1.18    Ca    7.8[L]      27 Jan 2025 04:50  Phos  2.5     01-27  Mg     1.1     01-27    TPro  5.6[L]  /  Alb  2.0[L]  /  TBili  3.3[H]  /  DBili  x   /  AST  150[H]  /  ALT  37[H]  /  AlkPhos  596[H]  01-27    ABG - ( 27 Jan 2025 05:43 )  pH, Arterial: 7.320 pH, Blood: x     /  pCO2: 21    /  pO2: 80    / HCO3: 11    / Base Excess: -15.3 /  SaO2: 98.1

## 2025-01-27 NOTE — PROGRESS NOTE ADULT - SUBJECTIVE AND OBJECTIVE BOX
Patient is a 59y old  Female who presents with a chief complaint of HAGMA, respiratory distress, shock (2025 13:16)    ABRAHAN GOODWIN  [id number]  Patient is a 59y old  Female who presents with a chief complaint of HAGMA, respiratory distress, shock (2025 13:16)      BRIEF HOSPITAL COURSE  HPI:  This is a 58 y/o female with PMH of ETOH cirrhosis, multiple paracentesis for ascites, esophageal varices s/p banding,  who presents with x1 week of generalized fatigue, multiple bouts of diarrhea with poor PO intake and intermittent fevers. Of note was diagnosed Flu positive last week. Her boyfriend at bedside Holden gave some collateral information. She reports fatigue, chills, +cough, +abdominal tenderness, and +SOB. (2025 07:48)      Events last 24 hours: Patient was found to be + for CDiff colitis, was switched from IV vanc to PO, and also continued on flagyl. electrolytes have been altered due to continual diarrhea, and patient with severe hypovolemia.     PAST MEDICAL & SURGICAL HISTORY:  Alcohol abuse      Depression      Withdrawal seizures      History of basal cell carcinoma excision      Squamous cell skin cancer      Hemorrhoids      2019 novel coronavirus disease (COVID-19)      Anemia      H/O vertigo      History of ear, nose, and throat (ENT) surgery      H/O basal cell carcinoma excision      H/O:           Review of Systems:  unable to illicit due to severe illness      Medications:  metroNIDAZOLE  IVPB 500 milliGRAM(s) IV Intermittent every 12 hours  vancomycin    Solution 500 milliGRAM(s) Oral every 6 hours    norepinephrine Infusion 0.05 MICROgram(s)/kG/Min IV Continuous <Continuous>      acetaminophen     Tablet .. 650 milliGRAM(s) Oral once      heparin   Injectable 5000 Unit(s) SubCutaneous every 8 hours    pantoprazole  Injectable 40 milliGRAM(s) IV Push two times a day      hydrocortisone sodium succinate Injectable 50 milliGRAM(s) IV Push every 6 hours  vasopressin Infusion 0.04 Unit(s)/Min IV Continuous <Continuous>    albumin human 25% IVPB 100 milliLiter(s) IV Intermittent every 6 hours  folic acid 1 milliGRAM(s) Oral daily  multivitamin 1 Tablet(s) Oral daily  potassium chloride   Powder 40 milliEquivalent(s) Oral once  potassium chloride  10 mEq/100 mL IVPB 10 milliEquivalent(s) IV Intermittent every 1 hour  thiamine IVPB 500 milliGRAM(s) IV Intermittent daily      chlorhexidine 2% Cloths 1 Application(s) Topical <User Schedule>            ICU Vital Signs Last 24 Hrs  T(C): 36.9 (2025 13:00), Max: 38.6 (2025 06:30)  T(F): 98.4 (2025 13:00), Max: 101.5 (2025 06:30)  HR: 113 (2025 14:15) (106 - 147)  BP: 98/64 (2025 14:15) (58/45 - 123/85)  BP(mean): 74 (2025 14:15) (45 - 100)  ABP: 94/44 (2025 14:15) (94/44 - 144/71)  ABP(mean): 61 (2025 14:15) (59 - 98)  RR: 27 (2025 14:15) (18 - 36)  SpO2: 94% (2025 14:15) (92% - 99%)    O2 Parameters below as of 2025 14:00  Patient On (Oxygen Delivery Method): nasal cannula  O2 Flow (L/min): 5          ABG - ( 2025 13:53 )  pH, Arterial: 7.320 pH, Blood: x     /  pCO2: 22    /  pO2: 67    / HCO3: 11    / Base Excess: -14.8 /  SaO2: 95.1                I&O's Detail    2025 07:01  -  2025 14:47  --------------------------------------------------------  IN:    Albumin 25%  -  50 mL: 100 mL    IV PiggyBack: 300 mL    IV PiggyBack: 50 mL    IV PiggyBack: 100 mL    Lactated Ringers Bolus: 999 mL    Norepinephrine: 258.6 mL    Oral Fluid: 600 mL    Vasopressin: 36 mL  Total IN: 2443.6 mL    OUT:    Indwelling Catheter - Urethral (mL): 320 mL  Total OUT: 320 mL    Total NET: 2123.6 mL            LABS:                        8.1    41.57 )-----------( 52       ( 2025 13:22 )             24.6         131[L]  |  96  |  18.6  ----------------------------<  106[H]  3.5   |  10.0[LL]  |  1.07    Ca    7.6[L]      2025 13:22  Phos  4.2       Mg     1.6         TPro  6.1[L]  /  Alb  2.5[L]  /  TBili  4.4[H]  /  DBili  x   /  AST  180[H]  /  ALT  42[H]  /  AlkPhos  331[H]            CAPILLARY BLOOD GLUCOSE      POCT Blood Glucose.: 307 mg/dL (2025 03:33)    PT/INR - ( 2025 08:15 )   PT: 22.7 sec;   INR: 1.97 ratio         PTT - ( 2025 08:15 )  PTT:40.3 sec  Urinalysis Basic - ( 2025 13:22 )    Color: x / Appearance: x / SG: x / pH: x  Gluc: 106 mg/dL / Ketone: x  / Bili: x / Urobili: x   Blood: x / Protein: x / Nitrite: x   Leuk Esterase: x / RBC: x / WBC x   Sq Epi: x / Non Sq Epi: x / Bacteria: x      CULTURES:  C Diff by PCR Result: Detected ( @ 07:15)  Rapid RVP Result: NotDetec ( @ 06:00)      Physical Examination:  GENERAL: NAD, well-developed  HEAD:  Atraumatic, Normocephalic  EYES: EOMI, PERRL, conjunctiva pink, sclera jaundiced  NECK: Supple, No JVD  CHEST/LUNG: Diminished to auscultation bilaterally; No wheezes or rales  HEART: Rapid rate and regular rhythm; No murmurs, rubs, or gallops  ABDOMEN: Distended, soft, tender, Hyperactive bowel sounds  EXTREMITIES:  2+ Peripheral Pulses, No clubbing or cyanosis  Neurological: AOx3, DELA CRUZ  Skin: Warm and dry  Musculoskeletal: Atraumatic, no joint effusions  Psychiatric: Normal affect

## 2025-01-27 NOTE — ED ADULT NURSE REASSESSMENT NOTE - NS ED NURSE REASSESS COMMENT FT1
Patient remains A&Ox4, drowsy, but arousable, v/s as charted, pressors infusing as ordered.  Pt is admitted to MICU currently awaiting bed.  S/O present at bedside, zeeshan inserted by MICU team, bipap present at bedside to be initiated at provider discretion.

## 2025-01-27 NOTE — CHART NOTE - NSCHARTNOTEFT_GEN_A_CORE
Tried calling patient's Fiance Holden without success and could not leave VM to update about patient being intubated   Will try again in AM

## 2025-01-27 NOTE — ED PROVIDER NOTE - CRTICAL CARE TIME SPENT (MIN)
04/11/21    Patient ID: Dahiana is a 57 year old female.    No chief complaint on file.          HPI  Dahiana is a nice patient who is here today with respiratory symptoms that started on Monday with sinus type headache and low-grade fever and body aches and mild respiratory symptoms.  Patient  was diagnosed with the flu after coming with respiratory symptoms and had positive test for the flu.  Discussed with patient, will go ahead and start Tamiflu      .  ALLERGIES:  No Known Allergies      No current outpatient medications on file.     No current facility-administered medications for this visit.         Patient Active Problem List   Diagnosis   • Hypertriglyceridemia   • Hypothyroid   • Macrocytosis   • Feels cold   • Trochanteric bursitis   • Vitamin D deficiency   • Encounter for general adult medical examination w/o abnormal findings          No past surgical history on file.      No family history on file.      Social History     Tobacco Use   • Smoking status: Never Smoker   • Smokeless tobacco: Never Used   Substance Use Topics   • Alcohol use: Yes   • Drug use: Not on file        Sexually Active: Not Asked             Immunization History   Administered Date(s) Administered   • Influenza, injectable, quadrivalent, preservative-free 12/08/2014, 12/28/2015, 01/18/2017, 01/24/2019   • Tdap 12/28/2015        Review of Systems   Constitutional: Negative for fatigue and fever.   HENT: Negative for ear pain and nosebleeds.    Respiratory: Negative for shortness of breath and wheezing.         See HPI   Cardiovascular: Negative for chest pain and palpitations.        No shortness of breath   Gastrointestinal: Negative for abdominal pain, nausea and vomiting.   Skin: Negative for rash.   Neurological: Negative for seizures and weakness.        No acute focal symptoms   Hematological: Does not bruise/bleed easily.   Psychiatric/Behavioral: Negative for confusion. The patient is not nervous/anxious.         Physical Exam:  There were no vitals taken for this visit.    Physical Exam  Constitutional:       General: She is not in acute distress.     Appearance: She is not diaphoretic.   HENT:      Head: Normocephalic and atraumatic.   Eyes:      Conjunctiva/sclera: Conjunctivae normal.   Cardiovascular:      Rate and Rhythm: Normal rate and regular rhythm.      Heart sounds: Normal heart sounds. No gallop.    Pulmonary:      Effort: Pulmonary effort is normal.      Breath sounds: Normal breath sounds. No wheezing.   Abdominal:      General: Bowel sounds are normal. There is no distension.      Palpations: Abdomen is soft.      Tenderness: There is no abdominal tenderness.   Musculoskeletal:      Cervical back: Neck supple.   Lymphadenopathy:      Cervical: No cervical adenopathy.   Skin:     General: Skin is warm and dry.   Neurological:      Mental Status: She is alert.      Coordination: Coordination normal.   Psychiatric:         Behavior: Behavior normal.         Last Results:    No visits with results within 1 Month(s) from this visit.   Latest known visit with results is:   Lab Services on 01/24/2019   Component Date Value Ref Range Status   • VITAMIND, 25 HYDROXY 01/24/2019 28.5* 30.0 - 100.0 ng/mL Final   • TSH 01/24/2019 2.992  0.350 - 5.000 mcUnits/mL Final   • WBC 01/24/2019 6.3  4.2 - 11.0 K/mcL Final   • RBC 01/24/2019 3.94* 4.00 - 5.20 mil/mcL Final   • HGB 01/24/2019 13.3  12.0 - 15.5 g/dL Final   • HCT 01/24/2019 38.6  36.0 - 46.5 % Final   • MCV 01/24/2019 98.0  78.0 - 100.0 fl Final   • MCH 01/24/2019 33.8  26.0 - 34.0 pg Final   • MCHC 01/24/2019 34.5  32.0 - 36.5 g/dL Final   • RDW-CV 01/24/2019 12.5  11.0 - 15.0 % Final   • PLT 01/24/2019 154  140 - 450 K/mcL Final   • NRBC 01/24/2019 0  0 /100 WBC Final   • DIFF TYPE 01/24/2019 AUTOMATED DIFFERENTIAL   Final   • Neutrophil 01/24/2019 66  % Final   • LYMPH 01/24/2019 23  % Final   • MONO 01/24/2019 9  % Final   • EOSIN 01/24/2019 2  % Final   •  BASO 01/24/2019 0  % Final   • Percent Immature Granuloctyes 01/24/2019 0  % Final   • Absolute Neutrophil 01/24/2019 4.1  1.8 - 7.7 K/mcL Final   • Absolute Lymph 01/24/2019 1.4  1.0 - 4.0 K/mcL Final   • Absolute Mono 01/24/2019 0.5  0.3 - 0.9 K/mcL Final   • Absolute Eos 01/24/2019 0.1  0.1 - 0.5 K/mcL Final   • Absolute Baso 01/24/2019 0.0  0.0 - 0.3 K/mcL Final   • Absolute Immature Granulocytes 01/24/2019 0.0  0 - 0.2 K/mcl Final   • FASTING STATUS 01/24/2019 UNKNOWN  hrs Final   • CHOLESTEROL 01/24/2019 195  <200 mg/dL Final   • CALCULATED LDL 01/24/2019 122  <130 mg/dL Final   • HDL 01/24/2019 53  >49 mg/dL Final   • TRIGLYCERIDE 01/24/2019 100  <150 mg/dL Final   • CALCULATED NON HDL 01/24/2019 142  mg/dL Final   • CHOL/HDL 01/24/2019 3.7  <4.5 Final   • Fasting Status 01/24/2019 UNKNOWN  hrs Final   • Sodium 01/24/2019 141  135 - 145 mmol/L Final   • Potassium 01/24/2019 4.3  3.4 - 5.1 mmol/L Final   • Chloride 01/24/2019 108* 98 - 107 mmol/L Final   • Carbon Dioxide 01/24/2019 24  21 - 32 mmol/L Final   • Anion Gap 01/24/2019 13  10 - 20 mmol/L Final   • Glucose 01/24/2019 96  65 - 99 mg/dL Final   • BUN 01/24/2019 13  6 - 20 mg/dL Final   • Creatinine 01/24/2019 0.67  0.51 - 0.95 mg/dL Final   • GFR Estimate,  01/24/2019 >90   Final   • GFR Estimate, Non  01/24/2019 >90   Final   • BUN/Creatinine Ratio 01/24/2019 19  7 - 25 Final   • CALCIUM 01/24/2019 9.0  8.4 - 10.2 mg/dL Final   • TOTAL BILIRUBIN 01/24/2019 0.4  0.2 - 1.0 mg/dL Final   • AST/SGOT 01/24/2019 27  <38 Units/L Final   • ALT/SGPT 01/24/2019 25  <79 Units/L Final   • ALK PHOSPHATASE 01/24/2019 79  45 - 117 Units/L Final   • TOTAL PROTEIN 01/24/2019 7.9  6.4 - 8.2 g/dL Final   • Albumin 01/24/2019 4.1  3.6 - 5.1 g/dL Final   • GLOBULIN 01/24/2019 3.8  2.0 - 4.0 g/dL Final   • A/G Ratio, Serum 01/24/2019 1.1  1.0 - 2.4 Final             ASSESSMENT/PLAN:  There are no diagnoses linked to this encounter.  Patient  will start Tamiflu twice a day for 5 days, fluids and rest, stay off work for 5 days.  Advised patient to report immediately if symptoms are worse and also let us know if her symptoms persist or new symptoms develop.  She have not seen Dr. Johnson since January of last year and advised to make appointment with her for her general checkup    Medical compliance with plan discussed and risks of non-compliance reviewed.    Patient education completed on disease process, etiology & prognosis.    Patient expresses understanding of the plan.    Proper usage and side effects of medications reviewed & discussed.   After visit summary printout explained and given to patient        Ame Brandon MD   31

## 2025-01-27 NOTE — ED ADULT NURSE NOTE - NSFALLHARMRISKINTERV_ED_ALL_ED

## 2025-01-27 NOTE — CONSULT NOTE ADULT - CONVERSATION DETAILS
Patient consistently designated Holden as primary HCP.     Discussed diagnosis, current medical management, prognosis, and treatment options with patient and Holden. Patient's mother passed away during COVID and had pancreatic cancer. Patient's brother passed away after massive heart attacks, despite aggressive medical management. Holden's mother passed away from dementia, was enrolled in hospice and did not want PEG to be placed. Holden worried about patient's quality of life, but want aggressive medical management for now. Answered his questions and he verbalized understanding.

## 2025-01-27 NOTE — PROGRESS NOTE ADULT - ASSESSMENT
ASSESSMENT  ABRAHAN GOODWIN is a 59y Female with PMH ETOH cirrhosis, multiple paracentesis for ascites, esophageal varices s/p banding in the hospital for <1d  transferred to the MICU for management of undifferentiated shock state. Labs on admission notable for NA of 125, Bicarb of 9, AG of 27, blood glucose of 48, Alk phos 854, , lactate of 15.10, lipase 333.    #Undifferentiated Shock: distributive vs hypovolemic  #now found to have CDiff Colitis  #HAGMA  #Hypoglycemia   #TEREZA    PLAN  =====Neurologic=====  Patient is AOx4  -continue aspiration precautions  -HOB 30 degrees  -Promote normal sleep wake cycles, delirium precautions  -Continue thiamine, folic acid, and multi vitamin  -Patient reports she is no longer actively drinking    =====Pulmonary=====  -Patient with increased WOB likely compensatory to metabolic acidosis, currently compensating from a respiratory stand point, BiPap as needed  -Bicarb low at 10, 1 amp of 50mEq Bicarb given  -On face mask for supplement O2  -Continue pulmonary toileting  -Patti PRN    =====Cardiovascular=====  -Patient currently in a dual pressor shock state s/p 4 L in ED, continue to titrate levophed for MAP of >65, continue fixed dose vaso at 0.04.  -Trend end-organ perfusion markers  -Lactate initially of 15.1, up-trending to 16.2 despite volume resuscitation possibly a component of not only shock but secondary to liver failure and decreased clearance however given LFTs are increasing as well as TEREZA likely secondary to shock state.  -Lactate now downtrending to 11s.  -Will also give albumin given patients cirrhosis and likely third spacing secondary to that as well as possible sepsis component  -F/U TTE  -Central line placed, will transduce a CVP, add flow trac for further invasive monitoring  -On levophed for HD support    =====GI=====  #PPI prophylaxis  - Protonix 40mg IV BID given history of GI bleeding  - LFTs initially elevated, now downtrending, likely shock liver state  - Monitor, trend    #Diet  -NPO except for meds  -Lipase elevated on admission, now downtrending, likely 2/2 shock state  -F/U RUQ US    =====Renal/=====  #Electrolytes  - Maintain K>4, Phos>3, Mag>2, iCal>1  -TEREZA, baseline of 0.55, now up to 1.18, now downdtrending, continue to trend renal indices as well as urine output  -Strict I & Os with indwelling Delaney catheter    =====Endocrine=====  - While NPO, FSBG and ENZO q6h  -Goal -200  -If remains hypoglycemic can consider IV fluids with dextrose  -Continue stress dose steroids for possible adrenal insufficiency vs CIRCI    =====Infectious Disease=====  Patient is febrile, now with leukocytosis on repeat labs without getting steroids  -Flagyl + PO Vanc   - CDiff +  -F/U empiric blood cultures, UA and UC  -RVP negative    =====Heme/Onc=====  #DVT PPX SCDs [X], Chemical prophylaxis [X] heparin subcutaneous every 8 hours      =====Ethics=====  FULL CODE    Code Status: Full Code  Dispo: MICU level of care    Case discussed with MICU Attending: Dr. Taylor

## 2025-01-27 NOTE — H&P ADULT - NS ATTEND AMEND GEN_ALL_CORE FT
I have personally seen and examined the patient. I fully participated in the care of this patient. I have made amendments to the documentation where necessary, and agree with the history, physical exam, and plan as documented by the Resident.    See my separate event note.

## 2025-01-27 NOTE — ED ADULT NURSE NOTE - OBJECTIVE STATEMENT
Patient BIBEMS to CC area of ED with c/o fever, nausea/vomiting/diarrhea x2 days, found to be hypotensive as charted, pt A&Ox4, pmhx alcoholic chirrosis Patient BIBEMS to CC area of ED with c/o fever, nausea/vomiting/diarrhea x2 days, found to be hypotensive as charted, pt A&Ox4, pmhx alcoholic cirrhosis with poor PO intake over past few days.  Sclarea noted yellow, abdomen distended, tender to palpation, bowel sounds present x4, tachycardic.  IV access established with pt noted to be responsive to fluids.  Pt states she has been drinking "on and off" at times abruptly stopping after binging.  MD present at bedside, medicated as ordered.

## 2025-01-27 NOTE — PHARMACOTHERAPY INTERVENTION NOTE - COMMENTS
As per policy, adjusted vancomycin dose to 1 gram q24hr in setting of elevated SCr from baseline/TEREZA. Ordered vancomycin level for tomorrow, 1/28 in AM to further assist with dosing.  As per policy, adjusted vancomycin dose to 1 gram q24hr in setting of elevated SCr from baseline/TEREZA. Ordered vancomycin level for tomorrow, 1/28 in AM to further assist with dosing.     Update: IV Vancomycin has been discontinued, PO has been initiated for treatment of CDif.

## 2025-01-27 NOTE — CONSULT NOTE ADULT - SUBJECTIVE AND OBJECTIVE BOX
HPI:  This is a 60 y/o female with PMH of ETOH cirrhosis, multiple paracentesis for ascites, esophageal varices s/p banding,  who presents with x1 week of generalized fatigue, multiple bouts of diarrhea with poor PO intake and intermittent fevers. Of note was diagnosed Flu positive last week. Her boyfriend at bedside Holden gave some collateral information. She reports fatigue, chills, +cough, +abdominal tenderness, and +SOB. (2025 07:48)    60 y/o female with PMH of ETOH cirrhosis, multiple paracentesis for ascites, esophageal varices s/p banding presented to ED from home with x1 week of generalized fatigue, diarrhea with poor PO intake and intermittent fevers.     PERTINENT PMH REVIEWED: Yes No    PAST MEDICAL & SURGICAL HISTORY:  Alcohol abuse      Depression      Withdrawal seizures      History of basal cell carcinoma excision      Squamous cell skin cancer      Hemorrhoids      2019 novel coronavirus disease (COVID-19)      Anemia      H/O vertigo      History of ear, nose, and throat (ENT) surgery      H/O basal cell carcinoma excision      H/O:           SOCIAL HISTORY:                      Substance history:                    Admitted from:  home  [facility]                     Restoration/spirituality:                    Cultural concerns:                      Surrogate/HCP/Guardian: Phone#:    FAMILY HISTORY:  FH: pancreatic cancer (Mother)    Family history of heart attack (Father)    Family history of CVA (Father)        Allergies    Zithromax (Unknown)    Intolerances    morphine (Nausea)      ADVANCE DIRECTIVES/TREATMENT PREFERENCES:  Full code, all aggressive measures desired   DNR/DNI - MOLST, continue all other medical treatments  DNR/DNI - MOLST, comfort measures only     Baseline ADLs (prior to admission):  Independent/ Dependent      Karnofsky/Palliative Performance Status Version 2:  %  http://npcrc.org/files/news/palliative_performance_scale_ppsv2.pdf    Present Symptoms:     Dyspnea: Mild Moderate Severe  Nausea/Vomiting: Yes No  Anxiety:  Yes No  Depression: Yes No  Fatigue: Yes No  Loss of appetite: Yes No  Constipation:     Pain: Yes No            Character-            Duration-            Effect-            Factors-            Frequency-            Location-            Severity-    Pain AD Score:  [email or web address] (press ctrl + left click to view)    Review of Systems: Reviewed                     Negative:                     Positive:  Unable to obtain due to poor mentation   All others negative    MEDICATIONS  (STANDING):  albumin human 25% IVPB 100 milliLiter(s) IV Intermittent every 6 hours  chlorhexidine 2% Cloths 1 Application(s) Topical <User Schedule>  folic acid 1 milliGRAM(s) Oral daily  heparin   Injectable 5000 Unit(s) SubCutaneous every 8 hours  hydrocortisone sodium succinate Injectable 50 milliGRAM(s) IV Push every 6 hours  metroNIDAZOLE  IVPB 500 milliGRAM(s) IV Intermittent every 12 hours  multivitamin 1 Tablet(s) Oral daily  norepinephrine Infusion 0.05 MICROgram(s)/kG/Min (4.89 mL/Hr) IV Continuous <Continuous>  pantoprazole  Injectable 40 milliGRAM(s) IV Push two times a day  potassium chloride   Powder 40 milliEquivalent(s) Oral once  potassium chloride  10 mEq/100 mL IVPB 10 milliEquivalent(s) IV Intermittent every 1 hour  thiamine IVPB 500 milliGRAM(s) IV Intermittent daily  vancomycin    Solution 500 milliGRAM(s) Oral every 6 hours  vasopressin Infusion 0.04 Unit(s)/Min (6 mL/Hr) IV Continuous <Continuous>    MEDICATIONS  (PRN):      PHYSICAL EXAM:    Vital Signs Last 24 Hrs  T(C): 37.1 (2025 12:00), Max: 38.6 (2025 06:30)  T(F): 98.8 (2025 12:00), Max: 101.5 (2025 06:30)  HR: 115 (2025 12:45) (106 - 147)  BP: 104/73 (2025 12:30) (58/45 - 123/85)  BP(mean): 82 (2025 12:30) (45 - 99)  RR: 19 (2025 12:45) (19 - 36)  SpO2: 92% (2025 12:45) (92% - 99%)    Parameters below as of 2025 12:00  Patient On (Oxygen Delivery Method): nasal cannula  O2 Flow (L/min): 5      General: alert  oriented x ____ lethargic agitated delirious                  cachexia  nonverbal  coma    HEENT: normal  dry mouth  ET tube/trach    Lungs: comfortable tachypnea/labored breathing  excessive secretions    CV: normal  tachycardia    GI: normal  distended  tender  no BS               PEG/NG/OG tube  constipation  last BM:     : normal  incontinent  oliguria/anuria  mccann    MSK: normal  weakness  edema             ambulatory  bedbound/wheelchair bound    Neuro: no focal deficits cognitive impairment dysphagia dysarthria hemiplegia     Skin: normal  pressure ulcers- Stage_____  no rash    LABS:                        8.7    20.91 )-----------( 49       ( 2025 08:15 )             26.0         131[L]  |  94[L]  |  18.9  ----------------------------<  93  2.4[LL]   |  11.0[L]  |  1.16    Ca    7.8[L]      2025 08:15  Phos  2.5       Mg     1.1         TPro  5.6[L]  /  Alb  2.0[L]  /  TBili  3.5[H]  /  DBili  3.0[H]  /  AST  163[H]  /  ALT  40[H]  /  AlkPhos  489[H]      PT/INR - ( 2025 08:15 )   PT: 22.7 sec;   INR: 1.97 ratio         PTT - ( 2025 08:15 )  PTT:40.3 sec  Urinalysis Basic - ( 2025 08:15 )    Color: x / Appearance: x / SG: x / pH: x  Gluc: 93 mg/dL / Ketone: x  / Bili: x / Urobili: x   Blood: x / Protein: x / Nitrite: x   Leuk Esterase: x / RBC: x / WBC x   Sq Epi: x / Non Sq Epi: x / Bacteria: x      I&O's Summary    2025 07:  -  2025 13:17  --------------------------------------------------------  IN: 2394.5 mL / OUT: 245 mL / NET: 2149.5 mL        RADIOLOGY & ADDITIONAL STUDIES:       HPI:  This is a 60 y/o female with PMH of ETOH cirrhosis, multiple paracentesis for ascites, esophageal varices s/p banding,  who presents with x1 week of generalized fatigue, multiple bouts of diarrhea with poor PO intake and intermittent fevers. Of note was diagnosed Flu positive last week. Her boyfriend at bedside Holden gave some collateral information. She reports fatigue, chills, +cough, +abdominal tenderness, and +SOB. (2025 07:48)    60 y/o female with PMH of ETOH cirrhosis, multiple paracentesis for ascites, esophageal varices s/p banding presented to ED from home with x1 week of generalized fatigue, diarrhea with poor PO intake and intermittent fevers. Patient     PERTINENT PMH REVIEWED: Yes No    PAST MEDICAL & SURGICAL HISTORY:  Alcohol abuse      Depression      Withdrawal seizures      History of basal cell carcinoma excision      Squamous cell skin cancer      Hemorrhoids      2019 novel coronavirus disease (COVID-19)      Anemia      H/O vertigo      History of ear, nose, and throat (ENT) surgery      H/O basal cell carcinoma excision      H/O:           SOCIAL HISTORY:                      Substance history:                    Admitted from:  home  [facility]                     Roman Catholic/spirituality:                    Cultural concerns:                      Surrogate/HCP/Guardian: Phone#:    FAMILY HISTORY:  FH: pancreatic cancer (Mother)    Family history of heart attack (Father)    Family history of CVA (Father)        Allergies    Zithromax (Unknown)    Intolerances    morphine (Nausea)      ADVANCE DIRECTIVES/TREATMENT PREFERENCES:  Full code, all aggressive measures desired   DNR/DNI - MOLST, continue all other medical treatments  DNR/DNI - MOLST, comfort measures only     Baseline ADLs (prior to admission):  Independent/ Dependent      Karnofsky/Palliative Performance Status Version 2:  %  http://npcrc.org/files/news/palliative_performance_scale_[email or web address].pdf    Present Symptoms:     Dyspnea: Mild Moderate Severe  Nausea/Vomiting: Yes No  Anxiety:  Yes No  Depression: Yes No  Fatigue: Yes No  Loss of appetite: Yes No  Constipation:     Pain: Yes No            Character-            Duration-            Effect-            Factors-            Frequency-            Location-            Severity-    Pain AD Score:  [email or web address] (press ctrl + left click to view)    Review of Systems: Reviewed                     Negative:                     Positive:  Unable to obtain due to poor mentation   All others negative    MEDICATIONS  (STANDING):  albumin human 25% IVPB 100 milliLiter(s) IV Intermittent every 6 hours  chlorhexidine 2% Cloths 1 Application(s) Topical <User Schedule>  folic acid 1 milliGRAM(s) Oral daily  heparin   Injectable 5000 Unit(s) SubCutaneous every 8 hours  hydrocortisone sodium succinate Injectable 50 milliGRAM(s) IV Push every 6 hours  metroNIDAZOLE  IVPB 500 milliGRAM(s) IV Intermittent every 12 hours  multivitamin 1 Tablet(s) Oral daily  norepinephrine Infusion 0.05 MICROgram(s)/kG/Min (4.89 mL/Hr) IV Continuous <Continuous>  pantoprazole  Injectable 40 milliGRAM(s) IV Push two times a day  potassium chloride   Powder 40 milliEquivalent(s) Oral once  potassium chloride  10 mEq/100 mL IVPB 10 milliEquivalent(s) IV Intermittent every 1 hour  thiamine IVPB 500 milliGRAM(s) IV Intermittent daily  vancomycin    Solution 500 milliGRAM(s) Oral every 6 hours  vasopressin Infusion 0.04 Unit(s)/Min (6 mL/Hr) IV Continuous <Continuous>    MEDICATIONS  (PRN):      PHYSICAL EXAM:    Vital Signs Last 24 Hrs  T(C): 37.1 (2025 12:00), Max: 38.6 (2025 06:30)  T(F): 98.8 (2025 12:00), Max: 101.5 (2025 06:30)  HR: 115 (2025 12:45) (106 - 147)  BP: 104/73 (2025 12:30) (58/45 - 123/85)  BP(mean): 82 (2025 12:30) (45 - 99)  RR: 19 (2025 12:45) (19 - 36)  SpO2: 92% (2025 12:45) (92% - 99%)    Parameters below as of 2025 12:00  Patient On (Oxygen Delivery Method): nasal cannula  O2 Flow (L/min): 5      General: alert  oriented x ____ lethargic agitated delirious                  cachexia  nonverbal  coma    HEENT: normal  dry mouth  ET tube/trach    Lungs: comfortable tachypnea/labored breathing  excessive secretions    CV: normal  tachycardia    GI: normal  distended  tender  no BS               PEG/NG/OG tube  constipation  last BM:     : normal  incontinent  oliguria/anuria  mccann    MSK: normal  weakness  edema             ambulatory  bedbound/wheelchair bound    Neuro: no focal deficits cognitive impairment dysphagia dysarthria hemiplegia     Skin: normal  pressure ulcers- Stage_____  no rash    LABS:                        8.7    20.91 )-----------( 49       ( 2025 08:15 )             26.0         131[L]  |  94[L]  |  18.9  ----------------------------<  93  2.4[LL]   |  11.0[L]  |  1.16    Ca    7.8[L]      2025 08:15  Phos  2.5       Mg     1.1         TPro  5.6[L]  /  Alb  2.0[L]  /  TBili  3.5[H]  /  DBili  3.0[H]  /  AST  163[H]  /  ALT  40[H]  /  AlkPhos  489[H]      PT/INR - ( 2025 08:15 )   PT: 22.7 sec;   INR: 1.97 ratio         PTT - ( 2025 08:15 )  PTT:40.3 sec  Urinalysis Basic - ( 2025 08:15 )    Color: x / Appearance: x / SG: x / pH: x  Gluc: 93 mg/dL / Ketone: x  / Bili: x / Urobili: x   Blood: x / Protein: x / Nitrite: x   Leuk Esterase: x / RBC: x / WBC x   Sq Epi: x / Non Sq Epi: x / Bacteria: x      I&O's Summary    2025 07:01  -  2025 13:17  --------------------------------------------------------  IN: 2394.5 mL / OUT: 245 mL / NET: 2149.5 mL        RADIOLOGY & ADDITIONAL STUDIES:       HPI:  This is a 58 y/o female with PMH of ETOH cirrhosis, multiple paracentesis for ascites, esophageal varices s/p banding,  who presents with x1 week of generalized fatigue, multiple bouts of diarrhea with poor PO intake and intermittent fevers. Of note was diagnosed Flu positive last week. Her boyfriend at bedside Holden gave some collateral information. She reports fatigue, chills, +cough, +abdominal tenderness, and +SOB. (2025 07:48)    58 y/o female with PMH of ETOH cirrhosis, multiple paracentesis for ascites, esophageal varices s/p banding presented to ED from home with x1 week of generalized fatigue, diarrhea with poor PO intake and intermittent fevers. + C diff. Patient was admitted for     PERTINENT PMH REVIEWED: Yes No    PAST MEDICAL & SURGICAL HISTORY:  Alcohol abuse      Depression      Withdrawal seizures      History of basal cell carcinoma excision      Squamous cell skin cancer      Hemorrhoids      2019 novel coronavirus disease (COVID-19)      Anemia      H/O vertigo      History of ear, nose, and throat (ENT) surgery      H/O basal cell carcinoma excision      H/O:           SOCIAL HISTORY:                      Substance history: +alcohol use, +smoker                    Admitted from:  home                      Buddhism/spirituality:                     Cultural concerns:                      Surrogate/HCP/Guardian: Phone#:    FAMILY HISTORY:  FH: pancreatic cancer (Mother)    Family history of heart attack (Father)    Family history of CVA (Father)        Allergies    Zithromax (Unknown)    Intolerances    morphine (Nausea)      ADVANCE DIRECTIVES/TREATMENT PREFERENCES:  Full code, all aggressive measures desired   DNR/DNI - MOLST, continue all other medical treatments  DNR/DNI - MOLST, comfort measures only     Baseline ADLs (prior to admission):  Independent/ Dependent      Karnofsky/Palliative Performance Status Version 2:  %  http://npcrc.org/files/news/palliative_performance_scale_[email or web address].pdf    Present Symptoms:     Dyspnea: Mild Moderate Severe  Nausea/Vomiting: Yes No  Anxiety:  Yes No  Depression: Yes No  Fatigue: Yes No  Loss of appetite: Yes No  Constipation:     Pain: Yes No            Character-            Duration-            Effect-            Factors-            Frequency-            Location-            Severity-    Pain AD Score:  [email or web address] (press ctrl + left click to view)    Review of Systems: Reviewed                     Negative:                     Positive:  Unable to obtain due to poor mentation   All others negative    MEDICATIONS  (STANDING):  albumin human 25% IVPB 100 milliLiter(s) IV Intermittent every 6 hours  chlorhexidine 2% Cloths 1 Application(s) Topical <User Schedule>  folic acid 1 milliGRAM(s) Oral daily  heparin   Injectable 5000 Unit(s) SubCutaneous every 8 hours  hydrocortisone sodium succinate Injectable 50 milliGRAM(s) IV Push every 6 hours  metroNIDAZOLE  IVPB 500 milliGRAM(s) IV Intermittent every 12 hours  multivitamin 1 Tablet(s) Oral daily  norepinephrine Infusion 0.05 MICROgram(s)/kG/Min (4.89 mL/Hr) IV Continuous <Continuous>  pantoprazole  Injectable 40 milliGRAM(s) IV Push two times a day  potassium chloride   Powder 40 milliEquivalent(s) Oral once  potassium chloride  10 mEq/100 mL IVPB 10 milliEquivalent(s) IV Intermittent every 1 hour  thiamine IVPB 500 milliGRAM(s) IV Intermittent daily  vancomycin    Solution 500 milliGRAM(s) Oral every 6 hours  vasopressin Infusion 0.04 Unit(s)/Min (6 mL/Hr) IV Continuous <Continuous>    MEDICATIONS  (PRN):      PHYSICAL EXAM:    Vital Signs Last 24 Hrs  T(C): 37.1 (2025 12:00), Max: 38.6 (2025 06:30)  T(F): 98.8 (2025 12:00), Max: 101.5 (2025 06:30)  HR: 115 (2025 12:45) (106 - 147)  BP: 104/73 (2025 12:30) (58/45 - 123/85)  BP(mean): 82 (2025 12:30) (45 - 99)  RR: 19 (2025 12:45) (19 - 36)  SpO2: 92% (2025 12:45) (92% - 99%)    Parameters below as of 2025 12:00  Patient On (Oxygen Delivery Method): nasal cannula  O2 Flow (L/min): 5      General: alert  oriented x 2 (person and place)    HEENT: normal, NC 5L    Lungs: comfortable      CV:  tachycardia    GI: normal  nondistended  nontender     : mccann    MSK:  weakness    Neuro: no focal deficits    Skin:  no rash    LABS:                        8.7    20.91 )-----------( 49       ( 2025 08:15 )             26.0         131[L]  |  94[L]  |  18.9  ----------------------------<  93  2.4[LL]   |  11.0[L]  |  1.16    Ca    7.8[L]      2025 08:15  Phos  2.5       Mg     1.1         TPro  5.6[L]  /  Alb  2.0[L]  /  TBili  3.5[H]  /  DBili  3.0[H]  /  AST  163[H]  /  ALT  40[H]  /  AlkPhos  489[H]      PT/INR - ( 2025 08:15 )   PT: 22.7 sec;   INR: 1.97 ratio         PTT - ( 2025 08:15 )  PTT:40.3 sec  Urinalysis Basic - ( 2025 08:15 )    Color: x / Appearance: x / SG: x / pH: x  Gluc: 93 mg/dL / Ketone: x  / Bili: x / Urobili: x   Blood: x / Protein: x / Nitrite: x   Leuk Esterase: x / RBC: x / WBC x   Sq Epi: x / Non Sq Epi: x / Bacteria: x      I&O's Summary    2025 07:01  -  2025 13:17  --------------------------------------------------------  IN: 2394.5 mL / OUT: 245 mL / NET: 2149.5 mL        RADIOLOGY & ADDITIONAL STUDIES:    CT PE  CHEST:  LUNGS AND LARGE AIRWAYS: Patent central airways. Smooth interlobular   septal thickening at the lung apices and lung bases. No lung   consolidation or mass. Mild bibasilar dependent atelectasis.  PLEURA: No pleural effusion.  VESSELS: Adequate pulmonary arterial opacification. No pulmonary embolism   in the main pulmonary artery or right or left main pulmonary artery.   Evaluation of lobar, segmental, subsegmental branches is degraded by   motion. Main pulmonary arteries not dilated. Thoracic aorta is normal in   caliber. Coronary artery atherosclerotic calcifications.  HEART: Heart is borderline enlarged. No pericardial effusion.  MEDIASTINUM AND HANNAH: No lymphadenopathy.  CHEST WALL AND LOWER NECK: Within normal limits.    ABDOMEN AND PELVIS:  LIVER: Enlarged cirrhotic liver with heterogeneous enhancement likely due   to underlying steatosis.  BILE DUCTS: Normal caliber.  GALLBLADDER: Mildly distended gallbladder with cholelithiasis,   gallbladder wall edema, and pericholecystic fluid.  SPLEEN: Splenomegaly.  PANCREAS: Within normal limits.  ADRENALS: Low-attenuation left adrenal nodule measuring 1.4 cm, unchanged.  KIDNEYS/URETERS: Within normal limits.    BLADDER: Underdistended.  REPRODUCTIVE ORGANS: Uterus and adnexa are unremarkable apart from a 3 cm   right adnexal cyst.    BOWEL: No bowel obstruction. Appendix is normal. Thickening of the walls   of the stomach and duodenum with submucosal edema.. Thickening of the   walls of the ascending colon despite underdistention. Colonic   diverticulosis without evidence of diverticulitis.  PERITONEUM/RETROPERITONEUM: Small volume of abdominal and pelvic ascites.   No pneumoperitoneum. No loculated fluid collection to suggest abscess.  VESSELS: Atherosclerotic calcifications of the aortoiliac tree. Normal   caliber abdominal aorta. Portosystemic collaterals.  LYMPH NODES: Mildly enlarged periportal and gastrohepatic lymph nodes,   unchanged.  ABDOMINAL WALL: Mild generalized soft tissue edema.  BONES: Mild degenerative changes of the spine.    IMPRESSION:  No pulmonary embolism in the main pulmonary artery or right or left main   pulmonary artery. Evaluation of lobar, segmental, and subsegmental   branches is degraded by motion.    Mild interstitial lung edema.    Enlarged cirrhotic liver with portal hypertension. Heterogeneous   enhancement of the liver likely due to underlying steatosis. Small volume   of abdominal and pelvic ascites.    Thickening of the walls of the stomach and duodenum with submucosal edema   likely reflective of a gastroenteritis.    Thickening of the walls of the ascending colon despite underdistention   likely due to portal colopathy.    Colonic diverticulosis without evidence of diverticulitis.    Cholelithiasis. Nonspecific gallbladder wall edema and pericholecystic   fluid in the setting of cirrhosis. Correlate with LFTs.    CTAP  CHEST:  LUNGS AND LARGE AIRWAYS: Patent central airways. Smooth interlobular   septal thickening at the lung apices and lung bases. No lung   consolidation or mass. Mild bibasilar dependent atelectasis.  PLEURA: No pleural effusion.  VESSELS: Adequate pulmonary arterial opacification. No pulmonary embolism   in the main pulmonary artery or right or left main pulmonary artery.   Evaluation of lobar, segmental, subsegmental branches is degraded by   motion. Main pulmonary arteries not dilated. Thoracic aorta is normal in   caliber. Coronary artery atherosclerotic calcifications.  HEART: Heart is borderline enlarged. No pericardial effusion.  MEDIASTINUM AND HANNAH: No lymphadenopathy.  CHEST WALL AND LOWER NECK: Within normal limits.    ABDOMEN AND PELVIS:  LIVER: Enlarged cirrhotic liver with heterogeneous enhancement likely due   to underlying steatosis.  BILE DUCTS: Normal caliber.  GALLBLADDER: Mildly distended gallbladder with cholelithiasis,   gallbladder wall edema, and pericholecystic fluid.  SPLEEN: Splenomegaly.  PANCREAS: Within normal limits.  ADRENALS: Low-attenuation left adrenal nodule measuring 1.4 cm, unchanged.  KIDNEYS/URETERS: Within normal limits.    BLADDER: Underdistended.  REPRODUCTIVE ORGANS: Uterus and adnexa are unremarkable apart from a 3 cm   right adnexal cyst.    BOWEL: No bowel obstruction. Appendix is normal. Thickening of the walls   of the stomach and duodenum with submucosal edema.. Thickening of the   walls of the ascending colon despite underdistention. Colonic   diverticulosis without evidence of diverticulitis.  PERITONEUM/RETROPERITONEUM: Small volume of abdominal and pelvic ascites.   No pneumoperitoneum. No loculated fluid collection to suggest abscess.  VESSELS: Atherosclerotic calcifications of the aortoiliac tree. Normal   caliber abdominal aorta. Portosystemic collaterals.  LYMPH NODES: Mildly enlarged periportal and gastrohepatic lymph nodes,   unchanged.  ABDOMINAL WALL: Mild generalized soft tissue edema.  BONES: Mild degenerative changes of the spine.    IMPRESSION:  No pulmonary embolism in the main pulmonary artery or right or left main   pulmonary artery. Evaluation of lobar, segmental, and subsegmental   branches is degraded by motion.    Mild interstitial lung edema.    Enlarged cirrhotic liver with portal hypertension. Heterogeneous   enhancement of the liver likely due to underlying steatosis. Small volume   of abdominal and pelvic ascites.    Thickening of the walls of the stomach and duodenum with submucosal edema   likely reflective of a gastroenteritis.    Thickening of the walls of the ascending colon despite underdistention   likely due to portal colopathy.    Colonic diverticulosis without evidence of diverticulitis.    Cholelithiasis. Nonspecific gallbladder wall edema and pericholecystic   fluid in the setting of cirrhosis. Correlate with LFTs.    Ultrasound of RUQ  FINDINGS:  Liver: Hepatomegaly. Nodular liver contour. Heterogeneous echotexture.   Hepatopedal flow in the main and right portal veins. Hepatofugal flow in   the left portal vein.  Bile ducts: Normal caliber. Common bile duct measures 4 mm.  Gallbladder: Cholelithiasis and sludge. Gallbladder wall thickening   measuring 8 mm. Negative sonographic Easley sign.  Pancreas: Visualized portions are within normal limits.  Right kidney: 11.3 cm. No hydronephrosis.  Ascites: Small perihepatic ascites.  IVC: Visualized portions are within normal limits.    IMPRESSION:  Hepatomegaly and cirrhosis. Reversal of flow in the left portal vein, can   be seen with portal hypertension.    Cholelithiasis and gallbladder sludge. Gallbladder wall thickening,   nonspecific in the setting of cirrhosis. If there is clinical concern for   acute cholecystitis consider HIDA scan.

## 2025-01-27 NOTE — CHART NOTE - NSCHARTNOTEFT_GEN_A_CORE
Briefly, this is a 59 F w/ h/o alcohol cirrhosis with known esophageal varices and ascites s/p recent paracentesis, who presented with 1 week of nausea, diarrhea, abdominal pain with worsening PO intake and mental status over the past 3 days, acutely worsened today so her fiance called 911 to bring her to the hospital. She was found to be hypotensive, febrile, started on vasopressors. Arterial line was placed. Added stress dose steroids and on antibiotics. We will collect stool PCR and culture, r/o C.diff although no recent medications were started. Has acute lactic acidosis to 16 which improved to 13, likely multifactorial in setting of septic shock and liver dysfunction, likely GI source. Will give IV thiamine as well. No abdominal tenderness on exam. CT C/A/P remarkable mainly for gastroenteritis, colitis. Does not appear to have cholecystitis. We will admit to MICU at this time. Pt is full code. HCP is pt's fiance. Pt has a son who is not involved with her medical care per fiance. Admit to MICU.    Full H&P by NP to follow.    40 minutes of critical care time spent titrating IV vasoactive medications, interpreting blood gases and chest imaging.

## 2025-01-27 NOTE — GOALS OF CARE CONVERSATION - ADVANCED CARE PLANNING - CONVERSATION DETAILS
Dinah Flores was admitted to the ICU for distributive/hypovolemic shock state.  I spoke with Dinah Flores and she identified that  who was identified that if she was unable to make decisions her boyfriend Holden would be able to as her son is not in the picture as the health care proxy and we had an extensive conversation about her care and current condition.     Discussed overall goals of care including advanced directives. During this discussion we reviewed the current diagnosis, treatment plan, and likely prognosis. Dinah identified that being able to continue her life as normally as possible is important to her. She reports she has a full functional status at baseline. After this conversation decision was made by to continue full code status including intubation and if she should ever need a tracheostomy she was also in favor.    Above was reviewed with MICU attending physician Dr. Beck.

## 2025-01-27 NOTE — H&P ADULT - NSHPREVIEWOFSYSTEMS_GEN_ALL_CORE
REVIEW OF SYSTEMS:    CONSTITUTIONAL: + weakness, fevers and chills  EYES/ENT: No visual changes;  No vertigo or throat pain   NECK: No pain or stiffness  RESPIRATORY: + cough, wheezing, hemoptysis; + shortness of breath  CARDIOVASCULAR: No chest pain or palpitations  GASTROINTESTINAL: + abdominal and epigastric pain. + nausea, + vomiting, no hematemesis; + diarrhea. No melena or hematochezia.  GENITOURINARY: No dysuria, frequency or hematuria  NEUROLOGICAL: No numbness  SKIN: No itching, rashes

## 2025-01-28 DIAGNOSIS — R78.81 BACTEREMIA: ICD-10-CM

## 2025-01-28 DIAGNOSIS — Z97.8 PRESENCE OF OTHER SPECIFIED DEVICES: ICD-10-CM

## 2025-01-28 DIAGNOSIS — I33.0 ACUTE AND SUBACUTE INFECTIVE ENDOCARDITIS: ICD-10-CM

## 2025-01-28 LAB
ACANTHOCYTES BLD QL SMEAR: SIGNIFICANT CHANGE UP
AGGLUTINATION: PRESENT — SIGNIFICANT CHANGE UP
ALBUMIN SERPL ELPH-MCNC: 2.8 G/DL — LOW (ref 3.3–5.2)
ALBUMIN SERPL ELPH-MCNC: 2.9 G/DL — LOW (ref 3.3–5.2)
ALBUMIN SERPL ELPH-MCNC: 3.1 G/DL — LOW (ref 3.3–5.2)
ALBUMIN SERPL ELPH-MCNC: 3.2 G/DL — LOW (ref 3.3–5.2)
ALP SERPL-CCNC: 216 U/L — HIGH (ref 40–120)
ALP SERPL-CCNC: 226 U/L — HIGH (ref 40–120)
ALP SERPL-CCNC: 236 U/L — HIGH (ref 40–120)
ALP SERPL-CCNC: 245 U/L — HIGH (ref 40–120)
ALP SERPL-CCNC: 247 U/L — HIGH (ref 40–120)
ALP SERPL-CCNC: 261 U/L — HIGH (ref 40–120)
ALT FLD-CCNC: 39 U/L — HIGH
ALT FLD-CCNC: 40 U/L — HIGH
ALT FLD-CCNC: 40 U/L — HIGH
ALT FLD-CCNC: 41 U/L — HIGH
ALT FLD-CCNC: 43 U/L — HIGH
ALT FLD-CCNC: 45 U/L — HIGH
AMMONIA BLD-MCNC: 41 UMOL/L — SIGNIFICANT CHANGE UP (ref 11–55)
ANION GAP SERPL CALC-SCNC: 13 MMOL/L — SIGNIFICANT CHANGE UP (ref 5–17)
ANION GAP SERPL CALC-SCNC: 13 MMOL/L — SIGNIFICANT CHANGE UP (ref 5–17)
ANION GAP SERPL CALC-SCNC: 15 MMOL/L — SIGNIFICANT CHANGE UP (ref 5–17)
ANION GAP SERPL CALC-SCNC: 16 MMOL/L — SIGNIFICANT CHANGE UP (ref 5–17)
ANION GAP SERPL CALC-SCNC: 21 MMOL/L — HIGH (ref 5–17)
ANION GAP SERPL CALC-SCNC: 24 MMOL/L — HIGH (ref 5–17)
ANISOCYTOSIS BLD QL: SIGNIFICANT CHANGE UP
ANISOCYTOSIS BLD QL: SIGNIFICANT CHANGE UP
ANISOCYTOSIS BLD QL: SLIGHT — SIGNIFICANT CHANGE UP
AST SERPL-CCNC: 146 U/L — HIGH
AST SERPL-CCNC: 154 U/L — HIGH
AST SERPL-CCNC: 154 U/L — HIGH
AST SERPL-CCNC: 163 U/L — HIGH
AST SERPL-CCNC: 175 U/L — HIGH
AST SERPL-CCNC: 185 U/L — HIGH
BASOPHILS # BLD AUTO: 0 K/UL — SIGNIFICANT CHANGE UP (ref 0–0.2)
BASOPHILS NFR BLD AUTO: 0 % — SIGNIFICANT CHANGE UP (ref 0–2)
BILIRUB SERPL-MCNC: 5.6 MG/DL — HIGH (ref 0.4–2)
BILIRUB SERPL-MCNC: 6 MG/DL — HIGH (ref 0.4–2)
BILIRUB SERPL-MCNC: 6.4 MG/DL — HIGH (ref 0.4–2)
BILIRUB SERPL-MCNC: 6.5 MG/DL — HIGH (ref 0.4–2)
BILIRUB SERPL-MCNC: 7.1 MG/DL — HIGH (ref 0.4–2)
BILIRUB SERPL-MCNC: 7.2 MG/DL — HIGH (ref 0.4–2)
BUN SERPL-MCNC: 18.5 MG/DL — SIGNIFICANT CHANGE UP (ref 8–20)
BUN SERPL-MCNC: 19.1 MG/DL — SIGNIFICANT CHANGE UP (ref 8–20)
BUN SERPL-MCNC: 21.2 MG/DL — HIGH (ref 8–20)
BUN SERPL-MCNC: 24.2 MG/DL — HIGH (ref 8–20)
BUN SERPL-MCNC: 27.9 MG/DL — HIGH (ref 8–20)
BUN SERPL-MCNC: 30.2 MG/DL — HIGH (ref 8–20)
CALCIUM SERPL-MCNC: 7.2 MG/DL — LOW (ref 8.4–10.5)
CALCIUM SERPL-MCNC: 7.2 MG/DL — LOW (ref 8.4–10.5)
CALCIUM SERPL-MCNC: 7.3 MG/DL — LOW (ref 8.4–10.5)
CALCIUM SERPL-MCNC: 7.4 MG/DL — LOW (ref 8.4–10.5)
CALCIUM SERPL-MCNC: 7.4 MG/DL — LOW (ref 8.4–10.5)
CALCIUM SERPL-MCNC: 7.6 MG/DL — LOW (ref 8.4–10.5)
CHLORIDE SERPL-SCNC: 89 MMOL/L — LOW (ref 96–108)
CHLORIDE SERPL-SCNC: 90 MMOL/L — LOW (ref 96–108)
CHLORIDE SERPL-SCNC: 91 MMOL/L — LOW (ref 96–108)
CHLORIDE SERPL-SCNC: 92 MMOL/L — LOW (ref 96–108)
CK SERPL-CCNC: 71 U/L — SIGNIFICANT CHANGE UP (ref 25–170)
CO2 SERPL-SCNC: 15 MMOL/L — LOW (ref 22–29)
CO2 SERPL-SCNC: 17 MMOL/L — LOW (ref 22–29)
CO2 SERPL-SCNC: 24 MMOL/L — SIGNIFICANT CHANGE UP (ref 22–29)
CO2 SERPL-SCNC: 26 MMOL/L — SIGNIFICANT CHANGE UP (ref 22–29)
CO2 SERPL-SCNC: 26 MMOL/L — SIGNIFICANT CHANGE UP (ref 22–29)
CO2 SERPL-SCNC: 27 MMOL/L — SIGNIFICANT CHANGE UP (ref 22–29)
CREAT SERPL-MCNC: 0.75 MG/DL — SIGNIFICANT CHANGE UP (ref 0.5–1.3)
CREAT SERPL-MCNC: 0.75 MG/DL — SIGNIFICANT CHANGE UP (ref 0.5–1.3)
CREAT SERPL-MCNC: 0.76 MG/DL — SIGNIFICANT CHANGE UP (ref 0.5–1.3)
CREAT SERPL-MCNC: 0.76 MG/DL — SIGNIFICANT CHANGE UP (ref 0.5–1.3)
CREAT SERPL-MCNC: 0.79 MG/DL — SIGNIFICANT CHANGE UP (ref 0.5–1.3)
CREAT SERPL-MCNC: 0.81 MG/DL — SIGNIFICANT CHANGE UP (ref 0.5–1.3)
DACRYOCYTES BLD QL SMEAR: SLIGHT — SIGNIFICANT CHANGE UP
EGFR: 83 ML/MIN/1.73M2 — SIGNIFICANT CHANGE UP
EGFR: 85 ML/MIN/1.73M2 — SIGNIFICANT CHANGE UP
EGFR: 89 ML/MIN/1.73M2 — SIGNIFICANT CHANGE UP
EGFR: 89 ML/MIN/1.73M2 — SIGNIFICANT CHANGE UP
EGFR: 91 ML/MIN/1.73M2 — SIGNIFICANT CHANGE UP
EGFR: 91 ML/MIN/1.73M2 — SIGNIFICANT CHANGE UP
ELLIPTOCYTES BLD QL SMEAR: SLIGHT — SIGNIFICANT CHANGE UP
EOSINOPHIL # BLD AUTO: 0 K/UL — SIGNIFICANT CHANGE UP (ref 0–0.5)
EOSINOPHIL NFR BLD AUTO: 0 % — SIGNIFICANT CHANGE UP (ref 0–6)
GAS PNL BLDA: SIGNIFICANT CHANGE UP
GAS PNL BLDA: SIGNIFICANT CHANGE UP
GIANT PLATELETS BLD QL SMEAR: PRESENT — SIGNIFICANT CHANGE UP
GIANT PLATELETS BLD QL SMEAR: PRESENT — SIGNIFICANT CHANGE UP
GLUCOSE BLDC GLUCOMTR-MCNC: 234 MG/DL — HIGH (ref 70–99)
GLUCOSE BLDC GLUCOMTR-MCNC: 255 MG/DL — HIGH (ref 70–99)
GLUCOSE BLDC GLUCOMTR-MCNC: 301 MG/DL — HIGH (ref 70–99)
GLUCOSE BLDC GLUCOMTR-MCNC: 319 MG/DL — HIGH (ref 70–99)
GLUCOSE SERPL-MCNC: 151 MG/DL — HIGH (ref 70–99)
GLUCOSE SERPL-MCNC: 224 MG/DL — HIGH (ref 70–99)
GLUCOSE SERPL-MCNC: 261 MG/DL — HIGH (ref 70–99)
GLUCOSE SERPL-MCNC: 283 MG/DL — HIGH (ref 70–99)
GLUCOSE SERPL-MCNC: 291 MG/DL — HIGH (ref 70–99)
GLUCOSE SERPL-MCNC: 337 MG/DL — HIGH (ref 70–99)
HCT VFR BLD CALC: 22.7 % — LOW (ref 34.5–45)
HCT VFR BLD CALC: 23.6 % — LOW (ref 34.5–45)
HCT VFR BLD CALC: 23.9 % — LOW (ref 34.5–45)
HCT VFR BLD CALC: 24.3 % — LOW (ref 34.5–45)
HCT VFR BLD CALC: 25 % — LOW (ref 34.5–45)
HCT VFR BLD CALC: 25.3 % — LOW (ref 34.5–45)
HGB BLD-MCNC: 7.8 G/DL — LOW (ref 11.5–15.5)
HGB BLD-MCNC: 7.9 G/DL — LOW (ref 11.5–15.5)
HGB BLD-MCNC: 8.2 G/DL — LOW (ref 11.5–15.5)
HGB BLD-MCNC: 8.2 G/DL — LOW (ref 11.5–15.5)
HGB BLD-MCNC: 8.3 G/DL — LOW (ref 11.5–15.5)
HGB BLD-MCNC: 8.4 G/DL — LOW (ref 11.5–15.5)
HYPOCHROMIA BLD QL: SIGNIFICANT CHANGE UP
HYPOCHROMIA BLD QL: SIGNIFICANT CHANGE UP
HYPOCHROMIA BLD QL: SLIGHT — SIGNIFICANT CHANGE UP
LACTATE SERPL-SCNC: 10.4 MMOL/L — CRITICAL HIGH (ref 0.5–2)
LACTATE SERPL-SCNC: 3.6 MMOL/L — HIGH (ref 0.5–2)
LIDOCAIN IGE QN: 25 U/L — SIGNIFICANT CHANGE UP (ref 22–51)
LYMPHOCYTES # BLD AUTO: 0 % — LOW (ref 13–44)
LYMPHOCYTES # BLD AUTO: 0 K/UL — LOW (ref 1–3.3)
LYMPHOCYTES # BLD AUTO: 0.93 K/UL — LOW (ref 1–3.3)
LYMPHOCYTES # BLD AUTO: 1.6 K/UL — SIGNIFICANT CHANGE UP (ref 1–3.3)
LYMPHOCYTES # BLD AUTO: 1.8 % — LOW (ref 13–44)
LYMPHOCYTES # BLD AUTO: 2.7 % — LOW (ref 13–44)
MACROCYTES BLD QL: SIGNIFICANT CHANGE UP
MACROCYTES BLD QL: SIGNIFICANT CHANGE UP
MACROCYTES BLD QL: SLIGHT — SIGNIFICANT CHANGE UP
MAGNESIUM SERPL-MCNC: 2 MG/DL — SIGNIFICANT CHANGE UP (ref 1.6–2.6)
MAGNESIUM SERPL-MCNC: 2 MG/DL — SIGNIFICANT CHANGE UP (ref 1.8–2.6)
MAGNESIUM SERPL-MCNC: 2.1 MG/DL — SIGNIFICANT CHANGE UP (ref 1.6–2.6)
MAGNESIUM SERPL-MCNC: 2.1 MG/DL — SIGNIFICANT CHANGE UP (ref 1.6–2.6)
MAGNESIUM SERPL-MCNC: 2.3 MG/DL — SIGNIFICANT CHANGE UP (ref 1.6–2.6)
MAGNESIUM SERPL-MCNC: 2.5 MG/DL — SIGNIFICANT CHANGE UP (ref 1.6–2.6)
MANUAL SMEAR VERIFICATION: SIGNIFICANT CHANGE UP
MCHC RBC-ENTMCNC: 26.5 PG — LOW (ref 27–34)
MCHC RBC-ENTMCNC: 26.7 PG — LOW (ref 27–34)
MCHC RBC-ENTMCNC: 27 PG — SIGNIFICANT CHANGE UP (ref 27–34)
MCHC RBC-ENTMCNC: 27.1 PG — SIGNIFICANT CHANGE UP (ref 27–34)
MCHC RBC-ENTMCNC: 27.2 PG — SIGNIFICANT CHANGE UP (ref 27–34)
MCHC RBC-ENTMCNC: 27.5 PG — SIGNIFICANT CHANGE UP (ref 27–34)
MCHC RBC-ENTMCNC: 33.2 G/DL — SIGNIFICANT CHANGE UP (ref 32–36)
MCHC RBC-ENTMCNC: 33.2 G/DL — SIGNIFICANT CHANGE UP (ref 32–36)
MCHC RBC-ENTMCNC: 33.5 G/DL — SIGNIFICANT CHANGE UP (ref 32–36)
MCHC RBC-ENTMCNC: 33.7 G/DL — SIGNIFICANT CHANGE UP (ref 32–36)
MCHC RBC-ENTMCNC: 34.3 G/DL — SIGNIFICANT CHANGE UP (ref 32–36)
MCHC RBC-ENTMCNC: 34.4 G/DL — SIGNIFICANT CHANGE UP (ref 32–36)
MCV RBC AUTO: 78.6 FL — LOW (ref 80–100)
MCV RBC AUTO: 78.9 FL — LOW (ref 80–100)
MCV RBC AUTO: 79.1 FL — LOW (ref 80–100)
MCV RBC AUTO: 79.7 FL — LOW (ref 80–100)
MCV RBC AUTO: 81.4 FL — SIGNIFICANT CHANGE UP (ref 80–100)
MCV RBC AUTO: 82.7 FL — SIGNIFICANT CHANGE UP (ref 80–100)
METAMYELOCYTES # FLD: 2.6 % — HIGH (ref 0–0)
METAMYELOCYTES NFR BLD: 2.6 % — HIGH (ref 0–0)
MONOCYTES # BLD AUTO: 11.21 K/UL — HIGH (ref 0–0.9)
MONOCYTES # BLD AUTO: 2.62 K/UL — HIGH (ref 0–0.9)
MONOCYTES # BLD AUTO: 6.1 K/UL — HIGH (ref 0–0.9)
MONOCYTES NFR BLD AUTO: 11.8 % — SIGNIFICANT CHANGE UP (ref 2–14)
MONOCYTES NFR BLD AUTO: 18.9 % — HIGH (ref 2–14)
MONOCYTES NFR BLD AUTO: 6.9 % — SIGNIFICANT CHANGE UP (ref 2–14)
MYELOCYTES NFR BLD: 0.9 % — HIGH (ref 0–0)
NEUTROPHILS # BLD AUTO: 35.32 K/UL — HIGH (ref 1.8–7.4)
NEUTROPHILS # BLD AUTO: 43.28 K/UL — HIGH (ref 1.8–7.4)
NEUTROPHILS # BLD AUTO: 44.43 K/UL — HIGH (ref 1.8–7.4)
NEUTROPHILS NFR BLD AUTO: 68.7 % — SIGNIFICANT CHANGE UP (ref 43–77)
NEUTROPHILS NFR BLD AUTO: 76.4 % — SIGNIFICANT CHANGE UP (ref 43–77)
NEUTROPHILS NFR BLD AUTO: 92.2 % — HIGH (ref 43–77)
NEUTS BAND # BLD: 0.9 % — SIGNIFICANT CHANGE UP (ref 0–8)
NEUTS BAND # BLD: 6.2 % — SIGNIFICANT CHANGE UP (ref 0–8)
NEUTS BAND # BLD: 7.3 % — SIGNIFICANT CHANGE UP (ref 0–8)
NEUTS BAND NFR BLD: 0.9 % — SIGNIFICANT CHANGE UP (ref 0–8)
NEUTS BAND NFR BLD: 6.2 % — SIGNIFICANT CHANGE UP (ref 0–8)
NEUTS BAND NFR BLD: 7.3 % — SIGNIFICANT CHANGE UP (ref 0–8)
NRBC # BLD: 1 /100 WBCS — HIGH (ref 0–0)
NRBC BLD-RTO: 1 /100 WBCS — HIGH (ref 0–0)
OVALOCYTES BLD QL SMEAR: SIGNIFICANT CHANGE UP
OVALOCYTES BLD QL SMEAR: SLIGHT — SIGNIFICANT CHANGE UP
OVALOCYTES BLD QL SMEAR: SLIGHT — SIGNIFICANT CHANGE UP
PHOSPHATE SERPL-MCNC: 2.9 MG/DL — SIGNIFICANT CHANGE UP (ref 2.4–4.7)
PHOSPHATE SERPL-MCNC: 3.1 MG/DL — SIGNIFICANT CHANGE UP (ref 2.4–4.7)
PHOSPHATE SERPL-MCNC: 3.2 MG/DL — SIGNIFICANT CHANGE UP (ref 2.4–4.7)
PHOSPHATE SERPL-MCNC: 3.8 MG/DL — SIGNIFICANT CHANGE UP (ref 2.4–4.7)
PHOSPHATE SERPL-MCNC: 4.6 MG/DL — SIGNIFICANT CHANGE UP (ref 2.4–4.7)
PLAT MORPH BLD: NORMAL — SIGNIFICANT CHANGE UP
PLATELET # BLD AUTO: 43 K/UL — LOW (ref 150–400)
PLATELET # BLD AUTO: 46 K/UL — LOW (ref 150–400)
PLATELET # BLD AUTO: 49 K/UL — LOW (ref 150–400)
PLATELET # BLD AUTO: 50 K/UL — LOW (ref 150–400)
PLATELET # BLD AUTO: 58 K/UL — LOW (ref 150–400)
PLATELET # BLD AUTO: 60 K/UL — LOW (ref 150–400)
POIKILOCYTOSIS BLD QL AUTO: SIGNIFICANT CHANGE UP
POIKILOCYTOSIS BLD QL AUTO: SIGNIFICANT CHANGE UP
POIKILOCYTOSIS BLD QL AUTO: SLIGHT — SIGNIFICANT CHANGE UP
POLYCHROMASIA BLD QL SMEAR: SLIGHT — SIGNIFICANT CHANGE UP
POTASSIUM SERPL-MCNC: 3.5 MMOL/L — SIGNIFICANT CHANGE UP (ref 3.5–5.3)
POTASSIUM SERPL-MCNC: 3.6 MMOL/L — SIGNIFICANT CHANGE UP (ref 3.5–5.3)
POTASSIUM SERPL-MCNC: 3.8 MMOL/L — SIGNIFICANT CHANGE UP (ref 3.5–5.3)
POTASSIUM SERPL-MCNC: 4.1 MMOL/L — SIGNIFICANT CHANGE UP (ref 3.5–5.3)
POTASSIUM SERPL-MCNC: 4.4 MMOL/L — SIGNIFICANT CHANGE UP (ref 3.5–5.3)
POTASSIUM SERPL-MCNC: 4.6 MMOL/L — SIGNIFICANT CHANGE UP (ref 3.5–5.3)
POTASSIUM SERPL-SCNC: 3.5 MMOL/L — SIGNIFICANT CHANGE UP (ref 3.5–5.3)
POTASSIUM SERPL-SCNC: 3.6 MMOL/L — SIGNIFICANT CHANGE UP (ref 3.5–5.3)
POTASSIUM SERPL-SCNC: 3.8 MMOL/L — SIGNIFICANT CHANGE UP (ref 3.5–5.3)
POTASSIUM SERPL-SCNC: 4.1 MMOL/L — SIGNIFICANT CHANGE UP (ref 3.5–5.3)
POTASSIUM SERPL-SCNC: 4.4 MMOL/L — SIGNIFICANT CHANGE UP (ref 3.5–5.3)
POTASSIUM SERPL-SCNC: 4.6 MMOL/L — SIGNIFICANT CHANGE UP (ref 3.5–5.3)
PROT SERPL-MCNC: 6.3 G/DL — LOW (ref 6.6–8.7)
PROT SERPL-MCNC: 6.4 G/DL — LOW (ref 6.6–8.7)
PROT SERPL-MCNC: 6.5 G/DL — LOW (ref 6.6–8.7)
PROT SERPL-MCNC: 6.6 G/DL — SIGNIFICANT CHANGE UP (ref 6.6–8.7)
RBC # BLD: 2.87 M/UL — LOW (ref 3.8–5.2)
RBC # BLD: 2.96 M/UL — LOW (ref 3.8–5.2)
RBC # BLD: 3.03 M/UL — LOW (ref 3.8–5.2)
RBC # BLD: 3.06 M/UL — LOW (ref 3.8–5.2)
RBC # BLD: 3.07 M/UL — LOW (ref 3.8–5.2)
RBC # BLD: 3.09 M/UL — LOW (ref 3.8–5.2)
RBC # FLD: 24.1 % — HIGH (ref 10.3–14.5)
RBC # FLD: 24.2 % — HIGH (ref 10.3–14.5)
RBC # FLD: 24.5 % — HIGH (ref 10.3–14.5)
RBC # FLD: 24.6 % — HIGH (ref 10.3–14.5)
RBC # FLD: 24.9 % — HIGH (ref 10.3–14.5)
RBC # FLD: 25 % — HIGH (ref 10.3–14.5)
RBC BLD AUTO: ABNORMAL
RBC BLD AUTO: ABNORMAL
RBC BLD AUTO: SIGNIFICANT CHANGE UP
SCHISTOCYTES BLD QL AUTO: SLIGHT — SIGNIFICANT CHANGE UP
SMUDGE CELLS # BLD: PRESENT — SIGNIFICANT CHANGE UP
SODIUM SERPL-SCNC: 129 MMOL/L — LOW (ref 135–145)
SODIUM SERPL-SCNC: 130 MMOL/L — LOW (ref 135–145)
SODIUM SERPL-SCNC: 131 MMOL/L — LOW (ref 135–145)
SODIUM SERPL-SCNC: 131 MMOL/L — LOW (ref 135–145)
TARGETS BLD QL SMEAR: SLIGHT — SIGNIFICANT CHANGE UP
TARGETS BLD QL SMEAR: SLIGHT — SIGNIFICANT CHANGE UP
VARIANT LYMPHS # BLD: 2.7 % — SIGNIFICANT CHANGE UP (ref 0–6)
VARIANT LYMPHS NFR BLD MANUAL: 2.7 % — SIGNIFICANT CHANGE UP (ref 0–6)
WBC # BLD: 36.7 K/UL — HIGH (ref 3.8–10.5)
WBC # BLD: 37.94 K/UL — HIGH (ref 3.8–10.5)
WBC # BLD: 39.69 K/UL — HIGH (ref 3.8–10.5)
WBC # BLD: 45.53 K/UL — CRITICAL HIGH (ref 3.8–10.5)
WBC # BLD: 51.71 K/UL — CRITICAL HIGH (ref 3.8–10.5)
WBC # BLD: 59.32 K/UL — CRITICAL HIGH (ref 3.8–10.5)
WBC # FLD AUTO: 36.7 K/UL — HIGH (ref 3.8–10.5)
WBC # FLD AUTO: 37.94 K/UL — HIGH (ref 3.8–10.5)
WBC # FLD AUTO: 39.69 K/UL — HIGH (ref 3.8–10.5)
WBC # FLD AUTO: 45.53 K/UL — CRITICAL HIGH (ref 3.8–10.5)
WBC # FLD AUTO: 51.71 K/UL — CRITICAL HIGH (ref 3.8–10.5)
WBC # FLD AUTO: 59.32 K/UL — CRITICAL HIGH (ref 3.8–10.5)

## 2025-01-28 PROCEDURE — 99291 CRITICAL CARE FIRST HOUR: CPT | Mod: GC

## 2025-01-28 PROCEDURE — 99233 SBSQ HOSP IP/OBS HIGH 50: CPT

## 2025-01-28 PROCEDURE — 71045 X-RAY EXAM CHEST 1 VIEW: CPT | Mod: 26

## 2025-01-28 PROCEDURE — G0545: CPT

## 2025-01-28 PROCEDURE — 74176 CT ABD & PELVIS W/O CONTRAST: CPT | Mod: 26

## 2025-01-28 PROCEDURE — 71045 X-RAY EXAM CHEST 1 VIEW: CPT | Mod: 26,77

## 2025-01-28 PROCEDURE — 99223 1ST HOSP IP/OBS HIGH 75: CPT

## 2025-01-28 RX ORDER — SODIUM CHLORIDE 9 G/ML
1000 INJECTION, SOLUTION INTRAVENOUS
Refills: 0 | Status: DISCONTINUED | OUTPATIENT
Start: 2025-01-28 | End: 2025-01-28

## 2025-01-28 RX ORDER — INSULIN LISPRO 100/ML
VIAL (ML) SUBCUTANEOUS EVERY 6 HOURS
Refills: 0 | Status: DISCONTINUED | OUTPATIENT
Start: 2025-01-28 | End: 2025-01-28

## 2025-01-28 RX ORDER — ALBUMIN HUMAN 50 G/1000ML
50 SOLUTION INTRAVENOUS EVERY 6 HOURS
Refills: 0 | Status: COMPLETED | OUTPATIENT
Start: 2025-01-28 | End: 2025-01-29

## 2025-01-28 RX ORDER — VANCOMYCIN HYDROCHLORIDE 50 MG/ML
500 KIT ORAL EVERY 6 HOURS
Refills: 0 | Status: DISCONTINUED | OUTPATIENT
Start: 2025-01-28 | End: 2025-02-02

## 2025-01-28 RX ORDER — BUMETANIDE 2 MG/1
1 TABLET ORAL ONCE
Refills: 0 | Status: COMPLETED | OUTPATIENT
Start: 2025-01-28 | End: 2025-01-28

## 2025-01-28 RX ORDER — GLUCAGON 3 MG/1
1 POWDER NASAL ONCE
Refills: 0 | Status: DISCONTINUED | OUTPATIENT
Start: 2025-01-28 | End: 2025-01-28

## 2025-01-28 RX ORDER — FOLIC ACID 1 MG
1 TABLET ORAL DAILY
Refills: 0 | Status: DISCONTINUED | OUTPATIENT
Start: 2025-01-28 | End: 2025-02-02

## 2025-01-28 RX ORDER — DM/PSEUDOEPHED/ACETAMINOPHEN 10-30-250
25 CAPSULE ORAL ONCE
Refills: 0 | Status: DISCONTINUED | OUTPATIENT
Start: 2025-01-28 | End: 2025-02-07

## 2025-01-28 RX ORDER — CEFTRIAXONE 250 MG/1
2000 INJECTION, POWDER, FOR SOLUTION INTRAMUSCULAR; INTRAVENOUS EVERY 24 HOURS
Refills: 0 | Status: COMPLETED | OUTPATIENT
Start: 2025-01-28 | End: 2025-02-04

## 2025-01-28 RX ORDER — DM/PSEUDOEPHED/ACETAMINOPHEN 10-30-250
12.5 CAPSULE ORAL ONCE
Refills: 0 | Status: DISCONTINUED | OUTPATIENT
Start: 2025-01-28 | End: 2025-02-07

## 2025-01-28 RX ORDER — INSULIN LISPRO 100/ML
VIAL (ML) SUBCUTANEOUS
Refills: 0 | Status: DISCONTINUED | OUTPATIENT
Start: 2025-01-28 | End: 2025-01-28

## 2025-01-28 RX ORDER — NOREPINEPHRINE BITARTRATE 1 MG/ML
0.05 INJECTION, SOLUTION, CONCENTRATE INTRAVENOUS
Qty: 16 | Refills: 0 | Status: DISCONTINUED | OUTPATIENT
Start: 2025-01-28 | End: 2025-01-31

## 2025-01-28 RX ORDER — INSULIN LISPRO 100/ML
4 VIAL (ML) SUBCUTANEOUS EVERY 6 HOURS
Refills: 0 | Status: DISCONTINUED | OUTPATIENT
Start: 2025-01-28 | End: 2025-01-28

## 2025-01-28 RX ORDER — POTASSIUM CHLORIDE 750 MG/1
20 TABLET, EXTENDED RELEASE ORAL
Refills: 0 | Status: COMPLETED | OUTPATIENT
Start: 2025-01-28 | End: 2025-01-28

## 2025-01-28 RX ORDER — INSULIN GLARGINE-YFGN 100 [IU]/ML
15 INJECTION, SOLUTION SUBCUTANEOUS
Refills: 0 | Status: DISCONTINUED | OUTPATIENT
Start: 2025-01-28 | End: 2025-01-28

## 2025-01-28 RX ORDER — SODIUM CHLORIDE 9 G/ML
1000 INJECTION, SOLUTION INTRAVENOUS
Refills: 0 | Status: DISCONTINUED | OUTPATIENT
Start: 2025-01-28 | End: 2025-01-29

## 2025-01-28 RX ORDER — DM/PSEUDOEPHED/ACETAMINOPHEN 10-30-250
15 CAPSULE ORAL ONCE
Refills: 0 | Status: DISCONTINUED | OUTPATIENT
Start: 2025-01-28 | End: 2025-01-28

## 2025-01-28 RX ORDER — POTASSIUM CHLORIDE 750 MG/1
10 TABLET, EXTENDED RELEASE ORAL
Refills: 0 | Status: DISCONTINUED | OUTPATIENT
Start: 2025-01-28 | End: 2025-01-28

## 2025-01-28 RX ORDER — IRON/FOLIC ACID/C/B6/B12/ZINC 150-1.25MG
15 TABLET ORAL DAILY
Refills: 0 | Status: DISCONTINUED | OUTPATIENT
Start: 2025-01-28 | End: 2025-02-02

## 2025-01-28 RX ORDER — INSULIN LISPRO 100/ML
VIAL (ML) SUBCUTANEOUS AT BEDTIME
Refills: 0 | Status: DISCONTINUED | OUTPATIENT
Start: 2025-01-28 | End: 2025-01-28

## 2025-01-28 RX ADMIN — Medication 15 MILLILITER(S): at 12:46

## 2025-01-28 RX ADMIN — Medication 6 UNIT(S)/MIN: at 07:57

## 2025-01-28 RX ADMIN — BUMETANIDE 1 MILLIGRAM(S): 2 TABLET ORAL at 21:29

## 2025-01-28 RX ADMIN — POTASSIUM CHLORIDE 50 MILLIEQUIVALENT(S): 750 TABLET, EXTENDED RELEASE ORAL at 17:35

## 2025-01-28 RX ADMIN — ALBUMIN HUMAN 50 MILLILITER(S): 50 SOLUTION INTRAVENOUS at 05:12

## 2025-01-28 RX ADMIN — Medication 1 MILLIGRAM(S): at 12:45

## 2025-01-28 RX ADMIN — VANCOMYCIN HYDROCHLORIDE 500 MILLIGRAM(S): KIT at 17:37

## 2025-01-28 RX ADMIN — BUMETANIDE 1 MILLIGRAM(S): 2 TABLET ORAL at 00:34

## 2025-01-28 RX ADMIN — Medication 4: at 17:19

## 2025-01-28 RX ADMIN — POTASSIUM CHLORIDE 50 MILLIEQUIVALENT(S): 750 TABLET, EXTENDED RELEASE ORAL at 18:41

## 2025-01-28 RX ADMIN — PANTOPRAZOLE 40 MILLIGRAM(S): 20 TABLET, DELAYED RELEASE ORAL at 05:11

## 2025-01-28 RX ADMIN — Medication 50 MILLIGRAM(S): at 12:48

## 2025-01-28 RX ADMIN — Medication 8: at 17:17

## 2025-01-28 RX ADMIN — ANTISEPTIC SURGICAL SCRUB 1 APPLICATION(S): 0.04 SOLUTION TOPICAL at 05:12

## 2025-01-28 RX ADMIN — Medication 50 MILLIGRAM(S): at 05:12

## 2025-01-28 RX ADMIN — Medication 105 MILLIGRAM(S): at 12:48

## 2025-01-28 RX ADMIN — INSULIN GLARGINE-YFGN 15 UNIT(S): 100 INJECTION, SOLUTION SUBCUTANEOUS at 17:16

## 2025-01-28 RX ADMIN — PROPOFOL 3.13 MICROGRAM(S)/KG/MIN: 10 INJECTION, EMULSION INTRAVENOUS at 06:03

## 2025-01-28 RX ADMIN — ANTISEPTIC SURGICAL SCRUB 15 MILLILITER(S): 0.04 SOLUTION TOPICAL at 05:12

## 2025-01-28 RX ADMIN — POTASSIUM CHLORIDE 50 MILLIEQUIVALENT(S): 750 TABLET, EXTENDED RELEASE ORAL at 15:45

## 2025-01-28 RX ADMIN — PROPOFOL 3.13 MICROGRAM(S)/KG/MIN: 10 INJECTION, EMULSION INTRAVENOUS at 19:59

## 2025-01-28 RX ADMIN — Medication 100 MILLIGRAM(S): at 17:18

## 2025-01-28 RX ADMIN — CEFTRIAXONE 2000 MILLIGRAM(S): 250 INJECTION, POWDER, FOR SOLUTION INTRAMUSCULAR; INTRAVENOUS at 10:50

## 2025-01-28 RX ADMIN — Medication 5000 UNIT(S): at 05:40

## 2025-01-28 RX ADMIN — Medication 40 MILLIGRAM(S): at 10:52

## 2025-01-28 RX ADMIN — VANCOMYCIN HYDROCHLORIDE 500 MILLIGRAM(S): KIT at 10:54

## 2025-01-28 RX ADMIN — ALBUMIN HUMAN 50 MILLILITER(S): 50 SOLUTION INTRAVENOUS at 20:49

## 2025-01-28 RX ADMIN — SODIUM BICARBONATE 150 MEQ/KG/HR: 42 INJECTION, SOLUTION INTRAVENOUS at 07:55

## 2025-01-28 RX ADMIN — PROPOFOL 3.13 MICROGRAM(S)/KG/MIN: 10 INJECTION, EMULSION INTRAVENOUS at 14:29

## 2025-01-28 RX ADMIN — Medication 100 MILLIGRAM(S): at 05:31

## 2025-01-28 RX ADMIN — NOREPINEPHRINE BITARTRATE 2.45 MICROGRAM(S)/KG/MIN: 1 INJECTION, SOLUTION, CONCENTRATE INTRAVENOUS at 14:28

## 2025-01-28 RX ADMIN — PANTOPRAZOLE 40 MILLIGRAM(S): 20 TABLET, DELAYED RELEASE ORAL at 17:44

## 2025-01-28 RX ADMIN — MEROPENEM 1000 MILLIGRAM(S): 500 INJECTION INTRAVENOUS at 05:11

## 2025-01-28 RX ADMIN — POTASSIUM CHLORIDE 100 MILLIEQUIVALENT(S): 750 TABLET, EXTENDED RELEASE ORAL at 00:05

## 2025-01-28 RX ADMIN — VANCOMYCIN HYDROCHLORIDE 500 MILLIGRAM(S): KIT at 05:11

## 2025-01-28 RX ADMIN — Medication 5 UNIT(S)/HR: at 20:49

## 2025-01-28 RX ADMIN — Medication 5000 UNIT(S): at 15:00

## 2025-01-28 RX ADMIN — PROPOFOL 3.13 MICROGRAM(S)/KG/MIN: 10 INJECTION, EMULSION INTRAVENOUS at 00:43

## 2025-01-28 RX ADMIN — Medication 50 MILLIGRAM(S): at 17:36

## 2025-01-28 RX ADMIN — Medication 5000 UNIT(S): at 21:29

## 2025-01-28 RX ADMIN — ANTISEPTIC SURGICAL SCRUB 15 MILLILITER(S): 0.04 SOLUTION TOPICAL at 17:37

## 2025-01-28 NOTE — DIETITIAN INITIAL EVALUATION ADULT - PERTINENT MEDS FT
MEDICATIONS  (STANDING):  cefTRIAXone Injectable. 2000 milliGRAM(s) IV Push every 24 hours  fentaNYL   Infusion. 0.5 MICROgram(s)/kG/Hr (2.61 mL/Hr) IV Continuous <Continuous>  folic acid Injectable 1 milliGRAM(s) IV Push daily  glucagon  Injectable 1 milliGRAM(s) IntraMuscular once  heparin   Injectable 5000 Unit(s) SubCutaneous every 8 hours  hydrocortisone sodium succinate Injectable 50 milliGRAM(s) IV Push every 6 hours  multivitamin/minerals/iron Oral Solution (CENTRUM) 15 milliLiter(s) Oral dailly   pantoprazole  Injectable 40 milliGRAM(s) IV Push two times a day  propofol Infusion 10 MICROgram(s)/kG/Min (3.13 mL/Hr) IV Continuous <Continuous>  thiamine IVPB 500 milliGRAM(s) IV Intermittent daily  vancomycin    Solution 500 milliGRAM(s) Oral every 6 hours  vasopressin Infusion 0.04 Unit(s)/Min (6 mL/Hr) IV Continuous <Continuous>

## 2025-01-28 NOTE — PROGRESS NOTE ADULT - SUBJECTIVE AND OBJECTIVE BOX
Patient is a 59y old  Female who presents with a chief complaint of HAGMA, respiratory distress, shock (2025 15:33)      BRIEF HOSPITAL COURSE:     This is a 58 y/o female with PMH of ETOH cirrhosis, multiple paracentesis for ascites, esophageal varices s/p banding,  who presents with x1 week of generalized fatigue, multiple bouts of diarrhea with poor PO intake and intermittent fevers. Of note was diagnosed Flu positive last week. Her boyfriend at bedside Holden gave some collateral information. She reports fatigue, chills, +cough, +abdominal tenderness, and +SOB.    During hospital stay, pt was found C diff positive colitis. Pt developed respiratory failure s/p intubation last night. Bcx showed G- bacteremia.      Interval HPI:  - This morning, pt is on vaso 0.04, levo 0.4, propofol 20mg, fentanyl 0.5. MAP 78, -58   - Talked to boyfriend Holden through the phone. According to him, pt has family Hx of cardiac attach. Therefore, she following cardiologist Dr. Suarez ([phone]) from [facility].   - Called Dr. Suarez. Pt did not have severe cardiac event. For the vegation on mitral valves, it's new event     PAST MEDICAL & SURGICAL HISTORY:  Alcohol abuse      Depression      Withdrawal seizures      History of basal cell carcinoma excision      Squamous cell skin cancer      Hemorrhoids      2019 novel coronavirus disease (COVID-19)      Anemia      H/O vertigo      History of ear, nose, and throat (ENT) surgery      H/O basal cell carcinoma excision      H/O:           Review of Systems (sedated, unable to obtain)         Medications:  cefTRIAXone Injectable. 2000 milliGRAM(s) IV Push every 24 hours  metroNIDAZOLE  IVPB 500 milliGRAM(s) IV Intermittent every 12 hours  vancomycin    Solution 500 milliGRAM(s) Oral every 6 hours    norepinephrine Infusion 0.05 MICROgram(s)/kG/Min IV Continuous <Continuous>      fentaNYL   Infusion. 0.5 MICROgram(s)/kG/Hr IV Continuous <Continuous>  propofol Infusion 10 MICROgram(s)/kG/Min IV Continuous <Continuous>      heparin   Injectable 5000 Unit(s) SubCutaneous every 8 hours    pantoprazole  Injectable 40 milliGRAM(s) IV Push two times a day      dextrose 50% Injectable 25 Gram(s) IV Push once  dextrose 50% Injectable 25 Gram(s) IV Push once  dextrose 50% Injectable 12.5 Gram(s) IV Push once  hydrocortisone sodium succinate Injectable 50 milliGRAM(s) IV Push every 6 hours  insulin glargine Injectable (LANTUS) 15 Unit(s) SubCutaneous before breakfast  insulin lispro (ADMELOG) corrective regimen sliding scale   SubCutaneous every 6 hours  insulin lispro Injectable (ADMELOG) 4 Unit(s) SubCutaneous every 6 hours  vasopressin Infusion 0.04 Unit(s)/Min IV Continuous <Continuous>    folic acid Injectable 1 milliGRAM(s) IV Push daily  multivitamin/minerals/iron Oral Solution (CENTRUM) 15 milliLiter(s) Oral daily  thiamine IVPB 500 milliGRAM(s) IV Intermittent daily      chlorhexidine 0.12% Liquid 15 milliLiter(s) Oral Mucosa every 12 hours  chlorhexidine 2% Cloths 1 Application(s) Topical <User Schedule>        Mode: AC/ CMV (Assist Control/ Continuous Mandatory Ventilation)  RR (machine): 16  TV (machine): 450  FiO2: 60  PEEP: 6  MAP: 11  PIP: 22      ICU Vital Signs Last 24 Hrs  T(C): 35.9 (2025 19:00), Max: 36.6 (2025 19:30)  T(F): 96.6 (2025 19:00), Max: 97.9 (2025 19:30)  HR: 70 (2025 19:00) (70 - 132)  BP: 97/71 (2025 19:00) (91/56 - 149/88)  BP(mean): 80 (2025 19:00) (61 - 129)  ABP: 96/53 (2025 19:00) (88/45 - 156/79)  ABP(mean): 71 (2025 19:00) (39 - 121)  RR: 16 (2025 19:00) (14 - 45)  SpO2: 97% (2025 19:00) (90% - 99%)    O2 Parameters below as of 2025 12:00  Patient On (Oxygen Delivery Method): ventilator, AC    O2 Concentration (%): 80        ABG - ( 2025 09:23 )  pH, Arterial: 7.450 pH, Blood: x     /  pCO2: 36    /  pO2: 113   / HCO3: 25    / Base Excess: 1.0   /  SaO2: 100.0               I&O's Detail    2025 07:01  -  2025 07:00  --------------------------------------------------------  IN:    Albumin 25%  -  50 mL: 400 mL    FentaNYL: 26.1 mL    IV PiggyBack: 50 mL    IV PiggyBack: 100 mL    IV PiggyBack: 700 mL    IV PiggyBack: 100 mL    Lactated Ringers Bolus: 999 mL    Norepinephrine: 810.7 mL    Norepinephrine: 175 mL    Oral Fluid: 1560 mL    Propofol: 178 mL    Sodium Bicarbonate: 1350 mL    Vasopressin: 144 mL  Total IN: 6592.8 mL    OUT:    Indwelling Catheter - Urethral (mL): 1350 mL    Nasogastric/Oral tube (mL): 60 mL  Total OUT: 1410 mL    Total NET: 5182.8 mL      2025 07:01  -  2025 19:26  --------------------------------------------------------  IN:    Enteral Tube Flush: 80 mL    FentaNYL: 31.2 mL    Glucerna 1.5: 20 mL    IV PiggyBack: 105 mL    IV PiggyBack: 100 mL    IV PiggyBack: 300 mL    Norepinephrine: 202.7 mL    Propofol: 150.1 mL    Sodium Bicarbonate: 900 mL    Vasopressin: 72 mL  Total IN: 1961 mL    OUT:    Indwelling Catheter - Urethral (mL): 720 mL  Total OUT: 720 mL    Total NET: 1241 mL            Physical Examination:  GENERAL: NAD, sedated   HEAD:  Atraumatic, Normocephalic  NECK: Supple, No JVD  CHEST/LUNG: Diminished to auscultation bilaterally; No wheezes or rales  HEART: Rapid rate and regular rhythm; No murmurs, rubs, or gallops  ABDOMEN: Distended, soft, tender,  bowel sounds +   EXTREMITIES:  2+ Peripheral Pulses, No clubbing or cyanosis  Neurological: sedated   Skin: Warm and dry  Musculoskeletal: Atraumatic, no joint effusions    LABS:                        8.2    39.69 )-----------( 46       ( 2025 17:10 )             23.9         129[L]  |  89[L]  |  27.9[H]  ----------------------------<  291[H]  3.6   |  27.0  |  0.81    Ca    7.4[L]      2025 17:10  Phos  3.1       Mg     2.1         TPro  6.5[L]  /  Alb  3.1[L]  /  TBili  7.1[H]  /  DBili  x   /  AST  154[H]  /  ALT  41[H]  /  AlkPhos  226[H]            CAPILLARY BLOOD GLUCOSE      POCT Blood Glucose.: 319 mg/dL (2025 17:12)    PT/INR - ( 2025 08:15 )   PT: 22.7 sec;   INR: 1.97 ratio         PTT - ( 2025 08:15 )  PTT:40.3 sec  Urinalysis Basic - ( 2025 17:10 )    Color: x / Appearance: x / SG: x / pH: x  Gluc: 291 mg/dL / Ketone: x  / Bili: x / Urobili: x   Blood: x / Protein: x / Nitrite: x   Leuk Esterase: x / RBC: x / WBC x   Sq Epi: x / Non Sq Epi: x / Bacteria: x      CULTURES:  C Diff by PCR Result: Detected ( @ 07:15)  Culture Results:   No growth at 24 hours ( @ 06:00)  Rapid RVP Result: NotDetec ( @ 06:00)  Culture Results:   Growth in aerobic and anaerobic bottles: Escherichia coli  Direct identification is available within approximately 3-5  hours either by Blood Panel Multiplexed PCR or Direct  MALDI-TOF. Details: [email or web address] ( @ 02:07)

## 2025-01-28 NOTE — DIETITIAN INITIAL EVALUATION ADULT - PERTINENT LABORATORY DATA
01-28    130[L]  |  90[L]  |  21.2[H]  ----------------------------<  283[H]  3.8   |  24.0  |  0.76    Ca    7.2[L]      28 Jan 2025 09:00  Phos  3.8     01-28  Mg     2.1     01-28    TPro  6.5[L]  /  Alb  3.1[L]  /  TBili  6.4[H]  /  DBili  x   /  AST  163[H]  /  ALT  40[H]  /  AlkPhos  245[H]  01-28

## 2025-01-28 NOTE — DIETITIAN INITIAL EVALUATION ADULT - NSFNSGIIOFT_GEN_A_CORE
01-27-25 @ 07:01  -  01-28-25 @ 07:00  --------------------------------------------------------  OUT:    Nasogastric/Oral tube (mL): 60 mL  Total OUT: 60 mL    Total NET: -60 mL

## 2025-01-28 NOTE — DIETITIAN INITIAL EVALUATION ADULT - OTHER INFO
pt is a 58 y/o female with PMH of ETOH cirrhosis, multiple paracentesis for ascites, esophageal varices s/p banding,  who presents with x1 week of generalized fatigue, multiple bouts of diarrhea with poor PO intake and intermittent fevers. Of note was diagnosed Flu positive last week.   Pt found + CDIFF, ECOLI, respiratory failure requiring vent support.

## 2025-01-28 NOTE — DIETITIAN INITIAL EVALUATION ADULT - ORAL INTAKE PTA/DIET HISTORY
Pt is currently intubated/sedated; unable to interview at this time. Weight from  (11/2024 135lbs) current weight 114lbs; indicative of 21lbs in past 2 months. Per H&P pt with poor PO intake and diarrhea x 1 week.   Propofol providing ~396kcal at this time

## 2025-01-28 NOTE — DIETITIAN INITIAL EVALUATION ADULT - ENTERAL
(If unable to extubate) Jevity 1.5 @ 20ml/hr; increase by 10ml every 8 hours as tolerated to goal rate = 40ml/hr (x18 hours) 720ml/day; 1080kcal, 46gm protein, 547ml free water  Banatrol TID to help bulk stool

## 2025-01-28 NOTE — CONSULT NOTE ADULT - SUBJECTIVE AND OBJECTIVE BOX
[facility]                                              CARDIOLOGY AT [place] [address]                                             Telephone: [phone]. Fax:[phone]                                                       CARDIOLOGY CONSULTATION NOTE                                                                                             History obtained by: Patient and medical record  Community Cardiologist: none   obtained: Yes [  ] No [ X ]  Reason for Consultation: MV vegetation  Available out pt records reviewed: Yes [ X ] No [  ]    Chief complaint: Patient is a 59y old  Female who presents with a chief complaint of HAGMA, respiratory distress, shock (2025 15:33)    HPI:  This is a 58 y/o female with PMH of ETOH cirrhosis, multiple paracentesis for ascites, esophageal varices s/p banding,  who presents with x1 week of generalized fatigue, multiple bouts of diarrhea with poor PO intake and intermittent fevers. Of note was diagnosed Flu positive last week. Her boyfriend at bedside Holden gave some collateral information. She reports fatigue, chills, +cough, +abdominal tenderness, and +SOB. (2025 07:48)    CARDIAC TESTING   ECHO:  < from: TTE W or WO Ultrasound Enhancing Agent (25 @ 17:21) >  CONCLUSIONS:   1. Left ventricular systolic function is normal with an ejection fraction of 68 % by Amcias's method of disks.   2. The left ventricular diastolic function is indeterminate.   3. Normal right ventricular cavity size and normal right ventricular systolic function.   4. Left atrium is mildly dilated.   5. The right atrium is moderately dilated.   6. Mild tricuspid regurgitation.   7. There is a small mobile vegetation attached to the posterior mitral valve leaflet.   8. Trace pericardial effusion noted adjacent to the anterior right ventricle.   9. No prior echocardiogram is available for comparison.  < end of copied text >    PAST MEDICAL HISTORY  Alcohol abuse  Depression  Withdrawal seizures  History of basal cell carcinoma excision  Squamous cell skin cancer  Hemorrhoids  2019 novel coronavirus disease (COVID-19)  Anemia  Vertigo    PAST SURGICAL HISTORY  Ear, nose, and throat (ENT) surgery  Basal cell carcinoma excision      SOCIAL HISTORY:  Denies drugs  CIGARETTES: 1PPD x10 years  ALCOHOL: history of EtOH abuse    FAMILY HISTORY:  Pancreatic cancer (Mother)  Heart attack (Father)  CVA (Father)    HOME MEDICATIONS:  Centrum Women&#x27;s oral tablet: 1 tab(s) orally once a day (2025 10:58)  ferrous sulfate 325 mg (65 mg elemental iron) oral tablet: 1 tab(s) orally once a day (2025 10:58)  melatonin 3 mg oral tablet: 1 tab(s) orally once a day (at bedtime) As needed Insomnia (2025 10:58)  Toprol-XL 25 mg oral tablet, extended release: 1 tab(s) orally once a day (2025 10:58)    CURRENT CARDIAC MEDICATIONS:  norepinephrine Infusion 0.05 MICROgram(s)/kG/Min IV Continuous <Continuous>    CURRENT OTHER MEDICATIONS:  fentaNYL   Infusion. 0.5 MICROgram(s)/kG/Hr (2.61 mL/Hr) IV Continuous <Continuous>  propofol Infusion 10 MICROgram(s)/kG/Min (3.13 mL/Hr) IV Continuous <Continuous>  pantoprazole  Injectable 40 milliGRAM(s) IV Push two times a day  cefTRIAXone Injectable. 2000 milliGRAM(s) IV Push every 24 hours, Stop order after: 8 Days  chlorhexidine 0.12% Liquid 15 milliLiter(s) Oral Mucosa every 12 hours  chlorhexidine 2% Cloths 1 Application(s) Topical <User Schedule>  dextrose 50% Injectable 25 Gram(s) IV Push once, Stop order after: 1 Doses  dextrose 50% Injectable 25 Gram(s) IV Push once, Stop order after: 1 Doses  dextrose 50% Injectable 12.5 Gram(s) IV Push once, Stop order after: 1 Doses  folic acid Injectable 1 milliGRAM(s) IV Push daily  heparin   Injectable 5000 Unit(s) SubCutaneous every 8 hours  hydrocortisone sodium succinate Injectable 50 milliGRAM(s) IV Push every 6 hours  insulin glargine Injectable (LANTUS) 15 Unit(s) SubCutaneous before breakfast  insulin lispro (ADMELOG) corrective regimen sliding scale   SubCutaneous every 6 hours  insulin lispro Injectable (ADMELOG) 4 Unit(s) SubCutaneous every 6 hours  metroNIDAZOLE  IVPB 500 milliGRAM(s) IV Intermittent every 12 hours  multivitamin/minerals/iron Oral Solution (CENTRUM) 15 milliLiter(s) Oral daily  thiamine IVPB 500 milliGRAM(s) IV Intermittent daily, Stop order after: 3 Days  vancomycin    Solution 500 milliGRAM(s) Oral every 6 hours  vasopressin Infusion 0.04 Unit(s)/Min (6 mL/Hr) IV Continuous <Continuous>    ALLERGIES:   Zithromax (Unknown)  morphine (Nausea)    REVIEW OF SYMPTOMS:   SHANA, intubated and sedated    VITAL SIGNS:  T(C): 35.9 (25 @ 19:00), Max: 36.5 (25 @ 20:30)  T(F): 96.6 (25 @ 19:00), Max: 97.7 (25 @ 20:30)  HR: 70 (25 @ 19:00) (70 - 132)  BP: 97/71 (25 @ 19:00) (91/56 - 149/88)  RR: 16 (25 @ 19:00) (14 - 42)  SpO2: 97% (25 @ 19:00) (90% - 99%)    INTAKE AND OUTPUT:   @ 07:  -   @ 07:00  --------------------------------------------------------  IN: 6592.8 mL / OUT: 1410 mL / NET: 5182.8 mL     @ 07: @ 20:01  --------------------------------------------------------  IN: 1961 mL / OUT: 720 mL / NET: 1241 mL    PHYSICAL EXAM:  Constitutional: Comfortable . No acute distress.   HEENT: Atraumatic and normocephalic , neck is supple . no JVD. No carotid bruit.  CNS: A&Ox3. No focal deficits.   Respiratory: CTAB, unlabored   Cardiovascular: RRR normal s1 s2. No murmur. No rubs or gallop.  Gastrointestinal: Soft, non-tender. +Bowel sounds.   Extremities: 2+ Peripheral Pulses, No clubbing, cyanosis, or edema  Psychiatric: Calm . no agitation.   Skin: Warm and dry, no ulcers on extremities     LABS:             8.2    39.69 )-----------( 46       ( 2025 17:10 )             23.9     129[L]  |  89[L]  |  27.9[H]  ----------------------------<  291[H]  3.6   |  27.0  |  0.81    Ca    7.4[L]      2025 17:10  Phos  3.1       Mg     2.1       TPro  6.5[L]  /  Alb  3.1[L]  /  TBili  7.1[H]  /  DBili  x   /  AST  154[H]  /  ALT  41[H]  /  AlkPhos  226[H]    PT/INR - ( 2025 08:15 )   PT: 22.7 sec;   INR: 1.97 ratio    PTT - ( 2025 08:15 )  PTT:40.3 sec    Urinalysis Basic - ( 2025 17:10 )  Color: x / Appearance: x / SG: x / pH: x  Gluc: 291 mg/dL / Ketone: x  / Bili: x / Urobili: x   Blood: x / Protein: x / Nitrite: x   Leuk Esterase: x / RBC: x / WBC x   Sq Epi: x / Non Sq Epi: x / Bacteria: x      INTERPRETATION OF TELEMETRY:     ECG:   Prior ECG: Yes [  ] No [  ]                                              [facility]                                              CARDIOLOGY AT [address], [place] [address]                                             Telephone: [phone]. Fax:[phone]                                                   CARDIOLOGY CONSULTATION NOTE                                                                                             History obtained by: Patient and medical record  Community Cardiologist: none   obtained: Yes [  ] No [ X ]  Reason for Consultation: MV vegetation  Available out pt records reviewed: Yes [ X ] No [  ]    Chief complaint: Patient is a 59y old  Female who presents with a chief complaint of HAGMA, respiratory distress, shock (2025 15:33)    HPI: 59F with PMHx of depression, EtOH cirrhosis, withdrawal seizures, ascites, esophageal varices S/P banding, squamous cell skin cancer, and anemia. Patient presented to [facility] ED with complaints of generalized fatigue, multiple bouts of diarrhea, poor PO intake, and fevers with cough, abdominal tenderness, and SOB.  Labs on admission notable for NA of 125, Bicarb of 9, AG of 27, blood glucose of 48, Alk phos 854, , lactate of 15.10, lipase 333. Patient was admitted for undifferentiated shock. Hospital course complicated by acute hypoxic respiratory failure requiring intubation and cdiff. Cardiology was consulted for a small mobile vegetation attached to the posterior mitral valve leaflet seen on TTE.    CARDIAC TESTING   ECHO:  < from: TTE W or WO Ultrasound Enhancing Agent (25 @ 17:21) >  CONCLUSIONS:   1. Left ventricular systolic function is normal with an ejection fraction of 68 % by Macias's method of disks.   2. The left ventricular diastolic function is indeterminate.   3. Normal right ventricular cavity size and normal right ventricular systolic function.   4. Left atrium is mildly dilated.   5. The right atrium is moderately dilated.   6. Mild tricuspid regurgitation.   7. There is a small mobile vegetation attached to the posterior mitral valve leaflet.   8. Trace pericardial effusion noted adjacent to the anterior right ventricle.   9. No prior echocardiogram is available for comparison.  < end of copied text >    PAST MEDICAL HISTORY  Alcohol abuse  Depression  Withdrawal seizures  History of basal cell carcinoma excision  Squamous cell skin cancer  Hemorrhoids  2019 novel coronavirus disease (COVID-19)  Anemia  Vertigo    PAST SURGICAL HISTORY  Ear, nose, and throat (ENT) surgery  Basal cell carcinoma excision      SOCIAL HISTORY:  Denies drugs  CIGARETTES: 1PPD x10 years  ALCOHOL: history of EtOH abuse    FAMILY HISTORY:  Pancreatic cancer (Mother)  Heart attack (Father)  CVA (Father)    HOME MEDICATIONS:  Centrum Women&#x27;s oral tablet: 1 tab(s) orally once a day (2025 10:58)  ferrous sulfate 325 mg (65 mg elemental iron) oral tablet: 1 tab(s) orally once a day (2025 10:58)  melatonin 3 mg oral tablet: 1 tab(s) orally once a day (at bedtime) As needed Insomnia (2025 10:58)  Toprol-XL 25 mg oral tablet, extended release: 1 tab(s) orally once a day (2025 10:58)    CURRENT CARDIAC MEDICATIONS:  norepinephrine Infusion 0.05 MICROgram(s)/kG/Min IV Continuous <Continuous>    CURRENT OTHER MEDICATIONS:  fentaNYL   Infusion. 0.5 MICROgram(s)/kG/Hr (2.61 mL/Hr) IV Continuous <Continuous>  propofol Infusion 10 MICROgram(s)/kG/Min (3.13 mL/Hr) IV Continuous <Continuous>  pantoprazole  Injectable 40 milliGRAM(s) IV Push two times a day  cefTRIAXone Injectable. 2000 milliGRAM(s) IV Push every 24 hours, Stop order after: 8 Days  chlorhexidine 0.12% Liquid 15 milliLiter(s) Oral Mucosa every 12 hours  chlorhexidine 2% Cloths 1 Application(s) Topical <User Schedule>  dextrose 50% Injectable 25 Gram(s) IV Push once, Stop order after: 1 Doses  dextrose 50% Injectable 25 Gram(s) IV Push once, Stop order after: 1 Doses  dextrose 50% Injectable 12.5 Gram(s) IV Push once, Stop order after: 1 Doses  folic acid Injectable 1 milliGRAM(s) IV Push daily  heparin   Injectable 5000 Unit(s) SubCutaneous every 8 hours  hydrocortisone sodium succinate Injectable 50 milliGRAM(s) IV Push every 6 hours  insulin glargine Injectable (LANTUS) 15 Unit(s) SubCutaneous before breakfast  insulin lispro (ADMELOG) corrective regimen sliding scale   SubCutaneous every 6 hours  insulin lispro Injectable (ADMELOG) 4 Unit(s) SubCutaneous every 6 hours  metroNIDAZOLE  IVPB 500 milliGRAM(s) IV Intermittent every 12 hours  multivitamin/minerals/iron Oral Solution (CENTRUM) 15 milliLiter(s) Oral daily  thiamine IVPB 500 milliGRAM(s) IV Intermittent daily, Stop order after: 3 Days  vancomycin    Solution 500 milliGRAM(s) Oral every 6 hours  vasopressin Infusion 0.04 Unit(s)/Min (6 mL/Hr) IV Continuous <Continuous>    ALLERGIES:   Zithromax (Unknown)  morphine (Nausea)    REVIEW OF SYMPTOMS:   SHANA, intubated and sedated    VITAL SIGNS:  T(C): 35.9 (25 @ 19:00), Max: 36.5 (25 @ 20:30)  T(F): 96.6 (25 @ 19:00), Max: 97.7 (25 @ 20:30)  HR: 70 (25 @ :00) (70 - 132)  BP: 97/71 (25 @ 19:00) (91/56 - 149/88)  RR: 16 (25 @ 19:00) (14 - 42)  SpO2: 97% (25 @ 19:00) (90% - 99%)    INTAKE AND OUTPUT:   @ 07: @ 07:00  --------------------------------------------------------  IN: 6592.8 mL / OUT: 1410 mL / NET: 5182.8 mL     @ 07: @ 20:01  --------------------------------------------------------  IN: 1961 mL / OUT: 720 mL / NET: 1241 mL    PHYSICAL EXAM:  Constitutional: Comfortable. No acute distress.   HEENT: Atraumatic and normocephalic , neck is supple . no JVD. No carotid bruit.  CNS: A&Ox3. No focal deficits.   Respiratory: intubated and ventilated; CTAB   Cardiovascular: RRR normal s1 s2. No murmur. No rubs or gallop.  Gastrointestinal: Soft, non-tender. +Bowel sounds.   Extremities: 2+ Peripheral Pulses, No clubbing, cyanosis, or edema  Psychiatric: Calm. No agitation.  Skin: Warm and dry, no ulcers on extremities    LABS:             8.2    39.69 )-----------( 46       ( 2025 17:10 )             23.9     129[L]  |  89[L]  |  27.9[H]  ----------------------------<  291[H]  3.6   |  27.0  |  0.81    Ca    7.4[L]      2025 17:10  Phos  3.1       Mg     2.1       TPro  6.5[L]  /  Alb  3.1[L]  /  TBili  7.1[H]  /  DBili  x   /  AST  154[H]  /  ALT  41[H]  /  AlkPhos  226[H]    PT/INR - ( 2025 08:15 )   PT: 22.7 sec;   INR: 1.97 ratio    PTT - ( 2025 08:15 )  PTT:40.3 sec    Urinalysis Basic - ( 2025 17:10 )  Color: x / Appearance: x / SG: x / pH: x  Gluc: 291 mg/dL / Ketone: x  / Bili: x / Urobili: x   Blood: x / Protein: x / Nitrite: x   Leuk Esterase: x / RBC: x / WBC x   Sq Epi: x / Non Sq Epi: x / Bacteria: x    INTERPRETATION OF TELEMETRY: NSR 70s    ECG:   < from: 12 Lead ECG (25 @ 20:29) >  Ventricular Rate 117 BPM  Atrial Rate 117 BPM  P-R Interval 140 ms  QRS Duration 84 ms  Q-T Interval 366 ms  QTC Calculation(Bazett) 510 ms  P Axis 71 degrees  R Axis 53 degrees  T Axis 84 degrees  Diagnosis Line Sinus tachycardia  Low voltage QRS  Nonspecific T wave abnormality  Abnormal ECG  Confirmed by Casimiro Sanchez ([id number]) on 2025 9:43:01 PM  < end of copied text >  Prior ECG: Yes [ X ] No [  ]

## 2025-01-28 NOTE — CONSULT NOTE ADULT - SUBJECTIVE AND OBJECTIVE BOX
INFECTIOUS DISEASES AND INTERNAL MEDICINE at [place]  =======================================================  Tommy Blair MD  Diplomates American Board of Internal Medicine and Infectious Diseases  Telephone [phone]  Fax            [phone]  =======================================================    ABRAHAN [id number]      HPI:  This is a 58 y/o female with PMH of ETOH cirrhosis, multiple paracentesis for ascites, esophageal varices s/p banding,  who presents with x1 week of generalized fatigue, multiple bouts of diarrhea with poor PO intake and intermittent fevers. Of note was diagnosed Flu positive last week.ON ADMISSION SHE REPORTED   fatigue, chills, +cough, +abdominal tenderness, and +SOB.    PT WITH POSITIVE STOOL FOR  C DIFF   AND BLOOD CX WITH ECOLI  ASKED TO EVALUATE FROM ID STANDPOINT     PAST MEDICAL & SURGICAL HISTORY:  Alcohol abuse      Depression      Withdrawal seizures      History of basal cell carcinoma excision      Squamous cell skin cancer      Hemorrhoids      2019 novel coronavirus disease (COVID-19)      Anemia      H/O vertigo      History of ear, nose, and throat (ENT) surgery      H/O basal cell carcinoma excision      H/O:           ANTIBIOTICS  cefTRIAXone Injectable. 2000 milliGRAM(s) IV Push every 24 hours  metroNIDAZOLE  IVPB 500 milliGRAM(s) IV Intermittent every 12 hours  vancomycin    Solution 500 milliGRAM(s) Oral every 6 hours      Allergies    Zithromax (Unknown)    Intolerances    morphine (Nausea)      SOCIAL HISTORY:     FAMILY HX   FAMILY HISTORY:  FH: pancreatic cancer (Mother)    Family history of heart attack (Father)    Family history of CVA (Father)        Vital Signs Last 24 Hrs  T(C): 36.4 (2025 10:30), Max: 36.9 (2025 13:00)  T(F): 97.5 (2025 10:30), Max: 98.4 (2025 13:00)  HR: 79 (2025 12:26) (79 - 132)  BP: 116/69 (2025 10:00) (80/69 - 149/88)  BP(mean): 85 (2025 10:00) (57 - 129)  RR: 17 (2025 10:30) (14 - 45)  SpO2: 99% (2025 12:26) (90% - 99%)    Parameters below as of 2025 08:00  Patient On (Oxygen Delivery Method): ventilator, AC    O2 Concentration (%): 80  Drug Dosing Weight  Height (cm): 160 (2025 05:29)  Weight (kg): 52.2 (2025 02:04)  BMI (kg/m2): 20.4 (2025 05:29)  BSA (m2): 1.53 (2025 05:29)      REVIEW OF SYSTEMS      UNABLE TO OBTAIN                PHYSICAL EXAMINATION:    GENERAL: The patient is a _ON VENT    VITAL SIGNS: T(C): 36.4 (25 @ 10:30), Max: 36.9 (25 @ 13:00)  HR: 79 (25 @ 12:26) (79 - 132)  BP: 116/69 (25 @ 10:00) (80/69 - 149/88)  RR: 17 (25 @ 10:30) (14 - 45)  SpO2: 99% (25 @ 12:26) (90% - 99%)  Wt(kg): --    HEENT: Head is normocephalic and atraumatic.  ANICTERIC  NECK: Supple. No carotid bruits.  No lymphadenopathy or thyromegaly.    LUNGS:COARSE BREATH SOUNDS    HEART: Regular rate and rhythm without murmur.    ABDOMEN: Soft, nontender, and nondistended.  Positive bowel sounds.  No hepatosplenomegaly was noted. NO REBOUND NO GUARDING    EXTREMITIES: NO EDEMA NO ERYTHEMA    NEUROLOGIC: ON VENT UNRESPONISIVE            BLOOD CULTURES  Culture Results:   Growth in anaerobic bottle: Gram Negative Rods  Direct identification is available within approximately 3-5  hours either by Blood Panel Multiplexed PCR or Direct  MALDI-TOF. Details: [email or web address]  Growth in aerobic bottle: Gram Negative Rods ( @ 02:07)       URINE CX          LABS:                        7.9    45.53 )-----------( 49       ( 2025 09:00 )             23.6     -    130[L]  |  90[L]  |  21.2[H]  ----------------------------<  283[H]  3.8   |  24.0  |  0.76    Ca    7.2[L]      2025 09:00  Phos  3.8       Mg     2.1         TPro  6.5[L]  /  Alb  3.1[L]  /  TBili  6.4[H]  /  DBili  x   /  AST  163[H]  /  ALT  40[H]  /  AlkPhos  245[H]      PT/INR - ( 2025 08:15 )   PT: 22.7 sec;   INR: 1.97 ratio         PTT - ( 2025 08:15 )  PTT:40.3 sec  Urinalysis Basic - ( 2025 09:00 )    Color: x / Appearance: x / SG: x / pH: x  Gluc: 283 mg/dL / Ketone: x  / Bili: x / Urobili: x   Blood: x / Protein: x / Nitrite: x   Leuk Esterase: x / RBC: x / WBC x   Sq Epi: x / Non Sq Epi: x / Bacteria: x        RADIOLOGY & ADDITIONAL STUDIES:      ASSESSMENT/PLAN      58 y/o female with PMH of ETOH cirrhosis, multiple paracentesis for ascites, esophageal varices s/p banding,  who presents with x1 week of generalized fatigue, multiple bouts of diarrhea with poor PO intake and intermittent fevers. Of note was diagnosed Flu positive last week.ON ADMISSION SHE REPORTED   fatigue, chills, +cough, +abdominal tenderness, and +SOB.    PT WITH POSITIVE STOOL FOR  C DIFF   AND BLOOD CX WITH ECOLI  CT SCN WITH COLITIS  AGREE WITH ORAL VANCO   UNCLEAR IF ABSORBED AS VIA NGT  IN ADDITION ON FLAGYL  IN TERMS OF ECOLI CAN D/C MERRREM AND CHANGE TO ROCEPHIN  PT INTUBATED ON PRESSORS  WBC 45K WAS 51K YESTERDAY  WILL FOLLOW UP WITH FURTHER RECOMMENDATIONS          PEARL TAI MD

## 2025-01-28 NOTE — PROGRESS NOTE ADULT - ASSESSMENT
59y female with PMH ETOH cirrhosis, multiple paracentesis for ascites, esophageal varices s/p banding in the hospital for <1d  transferred to the MICU for management of shock. s/p 4L in ED, started Leovphed and Vasopressin. + C. Diff colitis. + EColi. Patient was admitted for shock.     #Palliative Care Encounter  #Advance Care Planning  - Patient does not have capacity to make simple decisions. Intubated and sedated.  - HCP: Primary is Holden  - HCP placed in patient's folder.   - Holden would like Primary Team to Call Cardiologist Dr. Barrientos [phone]  - Full Code, continue medical management  - Palliative will continue to follow     #Shock   #C Diff Colitis   #Blood Culture + E Coli   #Electrolytes abnormality   - Elevated Lipase  #TEREZA  - As per ICU medical management  - s/p intubated and sedated. On Levophed and vasopressin   - on Vanco PO and Metronidazole   - Rocephin  - Replete electrolytes as needed  - RUQ US reviewed, may consider HIDA scan.  - TTE reviewed, recs ARACELI once patient is stable

## 2025-01-28 NOTE — CONSULT NOTE ADULT - PROBLEM SELECTOR RECOMMENDATION 9
.  - Non-ischemic EKG  - NSR on telemetry  - TTE demonstrating a small mobile vegetation attached to the posterior mitral valve leaflet  - Plan for bedside ARACELI  - Keep patient NPO after midnight

## 2025-01-28 NOTE — PROGRESS NOTE ADULT - ASSESSMENT
Assessment & Plan:   ABRAHAN GOODWIN is a 59y Female with PMH ETOH cirrhosis, multiple paracentesis for ascites, esophageal varices s/p banding in the hospital for <1d  transferred to the MICU for management of undifferentiated shock state.      ====================== NEUROLOGY=====================  fentaNYL   Infusion. 0.5 MICROgram(s)/kG/Hr (2.61 mL/Hr) IV Continuous <Continuous>  propofol Infusion 10 MICROgram(s)/kG/Min (3.13 mL/Hr) IV Continuous <Continuous>    - Sedated   -continue aspiration precautions  -HOB 30 degrees  -Continue thiamine, folic acid, and multi vitamin    ==================== RESPIRATORY======================  Mechanical Ventilation:  Mode: AC/ CMV (Assist Control/ Continuous Mandatory Ventilation)  RR (machine): 16  TV (machine): 450  FiO2: 60  PEEP: 6  MAP: 11  PIP: 22      ====================CARDIOVASCULAR==================  norepinephrine Infusion 0.05 MICROgram(s)/kG/Min (2.45 mL/Hr) IV Continuous <Continuous>  vasopressin Infusion 0.04 Unit(s)/Min (6 mL/Hr) IV Continuous <Continuous>    -Maintain MAP >60   - TTE showed EF 65%, vegetation on mitral valve   - ID and cardiology consults   - Trend lactate CBC, CMP    ===================HEMATOLOGIC/ONC ===================  heparin   Injectable 5000 Unit(s) SubCutaneous every 8 hours    ===================== RENAL =========================  Continue monitoring urine output    ==================== GASTROINTESTINAL===================  folic acid Injectable 1 milliGRAM(s) IV Push daily  multivitamin/minerals/iron Oral Solution (CENTRUM) 15 milliLiter(s) Oral daily  pantoprazole  Injectable 40 milliGRAM(s) IV Push two times a day  thiamine IVPB 500 milliGRAM(s) IV Intermittent daily    Diet: NPO except meds   =======================    ENDOCRINE  =====================  dextrose 50% Injectable 25 Gram(s) IV Push once  dextrose 50% Injectable 25 Gram(s) IV Push once  dextrose 50% Injectable 12.5 Gram(s) IV Push once  hydrocortisone sodium succinate Injectable 50 milliGRAM(s) IV Push every 6 hours  insulin glargine Injectable (LANTUS) 15 Unit(s) SubCutaneous before breakfast  insulin lispro (ADMELOG) corrective regimen sliding scale   SubCutaneous every 6 hours  insulin lispro Injectable (ADMELOG) 4 Unit(s) SubCutaneous every 6 hours  vasopressin Infusion 0.04 Unit(s)/Min (6 mL/Hr) IV Continuous <Continuous>    Blood sugar goal: 100-200  - Started ISS  - Started lantus and short acting insulin pre-meals     ========================INFECTIOUS DISEASE================  cefTRIAXone Injectable. 2000 milliGRAM(s) IV Push every 24 hours  metroNIDAZOLE  IVPB 500 milliGRAM(s) IV Intermittent every 12 hours  vancomycin    Solution 500 milliGRAM(s) Oral every 6 hours    - ID conuslt appreciated   - Bcx: E.Coli           Care plan discussed with ICU attending Dr. Claudia Manning d/w Family  Dsipo: active, needs ICU care

## 2025-01-28 NOTE — DIETITIAN INITIAL EVALUATION ADULT - ETIOLOGY
Related to inadequate protein energy intake with altered GI function in setting of ETOH cirrhosis; now with CDIFF, ECOLI bactaremia

## 2025-01-28 NOTE — PROGRESS NOTE ADULT - SUBJECTIVE AND OBJECTIVE BOX
OVERNIGHT EVENTS:    Patient was intubated.   TTE showed small mobile vegetation attached to the posterior mitral valve leaflet.    Goal of care:  Discussed diagnosis, current medical management, prognosis, and treatment options. Discussed risks and benefits of CPR. Holden wanted primary team to contact cardiologist Dr. Barrientos [phone]. Afterward, Holden would follow up with Cardiologist to hear his recommendation. Holden wanted full code and continue aggressive management for now. Answered his questions and he verbalized understanding.     Present Symptoms:     Dyspnea: No  Nausea/Vomiting:  No  Anxiety:  Yes No  Depression: Yes No  Fatigue: Yes No  Loss of appetite: Yes No  Constipation:     Pain:             Character-            Duration-            Effect-            Factors-            Frequency-            Location-            Severity-    Pain AD Score:  [email or web address]/[email or web address] (press ctrl + left click to view)    Review of Systems: Reviewed                     Negative:                     Positive:  Unable to obtain due to poor mentation   All others negative    MEDICATIONS  (STANDING):  cefTRIAXone Injectable. 2000 milliGRAM(s) IV Push every 24 hours  chlorhexidine 0.12% Liquid 15 milliLiter(s) Oral Mucosa every 12 hours  chlorhexidine 2% Cloths 1 Application(s) Topical <User Schedule>  dextrose 50% Injectable 25 Gram(s) IV Push once  dextrose 50% Injectable 12.5 Gram(s) IV Push once  dextrose 50% Injectable 25 Gram(s) IV Push once  fentaNYL   Infusion. 0.5 MICROgram(s)/kG/Hr (2.61 mL/Hr) IV Continuous <Continuous>  folic acid Injectable 1 milliGRAM(s) IV Push daily  heparin   Injectable 5000 Unit(s) SubCutaneous every 8 hours  hydrocortisone sodium succinate Injectable 50 milliGRAM(s) IV Push every 6 hours  insulin glargine Injectable (LANTUS) 15 Unit(s) SubCutaneous before breakfast  insulin lispro (ADMELOG) corrective regimen sliding scale   SubCutaneous three times a day before meals  insulin lispro (ADMELOG) corrective regimen sliding scale   SubCutaneous every 6 hours  insulin lispro Injectable (ADMELOG) 4 Unit(s) SubCutaneous every 6 hours  metroNIDAZOLE  IVPB 500 milliGRAM(s) IV Intermittent every 12 hours  multivitamin/minerals/iron Oral Solution (CENTRUM) 15 milliLiter(s) Oral daily  norepinephrine Infusion 0.05 MICROgram(s)/kG/Min (2.45 mL/Hr) IV Continuous <Continuous>  pantoprazole  Injectable 40 milliGRAM(s) IV Push two times a day  potassium chloride  20 mEq/100 mL IVPB 20 milliEquivalent(s) IV Intermittent every 2 hours  propofol Infusion 10 MICROgram(s)/kG/Min (3.13 mL/Hr) IV Continuous <Continuous>  thiamine IVPB 500 milliGRAM(s) IV Intermittent daily  vancomycin    Solution 500 milliGRAM(s) Oral every 6 hours  vasopressin Infusion 0.04 Unit(s)/Min (6 mL/Hr) IV Continuous <Continuous>    MEDICATIONS  (PRN):      PHYSICAL EXAM:    Vital Signs Last 24 Hrs  T(C): 36.3 (28 Jan 2025 14:15), Max: 36.9 (27 Jan 2025 16:00)  T(F): 97.3 (28 Jan 2025 14:15), Max: 98.4 (27 Jan 2025 16:00)  HR: 78 (28 Jan 2025 14:15) (77 - 132)  BP: 107/71 (28 Jan 2025 14:00) (80/69 - 149/88)  BP(mean): 83 (28 Jan 2025 14:00) (61 - 129)  RR: 16 (28 Jan 2025 14:15) (14 - 45)  SpO2: 97% (28 Jan 2025 14:15) (90% - 99%)    Parameters below as of 28 Jan 2025 12:00  Patient On (Oxygen Delivery Method): ventilator, AC    O2 Concentration (%): 80    General: alert  oriented x ____ lethargic agitated                  cachexia  nonverbal  coma    Karnofsky:  %    HEENT: normal  dry mouth  ET tube/trach    Lungs: comfortable tachypnea/labored breathing  excessive secretions    CV: normal  tachycardia    GI: normal  distended  tender  no BS               PEG/NG/OG tube  constipation  last BM:     : normal  incontinent  oliguria/anuria  mccann    MSK: normal  weakness  edema             ambulatory  bedbound/wheelchair bound    Skin: normal  pressure ulcers- Stage_____  no rash    LABS:                          7.8    37.94 )-----------( 43       ( 28 Jan 2025 13:00 )             22.7     01-28    131[L]  |  90[L]  |  24.2[H]  ----------------------------<  337[H]  3.5   |  26.0  |  0.79    Ca    7.2[L]      28 Jan 2025 13:00  Phos  3.2     01-28  Mg     2.0     01-28    TPro  6.4[L]  /  Alb  3.1[L]  /  TBili  6.5[H]  /  DBili  x   /  AST  154[H]  /  ALT  39[H]  /  AlkPhos  216[H]  01-28    PT/INR - ( 27 Jan 2025 08:15 )   PT: 22.7 sec;   INR: 1.97 ratio         PTT - ( 27 Jan 2025 08:15 )  PTT:40.3 sec  Urinalysis Basic - ( 28 Jan 2025 13:00 )    Color: x / Appearance: x / SG: x / pH: x  Gluc: 337 mg/dL / Ketone: x  / Bili: x / Urobili: x   Blood: x / Protein: x / Nitrite: x   Leuk Esterase: x / RBC: x / WBC x   Sq Epi: x / Non Sq Epi: x / Bacteria: x      I&O's Summary    27 Jan 2025 07:01  -  28 Jan 2025 07:00  --------------------------------------------------------  IN: 6592.8 mL / OUT: 1410 mL / NET: 5182.8 mL    28 Jan 2025 07:01  -  28 Jan 2025 15:34  --------------------------------------------------------  IN: 1287.8 mL / OUT: 460 mL / NET: 827.8 mL        RADIOLOGY & ADDITIONAL STUDIES:    ADVANCE DIRECTIVES/TREATMENT PREFERENCES:  DNR YES NO  Completed on:                     MOLST  YES NO   Completed on:  Living Will  YES NO   Completed on: OVERNIGHT EVENTS:    Patient was intubated.   TTE showed small mobile vegetation attached to the posterior mitral valve leaflet. Recs ARACELI.    Goal of care:  Discussed diagnosis, current medical management, prognosis, and treatment options. Discussed risks and benefits of CPR. Holden wanted primary team to contact cardiologist Dr. Barrientos [phone]. Afterward, Holden would follow up with Cardiologist to hear his recommendation. Holden wanted full code and continue aggressive management for now. Answered his questions and he verbalized understanding. Answered his questions and he verbalized understanding. Provided Cardiologist phone number to primary team.     Present Symptoms: Unable to assess   BM: x1 1/28/2025    Pain:             Character-            Duration-            Effect-            Factors-            Frequency-            Location-            Severity-    Pain AD Score:  [email or web address]/[email or web address].pdf (press ctrl + left click to view)    Review of Systems:   Unable to obtain due sedated and intubated     MEDICATIONS  (STANDING):  cefTRIAXone Injectable. 2000 milliGRAM(s) IV Push every 24 hours  chlorhexidine 0.12% Liquid 15 milliLiter(s) Oral Mucosa every 12 hours  chlorhexidine 2% Cloths 1 Application(s) Topical <User Schedule>  dextrose 50% Injectable 25 Gram(s) IV Push once  dextrose 50% Injectable 12.5 Gram(s) IV Push once  dextrose 50% Injectable 25 Gram(s) IV Push once  fentaNYL   Infusion. 0.5 MICROgram(s)/kG/Hr (2.61 mL/Hr) IV Continuous <Continuous>  folic acid Injectable 1 milliGRAM(s) IV Push daily  heparin   Injectable 5000 Unit(s) SubCutaneous every 8 hours  hydrocortisone sodium succinate Injectable 50 milliGRAM(s) IV Push every 6 hours  insulin glargine Injectable (LANTUS) 15 Unit(s) SubCutaneous before breakfast  insulin lispro (ADMELOG) corrective regimen sliding scale   SubCutaneous three times a day before meals  insulin lispro (ADMELOG) corrective regimen sliding scale   SubCutaneous every 6 hours  insulin lispro Injectable (ADMELOG) 4 Unit(s) SubCutaneous every 6 hours  metroNIDAZOLE  IVPB 500 milliGRAM(s) IV Intermittent every 12 hours  multivitamin/minerals/iron Oral Solution (CENTRUM) 15 milliLiter(s) Oral daily  norepinephrine Infusion 0.05 MICROgram(s)/kG/Min (2.45 mL/Hr) IV Continuous <Continuous>  pantoprazole  Injectable 40 milliGRAM(s) IV Push two times a day  potassium chloride  20 mEq/100 mL IVPB 20 milliEquivalent(s) IV Intermittent every 2 hours  propofol Infusion 10 MICROgram(s)/kG/Min (3.13 mL/Hr) IV Continuous <Continuous>  thiamine IVPB 500 milliGRAM(s) IV Intermittent daily  vancomycin    Solution 500 milliGRAM(s) Oral every 6 hours  vasopressin Infusion 0.04 Unit(s)/Min (6 mL/Hr) IV Continuous <Continuous>    MEDICATIONS  (PRN):      PHYSICAL EXAM:    Vital Signs Last 24 Hrs  T(C): 36.3 (28 Jan 2025 14:15), Max: 36.9 (27 Jan 2025 16:00)  T(F): 97.3 (28 Jan 2025 14:15), Max: 98.4 (27 Jan 2025 16:00)  HR: 78 (28 Jan 2025 14:15) (77 - 132)  BP: 107/71 (28 Jan 2025 14:00) (80/69 - 149/88)  BP(mean): 83 (28 Jan 2025 14:00) (61 - 129)  RR: 16 (28 Jan 2025 14:15) (14 - 45)  SpO2: 97% (28 Jan 2025 14:15) (90% - 99%)    Parameters below as of 28 Jan 2025 12:00  Patient On (Oxygen Delivery Method): ventilator, AC    O2 Concentration (%): 80    General: sedated and intubated    Karnofsky:  %    HEENT: ET tube    Lungs: comfortable    CV: normal  tachycardia    GI: normal  +distended  nontender      :  mccann    MSK: no edema     Skin: no rash    LABS:                          7.8    37.94 )-----------( 43       ( 28 Jan 2025 13:00 )             22.7     01-28    131[L]  |  90[L]  |  24.2[H]  ----------------------------<  337[H]  3.5   |  26.0  |  0.79    Ca    7.2[L]      28 Jan 2025 13:00  Phos  3.2     01-28  Mg     2.0     01-28    TPro  6.4[L]  /  Alb  3.1[L]  /  TBili  6.5[H]  /  DBili  x   /  AST  154[H]  /  ALT  39[H]  /  AlkPhos  216[H]  01-28    PT/INR - ( 27 Jan 2025 08:15 )   PT: 22.7 sec;   INR: 1.97 ratio         PTT - ( 27 Jan 2025 08:15 )  PTT:40.3 sec  Urinalysis Basic - ( 28 Jan 2025 13:00 )    Color: x / Appearance: x / SG: x / pH: x  Gluc: 337 mg/dL / Ketone: x  / Bili: x / Urobili: x   Blood: x / Protein: x / Nitrite: x   Leuk Esterase: x / RBC: x / WBC x   Sq Epi: x / Non Sq Epi: x / Bacteria: x      I&O's Summary    27 Jan 2025 07:01  -  28 Jan 2025 07:00  --------------------------------------------------------  IN: 6592.8 mL / OUT: 1410 mL / NET: 5182.8 mL    28 Jan 2025 07:01  -  28 Jan 2025 15:34  --------------------------------------------------------  IN: 1287.8 mL / OUT: 460 mL / NET: 827.8 mL        RADIOLOGY & ADDITIONAL STUDIES:    Echo 1/28/2025  CONCLUSIONS:      1. Left ventricular systolic function is normal with an ejection fraction of 68 % by Macias's method of disks.   2. The left ventricular diastolic function is indeterminate.   3. Normal right ventricular cavity size and normal right ventricular systolic function.   4. Left atrium is mildly dilated.   5. The right atrium is moderately dilated.   6. Mild tricuspid regurgitation.   7. There is a small mobile vegetation attached to the posterior mitral valve leaflet.   8. Trace pericardial effusion noted adjacent to the anterior right ventricle.   9. No prior echocardiogram is available for comparison.    CXR 1/28/2025    Heart normal for projection.    NG tube remains.    Endotracheal tube again noted. The tip is approximately 1.2 cm above the   maco showing some improvement from January 27 when it was 0.7 cm above   the maco.    There are persistent irregular fairly significant perihilar infiltrates   and developing left lower lobe consolidation.    Abdomen on January 27. CAT scan earlier in the day showed hepatomegaly   with cirrhosis and mild ascites.    On present examination there is mild air distention of the stomach. NG   tube is in good position. No bowel distention seen. Findings are similar   to the CAT scan.    What appears to be a right groin catheter ends lateral to L5.      IMPRESSION: Unchanged advanced infiltrates. Endotracheal tube position   somewhat improved. No bowel obstruction.    CTAP   FINDINGS:  LOWER CHEST: Bibasilar focal atelectasis and/or pneumonia, new since the   previous study.    LIVER: Grossly, no significant change since the previous study.   Geographic areas of ill-defined low-attenuation, likely hepatic   steatosis.. Nodular hepatic contour, likely concomitant cirrhotic change.  BILE DUCTS: Normal caliber.  GALLBLADDER: Cholelithiasis. Gallbladder wall thickening and   pericholecystic fluid.  SPLEEN: Stable splenomegaly  PANCREAS: Within normal limits.  ADRENALS: Within normal limits.  KIDNEYS/URETERS: Mild persistent enhancement of the renal parenchyma,   likely from recent prior intravenous contrast injection. No collecting   system obstruction bilaterally.    BLADDER: Dependent intravesicular air, likely secondary to the presence   of a Mccann catheter.  REPRODUCTIVE ORGANS: Within normal limits    BOWEL: Diffuse edema/wall thickening of the ascending colon. Wall   thickening of the sigmoid colon on a background of diverticulosis.  PERITONEUM/RETROPERITONEUM: Trace ascites.  VESSELS: Atherosclerotic calcifications. Right common femoral vein   central venous catheter. New since the previous study.  LYMPH NODES: No lymphadenopathy.  ABDOMINAL WALL: Mild diffuse anasarca  BONES: Degenerative changes.    IMPRESSION:  1.  New bilateral pleural effusions with bibasilar focal atelectasis   and/or pneumonia.  2.  Ascending colonic wall thickening and sigmoid colonic wall   thickening; possible colitis.  3.  Gallbladder wall thickening and pericholecystic fluid; nonspecific in   the presence of trace ascites. Cannot totally exclude acute cholecystitis.  4.  Cirrhotic changes in liver with geographic areas of hepatic steatosis   as noted previously.      ADVANCE DIRECTIVES/TREATMENT PREFERENCES:  Full Code, continue medical management

## 2025-01-28 NOTE — CONSULT NOTE ADULT - NS ATTEND AMEND GEN_ALL_CORE FT
TTE report reviewed, will plan for ARACELI bed side 1.30.2025. Hold tube feeds tonight after midnight.

## 2025-01-29 LAB
-  AMPICILLIN/SULBACTAM: SIGNIFICANT CHANGE UP
-  AMPICILLIN: SIGNIFICANT CHANGE UP
-  AZTREONAM: SIGNIFICANT CHANGE UP
-  CEFAZOLIN: SIGNIFICANT CHANGE UP
-  CEFEPIME: SIGNIFICANT CHANGE UP
-  CEFOXITIN: SIGNIFICANT CHANGE UP
-  CEFTRIAXONE: SIGNIFICANT CHANGE UP
-  CIPROFLOXACIN: SIGNIFICANT CHANGE UP
-  ERTAPENEM: SIGNIFICANT CHANGE UP
-  GENTAMICIN: SIGNIFICANT CHANGE UP
-  IMIPENEM: SIGNIFICANT CHANGE UP
-  LEVOFLOXACIN: SIGNIFICANT CHANGE UP
-  MEROPENEM: SIGNIFICANT CHANGE UP
-  PIPERACILLIN/TAZOBACTAM: SIGNIFICANT CHANGE UP
-  TOBRAMYCIN: SIGNIFICANT CHANGE UP
-  TRIMETHOPRIM/SULFAMETHOXAZOLE: SIGNIFICANT CHANGE UP
A1C WITH ESTIMATED AVERAGE GLUCOSE RESULT: 4.7 % — SIGNIFICANT CHANGE UP (ref 4–5.6)
ALBUMIN SERPL ELPH-MCNC: 3.1 G/DL — LOW (ref 3.3–5.2)
ALBUMIN SERPL ELPH-MCNC: 3.1 G/DL — LOW (ref 3.3–5.2)
ALBUMIN SERPL ELPH-MCNC: 3.3 G/DL — SIGNIFICANT CHANGE UP (ref 3.3–5.2)
ALP SERPL-CCNC: 204 U/L — HIGH (ref 40–120)
ALP SERPL-CCNC: 216 U/L — HIGH (ref 40–120)
ALP SERPL-CCNC: 217 U/L — HIGH (ref 40–120)
ALT FLD-CCNC: 33 U/L — HIGH
ALT FLD-CCNC: 34 U/L — HIGH
ALT FLD-CCNC: 38 U/L — HIGH
ANION GAP SERPL CALC-SCNC: 13 MMOL/L — SIGNIFICANT CHANGE UP (ref 5–17)
ANION GAP SERPL CALC-SCNC: 13 MMOL/L — SIGNIFICANT CHANGE UP (ref 5–17)
ANION GAP SERPL CALC-SCNC: 14 MMOL/L — SIGNIFICANT CHANGE UP (ref 5–17)
ANISOCYTOSIS BLD QL: SIGNIFICANT CHANGE UP
AST SERPL-CCNC: 117 U/L — HIGH
AST SERPL-CCNC: 118 U/L — HIGH
AST SERPL-CCNC: 131 U/L — HIGH
BASOPHILS # BLD AUTO: 0 K/UL — SIGNIFICANT CHANGE UP (ref 0–0.2)
BASOPHILS NFR BLD AUTO: 0 % — SIGNIFICANT CHANGE UP (ref 0–2)
BILIRUB SERPL-MCNC: 7 MG/DL — HIGH (ref 0.4–2)
BILIRUB SERPL-MCNC: 7.2 MG/DL — HIGH (ref 0.4–2)
BILIRUB SERPL-MCNC: 7.2 MG/DL — HIGH (ref 0.4–2)
BILIRUB SERPL-MCNC: 7.4 MG/DL — HIGH (ref 0.4–2)
BUN SERPL-MCNC: 31.5 MG/DL — HIGH (ref 8–20)
BUN SERPL-MCNC: 31.9 MG/DL — HIGH (ref 8–20)
BUN SERPL-MCNC: 35 MG/DL — HIGH (ref 8–20)
CALCIUM SERPL-MCNC: 7.4 MG/DL — LOW (ref 8.4–10.5)
CALCIUM SERPL-MCNC: 7.9 MG/DL — LOW (ref 8.4–10.5)
CALCIUM SERPL-MCNC: 8.1 MG/DL — LOW (ref 8.4–10.5)
CHLORIDE SERPL-SCNC: 90 MMOL/L — LOW (ref 96–108)
CHLORIDE SERPL-SCNC: 91 MMOL/L — LOW (ref 96–108)
CHLORIDE SERPL-SCNC: 93 MMOL/L — LOW (ref 96–108)
CO2 SERPL-SCNC: 26 MMOL/L — SIGNIFICANT CHANGE UP (ref 22–29)
CO2 SERPL-SCNC: 26 MMOL/L — SIGNIFICANT CHANGE UP (ref 22–29)
CO2 SERPL-SCNC: 27 MMOL/L — SIGNIFICANT CHANGE UP (ref 22–29)
CREAT SERPL-MCNC: 0.69 MG/DL — SIGNIFICANT CHANGE UP (ref 0.5–1.3)
CREAT SERPL-MCNC: 0.74 MG/DL — SIGNIFICANT CHANGE UP (ref 0.5–1.3)
CREAT SERPL-MCNC: 0.76 MG/DL — SIGNIFICANT CHANGE UP (ref 0.5–1.3)
CREAT SERPL-MCNC: 0.77 MG/DL — SIGNIFICANT CHANGE UP (ref 0.5–1.3)
CULTURE RESULTS: ABNORMAL
EGFR: 88 ML/MIN/1.73M2 — SIGNIFICANT CHANGE UP
EGFR: 89 ML/MIN/1.73M2 — SIGNIFICANT CHANGE UP
EGFR: 92 ML/MIN/1.73M2 — SIGNIFICANT CHANGE UP
EGFR: 99 ML/MIN/1.73M2 — SIGNIFICANT CHANGE UP
EOSINOPHIL # BLD AUTO: 0 K/UL — SIGNIFICANT CHANGE UP (ref 0–0.5)
EOSINOPHIL NFR BLD AUTO: 0 % — SIGNIFICANT CHANGE UP (ref 0–6)
ESTIMATED AVERAGE GLUCOSE: 88 MG/DL — SIGNIFICANT CHANGE UP (ref 68–114)
GAS PNL BLDA: SIGNIFICANT CHANGE UP
GIANT PLATELETS BLD QL SMEAR: PRESENT — SIGNIFICANT CHANGE UP
GLUCOSE BLDC GLUCOMTR-MCNC: 110 MG/DL — HIGH (ref 70–99)
GLUCOSE BLDC GLUCOMTR-MCNC: 117 MG/DL — HIGH (ref 70–99)
GLUCOSE BLDC GLUCOMTR-MCNC: 131 MG/DL — HIGH (ref 70–99)
GLUCOSE BLDC GLUCOMTR-MCNC: 135 MG/DL — HIGH (ref 70–99)
GLUCOSE BLDC GLUCOMTR-MCNC: 182 MG/DL — HIGH (ref 70–99)
GLUCOSE BLDC GLUCOMTR-MCNC: 200 MG/DL — HIGH (ref 70–99)
GLUCOSE BLDC GLUCOMTR-MCNC: 201 MG/DL — HIGH (ref 70–99)
GLUCOSE BLDC GLUCOMTR-MCNC: 213 MG/DL — HIGH (ref 70–99)
GLUCOSE SERPL-MCNC: 106 MG/DL — HIGH (ref 70–99)
GLUCOSE SERPL-MCNC: 146 MG/DL — HIGH (ref 70–99)
GLUCOSE SERPL-MCNC: 184 MG/DL — HIGH (ref 70–99)
HCT VFR BLD CALC: 22.4 % — LOW (ref 34.5–45)
HCT VFR BLD CALC: 22.7 % — LOW (ref 34.5–45)
HCT VFR BLD CALC: 23.4 % — LOW (ref 34.5–45)
HGB BLD-MCNC: 7.5 G/DL — LOW (ref 11.5–15.5)
HGB BLD-MCNC: 7.7 G/DL — LOW (ref 11.5–15.5)
HGB BLD-MCNC: 8 G/DL — LOW (ref 11.5–15.5)
HYPOCHROMIA BLD QL: SLIGHT — SIGNIFICANT CHANGE UP
INR BLD: 2.69 RATIO — HIGH (ref 0.85–1.16)
LACTATE SERPL-SCNC: 2.1 MMOL/L — HIGH (ref 0.5–2)
LYMPHOCYTES # BLD AUTO: 0.32 K/UL — LOW (ref 1–3.3)
LYMPHOCYTES # BLD AUTO: 0.9 % — LOW (ref 13–44)
MACROCYTES BLD QL: SIGNIFICANT CHANGE UP
MAGNESIUM SERPL-MCNC: 1.9 MG/DL — SIGNIFICANT CHANGE UP (ref 1.6–2.6)
MAGNESIUM SERPL-MCNC: 1.9 MG/DL — SIGNIFICANT CHANGE UP (ref 1.6–2.6)
MAGNESIUM SERPL-MCNC: 2 MG/DL — SIGNIFICANT CHANGE UP (ref 1.6–2.6)
MANUAL SMEAR VERIFICATION: SIGNIFICANT CHANGE UP
MCHC RBC-ENTMCNC: 26.4 PG — LOW (ref 27–34)
MCHC RBC-ENTMCNC: 26.8 PG — LOW (ref 27–34)
MCHC RBC-ENTMCNC: 26.9 PG — LOW (ref 27–34)
MCHC RBC-ENTMCNC: 33.5 G/DL — SIGNIFICANT CHANGE UP (ref 32–36)
MCHC RBC-ENTMCNC: 33.9 G/DL — SIGNIFICANT CHANGE UP (ref 32–36)
MCHC RBC-ENTMCNC: 34.2 G/DL — SIGNIFICANT CHANGE UP (ref 32–36)
MCV RBC AUTO: 78.8 FL — LOW (ref 80–100)
MCV RBC AUTO: 78.9 FL — LOW (ref 80–100)
MCV RBC AUTO: 79.1 FL — LOW (ref 80–100)
MELD SCORE WITH DIALYSIS: 39 POINTS — SIGNIFICANT CHANGE UP
MELD SCORE WITHOUT DIALYSIS: 29 POINTS — SIGNIFICANT CHANGE UP
METHOD TYPE: SIGNIFICANT CHANGE UP
MONOCYTES # BLD AUTO: 0.93 K/UL — HIGH (ref 0–0.9)
MONOCYTES NFR BLD AUTO: 2.6 % — SIGNIFICANT CHANGE UP (ref 2–14)
NEUTROPHILS # BLD AUTO: 33.93 K/UL — HIGH (ref 1.8–7.4)
NEUTROPHILS NFR BLD AUTO: 94.8 % — HIGH (ref 43–77)
ORGANISM # SPEC MICROSCOPIC CNT: ABNORMAL
ORGANISM # SPEC MICROSCOPIC CNT: ABNORMAL
ORGANISM # SPEC MICROSCOPIC CNT: SIGNIFICANT CHANGE UP
PHOSPHATE SERPL-MCNC: 2.6 MG/DL — SIGNIFICANT CHANGE UP (ref 2.4–4.7)
PHOSPHATE SERPL-MCNC: 2.6 MG/DL — SIGNIFICANT CHANGE UP (ref 2.4–4.7)
PHOSPHATE SERPL-MCNC: 3.2 MG/DL — SIGNIFICANT CHANGE UP (ref 2.4–4.7)
PLAT MORPH BLD: NORMAL — SIGNIFICANT CHANGE UP
PLATELET # BLD AUTO: 39 K/UL — LOW (ref 150–400)
PLATELET # BLD AUTO: 45 K/UL — LOW (ref 150–400)
PLATELET # BLD AUTO: 49 K/UL — LOW (ref 150–400)
POLYCHROMASIA BLD QL SMEAR: SLIGHT — SIGNIFICANT CHANGE UP
POTASSIUM SERPL-MCNC: 3.5 MMOL/L — SIGNIFICANT CHANGE UP (ref 3.5–5.3)
POTASSIUM SERPL-MCNC: 3.9 MMOL/L — SIGNIFICANT CHANGE UP (ref 3.5–5.3)
POTASSIUM SERPL-MCNC: 4 MMOL/L — SIGNIFICANT CHANGE UP (ref 3.5–5.3)
POTASSIUM SERPL-SCNC: 3.5 MMOL/L — SIGNIFICANT CHANGE UP (ref 3.5–5.3)
POTASSIUM SERPL-SCNC: 3.9 MMOL/L — SIGNIFICANT CHANGE UP (ref 3.5–5.3)
POTASSIUM SERPL-SCNC: 4 MMOL/L — SIGNIFICANT CHANGE UP (ref 3.5–5.3)
PROT SERPL-MCNC: 6.2 G/DL — LOW (ref 6.6–8.7)
PROT SERPL-MCNC: 6.3 G/DL — LOW (ref 6.6–8.7)
PROT SERPL-MCNC: 6.4 G/DL — LOW (ref 6.6–8.7)
PROTHROM AB SERPL-ACNC: 30.9 SEC — HIGH (ref 9.9–13.4)
RBC # BLD: 2.84 M/UL — LOW (ref 3.8–5.2)
RBC # BLD: 2.87 M/UL — LOW (ref 3.8–5.2)
RBC # BLD: 2.97 M/UL — LOW (ref 3.8–5.2)
RBC # FLD: 24.3 % — HIGH (ref 10.3–14.5)
RBC # FLD: 24.4 % — HIGH (ref 10.3–14.5)
RBC # FLD: 24.5 % — HIGH (ref 10.3–14.5)
RBC BLD AUTO: ABNORMAL
SODIUM SERPL-SCNC: 129 MMOL/L — LOW (ref 135–145)
SODIUM SERPL-SCNC: 130 MMOL/L — LOW (ref 135–145)
SODIUM SERPL-SCNC: 131 MMOL/L — LOW (ref 135–145)
SODIUM SERPL-SCNC: 132 MMOL/L — LOW (ref 135–145)
SPECIMEN SOURCE: SIGNIFICANT CHANGE UP
VARIANT LYMPHS # BLD: 1.7 % — SIGNIFICANT CHANGE UP (ref 0–6)
VARIANT LYMPHS NFR BLD MANUAL: 1.7 % — SIGNIFICANT CHANGE UP (ref 0–6)
WBC # BLD: 26.74 K/UL — HIGH (ref 3.8–10.5)
WBC # BLD: 32.66 K/UL — HIGH (ref 3.8–10.5)
WBC # BLD: 35.79 K/UL — HIGH (ref 3.8–10.5)
WBC # FLD AUTO: 26.74 K/UL — HIGH (ref 3.8–10.5)
WBC # FLD AUTO: 32.66 K/UL — HIGH (ref 3.8–10.5)
WBC # FLD AUTO: 35.79 K/UL — HIGH (ref 3.8–10.5)

## 2025-01-29 PROCEDURE — 99222 1ST HOSP IP/OBS MODERATE 55: CPT

## 2025-01-29 PROCEDURE — 99233 SBSQ HOSP IP/OBS HIGH 50: CPT

## 2025-01-29 PROCEDURE — 99291 CRITICAL CARE FIRST HOUR: CPT | Mod: GC

## 2025-01-29 PROCEDURE — G0545: CPT

## 2025-01-29 RX ORDER — POTASSIUM CHLORIDE 750 MG/1
10 TABLET, EXTENDED RELEASE ORAL
Refills: 0 | Status: COMPLETED | OUTPATIENT
Start: 2025-01-29 | End: 2025-01-29

## 2025-01-29 RX ORDER — SODIUM PHOSPHATE, DIBASIC, ANHYDROUS, POTASSIUM PHOSPHATE, MONOBASIC, AND SODIUM PHOSPHATE, MONOBASIC, MONOHYDRATE 852; 155; 130 MG/1; MG/1; MG/1
2 TABLET, COATED ORAL ONCE
Refills: 0 | Status: COMPLETED | OUTPATIENT
Start: 2025-01-29 | End: 2025-01-29

## 2025-01-29 RX ORDER — BUMETANIDE 2 MG/1
2 TABLET ORAL
Refills: 0 | Status: DISCONTINUED | OUTPATIENT
Start: 2025-01-29 | End: 2025-02-02

## 2025-01-29 RX ORDER — METRONIDAZOLE 500 MG
500 TABLET ORAL EVERY 8 HOURS
Refills: 0 | Status: DISCONTINUED | OUTPATIENT
Start: 2025-01-29 | End: 2025-02-07

## 2025-01-29 RX ORDER — ALBUMIN HUMAN 50 G/1000ML
50 SOLUTION INTRAVENOUS EVERY 6 HOURS
Refills: 0 | Status: COMPLETED | OUTPATIENT
Start: 2025-01-30 | End: 2025-01-30

## 2025-01-29 RX ADMIN — VANCOMYCIN HYDROCHLORIDE 500 MILLIGRAM(S): KIT at 17:15

## 2025-01-29 RX ADMIN — Medication 5000 UNIT(S): at 05:08

## 2025-01-29 RX ADMIN — ANTISEPTIC SURGICAL SCRUB 15 MILLILITER(S): 0.04 SOLUTION TOPICAL at 05:08

## 2025-01-29 RX ADMIN — Medication 50 MILLIGRAM(S): at 17:15

## 2025-01-29 RX ADMIN — Medication 5000 UNIT(S): at 21:46

## 2025-01-29 RX ADMIN — PROPOFOL 3.13 MICROGRAM(S)/KG/MIN: 10 INJECTION, EMULSION INTRAVENOUS at 16:10

## 2025-01-29 RX ADMIN — Medication 100 MILLIGRAM(S): at 21:46

## 2025-01-29 RX ADMIN — PANTOPRAZOLE 40 MILLIGRAM(S): 20 TABLET, DELAYED RELEASE ORAL at 05:08

## 2025-01-29 RX ADMIN — PROPOFOL 3.13 MICROGRAM(S)/KG/MIN: 10 INJECTION, EMULSION INTRAVENOUS at 06:20

## 2025-01-29 RX ADMIN — Medication 200 GRAM(S): at 05:07

## 2025-01-29 RX ADMIN — Medication 50 MILLIGRAM(S): at 11:28

## 2025-01-29 RX ADMIN — Medication 100 MILLIGRAM(S): at 17:16

## 2025-01-29 RX ADMIN — Medication 6 UNIT(S)/MIN: at 15:37

## 2025-01-29 RX ADMIN — NOREPINEPHRINE BITARTRATE 2.45 MICROGRAM(S)/KG/MIN: 1 INJECTION, SOLUTION, CONCENTRATE INTRAVENOUS at 10:33

## 2025-01-29 RX ADMIN — ANTISEPTIC SURGICAL SCRUB 1 APPLICATION(S): 0.04 SOLUTION TOPICAL at 05:09

## 2025-01-29 RX ADMIN — BUMETANIDE 2 MILLIGRAM(S): 2 TABLET ORAL at 11:27

## 2025-01-29 RX ADMIN — Medication 5000 UNIT(S): at 11:27

## 2025-01-29 RX ADMIN — POTASSIUM CHLORIDE 100 MILLIEQUIVALENT(S): 750 TABLET, EXTENDED RELEASE ORAL at 02:19

## 2025-01-29 RX ADMIN — Medication 105 MILLIGRAM(S): at 11:31

## 2025-01-29 RX ADMIN — Medication 1 MILLIGRAM(S): at 12:55

## 2025-01-29 RX ADMIN — ALBUMIN HUMAN 50 MILLILITER(S): 50 SOLUTION INTRAVENOUS at 08:21

## 2025-01-29 RX ADMIN — POTASSIUM CHLORIDE 100 MILLIEQUIVALENT(S): 750 TABLET, EXTENDED RELEASE ORAL at 04:44

## 2025-01-29 RX ADMIN — VANCOMYCIN HYDROCHLORIDE 500 MILLIGRAM(S): KIT at 05:09

## 2025-01-29 RX ADMIN — Medication 15 MILLILITER(S): at 11:31

## 2025-01-29 RX ADMIN — VANCOMYCIN HYDROCHLORIDE 500 MILLIGRAM(S): KIT at 11:31

## 2025-01-29 RX ADMIN — Medication 50 MILLIGRAM(S): at 00:30

## 2025-01-29 RX ADMIN — SODIUM PHOSPHATE, DIBASIC, ANHYDROUS, POTASSIUM PHOSPHATE, MONOBASIC, AND SODIUM PHOSPHATE, MONOBASIC, MONOHYDRATE 2 PACKET(S): 852; 155; 130 TABLET, COATED ORAL at 02:19

## 2025-01-29 RX ADMIN — ALBUMIN HUMAN 50 MILLILITER(S): 50 SOLUTION INTRAVENOUS at 02:18

## 2025-01-29 RX ADMIN — CEFTRIAXONE 2000 MILLIGRAM(S): 250 INJECTION, POWDER, FOR SOLUTION INTRAMUSCULAR; INTRAVENOUS at 09:51

## 2025-01-29 RX ADMIN — ANTISEPTIC SURGICAL SCRUB 15 MILLILITER(S): 0.04 SOLUTION TOPICAL at 17:14

## 2025-01-29 RX ADMIN — POTASSIUM CHLORIDE 100 MILLIEQUIVALENT(S): 750 TABLET, EXTENDED RELEASE ORAL at 03:06

## 2025-01-29 RX ADMIN — VANCOMYCIN HYDROCHLORIDE 500 MILLIGRAM(S): KIT at 00:31

## 2025-01-29 RX ADMIN — Medication 50 MILLIGRAM(S): at 05:08

## 2025-01-29 RX ADMIN — PANTOPRAZOLE 40 MILLIGRAM(S): 20 TABLET, DELAYED RELEASE ORAL at 17:15

## 2025-01-29 RX ADMIN — ALBUMIN HUMAN 50 MILLILITER(S): 50 SOLUTION INTRAVENOUS at 13:17

## 2025-01-29 RX ADMIN — Medication 100 MILLIGRAM(S): at 05:07

## 2025-01-29 NOTE — PROGRESS NOTE ADULT - ASSESSMENT
58 y/o female with PMH of ETOH cirrhosis, multiple paracentesis for ascites, esophageal varices s/p banding,  who presents with x1 week of generalized fatigue, multiple bouts of diarrhea with poor PO intake and intermittent fevers. Of note was diagnosed Flu positive last week.ON ADMISSION SHE REPORTED   fatigue, chills, +cough, +abdominal tenderness, and +SOB.    PT WITH POSITIVE STOOL FOR  C DIFF   AND BLOOD CX WITH ECOLI  CT SCN WITH COLITIS  CONTINUE  ORAL VANCO   UNCLEAR IF ABSORBED AS VIA NGT  IN ADDITION ON FLAGYL  IN TERMS OF ECOLI  CHANGED TO ROCEPHIN  PT INTUBATED ON PRESSORS  WBC NOW DOWN TO  32K  WILL FOLLOW UP WITH FURTHER RECOMMENDATIONS

## 2025-01-29 NOTE — PROGRESS NOTE ADULT - SUBJECTIVE AND OBJECTIVE BOX
BRIEF HOSPITAL COURSE:   60 y/o female with PMH of ETOH cirrhosis, multiple paracentesis for ascites, esophageal varices s/p banding,  who presents with x1 week of generalized fatigue, multiple bouts of diarrhea with poor PO intake and intermittent fevers. Of note was diagnosed Flu positive last week. Her boyfriend at bedside Holden gave some collateral information. She reports fatigue, chills, +cough, +abdominal tenderness, and +SOB.    During hospital stay, pt was found C diff positive colitis. Pt developed respiratory failure s/p intubation last night. Bcx showed G- bacteremia, found to have E.Coli bacteremia    Interval HPI:  - This morning, pt is on vaso 0.04, levo 0.15. propofol 20mg, fentanyl 0.5. MAP 78, -58   - Talked to boyfriend Holden through the phone. According to him, pt has family Hx of cardiac attack. Therefore, she following cardiologist Dr. Suarez ([phone]) from [facility].   - Called Dr. Suarez. Pt did not have severe cardiac event. For the vegation on mitral valves, it's new event   - Overnight patient with increased work of breathing and hypoxia, was intubated  - Scheduled for ARACELI tomorrow to assess for vegetations        MEDICATIONS  (STANDING):  buMETAnide Injectable 2 milliGRAM(s) IV Push two times a day  cefTRIAXone Injectable. 2000 milliGRAM(s) IV Push every 24 hours  chlorhexidine 0.12% Liquid 15 milliLiter(s) Oral Mucosa every 12 hours  chlorhexidine 2% Cloths 1 Application(s) Topical <User Schedule>  dextrose 50% Injectable 25 Gram(s) IV Push once  dextrose 50% Injectable 12.5 Gram(s) IV Push once  dextrose 50% Injectable 25 Gram(s) IV Push once  fentaNYL   Infusion. 0.5 MICROgram(s)/kG/Hr (2.61 mL/Hr) IV Continuous <Continuous>  folic acid Injectable 1 milliGRAM(s) IV Push daily  heparin   Injectable 5000 Unit(s) SubCutaneous every 8 hours  hydrocortisone sodium succinate Injectable 50 milliGRAM(s) IV Push every 6 hours  metroNIDAZOLE  IVPB 500 milliGRAM(s) IV Intermittent every 12 hours  multivitamin/minerals/iron Oral Solution (CENTRUM) 15 milliLiter(s) Oral daily  norepinephrine Infusion 0.05 MICROgram(s)/kG/Min (2.45 mL/Hr) IV Continuous <Continuous>  pantoprazole  Injectable 40 milliGRAM(s) IV Push two times a day  propofol Infusion 10 MICROgram(s)/kG/Min (3.13 mL/Hr) IV Continuous <Continuous>  vancomycin    Solution 500 milliGRAM(s) Oral every 6 hours  vasopressin Infusion 0.04 Unit(s)/Min (6 mL/Hr) IV Continuous <Continuous>    MEDICATIONS  (PRN):      Allergies    Zithromax (Unknown)    Intolerances    morphine (Nausea)      REVIEW OF SYSTEMS:  (sedated, unable to obtain)     OBJECTIVE:    Vital Signs Last 24 Hrs  T(C): 36.8 (29 Jan 2025 16:00), Max: 36.8 (29 Jan 2025 16:00)  T(F): 98.2 (29 Jan 2025 16:00), Max: 98.2 (29 Jan 2025 16:00)  HR: 82 (29 Jan 2025 16:00) (69 - 89)  BP: 110/71 (29 Jan 2025 16:00) (97/71 - 123/82)  BP(mean): 83 (29 Jan 2025 16:00) (80 - 109)  RR: 16 (29 Jan 2025 16:00) (15 - 25)  SpO2: 94% (29 Jan 2025 16:00) (93% - 100%)    Parameters below as of 29 Jan 2025 16:00  Patient On (Oxygen Delivery Method): ventilator    O2 Concentration (%): 40    PHYSICAL EXAM:  GENERAL: NAD, sedated   HEAD:  Atraumatic, Normocephalic  NECK: Supple, No JVD  CHEST/LUNG: Diminished to auscultation bilaterally; No wheezes or rales  HEART: Rapid rate and regular rhythm; No murmurs, rubs, or gallops  ABDOMEN: Distended, soft, tender,  bowel sounds +   EXTREMITIES:  2+ Peripheral Pulses, No clubbing or cyanosis  Neurological: sedated   Skin: Warm and dry  Musculoskeletal: Atraumatic, no joint effusions    Lab/ Imaging:    LABS:                        7.5    26.74 )-----------( 39       ( 29 Jan 2025 09:50 )             22.4     01-29    130[L]  |  90[L]  |  35.0[H]  ----------------------------<  146[H]  4.0   |  26.0  |  0.69    Ca    8.1[L]      29 Jan 2025 09:50  Phos  3.2     01-29  Mg     1.9     01-29    TPro  6.4[L]  /  Alb  3.3  /  TBili  7.2[H]  /  DBili  x   /  AST  117[H]  /  ALT  33[H]  /  AlkPhos  217[H]  01-29    PT/INR - ( 29 Jan 2025 01:12 )   PT: 30.9 sec;   INR: 2.69 ratio           Urinalysis Basic - ( 29 Jan 2025 09:50 )    Color: x / Appearance: x / SG: x / pH: x  Gluc: 146 mg/dL / Ketone: x  / Bili: x / Urobili: x   Blood: x / Protein: x / Nitrite: x   Leuk Esterase: x / RBC: x / WBC x   Sq Epi: x / Non Sq Epi: x / Bacteria: x      CAPILLARY BLOOD GLUCOSE      POCT Blood Glucose.: 131 mg/dL (29 Jan 2025 08:06)  POCT Blood Glucose.: 117 mg/dL (29 Jan 2025 06:48)  POCT Blood Glucose.: 110 mg/dL (29 Jan 2025 05:56)  POCT Blood Glucose.: 135 mg/dL (29 Jan 2025 04:40)  POCT Blood Glucose.: 182 mg/dL (29 Jan 2025 03:05)  POCT Blood Glucose.: 213 mg/dL (29 Jan 2025 01:58)  POCT Blood Glucose.: 201 mg/dL (29 Jan 2025 00:59)  POCT Blood Glucose.: 200 mg/dL (29 Jan 2025 00:12)  POCT Blood Glucose.: 234 mg/dL (28 Jan 2025 22:53)  POCT Blood Glucose.: 255 mg/dL (28 Jan 2025 22:07)  POCT Blood Glucose.: 301 mg/dL (28 Jan 2025 20:46)  POCT Blood Glucose.: 319 mg/dL (28 Jan 2025 17:12)      RADIOLOGY & ADDITIONAL TESTS:        Imaging Personally Reviewed:  [X] YES  [ ] NO    Consultant(s) Notes Reviewed:  [ X YES  [ ] NO    Care Discussed with Consultants/Other Providers [X] YES  [ ] NO

## 2025-01-29 NOTE — PROGRESS NOTE ADULT - ASSESSMENT
Assessment & Plan:   ABRAHAN GOODWIN is a 59y Female with PMH ETOH cirrhosis, multiple paracentesis for ascites, esophageal varices s/p banding in the hospital for <1d  transferred to the MICU for management of undifferentiated shock state.      ====================== NEUROLOGY=====================  fentaNYL   Infusion. 0.5 MICROgram(s)/kG/Hr (2.61 mL/Hr) IV Continuous <Continuous>  propofol Infusion 10 MICROgram(s)/kG/Min (3.13 mL/Hr) IV Continuous <Continuous>    - Sedated   -continue aspiration precautions  -HOB 30 degrees  -Continue thiamine, folic acid, and multi vitamin    ==================== RESPIRATORY======================  Mechanical Ventilation:  Mode: AC/ CMV (Assist Control/ Continuous Mandatory Ventilation)  RR (machine): 16  TV (machine): 450  FiO2: 60  PEEP: 6  MAP: 11  PIP: 22    Intubated    ====================CARDIOVASCULAR==================  norepinephrine Infusion 0.05 MICROgram(s)/kG/Min (2.45 mL/Hr) IV Continuous <Continuous>  vasopressin Infusion 0.04 Unit(s)/Min (6 mL/Hr) IV Continuous <Continuous>    - Maintain MAP >60   - TTE showed EF 65%, vegetation on mitral valve   - ARACELI pending, will perform on 1/30/25  - ID and cardiology consults   - Trend lactate CBC, CMP    ===================HEMATOLOGIC/ONC ===================  heparin   Injectable 5000 Unit(s) SubCutaneous every 8 hours    ===================== RENAL =========================  Continue monitoring urine output  Bumex BID today with good urine output  Continue diuresis    ==================== GASTROINTESTINAL===================  folic acid Injectable 1 milliGRAM(s) IV Push daily  multivitamin/minerals/iron Oral Solution (CENTRUM) 15 milliLiter(s) Oral daily  pantoprazole  Injectable 40 milliGRAM(s) IV Push two times a day  thiamine IVPB 500 milliGRAM(s) IV Intermittent daily    Diet: NPO except meds   =======================    ENDOCRINE  =====================  dextrose 50% Injectable 25 Gram(s) IV Push once  dextrose 50% Injectable 25 Gram(s) IV Push once  dextrose 50% Injectable 12.5 Gram(s) IV Push once  hydrocortisone sodium succinate Injectable 50 milliGRAM(s) IV Push every 6 hours  insulin glargine Injectable (LANTUS) 15 Unit(s) SubCutaneous before breakfast  insulin lispro (ADMELOG) corrective regimen sliding scale   SubCutaneous every 6 hours  insulin lispro Injectable (ADMELOG) 4 Unit(s) SubCutaneous every 6 hours  vasopressin Infusion 0.04 Unit(s)/Min (6 mL/Hr) IV Continuous <Continuous>    Blood sugar goal: 100-200  - Started ISS  - Started lantus and short acting insulin pre-meals     ========================INFECTIOUS DISEASE================  cefTRIAXone Injectable. 2000 milliGRAM(s) IV Push every 24 hours  metroNIDAZOLE  IVPB 500 milliGRAM(s) IV Intermittent every 12 hours  vancomycin    Solution 500 milliGRAM(s) Oral every 6 hours    - ID consult appreciated   - Bcx: E.Coli           Care plan discussed with ICU attending Dr. Claudia Manning d/w Family  Dsipo: active, needs ICU care

## 2025-01-29 NOTE — PROGRESS NOTE ADULT - SUBJECTIVE AND OBJECTIVE BOX
INFECTIOUS DISEASES AND INTERNAL MEDICINE at [place]  =======================================================  Tommy Blair MD  Diplomates American Board of Internal Medicine and Infectious Diseases  Telephone [phone]  Fax            [phone]  =======================================================    ABRAHAN GOODWIN [id number]    Follow up:  C DIFF ECOLI BACTEREMIA     Allergies:  Zithromax (Unknown)  morphine (Nausea)      Medications:  albumin human 25% IVPB 50 milliLiter(s) IV Intermittent every 6 hours  buMETAnide Injectable 2 milliGRAM(s) IV Push two times a day  cefTRIAXone Injectable. 2000 milliGRAM(s) IV Push every 24 hours  chlorhexidine 0.12% Liquid 15 milliLiter(s) Oral Mucosa every 12 hours  chlorhexidine 2% Cloths 1 Application(s) Topical <User Schedule>  dextrose 50% Injectable 25 Gram(s) IV Push once  dextrose 50% Injectable 12.5 Gram(s) IV Push once  dextrose 50% Injectable 25 Gram(s) IV Push once  fentaNYL   Infusion. 0.5 MICROgram(s)/kG/Hr IV Continuous <Continuous>  folic acid Injectable 1 milliGRAM(s) IV Push daily  heparin   Injectable 5000 Unit(s) SubCutaneous every 8 hours  hydrocortisone sodium succinate Injectable 50 milliGRAM(s) IV Push every 6 hours  metroNIDAZOLE  IVPB 500 milliGRAM(s) IV Intermittent every 12 hours  multivitamin/minerals/iron Oral Solution (CENTRUM) 15 milliLiter(s) Oral daily  norepinephrine Infusion 0.05 MICROgram(s)/kG/Min IV Continuous <Continuous>  pantoprazole  Injectable 40 milliGRAM(s) IV Push two times a day  propofol Infusion 10 MICROgram(s)/kG/Min IV Continuous <Continuous>  thiamine IVPB 500 milliGRAM(s) IV Intermittent daily  vancomycin    Solution 500 milliGRAM(s) Oral every 6 hours  vasopressin Infusion 0.04 Unit(s)/Min IV Continuous <Continuous>    SOCIAL       FAMILY   FAMILY HISTORY:  FH: pancreatic cancer (Mother)    Family history of heart attack (Father)    Family history of CVA (Father)      REVIEW OF SYSTEMS:  UNABLE TO OBTAIN         Physical Exam:  ICU Vital Signs Last 24 Hrs  T(C): 36.1 (29 Jan 2025 09:30), Max: 36.4 (28 Jan 2025 10:15)  T(F): 97 (29 Jan 2025 09:30), Max: 97.5 (28 Jan 2025 10:15)  HR: 73 (29 Jan 2025 09:30) (69 - 88)  BP: 99/69 (29 Jan 2025 09:00) (92/63 - 123/82)  BP(mean): 80 (29 Jan 2025 09:00) (74 - 109)  ABP: 96/54 (29 Jan 2025 09:30) (88/58 - 130/70)  ABP(mean): 71 (29 Jan 2025 09:30) (50 - 94)  RR: 16 (29 Jan 2025 09:30) (15 - 18)  SpO2: 95% (29 Jan 2025 09:15) (95% - 99%)    O2 Parameters below as of 29 Jan 2025 08:00  Patient On (Oxygen Delivery Method): ventilator, AC    O2 Concentration (%): 35      GEN: NAD, ON VENT  HEENT: normocephalic and atraumatic. EOMI. LJ.    NECK: Supple. No carotid bruits.  No lymphadenopathy or thyromegaly.  LUNGS: Clear to auscultation.  HEART: Regular rate and rhythm without murmur.  ABDOMEN: Soft, nontender, and nondistended.  Positive bowel sounds.    : No CVA tenderness  EXTREMITIES: Without any cyanosis, clubbing, rash, lesions or edema.  MSK: no joint swelling  NEUROLOGIC: SEEDATED ON VENT        Labs:  Vitals:  ============  T(F): 97 (29 Jan 2025 09:30), Max: 97.5 (28 Jan 2025 10:15)  HR: 73 (29 Jan 2025 09:30)  BP: 99/69 (29 Jan 2025 09:00)  RR: 16 (29 Jan 2025 09:30)  SpO2: 95% (29 Jan 2025 09:15) (95% - 99%)  temp max in last 48H T(F): , Max: 99 (01-27-25 @ 11:00)    =======================================================  Current Antibiotics:  cefTRIAXone Injectable. 2000 milliGRAM(s) IV Push every 24 hours  metroNIDAZOLE  IVPB 500 milliGRAM(s) IV Intermittent every 12 hours  vancomycin    Solution 500 milliGRAM(s) Oral every 6 hours    Other medications:  albumin human 25% IVPB 50 milliLiter(s) IV Intermittent every 6 hours  buMETAnide Injectable 2 milliGRAM(s) IV Push two times a day  chlorhexidine 0.12% Liquid 15 milliLiter(s) Oral Mucosa every 12 hours  chlorhexidine 2% Cloths 1 Application(s) Topical <User Schedule>  dextrose 50% Injectable 25 Gram(s) IV Push once  dextrose 50% Injectable 12.5 Gram(s) IV Push once  dextrose 50% Injectable 25 Gram(s) IV Push once  fentaNYL   Infusion. 0.5 MICROgram(s)/kG/Hr IV Continuous <Continuous>  folic acid Injectable 1 milliGRAM(s) IV Push daily  heparin   Injectable 5000 Unit(s) SubCutaneous every 8 hours  hydrocortisone sodium succinate Injectable 50 milliGRAM(s) IV Push every 6 hours  multivitamin/minerals/iron Oral Solution (CENTRUM) 15 milliLiter(s) Oral daily  norepinephrine Infusion 0.05 MICROgram(s)/kG/Min IV Continuous <Continuous>  pantoprazole  Injectable 40 milliGRAM(s) IV Push two times a day  propofol Infusion 10 MICROgram(s)/kG/Min IV Continuous <Continuous>  thiamine IVPB 500 milliGRAM(s) IV Intermittent daily  vasopressin Infusion 0.04 Unit(s)/Min IV Continuous <Continuous>      =======================================================  Labs:                        7.7    32.66 )-----------( 45       ( 29 Jan 2025 05:30 )             22.7     01-29    132[L]  |  93[L]  |  31.9[H]  ----------------------------<  106[H]  3.9   |  26.0  |  0.77    Ca    7.9[L]      29 Jan 2025 05:30  Phos  2.6     01-29  Mg     1.9     01-29    TPro  6.2[L]  /  Alb  3.1[L]  /  TBili  7.0[H]  /  DBili  x   /  AST  118[H]  /  ALT  34[H]  /  AlkPhos  204[H]  01-29      Culture - Blood (collected 01-27-25 @ 06:00)  Source: .Blood BLOOD  Preliminary Report (01-28-25 @ 13:01):    No growth at 24 hours    Culture - Blood (collected 01-27-25 @ 02:07)  Source: .Blood BLOOD  Gram Stain (01-27-25 @ 18:01):    Growth in anaerobic bottle: Gram Negative Rods    Growth in aerobic bottle: Gram Negative Rods  Final Report (01-29-25 @ 08:00):    Growth in aerobic and anaerobic bottles: Escherichia coli    Direct identification is available within approximately 3-5    hours either by Blood Panel Multiplexed PCR or Direct    MALDI-TOF. Details: [email or web address]  Organism: Blood Culture PCR  Escherichia coli (01-29-25 @ 08:00)  Organism: Escherichia coli (01-29-25 @ 08:00)    Sensitivities:      -  Levofloxacin: S <=0.5      -  Tobramycin: I 4      -  Aztreonam: S <=4      -  Gentamicin: S <=2      -  Cefazolin: I 4      -  Cefepime: S <=2      -  Piperacillin/Tazobactam: S <=8      -  Ciprofloxacin: S <=0.25      -  Imipenem: S <=1      -  Ceftriaxone: S <=1      -  Ampicillin: R >16 These ampicillin results predict results for amoxicillin      Method Type: NABILA      -  Meropenem: S <=1      -  Ampicillin/Sulbactam: I 16/8      -  Cefoxitin: S <=8      -  Trimethoprim/Sulfamethoxazole: S <=0.5/9.5      -  Ertapenem: S <=0.5  Organism: Blood Culture PCR (01-29-25 @ 08:00)    Sensitivities:      -  Escherichia coli: Detec      Method Type: PCR    Urinalysis with Rflx Culture (collected 11-16-24 @ 09:00)    Culture - Body Fluid with Gram Stain (collected 11-16-24 @ 04:44)  Source: Abdominal Fl  Gram Stain (11-16-24 @ 12:36):    polymorphonuclear leukocytes seen    No organisms seen    by cytocentrifuge  Final Report (11-21-24 @ 08:50):    No growth at 5 days    Culture - Blood (collected 11-16-24 @ 03:17)  Source: .Blood BLOOD  Final Report (11-21-24 @ 09:01):    No growth at 5 days    Culture - Blood (collected 11-16-24 @ 02:44)  Source: .Blood BLOOD  Final Report (11-21-24 @ 09:01):    No growth at 5 days    Culture - Fungal, Body Fluid (collected 11-23-23 @ 14:00)  Source: Peritoneal Peritoneal Fluid  Final Report (12-23-23 @ 15:01):    No fungus isolated at 4 weeks.    Culture - Body Fluid with Gram Stain (collected 11-23-23 @ 14:00)  Source: Ascites Fl Ascites Fluid  Gram Stain (11-23-23 @ 23:43):    polymorphonuclear leukocytes    No organisms seen    by cytocentrifuge  Final Report (11-28-23 @ 16:15):    No growth at 5 days    Culture - Blood (collected 11-22-23 @ 08:31)  Source: .Blood None  Final Report (11-27-23 @ 13:01):    No growth at 5 days    Culture - Blood (collected 11-22-23 @ 08:22)  Source: .Blood None  Final Report (11-27-23 @ 13:01):    No growth at 5 days    Culture - Blood (collected 11-09-23 @ 07:01)  Source: .Blood None  Final Report (11-14-23 @ 14:00):    No growth at 5 days    Culture - Blood (collected 11-09-23 @ 07:01)  Source: .Blood None  Final Report (11-14-23 @ 14:00):    No growth at 5 days    Culture - Blood (collected 10-07-23 @ 09:01)  Source: .Blood None  Final Report (10-12-23 @ 17:01):    No growth at 5 days    Culture - Blood (collected 10-07-23 @ 09:01)  Source: .Blood Blood-Venous  Final Report (10-12-23 @ 17:01):    No growth at 5 days    Culture - Blood (collected 05-09-23 @ 04:39)  Source: .Blood Blood-Peripheral  Final Report (05-14-23 @ 10:01):    No Growth Final    Culture - Urine (collected 05-09-23 @ 04:39)  Source: Clean Catch Clean Catch (Midstream)  Final Report (05-10-23 @ 23:46):    <10,000 CFU/mL Normal Urogenital Bibi    Culture - Blood (collected 05-09-23 @ 04:39)  Source: .Blood Blood-Peripheral  Final Report (05-14-23 @ 10:01):    No Growth Final      Creatinine: 0.77 mg/dL (01-29-25 @ 05:30)  Creatinine: 0.76 mg/dL (01-29-25 @ 01:12)  Creatinine: 0.74 mg/dL (01-29-25 @ 01:12)  Creatinine: 0.75 mg/dL (01-28-25 @ 21:10)  Creatinine: 0.81 mg/dL (01-28-25 @ 17:10)  Creatinine: 0.79 mg/dL (01-28-25 @ 13:00)  Creatinine: 0.76 mg/dL (01-28-25 @ 09:00)  Creatinine: 0.75 mg/dL (01-28-25 @ 04:30)  Creatinine: 0.76 mg/dL (01-28-25 @ 00:46)  Creatinine: 0.90 mg/dL (01-27-25 @ 21:08)  Creatinine: 1.07 mg/dL (01-27-25 @ 13:22)  Creatinine: 1.16 mg/dL (01-27-25 @ 08:15)  Creatinine: 1.18 mg/dL (01-27-25 @ 04:50)  Creatinine: 0.71 mg/dL (01-27-25 @ 02:07)            WBC Count: 32.66 K/uL (01-29-25 @ 05:30)  WBC Count: 35.79 K/uL (01-29-25 @ 01:12)  WBC Count: 36.70 K/uL (01-28-25 @ 21:10)  WBC Count: 39.69 K/uL (01-28-25 @ 17:10)  WBC Count: 37.94 K/uL (01-28-25 @ 13:00)  WBC Count: 45.53 K/uL (01-28-25 @ 09:00)  WBC Count: 51.71 K/uL (01-28-25 @ 04:30)  WBC Count: 59.32 K/uL (01-28-25 @ 00:46)  WBC Count: 62.07 K/uL (01-27-25 @ 21:08)  WBC Count: 41.57 K/uL (01-27-25 @ 13:22)  WBC Count: 20.91 K/uL (01-27-25 @ 08:15)  WBC Count: 4.14 K/uL (01-27-25 @ 02:07)    SARS-CoV-2: NotDetec (01-27-25 @ 06:00)      Alkaline Phosphatase: 204 U/L (01-29-25 @ 05:30)  Alkaline Phosphatase: 216 U/L (01-29-25 @ 01:12)  Alkaline Phosphatase: 236 U/L (01-28-25 @ 21:10)  Alkaline Phosphatase: 226 U/L (01-28-25 @ 17:10)  Alkaline Phosphatase: 216 U/L (01-28-25 @ 13:00)  Alkaline Phosphatase: 245 U/L (01-28-25 @ 09:00)  Alkaline Phosphatase: 247 U/L (01-28-25 @ 04:30)  Alkaline Phosphatase: 261 U/L (01-28-25 @ 00:46)  Alkaline Phosphatase: 331 U/L (01-27-25 @ 13:22)  Alkaline Phosphatase: 489 U/L (01-27-25 @ 08:15)  Alkaline Phosphatase: 596 U/L (01-27-25 @ 04:50)  Alkaline Phosphatase: 854 U/L (01-27-25 @ 02:07)  Alanine Aminotransferase (ALT/SGPT): 34 U/L (01-29-25 @ 05:30)  Alanine Aminotransferase (ALT/SGPT): 38 U/L (01-29-25 @ 01:12)  Alanine Aminotransferase (ALT/SGPT): 40 U/L (01-28-25 @ 21:10)  Alanine Aminotransferase (ALT/SGPT): 41 U/L (01-28-25 @ 17:10)  Alanine Aminotransferase (ALT/SGPT): 39 U/L (01-28-25 @ 13:00)  Alanine Aminotransferase (ALT/SGPT): 40 U/L (01-28-25 @ 09:00)  Alanine Aminotransferase (ALT/SGPT): 43 U/L (01-28-25 @ 04:30)  Alanine Aminotransferase (ALT/SGPT): 45 U/L (01-28-25 @ 00:46)  Alanine Aminotransferase (ALT/SGPT): 42 U/L (01-27-25 @ 13:22)  Alanine Aminotransferase (ALT/SGPT): 40 U/L (01-27-25 @ 08:15)  Alanine Aminotransferase (ALT/SGPT): 37 U/L (01-27-25 @ 04:50)  Alanine Aminotransferase (ALT/SGPT): 45 U/L (01-27-25 @ 02:07)  Aspartate Aminotransferase (AST/SGOT): 118 U/L (01-29-25 @ 05:30)  Aspartate Aminotransferase (AST/SGOT): 131 U/L (01-29-25 @ 01:12)  Aspartate Aminotransferase (AST/SGOT): 146 U/L (01-28-25 @ 21:10)  Aspartate Aminotransferase (AST/SGOT): 154 U/L (01-28-25 @ 17:10)  Aspartate Aminotransferase (AST/SGOT): 154 U/L (01-28-25 @ 13:00)  Aspartate Aminotransferase (AST/SGOT): 163 U/L (01-28-25 @ 09:00)  Aspartate Aminotransferase (AST/SGOT): 175 U/L (01-28-25 @ 04:30)  Aspartate Aminotransferase (AST/SGOT): 185 U/L (01-28-25 @ 00:46)  Aspartate Aminotransferase (AST/SGOT): 180 U/L (01-27-25 @ 13:22)  Aspartate Aminotransferase (AST/SGOT): 163 U/L (01-27-25 @ 08:15)  Aspartate Aminotransferase (AST/SGOT): 150 U/L (01-27-25 @ 04:50)  Aspartate Aminotransferase (AST/SGOT): 186 U/L (01-27-25 @ 02:07)  Bilirubin Total: 7.0 mg/dL (01-29-25 @ 05:30)  Bilirubin Total: 7.4 mg/dL (01-29-25 @ 01:12)  Bilirubin Total: 7.2 mg/dL (01-29-25 @ 01:12)  Bilirubin Total: 7.2 mg/dL (01-28-25 @ 21:10)  Bilirubin Total: 7.1 mg/dL (01-28-25 @ 17:10)  Bilirubin Total: 6.5 mg/dL (01-28-25 @ 13:00)  Bilirubin Total: 6.4 mg/dL (01-28-25 @ 09:00)  Bilirubin Total: 6.0 mg/dL (01-28-25 @ 04:30)  Bilirubin Total: 5.6 mg/dL (01-28-25 @ 00:46)  Bilirubin Total: 4.4 mg/dL (01-27-25 @ 13:22)  Bilirubin Total: 3.5 mg/dL (01-27-25 @ 08:15)  Bilirubin Total: 3.5 mg/dL (01-27-25 @ 08:15)  Bilirubin Total: 3.3 mg/dL (01-27-25 @ 04:50)  Bilirubin Total: 4.1 mg/dL (01-27-25 @ 02:07)  Bilirubin Direct: 3.0 mg/dL (01-27-25 @ 08:15)

## 2025-01-30 LAB
ALBUMIN SERPL ELPH-MCNC: 3.4 G/DL — SIGNIFICANT CHANGE UP (ref 3.3–5.2)
ALP SERPL-CCNC: 240 U/L — HIGH (ref 40–120)
ALT FLD-CCNC: 32 U/L — SIGNIFICANT CHANGE UP
ANION GAP SERPL CALC-SCNC: 16 MMOL/L — SIGNIFICANT CHANGE UP (ref 5–17)
ANION GAP SERPL CALC-SCNC: 18 MMOL/L — HIGH (ref 5–17)
AST SERPL-CCNC: 98 U/L — HIGH
BILIRUB SERPL-MCNC: 10.6 MG/DL — HIGH (ref 0.4–2)
BLD GP AB SCN SERPL QL: SIGNIFICANT CHANGE UP
BUN SERPL-MCNC: 44.9 MG/DL — HIGH (ref 8–20)
BUN SERPL-MCNC: 49.5 MG/DL — HIGH (ref 8–20)
CALCIUM SERPL-MCNC: 8.3 MG/DL — LOW (ref 8.4–10.5)
CALCIUM SERPL-MCNC: 8.6 MG/DL — SIGNIFICANT CHANGE UP (ref 8.4–10.5)
CHLORIDE SERPL-SCNC: 92 MMOL/L — LOW (ref 96–108)
CHLORIDE SERPL-SCNC: 93 MMOL/L — LOW (ref 96–108)
CO2 SERPL-SCNC: 28 MMOL/L — SIGNIFICANT CHANGE UP (ref 22–29)
CO2 SERPL-SCNC: 30 MMOL/L — HIGH (ref 22–29)
CREAT SERPL-MCNC: 0.42 MG/DL — LOW (ref 0.5–1.3)
CREAT SERPL-MCNC: 0.61 MG/DL — SIGNIFICANT CHANGE UP (ref 0.5–1.3)
EGFR: 102 ML/MIN/1.73M2 — SIGNIFICANT CHANGE UP
EGFR: 111 ML/MIN/1.73M2 — SIGNIFICANT CHANGE UP
GLUCOSE BLDC GLUCOMTR-MCNC: 205 MG/DL — HIGH (ref 70–99)
GLUCOSE BLDC GLUCOMTR-MCNC: 210 MG/DL — HIGH (ref 70–99)
GLUCOSE BLDC GLUCOMTR-MCNC: 210 MG/DL — HIGH (ref 70–99)
GLUCOSE BLDC GLUCOMTR-MCNC: 214 MG/DL — HIGH (ref 70–99)
GLUCOSE BLDC GLUCOMTR-MCNC: 227 MG/DL — HIGH (ref 70–99)
GLUCOSE BLDC GLUCOMTR-MCNC: 240 MG/DL — HIGH (ref 70–99)
GLUCOSE BLDC GLUCOMTR-MCNC: 267 MG/DL — HIGH (ref 70–99)
GLUCOSE SERPL-MCNC: 217 MG/DL — HIGH (ref 70–99)
GLUCOSE SERPL-MCNC: 220 MG/DL — HIGH (ref 70–99)
GRAM STN FLD: ABNORMAL
HCT VFR BLD CALC: 23.2 % — LOW (ref 34.5–45)
HGB BLD-MCNC: 7.6 G/DL — LOW (ref 11.5–15.5)
MAGNESIUM SERPL-MCNC: 1.6 MG/DL — SIGNIFICANT CHANGE UP (ref 1.6–2.6)
MAGNESIUM SERPL-MCNC: 2.1 MG/DL — SIGNIFICANT CHANGE UP (ref 1.6–2.6)
MCHC RBC-ENTMCNC: 26.1 PG — LOW (ref 27–34)
MCHC RBC-ENTMCNC: 32.8 G/DL — SIGNIFICANT CHANGE UP (ref 32–36)
MCV RBC AUTO: 79.7 FL — LOW (ref 80–100)
PHOSPHATE SERPL-MCNC: 2.7 MG/DL — SIGNIFICANT CHANGE UP (ref 2.4–4.7)
PHOSPHATE SERPL-MCNC: 3 MG/DL — SIGNIFICANT CHANGE UP (ref 2.4–4.7)
PLATELET # BLD AUTO: 41 K/UL — LOW (ref 150–400)
POTASSIUM SERPL-MCNC: 2.4 MMOL/L — CRITICAL LOW (ref 3.5–5.3)
POTASSIUM SERPL-MCNC: 3 MMOL/L — LOW (ref 3.5–5.3)
POTASSIUM SERPL-SCNC: 2.4 MMOL/L — CRITICAL LOW (ref 3.5–5.3)
POTASSIUM SERPL-SCNC: 3 MMOL/L — LOW (ref 3.5–5.3)
PROT SERPL-MCNC: 6.5 G/DL — LOW (ref 6.6–8.7)
RBC # BLD: 2.91 M/UL — LOW (ref 3.8–5.2)
RBC # FLD: 24 % — HIGH (ref 10.3–14.5)
SODIUM SERPL-SCNC: 138 MMOL/L — SIGNIFICANT CHANGE UP (ref 135–145)
SODIUM SERPL-SCNC: 139 MMOL/L — SIGNIFICANT CHANGE UP (ref 135–145)
TRIGL SERPL-MCNC: 110 MG/DL — SIGNIFICANT CHANGE UP
WBC # BLD: 17.97 K/UL — HIGH (ref 3.8–10.5)
WBC # FLD AUTO: 17.97 K/UL — HIGH (ref 3.8–10.5)

## 2025-01-30 PROCEDURE — 99222 1ST HOSP IP/OBS MODERATE 55: CPT

## 2025-01-30 PROCEDURE — 99232 SBSQ HOSP IP/OBS MODERATE 35: CPT

## 2025-01-30 PROCEDURE — 99233 SBSQ HOSP IP/OBS HIGH 50: CPT

## 2025-01-30 PROCEDURE — G0545: CPT

## 2025-01-30 PROCEDURE — 99291 CRITICAL CARE FIRST HOUR: CPT | Mod: GC

## 2025-01-30 RX ORDER — SODIUM CHLORIDE 9 G/ML
1000 INJECTION, SOLUTION INTRAVENOUS
Refills: 0 | Status: DISCONTINUED | OUTPATIENT
Start: 2025-01-30 | End: 2025-02-07

## 2025-01-30 RX ORDER — GLUCAGON 3 MG/1
1 POWDER NASAL ONCE
Refills: 0 | Status: DISCONTINUED | OUTPATIENT
Start: 2025-01-30 | End: 2025-02-07

## 2025-01-30 RX ORDER — INSULIN GLARGINE-YFGN 100 [IU]/ML
15 INJECTION, SOLUTION SUBCUTANEOUS EVERY MORNING
Refills: 0 | Status: DISCONTINUED | OUTPATIENT
Start: 2025-01-30 | End: 2025-02-07

## 2025-01-30 RX ORDER — POTASSIUM CHLORIDE 750 MG/1
10 TABLET, EXTENDED RELEASE ORAL
Refills: 0 | Status: COMPLETED | OUTPATIENT
Start: 2025-01-30 | End: 2025-01-30

## 2025-01-30 RX ORDER — POTASSIUM CHLORIDE 750 MG/1
40 TABLET, EXTENDED RELEASE ORAL ONCE
Refills: 0 | Status: COMPLETED | OUTPATIENT
Start: 2025-01-30 | End: 2025-01-30

## 2025-01-30 RX ORDER — DM/PSEUDOEPHED/ACETAMINOPHEN 10-30-250
15 CAPSULE ORAL ONCE
Refills: 0 | Status: DISCONTINUED | OUTPATIENT
Start: 2025-01-30 | End: 2025-02-07

## 2025-01-30 RX ORDER — POTASSIUM CHLORIDE 750 MG/1
20 TABLET, EXTENDED RELEASE ORAL
Refills: 0 | Status: COMPLETED | OUTPATIENT
Start: 2025-01-30 | End: 2025-01-30

## 2025-01-30 RX ORDER — INSULIN LISPRO 100/ML
VIAL (ML) SUBCUTANEOUS EVERY 6 HOURS
Refills: 0 | Status: DISCONTINUED | OUTPATIENT
Start: 2025-01-30 | End: 2025-02-07

## 2025-01-30 RX ORDER — MAGNESIUM SULFATE 0.8 MEQ/ML
2 AMPUL (ML) INJECTION ONCE
Refills: 0 | Status: COMPLETED | OUTPATIENT
Start: 2025-01-30 | End: 2025-01-30

## 2025-01-30 RX ADMIN — ALBUMIN HUMAN 50 MILLILITER(S): 50 SOLUTION INTRAVENOUS at 17:44

## 2025-01-30 RX ADMIN — Medication 4: at 06:22

## 2025-01-30 RX ADMIN — POTASSIUM CHLORIDE 100 MILLIEQUIVALENT(S): 750 TABLET, EXTENDED RELEASE ORAL at 15:08

## 2025-01-30 RX ADMIN — VANCOMYCIN HYDROCHLORIDE 500 MILLIGRAM(S): KIT at 06:24

## 2025-01-30 RX ADMIN — POTASSIUM CHLORIDE 100 MILLIEQUIVALENT(S): 750 TABLET, EXTENDED RELEASE ORAL at 18:02

## 2025-01-30 RX ADMIN — FENTANYL CITRATE 2.61 MICROGRAM(S)/KG/HR: 50 INJECTION INTRAMUSCULAR; INTRAVENOUS at 03:22

## 2025-01-30 RX ADMIN — ANTISEPTIC SURGICAL SCRUB 15 MILLILITER(S): 0.04 SOLUTION TOPICAL at 05:39

## 2025-01-30 RX ADMIN — POTASSIUM CHLORIDE 100 MILLIEQUIVALENT(S): 750 TABLET, EXTENDED RELEASE ORAL at 17:21

## 2025-01-30 RX ADMIN — ANTISEPTIC SURGICAL SCRUB 15 MILLILITER(S): 0.04 SOLUTION TOPICAL at 17:42

## 2025-01-30 RX ADMIN — ALBUMIN HUMAN 50 MILLILITER(S): 50 SOLUTION INTRAVENOUS at 12:09

## 2025-01-30 RX ADMIN — PANTOPRAZOLE 40 MILLIGRAM(S): 20 TABLET, DELAYED RELEASE ORAL at 17:43

## 2025-01-30 RX ADMIN — Medication 15 MILLILITER(S): at 12:09

## 2025-01-30 RX ADMIN — PROPOFOL 3.13 MICROGRAM(S)/KG/MIN: 10 INJECTION, EMULSION INTRAVENOUS at 02:41

## 2025-01-30 RX ADMIN — PROPOFOL 3.13 MICROGRAM(S)/KG/MIN: 10 INJECTION, EMULSION INTRAVENOUS at 23:19

## 2025-01-30 RX ADMIN — POTASSIUM CHLORIDE 40 MILLIEQUIVALENT(S): 750 TABLET, EXTENDED RELEASE ORAL at 17:43

## 2025-01-30 RX ADMIN — Medication 50 MILLIGRAM(S): at 23:36

## 2025-01-30 RX ADMIN — POTASSIUM CHLORIDE 40 MILLIEQUIVALENT(S): 750 TABLET, EXTENDED RELEASE ORAL at 05:40

## 2025-01-30 RX ADMIN — VANCOMYCIN HYDROCHLORIDE 500 MILLIGRAM(S): KIT at 12:09

## 2025-01-30 RX ADMIN — Medication 50 MILLIGRAM(S): at 17:42

## 2025-01-30 RX ADMIN — Medication 4: at 23:36

## 2025-01-30 RX ADMIN — ANTISEPTIC SURGICAL SCRUB 1 APPLICATION(S): 0.04 SOLUTION TOPICAL at 05:40

## 2025-01-30 RX ADMIN — Medication 50 MILLIGRAM(S): at 05:40

## 2025-01-30 RX ADMIN — POTASSIUM CHLORIDE 100 MILLIEQUIVALENT(S): 750 TABLET, EXTENDED RELEASE ORAL at 16:44

## 2025-01-30 RX ADMIN — Medication 4: at 12:08

## 2025-01-30 RX ADMIN — BUMETANIDE 2 MILLIGRAM(S): 2 TABLET ORAL at 12:08

## 2025-01-30 RX ADMIN — Medication 4: at 18:02

## 2025-01-30 RX ADMIN — Medication 100 MILLIGRAM(S): at 21:09

## 2025-01-30 RX ADMIN — VANCOMYCIN HYDROCHLORIDE 500 MILLIGRAM(S): KIT at 01:03

## 2025-01-30 RX ADMIN — Medication 6: at 03:28

## 2025-01-30 RX ADMIN — POTASSIUM CHLORIDE 100 MILLIEQUIVALENT(S): 750 TABLET, EXTENDED RELEASE ORAL at 15:52

## 2025-01-30 RX ADMIN — Medication 50 MILLIGRAM(S): at 01:03

## 2025-01-30 RX ADMIN — Medication 25 GRAM(S): at 09:14

## 2025-01-30 RX ADMIN — POTASSIUM CHLORIDE 50 MILLIEQUIVALENT(S): 750 TABLET, EXTENDED RELEASE ORAL at 09:14

## 2025-01-30 RX ADMIN — PROPOFOL 3.13 MICROGRAM(S)/KG/MIN: 10 INJECTION, EMULSION INTRAVENOUS at 11:11

## 2025-01-30 RX ADMIN — Medication 100 MILLIGRAM(S): at 05:39

## 2025-01-30 RX ADMIN — VANCOMYCIN HYDROCHLORIDE 500 MILLIGRAM(S): KIT at 23:56

## 2025-01-30 RX ADMIN — Medication 100 MILLIGRAM(S): at 15:08

## 2025-01-30 RX ADMIN — PANTOPRAZOLE 40 MILLIGRAM(S): 20 TABLET, DELAYED RELEASE ORAL at 05:40

## 2025-01-30 RX ADMIN — ALBUMIN HUMAN 50 MILLILITER(S): 50 SOLUTION INTRAVENOUS at 06:54

## 2025-01-30 RX ADMIN — INSULIN GLARGINE-YFGN 15 UNIT(S): 100 INJECTION, SOLUTION SUBCUTANEOUS at 07:57

## 2025-01-30 RX ADMIN — POTASSIUM CHLORIDE 50 MILLIEQUIVALENT(S): 750 TABLET, EXTENDED RELEASE ORAL at 05:39

## 2025-01-30 RX ADMIN — Medication 50 MILLIGRAM(S): at 12:08

## 2025-01-30 RX ADMIN — Medication 6 UNIT(S)/MIN: at 07:57

## 2025-01-30 RX ADMIN — Medication 1 MILLIGRAM(S): at 12:08

## 2025-01-30 RX ADMIN — CEFTRIAXONE 2000 MILLIGRAM(S): 250 INJECTION, POWDER, FOR SOLUTION INTRAMUSCULAR; INTRAVENOUS at 09:14

## 2025-01-30 RX ADMIN — VANCOMYCIN HYDROCHLORIDE 500 MILLIGRAM(S): KIT at 17:43

## 2025-01-30 RX ADMIN — ALBUMIN HUMAN 50 MILLILITER(S): 50 SOLUTION INTRAVENOUS at 23:35

## 2025-01-30 RX ADMIN — POTASSIUM CHLORIDE 50 MILLIEQUIVALENT(S): 750 TABLET, EXTENDED RELEASE ORAL at 07:10

## 2025-01-30 NOTE — PROGRESS NOTE ADULT - NS ATTEND AMEND GEN_ALL_CORE FT
-    	  59F hx depression, EtOH cirrhosis, withdrawal seizures, ascites, esophageal varices S/P banding, squamous cell skin cancer,   and anemia. Patient presented to [facility] ED with complaints diarrhea,  fevers with cough. Patient was admitted for undifferentiated shock. Hospital course complicated by acute hypoxic respiratory failure requiring intubation and C. diff. Cardiology was consulted for a small mobile vegetation attached to the posterior mitral valve leaflet seen on TTE.      Mitral valve vegetation.   - TTE with possible  small mobile vegetation   - ARACELI deferred today. Pt needs GI clearance given h/o esophagal varices and  h/o bleeding requiring transfusion  - OP Cardiologist Dr. Barrientos, available for any question. OP GI Dr. Brian Goodson.

## 2025-01-30 NOTE — PROGRESS NOTE ADULT - ASSESSMENT
59F with PMHx of depression, EtOH cirrhosis, withdrawal seizures, ascites, esophageal varices S/P banding, squamous cell skin cancer, and anemia. Patient presented to [facility] ED with complaints of generalized fatigue, multiple bouts of diarrhea, poor PO intake, and fevers with cough, abdominal tenderness, and SOB.  Labs on admission notable for NA of 125, Bicarb of 9, AG of 27, blood glucose of 48, Alk phos 854, , lactate of 15.10, lipase 333. Patient was admitted for undifferentiated shock. Hospital course complicated by acute hypoxic respiratory failure requiring intubation and cdiff. Cardiology was consulted for a small mobile vegetation attached to the posterior mitral valve leaflet seen on TTE.

## 2025-01-30 NOTE — PROGRESS NOTE ADULT - ASSESSMENT
Assessment & Plan:   ABRAHAN GOODWIN is a 59y Female with PMH ETOH cirrhosis, multiple paracentesis for ascites, esophageal varices s/p banding in the hospital for <1d  transferred to the MICU for management of undifferentiated shock state.    # Distributive shock 2/2 to G negative bacteremia  # C. diff colitis  # Cirrhosis 2/2 to EtoH abuse   # Hx of GIB 2/2 to esophageal varices s/p banding   # Hx of multiple paracentesis         ====================== NEUROLOGY=====================  fentaNYL   Infusion. 0.5 MICROgram(s)/kG/Hr (2.61 mL/Hr) IV Continuous <Continuous>  propofol Infusion 10 MICROgram(s)/kG/Min (3.13 mL/Hr) IV Continuous <Continuous>    - Sedated   -continue aspiration precautions  -HOB 30 degrees  -Continue thiamine, folic acid, and multi vitamin      ==================== RESPIRATORY======================  Mechanical Ventilation:  Mode: AC/ CMV (Assist Control/ Continuous Mandatory Ventilation)  RR (machine): 16  TV (machine): 450  FiO2: 40  PEEP: 6  ITime: 1  MAP: 10  PIP: 19      ====================CARDIOVASCULAR==================  buMETAnide Injectable 2 milliGRAM(s) IV Push two times a day  norepinephrine Infusion 0.05 MICROgram(s)/kG/Min (2.45 mL/Hr) IV Continuous <Continuous>    - Maintain MAP >60   - TTE showed EF 65%, vegetation on mitral valve   - ARACELI pending, needs GI clearance   - ID and cardiology consults   - Trend lactate CBC, CMP    ===================HEMATOLOGIC/ONC ===================  -SCD  ===================== RENAL =========================  Continue monitoring urine output    ==================== GASTROINTESTINAL===================  albumin human 25% IVPB 50 milliLiter(s) IV Intermittent every 6 hours  dextrose 5%. 1000 milliLiter(s) (50 mL/Hr) IV Continuous <Continuous>  dextrose 5%. 1000 milliLiter(s) (100 mL/Hr) IV Continuous <Continuous>  folic acid Injectable 1 milliGRAM(s) IV Push daily  multivitamin/minerals/iron Oral Solution (CENTRUM) 15 milliLiter(s) Oral daily  pantoprazole  Injectable 40 milliGRAM(s) IV Push two times a day  potassium chloride   Powder 40 milliEquivalent(s) Oral once  potassium chloride  10 mEq/100 mL IVPB 10 milliEquivalent(s) IV Intermittent every 1 hour    Diet: on NG tube. NPO after midnight for EGD tomorrow   =======================    ENDOCRINE  =====================  dextrose 50% Injectable 25 Gram(s) IV Push once  dextrose 50% Injectable 12.5 Gram(s) IV Push once  dextrose 50% Injectable 25 Gram(s) IV Push once  dextrose Oral Gel 15 Gram(s) Oral once PRN Blood Glucose LESS THAN 70 milliGRAM(s)/deciliter  glucagon  Injectable 1 milliGRAM(s) IntraMuscular once  hydrocortisone sodium succinate Injectable 50 milliGRAM(s) IV Push every 6 hours  insulin glargine Injectable (LANTUS) 15 Unit(s) SubCutaneous every morning  insulin lispro (ADMELOG) corrective regimen sliding scale   SubCutaneous every 6 hours  vasopressin Infusion 0.04 Unit(s)/Min (6 mL/Hr) IV Continuous <Continuous>    Blood sugar goal: 100-200  ========================INFECTIOUS DISEASE================  cefTRIAXone Injectable. 2000 milliGRAM(s) IV Push every 24 hours  metroNIDAZOLE  IVPB 500 milliGRAM(s) IV Intermittent every 8 hours  vancomycin    Solution 500 milliGRAM(s) Oral every 6 hours    Cx: E.Coli   Repeated BCX today       Care plan discussed with ICU attending Dr. Jarrell   Plan d/w HCP [facility]   Dsipo: active. EGD tomorrow before ARACELI.

## 2025-01-30 NOTE — PROGRESS NOTE ADULT - SUBJECTIVE AND OBJECTIVE BOX
INFECTIOUS DISEASES AND INTERNAL MEDICINE at [place]  =======================================================  Tommy Blair MD  Diplomates American Board of Internal Medicine and Infectious Diseases  Telephone [phone]  Fax            [phone]  =======================================================    ABRAHAN GOODWIN [id number]    Follow up:  C DIFF ECOLI BACTEREMIA     Allergies:  Zithromax (Unknown)  morphine (Nausea)      Medications:  albumin human 25% IVPB 50 milliLiter(s) IV Intermittent every 6 hours  buMETAnide Injectable 2 milliGRAM(s) IV Push two times a day  cefTRIAXone Injectable. 2000 milliGRAM(s) IV Push every 24 hours  chlorhexidine 0.12% Liquid 15 milliLiter(s) Oral Mucosa every 12 hours  chlorhexidine 2% Cloths 1 Application(s) Topical <User Schedule>  dextrose 50% Injectable 25 Gram(s) IV Push once  dextrose 50% Injectable 12.5 Gram(s) IV Push once  dextrose 50% Injectable 25 Gram(s) IV Push once  fentaNYL   Infusion. 0.5 MICROgram(s)/kG/Hr IV Continuous <Continuous>  folic acid Injectable 1 milliGRAM(s) IV Push daily  heparin   Injectable 5000 Unit(s) SubCutaneous every 8 hours  hydrocortisone sodium succinate Injectable 50 milliGRAM(s) IV Push every 6 hours  metroNIDAZOLE  IVPB 500 milliGRAM(s) IV Intermittent every 12 hours  multivitamin/minerals/iron Oral Solution (CENTRUM) 15 milliLiter(s) Oral daily  norepinephrine Infusion 0.05 MICROgram(s)/kG/Min IV Continuous <Continuous>  pantoprazole  Injectable 40 milliGRAM(s) IV Push two times a day  propofol Infusion 10 MICROgram(s)/kG/Min IV Continuous <Continuous>  thiamine IVPB 500 milliGRAM(s) IV Intermittent daily  vancomycin    Solution 500 milliGRAM(s) Oral every 6 hours  vasopressin Infusion 0.04 Unit(s)/Min IV Continuous <Continuous>    SOCIAL       FAMILY   FAMILY HISTORY:  FH: pancreatic cancer (Mother)    Family history of heart attack (Father)    Family history of CVA (Father)      REVIEW OF SYSTEMS:  UNABLE TO OBTAIN         Physical Exam:   Vital Signs Last 24 Hrs  T(C): 37.1 (30 Jan 2025 16:00), Max: 37.2 (30 Jan 2025 15:45)  T(F): 98.8 (30 Jan 2025 16:00), Max: 99 (30 Jan 2025 15:45)  HR: 75 (30 Jan 2025 16:00) (68 - 88)  BP: 107/70 (30 Jan 2025 16:00) (106/70 - 125/78)  BP(mean): 82 (30 Jan 2025 16:00) (77 - 93)  RR: 16 (30 Jan 2025 16:00) (14 - 26)  SpO2: 97% (30 Jan 2025 16:00) (95% - 100%)    Parameters below as of 30 Jan 2025 08:00  Patient On (Oxygen Delivery Method): ventilator    O2 Concentration (%): 40    GEN: NAD, ON VENT  HEENT: normocephalic and atraumatic. EOMI. LJ.    NECK: Supple. No carotid bruits.  No lymphadenopathy or thyromegaly.  LUNGS: Clear to auscultation.  HEART: Regular rate and rhythm without murmur.  ABDOMEN: Soft, nontender, and nondistended.  Positive bowel sounds.    : No CVA tenderness  EXTREMITIES: Without any cyanosis, clubbing, rash, lesions or edema.  MSK: no joint swelling  NEUROLOGIC: SEDATED ON VENT        Labs:  Vitals:   =======================================================  Current Antibiotics:  cefTRIAXone Injectable. 2000 milliGRAM(s) IV Push every 24 hours  metroNIDAZOLE  IVPB 500 milliGRAM(s) IV Intermittent every 12 hours  vancomycin    Solution 500 milliGRAM(s) Oral every 6 hours    Other medications:  albumin human 25% IVPB 50 milliLiter(s) IV Intermittent every 6 hours  buMETAnide Injectable 2 milliGRAM(s) IV Push two times a day  chlorhexidine 0.12% Liquid 15 milliLiter(s) Oral Mucosa every 12 hours  chlorhexidine 2% Cloths 1 Application(s) Topical <User Schedule>  dextrose 50% Injectable 25 Gram(s) IV Push once  dextrose 50% Injectable 12.5 Gram(s) IV Push once  dextrose 50% Injectable 25 Gram(s) IV Push once  fentaNYL   Infusion. 0.5 MICROgram(s)/kG/Hr IV Continuous <Continuous>  folic acid Injectable 1 milliGRAM(s) IV Push daily  heparin   Injectable 5000 Unit(s) SubCutaneous every 8 hours  hydrocortisone sodium succinate Injectable 50 milliGRAM(s) IV Push every 6 hours  multivitamin/minerals/iron Oral Solution (CENTRUM) 15 milliLiter(s) Oral daily  norepinephrine Infusion 0.05 MICROgram(s)/kG/Min IV Continuous <Continuous>  pantoprazole  Injectable 40 milliGRAM(s) IV Push two times a day  propofol Infusion 10 MICROgram(s)/kG/Min IV Continuous <Continuous>  thiamine IVPB 500 milliGRAM(s) IV Intermittent daily  vasopressin Infusion 0.04 Unit(s)/Min IV Continuous <Continuous>      =======================================================  Labs:                               7.6    17.97 )-----------( 41       ( 30 Jan 2025 04:30 )             23.2   01-30    139  |  93[L]  |  49.5[H]  ----------------------------<  220[H]  3.0[L]   |  28.0  |  0.42[L]    Ca    8.6      30 Jan 2025 13:50  Phos  2.7     01-30  Mg     2.1     01-30    TPro  6.5[L]  /  Alb  3.4  /  TBili  10.6[H]  /  DBili  x   /  AST  98[H]  /  ALT  32  /  AlkPhos  240[H]  01-30    Culture - Blood (collected 01-27-25 @ 06:00)  Source: .Blood BLOOD  Preliminary Report (01-28-25 @ 13:01):    No growth at 24 hours    Culture - Blood (collected 01-27-25 @ 02:07)  Source: .Blood BLOOD  Gram Stain (01-27-25 @ 18:01):    Growth in anaerobic bottle: Gram Negative Rods    Growth in aerobic bottle: Gram Negative Rods  Final Report (01-29-25 @ 08:00):    Growth in aerobic and anaerobic bottles: Escherichia coli    Direct identification is available within approximately 3-5    hours either by Blood Panel Multiplexed PCR or Direct    MALDI-TOF. Details: [email or web address]  Organism: Blood Culture PCR  Escherichia coli (01-29-25 @ 08:00)  Organism: Escherichia coli (01-29-25 @ 08:00)    Sensitivities:      -  Levofloxacin: S <=0.5      -  Tobramycin: I 4      -  Aztreonam: S <=4      -  Gentamicin: S <=2      -  Cefazolin: I 4      -  Cefepime: S <=2      -  Piperacillin/Tazobactam: S <=8      -  Ciprofloxacin: S <=0.25      -  Imipenem: S <=1      -  Ceftriaxone: S <=1      -  Ampicillin: R >16 These ampicillin results predict results for amoxicillin      Method Type: NABILA      -  Meropenem: S <=1      -  Ampicillin/Sulbactam: I 16/8      -  Cefoxitin: S <=8      -  Trimethoprim/Sulfamethoxazole: S <=0.5/9.5      -  Ertapenem: S <=0.5  Organism: Blood Culture PCR (01-29-25 @ 08:00)    Sensitivities:      -  Escherichia coli: Detec      Method Type: PCR    Urinalysis with Rflx Culture (collected 11-16-24 @ 09:00)    Culture - Body Fluid with Gram Stain (collected 11-16-24 @ 04:44)  Source: Abdominal Fl  Gram Stain (11-16-24 @ 12:36):    polymorphonuclear leukocytes seen    No organisms seen    by cytocentrifuge  Final Report (11-21-24 @ 08:50):    No growth at 5 days    Culture - Blood (collected 11-16-24 @ 03:17)  Source: .Blood BLOOD  Final Report (11-21-24 @ 09:01):    No growth at 5 days    Culture - Blood (collected 11-16-24 @ 02:44)  Source: .Blood BLOOD  Final Report (11-21-24 @ 09:01):    No growth at 5 days    Culture - Fungal, Body Fluid (collected 11-23-23 @ 14:00)  Source: Peritoneal Peritoneal Fluid  Final Report (12-23-23 @ 15:01):    No fungus isolated at 4 weeks.    Culture - Body Fluid with Gram Stain (collected 11-23-23 @ 14:00)  Source: Ascites Fl Ascites Fluid  Gram Stain (11-23-23 @ 23:43):    polymorphonuclear leukocytes    No organisms seen    by cytocentrifuge  Final Report (11-28-23 @ 16:15):    No growth at 5 days    Culture - Blood (collected 11-22-23 @ 08:31)  Source: .Blood None  Final Report (11-27-23 @ 13:01):    No growth at 5 days    Culture - Blood (collected 11-22-23 @ 08:22)  Source: .Blood None  Final Report (11-27-23 @ 13:01):    No growth at 5 days    Culture - Blood (collected 11-09-23 @ 07:01)  Source: .Blood None  Final Report (11-14-23 @ 14:00):    No growth at 5 days    Culture - Blood (collected 11-09-23 @ 07:01)  Source: .Blood None  Final Report (11-14-23 @ 14:00):    No growth at 5 days    Culture - Blood (collected 10-07-23 @ 09:01)  Source: .Blood None  Final Report (10-12-23 @ 17:01):    No growth at 5 days    Culture - Blood (collected 10-07-23 @ 09:01)  Source: .Blood Blood-Venous  Final Report (10-12-23 @ 17:01):    No growth at 5 days    Culture - Blood (collected 05-09-23 @ 04:39)  Source: .Blood Blood-Peripheral  Final Report (05-14-23 @ 10:01):    No Growth Final    Culture - Urine (collected 05-09-23 @ 04:39)  Source: Clean Catch Clean Catch (Midstream)  Final Report (05-10-23 @ 23:46):    <10,000 CFU/mL Normal Urogenital Bibi    Culture - Blood (collected 05-09-23 @ 04:39)  Source: .Blood Blood-Peripheral  Final Report (05-14-23 @ 10:01):    No Growth Final      Creatinine: 0.77 mg/dL (01-29-25 @ 05:30)  Creatinine: 0.76 mg/dL (01-29-25 @ 01:12)  Creatinine: 0.74 mg/dL (01-29-25 @ 01:12)  Creatinine: 0.75 mg/dL (01-28-25 @ 21:10)  Creatinine: 0.81 mg/dL (01-28-25 @ 17:10)  Creatinine: 0.79 mg/dL (01-28-25 @ 13:00)  Creatinine: 0.76 mg/dL (01-28-25 @ 09:00)  Creatinine: 0.75 mg/dL (01-28-25 @ 04:30)  Creatinine: 0.76 mg/dL (01-28-25 @ 00:46)  Creatinine: 0.90 mg/dL (01-27-25 @ 21:08)  Creatinine: 1.07 mg/dL (01-27-25 @ 13:22)  Creatinine: 1.16 mg/dL (01-27-25 @ 08:15)  Creatinine: 1.18 mg/dL (01-27-25 @ 04:50)  Creatinine: 0.71 mg/dL (01-27-25 @ 02:07)            WBC Count: 32.66 K/uL (01-29-25 @ 05:30)  WBC Count: 35.79 K/uL (01-29-25 @ 01:12)  WBC Count: 36.70 K/uL (01-28-25 @ 21:10)  WBC Count: 39.69 K/uL (01-28-25 @ 17:10)  WBC Count: 37.94 K/uL (01-28-25 @ 13:00)  WBC Count: 45.53 K/uL (01-28-25 @ 09:00)  WBC Count: 51.71 K/uL (01-28-25 @ 04:30)  WBC Count: 59.32 K/uL (01-28-25 @ 00:46)  WBC Count: 62.07 K/uL (01-27-25 @ 21:08)  WBC Count: 41.57 K/uL (01-27-25 @ 13:22)  WBC Count: 20.91 K/uL (01-27-25 @ 08:15)  WBC Count: 4.14 K/uL (01-27-25 @ 02:07)    SARS-CoV-2: NotDetec (01-27-25 @ 06:00)      Alkaline Phosphatase: 204 U/L (01-29-25 @ 05:30)  Alkaline Phosphatase: 216 U/L (01-29-25 @ 01:12)  Alkaline Phosphatase: 236 U/L (01-28-25 @ 21:10)  Alkaline Phosphatase: 226 U/L (01-28-25 @ 17:10)  Alkaline Phosphatase: 216 U/L (01-28-25 @ 13:00)  Alkaline Phosphatase: 245 U/L (01-28-25 @ 09:00)  Alkaline Phosphatase: 247 U/L (01-28-25 @ 04:30)  Alkaline Phosphatase: 261 U/L (01-28-25 @ 00:46)  Alkaline Phosphatase: 331 U/L (01-27-25 @ 13:22)  Alkaline Phosphatase: 489 U/L (01-27-25 @ 08:15)  Alkaline Phosphatase: 596 U/L (01-27-25 @ 04:50)  Alkaline Phosphatase: 854 U/L (01-27-25 @ 02:07)  Alanine Aminotransferase (ALT/SGPT): 34 U/L (01-29-25 @ 05:30)  Alanine Aminotransferase (ALT/SGPT): 38 U/L (01-29-25 @ 01:12)  Alanine Aminotransferase (ALT/SGPT): 40 U/L (01-28-25 @ 21:10)  Alanine Aminotransferase (ALT/SGPT): 41 U/L (01-28-25 @ 17:10)  Alanine Aminotransferase (ALT/SGPT): 39 U/L (01-28-25 @ 13:00)  Alanine Aminotransferase (ALT/SGPT): 40 U/L (01-28-25 @ 09:00)  Alanine Aminotransferase (ALT/SGPT): 43 U/L (01-28-25 @ 04:30)  Alanine Aminotransferase (ALT/SGPT): 45 U/L (01-28-25 @ 00:46)  Alanine Aminotransferase (ALT/SGPT): 42 U/L (01-27-25 @ 13:22)  Alanine Aminotransferase (ALT/SGPT): 40 U/L (01-27-25 @ 08:15)  Alanine Aminotransferase (ALT/SGPT): 37 U/L (01-27-25 @ 04:50)  Alanine Aminotransferase (ALT/SGPT): 45 U/L (01-27-25 @ 02:07)  Aspartate Aminotransferase (AST/SGOT): 118 U/L (01-29-25 @ 05:30)  Aspartate Aminotransferase (AST/SGOT): 131 U/L (01-29-25 @ 01:12)  Aspartate Aminotransferase (AST/SGOT): 146 U/L (01-28-25 @ 21:10)  Aspartate Aminotransferase (AST/SGOT): 154 U/L (01-28-25 @ 17:10)  Aspartate Aminotransferase (AST/SGOT): 154 U/L (01-28-25 @ 13:00)  Aspartate Aminotransferase (AST/SGOT): 163 U/L (01-28-25 @ 09:00)  Aspartate Aminotransferase (AST/SGOT): 175 U/L (01-28-25 @ 04:30)  Aspartate Aminotransferase (AST/SGOT): 185 U/L (01-28-25 @ 00:46)  Aspartate Aminotransferase (AST/SGOT): 180 U/L (01-27-25 @ 13:22)  Aspartate Aminotransferase (AST/SGOT): 163 U/L (01-27-25 @ 08:15)  Aspartate Aminotransferase (AST/SGOT): 150 U/L (01-27-25 @ 04:50)  Aspartate Aminotransferase (AST/SGOT): 186 U/L (01-27-25 @ 02:07)  Bilirubin Total: 7.0 mg/dL (01-29-25 @ 05:30)  Bilirubin Total: 7.4 mg/dL (01-29-25 @ 01:12)  Bilirubin Total: 7.2 mg/dL (01-29-25 @ 01:12)  Bilirubin Total: 7.2 mg/dL (01-28-25 @ 21:10)  Bilirubin Total: 7.1 mg/dL (01-28-25 @ 17:10)  Bilirubin Total: 6.5 mg/dL (01-28-25 @ 13:00)  Bilirubin Total: 6.4 mg/dL (01-28-25 @ 09:00)  Bilirubin Total: 6.0 mg/dL (01-28-25 @ 04:30)  Bilirubin Total: 5.6 mg/dL (01-28-25 @ 00:46)  Bilirubin Total: 4.4 mg/dL (01-27-25 @ 13:22)  Bilirubin Total: 3.5 mg/dL (01-27-25 @ 08:15)  Bilirubin Total: 3.5 mg/dL (01-27-25 @ 08:15)  Bilirubin Total: 3.3 mg/dL (01-27-25 @ 04:50)  Bilirubin Total: 4.1 mg/dL (01-27-25 @ 02:07)  Bilirubin Direct: 3.0 mg/dL (01-27-25 @ 08:15)

## 2025-01-30 NOTE — PROGRESS NOTE ADULT - PROBLEM SELECTOR PLAN 1
.  - Non-ischemic EKG  - NSR on telemetry  - TTE demonstrating a small mobile vegetation attached to the posterior mitral valve leaflet  - Plan for bedside ARACELI, however deffered today. Patient has hx of esophageal varices with banding. Per S/O at bedside most recent EGD was 1 month ago (serial EGDs throughout year) and at that time a variceal was perforated. Over past year has received 16units PRBC due to bleeding hemorrhoids and varices.   - patient will need GI consult to assess varices and banding prior to any procedures  - OP Cardiologist Dr. Barrientos, available for any question. OP GI Dr. Brian Goodson

## 2025-01-30 NOTE — CONSULT NOTE ADULT - SUBJECTIVE AND OBJECTIVE BOX
Chief Complaint:  Patient is a 59y old  Female who presents with a chief complaint of HAGMA, respiratory distress, shock (2025 12:05)    Patient is a 59y old  Female who presents with a chief complaint of HAGMA, respiratory distress, shock (2025 12:05)    HPI:  Ms. Flores is a 59 year old woman currently intubated in the MICU for whom GI consulted regarding upper endoscopy evaluation to rule out esophageal varices. Patient has a significant past medical history decompensated alcohol related cirrhosis, ascites, esophageal varices s/p previous band ligation, initially presented with weakness, fatigue, diarrhea and poor PO intake, found to be C. Difficile positive and recent influenza. Hospital course complicated by septic shock and respiratory failure for which she has been managed in the MICU. Patient currently scheduled for ARACELI to evaluate for valvular vegetations however should undergo repeat endoscopic evaluation given variceal history.     PAST MEDICAL & SURGICAL HISTORY:  Alcohol abuse  Depression  Withdrawal seizures  History of basal cell carcinoma excision  Squamous cell skin cancer  Hemorrhoids  2019 novel coronavirus disease (COVID-19)  Anemia  H/O vertigo  History of ear, nose, and throat (ENT) surgery  H/O basal cell carcinoma excision  H/O:     REVIEW OF SYSTEMS:   General: Negative  HEENT: Negative  CV: Negative  Respiratory: Negative  GI: See HPI  : Negative  MSK: Negative  Hematologic: Negative  Skin: Negative    MEDICATIONS:   MEDICATIONS  (STANDING):  albumin human 25% IVPB 50 milliLiter(s) IV Intermittent every 6 hours  buMETAnide Injectable 2 milliGRAM(s) IV Push two times a day  cefTRIAXone Injectable. 2000 milliGRAM(s) IV Push every 24 hours  chlorhexidine 0.12% Liquid 15 milliLiter(s) Oral Mucosa every 12 hours  chlorhexidine 2% Cloths 1 Application(s) Topical <User Schedule>  dextrose 5%. 1000 milliLiter(s) (50 mL/Hr) IV Continuous <Continuous>  dextrose 5%. 1000 milliLiter(s) (100 mL/Hr) IV Continuous <Continuous>  dextrose 50% Injectable 25 Gram(s) IV Push once  dextrose 50% Injectable 12.5 Gram(s) IV Push once  dextrose 50% Injectable 25 Gram(s) IV Push once  fentaNYL   Infusion. 0.5 MICROgram(s)/kG/Hr (2.61 mL/Hr) IV Continuous <Continuous>  folic acid Injectable 1 milliGRAM(s) IV Push daily  glucagon  Injectable 1 milliGRAM(s) IntraMuscular once  hydrocortisone sodium succinate Injectable 50 milliGRAM(s) IV Push every 6 hours  insulin glargine Injectable (LANTUS) 15 Unit(s) SubCutaneous every morning  insulin lispro (ADMELOG) corrective regimen sliding scale   SubCutaneous every 6 hours  metroNIDAZOLE  IVPB 500 milliGRAM(s) IV Intermittent every 8 hours  multivitamin/minerals/iron Oral Solution (CENTRUM) 15 milliLiter(s) Oral daily  norepinephrine Infusion 0.05 MICROgram(s)/kG/Min (2.45 mL/Hr) IV Continuous <Continuous>  pantoprazole  Injectable 40 milliGRAM(s) IV Push two times a day  potassium chloride  10 mEq/100 mL IVPB 10 milliEquivalent(s) IV Intermittent every 1 hour  propofol Infusion 10 MICROgram(s)/kG/Min (3.13 mL/Hr) IV Continuous <Continuous>  vancomycin    Solution 500 milliGRAM(s) Oral every 6 hours  vasopressin Infusion 0.04 Unit(s)/Min (6 mL/Hr) IV Continuous <Continuous>    MEDICATIONS  (PRN):  dextrose Oral Gel 15 Gram(s) Oral once PRN Blood Glucose LESS THAN 70 milliGRAM(s)/deciliter      DIET:  Diet, NPO after Midnight:      NPO Start Date: 2025,   NPO Start Time: 23:59 (25 @ 14:41) [Active]  Diet, NPO with Tube Feed:   Tube Feeding Modality: Orogastric  Glucerna 1.5 Lior (GLUCERNA1.5)  Total Volume for 24 Hours (mL): 720  Continuous  Starting Tube Feed Rate mL per Hour: 10  Increase Tube Feed Rate by (mL): 10     Every 4 hours  Until Goal Tube Feed Rate (mL per Hour): 30  Tube Feed Duration (in Hours): 24  Tube Feed Start Time: 15:00 (25 @ 14:55) [Active]    ALLERGIES:   Allergies  Zithromax (Unknown)    Intolerances  morphine (Nausea)    VITAL SIGNS:   Vital Signs Last 24 Hrs  T(C): 37.1 (2025 14:00), Max: 37.1 (2025 14:00)  T(F): 98.8 (2025 14:00), Max: 98.8 (2025 14:00)  HR: 76 (2025 14:00) (68 - 89)  BP: 115/74 (2025 14:00) (106/70 - 125/78)  BP(mean): 87 (2025 14:00) (77 - 93)  RR: 16 (2025 14:00) (14 - 26)  SpO2: 96% (2025 14:00) (94% - 100%)    Parameters below as of 2025 08:00  Patient On (Oxygen Delivery Method): ventilator    O2 Concentration (%): 40  I&O's Summary    2025 07:01  -  2025 07:00  --------------------------------------------------------  IN: 1849.7 mL / OUT: 3630 mL / NET: -1780.3 mL    2025 07:01  -  2025 15:08  --------------------------------------------------------  IN: 326.3 mL / OUT: 1030 mL / NET: -703.7 mL        PHYSICAL EXAM:   GENERAL:  Sedated   HEENT:  NC/AT, conjunctiva clear, sclera anicteric  CHEST:  Intubated in MICU   HEART:  Regular rate  ABDOMEN:  Soft, non-tender, no rebound or guarding  C. Diff precautions   SKIN:  Warm, dry  NEURO:  Calm    LABS:             7.6    17.97 )-----------( 41       ( 2025 04:30 )             23.2     Hemoglobin: 7.6 g/dL (25 @ 04:30)  Hemoglobin: 7.5 g/dL (25 @ 09:50)  Hemoglobin: 7.7 g/dL (25 @ 05:30)  Hemoglobin: 8.0 g/dL (25 @ 01:12)  Hemoglobin: 8.2 g/dL (25 @ 21:10)  Hemoglobin: 8.2 g/dL (25 @ 17:10)  Hemoglobin: 7.8 g/dL (25 @ 13:00)  Hemoglobin: 7.9 g/dL (25 @ 09:00)  Hemoglobin: 8.3 g/dL (25 @ 04:30)  Hemoglobin: 8.4 g/dL (25 @ 00:46)  Hemoglobin: 8.3 g/dL (25 @ 21:08)        139  |  93[L]  |  49.5[H]  ----------------------------<  220[H]  3.0[L]   |  28.0  |  0.42[L]    Ca    8.6      2025 13:50  Phos  2.7       Mg     2.1         TPro  6.5[L]  /  Alb  3.4  /  TBili  10.6[H]  /  DBili  x   /  AST  98[H]  /  ALT  32  /  AlkPhos  240[H]      LIVER FUNCTIONS - ( 2025 04:30 )  Alb: 3.4 g/dL / Pro: 6.5 g/dL / ALK PHOS: 240 U/L / ALT: 32 U/L / AST: 98 U/L / GGT: x           PT/INR - ( 2025 01:12 )   PT: 30.9 sec;   INR: 2.69 ratio    Triglycerides, Serum: 110 mg/dL (25 @ 04:30)      RADIOLOGY & ADDITIONAL STUDIES:    ACC: [id number] EXAM:  CT ABDOMEN AND PELVIS   ORDERED BY: CHRIS EDEN     PROCEDURE DATE:  2025      INTERPRETATION:  CLINICAL INFORMATION: Worsening shock.    COMPARISON: 2025.    CONTRAST/COMPLICATIONS:  IV Contrast: NONE  Oral Contrast: NONE  .    PROCEDURE:  CT of the Abdomen and Pelvis was performed.  Sagittal and coronal reformats were performed.    FINDINGS:  LOWER CHEST: Bibasilar focal atelectasis and/or pneumonia, new since the   previous study.    LIVER: Grossly, no significant change since the previous study.   Geographic areas of ill-defined low-attenuation, likely hepatic   steatosis.. Nodular hepatic contour, likely concomitant cirrhotic change.  BILE DUCTS: Normal caliber.  GALLBLADDER: Cholelithiasis. Gallbladder wall thickening and   pericholecystic fluid.  SPLEEN: Stable splenomegaly  PANCREAS: Within normal limits.  ADRENALS: Within normal limits.  KIDNEYS/URETERS: Mild persistent enhancement of the renal parenchyma,   likely from recent prior intravenous contrast injection. No collecting   system obstruction bilaterally.    BLADDER: Dependent intravesicular air, likely secondary to the presence   of a Delaney catheter.  REPRODUCTIVE ORGANS: Within normal limits    BOWEL: Diffuse edema/wall thickening of the ascending colon. Wall   thickening of the sigmoid colon on a background of diverticulosis.  PERITONEUM/RETROPERITONEUM: Trace ascites.  VESSELS: Atherosclerotic calcifications. Right common femoral vein   central venous catheter. New since the previous study.  LYMPH NODES: No lymphadenopathy.  ABDOMINAL WALL: Mild diffuse anasarca  BONES: Degenerative changes.    IMPRESSION:  1.  New bilateral pleural effusions with bibasilar focal atelectasis   and/or pneumonia.  2.  Ascending colonic wall thickening and sigmoid colonic wall   thickening; possible colitis.  3.  Gallbladder wall thickening and pericholecystic fluid; nonspecific in   the presence of trace ascites. Cannot totally exclude acute cholecystitis.  4.  Cirrhotic changes in liver with geographic areas of hepatic steatosis   as noted previously.    --- End of Report ---    NATALIE CRESPO MD; Attending Radiologist  This document has been electronically signed. 2025  2:57PM  25 @ 02:18

## 2025-01-30 NOTE — PROGRESS NOTE ADULT - SUBJECTIVE AND OBJECTIVE BOX
[facility]                                                         CARDIOLOGY AT [place] [address]                                                         Telephone: [phone]. Fax:[phone]                                                                             PROGRESS NOTE    Reason for follow up:   Update:       Review of symptoms:   Cardiac:  No chest pain. No dyspnea. No palpitations.  Respiratory: no cough. No dyspnea  Gastrointestinal: No diarrhea. No abdominal pain. No bleeding.   Neuro: No focal neuro complaints.    Vitals:  T(C): 36.7 (01-30-25 @ 11:00), Max: 37 (01-29-25 @ 17:15)  HR: 72 (01-30-25 @ 11:00) (68 - 89)  BP: 112/76 (01-30-25 @ 11:00) (105/72 - 123/78)  RR: 16 (01-30-25 @ 11:00) (14 - 26)  SpO2: 97% (01-30-25 @ 11:00) (93% - 100%)  Wt(kg): --  I&O's Summary    29 Jan 2025 07:01  -  30 Jan 2025 07:00  --------------------------------------------------------  IN: 1849.7 mL / OUT: 3630 mL / NET: -1780.3 mL    30 Jan 2025 07:01  -  30 Jan 2025 11:56  --------------------------------------------------------  IN: 253.3 mL / OUT: 355 mL / NET: -101.7 mL      Weight (kg): 52.2 (01-27 @ 02:04)    PHYSICAL EXAM:  Appearance: Comfortable. No acute distress  HEENT:  Atraumatic. Normocephalic.  Normal oral mucosa  Neurologic: A & O x 3, no gross focal deficits.  Cardiovascular: RRR S1 S2, No murmur, no rubs/gallops. No JVD  Respiratory: Lungs clear to auscultation, unlabored   Gastrointestinal:  Soft, Non-tender, + BS  Lower Extremities: 2+ Peripheral Pulses, No clubbing, cyanosis, or edema  Psychiatry: Patient is calm. No agitation.   Skin: warm and dry.    CURRENT CARDIAC MEDICATIONS:  buMETAnide Injectable 2 milliGRAM(s) IV Push two times a day  norepinephrine Infusion 0.05 MICROgram(s)/kG/Min IV Continuous <Continuous>      CURRENT OTHER MEDICATIONS:  cefTRIAXone Injectable. 2000 milliGRAM(s) IV Push every 24 hours  metroNIDAZOLE  IVPB 500 milliGRAM(s) IV Intermittent every 8 hours  vancomycin    Solution 500 milliGRAM(s) Oral every 6 hours  fentaNYL   Infusion. 0.5 MICROgram(s)/kG/Hr (2.61 mL/Hr) IV Continuous <Continuous>  propofol Infusion 10 MICROgram(s)/kG/Min (3.13 mL/Hr) IV Continuous <Continuous>  pantoprazole  Injectable 40 milliGRAM(s) IV Push two times a day  dextrose 50% Injectable 25 Gram(s) IV Push once, Stop order after: 1 Doses  dextrose 50% Injectable 12.5 Gram(s) IV Push once, Stop order after: 1 Doses  dextrose 50% Injectable 25 Gram(s) IV Push once, Stop order after: 1 Doses  dextrose Oral Gel 15 Gram(s) Oral once, Stop order after: 1 Doses PRN Blood Glucose LESS THAN 70 milliGRAM(s)/deciliter  glucagon  Injectable 1 milliGRAM(s) IntraMuscular once, Stop order after: 1 Doses  hydrocortisone sodium succinate Injectable 50 milliGRAM(s) IV Push every 6 hours  insulin glargine Injectable (LANTUS) 15 Unit(s) SubCutaneous every morning  insulin lispro (ADMELOG) corrective regimen sliding scale   SubCutaneous every 6 hours  vasopressin Infusion 0.04 Unit(s)/Min (6 mL/Hr) IV Continuous <Continuous>  albumin human 25% IVPB 50 milliLiter(s) IV Intermittent every 6 hours, Stop order after: 4 Doses  chlorhexidine 0.12% Liquid 15 milliLiter(s) Oral Mucosa every 12 hours  chlorhexidine 2% Cloths 1 Application(s) Topical <User Schedule>  dextrose 5%. 1000 milliLiter(s) (100 mL/Hr) IV Continuous <Continuous>  dextrose 5%. 1000 milliLiter(s) (50 mL/Hr) IV Continuous <Continuous>  folic acid Injectable 1 milliGRAM(s) IV Push daily  multivitamin/minerals/iron Oral Solution (CENTRUM) 15 milliLiter(s) Oral daily      LABS:	 	                            7.6    17.97 )-----------( 41       ( 30 Jan 2025 04:30 )             23.2     01-30    138  |  92[L]  |  44.9[H]  ----------------------------<  217[H]  2.4[LL]   |  30.0[H]  |  0.61    Ca    8.3[L]      30 Jan 2025 04:30  Phos  3.0     01-30  Mg     1.6     01-30    TPro  6.5[L]  /  Alb  3.4  /  TBili  10.6[H]  /  DBili  x   /  AST  98[H]  /  ALT  32  /  AlkPhos  240[H]  01-30    PT/INR/PTT ( 29 Jan 2025 01:12 )                       :                       :      30.9         :       X                     .        .                   .              .           .       2.69        .                                       Lipid Profile: Date: 01-30 @ 04:30  Total cholesterol --; Direct LDL: --; HDL: --; Triglycerides:110    HgA1c:   TSH: Thyroid Stimulating Hormone, Serum: 11.76 uIU/mL      TELEMETRY:   ECG:    DIAGNOSTIC TESTING:  [ ] Echocardiogram:   < from: TTE W or WO Ultrasound Enhancing Agent (01.27.25 @ 17:21) >  TRANSTHORACIC ECHOCARDIOGRAM REPORT  ________________________________________________________________________________                                      _______       Pt. Name:       ABRAHAN GOODWIN Study Date:    1/27/2025  MRN:            [id number]       YOB: 1965  Accession #:    [id number]      Age:           59 years  Account#:       [id number]     Gender:        F  Heart Rate:                    Height:        62.00 in (157.48 cm)  Rhythm:                        Weight: 115.00 lb (52.16 kg)  Blood Pressure: 83/65 mmHg     BSA/BMI:       1.51 m² / 21.03 kg/m²  ________________________________________________________________________________________  Referring Physician:    [id number] Lakshmi Beck  Interpreting Physician: Casimiro Sanchez MD  Primary Sonographer:    Shelly Cifuentes    CPT:               ECHO TTE WO CON COMP W DOPP - 00091.m  Indication(s):     Cardiogenic shock - R57.0  Procedure:         Transthoracic echocardiogram with 2-D, M-mode and complete                    spectral and color flow Doppler.  Ordering Location: [facility]  Admission Status:  Inpatient    _______________________________________________________________________________________     CONCLUSIONS:      1. Left ventricular systolic function is normal with an ejection fraction of 68 % by Macias's method of disks.   2. The left ventricular diastolic function is indeterminate.   3. Normal right ventricular cavity size and normal right ventricular systolic function.   4. Left atrium is mildly dilated.   5. The right atrium is moderately dilated.   6. Mild tricuspid regurgitation.   7. There is a small mobile vegetation attached to the posterior mitral valve leaflet.   8. Trace pericardial effusion noted adjacent to the anterior right ventricle.   9. No prior echocardiogram is available for comparison.    < end of copied text >  [ ]  Catheterization:  [ ] Stress Test:    OTHER:

## 2025-01-30 NOTE — PROGRESS NOTE ADULT - SUBJECTIVE AND OBJECTIVE BOX
Patient is a 59y old  Female who presents with a chief complaint of HAGMA, respiratory distress, shock (2025 17:12)        Interval HPI:  - No acute event overnight   - Pt failed SBT at night   - Talked to HCP Holden through the phone. Updated pt's current medical situation and treatment plan  - Needs GI clearance for ARACELI due to Hx of GIB 2/2 to esophageal varices 1 month ago   - NPO after midnight     PAST MEDICAL & SURGICAL HISTORY:  Alcohol abuse      Depression      Withdrawal seizures      History of basal cell carcinoma excision      Squamous cell skin cancer      Hemorrhoids      2019 novel coronavirus disease (COVID-19)      Anemia      H/O vertigo      History of ear, nose, and throat (ENT) surgery      H/O basal cell carcinoma excision      H/O:           Review of Systems: (sedated, unable to obtain)       Medications:  cefTRIAXone Injectable. 2000 milliGRAM(s) IV Push every 24 hours  metroNIDAZOLE  IVPB 500 milliGRAM(s) IV Intermittent every 8 hours  vancomycin    Solution 500 milliGRAM(s) Oral every 6 hours    buMETAnide Injectable 2 milliGRAM(s) IV Push two times a day  norepinephrine Infusion 0.05 MICROgram(s)/kG/Min IV Continuous <Continuous>      fentaNYL   Infusion. 0.5 MICROgram(s)/kG/Hr IV Continuous <Continuous>  propofol Infusion 10 MICROgram(s)/kG/Min IV Continuous <Continuous>        pantoprazole  Injectable 40 milliGRAM(s) IV Push two times a day      dextrose 50% Injectable 25 Gram(s) IV Push once  dextrose 50% Injectable 12.5 Gram(s) IV Push once  dextrose 50% Injectable 25 Gram(s) IV Push once  dextrose Oral Gel 15 Gram(s) Oral once PRN  glucagon  Injectable 1 milliGRAM(s) IntraMuscular once  hydrocortisone sodium succinate Injectable 50 milliGRAM(s) IV Push every 6 hours  insulin glargine Injectable (LANTUS) 15 Unit(s) SubCutaneous every morning  insulin lispro (ADMELOG) corrective regimen sliding scale   SubCutaneous every 6 hours  vasopressin Infusion 0.04 Unit(s)/Min IV Continuous <Continuous>    albumin human 25% IVPB 50 milliLiter(s) IV Intermittent every 6 hours  dextrose 5%. 1000 milliLiter(s) IV Continuous <Continuous>  dextrose 5%. 1000 milliLiter(s) IV Continuous <Continuous>  folic acid Injectable 1 milliGRAM(s) IV Push daily  multivitamin/minerals/iron Oral Solution (CENTRUM) 15 milliLiter(s) Oral daily  potassium chloride   Powder 40 milliEquivalent(s) Oral once  potassium chloride  10 mEq/100 mL IVPB 10 milliEquivalent(s) IV Intermittent every 1 hour      chlorhexidine 0.12% Liquid 15 milliLiter(s) Oral Mucosa every 12 hours  chlorhexidine 2% Cloths 1 Application(s) Topical <User Schedule>        Mode: AC/ CMV (Assist Control/ Continuous Mandatory Ventilation)  RR (machine): 16  TV (machine): 450  FiO2: 40  PEEP: 6  ITime: 1  MAP: 10  PIP: 19      ICU Vital Signs Last 24 Hrs  T(C): 37.1 (2025 16:00), Max: 37.2 (2025 15:45)  T(F): 98.8 (2025 16:00), Max: 99 (2025 15:45)  HR: 75 (2025 16:00) (68 - 88)  BP: 107/70 (2025 16:00) (106/70 - 125/78)  BP(mean): 82 (2025 16:00) (77 - 93)  ABP: 93/50 (2025 16:00) (76/40 - 114/59)  ABP(mean): 68 (2025 16:00) (56 - 83)  RR: 16 (2025 16:00) (14 - 26)  SpO2: 97% (2025 16:00) (95% - 100%)    O2 Parameters below as of 2025 08:00  Patient On (Oxygen Delivery Method): ventilator    O2 Concentration (%): 40        ABG - ( 2025 04:33 )  pH, Arterial: 7.500 pH, Blood: x     /  pCO2: 38    /  pO2: 102   / HCO3: 30    / Base Excess: 6.4   /  SaO2: 100.0               I&O's Detail    2025 07:01  -  2025 07:00  --------------------------------------------------------  IN:    Albumin 25%  -  50 mL: 150 mL    Enteral Tube Flush: 50 mL    FentaNYL: 135.3 mL    Glucerna 1.5: 520 mL    IV PiggyBack: 100 mL    IV PiggyBack: 100 mL    IV PiggyBack: 200 mL    IV PiggyBack: 100 mL    Norepinephrine: 136 mL    Propofol: 214.4 mL    Vasopressin: 144 mL  Total IN: 1849.7 mL    OUT:    Indwelling Catheter - Urethral (mL): 3030 mL    Rectal Tube (mL): 600 mL  Total OUT: 3630 mL    Total NET: -1780.3 mL      2025 07:01  -  2025 17:36  --------------------------------------------------------  IN:    FentaNYL: 52.1 mL    Glucerna 1.5: 60 mL    IV PiggyBack: 50 mL    IV PiggyBack: 200 mL    IV PiggyBack: 200 mL    Norepinephrine: 31.6 mL    Propofol: 97.4 mL    Vasopressin: 54 mL  Total IN: 745.1 mL    OUT:    Indwelling Catheter - Urethral (mL): 1555 mL  Total OUT: 1555 mL    Total NET: -809.9 mL      PHYSICAL EXAM:  GENERAL: NAD, sedated   HEAD:  Atraumatic, Normocephalic  NECK: Supple, No JVD  CHEST/LUNG: Diminished to auscultation bilaterally; No wheezes or rales  HEART: regular rate and regular rhythm; No murmurs, rubs, or gallops  ABDOMEN: No-distention, soft, non- tender,  bowel sounds +   EXTREMITIES:  2+ Peripheral Pulses, No clubbing or cyanosis  Neurological: sedated   Skin: Warm and dry  Musculoskeletal: Atraumatic, no joint effusions        LABS:                        7.6    17.97 )-----------( 41       ( 2025 04:30 )             23.2         139  |  93[L]  |  49.5[H]  ----------------------------<  220[H]  3.0[L]   |  28.0  |  0.42[L]    Ca    8.6      2025 13:50  Phos  2.7       Mg     2.1         TPro  6.5[L]  /  Alb  3.4  /  TBili  10.6[H]  /  DBili  x   /  AST  98[H]  /  ALT  32  /  AlkPhos  240[H]            CAPILLARY BLOOD GLUCOSE      POCT Blood Glucose.: 227 mg/dL (2025 11:42)    PT/INR - ( 2025 01:12 )   PT: 30.9 sec;   INR: 2.69 ratio           Urinalysis Basic - ( 2025 13:50 )    Color: x / Appearance: x / SG: x / pH: x  Gluc: 220 mg/dL / Ketone: x  / Bili: x / Urobili: x   Blood: x / Protein: x / Nitrite: x   Leuk Esterase: x / RBC: x / WBC x   Sq Epi: x / Non Sq Epi: x / Bacteria: x      CULTURES:  C Diff by PCR Result: Detected ( @ 07:15)  Culture Results:   Growth in anaerobic bottle: Gram Negative Rods ( @ 06:00)  Rapid RVP Result: NotDetec ( @ 06:00)  Culture Results:   Growth in aerobic and anaerobic bottles: Escherichia coli  Direct identification is available within approximately 3-5  hours either by Blood Panel Multiplexed PCR or Direct  MALDI-TOF. Details: [email or web address] ( @ 02:07)           Patient is a 59y old  Female who presents with a chief complaint of HAGMA, respiratory distress, shock (2025 17:12)      Interval HPI:  - No acute event overnight   - Pt failed SBT at night   - Talked to HCP Holden through the phone. Updated pt's current medical situation and treatment plan  - Needs GI clearance for ARACELI due to Hx of GIB 2/2 to esophageal varices 1 month ago   - NPO after midnight     PAST MEDICAL & SURGICAL HISTORY:  Alcohol abuse      Depression      Withdrawal seizures      History of basal cell carcinoma excision      Squamous cell skin cancer      Hemorrhoids      2019 novel coronavirus disease (COVID-19)      Anemia      H/O vertigo      History of ear, nose, and throat (ENT) surgery      H/O basal cell carcinoma excision      H/O:           Review of Systems: (sedated, unable to obtain)       Medications:  cefTRIAXone Injectable. 2000 milliGRAM(s) IV Push every 24 hours  metroNIDAZOLE  IVPB 500 milliGRAM(s) IV Intermittent every 8 hours  vancomycin    Solution 500 milliGRAM(s) Oral every 6 hours    buMETAnide Injectable 2 milliGRAM(s) IV Push two times a day  norepinephrine Infusion 0.05 MICROgram(s)/kG/Min IV Continuous <Continuous>      fentaNYL   Infusion. 0.5 MICROgram(s)/kG/Hr IV Continuous <Continuous>  propofol Infusion 10 MICROgram(s)/kG/Min IV Continuous <Continuous>        pantoprazole  Injectable 40 milliGRAM(s) IV Push two times a day      dextrose 50% Injectable 25 Gram(s) IV Push once  dextrose 50% Injectable 12.5 Gram(s) IV Push once  dextrose 50% Injectable 25 Gram(s) IV Push once  dextrose Oral Gel 15 Gram(s) Oral once PRN  glucagon  Injectable 1 milliGRAM(s) IntraMuscular once  hydrocortisone sodium succinate Injectable 50 milliGRAM(s) IV Push every 6 hours  insulin glargine Injectable (LANTUS) 15 Unit(s) SubCutaneous every morning  insulin lispro (ADMELOG) corrective regimen sliding scale   SubCutaneous every 6 hours  vasopressin Infusion 0.04 Unit(s)/Min IV Continuous <Continuous>    albumin human 25% IVPB 50 milliLiter(s) IV Intermittent every 6 hours  dextrose 5%. 1000 milliLiter(s) IV Continuous <Continuous>  dextrose 5%. 1000 milliLiter(s) IV Continuous <Continuous>  folic acid Injectable 1 milliGRAM(s) IV Push daily  multivitamin/minerals/iron Oral Solution (CENTRUM) 15 milliLiter(s) Oral daily  potassium chloride   Powder 40 milliEquivalent(s) Oral once  potassium chloride  10 mEq/100 mL IVPB 10 milliEquivalent(s) IV Intermittent every 1 hour      chlorhexidine 0.12% Liquid 15 milliLiter(s) Oral Mucosa every 12 hours  chlorhexidine 2% Cloths 1 Application(s) Topical <User Schedule>        Mode: AC/ CMV (Assist Control/ Continuous Mandatory Ventilation)  RR (machine): 16  TV (machine): 450  FiO2: 40  PEEP: 6  ITime: 1  MAP: 10  PIP: 19      ICU Vital Signs Last 24 Hrs  T(C): 37.1 (2025 16:00), Max: 37.2 (2025 15:45)  T(F): 98.8 (2025 16:00), Max: 99 (2025 15:45)  HR: 75 (2025 16:00) (68 - 88)  BP: 107/70 (2025 16:00) (106/70 - 125/78)  BP(mean): 82 (2025 16:00) (77 - 93)  ABP: 93/50 (2025 16:00) (76/40 - 114/59)  ABP(mean): 68 (2025 16:00) (56 - 83)  RR: 16 (2025 16:00) (14 - 26)  SpO2: 97% (2025 16:00) (95% - 100%)    O2 Parameters below as of 2025 08:00  Patient On (Oxygen Delivery Method): ventilator    O2 Concentration (%): 40        ABG - ( 2025 04:33 )  pH, Arterial: 7.500 pH, Blood: x     /  pCO2: 38    /  pO2: 102   / HCO3: 30    / Base Excess: 6.4   /  SaO2: 100.0               I&O's Detail    2025 07:01  -  2025 07:00  --------------------------------------------------------  IN:    Albumin 25%  -  50 mL: 150 mL    Enteral Tube Flush: 50 mL    FentaNYL: 135.3 mL    Glucerna 1.5: 520 mL    IV PiggyBack: 100 mL    IV PiggyBack: 100 mL    IV PiggyBack: 200 mL    IV PiggyBack: 100 mL    Norepinephrine: 136 mL    Propofol: 214.4 mL    Vasopressin: 144 mL  Total IN: 1849.7 mL    OUT:    Indwelling Catheter - Urethral (mL): 3030 mL    Rectal Tube (mL): 600 mL  Total OUT: 3630 mL    Total NET: -1780.3 mL      2025 07:01  -  2025 17:36  --------------------------------------------------------  IN:    FentaNYL: 52.1 mL    Glucerna 1.5: 60 mL    IV PiggyBack: 50 mL    IV PiggyBack: 200 mL    IV PiggyBack: 200 mL    Norepinephrine: 31.6 mL    Propofol: 97.4 mL    Vasopressin: 54 mL  Total IN: 745.1 mL    OUT:    Indwelling Catheter - Urethral (mL): 1555 mL  Total OUT: 1555 mL    Total NET: -809.9 mL      PHYSICAL EXAM:  GENERAL: NAD, sedated   HEAD:  Atraumatic, Normocephalic  NECK: Supple, No JVD  CHEST/LUNG: Diminished to auscultation bilaterally; No wheezes or rales  HEART: regular rate and regular rhythm; No murmurs, rubs, or gallops  ABDOMEN: No-distention, soft, non- tender,  bowel sounds +   EXTREMITIES:  2+ Peripheral Pulses, No clubbing or cyanosis  Neurological: sedated   Skin: Warm and dry  Musculoskeletal: Atraumatic, no joint effusions        LABS:                        7.6    17.97 )-----------( 41       ( 2025 04:30 )             23.2         139  |  93[L]  |  49.5[H]  ----------------------------<  220[H]  3.0[L]   |  28.0  |  0.42[L]    Ca    8.6      2025 13:50  Phos  2.7       Mg     2.1         TPro  6.5[L]  /  Alb  3.4  /  TBili  10.6[H]  /  DBili  x   /  AST  98[H]  /  ALT  32  /  AlkPhos  240[H]            CAPILLARY BLOOD GLUCOSE      POCT Blood Glucose.: 227 mg/dL (2025 11:42)    PT/INR - ( 2025 01:12 )   PT: 30.9 sec;   INR: 2.69 ratio           Urinalysis Basic - ( 2025 13:50 )    Color: x / Appearance: x / SG: x / pH: x  Gluc: 220 mg/dL / Ketone: x  / Bili: x / Urobili: x   Blood: x / Protein: x / Nitrite: x   Leuk Esterase: x / RBC: x / WBC x   Sq Epi: x / Non Sq Epi: x / Bacteria: x      CULTURES:  C Diff by PCR Result: Detected ( @ 07:15)  Culture Results:   Growth in anaerobic bottle: Gram Negative Rods ( @ 06:00)  Rapid RVP Result: NotDetec ( @ 06:00)  Culture Results:   Growth in aerobic and anaerobic bottles: Escherichia coli  Direct identification is available within approximately 3-5  hours either by Blood Panel Multiplexed PCR or Direct  MALDI-TOF. Details: [email or web address] ( @ 02:07)

## 2025-01-30 NOTE — CONSULT NOTE ADULT - REASON FOR ADMISSION
HAGMA, respiratory distress, shock

## 2025-01-30 NOTE — CONSULT NOTE ADULT - ASSESSMENT
Ms. Flores is a 59 year old woman with history of decompensated alcohol related cirrhosis, esophageal varices s/p band ligation therapy, admitted with influenza / C. Difficile colitis. Blood cultures positive for gram negative bacteremia (E. Coli). GI consulted for upper endoscopy prior to ARACELI given history. Will plan for bedside evaluation with upper endoscopy in the MICU. Keep intubated. Keep NPO at midnight. Hold Tube feeding. OK to continue DVT prophylaxis. Discussed with MICU team at bedside in detail. We will continue to follow along with you. 
59F with PMHx of depression, EtOH cirrhosis, withdrawal seizures, ascites, esophageal varices S/P banding, squamous cell skin cancer, and anemia. Patient presented to [facility] ED with complaints of generalized fatigue, multiple bouts of diarrhea, poor PO intake, and fevers with cough, abdominal tenderness, and SOB.  Labs on admission notable for NA of 125, Bicarb of 9, AG of 27, blood glucose of 48, Alk phos 854, , lactate of 15.10, lipase 333. Patient was admitted for undifferentiated shock. Hospital course complicated by acute hypoxic respiratory failure requiring intubation and cdiff. Cardiology was consulted for a small mobile vegetation attached to the posterior mitral valve leaflet seen on TTE.
59y female with PMH ETOH cirrhosis, multiple paracentesis for ascites, esophageal varices s/p banding in the hospital for <1d  transferred to the MICU for management of shock. s/p 4L in ED, started Leovphed and Vasopressin. + C. Diff.     #Palliative Care Encounter  #Advance Care Planning  - Patient has capacity to make simple decisions (e.g., designate HCP).   - Patient does not have capacity to make complex decisions.   - HCP: Primary is Holden  - HCP placed in patient's folder.   - Full Code, continue medical management  - Palliative will continue to follow     #Shock   #C Diff  #Electrolytes abnormality   - Elevated Lipase  #TEREZA  - As per ICU medical management  - On Levophed and vasopressin   - on Vanco PO and Metronidazole   - Replete electrolytes as needed  - RUQ US reviewed, may consider HIDA scan.   - Trend end organ perfusion markers

## 2025-01-31 ENCOUNTER — TRANSCRIPTION ENCOUNTER (OUTPATIENT)
Age: 60
End: 2025-01-31

## 2025-01-31 LAB
ALBUMIN SERPL ELPH-MCNC: 3.5 G/DL — SIGNIFICANT CHANGE UP (ref 3.3–5.2)
ALP SERPL-CCNC: 274 U/L — HIGH (ref 40–120)
ALT FLD-CCNC: 27 U/L — SIGNIFICANT CHANGE UP
ANION GAP SERPL CALC-SCNC: 15 MMOL/L — SIGNIFICANT CHANGE UP (ref 5–17)
APTT BLD: 34.1 SEC — SIGNIFICANT CHANGE UP (ref 24.5–35.6)
AST SERPL-CCNC: 74 U/L — HIGH
BASOPHILS # BLD AUTO: 0.03 K/UL — SIGNIFICANT CHANGE UP (ref 0–0.2)
BASOPHILS NFR BLD AUTO: 0.2 % — SIGNIFICANT CHANGE UP (ref 0–2)
BILIRUB SERPL-MCNC: 10.2 MG/DL — HIGH (ref 0.4–2)
BUN SERPL-MCNC: 52.9 MG/DL — HIGH (ref 8–20)
CALCIUM SERPL-MCNC: 8.5 MG/DL — SIGNIFICANT CHANGE UP (ref 8.4–10.5)
CHLORIDE SERPL-SCNC: 97 MMOL/L — SIGNIFICANT CHANGE UP (ref 96–108)
CO2 SERPL-SCNC: 30 MMOL/L — HIGH (ref 22–29)
CREAT SERPL-MCNC: 0.44 MG/DL — LOW (ref 0.5–1.3)
EGFR: 110 ML/MIN/1.73M2 — SIGNIFICANT CHANGE UP
EOSINOPHIL # BLD AUTO: 0 K/UL — SIGNIFICANT CHANGE UP (ref 0–0.5)
EOSINOPHIL NFR BLD AUTO: 0 % — SIGNIFICANT CHANGE UP (ref 0–6)
GLUCOSE BLDC GLUCOMTR-MCNC: 182 MG/DL — HIGH (ref 70–99)
GLUCOSE BLDC GLUCOMTR-MCNC: 202 MG/DL — HIGH (ref 70–99)
GLUCOSE BLDC GLUCOMTR-MCNC: 208 MG/DL — HIGH (ref 70–99)
GLUCOSE BLDC GLUCOMTR-MCNC: 211 MG/DL — HIGH (ref 70–99)
GLUCOSE BLDC GLUCOMTR-MCNC: 214 MG/DL — HIGH (ref 70–99)
GLUCOSE SERPL-MCNC: 172 MG/DL — HIGH (ref 70–99)
HCT VFR BLD CALC: 22.1 % — LOW (ref 34.5–45)
HGB BLD-MCNC: 7.3 G/DL — LOW (ref 11.5–15.5)
IMM GRANULOCYTES NFR BLD AUTO: 2.1 % — HIGH (ref 0–0.9)
INR BLD: 2.15 RATIO — HIGH (ref 0.85–1.16)
LYMPHOCYTES # BLD AUTO: 0.76 K/UL — LOW (ref 1–3.3)
LYMPHOCYTES # BLD AUTO: 5.9 % — LOW (ref 13–44)
MAGNESIUM SERPL-MCNC: 1.8 MG/DL — SIGNIFICANT CHANGE UP (ref 1.6–2.6)
MCHC RBC-ENTMCNC: 26.9 PG — LOW (ref 27–34)
MCHC RBC-ENTMCNC: 33 G/DL — SIGNIFICANT CHANGE UP (ref 32–36)
MCV RBC AUTO: 81.5 FL — SIGNIFICANT CHANGE UP (ref 80–100)
MONOCYTES # BLD AUTO: 0.78 K/UL — SIGNIFICANT CHANGE UP (ref 0–0.9)
MONOCYTES NFR BLD AUTO: 6.1 % — SIGNIFICANT CHANGE UP (ref 2–14)
NEUTROPHILS # BLD AUTO: 10.95 K/UL — HIGH (ref 1.8–7.4)
NEUTROPHILS NFR BLD AUTO: 85.7 % — HIGH (ref 43–77)
PHOSPHATE SERPL-MCNC: 2.6 MG/DL — SIGNIFICANT CHANGE UP (ref 2.4–4.7)
PLATELET # BLD AUTO: 38 K/UL — LOW (ref 150–400)
POTASSIUM SERPL-MCNC: 3.6 MMOL/L — SIGNIFICANT CHANGE UP (ref 3.5–5.3)
POTASSIUM SERPL-SCNC: 3.6 MMOL/L — SIGNIFICANT CHANGE UP (ref 3.5–5.3)
PROT SERPL-MCNC: 6.5 G/DL — LOW (ref 6.6–8.7)
PROTHROM AB SERPL-ACNC: 24.8 SEC — HIGH (ref 9.9–13.4)
RBC # BLD: 2.71 M/UL — LOW (ref 3.8–5.2)
RBC # FLD: 23.9 % — HIGH (ref 10.3–14.5)
SODIUM SERPL-SCNC: 142 MMOL/L — SIGNIFICANT CHANGE UP (ref 135–145)
WBC # BLD: 12.79 K/UL — HIGH (ref 3.8–10.5)
WBC # FLD AUTO: 12.79 K/UL — HIGH (ref 3.8–10.5)

## 2025-01-31 PROCEDURE — G0545: CPT

## 2025-01-31 PROCEDURE — 99233 SBSQ HOSP IP/OBS HIGH 50: CPT

## 2025-01-31 PROCEDURE — 43235 EGD DIAGNOSTIC BRUSH WASH: CPT

## 2025-01-31 PROCEDURE — 99291 CRITICAL CARE FIRST HOUR: CPT | Mod: GC

## 2025-01-31 RX ORDER — DEXMEDETOMIDINE HYDROCHLORIDE 4 UG/ML
0.5 INJECTION, SOLUTION INTRAVENOUS
Qty: 400 | Refills: 0 | Status: DISCONTINUED | OUTPATIENT
Start: 2025-01-31 | End: 2025-02-03

## 2025-01-31 RX ORDER — POTASSIUM PHOSPHATE, MONOBASIC POTASSIUM PHOSPHATE, DIBASIC 236; 224 MG/ML; MG/ML
15 INJECTION, SOLUTION INTRAVENOUS ONCE
Refills: 0 | Status: COMPLETED | OUTPATIENT
Start: 2025-01-31 | End: 2025-01-31

## 2025-01-31 RX ORDER — POTASSIUM CHLORIDE 750 MG/1
10 TABLET, EXTENDED RELEASE ORAL
Refills: 0 | Status: COMPLETED | OUTPATIENT
Start: 2025-01-31 | End: 2025-01-31

## 2025-01-31 RX ORDER — PROPOFOL 10 MG/ML
40 INJECTION, EMULSION INTRAVENOUS
Qty: 1000 | Refills: 0 | Status: DISCONTINUED | OUTPATIENT
Start: 2025-01-31 | End: 2025-02-02

## 2025-01-31 RX ORDER — MAGNESIUM SULFATE 0.8 MEQ/ML
2 AMPUL (ML) INJECTION ONCE
Refills: 0 | Status: COMPLETED | OUTPATIENT
Start: 2025-01-31 | End: 2025-01-31

## 2025-01-31 RX ORDER — PHYTONADIONE 2 MG/ML
10 INJECTION, EMULSION INTRAMUSCULAR; INTRAVENOUS; SUBCUTANEOUS ONCE
Refills: 0 | Status: COMPLETED | OUTPATIENT
Start: 2025-01-31 | End: 2025-01-31

## 2025-01-31 RX ORDER — FENTANYL CITRATE 50 UG/ML
0.5 INJECTION INTRAMUSCULAR; INTRAVENOUS
Qty: 2500 | Refills: 0 | Status: DISCONTINUED | OUTPATIENT
Start: 2025-01-31 | End: 2025-02-02

## 2025-01-31 RX ADMIN — Medication 50 MILLIGRAM(S): at 17:23

## 2025-01-31 RX ADMIN — CEFTRIAXONE 2000 MILLIGRAM(S): 250 INJECTION, POWDER, FOR SOLUTION INTRAMUSCULAR; INTRAVENOUS at 10:32

## 2025-01-31 RX ADMIN — PROPOFOL 3.13 MICROGRAM(S)/KG/MIN: 10 INJECTION, EMULSION INTRAVENOUS at 07:15

## 2025-01-31 RX ADMIN — Medication 2: at 23:49

## 2025-01-31 RX ADMIN — Medication 100 MILLIGRAM(S): at 05:19

## 2025-01-31 RX ADMIN — Medication 4: at 11:22

## 2025-01-31 RX ADMIN — VANCOMYCIN HYDROCHLORIDE 500 MILLIGRAM(S): KIT at 17:24

## 2025-01-31 RX ADMIN — POTASSIUM CHLORIDE 100 MILLIEQUIVALENT(S): 750 TABLET, EXTENDED RELEASE ORAL at 08:43

## 2025-01-31 RX ADMIN — BUMETANIDE 2 MILLIGRAM(S): 2 TABLET ORAL at 05:19

## 2025-01-31 RX ADMIN — Medication 4: at 17:24

## 2025-01-31 RX ADMIN — Medication 6 UNIT(S)/MIN: at 18:38

## 2025-01-31 RX ADMIN — INSULIN GLARGINE-YFGN 15 UNIT(S): 100 INJECTION, SOLUTION SUBCUTANEOUS at 08:42

## 2025-01-31 RX ADMIN — Medication 25 GRAM(S): at 08:43

## 2025-01-31 RX ADMIN — Medication 50 MILLIGRAM(S): at 11:21

## 2025-01-31 RX ADMIN — FENTANYL CITRATE 2.61 MICROGRAM(S)/KG/HR: 50 INJECTION INTRAMUSCULAR; INTRAVENOUS at 18:54

## 2025-01-31 RX ADMIN — POTASSIUM CHLORIDE 100 MILLIEQUIVALENT(S): 750 TABLET, EXTENDED RELEASE ORAL at 11:21

## 2025-01-31 RX ADMIN — PROPOFOL 3.13 MICROGRAM(S)/KG/MIN: 10 INJECTION, EMULSION INTRAVENOUS at 19:00

## 2025-01-31 RX ADMIN — Medication 100 MILLIGRAM(S): at 14:19

## 2025-01-31 RX ADMIN — POTASSIUM PHOSPHATE, MONOBASIC POTASSIUM PHOSPHATE, DIBASIC 62.5 MILLIMOLE(S): 236; 224 INJECTION, SOLUTION INTRAVENOUS at 08:43

## 2025-01-31 RX ADMIN — FENTANYL CITRATE 2.61 MICROGRAM(S)/KG/HR: 50 INJECTION INTRAMUSCULAR; INTRAVENOUS at 23:48

## 2025-01-31 RX ADMIN — PHYTONADIONE 102 MILLIGRAM(S): 2 INJECTION, EMULSION INTRAMUSCULAR; INTRAVENOUS; SUBCUTANEOUS at 11:03

## 2025-01-31 RX ADMIN — VANCOMYCIN HYDROCHLORIDE 500 MILLIGRAM(S): KIT at 05:19

## 2025-01-31 RX ADMIN — Medication 1 MILLIGRAM(S): at 13:00

## 2025-01-31 RX ADMIN — Medication 100 MILLIGRAM(S): at 21:16

## 2025-01-31 RX ADMIN — PANTOPRAZOLE 40 MILLIGRAM(S): 20 TABLET, DELAYED RELEASE ORAL at 17:23

## 2025-01-31 RX ADMIN — POTASSIUM CHLORIDE 100 MILLIEQUIVALENT(S): 750 TABLET, EXTENDED RELEASE ORAL at 10:32

## 2025-01-31 RX ADMIN — Medication 50 MILLIGRAM(S): at 23:48

## 2025-01-31 RX ADMIN — Medication 4: at 05:21

## 2025-01-31 RX ADMIN — Medication 50 MILLIGRAM(S): at 05:18

## 2025-01-31 RX ADMIN — VANCOMYCIN HYDROCHLORIDE 500 MILLIGRAM(S): KIT at 23:48

## 2025-01-31 RX ADMIN — ANTISEPTIC SURGICAL SCRUB 15 MILLILITER(S): 0.04 SOLUTION TOPICAL at 05:19

## 2025-01-31 RX ADMIN — ANTISEPTIC SURGICAL SCRUB 15 MILLILITER(S): 0.04 SOLUTION TOPICAL at 17:13

## 2025-01-31 RX ADMIN — PANTOPRAZOLE 40 MILLIGRAM(S): 20 TABLET, DELAYED RELEASE ORAL at 05:18

## 2025-01-31 RX ADMIN — ANTISEPTIC SURGICAL SCRUB 1 APPLICATION(S): 0.04 SOLUTION TOPICAL at 05:07

## 2025-01-31 RX ADMIN — BUMETANIDE 2 MILLIGRAM(S): 2 TABLET ORAL at 14:19

## 2025-01-31 NOTE — PROGRESS NOTE ADULT - ASSESSMENT
60 y/o female with PMH of ETOH cirrhosis, multiple paracentesis for ascites, esophageal varices s/p banding,  who presents with x1 week of generalized fatigue, multiple bouts of diarrhea with poor PO intake and intermittent fevers. Of note was diagnosed Flu positive last week ON ADMISSION SHE REPORTED   fatigue, chills, +cough, +abdominal tenderness, and +SOB.    PT WITH POSITIVE STOOL FOR  C DIFF   AND BLOOD CX WITH ECOLI  CT SCAN  WITH COLITIS  CONTINUE  ORAL VANCO   UNCLEAR IF ABSORBED AS VIA NGT  IN ADDITION ON FLAGYL  IN TERMS OF ECOLI  CHANGED TO ROCEPHIN  PT INTUBATED ON PRESSORS  WBC NOW DOWN TO 12 K  MORE AWAKE  PT WITH ? VEG ON MITRAL VALVE FOR ARACELI   WILL FOLLOW UP WITH FURTHER RECOMMENDATIONS

## 2025-01-31 NOTE — PROGRESS NOTE ADULT - ASSESSMENT
Assessment & Plan:   ABRAHAN GOODWIN is a 59y Female with PMH ETOH cirrhosis, multiple paracentesis for ascites, esophageal varices s/p banding in the hospital for <1d  transferred to the MICU for management of undifferentiated shock state.    # Distributive shock 2/2 to G negative bacteremia  # C. diff colitis  # Cirrhosis 2/2 to EtoH abuse   # Hx of GIB 2/2 to esophageal varices s/p banding   # Hx of multiple paracentesis   # New onset of mitral valve vegetation       ====================== NEUROLOGY=====================  fentaNYL   Infusion. 0.5 MICROgram(s)/kG/Hr (2.61 mL/Hr) IV Continuous <Continuous>  propofol Infusion 10 MICROgram(s)/kG/Min (3.13 mL/Hr) IV Continuous <Continuous>    - Sedated   -continue aspiration precautions  -HOB 30 degrees  -Continue thiamine, folic acid, and multi vitamin      ==================== RESPIRATORY======================  Mechanical Ventilation:  Mode: AC/ CMV (Assist Control/ Continuous Mandatory Ventilation)  RR (machine): 16  TV (machine): 450  FiO2: 40  PEEP: 6  ITime: 1  MAP: 9  PIP: 18      ====================CARDIOVASCULAR==================  buMETAnide Injectable 2 milliGRAM(s) IV Push two times a day  norepinephrine Infusion 0.05 MICROgram(s)/kG/Min (2.45 mL/Hr) IV Continuous <Continuous>    - Maintain MAP >60   - TTE showed EF 65%, vegetation on mitral valve   - ARACELI pending, needs GI clearance   - ID and cardiology consults   - Trend lactate CBC, CMP    ===================HEMATOLOGIC/ONC ===================  -SCD  ===================== RENAL =========================  Continue monitoring urine output    ==================== GASTROINTESTINAL===================  dextrose 5%. 1000 milliLiter(s) (50 mL/Hr) IV Continuous <Continuous>  dextrose 5%. 1000 milliLiter(s) (100 mL/Hr) IV Continuous <Continuous>  folic acid Injectable 1 milliGRAM(s) IV Push daily  multivitamin/minerals/iron Oral Solution (CENTRUM) 15 milliLiter(s) Oral daily  pantoprazole  Injectable 40 milliGRAM(s) IV Push two times a day    Diet: on NG tube  =======================    ENDOCRINE  =====================  dextrose 50% Injectable 25 Gram(s) IV Push once  dextrose 50% Injectable 12.5 Gram(s) IV Push once  dextrose 50% Injectable 25 Gram(s) IV Push once  dextrose Oral Gel 15 Gram(s) Oral once PRN Blood Glucose LESS THAN 70 milliGRAM(s)/deciliter  glucagon  Injectable 1 milliGRAM(s) IntraMuscular once  hydrocortisone sodium succinate Injectable 50 milliGRAM(s) IV Push every 6 hours  insulin glargine Injectable (LANTUS) 15 Unit(s) SubCutaneous every morning  insulin lispro (ADMELOG) corrective regimen sliding scale   SubCutaneous every 6 hours  vasopressin Infusion 0.04 Unit(s)/Min (6 mL/Hr) IV Continuous <Continuous>    Blood sugar goal: 100-200  ========================INFECTIOUS DISEASE================  cefTRIAXone Injectable. 2000 milliGRAM(s) IV Push every 24 hours  metroNIDAZOLE  IVPB 500 milliGRAM(s) IV Intermittent every 8 hours  vancomycin    Solution 500 milliGRAM(s) Oral every 6 hours    Cx: E.Coli   Repeated BCX pending     Care plan discussed with ICU attending Dr. Jarrell   Plan d/w HCP Holden   Dsipo: active. EGD today, then ARACELI. Consider extubation after it, if pt tolerates             Assessment & Plan:   ABRAHAN GOODWIN is a 59y Female with PMH ETOH cirrhosis, multiple paracentesis for ascites, esophageal varices s/p banding in the hospital for <1d  transferred to the MICU for management of undifferentiated shock state.    # Distributive shock 2/2 to G negative bacteremia  # C. diff colitis  # Cirrhosis 2/2 to EtoH abuse   # Hx of GIB 2/2 to esophageal varices s/p banding   # Hx of multiple paracentesis   # New onset of mitral valve vegetation       ====================== NEUROLOGY=====================  fentaNYL   Infusion. 0.5 MICROgram(s)/kG/Hr (2.61 mL/Hr) IV Continuous <Continuous>  propofol Infusion 10 MICROgram(s)/kG/Min (3.13 mL/Hr) IV Continuous <Continuous>    - Sedated   -continue aspiration precautions  -HOB 30 degrees  -Continue thiamine, folic acid, and multi vitamin      ==================== RESPIRATORY======================  Mechanical Ventilation:  Mode: AC/ CMV (Assist Control/ Continuous Mandatory Ventilation)  RR (machine): 16  TV (machine): 450  FiO2: 40  PEEP: 6  ITime: 1  MAP: 9  PIP: 18      ====================CARDIOVASCULAR==================  buMETAnide Injectable 2 milliGRAM(s) IV Push two times a day  norepinephrine Infusion 0.05 MICROgram(s)/kG/Min (2.45 mL/Hr) IV Continuous <Continuous>    - Maintain MAP >60   - TTE showed EF 65%, vegetation on mitral valve   - ARACELI pending, needs GI clearance   - ID and cardiology consults   - Trend lactate CBC, CMP    ===================HEMATOLOGIC/ONC ===================  -SCD  ===================== RENAL =========================  Continue monitoring urine output    ==================== GASTROINTESTINAL===================  dextrose 5%. 1000 milliLiter(s) (50 mL/Hr) IV Continuous <Continuous>  dextrose 5%. 1000 milliLiter(s) (100 mL/Hr) IV Continuous <Continuous>  folic acid Injectable 1 milliGRAM(s) IV Push daily  multivitamin/minerals/iron Oral Solution (CENTRUM) 15 milliLiter(s) Oral daily  pantoprazole  Injectable 40 milliGRAM(s) IV Push two times a day    Diet: on OG tube  =======================    ENDOCRINE  =====================  dextrose 50% Injectable 25 Gram(s) IV Push once  dextrose 50% Injectable 12.5 Gram(s) IV Push once  dextrose 50% Injectable 25 Gram(s) IV Push once  dextrose Oral Gel 15 Gram(s) Oral once PRN Blood Glucose LESS THAN 70 milliGRAM(s)/deciliter  glucagon  Injectable 1 milliGRAM(s) IntraMuscular once  hydrocortisone sodium succinate Injectable 50 milliGRAM(s) IV Push every 6 hours  insulin glargine Injectable (LANTUS) 15 Unit(s) SubCutaneous every morning  insulin lispro (ADMELOG) corrective regimen sliding scale   SubCutaneous every 6 hours  vasopressin Infusion 0.04 Unit(s)/Min (6 mL/Hr) IV Continuous <Continuous>    Blood sugar goal: 100-200  ========================INFECTIOUS DISEASE================  cefTRIAXone Injectable. 2000 milliGRAM(s) IV Push every 24 hours  metroNIDAZOLE  IVPB 500 milliGRAM(s) IV Intermittent every 8 hours  vancomycin    Solution 500 milliGRAM(s) Oral every 6 hours    Cx: E.Coli   Repeated BCX pending     Care plan discussed with ICU attending Dr. Jarrell   Plan d/w HCP Holden   Dsipo: active. EGD today, then ARACELI. Consider extubation after it, if pt tolerates

## 2025-01-31 NOTE — PROGRESS NOTE ADULT - SUBJECTIVE AND OBJECTIVE BOX
Patient is a 59y old  Female who presents with a chief complaint of HAGMA, respiratory distress, shock (2025 14:34)        Interval HPI:  - No acute event overnight   - Talked to HCP Holden this morning at bedside. Updated pt's current medical situation and treatment plan  - GI plan to EGD today. INR elevated this morning. S/p 100mg vitamin K  - Cardio following. S/p 2 units platelets (goal 50 K) for ARACELI  - Consider extubate pt after EGD and ARACELI     PAST MEDICAL & SURGICAL HISTORY:  Alcohol abuse      Depression      Withdrawal seizures      History of basal cell carcinoma excision      Squamous cell skin cancer      Hemorrhoids      2019 novel coronavirus disease (COVID-19)      Anemia      H/O vertigo      History of ear, nose, and throat (ENT) surgery      H/O basal cell carcinoma excision      H/O:           Review of Systems (sedated, unable to obtain):    Medications:  cefTRIAXone Injectable. 2000 milliGRAM(s) IV Push every 24 hours  metroNIDAZOLE  IVPB 500 milliGRAM(s) IV Intermittent every 8 hours  vancomycin    Solution 500 milliGRAM(s) Oral every 6 hours    buMETAnide Injectable 2 milliGRAM(s) IV Push two times a day  norepinephrine Infusion 0.05 MICROgram(s)/kG/Min IV Continuous <Continuous>      fentaNYL   Infusion. 0.5 MICROgram(s)/kG/Hr IV Continuous <Continuous>  propofol Infusion 10 MICROgram(s)/kG/Min IV Continuous <Continuous>        pantoprazole  Injectable 40 milliGRAM(s) IV Push two times a day      dextrose 50% Injectable 25 Gram(s) IV Push once  dextrose 50% Injectable 12.5 Gram(s) IV Push once  dextrose 50% Injectable 25 Gram(s) IV Push once  dextrose Oral Gel 15 Gram(s) Oral once PRN  glucagon  Injectable 1 milliGRAM(s) IntraMuscular once  hydrocortisone sodium succinate Injectable 50 milliGRAM(s) IV Push every 6 hours  insulin glargine Injectable (LANTUS) 15 Unit(s) SubCutaneous every morning  insulin lispro (ADMELOG) corrective regimen sliding scale   SubCutaneous every 6 hours  vasopressin Infusion 0.04 Unit(s)/Min IV Continuous <Continuous>    dextrose 5%. 1000 milliLiter(s) IV Continuous <Continuous>  dextrose 5%. 1000 milliLiter(s) IV Continuous <Continuous>  folic acid Injectable 1 milliGRAM(s) IV Push daily  multivitamin/minerals/iron Oral Solution (CENTRUM) 15 milliLiter(s) Oral daily      chlorhexidine 0.12% Liquid 15 milliLiter(s) Oral Mucosa every 12 hours  chlorhexidine 2% Cloths 1 Application(s) Topical <User Schedule>        Mode: AC/ CMV (Assist Control/ Continuous Mandatory Ventilation)  RR (machine): 16  TV (machine): 450  FiO2: 40  PEEP: 6  ITime: 1  MAP: 9  PIP: 18      ICU Vital Signs Last 24 Hrs  T(C): 36.9 (2025 04:52), Max: 37.2 (2025 15:45)  T(F): 98.4 (2025 04:52), Max: 99 (2025 15:45)  HR: 72 (2025 14:45) (65 - 82)  BP: 114/73 (2025 17:00) (107/70 - 117/75)  BP(mean): 86 (2025 17:00) (82 - 88)  ABP: 117/55 (2025 14:45) (87/48 - 117/55)  ABP(mean): 79 (2025 14:45) (62 - 81)  RR: 16 (2025 14:45) (14 - 33)  SpO2: 99% (2025 14:45) (96% - 99%)    O2 Parameters below as of 2025 12:00  Patient On (Oxygen Delivery Method): ventilator                I&O's Detail    2025 07:01  -  2025 07:00  --------------------------------------------------------  IN:    Albumin 25%  -  50 mL: 100 mL    Enteral Tube Flush: 50 mL    FentaNYL: 130.1 mL    Glucerna 1.5: 270 mL    IV PiggyBack: 50 mL    IV PiggyBack: 500 mL    IV PiggyBack: 400 mL    Norepinephrine: 57.7 mL    Platelets - Single Donor: 235 mL    Propofol: 238.4 mL    Vasopressin: 144 mL  Total IN: 2175.2 mL    OUT:    Indwelling Catheter - Urethral (mL): 2980 mL    Rectal Tube (mL): 250 mL  Total OUT: 3230 mL    Total NET: -1054.8 mL      2025 07:01  -  2025 14:55  --------------------------------------------------------  IN:    FentaNYL: 36.4 mL    IV PiggyBack: 50 mL    IV PiggyBack: 50 mL    IV PiggyBack: 300 mL    Norepinephrine: 2.5 mL    Platelets - Single Donor: 239 mL    Propofol: 65.8 mL    Vasopressin: 42 mL  Total IN: 785.7 mL    OUT:    Glucerna 1.5: 0 mL    Indwelling Catheter - Urethral (mL): 985 mL  Total OUT: 985 mL    Total NET: -199.3 mL    PHYSICAL EXAM:  GENERAL: NAD, sedated   HEAD:  Atraumatic, Normocephalic  NECK: Supple, No JVD  CHEST/LUNG: Diminished to auscultation bilaterally; No wheezes or rales  HEART: regular rate and regular rhythm; No murmurs, rubs, or gallops  ABDOMEN: No-distention, soft, non- tender,  bowel sounds +   EXTREMITIES:  2+ Peripheral Pulses, No clubbing or cyanosis  Neurological: sedated   Skin: Warm and dry  Musculoskeletal: Atraumatic, no joint effusions      LABS:                        7.3    12.79 )-----------( 38       ( 2025 02:45 )             22.1         142  |  97  |  52.9[H]  ----------------------------<  172[H]  3.6   |  30.0[H]  |  0.44[L]    Ca    8.5      2025 02:45  Phos  2.6       Mg     1.8         TPro  6.5[L]  /  Alb  3.5  /  TBili  10.2[H]  /  DBili  x   /  AST  74[H]  /  ALT  27  /  AlkPhos  274[H]            CAPILLARY BLOOD GLUCOSE      POCT Blood Glucose.: 214 mg/dL (2025 11:19)    PT/INR - ( 2025 08:30 )   PT: 24.8 sec;   INR: 2.15 ratio         PTT - ( 2025 08:30 )  PTT:34.1 sec  Urinalysis Basic - ( 2025 02:45 )    Color: x / Appearance: x / SG: x / pH: x  Gluc: 172 mg/dL / Ketone: x  / Bili: x / Urobili: x   Blood: x / Protein: x / Nitrite: x   Leuk Esterase: x / RBC: x / WBC x   Sq Epi: x / Non Sq Epi: x / Bacteria: x      CULTURES:  C Diff by PCR Result: Detected ( @ 07:15)  Culture Results:   Growth in anaerobic bottle: Gram Negative Rods ( @ 06:00)  Rapid RVP Result: NotDetec ( @ 06:00)  Culture Results:   Growth in aerobic and anaerobic bottles: Escherichia coli  Direct identification is available within approximately 3-5  hours either by Blood Panel Multiplexed PCR or Direct  MALDI-TOF. Details: [email or web address] ( @ 02:07)          DEVICES:     RADIOLOGY: ***    CRITICAL CARE TIME SPENT: ***

## 2025-01-31 NOTE — PROGRESS NOTE ADULT - SUBJECTIVE AND OBJECTIVE BOX
INFECTIOUS DISEASES AND INTERNAL MEDICINE at [place]  =======================================================  Tommy Blair MD  Diplomates [employer] Board of Internal Medicine and Infectious Diseases  Telephone [phone]  Fax            [phone]  =======================================================    ABRAHAN GOODWIN [id number]    Follow up:  C DIFF ECOLI BACTEREMIA     Allergies:  Zithromax (Unknown)  morphine (Nausea)      Medications:  albumin human 25% IVPB 50 milliLiter(s) IV Intermittent every 6 hours  buMETAnide Injectable 2 milliGRAM(s) IV Push two times a day  cefTRIAXone Injectable. 2000 milliGRAM(s) IV Push every 24 hours  chlorhexidine 0.12% Liquid 15 milliLiter(s) Oral Mucosa every 12 hours  chlorhexidine 2% Cloths 1 Application(s) Topical <User Schedule>  dextrose 50% Injectable 25 Gram(s) IV Push once  dextrose 50% Injectable 12.5 Gram(s) IV Push once  dextrose 50% Injectable 25 Gram(s) IV Push once  fentaNYL   Infusion. 0.5 MICROgram(s)/kG/Hr IV Continuous <Continuous>  folic acid Injectable 1 milliGRAM(s) IV Push daily  heparin   Injectable 5000 Unit(s) SubCutaneous every 8 hours  hydrocortisone sodium succinate Injectable 50 milliGRAM(s) IV Push every 6 hours  metroNIDAZOLE  IVPB 500 milliGRAM(s) IV Intermittent every 12 hours  multivitamin/minerals/iron Oral Solution (CENTRUM) 15 milliLiter(s) Oral daily  norepinephrine Infusion 0.05 MICROgram(s)/kG/Min IV Continuous <Continuous>  pantoprazole  Injectable 40 milliGRAM(s) IV Push two times a day  propofol Infusion 10 MICROgram(s)/kG/Min IV Continuous <Continuous>  thiamine IVPB 500 milliGRAM(s) IV Intermittent daily  vancomycin    Solution 500 milliGRAM(s) Oral every 6 hours  vasopressin Infusion 0.04 Unit(s)/Min IV Continuous <Continuous>    SOCIAL       FAMILY   FAMILY HISTORY:  FH: pancreatic cancer (Mother)    Family history of heart attack (Father)    Family history of CVA (Father)      REVIEW OF SYSTEMS:  UNABLE TO OBTAIN         Physical Exam:    Vital Signs Last 24 Hrs  T(C): 36.9 (31 Jan 2025 04:52), Max: 37.2 (30 Jan 2025 15:45)  T(F): 98.4 (31 Jan 2025 04:52), Max: 99 (30 Jan 2025 15:45)  HR: 76 (31 Jan 2025 07:45) (65 - 82)  BP: 114/73 (30 Jan 2025 17:00) (106/70 - 125/78)  BP(mean): 86 (30 Jan 2025 17:00) (82 - 93)  RR: 16 (31 Jan 2025 07:45) (14 - 33)  SpO2: 98% (31 Jan 2025 07:45) (96% - 99%)    Parameters below as of 31 Jan 2025 04:00  Patient On (Oxygen Delivery Method): ventilator        GEN: NAD, ON VENT  HEENT: normocephalic and atraumatic. EOMI. LJ.    NECK: Supple. No carotid bruits.  No lymphadenopathy or thyromegaly.  LUNGS: Clear to auscultation.  HEART: Regular rate and rhythm without murmur.  ABDOMEN: Soft, nontender, and nondistended.  Positive bowel sounds.    : No CVA tenderness  EXTREMITIES: Without any cyanosis, clubbing, rash, lesions or edema.  MSK: no joint swelling  NEUROLOGIC: SEDATED ON VENT        Labs:  Vitals:   =======================================================  Current Antibiotics:  cefTRIAXone Injectable. 2000 milliGRAM(s) IV Push every 24 hours  metroNIDAZOLE  IVPB 500 milliGRAM(s) IV Intermittent every 12 hours  vancomycin    Solution 500 milliGRAM(s) Oral every 6 hours    Other medications:  albumin human 25% IVPB 50 milliLiter(s) IV Intermittent every 6 hours  buMETAnide Injectable 2 milliGRAM(s) IV Push two times a day  chlorhexidine 0.12% Liquid 15 milliLiter(s) Oral Mucosa every 12 hours  chlorhexidine 2% Cloths 1 Application(s) Topical <User Schedule>  dextrose 50% Injectable 25 Gram(s) IV Push once  dextrose 50% Injectable 12.5 Gram(s) IV Push once  dextrose 50% Injectable 25 Gram(s) IV Push once  fentaNYL   Infusion. 0.5 MICROgram(s)/kG/Hr IV Continuous <Continuous>  folic acid Injectable 1 milliGRAM(s) IV Push daily  heparin   Injectable 5000 Unit(s) SubCutaneous every 8 hours  hydrocortisone sodium succinate Injectable 50 milliGRAM(s) IV Push every 6 hours  multivitamin/minerals/iron Oral Solution (CENTRUM) 15 milliLiter(s) Oral daily  norepinephrine Infusion 0.05 MICROgram(s)/kG/Min IV Continuous <Continuous>  pantoprazole  Injectable 40 milliGRAM(s) IV Push two times a day  propofol Infusion 10 MICROgram(s)/kG/Min IV Continuous <Continuous>  thiamine IVPB 500 milliGRAM(s) IV Intermittent daily  vasopressin Infusion 0.04 Unit(s)/Min IV Continuous <Continuous>      =======================================================  Labs:                                  7.3    12.79 )-----------( 38       ( 31 Jan 2025 02:45 )             22.1   01-31    142  |  97  |  52.9[H]  ----------------------------<  172[H]  3.6   |  30.0[H]  |  0.44[L]    Ca    8.5      31 Jan 2025 02:45  Phos  2.6     01-31  Mg     1.8     01-31    TPro  6.5[L]  /  Alb  3.5  /  TBili  10.2[H]  /  DBili  x   /  AST  74[H]  /  ALT  27  /  AlkPhos  274[H]  01-31    Culture - Blood (collected 01-27-25 @ 06:00)  Source: .Blood BLOOD  Preliminary Report (01-28-25 @ 13:01):    No growth at 24 hours    Culture - Blood (collected 01-27-25 @ 02:07)  Source: .Blood BLOOD  Gram Stain (01-27-25 @ 18:01):    Growth in anaerobic bottle: Gram Negative Rods    Growth in aerobic bottle: Gram Negative Rods  Final Report (01-29-25 @ 08:00):    Growth in aerobic and anaerobic bottles: Escherichia coli    Direct identification is available within approximately 3-5    hours either by Blood Panel Multiplexed PCR or Direct    MALDI-TOF. Details: [email or web address]  Organism: Blood Culture PCR  Escherichia coli (01-29-25 @ 08:00)  Organism: Escherichia coli (01-29-25 @ 08:00)    Sensitivities:      -  Levofloxacin: S <=0.5      -  Tobramycin: I 4      -  Aztreonam: S <=4      -  Gentamicin: S <=2      -  Cefazolin: I 4      -  Cefepime: S <=2      -  Piperacillin/Tazobactam: S <=8      -  Ciprofloxacin: S <=0.25      -  Imipenem: S <=1      -  Ceftriaxone: S <=1      -  Ampicillin: R >16 These ampicillin results predict results for amoxicillin      Method Type: NABILA      -  Meropenem: S <=1      -  Ampicillin/Sulbactam: I 16/8      -  Cefoxitin: S <=8      -  Trimethoprim/Sulfamethoxazole: S <=0.5/9.5      -  Ertapenem: S <=0.5  Organism: Blood Culture PCR (01-29-25 @ 08:00)    Sensitivities:      -  Escherichia coli: Detec      Method Type: PCR    Urinalysis with Rflx Culture (collected 11-16-24 @ 09:00)    Culture - Body Fluid with Gram Stain (collected 11-16-24 @ 04:44)  Source: Abdominal Fl  Gram Stain (11-16-24 @ 12:36):    polymorphonuclear leukocytes seen    No organisms seen    by cytocentrifuge  Final Report (11-21-24 @ 08:50):    No growth at 5 days    Culture - Blood (collected 11-16-24 @ 03:17)  Source: .Blood BLOOD  Final Report (11-21-24 @ 09:01):    No growth at 5 days    Culture - Blood (collected 11-16-24 @ 02:44)  Source: .Blood BLOOD  Final Report (11-21-24 @ 09:01):    No growth at 5 days    Culture - Fungal, Body Fluid (collected 11-23-23 @ 14:00)  Source: Peritoneal Peritoneal Fluid  Final Report (12-23-23 @ 15:01):    No fungus isolated at 4 weeks.    Culture - Body Fluid with Gram Stain (collected 11-23-23 @ 14:00)  Source: Ascites Fl Ascites Fluid  Gram Stain (11-23-23 @ 23:43):    polymorphonuclear leukocytes    No organisms seen    by cytocentrifuge  Final Report (11-28-23 @ 16:15):    No growth at 5 days    Culture - Blood (collected 11-22-23 @ 08:31)  Source: .Blood None  Final Report (11-27-23 @ 13:01):    No growth at 5 days    Culture - Blood (collected 11-22-23 @ 08:22)  Source: .Blood None  Final Report (11-27-23 @ 13:01):    No growth at 5 days    Culture - Blood (collected 11-09-23 @ 07:01)  Source: .Blood None  Final Report (11-14-23 @ 14:00):    No growth at 5 days    Culture - Blood (collected 11-09-23 @ 07:01)  Source: .Blood None  Final Report (11-14-23 @ 14:00):    No growth at 5 days    Culture - Blood (collected 10-07-23 @ 09:01)  Source: .Blood None  Final Report (10-12-23 @ 17:01):    No growth at 5 days    Culture - Blood (collected 10-07-23 @ 09:01)  Source: .Blood Blood-Venous  Final Report (10-12-23 @ 17:01):    No growth at 5 days    Culture - Blood (collected 05-09-23 @ 04:39)  Source: .Blood Blood-Peripheral  Final Report (05-14-23 @ 10:01):    No Growth Final    Culture - Urine (collected 05-09-23 @ 04:39)  Source: Clean Catch Clean Catch (Midstream)  Final Report (05-10-23 @ 23:46):    <10,000 CFU/mL Normal Urogenital Bibi    Culture - Blood (collected 05-09-23 @ 04:39)  Source: .Blood Blood-Peripheral  Final Report (05-14-23 @ 10:01):    No Growth Final      Creatinine: 0.77 mg/dL (01-29-25 @ 05:30)  Creatinine: 0.76 mg/dL (01-29-25 @ 01:12)  Creatinine: 0.74 mg/dL (01-29-25 @ 01:12)  Creatinine: 0.75 mg/dL (01-28-25 @ 21:10)  Creatinine: 0.81 mg/dL (01-28-25 @ 17:10)  Creatinine: 0.79 mg/dL (01-28-25 @ 13:00)  Creatinine: 0.76 mg/dL (01-28-25 @ 09:00)  Creatinine: 0.75 mg/dL (01-28-25 @ 04:30)  Creatinine: 0.76 mg/dL (01-28-25 @ 00:46)  Creatinine: 0.90 mg/dL (01-27-25 @ 21:08)  Creatinine: 1.07 mg/dL (01-27-25 @ 13:22)  Creatinine: 1.16 mg/dL (01-27-25 @ 08:15)  Creatinine: 1.18 mg/dL (01-27-25 @ 04:50)  Creatinine: 0.71 mg/dL (01-27-25 @ 02:07)            WBC Count: 32.66 K/uL (01-29-25 @ 05:30)  WBC Count: 35.79 K/uL (01-29-25 @ 01:12)  WBC Count: 36.70 K/uL (01-28-25 @ 21:10)  WBC Count: 39.69 K/uL (01-28-25 @ 17:10)  WBC Count: 37.94 K/uL (01-28-25 @ 13:00)  WBC Count: 45.53 K/uL (01-28-25 @ 09:00)  WBC Count: 51.71 K/uL (01-28-25 @ 04:30)  WBC Count: 59.32 K/uL (01-28-25 @ 00:46)  WBC Count: 62.07 K/uL (01-27-25 @ 21:08)  WBC Count: 41.57 K/uL (01-27-25 @ 13:22)  WBC Count: 20.91 K/uL (01-27-25 @ 08:15)  WBC Count: 4.14 K/uL (01-27-25 @ 02:07)    SARS-CoV-2: NotDetec (01-27-25 @ 06:00)      Alkaline Phosphatase: 204 U/L (01-29-25 @ 05:30)  Alkaline Phosphatase: 216 U/L (01-29-25 @ 01:12)  Alkaline Phosphatase: 236 U/L (01-28-25 @ 21:10)  Alkaline Phosphatase: 226 U/L (01-28-25 @ 17:10)  Alkaline Phosphatase: 216 U/L (01-28-25 @ 13:00)  Alkaline Phosphatase: 245 U/L (01-28-25 @ 09:00)  Alkaline Phosphatase: 247 U/L (01-28-25 @ 04:30)  Alkaline Phosphatase: 261 U/L (01-28-25 @ 00:46)  Alkaline Phosphatase: 331 U/L (01-27-25 @ 13:22)  Alkaline Phosphatase: 489 U/L (01-27-25 @ 08:15)  Alkaline Phosphatase: 596 U/L (01-27-25 @ 04:50)  Alkaline Phosphatase: 854 U/L (01-27-25 @ 02:07)  Alanine Aminotransferase (ALT/SGPT): 34 U/L (01-29-25 @ 05:30)  Alanine Aminotransferase (ALT/SGPT): 38 U/L (01-29-25 @ 01:12)  Alanine Aminotransferase (ALT/SGPT): 40 U/L (01-28-25 @ 21:10)  Alanine Aminotransferase (ALT/SGPT): 41 U/L (01-28-25 @ 17:10)  Alanine Aminotransferase (ALT/SGPT): 39 U/L (01-28-25 @ 13:00)  Alanine Aminotransferase (ALT/SGPT): 40 U/L (01-28-25 @ 09:00)  Alanine Aminotransferase (ALT/SGPT): 43 U/L (01-28-25 @ 04:30)  Alanine Aminotransferase (ALT/SGPT): 45 U/L (01-28-25 @ 00:46)  Alanine Aminotransferase (ALT/SGPT): 42 U/L (01-27-25 @ 13:22)  Alanine Aminotransferase (ALT/SGPT): 40 U/L (01-27-25 @ 08:15)  Alanine Aminotransferase (ALT/SGPT): 37 U/L (01-27-25 @ 04:50)  Alanine Aminotransferase (ALT/SGPT): 45 U/L (01-27-25 @ 02:07)  Aspartate Aminotransferase (AST/SGOT): 118 U/L (01-29-25 @ 05:30)  Aspartate Aminotransferase (AST/SGOT): 131 U/L (01-29-25 @ 01:12)  Aspartate Aminotransferase (AST/SGOT): 146 U/L (01-28-25 @ 21:10)  Aspartate Aminotransferase (AST/SGOT): 154 U/L (01-28-25 @ 17:10)  Aspartate Aminotransferase (AST/SGOT): 154 U/L (01-28-25 @ 13:00)  Aspartate Aminotransferase (AST/SGOT): 163 U/L (01-28-25 @ 09:00)  Aspartate Aminotransferase (AST/SGOT): 175 U/L (01-28-25 @ 04:30)  Aspartate Aminotransferase (AST/SGOT): 185 U/L (01-28-25 @ 00:46)  Aspartate Aminotransferase (AST/SGOT): 180 U/L (01-27-25 @ 13:22)  Aspartate Aminotransferase (AST/SGOT): 163 U/L (01-27-25 @ 08:15)  Aspartate Aminotransferase (AST/SGOT): 150 U/L (01-27-25 @ 04:50)  Aspartate Aminotransferase (AST/SGOT): 186 U/L (01-27-25 @ 02:07)  Bilirubin Total: 7.0 mg/dL (01-29-25 @ 05:30)  Bilirubin Total: 7.4 mg/dL (01-29-25 @ 01:12)  Bilirubin Total: 7.2 mg/dL (01-29-25 @ 01:12)  Bilirubin Total: 7.2 mg/dL (01-28-25 @ 21:10)  Bilirubin Total: 7.1 mg/dL (01-28-25 @ 17:10)  Bilirubin Total: 6.5 mg/dL (01-28-25 @ 13:00)  Bilirubin Total: 6.4 mg/dL (01-28-25 @ 09:00)  Bilirubin Total: 6.0 mg/dL (01-28-25 @ 04:30)  Bilirubin Total: 5.6 mg/dL (01-28-25 @ 00:46)  Bilirubin Total: 4.4 mg/dL (01-27-25 @ 13:22)  Bilirubin Total: 3.5 mg/dL (01-27-25 @ 08:15)  Bilirubin Total: 3.5 mg/dL (01-27-25 @ 08:15)  Bilirubin Total: 3.3 mg/dL (01-27-25 @ 04:50)  Bilirubin Total: 4.1 mg/dL (01-27-25 @ 02:07)  Bilirubin Direct: 3.0 mg/dL (01-27-25 @ 08:15)

## 2025-01-31 NOTE — CHART NOTE - NSCHARTNOTEFT_GEN_A_CORE
EGD- grade I varices, no stigmata of bleeding   portal hypertensive gastropathy   may restart tube fees if ARACELI  is not being done

## 2025-01-31 NOTE — CHART NOTE - NSCHARTNOTEFT_GEN_A_CORE
EGD to be performed today by GI.  Will plan for ARACELI after EGD while patient remains intubated.  Discussed with Dr. Guerra

## 2025-02-01 LAB
ALBUMIN SERPL ELPH-MCNC: 3.3 G/DL — SIGNIFICANT CHANGE UP (ref 3.3–5.2)
ALBUMIN SERPL ELPH-MCNC: 3.4 G/DL — SIGNIFICANT CHANGE UP (ref 3.3–5.2)
ALBUMIN SERPL ELPH-MCNC: 3.4 G/DL — SIGNIFICANT CHANGE UP (ref 3.3–5.2)
ALP SERPL-CCNC: 252 U/L — HIGH (ref 40–120)
ALP SERPL-CCNC: 262 U/L — HIGH (ref 40–120)
ALP SERPL-CCNC: 264 U/L — HIGH (ref 40–120)
ALT FLD-CCNC: 24 U/L — SIGNIFICANT CHANGE UP
AMMONIA BLD-MCNC: 39 UMOL/L — SIGNIFICANT CHANGE UP (ref 11–55)
ANION GAP SERPL CALC-SCNC: 13 MMOL/L — SIGNIFICANT CHANGE UP (ref 5–17)
ANION GAP SERPL CALC-SCNC: 13 MMOL/L — SIGNIFICANT CHANGE UP (ref 5–17)
ANION GAP SERPL CALC-SCNC: 15 MMOL/L — SIGNIFICANT CHANGE UP (ref 5–17)
ANION GAP SERPL CALC-SCNC: 15 MMOL/L — SIGNIFICANT CHANGE UP (ref 5–17)
ANISOCYTOSIS BLD QL: SLIGHT — SIGNIFICANT CHANGE UP
AST SERPL-CCNC: 58 U/L — HIGH
AST SERPL-CCNC: 59 U/L — HIGH
AST SERPL-CCNC: 60 U/L — HIGH
BASOPHILS # BLD AUTO: 0 K/UL — SIGNIFICANT CHANGE UP (ref 0–0.2)
BASOPHILS NFR BLD AUTO: 0 % — SIGNIFICANT CHANGE UP (ref 0–2)
BILIRUB SERPL-MCNC: 8.4 MG/DL — HIGH (ref 0.4–2)
BILIRUB SERPL-MCNC: 8.9 MG/DL — HIGH (ref 0.4–2)
BILIRUB SERPL-MCNC: 9 MG/DL — HIGH (ref 0.4–2)
BILIRUB SERPL-MCNC: 9.4 MG/DL — HIGH (ref 0.4–2)
BUN SERPL-MCNC: 57.1 MG/DL — HIGH (ref 8–20)
BUN SERPL-MCNC: 60.8 MG/DL — HIGH (ref 8–20)
BUN SERPL-MCNC: 61.8 MG/DL — HIGH (ref 8–20)
BUN SERPL-MCNC: 62.6 MG/DL — HIGH (ref 8–20)
CALCIUM SERPL-MCNC: 7.9 MG/DL — LOW (ref 8.4–10.5)
CALCIUM SERPL-MCNC: 8 MG/DL — LOW (ref 8.4–10.5)
CALCIUM SERPL-MCNC: 8.3 MG/DL — LOW (ref 8.4–10.5)
CALCIUM SERPL-MCNC: 8.3 MG/DL — LOW (ref 8.4–10.5)
CHLORIDE SERPL-SCNC: 100 MMOL/L — SIGNIFICANT CHANGE UP (ref 96–108)
CHLORIDE SERPL-SCNC: 101 MMOL/L — SIGNIFICANT CHANGE UP (ref 96–108)
CHLORIDE SERPL-SCNC: 104 MMOL/L — SIGNIFICANT CHANGE UP (ref 96–108)
CHLORIDE SERPL-SCNC: 97 MMOL/L — SIGNIFICANT CHANGE UP (ref 96–108)
CO2 SERPL-SCNC: 31 MMOL/L — HIGH (ref 22–29)
CO2 SERPL-SCNC: 31 MMOL/L — HIGH (ref 22–29)
CO2 SERPL-SCNC: 33 MMOL/L — HIGH (ref 22–29)
CO2 SERPL-SCNC: 35 MMOL/L — HIGH (ref 22–29)
CREAT SERPL-MCNC: 0.48 MG/DL — LOW (ref 0.5–1.3)
CREAT SERPL-MCNC: 0.48 MG/DL — LOW (ref 0.5–1.3)
CREAT SERPL-MCNC: 0.52 MG/DL — SIGNIFICANT CHANGE UP (ref 0.5–1.3)
CREAT SERPL-MCNC: 0.53 MG/DL — SIGNIFICANT CHANGE UP (ref 0.5–1.3)
CREAT SERPL-MCNC: 0.55 MG/DL — SIGNIFICANT CHANGE UP (ref 0.5–1.3)
CULTURE RESULTS: ABNORMAL
EGFR: 104 ML/MIN/1.73M2 — SIGNIFICANT CHANGE UP
EGFR: 105 ML/MIN/1.73M2 — SIGNIFICANT CHANGE UP
EGFR: 106 ML/MIN/1.73M2 — SIGNIFICANT CHANGE UP
EGFR: 108 ML/MIN/1.73M2 — SIGNIFICANT CHANGE UP
EGFR: 108 ML/MIN/1.73M2 — SIGNIFICANT CHANGE UP
EOSINOPHIL # BLD AUTO: 0 K/UL — SIGNIFICANT CHANGE UP (ref 0–0.5)
EOSINOPHIL NFR BLD AUTO: 0 % — SIGNIFICANT CHANGE UP (ref 0–6)
GIANT PLATELETS BLD QL SMEAR: PRESENT — SIGNIFICANT CHANGE UP
GLUCOSE BLDC GLUCOMTR-MCNC: 181 MG/DL — HIGH (ref 70–99)
GLUCOSE BLDC GLUCOMTR-MCNC: 194 MG/DL — HIGH (ref 70–99)
GLUCOSE BLDC GLUCOMTR-MCNC: 202 MG/DL — HIGH (ref 70–99)
GLUCOSE BLDC GLUCOMTR-MCNC: 234 MG/DL — HIGH (ref 70–99)
GLUCOSE BLDC GLUCOMTR-MCNC: 249 MG/DL — HIGH (ref 70–99)
GLUCOSE SERPL-MCNC: 169 MG/DL — HIGH (ref 70–99)
GLUCOSE SERPL-MCNC: 183 MG/DL — HIGH (ref 70–99)
GLUCOSE SERPL-MCNC: 210 MG/DL — HIGH (ref 70–99)
GLUCOSE SERPL-MCNC: 216 MG/DL — HIGH (ref 70–99)
HCT VFR BLD CALC: 20 % — CRITICAL LOW (ref 34.5–45)
HCT VFR BLD CALC: 23.7 % — LOW (ref 34.5–45)
HCT VFR BLD CALC: 25.7 % — LOW (ref 34.5–45)
HGB BLD-MCNC: 6.7 G/DL — CRITICAL LOW (ref 11.5–15.5)
HGB BLD-MCNC: 7.7 G/DL — LOW (ref 11.5–15.5)
HGB BLD-MCNC: 8.2 G/DL — LOW (ref 11.5–15.5)
HYPOCHROMIA BLD QL: SLIGHT — SIGNIFICANT CHANGE UP
INR BLD: 1.9 RATIO — HIGH (ref 0.85–1.16)
LYMPHOCYTES # BLD AUTO: 0.35 K/UL — LOW (ref 1–3.3)
LYMPHOCYTES # BLD AUTO: 4.4 % — LOW (ref 13–44)
MACROCYTES BLD QL: SLIGHT — SIGNIFICANT CHANGE UP
MAGNESIUM SERPL-MCNC: 1.8 MG/DL — SIGNIFICANT CHANGE UP (ref 1.6–2.6)
MAGNESIUM SERPL-MCNC: 2 MG/DL — SIGNIFICANT CHANGE UP (ref 1.6–2.6)
MAGNESIUM SERPL-MCNC: 2 MG/DL — SIGNIFICANT CHANGE UP (ref 1.6–2.6)
MAGNESIUM SERPL-MCNC: 2.1 MG/DL — SIGNIFICANT CHANGE UP (ref 1.6–2.6)
MANUAL SMEAR VERIFICATION: SIGNIFICANT CHANGE UP
MCHC RBC-ENTMCNC: 27.8 PG — SIGNIFICANT CHANGE UP (ref 27–34)
MCHC RBC-ENTMCNC: 27.9 PG — SIGNIFICANT CHANGE UP (ref 27–34)
MCHC RBC-ENTMCNC: 28.1 PG — SIGNIFICANT CHANGE UP (ref 27–34)
MCHC RBC-ENTMCNC: 31.9 G/DL — LOW (ref 32–36)
MCHC RBC-ENTMCNC: 32.5 G/DL — SIGNIFICANT CHANGE UP (ref 32–36)
MCHC RBC-ENTMCNC: 33.5 G/DL — SIGNIFICANT CHANGE UP (ref 32–36)
MCV RBC AUTO: 83.3 FL — SIGNIFICANT CHANGE UP (ref 80–100)
MCV RBC AUTO: 86.5 FL — SIGNIFICANT CHANGE UP (ref 80–100)
MCV RBC AUTO: 87.1 FL — SIGNIFICANT CHANGE UP (ref 80–100)
MELD SCORE WITH DIALYSIS: 35 POINTS — SIGNIFICANT CHANGE UP
MELD SCORE WITHOUT DIALYSIS: 22 POINTS — SIGNIFICANT CHANGE UP
MICROCYTES BLD QL: SLIGHT — SIGNIFICANT CHANGE UP
MONOCYTES # BLD AUTO: 0.34 K/UL — SIGNIFICANT CHANGE UP (ref 0–0.9)
MONOCYTES NFR BLD AUTO: 4.3 % — SIGNIFICANT CHANGE UP (ref 2–14)
NEUTROPHILS # BLD AUTO: 7.11 K/UL — SIGNIFICANT CHANGE UP (ref 1.8–7.4)
NEUTROPHILS NFR BLD AUTO: 90.4 % — HIGH (ref 43–77)
PHOSPHATE SERPL-MCNC: 3.2 MG/DL — SIGNIFICANT CHANGE UP (ref 2.4–4.7)
PHOSPHATE SERPL-MCNC: 3.2 MG/DL — SIGNIFICANT CHANGE UP (ref 2.4–4.7)
PHOSPHATE SERPL-MCNC: 4.9 MG/DL — HIGH (ref 2.4–4.7)
PLAT MORPH BLD: NORMAL — SIGNIFICANT CHANGE UP
PLATELET # BLD AUTO: 47 K/UL — LOW (ref 150–400)
PLATELET # BLD AUTO: 53 K/UL — LOW (ref 150–400)
PLATELET # BLD AUTO: 54 K/UL — LOW (ref 150–400)
POLYCHROMASIA BLD QL SMEAR: SLIGHT — SIGNIFICANT CHANGE UP
POTASSIUM SERPL-MCNC: 2.5 MMOL/L — CRITICAL LOW (ref 3.5–5.3)
POTASSIUM SERPL-MCNC: 3.3 MMOL/L — LOW (ref 3.5–5.3)
POTASSIUM SERPL-MCNC: 3.9 MMOL/L — SIGNIFICANT CHANGE UP (ref 3.5–5.3)
POTASSIUM SERPL-MCNC: 4.4 MMOL/L — SIGNIFICANT CHANGE UP (ref 3.5–5.3)
POTASSIUM SERPL-SCNC: 2.5 MMOL/L — CRITICAL LOW (ref 3.5–5.3)
POTASSIUM SERPL-SCNC: 3.3 MMOL/L — LOW (ref 3.5–5.3)
POTASSIUM SERPL-SCNC: 3.9 MMOL/L — SIGNIFICANT CHANGE UP (ref 3.5–5.3)
POTASSIUM SERPL-SCNC: 4.4 MMOL/L — SIGNIFICANT CHANGE UP (ref 3.5–5.3)
PROT SERPL-MCNC: 6 G/DL — LOW (ref 6.6–8.7)
PROT SERPL-MCNC: 6.5 G/DL — LOW (ref 6.6–8.7)
PROT SERPL-MCNC: 6.5 G/DL — LOW (ref 6.6–8.7)
PROTHROM AB SERPL-ACNC: 21.9 SEC — HIGH (ref 9.9–13.4)
RBC # BLD: 2.4 M/UL — LOW (ref 3.8–5.2)
RBC # BLD: 2.74 M/UL — LOW (ref 3.8–5.2)
RBC # BLD: 2.95 M/UL — LOW (ref 3.8–5.2)
RBC # FLD: 22.6 % — HIGH (ref 10.3–14.5)
RBC # FLD: 22.8 % — HIGH (ref 10.3–14.5)
RBC # FLD: 23.9 % — HIGH (ref 10.3–14.5)
RBC BLD AUTO: ABNORMAL
SODIUM SERPL-SCNC: 146 MMOL/L — HIGH (ref 135–145)
SODIUM SERPL-SCNC: 147 MMOL/L — HIGH (ref 135–145)
SPECIMEN SOURCE: SIGNIFICANT CHANGE UP
TARGETS BLD QL SMEAR: SLIGHT — SIGNIFICANT CHANGE UP
VARIANT LYMPHS # BLD: 0.9 % — SIGNIFICANT CHANGE UP (ref 0–6)
VARIANT LYMPHS NFR BLD MANUAL: 0.9 % — SIGNIFICANT CHANGE UP (ref 0–6)
WBC # BLD: 7.87 K/UL — SIGNIFICANT CHANGE UP (ref 3.8–10.5)
WBC # BLD: 8.95 K/UL — SIGNIFICANT CHANGE UP (ref 3.8–10.5)
WBC # BLD: 9.02 K/UL — SIGNIFICANT CHANGE UP (ref 3.8–10.5)
WBC # FLD AUTO: 7.87 K/UL — SIGNIFICANT CHANGE UP (ref 3.8–10.5)
WBC # FLD AUTO: 8.95 K/UL — SIGNIFICANT CHANGE UP (ref 3.8–10.5)
WBC # FLD AUTO: 9.02 K/UL — SIGNIFICANT CHANGE UP (ref 3.8–10.5)

## 2025-02-01 PROCEDURE — 99291 CRITICAL CARE FIRST HOUR: CPT | Mod: GC

## 2025-02-01 PROCEDURE — 71045 X-RAY EXAM CHEST 1 VIEW: CPT | Mod: 26

## 2025-02-01 RX ORDER — POTASSIUM CHLORIDE 750 MG/1
10 TABLET, EXTENDED RELEASE ORAL
Refills: 0 | Status: COMPLETED | OUTPATIENT
Start: 2025-02-01 | End: 2025-02-01

## 2025-02-01 RX ORDER — FENTANYL CITRATE 50 UG/ML
50 INJECTION INTRAMUSCULAR; INTRAVENOUS EVERY 4 HOURS
Refills: 0 | Status: DISCONTINUED | OUTPATIENT
Start: 2025-02-01 | End: 2025-02-02

## 2025-02-01 RX ORDER — POTASSIUM CHLORIDE 750 MG/1
40 TABLET, EXTENDED RELEASE ORAL ONCE
Refills: 0 | Status: COMPLETED | OUTPATIENT
Start: 2025-02-01 | End: 2025-02-01

## 2025-02-01 RX ORDER — MAGNESIUM SULFATE 0.8 MEQ/ML
2 AMPUL (ML) INJECTION ONCE
Refills: 0 | Status: DISCONTINUED | OUTPATIENT
Start: 2025-02-01 | End: 2025-02-01

## 2025-02-01 RX ORDER — FENTANYL CITRATE 50 UG/ML
50 INJECTION INTRAMUSCULAR; INTRAVENOUS ONCE
Refills: 0 | Status: DISCONTINUED | OUTPATIENT
Start: 2025-02-01 | End: 2025-02-01

## 2025-02-01 RX ORDER — MAGNESIUM SULFATE 0.8 MEQ/ML
1 AMPUL (ML) INJECTION ONCE
Refills: 0 | Status: COMPLETED | OUTPATIENT
Start: 2025-02-01 | End: 2025-02-01

## 2025-02-01 RX ADMIN — VANCOMYCIN HYDROCHLORIDE 500 MILLIGRAM(S): KIT at 11:31

## 2025-02-01 RX ADMIN — Medication 100 GRAM(S): at 03:16

## 2025-02-01 RX ADMIN — Medication 50 MILLIGRAM(S): at 17:45

## 2025-02-01 RX ADMIN — POTASSIUM CHLORIDE 40 MILLIEQUIVALENT(S): 750 TABLET, EXTENDED RELEASE ORAL at 03:15

## 2025-02-01 RX ADMIN — PROPOFOL 12.5 MICROGRAM(S)/KG/MIN: 10 INJECTION, EMULSION INTRAVENOUS at 01:05

## 2025-02-01 RX ADMIN — Medication 100 MILLIGRAM(S): at 13:18

## 2025-02-01 RX ADMIN — CEFTRIAXONE 2000 MILLIGRAM(S): 250 INJECTION, POWDER, FOR SOLUTION INTRAMUSCULAR; INTRAVENOUS at 11:29

## 2025-02-01 RX ADMIN — Medication 2: at 17:51

## 2025-02-01 RX ADMIN — VANCOMYCIN HYDROCHLORIDE 500 MILLIGRAM(S): KIT at 23:11

## 2025-02-01 RX ADMIN — FENTANYL CITRATE 50 MICROGRAM(S): 50 INJECTION INTRAMUSCULAR; INTRAVENOUS at 08:40

## 2025-02-01 RX ADMIN — Medication 4: at 23:10

## 2025-02-01 RX ADMIN — Medication 50 MILLIGRAM(S): at 05:44

## 2025-02-01 RX ADMIN — POTASSIUM CHLORIDE 100 MILLIEQUIVALENT(S): 750 TABLET, EXTENDED RELEASE ORAL at 03:15

## 2025-02-01 RX ADMIN — INSULIN GLARGINE-YFGN 15 UNIT(S): 100 INJECTION, SOLUTION SUBCUTANEOUS at 08:17

## 2025-02-01 RX ADMIN — ANTISEPTIC SURGICAL SCRUB 15 MILLILITER(S): 0.04 SOLUTION TOPICAL at 17:45

## 2025-02-01 RX ADMIN — Medication 100 MILLIGRAM(S): at 21:04

## 2025-02-01 RX ADMIN — Medication 15 MILLILITER(S): at 11:30

## 2025-02-01 RX ADMIN — FENTANYL CITRATE 50 MICROGRAM(S): 50 INJECTION INTRAMUSCULAR; INTRAVENOUS at 13:57

## 2025-02-01 RX ADMIN — ANTISEPTIC SURGICAL SCRUB 1 APPLICATION(S): 0.04 SOLUTION TOPICAL at 05:16

## 2025-02-01 RX ADMIN — FENTANYL CITRATE 50 MICROGRAM(S): 50 INJECTION INTRAMUSCULAR; INTRAVENOUS at 09:40

## 2025-02-01 RX ADMIN — POTASSIUM CHLORIDE 100 MILLIEQUIVALENT(S): 750 TABLET, EXTENDED RELEASE ORAL at 17:06

## 2025-02-01 RX ADMIN — VANCOMYCIN HYDROCHLORIDE 500 MILLIGRAM(S): KIT at 17:46

## 2025-02-01 RX ADMIN — ANTISEPTIC SURGICAL SCRUB 15 MILLILITER(S): 0.04 SOLUTION TOPICAL at 05:44

## 2025-02-01 RX ADMIN — PANTOPRAZOLE 40 MILLIGRAM(S): 20 TABLET, DELAYED RELEASE ORAL at 05:45

## 2025-02-01 RX ADMIN — Medication 100 MILLIGRAM(S): at 05:44

## 2025-02-01 RX ADMIN — POTASSIUM CHLORIDE 100 MILLIEQUIVALENT(S): 750 TABLET, EXTENDED RELEASE ORAL at 16:05

## 2025-02-01 RX ADMIN — POTASSIUM CHLORIDE 100 MILLIEQUIVALENT(S): 750 TABLET, EXTENDED RELEASE ORAL at 05:43

## 2025-02-01 RX ADMIN — POTASSIUM CHLORIDE 100 MILLIEQUIVALENT(S): 750 TABLET, EXTENDED RELEASE ORAL at 04:14

## 2025-02-01 RX ADMIN — POTASSIUM CHLORIDE 100 MILLIEQUIVALENT(S): 750 TABLET, EXTENDED RELEASE ORAL at 14:38

## 2025-02-01 RX ADMIN — Medication 1 MILLIGRAM(S): at 11:29

## 2025-02-01 RX ADMIN — POTASSIUM CHLORIDE 40 MILLIEQUIVALENT(S): 750 TABLET, EXTENDED RELEASE ORAL at 14:37

## 2025-02-01 RX ADMIN — DEXMEDETOMIDINE HYDROCHLORIDE 6.53 MICROGRAM(S)/KG/HR: 4 INJECTION, SOLUTION INTRAVENOUS at 06:43

## 2025-02-01 RX ADMIN — VANCOMYCIN HYDROCHLORIDE 500 MILLIGRAM(S): KIT at 05:44

## 2025-02-01 RX ADMIN — PANTOPRAZOLE 40 MILLIGRAM(S): 20 TABLET, DELAYED RELEASE ORAL at 17:45

## 2025-02-01 RX ADMIN — Medication 50 MILLIGRAM(S): at 11:30

## 2025-02-01 RX ADMIN — Medication 50 MILLIGRAM(S): at 23:10

## 2025-02-01 RX ADMIN — Medication 2: at 11:31

## 2025-02-01 RX ADMIN — FENTANYL CITRATE 50 MICROGRAM(S): 50 INJECTION INTRAMUSCULAR; INTRAVENOUS at 23:32

## 2025-02-01 RX ADMIN — POTASSIUM CHLORIDE 40 MILLIEQUIVALENT(S): 750 TABLET, EXTENDED RELEASE ORAL at 05:45

## 2025-02-01 RX ADMIN — Medication 4: at 05:45

## 2025-02-01 NOTE — PROGRESS NOTE ADULT - SUBJECTIVE AND OBJECTIVE BOX
Patient is a 59y old  Female who presents with a chief complaint of HAGMA, respiratory distress, shock (2025 14:34)        Interval HPI:  - Failed SVT/SBT overnight     PAST MEDICAL & SURGICAL HISTORY:  Alcohol abuse      Depression      Withdrawal seizures      History of basal cell carcinoma excision      Squamous cell skin cancer      Hemorrhoids      2019 novel coronavirus disease (COVID-19)      Anemia      H/O vertigo      History of ear, nose, and throat (ENT) surgery      H/O basal cell carcinoma excision      H/O:           Review of Systems:  CONSTITUTIONAL: No fever, chills, or fatigue  EYES: No eye pain, visual disturbances, or discharge  ENMT:  No difficulty hearing, tinnitus, vertigo; No sinus or throat pain  RESPIRATORY: No cough, wheezing, chills or hemoptysis; No shortness of breath  CARDIOVASCULAR: No chest pain, palpitations, dizziness, or leg swelling  GASTROINTESTINAL: No abdominal or epigastric pain. No nausea, vomiting, or hematemesis; No diarrhea or constipation.  GENITOURINARY: No dysuria, frequency, hematuria, or incontinence  NEUROLOGICAL: No headaches, memory loss, loss of strength, or numbness  SKIN: No rash  MUSCULOSKELETAL: No joint pain or swelling; No muscle, back, or extremity pain  PSYCHIATRIC: No depression, anxiety, mood swings, or difficulty sleeping      Medications:  cefTRIAXone Injectable. 2000 milliGRAM(s) IV Push every 24 hours  metroNIDAZOLE  IVPB 500 milliGRAM(s) IV Intermittent every 8 hours  vancomycin    Solution 500 milliGRAM(s) Oral every 6 hours    buMETAnide Injectable 2 milliGRAM(s) IV Push two times a day      dexMEDEtomidine Infusion 0.5 MICROgram(s)/kG/Hr IV Continuous <Continuous>  fentaNYL    Injectable 50 MICROGram(s) IV Push every 4 hours PRN  fentaNYL   Infusion. 0.5 MICROgram(s)/kG/Hr IV Continuous <Continuous>  propofol Infusion 40 MICROgram(s)/kG/Min IV Continuous <Continuous>        pantoprazole  Injectable 40 milliGRAM(s) IV Push two times a day      dextrose 50% Injectable 25 Gram(s) IV Push once  dextrose 50% Injectable 12.5 Gram(s) IV Push once  dextrose 50% Injectable 25 Gram(s) IV Push once  dextrose Oral Gel 15 Gram(s) Oral once PRN  glucagon  Injectable 1 milliGRAM(s) IntraMuscular once  hydrocortisone sodium succinate Injectable 50 milliGRAM(s) IV Push every 6 hours  insulin glargine Injectable (LANTUS) 15 Unit(s) SubCutaneous every morning  insulin lispro (ADMELOG) corrective regimen sliding scale   SubCutaneous every 6 hours  vasopressin Infusion 0.04 Unit(s)/Min IV Continuous <Continuous>    dextrose 5%. 1000 milliLiter(s) IV Continuous <Continuous>  dextrose 5%. 1000 milliLiter(s) IV Continuous <Continuous>  folic acid Injectable 1 milliGRAM(s) IV Push daily  multivitamin/minerals/iron Oral Solution (CENTRUM) 15 milliLiter(s) Oral daily      chlorhexidine 0.12% Liquid 15 milliLiter(s) Oral Mucosa every 12 hours  chlorhexidine 2% Cloths 1 Application(s) Topical <User Schedule>        Mode: AC/ CMV (Assist Control/ Continuous Mandatory Ventilation)  RR (machine): 12  TV (machine): 400  FiO2: 40  PEEP: 6      ICU Vital Signs Last 24 Hrs  T(C): 36.7 (2025 07:00), Max: 37.3 (2025 23:00)  T(F): 98.1 (2025 07:00), Max: 99.1 (2025 23:00)  HR: 61 (2025 11:00) (55 - 82)  BP: --  BP(mean): --  ABP: 115/54 (2025 11:00) (80/37 - 144/66)  ABP(mean): 77 (2025 11:00) (52 - 97)  RR: 16 (2025 11:00) (12 - 28)  SpO2: 100% (2025 11:00) (96% - 100%)    O2 Parameters below as of 2025 08:00  Patient On (Oxygen Delivery Method): ventilator                I&O's Detail    2025 07:01  -  2025 07:00  --------------------------------------------------------  IN:    Dexmedetomidine: 7.8 mL    FentaNYL: 67.6 mL    FentaNYL: 33.8 mL    Glucerna 1.5: 120 mL    IV PiggyBack: 50 mL    IV PiggyBack: 150 mL    IV PiggyBack: 600 mL    IV PiggyBack: 300 mL    Norepinephrine: 8.9 mL    Platelets - Single Donor: 239 mL    PRBCs (Packed Red Blood Cells): 323 mL    Propofol: 125.3 mL    Propofol: 112.7 mL    Vasopressin: 78 mL    Vasopressin: 66 mL  Total IN: 2282.1 mL    OUT:    Indwelling Catheter - Urethral (mL): 2975 mL    Rectal Tube (mL): 600 mL  Total OUT: 3575 mL    Total NET: -1292.9 mL      2025 07:01  -  2025 11:48  --------------------------------------------------------  IN:    Dexmedetomidine: 27.3 mL    Glucerna 1.5: 30 mL    Vasopressin: 18 mL  Total IN: 75.3 mL    OUT:    FentaNYL: 0 mL    Indwelling Catheter - Urethral (mL): 160 mL    Propofol: 0 mL  Total OUT: 160 mL    Total NET: -84.7 mL          Physical Examination:    General: No acute distress.    HEENT: Pupils equal, reactive to light.  Symmetric.  PULM: Clear to auscultation bilaterally, no significant sputum production  NECK: Supple, no lymphadenopathy, trachea midline  CVS: Regular rate and rhythm, no murmurs, rubs, or gallops  ABD: Soft, nondistended, nontender, normoactive bowel sounds, no masses  EXT: No edema, nontender  SKIN: Warm and well perfused, no rashes noted.  NEURO: Alert, oriented, interactive, nonfocal    LABS:                        7.7    8.95  )-----------( 53       ( 2025 06:45 )             23.7     02-    147[H]  |  x   |  x   ----------------------------<  x   x    |  x   |  0.52    Ca    7.9[L]      2025 06:45  Phos  4.9     02-  Mg     2.1     -    TPro  x   /  Alb  x   /  TBili  9.0[H]  /  DBili  x   /  AST  x   /  ALT  x   /  AlkPhos  x   02-01          CAPILLARY BLOOD GLUCOSE      POCT Blood Glucose.: 181 mg/dL (2025 11:28)    PT/INR - ( 2025 10:00 )   PT: 21.9 sec;   INR: 1.90 ratio         PTT - ( 2025 08:30 )  PTT:34.1 sec  Urinalysis Basic - ( 2025 06:45 )    Color: x / Appearance: x / SG: x / pH: x  Gluc: 210 mg/dL / Ketone: x  / Bili: x / Urobili: x   Blood: x / Protein: x / Nitrite: x   Leuk Esterase: x / RBC: x / WBC x   Sq Epi: x / Non Sq Epi: x / Bacteria: x      CULTURES:  Culture Results:   No growth at 24 hours ( @ 02:45)  Culture Results:   No growth at 24 hours ( @ 02:30)  C Diff by PCR Result: Detected ( @ 07:15)  Culture Results:   Growth in anaerobic bottle: Gram Negative Rods ( @ 06:00)  Rapid RVP Result: NotDetec ( @ 06:00)  Culture Results:   Growth in aerobic and anaerobic bottles: Escherichia coli  Direct identification is available within approximately 3-5  hours either by Blood Panel Multiplexed PCR or Direct  MALDI-TOF. Details: [email or web address] ( @ 02:07)          DEVICES:     RADIOLOGY: ***    CRITICAL CARE TIME SPENT: ***   Patient is a 59y old  Female who presents with a chief complaint of HAGMA, respiratory distress, shock (2025 14:34)        Interval HPI:  - Failed SVT/SBT last night   - More awake and alert today. Followed commends.   - Family at bedside. Updated treatment plan   - EGD performed yesterday:  2 cords of grade I varices. There portal hypertensive gastropathy. No stigmata of bleeding  - Continue trails of SBT/SVT        PAST MEDICAL & SURGICAL HISTORY:  Alcohol abuse  Depression  Withdrawal seizures  History of basal cell carcinoma excision  Squamous cell skin cancer  Hemorrhoids  2019 novel coronavirus disease (COVID-19)  Anemia  H/O vertigo  History of ear, nose, and throat (ENT) surgery  H/O basal cell carcinoma excision  H/O:           Review of Systems: unable to obtain due to medical condition         Medications:  cefTRIAXone Injectable. 2000 milliGRAM(s) IV Push every 24 hours  metroNIDAZOLE  IVPB 500 milliGRAM(s) IV Intermittent every 8 hours  vancomycin    Solution 500 milliGRAM(s) Oral every 6 hours    buMETAnide Injectable 2 milliGRAM(s) IV Push two times a day      dexMEDEtomidine Infusion 0.5 MICROgram(s)/kG/Hr IV Continuous <Continuous>  fentaNYL    Injectable 50 MICROGram(s) IV Push every 4 hours PRN  fentaNYL   Infusion. 0.5 MICROgram(s)/kG/Hr IV Continuous <Continuous>  propofol Infusion 40 MICROgram(s)/kG/Min IV Continuous <Continuous>        pantoprazole  Injectable 40 milliGRAM(s) IV Push two times a day      dextrose 50% Injectable 25 Gram(s) IV Push once  dextrose 50% Injectable 12.5 Gram(s) IV Push once  dextrose 50% Injectable 25 Gram(s) IV Push once  dextrose Oral Gel 15 Gram(s) Oral once PRN  glucagon  Injectable 1 milliGRAM(s) IntraMuscular once  hydrocortisone sodium succinate Injectable 50 milliGRAM(s) IV Push every 6 hours  insulin glargine Injectable (LANTUS) 15 Unit(s) SubCutaneous every morning  insulin lispro (ADMELOG) corrective regimen sliding scale   SubCutaneous every 6 hours  vasopressin Infusion 0.04 Unit(s)/Min IV Continuous <Continuous>    dextrose 5%. 1000 milliLiter(s) IV Continuous <Continuous>  dextrose 5%. 1000 milliLiter(s) IV Continuous <Continuous>  folic acid Injectable 1 milliGRAM(s) IV Push daily  multivitamin/minerals/iron Oral Solution (CENTRUM) 15 milliLiter(s) Oral daily      chlorhexidine 0.12% Liquid 15 milliLiter(s) Oral Mucosa every 12 hours  chlorhexidine 2% Cloths 1 Application(s) Topical <User Schedule>        Mode: AC/ CMV (Assist Control/ Continuous Mandatory Ventilation)  RR (machine): 12  TV (machine): 400  FiO2: 40  PEEP: 6      ICU Vital Signs Last 24 Hrs  T(C): 36.7 (2025 07:00), Max: 37.3 (2025 23:00)  T(F): 98.1 (2025 07:00), Max: 99.1 (2025 23:00)  HR: 61 (2025 11:00) (55 - 82)  BP: --  BP(mean): --  ABP: 115/54 (2025 11:00) (80/37 - 144/66)  ABP(mean): 77 (2025 11:00) (52 - 97)  RR: 16 (2025 11:00) (12 - 28)  SpO2: 100% (2025 11:00) (96% - 100%)    O2 Parameters below as of 2025 08:00  Patient On (Oxygen Delivery Method): ventilator                I&O's Detail    2025 07:01  -  2025 07:00  --------------------------------------------------------  IN:    Dexmedetomidine: 7.8 mL    FentaNYL: 67.6 mL    FentaNYL: 33.8 mL    Glucerna 1.5: 120 mL    IV PiggyBack: 50 mL    IV PiggyBack: 150 mL    IV PiggyBack: 600 mL    IV PiggyBack: 300 mL    Norepinephrine: 8.9 mL    Platelets - Single Donor: 239 mL    PRBCs (Packed Red Blood Cells): 323 mL    Propofol: 125.3 mL    Propofol: 112.7 mL    Vasopressin: 78 mL    Vasopressin: 66 mL  Total IN: 2282.1 mL    OUT:    Indwelling Catheter - Urethral (mL): 2975 mL    Rectal Tube (mL): 600 mL  Total OUT: 3575 mL    Total NET: -1292.9 mL      2025 07:01  -  2025 11:48  --------------------------------------------------------  IN:    Dexmedetomidine: 27.3 mL    Glucerna 1.5: 30 mL    Vasopressin: 18 mL  Total IN: 75.3 mL    OUT:    FentaNYL: 0 mL    Indwelling Catheter - Urethral (mL): 160 mL    Propofol: 0 mL  Total OUT: 160 mL    Total NET: -84.7 mL          Physical Examination:    GENERAL: NAD  HEAD:  Atraumatic, Normocephalic  NECK: Supple, No JVD  CHEST/LUNG: Diminished to auscultation bilaterally; No wheezes or rales  HEART: regular rate and regular rhythm; No murmurs, rubs, or gallops  ABDOMEN: No-distention, soft, non- tender,  bowel sounds +, w/ rectal tube   EXTREMITIES:  2+ Peripheral Pulses, No clubbing or cyanosis  Neurological: sedated   Skin: Warm and dry  Musculoskeletal: Atraumatic, no joint effusions    LABS:                        7.7    8.95  )-----------( 53       ( 2025 06:45 )             23.7     02-01    147[H]  |  x   |  x   ----------------------------<  x   x    |  x   |  0.52    Ca    7.9[L]      2025 06:45  Phos  4.9     02-  Mg     2.1     -    TPro  x   /  Alb  x   /  TBili  9.0[H]  /  DBili  x   /  AST  x   /  ALT  x   /  AlkPhos  x   02-          CAPILLARY BLOOD GLUCOSE      POCT Blood Glucose.: 181 mg/dL (2025 11:28)    PT/INR - ( 2025 10:00 )   PT: 21.9 sec;   INR: 1.90 ratio         PTT - ( 2025 08:30 )  PTT:34.1 sec  Urinalysis Basic - ( 2025 06:45 )    Color: x / Appearance: x / SG: x / pH: x  Gluc: 210 mg/dL / Ketone: x  / Bili: x / Urobili: x   Blood: x / Protein: x / Nitrite: x   Leuk Esterase: x / RBC: x / WBC x   Sq Epi: x / Non Sq Epi: x / Bacteria: x      CULTURES:  Culture Results:   No growth at 24 hours ( @ 02:45)  Culture Results:   No growth at 24 hours ( @ 02:30)  C Diff by PCR Result: Detected ( @ 07:15)  Culture Results:   Growth in anaerobic bottle: Gram Negative Rods ( @ 06:00)  Rapid RVP Result: NotDetec ( @ 06:00)  Culture Results:   Growth in aerobic and anaerobic bottles: Escherichia coli  Direct identification is available within approximately 3-5  hours either by Blood Panel Multiplexed PCR or Direct  MALDI-TOF. Details: [email or web address] ( @ 02:07)

## 2025-02-01 NOTE — PROGRESS NOTE ADULT - ASSESSMENT
Assessment & Plan:   CUAC GOODWIN is a 59y Female with PMH ETOH cirrhosis, multiple paracentesis for ascites, esophageal varices s/p banding in the hospital for <1d  transferred to the MICU for management of undifferentiated shock state.    # Distributive shock 2/2 to G negative bacteremia  # C. diff colitis  # Cirrhosis 2/2 to EtoH abuse   # Hx of GIB 2/2 to esophageal varices s/p banding   # Hx of multiple paracentesis   # New onset of mitral valve vegetation     ====================== NEUROLOGY=====================  dexMEDEtomidine Infusion 0.5 MICROgram(s)/kG/Hr (6.53 mL/Hr) IV Continuous <Continuous>  fentaNYL    Injectable 50 MICROGram(s) IV Push every 4 hours PRN Severe Pain (7 - 10), agitation  fentaNYL   Infusion. 0.5 MICROgram(s)/kG/Hr (2.61 mL/Hr) IV Continuous <Continuous>  propofol Infusion 40 MICROgram(s)/kG/Min (12.5 mL/Hr) IV Continuous <Continuous>      -continue aspiration precautions  -HOB 30 degrees  -Continue thiamine, folic acid, and multi vitamin  -  Continue trials of SVT/SBT    ==================== RESPIRATORY======================  Mechanical Ventilation:  Mode: AC/ CMV (Assist Control/ Continuous Mandatory Ventilation)  RR (machine): 12  TV (machine): 400  FiO2: 40  PEEP: 6    - Continue trials of SVT/SBT      ====================CARDIOVASCULAR==================  buMETAnide Injectable 2 milliGRAM(s) IV Push two times a day    - Maintain MAP >60   - TTE showed EF 65%, vegetation on mitral valve   - ARACELI pending, needs GI clearance   - ID and cardiology consults   - Trend lactate CBC, CMP    ===================HEMATOLOGIC/ONC ===================    -SCD    ===================== RENAL =========================  Continue monitoring urine output    ==================== GASTROINTESTINAL===================  dextrose 5%. 1000 milliLiter(s) (50 mL/Hr) IV Continuous <Continuous>  dextrose 5%. 1000 milliLiter(s) (100 mL/Hr) IV Continuous <Continuous>  folic acid Injectable 1 milliGRAM(s) IV Push daily  multivitamin/minerals/iron Oral Solution (CENTRUM) 15 milliLiter(s) Oral daily  pantoprazole  Injectable 40 milliGRAM(s) IV Push two times a day    Diet: on OG tube   =======================    ENDOCRINE  =====================  dextrose 50% Injectable 25 Gram(s) IV Push once  dextrose 50% Injectable 12.5 Gram(s) IV Push once  dextrose 50% Injectable 25 Gram(s) IV Push once  dextrose Oral Gel 15 Gram(s) Oral once PRN Blood Glucose LESS THAN 70 milliGRAM(s)/deciliter  glucagon  Injectable 1 milliGRAM(s) IntraMuscular once  hydrocortisone sodium succinate Injectable 50 milliGRAM(s) IV Push every 6 hours  insulin glargine Injectable (LANTUS) 15 Unit(s) SubCutaneous every morning  insulin lispro (ADMELOG) corrective regimen sliding scale   SubCutaneous every 6 hours  vasopressin Infusion 0.04 Unit(s)/Min (6 mL/Hr) IV Continuous <Continuous>    Blood sugar goal: 100-200  ========================INFECTIOUS DISEASE================  cefTRIAXone Injectable. 2000 milliGRAM(s) IV Push every 24 hours  metroNIDAZOLE  IVPB 500 milliGRAM(s) IV Intermittent every 8 hours  vancomycin    Solution 500 milliGRAM(s) Oral every 6 hours    Cx: E.Coli   Repeated BCX pending     Care plan discussed with ICU attending Dr. Wesly Manning d/w HCP [place]   Dsipo: active. s/p EGD. Will plan ARACELI next week. Continue trials of SVT/SBT

## 2025-02-02 LAB
ALBUMIN SERPL ELPH-MCNC: 3.4 G/DL — SIGNIFICANT CHANGE UP (ref 3.3–5.2)
ALP SERPL-CCNC: 287 U/L — HIGH (ref 40–120)
ALT FLD-CCNC: 26 U/L — SIGNIFICANT CHANGE UP
ANION GAP SERPL CALC-SCNC: 12 MMOL/L — SIGNIFICANT CHANGE UP (ref 5–17)
ANISOCYTOSIS BLD QL: SLIGHT — SIGNIFICANT CHANGE UP
AST SERPL-CCNC: 58 U/L — HIGH
BASOPHILS # BLD AUTO: 0 K/UL — SIGNIFICANT CHANGE UP (ref 0–0.2)
BASOPHILS NFR BLD AUTO: 0 % — SIGNIFICANT CHANGE UP (ref 0–2)
BILIRUB SERPL-MCNC: 8.6 MG/DL — HIGH (ref 0.4–2)
BLD GP AB SCN SERPL QL: SIGNIFICANT CHANGE UP
BUN SERPL-MCNC: 53.8 MG/DL — HIGH (ref 8–20)
CALCIUM SERPL-MCNC: 8.7 MG/DL — SIGNIFICANT CHANGE UP (ref 8.4–10.5)
CHLORIDE SERPL-SCNC: 107 MMOL/L — SIGNIFICANT CHANGE UP (ref 96–108)
CO2 SERPL-SCNC: 31 MMOL/L — HIGH (ref 22–29)
CREAT SERPL-MCNC: 0.45 MG/DL — LOW (ref 0.5–1.3)
DACRYOCYTES BLD QL SMEAR: SLIGHT — SIGNIFICANT CHANGE UP
EGFR: 109 ML/MIN/1.73M2 — SIGNIFICANT CHANGE UP
EOSINOPHIL # BLD AUTO: 0 K/UL — SIGNIFICANT CHANGE UP (ref 0–0.5)
EOSINOPHIL NFR BLD AUTO: 0 % — SIGNIFICANT CHANGE UP (ref 0–6)
GIANT PLATELETS BLD QL SMEAR: PRESENT — SIGNIFICANT CHANGE UP
GLUCOSE BLDC GLUCOMTR-MCNC: 173 MG/DL — HIGH (ref 70–99)
GLUCOSE BLDC GLUCOMTR-MCNC: 199 MG/DL — HIGH (ref 70–99)
GLUCOSE BLDC GLUCOMTR-MCNC: 220 MG/DL — HIGH (ref 70–99)
GLUCOSE BLDC GLUCOMTR-MCNC: 281 MG/DL — HIGH (ref 70–99)
GLUCOSE SERPL-MCNC: 230 MG/DL — HIGH (ref 70–99)
HCT VFR BLD CALC: 27.3 % — LOW (ref 34.5–45)
HGB BLD-MCNC: 8.9 G/DL — LOW (ref 11.5–15.5)
LYMPHOCYTES # BLD AUTO: 0.18 K/UL — LOW (ref 1–3.3)
LYMPHOCYTES # BLD AUTO: 2.6 % — LOW (ref 13–44)
MACROCYTES BLD QL: SLIGHT — SIGNIFICANT CHANGE UP
MAGNESIUM SERPL-MCNC: 2 MG/DL — SIGNIFICANT CHANGE UP (ref 1.6–2.6)
MANUAL SMEAR VERIFICATION: SIGNIFICANT CHANGE UP
MCHC RBC-ENTMCNC: 28.5 PG — SIGNIFICANT CHANGE UP (ref 27–34)
MCHC RBC-ENTMCNC: 32.6 G/DL — SIGNIFICANT CHANGE UP (ref 32–36)
MCV RBC AUTO: 87.5 FL — SIGNIFICANT CHANGE UP (ref 80–100)
MONOCYTES # BLD AUTO: 0.25 K/UL — SIGNIFICANT CHANGE UP (ref 0–0.9)
MONOCYTES NFR BLD AUTO: 3.5 % — SIGNIFICANT CHANGE UP (ref 2–14)
NEUTROPHILS # BLD AUTO: 6.46 K/UL — SIGNIFICANT CHANGE UP (ref 1.8–7.4)
NEUTROPHILS NFR BLD AUTO: 90.3 % — HIGH (ref 43–77)
NEUTS BAND # BLD: 0.9 % — SIGNIFICANT CHANGE UP (ref 0–8)
NEUTS BAND NFR BLD: 0.9 % — SIGNIFICANT CHANGE UP (ref 0–8)
NT-PROBNP SERPL-SCNC: 6843 PG/ML — HIGH (ref 0–300)
PHOSPHATE SERPL-MCNC: 3 MG/DL — SIGNIFICANT CHANGE UP (ref 2.4–4.7)
PLAT MORPH BLD: NORMAL — SIGNIFICANT CHANGE UP
PLATELET # BLD AUTO: 51 K/UL — LOW (ref 150–400)
POIKILOCYTOSIS BLD QL AUTO: SIGNIFICANT CHANGE UP
POLYCHROMASIA BLD QL SMEAR: SLIGHT — SIGNIFICANT CHANGE UP
POTASSIUM SERPL-MCNC: 3.8 MMOL/L — SIGNIFICANT CHANGE UP (ref 3.5–5.3)
POTASSIUM SERPL-SCNC: 3.8 MMOL/L — SIGNIFICANT CHANGE UP (ref 3.5–5.3)
PROT SERPL-MCNC: 6.4 G/DL — LOW (ref 6.6–8.7)
RBC # BLD: 3.12 M/UL — LOW (ref 3.8–5.2)
RBC # FLD: 23.5 % — HIGH (ref 10.3–14.5)
RBC BLD AUTO: ABNORMAL
SODIUM SERPL-SCNC: 150 MMOL/L — HIGH (ref 135–145)
TARGETS BLD QL SMEAR: SIGNIFICANT CHANGE UP
VARIANT LYMPHS # BLD: 2.7 % — SIGNIFICANT CHANGE UP (ref 0–6)
VARIANT LYMPHS NFR BLD MANUAL: 2.7 % — SIGNIFICANT CHANGE UP (ref 0–6)
WBC # BLD: 7.08 K/UL — SIGNIFICANT CHANGE UP (ref 3.8–10.5)
WBC # FLD AUTO: 7.08 K/UL — SIGNIFICANT CHANGE UP (ref 3.8–10.5)

## 2025-02-02 PROCEDURE — 99291 CRITICAL CARE FIRST HOUR: CPT | Mod: GC

## 2025-02-02 RX ORDER — SODIUM CHLORIDE 9 G/ML
500 INJECTION, SOLUTION INTRAVENOUS ONCE
Refills: 0 | Status: COMPLETED | OUTPATIENT
Start: 2025-02-02 | End: 2025-02-02

## 2025-02-02 RX ORDER — NOREPINEPHRINE BITARTRATE 1 MG/ML
0.05 INJECTION, SOLUTION, CONCENTRATE INTRAVENOUS
Qty: 8 | Refills: 0 | Status: DISCONTINUED | OUTPATIENT
Start: 2025-02-02 | End: 2025-02-03

## 2025-02-02 RX ORDER — PHENYLEPHRINE HYDROCHLORIDE 855; 2.5; 3 MG/G; MG/G; MG/G
1 SUPPOSITORY RECTAL EVERY 6 HOURS
Refills: 0 | Status: DISCONTINUED | OUTPATIENT
Start: 2025-02-02 | End: 2025-02-07

## 2025-02-02 RX ORDER — POTASSIUM CHLORIDE 750 MG/1
10 TABLET, EXTENDED RELEASE ORAL ONCE
Refills: 0 | Status: COMPLETED | OUTPATIENT
Start: 2025-02-02 | End: 2025-02-02

## 2025-02-02 RX ORDER — SODIUM CHLORIDE 9 G/ML
1000 INJECTION, SOLUTION INTRAVENOUS
Refills: 0 | Status: DISCONTINUED | OUTPATIENT
Start: 2025-02-02 | End: 2025-02-03

## 2025-02-02 RX ORDER — PANTOPRAZOLE 20 MG/1
40 TABLET, DELAYED RELEASE ORAL
Refills: 0 | Status: DISCONTINUED | OUTPATIENT
Start: 2025-02-02 | End: 2025-02-07

## 2025-02-02 RX ORDER — MECOBAL/LEVOMEFOLAT CA/B6 PHOS 2-3-35 MG
1 TABLET ORAL DAILY
Refills: 0 | Status: DISCONTINUED | OUTPATIENT
Start: 2025-02-02 | End: 2025-02-05

## 2025-02-02 RX ORDER — FOLIC ACID 1 MG
1 TABLET ORAL DAILY
Refills: 0 | Status: DISCONTINUED | OUTPATIENT
Start: 2025-02-02 | End: 2025-02-07

## 2025-02-02 RX ORDER — VANCOMYCIN HYDROCHLORIDE 50 MG/ML
500 KIT ORAL EVERY 6 HOURS
Refills: 0 | Status: DISCONTINUED | OUTPATIENT
Start: 2025-02-02 | End: 2025-02-07

## 2025-02-02 RX ADMIN — VANCOMYCIN HYDROCHLORIDE 500 MILLIGRAM(S): KIT at 11:25

## 2025-02-02 RX ADMIN — Medication 2: at 23:09

## 2025-02-02 RX ADMIN — Medication 4: at 05:22

## 2025-02-02 RX ADMIN — POTASSIUM CHLORIDE 100 MILLIEQUIVALENT(S): 750 TABLET, EXTENDED RELEASE ORAL at 03:17

## 2025-02-02 RX ADMIN — FENTANYL CITRATE 50 MICROGRAM(S): 50 INJECTION INTRAMUSCULAR; INTRAVENOUS at 00:32

## 2025-02-02 RX ADMIN — INSULIN GLARGINE-YFGN 15 UNIT(S): 100 INJECTION, SOLUTION SUBCUTANEOUS at 07:50

## 2025-02-02 RX ADMIN — Medication 100 MILLIGRAM(S): at 05:24

## 2025-02-02 RX ADMIN — Medication 100 MILLIGRAM(S): at 14:30

## 2025-02-02 RX ADMIN — VANCOMYCIN HYDROCHLORIDE 500 MILLIGRAM(S): KIT at 17:16

## 2025-02-02 RX ADMIN — BUMETANIDE 2 MILLIGRAM(S): 2 TABLET ORAL at 05:23

## 2025-02-02 RX ADMIN — SODIUM CHLORIDE 500 MILLILITER(S): 9 INJECTION, SOLUTION INTRAVENOUS at 11:25

## 2025-02-02 RX ADMIN — Medication 50 MILLIGRAM(S): at 17:16

## 2025-02-02 RX ADMIN — Medication 1 TABLET(S): at 11:24

## 2025-02-02 RX ADMIN — Medication 50 MILLIGRAM(S): at 05:22

## 2025-02-02 RX ADMIN — Medication 100 MILLIGRAM(S): at 23:09

## 2025-02-02 RX ADMIN — ANTISEPTIC SURGICAL SCRUB 1 APPLICATION(S): 0.04 SOLUTION TOPICAL at 05:04

## 2025-02-02 RX ADMIN — Medication 50 MILLIGRAM(S): at 23:09

## 2025-02-02 RX ADMIN — VANCOMYCIN HYDROCHLORIDE 500 MILLIGRAM(S): KIT at 23:10

## 2025-02-02 RX ADMIN — CEFTRIAXONE 2000 MILLIGRAM(S): 250 INJECTION, POWDER, FOR SOLUTION INTRAMUSCULAR; INTRAVENOUS at 11:24

## 2025-02-02 RX ADMIN — Medication 1 MILLIGRAM(S): at 11:24

## 2025-02-02 RX ADMIN — SODIUM CHLORIDE 75 MILLILITER(S): 9 INJECTION, SOLUTION INTRAVENOUS at 14:29

## 2025-02-02 RX ADMIN — Medication 2: at 11:39

## 2025-02-02 RX ADMIN — Medication 50 MILLIGRAM(S): at 11:24

## 2025-02-02 RX ADMIN — Medication 6: at 17:19

## 2025-02-02 RX ADMIN — PANTOPRAZOLE 40 MILLIGRAM(S): 20 TABLET, DELAYED RELEASE ORAL at 05:23

## 2025-02-02 NOTE — AIRWAY REMOVAL NOTE  ADULT & PEDS - ARTIFICAL AIRWAY REMOVAL COMMENTS
Patient successfully extubated as per orders  to aerosol mask 40% tolerating well no signs of respiratory distress noted

## 2025-02-02 NOTE — PROGRESS NOTE ADULT - CRITICAL CARE ATTENDING COMMENT
Critical care time spent titrating IV sedatives, vasoactive medications, adjusting mechanical ventilator settings, interpreting blood gases and chest imaging.
Critical care time spent titrating IV sedatives, vasoactive medications, adjusting mechanical ventilator settings, interpreting blood gases and chest imaging.
greater than 50% of time spent reviewing labs, notes, orders and radiographs, coordinating care  discussed with nursing,  icu team, consultants  critically ill patient, actively managing ventilator, vasopressors, high risk for clinical deterioration
greater than 50% of time spent reviewing labs, notes, orders and radiographs, coordinating care  discussed with nursing, primary team, icu team, consultants  critically ill patient, actively managing ventilator,  high risk for clinical deterioration
greater than 50% of time spent reviewing labs, notes, orders and radiographs, coordinating care  discussed with nursing,  icu team, consultants  critically ill patient, actively managing vasopressors, high risk for clinical deterioration
greater than 50% of time spent reviewing labs, notes, orders and radiographs, coordinating care  discussed with nursing, icu team, consultants  critically ill patient, actively managing ventilator, vasopressors, high risk for clinical deterioration

## 2025-02-02 NOTE — PROGRESS NOTE ADULT - ASSESSMENT
Assessment & Plan:   CUCA GOODWIN is a 59y Female with PMH ETOH cirrhosis, multiple paracentesis for ascites, esophageal varices s/p banding in the hospital for <1d  transferred to the MICU for management of undifferentiated shock state.    # Distributive shock 2/2 to G negative bacteremia  # C. diff colitis  # Cirrhosis 2/2 to EtoH abuse   # Hx of GIB 2/2 to esophageal varices s/p banding   # Hx of multiple paracentesis   # New onset of mitral valve vegetation   ====================== NEUROLOGY=====================  dexMEDEtomidine Infusion 0.5 MICROgram(s)/kG/Hr (6.53 mL/Hr) IV Continuous <Continuous>    ==================== RESPIRATORY======================  - Extubated  - Not requiring AVAPS, BIPAP or intubation vent at this moment    ====================CARDIOVASCULAR==================  norepinephrine Infusion 0.05 MICROgram(s)/kG/Min (4.89 mL/Hr) IV Continuous <Continuous>    ===================HEMATOLOGIC/ONC ===================    ===================== RENAL =========================  Continue monitoring urine output  - Dry on exam, was previously on bumex infusion, will D/C  - Hypernatremia, start D5+LR @ 75cc/hr x 14hrs    ==================== GASTROINTESTINAL===================  dextrose 5% + lactated ringers. 1000 milliLiter(s) (75 mL/Hr) IV Continuous <Continuous>  dextrose 5%. 1000 milliLiter(s) (50 mL/Hr) IV Continuous <Continuous>  dextrose 5%. 1000 milliLiter(s) (100 mL/Hr) IV Continuous <Continuous>  folic acid 1 milliGRAM(s) Oral daily  multivitamin 1 Tablet(s) Oral daily  pantoprazole    Tablet 40 milliGRAM(s) Oral before breakfast    Diet: Consistent Carb  =======================    ENDOCRINE  =====================  dextrose 50% Injectable 25 Gram(s) IV Push once  dextrose 50% Injectable 12.5 Gram(s) IV Push once  dextrose 50% Injectable 25 Gram(s) IV Push once  dextrose Oral Gel 15 Gram(s) Oral once PRN Blood Glucose LESS THAN 70 milliGRAM(s)/deciliter  glucagon  Injectable 1 milliGRAM(s) IntraMuscular once  hydrocortisone sodium succinate Injectable 50 milliGRAM(s) IV Push every 6 hours  insulin glargine Injectable (LANTUS) 15 Unit(s) SubCutaneous every morning  insulin lispro (ADMELOG) corrective regimen sliding scale   SubCutaneous every 6 hours    Blood sugar goal: 100-200  ========================INFECTIOUS DISEASE================  cefTRIAXone Injectable. 2000 milliGRAM(s) IV Push every 24 hours  metroNIDAZOLE  IVPB 500 milliGRAM(s) IV Intermittent every 8 hours  vancomycin    Solution 500 milliGRAM(s) Oral every 6 hours    Continue antibiotics as above  Cx: Negative, previously E.Coli Gram negative bacteremia    Care plan discussed with ICU care team  Plan d/w Family

## 2025-02-02 NOTE — PROGRESS NOTE ADULT - SUBJECTIVE AND OBJECTIVE BOX
Patient is a 59y old  Female who presents with a chief complaint of HAGMA, respiratory distress, shock (2025 11:47)    ABRAHAN GOODWIN  MRN-[id number]  Patient is a 59y old  Female who presents with a chief complaint of HAGMA, respiratory distress, shock (2025 11:47)      BRIEF HOSPITAL COURSE  HPI:  This is a 60 y/o female with PMH of ETOH cirrhosis, multiple paracentesis for ascites, esophageal varices s/p banding,  who presents with x1 week of generalized fatigue, multiple bouts of diarrhea with poor PO intake and intermittent fevers. Of note was diagnosed Flu positive last week. Her boyfriend at bedside [place] gave some collateral information. She reports fatigue, chills, +cough, +abdominal tenderness, and +SOB. (2025 07:48)      Events last 24 hours:   - Patient has been extubated  - Mental status waxing/waning  - Was off pressors, had to restart pressors as patient was hypotensive in AM, started on low-dose Levo    PAST MEDICAL & SURGICAL HISTORY:  Alcohol abuse  Depression  Withdrawal seizures  History of basal cell carcinoma excision  Squamous cell skin cancer  Hemorrhoids  2019 novel coronavirus disease (COVID-19)  Anemia  H/O vertigo  History of ear, nose, and throat (ENT) surgery  H/O basal cell carcinoma excision  H/O:     Review of Systems:  CONSTITUTIONAL: No fever, chills, or fatigue  EYES: No eye pain, visual disturbances, or discharge  ENMT:  No difficulty hearing, tinnitus, vertigo; No sinus or throat pain  NECK: No pain or stiffness  RESPIRATORY: No cough, wheezing, chills or hemoptysis; No shortness of breath  CARDIOVASCULAR: No chest pain, palpitations, dizziness, or leg swelling  GASTROINTESTINAL: No abdominal or epigastric pain. No nausea, vomiting, or hematemesis; No diarrhea or constipation. No melena or hematochezia.  GENITOURINARY: No dysuria, frequency, hematuria, or incontinence  NEUROLOGICAL: No headaches, memory loss, loss of strength, numbness, or tremors  SKIN: No itching, burning, rashes, or lesions   MUSCULOSKELETAL: No joint pain or swelling; No muscle, back, or extremity pain  PSYCHIATRIC: No depression, anxiety, mood swings, or difficulty sleeping      Medications:  cefTRIAXone Injectable. 2000 milliGRAM(s) IV Push every 24 hours  metroNIDAZOLE  IVPB 500 milliGRAM(s) IV Intermittent every 8 hours  vancomycin    Solution 500 milliGRAM(s) Oral every 6 hours    norepinephrine Infusion 0.05 MICROgram(s)/kG/Min IV Continuous <Continuous>      dexMEDEtomidine Infusion 0.5 MICROgram(s)/kG/Hr IV Continuous <Continuous>        pantoprazole    Tablet 40 milliGRAM(s) Oral before breakfast      dextrose 50% Injectable 25 Gram(s) IV Push once  dextrose 50% Injectable 12.5 Gram(s) IV Push once  dextrose 50% Injectable 25 Gram(s) IV Push once  dextrose Oral Gel 15 Gram(s) Oral once PRN  glucagon  Injectable 1 milliGRAM(s) IntraMuscular once  hydrocortisone sodium succinate Injectable 50 milliGRAM(s) IV Push every 6 hours  insulin glargine Injectable (LANTUS) 15 Unit(s) SubCutaneous every morning  insulin lispro (ADMELOG) corrective regimen sliding scale   SubCutaneous every 6 hours    dextrose 5% + lactated ringers. 1000 milliLiter(s) IV Continuous <Continuous>  dextrose 5%. 1000 milliLiter(s) IV Continuous <Continuous>  dextrose 5%. 1000 milliLiter(s) IV Continuous <Continuous>  folic acid 1 milliGRAM(s) Oral daily  multivitamin 1 Tablet(s) Oral daily      chlorhexidine 2% Cloths 1 Application(s) Topical <User Schedule>  hemorrhoidal Ointment 1 Application(s) Rectal every 6 hours PRN        Mode: CPAP with PS  FiO2: 40  PEEP: 5  PS: 10  MAP: 11      ICU Vital Signs Last 24 Hrs  T(C): --  T(F): --  HR: 80 (2025 15:15) (55 - 80)  BP: 85/66 (2025 11:00) (85/66 - 85/66)  BP(mean): 74 (2025 11:00) (74 - 74)  ABP: 104/63 (2025 15:15) (82/44 - 154/73)  ABP(mean): 81 (2025 15:15) (57 - 105)  RR: 32 (2025 15:15) (12 - 32)  SpO2: 100% (2025 15:15) (89% - 100%)    O2 Parameters below as of 2025 12:00  Patient On (Oxygen Delivery Method): nasal cannula  O2 Flow (L/min): 4              I&O's Detail    2025 07:01  -  2025 07:00  --------------------------------------------------------  IN:    Dexmedetomidine: 247.7 mL    Glucerna 1.5: 330 mL    IV PiggyBack: 400 mL    IV PiggyBack: 300 mL    Vasopressin: 12 mL  Total IN: 1289.7 mL    OUT:    FentaNYL: 0 mL    Indwelling Catheter - Urethral (mL): 2240 mL    Propofol: 0 mL    Rectal Tube (mL): 100 mL  Total OUT: 2340 mL    Total NET: -1050.3 mL      2025 07:01  -  2025 16:06  --------------------------------------------------------  IN:    dextrose 5% + lactated ringers: 150 mL    Lactated Ringers Bolus: 500 mL    Norepinephrine: 19.2 mL    Oral Fluid: 2500 mL  Total IN: 3169.2 mL    OUT:    Indwelling Catheter - Urethral (mL): 1665 mL  Total OUT: 1665 mL    Total NET: 1504.2 mL            LABS:                        8.9    7.08  )-----------( 51       ( 2025 02:12 )             27.3     02-02    150[H]  |  107  |  53.8[H]  ----------------------------<  230[H]  3.8   |  31.0[H]  |  0.45[L]    Ca    8.7      2025 02:12  Phos  3.0     02-02  Mg     2.0     02-02    TPro  6.4[L]  /  Alb  3.4  /  TBili  8.6[H]  /  DBili  x   /  AST  58[H]  /  ALT  26  /  AlkPhos  287[H]  02-02          CAPILLARY BLOOD GLUCOSE      POCT Blood Glucose.: 199 mg/dL (2025 11:32)    PT/INR - ( 2025 10:00 )   PT: 21.9 sec;   INR: 1.90 ratio           Urinalysis Basic - ( 2025 02:12 )    Color: x / Appearance: x / SG: x / pH: x  Gluc: 230 mg/dL / Ketone: x  / Bili: x / Urobili: x   Blood: x / Protein: x / Nitrite: x   Leuk Esterase: x / RBC: x / WBC x   Sq Epi: x / Non Sq Epi: x / Bacteria: x      CULTURES:  Culture Results:   No growth at 48 Hours ( @ 02:45)  Culture Results:   No growth at 48 Hours ( @ 02:30)  C Diff by PCR Result: Detected ( @ 07:15)  Culture Results:   Growth in anaerobic bottle: Bacteroides thetaiotaomicron group  "Susceptibilities not performed" ( @ 06:00)  Rapid RVP Result: NotDetec ( @ 06:00)  Culture Results:   Growth in aerobic and anaerobic bottles: Escherichia coli  Direct identification is available within approximately 3-5  hours either by Blood Panel Multiplexed PCR or Direct  MALDI-TOF. Details: [email or web address] ( @ 02:07)      Physical Examination:  GENERAL: NAD  HEAD:  Atraumatic, Normocephalic  NECK: Supple, No JVD  CHEST/LUNG: Diminished to auscultation bilaterally; No wheezes or rales  HEART: regular rate and regular rhythm; No murmurs, rubs, or gallops  ABDOMEN: No-distention, soft, non- tender,  bowel sounds +, w/ rectal tube   EXTREMITIES:  2+ Peripheral Pulses, No clubbing or cyanosis  Neurological: sedated   Skin: Warm and dry  Musculoskeletal: Atraumatic, no joint effusions

## 2025-02-02 NOTE — CHART NOTE - NSCHARTNOTEFT_GEN_A_CORE
[facility]                                                         CARDIOLOGY AT [facility]                                                                  [address] [facility], [place] [place] [address]                                                         Telephone: [phone]. Fax:[phone]                                                                             CHART NOTE    59F with PMHx of depression, EtOH cirrhosis, withdrawal seizures, ascites, esophageal varices S/P banding, squamous cell skin cancer, and anemia. Patient presented to [facility] ED with complaints of generalized fatigue, multiple bouts of diarrhea, poor PO intake, and fevers with cough, abdominal tenderness, and SOB.  Labs on admission notable for NA of 125, Bicarb of 9, AG of 27, blood glucose of 48, Alk phos 854, , lactate of 15.10, lipase 333. Patient was admitted for undifferentiated shock. Hospital course complicated by acute hypoxic respiratory failure requiring intubation and cdiff. Cardiology was consulted for a small mobile vegetation attached to the posterior mitral valve leaflet seen on TTE.      Cardio called for ARACELI Evaluation:      GI- s/p EGD grade I varices, no stigmata of bleeding, portal hypertensive gastropathy    Extubated this am    Pt sitting up awake, alert,  at bedside  Pt states cracked tooth, infection three weeks ago    Pt will be high risk ARACELI, states previous EGD with damage to varices  spoke with Dr. Jarrell.   MICU to discuss with ID tomorrow if ARACELI will  for patient  Blood culture cleared    d/w Dr. Trejo

## 2025-02-03 LAB
ALBUMIN SERPL ELPH-MCNC: 2.7 G/DL — LOW (ref 3.3–5.2)
ALBUMIN SERPL ELPH-MCNC: 2.8 G/DL — LOW (ref 3.3–5.2)
ALP SERPL-CCNC: 249 U/L — HIGH (ref 40–120)
ALP SERPL-CCNC: 258 U/L — HIGH (ref 40–120)
ALT FLD-CCNC: 26 U/L — SIGNIFICANT CHANGE UP
ALT FLD-CCNC: 27 U/L — SIGNIFICANT CHANGE UP
ANION GAP SERPL CALC-SCNC: 14 MMOL/L — SIGNIFICANT CHANGE UP (ref 5–17)
ANION GAP SERPL CALC-SCNC: 14 MMOL/L — SIGNIFICANT CHANGE UP (ref 5–17)
AST SERPL-CCNC: 50 U/L — HIGH
AST SERPL-CCNC: 51 U/L — HIGH
BASOPHILS # BLD AUTO: 0.04 K/UL — SIGNIFICANT CHANGE UP (ref 0–0.2)
BASOPHILS # BLD AUTO: 0.06 K/UL — SIGNIFICANT CHANGE UP (ref 0–0.2)
BASOPHILS NFR BLD AUTO: 0.3 % — SIGNIFICANT CHANGE UP (ref 0–2)
BASOPHILS NFR BLD AUTO: 0.3 % — SIGNIFICANT CHANGE UP (ref 0–2)
BILIRUB SERPL-MCNC: 6.7 MG/DL — HIGH (ref 0.4–2)
BILIRUB SERPL-MCNC: 7 MG/DL — HIGH (ref 0.4–2)
BUN SERPL-MCNC: 29.6 MG/DL — HIGH (ref 8–20)
BUN SERPL-MCNC: 29.6 MG/DL — HIGH (ref 8–20)
CALCIUM SERPL-MCNC: 8.1 MG/DL — LOW (ref 8.4–10.5)
CALCIUM SERPL-MCNC: 8.2 MG/DL — LOW (ref 8.4–10.5)
CHLORIDE SERPL-SCNC: 93 MMOL/L — LOW (ref 96–108)
CHLORIDE SERPL-SCNC: 94 MMOL/L — LOW (ref 96–108)
CO2 SERPL-SCNC: 26 MMOL/L — SIGNIFICANT CHANGE UP (ref 22–29)
CO2 SERPL-SCNC: 30 MMOL/L — HIGH (ref 22–29)
CREAT SERPL-MCNC: 0.32 MG/DL — LOW (ref 0.5–1.3)
CREAT SERPL-MCNC: 0.32 MG/DL — LOW (ref 0.5–1.3)
EGFR: 119 ML/MIN/1.73M2 — SIGNIFICANT CHANGE UP
EGFR: 119 ML/MIN/1.73M2 — SIGNIFICANT CHANGE UP
EOSINOPHIL # BLD AUTO: 0 K/UL — SIGNIFICANT CHANGE UP (ref 0–0.5)
EOSINOPHIL # BLD AUTO: 0 K/UL — SIGNIFICANT CHANGE UP (ref 0–0.5)
EOSINOPHIL NFR BLD AUTO: 0 % — SIGNIFICANT CHANGE UP (ref 0–6)
EOSINOPHIL NFR BLD AUTO: 0 % — SIGNIFICANT CHANGE UP (ref 0–6)
GLUCOSE BLDC GLUCOMTR-MCNC: 128 MG/DL — HIGH (ref 70–99)
GLUCOSE BLDC GLUCOMTR-MCNC: 156 MG/DL — HIGH (ref 70–99)
GLUCOSE BLDC GLUCOMTR-MCNC: 158 MG/DL — HIGH (ref 70–99)
GLUCOSE BLDC GLUCOMTR-MCNC: 171 MG/DL — HIGH (ref 70–99)
GLUCOSE BLDC GLUCOMTR-MCNC: 179 MG/DL — HIGH (ref 70–99)
GLUCOSE SERPL-MCNC: 154 MG/DL — HIGH (ref 70–99)
GLUCOSE SERPL-MCNC: 168 MG/DL — HIGH (ref 70–99)
HCT VFR BLD CALC: 32.7 % — LOW (ref 34.5–45)
HCT VFR BLD CALC: 34.1 % — LOW (ref 34.5–45)
HGB BLD-MCNC: 10.8 G/DL — LOW (ref 11.5–15.5)
HGB BLD-MCNC: 11.7 G/DL — SIGNIFICANT CHANGE UP (ref 11.5–15.5)
IMM GRANULOCYTES NFR BLD AUTO: 2 % — HIGH (ref 0–0.9)
IMM GRANULOCYTES NFR BLD AUTO: 2.7 % — HIGH (ref 0–0.9)
LYMPHOCYTES # BLD AUTO: 0.73 K/UL — LOW (ref 1–3.3)
LYMPHOCYTES # BLD AUTO: 0.74 K/UL — LOW (ref 1–3.3)
LYMPHOCYTES # BLD AUTO: 4 % — LOW (ref 13–44)
LYMPHOCYTES # BLD AUTO: 4.7 % — LOW (ref 13–44)
MAGNESIUM SERPL-MCNC: 1.3 MG/DL — LOW (ref 1.6–2.6)
MAGNESIUM SERPL-MCNC: 1.8 MG/DL — SIGNIFICANT CHANGE UP (ref 1.6–2.6)
MCHC RBC-ENTMCNC: 28.4 PG — SIGNIFICANT CHANGE UP (ref 27–34)
MCHC RBC-ENTMCNC: 28.8 PG — SIGNIFICANT CHANGE UP (ref 27–34)
MCHC RBC-ENTMCNC: 33 G/DL — SIGNIFICANT CHANGE UP (ref 32–36)
MCHC RBC-ENTMCNC: 34.3 G/DL — SIGNIFICANT CHANGE UP (ref 32–36)
MCV RBC AUTO: 84 FL — SIGNIFICANT CHANGE UP (ref 80–100)
MCV RBC AUTO: 86.1 FL — SIGNIFICANT CHANGE UP (ref 80–100)
MONOCYTES # BLD AUTO: 0.92 K/UL — HIGH (ref 0–0.9)
MONOCYTES # BLD AUTO: 1.17 K/UL — HIGH (ref 0–0.9)
MONOCYTES NFR BLD AUTO: 5.8 % — SIGNIFICANT CHANGE UP (ref 2–14)
MONOCYTES NFR BLD AUTO: 6.5 % — SIGNIFICANT CHANGE UP (ref 2–14)
NEUTROPHILS # BLD AUTO: 13.7 K/UL — HIGH (ref 1.8–7.4)
NEUTROPHILS # BLD AUTO: 15.74 K/UL — HIGH (ref 1.8–7.4)
NEUTROPHILS NFR BLD AUTO: 86.5 % — HIGH (ref 43–77)
NEUTROPHILS NFR BLD AUTO: 87.2 % — HIGH (ref 43–77)
PHOSPHATE SERPL-MCNC: 2.8 MG/DL — SIGNIFICANT CHANGE UP (ref 2.4–4.7)
PHOSPHATE SERPL-MCNC: 3 MG/DL — SIGNIFICANT CHANGE UP (ref 2.4–4.7)
PLATELET # BLD AUTO: 101 K/UL — LOW (ref 150–400)
PLATELET # BLD AUTO: 104 K/UL — LOW (ref 150–400)
POTASSIUM SERPL-MCNC: 2.7 MMOL/L — CRITICAL LOW (ref 3.5–5.3)
POTASSIUM SERPL-MCNC: 3.7 MMOL/L — SIGNIFICANT CHANGE UP (ref 3.5–5.3)
POTASSIUM SERPL-SCNC: 2.7 MMOL/L — CRITICAL LOW (ref 3.5–5.3)
POTASSIUM SERPL-SCNC: 3.7 MMOL/L — SIGNIFICANT CHANGE UP (ref 3.5–5.3)
PROT SERPL-MCNC: 5.6 G/DL — LOW (ref 6.6–8.7)
PROT SERPL-MCNC: 5.7 G/DL — LOW (ref 6.6–8.7)
RBC # BLD: 3.8 M/UL — SIGNIFICANT CHANGE UP (ref 3.8–5.2)
RBC # BLD: 4.06 M/UL — SIGNIFICANT CHANGE UP (ref 3.8–5.2)
RBC # FLD: 23.1 % — HIGH (ref 10.3–14.5)
RBC # FLD: 23.5 % — HIGH (ref 10.3–14.5)
SODIUM SERPL-SCNC: 134 MMOL/L — LOW (ref 135–145)
SODIUM SERPL-SCNC: 137 MMOL/L — SIGNIFICANT CHANGE UP (ref 135–145)
WBC # BLD: 15.83 K/UL — HIGH (ref 3.8–10.5)
WBC # BLD: 18.07 K/UL — HIGH (ref 3.8–10.5)
WBC # FLD AUTO: 15.83 K/UL — HIGH (ref 3.8–10.5)
WBC # FLD AUTO: 18.07 K/UL — HIGH (ref 3.8–10.5)

## 2025-02-03 PROCEDURE — G0545: CPT

## 2025-02-03 PROCEDURE — 99232 SBSQ HOSP IP/OBS MODERATE 35: CPT

## 2025-02-03 PROCEDURE — 74176 CT ABD & PELVIS W/O CONTRAST: CPT | Mod: 26

## 2025-02-03 PROCEDURE — 99291 CRITICAL CARE FIRST HOUR: CPT | Mod: GC

## 2025-02-03 RX ORDER — MIDODRINE HYDROCHLORIDE 5 MG/1
15 TABLET ORAL EVERY 8 HOURS
Refills: 0 | Status: DISCONTINUED | OUTPATIENT
Start: 2025-02-03 | End: 2025-02-07

## 2025-02-03 RX ORDER — POTASSIUM CHLORIDE 750 MG/1
40 TABLET, EXTENDED RELEASE ORAL ONCE
Refills: 0 | Status: COMPLETED | OUTPATIENT
Start: 2025-02-03 | End: 2025-02-03

## 2025-02-03 RX ORDER — TRAMADOL HYDROCHLORIDE 100 MG/1
50 TABLET, EXTENDED RELEASE ORAL EVERY 12 HOURS
Refills: 0 | Status: DISCONTINUED | OUTPATIENT
Start: 2025-02-03 | End: 2025-02-07

## 2025-02-03 RX ORDER — MIDODRINE HYDROCHLORIDE 5 MG/1
10 TABLET ORAL EVERY 8 HOURS
Refills: 0 | Status: DISCONTINUED | OUTPATIENT
Start: 2025-02-03 | End: 2025-02-03

## 2025-02-03 RX ORDER — HYDROMORPHONE HYDROCHLORIDE 4 MG/ML
0.5 INJECTION, SOLUTION INTRAMUSCULAR; INTRAVENOUS; SUBCUTANEOUS EVERY 6 HOURS
Refills: 0 | Status: DISCONTINUED | OUTPATIENT
Start: 2025-02-03 | End: 2025-02-03

## 2025-02-03 RX ORDER — POTASSIUM CHLORIDE 750 MG/1
20 TABLET, EXTENDED RELEASE ORAL
Refills: 0 | Status: COMPLETED | OUTPATIENT
Start: 2025-02-03 | End: 2025-02-03

## 2025-02-03 RX ORDER — MAGNESIUM SULFATE 0.8 MEQ/ML
2 AMPUL (ML) INJECTION ONCE
Refills: 0 | Status: COMPLETED | OUTPATIENT
Start: 2025-02-03 | End: 2025-02-03

## 2025-02-03 RX ORDER — ACETAMINOPHEN, DIPHENHYDRAMINE HCL, PHENYLEPHRINE HCL 325; 25; 5 MG/1; MG/1; MG/1
5 TABLET ORAL AT BEDTIME
Refills: 0 | Status: DISCONTINUED | OUTPATIENT
Start: 2025-02-03 | End: 2025-02-07

## 2025-02-03 RX ADMIN — VANCOMYCIN HYDROCHLORIDE 500 MILLIGRAM(S): KIT at 11:51

## 2025-02-03 RX ADMIN — MIDODRINE HYDROCHLORIDE 10 MILLIGRAM(S): 5 TABLET ORAL at 14:41

## 2025-02-03 RX ADMIN — Medication 1 TABLET(S): at 11:51

## 2025-02-03 RX ADMIN — ACETAMINOPHEN, DIPHENHYDRAMINE HCL, PHENYLEPHRINE HCL 5 MILLIGRAM(S): 325; 25; 5 TABLET ORAL at 02:16

## 2025-02-03 RX ADMIN — Medication 100 MILLIGRAM(S): at 21:10

## 2025-02-03 RX ADMIN — CEFTRIAXONE 2000 MILLIGRAM(S): 250 INJECTION, POWDER, FOR SOLUTION INTRAMUSCULAR; INTRAVENOUS at 10:59

## 2025-02-03 RX ADMIN — Medication 2: at 05:13

## 2025-02-03 RX ADMIN — POTASSIUM CHLORIDE 50 MILLIEQUIVALENT(S): 750 TABLET, EXTENDED RELEASE ORAL at 10:55

## 2025-02-03 RX ADMIN — MIDODRINE HYDROCHLORIDE 10 MILLIGRAM(S): 5 TABLET ORAL at 05:11

## 2025-02-03 RX ADMIN — PANTOPRAZOLE 40 MILLIGRAM(S): 20 TABLET, DELAYED RELEASE ORAL at 05:11

## 2025-02-03 RX ADMIN — TRAMADOL HYDROCHLORIDE 50 MILLIGRAM(S): 100 TABLET, EXTENDED RELEASE ORAL at 11:55

## 2025-02-03 RX ADMIN — TRAMADOL HYDROCHLORIDE 50 MILLIGRAM(S): 100 TABLET, EXTENDED RELEASE ORAL at 10:55

## 2025-02-03 RX ADMIN — Medication 50 MILLIGRAM(S): at 05:12

## 2025-02-03 RX ADMIN — MIDODRINE HYDROCHLORIDE 10 MILLIGRAM(S): 5 TABLET ORAL at 21:11

## 2025-02-03 RX ADMIN — ACETAMINOPHEN, DIPHENHYDRAMINE HCL, PHENYLEPHRINE HCL 5 MILLIGRAM(S): 325; 25; 5 TABLET ORAL at 21:11

## 2025-02-03 RX ADMIN — Medication 2: at 23:28

## 2025-02-03 RX ADMIN — POTASSIUM CHLORIDE 50 MILLIEQUIVALENT(S): 750 TABLET, EXTENDED RELEASE ORAL at 05:12

## 2025-02-03 RX ADMIN — Medication 100 MILLIGRAM(S): at 05:10

## 2025-02-03 RX ADMIN — Medication 50 MILLIGRAM(S): at 11:51

## 2025-02-03 RX ADMIN — Medication 2: at 18:22

## 2025-02-03 RX ADMIN — ANTISEPTIC SURGICAL SCRUB 1 APPLICATION(S): 0.04 SOLUTION TOPICAL at 05:13

## 2025-02-03 RX ADMIN — POTASSIUM CHLORIDE 50 MILLIEQUIVALENT(S): 750 TABLET, EXTENDED RELEASE ORAL at 08:13

## 2025-02-03 RX ADMIN — POTASSIUM CHLORIDE 40 MILLIEQUIVALENT(S): 750 TABLET, EXTENDED RELEASE ORAL at 05:12

## 2025-02-03 RX ADMIN — Medication 100 MILLIGRAM(S): at 14:42

## 2025-02-03 RX ADMIN — VANCOMYCIN HYDROCHLORIDE 500 MILLIGRAM(S): KIT at 18:22

## 2025-02-03 RX ADMIN — Medication 2: at 12:03

## 2025-02-03 RX ADMIN — Medication 50 MILLIGRAM(S): at 18:22

## 2025-02-03 RX ADMIN — Medication 1 MILLIGRAM(S): at 11:51

## 2025-02-03 RX ADMIN — Medication 25 GRAM(S): at 05:13

## 2025-02-03 RX ADMIN — Medication 50 MILLIGRAM(S): at 23:26

## 2025-02-03 RX ADMIN — VANCOMYCIN HYDROCHLORIDE 500 MILLIGRAM(S): KIT at 23:26

## 2025-02-03 RX ADMIN — VANCOMYCIN HYDROCHLORIDE 500 MILLIGRAM(S): KIT at 05:12

## 2025-02-03 RX ADMIN — INSULIN GLARGINE-YFGN 15 UNIT(S): 100 INJECTION, SOLUTION SUBCUTANEOUS at 08:11

## 2025-02-03 NOTE — PROGRESS NOTE ADULT - SUBJECTIVE AND OBJECTIVE BOX
INFECTIOUS DISEASES AND INTERNAL MEDICINE at [place]  =======================================================  Tommy Blair MD  Diplomates American Board of Internal Medicine and Infectious Diseases  Telephone [phone]  Fax            [phone]  =======================================================    ABRAHAN GOODWIN [id number]    Follow up:  BACTEREMIA C DIFF    Allergies:  Zithromax (Unknown)  morphine (Nausea)      Medications:  cefTRIAXone Injectable. 2000 milliGRAM(s) IV Push every 24 hours  chlorhexidine 2% Cloths 1 Application(s) Topical <User Schedule>  dextrose 5% + lactated ringers. 1000 milliLiter(s) IV Continuous <Continuous>  dextrose 5%. 1000 milliLiter(s) IV Continuous <Continuous>  dextrose 5%. 1000 milliLiter(s) IV Continuous <Continuous>  dextrose 50% Injectable 25 Gram(s) IV Push once  dextrose 50% Injectable 12.5 Gram(s) IV Push once  dextrose 50% Injectable 25 Gram(s) IV Push once  dextrose Oral Gel 15 Gram(s) Oral once PRN  folic acid 1 milliGRAM(s) Oral daily  glucagon  Injectable 1 milliGRAM(s) IntraMuscular once  hemorrhoidal Ointment 1 Application(s) Rectal every 6 hours PRN  hydrocortisone sodium succinate Injectable 50 milliGRAM(s) IV Push every 6 hours  insulin glargine Injectable (LANTUS) 15 Unit(s) SubCutaneous every morning  insulin lispro (ADMELOG) corrective regimen sliding scale   SubCutaneous every 6 hours  melatonin 5 milliGRAM(s) Oral at bedtime  metroNIDAZOLE  IVPB 500 milliGRAM(s) IV Intermittent every 8 hours  midodrine 10 milliGRAM(s) Oral every 8 hours  multivitamin 1 Tablet(s) Oral daily  norepinephrine Infusion 0.05 MICROgram(s)/kG/Min IV Continuous <Continuous>  pantoprazole    Tablet 40 milliGRAM(s) Oral before breakfast  potassium chloride  20 mEq/100 mL IVPB 20 milliEquivalent(s) IV Intermittent every 2 hours  vancomycin    Solution 500 milliGRAM(s) Oral every 6 hours    SOCIAL       FAMILY   FAMILY HISTORY:  FH: pancreatic cancer (Mother)    Family history of heart attack (Father)    Family history of CVA (Father)      REVIEW OF SYSTEMS:  CONSTITUTIONAL:  No Fever or chills  HEENT:   No diplopia or blurred vision.  No earache, sore throat or runny nose.  CARDIOVASCULAR:  No pressure, squeezing, strangling, tightness, heaviness or aching about the chest, neck, axilla or epigastrium.  RESPIRATORY:  No cough, shortness of breath, PND or orthopnea.  GASTROINTESTINAL:   diarrhea.  GENITOURINARY:  No dysuria, frequency or urgency. No Blood in urine  MUSCULOSKELETAL:   moves all joints  SKIN:  No change in skin, hair or nails.  NEUROLOGIC:  No paresthesias, fasciculations, seizures or weakness.  PSYCHIATRIC:  No disorder of thought or mood.  ENDOCRINE:  No heat or cold intolerance, polyuria or polydipsia.  HEMATOLOGICAL:  No easy bruising or bleeding.            Physical Exam:  ICU Vital Signs Last 24 Hrs  T(C): 36.8 (03 Feb 2025 04:02), Max: 36.8 (03 Feb 2025 04:02)  T(F): 98.3 (03 Feb 2025 04:02), Max: 98.3 (03 Feb 2025 04:02)  HR: 75 (03 Feb 2025 07:00) (55 - 82)  BP: 85/66 (02 Feb 2025 11:00) (85/66 - 85/66)  BP(mean): 74 (02 Feb 2025 11:00) (74 - 74)  ABP: 102/54 (03 Feb 2025 07:00) (82/44 - 154/73)  ABP(mean): 74 (03 Feb 2025 07:00) (60 - 105)  RR: 28 (03 Feb 2025 07:00) (12 - 35)  SpO2: 96% (03 Feb 2025 07:00) (92% - 100%)    O2 Parameters below as of 03 Feb 2025 04:00  Patient On (Oxygen Delivery Method): nasal cannula  O2 Flow (L/min): 4        GEN: NAD,   HEENT: normocephalic and atraumatic. EOMI. LJ.    NECK: Supple. No carotid bruits.  No lymphadenopathy or thyromegaly.  LUNGS: Clear to auscultation.  HEART: Regular rate and rhythm without murmur.  ABDOMEN: Soft, nontender, DISTENDED POS ASCITES.  Positive bowel sounds.    : No CVA tenderness  EXTREMITIES: Without any cyanosis, clubbing, rash, lesions or edema.  MSK: no joint swelling  NEUROLOGIC: Cranial nerves II through XII are grossly intact.  PSYCHIATRIC: Appropriate affect .  SKIN: No ulceration or induration present.        Labs:  Vitals:  ============  T(F): 98.3 (03 Feb 2025 04:02), Max: 98.3 (03 Feb 2025 04:02)  HR: 75 (03 Feb 2025 07:00)  BP: 85/66 (02 Feb 2025 11:00)  RR: 28 (03 Feb 2025 07:00)  SpO2: 96% (03 Feb 2025 07:00) (92% - 100%)  temp max in last 48H T(F): , Max: 100.9 (02-01-25 @ 16:00)    =======================================================  Current Antibiotics:  cefTRIAXone Injectable. 2000 milliGRAM(s) IV Push every 24 hours  metroNIDAZOLE  IVPB 500 milliGRAM(s) IV Intermittent every 8 hours  vancomycin    Solution 500 milliGRAM(s) Oral every 6 hours    Other medications:  chlorhexidine 2% Cloths 1 Application(s) Topical <User Schedule>  dextrose 5% + lactated ringers. 1000 milliLiter(s) IV Continuous <Continuous>  dextrose 5%. 1000 milliLiter(s) IV Continuous <Continuous>  dextrose 5%. 1000 milliLiter(s) IV Continuous <Continuous>  dextrose 50% Injectable 25 Gram(s) IV Push once  dextrose 50% Injectable 12.5 Gram(s) IV Push once  dextrose 50% Injectable 25 Gram(s) IV Push once  folic acid 1 milliGRAM(s) Oral daily  glucagon  Injectable 1 milliGRAM(s) IntraMuscular once  hydrocortisone sodium succinate Injectable 50 milliGRAM(s) IV Push every 6 hours  insulin glargine Injectable (LANTUS) 15 Unit(s) SubCutaneous every morning  insulin lispro (ADMELOG) corrective regimen sliding scale   SubCutaneous every 6 hours  melatonin 5 milliGRAM(s) Oral at bedtime  midodrine 10 milliGRAM(s) Oral every 8 hours  multivitamin 1 Tablet(s) Oral daily  norepinephrine Infusion 0.05 MICROgram(s)/kG/Min IV Continuous <Continuous>  pantoprazole    Tablet 40 milliGRAM(s) Oral before breakfast  potassium chloride  20 mEq/100 mL IVPB 20 milliEquivalent(s) IV Intermittent every 2 hours      =======================================================  Labs:                        10.8   15.83 )-----------( 101      ( 03 Feb 2025 03:28 )             32.7     02-03    137  |  93[L]  |  29.6[H]  ----------------------------<  168[H]  2.7[LL]   |  30.0[H]  |  0.32[L]    Ca    8.1[L]      03 Feb 2025 03:28  Phos  2.8     02-03  Mg     1.3     02-03    TPro  5.6[L]  /  Alb  2.8[L]  /  TBili  7.0[H]  /  DBili  x   /  AST  50[H]  /  ALT  26  /  AlkPhos  258[H]  02-03      Culture - Blood (collected 01-31-25 @ 02:45)  Source: .Blood BLOOD  Preliminary Report (02-03-25 @ 06:01):    No growth at 72 Hours    Culture - Blood (collected 01-31-25 @ 02:30)  Source: .Blood BLOOD  Preliminary Report (02-03-25 @ 06:01):    No growth at 72 Hours    Culture - Blood (collected 01-27-25 @ 06:00)  Source: .Blood BLOOD  Gram Stain (01-30-25 @ 07:57):    Growth in anaerobic bottle: Gram Negative Rods  Final Report (02-01-25 @ 11:51):    Growth in anaerobic bottle: Bacteroides thetaiotaomicron group    "Susceptibilities not performed"    Culture - Blood (collected 01-27-25 @ 02:07)  Source: .Blood BLOOD  Gram Stain (01-27-25 @ 18:01):    Growth in anaerobic bottle: Gram Negative Rods    Growth in aerobic bottle: Gram Negative Rods  Final Report (01-29-25 @ 08:00):    Growth in aerobic and anaerobic bottles: Escherichia coli    Direct identification is available within approximately 3-5    hours either by Blood Panel Multiplexed PCR or Direct    MALDI-TOF. Details: [email or web address]  Organism: Blood Culture PCR  Escherichia coli (01-29-25 @ 08:00)  Organism: Escherichia coli (01-29-25 @ 08:00)    Sensitivities:      Method Type: NABILA      -  Ampicillin: R >16 These ampicillin results predict results for amoxicillin      -  Ampicillin/Sulbactam: I 16/8      -  Aztreonam: S <=4      -  Cefazolin: I 4      -  Cefepime: S <=2      -  Cefoxitin: S <=8      -  Ceftriaxone: S <=1      -  Ciprofloxacin: S <=0.25      -  Ertapenem: S <=0.5      -  Gentamicin: S <=2      -  Imipenem: S <=1      -  Levofloxacin: S <=0.5      -  Meropenem: S <=1      -  Piperacillin/Tazobactam: S <=8      -  Tobramycin: I 4      -  Trimethoprim/Sulfamethoxazole: S <=0.5/9.5  Organism: Blood Culture PCR (01-29-25 @ 08:00)    Sensitivities:      Method Type: PCR      -  Escherichia coli: Detec    Urinalysis with Rflx Culture (collected 11-16-24 @ 09:00)    Culture - Body Fluid with Gram Stain (collected 11-16-24 @ 04:44)  Source: Abdominal Fl  Gram Stain (11-16-24 @ 12:36):    polymorphonuclear leukocytes seen    No organisms seen    by cytocentrifuge  Final Report (11-21-24 @ 08:50):    No growth at 5 days    Culture - Blood (collected 11-16-24 @ 03:17)  Source: .Blood BLOOD  Final Report (11-21-24 @ 09:01):    No growth at 5 days    Culture - Blood (collected 11-16-24 @ 02:44)  Source: .Blood BLOOD  Final Report (11-21-24 @ 09:01):    No growth at 5 days    Culture - Fungal, Body Fluid (collected 11-23-23 @ 14:00)  Source: Peritoneal Peritoneal Fluid  Final Report (12-23-23 @ 15:01):    No fungus isolated at 4 weeks.    Culture - Body Fluid with Gram Stain (collected 11-23-23 @ 14:00)  Source: Ascites Fl Ascites Fluid  Gram Stain (11-23-23 @ 23:43):    polymorphonuclear leukocytes    No organisms seen    by cytocentrifuge  Final Report (11-28-23 @ 16:15):    No growth at 5 days    Culture - Blood (collected 11-22-23 @ 08:31)  Source: .Blood None  Final Report (11-27-23 @ 13:01):    No growth at 5 days    Culture - Blood (collected 11-22-23 @ 08:22)  Source: .Blood None  Final Report (11-27-23 @ 13:01):    No growth at 5 days    Culture - Blood (collected 11-09-23 @ 07:01)  Source: .Blood None  Final Report (11-14-23 @ 14:00):    No growth at 5 days    Culture - Blood (collected 11-09-23 @ 07:01)  Source: .Blood None  Final Report (11-14-23 @ 14:00):    No growth at 5 days    Culture - Blood (collected 10-07-23 @ 09:01)  Source: .Blood None  Final Report (10-12-23 @ 17:01):    No growth at 5 days    Culture - Blood (collected 10-07-23 @ 09:01)  Source: .Blood Blood-Venous  Final Report (10-12-23 @ 17:01):    No growth at 5 days    Culture - Blood (collected 05-09-23 @ 04:39)  Source: .Blood Blood-Peripheral  Final Report (05-14-23 @ 10:01):    No Growth Final    Culture - Urine (collected 05-09-23 @ 04:39)  Source: Clean Catch Clean Catch (Midstream)  Final Report (05-10-23 @ 23:46):    <10,000 CFU/mL Normal Urogenital Bibi    Culture - Blood (collected 05-09-23 @ 04:39)  Source: .Blood Blood-Peripheral  Final Report (05-14-23 @ 10:01):    No Growth Final      Creatinine: 0.32 mg/dL (02-03-25 @ 03:28)  Creatinine: 0.45 mg/dL (02-02-25 @ 02:12)  Creatinine: 0.53 mg/dL (02-01-25 @ 20:00)  Creatinine: 0.55 mg/dL (02-01-25 @ 13:26)  Creatinine: 0.52 mg/dL (02-01-25 @ 10:00)  Creatinine: 0.48 mg/dL (02-01-25 @ 06:45)  Creatinine: 0.48 mg/dL (02-01-25 @ 02:15)  Creatinine: 0.44 mg/dL (01-31-25 @ 02:45)  Creatinine: 0.42 mg/dL (01-30-25 @ 13:50)  Creatinine: 0.61 mg/dL (01-30-25 @ 04:30)  Creatinine: 0.69 mg/dL (01-29-25 @ 09:50)            WBC Count: 15.83 K/uL (02-03-25 @ 03:28)  WBC Count: 7.08 K/uL (02-02-25 @ 02:12)  WBC Count: 7.87 K/uL (02-01-25 @ 13:26)  WBC Count: 8.95 K/uL (02-01-25 @ 06:45)  WBC Count: 9.02 K/uL (02-01-25 @ 02:15)  WBC Count: 12.79 K/uL (01-31-25 @ 02:45)  WBC Count: 17.97 K/uL (01-30-25 @ 04:30)  WBC Count: 26.74 K/uL (01-29-25 @ 09:50)    SARS-CoV-2: NotDetec (01-27-25 @ 06:00)      Alkaline Phosphatase: 258 U/L (02-03-25 @ 03:28)  Alkaline Phosphatase: 287 U/L (02-02-25 @ 02:12)  Alkaline Phosphatase: 262 U/L (02-01-25 @ 13:26)  Alkaline Phosphatase: 252 U/L (02-01-25 @ 06:45)  Alkaline Phosphatase: 264 U/L (02-01-25 @ 02:15)  Alkaline Phosphatase: 274 U/L (01-31-25 @ 02:45)  Alanine Aminotransferase (ALT/SGPT): 26 U/L (02-03-25 @ 03:28)  Alanine Aminotransferase (ALT/SGPT): 26 U/L (02-02-25 @ 02:12)  Alanine Aminotransferase (ALT/SGPT): 24 U/L (02-01-25 @ 13:26)  Alanine Aminotransferase (ALT/SGPT): 24 U/L (02-01-25 @ 06:45)  Alanine Aminotransferase (ALT/SGPT): 24 U/L (02-01-25 @ 02:15)  Alanine Aminotransferase (ALT/SGPT): 27 U/L (01-31-25 @ 02:45)  Aspartate Aminotransferase (AST/SGOT): 50 U/L (02-03-25 @ 03:28)  Aspartate Aminotransferase (AST/SGOT): 58 U/L (02-02-25 @ 02:12)  Aspartate Aminotransferase (AST/SGOT): 58 U/L (02-01-25 @ 13:26)  Aspartate Aminotransferase (AST/SGOT): 59 U/L (02-01-25 @ 06:45)  Aspartate Aminotransferase (AST/SGOT): 60 U/L (02-01-25 @ 02:15)  Aspartate Aminotransferase (AST/SGOT): 74 U/L (01-31-25 @ 02:45)  Bilirubin Total: 7.0 mg/dL (02-03-25 @ 03:28)  Bilirubin Total: 8.6 mg/dL (02-02-25 @ 02:12)  Bilirubin Total: 8.4 mg/dL (02-01-25 @ 13:26)  Bilirubin Total: 9.0 mg/dL (02-01-25 @ 10:00)  Bilirubin Total: 8.9 mg/dL (02-01-25 @ 06:45)  Bilirubin Total: 9.4 mg/dL (02-01-25 @ 02:15)  Bilirubin Total: 10.2 mg/dL (01-31-25 @ 02:45)

## 2025-02-03 NOTE — PROGRESS NOTE ADULT - ASSESSMENT
Patient was extubated. Re-discussed GOC with patient. Discussed risks and benefits of CPR and intubation. Patient continued to want full code, including trach if needed, continue aggressive medical management. Distended and tendered abdomen.  CTAP ordered.  59y female with PMH ETOH cirrhosis, multiple paracentesis for ascites, esophageal varices s/p banding in the hospital for <1d  transferred to the MICU for management of shock. s/p 4L in ED, started Leovphed and Vasopressin. + C. Diff colitis. + EColi. Patient was admitted for shock. Patient was intubated and s/p extubated on 2/2.     #Palliative Care Encounter  #Advance Care Planning  - Patient has capacity today   - HCP: Primary is Holden  - HCP placed in patient's folder.   - Full Code, continue medical management  - Palliative will continue to follow     #Distributive Shock   #C Diff Colitis   #Blood Culture + E Coli   #Electrolytes abnormality   #Cirrhosis 2/2 to Alcohol abuse  #HX of GIB 2/2 esophageal varices s/p banding  #HX of multiple paracentesis  #New onset of mitral valve vegetation  - As per ICU medical management  - ID is on board, recs appreciated.   - on Vanco PO and Metronidazole   - Rocephin  - Replete electrolytes as needed  - Will treat 6-8 weeks of Abx for presumed endocarditis. Had EGD with esophageal varices, felt to be higher risk for ARACELI.

## 2025-02-03 NOTE — PROGRESS NOTE ADULT - ASSESSMENT
Assessment & Plan:    60 yo female with ETOH cirrhosis, ascites, esophageal varices, presented with fatigue, fevers, diarrhea.  Patient found to have Cdiff colitis, gram negative bacteremia, shock. TTE found posterior mitral valve leaflet    # Distributive shock 2/2 to G negative bacteremia  # C. diff colitis  # Cirrhosis 2/2 to EtoH abuse   # Hx of GIB 2/2 to esophageal varices s/p banding   # Hx of multiple paracentesis   # New onset of mitral valve vegetation     ====================== NEUROLOGY=====================  melatonin 5 milliGRAM(s) Oral at bedtime  traMADol 50 milliGRAM(s) Oral every 12 hours PRN Moderate Pain (4 - 6)    - A&O X 3-4   -Continue aspiration precautions    ==================== RESPIRATORY======================  - Extubated  - Not requiring AVAPS, BIPAP or intubation vent at this moment    ====================CARDIOVASCULAR==================  midodrine 10 milliGRAM(s) Oral every 8 hours  norepinephrine Infusion 0.05 MICROgram(s)/kG/Min (4.89 mL/Hr) IV Continuous <Continuous>    - Maintain MAP >60   - TTE showed EF 65%, vegetation on mitral valve   - Cardiology consider high risk for ARACELI  - ID following for long term abx     ===================HEMATOLOGIC/ONC ===================  -SCD  ===================== RENAL =========================  Continue monitoring urine output  - S/P d5+LR  - normalized Na     ==================== GASTROINTESTINAL===================  dextrose 5%. 1000 milliLiter(s) (50 mL/Hr) IV Continuous <Continuous>  dextrose 5%. 1000 milliLiter(s) (100 mL/Hr) IV Continuous <Continuous>  folic acid 1 milliGRAM(s) Oral daily  multivitamin 1 Tablet(s) Oral daily  pantoprazole    Tablet 40 milliGRAM(s) Oral before breakfast    Diet: Consistent Carb  =======================    ENDOCRINE  =====================  dextrose 50% Injectable 25 Gram(s) IV Push once  dextrose 50% Injectable 12.5 Gram(s) IV Push once  dextrose 50% Injectable 25 Gram(s) IV Push once  dextrose Oral Gel 15 Gram(s) Oral once PRN Blood Glucose LESS THAN 70 milliGRAM(s)/deciliter  glucagon  Injectable 1 milliGRAM(s) IntraMuscular once  hydrocortisone sodium succinate Injectable 50 milliGRAM(s) IV Push every 6 hours  insulin glargine Injectable (LANTUS) 15 Unit(s) SubCutaneous every morning  insulin lispro (ADMELOG) corrective regimen sliding scale   SubCutaneous every 6 hours    Blood sugar goal: 100-200  ========================INFECTIOUS DISEASE================  cefTRIAXone Injectable. 2000 milliGRAM(s) IV Push every 24 hours  metroNIDAZOLE  IVPB 500 milliGRAM(s) IV Intermittent every 8 hours  vancomycin    Solution 500 milliGRAM(s) Oral every 6 hours    Continue antibiotics as above  Cx: Negative, previously E.Coli Gram positive bacteremia    Care plan discussed with ICU care team  Plan d/w Family

## 2025-02-03 NOTE — PROGRESS NOTE ADULT - SUBJECTIVE AND OBJECTIVE BOX
OVERNIGHT EVENTS:    No acute event overnight. Patient was successfully extubated on 2/2/2025. This morning, patient complained of abdominal pain. CT AP ordered.     GOAL OF CARE:  Patient was extubated. Discussed diagnosis, current medical management, prognosis, and treatment options. Re-discussed GOC with patient. Discussed risks and benefits of CPR and intubation. Patient wanted full code. Patient is agreeable to trach if medically necessary. Patient complained of abdominal pain, CTAP ordered.   Present Symptoms:     Dyspnea: No  Nausea/Vomiting: no  Anxiety:  Yes due to abdominal pain  Depression: No  Fatigue: No  Loss of appetite: Yes   Constipation: Rectal tube     Pain:             Character-            Duration-            Effect-            Factors-            Frequency-            Location-            Severity-    Pain AD Score:  [email or web address] (press ctrl + left click to view)    Review of Systems: Reviewed  CONSTITUTIONAL: Denies fever  HEENT: Denies acute changes in vision and hearing  CARDIO: Denies CP  PULM: Denies  SOB  ABD: +abd pain  : Denies dysuria  NEURO: Denies HA  EXTREMITIES: Denies LE swelling    MEDICATIONS  (STANDING):  cefTRIAXone Injectable. 2000 milliGRAM(s) IV Push every 24 hours  chlorhexidine 2% Cloths 1 Application(s) Topical <User Schedule>  dextrose 5%. 1000 milliLiter(s) (50 mL/Hr) IV Continuous <Continuous>  dextrose 5%. 1000 milliLiter(s) (100 mL/Hr) IV Continuous <Continuous>  dextrose 50% Injectable 25 Gram(s) IV Push once  dextrose 50% Injectable 12.5 Gram(s) IV Push once  dextrose 50% Injectable 25 Gram(s) IV Push once  folic acid 1 milliGRAM(s) Oral daily  glucagon  Injectable 1 milliGRAM(s) IntraMuscular once  hydrocortisone sodium succinate Injectable 50 milliGRAM(s) IV Push every 6 hours  insulin glargine Injectable (LANTUS) 15 Unit(s) SubCutaneous every morning  insulin lispro (ADMELOG) corrective regimen sliding scale   SubCutaneous every 6 hours  melatonin 5 milliGRAM(s) Oral at bedtime  metroNIDAZOLE  IVPB 500 milliGRAM(s) IV Intermittent every 8 hours  midodrine 10 milliGRAM(s) Oral every 8 hours  multivitamin 1 Tablet(s) Oral daily  norepinephrine Infusion 0.05 MICROgram(s)/kG/Min (4.89 mL/Hr) IV Continuous <Continuous>  pantoprazole    Tablet 40 milliGRAM(s) Oral before breakfast  vancomycin    Solution 500 milliGRAM(s) Oral every 6 hours    MEDICATIONS  (PRN):  dextrose Oral Gel 15 Gram(s) Oral once PRN Blood Glucose LESS THAN 70 milliGRAM(s)/deciliter  hemorrhoidal Ointment 1 Application(s) Rectal every 6 hours PRN Hemorrhoids  traMADol 50 milliGRAM(s) Oral every 12 hours PRN Moderate Pain (4 - 6)      PHYSICAL EXAM:    Vital Signs Last 24 Hrs  T(C): 36.8 (03 Feb 2025 04:02), Max: 36.8 (03 Feb 2025 04:02)  T(F): 98.3 (03 Feb 2025 04:02), Max: 98.3 (03 Feb 2025 04:02)  HR: 76 (03 Feb 2025 14:00) (66 - 84)  BP: --  BP(mean): --  RR: 21 (03 Feb 2025 14:00) (12 - 35)  SpO2: 97% (03 Feb 2025 14:00) (92% - 100%)    Parameters below as of 03 Feb 2025 14:00  Patient On (Oxygen Delivery Method): nasal cannula        General: alert  oriented x 3    Karnofsky:  %    HEENT: normal      Lungs: labored breathing (due to abdominal pain)    CV: normal      GI: +distended  +tender     :  mccann    MSK: weakness      Skin:   no rash    LABS:                          10.8   15.83 )-----------( 101      ( 03 Feb 2025 03:28 )             32.7     02-03    137  |  93[L]  |  29.6[H]  ----------------------------<  168[H]  2.7[LL]   |  30.0[H]  |  0.32[L]    Ca    8.1[L]      03 Feb 2025 03:28  Phos  2.8     02-03  Mg     1.3     02-03    TPro  5.6[L]  /  Alb  2.8[L]  /  TBili  7.0[H]  /  DBili  x   /  AST  50[H]  /  ALT  26  /  AlkPhos  258[H]  02-03      Urinalysis Basic - ( 03 Feb 2025 03:28 )    Color: x / Appearance: x / SG: x / pH: x  Gluc: 168 mg/dL / Ketone: x  / Bili: x / Urobili: x   Blood: x / Protein: x / Nitrite: x   Leuk Esterase: x / RBC: x / WBC x   Sq Epi: x / Non Sq Epi: x / Bacteria: x      I&O's Summary    02 Feb 2025 07:01  -  03 Feb 2025 07:00  --------------------------------------------------------  IN: 4462.7 mL / OUT: 2710 mL / NET: 1752.7 mL    03 Feb 2025 07:01  -  03 Feb 2025 14:55  --------------------------------------------------------  IN: 0 mL / OUT: 270 mL / NET: -270 mL        RADIOLOGY & ADDITIONAL STUDIES:    ADVANCE DIRECTIVES/TREATMENT PREFERENCES:  Full code, continue medical management

## 2025-02-03 NOTE — PROGRESS NOTE ADULT - ASSESSMENT
58 y/o female with PMH of ETOH cirrhosis, multiple paracentesis for ascites, esophageal varices s/p banding,  who presents with x1 week of generalized fatigue, multiple bouts of diarrhea with poor PO intake and intermittent fevers. Of note was diagnosed Flu positive last week ON ADMISSION SHE REPORTED   fatigue, chills, +cough, +abdominal tenderness, and +SOB.    PT WITH POSITIVE STOOL FOR  C DIFF   AND BLOOD CX WITH ECOLI  CT SCAN  WITH COLITIS  CONTINUE  ORAL VANCO   UNCLEAR IF ABSORBED AS VIA NGT  IN ADDITION ON FLAGYL  PT WITH BLOOD CX WITH ECOLI  ADN BACTEROIDES  PT I WAS NTUBATED ON PRESSORS NOW EXTUBATED AND OFF PRESSORS  WBC UP TO  K    AWAKE ALERT   PT WITH ? VEG ON MITRAL VALVE   ON TRANSTHORACIC ECHOCARDIOGRAM   HAD EGD WITH ESOPHAGEAL  VARICES FELT TO BE HIGHER  RISK FOR ARACELI   WILL THEREFORE PLAN ON 6B WEEKSOF IV ABX FOR PRESUMED ENDOCARDITIS  WHEN READY FOR D/C CNA CHANGE TO INVANZ TO COVER BOTH ECOLI AND BACTEROIDES  WILL NEED CONCOMITANT ORAL VANCO WHILE ON THE IV ABX FOR VALVE INFECTION   WILL FOLLOWUP

## 2025-02-03 NOTE — PROGRESS NOTE ADULT - SUBJECTIVE AND OBJECTIVE BOX
Patient is a 59y old  Female who presents with a chief complaint of HAGMA, respiratory distress, shock (2025 11:40)      BRIEF HOSPITAL COURSE:       60 y/o female with PMH of ETOH cirrhosis, multiple paracentesis for ascites, esophageal varices s/p banding,  who presents with x1 week of generalized fatigue, multiple bouts of diarrhea with poor PO intake and intermittent fevers. Of note was diagnosed Flu positive last week. Her boyfriend at bedside [place] gave some collateral information. She reports fatigue, chills, +cough, +abdominal tenderness, and +SOB            58 yo female with ETOH cirrhosis, ascites multiple  paracentesis, esophageal varices s/p banding, presented with fatigue, fevers, diarrhea.  Patient found to have Cdiff colitis, gram negative bacteremia, shock.  TTE found posterior mitral valve leaflet ??vegetation      Interval HPI:    PAST MEDICAL & SURGICAL HISTORY:  Alcohol abuse      Depression      Withdrawal seizures      History of basal cell carcinoma excision      Squamous cell skin cancer      Hemorrhoids      2019 novel coronavirus disease (COVID-19)      Anemia      H/O vertigo      History of ear, nose, and throat (ENT) surgery      H/O basal cell carcinoma excision      H/O:           Review of Systems:  CONSTITUTIONAL: No fever, chills, or fatigue  EYES: No eye pain, visual disturbances, or discharge  ENMT:  No difficulty hearing, tinnitus, vertigo; No sinus or throat pain  RESPIRATORY: No cough, wheezing, chills or hemoptysis; No shortness of breath  CARDIOVASCULAR: No chest pain, palpitations, dizziness, or leg swelling  GASTROINTESTINAL: No abdominal or epigastric pain. No nausea, vomiting, or hematemesis; No diarrhea or constipation.  GENITOURINARY: No dysuria, frequency, hematuria, or incontinence  NEUROLOGICAL: No headaches, memory loss, loss of strength, or numbness  SKIN: No rash  MUSCULOSKELETAL: No joint pain or swelling; No muscle, back, or extremity pain  PSYCHIATRIC: No depression, anxiety, mood swings, or difficulty sleeping      Medications:  cefTRIAXone Injectable. 2000 milliGRAM(s) IV Push every 24 hours  metroNIDAZOLE  IVPB 500 milliGRAM(s) IV Intermittent every 8 hours  vancomycin    Solution 500 milliGRAM(s) Oral every 6 hours    midodrine 10 milliGRAM(s) Oral every 8 hours  norepinephrine Infusion 0.05 MICROgram(s)/kG/Min IV Continuous <Continuous>      melatonin 5 milliGRAM(s) Oral at bedtime  traMADol 50 milliGRAM(s) Oral every 12 hours PRN        pantoprazole    Tablet 40 milliGRAM(s) Oral before breakfast      dextrose 50% Injectable 25 Gram(s) IV Push once  dextrose 50% Injectable 12.5 Gram(s) IV Push once  dextrose 50% Injectable 25 Gram(s) IV Push once  dextrose Oral Gel 15 Gram(s) Oral once PRN  glucagon  Injectable 1 milliGRAM(s) IntraMuscular once  hydrocortisone sodium succinate Injectable 50 milliGRAM(s) IV Push every 6 hours  insulin glargine Injectable (LANTUS) 15 Unit(s) SubCutaneous every morning  insulin lispro (ADMELOG) corrective regimen sliding scale   SubCutaneous every 6 hours    dextrose 5%. 1000 milliLiter(s) IV Continuous <Continuous>  dextrose 5%. 1000 milliLiter(s) IV Continuous <Continuous>  folic acid 1 milliGRAM(s) Oral daily  multivitamin 1 Tablet(s) Oral daily      chlorhexidine 2% Cloths 1 Application(s) Topical <User Schedule>  hemorrhoidal Ointment 1 Application(s) Rectal every 6 hours PRN            ICU Vital Signs Last 24 Hrs  T(C): 36.8 (2025 04:02), Max: 36.8 (2025 04:02)  T(F): 98.3 (2025 04:02), Max: 98.3 (2025 04:02)  HR: 66 (2025 12:00) (66 - 84)  BP: --  BP(mean): --  ABP: 104/66 (2025 12:00) (86/52 - 122/61)  ABP(mean): 82 (2025 12:00) (65 - 87)  RR: 25 (2025 12:00) (12 - 35)  SpO2: 96% (2025 12:00) (92% - 100%)    O2 Parameters below as of 2025 11:00  Patient On (Oxygen Delivery Method): nasal cannula                I&O's Detail    2025 07:01  -  2025 07:00  --------------------------------------------------------  IN:    dextrose 5% + lactated ringers: 1050 mL    IV PiggyBack: 200 mL    IV PiggyBack: 100 mL    IV PiggyBack: 50 mL    Lactated Ringers Bolus: 500 mL    Norepinephrine: 62.7 mL    Oral Fluid: 2500 mL  Total IN: 4462.7 mL    OUT:    Indwelling Catheter - Urethral (mL): 2680 mL    Rectal Tube (mL): 30 mL  Total OUT: 2710 mL    Total NET: 1752.7 mL      2025 07:01  -  2025 12:47  --------------------------------------------------------  IN:  Total IN: 0 mL    OUT:    dextrose 5% + lactated ringers: 0 mL    Indwelling Catheter - Urethral (mL): 270 mL    Norepinephrine: 0 mL  Total OUT: 270 mL    Total NET: -270 mL          Physical Examination:    General: No acute distress.    HEENT: Pupils equal, reactive to light.  Symmetric.  PULM: Clear to auscultation bilaterally, no significant sputum production  NECK: Supple, no lymphadenopathy, trachea midline  CVS: Regular rate and rhythm, no murmurs, rubs, or gallops  ABD: Soft, nondistended, nontender, normoactive bowel sounds, no masses  EXT: No edema, nontender  SKIN: Warm and well perfused, no rashes noted.  NEURO: Alert, oriented, interactive, nonfocal    LABS:                        10.8   15.83 )-----------( 101      ( 2025 03:28 )             32.7     02-03    137  |  93[L]  |  29.6[H]  ----------------------------<  168[H]  2.7[LL]   |  30.0[H]  |  0.32[L]    Ca    8.1[L]      2025 03:28  Phos  2.8     02-03  Mg     1.3     -03    TPro  5.6[L]  /  Alb  2.8[L]  /  TBili  7.0[H]  /  DBili  x   /  AST  50[H]  /  ALT  26  /  AlkPhos  258[H]  -          CAPILLARY BLOOD GLUCOSE      POCT Blood Glucose.: 171 mg/dL (2025 11:57)      Urinalysis Basic - ( 2025 03:28 )    Color: x / Appearance: x / SG: x / pH: x  Gluc: 168 mg/dL / Ketone: x  / Bili: x / Urobili: x   Blood: x / Protein: x / Nitrite: x   Leuk Esterase: x / RBC: x / WBC x   Sq Epi: x / Non Sq Epi: x / Bacteria: x      CULTURES:  Culture Results:   No growth at 72 Hours ( @ 02:45)  Culture Results:   No growth at 72 Hours ( @ 02:30)          DEVICES:     RADIOLOGY: ***    CRITICAL CARE TIME SPENT: ***   Patient is a 59y old  Female who presents with a chief complaint of HAGMA, respiratory distress, shock (2025 11:40)      BRIEF HOSPITAL COURSE:     60 y/o female with PMH of ETOH cirrhosis, multiple paracentesis for ascites, esophageal varices s/p banding,  who presents with x1 week of generalized fatigue, multiple bouts of diarrhea with poor PO intake and intermittent fevers. Of note was diagnosed Flu positive 1week ago.  Patient admitted to MICU for Cdiff colitis, gram negative bacteremia, shock needs pressor support, acute respiratory failure needs intubation,  During hospital stay, TTE found posterior mitral valve vegetation. GI following, repeated EGD no active bleeding,  2 cords of grade I varices. Cardiology considered high risk for ARACELI. ID following, consider long term abx.       Interval HPI:  - S/p extubation yesterday   - A&O x3-4  - C/o abdominal pain, pocus did NOT show worsening ascites   - Repeated CT A/P STAT    PAST MEDICAL & SURGICAL HISTORY:  Alcohol abuse  Depression  Withdrawal seizures  History of basal cell carcinoma excision  Squamous cell skin cancer  Hemorrhoids  2019 novel coronavirus disease (COVID-19)  Anemia  H/O vertigo  History of ear, nose, and throat (ENT) surgery  H/O basal cell carcinoma excision  H/O:           Review of Systems: (negative except mentioned above)        Medications:  cefTRIAXone Injectable. 2000 milliGRAM(s) IV Push every 24 hours  metroNIDAZOLE  IVPB 500 milliGRAM(s) IV Intermittent every 8 hours  vancomycin    Solution 500 milliGRAM(s) Oral every 6 hours    midodrine 10 milliGRAM(s) Oral every 8 hours  norepinephrine Infusion 0.05 MICROgram(s)/kG/Min IV Continuous <Continuous>      melatonin 5 milliGRAM(s) Oral at bedtime  traMADol 50 milliGRAM(s) Oral every 12 hours PRN        pantoprazole    Tablet 40 milliGRAM(s) Oral before breakfast      dextrose 50% Injectable 25 Gram(s) IV Push once  dextrose 50% Injectable 12.5 Gram(s) IV Push once  dextrose 50% Injectable 25 Gram(s) IV Push once  dextrose Oral Gel 15 Gram(s) Oral once PRN  glucagon  Injectable 1 milliGRAM(s) IntraMuscular once  hydrocortisone sodium succinate Injectable 50 milliGRAM(s) IV Push every 6 hours  insulin glargine Injectable (LANTUS) 15 Unit(s) SubCutaneous every morning  insulin lispro (ADMELOG) corrective regimen sliding scale   SubCutaneous every 6 hours    dextrose 5%. 1000 milliLiter(s) IV Continuous <Continuous>  dextrose 5%. 1000 milliLiter(s) IV Continuous <Continuous>  folic acid 1 milliGRAM(s) Oral daily  multivitamin 1 Tablet(s) Oral daily      chlorhexidine 2% Cloths 1 Application(s) Topical <User Schedule>  hemorrhoidal Ointment 1 Application(s) Rectal every 6 hours PRN            ICU Vital Signs Last 24 Hrs  T(C): 36.8 (2025 04:02), Max: 36.8 (2025 04:02)  T(F): 98.3 (2025 04:02), Max: 98.3 (2025 04:02)  HR: 66 (2025 12:00) (66 - 84)  BP: --  BP(mean): --  ABP: 104/66 (2025 12:00) (86/52 - 122/61)  ABP(mean): 82 (2025 12:00) (65 - 87)  RR: 25 (2025 12:00) (12 - 35)  SpO2: 96% (2025 12:00) (92% - 100%)    O2 Parameters below as of 2025 11:00  Patient On (Oxygen Delivery Method): nasal cannula                I&O's Detail    2025 07:01  -  2025 07:00  --------------------------------------------------------  IN:    dextrose 5% + lactated ringers: 1050 mL    IV PiggyBack: 200 mL    IV PiggyBack: 100 mL    IV PiggyBack: 50 mL    Lactated Ringers Bolus: 500 mL    Norepinephrine: 62.7 mL    Oral Fluid: 2500 mL  Total IN: 4462.7 mL    OUT:    Indwelling Catheter - Urethral (mL): 2680 mL    Rectal Tube (mL): 30 mL  Total OUT: 2710 mL    Total NET: 1752.7 mL      2025 07:01  -  2025 12:47  --------------------------------------------------------  IN:  Total IN: 0 mL    OUT:    dextrose 5% + lactated ringers: 0 mL    Indwelling Catheter - Urethral (mL): 270 mL    Norepinephrine: 0 mL  Total OUT: 270 mL    Total NET: -270 mL          Physical Examination:  GENERAL: NAD  HEAD:  Atraumatic, Normocephalic  NECK: Supple, No JVD  CHEST/LUNG: Diminished to auscultation bilaterally; No wheezes or rales  HEART: regular rate and regular rhythm; No murmurs, rubs, or gallops  ABDOMEN: Distended, tenderness (+),  w/ rectal tube   EXTREMITIES:  2+ Peripheral Pulses, No clubbing or cyanosis  Neurological: A&O x 3-4  Skin: Warm and dry  Musculoskeletal: Atraumatic, no joint effusions      LABS:                        10.8   15.83 )-----------( 101      ( 2025 03:28 )             32.7     02-03    137  |  93[L]  |  29.6[H]  ----------------------------<  168[H]  2.7[LL]   |  30.0[H]  |  0.32[L]    Ca    8.1[L]      2025 03:28  Phos  2.8     02-  Mg     1.3     -03    TPro  5.6[L]  /  Alb  2.8[L]  /  TBili  7.0[H]  /  DBili  x   /  AST  50[H]  /  ALT  26  /  AlkPhos  258[H]  02-03          CAPILLARY BLOOD GLUCOSE      POCT Blood Glucose.: 171 mg/dL (2025 11:57)      Urinalysis Basic - ( 2025 03:28 )    Color: x / Appearance: x / SG: x / pH: x  Gluc: 168 mg/dL / Ketone: x  / Bili: x / Urobili: x   Blood: x / Protein: x / Nitrite: x   Leuk Esterase: x / RBC: x / WBC x   Sq Epi: x / Non Sq Epi: x / Bacteria: x      CULTURES:  Culture Results:   No growth at 72 Hours ( @ 02:45)  Culture Results:   No growth at 72 Hours ( @ 02:30)

## 2025-02-04 LAB
ALBUMIN SERPL ELPH-MCNC: 2.6 G/DL — LOW (ref 3.3–5.2)
ALP SERPL-CCNC: 259 U/L — HIGH (ref 40–120)
ALT FLD-CCNC: 29 U/L — SIGNIFICANT CHANGE UP
ANION GAP SERPL CALC-SCNC: 10 MMOL/L — SIGNIFICANT CHANGE UP (ref 5–17)
AST SERPL-CCNC: 50 U/L — HIGH
B PERT IGG+IGM PNL SER: ABNORMAL
BASOPHILS # BLD AUTO: 0 K/UL — SIGNIFICANT CHANGE UP (ref 0–0.2)
BASOPHILS NFR BLD AUTO: 0 % — SIGNIFICANT CHANGE UP (ref 0–2)
BILIRUB SERPL-MCNC: 7.1 MG/DL — HIGH (ref 0.4–2)
BUN SERPL-MCNC: 30.1 MG/DL — HIGH (ref 8–20)
CALCIUM SERPL-MCNC: 7.8 MG/DL — LOW (ref 8.4–10.5)
CHLORIDE SERPL-SCNC: 94 MMOL/L — LOW (ref 96–108)
CO2 SERPL-SCNC: 29 MMOL/L — SIGNIFICANT CHANGE UP (ref 22–29)
COLOR FLD: ABNORMAL
CREAT SERPL-MCNC: 0.27 MG/DL — LOW (ref 0.5–1.3)
EGFR: 124 ML/MIN/1.73M2 — SIGNIFICANT CHANGE UP
EOSINOPHIL # BLD AUTO: 0 K/UL — SIGNIFICANT CHANGE UP (ref 0–0.5)
EOSINOPHIL NFR BLD AUTO: 0 % — SIGNIFICANT CHANGE UP (ref 0–6)
GAS PNL BLDA: SIGNIFICANT CHANGE UP
GLUCOSE BLDC GLUCOMTR-MCNC: 145 MG/DL — HIGH (ref 70–99)
GLUCOSE BLDC GLUCOMTR-MCNC: 158 MG/DL — HIGH (ref 70–99)
GLUCOSE BLDC GLUCOMTR-MCNC: 165 MG/DL — HIGH (ref 70–99)
GLUCOSE BLDC GLUCOMTR-MCNC: 167 MG/DL — HIGH (ref 70–99)
GLUCOSE BLDC GLUCOMTR-MCNC: 186 MG/DL — HIGH (ref 70–99)
GLUCOSE SERPL-MCNC: 153 MG/DL — HIGH (ref 70–99)
HCT VFR BLD CALC: 34.8 % — SIGNIFICANT CHANGE UP (ref 34.5–45)
HGB BLD-MCNC: 12 G/DL — SIGNIFICANT CHANGE UP (ref 11.5–15.5)
INR BLD: 2.37 RATIO — HIGH (ref 0.85–1.16)
LYMPHOCYTES # BLD AUTO: 0.44 K/UL — LOW (ref 1–3.3)
LYMPHOCYTES # BLD AUTO: 2.6 % — LOW (ref 13–44)
LYMPHOCYTES # FLD: 30 % — SIGNIFICANT CHANGE UP
MAGNESIUM SERPL-MCNC: 1.8 MG/DL — SIGNIFICANT CHANGE UP (ref 1.6–2.6)
MANUAL SMEAR VERIFICATION: SIGNIFICANT CHANGE UP
MCHC RBC-ENTMCNC: 29.2 PG — SIGNIFICANT CHANGE UP (ref 27–34)
MCHC RBC-ENTMCNC: 34.5 G/DL — SIGNIFICANT CHANGE UP (ref 32–36)
MCV RBC AUTO: 84.7 FL — SIGNIFICANT CHANGE UP (ref 80–100)
MONOCYTES # BLD AUTO: 1.32 K/UL — HIGH (ref 0–0.9)
MONOCYTES NFR BLD AUTO: 7.8 % — SIGNIFICANT CHANGE UP (ref 2–14)
MONOS+MACROS # FLD: 48 % — SIGNIFICANT CHANGE UP
NEUTROPHILS # BLD AUTO: 15.14 K/UL — HIGH (ref 1.8–7.4)
NEUTROPHILS #: 9 CELLS/UL — SIGNIFICANT CHANGE UP
NEUTROPHILS %.: 22 % — SIGNIFICANT CHANGE UP
NEUTROPHILS NFR BLD AUTO: 88.7 % — HIGH (ref 43–77)
NEUTS BAND # BLD: 0.9 % — SIGNIFICANT CHANGE UP (ref 0–8)
NEUTS BAND NFR BLD: 0.9 % — SIGNIFICANT CHANGE UP (ref 0–8)
PHOSPHATE SERPL-MCNC: 3.6 MG/DL — SIGNIFICANT CHANGE UP (ref 2.4–4.7)
PLAT MORPH BLD: NORMAL — SIGNIFICANT CHANGE UP
PLATELET # BLD AUTO: 139 K/UL — LOW (ref 150–400)
POTASSIUM SERPL-MCNC: 3.5 MMOL/L — SIGNIFICANT CHANGE UP (ref 3.5–5.3)
POTASSIUM SERPL-SCNC: 3.5 MMOL/L — SIGNIFICANT CHANGE UP (ref 3.5–5.3)
PROT SERPL-MCNC: 5.8 G/DL — LOW (ref 6.6–8.7)
PROTHROM AB SERPL-ACNC: 26.5 SEC — HIGH (ref 9.9–13.4)
RBC # BLD: 4.11 M/UL — SIGNIFICANT CHANGE UP (ref 3.8–5.2)
RBC # FLD: 23.5 % — HIGH (ref 10.3–14.5)
RBC BLD AUTO: NORMAL — SIGNIFICANT CHANGE UP
RCV VOL RI: 7000 CELLS/UL — HIGH
SODIUM SERPL-SCNC: 133 MMOL/L — LOW (ref 135–145)
TOTAL NUCLEATED CELL COUNT: 40 CELLS/UL — SIGNIFICANT CHANGE UP
TUBE TYPE: SIGNIFICANT CHANGE UP
WBC # BLD: 16.9 K/UL — HIGH (ref 3.8–10.5)
WBC # FLD AUTO: 16.9 K/UL — HIGH (ref 3.8–10.5)
WBC COUNT.: 40 CELLS/UL — SIGNIFICANT CHANGE UP

## 2025-02-04 PROCEDURE — G0545: CPT

## 2025-02-04 PROCEDURE — 99233 SBSQ HOSP IP/OBS HIGH 50: CPT

## 2025-02-04 PROCEDURE — 99233 SBSQ HOSP IP/OBS HIGH 50: CPT | Mod: GC

## 2025-02-04 RX ORDER — CEFTRIAXONE 250 MG/1
2000 INJECTION, POWDER, FOR SOLUTION INTRAMUSCULAR; INTRAVENOUS EVERY 24 HOURS
Refills: 0 | Status: DISCONTINUED | OUTPATIENT
Start: 2025-02-04 | End: 2025-02-04

## 2025-02-04 RX ORDER — ALBUMIN HUMAN 50 G/1000ML
100 SOLUTION INTRAVENOUS EVERY 6 HOURS
Refills: 0 | Status: COMPLETED | OUTPATIENT
Start: 2025-02-04 | End: 2025-02-05

## 2025-02-04 RX ORDER — THIAMINE HCL 100 MG
100 TABLET ORAL EVERY 24 HOURS
Refills: 0 | Status: DISCONTINUED | OUTPATIENT
Start: 2025-02-04 | End: 2025-02-07

## 2025-02-04 RX ORDER — CEFTRIAXONE 250 MG/1
2000 INJECTION, POWDER, FOR SOLUTION INTRAMUSCULAR; INTRAVENOUS EVERY 24 HOURS
Refills: 0 | Status: DISCONTINUED | OUTPATIENT
Start: 2025-02-04 | End: 2025-02-07

## 2025-02-04 RX ORDER — MAGNESIUM SULFATE 0.8 MEQ/ML
2 AMPUL (ML) INJECTION ONCE
Refills: 0 | Status: COMPLETED | OUTPATIENT
Start: 2025-02-04 | End: 2025-02-04

## 2025-02-04 RX ORDER — LACTULOSE 10 G/15 ML
10 SOLUTION, ORAL ORAL THREE TIMES A DAY
Refills: 0 | Status: DISCONTINUED | OUTPATIENT
Start: 2025-02-04 | End: 2025-02-07

## 2025-02-04 RX ORDER — POTASSIUM CHLORIDE 750 MG/1
40 TABLET, EXTENDED RELEASE ORAL ONCE
Refills: 0 | Status: COMPLETED | OUTPATIENT
Start: 2025-02-04 | End: 2025-02-04

## 2025-02-04 RX ADMIN — CEFTRIAXONE 2000 MILLIGRAM(S): 250 INJECTION, POWDER, FOR SOLUTION INTRAMUSCULAR; INTRAVENOUS at 19:31

## 2025-02-04 RX ADMIN — MIDODRINE HYDROCHLORIDE 15 MILLIGRAM(S): 5 TABLET ORAL at 21:01

## 2025-02-04 RX ADMIN — VANCOMYCIN HYDROCHLORIDE 500 MILLIGRAM(S): KIT at 05:32

## 2025-02-04 RX ADMIN — CEFTRIAXONE 2000 MILLIGRAM(S): 250 INJECTION, POWDER, FOR SOLUTION INTRAMUSCULAR; INTRAVENOUS at 09:39

## 2025-02-04 RX ADMIN — Medication 100 MILLIGRAM(S): at 21:02

## 2025-02-04 RX ADMIN — Medication 100 MILLIGRAM(S): at 14:45

## 2025-02-04 RX ADMIN — Medication 2: at 11:43

## 2025-02-04 RX ADMIN — VANCOMYCIN HYDROCHLORIDE 500 MILLIGRAM(S): KIT at 19:31

## 2025-02-04 RX ADMIN — VANCOMYCIN HYDROCHLORIDE 500 MILLIGRAM(S): KIT at 14:44

## 2025-02-04 RX ADMIN — Medication 100 MILLIGRAM(S): at 14:48

## 2025-02-04 RX ADMIN — ANTISEPTIC SURGICAL SCRUB 1 APPLICATION(S): 0.04 SOLUTION TOPICAL at 05:48

## 2025-02-04 RX ADMIN — Medication 10 GRAM(S): at 21:01

## 2025-02-04 RX ADMIN — Medication 2: at 05:30

## 2025-02-04 RX ADMIN — Medication 100 MILLIGRAM(S): at 19:39

## 2025-02-04 RX ADMIN — ALBUMIN HUMAN 50 MILLILITER(S): 50 SOLUTION INTRAVENOUS at 17:48

## 2025-02-04 RX ADMIN — Medication 100 MILLIGRAM(S): at 05:29

## 2025-02-04 RX ADMIN — Medication 25 GRAM(S): at 05:29

## 2025-02-04 RX ADMIN — ACETAMINOPHEN, DIPHENHYDRAMINE HCL, PHENYLEPHRINE HCL 5 MILLIGRAM(S): 325; 25; 5 TABLET ORAL at 21:01

## 2025-02-04 RX ADMIN — ALBUMIN HUMAN 50 MILLILITER(S): 50 SOLUTION INTRAVENOUS at 11:43

## 2025-02-04 RX ADMIN — Medication 2: at 17:53

## 2025-02-04 RX ADMIN — INSULIN GLARGINE-YFGN 15 UNIT(S): 100 INJECTION, SOLUTION SUBCUTANEOUS at 09:39

## 2025-02-04 RX ADMIN — Medication 1 TABLET(S): at 11:41

## 2025-02-04 RX ADMIN — PANTOPRAZOLE 40 MILLIGRAM(S): 20 TABLET, DELAYED RELEASE ORAL at 05:32

## 2025-02-04 RX ADMIN — Medication 10 GRAM(S): at 14:48

## 2025-02-04 RX ADMIN — MIDODRINE HYDROCHLORIDE 15 MILLIGRAM(S): 5 TABLET ORAL at 05:32

## 2025-02-04 RX ADMIN — POTASSIUM CHLORIDE 40 MILLIEQUIVALENT(S): 750 TABLET, EXTENDED RELEASE ORAL at 09:39

## 2025-02-04 RX ADMIN — Medication 1 MILLIGRAM(S): at 11:41

## 2025-02-04 RX ADMIN — MIDODRINE HYDROCHLORIDE 15 MILLIGRAM(S): 5 TABLET ORAL at 14:48

## 2025-02-04 RX ADMIN — Medication 40 MILLIGRAM(S): at 17:48

## 2025-02-04 RX ADMIN — ALBUMIN HUMAN 50 MILLILITER(S): 50 SOLUTION INTRAVENOUS at 05:30

## 2025-02-04 NOTE — PROGRESS NOTE ADULT - SUBJECTIVE AND OBJECTIVE BOX
SUBJECTIVE:  Chief Complaint: Patient is a 59y old  Female who presents with a chief complaint of HAGMA, respiratory distress, shock (04 Feb 2025 09:53)    BRIEF HOSPITAL COURSE:        MEDICATIONS  (STANDING):  albumin human 25% IVPB 100 milliLiter(s) IV Intermittent every 6 hours  dextrose 5%. 1000 milliLiter(s) (50 mL/Hr) IV Continuous <Continuous>  dextrose 5%. 1000 milliLiter(s) (100 mL/Hr) IV Continuous <Continuous>  dextrose 50% Injectable 25 Gram(s) IV Push once  dextrose 50% Injectable 12.5 Gram(s) IV Push once  dextrose 50% Injectable 25 Gram(s) IV Push once  folic acid 1 milliGRAM(s) Oral daily  furosemide    Tablet 40 milliGRAM(s) Oral daily  glucagon  Injectable 1 milliGRAM(s) IntraMuscular once  insulin glargine Injectable (LANTUS) 15 Unit(s) SubCutaneous every morning  insulin lispro (ADMELOG) corrective regimen sliding scale   SubCutaneous every 6 hours  lactulose Syrup 10 Gram(s) Oral three times a day  melatonin 5 milliGRAM(s) Oral at bedtime  metroNIDAZOLE  IVPB 500 milliGRAM(s) IV Intermittent every 8 hours  midodrine 15 milliGRAM(s) Oral every 8 hours  multivitamin 1 Tablet(s) Oral daily  pantoprazole    Tablet 40 milliGRAM(s) Oral before breakfast  spironolactone 100 milliGRAM(s) Oral daily  vancomycin    Solution 500 milliGRAM(s) Oral every 6 hours    MEDICATIONS  (PRN):  dextrose Oral Gel 15 Gram(s) Oral once PRN Blood Glucose LESS THAN 70 milliGRAM(s)/deciliter  hemorrhoidal Ointment 1 Application(s) Rectal every 6 hours PRN Hemorrhoids  traMADol 50 milliGRAM(s) Oral every 12 hours PRN Moderate Pain (4 - 6)      Allergies  Zithromax (Unknown)  Intolerances  morphine (Nausea)      OBJECTIVE:  Vital Signs Last 24 Hrs  T(C): 36.6 (04 Feb 2025 11:13), Max: 36.6 (03 Feb 2025 16:12)  T(F): 97.9 (04 Feb 2025 11:13), Max: 97.9 (03 Feb 2025 21:49)  HR: 84 (04 Feb 2025 15:00) (63 - 87)  BP: 94/54 (04 Feb 2025 15:00) (94/54 - 112/76)  BP(mean): 67 (04 Feb 2025 15:00) (67 - 89)  RR: 24 (04 Feb 2025 15:00) (13 - 30)  SpO2: 97% (04 Feb 2025 15:00) (90% - 100%)  Parameters below as of 04 Feb 2025 15:00  Patient On (Oxygen Delivery Method): nasal cannula      PHYSICAL EXAM:  Constitutional: Alert, interactive, comfortable, NAD  Head and Face: Atraumatic, head and face were normal in appearance  Eyes: Sclera and conjunctiva were normal, pupils were equal in size, round, eyelids normal  ENT: Ears and nose were normal in appearance  Neck: Appearance was normal, neck was supple, No JVD  Pulmonary: Clear to auscultation bilaterally, no rales/crackles, or wheezing  Heart: Regular rate and rhythm, no murmurs, gallops, or pericardial rubs  Abdominal: Soft, nontender, nondistended. No appreciable hepatosplenomegaly. Bowel sounds normal  Skin: Normal color and intact without appreciable rash or abnormal skin lesion  Extremities: Warm without edema. No clubbing.  Pulse: 2+ radial pulse  Neuro: Oriented to person, place, and time    Lab/ Imaging:  LABS:                        12.0   16.90 )-----------( 139      ( 04 Feb 2025 04:19 )             34.8     02-04    133[L]  |  94[L]  |  30.1[H]  ----------------------------<  153[H]  3.5   |  29.0  |  0.27[L]    Ca    7.8[L]      04 Feb 2025 04:19  Phos  3.6     02-04  Mg     1.8     02-04    TPro  5.8[L]  /  Alb  2.6[L]  /  TBili  7.1[H]  /  DBili  x   /  AST  50[H]  /  ALT  29  /  AlkPhos  259[H]  02-04    PT/INR - ( 04 Feb 2025 04:27 )   PT: 26.5 sec;   INR: 2.37 ratio         Urinalysis Basic - ( 04 Feb 2025 04:19 )  Color: x / Appearance: x / SG: x / pH: x  Gluc: 153 mg/dL / Ketone: x  / Bili: x / Urobili: x   Blood: x / Protein: x / Nitrite: x   Leuk Esterase: x / RBC: x / WBC x   Sq Epi: x / Non Sq Epi: x / Bacteria: x      CAPILLARY BLOOD GLUCOSE  POCT Blood Glucose.: 186 mg/dL (04 Feb 2025 11:36)  POCT Blood Glucose.: 158 mg/dL (04 Feb 2025 09:36)  POCT Blood Glucose.: 167 mg/dL (04 Feb 2025 05:24)  POCT Blood Glucose.: 158 mg/dL (03 Feb 2025 23:25)  POCT Blood Glucose.: 179 mg/dL (03 Feb 2025 18:20)   SUBJECTIVE:  Chief Complaint: Patient is a 59y old  Female who presents with a chief complaint of HAGMA, respiratory distress, shock (04 Feb 2025 09:53)    BRIEF HOSPITAL COURSE:  59F with PMHx of ETOH cirrhosis, recurrent ascites s/p multiple paracentesis, esophageal varices s/p banding, Flu positive a week prior, presented generalized weakness, diarrhea, intermittent fever and poor po intake. On arrival found to be hypotensive unresponsive to be IVF requiring vasopressor support. Admitted to MICU for Septic shock 2/2 to colitis. CT with evidence of colitis and C Diff PCR positive and started on PO Vancomycin and Flagyl. Course complicated by AHRF with AMS was emergently intubated 1/28/25    Found to have EColi and bacteroides bacteremia and possible MV vegetation on TTE and cardiology consulted however deemed poor candidate for ARACELI due to esophageal varices. ID following and decided on empiric 6 weeks of antibiotics coverage for presumed endocarditis.          During MICU stay, pt supported by pressors, vent. stool PCR showed c.diff positive. TTE showed EF 60% with mobile vegetation attached to the posterior mitral valve leaflet. GI repeated EGD showed no active bleeding, but 2 cords of grade I varices were seen in the lower third of the esophagus. Cardiology following and consider it's high risk to have ARACELI. Will have long-term abx to presume endocarditis. ID following, and recommended vanco+ flagyl for C.diff, and ceftriaxone added to cover E.coli (course 2 g qd from 1/27/25 to 2/4/2025). Pt currently on vanco PO, and flagyle. when discharged can change to invanz to cover both ecoli and bacteroides. Pt extubated on 2/2/24, dc'd pressor since yesterday night. S/p paracentesis (removed 2 L this morning, labs sent out).  Pt is medically stable to downgrade to medicine floor,       MEDICATIONS  (STANDING):  albumin human 25% IVPB 100 milliLiter(s) IV Intermittent every 6 hours  dextrose 5%. 1000 milliLiter(s) (50 mL/Hr) IV Continuous <Continuous>  dextrose 5%. 1000 milliLiter(s) (100 mL/Hr) IV Continuous <Continuous>  dextrose 50% Injectable 25 Gram(s) IV Push once  dextrose 50% Injectable 12.5 Gram(s) IV Push once  dextrose 50% Injectable 25 Gram(s) IV Push once  folic acid 1 milliGRAM(s) Oral daily  furosemide    Tablet 40 milliGRAM(s) Oral daily  glucagon  Injectable 1 milliGRAM(s) IntraMuscular once  insulin glargine Injectable (LANTUS) 15 Unit(s) SubCutaneous every morning  insulin lispro (ADMELOG) corrective regimen sliding scale   SubCutaneous every 6 hours  lactulose Syrup 10 Gram(s) Oral three times a day  melatonin 5 milliGRAM(s) Oral at bedtime  metroNIDAZOLE  IVPB 500 milliGRAM(s) IV Intermittent every 8 hours  midodrine 15 milliGRAM(s) Oral every 8 hours  multivitamin 1 Tablet(s) Oral daily  pantoprazole    Tablet 40 milliGRAM(s) Oral before breakfast  spironolactone 100 milliGRAM(s) Oral daily  vancomycin    Solution 500 milliGRAM(s) Oral every 6 hours    MEDICATIONS  (PRN):  dextrose Oral Gel 15 Gram(s) Oral once PRN Blood Glucose LESS THAN 70 milliGRAM(s)/deciliter  hemorrhoidal Ointment 1 Application(s) Rectal every 6 hours PRN Hemorrhoids  traMADol 50 milliGRAM(s) Oral every 12 hours PRN Moderate Pain (4 - 6)      Allergies  Zithromax (Unknown)  Intolerances  morphine (Nausea)      OBJECTIVE:  Vital Signs Last 24 Hrs  T(C): 36.6 (04 Feb 2025 11:13), Max: 36.6 (03 Feb 2025 16:12)  T(F): 97.9 (04 Feb 2025 11:13), Max: 97.9 (03 Feb 2025 21:49)  HR: 84 (04 Feb 2025 15:00) (63 - 87)  BP: 94/54 (04 Feb 2025 15:00) (94/54 - 112/76)  BP(mean): 67 (04 Feb 2025 15:00) (67 - 89)  RR: 24 (04 Feb 2025 15:00) (13 - 30)  SpO2: 97% (04 Feb 2025 15:00) (90% - 100%)  Parameters below as of 04 Feb 2025 15:00  Patient On (Oxygen Delivery Method): nasal cannula      PHYSICAL EXAM:  Constitutional: Alert, interactive, comfortable, NAD  Head and Face: Atraumatic, head and face were normal in appearance  Eyes: Sclera and conjunctiva were normal, pupils were equal in size, round, eyelids normal  ENT: Ears and nose were normal in appearance  Neck: Appearance was normal, neck was supple, No JVD  Pulmonary: Clear to auscultation bilaterally, no rales/crackles, or wheezing  Heart: Regular rate and rhythm, no murmurs, gallops, or pericardial rubs  Abdominal: Soft, nontender, nondistended. No appreciable hepatosplenomegaly. Bowel sounds normal  Skin: Normal color and intact without appreciable rash or abnormal skin lesion  Extremities: Warm without edema. No clubbing.  Pulse: 2+ radial pulse  Neuro: Oriented to person, place, and time    Lab/ Imaging:  LABS:                        12.0   16.90 )-----------( 139      ( 04 Feb 2025 04:19 )             34.8     02-04    133[L]  |  94[L]  |  30.1[H]  ----------------------------<  153[H]  3.5   |  29.0  |  0.27[L]    Ca    7.8[L]      04 Feb 2025 04:19  Phos  3.6     02-04  Mg     1.8     02-04    TPro  5.8[L]  /  Alb  2.6[L]  /  TBili  7.1[H]  /  DBili  x   /  AST  50[H]  /  ALT  29  /  AlkPhos  259[H]  02-04    PT/INR - ( 04 Feb 2025 04:27 )   PT: 26.5 sec;   INR: 2.37 ratio         Urinalysis Basic - ( 04 Feb 2025 04:19 )  Color: x / Appearance: x / SG: x / pH: x  Gluc: 153 mg/dL / Ketone: x  / Bili: x / Urobili: x   Blood: x / Protein: x / Nitrite: x   Leuk Esterase: x / RBC: x / WBC x   Sq Epi: x / Non Sq Epi: x / Bacteria: x      CAPILLARY BLOOD GLUCOSE  POCT Blood Glucose.: 186 mg/dL (04 Feb 2025 11:36)  POCT Blood Glucose.: 158 mg/dL (04 Feb 2025 09:36)  POCT Blood Glucose.: 167 mg/dL (04 Feb 2025 05:24)  POCT Blood Glucose.: 158 mg/dL (03 Feb 2025 23:25)  POCT Blood Glucose.: 179 mg/dL (03 Feb 2025 18:20)   SUBJECTIVE:  Chief Complaint: Patient is a 59y old  Female who presents with a chief complaint of HAGMA, respiratory distress, shock (04 Feb 2025 09:53)    BRIEF HOSPITAL COURSE:  59F with PMHx of ETOH cirrhosis, recurrent ascites s/p multiple paracentesis, esophageal varices s/p banding, Flu positive a week prior, presented generalized weakness, diarrhea, intermittent fever and poor po intake. On arrival found to be hypotensive unresponsive to be IVF requiring vasopressor support. Admitted to MICU for Septic shock 2/2 to colitis. CT with evidence of colitis and C Diff PCR positive and started on PO Vancomycin and Flagyl. Course complicated by AHRF with change in mental and was emergently intubated 1/28/25 and successfully extubated 2/2/25. Found to have E Coli and bacteroides bacteremia and possible MV vegetation on TTE and cardiology consulted for ARACELI, s/p EGD 1/31 for GI clearance which noted nonbleeding 2 cords of grade I varices in the lower third of the esophagus and ultimately deemed poor candidate for ARACELI due to esophageal varices. ID following and decided on empiric 6 weeks of antibiotics coverage for presumed endocarditis. Repeat blood culture 1/31 negative.      ID following, and recommended vanco+ flagyl for C.diff, and ceftriaxone added to cover E.coli (course 2 g qd from 1/27/25 to 2/4/2025)  since yesterday night. S/p paracentesis (removed 2 L this morning, labs sent out).  Pt is medically stable to downgrade to medicine floor,       MEDICATIONS  (STANDING):  albumin human 25% IVPB 100 milliLiter(s) IV Intermittent every 6 hours  dextrose 5%. 1000 milliLiter(s) (50 mL/Hr) IV Continuous <Continuous>  dextrose 5%. 1000 milliLiter(s) (100 mL/Hr) IV Continuous <Continuous>  dextrose 50% Injectable 25 Gram(s) IV Push once  dextrose 50% Injectable 12.5 Gram(s) IV Push once  dextrose 50% Injectable 25 Gram(s) IV Push once  folic acid 1 milliGRAM(s) Oral daily  furosemide    Tablet 40 milliGRAM(s) Oral daily  glucagon  Injectable 1 milliGRAM(s) IntraMuscular once  insulin glargine Injectable (LANTUS) 15 Unit(s) SubCutaneous every morning  insulin lispro (ADMELOG) corrective regimen sliding scale   SubCutaneous every 6 hours  lactulose Syrup 10 Gram(s) Oral three times a day  melatonin 5 milliGRAM(s) Oral at bedtime  metroNIDAZOLE  IVPB 500 milliGRAM(s) IV Intermittent every 8 hours  midodrine 15 milliGRAM(s) Oral every 8 hours  multivitamin 1 Tablet(s) Oral daily  pantoprazole    Tablet 40 milliGRAM(s) Oral before breakfast  spironolactone 100 milliGRAM(s) Oral daily  vancomycin    Solution 500 milliGRAM(s) Oral every 6 hours    MEDICATIONS  (PRN):  dextrose Oral Gel 15 Gram(s) Oral once PRN Blood Glucose LESS THAN 70 milliGRAM(s)/deciliter  hemorrhoidal Ointment 1 Application(s) Rectal every 6 hours PRN Hemorrhoids   traMADol 50 milliGRAM(s) Oral every 12 hours PRN Moderate Pain (4 - 6)      Allergies  Zithromax (Unknown)  Intolerances  morphine (Nausea)      OBJECTIVE:  Vital Signs Last 24 Hrs  T(C): 36.6 (04 Feb 2025 11:13), Max: 36.6 (03 Feb 2025 16:12)  T(F): 97.9 (04 Feb 2025 11:13), Max: 97.9 (03 Feb 2025 21:49)  HR: 84 (04 Feb 2025 15:00) (63 - 87)  BP: 94/54 (04 Feb 2025 15:00) (94/54 - 112/76)  BP(mean): 67 (04 Feb 2025 15:00) (67 - 89)  RR: 24 (04 Feb 2025 15:00) (13 - 30)  SpO2: 97% (04 Feb 2025 15:00) (90% - 100%)  Parameters below as of 04 Feb 2025 15:00  Patient On (Oxygen Delivery Method): nasal cannula      PHYSICAL EXAM:  Constitutional: Alert, interactive, comfortable, NAD  Head and Face: Atraumatic, head and face were normal in appearance  Eyes: Sclera and conjunctiva were normal, pupils were equal in size, round, eyelids normal  ENT: Ears and nose were normal in appearance  Neck: Appearance was normal, neck was supple, No JVD  Pulmonary: Clear to auscultation bilaterally, no rales/crackles, or wheezing  Heart: Regular rate and rhythm, no murmurs, gallops, or pericardial rubs  Abdominal: Soft, nontender, nondistended. No appreciable hepatosplenomegaly. Bowel sounds normal  Skin: Normal color and intact without appreciable rash or abnormal skin lesion  Extremities: Warm without edema. No clubbing.  Pulse: 2+ radial pulse  Neuro: Oriented to person, place, and time    Lab/ Imaging:  LABS:                        12.0   16.90 )-----------( 139      ( 04 Feb 2025 04:19 )             34.8     02-04    133[L]  |  94[L]  |  30.1[H]  ----------------------------<  153[H]  3.5   |  29.0  |  0.27[L]    Ca    7.8[L]      04 Feb 2025 04:19  Phos  3.6     02-04  Mg     1.8     02-04    TPro  5.8[L]  /  Alb  2.6[L]  /  TBili  7.1[H]  /  DBili  x   /  AST  50[H]  /  ALT  29  /  AlkPhos  259[H]  02-04    PT/INR - ( 04 Feb 2025 04:27 )   PT: 26.5 sec;   INR: 2.37 ratio         Urinalysis Basic - ( 04 Feb 2025 04:19 )  Color: x / Appearance: x / SG: x / pH: x  Gluc: 153 mg/dL / Ketone: x  / Bili: x / Urobili: x   Blood: x / Protein: x / Nitrite: x   Leuk Esterase: x / RBC: x / WBC x   Sq Epi: x / Non Sq Epi: x / Bacteria: x      CAPILLARY BLOOD GLUCOSE  POCT Blood Glucose.: 186 mg/dL (04 Feb 2025 11:36)  POCT Blood Glucose.: 158 mg/dL (04 Feb 2025 09:36)  POCT Blood Glucose.: 167 mg/dL (04 Feb 2025 05:24)  POCT Blood Glucose.: 158 mg/dL (03 Feb 2025 23:25)  POCT Blood Glucose.: 179 mg/dL (03 Feb 2025 18:20)   SUBJECTIVE:  Chief Complaint: Patient is a 59y old  Female who presents with a chief complaint of HAGMA, respiratory distress, shock (04 Feb 2025 09:53)    BRIEF HOSPITAL COURSE:  59F with PMHx of ETOH cirrhosis, recurrent ascites s/p multiple paracentesis, esophageal varices s/p banding, Flu positive a week prior, presented generalized weakness, diarrhea, intermittent fever and poor po intake. On arrival found to be hypotensive unresponsive to be IVF requiring vasopressor support. Admitted to MICU for Septic shock 2/2 to colitis. CT with evidence of colitis and C Diff PCR positive and started on PO Vancomycin and Flagyl. Course complicated by AHRF with change in mental status and was emergently intubated 1/28/25 and successfully extubated 2/2/25. Found to have E Coli and bacteroides bacteremia and possible MV vegetation on TTE and cardiology consulted for ARACELI, s/p EGD 1/31 for GI clearance which noted nonbleeding 2 cords of grade I varices in the lower third of the esophagus and ultimately deemed poor candidate for ARACELI due to esophageal varices. ID following and decided on empiric 6 weeks of antibiotics coverage for presumed endocarditis. Repeat blood culture 1/31 negative. Pt also s/p paracentesis 2/4 and removed 2L. Now downgraded to medicine.       MEDICATIONS  (STANDING):  albumin human 25% IVPB 100 milliLiter(s) IV Intermittent every 6 hours  dextrose 5%. 1000 milliLiter(s) (50 mL/Hr) IV Continuous <Continuous>  dextrose 5%. 1000 milliLiter(s) (100 mL/Hr) IV Continuous <Continuous>  dextrose 50% Injectable 25 Gram(s) IV Push once  dextrose 50% Injectable 12.5 Gram(s) IV Push once  dextrose 50% Injectable 25 Gram(s) IV Push once  folic acid 1 milliGRAM(s) Oral daily  furosemide    Tablet 40 milliGRAM(s) Oral daily  glucagon  Injectable 1 milliGRAM(s) IntraMuscular once  insulin glargine Injectable (LANTUS) 15 Unit(s) SubCutaneous every morning  insulin lispro (ADMELOG) corrective regimen sliding scale   SubCutaneous every 6 hours  lactulose Syrup 10 Gram(s) Oral three times a day  melatonin 5 milliGRAM(s) Oral at bedtime  metroNIDAZOLE  IVPB 500 milliGRAM(s) IV Intermittent every 8 hours  midodrine 15 milliGRAM(s) Oral every 8 hours  multivitamin 1 Tablet(s) Oral daily  pantoprazole    Tablet 40 milliGRAM(s) Oral before breakfast  spironolactone 100 milliGRAM(s) Oral daily  vancomycin    Solution 500 milliGRAM(s) Oral every 6 hours    MEDICATIONS  (PRN):  dextrose Oral Gel 15 Gram(s) Oral once PRN Blood Glucose LESS THAN 70 milliGRAM(s)/deciliter  hemorrhoidal Ointment 1 Application(s) Rectal every 6 hours PRN Hemorrhoids   traMADol 50 milliGRAM(s) Oral every 12 hours PRN Moderate Pain (4 - 6)      Allergies  Zithromax (Unknown)  Intolerances  morphine (Nausea)      OBJECTIVE:  Vital Signs Last 24 Hrs  T(C): 36.6 (04 Feb 2025 11:13), Max: 36.6 (03 Feb 2025 16:12)  T(F): 97.9 (04 Feb 2025 11:13), Max: 97.9 (03 Feb 2025 21:49)  HR: 84 (04 Feb 2025 15:00) (63 - 87)  BP: 94/54 (04 Feb 2025 15:00) (94/54 - 112/76)  BP(mean): 67 (04 Feb 2025 15:00) (67 - 89)  RR: 24 (04 Feb 2025 15:00) (13 - 30)  SpO2: 97% (04 Feb 2025 15:00) (90% - 100%)  Parameters below as of 04 Feb 2025 15:00  Patient On (Oxygen Delivery Method): nasal cannula      PHYSICAL EXAM:  GENERAL: comfortable, NAD  HEAD:  Atraumatic, Normocephalic  NECK: Supple, No JVD  CHEST/LUNG: Diminished to auscultation bilaterally; No wheezes or rales  HEART: regular rate and regular rhythm; No murmurs, rubs, or gallops  ABDOMEN: Distended, tenderness (+),  w/ rectal tube   : +Delaney   EXTREMITIES:  2+ Peripheral Pulses, No clubbing or cyanosis  Neurological: A&O x 3-4  Skin: Warm and dry  Musculoskeletal: Atraumatic, no joint effusions      Lab/ Imaging:  LABS:                        12.0   16.90 )-----------( 139      ( 04 Feb 2025 04:19 )             34.8     02-04    133[L]  |  94[L]  |  30.1[H]  ----------------------------<  153[H]  3.5   |  29.0  |  0.27[L]    Ca    7.8[L]      04 Feb 2025 04:19  Phos  3.6     02-04  Mg     1.8     02-04    TPro  5.8[L]  /  Alb  2.6[L]  /  TBili  7.1[H]  /  DBili  x   /  AST  50[H]  /  ALT  29  /  AlkPhos  259[H]  02-04    PT/INR - ( 04 Feb 2025 04:27 )   PT: 26.5 sec;   INR: 2.37 ratio         Urinalysis Basic - ( 04 Feb 2025 04:19 )  Color: x / Appearance: x / SG: x / pH: x  Gluc: 153 mg/dL / Ketone: x  / Bili: x / Urobili: x   Blood: x / Protein: x / Nitrite: x   Leuk Esterase: x / RBC: x / WBC x   Sq Epi: x / Non Sq Epi: x / Bacteria: x      CAPILLARY BLOOD GLUCOSE  POCT Blood Glucose.: 186 mg/dL (04 Feb 2025 11:36)  POCT Blood Glucose.: 158 mg/dL (04 Feb 2025 09:36)  POCT Blood Glucose.: 167 mg/dL (04 Feb 2025 05:24)  POCT Blood Glucose.: 158 mg/dL (03 Feb 2025 23:25)  POCT Blood Glucose.: 179 mg/dL (03 Feb 2025 18:20)

## 2025-02-04 NOTE — CHART NOTE - NSCHARTNOTEFT_GEN_A_CORE
No further plans for ARACELI  No further inpatient cardiac work up at this time. Patient can follow up with OP cardiologist after D/C for repeat TTE  Please reconsult if needed.

## 2025-02-04 NOTE — PROGRESS NOTE ADULT - ASSESSMENT
Assessment & Plan:     58 yo female with ETOH cirrhosis, ascites, esophageal varices, presented with fatigue, fevers, diarrhea.  Patient found to have Cdiff colitis, gram negative bacteremia, shock. TTE found posterior mitral valve leaflet    # Distributive shock 2/2 to G negative bacteremia  # C. diff colitis  # Cirrhosis 2/2 to EtoH abuse   # Hx of GIB 2/2 to esophageal varices s/p banding   # Hx of multiple paracentesis   # New onset of mitral valve vegetation       ====================== NEUROLOGY=====================  melatonin 5 milliGRAM(s) Oral at bedtime  traMADol 50 milliGRAM(s) Oral every 12 hours PRN Moderate Pain (4 - 6)    - A&O X 3-4   -Continue aspiration precautions      ==================== RESPIRATORY======================  s/p extubation   - Maintain SPO2 >90%     ====================CARDIOVASCULAR==================  furosemide    Tablet 40 milliGRAM(s) Oral daily  midodrine 15 milliGRAM(s) Oral every 8 hours  spironolactone 100 milliGRAM(s) Oral daily    - Maintain MAP >60   - TTE showed EF 65%, vegetation on mitral valve   - Cardiology consider high risk for ARACELI  - ID following for long term abx     ===================HEMATOLOGIC/ONC ===================    -SCD  ===================== RENAL =========================  Continue monitoring urine output      ==================== GASTROINTESTINAL===================  albumin human 25% IVPB 100 milliLiter(s) IV Intermittent every 6 hours  dextrose 5%. 1000 milliLiter(s) (100 mL/Hr) IV Continuous <Continuous>  dextrose 5%. 1000 milliLiter(s) (50 mL/Hr) IV Continuous <Continuous>  folic acid 1 milliGRAM(s) Oral daily  lactulose Syrup 10 Gram(s) Oral three times a day  multivitamin 1 Tablet(s) Oral daily  pantoprazole    Tablet 40 milliGRAM(s) Oral before breakfast    Diet: Consistent Carb    =======================    ENDOCRINE  =====================  dextrose 50% Injectable 25 Gram(s) IV Push once  dextrose 50% Injectable 12.5 Gram(s) IV Push once  dextrose 50% Injectable 25 Gram(s) IV Push once  dextrose Oral Gel 15 Gram(s) Oral once PRN Blood Glucose LESS THAN 70 milliGRAM(s)/deciliter  glucagon  Injectable 1 milliGRAM(s) IntraMuscular once  insulin glargine Injectable (LANTUS) 15 Unit(s) SubCutaneous every morning  insulin lispro (ADMELOG) corrective regimen sliding scale   SubCutaneous every 6 hours    Blood sugar goal: 100-200  ========================INFECTIOUS DISEASE================  metroNIDAZOLE  IVPB 500 milliGRAM(s) IV Intermittent every 8 hours  vancomycin    Solution 500 milliGRAM(s) Oral every 6 hours    S/p for ceftriaxone G- bacteria      Continue antibiotics as above  Cx: Negative, previously E.Coli Gram positive bacteremia    Care plan discussed with ICU care team  Plan d/w Family

## 2025-02-04 NOTE — PROGRESS NOTE ADULT - ASSESSMENT
60 y/o female with PMH of ETOH cirrhosis, multiple paracentesis for ascites, esophageal varices s/p banding,  who presents with x1 week of generalized fatigue, multiple bouts of diarrhea with poor PO intake and intermittent fevers. Of note was diagnosed Flu positive last week ON ADMISSION SHE REPORTED   fatigue, chills, +cough, +abdominal tenderness, and +SOB.    PT WITH POSITIVE STOOL FOR  C DIFF   AND BLOOD CX WITH ECOLI  CT SCAN  WITH COLITIS  CONTINUE  ORAL VANCO   UNCLEAR IF ABSORBED AS VIA NGT  IN ADDITION ON FLAGYL  PT WITH BLOOD CX WITH ECOLI  ADN BACTEROIDES  PT I WAS NTUBATED ON PRESSORS NOW EXTUBATED AND OFF PRESSORS  WBC UP TO  K    AWAKE ALERT   PT WITH ? VEG ON MITRAL VALVE   ON TRANSTHORACIC ECHOCARDIOGRAM   HAD EGD WITH ESOPHAGEAL  VARICES FELT TO BE HIGHER  RISK FOR ARACELI   WILL THEREFORE PLAN ON 6  WEEKSOF IV ABX FOR PRESUMED ENDOCARDITIS  WHEN READY FOR D/C CNA CHANGE TO INVANZ TO COVER BOTH ECOLI AND BACTEROIDES  WILL NEED CONCOMITANT ORAL VANCO WHILE ON THE IV ABX FOR VALVE INFECTION   WILL FOLLOWUP HERBER KERN AT BEDSIDE

## 2025-02-04 NOTE — PROGRESS NOTE ADULT - SUBJECTIVE AND OBJECTIVE BOX
Patient is a 59y old  Female who presents with a chief complaint of HAGMA, respiratory distress, shock (2025 07:47)      BRIEF HOSPITAL COURSE:     Interval HPI:    PAST MEDICAL & SURGICAL HISTORY:  Alcohol abuse      Depression      Withdrawal seizures      History of basal cell carcinoma excision      Squamous cell skin cancer      Hemorrhoids      2019 novel coronavirus disease (COVID-19)      Anemia      H/O vertigo      History of ear, nose, and throat (ENT) surgery      H/O basal cell carcinoma excision      H/O:           Review of Systems:  CONSTITUTIONAL: No fever, chills, or fatigue  EYES: No eye pain, visual disturbances, or discharge  ENMT:  No difficulty hearing, tinnitus, vertigo; No sinus or throat pain  RESPIRATORY: No cough, wheezing, chills or hemoptysis; No shortness of breath  CARDIOVASCULAR: No chest pain, palpitations, dizziness, or leg swelling  GASTROINTESTINAL: No abdominal or epigastric pain. No nausea, vomiting, or hematemesis; No diarrhea or constipation.  GENITOURINARY: No dysuria, frequency, hematuria, or incontinence  NEUROLOGICAL: No headaches, memory loss, loss of strength, or numbness  SKIN: No rash  MUSCULOSKELETAL: No joint pain or swelling; No muscle, back, or extremity pain  PSYCHIATRIC: No depression, anxiety, mood swings, or difficulty sleeping      Medications:  cefTRIAXone Injectable. 2000 milliGRAM(s) IV Push every 24 hours  metroNIDAZOLE  IVPB 500 milliGRAM(s) IV Intermittent every 8 hours  vancomycin    Solution 500 milliGRAM(s) Oral every 6 hours    midodrine 15 milliGRAM(s) Oral every 8 hours      melatonin 5 milliGRAM(s) Oral at bedtime  traMADol 50 milliGRAM(s) Oral every 12 hours PRN        pantoprazole    Tablet 40 milliGRAM(s) Oral before breakfast      dextrose 50% Injectable 25 Gram(s) IV Push once  dextrose 50% Injectable 12.5 Gram(s) IV Push once  dextrose 50% Injectable 25 Gram(s) IV Push once  dextrose Oral Gel 15 Gram(s) Oral once PRN  glucagon  Injectable 1 milliGRAM(s) IntraMuscular once  insulin glargine Injectable (LANTUS) 15 Unit(s) SubCutaneous every morning  insulin lispro (ADMELOG) corrective regimen sliding scale   SubCutaneous every 6 hours    albumin human 25% IVPB 100 milliLiter(s) IV Intermittent every 6 hours  dextrose 5%. 1000 milliLiter(s) IV Continuous <Continuous>  dextrose 5%. 1000 milliLiter(s) IV Continuous <Continuous>  folic acid 1 milliGRAM(s) Oral daily  multivitamin 1 Tablet(s) Oral daily  potassium chloride   Powder 40 milliEquivalent(s) Oral once      chlorhexidine 2% Cloths 1 Application(s) Topical <User Schedule>  hemorrhoidal Ointment 1 Application(s) Rectal every 6 hours PRN            ICU Vital Signs Last 24 Hrs  T(C): 36.5 (2025 07:18), Max: 36.6 (2025 16:12)  T(F): 97.7 (2025 07:18), Max: 97.9 (2025 21:49)  HR: 68 (2025 08:00) (63 - 85)  BP: 107/67 (2025 08:00) (100/70 - 112/76)  BP(mean): 79 (2025 08:00) (79 - 89)  ABP: 101/57 (2025 04:45) (84/49 - 123/69)  ABP(mean): 72 (2025 04:45) (62 - 98)  RR: 16 (2025 08:00) (13 - 30)  SpO2: 96% (2025 08:00) (90% - 100%)    O2 Parameters below as of 2025 08:00  Patient On (Oxygen Delivery Method): nasal cannula            ABG - ( 2025 04:17 )  pH, Arterial: 7.620 pH, Blood: x     /  pCO2: 28    /  pO2: 76    / HCO3: 29    / Base Excess: 7.6   /  SaO2: 100.0               I&O's Detail    2025 07:01  -  2025 07:00  --------------------------------------------------------  IN:    IV PiggyBack: 200 mL    IV PiggyBack: 200 mL    IV PiggyBack: 100 mL    IV PiggyBack: 100 mL    Norepinephrine: 9.5 mL  Total IN: 609.5 mL    OUT:    dextrose 5% + lactated ringers: 0 mL    Indwelling Catheter - Urethral (mL): 1320 mL    Rectal Tube (mL): 100 mL  Total OUT: 1420 mL    Total NET: -810.5 mL          Physical Examination:    General: No acute distress.    HEENT: Pupils equal, reactive to light.  Symmetric.  PULM: Clear to auscultation bilaterally, no significant sputum production  NECK: Supple, no lymphadenopathy, trachea midline  CVS: Regular rate and rhythm, no murmurs, rubs, or gallops  ABD: Soft, nondistended, nontender, normoactive bowel sounds, no masses  EXT: No edema, nontender  SKIN: Warm and well perfused, no rashes noted.  NEURO: Alert, oriented, interactive, nonfocal    LABS:                        12.0   16.90 )-----------( 139      ( 2025 04:19 )             34.8     02-04    133[L]  |  94[L]  |  30.1[H]  ----------------------------<  153[H]  3.5   |  29.0  |  0.27[L]    Ca    7.8[L]      2025 04:19  Phos  3.6     02-04  Mg     1.8     -04    TPro  5.8[L]  /  Alb  2.6[L]  /  TBili  7.1[H]  /  DBili  x   /  AST  50[H]  /  ALT  29  /  AlkPhos  259[H]  02-04          CAPILLARY BLOOD GLUCOSE      POCT Blood Glucose.: 167 mg/dL (2025 05:24)    PT/INR - ( 2025 04:27 )   PT: 26.5 sec;   INR: 2.37 ratio           Urinalysis Basic - ( 2025 04:19 )    Color: x / Appearance: x / SG: x / pH: x  Gluc: 153 mg/dL / Ketone: x  / Bili: x / Urobili: x   Blood: x / Protein: x / Nitrite: x   Leuk Esterase: x / RBC: x / WBC x   Sq Epi: x / Non Sq Epi: x / Bacteria: x      CULTURES:  Culture Results:   No growth at 4 days ( @ 02:45)  Culture Results:   No growth at 4 days ( @ 02:30)          DEVICES:     RADIOLOGY: ***    CRITICAL CARE TIME SPENT: ***   Patient is a 59y old  Female who presents with a chief complaint of HAGMA, respiratory distress, shock (2025 07:47)      Interval HPI:  - No overnight event   - C/o hip, body/abdomen general pain improved by tramadol (allergic to morphine, per pt)   - Dc'd levo since yesterday night   - Distended CT a/p showed moderate ascites and edema   - Restart laxis, spirolactone   - Start latulose tid       PAST MEDICAL & SURGICAL HISTORY:  Alcohol abuse      Depression      Withdrawal seizures      History of basal cell carcinoma excision      Squamous cell skin cancer      Hemorrhoids      2019 novel coronavirus disease (COVID-19)      Anemia      H/O vertigo      History of ear, nose, and throat (ENT) surgery      H/O basal cell carcinoma excision      H/O:         Review of Systems: (negative except mentioned above)    Medications:  cefTRIAXone Injectable. 2000 milliGRAM(s) IV Push every 24 hours  metroNIDAZOLE  IVPB 500 milliGRAM(s) IV Intermittent every 8 hours  vancomycin    Solution 500 milliGRAM(s) Oral every 6 hours    midodrine 15 milliGRAM(s) Oral every 8 hours      melatonin 5 milliGRAM(s) Oral at bedtime  traMADol 50 milliGRAM(s) Oral every 12 hours PRN        pantoprazole    Tablet 40 milliGRAM(s) Oral before breakfast      dextrose 50% Injectable 25 Gram(s) IV Push once  dextrose 50% Injectable 12.5 Gram(s) IV Push once  dextrose 50% Injectable 25 Gram(s) IV Push once  dextrose Oral Gel 15 Gram(s) Oral once PRN  glucagon  Injectable 1 milliGRAM(s) IntraMuscular once  insulin glargine Injectable (LANTUS) 15 Unit(s) SubCutaneous every morning  insulin lispro (ADMELOG) corrective regimen sliding scale   SubCutaneous every 6 hours    albumin human 25% IVPB 100 milliLiter(s) IV Intermittent every 6 hours  dextrose 5%. 1000 milliLiter(s) IV Continuous <Continuous>  dextrose 5%. 1000 milliLiter(s) IV Continuous <Continuous>  folic acid 1 milliGRAM(s) Oral daily  multivitamin 1 Tablet(s) Oral daily  potassium chloride   Powder 40 milliEquivalent(s) Oral once      chlorhexidine 2% Cloths 1 Application(s) Topical <User Schedule>  hemorrhoidal Ointment 1 Application(s) Rectal every 6 hours PRN            ICU Vital Signs Last 24 Hrs  T(C): 36.5 (2025 07:18), Max: 36.6 (2025 16:12)  T(F): 97.7 (2025 07:18), Max: 97.9 (2025 21:49)  HR: 68 (2025 08:00) (63 - 85)  BP: 107/67 (2025 08:00) (100/70 - 112/76)  BP(mean): 79 (2025 08:00) (79 - 89)  ABP: 101/57 (2025 04:45) (84/49 - 123/69)  ABP(mean): 72 (2025 04:45) (62 - 98)  RR: 16 (2025 08:00) (13 - 30)  SpO2: 96% (2025 08:00) (90% - 100%)    O2 Parameters below as of 2025 08:00  Patient On (Oxygen Delivery Method): nasal cannula            ABG - ( 2025 04:17 )  pH, Arterial: 7.620 pH, Blood: x     /  pCO2: 28    /  pO2: 76    / HCO3: 29    / Base Excess: 7.6   /  SaO2: 100.0               I&O's Detail    2025 07:01  -  2025 07:00  --------------------------------------------------------  IN:    IV PiggyBack: 200 mL    IV PiggyBack: 200 mL    IV PiggyBack: 100 mL    IV PiggyBack: 100 mL    Norepinephrine: 9.5 mL  Total IN: 609.5 mL    OUT:    dextrose 5% + lactated ringers: 0 mL    Indwelling Catheter - Urethral (mL): 1320 mL    Rectal Tube (mL): 100 mL  Total OUT: 1420 mL    Total NET: -810.5 mL          Physical Examination:  GENERAL: NAD  HEAD:  Atraumatic, Normocephalic  NECK: Supple, No JVD  CHEST/LUNG: Diminished to auscultation bilaterally; No wheezes or rales  HEART: regular rate and regular rhythm; No murmurs, rubs, or gallops  ABDOMEN: Distended, tenderness (+),  w/ rectal tube   EXTREMITIES:  2+ Peripheral Pulses, No clubbing or cyanosis  Neurological: A&O x 3-4  Skin: Warm and dry  Musculoskeletal: Atraumatic, no joint effusions    LABS:                        12.0   16.90 )-----------( 139      ( 2025 04:19 )             34.8     02-04    133[L]  |  94[L]  |  30.1[H]  ----------------------------<  153[H]  3.5   |  29.0  |  0.27[L]    Ca    7.8[L]      2025 04:19  Phos  3.6     -  Mg     1.8     -    TPro  5.8[L]  /  Alb  2.6[L]  /  TBili  7.1[H]  /  DBili  x   /  AST  50[H]  /  ALT  29  /  AlkPhos  259[H]  -          CAPILLARY BLOOD GLUCOSE      POCT Blood Glucose.: 167 mg/dL (2025 05:24)    PT/INR - ( 2025 04:27 )   PT: 26.5 sec;   INR: 2.37 ratio           Urinalysis Basic - ( 2025 04:19 )    Color: x / Appearance: x / SG: x / pH: x  Gluc: 153 mg/dL / Ketone: x  / Bili: x / Urobili: x   Blood: x / Protein: x / Nitrite: x   Leuk Esterase: x / RBC: x / WBC x   Sq Epi: x / Non Sq Epi: x / Bacteria: x      CULTURES:  Culture Results:   No growth at 4 days ( @ 02:45)  Culture Results:   No growth at 4 days ( @ 02:30)          DEVICES:        Patient is a 59y old  Female who presents with a chief complaint of HAGMA, respiratory distress, shock (2025 07:47)      Brief Hospital Course:     This is a 58 y/o female with PMH of ETOH cirrhosis, multiple paracentesis for ascites, esophageal varices s/p banding,  who presents with x1 week of generalized fatigue, multiple bouts of diarrhea with poor PO intake and intermittent fevers. Of note was diagnosed Flu positive last week. In ED, pt found hypotensive needed pressors support, febrile, pt had AMS, acidosis with open AG, needed airway protection, urgently intubated admitted in MICU.     During MICU stay, pt supported by pressors, vent. stool PCR showed c.diff positive. TTE showed EF 60% with mobile vegetation attached to the posterior mitral valve leaflet. GI repeated EGD showed no active bleeding, but 2 cords of grade I varices were seen in the lower third of the esophagus. Cardiology following and consider it's high risk to have ARACELI. Will have long-term abx to presume endocarditis. ID following, and recommended vanco+ flagyl for C.diff, and ceftriaxone added to cover E.coli (course 2 g qd from 25 to 2025). Pt currently on vanco PO, and flagyle. when discharged can change to invanz to cover both ecoli and bacteroides. Pt extubated on 24, dc'd pressor since yesterday night. S/p paracentesis (removed 2 L this morning, labs sent out).  Pt is medically stable to downgrade to medicine floor,         Interval HPI:  - No overnight event   - C/o hip, body/abdomen general pain improved by tramadol (allergic to morphine, per pt)   - Dc'd levo since yesterday night   - CT a/p showed moderate ascites and edema. Pain can be caused by edema, or ascites. Completed course of ceftriaxone covered possible SBP.     - Restart laxis, spirolactone   - Start latulose tid       PAST MEDICAL & SURGICAL HISTORY:  Alcohol abuse      Depression      Withdrawal seizures      History of basal cell carcinoma excision      Squamous cell skin cancer      Hemorrhoids      2019 novel coronavirus disease (COVID-19)      Anemia      H/O vertigo      History of ear, nose, and throat (ENT) surgery      H/O basal cell carcinoma excision      H/O:         Review of Systems: (negative except mentioned above)    Medications:  cefTRIAXone Injectable. 2000 milliGRAM(s) IV Push every 24 hours  metroNIDAZOLE  IVPB 500 milliGRAM(s) IV Intermittent every 8 hours  vancomycin    Solution 500 milliGRAM(s) Oral every 6 hours    midodrine 15 milliGRAM(s) Oral every 8 hours      melatonin 5 milliGRAM(s) Oral at bedtime  traMADol 50 milliGRAM(s) Oral every 12 hours PRN        pantoprazole    Tablet 40 milliGRAM(s) Oral before breakfast      dextrose 50% Injectable 25 Gram(s) IV Push once  dextrose 50% Injectable 12.5 Gram(s) IV Push once  dextrose 50% Injectable 25 Gram(s) IV Push once  dextrose Oral Gel 15 Gram(s) Oral once PRN  glucagon  Injectable 1 milliGRAM(s) IntraMuscular once  insulin glargine Injectable (LANTUS) 15 Unit(s) SubCutaneous every morning  insulin lispro (ADMELOG) corrective regimen sliding scale   SubCutaneous every 6 hours    albumin human 25% IVPB 100 milliLiter(s) IV Intermittent every 6 hours  dextrose 5%. 1000 milliLiter(s) IV Continuous <Continuous>  dextrose 5%. 1000 milliLiter(s) IV Continuous <Continuous>  folic acid 1 milliGRAM(s) Oral daily  multivitamin 1 Tablet(s) Oral daily  potassium chloride   Powder 40 milliEquivalent(s) Oral once      chlorhexidine 2% Cloths 1 Application(s) Topical <User Schedule>  hemorrhoidal Ointment 1 Application(s) Rectal every 6 hours PRN            ICU Vital Signs Last 24 Hrs  T(C): 36.5 (2025 07:18), Max: 36.6 (2025 16:12)  T(F): 97.7 (2025 07:18), Max: 97.9 (2025 21:49)  HR: 68 (2025 08:00) (63 - 85)  BP: 107/67 (2025 08:00) (100/70 - 112/76)  BP(mean): 79 (2025 08:00) (79 - 89)  ABP: 101/57 (2025 04:45) (84/49 - 123/69)  ABP(mean): 72 (2025 04:45) (62 - 98)  RR: 16 (2025 08:00) (13 - 30)  SpO2: 96% (2025 08:00) (90% - 100%)    O2 Parameters below as of 2025 08:00  Patient On (Oxygen Delivery Method): nasal cannula            ABG - ( 2025 04:17 )  pH, Arterial: 7.620 pH, Blood: x     /  pCO2: 28    /  pO2: 76    / HCO3: 29    / Base Excess: 7.6   /  SaO2: 100.0               I&O's Detail    2025 07:01  -  2025 07:00  --------------------------------------------------------  IN:    IV PiggyBack: 200 mL    IV PiggyBack: 200 mL    IV PiggyBack: 100 mL    IV PiggyBack: 100 mL    Norepinephrine: 9.5 mL  Total IN: 609.5 mL    OUT:    dextrose 5% + lactated ringers: 0 mL    Indwelling Catheter - Urethral (mL): 1320 mL    Rectal Tube (mL): 100 mL  Total OUT: 1420 mL    Total NET: -810.5 mL          Physical Examination:  GENERAL: NAD  HEAD:  Atraumatic, Normocephalic  NECK: Supple, No JVD  CHEST/LUNG: Diminished to auscultation bilaterally; No wheezes or rales  HEART: regular rate and regular rhythm; No murmurs, rubs, or gallops  ABDOMEN: Distended, tenderness (+),  w/ rectal tube   EXTREMITIES:  2+ Peripheral Pulses, No clubbing or cyanosis  Neurological: A&O x 3-4  Skin: Warm and dry  Musculoskeletal: Atraumatic, no joint effusions    LABS:                        12.0   16.90 )-----------( 139      ( 2025 04:19 )             34.8     02-04    133[L]  |  94[L]  |  30.1[H]  ----------------------------<  153[H]  3.5   |  29.0  |  0.27[L]    Ca    7.8[L]      2025 04:19  Phos  3.6     -  Mg     1.8     -    TPro  5.8[L]  /  Alb  2.6[L]  /  TBili  7.1[H]  /  DBili  x   /  AST  50[H]  /  ALT  29  /  AlkPhos  259[H]  -          CAPILLARY BLOOD GLUCOSE      POCT Blood Glucose.: 167 mg/dL (2025 05:24)    PT/INR - ( 2025 04:27 )   PT: 26.5 sec;   INR: 2.37 ratio           Urinalysis Basic - ( 2025 04:19 )    Color: x / Appearance: x / SG: x / pH: x  Gluc: 153 mg/dL / Ketone: x  / Bili: x / Urobili: x   Blood: x / Protein: x / Nitrite: x   Leuk Esterase: x / RBC: x / WBC x   Sq Epi: x / Non Sq Epi: x / Bacteria: x      CULTURES:  Culture Results:   No growth at 4 days ( @ 02:45)  Culture Results:   No growth at 4 days ( @ 02:30)          DEVICES:

## 2025-02-04 NOTE — PROGRESS NOTE ADULT - ASSESSMENT
59F with PMHx of ETOH cirrhosis, recurrent ascites s/p multiple paracentesis, esophageal varices s/p banding, Flu positive a week prior, presented generalized weakness, diarrhea, intermittent fever and poor po intake. On arrival found to be hypotensive unresponsive to be IVF requiring vasopressor support. Admitted to MICU for Septic shock 2/2 to colitis. CT with evidence of colitis and C Diff PCR positive and started on PO Vancomycin and Flagyl. Course complicated by AHRF with change in mental status and was emergently intubated 1/28/25 and successfully extubated 2/2/25. Found to have E Coli and bacteroides bacteremia and possible MV vegetation on TTE and cardiology consulted for ARACELI, s/p EGD 1/31 for GI clearance which noted nonbleeding 2 cords of grade I varices in the lower third of the esophagus and ultimately deemed poor candidate for ARACELI due to esophageal varices. ID following and decided on empiric 6 weeks of antibiotics coverage for presumed endocarditis. Repeat blood culture 1/31 negative. Pt also s/p paracentesis 2/4 and removed 2L. Now downgraded to medicine.     PLAN  Septic shock 2/2 to C Diff Colitis   - s/p Levophed  - c/w midodrine 15mg po TID, maintain MAP >65  - C Diff PCR positive   - c/w PO Vancomycin and Flagyl, plan to c/w vancomycin while on long term abx for presumed endocarditis   - ID following     E Coli and bacteroides bacteremia   Presumed endocarditis   Afebrile and WBC stable   - TTE with possible MV vegetation  - BCx 1/27/25 revealed E Coli and bacteroides, Repeat BCx 1/31 negative  - s/p Meropenem for 1 day  - c/w Ceftriaxone 2g while inpatient and plan for switch to Invanz on DC  - ID following and pending final recs for DC including duration     - s/p EGD for GI clearance given h/o varices and GI bleeding, and noted  nonbleeding 2 cords of grade I varices in the lower third of the esophagus  - Seen by Cardiology for ARACELI however deemed poor candidate due to esophageal varices    Decompensated cirrhosis 2/2 to ETOH abuse   - s/p paracentesis 2/4 and removed 2L  - c/w Lasix 40mg po QD  - c/w Spironolactone 100mg po QD   - c/w thiamine, folic acid and multivitamin      Hepatic encephalopathy  - c/w Lactulose 10g po TID, monitor BM and titrate with mental status   - Added Rifaximin 550mg po BID    AHRF   - s/p emergently intubated  for airway protection 1/28/25 and successfully extubated 2/2/25  - now on 3L satting 98%    DVT ppx: SCD  Diet: Soft and bite sized

## 2025-02-04 NOTE — PROGRESS NOTE ADULT - ATTENDING COMMENTS
58 yo female with ETOH cirrhosis, ascites, esophageal varices, presented with fatigue, fevers, diarrhea.  Patient found to have Cdiff colitis, gram negative bacteremia, shock.  Source of E coli bacteremia unclear.  Doing better today.
.    59F PMH EtOH cirrhosis, recurrent ascites, esophageal varices s/p banding who presented 1/27/25 with fatigue, diarrhea, poor PO intake, noted with influenza 1 week prior to admission, admitted to ICU in septic shock 2/2 colitis, noted with E. coli and Bacteroides bacteremia, C. diff, and possible mitral valve vegetation on TTE however is poor candidate for ARACELI given presence of varices. Had to be placed back on Levophed today. C/w CTX, Vanc, Aztreonam. ID following, will empirically do 6 weeks of antibiotics.  Has cholelithiasis and elevated Tbili however is downtrending to 6.7. Had repeat CT today given abdominal pain, noted with increased ascites. On sonogram, visualized pockets were not ideal for paracentesis. Will c/w care in ICU.      Sheng Lopez MD, [facility]  , Pulmonary & Critical Care Medicine  [facility]  Pulmonary and Sleep Medicine at [place]  [address]  [place], [place] [address]  T: [phone]  F: [phone]
60 yo female with ETOH cirrhosis, ascites, esophageal varices, presented with fatigue, fevers, diarrhea.  Patient found to have Cdiff colitis, gram negative bacteremia, shock.  Awaiting ARACELI.  Will soon try to liberate from ventilator.
60 yo female with ETOH cirrhosis, ascites, esophageal varices, presented with fatigue, fevers, diarrhea.  Patient found to have Cdiff colitis, gram negative bacteremia, shock.  TTE found posterior mitral valve leaflet ??vegetation    exdtubated 2/2 AM - doing well in the morning, AAOx2-3 , responding to question appropriately   s/p EGD 1/31 - non bleeding varice, PHG,   ARACELI deferred due to high risk of bleeding   will try to wean from ventilator post procedure   wean pressor as tolerates  repeat blood culture 1/31 negative  blood culture 1/27 - e coli   continue antibiotic, and continue oral vanc  continue to wean pressor  prognosis guarded .
60 yo female with ETOH cirrhosis, ascites, esophageal varices, presented with fatigue, fevers, diarrhea.  Patient found to have Cdiff colitis, gram negative bacteremia, shock.  TTE found posterior mitral valve leaflet ??vegetation  s/p EGD today 1/31 - non bleeding varice, PHG,   ARACELI deferred due to high risk of bleeding   will try to wean from ventilator post procedure   wean pressor as tolerates  repeat blood culture  continue antibiotic  daily SAT/ SBT   prognosis guarded .
58 yo female with ETOH cirrhosis, ascites, esophageal varices, presented with fatigue, fevers, diarrhea.  Patient found to have Cdiff colitis, gram negative bacteremia, shock.  TTE found posterior mitral valve leaflet ??vegetation  s/p EGD 1/31 - non bleeding varice, PHG,   ARACELI deferred due to high risk of bleeding   will try to wean from ventilator post procedure   wean pressor as tolerates  repeat blood culture 1/31 negattive  blood culture 1/27 - e coli   continue antibiotic, and continue oral vanc  daily SAT/ SBT   prognosis guarded .
60 yo female with ETOH cirrhosis, ascites, esophageal varices, presented with fatigue, fevers, diarrhea.  Patient found to have Cdiff colitis, gram negative bacteremia, shock.  TTE found posterior mitral valve leaflet ??vegetation  ARACELI deferred today pending GI workup  GI consulted, apprecaite for rec, possible endoscopy   will touch base with cardiology for ARACELI post EGD  will try to wean from ventilator post procedure   repeat blood culture tomorrow AM   prognosis guarded
I attest that I have completed more than 50% of the documentation, physical exam and orders      GENERAL: pt examined bedside, laying comfortably in bed in NAD  HEENT: NC/AT, moist oral mucosa, clear conjunctiva, sclera icteric  RESPIRATORY: Normal respiratory effort, no wheezing, rhonchi, rales  CARDIOVASCULAR: RRR, normal S1 and S2, +murmur  ABDOMEN: soft, distended, +fluid wave, nontender, normoactive bowel sounds, no rebound/guarding  EXTREMITIES: No cynaosis, no clubbing, trace pretibial edema   NEUROLOGY: AAOx3, no focal neurologic deficits appreciated  SKIN: +jaundice        58 y/o F w/ PMH of ETOH cirrhosis w/ recurrent ascites s/p multiple paracentesis, esophageal varices s/p banding, recent Flu infection week prior to admission presented w/ large vol watery diarrhea, fever, poor po intake and generalized weakness.  In the ED pt was unresponsive and hypotensive.  CT a/p found to have evidence of colitis and Cdiff pcr was positive.  Pt was admitted to MICU 1/27/25 for septic shock 2/2 cdiff colitis and bacteremia 2/2 ecoli and bacteroides likely from translocation insetting of colitis.  Pt was emergently intubated 1/28 due to decreased mentation and acute hypoxic respiratory failure.  Pts ammonia was mildly elevated and pt started on lactulose.  Pt was successfully extubated on 2/2/25.  Pt is on rocephin, flagyl and po vanco for bacteremia and Cdiff.  She continues to have significant diarrhea and has rectal tube in place.  MICU course also complicated by concern for possible MV vegitation seen on TTE.  Cardiology consulted for ARACELI however requested EGD first which was done on 1/31 that showed 2 nonbleeding cords of grade I varices in the lower third of the esophagus and ultimately deemed poor candidate for ARACELI due to esophageal varices.  ID following and decided to c/w rocephin for presumed endocarditis and likely will d/c on invanz for total of 6 weeks of abx.  Repeat blood culture 1/31 negative.  Pt c/o of abd pain last night and this morning had paracentesis w/ 2L removed but no evidence of SBP on fluid analysis.  Pts mental status is at baseline at this time.  WBC is elevated but overall improving.  Pt has mild hyponatremia which is likely 2/2 decompensated cirrhosis.  c/w lasix, aldactone, rifaxamin and midodrine.  Pt is medically stable to downgrade to medicine floor,

## 2025-02-04 NOTE — PROGRESS NOTE ADULT - SUBJECTIVE AND OBJECTIVE BOX
INFECTIOUS DISEASES AND INTERNAL MEDICINE at [place]  =======================================================  Tommy Blair MD  Diplomates American Board of Internal Medicine and Infectious Diseases  Telephone [phone]  Fax            [phone]  =======================================================    ABRAHAN GOODWIN [id number]    Follow up:  BACTEREMIA C DIFF    Allergies:  Zithromax (Unknown)  morphine (Nausea)      Medications:  cefTRIAXone Injectable. 2000 milliGRAM(s) IV Push every 24 hours  chlorhexidine 2% Cloths 1 Application(s) Topical <User Schedule>  dextrose 5% + lactated ringers. 1000 milliLiter(s) IV Continuous <Continuous>  dextrose 5%. 1000 milliLiter(s) IV Continuous <Continuous>  dextrose 5%. 1000 milliLiter(s) IV Continuous <Continuous>  dextrose 50% Injectable 25 Gram(s) IV Push once  dextrose 50% Injectable 12.5 Gram(s) IV Push once  dextrose 50% Injectable 25 Gram(s) IV Push once  dextrose Oral Gel 15 Gram(s) Oral once PRN  folic acid 1 milliGRAM(s) Oral daily  glucagon  Injectable 1 milliGRAM(s) IntraMuscular once  hemorrhoidal Ointment 1 Application(s) Rectal every 6 hours PRN  hydrocortisone sodium succinate Injectable 50 milliGRAM(s) IV Push every 6 hours  insulin glargine Injectable (LANTUS) 15 Unit(s) SubCutaneous every morning  insulin lispro (ADMELOG) corrective regimen sliding scale   SubCutaneous every 6 hours  melatonin 5 milliGRAM(s) Oral at bedtime  metroNIDAZOLE  IVPB 500 milliGRAM(s) IV Intermittent every 8 hours  midodrine 10 milliGRAM(s) Oral every 8 hours  multivitamin 1 Tablet(s) Oral daily  norepinephrine Infusion 0.05 MICROgram(s)/kG/Min IV Continuous <Continuous>  pantoprazole    Tablet 40 milliGRAM(s) Oral before breakfast  potassium chloride  20 mEq/100 mL IVPB 20 milliEquivalent(s) IV Intermittent every 2 hours  vancomycin    Solution 500 milliGRAM(s) Oral every 6 hours    SOCIAL       FAMILY   FAMILY HISTORY:  FH: pancreatic cancer (Mother)    Family history of heart attack (Father)    Family history of CVA (Father)      REVIEW OF SYSTEMS:  CONSTITUTIONAL:  No Fever or chills  HEENT:   No diplopia or blurred vision.  No earache, sore throat or runny nose.  CARDIOVASCULAR:  No pressure, squeezing, strangling, tightness, heaviness or aching about the chest, neck, axilla or epigastrium.  RESPIRATORY:  No cough, shortness of breath, PND or orthopnea.  GASTROINTESTINAL:   diarrhea.  GENITOURINARY:  No dysuria, frequency or urgency. No Blood in urine  MUSCULOSKELETAL:   moves all joints  SKIN:  No change in skin, hair or nails.  NEUROLOGIC:  No paresthesias, fasciculations, seizures or weakness.  PSYCHIATRIC:  No disorder of thought or mood.  ENDOCRINE:  No heat or cold intolerance, polyuria or polydipsia.  HEMATOLOGICAL:  No easy bruising or bleeding.            Physical Exam:   Vital Signs Last 24 Hrs  T(C): 36.5 (04 Feb 2025 07:18), Max: 36.6 (03 Feb 2025 16:12)  T(F): 97.7 (04 Feb 2025 07:18), Max: 97.9 (03 Feb 2025 21:49)  HR: 68 (04 Feb 2025 08:00) (63 - 85)  BP: 107/67 (04 Feb 2025 08:00) (100/70 - 112/76)  BP(mean): 79 (04 Feb 2025 08:00) (79 - 89)  RR: 16 (04 Feb 2025 08:00) (13 - 30)  SpO2: 96% (04 Feb 2025 08:00) (90% - 100%)    Parameters below as of 04 Feb 2025 08:00  Patient On (Oxygen Delivery Method): nasal cannula            GEN: NAD,   HEENT: normocephalic and atraumatic. EOMI. LJ.    NECK: Supple. No carotid bruits.  No lymphadenopathy or thyromegaly.  LUNGS: Clear to auscultation.  HEART: Regular rate and rhythm without murmur.  ABDOMEN: Soft, nontender, DISTENDED POS ASCITES.  Positive bowel sounds.    : No CVA tenderness  EXTREMITIES: Without any cyanosis, clubbing, rash, lesions or edema.  MSK: no joint swelling  NEUROLOGIC: Cranial nerves II through XII are grossly intact.  PSYCHIATRIC: Appropriate affect .  SKIN: No ulceration or induration present.        Labs:  Vitals:  =======   =======================================================  Current Antibiotics:  cefTRIAXone Injectable. 2000 milliGRAM(s) IV Push every 24 hours  metroNIDAZOLE  IVPB 500 milliGRAM(s) IV Intermittent every 8 hours  vancomycin    Solution 500 milliGRAM(s) Oral every 6 hours    Other medications:  chlorhexidine 2% Cloths 1 Application(s) Topical <User Schedule>  dextrose 5% + lactated ringers. 1000 milliLiter(s) IV Continuous <Continuous>  dextrose 5%. 1000 milliLiter(s) IV Continuous <Continuous>  dextrose 5%. 1000 milliLiter(s) IV Continuous <Continuous>  dextrose 50% Injectable 25 Gram(s) IV Push once  dextrose 50% Injectable 12.5 Gram(s) IV Push once  dextrose 50% Injectable 25 Gram(s) IV Push once  folic acid 1 milliGRAM(s) Oral daily  glucagon  Injectable 1 milliGRAM(s) IntraMuscular once  hydrocortisone sodium succinate Injectable 50 milliGRAM(s) IV Push every 6 hours  insulin glargine Injectable (LANTUS) 15 Unit(s) SubCutaneous every morning  insulin lispro (ADMELOG) corrective regimen sliding scale   SubCutaneous every 6 hours  melatonin 5 milliGRAM(s) Oral at bedtime  midodrine 10 milliGRAM(s) Oral every 8 hours  multivitamin 1 Tablet(s) Oral daily  norepinephrine Infusion 0.05 MICROgram(s)/kG/Min IV Continuous <Continuous>  pantoprazole    Tablet 40 milliGRAM(s) Oral before breakfast  potassium chloride  20 mEq/100 mL IVPB 20 milliEquivalent(s) IV Intermittent every 2 hours      =======================================================  Labs:                                        12.0   16.90 )-----------( 139      ( 04 Feb 2025 04:19 )             34.8   02-04    133[L]  |  94[L]  |  30.1[H]  ----------------------------<  153[H]  3.5   |  29.0  |  0.27[L]    Ca    7.8[L]      04 Feb 2025 04:19  Phos  3.6     02-04  Mg     1.8     02-04    TPro  5.8[L]  /  Alb  2.6[L]  /  TBili  7.1[H]  /  DBili  x   /  AST  50[H]  /  ALT  29  /  AlkPhos  259[H]  02-04        Culture - Blood (collected 01-31-25 @ 02:45)  Source: .Blood BLOOD  Preliminary Report (02-03-25 @ 06:01):    No growth at 72 Hours    Culture - Blood (collected 01-31-25 @ 02:30)  Source: .Blood BLOOD  Preliminary Report (02-03-25 @ 06:01):    No growth at 72 Hours    Culture - Blood (collected 01-27-25 @ 06:00)  Source: .Blood BLOOD  Gram Stain (01-30-25 @ 07:57):    Growth in anaerobic bottle: Gram Negative Rods  Final Report (02-01-25 @ 11:51):    Growth in anaerobic bottle: Bacteroides thetaiotaomicron group    "Susceptibilities not performed"    Culture - Blood (collected 01-27-25 @ 02:07)  Source: .Blood BLOOD  Gram Stain (01-27-25 @ 18:01):    Growth in anaerobic bottle: Gram Negative Rods    Growth in aerobic bottle: Gram Negative Rods  Final Report (01-29-25 @ 08:00):    Growth in aerobic and anaerobic bottles: Escherichia coli    Direct identification is available within approximately 3-5    hours either by Blood Panel Multiplexed PCR or Direct    MALDI-TOF. Details: [email or web address]  Organism: Blood Culture PCR  Escherichia coli (01-29-25 @ 08:00)  Organism: Escherichia coli (01-29-25 @ 08:00)    Sensitivities:      Method Type: NABILA      -  Ampicillin: R >16 These ampicillin results predict results for amoxicillin      -  Ampicillin/Sulbactam: I 16/8      -  Aztreonam: S <=4      -  Cefazolin: I 4      -  Cefepime: S <=2      -  Cefoxitin: S <=8      -  Ceftriaxone: S <=1      -  Ciprofloxacin: S <=0.25      -  Ertapenem: S <=0.5      -  Gentamicin: S <=2      -  Imipenem: S <=1      -  Levofloxacin: S <=0.5      -  Meropenem: S <=1      -  Piperacillin/Tazobactam: S <=8      -  Tobramycin: I 4      -  Trimethoprim/Sulfamethoxazole: S <=0.5/9.5  Organism: Blood Culture PCR (01-29-25 @ 08:00)    Sensitivities:      Method Type: PCR      -  Escherichia coli: Detec    Urinalysis with Rflx Culture (collected 11-16-24 @ 09:00)    Culture - Body Fluid with Gram Stain (collected 11-16-24 @ 04:44)  Source: Abdominal Fl  Gram Stain (11-16-24 @ 12:36):    polymorphonuclear leukocytes seen    No organisms seen    by cytocentrifuge  Final Report (11-21-24 @ 08:50):    No growth at 5 days    Culture - Blood (collected 11-16-24 @ 03:17)  Source: .Blood BLOOD  Final Report (11-21-24 @ 09:01):    No growth at 5 days    Culture - Blood (collected 11-16-24 @ 02:44)  Source: .Blood BLOOD  Final Report (11-21-24 @ 09:01):    No growth at 5 days    Culture - Fungal, Body Fluid (collected 11-23-23 @ 14:00)  Source: Peritoneal Peritoneal Fluid  Final Report (12-23-23 @ 15:01):    No fungus isolated at 4 weeks.    Culture - Body Fluid with Gram Stain (collected 11-23-23 @ 14:00)  Source: Ascites Fl Ascites Fluid  Gram Stain (11-23-23 @ 23:43):    polymorphonuclear leukocytes    No organisms seen    by cytocentrifuge  Final Report (11-28-23 @ 16:15):    No growth at 5 days    Culture - Blood (collected 11-22-23 @ 08:31)  Source: .Blood None  Final Report (11-27-23 @ 13:01):    No growth at 5 days    Culture - Blood (collected 11-22-23 @ 08:22)  Source: .Blood None  Final Report (11-27-23 @ 13:01):    No growth at 5 days    Culture - Blood (collected 11-09-23 @ 07:01)  Source: .Blood None  Final Report (11-14-23 @ 14:00):    No growth at 5 days    Culture - Blood (collected 11-09-23 @ 07:01)  Source: .Blood None  Final Report (11-14-23 @ 14:00):    No growth at 5 days    Culture - Blood (collected 10-07-23 @ 09:01)  Source: .Blood None  Final Report (10-12-23 @ 17:01):    No growth at 5 days    Culture - Blood (collected 10-07-23 @ 09:01)  Source: .Blood Blood-Venous  Final Report (10-12-23 @ 17:01):    No growth at 5 days    Culture - Blood (collected 05-09-23 @ 04:39)  Source: .Blood Blood-Peripheral  Final Report (05-14-23 @ 10:01):    No Growth Final    Culture - Urine (collected 05-09-23 @ 04:39)  Source: Clean Catch Clean Catch (Midstream)  Final Report (05-10-23 @ 23:46):    <10,000 CFU/mL Normal Urogenital Bibi    Culture - Blood (collected 05-09-23 @ 04:39)  Source: .Blood Blood-Peripheral  Final Report (05-14-23 @ 10:01):    No Growth Final      Creatinine: 0.32 mg/dL (02-03-25 @ 03:28)  Creatinine: 0.45 mg/dL (02-02-25 @ 02:12)  Creatinine: 0.53 mg/dL (02-01-25 @ 20:00)  Creatinine: 0.55 mg/dL (02-01-25 @ 13:26)  Creatinine: 0.52 mg/dL (02-01-25 @ 10:00)  Creatinine: 0.48 mg/dL (02-01-25 @ 06:45)  Creatinine: 0.48 mg/dL (02-01-25 @ 02:15)  Creatinine: 0.44 mg/dL (01-31-25 @ 02:45)  Creatinine: 0.42 mg/dL (01-30-25 @ 13:50)  Creatinine: 0.61 mg/dL (01-30-25 @ 04:30)  Creatinine: 0.69 mg/dL (01-29-25 @ 09:50)            WBC Count: 15.83 K/uL (02-03-25 @ 03:28)  WBC Count: 7.08 K/uL (02-02-25 @ 02:12)  WBC Count: 7.87 K/uL (02-01-25 @ 13:26)  WBC Count: 8.95 K/uL (02-01-25 @ 06:45)  WBC Count: 9.02 K/uL (02-01-25 @ 02:15)  WBC Count: 12.79 K/uL (01-31-25 @ 02:45)  WBC Count: 17.97 K/uL (01-30-25 @ 04:30)  WBC Count: 26.74 K/uL (01-29-25 @ 09:50)    SARS-CoV-2: NotDetec (01-27-25 @ 06:00)      Alkaline Phosphatase: 258 U/L (02-03-25 @ 03:28)  Alkaline Phosphatase: 287 U/L (02-02-25 @ 02:12)  Alkaline Phosphatase: 262 U/L (02-01-25 @ 13:26)  Alkaline Phosphatase: 252 U/L (02-01-25 @ 06:45)  Alkaline Phosphatase: 264 U/L (02-01-25 @ 02:15)  Alkaline Phosphatase: 274 U/L (01-31-25 @ 02:45)  Alanine Aminotransferase (ALT/SGPT): 26 U/L (02-03-25 @ 03:28)  Alanine Aminotransferase (ALT/SGPT): 26 U/L (02-02-25 @ 02:12)  Alanine Aminotransferase (ALT/SGPT): 24 U/L (02-01-25 @ 13:26)  Alanine Aminotransferase (ALT/SGPT): 24 U/L (02-01-25 @ 06:45)  Alanine Aminotransferase (ALT/SGPT): 24 U/L (02-01-25 @ 02:15)  Alanine Aminotransferase (ALT/SGPT): 27 U/L (01-31-25 @ 02:45)  Aspartate Aminotransferase (AST/SGOT): 50 U/L (02-03-25 @ 03:28)  Aspartate Aminotransferase (AST/SGOT): 58 U/L (02-02-25 @ 02:12)  Aspartate Aminotransferase (AST/SGOT): 58 U/L (02-01-25 @ 13:26)  Aspartate Aminotransferase (AST/SGOT): 59 U/L (02-01-25 @ 06:45)  Aspartate Aminotransferase (AST/SGOT): 60 U/L (02-01-25 @ 02:15)  Aspartate Aminotransferase (AST/SGOT): 74 U/L (01-31-25 @ 02:45)  Bilirubin Total: 7.0 mg/dL (02-03-25 @ 03:28)  Bilirubin Total: 8.6 mg/dL (02-02-25 @ 02:12)  Bilirubin Total: 8.4 mg/dL (02-01-25 @ 13:26)  Bilirubin Total: 9.0 mg/dL (02-01-25 @ 10:00)  Bilirubin Total: 8.9 mg/dL (02-01-25 @ 06:45)  Bilirubin Total: 9.4 mg/dL (02-01-25 @ 02:15)  Bilirubin Total: 10.2 mg/dL (01-31-25 @ 02:45)

## 2025-02-04 NOTE — CHART NOTE - NSCHARTNOTEFT_GEN_A_CORE
MICU Bedside Procedure Note    Central Line Removal Note    Site:  Right groin CVC  Patient seen and examined at bedside for removal of central line. Chart and labs reviewed prior to removal, no contraindication to procedure. Area cleaned with Alcohol swabs and suture removal kit used to remove sutures holding central line in place using. Patient then placed in reverse Trendelenburg position and then central line removed with tip intact. Pressure applied for 15+ minutes with gauze. No signs of active bleeding noted. No hematoma formation noted. Tegaderm and gauze/tape used to create pressure dressing to maintain pressure on central line site. Patient tolerated well without complications.   ________________________________________________________________________________________________________________________________________________________________________________________________________________________________________________________________________    Arterial Line Removal Note    Site:  Right axillary A-line    Explained the procedure. Dressing taken down, sutures removed, catheter removed and tip intact. Pressure applied for greater than 15 mins, no hematoma or bleeding noted. Patient tolerated procedure well. A dry sterile dressing applied.     Discussed procedures with RN who will monitor and notify the provider for bleeding, hematoma formation, or other procedural complications.

## 2025-02-05 LAB
ALBUMIN FLD-MCNC: 0.7 G/DL — SIGNIFICANT CHANGE UP
ALBUMIN SERPL ELPH-MCNC: 3.9 G/DL — SIGNIFICANT CHANGE UP (ref 3.3–5.2)
ALP SERPL-CCNC: 234 U/L — HIGH (ref 40–120)
ALT FLD-CCNC: 32 U/L — SIGNIFICANT CHANGE UP
ANION GAP SERPL CALC-SCNC: 13 MMOL/L — SIGNIFICANT CHANGE UP (ref 5–17)
APTT BLD: 34.4 SEC — SIGNIFICANT CHANGE UP (ref 24.5–35.6)
AST SERPL-CCNC: 61 U/L — HIGH
BASOPHILS # BLD AUTO: 0.03 K/UL — SIGNIFICANT CHANGE UP (ref 0–0.2)
BASOPHILS NFR BLD AUTO: 0.2 % — SIGNIFICANT CHANGE UP (ref 0–2)
BILIRUB SERPL-MCNC: 8.3 MG/DL — HIGH (ref 0.4–2)
BUN SERPL-MCNC: 27.6 MG/DL — HIGH (ref 8–20)
CALCIUM SERPL-MCNC: 8.6 MG/DL — SIGNIFICANT CHANGE UP (ref 8.4–10.5)
CHLORIDE SERPL-SCNC: 93 MMOL/L — LOW (ref 96–108)
CO2 SERPL-SCNC: 31 MMOL/L — HIGH (ref 22–29)
CREAT SERPL-MCNC: 0.23 MG/DL — LOW (ref 0.5–1.3)
CULTURE RESULTS: SIGNIFICANT CHANGE UP
CULTURE RESULTS: SIGNIFICANT CHANGE UP
EGFR: 129 ML/MIN/1.73M2 — SIGNIFICANT CHANGE UP
EOSINOPHIL # BLD AUTO: 0.01 K/UL — SIGNIFICANT CHANGE UP (ref 0–0.5)
EOSINOPHIL NFR BLD AUTO: 0.1 % — SIGNIFICANT CHANGE UP (ref 0–6)
GLUCOSE BLDC GLUCOMTR-MCNC: 108 MG/DL — HIGH (ref 70–99)
GLUCOSE BLDC GLUCOMTR-MCNC: 111 MG/DL — HIGH (ref 70–99)
GLUCOSE BLDC GLUCOMTR-MCNC: 151 MG/DL — HIGH (ref 70–99)
GLUCOSE FLD-MCNC: 185 MG/DL — SIGNIFICANT CHANGE UP
GLUCOSE SERPL-MCNC: 100 MG/DL — HIGH (ref 70–99)
GRAM STN FLD: SIGNIFICANT CHANGE UP
HCT VFR BLD CALC: 34 % — LOW (ref 34.5–45)
HGB BLD-MCNC: 11.5 G/DL — SIGNIFICANT CHANGE UP (ref 11.5–15.5)
IMM GRANULOCYTES NFR BLD AUTO: 1.8 % — HIGH (ref 0–0.9)
INR BLD: 2.05 RATIO — HIGH (ref 0.85–1.16)
LDH SERPL L TO P-CCNC: 34 U/L — SIGNIFICANT CHANGE UP
LYMPHOCYTES # BLD AUTO: 0.68 K/UL — LOW (ref 1–3.3)
LYMPHOCYTES # BLD AUTO: 5.2 % — LOW (ref 13–44)
MAGNESIUM SERPL-MCNC: 1.6 MG/DL — SIGNIFICANT CHANGE UP (ref 1.6–2.6)
MCHC RBC-ENTMCNC: 29.5 PG — SIGNIFICANT CHANGE UP (ref 27–34)
MCHC RBC-ENTMCNC: 33.8 G/DL — SIGNIFICANT CHANGE UP (ref 32–36)
MCV RBC AUTO: 87.2 FL — SIGNIFICANT CHANGE UP (ref 80–100)
MELD SCORE WITH DIALYSIS: 36 POINTS — SIGNIFICANT CHANGE UP
MELD SCORE WITHOUT DIALYSIS: 23 POINTS — SIGNIFICANT CHANGE UP
MONOCYTES # BLD AUTO: 0.67 K/UL — SIGNIFICANT CHANGE UP (ref 0–0.9)
MONOCYTES NFR BLD AUTO: 5.1 % — SIGNIFICANT CHANGE UP (ref 2–14)
NEUTROPHILS # BLD AUTO: 11.47 K/UL — HIGH (ref 1.8–7.4)
NEUTROPHILS NFR BLD AUTO: 87.6 % — HIGH (ref 43–77)
PHOSPHATE SERPL-MCNC: 1.8 MG/DL — LOW (ref 2.4–4.7)
PLATELET # BLD AUTO: 108 K/UL — LOW (ref 150–400)
POTASSIUM SERPL-MCNC: 2.5 MMOL/L — CRITICAL LOW (ref 3.5–5.3)
POTASSIUM SERPL-SCNC: 2.5 MMOL/L — CRITICAL LOW (ref 3.5–5.3)
PROT FLD-MCNC: 1.1 G/DL — SIGNIFICANT CHANGE UP
PROT SERPL-MCNC: 6.3 G/DL — LOW (ref 6.6–8.7)
PROTHROM AB SERPL-ACNC: 23.6 SEC — HIGH (ref 9.9–13.4)
RBC # BLD: 3.9 M/UL — SIGNIFICANT CHANGE UP (ref 3.8–5.2)
RBC # FLD: 23.9 % — HIGH (ref 10.3–14.5)
SODIUM SERPL-SCNC: 136 MMOL/L — SIGNIFICANT CHANGE UP (ref 135–145)
SPECIMEN SOURCE: SIGNIFICANT CHANGE UP
WBC # BLD: 13.1 K/UL — HIGH (ref 3.8–10.5)
WBC # FLD AUTO: 13.1 K/UL — HIGH (ref 3.8–10.5)

## 2025-02-05 PROCEDURE — 99233 SBSQ HOSP IP/OBS HIGH 50: CPT

## 2025-02-05 PROCEDURE — G0545: CPT

## 2025-02-05 RX ORDER — POTASSIUM CHLORIDE 750 MG/1
10 TABLET, EXTENDED RELEASE ORAL
Refills: 0 | Status: COMPLETED | OUTPATIENT
Start: 2025-02-05 | End: 2025-02-05

## 2025-02-05 RX ORDER — POTASSIUM CHLORIDE 750 MG/1
40 TABLET, EXTENDED RELEASE ORAL ONCE
Refills: 0 | Status: COMPLETED | OUTPATIENT
Start: 2025-02-05 | End: 2025-02-05

## 2025-02-05 RX ORDER — BACTERIOSTATIC SODIUM CHLORIDE 0.9 %
500 VIAL (ML) INJECTION
Refills: 0 | Status: COMPLETED | OUTPATIENT
Start: 2025-02-05 | End: 2025-02-05

## 2025-02-05 RX ADMIN — ALBUMIN HUMAN 50 MILLILITER(S): 50 SOLUTION INTRAVENOUS at 00:25

## 2025-02-05 RX ADMIN — INSULIN GLARGINE-YFGN 15 UNIT(S): 100 INJECTION, SOLUTION SUBCUTANEOUS at 08:14

## 2025-02-05 RX ADMIN — POTASSIUM CHLORIDE 100 MILLIEQUIVALENT(S): 750 TABLET, EXTENDED RELEASE ORAL at 08:57

## 2025-02-05 RX ADMIN — VANCOMYCIN HYDROCHLORIDE 500 MILLIGRAM(S): KIT at 01:37

## 2025-02-05 RX ADMIN — Medication 100 MILLIGRAM(S): at 13:35

## 2025-02-05 RX ADMIN — PANTOPRAZOLE 40 MILLIGRAM(S): 20 TABLET, DELAYED RELEASE ORAL at 06:03

## 2025-02-05 RX ADMIN — Medication 100 MILLIGRAM(S): at 05:49

## 2025-02-05 RX ADMIN — MIDODRINE HYDROCHLORIDE 15 MILLIGRAM(S): 5 TABLET ORAL at 13:35

## 2025-02-05 RX ADMIN — Medication 1 TABLET(S): at 12:13

## 2025-02-05 RX ADMIN — Medication 2: at 12:12

## 2025-02-05 RX ADMIN — POTASSIUM CHLORIDE 100 MILLIEQUIVALENT(S): 750 TABLET, EXTENDED RELEASE ORAL at 10:16

## 2025-02-05 RX ADMIN — POTASSIUM CHLORIDE 100 MILLIEQUIVALENT(S): 750 TABLET, EXTENDED RELEASE ORAL at 12:12

## 2025-02-05 RX ADMIN — Medication 75 MILLILITER(S): at 15:15

## 2025-02-05 RX ADMIN — VANCOMYCIN HYDROCHLORIDE 500 MILLIGRAM(S): KIT at 13:35

## 2025-02-05 RX ADMIN — Medication 10 GRAM(S): at 13:35

## 2025-02-05 RX ADMIN — Medication 1 MILLIGRAM(S): at 12:13

## 2025-02-05 RX ADMIN — MIDODRINE HYDROCHLORIDE 15 MILLIGRAM(S): 5 TABLET ORAL at 05:49

## 2025-02-05 RX ADMIN — Medication 10 GRAM(S): at 05:49

## 2025-02-05 RX ADMIN — Medication 100 MILLIGRAM(S): at 05:54

## 2025-02-05 RX ADMIN — VANCOMYCIN HYDROCHLORIDE 500 MILLIGRAM(S): KIT at 08:57

## 2025-02-05 RX ADMIN — POTASSIUM CHLORIDE 40 MILLIEQUIVALENT(S): 750 TABLET, EXTENDED RELEASE ORAL at 08:57

## 2025-02-05 RX ADMIN — Medication 40 MILLIGRAM(S): at 05:49

## 2025-02-05 NOTE — PROGRESS NOTE ADULT - SUBJECTIVE AND OBJECTIVE BOX
[facility] Division of Hospital Medicine    SUBJECTIVE / OVERNIGHT EVENTS:    pt seen and examined at bedside  pt still feeling tired and weak  rectal tube with solid stools  not having abdominal pain  tolerating po diet    MEDICATIONS  (STANDING):  cefTRIAXone Injectable. 2000 milliGRAM(s) IV Push every 24 hours  dextrose 5%. 1000 milliLiter(s) (50 mL/Hr) IV Continuous <Continuous>  dextrose 5%. 1000 milliLiter(s) (100 mL/Hr) IV Continuous <Continuous>  dextrose 50% Injectable 25 Gram(s) IV Push once  dextrose 50% Injectable 12.5 Gram(s) IV Push once  dextrose 50% Injectable 25 Gram(s) IV Push once  folic acid 1 milliGRAM(s) Oral daily  furosemide    Tablet 40 milliGRAM(s) Oral daily  glucagon  Injectable 1 milliGRAM(s) IntraMuscular once  insulin glargine Injectable (LANTUS) 15 Unit(s) SubCutaneous every morning  insulin lispro (ADMELOG) corrective regimen sliding scale   SubCutaneous every 6 hours  lactulose Syrup 10 Gram(s) Oral three times a day  melatonin 5 milliGRAM(s) Oral at bedtime  metroNIDAZOLE  IVPB 500 milliGRAM(s) IV Intermittent every 8 hours  midodrine 15 milliGRAM(s) Oral every 8 hours  multivitamin 1 Tablet(s) Oral daily  pantoprazole    Tablet 40 milliGRAM(s) Oral before breakfast  rifAXIMin 550 milliGRAM(s) Oral two times a day  sodium chloride 0.9%. 500 milliLiter(s) (75 mL/Hr) IV Continuous <Continuous>  spironolactone 100 milliGRAM(s) Oral daily  thiamine 100 milliGRAM(s) Oral every 24 hours  vancomycin    Solution 500 milliGRAM(s) Oral every 6 hours    MEDICATIONS  (PRN):  dextrose Oral Gel 15 Gram(s) Oral once PRN Blood Glucose LESS THAN 70 milliGRAM(s)/deciliter  hemorrhoidal Ointment 1 Application(s) Rectal every 6 hours PRN Hemorrhoids  traMADol 50 milliGRAM(s) Oral every 12 hours PRN Moderate Pain (4 - 6)        I&O's Summary    04 Feb 2025 07:01  -  05 Feb 2025 07:00  --------------------------------------------------------  IN: 400 mL / OUT: 640 mL / NET: -240 mL        PHYSICAL EXAM:  Vital Signs Last 24 Hrs  T(C): 36.4 (05 Feb 2025 10:52), Max: 36.4 (04 Feb 2025 17:12)  T(F): 97.5 (05 Feb 2025 10:52), Max: 97.6 (04 Feb 2025 17:12)  HR: 100 (05 Feb 2025 10:52) (84 - 100)  BP: 92/48 (05 Feb 2025 11:00) (80/47 - 115/78)  BP(mean): 78 (04 Feb 2025 20:00) (67 - 90)  RR: 18 (05 Feb 2025 10:52) (18 - 25)  SpO2: 97% (05 Feb 2025 10:52) (94% - 99%)    Parameters below as of 04 Feb 2025 22:00  Patient On (Oxygen Delivery Method): nasal cannula  O2 Flow (L/min): 4          CONSTITUTIONAL: NAD, well-groomed  ENMT: Moist oral mucosa, no pharyngeal injection or exudates; normal dentition  RESPIRATORY: Normal respiratory effort; lungs are clear to auscultation bilaterally  CARDIOVASCULAR: Regular rate and rhythm, normal S1 and S2, no murmur/rub/gallop; No lower extremity edema; Peripheral pulses are 2+ bilaterally  ABDOMEN: Nontender to palpation, normoactive bowel sounds, no rebound/guarding; No hepatosplenomegaly  MUSCLOSKELETAL:  Normal gait; no clubbing or cyanosis of digits; no joint swelling or tenderness to palpation  PSYCH: A+O to person, place, and time; affect appropriate  NEUROLOGY: CN 2-12 are intact and symmetric; no gross sensory deficits;   SKIN: No rashes; no palpable lesions  GI: rectal tube w solid stool    LABS:                        11.5   13.10 )-----------( 108      ( 05 Feb 2025 06:35 )             34.0     02-05    136  |  93[L]  |  27.6[H]  ----------------------------<  100[H]  2.5[LL]   |  31.0[H]  |  0.23[L]    Ca    8.6      05 Feb 2025 06:35  Phos  1.8     02-05  Mg     1.6     02-05    TPro  6.3[L]  /  Alb  3.9  /  TBili  8.3[H]  /  DBili  x   /  AST  61[H]  /  ALT  32  /  AlkPhos  234[H]  02-05    PT/INR - ( 05 Feb 2025 06:35 )   PT: 23.6 sec;   INR: 2.05 ratio         PTT - ( 05 Feb 2025 06:35 )  PTT:34.4 sec      Urinalysis Basic - ( 05 Feb 2025 06:35 )    Color: x / Appearance: x / SG: x / pH: x  Gluc: 100 mg/dL / Ketone: x  / Bili: x / Urobili: x   Blood: x / Protein: x / Nitrite: x   Leuk Esterase: x / RBC: x / WBC x   Sq Epi: x / Non Sq Epi: x / Bacteria: x        Culture - Fungal, Body Fluid (collected 04 Feb 2025 16:00)  Source: Peritoneal  Preliminary Report (05 Feb 2025 08:02):    Testing in progress    Culture - Body Fluid with Gram Stain (collected 04 Feb 2025 16:00)  Source: Abdominal Fl  Gram Stain (05 Feb 2025 01:23):    No polymorphonuclear leukocytes seen    No organisms seen    by cytocentrifuge      CAPILLARY BLOOD GLUCOSE      POCT Blood Glucose.: 151 mg/dL (05 Feb 2025 12:10)  POCT Blood Glucose.: 111 mg/dL (05 Feb 2025 08:12)  POCT Blood Glucose.: 145 mg/dL (04 Feb 2025 22:11)  POCT Blood Glucose.: 165 mg/dL (04 Feb 2025 17:52)

## 2025-02-05 NOTE — PROGRESS NOTE ADULT - ASSESSMENT
59F with PMHx of ETOH cirrhosis, recurrent ascites s/p multiple paracentesis, esophageal varices s/p banding, Flu positive a week prior, presented generalized weakness, diarrhea, intermittent fever and poor po intake. On arrival found to be hypotensive unresponsive to be IVF requiring vasopressor support. Admitted to MICU for Septic shock 2/2 to colitis. CT with evidence of colitis and C Diff PCR positive and started on PO Vancomycin and Flagyl. Course complicated by AHRF with change in mental status and was emergently intubated 1/28/25 and successfully extubated 2/2/25. Found to have E Coli and bacteroides bacteremia and possible MV vegetation on TTE and cardiology consulted for ARACELI, s/p EGD 1/31 for GI clearance which noted nonbleeding 2 cords of grade I varices in the lower third of the esophagus and ultimately deemed poor candidate for ARACELI due to esophageal varices. ID following and decided on empiric 6 weeks of antibiotics coverage for presumed endocarditis. Repeat blood culture 1/31 negative. Pt also s/p paracentesis 2/4 and removed 2L. Now downgraded to medicine.     #Septic shock 2/2 to C Diff Colitis   - s/p Levophed  - c/w midodrine 15mg po TID, maintain MAP >65  -ordered 500cc of NS today  - C Diff PCR positive   - c/w PO Vancomycin and Flagyl, plan to c/w vancomycin while on long term abx for presumed endocarditis (need total of 2 weeks through 02/13/25)  - ID following   -will dc rectal tube today as stool is more formed    #E Coli and bacteroides bacteremia   #Presumed endocarditis   Afebrile and WBC stable   - TTE with possible MV vegetation  - BCx 1/27/25 revealed E Coli and bacteroides, Repeat BCx 1/31 negative  - s/p Meropenem for 1 day  - c/w Ceftriaxone 2g (will need to continue until 03/14/25) --- will need PICC line prior to dc  - ID following and pending final recs for DC including duration     - s/p EGD for GI clearance given h/o varices and GI bleeding, and noted  nonbleeding 2 cords of grade I varices in the lower third of the esophagus  - Seen by Cardiology for ARACELI however deemed poor candidate due to esophageal varices    Decompensated cirrhosis 2/2 to ETOH abuse   - s/p paracentesis 2/4 and removed 2L  - c/w Lasix 40mg po QD  - c/w Spironolactone 100mg po QD   - c/w thiamine, folic acid and multivitamin      Hepatic encephalopathy  - c/w Lactulose 10g po TID, monitor BM and titrate with mental status   -c/w Rifaximin 550mg po BID    AHRF   - s/p emergently intubated  for airway protection 1/28/25 and successfully extubated 2/2/25  - now on 3L satting 98%    DVT ppx: SCD  diet: soft, bite sized carb consistent  dispo: anticipate 2 more day LOS. will request for PICC when WOODY is determined

## 2025-02-05 NOTE — PROVIDER CONTACT NOTE (CRITICAL VALUE NOTIFICATION) - PERSON GIVING RESULT:
Anders Cantor
Demario Maharaj
Pebbles WINCHESTER
Denver Ford
Dakota Maharaj
Demario/ Carol Ann
Carri WINC[place]STER
Lab Jessica
Demario Maharaj
Edmond Alvarez
Demario Brooks
Jessica Payton
Kira ACEVEDO, Lab
Mandy Rothman
Shi gonzalez
Kira CAMPBELL, Lab
RT Peres

## 2025-02-05 NOTE — PROVIDER CONTACT NOTE (CRITICAL VALUE NOTIFICATION) - NAME OF MD/NP/PA/DO NOTIFIED:
BERTIN Veronica
Rica Turner
Bessie YOUNG
Rica Turner
Dr. Alexandra
Glenys Flores
Medicine Pa Ammy Leong
Rica Turner
Dr. La
BERTIN Varghese
Bessie YOUNG
Rica Pierce
BERTIN Varghese
julisa cardona
DO Guillen
Bessie YOUNG
Dr. Flores

## 2025-02-05 NOTE — PROGRESS NOTE ADULT - SUBJECTIVE AND OBJECTIVE BOX
INFECTIOUS DISEASES AND INTERNAL MEDICINE at [place]  =======================================================  Tommy Blair MD  Diplomates American Board of Internal Medicine and Infectious Diseases  Telephone [phone]  Fax            [phone]  =======================================================    AB[place]HAN GOODWIN [id number]    Follow up:  BACTEREMIA C DIFF    Allergies:  Zithromax (Unknown)  morphine (Nausea)      Medications:  cefTRIAXone Injectable. 2000 milliGRAM(s) IV Push every 24 hours  chlorhexidine 2% Cloths 1 Application(s) Topical <User Schedule>  dextrose 5% + lactated ringers. 1000 milliLiter(s) IV Continuous <Continuous>  dextrose 5%. 1000 milliLiter(s) IV Continuous <Continuous>  dextrose 5%. 1000 milliLiter(s) IV Continuous <Continuous>  dextrose 50% Injectable 25 Gram(s) IV Push once  dextrose 50% Injectable 12.5 Gram(s) IV Push once  dextrose 50% Injectable 25 Gram(s) IV Push once  dextrose Oral Gel 15 Gram(s) Oral once PRN  folic acid 1 milliGRAM(s) Oral daily  glucagon  Injectable 1 milliGRAM(s) IntraMuscular once  hemorrhoidal Ointment 1 Application(s) Rectal every 6 hours PRN  hydrocortisone sodium succinate Injectable 50 milliGRAM(s) IV Push every 6 hours  insulin glargine Injectable (LANTUS) 15 Unit(s) SubCutaneous every morning  insulin lispro (ADMELOG) corrective regimen sliding scale   SubCutaneous every 6 hours  melatonin 5 milliGRAM(s) Oral at bedtime  metroNIDAZOLE  IVPB 500 milliGRAM(s) IV Intermittent every 8 hours  midodrine 10 milliGRAM(s) Oral every 8 hours  multivitamin 1 Tablet(s) Oral daily  norepinephrine Infusion 0.05 MICROgram(s)/kG/Min IV Continuous <Continuous>  pantoprazole    Tablet 40 milliGRAM(s) Oral before breakfast  potassium chloride  20 mEq/100 mL IVPB 20 milliEquivalent(s) IV Intermittent every 2 hours  vancomycin    Solution 500 milliGRAM(s) Oral every 6 hours    SOCIAL       FAMILY   FAMILY HISTORY:  FH: pancreatic cancer (Mother)    Family history of heart attack (Father)    Family history of CVA (Father)      REVIEW OF SYSTEMS:  CONSTITUTIONAL:  No Fever or chills  HEENT:   No diplopia or blurred vision.  No earache, sore throat or runny nose.  CARDIOVASCULAR:  No pressure, squeezing, strangling, tightness, heaviness or aching about the chest, neck, axilla or epigastrium.  RESPIRATORY:  No cough, shortness of breath, PND or orthopnea.  GASTROINTESTINAL:   diarrhea.  GENITOURINARY:  No dysuria, frequency or urgency. No Blood in urine  MUSCULOSKELETAL:   moves all joints  SKIN:  No change in skin, hair or nails.  NEUROLOGIC:  No paresthesias, fasciculations, seizures or weakness.  PSYCHIATRIC:  No disorder of thought or mood.  ENDOCRINE:  No heat or cold intolerance, polyuria or polydipsia.  HEMATOLOGICAL:  No easy bruising or bleeding.            Physical Exam:   Vital Signs Last 24 Hrs  T(C): 36.5 (04 Feb 2025 07:18), Max: 36.6 (03 Feb 2025 16:12)  T(F): 97.7 (04 Feb 2025 07:18), Max: 97.9 (03 Feb 2025 21:49)  HR: 68 (04 Feb 2025 08:00) (63 - 85)  BP: 107/67 (04 Feb 2025 08:00) (100/70 - 112/76)  BP(mean): 79 (04 Feb 2025 08:00) (79 - 89)  RR: 16 (04 Feb 2025 08:00) (13 - 30)  SpO2: 96% (04 Feb 2025 08:00) (90% - 100%)    Parameters below as of 04 Feb 2025 08:00  Patient On (Oxygen Delivery Method): nasal cannula            GEN: NAD,   HEENT: normocephalic and atraumatic. EOMI. LJ.    NECK: Supple. No carotid bruits.  No lymphadenopathy or thyromegaly.  LUNGS: Clear to auscultation.  HEART: Regular rate and rhythm without murmur.  ABDOMEN: Soft, nontender, DISTENDED  .  Positive bowel sounds.    : No CVA tenderness  EXTREMITIES: Without any cyanosis, clubbing, rash, lesions or edema.  MSK: no joint swelling  NEUROLOGIC: Cranial nerves II through XII are grossly intact.  PSYCHIATRIC: Appropriate affect .  SKIN: No ulceration or induration present.        Labs:  Vitals:  =======   =======================================================  Current Antibiotics:  cefTRIAXone Injectable. 2000 milliGRAM(s) IV Push every 24 hours  metroNIDAZOLE  IVPB 500 milliGRAM(s) IV Intermittent every 8 hours  vancomycin    Solution 500 milliGRAM(s) Oral every 6 hours    Other medications:  chlorhexidine 2% Cloths 1 Application(s) Topical <User Schedule>  dextrose 5% + lactated ringers. 1000 milliLiter(s) IV Continuous <Continuous>  dextrose 5%. 1000 milliLiter(s) IV Continuous <Continuous>  dextrose 5%. 1000 milliLiter(s) IV Continuous <Continuous>  dextrose 50% Injectable 25 Gram(s) IV Push once  dextrose 50% Injectable 12.5 Gram(s) IV Push once  dextrose 50% Injectable 25 Gram(s) IV Push once  folic acid 1 milliGRAM(s) Oral daily  glucagon  Injectable 1 milliGRAM(s) IntraMuscular once  hydrocortisone sodium succinate Injectable 50 milliGRAM(s) IV Push every 6 hours  insulin glargine Injectable (LANTUS) 15 Unit(s) SubCutaneous every morning  insulin lispro (ADMELOG) corrective regimen sliding scale   SubCutaneous every 6 hours  melatonin 5 milliGRAM(s) Oral at bedtime  midodrine 10 milliGRAM(s) Oral every 8 hours  multivitamin 1 Tablet(s) Oral daily  norepinephrine Infusion 0.05 MICROgram(s)/kG/Min IV Continuous <Continuous>  pantoprazole    Tablet 40 milliGRAM(s) Oral before breakfast  potassium chloride  20 mEq/100 mL IVPB 20 milliEquivalent(s) IV Intermittent every 2 hours      =======================================================  Labs:                                        1                       11.5   13.10 )-----------( 108      ( 05 Feb 2025 06:35 )             34.0   02-05    136  |  93[L]  |  27.6[H]  ----------------------------<  100[H]  2.5[LL]   |  31.0[H]  |  0.23[L]    Ca    8.6      05 Feb 2025 06:35  Phos  1.8     02-05  Mg     1.6     02-05    TPro  6.3[L]  /  Alb  3.9  /  TBili  8.3[H]  /  DBili  x   /  AST  61[H]  /  ALT  32  /  AlkPhos  234[H]  02-05      Culture - Blood (collected 01-31-25 @ 02:45)  Source: .Blood BLOOD  Preliminary Report (02-03-25 @ 06:01):    No growth at 72 Hours    Culture - Blood (collected 01-31-25 @ 02:30)  Source: .Blood BLOOD  Preliminary Report (02-03-25 @ 06:01):    No growth at 72 Hours    Culture - Blood (collected 01-27-25 @ 06:00)  Source: .Blood BLOOD  Gram Stain (01-30-25 @ 07:57):    Growth in anaerobic bottle: Gram Negative Rods  Final Report (02-01-25 @ 11:51):    Growth in anaerobic bottle: Bacteroides thetaiotaomicron group    "Susceptibilities not performed"    Culture - Blood (collected 01-27-25 @ 02:07)  Source: .Blood BLOOD  Gram Stain (01-27-25 @ 18:01):    Growth in anaerobic bottle: Gram Negative Rods    Growth in aerobic bottle: Gram Negative Rods  Final Report (01-29-25 @ 08:00):    Growth in aerobic and anaerobic bottles: Escherichia coli    Direct identification is available within approximately 3-5    hours either by Blood Panel Multiplexed PCR or Direct    MALDI-TOF. Details: [email or web address]  Organism: Blood Culture PCR  Escherichia coli (01-29-25 @ 08:00)  Organism: Escherichia coli (01-29-25 @ 08:00)    Sensitivities:      Method Type: NABILA      -  Ampicillin: R >16 These ampicillin results predict results for amoxicillin      -  Ampicillin/Sulbactam: I 16/8      -  Aztreonam: S <=4      -  Cefazolin: I 4      -  Cefepime: S <=2      -  Cefoxitin: S <=8      -  Ceftriaxone: S <=1      -  Ciprofloxacin: S <=0.25      -  Ertapenem: S <=0.5      -  Gentamicin: S <=2      -  Imipenem: S <=1      -  Levofloxacin: S <=0.5      -  Meropenem: S <=1      -  Piperacillin/Tazobactam: S <=8      -  Tobramycin: I 4      -  Trimethoprim/Sulfamethoxazole: S <=0.5/9.5  Organism: Blood Culture PCR (01-29-25 @ 08:00)    Sensitivities:      Method Type: PCR      -  Escherichia coli: Detec    Urinalysis with Rflx Culture (collected 11-16-24 @ 09:00)    Culture - Body Fluid with Gram Stain (collected 11-16-24 @ 04:44)  Source: Abdominal Fl  Gram Stain (11-16-24 @ 12:36):    polymorphonuclear leukocytes seen    No organisms seen    by cytocentrifuge  Final Report (11-21-24 @ 08:50):    No growth at 5 days    Culture - Blood (collected 11-16-24 @ 03:17)  Source: .Blood BLOOD  Final Report (11-21-24 @ 09:01):    No growth at 5 days    Culture - Blood (collected 11-16-24 @ 02:44)  Source: .Blood BLOOD  Final Report (11-21-24 @ 09:01):    No growth at 5 days    Culture - Fungal, Body Fluid (collected 11-23-23 @ 14:00)  Source: Peritoneal Peritoneal Fluid  Final Report (12-23-23 @ 15:01):    No fungus isolated at 4 weeks.    Culture - Body Fluid with Gram Stain (collected 11-23-23 @ 14:00)  Source: Ascites Fl Ascites Fluid  Gram Stain (11-23-23 @ 23:43):    polymorphonuclear leukocytes    No organisms seen    by cytocentrifuge  Final Report (11-28-23 @ 16:15):    No growth at 5 days    Culture - Blood (collected 11-22-23 @ 08:31)  Source: .Blood None  Final Report (11-27-23 @ 13:01):    No growth at 5 days    Culture - Blood (collected 11-22-23 @ 08:22)  Source: .Blood None  Final Report (11-27-23 @ 13:01):    No growth at 5 days    Culture - Blood (collected 11-09-23 @ 07:01)  Source: .Blood None  Final Report (11-14-23 @ 14:00):    No growth at 5 days    Culture - Blood (collected 11-09-23 @ 07:01)  Source: .Blood None  Final Report (11-14-23 @ 14:00):    No growth at 5 days    Culture - Blood (collected 10-07-23 @ 09:01)  Source: .Blood None  Final Report (10-12-23 @ 17:01):    No growth at 5 days    Culture - Blood (collected 10-07-23 @ 09:01)  Source: .Blood Blood-Venous  Final Report (10-12-23 @ 17:01):    No growth at 5 days    Culture - Blood (collected 05-09-23 @ 04:39)  Source: .Blood Blood-Peripheral  Final Report (05-14-23 @ 10:01):    No Growth Final    Culture - Urine (collected 05-09-23 @ 04:39)  Source: Clean Catch Clean Catch (Midstream)  Final Report (05-10-23 @ 23:46):    <10,000 CFU/mL Normal Urogenital Bibi    Culture - Blood (collected 05-09-23 @ 04:39)  Source: .Blood Blood-Peripheral  Final Report (05-14-23 @ 10:01):    No Growth Final      Creatinine: 0.32 mg/dL (02-03-25 @ 03:28)  Creatinine: 0.45 mg/dL (02-02-25 @ 02:12)  Creatinine: 0.53 mg/dL (02-01-25 @ 20:00)  Creatinine: 0.55 mg/dL (02-01-25 @ 13:26)  Creatinine: 0.52 mg/dL (02-01-25 @ 10:00)  Creatinine: 0.48 mg/dL (02-01-25 @ 06:45)  Creatinine: 0.48 mg/dL (02-01-25 @ 02:15)  Creatinine: 0.44 mg/dL (01-31-25 @ 02:45)  Creatinine: 0.42 mg/dL (01-30-25 @ 13:50)  Creatinine: 0.61 mg/dL (01-30-25 @ 04:30)  Creatinine: 0.69 mg/dL (01-29-25 @ 09:50)            WBC Count: 15.83 K/uL (02-03-25 @ 03:28)  WBC Count: 7.08 K/uL (02-02-25 @ 02:12)  WBC Count: 7.87 K/uL (02-01-25 @ 13:26)  WBC Count: 8.95 K/uL (02-01-25 @ 06:45)  WBC Count: 9.02 K/uL (02-01-25 @ 02:15)  WBC Count: 12.79 K/uL (01-31-25 @ 02:45)  WBC Count: 17.97 K/uL (01-30-25 @ 04:30)  WBC Count: 26.74 K/uL (01-29-25 @ 09:50)    SARS-CoV-2: NotDetec (01-27-25 @ 06:00)      Alkaline Phosphatase: 258 U/L (02-03-25 @ 03:28)  Alkaline Phosphatase: 287 U/L (02-02-25 @ 02:12)  Alkaline Phosphatase: 262 U/L (02-01-25 @ 13:26)  Alkaline Phosphatase: 252 U/L (02-01-25 @ 06:45)  Alkaline Phosphatase: 264 U/L (02-01-25 @ 02:15)  Alkaline Phosphatase: 274 U/L (01-31-25 @ 02:45)  Alanine Aminotransferase (ALT/SGPT): 26 U/L (02-03-25 @ 03:28)  Alanine Aminotransferase (ALT/SGPT): 26 U/L (02-02-25 @ 02:12)  Alanine Aminotransferase (ALT/SGPT): 24 U/L (02-01-25 @ 13:26)  Alanine Aminotransferase (ALT/SGPT): 24 U/L (02-01-25 @ 06:45)  Alanine Aminotransferase (ALT/SGPT): 24 U/L (02-01-25 @ 02:15)  Alanine Aminotransferase (ALT/SGPT): 27 U/L (01-31-25 @ 02:45)  Aspartate Aminotransferase (AST/SGOT): 50 U/L (02-03-25 @ 03:28)  Aspartate Aminotransferase (AST/SGOT): 58 U/L (02-02-25 @ 02:12)  Aspartate Aminotransferase (AST/SGOT): 58 U/L (02-01-25 @ 13:26)  Aspartate Aminotransferase (AST/SGOT): 59 U/L (02-01-25 @ 06:45)  Aspartate Aminotransferase (AST/SGOT): 60 U/L (02-01-25 @ 02:15)  Aspartate Aminotransferase (AST/SGOT): 74 U/L (01-31-25 @ 02:45)  Bilirubin Total: 7.0 mg/dL (02-03-25 @ 03:28)  Bilirubin Total: 8.6 mg/dL (02-02-25 @ 02:12)  Bilirubin Total: 8.4 mg/dL (02-01-25 @ 13:26)  Bilirubin Total: 9.0 mg/dL (02-01-25 @ 10:00)  Bilirubin Total: 8.9 mg/dL (02-01-25 @ 06:45)  Bilirubin Total: 9.4 mg/dL (02-01-25 @ 02:15)  Bilirubin Total: 10.2 mg/dL (01-31-25 @ 02:45)

## 2025-02-05 NOTE — PHYSICAL THERAPY INITIAL EVALUATION ADULT - ACTIVE RANGE OF MOTION EXAMINATION, REHAB EVAL
b/l LE to mid-range./bilateral upper extremity Active ROM was WFL (within functional limits)/deficits as listed below

## 2025-02-05 NOTE — PHYSICAL THERAPY INITIAL EVALUATION ADULT - PERTINENT HX OF CURRENT PROBLEM, REHAB EVAL
Pt is a 60 y/o female who presented with fatigue and diarrhea, had tested positive for flu. Pt has x/o ETOH abuse with multiple paracentesis and esophageal varices with banding.  Pt is now s/p downgrade from ICU where she was intubated and had paracentesis. (+) c-diff.

## 2025-02-05 NOTE — PROVIDER CONTACT NOTE (CRITICAL VALUE NOTIFICATION) - ACTION/TREATMENT ORDERED:
Continue antibiotic treatment. LINN suresh ordered for repeat lacate from 0000.
Rpt labs at 1200.
Cont with vancomycin
Maintain sepsis treatment
awaiting new orders
Cont potassium supplementation and rpt labs at 1200
No new orders at this time.
No new orders at this time.
Medicine Pa made aware of critical value. Oncoming RN aware. Awaiting further orders.

## 2025-02-05 NOTE — PROGRESS NOTE ADULT - ASSESSMENT
60 y/o female with PMH of ETOH cirrhosis, multiple paracentesis for ascites, esophageal varices s/p banding,  who presents with x1 week of generalized fatigue, multiple bouts of diarrhea with poor PO intake and intermittent fevers. Of note was diagnosed Flu positive last week ON ADMISSION SHE REPORTED   fatigue, chills, +cough, +abdominal tenderness, and +SOB.    PT WITH POSITIVE STOOL FOR  C DIFF   AND BLOOD CX WITH ECOLI  CT SCAN  WITH COLITIS  CONTINUE  ORAL VANCO   UNCLEAR IF ABSORBED AS VIA NGT  IN ADDITION ON FLAGYL  PT WITH BLOOD CX WITH ECOLI  ADN BACTEROIDES  PT I WAS iNTUBATED ON PRESSORS NOW EXTUBATED AND OFF PRESSORS  WBC UP TO  K    AWAKE ALERT   PT WITH ? VEG ON MITRAL VALVE   ON TRANSTHORACIC ECHOCARDIOGRAM   HAD EGD WITH ESOPHAGEAL  VARICES FELT TO BE HIGHER  RISK FOR ARACELI   ALTHOUHG GM NEGATIVE ENDOCARDITIS IS RARE  CONCERN AS PT WITH CIRRHOSIS  WILL THEREFORE PLAN ON 6  WEEKS OF IV ABX FOR PRESUMED ENDOCARDITIS   WILL RECOMMEND ROCEPHIN 2GM Q 24 THROUGH 3/14/25  WOULD  CONTINUE FLAGYL ORAL FOR BACTEROIDES FOR 2 WEEKS  THRO 2/13/25   CONTINUE ORAL VANCO 125 QID WHILE ON IV ROCEPIN THROUGH  3/14  PICC LINE WHEN READY FOR DISCHARGE

## 2025-02-05 NOTE — CHART NOTE - NSCHARTNOTESELECT_GEN_ALL_CORE
CVC and A-line removal
Cardiology/Off Service Note
Event Note
Event Note
Nutrition Services
RSI
Cardiology
Cardiology/Event Note
Event Note
Event Note

## 2025-02-05 NOTE — PROVIDER CONTACT NOTE (CRITICAL VALUE NOTIFICATION) - TEST AND RESULT REPORTED:
H.7  Ht: 20.0
Lactate 10.4
Lactate 11.8
WBC 41.57
potassium: 2.5
K 2.5
Lactate = 4.9
k 2.7
WBC 51.71
C.diff positive
Co2 8
K+ 2.4
WBC 62.07
CO2 10
Lactate 11.3
WBC = 45.53
lactate 12.1

## 2025-02-05 NOTE — CHART NOTE - NSCHARTNOTEFT_GEN_A_CORE
Source: Patient [ ]  Family [ ]   other [ ]    Current Diet:   Soft & Bite Sized, consistent CHO (evening snack); Ensure Enlive TID     Patient reports [ ] nausea  [ ] vomiting [ ] diarrhea [ ] constipation  [ ]chewing problems [ ] swallowing issues  [ ] other:     PO intake:  < 50% [ ]   50-75%  [ ]   %  [ ]  other :    Source for PO intake [ ] Patient [ ] family [ ] chart [ ] staff [ ] other    Current Weight:   (1/28) 148.3 lbs  (1/27) 136.9 lbs    % Weight Change     Pertinent Medications: MEDICATIONS  (STANDING):  dextrose 50% Injectable 25 Gram(s) IV Push once  folic acid 1 milliGRAM(s) Oral daily  furosemide    Tablet 40 milliGRAM(s) Oral daily  glucagon  Injectable 1 milliGRAM(s) IntraMuscular once  insulin glargine Injectable (LANTUS) 15 Unit(s) SubCutaneous every morning  insulin lispro (ADMELOG) corrective regimen sliding scale   SubCutaneous every 6 hours  lactulose Syrup 10 Gram(s) Oral three times a day  multivitamin 1 Tablet(s) Oral daily  thiamine 100 milliGRAM(s) Oral every 24 hours    Pertinent Labs: CBC Full  -  ( 05 Feb 2025 06:35 )  WBC Count : 13.10 K/uL  RBC Count : 3.90 M/uL  Hemoglobin : 11.5 g/dL  Hematocrit : 34.0 %  Platelet Count - Automated : 108 K/uL  Mean Cell Volume : 87.2 fl  Mean Cell Hemoglobin : 29.5 pg  Mean Cell Hemoglobin Concentration : 33.8 g/dL    Skin: IAD coccyx  Edema: 1+ generalized edema     Nutrition focused physical exam conducted - found signs of malnutrition [ ]absent [ ]present    Subcutaneous fat loss: [ ] Orbital fat pads region, [ ]Buccal fat region, [ ]Triceps region,  [ ]Ribs region    Muscle wasting: [ ]Temples region, [ ]Clavicle region, [ ]Shoulder region, [ ]Scapula region, [ ]Interosseous region,  [ ]thigh region, [ ]Calf region    Estimated Needs:   [ ] no change since previous assessment  [ ] recalculated:     Current Nutrition Diagnosis:    Recommendations:     Monitoring and Evaluation:   [ ] PO intake [ ] Tolerance to diet prescription [X] Weights  [X] Follow up per protocol [X] Labs: Source: Patient [ x]  Family [ x]   other [ ] Pt's spouse at bedside    Current Diet:   Soft & Bite Sized, consistent CHO (evening snack); Ensure Enlive TID     Patient reports [ ] nausea  [ ] vomiting [ ] diarrhea [ ] constipation  [ ]chewing problems [x ] swallowing issues  [ ] other: c/o dry food    PO intake:  < 50% [ x]   50-75%  [ ]   %  [ ]  other :    Source for PO intake [x] Patient [ x] family [ ] chart [ ] staff [ ] other    Current Weight:   (1/28) 148.3 lbs  (1/27) 136.9 lbs    % Weight Change: Unclear accuracy of weight 2/2 inconsistency, will continue to monitor     Pertinent Medications: MEDICATIONS  (STANDING):  dextrose 50% Injectable 25 Gram(s) IV Push once  folic acid 1 milliGRAM(s) Oral daily  furosemide    Tablet 40 milliGRAM(s) Oral daily  glucagon  Injectable 1 milliGRAM(s) IntraMuscular once  insulin glargine Injectable (LANTUS) 15 Unit(s) SubCutaneous every morning  insulin lispro (ADMELOG) corrective regimen sliding scale   SubCutaneous every 6 hours  lactulose Syrup 10 Gram(s) Oral three times a day  multivitamin 1 Tablet(s) Oral daily  thiamine 100 milliGRAM(s) Oral every 24 hours    Pertinent Labs: CBC Full  -  ( 05 Feb 2025 06:35 )  WBC Count : 13.10 K/uL  RBC Count : 3.90 M/uL  Hemoglobin : 11.5 g/dL  Hematocrit : 34.0 %  Platelet Count - Automated : 108 K/uL  Mean Cell Volume : 87.2 fl  Mean Cell Hemoglobin : 29.5 pg  Mean Cell Hemoglobin Concentration : 33.8 g/dL    Skin: IAD coccyx  Edema: 1+ generalized edema     Nutrition focused physical exam conducted - found signs of malnutrition [ ]absent [x]present    Subcutaneous fat loss: [ x] Orbital fat pads region, [ ]Buccal fat region, [ ]Triceps region,  [ ]Ribs region    Muscle wasting: [x ]Temples region, [ ]Clavicle region, [ ]Shoulder region, [ ]Scapula region, [ ]Interosseous region,  [ ]thigh region, [ ]Calf region    Estimated Needs:   [x ] no change since previous assessment  [ ] recalculated:     Current Nutrition Diagnosis: Pt remains at high nutrition risk secondary to malnutrition (moderate chronic) related to inadequate protein energy intake with altered GI function in setting of ETOH cirrhosis; now with CDIFF, ECOLI bacteremia As evidenced by physical signs of mod muscle/fat loss; meeting < 75% est needs > 1mo, weight loss. Pt reports continued poor PO intake 2/2 persistent lack of appetite, early satiety. Pt c/o pain urinating, at risk for decreased hydration. Pt is drinking Ensure well. Discussed with Pt/family to liberalize diet to regular consistency as tolerated with the impression that they would order meals daily to ensure Pt is receiving preferred and soft foods. Labs notable for decreased K, Phos, creat and elevated BUN.     Recommendations:     Monitoring and Evaluation:   [ ] PO intake [ ] Tolerance to diet prescription [X] Weights  [X] Follow up per protocol [X] Labs: Source: Patient [ x]  Family [ x]   other [ ] Pt's friend/family at bedside    Current Diet:   Soft & Bite Sized, consistent CHO (evening snack); Ensure Enlive TID     Patient reports [ ] nausea  [ ] vomiting [ ] diarrhea [ ] constipation  [ ]chewing problems [x ] swallowing issues  [ ] other: c/o dry food    PO intake:  < 50% [ x]   50-75%  [ ]   %  [ ]  other :    Source for PO intake [x] Patient [ x] family [ ] chart [ ] staff [ ] other    Current Weight:   (1/28) 148.3 lbs  (1/27) 136.9 lbs    % Weight Change: Unclear accuracy of weight 2/2 inconsistency, will continue to monitor     Pertinent Medications: MEDICATIONS  (STANDING):  dextrose 50% Injectable 25 Gram(s) IV Push once  folic acid 1 milliGRAM(s) Oral daily  furosemide    Tablet 40 milliGRAM(s) Oral daily  glucagon  Injectable 1 milliGRAM(s) IntraMuscular once  insulin glargine Injectable (LANTUS) 15 Unit(s) SubCutaneous every morning  insulin lispro (ADMELOG) corrective regimen sliding scale   SubCutaneous every 6 hours  lactulose Syrup 10 Gram(s) Oral three times a day  multivitamin 1 Tablet(s) Oral daily  thiamine 100 milliGRAM(s) Oral every 24 hours    Pertinent Labs: CBC Full  -  ( 05 Feb 2025 06:35 )  WBC Count : 13.10 K/uL  RBC Count : 3.90 M/uL  Hemoglobin : 11.5 g/dL  Hematocrit : 34.0 %  Platelet Count - Automated : 108 K/uL  Mean Cell Volume : 87.2 fl  Mean Cell Hemoglobin : 29.5 pg  Mean Cell Hemoglobin Concentration : 33.8 g/dL    Skin: IAD coccyx  Edema: 1+ generalized edema     Nutrition focused physical exam conducted - found signs of malnutrition [ ]absent [x]present    Subcutaneous fat loss: [ x] Orbital fat pads region, [ ]Buccal fat region, [ ]Triceps region,  [ ]Ribs region    Muscle wasting: [x ]Temples region, [ ]Clavicle region, [ ]Shoulder region, [ ]Scapula region, [ ]Interosseous region,  [ ]thigh region, [ ]Calf region    Estimated Needs:   [x ] no change since previous assessment  [ ] recalculated:     Current Nutrition Diagnosis: Pt remains at high nutrition risk secondary to malnutrition (moderate chronic) related to inadequate protein energy intake with altered GI function in setting of ETOH cirrhosis; now with CDIFF, ECOLI bacteremia As evidenced by physical signs of mod muscle/fat loss; meeting < 75% est needs > 1mo, weight loss. Pt reports continued poor PO intake 2/2 persistent lack of appetite, early satiety. Pt c/o pain urinating, at risk for decreased hydration. Pt is drinking Ensure well. Discussed with Pt/family to liberalize diet to regular consistency as tolerated with the impression that they would order meals daily to ensure Pt is receiving preferred and soft foods. Labs notable for decreased K, Phos, creat and elevated BUN.     Recommendations:   1) Change to consistent CHO with evening snack, regular consistency  2) Add Glucerna TID   3) Continue MVI, thiamine and folic acid supplementation  4) Encourage HBV protein sources   5) Monitor weights daily for trend/accuracy     Monitoring and Evaluation:   [x ] PO intake [x ] Tolerance to diet prescription [X] Weights  [X] Follow up per protocol [X] Labs:

## 2025-02-06 LAB
ALBUMIN SERPL ELPH-MCNC: 3 G/DL — LOW (ref 3.3–5.2)
ALP SERPL-CCNC: 231 U/L — HIGH (ref 40–120)
ALT FLD-CCNC: 36 U/L — HIGH
ANION GAP SERPL CALC-SCNC: 11 MMOL/L — SIGNIFICANT CHANGE UP (ref 5–17)
AST SERPL-CCNC: 76 U/L — HIGH
BILIRUB SERPL-MCNC: 6.6 MG/DL — HIGH (ref 0.4–2)
BUN SERPL-MCNC: 17.9 MG/DL — SIGNIFICANT CHANGE UP (ref 8–20)
CALCIUM SERPL-MCNC: 8.1 MG/DL — LOW (ref 8.4–10.5)
CHLORIDE SERPL-SCNC: 98 MMOL/L — SIGNIFICANT CHANGE UP (ref 96–108)
CO2 SERPL-SCNC: 27 MMOL/L — SIGNIFICANT CHANGE UP (ref 22–29)
CREAT SERPL-MCNC: <0.2 MG/DL — LOW (ref 0.5–1.3)
CULTURE RESULTS: ABNORMAL
EGFR: 133 ML/MIN/1.73M2 — SIGNIFICANT CHANGE UP
GLUCOSE BLDC GLUCOMTR-MCNC: 105 MG/DL — HIGH (ref 70–99)
GLUCOSE BLDC GLUCOMTR-MCNC: 113 MG/DL — HIGH (ref 70–99)
GLUCOSE BLDC GLUCOMTR-MCNC: 145 MG/DL — HIGH (ref 70–99)
GLUCOSE BLDC GLUCOMTR-MCNC: 145 MG/DL — HIGH (ref 70–99)
GLUCOSE BLDC GLUCOMTR-MCNC: 151 MG/DL — HIGH (ref 70–99)
GLUCOSE SERPL-MCNC: 86 MG/DL — SIGNIFICANT CHANGE UP (ref 70–99)
HCT VFR BLD CALC: 26.9 % — LOW (ref 34.5–45)
HGB BLD-MCNC: 9 G/DL — LOW (ref 11.5–15.5)
MCHC RBC-ENTMCNC: 29.5 PG — SIGNIFICANT CHANGE UP (ref 27–34)
MCHC RBC-ENTMCNC: 33.5 G/DL — SIGNIFICANT CHANGE UP (ref 32–36)
MCV RBC AUTO: 88.2 FL — SIGNIFICANT CHANGE UP (ref 80–100)
PLATELET # BLD AUTO: 105 K/UL — LOW (ref 150–400)
POTASSIUM SERPL-MCNC: 3.4 MMOL/L — LOW (ref 3.5–5.3)
POTASSIUM SERPL-SCNC: 3.4 MMOL/L — LOW (ref 3.5–5.3)
PROT SERPL-MCNC: 5.3 G/DL — LOW (ref 6.6–8.7)
RBC # BLD: 3.05 M/UL — LOW (ref 3.8–5.2)
RBC # FLD: 24.7 % — HIGH (ref 10.3–14.5)
SODIUM SERPL-SCNC: 136 MMOL/L — SIGNIFICANT CHANGE UP (ref 135–145)
WBC # BLD: 10.84 K/UL — HIGH (ref 3.8–10.5)
WBC # FLD AUTO: 10.84 K/UL — HIGH (ref 3.8–10.5)

## 2025-02-06 PROCEDURE — 99233 SBSQ HOSP IP/OBS HIGH 50: CPT

## 2025-02-06 PROCEDURE — G0545: CPT

## 2025-02-06 RX ADMIN — Medication 1 MILLIGRAM(S): at 16:25

## 2025-02-06 RX ADMIN — MIDODRINE HYDROCHLORIDE 15 MILLIGRAM(S): 5 TABLET ORAL at 16:25

## 2025-02-06 RX ADMIN — VANCOMYCIN HYDROCHLORIDE 500 MILLIGRAM(S): KIT at 11:48

## 2025-02-06 RX ADMIN — CEFTRIAXONE 2000 MILLIGRAM(S): 250 INJECTION, POWDER, FOR SOLUTION INTRAMUSCULAR; INTRAVENOUS at 17:59

## 2025-02-06 RX ADMIN — Medication 10 GRAM(S): at 22:22

## 2025-02-06 RX ADMIN — Medication 10 GRAM(S): at 00:02

## 2025-02-06 RX ADMIN — Medication 100 MILLIGRAM(S): at 08:45

## 2025-02-06 RX ADMIN — Medication 100 MILLIGRAM(S): at 17:59

## 2025-02-06 RX ADMIN — Medication 100 MILLIGRAM(S): at 16:25

## 2025-02-06 RX ADMIN — ACETAMINOPHEN, DIPHENHYDRAMINE HCL, PHENYLEPHRINE HCL 5 MILLIGRAM(S): 325; 25; 5 TABLET ORAL at 00:01

## 2025-02-06 RX ADMIN — INSULIN GLARGINE-YFGN 15 UNIT(S): 100 INJECTION, SOLUTION SUBCUTANEOUS at 08:45

## 2025-02-06 RX ADMIN — MIDODRINE HYDROCHLORIDE 15 MILLIGRAM(S): 5 TABLET ORAL at 22:24

## 2025-02-06 RX ADMIN — MIDODRINE HYDROCHLORIDE 15 MILLIGRAM(S): 5 TABLET ORAL at 05:21

## 2025-02-06 RX ADMIN — ACETAMINOPHEN, DIPHENHYDRAMINE HCL, PHENYLEPHRINE HCL 5 MILLIGRAM(S): 325; 25; 5 TABLET ORAL at 22:23

## 2025-02-06 RX ADMIN — VANCOMYCIN HYDROCHLORIDE 500 MILLIGRAM(S): KIT at 23:41

## 2025-02-06 RX ADMIN — VANCOMYCIN HYDROCHLORIDE 500 MILLIGRAM(S): KIT at 05:20

## 2025-02-06 RX ADMIN — Medication 2: at 17:00

## 2025-02-06 RX ADMIN — Medication 10 GRAM(S): at 16:25

## 2025-02-06 RX ADMIN — CEFTRIAXONE 2000 MILLIGRAM(S): 250 INJECTION, POWDER, FOR SOLUTION INTRAMUSCULAR; INTRAVENOUS at 00:02

## 2025-02-06 RX ADMIN — MIDODRINE HYDROCHLORIDE 15 MILLIGRAM(S): 5 TABLET ORAL at 00:01

## 2025-02-06 RX ADMIN — Medication 100 MILLIGRAM(S): at 23:41

## 2025-02-06 RX ADMIN — Medication 100 MILLIGRAM(S): at 00:01

## 2025-02-06 RX ADMIN — Medication 10 GRAM(S): at 05:21

## 2025-02-06 RX ADMIN — VANCOMYCIN HYDROCHLORIDE 500 MILLIGRAM(S): KIT at 18:00

## 2025-02-06 RX ADMIN — Medication 100 MILLIGRAM(S): at 00:02

## 2025-02-06 RX ADMIN — PANTOPRAZOLE 40 MILLIGRAM(S): 20 TABLET, DELAYED RELEASE ORAL at 05:20

## 2025-02-06 RX ADMIN — VANCOMYCIN HYDROCHLORIDE 500 MILLIGRAM(S): KIT at 00:04

## 2025-02-06 NOTE — PROGRESS NOTE ADULT - NUTRITIONAL ASSESSMENT
This patient has been assessed with a concern for Malnutrition and has been determined to have a diagnosis/diagnoses of Moderate protein-calorie malnutrition and Underweight (BMI < 19).    This patient is being managed with:   Diet NPO with Tube Feed-  Tube Feeding Modality: Orogastric  Glucerna 1.5 Lior (GLUCERNA1.5)  Total Volume for 24 Hours (mL): 720  Continuous  Starting Tube Feed Rate {mL per Hour}: 10  Increase Tube Feed Rate by (mL): 10     Every 4 hours  Until Goal Tube Feed Rate (mL per Hour): 30  Tube Feed Duration (in Hours): 24  Tube Feed Start Time: 15:00  Entered: Jan 28 2025  2:55PM  
This patient has been assessed with a concern for Malnutrition and has been determined to have a diagnosis/diagnoses of Moderate protein-calorie malnutrition and Underweight (BMI < 19).    This patient is being managed with:   Diet NPO with Tube Feed-  Tube Feeding Modality: Orogastric  Glucerna 1.5 Lior (GLUCERNA1.5)  Total Volume for 24 Hours (mL): 720  Continuous  Starting Tube Feed Rate {mL per Hour}: 10  Increase Tube Feed Rate by (mL): 10     Every 4 hours  Until Goal Tube Feed Rate (mL per Hour): 30  Tube Feed Duration (in Hours): 24  Tube Feed Start Time: 15:00  Entered: Jan 28 2025  2:55PM  
This patient has been assessed with a concern for Malnutrition and has been determined to have a diagnosis/diagnoses of Moderate protein-calorie malnutrition and Underweight (BMI < 19).    This patient is being managed with:   Diet Consistent Carbohydrate w/Evening Snack-  Supplement Feeding Modality:  Oral  Glucerna Shake Cans or Servings Per Day:  1       Frequency:  Three Times a day  Entered: Feb 5 2025  2:49PM  
This patient has been assessed with a concern for Malnutrition and has been determined to have a diagnosis/diagnoses of Moderate protein-calorie malnutrition and Underweight (BMI < 19).    This patient is being managed with:   Diet Soft and Bite Sized-  Consistent Carbohydrate {Evening Snack} (CSTCHOSN)  Supplement Feeding Modality:  Oral  Ensure Enlive Cans or Servings Per Day:  3       Frequency:  Three Times a day  Entered: Feb 4 2025 10:35AM  
This patient has been assessed with a concern for Malnutrition and has been determined to have a diagnosis/diagnoses of Moderate protein-calorie malnutrition and Underweight (BMI < 19).    This patient is being managed with:   Diet Consistent Carbohydrate w/Evening Snack-  Entered: Feb 2 2025  7:56AM  
This patient has been assessed with a concern for Malnutrition and has been determined to have a diagnosis/diagnoses of Moderate protein-calorie malnutrition and Underweight (BMI < 19).    This patient is being managed with:   Diet Soft and Bite Sized-  Consistent Carbohydrate {Evening Snack} (CSTCHOSN)  Supplement Feeding Modality:  Oral  Ensure Enlive Cans or Servings Per Day:  3       Frequency:  Three Times a day  Entered: Feb 4 2025 10:35AM

## 2025-02-06 NOTE — PROGRESS NOTE ADULT - SUBJECTIVE AND OBJECTIVE BOX
OVERNIGHT EVENTS:    No Acute event overnight.     GOAL OF CARE:    Discussed diagnosis, current medical management, prognosis, and treatment options with patient and Holden at bedside. Re-discussed risks and benefits of CPR and intubation. Patient wanted full code and continue aggressive medical management. Patient was "brought back for the dead" and wanted to go to rehab to get better. Answered their questions and they verbalized understanding.      Present Symptoms:     Dyspnea: No  Nausea/Vomiting: No  Anxiety:  No  Depression: No  Fatigue: No  Loss of appetite: No  Constipation: No BM 2/6    Pain:             Character-            Duration-            Effect-            Factors-            Frequency-            Location-            Severity-    Pain AD Score:  [email or web address] (press ctrl + left click to view)    Review of Systems: Reviewed  CONSTITUTIONAL: Denies fever  HEENT: Denies acute changes in vision and hearing  CARDIO: Denies CP  PULM: Denies SOB  ABD: Denies abd pain  : Denies dysuria  NEURO: Denies HA  EXTREMITIES: Denies LE swelling    MEDICATIONS  (STANDING):  cefTRIAXone Injectable. 2000 milliGRAM(s) IV Push every 24 hours  dextrose 5%. 1000 milliLiter(s) (50 mL/Hr) IV Continuous <Continuous>  dextrose 5%. 1000 milliLiter(s) (100 mL/Hr) IV Continuous <Continuous>  dextrose 50% Injectable 25 Gram(s) IV Push once  dextrose 50% Injectable 12.5 Gram(s) IV Push once  dextrose 50% Injectable 25 Gram(s) IV Push once  folic acid 1 milliGRAM(s) Oral daily  furosemide    Tablet 40 milliGRAM(s) Oral daily  glucagon  Injectable 1 milliGRAM(s) IntraMuscular once  insulin glargine Injectable (LANTUS) 15 Unit(s) SubCutaneous every morning  insulin lispro (ADMELOG) corrective regimen sliding scale   SubCutaneous every 6 hours  lactulose Syrup 10 Gram(s) Oral three times a day  melatonin 5 milliGRAM(s) Oral at bedtime  metroNIDAZOLE  IVPB 500 milliGRAM(s) IV Intermittent every 8 hours  midodrine 15 milliGRAM(s) Oral every 8 hours  pantoprazole    Tablet 40 milliGRAM(s) Oral before breakfast  rifAXIMin 550 milliGRAM(s) Oral two times a day  spironolactone 100 milliGRAM(s) Oral daily  thiamine 100 milliGRAM(s) Oral every 24 hours  vancomycin    Solution 500 milliGRAM(s) Oral every 6 hours    MEDICATIONS  (PRN):  dextrose Oral Gel 15 Gram(s) Oral once PRN Blood Glucose LESS THAN 70 milliGRAM(s)/deciliter  hemorrhoidal Ointment 1 Application(s) Rectal every 6 hours PRN Hemorrhoids  traMADol 50 milliGRAM(s) Oral every 12 hours PRN Moderate Pain (4 - 6)      PHYSICAL EXAM:    Vital Signs Last 24 Hrs  T(C): 36.9 (06 Feb 2025 10:22), Max: 36.9 (06 Feb 2025 10:22)  T(F): 98.5 (06 Feb 2025 10:22), Max: 98.5 (06 Feb 2025 10:22)  HR: 93 (06 Feb 2025 10:22) (93 - 98)  BP: 106/68 (06 Feb 2025 10:22) (97/59 - 110/66)  BP(mean): --  RR: 18 (06 Feb 2025 10:22) (18 - 18)  SpO2: 97% (06 Feb 2025 10:22) (96% - 97%)        General: alert  oriented x 3    PPS: 40 %    HEENT: normal      Lungs: comfortable     CV: normal      GI: normal  nondistended  nontender      : normal      MSK: weakness      Skin:  no rash    LABS:                          9.0    10.84 )-----------( 105      ( 06 Feb 2025 05:19 )             26.9     02-06    136  |  98  |  17.9  ----------------------------<  86  3.4[L]   |  27.0  |  <0.20[L]    Ca    8.1[L]      06 Feb 2025 05:19  Phos  1.8     02-05  Mg     1.6     02-05    TPro  5.3[L]  /  Alb  3.0[L]  /  TBili  6.6[H]  /  DBili  x   /  AST  76[H]  /  ALT  36[H]  /  AlkPhos  231[H]  02-06    PT/INR - ( 05 Feb 2025 06:35 )   PT: 23.6 sec;   INR: 2.05 ratio         PTT - ( 05 Feb 2025 06:35 )  PTT:34.4 sec  Urinalysis Basic - ( 06 Feb 2025 05:19 )    Color: x / Appearance: x / SG: x / pH: x  Gluc: 86 mg/dL / Ketone: x  / Bili: x / Urobili: x   Blood: x / Protein: x / Nitrite: x   Leuk Esterase: x / RBC: x / WBC x   Sq Epi: x / Non Sq Epi: x / Bacteria: x      I&O's Summary      RADIOLOGY & ADDITIONAL STUDIES:    No new imaging today    ADVANCE DIRECTIVES/TREATMENT PREFERENCES:  Full code, continue aggressive medical management

## 2025-02-06 NOTE — PROGRESS NOTE ADULT - PROBLEM SELECTOR PROBLEM 1
Encounter for palliative care
Mitral valve vegetation
Encounter for palliative care
Encounter for palliative care

## 2025-02-06 NOTE — PROGRESS NOTE ADULT - TIME BILLING
Time spent reviewing the chart documentation, reviewing labs and imaging studies, evaluating the patient, discussing the plan of care with the consultants & medical team, and documenting.
Total Time Spent 50 minutes  This include chart review, patient assessment, discussion and collaboration with interdisciplinary team members, GOC planning with patient's partner Holden.     Thank you for the opportunity to assist with the care of this patient.   [facility] Palliative Medicine Consult Service [phone]
Time spent reviewing the chart documentation, reviewing labs and imaging studies, evaluating the patient, discussing the plan of care with the consultants & medical team, and documenting.
Total Time Spent 50 minutes  This include chart review, patient assessment, discussion and collaboration with interdisciplinary team members, GOC planning.    Thank you for the opportunity to assist with the care of this patient.   [facility] Palliative Medicine Consult Service [phone]

## 2025-02-06 NOTE — PROGRESS NOTE ADULT - SUBJECTIVE AND OBJECTIVE BOX
INFECTIOUS DISEASES AND INTERNAL MEDICINE at [place]  =======================================================  Tommy Blair MD  Diplomates American Board of Internal Medicine and Infectious Diseases  Telephone [phone]  Fax            [phone]  =======================================================    ABRAHAN GOODWIN [id number]    Follow up:  BACTEREMIA C DIFF    Allergies:  Zithromax (Unknown)  morphine (Nausea)      Medications:  cefTRIAXone Injectable. 2000 milliGRAM(s) IV Push every 24 hours  chlorhexidine 2% Cloths 1 Application(s) Topical <User Schedule>  dextrose 5% + lactated ringers. 1000 milliLiter(s) IV Continuous <Continuous>  dextrose 5%. 1000 milliLiter(s) IV Continuous <Continuous>  dextrose 5%. 1000 milliLiter(s) IV Continuous <Continuous>  dextrose 50% Injectable 25 Gram(s) IV Push once  dextrose 50% Injectable 12.5 Gram(s) IV Push once  dextrose 50% Injectable 25 Gram(s) IV Push once  dextrose Oral Gel 15 Gram(s) Oral once PRN  folic acid 1 milliGRAM(s) Oral daily  glucagon  Injectable 1 milliGRAM(s) IntraMuscular once  hemorrhoidal Ointment 1 Application(s) Rectal every 6 hours PRN  hydrocortisone sodium succinate Injectable 50 milliGRAM(s) IV Push every 6 hours  insulin glargine Injectable (LANTUS) 15 Unit(s) SubCutaneous every morning  insulin lispro (ADMELOG) corrective regimen sliding scale   SubCutaneous every 6 hours  melatonin 5 milliGRAM(s) Oral at bedtime  metroNIDAZOLE  IVPB 500 milliGRAM(s) IV Intermittent every 8 hours  midodrine 10 milliGRAM(s) Oral every 8 hours  multivitamin 1 Tablet(s) Oral daily  norepinephrine Infusion 0.05 MICROgram(s)/kG/Min IV Continuous <Continuous>  pantoprazole    Tablet 40 milliGRAM(s) Oral before breakfast  potassium chloride  20 mEq/100 mL IVPB 20 milliEquivalent(s) IV Intermittent every 2 hours  vancomycin    Solution 500 milliGRAM(s) Oral every 6 hours    SOCIAL       FAMILY   FAMILY HISTORY:  FH: pancreatic cancer (Mother)    Family history of heart attack (Father)    Family history of CVA (Father)      REVIEW OF SYSTEMS:  CONSTITUTIONAL:  No Fever or chills  HEENT:   No diplopia or blurred vision.  No earache, sore throat or runny nose.  CARDIOVASCULAR:  No pressure, squeezing, strangling, tightness, heaviness or aching about the chest, neck, axilla or epigastrium.  RESPIRATORY:  No cough, shortness of breath, PND or orthopnea.  GASTROINTESTINAL:   diarrhea.  GENITOURINARY:  No dysuria, frequency or urgency. No Blood in urine  MUSCULOSKELETAL:   moves all joints  SKIN:  No change in skin, hair or nails.  NEUROLOGIC:  No paresthesias, fasciculations, seizures or weakness.  PSYCHIATRIC:  No disorder of thought or mood.  ENDOCRINE:  No heat or cold intolerance, polyuria or polydipsia.  HEMATOLOGICAL:  No easy bruising or bleeding.            Physical Exam:    Vital Signs Last 24 Hrs  T(C): 36.6 (02-06-25 @ 16:29), Max: 36.9 (02-06-25 @ 10:22)  T(F): 97.8 (02-06-25 @ 16:29), Max: 98.5 (02-06-25 @ 10:22)  HR: 101 (02-06-25 @ 16:29) (93 - 101)  BP: 103/62 (02-06-25 @ 16:29) (97/59 - 110/66)  BP(mean): --  RR: 18 (02-06-25 @ 16:29) (18 - 18)  SpO2: 97% (02-06-25 @ 16:29) (96% - 97%)                GEN: NAD,   HEENT: normocephalic and atraumatic. EOMI. LJ.    NECK: Supple. No carotid bruits.  No lymphadenopathy or thyromegaly.  LUNGS: Clear to auscultation.  HEART: Regular rate and rhythm without murmur.  ABDOMEN: Soft, nontender, DISTENDED  .  Positive bowel sounds.    : No CVA tenderness  EXTREMITIES: Without any cyanosis, clubbing, rash, lesions or edema.  MSK: no joint swelling  NEUROLOGIC: Cranial nerves II through XII are grossly intact.  PSYCHIATRIC: Appropriate affect .  SKIN: No ulceration or induration present.        Labs:  Vitals:  =======   =======================================================  Current Antibiotics:  cefTRIAXone Injectable. 2000 milliGRAM(s) IV Push every 24 hours  metroNIDAZOLE  IVPB 500 milliGRAM(s) IV Intermittent every 8 hours  vancomycin    Solution 500 milliGRAM(s) Oral every 6 hours    Other medications:  chlorhexidine 2% Cloths 1 Application(s) Topical <User Schedule>  dextrose 5% + lactated ringers. 1000 milliLiter(s) IV Continuous <Continuous>  dextrose 5%. 1000 milliLiter(s) IV Continuous <Continuous>  dextrose 5%. 1000 milliLiter(s) IV Continuous <Continuous>  dextrose 50% Injectable 25 Gram(s) IV Push once  dextrose 50% Injectable 12.5 Gram(s) IV Push once  dextrose 50% Injectable 25 Gram(s) IV Push once  folic acid 1 milliGRAM(s) Oral daily  glucagon  Injectable 1 milliGRAM(s) IntraMuscular once  hydrocortisone sodium succinate Injectable 50 milliGRAM(s) IV Push every 6 hours  insulin glargine Injectable (LANTUS) 15 Unit(s) SubCutaneous every morning  insulin lispro (ADMELOG) corrective regimen sliding scale   SubCutaneous every 6 hours  melatonin 5 milliGRAM(s) Oral at bedtime  midodrine 10 milliGRAM(s) Oral every 8 hours  multivitamin 1 Tablet(s) Oral daily  norepinephrine Infusion 0.05 MICROgram(s)/kG/Min IV Continuous <Continuous>  pantoprazole    Tablet 40 milliGRAM(s) Oral before breakfast  potassium chloride  20 mEq/100 mL IVPB 20 milliEquivalent(s) IV Intermittent every 2 hours      =======================================================  Labs:                                                                   9.0    10.84 )-----------( 105      ( 06 Feb 2025 05:19 )             26.9   02-06    136  |  98  |  17.9  ----------------------------<  86  3.4[L]   |  27.0  |  <0.20[L]    Ca    8.1[L]      06 Feb 2025 05:19  Phos  1.8     02-05  Mg     1.6     02-05    TPro  5.3[L]  /  Alb  3.0[L]  /  TBili  6.6[H]  /  DBili  x   /  AST  76[H]  /  ALT  36[H]  /  AlkPhos  231[H]  02-06      Culture - Blood (collected 01-31-25 @ 02:45)  Source: .Blood BLOOD  Preliminary Report (02-03-25 @ 06:01):    No growth at 72 Hours    Culture - Blood (collected 01-31-25 @ 02:30)  Source: .Blood BLOOD  Preliminary Report (02-03-25 @ 06:01):    No growth at 72 Hours    Culture - Blood (collected 01-27-25 @ 06:00)  Source: .Blood BLOOD  Gram Stain (01-30-25 @ 07:57):    Growth in anaerobic bottle: Gram Negative Rods  Final Report (02-01-25 @ 11:51):    Growth in anaerobic bottle: Bacteroides thetaiotaomicron group    "Susceptibilities not performed"    Culture - Blood (collected 01-27-25 @ 02:07)  Source: .Blood BLOOD  Gram Stain (01-27-25 @ 18:01):    Growth in anaerobic bottle: Gram Negative Rods    Growth in aerobic bottle: Gram Negative Rods  Final Report (01-29-25 @ 08:00):    Growth in aerobic and anaerobic bottles: Escherichia coli    Direct identification is available within approximately 3-5    hours either by Blood Panel Multiplexed PCR or Direct    MALDI-TOF. Details: [email or web address]  Organism: Blood Culture PCR  Escherichia coli (01-29-25 @ 08:00)  Organism: Escherichia coli (01-29-25 @ 08:00)    Sensitivities:      Method Type: NABILA      -  Ampicillin: R >16 These ampicillin results predict results for amoxicillin      -  Ampicillin/Sulbactam: I 16/8      -  Aztreonam: S <=4      -  Cefazolin: I 4      -  Cefepime: S <=2      -  Cefoxitin: S <=8      -  Ceftriaxone: S <=1      -  Ciprofloxacin: S <=0.25      -  Ertapenem: S <=0.5      -  Gentamicin: S <=2      -  Imipenem: S <=1      -  Levofloxacin: S <=0.5      -  Meropenem: S <=1      -  Piperacillin/Tazobactam: S <=8      -  Tobramycin: I 4      -  Trimethoprim/Sulfamethoxazole: S <=0.5/9.5  Organism: Blood Culture PCR (01-29-25 @ 08:00)    Sensitivities:      Method Type: PCR      -  Escherichia coli: Detec    Urinalysis with Rflx Culture (collected 11-16-24 @ 09:00)    Culture - Body Fluid with Gram Stain (collected 11-16-24 @ 04:44)  Source: Abdominal Fl  Gram Stain (11-16-24 @ 12:36):    polymorphonuclear leukocytes seen    No organisms seen    by cytocentrifuge  Final Report (11-21-24 @ 08:50):    No growth at 5 days    Culture - Blood (collected 11-16-24 @ 03:17)  Source: .Blood BLOOD  Final Report (11-21-24 @ 09:01):    No growth at 5 days    Culture - Blood (collected 11-16-24 @ 02:44)  Source: .Blood BLOOD  Final Report (11-21-24 @ 09:01):    No growth at 5 days    Culture - Fungal, Body Fluid (collected 11-23-23 @ 14:00)  Source: Peritoneal Peritoneal Fluid  Final Report (12-23-23 @ 15:01):    No fungus isolated at 4 weeks.    Culture - Body Fluid with Gram Stain (collected 11-23-23 @ 14:00)  Source: Ascites Fl Ascites Fluid  Gram Stain (11-23-23 @ 23:43):    polymorphonuclear leukocytes    No organisms seen    by cytocentrifuge  Final Report (11-28-23 @ 16:15):    No growth at 5 days    Culture - Blood (collected 11-22-23 @ 08:31)  Source: .Blood None  Final Report (11-27-23 @ 13:01):    No growth at 5 days    Culture - Blood (collected 11-22-23 @ 08:22)  Source: .Blood None  Final Report (11-27-23 @ 13:01):    No growth at 5 days    Culture - Blood (collected 11-09-23 @ 07:01)  Source: .Blood None  Final Report (11-14-23 @ 14:00):    No growth at 5 days    Culture - Blood (collected 11-09-23 @ 07:01)  Source: .Blood None  Final Report (11-14-23 @ 14:00):    No growth at 5 days    Culture - Blood (collected 10-07-23 @ 09:01)  Source: .Blood None  Final Report (10-12-23 @ 17:01):    No growth at 5 days    Culture - Blood (collected 10-07-23 @ 09:01)  Source: .Blood Blood-Venous  Final Report (10-12-23 @ 17:01):    No growth at 5 days    Culture - Blood (collected 05-09-23 @ 04:39)  Source: .Blood Blood-Peripheral  Final Report (05-14-23 @ 10:01):    No Growth Final    Culture - Urine (collected 05-09-23 @ 04:39)  Source: Clean Catch Clean Catch (Midstream)  Final Report (05-10-23 @ 23:46):    <10,000 CFU/mL Normal Urogenital Bibi    Culture - Blood (collected 05-09-23 @ 04:39)  Source: .Blood Blood-Peripheral  Final Report (05-14-23 @ 10:01):    No Growth Final      Creatinine: 0.32 mg/dL (02-03-25 @ 03:28)  Creatinine: 0.45 mg/dL (02-02-25 @ 02:12)  Creatinine: 0.53 mg/dL (02-01-25 @ 20:00)  Creatinine: 0.55 mg/dL (02-01-25 @ 13:26)  Creatinine: 0.52 mg/dL (02-01-25 @ 10:00)  Creatinine: 0.48 mg/dL (02-01-25 @ 06:45)  Creatinine: 0.48 mg/dL (02-01-25 @ 02:15)  Creatinine: 0.44 mg/dL (01-31-25 @ 02:45)  Creatinine: 0.42 mg/dL (01-30-25 @ 13:50)  Creatinine: 0.61 mg/dL (01-30-25 @ 04:30)  Creatinine: 0.69 mg/dL (01-29-25 @ 09:50)            WBC Count: 15.83 K/uL (02-03-25 @ 03:28)  WBC Count: 7.08 K/uL (02-02-25 @ 02:12)  WBC Count: 7.87 K/uL (02-01-25 @ 13:26)  WBC Count: 8.95 K/uL (02-01-25 @ 06:45)  WBC Count: 9.02 K/uL (02-01-25 @ 02:15)  WBC Count: 12.79 K/uL (01-31-25 @ 02:45)  WBC Count: 17.97 K/uL (01-30-25 @ 04:30)  WBC Count: 26.74 K/uL (01-29-25 @ 09:50)    SARS-CoV-2: NotDetec (01-27-25 @ 06:00)      Alkaline Phosphatase: 258 U/L (02-03-25 @ 03:28)  Alkaline Phosphatase: 287 U/L (02-02-25 @ 02:12)  Alkaline Phosphatase: 262 U/L (02-01-25 @ 13:26)  Alkaline Phosphatase: 252 U/L (02-01-25 @ 06:45)  Alkaline Phosphatase: 264 U/L (02-01-25 @ 02:15)  Alkaline Phosphatase: 274 U/L (01-31-25 @ 02:45)  Alanine Aminotransferase (ALT/SGPT): 26 U/L (02-03-25 @ 03:28)  Alanine Aminotransferase (ALT/SGPT): 26 U/L (02-02-25 @ 02:12)  Alanine Aminotransferase (ALT/SGPT): 24 U/L (02-01-25 @ 13:26)  Alanine Aminotransferase (ALT/SGPT): 24 U/L (02-01-25 @ 06:45)  Alanine Aminotransferase (ALT/SGPT): 24 U/L (02-01-25 @ 02:15)  Alanine Aminotransferase (ALT/SGPT): 27 U/L (01-31-25 @ 02:45)  Aspartate Aminotransferase (AST/SGOT): 50 U/L (02-03-25 @ 03:28)  Aspartate Aminotransferase (AST/SGOT): 58 U/L (02-02-25 @ 02:12)  Aspartate Aminotransferase (AST/SGOT): 58 U/L (02-01-25 @ 13:26)  Aspartate Aminotransferase (AST/SGOT): 59 U/L (02-01-25 @ 06:45)  Aspartate Aminotransferase (AST/SGOT): 60 U/L (02-01-25 @ 02:15)  Aspartate Aminotransferase (AST/SGOT): 74 U/L (01-31-25 @ 02:45)  Bilirubin Total: 7.0 mg/dL (02-03-25 @ 03:28)  Bilirubin Total: 8.6 mg/dL (02-02-25 @ 02:12)  Bilirubin Total: 8.4 mg/dL (02-01-25 @ 13:26)  Bilirubin Total: 9.0 mg/dL (02-01-25 @ 10:00)  Bilirubin Total: 8.9 mg/dL (02-01-25 @ 06:45)  Bilirubin Total: 9.4 mg/dL (02-01-25 @ 02:15)  Bilirubin Total: 10.2 mg/dL (01-31-25 @ 02:45)

## 2025-02-06 NOTE — PROGRESS NOTE ADULT - ASSESSMENT
59F with PMHx of ETOH cirrhosis, recurrent ascites s/p multiple paracentesis, esophageal varices s/p banding, Flu positive a week prior, presented generalized weakness, diarrhea, intermittent fever and poor po intake. On arrival found to be hypotensive unresponsive to be IVF requiring vasopressor support. Admitted to MICU for Septic shock 2/2 to colitis. CT with evidence of colitis and C Diff PCR positive and started on PO Vancomycin and Flagyl. Course complicated by AHRF with change in mental status and was emergently intubated 1/28/25 and successfully extubated 2/2/25. Found to have E Coli and bacteroides bacteremia and possible MV vegetation on TTE and cardiology consulted for ARACELI, s/p EGD 1/31 for GI clearance which noted nonbleeding 2 cords of grade I varices in the lower third of the esophagus and ultimately deemed poor candidate for ARACELI due to esophageal varices. ID following and decided on empiric 6 weeks of antibiotics coverage for presumed endocarditis. Repeat blood culture 1/31 negative. Pt also s/p paracentesis 2/4 and removed 2L. Now downgraded to medicine.     #Septic shock 2/2 to C Diff Colitis   - s/p Levophed  - c/w midodrine 15mg po TID, maintain MAP >65  -ordered 500cc of NS today  - C Diff PCR positive   - c/w PO Vancomycin and Flagyl, plan to c/w vancomycin while on long term abx for presumed endocarditis (need total of 2 weeks through 02/13/25)  - ID following, PICC line ordered for tomorrow    #E Coli and bacteroides bacteremia   #Presumed endocarditis   Afebrile and WBC stable   - TTE with possible MV vegetation  - BCx 1/27/25 revealed E Coli and bacteroides, Repeat BCx 1/31 negative  - s/p Meropenem for 1 day  - c/w Ceftriaxone 2g (will need to continue until 03/14/25) --- will need PICC line prior to dc  - ID following and pending final recs for DC including duration     - s/p EGD for GI clearance given h/o varices and GI bleeding, and noted  nonbleeding 2 cords of grade I varices in the lower third of the esophagus  - Seen by Cardiology for ARACELI however deemed poor candidate due to esophageal varices    Decompensated cirrhosis 2/2 to ETOH abuse   - s/p paracentesis 2/4 and removed 2L  - c/w Lasix 40mg po QD  - c/w Spironolactone 100mg po QD   - c/w thiamine, folic acid and multivitamin      Hepatic encephalopathy  - c/w Lactulose 10g po TID, monitor BM and titrate with mental status   -c/w Rifaximin 550mg po BID    AHRF   - s/p emergently intubated  for airway protection 1/28/25 and successfully extubated 2/2/25  - now on 3L satting 98%    DVT ppx: SCD  diet: soft, bite sized carb consistent  dispo: anticipate 1 more day LOS. will request for PICC when WOODY is determined

## 2025-02-06 NOTE — PROGRESS NOTE ADULT - ASSESSMENT
59y female with PMH ETOH cirrhosis, multiple paracentesis for ascites, esophageal varices s/p banding in the hospital for <1d  transferred to the MICU for management of shock. s/p 4L in ED, started Leovphed and Vasopressin. + C. Diff colitis. + EColi. Patient was admitted for shock. Patient was intubated and s/p extubated on 2/2. Patient had paracentesis on 2/4 and removed 2L of fluid.     #Palliative Care Encounter  - Patient has capacity today   - HCP: Primary is Holden  - HCP placed in patient's folder.   - Full Code, continue aggressive medical management    No further recommendations from a palliative standpoint. Will sign off. Please reconsult PRN if goals of care should change and assistance needed in further collaborative family discussions. Dispo planning per SW. Ongoing medical management per primary team.       #Distributive Shock - improving   #C Diff Colitis   - ID is on board, recs appreciated.   - on Vanco PO and Metronidazole   - Midodrine 15mg TID    #Blood Culture + E Coli   #New onset of mitral valve vegetation  - ID is on board, recs appreciated.  - Rocephin  - Will treat (until 3/14/2025) of Abx for presumed endocarditis. Had EGD with esophageal varices, felt to be higher risk for ARACELI. Need PICC line.     #Cirrhosis 2/2 to Alcohol abuse  #HX of GIB 2/2 esophageal varices s/p banding  #HX of multiple paracentesis  - Rifaximin 550mg BID  - Lactulose  - Lasix 40mg daily  - Spironolactone 100mg daily   - folic acid   - Thiamine

## 2025-02-06 NOTE — PROGRESS NOTE ADULT - SUBJECTIVE AND OBJECTIVE BOX
[facility] Division of Hospital Medicine    SUBJECTIVE / OVERNIGHT EVENTS:    pt seen and examined at bedside  stool is more soft and formed   no chp, sob, abd, nausea, vomiting  tolerating po diet    MEDICATIONS  (STANDING):  cefTRIAXone Injectable. 2000 milliGRAM(s) IV Push every 24 hours  dextrose 5%. 1000 milliLiter(s) (50 mL/Hr) IV Continuous <Continuous>  dextrose 5%. 1000 milliLiter(s) (100 mL/Hr) IV Continuous <Continuous>  dextrose 50% Injectable 25 Gram(s) IV Push once  dextrose 50% Injectable 12.5 Gram(s) IV Push once  dextrose 50% Injectable 25 Gram(s) IV Push once  folic acid 1 milliGRAM(s) Oral daily  furosemide    Tablet 40 milliGRAM(s) Oral daily  glucagon  Injectable 1 milliGRAM(s) IntraMuscular once  insulin glargine Injectable (LANTUS) 15 Unit(s) SubCutaneous every morning  insulin lispro (ADMELOG) corrective regimen sliding scale   SubCutaneous every 6 hours  lactulose Syrup 10 Gram(s) Oral three times a day  melatonin 5 milliGRAM(s) Oral at bedtime  metroNIDAZOLE  IVPB 500 milliGRAM(s) IV Intermittent every 8 hours  midodrine 15 milliGRAM(s) Oral every 8 hours  pantoprazole    Tablet 40 milliGRAM(s) Oral before breakfast  rifAXIMin 550 milliGRAM(s) Oral two times a day  spironolactone 100 milliGRAM(s) Oral daily  thiamine 100 milliGRAM(s) Oral every 24 hours  vancomycin    Solution 500 milliGRAM(s) Oral every 6 hours    MEDICATIONS  (PRN):  dextrose Oral Gel 15 Gram(s) Oral once PRN Blood Glucose LESS THAN 70 milliGRAM(s)/deciliter  hemorrhoidal Ointment 1 Application(s) Rectal every 6 hours PRN Hemorrhoids  traMADol 50 milliGRAM(s) Oral every 12 hours PRN Moderate Pain (4 - 6)        I&O's Summary      PHYSICAL EXAM:  Vital Signs Last 24 Hrs  T(C): 36.6 (06 Feb 2025 16:29), Max: 36.9 (06 Feb 2025 10:22)  T(F): 97.8 (06 Feb 2025 16:29), Max: 98.5 (06 Feb 2025 10:22)  HR: 101 (06 Feb 2025 16:29) (93 - 101)  BP: 103/62 (06 Feb 2025 16:29) (97/59 - 110/66)  BP(mean): --  RR: 18 (06 Feb 2025 16:29) (18 - 18)  SpO2: 97% (06 Feb 2025 16:29) (96% - 97%)            CONSTITUTIONAL: NAD, well-groomed  ENMT: Moist oral mucosa, no pharyngeal injection or exudates; normal dentition  RESPIRATORY: Normal respiratory effort; lungs are clear to auscultation bilaterally  CARDIOVASCULAR: Regular rate and rhythm, normal S1 and S2, no murmur/rub/gallop; No lower extremity edema; Peripheral pulses are 2+ bilaterally  ABDOMEN: Nontender to palpation, normoactive bowel sounds, no rebound/guarding; No hepatosplenomegaly  MUSCLOSKELETAL:  Normal gait; no clubbing or cyanosis of digits; no joint swelling or tenderness to palpation  PSYCH: A+O to person, place, and time; affect appropriate  NEUROLOGY: CN 2-12 are intact and symmetric; no gross sensory deficits;   SKIN: No rashes; no palpable lesions    LABS:                        9.0    10.84 )-----------( 105      ( 06 Feb 2025 05:19 )             26.9     02-06    136  |  98  |  17.9  ----------------------------<  86  3.4[L]   |  27.0  |  <0.20[L]    Ca    8.1[L]      06 Feb 2025 05:19  Phos  1.8     02-05  Mg     1.6     02-05    TPro  5.3[L]  /  Alb  3.0[L]  /  TBili  6.6[H]  /  DBili  x   /  AST  76[H]  /  ALT  36[H]  /  AlkPhos  231[H]  02-06    PT/INR - ( 05 Feb 2025 06:35 )   PT: 23.6 sec;   INR: 2.05 ratio         PTT - ( 05 Feb 2025 06:35 )  PTT:34.4 sec      Urinalysis Basic - ( 06 Feb 2025 05:19 )    Color: x / Appearance: x / SG: x / pH: x  Gluc: 86 mg/dL / Ketone: x  / Bili: x / Urobili: x   Blood: x / Protein: x / Nitrite: x   Leuk Esterase: x / RBC: x / WBC x   Sq Epi: x / Non Sq Epi: x / Bacteria: x        Culture - Fungal, Body Fluid (collected 04 Feb 2025 16:00)  Source: Peritoneal  Preliminary Report (05 Feb 2025 23:03):    Culture is being performed. Fungal cultures are held for 4 weeks.    Culture - Body Fluid with Gram Stain (collected 04 Feb 2025 16:00)  Source: Abdominal Fl  Gram Stain (05 Feb 2025 01:23):    No polymorphonuclear leukocytes seen    No organisms seen    by cytocentrifuge  Preliminary Report (05 Feb 2025 21:41):    No growth to date.      CAPILLARY BLOOD GLUCOSE      POCT Blood Glucose.: 151 mg/dL (06 Feb 2025 16:21)  POCT Blood Glucose.: 113 mg/dL (06 Feb 2025 11:51)  POCT Blood Glucose.: 145 mg/dL (06 Feb 2025 08:22)  POCT Blood Glucose.: 145 mg/dL (06 Feb 2025 00:18)  POCT Blood Glucose.: 108 mg/dL (05 Feb 2025 17:57)

## 2025-02-06 NOTE — PROGRESS NOTE ADULT - ASSESSMENT
58 y/o female with PMH of ETOH cirrhosis, multiple paracentesis for ascites, esophageal varices s/p banding,  who presents with x1 week of generalized fatigue, multiple bouts of diarrhea with poor PO intake and intermittent fevers. Of note was diagnosed Flu positive last week ON ADMISSION SHE REPORTED   fatigue, chills, +cough, +abdominal tenderness, and +SOB.    PT WITH POSITIVE STOOL FOR  C DIFF   AND BLOOD CX WITH ECOLI  CT SCAN  WITH COLITIS  CONTINUE  ORAL VANCO   UNCLEAR IF ABSORBED AS VIA NGT  IN ADDITION ON FLAGYL  PT WITH BLOOD CX WITH ECOLI  ADN BACTEROIDES  PT I WAS iNTUBATED ON PRESSORS NOW EXTUBATED AND OFF PRESSORS  WBC UP TO  K    AWAKE ALERT   PT WITH ? VEG ON MITRAL VALVE   ON TRANSTHORACIC ECHOCARDIOGRAM   HAD EGD WITH ESOPHAGEAL  VARICES FELT TO BE HIGHER  RISK FOR ARACELI   ALTHOUHG GM NEGATIVE ENDOCARDITIS IS RARE  CONCERN AS PT WITH CIRRHOSIS  WILL THEREFORE PLAN ON 6  WEEKS OF IV ABX FOR PRESUMED ENDOCARDITIS   WILL RECOMMEND ROCEPHIN 2GM Q 24 THROUGH 3/14/25  WOULD  CONTINUE FLAGYL ORAL FOR BACTEROIDES FOR 2 WEEKS  THRO 2/13/25   CONTINUE ORAL VANCO 125 QID WHILE ON IV ROCEPIN THROUGH  3/14/25  PICC LINE  ORDER PLACED FOR AM

## 2025-02-07 ENCOUNTER — TRANSCRIPTION ENCOUNTER (OUTPATIENT)
Age: 60
End: 2025-02-07

## 2025-02-07 VITALS
RESPIRATION RATE: 19 BRPM | TEMPERATURE: 98 F | OXYGEN SATURATION: 99 % | SYSTOLIC BLOOD PRESSURE: 101 MMHG | HEART RATE: 97 BPM | DIASTOLIC BLOOD PRESSURE: 62 MMHG

## 2025-02-07 LAB
ANION GAP SERPL CALC-SCNC: 11 MMOL/L — SIGNIFICANT CHANGE UP (ref 5–17)
BUN SERPL-MCNC: 11.6 MG/DL — SIGNIFICANT CHANGE UP (ref 8–20)
CALCIUM SERPL-MCNC: 8.1 MG/DL — LOW (ref 8.4–10.5)
CHLORIDE SERPL-SCNC: 100 MMOL/L — SIGNIFICANT CHANGE UP (ref 96–108)
CO2 SERPL-SCNC: 25 MMOL/L — SIGNIFICANT CHANGE UP (ref 22–29)
CREAT SERPL-MCNC: <0.2 MG/DL — LOW (ref 0.5–1.3)
EGFR: 133 ML/MIN/1.73M2 — SIGNIFICANT CHANGE UP
GLUCOSE BLDC GLUCOMTR-MCNC: 113 MG/DL — HIGH (ref 70–99)
GLUCOSE BLDC GLUCOMTR-MCNC: 85 MG/DL — SIGNIFICANT CHANGE UP (ref 70–99)
GLUCOSE BLDC GLUCOMTR-MCNC: 93 MG/DL — SIGNIFICANT CHANGE UP (ref 70–99)
GLUCOSE BLDC GLUCOMTR-MCNC: 99 MG/DL — SIGNIFICANT CHANGE UP (ref 70–99)
GLUCOSE SERPL-MCNC: 83 MG/DL — SIGNIFICANT CHANGE UP (ref 70–99)
HCT VFR BLD CALC: 28.4 % — LOW (ref 34.5–45)
HGB BLD-MCNC: 9.4 G/DL — LOW (ref 11.5–15.5)
MCHC RBC-ENTMCNC: 29.7 PG — SIGNIFICANT CHANGE UP (ref 27–34)
MCHC RBC-ENTMCNC: 33.1 G/DL — SIGNIFICANT CHANGE UP (ref 32–36)
MCV RBC AUTO: 89.6 FL — SIGNIFICANT CHANGE UP (ref 80–100)
PLATELET # BLD AUTO: 115 K/UL — LOW (ref 150–400)
POTASSIUM SERPL-MCNC: 3.2 MMOL/L — LOW (ref 3.5–5.3)
POTASSIUM SERPL-SCNC: 3.2 MMOL/L — LOW (ref 3.5–5.3)
RBC # BLD: 3.17 M/UL — LOW (ref 3.8–5.2)
RBC # FLD: 25.5 % — HIGH (ref 10.3–14.5)
SODIUM SERPL-SCNC: 136 MMOL/L — SIGNIFICANT CHANGE UP (ref 135–145)
WBC # BLD: 10.94 K/UL — HIGH (ref 3.8–10.5)
WBC # FLD AUTO: 10.94 K/UL — HIGH (ref 3.8–10.5)

## 2025-02-07 PROCEDURE — 87324 CLOSTRIDIUM AG IA: CPT

## 2025-02-07 PROCEDURE — 82945 GLUCOSE OTHER FLUID: CPT

## 2025-02-07 PROCEDURE — 99232 SBSQ HOSP IP/OBS MODERATE 35: CPT

## 2025-02-07 PROCEDURE — 86923 COMPATIBILITY TEST ELECTRIC: CPT

## 2025-02-07 PROCEDURE — 36410 VNPNXR 3YR/> PHY/QHP DX/THER: CPT | Mod: 59

## 2025-02-07 PROCEDURE — 99285 EMERGENCY DEPT VISIT HI MDM: CPT

## 2025-02-07 PROCEDURE — 84443 ASSAY THYROID STIM HORMONE: CPT

## 2025-02-07 PROCEDURE — 71045 X-RAY EXAM CHEST 1 VIEW: CPT | Mod: 26

## 2025-02-07 PROCEDURE — 80048 BASIC METABOLIC PNL TOTAL CA: CPT

## 2025-02-07 PROCEDURE — 86900 BLOOD TYPING SEROLOGIC ABO: CPT

## 2025-02-07 PROCEDURE — 82565 ASSAY OF CREATININE: CPT

## 2025-02-07 PROCEDURE — 82140 ASSAY OF AMMONIA: CPT

## 2025-02-07 PROCEDURE — 83036 HEMOGLOBIN GLYCOSYLATED A1C: CPT

## 2025-02-07 PROCEDURE — 89051 BODY FLUID CELL COUNT: CPT

## 2025-02-07 PROCEDURE — 87493 C DIFF AMPLIFIED PROBE: CPT

## 2025-02-07 PROCEDURE — P9016: CPT

## 2025-02-07 PROCEDURE — 71045 X-RAY EXAM CHEST 1 VIEW: CPT

## 2025-02-07 PROCEDURE — 71275 CT ANGIOGRAPHY CHEST: CPT | Mod: MC

## 2025-02-07 PROCEDURE — 87102 FUNGUS ISOLATION CULTURE: CPT

## 2025-02-07 PROCEDURE — 85610 PROTHROMBIN TIME: CPT

## 2025-02-07 PROCEDURE — 74176 CT ABD & PELVIS W/O CONTRAST: CPT | Mod: MC

## 2025-02-07 PROCEDURE — 82803 BLOOD GASES ANY COMBINATION: CPT

## 2025-02-07 PROCEDURE — 76705 ECHO EXAM OF ABDOMEN: CPT

## 2025-02-07 PROCEDURE — 85025 COMPLETE CBC W/AUTO DIFF WBC: CPT

## 2025-02-07 PROCEDURE — 83605 ASSAY OF LACTIC ACID: CPT

## 2025-02-07 PROCEDURE — 87040 BLOOD CULTURE FOR BACTERIA: CPT

## 2025-02-07 PROCEDURE — 82947 ASSAY GLUCOSE BLOOD QUANT: CPT

## 2025-02-07 PROCEDURE — 85730 THROMBOPLASTIN TIME PARTIAL: CPT

## 2025-02-07 PROCEDURE — 84295 ASSAY OF SERUM SODIUM: CPT

## 2025-02-07 PROCEDURE — 82330 ASSAY OF CALCIUM: CPT

## 2025-02-07 PROCEDURE — 94003 VENT MGMT INPAT SUBQ DAY: CPT

## 2025-02-07 PROCEDURE — 87507 IADNA-DNA/RNA PROBE TQ 12-25: CPT

## 2025-02-07 PROCEDURE — 87641 MR-STAPH DNA AMP PROBE: CPT

## 2025-02-07 PROCEDURE — 87077 CULTURE AEROBIC IDENTIFY: CPT

## 2025-02-07 PROCEDURE — 87640 STAPH A DNA AMP PROBE: CPT

## 2025-02-07 PROCEDURE — 76942 ECHO GUIDE FOR BIOPSY: CPT | Mod: 26,59

## 2025-02-07 PROCEDURE — 82009 KETONE BODYS QUAL: CPT

## 2025-02-07 PROCEDURE — 83880 ASSAY OF NATRIURETIC PEPTIDE: CPT

## 2025-02-07 PROCEDURE — 85027 COMPLETE CBC AUTOMATED: CPT

## 2025-02-07 PROCEDURE — 94660 CPAP INITIATION&MGMT: CPT

## 2025-02-07 PROCEDURE — 84478 ASSAY OF TRIGLYCERIDES: CPT

## 2025-02-07 PROCEDURE — 87150 DNA/RNA AMPLIFIED PROBE: CPT

## 2025-02-07 PROCEDURE — 36573 INSJ PICC RS&I 5 YR+: CPT

## 2025-02-07 PROCEDURE — 86901 BLOOD TYPING SEROLOGIC RH(D): CPT

## 2025-02-07 PROCEDURE — P9037: CPT

## 2025-02-07 PROCEDURE — 87075 CULTR BACTERIA EXCEPT BLOOD: CPT

## 2025-02-07 PROCEDURE — 96374 THER/PROPH/DIAG INJ IV PUSH: CPT

## 2025-02-07 PROCEDURE — P9047: CPT

## 2025-02-07 PROCEDURE — 84132 ASSAY OF SERUM POTASSIUM: CPT

## 2025-02-07 PROCEDURE — 86850 RBC ANTIBODY SCREEN: CPT

## 2025-02-07 PROCEDURE — 82248 BILIRUBIN DIRECT: CPT

## 2025-02-07 PROCEDURE — 93005 ELECTROCARDIOGRAM TRACING: CPT

## 2025-02-07 PROCEDURE — 36430 TRANSFUSION BLD/BLD COMPNT: CPT

## 2025-02-07 PROCEDURE — 94002 VENT MGMT INPAT INIT DAY: CPT

## 2025-02-07 PROCEDURE — 93306 TTE W/DOPPLER COMPLETE: CPT

## 2025-02-07 PROCEDURE — 83735 ASSAY OF MAGNESIUM: CPT

## 2025-02-07 PROCEDURE — 87186 SC STD MICRODIL/AGAR DIL: CPT

## 2025-02-07 PROCEDURE — 94760 N-INVAS EAR/PLS OXIMETRY 1: CPT

## 2025-02-07 PROCEDURE — 80053 COMPREHEN METABOLIC PANEL: CPT

## 2025-02-07 PROCEDURE — 74177 CT ABD & PELVIS W/CONTRAST: CPT | Mod: MC

## 2025-02-07 PROCEDURE — 87205 SMEAR GRAM STAIN: CPT

## 2025-02-07 PROCEDURE — 82550 ASSAY OF CK (CPK): CPT

## 2025-02-07 PROCEDURE — 0225U NFCT DS DNA&RNA 21 SARSCOV2: CPT

## 2025-02-07 PROCEDURE — 84157 ASSAY OF PROTEIN OTHER: CPT

## 2025-02-07 PROCEDURE — 82435 ASSAY OF BLOOD CHLORIDE: CPT

## 2025-02-07 PROCEDURE — 76937 US GUIDE VASCULAR ACCESS: CPT | Mod: 26,59

## 2025-02-07 PROCEDURE — 87015 SPECIMEN INFECT AGNT CONCNTJ: CPT

## 2025-02-07 PROCEDURE — 82247 BILIRUBIN TOTAL: CPT

## 2025-02-07 PROCEDURE — 99239 HOSP IP/OBS DSCHRG MGMT >30: CPT

## 2025-02-07 PROCEDURE — 74019 RADEX ABDOMEN 2 VIEWS: CPT

## 2025-02-07 PROCEDURE — 87070 CULTURE OTHR SPECIMN AEROBIC: CPT

## 2025-02-07 PROCEDURE — 84100 ASSAY OF PHOSPHORUS: CPT

## 2025-02-07 PROCEDURE — G0545: CPT

## 2025-02-07 PROCEDURE — 82042 OTHER SOURCE ALBUMIN QUAN EA: CPT

## 2025-02-07 PROCEDURE — 84484 ASSAY OF TROPONIN QUANT: CPT

## 2025-02-07 PROCEDURE — 87449 NOS EACH ORGANISM AG IA: CPT

## 2025-02-07 PROCEDURE — 82962 GLUCOSE BLOOD TEST: CPT

## 2025-02-07 PROCEDURE — 83615 LACTATE (LD) (LDH) ENZYME: CPT

## 2025-02-07 PROCEDURE — 36415 COLL VENOUS BLD VENIPUNCTURE: CPT

## 2025-02-07 PROCEDURE — 85014 HEMATOCRIT: CPT

## 2025-02-07 PROCEDURE — 96375 TX/PRO/DX INJ NEW DRUG ADDON: CPT

## 2025-02-07 PROCEDURE — 83690 ASSAY OF LIPASE: CPT

## 2025-02-07 PROCEDURE — P9100: CPT

## 2025-02-07 PROCEDURE — 85018 HEMOGLOBIN: CPT

## 2025-02-07 RX ORDER — METRONIDAZOLE 500 MG
100 TABLET ORAL
Qty: 0 | Refills: 0 | DISCHARGE
Start: 2025-02-07

## 2025-02-07 RX ORDER — PANTOPRAZOLE 20 MG/1
1 TABLET, DELAYED RELEASE ORAL
Qty: 0 | Refills: 0 | DISCHARGE
Start: 2025-02-07

## 2025-02-07 RX ORDER — CEFTRIAXONE 250 MG/1
2 INJECTION, POWDER, FOR SOLUTION INTRAMUSCULAR; INTRAVENOUS
Qty: 70 | Refills: 0
Start: 2025-02-07 | End: 2025-03-13

## 2025-02-07 RX ORDER — FOLIC ACID 1 MG
1 TABLET ORAL
Qty: 0 | Refills: 0 | DISCHARGE
Start: 2025-02-07

## 2025-02-07 RX ORDER — TRAMADOL HYDROCHLORIDE 100 MG/1
1 TABLET, EXTENDED RELEASE ORAL
Qty: 0 | Refills: 0 | DISCHARGE
Start: 2025-02-07

## 2025-02-07 RX ORDER — THIAMINE HCL 100 MG
1 TABLET ORAL
Qty: 0 | Refills: 0 | DISCHARGE
Start: 2025-02-07

## 2025-02-07 RX ORDER — ANTISEPTIC SURGICAL SCRUB 0.04 MG/ML
1 SOLUTION TOPICAL
Refills: 0 | Status: DISCONTINUED | OUTPATIENT
Start: 2025-02-07 | End: 2025-02-07

## 2025-02-07 RX ORDER — METOPROLOL SUCCINATE 25 MG
1 TABLET, EXTENDED RELEASE 24 HR ORAL
Refills: 0 | DISCHARGE

## 2025-02-07 RX ORDER — BACTERIOSTATIC SODIUM CHLORIDE 0.9 %
10 VIAL (ML) INJECTION
Refills: 0 | Status: DISCONTINUED | OUTPATIENT
Start: 2025-02-07 | End: 2025-02-07

## 2025-02-07 RX ORDER — POTASSIUM CHLORIDE 750 MG/1
40 TABLET, EXTENDED RELEASE ORAL EVERY 4 HOURS
Refills: 0 | Status: COMPLETED | OUTPATIENT
Start: 2025-02-07 | End: 2025-02-07

## 2025-02-07 RX ORDER — MIDODRINE HYDROCHLORIDE 5 MG/1
3 TABLET ORAL
Qty: 0 | Refills: 0 | DISCHARGE
Start: 2025-02-07

## 2025-02-07 RX ORDER — VANCOMYCIN HYDROCHLORIDE 50 MG/ML
2 KIT ORAL
Qty: 48 | Refills: 0
Start: 2025-02-07 | End: 2025-02-12

## 2025-02-07 RX ORDER — INSULIN GLARGINE-YFGN 100 [IU]/ML
15 INJECTION, SOLUTION SUBCUTANEOUS
Qty: 0 | Refills: 0 | DISCHARGE
Start: 2025-02-07

## 2025-02-07 RX ORDER — PHENYLEPHRINE HYDROCHLORIDE 855; 2.5; 3 MG/G; MG/G; MG/G
1 SUPPOSITORY RECTAL
Qty: 1 | Refills: 0
Start: 2025-02-07 | End: 2025-03-08

## 2025-02-07 RX ORDER — RIFAXIMIN 200 MG
1 TABLET ORAL
Qty: 0 | Refills: 0 | DISCHARGE
Start: 2025-02-07

## 2025-02-07 RX ORDER — LACTULOSE 10 G/15 ML
15 SOLUTION, ORAL ORAL
Qty: 0 | Refills: 0 | DISCHARGE
Start: 2025-02-07

## 2025-02-07 RX ADMIN — Medication 100 MILLIGRAM(S): at 08:11

## 2025-02-07 RX ADMIN — Medication 1 MILLIGRAM(S): at 12:14

## 2025-02-07 RX ADMIN — VANCOMYCIN HYDROCHLORIDE 500 MILLIGRAM(S): KIT at 05:55

## 2025-02-07 RX ADMIN — POTASSIUM CHLORIDE 40 MILLIEQUIVALENT(S): 750 TABLET, EXTENDED RELEASE ORAL at 09:24

## 2025-02-07 RX ADMIN — POTASSIUM CHLORIDE 40 MILLIEQUIVALENT(S): 750 TABLET, EXTENDED RELEASE ORAL at 14:02

## 2025-02-07 RX ADMIN — INSULIN GLARGINE-YFGN 15 UNIT(S): 100 INJECTION, SOLUTION SUBCUTANEOUS at 08:11

## 2025-02-07 RX ADMIN — VANCOMYCIN HYDROCHLORIDE 500 MILLIGRAM(S): KIT at 12:13

## 2025-02-07 RX ADMIN — VANCOMYCIN HYDROCHLORIDE 500 MILLIGRAM(S): KIT at 16:36

## 2025-02-07 RX ADMIN — CEFTRIAXONE 2000 MILLIGRAM(S): 250 INJECTION, POWDER, FOR SOLUTION INTRAMUSCULAR; INTRAVENOUS at 17:22

## 2025-02-07 RX ADMIN — Medication 100 MILLIGRAM(S): at 17:22

## 2025-02-07 RX ADMIN — Medication 100 MILLIGRAM(S): at 16:36

## 2025-02-07 RX ADMIN — PANTOPRAZOLE 40 MILLIGRAM(S): 20 TABLET, DELAYED RELEASE ORAL at 05:56

## 2025-02-07 RX ADMIN — MIDODRINE HYDROCHLORIDE 15 MILLIGRAM(S): 5 TABLET ORAL at 05:56

## 2025-02-07 RX ADMIN — Medication 10 GRAM(S): at 05:55

## 2025-02-07 RX ADMIN — MIDODRINE HYDROCHLORIDE 15 MILLIGRAM(S): 5 TABLET ORAL at 14:03

## 2025-02-07 NOTE — DISCHARGE NOTE PROVIDER - NSDCCPCAREPLAN_GEN_ALL_CORE_FT
PRINCIPAL DISCHARGE DIAGNOSIS  Diagnosis: Septic shock  Assessment and Plan of Treatment: - Found to have C. Dif colitis and bacteremia.   - Blood cultures returned positive for bacterial growth, and you were treated with IV ABX.   - Repeat cultures were negative.   - Unable to proceed with ARACELI due to esophageal varices. Treated empirically for endocarditis.   - Please continue course of antibiotics as directed and follow up outpatient with your PCP, GI and ID.      SECONDARY DISCHARGE DIAGNOSES  Diagnosis: Acute hypoxic respiratory failure  Assessment and Plan of Treatment: - Intubated and successfully extubated, monitored in ICU    Diagnosis: C. difficile colitis  Assessment and Plan of Treatment: - Followed by ID and GI, treated as noted above.    Diagnosis: Bacteremia  Assessment and Plan of Treatment: - Treated as noted above. Follow up outpatient for monitoring.    Diagnosis: Decompensated cirrhosis  Assessment and Plan of Treatment: - Underwent paracentesis. Continue with medications as directed. Follow up outpatient for monitoring

## 2025-02-07 NOTE — PROCEDURE NOTE - NSINDICATIONS_GEN_A_CORE
ascites/suspected spontaneous bacterial peritonitis
critical illness
antibiotic therapy
airway protection/mental status change/respiratory distress/respiratory failure
arterial puncture to obtain ABG's/critical patient/monitoring purposes

## 2025-02-07 NOTE — DISCHARGE NOTE NURSING/CASE MANAGEMENT/SOCIAL WORK - NSDCVIVACCINE_GEN_ALL_CORE_FT
influenza, injectable, quadrivalent, preservative free; 13-Oct-2023 11:44; Kristina Renee (RN); [employer]; [id number] (Exp. Date: 13-Jun-2024); IntraMuscular; Deltoid Left.; 0.5 milliLiter(s); VIS (VIS Published: 06-Aug-2021, VIS Presented: 13-Oct-2023);   Influenza, split virus, trivalent, preservative free; 08-Oct-2024 13:53; Ector Canales (RN); [employer]; [id number] (Exp. Date: 30-Jun-2025); IntraMuscular; Deltoid Left.; 0.5 milliLiter(s); VIS (VIS Published: 06-Aug-2021, VIS Presented: 08-Oct-2024);

## 2025-02-07 NOTE — DISCHARGE NOTE PROVIDER - ATTENDING DISCHARGE PHYSICAL EXAMINATION:
T(C): 37.1 (02-07-25 @ 10:48), Max: 37.1 (02-07-25 @ 10:48)  HR: 75 (02-07-25 @ 10:48) (75 - 101)  BP: 100/61 (02-07-25 @ 10:48) (92/61 - 103/62)  RR: 19 (02-07-25 @ 10:48) (18 - 19)  SpO2: 100% (02-07-25 @ 10:48) (95% - 100%)    CONSTITUTIONAL: Well groomed, no apparent distress  EYES: PERRLA and symmetric, EOMI, No conjunctival or scleral injection, non-icteric  ENMT: Oral mucosa with moist membranes. Normal dentition; no pharyngeal injection or exudates             NECK: Supple, symmetric and without tracheal deviation   RESP: No respiratory distress, no use of accessory muscles; CTA b/l, no WRR  CV: RRR, +S1S2, no MRG; no JVD; no peripheral edema  GI: Soft, NT, ND, no rebound, no guarding; no palpable masses; no hepatosplenomegaly; no hernia palpated  LYMPH: No cervical LAD or tenderness; no axillary LAD or tenderness; no inguinal LAD or tenderness  MSK: Normal gait; No digital clubbing or cyanosis; examination of the (head/neck/spine/ribs/pelvis, RUE, LUE, RLE, LLE) without misalignment,            Normal ROM without pain, no spinal tenderness, normal muscle strength/tone  SKIN: No rashes or ulcers noted; no subcutaneous nodules or induration palpable  NEURO: CN II-XII intact; normal reflexes in upper and lower extremities, sensation intact in upper and lower extremities b/l to light touch   PSYCH: Appropriate insight/judgment; A+O x 3, mood and affect appropriate, recent/remote memory intact

## 2025-02-07 NOTE — PROGRESS NOTE ADULT - ASSESSMENT
60 y/o female with PMH of ETOH cirrhosis, multiple paracentesis for ascites, esophageal varices s/p banding,  who presents with x1 week of generalized fatigue, multiple bouts of diarrhea with poor PO intake and intermittent fevers. Of note was diagnosed Flu positive last week ON ADMISSION SHE REPORTED   fatigue, chills, +cough, +abdominal tenderness, and +SOB.    PT WITH POSITIVE STOOL FOR  C DIFF   AND BLOOD CX WITH ECOLI  CT SCAN  WITH COLITIS  CONTINUE  ORAL VANCO   UNCLEAR IF ABSORBED AS VIA NGT  IN ADDITION ON FLAGYL  PT WITH BLOOD CX WITH ECOLI  ADN BACTEROIDES  PT I WAS iNTUBATED ON PRESSORS NOW EXTUBATED AND OFF PRESSORS  WBC UP TO  K    AWAKE ALERT   PT WITH ? VEG ON MITRAL VALVE   ON TRANSTHORACIC ECHOCARDIOGRAM   HAD EGD WITH ESOPHAGEAL  VARICES FELT TO BE HIGHER  RISK FOR ARACELI   ALTHOUHG GM NEGATIVE ENDOCARDITIS IS RARE  CONCERN AS PT WITH CIRRHOSIS  WILL THEREFORE PLAN ON 6  WEEKS OF IV ABX FOR PRESUMED ENDOCARDITIS   WILL RECOMMEND ROCEPHIN 2GM Q 24 THROUGH 3/14/25  WOULD  CONTINUE FLAGYL ORAL FOR BACTEROIDES FOR 2 WEEKS  THRO 2/13/25   CONTINUE ORAL VANCO 125 QID WHILE ON IV ROCEPIN THROUGH  3/14/25  PICC LINE   PLACED  this  AM   FOR [place]   SPOKE TO BOYFRIEND AT BEDSIDE AND WILL D/W

## 2025-02-07 NOTE — DISCHARGE NOTE PROVIDER - CARE PROVIDERS DIRECT ADDRESSES
,[email or web address]

## 2025-02-07 NOTE — PROGRESS NOTE ADULT - PROVIDER SPECIALTY LIST ADULT
Infectious Disease
MICU
MICU
Infectious Disease
Infectious Disease
MICU
Palliative Care
Palliative Care
Infectious Disease
Internal Medicine
MICU
Hospitalist
Infectious Disease
Palliative Care
Hospitalist
Cardiology

## 2025-02-07 NOTE — DISCHARGE NOTE PROVIDER - NSDCMRMEDTOKEN_GEN_ALL_CORE_FT
cefTRIAXone 1 g injection: 2 gram(s) intravenously once a day  Centrum Women&#x27;s oral tablet: 1 tab(s) orally once a day  ferrous sulfate 325 mg (65 mg elemental iron) oral tablet: 1 tab(s) orally once a day  folic acid 1 mg oral tablet: 1 tab(s) orally once a day  furosemide 40 mg oral tablet: 1 tab(s) orally once a day  insulin glargine 100 units/mL subcutaneous solution: 15 unit(s) subcutaneous once a day (at bedtime)  lactulose 10 g/15 mL oral syrup: 15 milliliter(s) orally 3 times a day  melatonin 3 mg oral tablet: 1 tab(s) orally once a day (at bedtime) As needed Insomnia  metroNIDAZOLE 500 mg/100 mL intravenous solution: 100 milliliter(s) intravenous every 8 hours take until 02/13/25  midodrine 5 mg oral tablet: 3 tab(s) orally every 8 hours  pantoprazole 40 mg oral delayed release tablet: 1 tab(s) orally once a day (before a meal)  Preparation H Cooling 0.25% rectal gel: 1 application rectal 2 times a day  rifAXIMin 550 mg oral tablet: 1 tab(s) orally 2 times a day  spironolactone 25 mg oral tablet: 4 tab(s) orally once a day  sucralfate 1 g/10 mL oral suspension: 10 milliliter(s) orally every 6 hours  thiamine 100 mg oral tablet: 1 tab(s) orally every 24 hours  traMADol 50 mg oral tablet: 1 tab(s) orally every 12 hours As needed Moderate Pain (4 - 6)  vancomycin 250 mg oral capsule: 2 cap(s) orally every 6 hours take until 02/13/25

## 2025-02-07 NOTE — PROCEDURE NOTE - NSPOSTCAREGUIDE_GEN_A_CORE
Care for catheter as per unit/ICU protocols
Verbal/written post procedure instructions were given to patient/caregiver/Instructed patient/caregiver to follow-up with primary care physician/Instructed patient/caregiver regarding signs and symptoms of infection/Keep the cast/splint/dressing clean and dry/Care for catheter as per unit/ICU protocols
Verbal/written post procedure instructions were given to patient/caregiver
Instructed patient/caregiver to follow-up with primary care physician/Instructed patient/caregiver regarding signs and symptoms of infection

## 2025-02-07 NOTE — DISCHARGE NOTE NURSING/CASE MANAGEMENT/SOCIAL WORK - PATIENT PORTAL LINK FT
You can access the FollowMyHealth Patient Portal offered by [facility] by registering at the following website: [email or web address]. By joining [facility]’s FollowMyHealth portal, you will also be able to view your health information using other applications (apps) compatible with our system.

## 2025-02-07 NOTE — PROCEDURE NOTE - ADDITIONAL PROCEDURE DETAILS
VBG obtained to confirm venous
4FR 37CM 28CIRC BARD POWER PICC LINE good flash ns flush left basilic vein by ultrasound guidance.  Patient tolerated well.

## 2025-02-07 NOTE — PROCEDURE NOTE - NSPROCDETAILS_GEN_ALL_CORE
location identified, draped/prepped, sterile technique used, needle inserted/introduced/positive blood return obtained via catheter/connected to a pressurized flush line/sutured in place/hemostasis with direct pressure, dressing applied/Seldinger technique/all materials/supplies accounted for at end of procedure
guidewire recovered/lumen(s) aspirated and flushed/sterile dressing applied/sterile technique, catheter placed/ultrasound guidance with use of sterile gel and probe cove
patient pre-oxygenated, tube inserted, placement confirmed
location identified, draped/prepped, sterile technique used/sterile dressing applied/sterile technique, catheter placed/supine position/ultrasound guidance

## 2025-02-07 NOTE — DISCHARGE NOTE NURSING/CASE MANAGEMENT/SOCIAL WORK - FINANCIAL ASSISTANCE
[facility] provides services at a reduced cost to those who are determined to be eligible through [facility]’s financial assistance program. Information regarding [facility]’s financial assistance program can be found by going to [email or web address] or by calling [phone].

## 2025-02-07 NOTE — PROGRESS NOTE ADULT - REASON FOR ADMISSION
HAGMA, respiratory distress, shock

## 2025-02-07 NOTE — PROCEDURE NOTE - NSPOSTPRCRAD_GEN_A_CORE
post-procedure radiography performed
chest radiograph/depth of insertion/line adjusted to depth of insertion/postion of catheter/ultrasound

## 2025-02-07 NOTE — DISCHARGE NOTE PROVIDER - DETAILS OF MALNUTRITION DIAGNOSIS/DIAGNOSES
This patient has been assessed with a concern for Malnutrition and was treated during this hospitalization for the following Nutrition diagnosis/diagnoses:     -  01/28/2025: Moderate protein-calorie malnutrition   -  01/28/2025: Underweight (BMI < 19)

## 2025-02-07 NOTE — PROCEDURE NOTE - NSSITEPREP_SKIN_A_CORE
chlorhexidine/Adherence to aseptic technique: hand hygiene prior to donning barriers (gown, gloves), don cap and mask, sterile drape over patient
chlorhexidine
chlorhexidine
alcohol/Adherence to aseptic technique: hand hygiene prior to donning barriers (gown, gloves), don cap and mask, sterile drape over patient

## 2025-02-07 NOTE — DISCHARGE NOTE PROVIDER - PROVIDER TOKENS
PROVIDER:[TOKEN:[[id number]],FOLLOWUP:[1 week]],FREE:[LAST:[PCP],PHONE:[(   )    -],FAX:[(   )    -],FOLLOWUP:[1-3 days]],PROVIDER:[TOKEN:[[id number]],FOLLOWUP:[1 week]],PROVIDER:[TOKEN:[[id number]],FOLLOWUP:[1 week]]

## 2025-02-07 NOTE — PROGRESS NOTE ADULT - SUBJECTIVE AND OBJECTIVE BOX
INFECTIOUS DISEASES AND INTERNAL MEDICINE at [place]  =======================================================  Tommy Blair MD  Diplomates American Board of Internal Medicine and Infectious Diseases  Telephone [phone]  Fax            [phone]  =======================================================    ABRAHAN GOODWIN [id number]    Follow up:  BACTEREMIA C DIFF    Allergies:  Zithromax (Unknown)  morphine (Nausea)      Medications:  cefTRIAXone Injectable. 2000 milliGRAM(s) IV Push every 24 hours  chlorhexidine 2% Cloths 1 Application(s) Topical <User Schedule>  dextrose 5% + lactated ringers. 1000 milliLiter(s) IV Continuous <Continuous>  dextrose 5%. 1000 milliLiter(s) IV Continuous <Continuous>  dextrose 5%. 1000 milliLiter(s) IV Continuous <Continuous>  dextrose 50% Injectable 25 Gram(s) IV Push once  dextrose 50% Injectable 12.5 Gram(s) IV Push once  dextrose 50% Injectable 25 Gram(s) IV Push once  dextrose Oral Gel 15 Gram(s) Oral once PRN  folic acid 1 milliGRAM(s) Oral daily  glucagon  Injectable 1 milliGRAM(s) IntraMuscular once  hemorrhoidal Ointment 1 Application(s) Rectal every 6 hours PRN  hydrocortisone sodium succinate Injectable 50 milliGRAM(s) IV Push every 6 hours  insulin glargine Injectable (LANTUS) 15 Unit(s) SubCutaneous every morning  insulin lispro (ADMELOG) corrective regimen sliding scale   SubCutaneous every 6 hours  melatonin 5 milliGRAM(s) Oral at bedtime  metroNIDAZOLE  IVPB 500 milliGRAM(s) IV Intermittent every 8 hours  midodrine 10 milliGRAM(s) Oral every 8 hours  multivitamin 1 Tablet(s) Oral daily  norepinephrine Infusion 0.05 MICROgram(s)/kG/Min IV Continuous <Continuous>  pantoprazole    Tablet 40 milliGRAM(s) Oral before breakfast  potassium chloride  20 mEq/100 mL IVPB 20 milliEquivalent(s) IV Intermittent every 2 hours  vancomycin    Solution 500 milliGRAM(s) Oral every 6 hours    SOCIAL       FAMILY   FAMILY HISTORY:  FH: pancreatic cancer (Mother)    Family history of heart attack (Father)    Family history of CVA (Father)      REVIEW OF SYSTEMS:  CONSTITUTIONAL:  No Fever or chills  HEENT:   No diplopia or blurred vision.  No earache, sore throat or runny nose.  CARDIOVASCULAR:  No pressure, squeezing, strangling, tightness, heaviness or aching about the chest, neck, axilla or epigastrium.  RESPIRATORY:  No cough, shortness of breath, PND or orthopnea.  GASTROINTESTINAL:   diarrhea.  GENITOURINARY:  No dysuria, frequency or urgency. No Blood in urine  MUSCULOSKELETAL:   moves all joints  SKIN:  No change in skin, hair or nails.  NEUROLOGIC:  No paresthesias, fasciculations, seizures or weakness.  PSYCHIATRIC:  No disorder of thought or mood.  ENDOCRINE:  No heat or cold intolerance, polyuria or polydipsia.  HEMATOLOGICAL:  No easy bruising or bleeding.            Physical Exam:     Vital Signs Last 24 Hrs  T(C): 37 (07 Feb 2025 04:40), Max: 37 (07 Feb 2025 04:40)  T(F): 98.6 (07 Feb 2025 04:40), Max: 98.6 (07 Feb 2025 04:40)  HR: 97 (07 Feb 2025 06:56) (97 - 101)  BP: 92/61 (07 Feb 2025 06:56) (92/61 - 103/62)  BP(mean): --  RR: 18 (07 Feb 2025 04:40) (18 - 18)  SpO2: 100% (07 Feb 2025 04:40) (97% - 100%)    Parameters below as of 06 Feb 2025 22:15  Patient On (Oxygen Delivery Method): room air                    GEN: NAD,   HEENT: normocephalic and atraumatic. EOMI. LJ.    NECK: Supple. No carotid bruits.  No lymphadenopathy or thyromegaly.  LUNGS: Clear to auscultation.  HEART: Regular rate and rhythm without murmur.  ABDOMEN: Soft, nontender, DISTENDED  .  Positive bowel sounds.    : No CVA tenderness  EXTREMITIES: Without any cyanosis, clubbing, rash, lesions or edema.  MSK: no joint swelling  NEUROLOGIC: Cranial nerves II through XII are grossly intact.  PSYCHIATRIC: Appropriate affect .  SKIN: No ulceration or induration present.        Labs:  Vitals:  =======   =======================================================  Current Antibiotics:  cefTRIAXone Injectable. 2000 milliGRAM(s) IV Push every 24 hours  metroNIDAZOLE  IVPB 500 milliGRAM(s) IV Intermittent every 8 hours  vancomycin    Solution 500 milliGRAM(s) Oral every 6 hours    Other medications:  chlorhexidine 2% Cloths 1 Application(s) Topical <User Schedule>  dextrose 5% + lactated ringers. 1000 milliLiter(s) IV Continuous <Continuous>  dextrose 5%. 1000 milliLiter(s) IV Continuous <Continuous>  dextrose 5%. 1000 milliLiter(s) IV Continuous <Continuous>  dextrose 50% Injectable 25 Gram(s) IV Push once  dextrose 50% Injectable 12.5 Gram(s) IV Push once  dextrose 50% Injectable 25 Gram(s) IV Push once  folic acid 1 milliGRAM(s) Oral daily  glucagon  Injectable 1 milliGRAM(s) IntraMuscular once  hydrocortisone sodium succinate Injectable 50 milliGRAM(s) IV Push every 6 hours  insulin glargine Injectable (LANTUS) 15 Unit(s) SubCutaneous every morning  insulin lispro (ADMELOG) corrective regimen sliding scale   SubCutaneous every 6 hours  melatonin 5 milliGRAM(s) Oral at bedtime  midodrine 10 milliGRAM(s) Oral every 8 hours  multivitamin 1 Tablet(s) Oral daily  norepinephrine Infusion 0.05 MICROgram(s)/kG/Min IV Continuous <Continuous>  pantoprazole    Tablet 40 milliGRAM(s) Oral before breakfast  potassium chloride  20 mEq/100 mL IVPB 20 milliEquivalent(s) IV Intermittent every 2 hours      =======================================================  Labs:                                                        9.4    10.94 )-----------( 115      ( 07 Feb 2025 05:43 )             28.4                                                           9.4    10.94 )-----------( 115      ( 07 Feb 2025 05:43 )             28.4         Culture - Blood (collected 01-31-25 @ 02:45)  Source: .Blood BLOOD  Preliminary Report (02-03-25 @ 06:01):    No growth at 72 Hours    Culture - Blood (collected 01-31-25 @ 02:30)  Source: .Blood BLOOD  Preliminary Report (02-03-25 @ 06:01):    No growth at 72 Hours    Culture - Blood (collected 01-27-25 @ 06:00)  Source: .Blood BLOOD  Gram Stain (01-30-25 @ 07:57):    Growth in anaerobic bottle: Gram Negative Rods  Final Report (02-01-25 @ 11:51):    Growth in anaerobic bottle: Bacteroides thetaiotaomicron group    "Susceptibilities not performed"    Culture - Blood (collected 01-27-25 @ 02:07)  Source: .Blood BLOOD  Gram Stain (01-27-25 @ 18:01):    Growth in anaerobic bottle: Gram Negative Rods    Growth in aerobic bottle: Gram Negative Rods  Final Report (01-29-25 @ 08:00):    Growth in aerobic and anaerobic bottles: Escherichia coli    Direct identification is available within approximately 3-5    hours either by Blood Panel Multiplexed PCR or Direct    MALDI-TOF. Details: [email or web address]  Organism: Blood Culture PCR  Escherichia coli (01-29-25 @ 08:00)  Organism: Escherichia coli (01-29-25 @ 08:00)    Sensitivities:      Method Type: NABILA      -  Ampicillin: R >16 These ampicillin results predict results for amoxicillin      -  Ampicillin/Sulbactam: I 16/8      -  Aztreonam: S <=4      -  Cefazolin: I 4      -  Cefepime: S <=2      -  Cefoxitin: S <=8      -  Ceftriaxone: S <=1      -  Ciprofloxacin: S <=0.25      -  Ertapenem: S <=0.5      -  Gentamicin: S <=2      -  Imipenem: S <=1      -  Levofloxacin: S <=0.5      -  Meropenem: S <=1      -  Piperacillin/Tazobactam: S <=8      -  Tobramycin: I 4      -  Trimethoprim/Sulfamethoxazole: S <=0.5/9.5  Organism: Blood Culture PCR (01-29-25 @ 08:00)    Sensitivities:      Method Type: PCR      -  Escherichia coli: Detec    Urinalysis with Rflx Culture (collected 11-16-24 @ 09:00)    Culture - Body Fluid with Gram Stain (collected 11-16-24 @ 04:44)  Source: Abdominal Fl  Gram Stain (11-16-24 @ 12:36):    polymorphonuclear leukocytes seen    No organisms seen    by cytocentrifuge  Final Report (11-21-24 @ 08:50):    No growth at 5 days    Culture - Blood (collected 11-16-24 @ 03:17)  Source: .Blood BLOOD  Final Report (11-21-24 @ 09:01):    No growth at 5 days    Culture - Blood (collected 11-16-24 @ 02:44)  Source: .Blood BLOOD  Final Report (11-21-24 @ 09:01):    No growth at 5 days    Culture - Fungal, Body Fluid (collected 11-23-23 @ 14:00)  Source: Peritoneal Peritoneal Fluid  Final Report (12-23-23 @ 15:01):    No fungus isolated at 4 weeks.    Culture - Body Fluid with Gram Stain (collected 11-23-23 @ 14:00)  Source: Ascites Fl Ascites Fluid  Gram Stain (11-23-23 @ 23:43):    polymorphonuclear leukocytes    No organisms seen    by cytocentrifuge  Final Report (11-28-23 @ 16:15):    No growth at 5 days    Culture - Blood (collected 11-22-23 @ 08:31)  Source: .Blood None  Final Report (11-27-23 @ 13:01):    No growth at 5 days    Culture - Blood (collected 11-22-23 @ 08:22)  Source: .Blood None  Final Report (11-27-23 @ 13:01):    No growth at 5 days    Culture - Blood (collected 11-09-23 @ 07:01)  Source: .Blood None  Final Report (11-14-23 @ 14:00):    No growth at 5 days    Culture - Blood (collected 11-09-23 @ 07:01)  Source: .Blood None  Final Report (11-14-23 @ 14:00):    No growth at 5 days    Culture - Blood (collected 10-07-23 @ 09:01)  Source: .Blood None  Final Report (10-12-23 @ 17:01):    No growth at 5 days    Culture - Blood (collected 10-07-23 @ 09:01)  Source: .Blood Blood-Venous  Final Report (10-12-23 @ 17:01):    No growth at 5 days    Culture - Blood (collected 05-09-23 @ 04:39)  Source: .Blood Blood-Peripheral  Final Report (05-14-23 @ 10:01):    No Growth Final    Culture - Urine (collected 05-09-23 @ 04:39)  Source: Clean Catch Clean Catch (Midstream)  Final Report (05-10-23 @ 23:46):    <10,000 CFU/mL Normal Urogenital Bibi    Culture - Blood (collected 05-09-23 @ 04:39)  Source: .Blood Blood-Peripheral  Final Report (05-14-23 @ 10:01):    No Growth Final      Creatinine: 0.32 mg/dL (02-03-25 @ 03:28)  Creatinine: 0.45 mg/dL (02-02-25 @ 02:12)  Creatinine: 0.53 mg/dL (02-01-25 @ 20:00)  Creatinine: 0.55 mg/dL (02-01-25 @ 13:26)  Creatinine: 0.52 mg/dL (02-01-25 @ 10:00)  Creatinine: 0.48 mg/dL (02-01-25 @ 06:45)  Creatinine: 0.48 mg/dL (02-01-25 @ 02:15)  Creatinine: 0.44 mg/dL (01-31-25 @ 02:45)  Creatinine: 0.42 mg/dL (01-30-25 @ 13:50)  Creatinine: 0.61 mg/dL (01-30-25 @ 04:30)  Creatinine: 0.69 mg/dL (01-29-25 @ 09:50)            WBC Count: 15.83 K/uL (02-03-25 @ 03:28)  WBC Count: 7.08 K/uL (02-02-25 @ 02:12)  WBC Count: 7.87 K/uL (02-01-25 @ 13:26)  WBC Count: 8.95 K/uL (02-01-25 @ 06:45)  WBC Count: 9.02 K/uL (02-01-25 @ 02:15)  WBC Count: 12.79 K/uL (01-31-25 @ 02:45)  WBC Count: 17.97 K/uL (01-30-25 @ 04:30)  WBC Count: 26.74 K/uL (01-29-25 @ 09:50)    SARS-CoV-2: NotDetec (01-27-25 @ 06:00)      Alkaline Phosphatase: 258 U/L (02-03-25 @ 03:28)  Alkaline Phosphatase: 287 U/L (02-02-25 @ 02:12)  Alkaline Phosphatase: 262 U/L (02-01-25 @ 13:26)  Alkaline Phosphatase: 252 U/L (02-01-25 @ 06:45)  Alkaline Phosphatase: 264 U/L (02-01-25 @ 02:15)  Alkaline Phosphatase: 274 U/L (01-31-25 @ 02:45)  Alanine Aminotransferase (ALT/SGPT): 26 U/L (02-03-25 @ 03:28)  Alanine Aminotransferase (ALT/SGPT): 26 U/L (02-02-25 @ 02:12)  Alanine Aminotransferase (ALT/SGPT): 24 U/L (02-01-25 @ 13:26)  Alanine Aminotransferase (ALT/SGPT): 24 U/L (02-01-25 @ 06:45)  Alanine Aminotransferase (ALT/SGPT): 24 U/L (02-01-25 @ 02:15)  Alanine Aminotransferase (ALT/SGPT): 27 U/L (01-31-25 @ 02:45)  Aspartate Aminotransferase (AST/SGOT): 50 U/L (02-03-25 @ 03:28)  Aspartate Aminotransferase (AST/SGOT): 58 U/L (02-02-25 @ 02:12)  Aspartate Aminotransferase (AST/SGOT): 58 U/L (02-01-25 @ 13:26)  Aspartate Aminotransferase (AST/SGOT): 59 U/L (02-01-25 @ 06:45)  Aspartate Aminotransferase (AST/SGOT): 60 U/L (02-01-25 @ 02:15)  Aspartate Aminotransferase (AST/SGOT): 74 U/L (01-31-25 @ 02:45)  Bilirubin Total: 7.0 mg/dL (02-03-25 @ 03:28)  Bilirubin Total: 8.6 mg/dL (02-02-25 @ 02:12)  Bilirubin Total: 8.4 mg/dL (02-01-25 @ 13:26)  Bilirubin Total: 9.0 mg/dL (02-01-25 @ 10:00)  Bilirubin Total: 8.9 mg/dL (02-01-25 @ 06:45)  Bilirubin Total: 9.4 mg/dL (02-01-25 @ 02:15)  Bilirubin Total: 10.2 mg/dL (01-31-25 @ 02:45)

## 2025-02-07 NOTE — DISCHARGE NOTE PROVIDER - HOSPITAL COURSE
59 year old female with a PMHx of ETOH cirrhosis, recurrent ascites s/p multiple paracentesis, esophageal varices s/p banding, Flu positive a week prior, who presented to [facility] with c/o generalized weakness, diarrhea, intermittent fever and poor po intake. In the ED, patient met septic criteria. Noted to be hypotensive requiring vasopressor support. CT with evidence of colitis and C Diff PCR positive. Sarted on PO Vancomycin and Flagyl. Admitted to MICU for management of septic shock secondary to colitis. Course complicated by AHRF with change in mental status and was emergently intubated on 1/28/25 and successfully extubated 2/2/25. Found to have E Coli and bacteroides bacteremia and possible MV vegetation on TTE and cardiology consulted for ARACELI, s/p EGD 1/31 for GI clearance which noted nonbleeding 2 cords of grade I varices in the lower third of the esophagus and ultimately deemed poor candidate for ARACELI due to esophageal varices. ID following and decided on empiric 6 weeks of antibiotics coverage for presumed endocarditis. Repeat blood culture 1/31 negative. PICC line placed. Patient underwent paracentesis for decompensated cirrhosis with 2L fluid removed. At this time, patient is medically stable for discharge with close outpatient follow up.

## 2025-02-07 NOTE — PROCEDURE NOTE - NSPROCNAME_GEN_A_CORE
Arterial Puncture/Cannulation
Paracentesis
Tracheal Intubation
PICC Line Insertion
Central Line Insertion

## 2025-02-07 NOTE — DISCHARGE NOTE PROVIDER - CARE PROVIDER_API CALL
Matthew Dickey  Infectious Disease  [address], [address]  [place] [address]  Phone: [phone]  Fax: [phone]  Follow Up Time: 1 week    PCP,   Phone: (   )    -  Fax: (   )    -  Follow Up Time: 1-3 days    Deshaun Trejo  Cardiovascular Disease  [address], [address] [place] [address]  Phone: [phone]  Fax: [phone]  Follow Up Time: 1 week    Martin Hubbard  Gastroenterology  [address], [address]  [place] [address]  Phone: [phone]  Fax: [phone]  Follow Up Time: 1 week

## 2025-02-07 NOTE — PROCEDURE NOTE - NSINFORMCONSENT_GEN_A_CORE
Benefits, risks, and possible complications of procedure explained to patient/caregiver who verbalized understanding and gave verbal consent.
Benefits, risks, and possible complications of procedure explained to patient/caregiver who verbalized understanding and gave written consent.
Benefits, risks, and possible complications of procedure explained to patient/caregiver who verbalized understanding and gave verbal consent.
Benefits, risks, and possible complications of procedure explained to patient/caregiver who verbalized understanding and gave written consent.
This was an emergent procedure.

## 2025-02-09 LAB
CULTURE RESULTS: SIGNIFICANT CHANGE UP
SPECIMEN SOURCE: SIGNIFICANT CHANGE UP

## 2025-02-10 ENCOUNTER — INPATIENT (INPATIENT)
Facility: HOSPITAL | Age: 60
LOS: 22 days | Discharge: NURSING, REHAB OR LONG-TERM CARE | DRG: 443 | End: 2025-03-05
Attending: STUDENT IN AN ORGANIZED HEALTH CARE EDUCATION/TRAINING PROGRAM | Admitting: STUDENT IN AN ORGANIZED HEALTH CARE EDUCATION/TRAINING PROGRAM
Payer: MEDICAID

## 2025-02-10 VITALS
TEMPERATURE: 99 F | HEART RATE: 115 BPM | SYSTOLIC BLOOD PRESSURE: 115 MMHG | RESPIRATION RATE: 18 BRPM | OXYGEN SATURATION: 100 % | DIASTOLIC BLOOD PRESSURE: 68 MMHG

## 2025-02-10 DIAGNOSIS — Z98.890 OTHER SPECIFIED POSTPROCEDURAL STATES: Chronic | ICD-10-CM

## 2025-02-10 DIAGNOSIS — Z98.891 HISTORY OF UTERINE SCAR FROM PREVIOUS SURGERY: Chronic | ICD-10-CM

## 2025-02-10 DIAGNOSIS — R09.89 OTHER SPECIFIED SYMPTOMS AND SIGNS INVOLVING THE CIRCULATORY AND RESPIRATORY SYSTEMS: ICD-10-CM

## 2025-02-10 DIAGNOSIS — Z29.9 ENCOUNTER FOR PROPHYLACTIC MEASURES, UNSPECIFIED: ICD-10-CM

## 2025-02-10 DIAGNOSIS — E11.9 TYPE 2 DIABETES MELLITUS WITHOUT COMPLICATIONS: ICD-10-CM

## 2025-02-10 DIAGNOSIS — K72.90 HEPATIC FAILURE, UNSPECIFIED WITHOUT COMA: ICD-10-CM

## 2025-02-10 DIAGNOSIS — A04.72 ENTEROCOLITIS DUE TO CLOSTRIDIUM DIFFICILE, NOT SPECIFIED AS RECURRENT: ICD-10-CM

## 2025-02-10 DIAGNOSIS — K70.30 ALCOHOLIC CIRRHOSIS OF LIVER WITHOUT ASCITES: ICD-10-CM

## 2025-02-10 LAB — GLUCOSE BLDC GLUCOMTR-MCNC: 126 MG/DL — HIGH (ref 70–99)

## 2025-02-10 PROCEDURE — 99223 1ST HOSP IP/OBS HIGH 75: CPT | Mod: GC

## 2025-02-10 PROCEDURE — 71045 X-RAY EXAM CHEST 1 VIEW: CPT | Mod: 26

## 2025-02-10 RX ORDER — LACTULOSE 10 G/15ML
10 SOLUTION ORAL THREE TIMES A DAY
Refills: 0 | Status: DISCONTINUED | OUTPATIENT
Start: 2025-02-10 | End: 2025-02-16

## 2025-02-10 RX ORDER — FUROSEMIDE 10 MG/ML
40 INJECTION INTRAMUSCULAR; INTRAVENOUS DAILY
Refills: 0 | Status: DISCONTINUED | OUTPATIENT
Start: 2025-02-10 | End: 2025-02-20

## 2025-02-10 RX ORDER — DEXTROSE 50 % IN WATER 50 %
25 SYRINGE (ML) INTRAVENOUS ONCE
Refills: 0 | Status: DISCONTINUED | OUTPATIENT
Start: 2025-02-10 | End: 2025-02-12

## 2025-02-10 RX ORDER — GLUCAGON 3 MG/1
1 POWDER NASAL ONCE
Refills: 0 | Status: DISCONTINUED | OUTPATIENT
Start: 2025-02-10 | End: 2025-02-12

## 2025-02-10 RX ORDER — INSULIN LISPRO 100 U/ML
INJECTION, SOLUTION INTRAVENOUS; SUBCUTANEOUS AT BEDTIME
Refills: 0 | Status: DISCONTINUED | OUTPATIENT
Start: 2025-02-10 | End: 2025-02-12

## 2025-02-10 RX ORDER — METRONIDAZOLE 250 MG
500 TABLET ORAL EVERY 8 HOURS
Refills: 0 | Status: COMPLETED | OUTPATIENT
Start: 2025-02-10 | End: 2025-02-13

## 2025-02-10 RX ORDER — DEXTROSE 50 % IN WATER 50 %
12.5 SYRINGE (ML) INTRAVENOUS ONCE
Refills: 0 | Status: DISCONTINUED | OUTPATIENT
Start: 2025-02-10 | End: 2025-02-12

## 2025-02-10 RX ORDER — VANCOMYCIN HCL IN 5 % DEXTROSE 1.5G/250ML
500 PLASTIC BAG, INJECTION (ML) INTRAVENOUS EVERY 6 HOURS
Refills: 0 | Status: DISCONTINUED | OUTPATIENT
Start: 2025-02-10 | End: 2025-02-11

## 2025-02-10 RX ORDER — DEXTROSE 50 % IN WATER 50 %
15 SYRINGE (ML) INTRAVENOUS ONCE
Refills: 0 | Status: DISCONTINUED | OUTPATIENT
Start: 2025-02-10 | End: 2025-02-12

## 2025-02-10 RX ORDER — CEFTRIAXONE 500 MG/1
2000 INJECTION, POWDER, FOR SOLUTION INTRAMUSCULAR; INTRAVENOUS EVERY 24 HOURS
Refills: 0 | Status: COMPLETED | OUTPATIENT
Start: 2025-02-10 | End: 2025-02-13

## 2025-02-10 RX ORDER — INSULIN GLARGINE-YFGN 100 [IU]/ML
15 INJECTION, SOLUTION SUBCUTANEOUS AT BEDTIME
Refills: 0 | Status: DISCONTINUED | OUTPATIENT
Start: 2025-02-10 | End: 2025-02-12

## 2025-02-10 RX ORDER — SPIRONOLACTONE 25 MG
100 TABLET ORAL DAILY
Refills: 0 | Status: DISCONTINUED | OUTPATIENT
Start: 2025-02-10 | End: 2025-02-20

## 2025-02-10 RX ORDER — SUCRALFATE 1 G
1 TABLET ORAL EVERY 6 HOURS
Refills: 0 | Status: DISCONTINUED | OUTPATIENT
Start: 2025-02-10 | End: 2025-03-05

## 2025-02-10 RX ORDER — FOLIC ACID 1 MG/1
1 TABLET ORAL DAILY
Refills: 0 | Status: DISCONTINUED | OUTPATIENT
Start: 2025-02-10 | End: 2025-03-05

## 2025-02-10 RX ORDER — SODIUM CHLORIDE 9 G/1000ML
1000 INJECTION, SOLUTION INTRAVENOUS
Refills: 0 | Status: DISCONTINUED | OUTPATIENT
Start: 2025-02-10 | End: 2025-02-12

## 2025-02-10 RX ORDER — MIDODRINE HYDROCHLORIDE 5 MG/1
15 TABLET ORAL THREE TIMES A DAY
Refills: 0 | Status: DISCONTINUED | OUTPATIENT
Start: 2025-02-10 | End: 2025-03-05

## 2025-02-10 RX ORDER — HEPARIN SODIUM 1000 [USP'U]/ML
5000 INJECTION INTRAVENOUS; SUBCUTANEOUS EVERY 8 HOURS
Refills: 0 | Status: DISCONTINUED | OUTPATIENT
Start: 2025-02-10 | End: 2025-02-13

## 2025-02-10 RX ORDER — MELATONIN 5 MG
3 TABLET ORAL AT BEDTIME
Refills: 0 | Status: DISCONTINUED | OUTPATIENT
Start: 2025-02-10 | End: 2025-03-05

## 2025-02-10 RX ORDER — INSULIN LISPRO 100 U/ML
INJECTION, SOLUTION INTRAVENOUS; SUBCUTANEOUS
Refills: 0 | Status: DISCONTINUED | OUTPATIENT
Start: 2025-02-10 | End: 2025-02-12

## 2025-02-10 RX ADMIN — Medication 3 MILLIGRAM(S): at 22:45

## 2025-02-10 RX ADMIN — INSULIN GLARGINE-YFGN 15 UNIT(S): 100 INJECTION, SOLUTION SUBCUTANEOUS at 22:52

## 2025-02-10 RX ADMIN — LACTULOSE 10 GRAM(S): 10 SOLUTION ORAL at 21:37

## 2025-02-10 RX ADMIN — Medication 500 MILLIGRAM(S): at 23:33

## 2025-02-10 RX ADMIN — Medication 100 MILLIGRAM(S): at 22:51

## 2025-02-10 RX ADMIN — Medication 1 GRAM(S): at 23:34

## 2025-02-10 RX ADMIN — HEPARIN SODIUM 5000 UNIT(S): 1000 INJECTION INTRAVENOUS; SUBCUTANEOUS at 22:45

## 2025-02-10 RX ADMIN — CEFTRIAXONE 100 MILLIGRAM(S): 500 INJECTION, POWDER, FOR SOLUTION INTRAMUSCULAR; INTRAVENOUS at 21:37

## 2025-02-10 NOTE — H&P ADULT - ASSESSMENT
58yo F w/ PMH of alcohol cirrhosis with recurrent ascites, s/p multiple paracentesis, s/p banding of esophageal varices, currently being treated for recent c. diff colitis and possible endocarditis. Pt is jaundiced, with scleral icterus and abdominal distension presenting for liver transplant evaluation from [facility].

## 2025-02-10 NOTE — H&P ADULT - NSHPPHYSICALEXAM_GEN_ALL_CORE
VITALS:   T(C): 37.4 (02-10-25 @ 19:35), Max: 37.4 (02-10-25 @ 19:35)  HR: 115 (02-10-25 @ 19:35) (115 - 115)  BP: 115/68 (02-10-25 @ 19:35) (115/68 - 115/68)  RR: 18 (02-10-25 @ 19:35) (18 - 18)  SpO2: 100% (02-10-25 @ 19:35) (100% - 100%)    GENERAL: NAD, lying in bed comfortably  HEAD:  Atraumatic, Normocephalic  EYES: +scleral icterus. EOMI, PERRLA.   ENT: Moist mucous membranes  NECK: Supple, No JVD  CHEST/LUNG: Clear to auscultation bilaterally; No rales, rhonchi, wheezing, or rubs. Unlabored respirations.  HEART: Tachycardic with regular rhythm; No murmurs.  ABDOMEN: BSx4; Soft, distended, mild tenderness to palpation of epigastric region.  EXTREMITIES: No clubbing, cyanosis, or edema.   NERVOUS SYSTEM:  A&Ox3, no gross lateralizing deficits   SKIN: +spider angiomata noted diffusely, grossly jaundiced.

## 2025-02-10 NOTE — H&P ADULT - HISTORY OF PRESENT ILLNESS
60yo F w/ PMH of alcohol cirrhosis with recurrent ascites, s/p multiple paracentesis, s/p banding of esophageal varices, currently being treated for recent c. diff colitis and possible endocarditis, presenting for possible liver transplant from [facility]. Pt initially admitted to [facility] c/o weakness, poor PO intake, and fever; hypotensive in ED. Met criteria for sepsis iso C. diff colitis and was treated with vasopressor support, flagyl, and vanc. Hospital course was complicated by AHRF and pt was intubated 1/28-2/2. Pt then treated for E. coli and Bacteroides bacteremia with TTE findings concerning for mitral vegetation; pt not a good candidate for ARACELI due to varices decided to treat empirically for IE with 6 weeks of antibiotics s/p PICC line placement. Pt d/c from [facility] on 2/7 and then presented to [facility] c/o SOB. Ultimately transferred to [facility] for hepatology transplant consult. On presentation today, pt endorses subjective nighttime fevers, chills, SOB and abdominal discomfort. Attributes fevers to recent flu dx (noted in chart to be flu positive 1 week prior to presentation at [facility]). Pt states she has not used alcohol in over a year. She began using alcohol in her 20s socially and her use gradually increased to include ~3-4 glasses of wine everyday when she was drinking the most. Has been to rehab and therapy multiple times. Pt first began smoking in her 20s ~1pack/day for many years, cannot recall specifics. Has not had a cigarette in several months. Pt is unable to ambulate on her own and has remained bedbound since intubation several months ago.

## 2025-02-10 NOTE — H&P ADULT - PROBLEM SELECTOR PLAN 3
Pt tested positive for C. diff colitis at [facility]. Started on oral vancomycin.     Plan:   - Plan to continue oral vancomycin until CTX course finished (CTX ends 3/14/25)   - ID consult in AM

## 2025-02-10 NOTE — H&P ADULT - NSHPREVIEWOFSYSTEMS_GEN_ALL_CORE
REVIEW OF SYSTEMS:  CONSTITUTIONAL: +fevers, +chills, +weakness.   EYES/ENT: +vertigo, +throat pain. No visual changes.   NECK: No pain or stiffness  RESPIRATORY: +cough, +SOB. No wheezing, hemoptysis.   CARDIOVASCULAR: No chest pain or palpitations  GASTROINTESTINAL: +abdominal pain. No nausea, vomiting, or hematemesis; No diarrhea or constipation. No melena or hematochezia.    GENITOURINARY: No dysuria, frequency or hematuria  NEUROLOGICAL: No numbness or tingling.

## 2025-02-10 NOTE — H&P ADULT - PROBLEM SELECTOR PLAN 5
DVT prophylaxis: heparin 500mg Q8h  Diet: Consistent card diet; 1500mL fluid restriction DVT prophylaxis: heparin 500mg Q8h  Diet: Consistent carb diet; 1500mL fluid restriction

## 2025-02-10 NOTE — H&P ADULT - PROBLEM SELECTOR PLAN 1
- C/b hepatic encephalopathy and esophageal varices  - Paracentesis done today; 1.4L removed  - Pt appears clinically euvolemic on exam    Plan:  - Continue on Lasix 40mg and Spironolactone 100mg qD  - Continue on lactulose and rifaximin  - Low current suspicion for SBP; pt taking IV CTX 2g qD  - Transplant hepatology eval in AM

## 2025-02-10 NOTE — H&P ADULT - PROBLEM SELECTOR PLAN 4
Recently diagnosed per patient. Per prior d/c note taking 15U Lantus every night.     Plan:  Continue w/ 15U of Lantus qHS Recently diagnosed per patient. Per prior d/c note taking 15U Lantus every night.     Plan:  -Continue w/ 15U of Lantus qHS  -ENZO

## 2025-02-10 NOTE — H&P ADULT - PROBLEM SELECTOR PLAN 2
iso e. coli and bacteroides bacteremia. Pt with evidence of mitral valve vegetation on TTE; not confirmed with ARACELI due to concerns regarding esophageal varices.     Plan:   - Continue with empiric CTX 2g qD (for 6 weeks, ends 3/14/25)  - Continue with Flagyl 500mg q8 (for 2 weeks, ends 2/13/25) for bacteroides bacteremia  - ID consult in AM -iso e. coli and bacteroides bacteremia. Pt with evidence of mitral valve vegetation on TTE; not confirmed with ARACELI due to concerns regarding esophageal varices.   -Likely in setting of recent dental extraction in December    Plan:   - Continue with empiric CTX 2g qD (for 6 weeks, ends 3/14/25)  - Continue with Flagyl 500mg q8 (for 2 weeks, ends 2/13/25) for bacteroides bacteremia  - ID consult in AM

## 2025-02-10 NOTE — H&P ADULT - TIME BILLING
Chart review , case discussion with other provider , obtain history  ,  examination of patient , answering questions and concerns , review  orders,  labs and medications , and documentation

## 2025-02-10 NOTE — H&P ADULT - ATTENDING COMMENTS
59F w/ PMH of alcohol cirrhosis c/b ascites s/p  paracentesis, esophageal varices s/p banding , recent complicated admission to [facility] for septic shock w/ AHRF  requiring pressors , intubation , course c/b   c. diff colitis , Bacteroides  bacteremia w/ probable endocarditis unable to obtain ARACELI due to varices ,  now s/p PICC  and planned for 6 week abx , d/c;ed to rehab 2/7/25  and Re-admitted to [facility] shortly afterward for SOB  , t/f to [facility] for hepatology evaluation reported MELD 29 , Here  VSS , reports SOB other no complaints , lung CTA ,  no asterixis , cachectic and jaundice w/ scleral icterus , + ascites , soft compressible ; will continue ABX for endocarditis  and cdiff , continue w/ rifaximin , c/w Lasix , midodrine and spironolactone , f/u LFts , hepatology consult to be arranged by day team .  Trial of bronchodilators , SOB/ Wheezing improves can continue q6h PRN.

## 2025-02-10 NOTE — H&P ADULT - NSICDXPASTMEDICALHX_GEN_ALL_CORE_FT
PAST MEDICAL HISTORY:  Alcoholic cirrhosis     GERD (gastroesophageal reflux disease)     H/O bacteremia     H/O Clostridium difficile infection     H/O esophageal varices     History of bacteremia

## 2025-02-10 NOTE — H&P ADULT - NSHPSOCIALHISTORY_GEN_ALL_CORE
Pt is engaged, lives at home with partha. Hx of alcohol use disorder for which she has been to rehab multiple times. Began in early 20s at which time she drank only socially, smoked 1 pack of cigarettes per day during this time as well. States her alcohol use progressively increased; at its peak she was drinking 3-4 glasses of wine everyday. Pt quit smoking several times for extended periods of times (~8 years) but does not remember exactly when or how long she has smoked. States after quitting she began smoking again when her mother  of Covid (previously tx for pancreatic cancer). Pt states her brother also had alcohol use disorder;  at 58 unexpectedly due to possible ACS s/p coronary stent placement. Denies other recreational drug use. Pt has mixed feelings about liver transplant because "she did this to herself" but endorses abstinence for past year as she is trying to "change things around."

## 2025-02-11 LAB
A1C WITH ESTIMATED AVERAGE GLUCOSE RESULT: 4.6 % — SIGNIFICANT CHANGE UP (ref 4–5.6)
ALBUMIN SERPL ELPH-MCNC: 3 G/DL — LOW (ref 3.3–5)
ALBUMIN SERPL ELPH-MCNC: 3.5 G/DL — SIGNIFICANT CHANGE UP (ref 3.3–5)
ALP SERPL-CCNC: 257 U/L — HIGH (ref 40–120)
ALP SERPL-CCNC: 279 U/L — HIGH (ref 40–120)
ALT FLD-CCNC: 46 U/L — HIGH (ref 10–45)
ALT FLD-CCNC: 50 U/L — HIGH (ref 10–45)
ANION GAP SERPL CALC-SCNC: 12 MMOL/L — SIGNIFICANT CHANGE UP (ref 5–17)
ANION GAP SERPL CALC-SCNC: 15 MMOL/L — SIGNIFICANT CHANGE UP (ref 5–17)
APTT BLD: 38.6 SEC — HIGH (ref 24.5–35.6)
APTT BLD: 53.6 SEC — HIGH (ref 24.5–35.6)
AST SERPL-CCNC: 105 U/L — HIGH (ref 10–40)
AST SERPL-CCNC: 186 U/L — HIGH (ref 10–40)
BILIRUB SERPL-MCNC: 11.4 MG/DL — HIGH (ref 0.2–1.2)
BILIRUB SERPL-MCNC: 12.6 MG/DL — HIGH (ref 0.2–1.2)
BUN SERPL-MCNC: 6 MG/DL — LOW (ref 7–23)
BUN SERPL-MCNC: 6 MG/DL — LOW (ref 7–23)
C DIFF GDH STL QL: NEGATIVE — SIGNIFICANT CHANGE UP
C DIFF GDH STL QL: SIGNIFICANT CHANGE UP
CALCIUM SERPL-MCNC: 7.8 MG/DL — LOW (ref 8.4–10.5)
CALCIUM SERPL-MCNC: 8.3 MG/DL — LOW (ref 8.4–10.5)
CHLORIDE SERPL-SCNC: 101 MMOL/L — SIGNIFICANT CHANGE UP (ref 96–108)
CHLORIDE SERPL-SCNC: 102 MMOL/L — SIGNIFICANT CHANGE UP (ref 96–108)
CO2 SERPL-SCNC: 17 MMOL/L — LOW (ref 22–31)
CO2 SERPL-SCNC: 18 MMOL/L — LOW (ref 22–31)
CREAT SERPL-MCNC: <0.3 MG/DL — LOW (ref 0.5–1.3)
CREAT SERPL-MCNC: <0.3 MG/DL — LOW (ref 0.5–1.3)
EGFR: 121 ML/MIN/1.73M2 — SIGNIFICANT CHANGE UP
ESTIMATED AVERAGE GLUCOSE: 85 MG/DL — SIGNIFICANT CHANGE UP (ref 68–114)
GAS PNL BLDV: SIGNIFICANT CHANGE UP
GAS PNL BLDV: SIGNIFICANT CHANGE UP
GLUCOSE BLDC GLUCOMTR-MCNC: 108 MG/DL — HIGH (ref 70–99)
GLUCOSE BLDC GLUCOMTR-MCNC: 113 MG/DL — HIGH (ref 70–99)
GLUCOSE BLDC GLUCOMTR-MCNC: 122 MG/DL — HIGH (ref 70–99)
GLUCOSE BLDC GLUCOMTR-MCNC: 136 MG/DL — HIGH (ref 70–99)
GLUCOSE SERPL-MCNC: 105 MG/DL — HIGH (ref 70–99)
GLUCOSE SERPL-MCNC: 139 MG/DL — HIGH (ref 70–99)
HCT VFR BLD CALC: 22.9 % — LOW (ref 34.5–45)
HCT VFR BLD CALC: 25.2 % — LOW (ref 34.5–45)
HGB BLD-MCNC: 7.5 G/DL — LOW (ref 11.5–15.5)
HGB BLD-MCNC: 8.4 G/DL — LOW (ref 11.5–15.5)
INR BLD: 2 RATIO — HIGH (ref 0.85–1.16)
INR BLD: 2.24 RATIO — HIGH (ref 0.85–1.16)
MAGNESIUM SERPL-MCNC: 1.5 MG/DL — LOW (ref 1.6–2.6)
MAGNESIUM SERPL-MCNC: 1.8 MG/DL — SIGNIFICANT CHANGE UP (ref 1.6–2.6)
MCHC RBC-ENTMCNC: 30.9 PG — SIGNIFICANT CHANGE UP (ref 27–34)
MCHC RBC-ENTMCNC: 31.5 PG — SIGNIFICANT CHANGE UP (ref 27–34)
MCHC RBC-ENTMCNC: 32.8 G/DL — SIGNIFICANT CHANGE UP (ref 32–36)
MCHC RBC-ENTMCNC: 33.3 G/DL — SIGNIFICANT CHANGE UP (ref 32–36)
MCV RBC AUTO: 92.6 FL — SIGNIFICANT CHANGE UP (ref 80–100)
MCV RBC AUTO: 96.2 FL — SIGNIFICANT CHANGE UP (ref 80–100)
MELD SCORE WITH DIALYSIS: 37 POINTS — SIGNIFICANT CHANGE UP
MELD SCORE WITHOUT DIALYSIS: 26 POINTS — SIGNIFICANT CHANGE UP
NRBC # BLD: 0 /100 WBCS — SIGNIFICANT CHANGE UP (ref 0–0)
NRBC # BLD: 0 /100 WBCS — SIGNIFICANT CHANGE UP (ref 0–0)
NRBC BLD-RTO: 0 /100 WBCS — SIGNIFICANT CHANGE UP (ref 0–0)
NRBC BLD-RTO: 0 /100 WBCS — SIGNIFICANT CHANGE UP (ref 0–0)
PHOSPHATE SERPL-MCNC: 2.1 MG/DL — LOW (ref 2.5–4.5)
PHOSPHATE SERPL-MCNC: 2.6 MG/DL — SIGNIFICANT CHANGE UP (ref 2.5–4.5)
PLATELET # BLD AUTO: 115 K/UL — LOW (ref 150–400)
PLATELET # BLD AUTO: 130 K/UL — LOW (ref 150–400)
POTASSIUM SERPL-MCNC: 3.5 MMOL/L — SIGNIFICANT CHANGE UP (ref 3.5–5.3)
POTASSIUM SERPL-MCNC: 5.6 MMOL/L — HIGH (ref 3.5–5.3)
POTASSIUM SERPL-SCNC: 3.5 MMOL/L — SIGNIFICANT CHANGE UP (ref 3.5–5.3)
POTASSIUM SERPL-SCNC: 5.6 MMOL/L — HIGH (ref 3.5–5.3)
PROT SERPL-MCNC: 5.7 G/DL — LOW (ref 6–8.3)
PROT SERPL-MCNC: 6.8 G/DL — SIGNIFICANT CHANGE UP (ref 6–8.3)
PROTHROM AB SERPL-ACNC: 22.7 SEC — HIGH (ref 9.9–13.4)
PROTHROM AB SERPL-ACNC: 25.6 SEC — HIGH (ref 9.9–13.4)
RBC # BLD: 2.38 M/UL — LOW (ref 3.8–5.2)
RBC # BLD: 2.72 M/UL — LOW (ref 3.8–5.2)
RBC # FLD: 26.8 % — HIGH (ref 10.3–14.5)
RBC # FLD: 27 % — HIGH (ref 10.3–14.5)
SODIUM SERPL-SCNC: 132 MMOL/L — LOW (ref 135–145)
SODIUM SERPL-SCNC: 133 MMOL/L — LOW (ref 135–145)
WBC # BLD: 6.32 K/UL — SIGNIFICANT CHANGE UP (ref 3.8–10.5)
WBC # BLD: 7.37 K/UL — SIGNIFICANT CHANGE UP (ref 3.8–10.5)
WBC # FLD AUTO: 6.32 K/UL — SIGNIFICANT CHANGE UP (ref 3.8–10.5)
WBC # FLD AUTO: 7.37 K/UL — SIGNIFICANT CHANGE UP (ref 3.8–10.5)

## 2025-02-11 PROCEDURE — 99223 1ST HOSP IP/OBS HIGH 75: CPT | Mod: GC

## 2025-02-11 PROCEDURE — G0545: CPT

## 2025-02-11 PROCEDURE — 93010 ELECTROCARDIOGRAM REPORT: CPT

## 2025-02-11 PROCEDURE — 99233 SBSQ HOSP IP/OBS HIGH 50: CPT | Mod: GC

## 2025-02-11 RX ORDER — DEXTROMETHORPHAN HBR, GUAIFENESIN 200 MG/10ML
100 LIQUID ORAL EVERY 6 HOURS
Refills: 0 | Status: DISCONTINUED | OUTPATIENT
Start: 2025-02-11 | End: 2025-03-05

## 2025-02-11 RX ORDER — IPRATROPIUM BROMIDE AND ALBUTEROL SULFATE .5; 2.5 MG/3ML; MG/3ML
3 SOLUTION RESPIRATORY (INHALATION) ONCE
Refills: 0 | Status: COMPLETED | OUTPATIENT
Start: 2025-02-11 | End: 2025-02-12

## 2025-02-11 RX ORDER — IPRATROPIUM BROMIDE AND ALBUTEROL SULFATE .5; 2.5 MG/3ML; MG/3ML
3 SOLUTION RESPIRATORY (INHALATION) EVERY 6 HOURS
Refills: 0 | Status: DISCONTINUED | OUTPATIENT
Start: 2025-02-11 | End: 2025-03-05

## 2025-02-11 RX ORDER — MAGNESIUM SULFATE 500 MG/ML
1 SYRINGE (ML) INJECTION ONCE
Refills: 0 | Status: COMPLETED | OUTPATIENT
Start: 2025-02-11 | End: 2025-02-11

## 2025-02-11 RX ORDER — VANCOMYCIN HCL IN 5 % DEXTROSE 1.5G/250ML
125 PLASTIC BAG, INJECTION (ML) INTRAVENOUS EVERY 6 HOURS
Refills: 0 | Status: COMPLETED | OUTPATIENT
Start: 2025-02-11 | End: 2025-02-14

## 2025-02-11 RX ADMIN — Medication 1 GRAM(S): at 05:56

## 2025-02-11 RX ADMIN — Medication 100 MILLIGRAM(S): at 05:57

## 2025-02-11 RX ADMIN — LACTULOSE 10 GRAM(S): 10 SOLUTION ORAL at 05:56

## 2025-02-11 RX ADMIN — IPRATROPIUM BROMIDE AND ALBUTEROL SULFATE 3 MILLILITER(S): .5; 2.5 SOLUTION RESPIRATORY (INHALATION) at 22:09

## 2025-02-11 RX ADMIN — MIDODRINE HYDROCHLORIDE 15 MILLIGRAM(S): 5 TABLET ORAL at 18:29

## 2025-02-11 RX ADMIN — HEPARIN SODIUM 5000 UNIT(S): 1000 INJECTION INTRAVENOUS; SUBCUTANEOUS at 13:56

## 2025-02-11 RX ADMIN — Medication 1 GRAM(S): at 18:30

## 2025-02-11 RX ADMIN — Medication 125 MILLIGRAM(S): at 23:03

## 2025-02-11 RX ADMIN — HEPARIN SODIUM 5000 UNIT(S): 1000 INJECTION INTRAVENOUS; SUBCUTANEOUS at 22:10

## 2025-02-11 RX ADMIN — Medication 1 GRAM(S): at 12:38

## 2025-02-11 RX ADMIN — Medication 500 MILLIGRAM(S): at 05:57

## 2025-02-11 RX ADMIN — MIDODRINE HYDROCHLORIDE 15 MILLIGRAM(S): 5 TABLET ORAL at 05:58

## 2025-02-11 RX ADMIN — CEFTRIAXONE 100 MILLIGRAM(S): 500 INJECTION, POWDER, FOR SOLUTION INTRAMUSCULAR; INTRAVENOUS at 22:08

## 2025-02-11 RX ADMIN — Medication 40 MILLIGRAM(S): at 06:23

## 2025-02-11 RX ADMIN — Medication 125 MILLIGRAM(S): at 18:30

## 2025-02-11 RX ADMIN — LACTULOSE 10 GRAM(S): 10 SOLUTION ORAL at 22:10

## 2025-02-11 RX ADMIN — Medication 1 GRAM(S): at 23:03

## 2025-02-11 RX ADMIN — FUROSEMIDE 40 MILLIGRAM(S): 10 INJECTION INTRAMUSCULAR; INTRAVENOUS at 05:58

## 2025-02-11 RX ADMIN — Medication 1 APPLICATION(S): at 22:22

## 2025-02-11 RX ADMIN — LACTULOSE 10 GRAM(S): 10 SOLUTION ORAL at 13:56

## 2025-02-11 RX ADMIN — INSULIN GLARGINE-YFGN 15 UNIT(S): 100 INJECTION, SOLUTION SUBCUTANEOUS at 22:08

## 2025-02-11 RX ADMIN — Medication 3 MILLIGRAM(S): at 22:10

## 2025-02-11 RX ADMIN — HEPARIN SODIUM 5000 UNIT(S): 1000 INJECTION INTRAVENOUS; SUBCUTANEOUS at 06:23

## 2025-02-11 RX ADMIN — IPRATROPIUM BROMIDE AND ALBUTEROL SULFATE 3 MILLILITER(S): .5; 2.5 SOLUTION RESPIRATORY (INHALATION) at 15:59

## 2025-02-11 RX ADMIN — Medication 500 MILLIGRAM(S): at 12:38

## 2025-02-11 RX ADMIN — Medication 100 MILLIGRAM(S): at 13:58

## 2025-02-11 RX ADMIN — MIDODRINE HYDROCHLORIDE 15 MILLIGRAM(S): 5 TABLET ORAL at 12:38

## 2025-02-11 RX ADMIN — Medication 100 GRAM(S): at 04:51

## 2025-02-11 RX ADMIN — FOLIC ACID 1 MILLIGRAM(S): 1 TABLET ORAL at 12:38

## 2025-02-11 RX ADMIN — Medication 100 MILLIGRAM(S): at 22:09

## 2025-02-11 RX ADMIN — Medication 100 MILLIGRAM(S): at 12:38

## 2025-02-11 RX ADMIN — Medication 100 MILLIGRAM(S): at 06:29

## 2025-02-11 NOTE — CONSULT NOTE ADULT - SUBJECTIVE AND OBJECTIVE BOX
Chief Complaint:  Patient is a 59y old  Female who presents with a chief complaint of Transfer from [place] for possible liver transplant (11 Feb 2025 15:44)      HPI:  ABRAHAN GOODWIN is a 58 y/o F w/ PMH HTN, anemia, EtOH associated cirrhosis c/b ascites/EV (s/p banding last 11/2024), AUD now sober for ~3 months, bleeding rectal varices vs hemorrhoidal bleeding s/p hemorrhoidectomy (11/2023) presenting as a transfer from [place] for evaluation of liver transplant.      Pt initially admitted to [facility] c/o weakness, poor PO intake, and fever; hypotensive in ED. Met criteria for sepsis iso C. diff colitis and was treated with vasopressor support, flagyl, and vanc. Hospital course was complicated by AHRF and pt was intubated 1/28-2/2. Pt then treated for E. coli and Bacteroides bacteremia with TTE findings concerning for mitral vegetation; pt not a good candidate for ARACELI due to varices decided to treat empirically for IE with 6 weeks of antibiotics s/p PICC line placement. Pt d/c from [facility] on 2/7 and then presented to [place] c/o SOB. Ultimately transferred to [facility] for hepatology transplant consult. On presentation today, pt endorses subjective nighttime fevers, chills, SOB and abdominal discomfort. Attributes fevers to recent flu dx (noted in chart to be flu positive 1 week prior to presentation at [facility]). Pt states she has not used alcohol in over a year. She began using alcohol in her 20s socially and her use gradually increased to include ~3-4 glasses of wine everyday when she was drinking the most. Has been to rehab and therapy multiple times. Pt first began smoking in her 20s ~1pack/day for many years, cannot recall specifics. Has not had a cigarette in several months.    The patient had seen Dr. Hampton as an outpatient on 1/2024, at that time was doing well, and w/o complaints, patient had not required LVP and was off diuretics with low sodium diet. The patient was retrialed on carvedilol with success. However, patient never followed back with Dr. Hampton for a whole year.     Otherwise, patient denies fevers, chills, weight loss, dysphagia, odynophagia, early satiety, poor oral intake, abdominal pain, nausea, vomiting, diarrhea, melena, hematemesis, hematochezia, change in stool caliber, or family history of GI-related cancers.    ROS:   General:  No fevers, chills, night sweats  Eyes:  Good vision, no reported pain  ENT:  No sore throat, pain, runny nose  CV:  No pain, palpitations  Pulm:  No dyspnea, cough  GI:  See HPI, otherwise negative  :  No incontinence, nocturia  Muscle:  No reported pain, weakness  Neuro:  No memory problems  Psych:  No insomnia, psychosis  Endocrine:  No polyuria, polydipsia  Heme:  No petechiae, ecchymosis, easy bruisability  Skin:  No reported rash    PMHX/PSHX:    Alcoholic cirrhosis    H/O esophageal varices    GERD (gastroesophageal reflux disease)    H/O bacteremia    History of bacteremia    H/O Clostridium difficile infection    S/P hemorrhoidectomy      Allergies:  Zithromax (Unknown)      Home Medications: reviewed  Hospital Medications:  albuterol/ipratropium for Nebulization 3 milliLiter(s) Nebulizer every 6 hours PRN  albuterol/ipratropium for Nebulization. 3 milliLiter(s) Nebulizer once PRN  cefTRIAXone   IVPB 2000 milliGRAM(s) IV Intermittent every 24 hours  chlorhexidine 2% Cloths 1 Application(s) Topical <User Schedule>  dextrose 5%. 1000 milliLiter(s) IV Continuous <Continuous>  dextrose 5%. 1000 milliLiter(s) IV Continuous <Continuous>  dextrose 50% Injectable 25 Gram(s) IV Push once  dextrose 50% Injectable 12.5 Gram(s) IV Push once  dextrose 50% Injectable 25 Gram(s) IV Push once  dextrose Oral Gel 15 Gram(s) Oral once PRN  folic acid 1 milliGRAM(s) Oral daily  furosemide    Tablet 40 milliGRAM(s) Oral daily  glucagon  Injectable 1 milliGRAM(s) IntraMuscular once  guaiFENesin Oral Liquid (Sugar-Free) 100 milliGRAM(s) Oral every 6 hours PRN  heparin   Injectable 5000 Unit(s) SubCutaneous every 8 hours  insulin glargine Injectable (LANTUS) 15 Unit(s) SubCutaneous at bedtime  insulin lispro (ADMELOG) corrective regimen sliding scale   SubCutaneous three times a day before meals  insulin lispro (ADMELOG) corrective regimen sliding scale   SubCutaneous at bedtime  lactulose Syrup 10 Gram(s) Oral three times a day  melatonin 3 milliGRAM(s) Oral at bedtime  metroNIDAZOLE  IVPB 500 milliGRAM(s) IV Intermittent every 8 hours  midodrine. 15 milliGRAM(s) Oral three times a day  pantoprazole    Tablet 40 milliGRAM(s) Oral before breakfast  rifAXIMin 550 milliGRAM(s) Oral two times a day  spironolactone 100 milliGRAM(s) Oral daily  sucralfate suspension 1 Gram(s) Oral every 6 hours  thiamine 100 milliGRAM(s) Oral daily  vancomycin    Solution 125 milliGRAM(s) Oral every 6 hours      Social History:   Tobacco: Denies  EtOH: Denies  Illicit Drugs: Denies    Family history:    FH: pancreatic cancer (Mother)    Heavy drinker of alcohol (Sibling)    Family history of stent (Sibling)      Denies family history of colon cancer/polyps, stomach cancer/polyps, pancreatic cancer/masses, liver cancer/disease, ovarian cancer and endometrial cancer.    PHYSICAL EXAM:   Vital Signs:  Vital Signs Last 24 Hrs  T(C): 37.1 (11 Feb 2025 17:24), Max: 37.4 (10 Feb 2025 19:35)  T(F): 98.7 (11 Feb 2025 17:24), Max: 99.3 (10 Feb 2025 19:35)  HR: 107 (11 Feb 2025 17:24) (107 - 116)  BP: 118/75 (11 Feb 2025 17:24) (113/70 - 118/75)  BP(mean): --  RR: 18 (11 Feb 2025 17:24) (18 - 18)  SpO2: 98% (11 Feb 2025 17:24) (98% - 100%)    Parameters below as of 11 Feb 2025 17:24  Patient On (Oxygen Delivery Method): room air      Daily     Daily     GENERAL: no acute distress  NEURO: alert, no asterixis  HEENT: anicteric sclera, no conjunctival pallor appreciated  CHEST: no respiratory distress, no accessory muscle use  CARDIAC: regular rate, +S1/S2  ABDOMEN: mildly distended, no rebound or guarding  EXTREMITIES: warm, well perfused, no edema  SKIN: jaundiced     LABS: reviewed                        8.4    6.32  )-----------( 115      ( 11 Feb 2025 07:17 )             25.2     02-11    132[L]  |  102  |  6[L]  ----------------------------<  139[H]  3.5   |  18[L]  |  <0.30[L]    Ca    7.8[L]      11 Feb 2025 07:19  Phos  2.1     02-11  Mg     1.8     02-11    TPro  5.7[L]  /  Alb  3.0[L]  /  TBili  11.4[H]  /  DBili  x   /  AST  105[H]  /  ALT  46[H]  /  AlkPhos  257[H]  02-11    LIVER FUNCTIONS - ( 11 Feb 2025 07:19 )  Alb: 3.0 g/dL / Pro: 5.7 g/dL / ALK PHOS: 257 U/L / ALT: 46 U/L / AST: 105 U/L / GGT: x               Diagnostic Studies: see sunrise for full report

## 2025-02-11 NOTE — DISCHARGE NOTE PROVIDER - NSDCMRMEDTOKEN_GEN_ALL_CORE_FT
Aldactone 100 mg oral tablet: 1 tab(s) orally once a day  cefTRIAXone 2 g injection: 2 gram(s) intravenously once a day  ferrous sulfate 325 mg (65 mg elemental iron) oral delayed release tablet: 1 tab(s) orally once a day  folic acid 1 mg oral tablet: 1 tab(s) orally once a day  insulin glargine 100 units/mL subcutaneous solution: 15 unit(s) subcutaneous once a day (at bedtime)  lactulose 10 g/15 mL oral syrup: 15 milliliter(s) orally 3 times a day  Lasix 40 mg oral tablet: 1 tab(s) orally once a day  melatonin 3 mg oral tablet: 1 tab(s) orally once a day (at bedtime)  metroNIDAZOLE 500 mg/100 mL intravenous solution: 500 milligram(s) intravenously every 8 hours  midodrine 5 mg oral tablet: 3 tab(s) orally 3 times a day  pantoprazole 40 mg oral delayed release tablet: 1 tab(s) orally once a day  rifAXIMin 550 mg oral tablet: 1 tab(s) orally 2 times a day  sucralfate 1 g/10 mL oral suspension: 10 milliliter(s) orally every 6 hours  thiamine 100 mg oral tablet: 1 tab(s) orally once a day  traMADol 50 mg oral tablet: 1 tab(s) orally 2 times a day as needed for  moderate pain  vancomycin 250 mg oral capsule: 2 cap(s) orally every 6 hours   10cc NS flush: Please flush PICC line 2x daily with 10cc prior to and after administration of ceftriaxone  Alcohol Pads: as needed for PICC line care  Aldactone 100 mg oral tablet: 1 tab(s) orally once a day  cefTRIAXone 2 g injection: 2 gram(s) intravenously once a day until 3/14/2025  cefTRIAXone 2 g injection: 2 gram(s) intravenously once a day IVPB daily until March 14th  ferrous sulfate 325 mg (65 mg elemental iron) oral delayed release tablet: 1 tab(s) orally once a day  folic acid 1 mg oral tablet: 1 tab(s) orally once a day  insulin glargine 100 units/mL subcutaneous solution: 15 unit(s) subcutaneous once a day (at bedtime)  lactulose 10 g/15 mL oral syrup: 15 milliliter(s) orally 3 times a day  Lasix 40 mg oral tablet: 1 tab(s) orally once a day  melatonin 3 mg oral tablet: 1 tab(s) orally once a day (at bedtime)  metroNIDAZOLE 500 mg/100 mL intravenous solution: 500 milligram(s) intravenously every 8 hours  midodrine 5 mg oral tablet: 3 tab(s) orally 3 times a day  Normal Saline Flush: Flush as needed for PICC line care  pantoprazole 40 mg oral delayed release tablet: 1 tab(s) orally once a day  rifAXIMin 550 mg oral tablet: 1 tab(s) orally 2 times a day  Rubbing Alcohol Wipes 70% topical pad: Apply topically to affected area once a day  sucralfate 1 g/10 mL oral suspension: 10 milliliter(s) orally every 6 hours  thiamine 100 mg oral tablet: 1 tab(s) orally once a day  traMADol 50 mg oral tablet: 1 tab(s) orally 2 times a day as needed for  moderate pain  vancomycin 250 mg oral capsule: 2 cap(s) orally every 6 hours  Weekly ESR, CRP, CBC, CMP: Weekly ESR, CRP, CBC, CMP for follow up   Aldactone 100 mg oral tablet: 1 tab(s) orally once a day  ferrous sulfate 325 mg (65 mg elemental iron) oral delayed release tablet: 1 tab(s) orally once a day  folic acid 1 mg oral tablet: 1 tab(s) orally once a day  insulin glargine 100 units/mL subcutaneous solution: 15 unit(s) subcutaneous once a day (at bedtime)  lactulose 10 g/15 mL oral syrup: 15 milliliter(s) orally 3 times a day  Lasix 40 mg oral tablet: 1 tab(s) orally once a day  melatonin 3 mg oral tablet: 1 tab(s) orally once a day (at bedtime)  metroNIDAZOLE 500 mg/100 mL intravenous solution: 500 milligram(s) intravenously every 8 hours  midodrine 5 mg oral tablet: 3 tab(s) orally 3 times a day  pantoprazole 40 mg oral delayed release tablet: 1 tab(s) orally once a day  rifAXIMin 550 mg oral tablet: 1 tab(s) orally 2 times a day  sucralfate 1 g/10 mL oral suspension: 10 milliliter(s) orally every 6 hours  thiamine 100 mg oral tablet: 1 tab(s) orally once a day  traMADol 50 mg oral tablet: 1 tab(s) orally 2 times a day as needed for  moderate pain  vancomycin 250 mg oral capsule: 2 cap(s) orally every 6 hours   acetaminophen 325 mg oral tablet: 2 tab(s) orally every 8 hours As needed Mild Pain (1 - 3), Moderate Pain (4 - 6)  Aldactone 100 mg oral tablet: 1 tab(s) orally once a day  carvedilol 3.125 mg oral tablet: 1 tab(s) orally every 12 hours  cefpodoxime 100 mg oral tablet: 4 tab(s) orally 2 times a day  ferrous sulfate 325 mg (65 mg elemental iron) oral delayed release tablet: 1 tab(s) orally once a day  folic acid 1 mg oral tablet: 1 tab(s) orally once a day  furosemide 80 mg oral tablet: 1 tab(s) orally once a day  hydrOXYzine hydrochloride 25 mg oral tablet: 1 tab(s) orally every 8 hours As needed Itching  lactulose 10 g/15 mL oral syrup: 15 milliliter(s) orally once a day  magnesium oxide 400 mg oral tablet: 1 tab(s) orally 3 times a day (with meals)  melatonin 3 mg oral tablet: 1 tab(s) orally once a day (at bedtime)  metroNIDAZOLE 500 mg/100 mL intravenous solution: 100 milliliter(s) intravenous every 12 hours  midodrine 5 mg oral tablet: 3 tab(s) orally 3 times a day  mirtazapine 7.5 mg oral tablet: 1 tab(s) orally once a day (at bedtime)  pantoprazole 40 mg oral delayed release tablet: 1 tab(s) orally once a day  rifAXIMin 550 mg oral tablet: 1 tab(s) orally 2 times a day  saliva substitutes oral solution: 15 orally 5 times a day  sodium chloride 1 g oral tablet: 1 tab(s) orally once a day  spironolactone 25 mg oral tablet: 6 tab(s) orally once a day  sucralfate 1 g/10 mL oral suspension: 10 milliliter(s) orally every 6 hours  thiamine 100 mg oral tablet: 1 tab(s) orally once a day  ursodiol 300 mg oral capsule: 1 cap(s) orally every 12 hours  vancomycin 250 mg/5 mL oral liquid: 5 milliliter(s) orally 2 times a day   acetaminophen 325 mg oral tablet: 2 tab(s) orally every 8 hours As needed Mild Pain (1 - 3), Moderate Pain (4 - 6)  Aldactone 100 mg oral tablet: 1 tab(s) orally once a day  carvedilol 3.125 mg oral tablet: 1 tab(s) orally every 12 hours  cefpodoxime 100 mg oral tablet: 4 tab(s) orally 2 times a day  ciprofloxacin 500 mg oral tablet, extended release: 1 tab(s) orally once a day  ferrous sulfate 325 mg (65 mg elemental iron) oral delayed release tablet: 1 tab(s) orally once a day  folic acid 1 mg oral tablet: 1 tab(s) orally once a day  furosemide 80 mg oral tablet: 1 tab(s) orally once a day  hydrOXYzine hydrochloride 25 mg oral tablet: 1 tab(s) orally every 8 hours As needed Itching  lactulose 10 g/15 mL oral syrup: 15 milliliter(s) orally once a day  magnesium oxide 400 mg oral tablet: 1 tab(s) orally 3 times a day (with meals)  melatonin 3 mg oral tablet: 1 tab(s) orally once a day (at bedtime)  metroNIDAZOLE 500 mg/100 mL intravenous solution: 100 milliliter(s) intravenous every 12 hours  midodrine 5 mg oral tablet: 3 tab(s) orally 3 times a day  mirtazapine 7.5 mg oral tablet: 1 tab(s) orally once a day (at bedtime)  pantoprazole 40 mg oral delayed release tablet: 1 tab(s) orally once a day  rifAXIMin 550 mg oral tablet: 1 tab(s) orally 2 times a day  saliva substitutes oral solution: 15 orally 5 times a day  sodium chloride 1 g oral tablet: 1 tab(s) orally once a day  spironolactone 25 mg oral tablet: 6 tab(s) orally once a day  sucralfate 1 g/10 mL oral suspension: 10 milliliter(s) orally every 6 hours  thiamine 100 mg oral tablet: 1 tab(s) orally once a day  ursodiol 300 mg oral capsule: 1 cap(s) orally every 12 hours  vancomycin 250 mg/5 mL oral liquid: 5 milliliter(s) orally 2 times a day   acetaminophen 325 mg oral tablet: 2 tab(s) orally every 8 hours As needed Mild Pain (1 - 3), Moderate Pain (4 - 6)  carvedilol 3.125 mg oral tablet: 1 tab(s) orally every 12 hours  cefpodoxime 100 mg oral tablet: 4 tab(s) orally 2 times a day  ciprofloxacin 500 mg oral tablet, extended release: 1 tab(s) orally once a day  ferrous sulfate 325 mg (65 mg elemental iron) oral delayed release tablet: 1 tab(s) orally once a day  folic acid 1 mg oral tablet: 1 tab(s) orally once a day  furosemide 80 mg oral tablet: 1 tab(s) orally once a day  hydrOXYzine hydrochloride 25 mg oral tablet: 1 tab(s) orally every 8 hours As needed Itching  lactulose 10 g/15 mL oral syrup: 15 milliliter(s) orally once a day  magnesium oxide 400 mg oral tablet: 1 tab(s) orally 3 times a day (with meals)  melatonin 3 mg oral tablet: 1 tab(s) orally once a day (at bedtime)  metroNIDAZOLE 500 mg/100 mL intravenous solution: 100 milliliter(s) intravenous every 12 hours  midodrine 5 mg oral tablet: 3 tab(s) orally 3 times a day  mirtazapine 7.5 mg oral tablet: 1 tab(s) orally once a day (at bedtime)  pantoprazole 40 mg oral delayed release tablet: 1 tab(s) orally once a day  rifAXIMin 550 mg oral tablet: 1 tab(s) orally 2 times a day  saliva substitutes oral solution: 15 orally 5 times a day  sodium chloride 1 g oral tablet: 1 tab(s) orally once a day  spironolactone 25 mg oral tablet: 6 tab(s) orally once a day  sucralfate 1 g/10 mL oral suspension: 10 milliliter(s) orally every 6 hours  thiamine 100 mg oral tablet: 1 tab(s) orally once a day  ursodiol 300 mg oral capsule: 1 cap(s) orally every 12 hours  vancomycin 250 mg/5 mL oral liquid: 5 milliliter(s) orally 2 times a day

## 2025-02-11 NOTE — ADVANCED PRACTICE NURSE CONSULT - REASON FOR CONSULT
Wound consultation requested to assess skin status; Sacrum / B/L buttocks for treatment recommendations     History of Present Illness:  Reason for Admission: Transfer from [facility] for possible liver transplant  History of Present Illness:   58yo F w/ PMH of alcohol cirrhosis with recurrent ascites, s/p multiple paracentesis, s/p banding of esophageal varices, currently being treated for recent c. diff colitis and possible endocarditis, presenting for possible liver transplant from [facility]. Pt initially admitted to [facility] c/o weakness, poor PO intake, and fever; hypotensive in ED. Met criteria for sepsis iso C. diff colitis and was treated with vasopressor support, flagyl, and vanc. Hospital course was complicated by AHRF and pt was intubated 1/28-2/2. Pt then treated for E. coli and Bacteroides bacteremia with TTE findings concerning for mitral vegetation; pt not a good candidate for ARACELI due to varices decided to treat empirically for IE with 6 weeks of antibiotics s/p PICC line placement. Pt d/c from [facility] on 2/7 and then presented to [facility] c/o SOB. Ultimately transferred to [facility] for hepatology transplant consult. On presentation today, pt endorses subjective nighttime fevers, chills, SOB and abdominal discomfort. Attributes fevers to recent flu dx (noted in chart to be flu positive 1 week prior to presentation at [facility]). Pt states she has not used alcohol in over a year. She began using alcohol in her 20s socially and her use gradually increased to include ~3-4 glasses of wine everyday when she was drinking the most. Has been to rehab and therapy multiple times. Pt first began smoking in her 20s ~1pack/day for many years, cannot recall specifics. Has not had a cigarette in several months. Pt is unable to ambulate on her own and has remained bedbound since intubation several months ago.

## 2025-02-11 NOTE — ADVANCED PRACTICE NURSE CONSULT - RECOMMEDATIONS
Impression      Urinary incontinence    Fecal incontinence  Bilateral sacrum/buttock - Erythematous and macerated skin noted on perineum, buttocks, and inner thighs           Recommendations       1. Bilateral sacrum/buttock - IAD     Topical therapy- sacral/bilateral buttocks- cleanse w/incontinent cleanser, pat dry & apply Renetta Antifungal Cream twice daily & PRN Soiling. Monitor for changes       2. Incontinent management - incontinent cleanser, pads, nilson care BID      3. Maintain on an alternating air with low air loss surface       4. Turn & reposition every 2 hr.; Use positioning pillow to turn and reposition, soft pillow between bony prominences; continue measures to decrease friction/shear/pressure.     5. Nutrition optimization.      6. Place waffle cushion when out of bed to chair.

## 2025-02-11 NOTE — DISCHARGE NOTE PROVIDER - NSDCCPCAREPLAN_GEN_ALL_CORE_FT
PRINCIPAL DISCHARGE DIAGNOSIS  Diagnosis: Awaiting liver transplant  Assessment and Plan of Treatment: You were transferred to our hospital for liver transplant evaluation.     PRINCIPAL DISCHARGE DIAGNOSIS  Diagnosis: Awaiting liver transplant  Assessment and Plan of Treatment: You were transferred to our hospital for liver transplant evaluation. You were seen by our hepatology team believed it would be best for you to recover from your infection prior to proceding with evaluation. While here you were seen by our infectious disease team who wanted you to complete a two week course of antibiotics for the bacterial infection in your blood. They also recommended finishing two weeks of an antibiotic, vancomycin for your c diff.   Please continue taking vancomycin as prescribed.  Please follow up with *** for monitoring of your infection and repeating blood tests. The infectious disease team also wants to repeat an ultrasound of your heart in 2-4 weeks.  Please return to the hospital if you develop severe abdominal pain, confusion, tremors, or fever.     PRINCIPAL DISCHARGE DIAGNOSIS  Diagnosis: Awaiting liver transplant  Assessment and Plan of Treatment: You were transferred to our hospital for liver transplant evaluation. You were seen by our hepatology team believed it would be best for you to gain strength with physical therapy as well as work with the psych team prior to a liver transplant. While here you were seen by our infectious disease team who wanted you to complete a two week course of antibiotics for the bacterial infection in your blood. They also recommended finishing two weeks of an antibiotic, vancomycin for your c diff.   Please follow up with *** for monitoring of your infection and repeating blood tests. The infectious disease team also wants to repeat an ultrasound of your heart in 2-4 weeks (~week of March 16th).  Please continue to take ciprofloxacin to prevent infections in your abdomen.  Please return to the hospital if you develop severe abdominal pain, confusion, tremors, or fever.     PRINCIPAL DISCHARGE DIAGNOSIS  Diagnosis: Awaiting liver transplant  Assessment and Plan of Treatment: You were transferred to our hospital for liver transplant evaluation. Work up for expedited liver transplant was initiated. Due to positive PETH test, which is a marker of longitudinal alcohol use, our hepatology team is currently deferring liver transplant for now. They recommend further optimization prior to transplant. This includes optimizing nutrition, your physical conditioning with physical therapy, and your psychosocial support. Fortunately, much of the work for getting approval for a transplant was completed during this hospitalization. Following completion of your outpatient PT and your psychiatric and social work followups, our transplant team will reopen consideration for a new liver.  Please remember to keep track of your bowel movements daily. Titrate the amount of lactulose you take for 3-5 bowel movements.      SECONDARY DISCHARGE DIAGNOSES  Diagnosis: Psoas abscess, right  Assessment and Plan of Treatment: Your hospital course was hospitalized was complicated by development of fevers. There was some concern from prior studies that you may have infective endocarditis, an infection of the heart. Our echocardiogram studies here did not support this conclusion. Upon further investigation, a CT of your abdomen and pelvis revealed an abscess within the psoas muscle, connecting your lumbar spine to your femur. You were treated with IV antibiotics to address this infection. We also performed a MRI to further characterize this abscess. Our interventional radiology team reviewed your imaging and deferred aspiration of your abscess as it was located proximal to significant vasculature, posing a high risk for bleed. As a result, conservative management with continued antibiotics was preferred. A repeat CT scan done following an additional week of antibiotics demonstrated an interval decrease in the size of the abscess, trending towards resolution. In order to be cleared from an infectious disease perspective for liver transplant, resolution of this abscess is necessary. Our transplant infectious disease doctors recommended switching to Cefopodoxime 400mg two times a day and Flagyl 500mg two times a day to treat the abscess. You should have a CT of your abdomen and pelvis with contrast during the week of 3/17 to further assess the status of the abscess.   In addition to these antibiotics, please continue to take oral vancomycin 250mg two times a day to protect against the development of C. diff. Also be sure to continue to take ciprofloxacin 500mg once daily as well for propylaxis against spontaneous bacterial peritonitis (infection of fluid in abdomen).       PRINCIPAL DISCHARGE DIAGNOSIS  Diagnosis: Awaiting liver transplant  Assessment and Plan of Treatment: You were transferred to our hospital for liver transplant evaluation. Work up for expedited liver transplant was initiated. Due to positive PETH test, which is a marker of longitudinal alcohol use, our hepatology team is currently deferring liver transplant for now. They recommend further optimization prior to transplant. This includes optimizing nutrition, your physical conditioning with physical therapy, and your psychosocial support. Fortunately, much of the work for getting approval for a transplant was completed during this hospitalization. Following completion of your outpatient PT and your psychiatric and social work followups, our transplant team will reopen consideration for a new liver. Our inpatient  has provided you with resources for completing your pschiatric evaluation prior to future transplant discussions.  Thus far, our inpatient dental and gynecology teams were able to evaluate you and clear you for transplant. For cardiology clearance, they recommend a dobutamine stress test which can be done closer to anticipated transplant.   Please remember to keep track of your bowel movements daily. Titrate the amount of lactulose you take for 3-5 bowel movements and take your medications as precribed.  If you notice new fevers, worsening distension/tenderness of your abdomen, or increased lethargy/confusion, please seek immediate medical attention. If you develop vomiting containing blood or bowel movements with significant blood, please seek immediate medical attention.      SECONDARY DISCHARGE DIAGNOSES  Diagnosis: Psoas abscess, right  Assessment and Plan of Treatment: Your hospital course was hospitalized was complicated by development of fevers. There was some concern from prior studies that you may have infective endocarditis, an infection of the heart. Our echocardiogram studies here did not support this conclusion. Upon further investigation, a CT of your abdomen and pelvis revealed an abscess within the psoas muscle, connecting your lumbar spine to your femur. You were treated with IV antibiotics to address this infection. We also performed a MRI to further characterize this abscess. Our interventional radiology team reviewed your imaging and deferred aspiration of your abscess as it was located proximal to significant vasculature, posing a high risk for bleed. As a result, conservative management with continued antibiotics was preferred. A repeat CT scan done following an additional week of antibiotics demonstrated an interval decrease in the size of the abscess, trending towards resolution. In order to be cleared from an infectious disease perspective for liver transplant, resolution of this abscess is necessary. Our transplant infectious disease doctors recommended switching to Cefopodoxime 400mg two times a day and Flagyl 500mg two times a day to treat the abscess. You should have a CT of your abdomen and pelvis with contrast during the week of 3/17 to further assess the status of the abscess.   In addition to these antibiotics, please continue to take oral vancomycin 250mg two times a day to protect against the development of C. diff. Also be sure to continue to take ciprofloxacin 500mg once daily as well for propylaxis against spontaneous bacterial peritonitis (infection of fluid in abdomen).

## 2025-02-11 NOTE — PROGRESS NOTE ADULT - PROBLEM SELECTOR PLAN 3
Pt tested positive for C. diff colitis at [facility]. Started on oral vancomycin.     Plan:   - Plan to continue oral vancomycin until CTX course finished (CTX ends 3/14/25)   - ID consult in AM Pt tested positive for C. diff colitis at [facility]. Started on oral vancomycin.     Plan:   - Plan to continue oral vancomycin until CTX course finished (CTX ends 3/14/25)

## 2025-02-11 NOTE — DISCHARGE NOTE PROVIDER - HOSPITAL COURSE
HPI:  60yo F w/ PMH of alcohol cirrhosis with recurrent ascites, s/p multiple paracentesis, s/p banding of esophageal varices, currently being treated for recent c. diff colitis and possible endocarditis, presenting for possible liver transplant from [facility]. Pt initially admitted to [facility] c/o weakness, poor PO intake, and fever; hypotensive in ED. Met criteria for sepsis iso C. diff colitis and was treated with vasopressor support, flagyl, and vanc. Hospital course was complicated by AHRF and pt was intubated 1/28-2/2. Pt then treated for E. coli and Bacteroides bacteremia with TTE findings concerning for mitral vegetation; pt not a good candidate for ARACELI due to varices decided to treat empirically for IE with 6 weeks of antibiotics s/p PICC line placement. Pt d/c from [facility] on 2/7 and then presented to [place] c/o SOB. Ultimately transferred to [facility] for hepatology transplant consult. On presentation today, pt endorses subjective nighttime fevers, chills, SOB and abdominal discomfort. Attributes fevers to recent flu dx (noted in chart to be flu positive 1 week prior to presentation at [facility]). Pt states she has not used alcohol in over a year. She began using alcohol in her 20s socially and her use gradually increased to include ~3-4 glasses of wine everyday when she was drinking the most. Has been to rehab and therapy multiple times. Pt first began smoking in her 20s ~1pack/day for many years, cannot recall specifics. Has not had a cigarette in several months. Pt is unable to ambulate on her own and has remained bedbound since intubation several months ago.  (10 Feb 2025 20:23).    During admission, pt seen by hepatology team for liver transplant eval ***. Pt seen by infectious disease for bacteremia and suspected endocarditis. TTE showed ***. ID recommended ***.    Medication Changes:    Important Follow Up:   0 (no pain/absence of nonverbal indicators of pain) HPI:  60yo F w/ PMH of alcohol cirrhosis with recurrent ascites, s/p multiple paracentesis, s/p banding of esophageal varices, currently being treated for recent c. diff colitis and possible endocarditis, presenting for possible liver transplant from [facility]. Pt initially admitted to [facility] c/o weakness, poor PO intake, and fever; hypotensive in ED. Met criteria for sepsis iso C. diff colitis and was treated with vasopressor support, flagyl, and vanc. Hospital course was complicated by AHRF and pt was intubated 1/28-2/2. Pt then treated for E. coli and Bacteroides bacteremia with TTE findings concerning for mitral vegetation; pt not a good candidate for ARACELI due to varices decided to treat empirically for IE with 6 weeks of antibiotics s/p PICC line placement. Pt d/c from [facility] on 2/7 and then presented to [place] c/o SOB. Ultimately transferred to [facility] for hepatology transplant consult. On presentation today, pt endorses subjective nighttime fevers, chills, SOB and abdominal discomfort. Attributes fevers to recent flu dx (noted in chart to be flu positive 1 week prior to presentation at [facility]). Pt states she has not used alcohol in over a year. She began using alcohol in her 20s socially and her use gradually increased to include ~3-4 glasses of wine everyday when she was drinking the most. Has been to rehab and therapy multiple times. Pt first began smoking in her 20s ~1pack/day for many years, cannot recall specifics. Has not had a cigarette in several months. Pt is unable to ambulate on her own and has remained bedbound since intubation several months ago.  (10 Feb 2025 20:23).    During admission, pt seen by hepatology team for liver transplant eval who recommended deferring eval iso acute infection. Pt seen by infectious disease for bacteremia and suspected endocarditis. TTE did not show evidence of a vegetation. ID recommended completing a wo week course of ceftiazone and metronidazole as well as 2 weesk fo vancomycin PO fo rcdiff.    Medication Changes:    Important Follow Up:   HPI:  58yo F w/ PMH of alcohol cirrhosis with recurrent ascites, s/p multiple paracentesis, s/p banding of esophageal varices, currently being treated for recent c. diff colitis and possible endocarditis, presenting for possible liver transplant from [facility]. Pt initially admitted to [facility] c/o weakness, poor PO intake, and fever; hypotensive in ED. Met criteria for sepsis iso C. diff colitis and was treated with vasopressor support, flagyl, and vanc. Hospital course was complicated by AHRF and pt was intubated 1/28-2/2. Pt then treated for E. coli and Bacteroides bacteremia with TTE findings concerning for mitral vegetation; pt not a good candidate for ARACELI due to varices decided to treat empirically for IE with 6 weeks of antibiotics s/p PICC line placement. Pt d/c from [facility] on 2/7 and then presented to [place] c/o SOB. Ultimately transferred to [facility] for hepatology transplant consult. On presentation today, pt endorses subjective nighttime fevers, chills, SOB and abdominal discomfort. Attributes fevers to recent flu dx (noted in chart to be flu positive 1 week prior to presentation at [facility]). Pt states she has not used alcohol in over a year. She began using alcohol in her 20s socially and her use gradually increased to include ~3-4 glasses of wine everyday when she was drinking the most. Has been to rehab and therapy multiple times. Pt first began smoking in her 20s ~1pack/day for many years, cannot recall specifics. Has not had a cigarette in several months. Pt is unable to ambulate on her own and has remained bedbound since intubation several months ago.  (10 Feb 2025 20:23).    During admission, pt seen by hepatology team for liver transplant eval who recommended deferring eval iso acute infection. Pt seen by infectious disease for bacteremia and suspected endocarditis. TTE did not show evidence of a vegetation. ID recommended completing a two week course of ceftriaxone and metronidazole as well as 2 weeks of vancomycin PO for cdiff. Pt to fu with ID for lab draws and obtain repeat TTE outpatient. PT recommended subacute rehab.    Medication Changes:  PO vancomycin 125 q6hrs    Important Follow Up:  ID  TTE   HPI:  60yo F w/ PMH of alcohol cirrhosis with recurrent ascites, s/p multiple paracentesis, s/p banding of esophageal varices, currently being treated for recent c. diff colitis and possible endocarditis, presenting for possible liver transplant from [facility]. Pt initially admitted to [facility] c/o weakness, poor PO intake, and fever; hypotensive in ED. Met criteria for sepsis iso C. diff colitis and was treated with vasopressor support, flagyl, and vanc. Hospital course was complicated by AHRF and pt was intubated 1/28-2/2. Pt then treated for E. coli and Bacteroides bacteremia with TTE findings concerning for mitral vegetation; pt not a good candidate for ARACELI due to varices decided to treat empirically for IE with 6 weeks of antibiotics s/p PICC line placement. Pt d/c from [facility] on 2/7 and then presented to [place] c/o SOB. Ultimately transferred to [facility] for hepatology transplant consult. On presentation today, pt endorses subjective nighttime fevers, chills, SOB and abdominal discomfort. Attributes fevers to recent flu dx (noted in chart to be flu positive 1 week prior to presentation at [facility]). Pt states she has not used alcohol in over a year. She began using alcohol in her 20s socially and her use gradually increased to include ~3-4 glasses of wine everyday when she was drinking the most. Has been to rehab and therapy multiple times. Pt first began smoking in her 20s ~1pack/day for many years, cannot recall specifics. Has not had a cigarette in several months. Pt is unable to ambulate on her own and has remained bedbound since intubation several months ago.  (10 Feb 2025 20:23).    During admission, pt seen by hepatology team for liver transplant eval who recommended pt complete WOODY and RPP prior to liver transplant. Pt seen by infectious disease for bacteremia and suspected endocarditis. TTE did not show evidence of a vegetation. ID recommended completing a two week course of ceftriaxone and metronidazole as well as 2 weeks of vancomycin PO for cdiff. Pt to fu with ID for lab draws and obtain repeat TTE outpatient. Pt was febrile during admission 2/22. CT A/P showed ***. PT recommended subacute rehab.    Medication Changes:  Ciprofloxacin for SBP prophylaxis    Important Follow Up:  ID  TTE   HPI:  58yo F w/ PMH of alcohol cirrhosis with recurrent ascites, s/p multiple paracentesis, s/p banding of esophageal varices, currently being treated for recent c. diff colitis and possible endocarditis, presenting for possible liver transplant from [facility]. Pt initially admitted to [facility] c/o weakness, poor PO intake, and fever; hypotensive in ED. Met criteria for sepsis iso C. diff colitis and was treated with vasopressor support, flagyl, and vanc. Hospital course was complicated by AHRF and pt was intubated 1/28-2/2. Pt then treated for E. coli and Bacteroides bacteremia with TTE findings concerning for mitral vegetation; pt not a good candidate for ARACELI due to varices decided to treat empirically for IE with 6 weeks of antibiotics s/p PICC line placement. Pt d/c from [facility] on 2/7 and then presented to [place] c/o SOB. Ultimately transferred to [facility] for hepatology transplant consult. On presentation today, pt endorses subjective nighttime fevers, chills, SOB and abdominal discomfort. Attributes fevers to recent flu dx (noted in chart to be flu positive 1 week prior to presentation at [facility]). Pt states she has not used alcohol in over a year. She began using alcohol in her 20s socially and her use gradually increased to include ~3-4 glasses of wine everyday when she was drinking the most. Has been to rehab and therapy multiple times. Pt first began smoking in her 20s ~1pack/day for many years, cannot recall specifics. Has not had a cigarette in several months. Pt is unable to ambulate on her own and has remained bedbound since intubation several months ago.    During admission, pt seen by hepatology team for liver transplant eval. Due to positive PETH of 72, they recommended pt complete WOODY and RPP prior to liver transplant. Pt seen by infectious disease for bacteremia and suspected endocarditis. TTEx2 did not show evidence of a vegetation. ID recommended completing a two week course of ceftriaxone and metronidazole as well as 2 weeks of vancomycin PO for c-diff. Pt became febrile during admission on 2/22. CT A/P demonstrated a 3.7 cm x 2.7 cm bilobed abscess. A MRI of the lumbar spine was then performed to further characterize infection. Patient was restarted on ceftriaxone and flagyl IV abx. IR consulted but deferred aspiration as abscess situated by significant vasculature. IR and ID therefore recommended indefinite abx course with serial CT scans to evaluate abscess resolution. Patient had repeat CT on 3/4 which demonstrated interval decrease in abscess size 2.1cm x 1.8cm. Due to adequate interval change, patient deemed ready for discharge with PO cefpodoxime and flagyl with f/u scan the week of 3/17. Patient's course was c/b significant diarrhea. Due to prior c. diff infection, patient recommended to be on PO vanc while remaining of abx. Diarrhea resolved with decrease in lactulose and this was likely medication induced diarrhea. On day of discharge, patient is clinically stable with no new exam findings or acute symptoms compared to prior. The patient was seen by the attending physician on the date of discharge and deemed stable and acceptable for discharge. The patient's chronic medical conditions were treated accordingly per the patient's home medication regimen. The patient's medication reconciliation (with changes made to chronic medications), follow up appointments, discharge orders, instructions, and significant lab and diagnostic studies are as noted. Patient transported to [facility] with outpatient followup with psych and SW.      Medication Changes:  Ciprofloxacin for SBP prophylaxis  Cefpodoxime 400mg BID and flagyl 500mg BID for abscess  Carvedilol 3.125mg BID for varices  PO vanc 125mg BID for C diff ppx    Important Follow Up:  Transplant hepatology  ID for abscess resolution HPI:  60yo F w/ PMH of alcohol cirrhosis with recurrent ascites, s/p multiple paracentesis, s/p banding of esophageal varices, currently being treated for recent c. diff colitis and possible endocarditis, presenting for possible liver transplant from [facility]. Pt initially admitted to [facility] c/o weakness, poor PO intake, and fever; hypotensive in ED. Met criteria for sepsis iso C. diff colitis and was treated with vasopressor support, flagyl, and vanc. Hospital course was complicated by AHRF and pt was intubated 1/28-2/2. Pt then treated for E. coli and Bacteroides bacteremia with TTE findings concerning for mitral vegetation; pt not a good candidate for ARACELI due to varices decided to treat empirically for IE with 6 weeks of antibiotics s/p PICC line placement. Pt d/c from [facility] on 2/7 and then presented to [place] c/o SOB. Ultimately transferred to [facility] for hepatology transplant consult. On presentation today, pt endorses subjective nighttime fevers, chills, SOB and abdominal discomfort. Attributes fevers to recent flu dx (noted in chart to be flu positive 1 week prior to presentation at [facility]). Pt states she has not used alcohol in over a year. She began using alcohol in her 20s socially and her use gradually increased to include ~3-4 glasses of wine everyday when she was drinking the most. Has been to rehab and therapy multiple times. Pt first began smoking in her 20s ~1pack/day for many years, cannot recall specifics. Has not had a cigarette in several months. Pt is unable to ambulate on her own and has remained bedbound since intubation several months ago.    During admission, pt seen by hepatology team for liver transplant eval. Due to positive PETH of 72, they recommended pt complete WOODY and RPP prior to liver transplant. Pt seen by infectious disease for bacteremia and suspected endocarditis. TTEx2 did not show evidence of a vegetation. ID recommended completing a two week course of ceftriaxone and metronidazole as well as 2 weeks of vancomycin PO for c-diff. Pt became febrile during admission on 2/22. CT A/P demonstrated a 3.7 cm x 2.7 cm bilobed abscess. A MRI of the lumbar spine was then performed to further characterize infection. Patient was restarted on ceftriaxone and flagyl IV abx. IR consulted but deferred aspiration as abscess situated by significant vasculature. IR and ID therefore recommended prolonged abx course with serial CT scans to evaluate abscess resolution. Patient had repeat CT on 3/4 which demonstrated interval decrease in abscess size 2.1cm x 1.8cm. Due to adequate interval change, patient deemed ready for discharge with PO cefpodoxime and flagyl with f/u scan the week of 3/17. Patient's course was c/b significant diarrhea. Due to prior c. diff infection, patient recommended to be on PO vanc while remaining of abx. Diarrhea resolved with decrease in lactulose and this was likely medication induced diarrhea. On day of discharge, patient is clinically stable with no new exam findings or acute symptoms compared to prior. The patient was seen by the attending physician on the date of discharge and deemed stable and acceptable for discharge. The patient's chronic medical conditions were treated accordingly per the patient's home medication regimen. The patient's medication reconciliation (with changes made to chronic medications), follow up appointments, discharge orders, instructions, and significant lab and diagnostic studies are as noted. Patient transported to [facility] with outpatient followup with psych and SW.      Medication Changes:  Ciprofloxacin 500mg for SBP prophylaxis  Cefpodoxime 400mg BID and flagyl 500mg BID for abscess  Carvedilol 3.125mg BID for varices  PO vanc 125mg BID for C diff ppx    Important Follow Up:  Transplant hepatology  ID for abscess resolution

## 2025-02-11 NOTE — PATIENT PROFILE ADULT - FALL HARM RISK - HARM RISK INTERVENTIONS
Assistance with ambulation/Assistance OOB with selected safe patient handling equipment/Communicate Risk of Fall with Harm to all staff/Monitor for mental status changes/Monitor gait and stability/Reinforce activity limits and safety measures with patient and family/Review medications for side effects contributing to fall risk/Sit up slowly, dangle for a short time, stand at bedside before walking/Tailored Fall Risk Interventions/Use of alarms - bed, chair and/or voice tab/Visual Cue: Yellow wristband and red socks/Bed in lowest position, wheels locked, appropriate side rails in place/Call bell, personal items and telephone in reach/Instruct patient to call for assistance before getting out of bed or chair/Non-slip footwear when patient is out of bed/[place] to call system/Physically safe environment - no spills, clutter or unnecessary equipment/Purposeful Proactive Rounding/Room/bathroom lighting operational, light cord in reach

## 2025-02-11 NOTE — ADVANCED PRACTICE NURSE CONSULT - ASSESSMENT
Arrived on 5 SURGE (4 TOWER) on 2/11/25. Patient is awake, alert, and engaging in conversation. Patient expressed consent for skin consult. Patient expresses she is incontinent of stool and urine, despite no urine or stool on her skin during evaluation.   Skin assessment reveals;  Arrived on 5 SURGE (4 TOWER) on 2/11/25. Patient is awake, alert, and engaging in conversation. Patient expressed consent for skin consult. Patient expresses she is incontinent of stool and urine, despite no urine or stool on her skin during evaluation.   Skin assessment reveals; Sacrum / B/L buttocks skin changes consistent with IAD (incontinence-associated dermatitis) characterized by hyperpigmentation, redness, ranging from light pink to dark red measuring approximately 17.0 cm x 16.0 cm x 0.0 cm. Erythematous and macerated skin noted on perineum, buttocks, and inner thighs. No eroded skin. The patient complaints of burning, and itching. Cleansed w/ incontinent cleanser, pat dry & applied Renetta Antifungal Cream, containing 2% Miconazole Nitrate, designed to guard against irritation and infection at bedside.   Once the consultation was complete, patient and RN were educated regarding the need of prompt and thorough cleansing of the skin after each incontinent episode. Recommended using a pH-balanced skin cleanser and avoiding harsh soaps or detergents. Advised against scrubbing the skin vigorously. Instructed to pat the skin dry gently. When consultation was completed, the patient was left in a right sided position, with side rails up, call bell within reach, and bed in lowest position.     Plan of care discussed with [place] (SHANNAN).

## 2025-02-11 NOTE — PROGRESS NOTE ADULT - PROBLEM SELECTOR PLAN 1
- C/b hepatic encephalopathy and esophageal varices  - Paracentesis done today; 1.4L removed  - Pt appears clinically euvolemic on exam    Plan:  - Continue on Lasix 40mg and Spironolactone 100mg qD  - Continue on lactulose and rifaximin  - Low current suspicion for SBP; pt taking IV CTX 2g qD  - Transplant hepatology eval in AM - C/b hepatic encephalopathy and esophageal varices  - Paracentesis done today; 1.4L removed  - Pt appears clinically euvolemic on exam    Plan:  - Continue on Lasix 40mg and Spironolactone 100mg qD  - Continue on lactulose and rifaximin  - Low current suspicion for SBP; pt taking IV CTX 2g qD for bacteremia  - fu Transplant hepatology recs

## 2025-02-11 NOTE — CONSULT NOTE ADULT - ASSESSMENT
Impression/Hospital Course:  60yo F w/ PMH of alcohol cirrhosis with recurrent ascites, s/p multiple paracentesis, s/p banding of esophageal varices, currently being treated for recent c. diff colitis and possible endocarditis, presenting for possible liver transplant from [facility]. Pt initially admitted to [facility] c/o weakness, poor PO intake, and fever; hypotensive in ED. Met criteria for sepsis iso C. diff colitis and was treated with vasopressor support, flagyl, and vanc. Hospital course was complicated by AHRF and pt was intubated 1/28-2/2. Pt then treated for E. coli and Bacteroides bacteremia with TTE findings concerning for mitral vegetation; pt not a good candidate for ARACELI due to varices found on EGD from 1/31/24 decided to treat empirically for IE with 6 weeks of antibiotics s/p PICC line placement with negative blood cultures from 1/31/24. Pt d/c from [facility] on 2/7 and then presented to [place] c/o SOB. Ultimately transferred to [facility] for hepatology transplant consult. On presentation today, pt endorses subjective nighttime fevers, chills, SOB and abdominal discomfort. Attributes fevers to recent flu dx (noted in chart to be flu positive 1 week prior to presentation at [facility]). Pt states she has not used alcohol in over a year. She began using alcohol in her 20s socially and her use gradually increased to include ~3-4 glasses of wine everyday when she was drinking the most. Has been to rehab and therapy multiple times. Pt first began smoking in her 20s ~1pack/day for many years, cannot recall specifics. Has not had a cigarette in several months. Pt is unable to ambulate on her own and has remained bedbound since intubation several months ago.     Assessment:  *Polymicrobial bacteremia  *E.coli Bacteremia  *Bacteroides bacteremia  *Possible MV vegetation on TTE, though E.coli and bacteroides atypical endocarditis organisms and polymicrobial is also atypical for endocarditis  *C.diff colitis   *Hx of AHRF s/p intubation 1/28 - 2/2/24  *EtOH cirrhosis with liver transplant evaluation  *Recurrent ascites, s/p multiple paracentesis, s/p banding of esophageal varices    Recommendations: PLEASE DEFER ALL CHANGES IN PLAN UNTIL SIGNED BY ATTENDING. All recommendations are tentative pending Attending Attestation.  - continue ceftriaxone 2g IV QD  - continue metronidazole 500mg IV Q8  - continue Vancomycin 500mg PO Q6 hours   - MV vegatation per primary team  - Cirrhosis and liver transplant eval per hepatology  - follow blood cultures (received by lab with results pending), adjust antimicrobial therapy based off of culture and sensitivity   - trend temperature and WBC     Marko Garcia DO, PGY-5   Infectious Disease Fellow  [facility] Teams Preferred  After 5pm/weekends call [phone]  Impression/Hospital Course:  58yo F w/ PMH of alcohol cirrhosis with recurrent ascites, s/p multiple paracentesis, s/p banding of esophageal varices, currently being treated for recent c. diff colitis and possible endocarditis, presenting for possible liver transplant from [facility]. Pt initially admitted to [facility] c/o weakness, poor PO intake, and fever; hypotensive in ED. Met criteria for sepsis iso C. diff colitis and was treated with vasopressor support, flagyl, and vanc. Hospital course was complicated by AHRF and pt was intubated 1/28-2/2. Pt then treated for E. coli and Bacteroides bacteremia with TTE findings concerning for mitral vegetation; pt not a good candidate for ARACELI due to varices found on EGD from 1/31/24 decided to treat empirically for IE with 6 weeks of antibiotics s/p PICC line placement with negative blood cultures from 1/31/24. Pt d/c from [facility] on 2/7 and then presented to [place] c/o SOB. Ultimately transferred to [facility] for hepatology transplant consult. On presentation today, pt endorses subjective nighttime fevers, chills, SOB and abdominal discomfort. Attributes fevers to recent flu dx (noted in chart to be flu positive 1 week prior to presentation at [facility]). Pt states she has not used alcohol in over a year. She began using alcohol in her 20s socially and her use gradually increased to include ~3-4 glasses of wine everyday when she was drinking the most. Has been to rehab and therapy multiple times. Pt first began smoking in her 20s ~1pack/day for many years, cannot recall specifics. Has not had a cigarette in several months. Pt is unable to ambulate on her own and has remained bedbound since intubation several months ago.     Assessment:  *Polymicrobial bacteremia  *E.coli Bacteremia  *Bacteroides bacteremia  *Possible MV vegetation on TTE, though E.coli and bacteroides atypical endocarditis organisms and polymicrobial is also atypical for endocarditis  *C.diff colitis   *Hx of AHRF s/p intubation 1/28 - 2/2/24  *EtOH cirrhosis with liver transplant evaluation  *Recurrent ascites, s/p multiple paracentesis, s/p banding of esophageal varices    Recommendations: PLEASE DEFER ALL CHANGES IN PLAN UNTIL SIGNED BY ATTENDING. All recommendations are tentative pending Attending Attestation.  - continue ceftriaxone 2g IV QD  - continue metronidazole 500mg IV Q8  - continue Vancomycin PO but decrease dose to 125mg PO Q6 hours   - MV vegetation per primary team  - Cirrhosis and liver transplant eval per hepatology  - follow blood cultures (received by lab with results pending), adjust antimicrobial therapy based off of culture and sensitivity   - trend temperature and WBC     Marko Garcia DO, PGY-5   Infectious Disease Fellow  [facility] Teams Preferred  After 5pm/weekends call [phone]  Impression/Hospital Course:  60yo F w/ PMH of alcohol cirrhosis with recurrent ascites, s/p multiple paracentesis, s/p banding of esophageal varices, currently being treated for recent c. diff colitis and possible endocarditis, presenting for possible liver transplant from [facility]. Pt initially admitted to [facility] c/o weakness, poor PO intake, and fever; hypotensive in ED. Met criteria for sepsis iso C. diff colitis and was treated with vasopressor support, flagyl, and vanc. Hospital course was complicated by AHRF and pt was intubated 1/28-2/2. Pt then treated for E. coli and Bacteroides bacteremia with TTE findings concerning for mitral vegetation; pt not a good candidate for ARACELI due to varices found on EGD from 1/31/24 decided to treat empirically for IE with 6 weeks of antibiotics s/p PICC line placement with negative blood cultures from 1/31/24. Pt d/c from [facility] on 2/7 and then presented to [place] c/o SOB. Ultimately transferred to [facility] for hepatology transplant consult. On presentation today, pt endorses subjective nighttime fevers, chills, SOB and abdominal discomfort. Attributes fevers to recent flu dx (noted in chart to be flu positive 1 week prior to presentation at [facility]). Pt states she has not used alcohol in over a year. She began using alcohol in her 20s socially and her use gradually increased to include ~3-4 glasses of wine everyday when she was drinking the most. Has been to rehab and therapy multiple times. Pt first began smoking in her 20s ~1pack/day for many years, cannot recall specifics. Has not had a cigarette in several months. Pt is unable to ambulate on her own and has remained bedbound since intubation several months ago.     Assessment:  *Polymicrobial bacteremia  *E.coli Bacteremia  *Bacteroides bacteremia  *Possible MV vegetation on TTE, though E.coli and bacteroides atypical endocarditis organisms and polymicrobial is also atypical for endocarditis  *C.diff colitis   *Hx of AHRF s/p intubation 1/28 - 2/2/24  *EtOH cirrhosis with liver transplant evaluation with transaminitis s/p banding of esophageal varices   *Recurrent ascites, s/p multiple paracentesis,   *Hyperbilirubinemia     Recommendations:   - continue ceftriaxone 2g IV QD through 3/14/25 for now  - continue metronidazole 500mg IV Q8 through 2/13/25  - continue Vancomycin PO but decrease dose to 125mg PO Q6 hours   - repeat TTE to assess vegetation   - if patient not yet treated for flu with benefit from oseltamivir though unable to see positive flu in [facility], is this from [facility]?  - MV vegetation per primary team  - Cirrhosis and liver transplant eval per hepatology  - follow blood cultures (received by lab with results pending), adjust antimicrobial therapy based off of culture and sensitivity   - trend temperature and WBC     Marko Garcia DO, PGY-5   Infectious Disease Fellow  [facility] Teams Preferred  After 5pm/weekends call [phone]

## 2025-02-11 NOTE — PROGRESS NOTE ADULT - SUBJECTIVE AND OBJECTIVE BOX
ABRAHAN GOODWIN ([id number])- Patient is a 59y old  Female who presents with a chief complaint of Transfer from [place] for possible liver transplant (10 Feb 2025 20:23)      INTERVAL HPI/OVERNIGHT EVENTS:   Patient has no acute events overnight,     SUBJECTIVE: Pt seen at bedside; denies n/v, sob, chest pain.     PHYSICAL EXAM:  - GENERAL: Follows conversation appropriately. No acute distress.  - EYES: Tracks me in room.   - HENT: Moist mucous membranes. No scleral icterus.   - LUNGS: Clear to auscultation bilaterally. No accessory muscle use.  - CARDIOVASCULAR: Regular rate and rhythm. No murmur.  - ABDOMEN: Soft, non-tender and non-distended. No palpable masses.  - EXTREMITIES: No edema. Non-tender.  - SKIN: No rashes or lesions. Warm.  - NEUROLOGIC: No focal neurological deficits. CN II-XII grossly intact, but not individually tested.  - PSYCHIATRIC: Cooperative. Appropriate mood and affect.    Vital Signs Last 24 Hrs  T(C): 36.7 (11 Feb 2025 04:42), Max: 37.4 (10 Feb 2025 19:35)  T(F): 98 (11 Feb 2025 04:42), Max: 99.3 (10 Feb 2025 19:35)  HR: 116 (11 Feb 2025 04:42) (115 - 116)  BP: 113/70 (11 Feb 2025 04:42) (113/70 - 115/68)  BP(mean): --  RR: 18 (11 Feb 2025 04:42) (18 - 18)  SpO2: 98% (11 Feb 2025 04:42) (98% - 100%)    Parameters below as of 11 Feb 2025 04:42  Patient On (Oxygen Delivery Method): room air        LABS:                          8.4    6.32  )-----------( 115      ( 11 Feb 2025 07:17 )             25.2     02-11    133[L]  |  101  |  6[L]  ----------------------------<  105[H]  5.6[H]   |  17[L]  |  <0.30[L]    Ca    8.3[L]      11 Feb 2025 01:01  Phos  2.6     02-11  Mg     1.5     02-11    TPro  6.8  /  Alb  3.5  /  TBili  12.6[H]  /  DBili  x   /  AST  186[H]  /  ALT  50[H]  /  AlkPhos  279[H]  02-11          Urinalysis Basic - ( 11 Feb 2025 01:01 )    Color: x / Appearance: x / SG: x / pH: x  Gluc: 105 mg/dL / Ketone: x  / Bili: x / Urobili: x   Blood: x / Protein: x / Nitrite: x   Leuk Esterase: x / RBC: x / WBC x   Sq Epi: x / Non Sq Epi: x / Bacteria: x      PT/INR - ( 11 Feb 2025 07:22 )   PT: 25.6 sec;   INR: 2.24 ratio         PTT - ( 11 Feb 2025 07:22 )  PTT:53.6 sec  Lactate Trend        CAPILLARY BLOOD GLUCOSE      POCT Blood Glucose.: 122 mg/dL (11 Feb 2025 07:47)      Medications:  albuterol/ipratropium for Nebulization. 3 milliLiter(s) Nebulizer once PRN  cefTRIAXone   IVPB 2000 milliGRAM(s) IV Intermittent every 24 hours  dextrose 5%. 1000 milliLiter(s) IV Continuous <Continuous>  dextrose 5%. 1000 milliLiter(s) IV Continuous <Continuous>  dextrose 50% Injectable 25 Gram(s) IV Push once  dextrose 50% Injectable 12.5 Gram(s) IV Push once  dextrose 50% Injectable 25 Gram(s) IV Push once  dextrose Oral Gel 15 Gram(s) Oral once PRN  folic acid 1 milliGRAM(s) Oral daily  furosemide    Tablet 40 milliGRAM(s) Oral daily  glucagon  Injectable 1 milliGRAM(s) IntraMuscular once  guaiFENesin Oral Liquid (Sugar-Free) 100 milliGRAM(s) Oral every 6 hours PRN  heparin   Injectable 5000 Unit(s) SubCutaneous every 8 hours  insulin glargine Injectable (LANTUS) 15 Unit(s) SubCutaneous at bedtime  insulin lispro (ADMELOG) corrective regimen sliding scale   SubCutaneous three times a day before meals  insulin lispro (ADMELOG) corrective regimen sliding scale   SubCutaneous at bedtime  lactulose Syrup 10 Gram(s) Oral three times a day  melatonin 3 milliGRAM(s) Oral at bedtime  metroNIDAZOLE  IVPB 500 milliGRAM(s) IV Intermittent every 8 hours  midodrine. 15 milliGRAM(s) Oral three times a day  pantoprazole    Tablet 40 milliGRAM(s) Oral before breakfast  rifAXIMin 550 milliGRAM(s) Oral two times a day  spironolactone 100 milliGRAM(s) Oral daily  sucralfate suspension 1 Gram(s) Oral every 6 hours  thiamine 100 milliGRAM(s) Oral daily  vancomycin    Solution 500 milliGRAM(s) Oral every 6 hours      RADIOLOGY & ADDITIONAL TESTS were viewed by me personally.   EKG:    ABRAHAN GOODWIN ([id number])- Patient is a 59y old  Female who presents with a chief complaint of Transfer from [place] for possible liver transplant (10 Feb 2025 20:23)      INTERVAL HPI/OVERNIGHT EVENTS:   Patient transferred from [place] overnight    SUBJECTIVE: Pt seen at bedside; denies n/v, sob, chest pain.     PHYSICAL EXAM:  - GENERAL: Follows conversation appropriately. No acute distress. Jaundiced  - EYES: Tracks me in room. Scleral ictus   - HENT: Moist mucous membranes. No scleral icterus.   - LUNGS: Clear to auscultation bilaterally. No accessory muscle use.  - CARDIOVASCULAR: Regular rate and rhythm. No murmur.  - ABDOMEN: Soft, non-tender and non-distended. No palpable masses.  - EXTREMITIES: No edema. Non-tender.  - SKIN: No rashes or lesions. Jaundiced. Warm. PICC in place L upper arm  - NEUROLOGIC: No focal neurological deficits. CN II-XII grossly intact, but not individually tested.  - PSYCHIATRIC: Cooperative. Appropriate mood and affect.    Vital Signs Last 24 Hrs  T(C): 36.7 (11 Feb 2025 04:42), Max: 37.4 (10 Feb 2025 19:35)  T(F): 98 (11 Feb 2025 04:42), Max: 99.3 (10 Feb 2025 19:35)  HR: 116 (11 Feb 2025 04:42) (115 - 116)  BP: 113/70 (11 Feb 2025 04:42) (113/70 - 115/68)  BP(mean): --  RR: 18 (11 Feb 2025 04:42) (18 - 18)  SpO2: 98% (11 Feb 2025 04:42) (98% - 100%)    Parameters below as of 11 Feb 2025 04:42  Patient On (Oxygen Delivery Method): room air        LABS:                          8.4    6.32  )-----------( 115      ( 11 Feb 2025 07:17 )             25.2     02-11    133[L]  |  101  |  6[L]  ----------------------------<  105[H]  5.6[H]   |  17[L]  |  <0.30[L]    Ca    8.3[L]      11 Feb 2025 01:01  Phos  2.6     02-11  Mg     1.5     02-11    TPro  6.8  /  Alb  3.5  /  TBili  12.6[H]  /  DBili  x   /  AST  186[H]  /  ALT  50[H]  /  AlkPhos  279[H]  02-11          Urinalysis Basic - ( 11 Feb 2025 01:01 )    Color: x / Appearance: x / SG: x / pH: x  Gluc: 105 mg/dL / Ketone: x  / Bili: x / Urobili: x   Blood: x / Protein: x / Nitrite: x   Leuk Esterase: x / RBC: x / WBC x   Sq Epi: x / Non Sq Epi: x / Bacteria: x      PT/INR - ( 11 Feb 2025 07:22 )   PT: 25.6 sec;   INR: 2.24 ratio         PTT - ( 11 Feb 2025 07:22 )  PTT:53.6 sec  Lactate Trend        CAPILLARY BLOOD GLUCOSE      POCT Blood Glucose.: 122 mg/dL (11 Feb 2025 07:47)      Medications:  albuterol/ipratropium for Nebulization. 3 milliLiter(s) Nebulizer once PRN  cefTRIAXone   IVPB 2000 milliGRAM(s) IV Intermittent every 24 hours  dextrose 5%. 1000 milliLiter(s) IV Continuous <Continuous>  dextrose 5%. 1000 milliLiter(s) IV Continuous <Continuous>  dextrose 50% Injectable 25 Gram(s) IV Push once  dextrose 50% Injectable 12.5 Gram(s) IV Push once  dextrose 50% Injectable 25 Gram(s) IV Push once  dextrose Oral Gel 15 Gram(s) Oral once PRN  folic acid 1 milliGRAM(s) Oral daily  furosemide    Tablet 40 milliGRAM(s) Oral daily  glucagon  Injectable 1 milliGRAM(s) IntraMuscular once  guaiFENesin Oral Liquid (Sugar-Free) 100 milliGRAM(s) Oral every 6 hours PRN  heparin   Injectable 5000 Unit(s) SubCutaneous every 8 hours  insulin glargine Injectable (LANTUS) 15 Unit(s) SubCutaneous at bedtime  insulin lispro (ADMELOG) corrective regimen sliding scale   SubCutaneous three times a day before meals  insulin lispro (ADMELOG) corrective regimen sliding scale   SubCutaneous at bedtime  lactulose Syrup 10 Gram(s) Oral three times a day  melatonin 3 milliGRAM(s) Oral at bedtime  metroNIDAZOLE  IVPB 500 milliGRAM(s) IV Intermittent every 8 hours  midodrine. 15 milliGRAM(s) Oral three times a day  pantoprazole    Tablet 40 milliGRAM(s) Oral before breakfast  rifAXIMin 550 milliGRAM(s) Oral two times a day  spironolactone 100 milliGRAM(s) Oral daily  sucralfate suspension 1 Gram(s) Oral every 6 hours  thiamine 100 milliGRAM(s) Oral daily  vancomycin    Solution 500 milliGRAM(s) Oral every 6 hours      RADIOLOGY & ADDITIONAL TESTS were viewed by me personally.   EKG:

## 2025-02-11 NOTE — PROGRESS NOTE ADULT - PROBLEM SELECTOR PLAN 2
-iso e. coli and bacteroides bacteremia. Pt with evidence of mitral valve vegetation on TTE; not confirmed with ARACELI due to concerns regarding esophageal varices.   -Likely in setting of recent dental extraction in December    Plan:   - Continue with empiric CTX 2g qD (for 6 weeks, ends 3/14/25)  - Continue with Flagyl 500mg q8 (for 2 weeks, ends 2/13/25) for bacteroides bacteremia  - ID consult in AM -iso e. coli and bacteroides bacteremia. Pt with evidence of mitral valve vegetation on TTE; not confirmed with ARACELI due to concerns regarding esophageal varices.   -Likely in setting of recent dental extraction in December    Plan:   - Continue with empiric CTX 2g qD (for 6 weeks, ends 3/14/25)  - Continue with Flagyl 500mg q8 (for 2 weeks, ends 2/13/25) for bacteroides bacteremia  - fu ID consult recs

## 2025-02-11 NOTE — CONSULT NOTE ADULT - ATTENDING COMMENTS
58yo F w/ PMH of alcohol cirrhosis with recurrent ascites, s/p multiple paracentesis, s/p banding of esophageal varices, currently being treated for recent c. diff colitis and possible endocarditis, presenting for possible liver transplant from [facility]. Pt initially admitted to [facility] c/o weakness, poor PO intake, and fever; hypotensive in ED. Met criteria for sepsis iso C. diff colitis and was treated with vasopressor support, flagyl, and vanc. Hospital course was complicated by AHRF and pt was intubated 1/28-2/2. Pt then treated for E. coli and Bacteroides bacteremia with TTE findings concerning for mitral vegetation; pt not a good candidate for ARACELI due to varices found on EGD from 1/31/24 decided to treat empirically for IE with 6 weeks of antibiotics s/p PICC line placement with negative blood cultures from 1/31/24. Pt d/c from [facility] on 2/7 and then presented to [place] c/o SOB. Ultimately transferred to [facility] for hepatology transplant consult. On presentation today, pt endorses subjective nighttime fevers, chills, SOB and abdominal discomfort. Attributes fevers to recent flu dx (noted in chart to be flu positive 1 week prior to presentation at [facility]). Pt states she has not used alcohol in over a year. She began using alcohol in her 20s socially and her use gradually increased to include ~3-4 glasses of wine everyday when she was drinking the most. Has been to rehab and therapy multiple times. Pt first began smoking in her 20s ~1pack/day for many years, cannot recall specifics. Has not had a cigarette in several months. Pt is unable to ambulate on her own and has remained bedbound since intubation several months ago.     Assessment:  *Polymicrobial bacteremia with E.coli and Bacteroides  *Possible MV vegetation on TTE, though E.coli and bacteroides atypical endocarditis organisms and polymicrobial is also atypical for endocarditis  *C.diff colitis   *transaminitis   *EtOH cirrhosis with liver transplant evaluation with transaminitis s/p banding of esophageal varices   *Abnormal CT with colitis         Recommendations:   - continue ceftriaxone 2g IV QD through 3/14/25 for now  - continue metronidazole 500mg IV Q8 through 2/13/25  - continue Vancomycin PO but decrease dose to 125mg PO Q6 hours   - repeat TTE to assess vegetation   - if patient not yet treated for flu with benefit from oseltamivir   - Cirrhosis, hepatology on board   - follow blood cultures (received by lab with results pending),   - trend WBC       Plan discussed with Medicine Attending.     Deniec Veliz  Please contact through [employer] Teams   If no response or past 5 pm/weekend call [phone].

## 2025-02-11 NOTE — PROGRESS NOTE ADULT - ASSESSMENT
60yo F w/ PMH of alcohol cirrhosis with recurrent ascites, s/p multiple paracentesis, s/p banding of esophageal varices, currently being treated for recent c. diff colitis and possible endocarditis. Pt is jaundiced, with scleral icterus and abdominal distension presenting for liver transplant evaluation from [facility].

## 2025-02-11 NOTE — DISCHARGE NOTE PROVIDER - NSDCCPTREATMENT_GEN_ALL_CORE_FT
PRINCIPAL PROCEDURE  Procedure: MR lumbar spine wo/w con  Findings and Treatment: Vertebral body height, marrow signal homogeneity, and facet alignment are maintained throughout the visualized spinal segments.  Disc space narrowing at L4-L5.  No prevertebral or paravertebral edema.  Redemonstration of right iliopsoas muscle abscess currently measuring 2.8 x 2.6 x 2.5 cm (maximal AP xTV x CC dimensions), similar to the prior CT abdomen and pelvis, given differences in modality.  No marrow edema.  No abnormal enhancement of the lumbar spine and visualized sacrum and iliac bones.  Mild multilevel degenerative changes resulting in mild multilevel spinal canal stenosis or neural foraminal narrowing.  IMPRESSION:  MRI LUMBAR SPINE:  Redemonstration of right iliopsoas muscle abscess currently measuring 2.8 x 2.6 x 2.5 cm (maximal AP x TV x CC dimensions), similar to the prior CT abdomen and pelvis, given differences in modality.  No evidence for discitis/osteomyelitis of the lumbar spine. No evidence for osteomyelitis involving the visualized sacrum or iliac bones.        SECONDARY PROCEDURE  Procedure: CT abdomen and pelvis with contrast  Findings and Treatment: LOWER CHEST: Clear lung bases.  LIVER: Cirrhosis.  BILE DUCTS: Stable dilatation.  GALLBLADDER: Cholelithiasis.  SPLEEN: Splenomegaly.  PANCREAS: Within normal limits.  ADRENALS: Nodular thickening. Stable left adrenal nodule.  KIDNEYS/URETERS: No hydronephrosis.  BLADDER: Within normal limits.  REPRODUCTIVE ORGANS: Uterus and adnexa within normal limits.  BOWEL: No bowel obstruction. Colonic diverticulosis. Appendix is not visualized.  PERITONEUM/RETROPERITONEUM: Interval decreased size of right   retroperitoneal fluid collection anterior to the psoas measuring 2.1 x   1.8 cm (previously 3.7 cm).  VESSELS: Atherosclerotic changes. Dilated collateral vessels.  LYMPH NODES: No lymphadenopathy. Subcentimeter retroperitoneal lymph nodes.  ABDOMINAL WALL: Within normal limits.  BONES: Degenerative changes.  IMPRESSION:  Interval decreased size of right retroperitoneal fluid collection   anterior to the psoas (2.1 x 1.8 cm).

## 2025-02-11 NOTE — PHYSICAL THERAPY INITIAL EVALUATION ADULT - PERTINENT HX OF CURRENT PROBLEM, REHAB EVAL
Pt is a 58yo F w/ PMH of alcohol cirrhosis with recurrent ascites, s/p multiple paracentesis, s/p banding of esophageal varices, currently being treated for recent c. diff colitis and possible endocarditis, presenting for possible liver transplant from [facility]. Pt initially admitted to [facility] c/o weakness, poor PO intake, and fever; hypotensive in ED.

## 2025-02-11 NOTE — CONSULT NOTE ADULT - SUBJECTIVE AND OBJECTIVE BOX
Patient is a 59y old  Female who presents with a chief complaint of Transfer from [place] for possible liver transplant     HPI:  60yo F w/ PMH of alcohol cirrhosis with recurrent ascites, s/p multiple paracentesis, s/p banding of esophageal varices, currently being treated for recent c. diff colitis and possible endocarditis, presenting [facility] Pt initially admitted to [facility] c/o weakness, poor PO intake, and fever; hypotensive in ED. Met criteria for sepsis iso C. diff colitis and was treated with vasopressor support, flagyl, and vanc. Hospital course was complicated by AHRF and pt was intubated 1/28-2/2. Pt then treated for E. coli and Bacteroides bacteremia with TTE findings concerning for mitral vegetation; pt not a good candidate for ARACELI due to varices found on EGD from 1/31/24 decided to treat empirically for IE with 6 weeks of antibiotics s/p PICC line placement with negative blood cultures from 1/31/24. Pt d/c from [facility] on 2/7 and then presented to [place] c/o SOB. Ultimately transferred to [facility] for hepatology transplant consult. On presentation today, pt endorses subjective nighttime fevers, chills, SOB and abdominal discomfort. Attributes fevers to recent flu dx (noted in chart to be flu positive 1 week prior to presentation at [facility]). Pt states she has not used alcohol in over a year. She began using alcohol in her 20s socially and her use gradually increased to include ~3-4 glasses of wine everyday when she was drinking the most. Has been to rehab and therapy multiple times. Pt first began smoking in her 20s ~1pack/day for many years, cannot recall specifics. Has not had a cigarette in several months. Pt is unable to ambulate on her own and has remained bedbound since intubation several months ago.     REVIEW OF SYSTEMS  pending full examination    prior hospital charts reviewed [V]  primary team notes reviewed [V]  other consultant notes reviewed [V]    PAST MEDICAL & SURGICAL HISTORY:  Alcoholic cirrhosis      H/O esophageal varices      GERD (gastroesophageal reflux disease)      H/O bacteremia      History of bacteremia      H/O Clostridium difficile infection      S/P hemorrhoidectomy          SOCIAL HISTORY:  Denied smoking/vaping/alcohol/recreational drug use    FAMILY HISTORY:  FH: pancreatic cancer (Mother)    Heavy drinker of alcohol (Sibling)    Family history of stent (Sibling)        Allergies  Zithromax (Unknown)        ANTIMICROBIALS:  cefTRIAXone   IVPB 2000 every 24 hours  metroNIDAZOLE  IVPB 500 every 8 hours  rifAXIMin 550 two times a day  vancomycin    Solution 500 every 6 hours      ANTIMICROBIALS (past 90 days):  MEDICATIONS  (STANDING):  cefTRIAXone   IVPB   100 mL/Hr IV Intermittent (02-10-25 @ 21:37)    metroNIDAZOLE  IVPB   100 mL/Hr IV Intermittent (02-11-25 @ 06:29)   100 mL/Hr IV Intermittent (02-10-25 @ 22:51)    rifAXIMin   550 milliGRAM(s) Oral (02-11-25 @ 05:57)    vancomycin    Solution   500 milliGRAM(s) Oral (02-11-25 @ 05:57)   500 milliGRAM(s) Oral (02-10-25 @ 23:33)        OTHER MEDS:   MEDICATIONS  (STANDING):  albuterol/ipratropium for Nebulization. 3 once PRN  dextrose 50% Injectable 25 once  dextrose 50% Injectable 12.5 once  dextrose 50% Injectable 25 once  dextrose Oral Gel 15 once PRN  furosemide    Tablet 40 daily  glucagon  Injectable 1 once  guaiFENesin Oral Liquid (Sugar-Free) 100 every 6 hours PRN  heparin   Injectable 5000 every 8 hours  insulin glargine Injectable (LANTUS) 15 at bedtime  insulin lispro (ADMELOG) corrective regimen sliding scale  three times a day before meals  insulin lispro (ADMELOG) corrective regimen sliding scale  at bedtime  lactulose Syrup 10 three times a day  melatonin 3 at bedtime  midodrine. 15 three times a day  pantoprazole    Tablet 40 before breakfast  spironolactone 100 daily  sucralfate suspension 1 every 6 hours      VITALS:  Vital Signs Last 24 Hrs  T(F): 98 (02-11-25 @ 04:42), Max: 99.3 (02-10-25 @ 19:35)    Vital Signs Last 24 Hrs  HR: 116 (02-11-25 @ 04:42) (115 - 116)  BP: 113/70 (02-11-25 @ 04:42) (113/70 - 115/68)  RR: 18 (02-11-25 @ 04:42)  SpO2: 98% (02-11-25 @ 04:42) (98% - 100%)  Wt(kg): --    EXAM:  pending full examination      Labs:                        8.4    6.32  )-----------( 115      ( 11 Feb 2025 07:17 )             25.2     02-11    132[L]  |  102  |  6[L]  ----------------------------<  139[H]  3.5   |  18[L]  |  <0.30[L]    Ca    7.8[L]      11 Feb 2025 07:19  Phos  2.1     02-11  Mg     1.8     02-11    TPro  5.7[L]  /  Alb  3.0[L]  /  TBili  11.4[H]  /  DBili  x   /  AST  105[H]  /  ALT  46[H]  /  AlkPhos  257[H]  02-11      WBC Trend:  WBC Count: 6.32 (02-11-25 @ 07:17)  WBC Count: 7.37 (02-11-25 @ 01:01)          Creatine Trend:  Creatinine: <0.30 (02-11)  Creatinine: <0.30 (02-11)      Liver Biochemical Testing Trend:  Alanine Aminotransferase (ALT/SGPT): 46 *H* (02-11)  Alanine Aminotransferase (ALT/SGPT): 50 *H* (02-11)  Aspartate Aminotransferase (AST/SGOT): 105 (02-11-25 @ 07:19)  Aspartate Aminotransferase (AST/SGOT): 186 (02-11-25 @ 01:01)  Bilirubin Total: 11.4 (02-11)  Bilirubin Total: 12.6 (02-11)    MICROBIOLOGY:  Blood Gas Venous - Lactate: 1.9 (02-11 @ 07:15)  Blood Gas Venous - Lactate: 1.7 (02-11 @ 01:05)    RADIOLOGY:  imaging below personally reviewed   Patient is a 59y old  Female who presents with a chief complaint of Transfer from [place] for possible liver transplant     HPI:  58yo F w/ PMH of alcohol cirrhosis with recurrent ascites, s/p multiple paracentesis, s/p banding of esophageal varices, currently being treated for recent c. diff colitis and possible endocarditis, presenting for possible liver transplant from [facility]. Pt initially admitted to [facility] c/o weakness, poor PO intake, and fever; hypotensive in ED. Met criteria for sepsis iso C. diff colitis and was treated with vasopressor support, flagyl, and vanc. Hospital course was complicated by AHRF and pt was intubated 1/28-2/2. Pt then treated for E. coli and Bacteroides bacteremia with TTE findings concerning for mitral vegetation; pt not a good candidate for ARACELI due to varices found on EGD from 1/31/24 decided to treat empirically for IE with 6 weeks of antibiotics s/p PICC line placement with negative blood cultures from 1/31/24. Pt d/c from [facility] on 2/7 and then presented to [place] c/o SOB. Ultimately transferred to [facility] for hepatology transplant consult. On presentation today, pt endorses subjective nighttime fevers, chills, SOB and abdominal discomfort. Attributes fevers to recent flu dx (noted in chart to be flu positive 1 week prior to presentation at [facility]). Pt states she has not used alcohol in over a year. She began using alcohol in her 20s socially and her use gradually increased to include ~3-4 glasses of wine everyday when she was drinking the most. Has been to rehab and therapy multiple times. Pt first began smoking in her 20s ~1pack/day for many years, cannot recall specifics. Has not had a cigarette in several months. Pt is unable to ambulate on her own and has remained bedbound since intubation several months ago. Patient states that she continues to have diarrhea 3x a day but does state that she always had issues with her BMs. She admits to SOB especially at night where she thinks she needs to sit up more and also states that she feels like she is wheezing. She denies any fevers, chest pain, urinary complaints or abdominal pain. She does admit to having her taste buds change since being on antibiotics. She also admits to having a dental procedure 12/24/24.    REVIEW OF SYSTEMS  Constitutional: No fevers, No chills, No weight loss, No fatigue   Skin: No rash, no phlebitis	  Eyes: No discharge, No change in vision	  ENMT: No sore throat, No ulcers  Respiratory: No cough, no SOB  Cardiovascular:  No chest pain, No palpitations   Gastrointestinal: No pain, No nausea, No vomiting, Positive diarrhea, No constipation	  Genitourinary: No dysuria, No frequency, No hesitancy, No flank pain  MSK: No Joint pain, No back pain, No edema  Neurological: No HA, no weakness, no seizures, no AMS     prior hospital charts reviewed [V]  primary team notes reviewed [V]  other consultant notes reviewed [V]    PAST MEDICAL & SURGICAL HISTORY:  Alcoholic cirrhosis      H/O esophageal varices      GERD (gastroesophageal reflux disease)      H/O bacteremia      History of bacteremia      H/O Clostridium difficile infection      S/P hemorrhoidectomy          SOCIAL HISTORY:  Denied smoking/vaping/alcohol/recreational drug use currently    FAMILY HISTORY:  FH: pancreatic cancer (Mother)    Heavy drinker of alcohol (Sibling)    Family history of stent (Sibling)        Allergies  Zithromax (Unknown)        ANTIMICROBIALS:  cefTRIAXone   IVPB 2000 every 24 hours  metroNIDAZOLE  IVPB 500 every 8 hours  rifAXIMin 550 two times a day  vancomycin    Solution 500 every 6 hours      ANTIMICROBIALS (past 90 days):  MEDICATIONS  (STANDING):  cefTRIAXone   IVPB   100 mL/Hr IV Intermittent (02-10-25 @ 21:37)    metroNIDAZOLE  IVPB   100 mL/Hr IV Intermittent (02-11-25 @ 06:29)   100 mL/Hr IV Intermittent (02-10-25 @ 22:51)    rifAXIMin   550 milliGRAM(s) Oral (02-11-25 @ 05:57)    vancomycin    Solution   500 milliGRAM(s) Oral (02-11-25 @ 05:57)   500 milliGRAM(s) Oral (02-10-25 @ 23:33)        OTHER MEDS:   MEDICATIONS  (STANDING):  albuterol/ipratropium for Nebulization. 3 once PRN  dextrose 50% Injectable 25 once  dextrose 50% Injectable 12.5 once  dextrose 50% Injectable 25 once  dextrose Oral Gel 15 once PRN  furosemide    Tablet 40 daily  glucagon  Injectable 1 once  guaiFENesin Oral Liquid (Sugar-Free) 100 every 6 hours PRN  heparin   Injectable 5000 every 8 hours  insulin glargine Injectable (LANTUS) 15 at bedtime  insulin lispro (ADMELOG) corrective regimen sliding scale  three times a day before meals  insulin lispro (ADMELOG) corrective regimen sliding scale  at bedtime  lactulose Syrup 10 three times a day  melatonin 3 at bedtime  midodrine. 15 three times a day  pantoprazole    Tablet 40 before breakfast  spironolactone 100 daily  sucralfate suspension 1 every 6 hours      VITALS:  Vital Signs Last 24 Hrs  T(F): 98 (02-11-25 @ 04:42), Max: 99.3 (02-10-25 @ 19:35)    Vital Signs Last 24 Hrs  HR: 116 (02-11-25 @ 04:42) (115 - 116)  BP: 113/70 (02-11-25 @ 04:42) (113/70 - 115/68)  RR: 18 (02-11-25 @ 04:42)  SpO2: 98% (02-11-25 @ 04:42) (98% - 100%)  Wt(kg): --    EXAM:  General: Patient appears comfortable, no acute distress  HEENT: NCAT, icteric sclera  Neck: Supple, No lymphadenopathy  CV: +S1/S2, RRR, no M/R/G  Lungs: No respiratory distress, expiratory wheezing present b/l   Abd:  BS4+, distended, no pain on palpation  : No suprapubic tenderness  Neuro: AAOx3. No focal deficits noted.   Ext: No cyanosis  Msk: freely moving upper and lower extremities  Skin: No rash, no phlebitis, No erythema       Labs:                        8.4    6.32  )-----------( 115      ( 11 Feb 2025 07:17 )             25.2     02-11    132[L]  |  102  |  6[L]  ----------------------------<  139[H]  3.5   |  18[L]  |  <0.30[L]    Ca    7.8[L]      11 Feb 2025 07:19  Phos  2.1     02-11  Mg     1.8     02-11    TPro  5.7[L]  /  Alb  3.0[L]  /  TBili  11.4[H]  /  DBili  x   /  AST  105[H]  /  ALT  46[H]  /  AlkPhos  257[H]  02-11      WBC Trend:  WBC Count: 6.32 (02-11-25 @ 07:17)  WBC Count: 7.37 (02-11-25 @ 01:01)          Creatine Trend:  Creatinine: <0.30 (02-11)  Creatinine: <0.30 (02-11)      Liver Biochemical Testing Trend:  Alanine Aminotransferase (ALT/SGPT): 46 *H* (02-11)  Alanine Aminotransferase (ALT/SGPT): 50 *H* (02-11)  Aspartate Aminotransferase (AST/SGOT): 105 (02-11-25 @ 07:19)  Aspartate Aminotransferase (AST/SGOT): 186 (02-11-25 @ 01:01)  Bilirubin Total: 11.4 (02-11)  Bilirubin Total: 12.6 (02-11)    MICROBIOLOGY:  Blood Gas Venous - Lactate: 1.9 (02-11 @ 07:15)  Blood Gas Venous - Lactate: 1.7 (02-11 @ 01:05)    RADIOLOGY:  imaging below personally reviewed   Patient is a 59y old  Female who presents with a chief complaint of Transfer from [place] for possible liver transplant     HPI:  58yo F w/ PMH of alcohol cirrhosis with recurrent ascites, s/p multiple paracentesis, s/p banding of esophageal varices, currently being treated for recent c. diff colitis and possible endocarditis, presenting for possible liver transplant from [facility]. Pt initially admitted to [facility] c/o weakness, poor PO intake, and fever; hypotensive in ED. Met criteria for sepsis iso C. diff colitis and was treated with vasopressor support, flagyl, and vanc. Hospital course was complicated by AHRF and pt was intubated 1/28-2/2. Pt then treated for E. coli and Bacteroides bacteremia with TTE findings concerning for mitral vegetation; pt not a good candidate for ARACELI due to varices found on EGD from 1/31/24 decided to treat empirically for IE with 6 weeks of antibiotics s/p PICC line placement with negative blood cultures from 1/31/24. Pt d/c from [facility] on 2/7 and then presented to [place] c/o SOB. Ultimately transferred to [facility] for hepatology transplant consult. On presentation today, pt endorses subjective nighttime fevers, chills, SOB and abdominal discomfort. Attributes fevers to recent flu dx (noted in chart to be flu positive 1 week prior to presentation at [facility]). Pt states she has not used alcohol in over a year. She began using alcohol in her 20s socially and her use gradually increased to include ~3-4 glasses of wine everyday when she was drinking the most. Has been to rehab and therapy multiple times. Pt first began smoking in her 20s ~1pack/day for many years, cannot recall specifics. Has not had a cigarette in several months. Pt is unable to ambulate on her own and has remained bedbound since intubation several months ago. Patient states that she continues to have diarrhea 3x a day but does state that she always had issues with her BMs. She admits to SOB especially at night where she thinks she needs to sit up more and also states that she feels like she is wheezing. She denies any fevers, chest pain, urinary complaints or abdominal pain. She does admit to having her taste buds change since being on antibiotics. She also admits to having a dental procedure 12/24/24.      REVIEW OF SYSTEMS  Constitutional: No fevers,  Skin: No rash, 	  Eyes: No discharge,   ENMT: No sore throat,   Respiratory: No cough, no SOB  Cardiovascular:  No chest pain,   Gastrointestinal: No pain, No nausea, No vomiting, Positive diarrhea, 	  Genitourinary: No dysuria, No frequency,  MSK: No Joint pain, No back pain,   Extremities: No edema  Neurological: No HA,  no AMS     prior hospital charts reviewed [V]  primary team notes reviewed [V]  other consultant notes reviewed [V]    PAST MEDICAL & SURGICAL HISTORY:  Alcoholic cirrhosis      H/O esophageal varices      GERD (gastroesophageal reflux disease)      H/O bacteremia      History of bacteremia      H/O Clostridium difficile infection      S/P hemorrhoidectomy          SOCIAL HISTORY:  lives with fiance, former smoker, EtOH abuse.         FAMILY HISTORY:  FH: pancreatic cancer (Mother)    Heavy drinker of alcohol (Sibling)    Family history of stent (Sibling)        Allergies  Zithromax (Unknown)        ANTIMICROBIALS:  cefTRIAXone   IVPB 2000 every 24 hours  metroNIDAZOLE  IVPB 500 every 8 hours  rifAXIMin 550 two times a day  vancomycin    Solution 500 every 6 hours      ANTIMICROBIALS (past 90 days):  MEDICATIONS  (STANDING):  cefTRIAXone   IVPB   100 mL/Hr IV Intermittent (02-10-25 @ 21:37)    metroNIDAZOLE  IVPB   100 mL/Hr IV Intermittent (02-11-25 @ 06:29)   100 mL/Hr IV Intermittent (02-10-25 @ 22:51)    rifAXIMin   550 milliGRAM(s) Oral (02-11-25 @ 05:57)    vancomycin    Solution   500 milliGRAM(s) Oral (02-11-25 @ 05:57)   500 milliGRAM(s) Oral (02-10-25 @ 23:33)        OTHER MEDS:   MEDICATIONS  (STANDING):  albuterol/ipratropium for Nebulization. 3 once PRN  dextrose 50% Injectable 25 once  dextrose 50% Injectable 12.5 once  dextrose 50% Injectable 25 once  dextrose Oral Gel 15 once PRN  furosemide    Tablet 40 daily  glucagon  Injectable 1 once  guaiFENesin Oral Liquid (Sugar-Free) 100 every 6 hours PRN  heparin   Injectable 5000 every 8 hours  insulin glargine Injectable (LANTUS) 15 at bedtime  insulin lispro (ADMELOG) corrective regimen sliding scale  three times a day before meals  insulin lispro (ADMELOG) corrective regimen sliding scale  at bedtime  lactulose Syrup 10 three times a day  melatonin 3 at bedtime  midodrine. 15 three times a day  pantoprazole    Tablet 40 before breakfast  spironolactone 100 daily  sucralfate suspension 1 every 6 hours      VITALS:  Vital Signs Last 24 Hrs  T(F): 98 (02-11-25 @ 04:42), Max: 99.3 (02-10-25 @ 19:35)    Vital Signs Last 24 Hrs  HR: 116 (02-11-25 @ 04:42) (115 - 116)  BP: 113/70 (02-11-25 @ 04:42) (113/70 - 115/68)  RR: 18 (02-11-25 @ 04:42)  SpO2: 98% (02-11-25 @ 04:42) (98% - 100%)  Wt(kg): --      EXAM:  General: Patient appears comfortable, no acute distress  Eyes:  icteric sclera  ENT: No oral ulcers   Neck: Supple,   CV: +S1/S2, normal   Lungs: No respiratory distress, expiratory wheezing present b/l   Abd:  distended, no pain on palpation  : No suprapubic tenderness  Neuro: AAOx3. No new focal deficits noted.   Ext: No edema   Msk: no joint swelling   Skin: No rash, + jaundice.       Labs:                        8.4    6.32  )-----------( 115      ( 11 Feb 2025 07:17 )             25.2     02-11    132[L]  |  102  |  6[L]  ----------------------------<  139[H]  3.5   |  18[L]  |  <0.30[L]    Ca    7.8[L]      11 Feb 2025 07:19  Phos  2.1     02-11  Mg     1.8     02-11    TPro  5.7[L]  /  Alb  3.0[L]  /  TBili  11.4[H]  /  DBili  x   /  AST  105[H]  /  ALT  46[H]  /  AlkPhos  257[H]  02-11      WBC Trend:  WBC Count: 6.32 (02-11-25 @ 07:17)  WBC Count: 7.37 (02-11-25 @ 01:01)          Creatine Trend:  Creatinine: <0.30 (02-11)  Creatinine: <0.30 (02-11)      Liver Biochemical Testing Trend:  Alanine Aminotransferase (ALT/SGPT): 46 *H* (02-11)  Alanine Aminotransferase (ALT/SGPT): 50 *H* (02-11)  Aspartate Aminotransferase (AST/SGOT): 105 (02-11-25 @ 07:19)  Aspartate Aminotransferase (AST/SGOT): 186 (02-11-25 @ 01:01)  Bilirubin Total: 11.4 (02-11)  Bilirubin Total: 12.6 (02-11)    MICROBIOLOGY:  Blood Gas Venous - Lactate: 1.9 (02-11 @ 07:15)  Blood Gas Venous - Lactate: 1.7 (02-11 @ 01:05)          RADIOLOGY:  imaging below personally reviewed    < from: Xray Chest 1 View- PORTABLE-Urgent (Xray Chest 1 View- PORTABLE-Urgent .) (02.10.25 @ 23:40) >  IMPRESSION:  Left upper extremity PICC with tip terminating in the SVC. No   pneumothorax.

## 2025-02-11 NOTE — DISCHARGE NOTE PROVIDER - DETAILS OF MALNUTRITION DIAGNOSIS/DIAGNOSES
This patient has been assessed with a concern for Malnutrition and was treated during this hospitalization for the following Nutrition diagnosis/diagnoses:     -  02/21/2025: Severe protein-calorie malnutrition   -  02/16/2025: Moderate protein-calorie malnutrition

## 2025-02-11 NOTE — DISCHARGE NOTE PROVIDER - NSFOLLOWUPCLINICS_GEN_ALL_ED_FT
[facility] - Infectious Disease  Infectious Disease  [address], Infectious [place] [address]  Phone: [phone]  Fax:

## 2025-02-11 NOTE — PHYSICAL THERAPY INITIAL EVALUATION ADULT - ADDITIONAL COMMENTS
Pt reports she resides in hotel and friend available to assist at all times.  Patient ambulated without AD independent. pt owns no dme s at home.

## 2025-02-11 NOTE — DISCHARGE NOTE PROVIDER - CARE PROVIDER_API CALL
Rica Hampton  Transplant Hepatology  [address] [place] [id number]  Phone: [phone]  Fax: [phone]  Established Patient  Follow Up Time: Routine

## 2025-02-11 NOTE — PATIENT PROFILE ADULT - FUNCTIONAL ASSESSMENT - BASIC MOBILITY 6.
2-calculated by average/Not able to assess (calculate score using [facility] averaging method)

## 2025-02-11 NOTE — DISCHARGE NOTE PROVIDER - NSDCFUADDAPPT_GEN_ALL_CORE_FT
APPTS ARE READY TO BE MADE: [ ] YES    Best Family or Patient Contact (if needed):    Additional Information about above appointments (if needed):    1:   2:   3:     Other comments or requests:    APPTS ARE READY TO BE MADE: [ ] YES    Best Family or Patient Contact (if needed):    Additional Information about above appointments (if needed):    1: ID for repeat blood cultures  2:   3:     Other comments or requests:    APPTS ARE READY TO BE MADE: [X] YES    Best Family or Patient Contact (if needed):    Additional Information about above appointments (if needed):    1: ID for abscess f/u. Patient needs repeat CT A/P with contrast week of 3/17.  2:   3:     Other comments or requests:    APPTS ARE READY TO BE MADE: [X] YES    Best Family or Patient Contact (if needed):    Additional Information about above appointments (if needed):    1: ID for abscess f/u. Patient needs repeat CT A/P with contrast week of 3/17.  2: Transplant Hepatology  3:     Other comments or requests:    APPTS ARE READY TO BE MADE: [X] YES    Best Family or Patient Contact (if needed):    Additional Information about above appointments (if needed):    1: ID for abscess f/u. Patient needs repeat CT A/P with contrast week of 3/17.  2: Transplant Hepatology      Other comments or requests:    APPTS ARE READY TO BE MADE: [X] YES    Best Family or Patient Contact (if needed):    Additional Information about above appointments (if needed):    1: ID for abscess f/u. Patient needs repeat CT A/P with contrast week of 3/17.  2: Transplant Hepatology      Other comments or requests:     Patient is being discharged to rehab/hospice. Caregiver will arrange follow up.

## 2025-02-11 NOTE — PROGRESS NOTE ADULT - PROBLEM SELECTOR PLAN 4
Recently diagnosed per patient. Per prior d/c note taking 15U Lantus every night.     Plan:  -Continue w/ 15U of Lantus qHS  -ENZO

## 2025-02-12 ENCOUNTER — RESULT REVIEW (OUTPATIENT)
Age: 60
End: 2025-02-12

## 2025-02-12 LAB
A1C WITH ESTIMATED AVERAGE GLUCOSE RESULT: 4.7 % — SIGNIFICANT CHANGE UP (ref 4–5.6)
ALBUMIN SERPL ELPH-MCNC: 2.9 G/DL — LOW (ref 3.3–5)
ALP SERPL-CCNC: 258 U/L — HIGH (ref 40–120)
ALT FLD-CCNC: 41 U/L — SIGNIFICANT CHANGE UP (ref 10–45)
ANION GAP SERPL CALC-SCNC: 11 MMOL/L — SIGNIFICANT CHANGE UP (ref 5–17)
ANION GAP SERPL CALC-SCNC: 13 MMOL/L — SIGNIFICANT CHANGE UP (ref 5–17)
AST SERPL-CCNC: 101 U/L — HIGH (ref 10–40)
BILIRUB SERPL-MCNC: 10.3 MG/DL — HIGH (ref 0.2–1.2)
BLD GP AB SCN SERPL QL: NEGATIVE — SIGNIFICANT CHANGE UP
BUN SERPL-MCNC: 5 MG/DL — LOW (ref 7–23)
BUN SERPL-MCNC: 6 MG/DL — LOW (ref 7–23)
CALCIUM SERPL-MCNC: 7.9 MG/DL — LOW (ref 8.4–10.5)
CALCIUM SERPL-MCNC: 7.9 MG/DL — LOW (ref 8.4–10.5)
CHLORIDE SERPL-SCNC: 103 MMOL/L — SIGNIFICANT CHANGE UP (ref 96–108)
CHLORIDE SERPL-SCNC: 104 MMOL/L — SIGNIFICANT CHANGE UP (ref 96–108)
CO2 SERPL-SCNC: 17 MMOL/L — LOW (ref 22–31)
CO2 SERPL-SCNC: 17 MMOL/L — LOW (ref 22–31)
CREAT SERPL-MCNC: <0.3 MG/DL — LOW (ref 0.5–1.3)
CREAT SERPL-MCNC: <0.3 MG/DL — LOW (ref 0.5–1.3)
EGFR: 121 ML/MIN/1.73M2 — SIGNIFICANT CHANGE UP
ESTIMATED AVERAGE GLUCOSE: 88 MG/DL — SIGNIFICANT CHANGE UP (ref 68–114)
GLUCOSE BLDC GLUCOMTR-MCNC: 107 MG/DL — HIGH (ref 70–99)
GLUCOSE BLDC GLUCOMTR-MCNC: 121 MG/DL — HIGH (ref 70–99)
GLUCOSE SERPL-MCNC: 117 MG/DL — HIGH (ref 70–99)
GLUCOSE SERPL-MCNC: 98 MG/DL — SIGNIFICANT CHANGE UP (ref 70–99)
HCT VFR BLD CALC: 25.7 % — LOW (ref 34.5–45)
HGB BLD-MCNC: 8.5 G/DL — LOW (ref 11.5–15.5)
IGG FLD-MCNC: 1328 MG/DL — SIGNIFICANT CHANGE UP (ref 610–1660)
IGG1 SER-MCNC: 781 MG/DL — SIGNIFICANT CHANGE UP (ref 240–1118)
IGG2 SER-MCNC: 396 MG/DL — SIGNIFICANT CHANGE UP (ref 124–549)
IGG3 SER-MCNC: 91.6 MG/DL — SIGNIFICANT CHANGE UP (ref 15–102)
IGG4 SER-MCNC: 52.4 MG/DL — SIGNIFICANT CHANGE UP (ref 1–123)
MAGNESIUM SERPL-MCNC: 1.3 MG/DL — LOW (ref 1.6–2.6)
MCHC RBC-ENTMCNC: 30.8 PG — SIGNIFICANT CHANGE UP (ref 27–34)
MCHC RBC-ENTMCNC: 33.1 G/DL — SIGNIFICANT CHANGE UP (ref 32–36)
MCV RBC AUTO: 93.1 FL — SIGNIFICANT CHANGE UP (ref 80–100)
MRSA PCR RESULT.: SIGNIFICANT CHANGE UP
NRBC BLD AUTO-RTO: 0 /100 WBCS — SIGNIFICANT CHANGE UP (ref 0–0)
PHOSPHATE SERPL-MCNC: 2 MG/DL — LOW (ref 2.5–4.5)
PLATELET # BLD AUTO: 101 K/UL — LOW (ref 150–400)
POTASSIUM SERPL-MCNC: 3.1 MMOL/L — LOW (ref 3.5–5.3)
POTASSIUM SERPL-MCNC: 4.6 MMOL/L — SIGNIFICANT CHANGE UP (ref 3.5–5.3)
POTASSIUM SERPL-SCNC: 3.1 MMOL/L — LOW (ref 3.5–5.3)
POTASSIUM SERPL-SCNC: 4.6 MMOL/L — SIGNIFICANT CHANGE UP (ref 3.5–5.3)
PROT SERPL-MCNC: 5.8 G/DL — LOW (ref 6–8.3)
RBC # BLD: 2.76 M/UL — LOW (ref 3.8–5.2)
RBC # FLD: 26.4 % — HIGH (ref 10.3–14.5)
RH IG SCN BLD-IMP: POSITIVE — SIGNIFICANT CHANGE UP
S AUREUS DNA NOSE QL NAA+PROBE: SIGNIFICANT CHANGE UP
SODIUM SERPL-SCNC: 132 MMOL/L — LOW (ref 135–145)
SODIUM SERPL-SCNC: 133 MMOL/L — LOW (ref 135–145)
WBC # BLD: 6.25 K/UL — SIGNIFICANT CHANGE UP (ref 3.8–10.5)
WBC # FLD AUTO: 6.25 K/UL — SIGNIFICANT CHANGE UP (ref 3.8–10.5)

## 2025-02-12 PROCEDURE — 99233 SBSQ HOSP IP/OBS HIGH 50: CPT | Mod: GC

## 2025-02-12 PROCEDURE — 99233 SBSQ HOSP IP/OBS HIGH 50: CPT

## 2025-02-12 PROCEDURE — G0545: CPT

## 2025-02-12 PROCEDURE — 76705 ECHO EXAM OF ABDOMEN: CPT | Mod: 26

## 2025-02-12 PROCEDURE — 93325 DOPPLER ECHO COLOR FLOW MAPG: CPT | Mod: 26

## 2025-02-12 PROCEDURE — 71045 X-RAY EXAM CHEST 1 VIEW: CPT | Mod: 26

## 2025-02-12 PROCEDURE — 93308 TTE F-UP OR LMTD: CPT | Mod: 26

## 2025-02-12 RX ORDER — MAGNESIUM SULFATE 500 MG/ML
2 SYRINGE (ML) INJECTION ONCE
Refills: 0 | Status: COMPLETED | OUTPATIENT
Start: 2025-02-12 | End: 2025-02-12

## 2025-02-12 RX ORDER — CEFTRIAXONE 500 MG/1
2 INJECTION, POWDER, FOR SOLUTION INTRAMUSCULAR; INTRAVENOUS
Qty: 2 | Refills: 0
Start: 2025-02-12

## 2025-02-12 RX ORDER — ISOPROPYL ALCOHOL 0.7 ML/ML
1 SWAB TOPICAL
Qty: 1 | Refills: 0
Start: 2025-02-12 | End: 2025-03-13

## 2025-02-12 RX ORDER — CEFTRIAXONE 500 MG/1
2 INJECTION, POWDER, FOR SOLUTION INTRAMUSCULAR; INTRAVENOUS
Qty: 60 | Refills: 0
Start: 2025-02-12 | End: 2025-03-13

## 2025-02-12 RX ORDER — MAGNESIUM SULFATE 500 MG/ML
2 SYRINGE (ML) INJECTION ONCE
Refills: 0 | Status: DISCONTINUED | OUTPATIENT
Start: 2025-02-12 | End: 2025-02-12

## 2025-02-12 RX ORDER — VANCOMYCIN HCL IN 5 % DEXTROSE 1.5G/250ML
1 PLASTIC BAG, INJECTION (ML) INTRAVENOUS
Qty: 40 | Refills: 0
Start: 2025-02-12 | End: 2025-02-21

## 2025-02-12 RX ORDER — SOD PHOS DI, MONO/K PHOS MONO 250 MG
1 TABLET ORAL
Refills: 0 | Status: COMPLETED | OUTPATIENT
Start: 2025-02-12 | End: 2025-02-12

## 2025-02-12 RX ADMIN — Medication 125 MILLIGRAM(S): at 11:34

## 2025-02-12 RX ADMIN — DEXTROMETHORPHAN HBR, GUAIFENESIN 100 MILLIGRAM(S): 200 LIQUID ORAL at 06:38

## 2025-02-12 RX ADMIN — CEFTRIAXONE 100 MILLIGRAM(S): 500 INJECTION, POWDER, FOR SOLUTION INTRAMUSCULAR; INTRAVENOUS at 20:47

## 2025-02-12 RX ADMIN — MIDODRINE HYDROCHLORIDE 15 MILLIGRAM(S): 5 TABLET ORAL at 11:34

## 2025-02-12 RX ADMIN — Medication 25 GRAM(S): at 11:32

## 2025-02-12 RX ADMIN — Medication 3 MILLIGRAM(S): at 21:27

## 2025-02-12 RX ADMIN — HEPARIN SODIUM 5000 UNIT(S): 1000 INJECTION INTRAVENOUS; SUBCUTANEOUS at 21:27

## 2025-02-12 RX ADMIN — Medication 100 MILLIGRAM(S): at 13:41

## 2025-02-12 RX ADMIN — LACTULOSE 10 GRAM(S): 10 SOLUTION ORAL at 13:40

## 2025-02-12 RX ADMIN — Medication 100 MILLIGRAM(S): at 21:27

## 2025-02-12 RX ADMIN — LACTULOSE 10 GRAM(S): 10 SOLUTION ORAL at 05:08

## 2025-02-12 RX ADMIN — IPRATROPIUM BROMIDE AND ALBUTEROL SULFATE 3 MILLILITER(S): .5; 2.5 SOLUTION RESPIRATORY (INHALATION) at 17:31

## 2025-02-12 RX ADMIN — FUROSEMIDE 40 MILLIGRAM(S): 10 INJECTION INTRAMUSCULAR; INTRAVENOUS at 05:08

## 2025-02-12 RX ADMIN — Medication 40 MILLIGRAM(S): at 05:28

## 2025-02-12 RX ADMIN — Medication 125 MILLIGRAM(S): at 05:07

## 2025-02-12 RX ADMIN — Medication 1 GRAM(S): at 11:34

## 2025-02-12 RX ADMIN — Medication 40 MILLIEQUIVALENT(S): at 11:35

## 2025-02-12 RX ADMIN — Medication 100 MILLIGRAM(S): at 05:09

## 2025-02-12 RX ADMIN — DEXTROMETHORPHAN HBR, GUAIFENESIN 100 MILLIGRAM(S): 200 LIQUID ORAL at 17:30

## 2025-02-12 RX ADMIN — Medication 1 APPLICATION(S): at 05:06

## 2025-02-12 RX ADMIN — Medication 1 PACKET(S): at 11:34

## 2025-02-12 RX ADMIN — Medication 40 MILLIEQUIVALENT(S): at 13:40

## 2025-02-12 RX ADMIN — Medication 125 MILLIGRAM(S): at 17:30

## 2025-02-12 RX ADMIN — IPRATROPIUM BROMIDE AND ALBUTEROL SULFATE 3 MILLILITER(S): .5; 2.5 SOLUTION RESPIRATORY (INHALATION) at 06:38

## 2025-02-12 RX ADMIN — LACTULOSE 10 GRAM(S): 10 SOLUTION ORAL at 21:27

## 2025-02-12 RX ADMIN — MIDODRINE HYDROCHLORIDE 15 MILLIGRAM(S): 5 TABLET ORAL at 05:29

## 2025-02-12 RX ADMIN — MIDODRINE HYDROCHLORIDE 15 MILLIGRAM(S): 5 TABLET ORAL at 17:31

## 2025-02-12 RX ADMIN — FOLIC ACID 1 MILLIGRAM(S): 1 TABLET ORAL at 11:35

## 2025-02-12 RX ADMIN — Medication 1 GRAM(S): at 05:28

## 2025-02-12 RX ADMIN — HEPARIN SODIUM 5000 UNIT(S): 1000 INJECTION INTRAVENOUS; SUBCUTANEOUS at 13:41

## 2025-02-12 RX ADMIN — Medication 100 MILLIGRAM(S): at 11:34

## 2025-02-12 RX ADMIN — Medication 1 PACKET(S): at 13:41

## 2025-02-12 RX ADMIN — HEPARIN SODIUM 5000 UNIT(S): 1000 INJECTION INTRAVENOUS; SUBCUTANEOUS at 05:08

## 2025-02-12 RX ADMIN — Medication 1 GRAM(S): at 17:30

## 2025-02-12 RX ADMIN — Medication 100 MILLIGRAM(S): at 05:06

## 2025-02-12 NOTE — PROGRESS NOTE ADULT - PROBLEM SELECTOR PLAN 2
-iso e. coli and bacteroides bacteremia. Pt with evidence of mitral valve vegetation on TTE; not confirmed with ARACELI due to concerns regarding esophageal varices.   -Likely in setting of recent dental extraction in December    Plan:   - Continue with empiric CTX 2g qD (for 6 weeks, ends 3/14/25)  - Continue with Flagyl 500mg q8 (for 2 weeks, ends 2/13/25) for bacteroides bacteremia  - fu ID consult recs -iso e. coli and bacteroides bacteremia. Pt with evidence of mitral valve vegetation on TTE; not confirmed with ARACELI due to concerns regarding esophageal varices.   -Likely in setting of recent dental extraction in December    Plan:   - Continue with empiric CTX 2g qD (for 6 weeks, ends 3/14/25)  - Continue with Flagyl 500mg q8 (for 2 weeks, ends 2/13/25) for bacteroides bacteremia  - fu ID consult recs      > no vegetations seen on TTE

## 2025-02-12 NOTE — PROGRESS NOTE ADULT - PROBLEM SELECTOR PLAN 4
Recently diagnosed per patient. Per prior d/c note taking 15U Lantus every night.     Plan:  -Continue w/ 15U of Lantus qHS  -ENZO Recently diagnosed per patient. Per prior d/c note taking 15U Lantus every night.     Plan:  -Continue w/ 15U of Lantus qHS  -ENZO  - Lantus started on OSH, A1c 4.6, will dc insulin and monitor

## 2025-02-12 NOTE — CONSULT NOTE ADULT - ASSESSMENT
Interventional Radiology    Evaluate for Procedure: Dx/Tx para     HPI: ABRAHAN GOODWIN is a 60 y/o F w/ PMH HTN, anemia, EtOH associated cirrhosis c/b ascites/EV (s/p banding last 11/2024), AUD now sober for ~3 months, bleeding rectal varices vs hemorrhoidal bleeding s/p hemorrhoidectomy (11/2023) presenting as a transfer from [place] for evaluation of liver transplant.      Pt initially admitted to [facility] c/o weakness, poor PO intake, and fever; hypotensive in ED. Met criteria for sepsis iso C. diff colitis and was treated with vasopressor support, flagyl, and vanc. Hospital course was complicated by AHRF and pt was intubated 1/28-2/2. Pt then treated for E. coli and Bacteroides bacteremia with TTE findings concerning for mitral vegetation; pt not a good candidate for ARACELI due to varices decided to treat empirically for IE with 6 weeks of antibiotics s/p PICC line placement. Pt d/c from [facility] on 2/7 and then presented to [place] c/o SOB. Ultimately transferred to [facility] for hepatology transplant consult. On presentation today, pt endorses subjective nighttime fevers, chills, SOB and abdominal discomfort. Attributes fevers to recent flu dx (noted in chart to be flu positive 1 week prior to presentation at [facility]). Pt states she has not used alcohol in over a year. She began using alcohol in her 20s socially and her use gradually increased to include ~3-4 glasses of wine everyday when she was drinking the most. Has been to rehab and therapy multiple times. Pt first began smoking in her 20s ~1pack/day for many years, cannot recall specifics. Has not had a cigarette in several months.    The patient had seen Dr. Hampton as an outpatient on 1/2024, at that time was doing well, and w/o complaints, patient had not required LVP and was off diuretics with low sodium diet. The patient was retrialed on carvedilol with success. However, patient never followed back with Dr. Hampton for a whole year.     Otherwise, patient denies fevers, chills, weight loss, dysphagia, odynophagia, early satiety, poor oral intake, abdominal pain, nausea, vomiting, diarrhea, melena, hematemesis, hematochezia, change in stool caliber, or family history of GI-related cancers.    Allergies: Zithromax (Unknown)    Medications (Abx/Cardiac/Anticoagulation/Blood Products)  cefTRIAXone   IVPB: 100 mL/Hr IV Intermittent (02-11 @ 22:08)  furosemide    Tablet: 40 milliGRAM(s) Oral (02-12 @ 05:08)  heparin   Injectable: 5000 Unit(s) SubCutaneous (02-12 @ 13:41)  metroNIDAZOLE  IVPB: 100 mL/Hr IV Intermittent (02-12 @ 13:41)  midodrine.: 15 milliGRAM(s) Oral (02-12 @ 11:34)  rifAXIMin: 550 milliGRAM(s) Oral (02-12 @ 05:08)  spironolactone: 100 milliGRAM(s) Oral (02-12 @ 05:09)  vancomycin    Solution: 500 milliGRAM(s) Oral (02-11 @ 12:38)  vancomycin    Solution: 125 milliGRAM(s) Oral (02-12 @ 11:34)    Data:    T(C): 36.3  HR: 111  BP: 110/69  RR: 18  SpO2: 96%    -WBC 6.25 / HgB 8.5 / Hct 25.7 / Plt 101  -Na 133 / Cl 103 / BUN 6 / Glucose 98  -K 3.1 / CO2 17 / Cr <0.30  -ALT 41 / Alk Phos 258 / T.Bili 10.3  -INR 2.24 / PTT 53.6      Radiology:   - relevant imaging reviewed     Assessment/Plan:   59F decomp etoh cirrhosis cb ascites and EV, bleeding rectal varices vs hemorrhoids sp hemorrhoidectomy presenting for liver transplant eval. Mod ascites on US, IR consulted for dx/tx para.     - Plan for dx/tx para tomorrow 2/13  - no need for npo   - am cbc bmp coags   - hold am heparin   - procedure order under NP [place]      --  Malcolm Cates MD, PGY-4  Vascular and Interventional Radiology   Available on [employer] Teams    - Non-emergent consults: Place IR consult order in [place]  - Emergent issues (pager): [facility] [phone]; [facility] [phone]; [phone]  - Scheduling questions: [facility] [phone]; [facility] [phone]  - Clinic/outpatient booking: [facility] [phone]; [facility] [phone]

## 2025-02-12 NOTE — PROGRESS NOTE ADULT - SUBJECTIVE AND OBJECTIVE BOX
ABRAHAN GOODWIN ([id number])- Patient is a 59y old  Female who presents with a chief complaint of Transfer from [place] for possible liver transplant (10 Feb 2025 20:23)      INTERVAL HPI/OVERNIGHT EVENTS:   Patient transferred from [place] overnight    SUBJECTIVE: Pt seen at bedside; denies n/v, sob, chest pain.     PHYSICAL EXAM:  - GENERAL: Follows conversation appropriately. No acute distress. Jaundiced  - EYES: Tracks me in room. Scleral ictus   - HENT: Moist mucous membranes. No scleral icterus.   - LUNGS: Clear to auscultation bilaterally. No accessory muscle use.  - CARDIOVASCULAR: Regular rate and rhythm. No murmur.  - ABDOMEN: Soft, non-tender and non-distended. No palpable masses.  - EXTREMITIES: No edema. Non-tender.  - SKIN: No rashes or lesions. Jaundiced. Warm. PICC in place L upper arm  - NEUROLOGIC: No focal neurological deficits. CN II-XII grossly intact, but not individually tested.  - PSYCHIATRIC: Cooperative. Appropriate mood and affect.    Vital Signs Last 24 Hrs  T(C): 36.7 (11 Feb 2025 04:42), Max: 37.4 (10 Feb 2025 19:35)  T(F): 98 (11 Feb 2025 04:42), Max: 99.3 (10 Feb 2025 19:35)  HR: 116 (11 Feb 2025 04:42) (115 - 116)  BP: 113/70 (11 Feb 2025 04:42) (113/70 - 115/68)  BP(mean): --  RR: 18 (11 Feb 2025 04:42) (18 - 18)  SpO2: 98% (11 Feb 2025 04:42) (98% - 100%)    Parameters below as of 11 Feb 2025 04:42  Patient On (Oxygen Delivery Method): room air        LABS:                          8.4    6.32  )-----------( 115      ( 11 Feb 2025 07:17 )             25.2     02-11    133[L]  |  101  |  6[L]  ----------------------------<  105[H]  5.6[H]   |  17[L]  |  <0.30[L]    Ca    8.3[L]      11 Feb 2025 01:01  Phos  2.6     02-11  Mg     1.5     02-11    TPro  6.8  /  Alb  3.5  /  TBili  12.6[H]  /  DBili  x   /  AST  186[H]  /  ALT  50[H]  /  AlkPhos  279[H]  02-11          Urinalysis Basic - ( 11 Feb 2025 01:01 )    Color: x / Appearance: x / SG: x / pH: x  Gluc: 105 mg/dL / Ketone: x  / Bili: x / Urobili: x   Blood: x / Protein: x / Nitrite: x   Leuk Esterase: x / RBC: x / WBC x   Sq Epi: x / Non Sq Epi: x / Bacteria: x      PT/INR - ( 11 Feb 2025 07:22 )   PT: 25.6 sec;   INR: 2.24 ratio         PTT - ( 11 Feb 2025 07:22 )  PTT:53.6 sec  Lactate Trend        CAPILLARY BLOOD GLUCOSE      POCT Blood Glucose.: 122 mg/dL (11 Feb 2025 07:47)      Medications:  albuterol/ipratropium for Nebulization. 3 milliLiter(s) Nebulizer once PRN  cefTRIAXone   IVPB 2000 milliGRAM(s) IV Intermittent every 24 hours  dextrose 5%. 1000 milliLiter(s) IV Continuous <Continuous>  dextrose 5%. 1000 milliLiter(s) IV Continuous <Continuous>  dextrose 50% Injectable 25 Gram(s) IV Push once  dextrose 50% Injectable 12.5 Gram(s) IV Push once  dextrose 50% Injectable 25 Gram(s) IV Push once  dextrose Oral Gel 15 Gram(s) Oral once PRN  folic acid 1 milliGRAM(s) Oral daily  furosemide    Tablet 40 milliGRAM(s) Oral daily  glucagon  Injectable 1 milliGRAM(s) IntraMuscular once  guaiFENesin Oral Liquid (Sugar-Free) 100 milliGRAM(s) Oral every 6 hours PRN  heparin   Injectable 5000 Unit(s) SubCutaneous every 8 hours  insulin glargine Injectable (LANTUS) 15 Unit(s) SubCutaneous at bedtime  insulin lispro (ADMELOG) corrective regimen sliding scale   SubCutaneous three times a day before meals  insulin lispro (ADMELOG) corrective regimen sliding scale   SubCutaneous at bedtime  lactulose Syrup 10 Gram(s) Oral three times a day  melatonin 3 milliGRAM(s) Oral at bedtime  metroNIDAZOLE  IVPB 500 milliGRAM(s) IV Intermittent every 8 hours  midodrine. 15 milliGRAM(s) Oral three times a day  pantoprazole    Tablet 40 milliGRAM(s) Oral before breakfast  rifAXIMin 550 milliGRAM(s) Oral two times a day  spironolactone 100 milliGRAM(s) Oral daily  sucralfate suspension 1 Gram(s) Oral every 6 hours  thiamine 100 milliGRAM(s) Oral daily  vancomycin    Solution 500 milliGRAM(s) Oral every 6 hours      RADIOLOGY & ADDITIONAL TESTS were viewed by me personally.   EKG:    ABRAHAN GOODWIN ([id number])- Patient is a 59y old  Female who presents with a chief complaint of Transfer from [place] for possible liver transplant (10 Feb 2025 20:23)      INTERVAL HPI/OVERNIGHT EVENTS:   No acute overnight events    SUBJECTIVE: Pt seen at bedside; denies n/v, sob, chest pain.     PHYSICAL EXAM:  - GENERAL: Follows conversation appropriately. No acute distress. Jaundiced  - EYES: Tracks me in room. Scleral ictus   - HENT: Moist mucous membranes. No scleral icterus.   - LUNGS: Clear to auscultation bilaterally. No accessory muscle use.  - CARDIOVASCULAR: Regular rate and rhythm. No murmur.  - ABDOMEN: Soft, non-tender and non-distended. No palpable masses.  - EXTREMITIES: No edema. Non-tender.  - SKIN: No rashes or lesions. Jaundiced. Warm. PICC in place L upper arm  - NEUROLOGIC: No focal neurological deficits. CN II-XII grossly intact, but not individually tested.  - PSYCHIATRIC: Cooperative. Appropriate mood and affect.    VS  T(C): 36.3 (02-12-25 @ 13:43), Max: 37.2 (02-11-25 @ 21:44)  HR: 111 (02-12-25 @ 13:43) (105 - 115)  BP: 110/69 (02-12-25 @ 13:43) (110/69 - 118/75)  RR: 18 (02-12-25 @ 13:43) (18 - 18)  SpO2: 96% (02-12-25 @ 13:43) (96% - 99%)    LABS (available at time of writing 02-12-25 @ 14:25):                         8.5    6.25  )-----------( 101      ( 12 Feb 2025 07:11 )             25.7     02-12    133[L]  |  103  |  6[L]  ----------------------------<  98  3.1[L]   |  17[L]  |  <0.30[L]    Ca    7.9[L]      12 Feb 2025 07:11  Phos  2.0     02-12  Mg     1.3     02-12    TPro  5.8[L]  /  Alb  2.9[L]  /  TBili  10.3[H]  /  DBili  x   /  AST  101[H]  /  ALT  41  /  AlkPhos  258[H]  02-12    PT/INR - ( 11 Feb 2025 07:22 )   PT: 25.6 sec;   INR: 2.24 ratio         PTT - ( 11 Feb 2025 07:22 )  PTT:53.6 sec  Urinalysis Basic - ( 12 Feb 2025 07:11 )    Color: x / Appearance: x / SG: x / pH: x  Gluc: 98 mg/dL / Ketone: x  / Bili: x / Urobili: x   Blood: x / Protein: x / Nitrite: x   Leuk Esterase: x / RBC: x / WBC x   Sq Epi: x / Non Sq Epi: x / Bacteria: x          Culture - Blood (collected 02-11-25 @ 00:20)  Source: .Blood BLOOD  Preliminary Report (02-12-25 @ 05:01):    No growth at 24 hours    Culture - Blood (collected 02-10-25 @ 23:54)  Source: .Blood BLOOD  Preliminary Report (02-12-25 @ 05:01):    No growth at 24 hours        Medications:   albuterol/ipratropium for Nebulization 3 milliLiter(s) Nebulizer every 6 hours PRN  cefTRIAXone   IVPB 2000 milliGRAM(s) IV Intermittent every 24 hours  chlorhexidine 2% Cloths 1 Application(s) Topical <User Schedule>  folic acid 1 milliGRAM(s) Oral daily  furosemide    Tablet 40 milliGRAM(s) Oral daily  guaiFENesin Oral Liquid (Sugar-Free) 100 milliGRAM(s) Oral every 6 hours PRN  heparin   Injectable 5000 Unit(s) SubCutaneous every 8 hours  lactulose Syrup 10 Gram(s) Oral three times a day  melatonin 3 milliGRAM(s) Oral at bedtime  metroNIDAZOLE  IVPB 500 milliGRAM(s) IV Intermittent every 8 hours  midodrine. 15 milliGRAM(s) Oral three times a day  pantoprazole    Tablet 40 milliGRAM(s) Oral before breakfast  rifAXIMin 550 milliGRAM(s) Oral two times a day  spironolactone 100 milliGRAM(s) Oral daily  sucralfate suspension 1 Gram(s) Oral every 6 hours  thiamine 100 milliGRAM(s) Oral daily  vancomycin    Solution 125 milliGRAM(s) Oral every 6 hours      RADIOLOGY, EKG & ADDITIONAL TESTS: Reviewed.

## 2025-02-12 NOTE — PROGRESS NOTE ADULT - PROBLEM SELECTOR PLAN 5
DVT prophylaxis: heparin 500mg Q8h  Diet: Consistent carb diet; 1500mL fluid restriction DVT prophylaxis: heparin 500mg Q8h  Diet: Consistent carb diet; 1500mL fluid restriction  Dispo: PT rec SARS

## 2025-02-12 NOTE — PROGRESS NOTE ADULT - PROBLEM SELECTOR PLAN 3
Pt tested positive for C. diff colitis at [facility]. Started on oral vancomycin.     Plan:   - Plan to continue oral vancomycin until CTX course finished (CTX ends 3/14/25)

## 2025-02-12 NOTE — PROGRESS NOTE ADULT - PROBLEM SELECTOR PLAN 1
- C/b hepatic encephalopathy and esophageal varices  - Paracentesis done today; 1.4L removed  - Pt appears clinically euvolemic on exam    Plan:  - Continue on Lasix 40mg and Spironolactone 100mg qD  - Continue on lactulose and rifaximin  - Low current suspicion for SBP; pt taking IV CTX 2g qD for bacteremia  - fu Transplant hepatology recs - C/b hepatic encephalopathy and esophageal varices  - Paracentesis done today; 1.4L removed  - Pt appears clinically euvolemic on exam    Plan:  - Continue on Lasix 40mg and Spironolactone 100mg qD  - Continue on lactulose and rifaximin  - Low current suspicion for SBP; pt taking IV CTX 2g qD for bacteremia  - fu Transplant hepatology recs       > PETH level, Type and Screen, Would repeat TAJ and ASMA, IgG levels, Microsomal Ab  - fu abdominal US for ascites

## 2025-02-12 NOTE — PROGRESS NOTE ADULT - ASSESSMENT
60yo F w/ PMH of alcohol cirrhosis with recurrent ascites, s/p multiple paracentesis, s/p banding of esophageal varices, currently being treated for recent c. diff colitis and possible endocarditis, presenting for possible liver transplant from [facility]. Pt initially admitted to [facility] c/o weakness, poor PO intake, and fever; hypotensive in ED. Met criteria for sepsis iso C. diff colitis and was treated with vasopressor support, flagyl, and vanc. Hospital course was complicated by AHRF and pt was intubated 1/28-2/2. Pt then treated for E. coli and Bacteroides bacteremia with TTE findings concerning for mitral vegetation; pt not a good candidate for ARACELI due to varices found on EGD from 1/31/24 decided to treat empirically for IE with 6 weeks of antibiotics s/p PICC line placement with negative blood cultures from 1/31/24. Pt d/c from [facility] on 2/7 and then presented to [place] c/o SOB. Ultimately transferred to [facility] for hepatology transplant consult. On presentation today, pt endorses subjective nighttime fevers, chills, SOB and abdominal discomfort. Attributes fevers to recent flu dx (noted in chart to be flu positive 1 week prior to presentation at [facility]). Pt states she has not used alcohol in over a year. She began using alcohol in her 20s socially and her use gradually increased to include ~3-4 glasses of wine everyday when she was drinking the most. Has been to rehab and therapy multiple times. Pt first began smoking in her 20s ~1pack/day for many years, cannot recall specifics. Has not had a cigarette in several months. Pt is unable to ambulate on her own and has remained bedbound since intubation several months ago.     Assessment:  *Polymicrobial bacteremia with E.coli and Bacteroides  *Possible MV vegetation on TTE, though E.coli and bacteroides atypical endocarditis organisms and polymicrobial is also atypical for endocarditis  *C.diff colitis   *transaminitis   *EtOH cirrhosis with liver transplant evaluation with transaminitis s/p banding of esophageal varices   *Abnormal CT with colitis         Recommendations:   - continue ceftriaxone 2g IV QD, given recent echo findings plan to treat for 2 weeks total until 2/13/25  - continue metronidazole 500mg IV Q8 through 2/13/25  - continue Vancomycin PO but decrease dose to 125mg PO Q6 hours, plan for 2 weeks total.   - repeat TTE with no vegetation   - follow blood cultures, NTD    - trend WBC   - Pt needs repeat TTE in a montha nd surveillance blood cx in 2-4 weeks after completion of therapy. Pt and fiance notified, contact info provided.       Plan discussed with Medicine Attending.     Denice Veliz  Please contact through MS Teams   If no response or past 5 pm/weekend call [phone].

## 2025-02-12 NOTE — PROGRESS NOTE ADULT - ASSESSMENT
60yo F w/ PMH of alcohol cirrhosis with recurrent ascites, s/p multiple paracentesis, s/p banding of esophageal varices, currently being treated for recent c. diff colitis and possible endocarditis. Pt is jaundiced, with scleral icterus and abdominal distension presenting for liver transplant evaluation from [facility].  Render Risk Assessment In Note?: no Additional Notes: Benzoyl Peroxide 10% wash Detail Level: Simple

## 2025-02-12 NOTE — PROGRESS NOTE ADULT - SUBJECTIVE AND OBJECTIVE BOX
59yPatient is a 59y old  Female who presents with a chief complaint of Transfer from [place] for possible liver transplant (12 Feb 2025 16:24)      Interval history:  Afebrile, feels like abdomen more distended.       Allergies:   Zithromax (Unknown)      Antimicrobials:  cefTRIAXone   IVPB 2000 milliGRAM(s) IV Intermittent every 24 hours  metroNIDAZOLE  IVPB 500 milliGRAM(s) IV Intermittent every 8 hours  rifAXIMin 550 milliGRAM(s) Oral two times a day  vancomycin    Solution 125 milliGRAM(s) Oral every 6 hours        REVIEW OF SYSTEMS:  No chest pain   No SOB  No rash.       Vital Signs Last 24 Hrs  T(C): 36.3 (02-12-25 @ 13:43), Max: 37.2 (02-11-25 @ 21:44)  T(F): 97.4 (02-12-25 @ 13:43), Max: 99 (02-12-25 @ 04:30)  HR: 111 (02-12-25 @ 13:43) (105 - 115)  BP: 110/69 (02-12-25 @ 13:43) (110/69 - 118/74)  BP(mean): --  RR: 18 (02-12-25 @ 13:43) (18 - 18)  SpO2: 96% (02-12-25 @ 13:43) (96% - 99%)      PHYSICAL EXAM:  Pt in no acute distress, alert, awake.   + icterus  non tender abdomen  no edema LE   + PICC                             8.5    6.25  )-----------( 101      ( 12 Feb 2025 07:11 )             25.7   02-12    133[L]  |  103  |  6[L]  ----------------------------<  98  3.1[L]   |  17[L]  |  <0.30[L]    Ca    7.9[L]      12 Feb 2025 07:11  Phos  2.0     02-12  Mg     1.3     02-12    TPro  5.8[L]  /  Alb  2.9[L]  /  TBili  10.3[H]  /  DBili  x   /  AST  101[H]  /  ALT  41  /  AlkPhos  258[H]  02-12      LIVER FUNCTIONS - ( 12 Feb 2025 07:11 )  Alb: 2.9 g/dL / Pro: 5.8 g/dL / ALK PHOS: 258 U/L / ALT: 41 U/L / AST: 101 U/L / GGT: x               Culture - Blood (collected 11 Feb 2025 00:20)  Source: .Blood BLOOD  Preliminary Report (12 Feb 2025 05:01):    No growth at 24 hours    Culture - Blood (collected 10 Feb 2025 23:54)  Source: .Blood BLOOD  Preliminary Report (12 Feb 2025 05:01):    No growth at 24 hours        Radiology:       59yPatient is a 59y old  Female who presents with a chief complaint of Transfer from [place] for possible liver transplant (12 Feb 2025 16:24)      Interval history:  Afebrile, feels like abdomen more distended.       Allergies:   Zithromax (Unknown)      Antimicrobials:  cefTRIAXone   IVPB 2000 milliGRAM(s) IV Intermittent every 24 hours  metroNIDAZOLE  IVPB 500 milliGRAM(s) IV Intermittent every 8 hours  rifAXIMin 550 milliGRAM(s) Oral two times a day  vancomycin    Solution 125 milliGRAM(s) Oral every 6 hours        REVIEW OF SYSTEMS:  No chest pain   No SOB  No rash.       Vital Signs Last 24 Hrs  T(C): 36.3 (02-12-25 @ 13:43), Max: 37.2 (02-11-25 @ 21:44)  T(F): 97.4 (02-12-25 @ 13:43), Max: 99 (02-12-25 @ 04:30)  HR: 111 (02-12-25 @ 13:43) (105 - 115)  BP: 110/69 (02-12-25 @ 13:43) (110/69 - 118/74)  BP(mean): --  RR: 18 (02-12-25 @ 13:43) (18 - 18)  SpO2: 96% (02-12-25 @ 13:43) (96% - 99%)      PHYSICAL EXAM:  Pt in no acute distress, alert, awake.   + icterus  non tender abdomen  no edema LE   + PICC                             8.5    6.25  )-----------( 101      ( 12 Feb 2025 07:11 )             25.7   02-12    133[L]  |  103  |  6[L]  ----------------------------<  98  3.1[L]   |  17[L]  |  <0.30[L]    Ca    7.9[L]      12 Feb 2025 07:11  Phos  2.0     02-12  Mg     1.3     02-12    TPro  5.8[L]  /  Alb  2.9[L]  /  TBili  10.3[H]  /  DBili  x   /  AST  101[H]  /  ALT  41  /  AlkPhos  258[H]  02-12      LIVER FUNCTIONS - ( 12 Feb 2025 07:11 )  Alb: 2.9 g/dL / Pro: 5.8 g/dL / ALK PHOS: 258 U/L / ALT: 41 U/L / AST: 101 U/L / GGT: x               Culture - Blood (collected 11 Feb 2025 00:20)  Source: .Blood BLOOD  Preliminary Report (12 Feb 2025 05:01):    No growth at 24 hours    Culture - Blood (collected 10 Feb 2025 23:54)  Source: .Blood BLOOD  Preliminary Report (12 Feb 2025 05:01):    No growth at 24 hours        Radiology:  < from: US Abdomen Limited (02.12.25 @ 15:03) >    IMPRESSION:  *  Mild to moderate volume ascites as above.  *  Cirrhotic liver.            < from: TTE W or WO Ultrasound Enhancing Agent (02.12.25 @ 08:22) >  CONCLUSIONS:      1. Left ventricular systolic function is normal.   2. No significant valvular disease.   3. There is mild posterior calcification of the mitral valve annulus.   4. No echocardiographic evidence of vegetations.   5. No pericardial effusion seen.   6. Compared to the transthoracic echocardiogram performed on 1/27/2025, there is noevidence of vegetation on the mitral valve. The posterior mitral annulus is calcified.

## 2025-02-13 ENCOUNTER — RESULT REVIEW (OUTPATIENT)
Age: 60
End: 2025-02-13

## 2025-02-13 LAB
ALBUMIN FLD-MCNC: 0.3 G/DL — SIGNIFICANT CHANGE UP
ANA PAT FLD IF-IMP: ABNORMAL
ANA TITR SER: ABNORMAL
ANION GAP SERPL CALC-SCNC: 13 MMOL/L — SIGNIFICANT CHANGE UP (ref 5–17)
APTT BLD: 111.9 SEC — HIGH (ref 24.5–35.6)
APTT BLD: 61.1 SEC — HIGH (ref 24.5–35.6)
B PERT IGG+IGM PNL SER: ABNORMAL
BUN SERPL-MCNC: 6 MG/DL — LOW (ref 7–23)
CALCIUM SERPL-MCNC: 8 MG/DL — LOW (ref 8.4–10.5)
CHLORIDE SERPL-SCNC: 102 MMOL/L — SIGNIFICANT CHANGE UP (ref 96–108)
CO2 SERPL-SCNC: 17 MMOL/L — LOW (ref 22–31)
COLOR FLD: YELLOW
CREAT SERPL-MCNC: 0.34 MG/DL — LOW (ref 0.5–1.3)
EGFR: 117 ML/MIN/1.73M2 — SIGNIFICANT CHANGE UP
EGFR: 117 ML/MIN/1.73M2 — SIGNIFICANT CHANGE UP
FLUID INTAKE SUBSTANCE CLASS: SIGNIFICANT CHANGE UP
GLUCOSE FLD-MCNC: 164 MG/DL — SIGNIFICANT CHANGE UP
GLUCOSE SERPL-MCNC: 110 MG/DL — HIGH (ref 70–99)
GRAM STN FLD: SIGNIFICANT CHANGE UP
INR BLD: 2.36 RATIO — HIGH (ref 0.85–1.16)
INR BLD: 2.39 RATIO — HIGH (ref 0.85–1.16)
LDH SERPL L TO P-CCNC: 24 U/L — SIGNIFICANT CHANGE UP
LKM AB SER-ACNC: <20.1 UNITS — SIGNIFICANT CHANGE UP (ref 0–20)
LYMPHOCYTES # FLD: 22 % — SIGNIFICANT CHANGE UP
MAGNESIUM SERPL-MCNC: 1.3 MG/DL — LOW (ref 1.6–2.6)
MESOTHL CELL # FLD: SIGNIFICANT CHANGE UP
MISCELLANEOUS TEST NAME: SIGNIFICANT CHANGE UP
MONOS+MACROS # FLD: 72 % — SIGNIFICANT CHANGE UP
NEUTROPHILS-BODY FLUID: 6 % — SIGNIFICANT CHANGE UP
PHOSPHATE SERPL-MCNC: 1.8 MG/DL — LOW (ref 2.5–4.5)
POTASSIUM SERPL-MCNC: 4.1 MMOL/L — SIGNIFICANT CHANGE UP (ref 3.5–5.3)
POTASSIUM SERPL-SCNC: 4.1 MMOL/L — SIGNIFICANT CHANGE UP (ref 3.5–5.3)
PROT FLD-MCNC: <0.6 G/DL — SIGNIFICANT CHANGE UP
PROTHROM AB SERPL-ACNC: 26.9 SEC — HIGH (ref 9.9–13.4)
PROTHROM AB SERPL-ACNC: 27.2 SEC — HIGH (ref 9.9–13.4)
RCV VOL RI: <2000 CELLS/UL — HIGH
SMOOTH MUSCLE AB SER-ACNC: ABNORMAL
SODIUM SERPL-SCNC: 132 MMOL/L — LOW (ref 135–145)
SPECIMEN SOURCE FLD: SIGNIFICANT CHANGE UP
SPECIMEN SOURCE: SIGNIFICANT CHANGE UP
TOTAL CELLS COUNTED, BODY FLUID: 89 CELLS — SIGNIFICANT CHANGE UP
TOTAL NUCLEATED CELL COUNT, BODY FLUID: 89 CELLS/UL — SIGNIFICANT CHANGE UP
TUBE TYPE: SIGNIFICANT CHANGE UP
WBC COUNT.: 83 CELLS/UL — SIGNIFICANT CHANGE UP

## 2025-02-13 PROCEDURE — 49083 ABD PARACENTESIS W/IMAGING: CPT

## 2025-02-13 PROCEDURE — 99232 SBSQ HOSP IP/OBS MODERATE 35: CPT | Mod: GC

## 2025-02-13 PROCEDURE — 99233 SBSQ HOSP IP/OBS HIGH 50: CPT | Mod: GC

## 2025-02-13 PROCEDURE — 88112 CYTOPATH CELL ENHANCE TECH: CPT | Mod: 26

## 2025-02-13 PROCEDURE — 49082 ABD PARACENTESIS: CPT

## 2025-02-13 PROCEDURE — 88305 TISSUE EXAM BY PATHOLOGIST: CPT | Mod: 26

## 2025-02-13 RX ORDER — ACETAMINOPHEN 500 MG/5ML
1000 LIQUID (ML) ORAL ONCE
Refills: 0 | Status: COMPLETED | OUTPATIENT
Start: 2025-02-13 | End: 2025-02-13

## 2025-02-13 RX ORDER — KETOROLAC TROMETHAMINE 30 MG/ML
15 INJECTION, SOLUTION INTRAMUSCULAR; INTRAVENOUS ONCE
Refills: 0 | Status: DISCONTINUED | OUTPATIENT
Start: 2025-02-13 | End: 2025-02-13

## 2025-02-13 RX ORDER — MAGNESIUM SULFATE 500 MG/ML
2 SYRINGE (ML) INJECTION ONCE
Refills: 0 | Status: COMPLETED | OUTPATIENT
Start: 2025-02-13 | End: 2025-02-13

## 2025-02-13 RX ORDER — HEPARIN SODIUM 1000 [USP'U]/ML
5000 INJECTION INTRAVENOUS; SUBCUTANEOUS EVERY 8 HOURS
Refills: 0 | Status: DISCONTINUED | OUTPATIENT
Start: 2025-02-14 | End: 2025-03-05

## 2025-02-13 RX ORDER — ALBUMIN (HUMAN) 12.5 G/50ML
50 INJECTION, SOLUTION INTRAVENOUS ONCE
Refills: 0 | Status: COMPLETED | OUTPATIENT
Start: 2025-02-13 | End: 2025-02-14

## 2025-02-13 RX ORDER — ALBUMIN (HUMAN) 12.5 G/50ML
50 INJECTION, SOLUTION INTRAVENOUS ONCE
Refills: 0 | Status: COMPLETED | OUTPATIENT
Start: 2025-02-13 | End: 2025-02-13

## 2025-02-13 RX ORDER — SODIUM PHOSPHATE,DIBASIC DIHYD
30 POWDER (GRAM) MISCELLANEOUS ONCE
Refills: 0 | Status: COMPLETED | OUTPATIENT
Start: 2025-02-13 | End: 2025-02-13

## 2025-02-13 RX ADMIN — MIDODRINE HYDROCHLORIDE 15 MILLIGRAM(S): 5 TABLET ORAL at 05:53

## 2025-02-13 RX ADMIN — Medication 125 MILLIGRAM(S): at 11:06

## 2025-02-13 RX ADMIN — Medication 40 MILLIGRAM(S): at 05:53

## 2025-02-13 RX ADMIN — MIDODRINE HYDROCHLORIDE 15 MILLIGRAM(S): 5 TABLET ORAL at 17:54

## 2025-02-13 RX ADMIN — Medication 1 GRAM(S): at 05:52

## 2025-02-13 RX ADMIN — Medication 125 MILLIGRAM(S): at 17:53

## 2025-02-13 RX ADMIN — Medication 100 MILLIGRAM(S): at 14:00

## 2025-02-13 RX ADMIN — Medication 100 MILLIGRAM(S): at 11:07

## 2025-02-13 RX ADMIN — Medication 25 GRAM(S): at 11:24

## 2025-02-13 RX ADMIN — Medication 400 MILLIGRAM(S): at 02:53

## 2025-02-13 RX ADMIN — Medication 125 MILLIGRAM(S): at 00:25

## 2025-02-13 RX ADMIN — Medication 100 MILLIGRAM(S): at 22:35

## 2025-02-13 RX ADMIN — Medication 1 APPLICATION(S): at 05:59

## 2025-02-13 RX ADMIN — Medication 1 GRAM(S): at 17:53

## 2025-02-13 RX ADMIN — MIDODRINE HYDROCHLORIDE 15 MILLIGRAM(S): 5 TABLET ORAL at 11:07

## 2025-02-13 RX ADMIN — LACTULOSE 10 GRAM(S): 10 SOLUTION ORAL at 14:00

## 2025-02-13 RX ADMIN — Medication 85 MILLIMOLE(S): at 11:24

## 2025-02-13 RX ADMIN — Medication 100 MILLIGRAM(S): at 05:54

## 2025-02-13 RX ADMIN — KETOROLAC TROMETHAMINE 15 MILLIGRAM(S): 30 INJECTION, SOLUTION INTRAMUSCULAR; INTRAVENOUS at 00:43

## 2025-02-13 RX ADMIN — Medication 1 GRAM(S): at 11:06

## 2025-02-13 RX ADMIN — Medication 3 MILLIGRAM(S): at 22:36

## 2025-02-13 RX ADMIN — CEFTRIAXONE 100 MILLIGRAM(S): 500 INJECTION, POWDER, FOR SOLUTION INTRAMUSCULAR; INTRAVENOUS at 22:34

## 2025-02-13 RX ADMIN — KETOROLAC TROMETHAMINE 15 MILLIGRAM(S): 30 INJECTION, SOLUTION INTRAMUSCULAR; INTRAVENOUS at 03:08

## 2025-02-13 RX ADMIN — KETOROLAC TROMETHAMINE 15 MILLIGRAM(S): 30 INJECTION, SOLUTION INTRAMUSCULAR; INTRAVENOUS at 02:53

## 2025-02-13 RX ADMIN — FOLIC ACID 1 MILLIGRAM(S): 1 TABLET ORAL at 11:07

## 2025-02-13 RX ADMIN — Medication 1000 MILLIGRAM(S): at 03:08

## 2025-02-13 RX ADMIN — IPRATROPIUM BROMIDE AND ALBUTEROL SULFATE 3 MILLILITER(S): .5; 2.5 SOLUTION RESPIRATORY (INHALATION) at 06:55

## 2025-02-13 RX ADMIN — DEXTROMETHORPHAN HBR, GUAIFENESIN 100 MILLIGRAM(S): 200 LIQUID ORAL at 06:55

## 2025-02-13 RX ADMIN — Medication 1 GRAM(S): at 00:25

## 2025-02-13 RX ADMIN — IPRATROPIUM BROMIDE AND ALBUTEROL SULFATE 3 MILLILITER(S): .5; 2.5 SOLUTION RESPIRATORY (INHALATION) at 23:20

## 2025-02-13 RX ADMIN — Medication 125 MILLIGRAM(S): at 05:52

## 2025-02-13 RX ADMIN — LACTULOSE 10 GRAM(S): 10 SOLUTION ORAL at 05:52

## 2025-02-13 RX ADMIN — KETOROLAC TROMETHAMINE 15 MILLIGRAM(S): 30 INJECTION, SOLUTION INTRAMUSCULAR; INTRAVENOUS at 00:25

## 2025-02-13 RX ADMIN — LACTULOSE 10 GRAM(S): 10 SOLUTION ORAL at 22:35

## 2025-02-13 RX ADMIN — ALBUMIN (HUMAN) 300 MILLILITER(S): 12.5 INJECTION, SOLUTION INTRAVENOUS at 13:43

## 2025-02-13 RX ADMIN — IPRATROPIUM BROMIDE AND ALBUTEROL SULFATE 3 MILLILITER(S): .5; 2.5 SOLUTION RESPIRATORY (INHALATION) at 00:25

## 2025-02-13 NOTE — PROGRESS NOTE ADULT - ASSESSMENT
60yo F w/ PMH of alcohol cirrhosis with recurrent ascites, s/p multiple paracentesis, s/p banding of esophageal varices, currently being treated for recent c. diff colitis and possible endocarditis. Pt is jaundiced, with scleral icterus and abdominal distension presenting for liver transplant evaluation from [facility].

## 2025-02-13 NOTE — PROGRESS NOTE ADULT - SUBJECTIVE AND OBJECTIVE BOX
ABRAHAN GOODWIN ([id number])- Patient is a 59y old  Female who presents with a chief complaint of Transfer from [facility] for possible liver transplant (10 Feb 2025 20:23)      INTERVAL HPI/OVERNIGHT EVENTS:   No acute overnight events    SUBJECTIVE: Pt seen at bedside; denies n/v, sob, chest pain.     PHYSICAL EXAM:  - GENERAL: Follows conversation appropriately. No acute distress. Jaundiced  - EYES: Tracks me in room. Scleral ictus   - HENT: Moist mucous membranes. No scleral icterus.   - LUNGS: Clear to auscultation bilaterally. No accessory muscle use.  - CARDIOVASCULAR: Regular rate and rhythm. No murmur.  - ABDOMEN: Soft, non-tender and non-distended. No palpable masses.  - EXTREMITIES: No edema. Non-tender.  - SKIN: No rashes or lesions. Jaundiced. Warm. PICC in place L upper arm  - NEUROLOGIC: No focal neurological deficits. CN II-XII grossly intact, but not individually tested.  - PSYCHIATRIC: Cooperative. Appropriate mood and affect.    VS  T(C): 36.4 (02-13-25 @ 04:57), Max: 36.7 (02-12-25 @ 20:51)  HR: 108 (02-13-25 @ 04:57) (106 - 111)  BP: 95/57 (02-13-25 @ 04:57) (95/57 - 110/69)  RR: 18 (02-13-25 @ 04:57) (18 - 18)  SpO2: 97% (02-13-25 @ 04:57) (96% - 98%)    LABS (available at time of writing 02-13-25 @ 07:36):                         8.5    6.25  )-----------( 101      ( 12 Feb 2025 07:11 )             25.7     02-12    132[L]  |  104  |  5[L]  ----------------------------<  117[H]  4.6   |  17[L]  |  <0.30[L]    Ca    7.9[L]      12 Feb 2025 17:42  Phos  2.0     02-12  Mg     1.3     02-12    TPro  5.8[L]  /  Alb  2.9[L]  /  TBili  10.3[H]  /  DBili  x   /  AST  101[H]  /  ALT  41  /  AlkPhos  258[H]  02-12      Urinalysis Basic - ( 12 Feb 2025 17:42 )    Color: x / Appearance: x / SG: x / pH: x  Gluc: 117 mg/dL / Ketone: x  / Bili: x / Urobili: x   Blood: x / Protein: x / Nitrite: x   Leuk Esterase: x / RBC: x / WBC x   Sq Epi: x / Non Sq Epi: x / Bacteria: x          Culture - Blood (collected 02-11-25 @ 00:20)  Source: .Blood BLOOD  Preliminary Report (02-13-25 @ 05:01):    No growth at 48 Hours    Culture - Blood (collected 02-10-25 @ 23:54)  Source: .Blood BLOOD  Preliminary Report (02-13-25 @ 05:01):    No growth at 48 Hours        Medications:   albuterol/ipratropium for Nebulization 3 milliLiter(s) Nebulizer every 6 hours PRN  cefTRIAXone   IVPB 2000 milliGRAM(s) IV Intermittent every 24 hours  chlorhexidine 2% Cloths 1 Application(s) Topical <User Schedule>  folic acid 1 milliGRAM(s) Oral daily  furosemide    Tablet 40 milliGRAM(s) Oral daily  guaiFENesin Oral Liquid (Sugar-Free) 100 milliGRAM(s) Oral every 6 hours PRN  lactulose Syrup 10 Gram(s) Oral three times a day  melatonin 3 milliGRAM(s) Oral at bedtime  metroNIDAZOLE  IVPB 500 milliGRAM(s) IV Intermittent every 8 hours  midodrine. 15 milliGRAM(s) Oral three times a day  pantoprazole    Tablet 40 milliGRAM(s) Oral before breakfast  rifAXIMin 550 milliGRAM(s) Oral two times a day  spironolactone 100 milliGRAM(s) Oral daily  sucralfate suspension 1 Gram(s) Oral every 6 hours  thiamine 100 milliGRAM(s) Oral daily  vancomycin    Solution 125 milliGRAM(s) Oral every 6 hours      RADIOLOGY, EKG & ADDITIONAL TESTS: Reviewed.

## 2025-02-13 NOTE — PRE PROCEDURE NOTE - PRE PROCEDURE EVALUATION
Interventional Radiology    HPI: 59F decomp etoh cirrhosis cb ascites and EV, bleeding rectal varices vs hemorrhoids sp hemorrhoidectomy presenting for liver transplant eval. Mod ascites on US, IR consulted for dx/tx para.    Allergies: Zithromax (Unknown)    Medications (Abx/Cardiac/Anticoagulation/Blood Products)  cefTRIAXone   IVPB: 100 mL/Hr IV Intermittent (02-12 @ 20:47)  furosemide    Tablet: 40 milliGRAM(s) Oral (02-12 @ 05:08)  heparin   Injectable: 5000 Unit(s) SubCutaneous (02-12 @ 21:27)  metroNIDAZOLE  IVPB: 100 mL/Hr IV Intermittent (02-13 @ 05:54)  midodrine.: 15 milliGRAM(s) Oral (02-13 @ 11:07)  rifAXIMin: 550 milliGRAM(s) Oral (02-13 @ 05:53)  spironolactone: 100 milliGRAM(s) Oral (02-12 @ 05:09)  vancomycin    Solution: 500 milliGRAM(s) Oral (02-11 @ 12:38)  vancomycin    Solution: 125 milliGRAM(s) Oral (02-13 @ 11:06)    Data:    59.9  T(C): 36.3  HR: 103  BP: 109/68  RR: 18  SpO2: 99%    Exam  General: No acute distress  Chest: Non labored breathing  Abdomen: Distended    -WBC 6.25 / HgB 8.5 / Hct 25.7 / Plt 101  -Na 132 / Cl 102 / BUN 6 / Glucose 110  -K 4.1 / CO2 17 / Cr 0.34  -ALT -- / Alk Phos -- / T.Bili --  -INR2.39    Imaging: Reviewed.    Plan: 59y Female presents for paracentesis.    -Risks/Benefits/alternatives explained with the patient and/or healthcare proxy and witnessed informed consent obtained.

## 2025-02-13 NOTE — PROGRESS NOTE ADULT - ASSESSMENT
58 yo F w/ PMH HTN, anemia, EtOH associated cirrhosis c/b ascites/EV (s/p banding last 11/2024), AUD now sober for ~3 months, bleeding rectal varices vs hemorrhoidal bleeding s/p hemorrhoidectomy (11/2023) presenting as a transfer from [facility] for evaluation of liver transplant.     #Decompensated EtOH Associated Cirrhosis  #Bacteriodes and E coli Bacteremia   #Influenza  #C-diff colitis     Patient thought to have EtOH cirrhosis, w/ low MELD 18 as an outpatient, however w/ significant hyperbilirubinemia and elevated INR. Suspect decompensation likely in the setting of multiple active infections. Bacteroides and E coli bacteremia initially thought to be due to endocarditis but appears that repeat TTE shows no vegetation and likely false read at OSH. Suspect SBP may be source of bacteremia. Patient's underlying cirrhosis in the setting of EtOH use, however, outpatient labs suggestive of possible concommitant AIH w/ elevated TAJ and ASMA. Per patient, she has been sober for many months. Outpatient PETH however > 400 on 1/17.    MELD: 28 2/13  HE: no overt evidence of HE. On lactulose and rifaximin  Varices: EGD 11/5/2024 w/ small varices w/ blue bleb s/p 2 bands placed. Last EGD 1/31, no report available but per pt no high risk varices. Per outpatient notes unable to tolerate B, however retrialed on coreg w/ success as outpatient.  On Coreg 3.125 mg BID at home   Ascites: Hx significant ascites w/ prior hx LVP, was off spironolactone as outpatient, compensated on low salt diet last year. Currently w/ worsening ascites, was d/mehrdad on Lasix 40 and sara 100 during recent admission.   HCC: CT w/ IV contrast 1/27 w/o evidence of suspicious lesion   OLT: will discuss initating transplant evaluation given completion of antibiotic course    Recommendations:  - Discuss initiating transplant evaluation given resolution of infections  - Diagnostic/therapeutic paracentesis today with IR (send cell count, culture, cytology, albumin, total protein)  - Continue lasix 40 mg, spironolactone 100 mg QD  - C/w CTX and flagyl, end date for antibiotic course today  - Vancomycin po 125 mg q6h for 2 wk total course per ID  - Send repeat TAJ, ASMA, AMA, IgG level for autoimmune workup  - F/u PETH level   - Low sodium diet    All recommendations are tentative until note is attested by attending.     Kimi Hitchcock, PGY4  Gastroenterology/Hepatology Fellow  Available on [employer] Teams  [phone] (Long Range Pager)  [phone] (Short Range Pager [facility])    After 5 pm, please contact the on-call GI fellow for any urgent issues via the Hospital Call   60 yo F w/ PMH HTN, anemia, EtOH associated cirrhosis c/b ascites/EV (s/p banding last 11/2024), AUD now sober for ~3 months, bleeding rectal varices vs hemorrhoidal bleeding s/p hemorrhoidectomy (11/2023) presenting as a transfer from [facility] for evaluation of liver transplant.     #Decompensated EtOH Associated Cirrhosis  #Bacteriodes and E coli Bacteremia   #Influenza  #C-diff colitis     Patient thought to have EtOH cirrhosis, w/ low MELD 18 as an outpatient, however w/ significant hyperbilirubinemia and elevated INR. Suspect decompensation likely in the setting of multiple active infections. Bacteroides and E coli bacteremia initially thought to be due to endocarditis but appears that repeat TTE shows no vegetation and likely false read at OSH. Suspect SBP may be source of bacteremia. Patient's underlying cirrhosis in the setting of EtOH use, however, outpatient labs suggestive of possible concomitant AIH w/ elevated TAJ and ASMA. Per patient, she has been sober for many months. Outpatient PETH however > 400 on 1/17.    MELD: 28 2/13  HE: no overt evidence of HE. On lactulose and rifaximin  Varices: EGD 11/5/2024 w/ small varices w/ blue bleb s/p 2 bands placed. Last EGD 1/31, no report available but per pt no high risk varices. Per outpatient notes unable to tolerate B, however retrialed on coreg w/ success as outpatient.  On Coreg 3.125 mg BID at home   Ascites: Hx significant ascites w/ prior hx LVP, s/p para 2/13 w/ 3 L fluid removed- neg for sbp  HCC: CT w/ IV contrast 1/27 w/o evidence of suspicious lesion   OLT: will discuss initiating transplant evaluation given completion of antibiotic course    Recommendations:  - Discuss initiating transplant evaluation given resolution of infections  - Continue lasix 40 mg, spironolactone 100 mg QD  - C/w CTX and flagyl, end date for antibiotic course today  - After completes CTX/flagyl course, will need SBP ppx (given low fluid TP<0.6 w/ child mallory above 9 and bili above 3). Discuss agent choice w/ ID given recent c-diff  - Vancomycin po 125 mg q6h for 2 wk total course per ID  - Send repeat TAJ, ASMA, AMA, IgG level for autoimmune workup  - F/u PETH level   - Low sodium diet    All recommendations are tentative until note is attested by attending.     Kimi Hitchcock, PGY4  Gastroenterology/Hepatology Fellow  Available on [employer] Teams  [phone] (Long Range Pager)  [phone] (Short Range Pager [facility])    After 5 pm, please contact the on-call GI fellow for any urgent issues via the Hospital Call

## 2025-02-13 NOTE — PROGRESS NOTE ADULT - SUBJECTIVE AND OBJECTIVE BOX
Interval Events:   - NAEON, afebrile  - She reports abdominal distension and tenderness at ruq    ROS:   12 point review of systems performed and negative except otherwise noted in HPI.    Hospital Medications:  albuterol/ipratropium for Nebulization 3 milliLiter(s) Nebulizer every 6 hours PRN  cefTRIAXone   IVPB 2000 milliGRAM(s) IV Intermittent every 24 hours  chlorhexidine 2% Cloths 1 Application(s) Topical <User Schedule>  folic acid 1 milliGRAM(s) Oral daily  furosemide    Tablet 40 milliGRAM(s) Oral daily  guaiFENesin Oral Liquid (Sugar-Free) 100 milliGRAM(s) Oral every 6 hours PRN  lactulose Syrup 10 Gram(s) Oral three times a day  melatonin 3 milliGRAM(s) Oral at bedtime  metroNIDAZOLE  IVPB 500 milliGRAM(s) IV Intermittent every 8 hours  midodrine. 15 milliGRAM(s) Oral three times a day  pantoprazole    Tablet 40 milliGRAM(s) Oral before breakfast  rifAXIMin 550 milliGRAM(s) Oral two times a day  spironolactone 100 milliGRAM(s) Oral daily  sucralfate suspension 1 Gram(s) Oral every 6 hours  thiamine 100 milliGRAM(s) Oral daily  vancomycin    Solution 125 milliGRAM(s) Oral every 6 hours      PHYSICAL EXAM:   Vital Signs:  Vital Signs Last 24 Hrs  T(C): 36.3 (13 Feb 2025 09:42), Max: 36.7 (12 Feb 2025 20:51)  T(F): 97.4 (13 Feb 2025 09:42), Max: 98.1 (12 Feb 2025 20:51)  HR: 104 (13 Feb 2025 09:42) (104 - 111)  BP: 101/59 (13 Feb 2025 09:42) (95/57 - 110/69)  BP(mean): --  RR: 19 (13 Feb 2025 09:42) (18 - 19)  SpO2: 100% (13 Feb 2025 09:42) (96% - 100%)    Parameters below as of 13 Feb 2025 04:57  Patient On (Oxygen Delivery Method): room air      Daily     Daily       GENERAL: no acute distress  NEURO: alert, no asterixis  HEENT: icteric sclera, no conjunctival pallor appreciated  CHEST: no respiratory distress, no accessory muscle use  CARDIAC: regular rate, +S1/S2  ABDOMEN: mildly distended, no rebound or guarding  EXTREMITIES: warm, well perfused, no edema  SKIN: jaundiced     LABS: reviewed                        8.5    6.25  )-----------( 101      ( 12 Feb 2025 07:11 )             25.7     02-13    132[L]  |  102  |  6[L]  ----------------------------<  110[H]  4.1   |  17[L]  |  0.34[L]    Ca    8.0[L]      13 Feb 2025 07:45  Phos  1.8     02-13  Mg     1.3     02-13    TPro  5.8[L]  /  Alb  2.9[L]  /  TBili  10.3[H]  /  DBili  x   /  AST  101[H]  /  ALT  41  /  AlkPhos  258[H]  02-12    LIVER FUNCTIONS - ( 12 Feb 2025 07:11 )  Alb: 2.9 g/dL / Pro: 5.8 g/dL / ALK PHOS: 258 U/L / ALT: 41 U/L / AST: 101 U/L / GGT: x             Interval Diagnostic Studies: see sunrise for full report   Interval Events:   - NAEON, afebrile  - She reports abdominal distension and tenderness at ruq  - s/p para today with 3 L fluid removed    ROS:   12 point review of systems performed and negative except otherwise noted in HPI.    Hospital Medications:  albuterol/ipratropium for Nebulization 3 milliLiter(s) Nebulizer every 6 hours PRN  cefTRIAXone   IVPB 2000 milliGRAM(s) IV Intermittent every 24 hours  chlorhexidine 2% Cloths 1 Application(s) Topical <User Schedule>  folic acid 1 milliGRAM(s) Oral daily  furosemide    Tablet 40 milliGRAM(s) Oral daily  guaiFENesin Oral Liquid (Sugar-Free) 100 milliGRAM(s) Oral every 6 hours PRN  lactulose Syrup 10 Gram(s) Oral three times a day  melatonin 3 milliGRAM(s) Oral at bedtime  metroNIDAZOLE  IVPB 500 milliGRAM(s) IV Intermittent every 8 hours  midodrine. 15 milliGRAM(s) Oral three times a day  pantoprazole    Tablet 40 milliGRAM(s) Oral before breakfast  rifAXIMin 550 milliGRAM(s) Oral two times a day  spironolactone 100 milliGRAM(s) Oral daily  sucralfate suspension 1 Gram(s) Oral every 6 hours  thiamine 100 milliGRAM(s) Oral daily  vancomycin    Solution 125 milliGRAM(s) Oral every 6 hours      PHYSICAL EXAM:   Vital Signs:  Vital Signs Last 24 Hrs  T(C): 36.3 (13 Feb 2025 09:42), Max: 36.7 (12 Feb 2025 20:51)  T(F): 97.4 (13 Feb 2025 09:42), Max: 98.1 (12 Feb 2025 20:51)  HR: 104 (13 Feb 2025 09:42) (104 - 111)  BP: 101/59 (13 Feb 2025 09:42) (95/57 - 110/69)  BP(mean): --  RR: 19 (13 Feb 2025 09:42) (18 - 19)  SpO2: 100% (13 Feb 2025 09:42) (96% - 100%)    Parameters below as of 13 Feb 2025 04:57  Patient On (Oxygen Delivery Method): room air      Daily     Daily       GENERAL: no acute distress  NEURO: alert, no asterixis  HEENT: icteric sclera, no conjunctival pallor appreciated  CHEST: no respiratory distress, no accessory muscle use  CARDIAC: regular rate, +S1/S2  ABDOMEN: mildly distended, no rebound or guarding  EXTREMITIES: warm, well perfused, no edema  SKIN: jaundiced     LABS: reviewed                        8.5    6.25  )-----------( 101      ( 12 Feb 2025 07:11 )             25.7     02-13    132[L]  |  102  |  6[L]  ----------------------------<  110[H]  4.1   |  17[L]  |  0.34[L]    Ca    8.0[L]      13 Feb 2025 07:45  Phos  1.8     02-13  Mg     1.3     02-13    TPro  5.8[L]  /  Alb  2.9[L]  /  TBili  10.3[H]  /  DBili  x   /  AST  101[H]  /  ALT  41  /  AlkPhos  258[H]  02-12    LIVER FUNCTIONS - ( 12 Feb 2025 07:11 )  Alb: 2.9 g/dL / Pro: 5.8 g/dL / ALK PHOS: 258 U/L / ALT: 41 U/L / AST: 101 U/L / GGT: x             Interval Diagnostic Studies: see sunrise for full report

## 2025-02-13 NOTE — CHART NOTE - NSCHARTNOTEFT_GEN_A_CORE
Pt schedule for paracentesis today however PTT this .   Please continue medical optimization for procedure.  Will tentatively reschedule para to 2/14/25.    Lalo Lopez PA-C  IR call back ext. [phone]  Also available on Teams    - Non-emergent consults: Place IR consult order in [place]  - Emergent issues (pager): [facility] [phone]; [facility] [phone]; [phone]  - Scheduling questions: [facility] [phone]; [facility] [phone]  - Clinic/outpatient booking: [facility] [phone]; [facility] [phone] Pt schedule for paracentesis today however PTT this .   Please continue medical optimization for procedure.  Will tentatively reschedule para to 2/14/25.    Addendum 2/13/25 @ 12:02 PM: hepatology/primary requesting repeat PTT, as this AM PTT appears to be erroneously elevated. Repeat PTT 61. Will proceed with paracentesis today.     Lalo Lopez PA-C  IR call back ext. [phone]  Also available on Teams    - Non-emergent consults: Place IR consult order in [place]  - Emergent issues (pager): [facility] [phone]; [facility] [phone]; [phone]  - Scheduling questions: [facility] [phone]; [facility] [phone]  - Clinic/outpatient booking: [facility] [phone]; [facility] [phone]

## 2025-02-13 NOTE — PROGRESS NOTE ADULT - PROBLEM SELECTOR PLAN 3
Pt tested positive for C. diff colitis at [facility]. Started on oral vancomycin.     Plan:   - Plan to continue oral vancomycin until CTX course finished (CTX ends 3/14/25)

## 2025-02-13 NOTE — PROGRESS NOTE ADULT - PROBLEM SELECTOR PLAN 5
DVT prophylaxis: heparin 500mg Q8h  Diet: Consistent carb diet; 1500mL fluid restriction  Dispo: PT rec [facility]

## 2025-02-13 NOTE — PROGRESS NOTE ADULT - PROBLEM SELECTOR PLAN 2
-iso e. coli and bacteroides bacteremia. Pt with evidence of mitral valve vegetation on TTE; not confirmed with ARACELI due to concerns regarding esophageal varices.   -Likely in setting of recent dental extraction in December    Plan:   - Continue with empiric CTX 2g qD (for 6 weeks, ends 3/14/25)  - Continue with Flagyl 500mg q8 (for 2 weeks, ends 2/13/25) for bacteroides bacteremia  - fu ID consult recs      > no vegetations seen on TTE

## 2025-02-13 NOTE — ED ADULT NURSE NOTE - NSFALLRISKASMTTYPE_ED_ALL_ED
[facility] Hematology/Oncology Note    Patient Name: Yi Torres   YOB: 1954   Medical Record Number: [id number]   CSN: [id number]   Consulting Physician: José Miguel Goodson MD  Referring Physician(s): Dr Gabrielle Garrido DO  Date of Consultation: 2/13/25    Reason for Consultation:  Lung cancer     Cancer Staging   Malignant neoplasm of upper lobe of right lung (HCC)  Staging form: Lung, AJCC 8th Edition  - Clinical stage from 1/29/2025: Stage IIIC (cT4, cN3, cM0) - Signed by José Miguel Goodson MD on 1/29/2025        Interval History    Patient seen today for an Acute Care visit after she was unable to complete her CT simulation in the Radiation department due to patient turning red with sensation of eye bulging while laying flat.      Patient states that since she had her Port placed on 2/11/25 she has been having facial flushing when bending over - does not lay flat at home as she sleeps in a recliner.  Pt also states her head feels a lot of pressure when bending over. She denies worsening SOB or change in breathing.  She has mild pain above Port site and surrounding area has erythema that extends to left breast.  She feels like the left side of her neck is swollen and bumpy.   Patient denies headache, dysphagia, chest pain, confusion, and dizziness.      History of Present Illness:   Yi Torres is a 70 year old female that was seen in the Cancer Center for newly diagnosed lung cancer.  Her oncologic history is detailed below    1/9/25 she had complained of increasing dyspnea for about 3 months.  Saw PCP and underwent a CT chest that showed a very large centrally necrotic right upper lobe lung mass measuring 8.2 x 7.4 x 10.8 cm with extension to the right paratracheal upper mediastinum.  Extensive right upper lobe bronchovascular structure encasement.  Mildly enlarged right paratracheal mediastinal lymph nodes bilateral adrenal nodules were noted.    1/17/2025 PET/CT showed a 9 x 8 cm  hypermetabolic mass within the right upper lobe extending into the mediastinum and right hilum peripherally hypermetabolic and centrally cystic and necrotic.  Effacement of the IVC.  Subcentimeter pulmonary nodules bilaterally were not FDG avid  Hypermetabolic lymph nodes within the anterior superior mediastinum subcarinal right paratracheal area and right clavicle chains were all compatible with metastatic disease.    25 she underwent a robotic navigational bronchoscopy with EBUS bx of RUL mass and  TBNA of lymph node station 7.  BAL from the right upper lobe was consistent with squamous cell carcinoma as was the needle biopsy from the right upper lobe mass.  EBUS sampling from station node 7 showed no malignant cells.  Lymphocytes were present. PD-L1 TPS was 0    She denies any new pain no focal deficit.  She has lost over 100 pounds in the last year and a half intentionally watching her calories.  Continues to have some postural dyspnea and orthopnea.        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     Cholecystectomy      Laparoscopic incisional / umbilical / ventral hernia repair  2024       Family Medical History:  Family History   Problem Relation Age of Onset    Hypertension Father     Stroke Father     Heart Attack Mother        Gyne History:  OB History     Para Term  AB Living   1 1 0 0 0 0   SAB IAB Ectopic Multiple Live Births   0 0 0 0 0       Social History:  Social History     Socioeconomic History    Marital status:      Spouse name: Not on file    Number of children: 1    Years of education: Not on file    Highest education level: Not on file   Occupational History    Not on file   Tobacco Use    Smoking status: Former     Current packs/day: 0.00     Average packs/day: 0.5 packs/day for 45.0 years (22.5 ttl pk-yrs)     Types: Cigarettes     Start date: 1973     Quit date: 2018     Years since quittin.0     Passive exposure: Past    Smokeless tobacco: Never    Tobacco comments:     1 pack every 3 days   Vaping Use    Vaping status: Never Used   Substance and Sexual Activity    Alcohol use: Not Currently     Comment: very rarely    Drug use: No    Sexual activity: Not on file   Other Topics Concern     Service Not Asked    Blood Transfusions Not Asked    Caffeine Concern Yes     Comment: Coffee, 2 cups per day     Occupational Exposure Not Asked    Hobby Hazards Not Asked    Sleep Concern Not Asked    Stress Concern Not Asked    Weight Concern Not Asked    Special Diet Not Asked    Back Care Not Asked    Exercise Not Asked    Bike Helmet Not Asked    Seat Belt Not Asked    Self-Exams Not Asked    Grew up on a farm Not Asked    History of tanning No    Outdoor occupation Not Asked    Breast feeding Not Asked    Reaction to local anesthetic No    Left Handed No    Right Handed Yes    Currently spends a great deal of time in the sun No    Past Sunlamp Treatments for Acne Not Asked    Hx of Spending Great Deal of Time in Sun No    Bad sunburns in the past Yes    Tanning Salons in the Past No    Hx of Radiation Treatments No    Regular use of sun block Not Asked   Social History Narrative    - lives with     1 son      Social Drivers of Health     Food Insecurity: Not on file   Transportation Needs: Not on file    Housing Stability: Not on file       Allergies:   Allergies[1]    Current Medications:   prochlorperazine (COMPAZINE) 10 mg tablet Take 1 tablet (10 mg total) by mouth every 6 (six) hours as needed for Nausea. 30 tablet 3    ondansetron (ZOFRAN) 8 MG tablet Take 1 tablet (8 mg total) by mouth every 8 (eight) hours as needed for Nausea. 30 tablet 3    Spironolactone-HCTZ 25-25 MG Oral Tab Take 1 tablet by mouth daily. 90 tablet 3    atorvastatin 20 MG Oral Tab TAKE 1 TABLET (20MG) BY MOUTH EVERY DAY 90 tablet 3    Cetirizine HCl 10 MG Oral Cap Take 10 mg by mouth daily.      Psyllium (METAMUCIL FREE & NATURAL) 43 % Oral Powder Take 1 Scoop by mouth as needed.      metFORMIN  MG Oral Tablet 24 Hr Take 1 tablet (500 mg total) by mouth daily with food. 90 tablet 3    dapagliflozin (FARXIGA) 10 MG Oral Tab Take 1 tablet (10 mg total) by mouth daily. 90 tablet 3    metoprolol tartrate 25 MG Oral Tab Take 1 tablet (25 mg total) by mouth 2 (two) times daily. 180 tablet 3    verapamil  MG Oral Tab CR Take 1 tablet (240 mg total) by mouth nightly. 90 tablet 3    alendronate 70 MG Oral Tab Take 1 tablet (70 mg total) by mouth once a week. 12 tablet 3    cholecalciferol 50 MCG (2000 UT) Oral Cap Take 1 capsule (2,000 Units total) by mouth daily.      Glucose Blood (ACCU-CHEK ADITYA PLUS) In Vitro Strip USE ONCE DAILY IN THE MORNING BEFORE BREAKFAST 100 strip 3    Blood Glucose Monitoring Suppl (ACCU-CHEK ADITYA PLUS) w/Device Does not apply Kit USE ONCE DAILY IN AM BEFORE BREAKFAST 1 kit 0    LANCETS ULTRA THIN Does not apply Misc Use one daily 100 each 6    Multiple Vitamins-Minerals (CENTRUM SILVER) Oral Tab Take 1 tablet by mouth daily.         Review of Systems:    A comprehensive 14 point review of systems was completed.  Pertinent positives and negatives noted in the the HPI.       Vital Signs:  /65 (BP Location: Right arm, Patient Position: Sitting, Cuff Size: large)   Pulse 88   Temp 97.7 °F (36.5  °C) (Oral)   Resp 18   Wt 93 kg (205 lb)   SpO2 96%   BMI 34.64 kg/m²       Physical Examination:    General: Patient is alert and oriented x 3, not in acute distress.  HEENT: EOMs intact. PERRL. Oropharynx is clear.   Neck/Chest:  Left base of neck near clavicle with mild fullness. Left sided Port in place with surrounding erythema that extends above and below site.  Mildly warm to touch, slight tenderness above port with palpation.  No obvious enlarged veins on chest wall   Heart: Regular rate and rhythm. S1S2 normal.  Abdomen: Soft, non tender, no hepatosplenomegaly.  No palpable mass.  Extremities: No edema or calf tenderness.  Neurological: Grossly intact.     Performance Status:    ECO-2    Labs:    Lab Results   Component Value Date/Time    WBC 12.9 (H) 2025 09:27 AM    RBC 5.35 (H) 2025 09:27 AM    HGB 13.7 2025 09:27 AM    HCT 43.7 2025 09:27 AM    MCV 81.7 2025 09:27 AM    MCH 25.6 (L) 2025 09:27 AM    MCHC 31.4 2025 09:27 AM    RDW 17.2 (H) 2025 09:27 AM    NEPRELIM 9.14 (H) 2025 09:27 AM    .0 2025 09:27 AM       Lab Results   Component Value Date/Time     (H) 2025 09:27 AM    BUN 22 2025 09:27 AM    CREATSERUM 0.87 2025 09:27 AM    GFRNAA 45 (L) 2022 08:02 AM    CA 10.5 (H) 2025 09:27 AM    ALB 4.6 2025 09:27 AM     (L) 2025 09:27 AM    K 3.9 2025 09:27 AM    CL 98 2025 09:27 AM    CO2 28.0 2025 09:27 AM    ALKPHO 68 2025 09:27 AM    AST 17 2025 09:27 AM    ALT 16 2025 09:27 AM       Imaging:  PET films reviewed personally with the patient/family      Impression:    Lung Cancer    70-year-old female with recently diagnosed locally advanced squamous cell carcinoma of the right upper lobe with mediastinal involvement as well as clavicle lymph nodes and ipsilateral mediastinal involvement suggesting N3 disease.    Dr. Goodson had a long discussion  with the patient and explained that given the extent of disease concurrent chemoradiation would be recommended rather than upfront surgical resection    Plan:  Patient to go to ER for evaluation to rule out SVC syndrome and thrombus     Emotional Well Being:    Emotional Well Being discussed, patient aware of support systems available through the Cancer Center. No acute issues.     The diagnosis, prognosis, treatment goals, plan for treatment, and expected response was explained to the patient.       High complexity MDM with detailed discussion of new cancer diagnosis, prognosis, treatment options side effects etc.    SIMRAN Brannon  Hematology/Oncology            [1]   Allergies  Allergen Reactions    Erythromycin DIARRHEA     Stomach pain      Initial (On Arrival)

## 2025-02-13 NOTE — PROGRESS NOTE ADULT - PROBLEM SELECTOR PLAN 4
Recently diagnosed per patient. Per prior d/c note taking 15U Lantus every night.     Plan:  -Continue w/ 15U of Lantus qHS  -ENZO  - Lantus started on OSH, A1c 4.6, will dc insulin and monitor

## 2025-02-13 NOTE — PROVIDER CONTACT NOTE (MEDICATION) - ASSESSMENT
At 4:47 AM, VS were 95/57mmHg,  BPM, pulse ox 97, respiration rate 18. Pt is A&Ox4. Pt denies any s/s of distress, pain, SOB and chest pain. Pt asymptomatic with blood pressure.

## 2025-02-13 NOTE — PROGRESS NOTE ADULT - PROBLEM SELECTOR PLAN 1
- C/b hepatic encephalopathy and esophageal varices  - Paracentesis done today; 1.4L removed  - Pt appears clinically euvolemic on exam    Plan:  - Continue on Lasix 40mg and Spironolactone 100mg qD  - Continue on lactulose and rifaximin  - Low current suspicion for SBP; pt taking IV CTX 2g qD for bacteremia  - fu Transplant hepatology recs       > PETH level, Type and Screen, Would repeat TAJ and ASMA, IgG levels, Microsomal Ab  - fu abdominal US for ascites - mild-mod ascites       > Paracentesis tentatively planned with IR 2/13       > Fu cell count

## 2025-02-14 LAB
ALBUMIN SERPL ELPH-MCNC: 3 G/DL — LOW (ref 3.3–5)
ALP SERPL-CCNC: 319 U/L — HIGH (ref 40–120)
ALT FLD-CCNC: 42 U/L — SIGNIFICANT CHANGE UP (ref 10–45)
ANION GAP SERPL CALC-SCNC: 14 MMOL/L — SIGNIFICANT CHANGE UP (ref 5–17)
APTT BLD: 45.8 SEC — HIGH (ref 24.5–35.6)
AST SERPL-CCNC: 120 U/L — HIGH (ref 10–40)
BILIRUB SERPL-MCNC: 10.4 MG/DL — HIGH (ref 0.2–1.2)
BUN SERPL-MCNC: 9 MG/DL — SIGNIFICANT CHANGE UP (ref 7–23)
CALCIUM SERPL-MCNC: 8.3 MG/DL — LOW (ref 8.4–10.5)
CHLORIDE SERPL-SCNC: 101 MMOL/L — SIGNIFICANT CHANGE UP (ref 96–108)
CO2 SERPL-SCNC: 16 MMOL/L — LOW (ref 22–31)
CREAT SERPL-MCNC: 0.3 MG/DL — LOW (ref 0.5–1.3)
EGFR: 121 ML/MIN/1.73M2 — SIGNIFICANT CHANGE UP
EGFR: 121 ML/MIN/1.73M2 — SIGNIFICANT CHANGE UP
GLUCOSE SERPL-MCNC: 118 MG/DL — HIGH (ref 70–99)
HCT VFR BLD CALC: 27.3 % — LOW (ref 34.5–45)
HGB BLD-MCNC: 8.9 G/DL — LOW (ref 11.5–15.5)
INR BLD: 2.12 RATIO — HIGH (ref 0.85–1.16)
MAGNESIUM SERPL-MCNC: 1.5 MG/DL — LOW (ref 1.6–2.6)
MCHC RBC-ENTMCNC: 30 PG — SIGNIFICANT CHANGE UP (ref 27–34)
MCHC RBC-ENTMCNC: 32.6 G/DL — SIGNIFICANT CHANGE UP (ref 32–36)
MCV RBC AUTO: 91.9 FL — SIGNIFICANT CHANGE UP (ref 80–100)
NRBC BLD AUTO-RTO: 0 /100 WBCS — SIGNIFICANT CHANGE UP (ref 0–0)
PHOSPHATE SERPL-MCNC: 2.5 MG/DL — SIGNIFICANT CHANGE UP (ref 2.5–4.5)
PLATELET # BLD AUTO: 79 K/UL — LOW (ref 150–400)
POTASSIUM SERPL-MCNC: 3.9 MMOL/L — SIGNIFICANT CHANGE UP (ref 3.5–5.3)
POTASSIUM SERPL-SCNC: 3.9 MMOL/L — SIGNIFICANT CHANGE UP (ref 3.5–5.3)
PROT SERPL-MCNC: 6.1 G/DL — SIGNIFICANT CHANGE UP (ref 6–8.3)
PROTHROM AB SERPL-ACNC: 24.2 SEC — HIGH (ref 9.9–13.4)
RBC # BLD: 2.97 M/UL — LOW (ref 3.8–5.2)
RBC # FLD: 25.3 % — HIGH (ref 10.3–14.5)
SODIUM SERPL-SCNC: 131 MMOL/L — LOW (ref 135–145)
WBC # BLD: 9.35 K/UL — SIGNIFICANT CHANGE UP (ref 3.8–10.5)
WBC # FLD AUTO: 9.35 K/UL — SIGNIFICANT CHANGE UP (ref 3.8–10.5)

## 2025-02-14 PROCEDURE — ZZZZZ: CPT | Mod: GC

## 2025-02-14 PROCEDURE — 99233 SBSQ HOSP IP/OBS HIGH 50: CPT

## 2025-02-14 PROCEDURE — G0545: CPT

## 2025-02-14 PROCEDURE — 99233 SBSQ HOSP IP/OBS HIGH 50: CPT | Mod: GC

## 2025-02-14 RX ORDER — VANCOMYCIN HCL IN 5 % DEXTROSE 1.5G/250ML
125 PLASTIC BAG, INJECTION (ML) INTRAVENOUS EVERY 12 HOURS
Refills: 0 | Status: COMPLETED | OUTPATIENT
Start: 2025-02-14 | End: 2025-02-16

## 2025-02-14 RX ORDER — ONDANSETRON HCL/PF 4 MG/2 ML
4 VIAL (ML) INJECTION ONCE
Refills: 0 | Status: COMPLETED | OUTPATIENT
Start: 2025-02-14 | End: 2025-02-14

## 2025-02-14 RX ADMIN — Medication 40 MILLIGRAM(S): at 05:08

## 2025-02-14 RX ADMIN — Medication 3 MILLIGRAM(S): at 22:16

## 2025-02-14 RX ADMIN — FUROSEMIDE 40 MILLIGRAM(S): 10 INJECTION INTRAMUSCULAR; INTRAVENOUS at 05:09

## 2025-02-14 RX ADMIN — HEPARIN SODIUM 5000 UNIT(S): 1000 INJECTION INTRAVENOUS; SUBCUTANEOUS at 14:58

## 2025-02-14 RX ADMIN — HEPARIN SODIUM 5000 UNIT(S): 1000 INJECTION INTRAVENOUS; SUBCUTANEOUS at 05:06

## 2025-02-14 RX ADMIN — Medication 1 GRAM(S): at 11:57

## 2025-02-14 RX ADMIN — Medication 125 MILLIGRAM(S): at 00:02

## 2025-02-14 RX ADMIN — Medication 100 MILLIGRAM(S): at 11:56

## 2025-02-14 RX ADMIN — MIDODRINE HYDROCHLORIDE 15 MILLIGRAM(S): 5 TABLET ORAL at 05:07

## 2025-02-14 RX ADMIN — DEXTROMETHORPHAN HBR, GUAIFENESIN 100 MILLIGRAM(S): 200 LIQUID ORAL at 22:15

## 2025-02-14 RX ADMIN — Medication 125 MILLIGRAM(S): at 17:49

## 2025-02-14 RX ADMIN — Medication 100 MILLIGRAM(S): at 05:09

## 2025-02-14 RX ADMIN — LACTULOSE 10 GRAM(S): 10 SOLUTION ORAL at 22:16

## 2025-02-14 RX ADMIN — LACTULOSE 10 GRAM(S): 10 SOLUTION ORAL at 05:07

## 2025-02-14 RX ADMIN — ALBUMIN (HUMAN) 300 MILLILITER(S): 12.5 INJECTION, SOLUTION INTRAVENOUS at 12:03

## 2025-02-14 RX ADMIN — LACTULOSE 10 GRAM(S): 10 SOLUTION ORAL at 14:57

## 2025-02-14 RX ADMIN — MIDODRINE HYDROCHLORIDE 15 MILLIGRAM(S): 5 TABLET ORAL at 17:48

## 2025-02-14 RX ADMIN — Medication 1 GRAM(S): at 17:49

## 2025-02-14 RX ADMIN — MIDODRINE HYDROCHLORIDE 15 MILLIGRAM(S): 5 TABLET ORAL at 11:57

## 2025-02-14 RX ADMIN — HEPARIN SODIUM 5000 UNIT(S): 1000 INJECTION INTRAVENOUS; SUBCUTANEOUS at 22:16

## 2025-02-14 RX ADMIN — Medication 1 GRAM(S): at 00:02

## 2025-02-14 RX ADMIN — FOLIC ACID 1 MILLIGRAM(S): 1 TABLET ORAL at 11:56

## 2025-02-14 RX ADMIN — Medication 1 GRAM(S): at 05:06

## 2025-02-14 RX ADMIN — Medication 1 APPLICATION(S): at 07:03

## 2025-02-14 RX ADMIN — Medication 4 MILLIGRAM(S): at 04:32

## 2025-02-14 NOTE — PROGRESS NOTE ADULT - PROBLEM SELECTOR PLAN 4
Recently diagnosed per patient. Per prior d/c note taking 15U Lantus every night.     Plan:  -Continue w/ 15U of Lantus qHS  -ENZO  - Lantus started on OSH, A1c 4.6, will dc insulin and monitor  - Monitor off insulin

## 2025-02-14 NOTE — PROGRESS NOTE ADULT - PROBLEM SELECTOR PLAN 3
Pt tested positive for C. diff colitis at [facility]. Started on oral vancomycin.     Plan:   - Plan to continue oral vancomycin until CTX course finished (CTX ends 3/14/25)

## 2025-02-14 NOTE — PROGRESS NOTE ADULT - SUBJECTIVE AND OBJECTIVE BOX
ABRAHAN GOODWIN ([id number])- Patient is a 59y old  Female who presents with a chief complaint of Transfer from [place] for possible liver transplant (10 Feb 2025 20:23)      INTERVAL HPI/OVERNIGHT EVENTS:   No acute overnight events    SUBJECTIVE: Pt seen at bedside; denies n/v, sob, chest pain.     PHYSICAL EXAM:  - GENERAL: Follows conversation appropriately. No acute distress. Jaundiced  - EYES: Tracks me in room. Scleral ictus   - HENT: Moist mucous membranes. No scleral icterus.   - LUNGS: Clear to auscultation bilaterally. No accessory muscle use.  - CARDIOVASCULAR: Regular rate and rhythm. No murmur.  - ABDOMEN: Soft, non-tender and non-distended. No palpable masses.  - EXTREMITIES: No edema. Non-tender.  - SKIN: No rashes or lesions. Jaundiced. Warm. PICC in place L upper arm  - NEUROLOGIC: No focal neurological deficits. CN II-XII grossly intact, but not individually tested.  - PSYCHIATRIC: Cooperative. Appropriate mood and affect.    VS  T(C): 36.7 (02-14-25 @ 05:00), Max: 36.7 (02-13-25 @ 21:17)  HR: 122 (02-14-25 @ 05:00) (102 - 122)  BP: 107/68 (02-14-25 @ 05:00) (101/59 - 109/68)  RR: 18 (02-14-25 @ 05:00) (16 - 19)  SpO2: 98% (02-14-25 @ 05:00) (94% - 100%)    LABS (available at time of writing 02-14-25 @ 07:17):     02-13    132[L]  |  102  |  6[L]  ----------------------------<  110[H]  4.1   |  17[L]  |  0.34[L]    Ca    8.0[L]      13 Feb 2025 07:45  Phos  1.8     02-13  Mg     1.3     02-13      PT/INR - ( 13 Feb 2025 11:22 )   PT: 27.2 sec;   INR: 2.39 ratio         PTT - ( 13 Feb 2025 11:22 )  PTT:61.1 sec  Urinalysis Basic - ( 13 Feb 2025 07:45 )    Color: x / Appearance: x / SG: x / pH: x  Gluc: 110 mg/dL / Ketone: x  / Bili: x / Urobili: x   Blood: x / Protein: x / Nitrite: x   Leuk Esterase: x / RBC: x / WBC x   Sq Epi: x / Non Sq Epi: x / Bacteria: x          Culture - Body Fluid with Gram Stain (collected 02-13-25 @ 13:41)  Source: Peritoneal  Gram Stain (02-13-25 @ 22:19):    polymorphonuclear leukocytes seen    No organisms seen    by cytocentrifuge    Culture - Blood (collected 02-11-25 @ 00:20)  Source: .Blood BLOOD  Preliminary Report (02-14-25 @ 05:01):    No growth at 72 Hours    Culture - Blood (collected 02-10-25 @ 23:54)  Source: .Blood BLOOD  Preliminary Report (02-14-25 @ 05:01):    No growth at 72 Hours        Medications:   albumin human 25% IVPB 50 milliLiter(s) IV Intermittent once  albuterol/ipratropium for Nebulization 3 milliLiter(s) Nebulizer every 6 hours PRN  chlorhexidine 2% Cloths 1 Application(s) Topical <User Schedule>  folic acid 1 milliGRAM(s) Oral daily  furosemide    Tablet 40 milliGRAM(s) Oral daily  guaiFENesin Oral Liquid (Sugar-Free) 100 milliGRAM(s) Oral every 6 hours PRN  heparin   Injectable 5000 Unit(s) SubCutaneous every 8 hours  lactulose Syrup 10 Gram(s) Oral three times a day  melatonin 3 milliGRAM(s) Oral at bedtime  midodrine. 15 milliGRAM(s) Oral three times a day  pantoprazole    Tablet 40 milliGRAM(s) Oral before breakfast  rifAXIMin 550 milliGRAM(s) Oral two times a day  spironolactone 100 milliGRAM(s) Oral daily  sucralfate suspension 1 Gram(s) Oral every 6 hours  thiamine 100 milliGRAM(s) Oral daily      RADIOLOGY, EKG & ADDITIONAL TESTS: Reviewed.

## 2025-02-14 NOTE — PROGRESS NOTE ADULT - ASSESSMENT
60 yo F w/ PMH HTN, anemia, EtOH associated cirrhosis c/b ascites/EV (s/p banding last 11/2024), AUD now sober for ~3 months, bleeding rectal varices vs hemorrhoidal bleeding s/p hemorrhoidectomy (11/2023) presenting as a transfer from [place] for evaluation of liver transplant.     #Decompensated EtOH Associated Cirrhosis  #Bacteriodes and E coli Bacteremia   #Influenza  #C-diff colitis     Patient thought to have EtOH cirrhosis, w/ low MELD 18 as an outpatient, however w/ significant hyperbilirubinemia and elevated INR. Suspect decompensation likely in the setting of multiple active infections. Bacteroides and E coli bacteremia initially thought to be due to endocarditis but appears that repeat TTE shows no vegetation and likely false read at OSH. Suspect SBP may be source of bacteremia. Patient's underlying cirrhosis in the setting of EtOH use, however, outpatient labs suggestive of possible concomitant AIH w/ elevated TAJ and ASMA. Per patient, she has been sober for many months. Outpatient PETH however > 400 on 1/17/2024.      MELD: 27 2/14 B+  HE: no overt evidence of HE. On lactulose and rifaximin  Varices: EGD 11/5/2024 w/ small varices w/ blue bleb s/p 2 bands placed. Last EGD 1/31, no report available but per pt no high risk varices. Per outpatient notes unable to tolerate B, however retrialed on coreg w/ success as outpatient.  On Coreg 3.125 mg BID at home   Ascites: Hx significant ascites w/ prior hx LVP, s/p para 2/13 w/ 3 L fluid removed- neg for sbp  HCC: CT w/ IV contrast 1/27 w/o evidence of suspicious lesion   OLT: opening OLT eval today 2/14    Recommendations:  - Continue lasix 40 mg, spironolactone 100 mg QD, will uptitrate as tolerated   - Start SBP ppx (given low fluid TP<0.6 w/ child mallory above 9 and bili above 3). Would discuss w/ ID regarding cipro vs bactrim 2/2 recent C diff  - Vancomycin po 125 mg q6h for 2 wk total course per ID  - Follow up repeat PETH   - Send repeat TAJ, ASMA, AMA, IgG level for autoimmune workup  - Discussed transplant process in detail with patient and her fiance, who expressed understanding, and are willing/motivated to start the process.     Note incomplete until finalized by attending signature/attestation.    Holden Ahn  GI/Hepatology Fellow, PGY-4    MONDAY-FRIDAY 8AM-5PM:  Please message via [employer] or email [email or web address]     On Weekends/Holidays (All day) and Weekdays after 5 PM to 8 AM  For nonurgent consults please email:  Please email [email or web address]  For urgent consults:  Please contact on call GI team. See Amion schedule ([facility]), [facility] paging system ([facility]), or call hospital  ([facility])

## 2025-02-14 NOTE — PROGRESS NOTE ADULT - SUBJECTIVE AND OBJECTIVE BOX
Follow Up:      Interval History:    REVIEW OF SYSTEMS  [  ] ROS unobtainable because:    [  ] All other systems negative except as noted below    Constitutional:  [ ] fever [ ] chills  [ ] weight loss  [ ] weakness  Skin:  [ ] rash [ ] phlebitis	  Eyes: [ ] icterus [ ] pain  [ ] discharge	  ENMT: [ ] sore throat  [ ] thrush [ ] ulcers [ ] exudates  Respiratory: [ ] dyspnea [ ] hemoptysis [ ] cough [ ] sputum	  Cardiovascular:  [ ] chest pain [ ] palpitations [ ] edema	  Gastrointestinal:  [ ] nausea [ ] vomiting [ ] diarrhea [ ] constipation [ ] pain	  Genitourinary:  [ ] dysuria [ ] frequency [ ] hematuria [ ] discharge [ ] flank pain  [ ] incontinence  Musculoskeletal:  [ ] myalgias [ ] arthralgias [ ] arthritis  [ ] back pain  Neurological:  [ ] headache [ ] seizures  [ ] confusion/altered mental status    Allergies  Zithromax (Unknown)        ANTIMICROBIALS:  rifAXIMin 550 two times a day      OTHER MEDS:  MEDICATIONS  (STANDING):  albuterol/ipratropium for Nebulization 3 every 6 hours PRN  furosemide    Tablet 40 daily  guaiFENesin Oral Liquid (Sugar-Free) 100 every 6 hours PRN  heparin   Injectable 5000 every 8 hours  lactulose Syrup 10 three times a day  melatonin 3 at bedtime  midodrine. 15 three times a day  pantoprazole    Tablet 40 before breakfast  spironolactone 100 daily  sucralfate suspension 1 every 6 hours      Vital Signs Last 24 Hrs  T(C): 36.3 (14 Feb 2025 11:57), Max: 36.7 (13 Feb 2025 21:17)  T(F): 97.3 (14 Feb 2025 11:57), Max: 98.1 (13 Feb 2025 21:17)  HR: 114 (14 Feb 2025 11:57) (102 - 122)  BP: 110/67 (14 Feb 2025 11:57) (107/68 - 110/67)  BP(mean): --  RR: 18 (14 Feb 2025 11:57) (16 - 18)  SpO2: 95% (14 Feb 2025 11:57) (94% - 98%)    Parameters below as of 14 Feb 2025 11:57  Patient On (Oxygen Delivery Method): room air        PHYSICAL EXAMINATION:  General: Alert and Awake, NAD  HEENT: PERRL, EOMI  Neck: Supple  Cardiac: RRR, No M/R/G  Resp: CTAB, No Wh/Rh/Ra  Abdomen: NBS, NT/ND, No HSM, No rigidity or guarding  MSK: No LE edema. No Calf tenderness  : No mccann  Skin: No rashes or lesions. Skin is warm and dry to the touch.   Neuro: Alert and Awake. CN 2-12 Grossly intact. Moves all four extremities spontaneously.  Psych: Calm, Pleasant, Cooperative                          8.9    9.35  )-----------( 79       ( 14 Feb 2025 07:16 )             27.3       02-14    131[L]  |  101  |  9   ----------------------------<  118[H]  3.9   |  16[L]  |  0.30[L]    Ca    8.3[L]      14 Feb 2025 07:16  Phos  2.5     02-14  Mg     1.5     02-14    TPro  6.1  /  Alb  3.0[L]  /  TBili  10.4[H]  /  DBili  x   /  AST  120[H]  /  ALT  42  /  AlkPhos  319[H]  02-14      Urinalysis Basic - ( 14 Feb 2025 07:16 )    Color: x / Appearance: x / SG: x / pH: x  Gluc: 118 mg/dL / Ketone: x  / Bili: x / Urobili: x   Blood: x / Protein: x / Nitrite: x   Leuk Esterase: x / RBC: x / WBC x   Sq Epi: x / Non Sq Epi: x / Bacteria: x        MICROBIOLOGY:  v  Peritoneal  02-13-25   Testing in progress  --    polymorphonuclear leukocytes seen  No organisms seen  by cytocentrifuge      .Blood BLOOD  02-11-25   No growth at 72 Hours  --  --      .Blood BLOOD  02-10-25   No growth at 72 Hours  --  --            Clostridium difficile GDH Interpretation: Negative for toxigenic C. Difficile.  This specimen is negative for C.  Difficile glutamate dehydrogenase (GDH) antigen and negative for C.  Difficile Toxins A & B, by EIA.  GDH is a highly sensitive screening  marker for C. Difficile that is produced in large amounts by all C.  Difficile strains, both toxigenic and nontoxigenic.  This assay has not  been validated as a test of cure.  Repeat testing during the same episode  of diarrhea is of limited value and is discouraged.  The results of this  assay should always be interpreted in conjunction with patient's clinical  history. (02-11-25 @ 09:29)  Clostridium difficile GDH Toxins A&amp;B, EIA:   Negative (02-11-25 @ 09:29)      RADIOLOGY:    <The imaging below has been reviewed and visualized by me independently. Findings as detailed in report below> Follow Up:  Preliver transplant evaluation    Interval History: Diarrhea has resolved.  Afebrile.    REVIEW OF SYSTEMS  [  ] ROS unobtainable because:    [ x ] All other systems negative except as noted below    Constitutional:  [ ] fever [ ] chills  [ ] weight loss  [ ] weakness  Skin:  [ ] rash [ ] phlebitis	  Eyes: [ ] icterus [ ] pain  [ ] discharge	  ENMT: [ ] sore throat  [ ] thrush [ ] ulcers [ ] exudates  Respiratory: [ ] dyspnea [ ] hemoptysis [ ] cough [ ] sputum	  Cardiovascular:  [ ] chest pain [ ] palpitations [ ] edema	  Gastrointestinal:  [ ] nausea [ ] vomiting [ ] diarrhea [ ] constipation [ ] pain	  Genitourinary:  [ ] dysuria [ ] frequency [ ] hematuria [ ] discharge [ ] flank pain  [ ] incontinence  Musculoskeletal:  [ ] myalgias [ ] arthralgias [ ] arthritis  [ ] back pain  Neurological:  [ ] headache [ ] seizures  [ ] confusion/altered mental status    Allergies  Zithromax (Unknown)        ANTIMICROBIALS:  rifAXIMin 550 two times a day      OTHER MEDS:  MEDICATIONS  (STANDING):  albuterol/ipratropium for Nebulization 3 every 6 hours PRN  furosemide    Tablet 40 daily  guaiFENesin Oral Liquid (Sugar-Free) 100 every 6 hours PRN  heparin   Injectable 5000 every 8 hours  lactulose Syrup 10 three times a day  melatonin 3 at bedtime  midodrine. 15 three times a day  pantoprazole    Tablet 40 before breakfast  spironolactone 100 daily  sucralfate suspension 1 every 6 hours      Vital Signs Last 24 Hrs  T(C): 36.3 (14 Feb 2025 11:57), Max: 36.7 (13 Feb 2025 21:17)  T(F): 97.3 (14 Feb 2025 11:57), Max: 98.1 (13 Feb 2025 21:17)  HR: 114 (14 Feb 2025 11:57) (102 - 122)  BP: 110/67 (14 Feb 2025 11:57) (107/68 - 110/67)  BP(mean): --  RR: 18 (14 Feb 2025 11:57) (16 - 18)  SpO2: 95% (14 Feb 2025 11:57) (94% - 98%)    Parameters below as of 14 Feb 2025 11:57  Patient On (Oxygen Delivery Method): room air        PHYSICAL EXAMINATION:  General: Alert and Awake, NAD, jaundice  Cardiac: RRR, No M/R/G  Resp: CTAB, No Wh/Rh/Ra  Abdomen: NBS, moderate abdominal distension, No rigidity or guarding  MSK: No LE edema. No Calf tenderness  : No mccann  Skin: No rashes or lesions. Skin is warm and dry to the touch.   Neuro: Alert and Awake. CN 2-12 Grossly intact. Moves all four extremities spontaneously.  Psych: Calm, Pleasant, Cooperative                          8.9    9.35  )-----------( 79       ( 14 Feb 2025 07:16 )             27.3       02-14    131[L]  |  101  |  9   ----------------------------<  118[H]  3.9   |  16[L]  |  0.30[L]    Ca    8.3[L]      14 Feb 2025 07:16  Phos  2.5     02-14  Mg     1.5     02-14    TPro  6.1  /  Alb  3.0[L]  /  TBili  10.4[H]  /  DBili  x   /  AST  120[H]  /  ALT  42  /  AlkPhos  319[H]  02-14      Urinalysis Basic - ( 14 Feb 2025 07:16 )    Color: x / Appearance: x / SG: x / pH: x  Gluc: 118 mg/dL / Ketone: x  / Bili: x / Urobili: x   Blood: x / Protein: x / Nitrite: x   Leuk Esterase: x / RBC: x / WBC x   Sq Epi: x / Non Sq Epi: x / Bacteria: x        MICROBIOLOGY:  v  Peritoneal  02-13-25   Testing in progress  --    polymorphonuclear leukocytes seen  No organisms seen  by cytocentrifuge      .Blood BLOOD  02-11-25   No growth at 72 Hours  --  --      .Blood BLOOD  02-10-25   No growth at 72 Hours  --  --            Clostridium difficile GDH Interpretation: Negative for toxigenic C. Difficile.  This specimen is negative for C.  Difficile glutamate dehydrogenase (GDH) antigen and negative for C.  Difficile Toxins A & B, by EIA.  GDH is a highly sensitive screening  marker for C. Difficile that is produced in large amounts by all C.  Difficile strains, both toxigenic and nontoxigenic.  This assay has not  been validated as a test of cure.  Repeat testing during the same episode  of diarrhea is of limited value and is discouraged.  The results of this  assay should always be interpreted in conjunction with patient's clinical  history. (02-11-25 @ 09:29)  Clostridium difficile GDH Toxins A&amp;B, EIA:   Negative (02-11-25 @ 09:29)      RADIOLOGY:    <The imaging below has been reviewed and visualized by me independently. Findings as detailed in report below>    < from: Xray Chest 1 View- PORTABLE-Urgent (Xray Chest 1 View- PORTABLE-Urgent .) (02.12.25 @ 15:36) >  IMPRESSION:    Mild bibasilar platelike opacities atelectasis, expiratory view. No   consolidation    < end of copied text >

## 2025-02-14 NOTE — PROGRESS NOTE ADULT - SUBJECTIVE AND OBJECTIVE BOX
Patient is a 59y old  Female who presents with a chief complaint of Transfer from [place] for possible liver transplant (14 Feb 2025 12:02)      Interval Events:   Patient feels well, has been having 3-4 BMs/24 hours. Reports stools are appearing more solid and w/ decreasing diarrhea.   Patient feels improved after paracentesis - per pt approx 3L were removed.     ROS:   A 12-point ROS was performed and negative except as noted in HPI.    Hospital Medications:  albuterol/ipratropium for Nebulization 3 milliLiter(s) Nebulizer every 6 hours PRN  chlorhexidine 2% Cloths 1 Application(s) Topical <User Schedule>  folic acid 1 milliGRAM(s) Oral daily  furosemide    Tablet 40 milliGRAM(s) Oral daily  guaiFENesin Oral Liquid (Sugar-Free) 100 milliGRAM(s) Oral every 6 hours PRN  heparin   Injectable 5000 Unit(s) SubCutaneous every 8 hours  lactulose Syrup 10 Gram(s) Oral three times a day  melatonin 3 milliGRAM(s) Oral at bedtime  midodrine. 15 milliGRAM(s) Oral three times a day  pantoprazole    Tablet 40 milliGRAM(s) Oral before breakfast  rifAXIMin 550 milliGRAM(s) Oral two times a day  spironolactone 100 milliGRAM(s) Oral daily  sucralfate suspension 1 Gram(s) Oral every 6 hours  thiamine 100 milliGRAM(s) Oral daily      PHYSICAL EXAM:   Vital Signs:  Vital Signs Last 24 Hrs  T(C): 36.3 (14 Feb 2025 11:57), Max: 36.7 (13 Feb 2025 21:17)  T(F): 97.3 (14 Feb 2025 11:57), Max: 98.1 (13 Feb 2025 21:17)  HR: 114 (14 Feb 2025 11:57) (102 - 122)  BP: 110/67 (14 Feb 2025 11:57) (107/68 - 110/67)  BP(mean): --  RR: 18 (14 Feb 2025 11:57) (16 - 18)  SpO2: 95% (14 Feb 2025 11:57) (94% - 98%)    Parameters below as of 14 Feb 2025 11:57  Patient On (Oxygen Delivery Method): room air      Daily     Daily     GENERAL: no acute distress  NEURO: alert, no asterixis   HEENT: anicteric sclera, no conjunctival pallor appreciated  CHEST: no respiratory distress, no accessory muscle use  CARDIAC: regular rate  ABDOMEN: soft, mildly distended, no TTP, no rebound or guarding  EXTREMITIES: warm, well perfused, no edema  SKIN: jaundiced    LABS: reviewed                        8.9    9.35  )-----------( 79       ( 14 Feb 2025 07:16 )             27.3     02-14    131[L]  |  101  |  9   ----------------------------<  118[H]  3.9   |  16[L]  |  0.30[L]    Ca    8.3[L]      14 Feb 2025 07:16  Phos  2.5     02-14  Mg     1.5     02-14    TPro  6.1  /  Alb  3.0[L]  /  TBili  10.4[H]  /  DBili  x   /  AST  120[H]  /  ALT  42  /  AlkPhos  319[H]  02-14    LIVER FUNCTIONS - ( 14 Feb 2025 07:16 )  Alb: 3.0 g/dL / Pro: 6.1 g/dL / ALK PHOS: 319 U/L / ALT: 42 U/L / AST: 120 U/L / GGT: x             Interval Diagnostic Studies: see sunrise for full report

## 2025-02-14 NOTE — PROGRESS NOTE ADULT - ASSESSMENT
58yo F w/ PMH of alcohol cirrhosis with recurrent ascites, s/p multiple paracentesis, s/p banding of esophageal varices, currently being treated for recent c. diff colitis and possible endocarditis. Pt is jaundiced, with scleral icterus and abdominal distension presenting for liver transplant evaluation from [facility].

## 2025-02-14 NOTE — PROGRESS NOTE ADULT - ASSESSMENT
58yo F w/ PMH of alcohol cirrhosis with recurrent ascites, s/p multiple paracentesis, s/p banding of esophageal varices, currently being treated for recent c. diff colitis and possible endocarditis, presenting for possible liver transplant from [facility]. Pt initially admitted to [facility] c/o weakness, poor PO intake, and fever; hypotensive in ED. Met criteria for sepsis iso C. diff colitis and was treated with vasopressor support, flagyl, and vanc. Hospital course was complicated by AHRF and pt was intubated 1/28-2/2. Pt then treated for E. coli and Bacteroides bacteremia with TTE findings concerning for mitral vegetation; pt not a good candidate for ARACELI due to varices found on EGD from 1/31/24 decided to treat empirically for IE with 6 weeks of antibiotics s/p PICC line placement with negative blood cultures from 1/31/24. Pt d/c from [facility] on 2/7 and then presented to [facility] c/o SOB. Ultimately transferred to [facility] for hepatology transplant consult. On presentation today, pt endorses subjective nighttime fevers, chills, SOB and abdominal discomfort. Attributes fevers to recent flu dx (noted in chart to be flu positive 1 week prior to presentation at [facility]). Pt states she has not used alcohol in over a year. She began using alcohol in her 20s socially and her use gradually increased to include ~3-4 glasses of wine everyday when she was drinking the most. Has been to rehab and therapy multiple times. Pt first began smoking in her 20s ~1pack/day for many years, cannot recall specifics. Has not had a cigarette in several months. Pt is unable to ambulate on her own and has remained bedbound since intubation several months ago.     C. difficile toxin assay (January 27) positive  Blood cultures (January 27) 2/4 E. coli, 1/4 Bacteroides   Blood cultures (January 31) no growth to date  Blood cultures (February 10, February 11) no growth to date  Paracentesis (February 4, February 13) Cell counts negative for SBP    CTA chest (January 27) Patent central airways. Smooth interlobular septal thickening at the lung apices and lung bases. No lung consolidation or mass. Mild bibasilar dependent atelectasis. No lymphadenopathy.    CT A/P (January 27) Thickening of the walls of the stomach and duodenum with submucosal edema likely reflective of a gastroenteritis. Thickening of the walls of the ascending colon despite underdistention likely due to portal colopathy. Colonic diverticulosis without evidence of diverticulitis. Cholelithiasis. Nonspecific gallbladder wall edema and pericholecystic fluid in the setting of cirrhosis.    TTE (1/27) small mobile vegetation attached to the posterior mitral valve leaflet.  TTE (February 12) no evidence of vegetations    Antibiotic Course:  PO Vancomycin: 1/27 (2 doses) > 2/7, 2/11 > 2/14 (1 dose)  Metronidazole: 1/27 > 2/7, 2/10 > 2/13  Ceftriaxone: 1/27 > 2/7, 2/10 > 2/13  Meropenem: 1/27    Assessment:  *Polymicrobial bacteremia with E.coli and Bacteroides  *Possible MV vegetation on TTE, though E.coli and bacteroides atypical endocarditis organisms and polymicrobial is also atypical for endocarditis  *C.diff colitis   *transaminitis   *EtOH cirrhosis with liver transplant evaluation with transaminitis s/p banding of esophageal varices   *Abnormal CT with colitis  59 year old female PMH of alcohol cirrhosis with recurrent ascites, s/p multiple paracentesis, s/p banding of esophageal varices, currently being treated for recent c. diff colitis and possible endocarditis, presenting for possible liver transplant from [facility]. Pt initially admitted to [facility] c/o weakness, poor PO intake, and fever; hypotensive in ED. Met criteria for sepsis iso C. diff colitis and was treated with vasopressor support, flagyl, and vanc. Hospital course was complicated by AHRF and pt was intubated 1/28-2/2. Pt then treated for E. coli and Bacteroides bacteremia with TTE findings concerning for mitral vegetation; pt not a good candidate for ARACELI due to varices found on EGD from 1/31/24 decided to treat empirically for IE with 6 weeks of antibiotics s/p PICC line placement with negative blood cultures from 1/31/24. Pt d/c from [facility] on 2/7 and then presented to [facility] c/o SOB. Ultimately transferred to [facility] for hepatology transplant consult. On presentation today, pt endorses subjective nighttime fevers, chills, SOB and abdominal discomfort. Attributes fevers to recent flu dx (noted in chart to be flu positive 1 week prior to presentation at [facility]). Pt states she has not used alcohol in over a year. She began using alcohol in her 20s socially and her use gradually increased to include ~3-4 glasses of wine everyday when she was drinking the most. Has been to rehab and therapy multiple times. Pt first began smoking in her 20s ~1pack/day for many years, cannot recall specifics. Has not had a cigarette in several months. Pt is unable to ambulate on her own and has remained bedbound since intubation several months ago.     C. difficile toxin assay (January 27) positive  Blood cultures (January 27) 2/4 E. coli, 1/4 Bacteroides   Blood cultures (January 31) no growth to date  Blood cultures (February 10, February 11) no growth to date  Paracentesis (February 4, February 13) Cell counts negative for SBP    CTA chest (January 27) Patent central airways. Smooth interlobular septal thickening at the lung apices and lung bases. No lung consolidation or mass. Mild bibasilar dependent atelectasis. No lymphadenopathy.    CT A/P (January 27) Thickening of the walls of the stomach and duodenum with submucosal edema likely reflective of a gastroenteritis. Thickening of the walls of the ascending colon despite underdistention likely due to portal colopathy. Colonic diverticulosis without evidence of diverticulitis. Cholelithiasis. Nonspecific gallbladder wall edema and pericholecystic fluid in the setting of cirrhosis.    TTE (1/27) small mobile vegetation attached to the posterior mitral valve leaflet.  TTE (February 12) no evidence of vegetations    Social history: born in [place].  Travel history includes travel to [facility] and [place], [place], [place].  Lives primarily in [place] but has also had travel to [place] and [place].  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  States her mother had a history of tuberculosis but this was before patient was born.  Patient notes livestock exposure during her work as a .  Has sugar gliders as pets.    Antibiotic Course:  PO Vancomycin: 1/27 (2 doses) > 2/14 (1 dose)  Metronidazole: 1/27 > 2/13  Ceftriaxone: 1/27 > 2/13  Meropenem: 1/27    #C.diff colitis   --Would continue PO Vancomycin 125 BID for next 72H to follow antibiotic completion on 2/13/25    #Polymicrobial bacteremia with E.coli and Bacteroides  Status post course of ceftriaxone and metronidazole as above    #Possible MV vegetation on TTE from OSH  E.coli and bacteroides atypical endocarditis organisms and polymicrobial is also atypical for endocarditis  Repeat transthoracic echocardiogram here without evidence of vegetation    #Preliver transplant evaluation  --Recommend COVID19 Nucleocapsid Antibody  --Recommend COVID19 Eder Antibody  --Recommend HAV IgG  --Recommend HBVs Ab, HBVsAg, HBVc Ab  --Recommend HCV Ab  --Recommend HSV 1 IgG /HSV 2 IgG  --Recommend EBV IgG  --Recommend CMV IgG  --Recommend VZV IgG  --Recommend Measles IgG , Mumps IgG and Rubella IgG   --Recommend Quantiferon Gold  --Recommend HIV Ag/Ab by [facility]IA  --Recommend Syphilis Screen  --Recommend Strongyloides Ab  --Recommend Coccidiodes Ab  --Recommend Brucella IgG  --Recommend Q Fever Serologies    #Encounter to Vaccinate Patient  COVID19: Would benefit from COVID19 [id number] Vaccine Dose  Influenza: 10/8/24  Pneumococcal: Would benefit from PCV20  HAV: Check HAV IgG  HBV: Check HBVs Ab  MMR: Check MMR IgG   Varicella: Check Varicella IgG   Shingles: Will require Shingrix  Tdap: Will require Tdap    Dr. Pauly Fenton will be covering the patient starting from tomorrow. Please reach out to her for further questions and follow up.     Humberto Zamora M.D.  [facility] Division of Infectious Disease  8AM-5PM Monday - Friday: Available on [employer] Teams  After Hours and Holidays (or if no response on [employer] Teams): Please contact the Infectious Diseases Office at [phone]

## 2025-02-14 NOTE — PROGRESS NOTE ADULT - PROBLEM SELECTOR PLAN 2
-iso e. coli and bacteroides bacteremia. Pt with evidence of mitral valve vegetation on TTE; not confirmed with ARACELI due to concerns regarding esophageal varices.   -Likely in setting of recent dental extraction in December    Plan:   - Continue with empiric CTX 2g qD (for 6 weeks, ends 3/14/25)  - Continue with Flagyl 500mg q8 (for 2 weeks, ends 2/13/25) for bacteroides bacteremia  - fu ID consult recs      > no vegetations seen on TTE  - Monitor off abx       > Will need rpt TTE in 2-4 weeks

## 2025-02-15 ENCOUNTER — RESULT REVIEW (OUTPATIENT)
Age: 60
End: 2025-02-15

## 2025-02-15 LAB
AFP-TM SERPL-MCNC: 4.4 NG/ML — SIGNIFICANT CHANGE UP
AMMONIA BLD-MCNC: 33 UMOL/L — SIGNIFICANT CHANGE UP (ref 11–55)
CERULOPLASMIN SERPL-MCNC: 17 MG/DL — SIGNIFICANT CHANGE UP (ref 16–45)
CHOLEST SERPL-MCNC: 108 MG/DL — SIGNIFICANT CHANGE UP
CMV IGG FLD QL: >10 U/ML — HIGH
CMV IGG SERPL-IMP: POSITIVE
CREAT ?TM UR-MCNC: 84 MG/DL — SIGNIFICANT CHANGE UP
EBV EA AB SER IA-ACNC: 8.45 U/ML — SIGNIFICANT CHANGE UP
EBV EA AB TITR SER IF: POSITIVE
EBV EA IGG SER-ACNC: NEGATIVE — SIGNIFICANT CHANGE UP
EBV NA IGG SER IA-ACNC: >600 U/ML — HIGH
EBV PATRN SPEC IB-IMP: SIGNIFICANT CHANGE UP
EBV VCA IGG AVIDITY SER QL IA: POSITIVE
EBV VCA IGM SER IA-ACNC: 22.5 U/ML — SIGNIFICANT CHANGE UP
EBV VCA IGM SER IA-ACNC: >750 U/ML — HIGH
EBV VCA IGM TITR FLD: NEGATIVE — SIGNIFICANT CHANGE UP
FERRITIN SERPL-MCNC: 215 NG/ML — SIGNIFICANT CHANGE UP (ref 13–330)
HBV SURFACE AG SER-ACNC: SIGNIFICANT CHANGE UP
HCV AB S/CO SERPL IA: 0.06 S/CO — SIGNIFICANT CHANGE UP
HCV AB SERPL-IMP: SIGNIFICANT CHANGE UP
HCV RNA FLD QL NAA+PROBE: SIGNIFICANT CHANGE UP
HCV RNA SPEC QL PROBE+SIG AMP: SIGNIFICANT CHANGE UP
HDLC SERPL-MCNC: 15 MG/DL — LOW
HSV1 IGG SER-ACNC: 37.4 INDEX — HIGH
HSV1 IGG SERPL QL IA: POSITIVE
HSV2 IGG FLD-ACNC: 0.04 INDEX — SIGNIFICANT CHANGE UP
HSV2 IGG SERPL QL IA: NEGATIVE — SIGNIFICANT CHANGE UP
IRON SATN MFR SERPL: 45 % — SIGNIFICANT CHANGE UP (ref 14–50)
IRON SATN MFR SERPL: 52 UG/DL — SIGNIFICANT CHANGE UP (ref 30–160)
LIPID PNL WITH DIRECT LDL SERPL: 75 MG/DL — SIGNIFICANT CHANGE UP
MEV IGG SER-ACNC: >300 AU/ML — SIGNIFICANT CHANGE UP
MEV IGG+IGM SER-IMP: POSITIVE — SIGNIFICANT CHANGE UP
MUV AB SER-ACNC: POSITIVE — SIGNIFICANT CHANGE UP
MUV IGG FLD-ACNC: 15.5 AU/ML — SIGNIFICANT CHANGE UP
NON HDL CHOLESTEROL: 93 MG/DL — SIGNIFICANT CHANGE UP
PROT ?TM UR-MCNC: 34 MG/DL — HIGH (ref 0–12)
PROT/CREAT UR-RTO: 0.4 RATIO — HIGH (ref 0–0.2)
RUBV IGG SER-ACNC: 12.9 INDEX — SIGNIFICANT CHANGE UP
RUBV IGG SER-IMP: POSITIVE — SIGNIFICANT CHANGE UP
SARS-COV-2 IGG+IGM SERPL QL IA: >250 U/ML — HIGH
SARS-COV-2 IGG+IGM SERPL QL IA: POSITIVE
T GONDII IGG SER QL: <3 IU/ML — SIGNIFICANT CHANGE UP
T GONDII IGG SER QL: NEGATIVE — SIGNIFICANT CHANGE UP
T PALLIDUM AB TITR SER: NEGATIVE — SIGNIFICANT CHANGE UP
T3FREE SERPL-MCNC: 1.19 PG/ML — LOW (ref 2–4.4)
TIBC SERPL-MCNC: 116 UG/DL — LOW (ref 220–430)
TRIGL SERPL-MCNC: 91 MG/DL — SIGNIFICANT CHANGE UP
TSH SERPL-MCNC: 2.22 UIU/ML — SIGNIFICANT CHANGE UP (ref 0.27–4.2)
UIBC SERPL-MCNC: 64 UG/DL — LOW (ref 110–370)
VZV IGG FLD QL IA: 12.1 S/CO — SIGNIFICANT CHANGE UP
VZV IGG FLD QL IA: POSITIVE — SIGNIFICANT CHANGE UP

## 2025-02-15 PROCEDURE — 99232 SBSQ HOSP IP/OBS MODERATE 35: CPT | Mod: GC

## 2025-02-15 PROCEDURE — 99232 SBSQ HOSP IP/OBS MODERATE 35: CPT

## 2025-02-15 RX ADMIN — Medication 1 GRAM(S): at 13:58

## 2025-02-15 RX ADMIN — Medication 1 GRAM(S): at 05:57

## 2025-02-15 RX ADMIN — DEXTROMETHORPHAN HBR, GUAIFENESIN 100 MILLIGRAM(S): 200 LIQUID ORAL at 18:30

## 2025-02-15 RX ADMIN — Medication 100 MILLIGRAM(S): at 05:57

## 2025-02-15 RX ADMIN — LACTULOSE 10 GRAM(S): 10 SOLUTION ORAL at 13:59

## 2025-02-15 RX ADMIN — Medication 100 MILLIGRAM(S): at 13:58

## 2025-02-15 RX ADMIN — Medication 125 MILLIGRAM(S): at 18:27

## 2025-02-15 RX ADMIN — FUROSEMIDE 40 MILLIGRAM(S): 10 INJECTION INTRAMUSCULAR; INTRAVENOUS at 05:56

## 2025-02-15 RX ADMIN — Medication 3 MILLIGRAM(S): at 21:57

## 2025-02-15 RX ADMIN — HEPARIN SODIUM 5000 UNIT(S): 1000 INJECTION INTRAVENOUS; SUBCUTANEOUS at 05:58

## 2025-02-15 RX ADMIN — LACTULOSE 10 GRAM(S): 10 SOLUTION ORAL at 21:57

## 2025-02-15 RX ADMIN — HEPARIN SODIUM 5000 UNIT(S): 1000 INJECTION INTRAVENOUS; SUBCUTANEOUS at 21:57

## 2025-02-15 RX ADMIN — FOLIC ACID 1 MILLIGRAM(S): 1 TABLET ORAL at 13:58

## 2025-02-15 RX ADMIN — Medication 125 MILLIGRAM(S): at 05:57

## 2025-02-15 RX ADMIN — Medication 1 GRAM(S): at 00:20

## 2025-02-15 RX ADMIN — Medication 1 APPLICATION(S): at 14:00

## 2025-02-15 RX ADMIN — DEXTROMETHORPHAN HBR, GUAIFENESIN 100 MILLIGRAM(S): 200 LIQUID ORAL at 06:06

## 2025-02-15 RX ADMIN — HEPARIN SODIUM 5000 UNIT(S): 1000 INJECTION INTRAVENOUS; SUBCUTANEOUS at 14:00

## 2025-02-15 RX ADMIN — Medication 40 MILLIGRAM(S): at 05:57

## 2025-02-15 RX ADMIN — LACTULOSE 10 GRAM(S): 10 SOLUTION ORAL at 05:58

## 2025-02-15 RX ADMIN — Medication 1 GRAM(S): at 18:27

## 2025-02-15 RX ADMIN — MIDODRINE HYDROCHLORIDE 15 MILLIGRAM(S): 5 TABLET ORAL at 18:28

## 2025-02-15 RX ADMIN — MIDODRINE HYDROCHLORIDE 15 MILLIGRAM(S): 5 TABLET ORAL at 13:57

## 2025-02-15 RX ADMIN — MIDODRINE HYDROCHLORIDE 15 MILLIGRAM(S): 5 TABLET ORAL at 05:57

## 2025-02-15 NOTE — PROGRESS NOTE ADULT - ASSESSMENT
60 yo F w/ PMH HTN, anemia, EtOH associated cirrhosis c/b ascites/EV (s/p banding last 11/2024), AUD now sober for ~3 months, bleeding rectal varices vs hemorrhoidal bleeding s/p hemorrhoidectomy (11/2023) presenting as a transfer from [place] for evaluation of liver transplant.     #Decompensated EtOH Associated Cirrhosis  #Bacteriodes and E coli Bacteremia   #Influenza  #C-diff colitis     Patient thought to have EtOH cirrhosis, w/ low MELD 18 as an outpatient, however w/ significant hyperbilirubinemia and elevated INR. Suspect decompensation likely in the setting of multiple active infections. Bacteroides and E coli bacteremia initially thought to be due to endocarditis but appears that repeat TTE shows no vegetation and likely false read at OSH. Suspect SBP may be source of bacteremia. Patient's underlying cirrhosis in the setting of EtOH use, however, outpatient labs suggestive of possible concomitant AIH w/ elevated TAJ and ASMA. Per patient, she has been sober for many months. Outpatient PETH however > 400 on 1/17/2024.      MELD: 27 2/14 B+  HE: no overt evidence of HE. On lactulose and rifaximin  Varices: EGD 11/5/2024 w/ small varices w/ blue bleb s/p 2 bands placed. Last EGD 1/31, no report available but per pt no high risk varices. Per outpatient notes unable to tolerate B, however retrialed on coreg w/ success as outpatient.  On Coreg 3.125 mg BID at home   Ascites: Hx significant ascites w/ prior hx LVP, s/p para 2/13 w/ 3 L fluid removed- neg for sbp  HCC: CT w/ IV contrast 1/27 w/o evidence of suspicious lesion   OLT: OLT eval opened 2/14    Recommendations:  - Continue lasix 40 mg, spironolactone 100 mg QD, will uptitrate as tolerated   - Start SBP ppx with Cipro 500 mg daily (given low fluid TP<0.6 w/ child mallory above 9 and bili above 3)   - Vancomycin po per ID  - Follow up repeat PETH   - Send repeat TAJ, ASMA, AMA, IgG level for autoimmune workup  - Discussed transplant process in detail with patient and her fiance, who expressed understanding, and are willing/motivated to start the process.    - Proceed with liver transplant process  - Folic acid, thiamine, multivitamin  - Psych, social work  - Nutrition, PT  - Transplant ID consult     58 yo F w/ PMH HTN, anemia, EtOH associated cirrhosis c/b ascites/EV (s/p banding last 11/2024), AUD now sober for ~3 months, bleeding rectal varices vs hemorrhoidal bleeding s/p hemorrhoidectomy (11/2023) presenting as a transfer from [place] for evaluation of liver transplant.     #Decompensated EtOH Associated Cirrhosis  #Bacteriodes and E coli Bacteremia   #Influenza  #C-diff colitis     Patient thought to have EtOH cirrhosis, w/ low MELD 18 as an outpatient, however w/ significant hyperbilirubinemia and elevated INR. Suspect decompensation likely in the setting of multiple active infections. Bacteroides and E coli bacteremia initially thought to be due to endocarditis but appears that repeat TTE shows no vegetation and likely false read at OSH. Suspect SBP may be source of bacteremia. Patient's underlying cirrhosis in the setting of EtOH use, however, outpatient labs suggestive of possible concomitant AIH w/ elevated TAJ and ASMA. Per patient, she has been sober for many months. Outpatient PETH however > 400 on 1/17/2024.    MELD: 27 2/14 B+  HE: no overt evidence of HE. On lactulose and rifaximin  Varices: EGD 11/5/2024 w/ small varices w/ blue bleb s/p 2 bands placed. Last EGD 1/31, no report available but per pt no high risk varices. Per outpatient notes unable to tolerate B, however retrialed on coreg w/ success as outpatient.  On Coreg 3.125 mg BID at home   Ascites: Hx significant ascites w/ prior hx LVP, s/p para 2/13 w/ 3 L fluid removed- neg for sbp  HCC: CT w/ IV contrast 1/27 w/o evidence of suspicious lesion   OLT: OLT eval opened 2/14    Recommendations:  - Continue lasix 40 mg, spironolactone 100 mg QD, will uptitrate as tolerated   - Start SBP ppx with Cipro 500 mg daily (given low fluid TP<0.6 w/ child mallory above 9 and bili above 3)   - Obtain MRI Abdomen W/WO Contrast  - Vancomycin po per ID  - Follow up repeat PETH   - Send repeat TAJ, ASMA, AMA, IgG level for autoimmune workup  - Discussed transplant process in detail with patient and her fiance, who expressed understanding, and are willing/motivated to start the process.    - Proceed with liver transplant process  - Folic acid, thiamine, multivitamin  - Psych, social work consults  - Nutrition, PT  - Transplant ID consult

## 2025-02-15 NOTE — PROGRESS NOTE ADULT - PROBLEM SELECTOR PLAN 3
Pt tested positive for C. diff colitis at [facility]. Started on oral vancomycin.     Plan:   - Plan to continue oral vancomycin until CTX course finished (CTX ends 3/14/25) Pt tested positive for C. diff colitis at [facility]. Started on oral vancomycin.     Plan:   - S/p 2 weeks vancomycin

## 2025-02-15 NOTE — PROGRESS NOTE ADULT - ASSESSMENT
60yo F w/ PMH of alcohol cirrhosis with recurrent ascites, s/p multiple paracentesis, s/p banding of esophageal varices, currently being treated for recent c. diff colitis and possible endocarditis. Pt is jaundiced, with scleral icterus and abdominal distension presenting for liver transplant evaluation from [facility].

## 2025-02-15 NOTE — PROGRESS NOTE ADULT - PROBLEM SELECTOR PLAN 2
-iso e. coli and bacteroides bacteremia. Pt with evidence of mitral valve vegetation on TTE; not confirmed with ARACELI due to concerns regarding esophageal varices.   -Likely in setting of recent dental extraction in December    Plan:   - Continue with empiric CTX 2g qD (for 6 weeks, ends 3/14/25)  - Continue with Flagyl 500mg q8 (for 2 weeks, ends 2/13/25) for bacteroides bacteremia  - fu ID consult recs      > no vegetations seen on TTE  - Monitor off abx       > Will need rpt TTE in 2-4 weeks -iso e. coli and bacteroides bacteremia. Pt with evidence of mitral valve vegetation on TTE; not confirmed with ARACELI due to concerns regarding esophageal varices.   -Likely in setting of recent dental extraction in December    Plan:   - Continue with empiric CTX 2g qD (for 6 weeks, ends 3/14/25)       > S/p 2 weeks CTX given no evidence of vegetation on rpt TTE  - Continue with Flagyl 500mg q8 (for 2 weeks, ends 2/13/25) for bacteroides bacteremia  - fu ID consult recs      > no vegetations seen on TTE  - Monitor off abx       > Will need rpt TTE in 2-4 weeks

## 2025-02-15 NOTE — PROGRESS NOTE ADULT - SUBJECTIVE AND OBJECTIVE BOX
Interval Events:   no acute events  patient reports several loose/watery stools without blood, denies abdominal pain  afebrile, tachycardic, HDS, on RA  labs pending    Hospital Medications:  albuterol/ipratropium for Nebulization 3 milliLiter(s) Nebulizer every 6 hours PRN  chlorhexidine 2% Cloths 1 Application(s) Topical <User Schedule>  folic acid 1 milliGRAM(s) Oral daily  furosemide    Tablet 40 milliGRAM(s) Oral daily  guaiFENesin Oral Liquid (Sugar-Free) 100 milliGRAM(s) Oral every 6 hours PRN  heparin   Injectable 5000 Unit(s) SubCutaneous every 8 hours  lactulose Syrup 10 Gram(s) Oral three times a day  melatonin 3 milliGRAM(s) Oral at bedtime  midodrine. 15 milliGRAM(s) Oral three times a day  pantoprazole    Tablet 40 milliGRAM(s) Oral before breakfast  rifAXIMin 550 milliGRAM(s) Oral two times a day  spironolactone 100 milliGRAM(s) Oral daily  sucralfate suspension 1 Gram(s) Oral every 6 hours  thiamine 100 milliGRAM(s) Oral daily  vancomycin    Solution 125 milliGRAM(s) Oral every 12 hours    ROS: See above.    PHYSICAL EXAM:   Vital Signs:  Vital Signs Last 24 Hrs  T(C): 37 (15 Feb 2025 12:10), Max: 37 (15 Feb 2025 12:10)  T(F): 98.6 (15 Feb 2025 12:10), Max: 98.6 (15 Feb 2025 12:10)  HR: 111 (15 Feb 2025 12:10) (111 - 125)  BP: 103/63 (15 Feb 2025 12:10) (103/63 - 116/72)  BP(mean): --  RR: 18 (15 Feb 2025 12:10) (18 - 18)  SpO2: 96% (15 Feb 2025 12:10) (94% - 96%)    Parameters below as of 15 Feb 2025 12:10  Patient On (Oxygen Delivery Method): room air    GENERAL:  NAD, resting comfortably in bed  HEENT:  sclera iteric  RESPIRATORY:  Normal Effort  CARDIAC:  HDS  ABDOMEN:  Soft, non-tender, non-distended  SKIN:  Warm & Dry. jaundice  NEURO:  Alert, conversant, no focal deficit    LABS:                        8.9    9.35  )-----------( 79       ( 14 Feb 2025 07:16 )             27.3       02-14    131[L]  |  101  |  9   ----------------------------<  118[H]  3.9   |  16[L]  |  0.30[L]    Ca    8.3[L]      14 Feb 2025 07:16  Phos  2.5     02-14  Mg     1.5     02-14    TPro  6.1  /  Alb  3.0[L]  /  TBili  10.4[H]  /  DBili  x   /  AST  120[H]  /  ALT  42  /  AlkPhos  319[H]  02-14    LIVER FUNCTIONS - ( 14 Feb 2025 07:16 )  Alb: 3.0 g/dL / Pro: 6.1 g/dL / ALK PHOS: 319 U/L / ALT: 42 U/L / AST: 120 U/L / GGT: x           PT/INR - ( 14 Feb 2025 07:18 )   PT: 24.2 sec;   INR: 2.12 ratio         PTT - ( 14 Feb 2025 07:18 )  PTT:45.8 sec  Amylase Serum--      Lipase serum--       [id number]

## 2025-02-15 NOTE — PROGRESS NOTE ADULT - SUBJECTIVE AND OBJECTIVE BOX
ABRAHAN GOODWIN ([id number])- Patient is a 59y old  Female who presents with a chief complaint of Transfer from [place] for possible liver transplant (10 Feb 2025 20:23)      INTERVAL HPI/OVERNIGHT EVENTS:   No acute overnight events    SUBJECTIVE: Pt seen at bedside; denies n/v, sob, chest pain.     PHYSICAL EXAM:  - GENERAL: Follows conversation appropriately. No acute distress. Jaundiced  - EYES: Tracks me in room. Scleral ictus   - HENT: Moist mucous membranes. No scleral icterus.   - LUNGS: Clear to auscultation bilaterally. No accessory muscle use.  - CARDIOVASCULAR: Regular rate and rhythm. No murmur.  - ABDOMEN: Soft, non-tender and non-distended. No palpable masses.  - EXTREMITIES: No edema. Non-tender.  - SKIN: No rashes or lesions. Jaundiced. Warm. PICC in place L upper arm  - NEUROLOGIC: No focal neurological deficits. CN II-XII grossly intact, but not individually tested.  - PSYCHIATRIC: Cooperative. Appropriate mood and affect.    VS  T(C): 36.7 (02-15-25 @ 05:10), Max: 36.8 (02-14-25 @ 17:46)  HR: 122 (02-15-25 @ 05:10) (114 - 125)  BP: 112/68 (02-15-25 @ 05:10) (110/67 - 116/72)  RR: 18 (02-15-25 @ 05:10) (18 - 18)  SpO2: 95% (02-15-25 @ 05:10) (94% - 95%)    LABS (available at time of writing 02-15-25 @ 07:35):                         8.9    9.35  )-----------( 79       ( 14 Feb 2025 07:16 )             27.3     02-14    131[L]  |  101  |  9   ----------------------------<  118[H]  3.9   |  16[L]  |  0.30[L]    Ca    8.3[L]      14 Feb 2025 07:16  Phos  2.5     02-14  Mg     1.5     02-14    TPro  6.1  /  Alb  3.0[L]  /  TBili  10.4[H]  /  DBili  x   /  AST  120[H]  /  ALT  42  /  AlkPhos  319[H]  02-14    PT/INR - ( 14 Feb 2025 07:18 )   PT: 24.2 sec;   INR: 2.12 ratio         PTT - ( 14 Feb 2025 07:18 )  PTT:45.8 sec  Urinalysis Basic - ( 14 Feb 2025 07:16 )    Color: x / Appearance: x / SG: x / pH: x  Gluc: 118 mg/dL / Ketone: x  / Bili: x / Urobili: x   Blood: x / Protein: x / Nitrite: x   Leuk Esterase: x / RBC: x / WBC x   Sq Epi: x / Non Sq Epi: x / Bacteria: x          Culture - Fungal, Body Fluid (collected 02-13-25 @ 13:41)  Source: Peritoneal  Preliminary Report (02-14-25 @ 09:20):    Testing in progress    Culture - Body Fluid with Gram Stain (collected 02-13-25 @ 13:41)  Source: Peritoneal  Gram Stain (02-13-25 @ 22:19):    polymorphonuclear leukocytes seen    No organisms seen    by cytocentrifuge  Preliminary Report (02-14-25 @ 16:46):    No growth    Culture - Blood (collected 02-11-25 @ 00:20)  Source: .Blood BLOOD  Preliminary Report (02-15-25 @ 05:00):    No growth at 4 days    Culture - Blood (collected 02-10-25 @ 23:54)  Source: .Blood BLOOD  Preliminary Report (02-15-25 @ 05:00):    No growth at 4 days        Medications:   albuterol/ipratropium for Nebulization 3 milliLiter(s) Nebulizer every 6 hours PRN  chlorhexidine 2% Cloths 1 Application(s) Topical <User Schedule>  folic acid 1 milliGRAM(s) Oral daily  furosemide    Tablet 40 milliGRAM(s) Oral daily  guaiFENesin Oral Liquid (Sugar-Free) 100 milliGRAM(s) Oral every 6 hours PRN  heparin   Injectable 5000 Unit(s) SubCutaneous every 8 hours  lactulose Syrup 10 Gram(s) Oral three times a day  melatonin 3 milliGRAM(s) Oral at bedtime  midodrine. 15 milliGRAM(s) Oral three times a day  pantoprazole    Tablet 40 milliGRAM(s) Oral before breakfast  rifAXIMin 550 milliGRAM(s) Oral two times a day  spironolactone 100 milliGRAM(s) Oral daily  sucralfate suspension 1 Gram(s) Oral every 6 hours  thiamine 100 milliGRAM(s) Oral daily  vancomycin    Solution 125 milliGRAM(s) Oral every 12 hours      RADIOLOGY, EKG & ADDITIONAL TESTS: Reviewed.      ABRAHAN GOODWIN ([id number])- Patient is a 59y old  Female who presents with a chief complaint of Transfer from [place] for possible liver transplant (10 Feb 2025 20:23)      INTERVAL HPI/OVERNIGHT EVENTS:   No acute overnight events    SUBJECTIVE: Pt seen at bedside; denies n/v, sob, chest pain.     PHYSICAL EXAM:  - GENERAL: Follows conversation appropriately. No acute distress. Jaundiced  - EYES: Tracks me in room. Scleral ictus   - HENT: Moist mucous membranes.  - LUNGS: Clear to auscultation bilaterally. No accessory muscle use.  - CARDIOVASCULAR: Regular rate and rhythm. No murmur.  - ABDOMEN: Soft, non-tender, +distention. No palpable masses.  - EXTREMITIES: No edema. Non-tender.  - SKIN: No rashes or lesions. Jaundiced. Warm. PICC in place L upper arm  - NEUROLOGIC: No focal neurological deficits. CN II-XII grossly intact, but not individually tested.  - PSYCHIATRIC: Cooperative. Appropriate mood and affect.    VS  T(C): 36.7 (02-15-25 @ 05:10), Max: 36.8 (02-14-25 @ 17:46)  HR: 122 (02-15-25 @ 05:10) (114 - 125)  BP: 112/68 (02-15-25 @ 05:10) (110/67 - 116/72)  RR: 18 (02-15-25 @ 05:10) (18 - 18)  SpO2: 95% (02-15-25 @ 05:10) (94% - 95%)    LABS (available at time of writing 02-15-25 @ 07:35):                         8.9    9.35  )-----------( 79       ( 14 Feb 2025 07:16 )             27.3     02-14    131[L]  |  101  |  9   ----------------------------<  118[H]  3.9   |  16[L]  |  0.30[L]    Ca    8.3[L]      14 Feb 2025 07:16  Phos  2.5     02-14  Mg     1.5     02-14    TPro  6.1  /  Alb  3.0[L]  /  TBili  10.4[H]  /  DBili  x   /  AST  120[H]  /  ALT  42  /  AlkPhos  319[H]  02-14    PT/INR - ( 14 Feb 2025 07:18 )   PT: 24.2 sec;   INR: 2.12 ratio         PTT - ( 14 Feb 2025 07:18 )  PTT:45.8 sec  Urinalysis Basic - ( 14 Feb 2025 07:16 )    Color: x / Appearance: x / SG: x / pH: x  Gluc: 118 mg/dL / Ketone: x  / Bili: x / Urobili: x   Blood: x / Protein: x / Nitrite: x   Leuk Esterase: x / RBC: x / WBC x   Sq Epi: x / Non Sq Epi: x / Bacteria: x          Culture - Fungal, Body Fluid (collected 02-13-25 @ 13:41)  Source: Peritoneal  Preliminary Report (02-14-25 @ 09:20):    Testing in progress    Culture - Body Fluid with Gram Stain (collected 02-13-25 @ 13:41)  Source: Peritoneal  Gram Stain (02-13-25 @ 22:19):    polymorphonuclear leukocytes seen    No organisms seen    by cytocentrifuge  Preliminary Report (02-14-25 @ 16:46):    No growth    Culture - Blood (collected 02-11-25 @ 00:20)  Source: .Blood BLOOD  Preliminary Report (02-15-25 @ 05:00):    No growth at 4 days    Culture - Blood (collected 02-10-25 @ 23:54)  Source: .Blood BLOOD  Preliminary Report (02-15-25 @ 05:00):    No growth at 4 days        Medications:   albuterol/ipratropium for Nebulization 3 milliLiter(s) Nebulizer every 6 hours PRN  chlorhexidine 2% Cloths 1 Application(s) Topical <User Schedule>  folic acid 1 milliGRAM(s) Oral daily  furosemide    Tablet 40 milliGRAM(s) Oral daily  guaiFENesin Oral Liquid (Sugar-Free) 100 milliGRAM(s) Oral every 6 hours PRN  heparin   Injectable 5000 Unit(s) SubCutaneous every 8 hours  lactulose Syrup 10 Gram(s) Oral three times a day  melatonin 3 milliGRAM(s) Oral at bedtime  midodrine. 15 milliGRAM(s) Oral three times a day  pantoprazole    Tablet 40 milliGRAM(s) Oral before breakfast  rifAXIMin 550 milliGRAM(s) Oral two times a day  spironolactone 100 milliGRAM(s) Oral daily  sucralfate suspension 1 Gram(s) Oral every 6 hours  thiamine 100 milliGRAM(s) Oral daily  vancomycin    Solution 125 milliGRAM(s) Oral every 12 hours      RADIOLOGY, EKG & ADDITIONAL TESTS: Reviewed.

## 2025-02-15 NOTE — PROGRESS NOTE ADULT - PROBLEM SELECTOR PLAN 1
- C/b hepatic encephalopathy and esophageal varices  - Paracentesis done today; 1.4L removed  - Pt appears clinically euvolemic on exam    Plan:  - Continue on Lasix 40mg and Spironolactone 100mg qD  - Continue on lactulose and rifaximin  - Low current suspicion for SBP; pt taking IV CTX 2g qD for bacteremia  - fu Transplant hepatology recs       > PETH level, Type and Screen, Would repeat TAJ and ASMA, IgG levels, Microsomal Ab  - fu abdominal US for ascites - mild-mod ascites       > Paracentesis tentatively planned with IR 2/13       > Fu cell count       > Negative for SBP       > Consider SBP prophylaxis - C/b hepatic encephalopathy and esophageal varices  - Paracentesis done today; 1.4L removed  - Pt appears clinically euvolemic on exam    Plan:  - Continue on Lasix 40mg and Spironolactone 100mg qD  - Continue on lactulose and rifaximin  - Low current suspicion for SBP; pt taking IV CTX 2g qD for bacteremia  - fu Transplant hepatology recs       > PETH level, Type and Screen, repeat TAJ and ASMA, IgG levels, Microsomal Ab  - fu abdominal US for ascites - mild-mod ascites       > Paracentesis tentatively planned with IR 2/13       > Fu cell count       > Negative for SBP       > Consider SBP prophylaxis - C/b hepatic encephalopathy and esophageal varices  - Paracentesis done today; 1.4L removed  - Pt appears clinically euvolemic on exam    Plan:  - Continue on Lasix 40mg and Spironolactone 100mg qD  - Continue on lactulose and rifaximin  - Low current suspicion for SBP; pt taking IV CTX 2g qD for bacteremia  - fu Transplant hepatology recs       > PETH level, Type and Screen, repeat TAJ and ASMA, IgG levels, Microsomal Ab  - fu abdominal US for ascites - mild-mod ascites       > Paracentesis tentatively planned with IR 2/13       > Fu cell count       > Negative for SBP       > Consider SBP prophylaxis  > pending transport to  for transplant eval

## 2025-02-15 NOTE — CHART NOTE - NSCHARTNOTEFT_GEN_A_CORE
Patient is a 60yo postmenopausal female with alcoholic cirrhosis for whom GYN was consulted to perform PAP smear for transplant evaluation. Denies hx of postmenopausal bleeding    POB:     OBHx: C/Sx1    GYN Hx:  - Menopause at 53y/o  - Last pap smear , NILM  - Pt reports hx of abnormal pap smear years ago s/p normal colpo  - Denies hx of fibroids, ovarian cysts, STIs, endometriosis      Chaperone: Kulwant Redman  Procedure: cervical pap smear  PAP smear completed without complication. Cervix visualized and wnl. No VB or abnormal discharge. Atrophic vaginal mucosa.       Yanely Delvalle, PGY2

## 2025-02-16 LAB
CULTURE RESULTS: SIGNIFICANT CHANGE UP
CULTURE RESULTS: SIGNIFICANT CHANGE UP
GLUCOSE BLDC GLUCOMTR-MCNC: 111 MG/DL — HIGH (ref 70–99)
SPECIMEN SOURCE: SIGNIFICANT CHANGE UP
SPECIMEN SOURCE: SIGNIFICANT CHANGE UP

## 2025-02-16 PROCEDURE — 99232 SBSQ HOSP IP/OBS MODERATE 35: CPT

## 2025-02-16 PROCEDURE — 99232 SBSQ HOSP IP/OBS MODERATE 35: CPT | Mod: GC

## 2025-02-16 PROCEDURE — 74183 MRI ABD W/O CNTR FLWD CNTR: CPT | Mod: 26

## 2025-02-16 RX ORDER — LACTULOSE 10 G/15ML
10 SOLUTION ORAL
Refills: 0 | Status: DISCONTINUED | OUTPATIENT
Start: 2025-02-16 | End: 2025-02-24

## 2025-02-16 RX ORDER — CIPROFLOXACIN HCL 250 MG
500 TABLET ORAL DAILY
Refills: 0 | Status: DISCONTINUED | OUTPATIENT
Start: 2025-02-16 | End: 2025-02-24

## 2025-02-16 RX ADMIN — LACTULOSE 10 GRAM(S): 10 SOLUTION ORAL at 06:16

## 2025-02-16 RX ADMIN — Medication 1 APPLICATION(S): at 12:15

## 2025-02-16 RX ADMIN — Medication 40 MILLIGRAM(S): at 06:12

## 2025-02-16 RX ADMIN — Medication 3 MILLIGRAM(S): at 21:23

## 2025-02-16 RX ADMIN — FUROSEMIDE 40 MILLIGRAM(S): 10 INJECTION INTRAMUSCULAR; INTRAVENOUS at 06:12

## 2025-02-16 RX ADMIN — MIDODRINE HYDROCHLORIDE 15 MILLIGRAM(S): 5 TABLET ORAL at 12:14

## 2025-02-16 RX ADMIN — DEXTROMETHORPHAN HBR, GUAIFENESIN 100 MILLIGRAM(S): 200 LIQUID ORAL at 00:01

## 2025-02-16 RX ADMIN — Medication 100 MILLIGRAM(S): at 06:12

## 2025-02-16 RX ADMIN — HEPARIN SODIUM 5000 UNIT(S): 1000 INJECTION INTRAVENOUS; SUBCUTANEOUS at 06:13

## 2025-02-16 RX ADMIN — Medication 100 MILLIGRAM(S): at 12:14

## 2025-02-16 RX ADMIN — DEXTROMETHORPHAN HBR, GUAIFENESIN 100 MILLIGRAM(S): 200 LIQUID ORAL at 21:23

## 2025-02-16 RX ADMIN — HEPARIN SODIUM 5000 UNIT(S): 1000 INJECTION INTRAVENOUS; SUBCUTANEOUS at 21:24

## 2025-02-16 RX ADMIN — HEPARIN SODIUM 5000 UNIT(S): 1000 INJECTION INTRAVENOUS; SUBCUTANEOUS at 14:30

## 2025-02-16 RX ADMIN — Medication 1 GRAM(S): at 00:00

## 2025-02-16 RX ADMIN — Medication 1 GRAM(S): at 12:13

## 2025-02-16 RX ADMIN — Medication 125 MILLIGRAM(S): at 06:13

## 2025-02-16 RX ADMIN — FOLIC ACID 1 MILLIGRAM(S): 1 TABLET ORAL at 12:13

## 2025-02-16 RX ADMIN — Medication 500 MILLIGRAM(S): at 12:13

## 2025-02-16 RX ADMIN — Medication 1 GRAM(S): at 06:13

## 2025-02-16 RX ADMIN — MIDODRINE HYDROCHLORIDE 15 MILLIGRAM(S): 5 TABLET ORAL at 21:24

## 2025-02-16 RX ADMIN — Medication 125 MILLIGRAM(S): at 21:23

## 2025-02-16 RX ADMIN — MIDODRINE HYDROCHLORIDE 15 MILLIGRAM(S): 5 TABLET ORAL at 06:12

## 2025-02-16 RX ADMIN — Medication 1 GRAM(S): at 21:23

## 2025-02-16 NOTE — DIETITIAN INITIAL EVALUATION ADULT - ENERGY INTAKE
Pt reports improving appetite/PO intake in this hospital with ~50-75% of foods provided, has been ordering preferred meals from the kitchen. States some foods are too dry for her however states able to tolerate a bagel (brought by boyfriend), discussed softer food choices available on the menu and encourage pt to order as needed. Amenable to receive Ensure Max in chocolate when offered.

## 2025-02-16 NOTE — DIETITIAN INITIAL EVALUATION ADULT - OTHER CALCULATIONS
Fluid needs deferred to team. Energy and protein needs based on current weight of 59kg in consideration of malnutrition and Increased nutrient needs.

## 2025-02-16 NOTE — PROGRESS NOTE ADULT - ASSESSMENT
60yo F w/ PMH of alcohol cirrhosis with recurrent ascites, s/p multiple paracentesis, s/p banding of esophageal varices, currently being treated for recent c. diff colitis and possible endocarditis. Pt is jaundiced, with scleral icterus and abdominal distension presenting for liver transplant evaluation from [facility].

## 2025-02-16 NOTE — DIETITIAN INITIAL EVALUATION ADULT - NSFNSGIASSESSMENTFT_GEN_A_CORE
Ordered for Protonix and Carafate. Pt denies nausea/vomiting/constipation/at present. Reports 5x BM today (expected with Lactulose), will continue to monitor GI function.

## 2025-02-16 NOTE — DIETITIAN INITIAL EVALUATION ADULT - OTHER INFO
- transfer from [place] for evaluation of liver transplant   - Decompensated EtOH Associated Cirrhosis: on Lasix, Spironolactone, Lactulose and rifaximin, s/p paracentesis 2/13  - Per chart: "recent dx of DM with 15U Lantus every night" however pt denies dx of DM, most recent HbA1c 4.7 on 2/11/25

## 2025-02-16 NOTE — DIETITIAN INITIAL EVALUATION ADULT - PERTINENT MEDS FT
MEDICATIONS  (STANDING):  chlorhexidine 2% Cloths 1 Application(s) Topical <User Schedule>  ciprofloxacin     Tablet 500 milliGRAM(s) Oral daily  folic acid 1 milliGRAM(s) Oral daily  furosemide    Tablet 40 milliGRAM(s) Oral daily  heparin   Injectable 5000 Unit(s) SubCutaneous every 8 hours  lactulose Syrup 10 Gram(s) Oral two times a day  melatonin 3 milliGRAM(s) Oral at bedtime  midodrine. 15 milliGRAM(s) Oral three times a day  pantoprazole    Tablet 40 milliGRAM(s) Oral before breakfast  rifAXIMin 550 milliGRAM(s) Oral two times a day  spironolactone 100 milliGRAM(s) Oral daily  sucralfate suspension 1 Gram(s) Oral every 6 hours  thiamine 100 milliGRAM(s) Oral daily  vancomycin    Solution 125 milliGRAM(s) Oral every 12 hours    MEDICATIONS  (PRN):  albuterol/ipratropium for Nebulization 3 milliLiter(s) Nebulizer every 6 hours PRN Shortness of Breath and/or Wheezing  guaiFENesin Oral Liquid (Sugar-Free) 100 milliGRAM(s) Oral every 6 hours PRN Cough

## 2025-02-16 NOTE — DIETITIAN INITIAL EVALUATION ADULT - ADD RECOMMEND
-- Continue micronutrient supplements as ordered, pending no medical contraindications, for micronutrient support.   -- Monitor PO intake, GI tolerance, skin integrity, labs, weight, and bowel movement regularity.   -- Encourage use of daily menus. Honor dietary preferences as expressed as able.   -- Malnutrition alert placed in chart.

## 2025-02-16 NOTE — DIETITIAN INITIAL EVALUATION ADULT - ORAL INTAKE PTA/DIET HISTORY
Pt reports fluctuating PO intake overtime due to multiple hospitalizations. Confirms no known food allergies. Reports mouth very dry and has some difficulty with swallowing tough meat. Denies chewing or swallowing issues. Denies GI distress. Pt reports drinking Lean body protein supplement 1x daily, on Feso4, Thiamine and Folic Acid per H&P. Noted ETOH use per chart, has not consume for > 1 year.

## 2025-02-16 NOTE — PROGRESS NOTE ADULT - PROBLEM SELECTOR PLAN 3
Pt tested positive for C. diff colitis at [facility]. Started on oral vancomycin.     Plan:   - S/p 2 weeks vancomycin

## 2025-02-16 NOTE — DIETITIAN INITIAL EVALUATION ADULT - PERTINENT LABORATORY DATA
A1C with Estimated Average Glucose Result: 4.7 % (02-11-25 @ 18:56)  A1C with Estimated Average Glucose Result: 4.6 % (02-11-25 @ 07:17)

## 2025-02-16 NOTE — DIETITIAN INITIAL EVALUATION ADULT - PROBLEM SELECTOR PLAN 2
-iso e. coli and bacteroides bacteremia. Pt with evidence of mitral valve vegetation on TTE; not confirmed with ARACELI due to concerns regarding esophageal varices.   -Likely in setting of recent dental extraction in December    Plan:   - Continue with empiric CTX 2g qD (for 6 weeks, ends 3/14/25)  - Continue with Flagyl 500mg q8 (for 2 weeks, ends 2/13/25) for bacteroides bacteremia  - ID consult in AM

## 2025-02-16 NOTE — DIETITIAN INITIAL EVALUATION ADULT - NS FNS DIET ORDER
Diet, Consistent Carbohydrate/No Snacks:   1500mL Fluid Restriction ([id number])  Low Sodium (02-12-25 @ 07:30) [Active]

## 2025-02-16 NOTE — DIETITIAN INITIAL EVALUATION ADULT - ORAL NUTRITION SUPPLEMENTS
Recommend Ensure Max 2x daily (320 calories, 60g proteins) to provide additional calories and nutrients to encourage PO intake while in house.

## 2025-02-16 NOTE — DIETITIAN INITIAL EVALUATION ADULT - PROBLEM SELECTOR PLAN 3
Pt tested positive for C. diff colitis at [facility]. Started on oral vancomycin.     Plan:   - Plan to continue oral vancomycin until CTX course finished (CTX ends 3/14/25)   - ID consult in AM

## 2025-02-16 NOTE — PROGRESS NOTE ADULT - SUBJECTIVE AND OBJECTIVE BOX
Interval Events:   no acute events  patient is feeling well w/o complaint. eating well. drinking protein supplements. moving in bed as able  vitals and exam are stable  labs pending    Hospital Medications:  albuterol/ipratropium for Nebulization 3 milliLiter(s) Nebulizer every 6 hours PRN  chlorhexidine 2% Cloths 1 Application(s) Topical <User Schedule>  ciprofloxacin     Tablet 500 milliGRAM(s) Oral daily  folic acid 1 milliGRAM(s) Oral daily  furosemide    Tablet 40 milliGRAM(s) Oral daily  guaiFENesin Oral Liquid (Sugar-Free) 100 milliGRAM(s) Oral every 6 hours PRN  heparin   Injectable 5000 Unit(s) SubCutaneous every 8 hours  lactulose Syrup 10 Gram(s) Oral two times a day  melatonin 3 milliGRAM(s) Oral at bedtime  midodrine. 15 milliGRAM(s) Oral three times a day  pantoprazole    Tablet 40 milliGRAM(s) Oral before breakfast  rifAXIMin 550 milliGRAM(s) Oral two times a day  spironolactone 100 milliGRAM(s) Oral daily  sucralfate suspension 1 Gram(s) Oral every 6 hours  thiamine 100 milliGRAM(s) Oral daily  vancomycin    Solution 125 milliGRAM(s) Oral every 12 hours      ROS: See above.    PHYSICAL EXAM:   Vital Signs:  Vital Signs Last 24 Hrs  T(C): 36.9 (16 Feb 2025 11:02), Max: 36.9 (15 Feb 2025 17:09)  T(F): 98.5 (16 Feb 2025 11:02), Max: 98.5 (15 Feb 2025 17:09)  HR: 108 (16 Feb 2025 11:02) (103 - 112)  BP: 100/60 (16 Feb 2025 11:02) (100/60 - 112/67)  BP(mean): --  RR: 18 (16 Feb 2025 11:02) (18 - 18)  SpO2: 95% (16 Feb 2025 11:02) (95% - 98%)    Parameters below as of 16 Feb 2025 11:02  Patient On (Oxygen Delivery Method): room air        GENERAL:  NAD, resting comfortably in bed  HEENT:  sclera icteric  RESPIRATORY:  Normal Effort  CARDIAC:  HDS  ABDOMEN:  Soft, non-tender, non-distended  SKIN:  Warm & Dry. jaundice  NEURO:  Alert, conversant, no focal deficit    LABS:

## 2025-02-16 NOTE — DIETITIAN INITIAL EVALUATION ADULT - ETIOLOGY
increased physiological demand for nutrients  decreased ability to consume adequate protein-energy in setting of increased physiological demand for nutrients

## 2025-02-16 NOTE — PROGRESS NOTE ADULT - ASSESSMENT
60 yo F w/ PMH HTN, anemia, EtOH associated cirrhosis c/b ascites/EV (s/p banding last 11/2024), AUD now sober for ~3 months, bleeding rectal varices vs hemorrhoidal bleeding s/p hemorrhoidectomy (11/2023) presenting as a transfer from [place] for evaluation of liver transplant.     #Decompensated EtOH Associated Cirrhosis  #Bacteriodes and E coli Bacteremia   #Influenza  #C-diff colitis     Patient thought to have EtOH cirrhosis, w/ low MELD 18 as an outpatient, however w/ significant hyperbilirubinemia and elevated INR. Suspect decompensation likely in the setting of multiple active infections. Bacteroides and E coli bacteremia initially thought to be due to endocarditis but appears that repeat TTE shows no vegetation and likely false read at OSH. Suspect SBP may be source of bacteremia. Patient's underlying cirrhosis in the setting of EtOH use, however, outpatient labs suggestive of possible concomitant AIH w/ elevated TAJ and ASMA. Per patient, she has been sober for many months. Outpatient PETH however > 400 on 1/17/2024.    MELD: 27 (2/14) B+  HE: no overt evidence of HE. On lactulose and rifaximin  Varices: EGD 11/5/2024 w/ small varices w/ blue bleb s/p 2 bands placed. Last EGD 1/31, no report available but per pt no high risk varices. Per outpatient notes unable to tolerate B, however retrialed on coreg w/ success as outpatient.  On Coreg 3.125 mg BID at home   Ascites: Hx significant ascites w/ prior hx LVP, s/p para 2/13 w/ 3 L fluid removed- neg for sbp  HCC: CT w/ IV contrast 1/27 w/o evidence of suspicious lesion   OLT: OLT eval opened 2/14    Recommendations:  - Please trend CMP and PT/INR  daily forMELD calculation  - Continue lasix 40 mg, spironolactone 100 mg QD  - Cipro 500 mg daily for SBP ppx   - Obtain MRI Abdomen W/WO Contrast  - Vancomycin po per ID  - Follow up repeat PETH   - Send repeat TAJ, ASMA, AMA, IgG level for autoimmune workup  - Proceed with liver transplant process  - Folic acid, thiamine, multivitamin  - Psych, social work consults  - Nutrition, PT  - Transplant ID consult

## 2025-02-16 NOTE — DIETITIAN INITIAL EVALUATION ADULT - DIET TYPE
consider removing Consistent Carbohydrate diet to offer for more food choices in house, continue low Na diet, on 1500ml fluid restriction per team.

## 2025-02-16 NOTE — PROGRESS NOTE ADULT - SUBJECTIVE AND OBJECTIVE BOX
DATE OF SERVICE: 02-16-25 @ 07:39    Patient is a 59y old  Female who presents with a chief complaint of Transfer from [place] for liver transplant evaluation (15 Feb 2025 12:30)      SUBJECTIVE / OVERNIGHT EVENTS:    MEDICATIONS  (STANDING):  chlorhexidine 2% Cloths 1 Application(s) Topical <User Schedule>  folic acid 1 milliGRAM(s) Oral daily  furosemide    Tablet 40 milliGRAM(s) Oral daily  heparin   Injectable 5000 Unit(s) SubCutaneous every 8 hours  lactulose Syrup 10 Gram(s) Oral three times a day  melatonin 3 milliGRAM(s) Oral at bedtime  midodrine. 15 milliGRAM(s) Oral three times a day  pantoprazole    Tablet 40 milliGRAM(s) Oral before breakfast  rifAXIMin 550 milliGRAM(s) Oral two times a day  spironolactone 100 milliGRAM(s) Oral daily  sucralfate suspension 1 Gram(s) Oral every 6 hours  thiamine 100 milliGRAM(s) Oral daily  vancomycin    Solution 125 milliGRAM(s) Oral every 12 hours    MEDICATIONS  (PRN):  albuterol/ipratropium for Nebulization 3 milliLiter(s) Nebulizer every 6 hours PRN Shortness of Breath and/or Wheezing  guaiFENesin Oral Liquid (Sugar-Free) 100 milliGRAM(s) Oral every 6 hours PRN Cough      Vital Signs Last 24 Hrs  T(C): 36.8 (16 Feb 2025 05:02), Max: 37 (15 Feb 2025 12:10)  T(F): 98.2 (16 Feb 2025 05:02), Max: 98.6 (15 Feb 2025 12:10)  HR: 112 (16 Feb 2025 05:02) (103 - 112)  BP: 103/67 (16 Feb 2025 05:02) (103/63 - 112/67)  BP(mean): --  RR: 18 (16 Feb 2025 05:02) (18 - 18)  SpO2: 96% (16 Feb 2025 05:02) (95% - 98%)    Parameters below as of 16 Feb 2025 05:02  Patient On (Oxygen Delivery Method): room air      CAPILLARY BLOOD GLUCOSE        I&O's Summary    15 Feb 2025 07:01  -  16 Feb 2025 07:00  --------------------------------------------------------  IN: 240 mL / OUT: 350 mL / NET: -110 mL        PHYSICAL EXAM:  GENERAL: NAD, well-developed  HEAD:  Atraumatic, Normocephalic  EYES: EOMI, PERRLA, conjunctiva and sclera clear  NECK: Supple, No JVD  CHEST/LUNG: Clear to auscultation bilaterally; No wheeze  HEART: Regular rate and rhythm; No murmurs, rubs, or gallops  ABDOMEN: Soft, Nontender, Nondistended; Bowel sounds present  EXTREMITIES:  2+ Peripheral Pulses, No clubbing, cyanosis, or edema  PSYCH: AAOx3  NEUROLOGY: non-focal  SKIN: No rashes or lesions    LABS:                    RADIOLOGY & ADDITIONAL TESTS:    Imaging Personally Reviewed:    Consultant(s) Notes Reviewed:      Care Discussed with Consultants/Other Providers:   DATE OF SERVICE: 02-16-25 @ 07:39    Patient is a 59y old  Female who presents with a chief complaint of Transfer from [place] for liver transplant evaluation (15 Feb 2025 12:30)      SUBJECTIVE / OVERNIGHT EVENTS: NAEO. Patient had 5 bowel movements this morning, brown in color.    MEDICATIONS  (STANDING):  chlorhexidine 2% Cloths 1 Application(s) Topical <User Schedule>  folic acid 1 milliGRAM(s) Oral daily  furosemide    Tablet 40 milliGRAM(s) Oral daily  heparin   Injectable 5000 Unit(s) SubCutaneous every 8 hours  lactulose Syrup 10 Gram(s) Oral three times a day  melatonin 3 milliGRAM(s) Oral at bedtime  midodrine. 15 milliGRAM(s) Oral three times a day  pantoprazole    Tablet 40 milliGRAM(s) Oral before breakfast  rifAXIMin 550 milliGRAM(s) Oral two times a day  spironolactone 100 milliGRAM(s) Oral daily  sucralfate suspension 1 Gram(s) Oral every 6 hours  thiamine 100 milliGRAM(s) Oral daily  vancomycin    Solution 125 milliGRAM(s) Oral every 12 hours    MEDICATIONS  (PRN):  albuterol/ipratropium for Nebulization 3 milliLiter(s) Nebulizer every 6 hours PRN Shortness of Breath and/or Wheezing  guaiFENesin Oral Liquid (Sugar-Free) 100 milliGRAM(s) Oral every 6 hours PRN Cough      Vital Signs Last 24 Hrs  T(C): 36.8 (16 Feb 2025 05:02), Max: 37 (15 Feb 2025 12:10)  T(F): 98.2 (16 Feb 2025 05:02), Max: 98.6 (15 Feb 2025 12:10)  HR: 112 (16 Feb 2025 05:02) (103 - 112)  BP: 103/67 (16 Feb 2025 05:02) (103/63 - 112/67)  BP(mean): --  RR: 18 (16 Feb 2025 05:02) (18 - 18)  SpO2: 96% (16 Feb 2025 05:02) (95% - 98%)    Parameters below as of 16 Feb 2025 05:02  Patient On (Oxygen Delivery Method): room air      CAPILLARY BLOOD GLUCOSE        I&O's Summary    15 Feb 2025 07:01  -  16 Feb 2025 07:00  --------------------------------------------------------  IN: 240 mL / OUT: 350 mL / NET: -110 mL        PHYSICAL EXAM:  GENERAL: NAD, well-developed  HEAD:  Atraumatic, Normocephalic  EYES: EOMI, conjunctiva and sclera clear  CHEST/LUNG: Clear to auscultation bilaterally; No wheeze  HEART: Regular rate and rhythm; No murmurs, rubs, or gallops  ABDOMEN: soft, moderate distension, nontender; Bowel sounds present  EXTREMITIES:  2+ Peripheral Pulses, No cyanosis or edema  PSYCH: AAOx3  NEUROLOGY: non-focal  SKIN: No rashes or lesions    LABS:                    RADIOLOGY & ADDITIONAL TESTS:    Imaging Personally Reviewed:    Consultant(s) Notes Reviewed:      Care Discussed with Consultants/Other Providers:

## 2025-02-16 NOTE — PROGRESS NOTE ADULT - PROBLEM SELECTOR PLAN 2
-iso e. coli and bacteroides bacteremia. Pt with evidence of mitral valve vegetation on TTE; not confirmed with ARACELI due to concerns regarding esophageal varices.   -Likely in setting of recent dental extraction in December    Plan:   - Continue with empiric CTX 2g qD (for 6 weeks, ends 3/14/25)       > S/p 2 weeks CTX given no evidence of vegetation on rpt TTE  - Continue with Flagyl 500mg q8 (for 2 weeks, ends 2/13/25) for bacteroides bacteremia  - fu ID consult recs      > no vegetations seen on TTE  - Monitor off abx       > Will need rpt TTE in 2-4 weeks

## 2025-02-16 NOTE — DIETITIAN INITIAL EVALUATION ADULT - PHYSCIAL ASSESSMENT
No dosing Height since admission, Height per pt: 66.5"    Drug Dosing Weight (kg): 59.9 (13 Feb 2025 02:34)  Daily weight (standing or bed scale): none documented thus far   Weight obtained by RD: 59kg/130lb (bed scale)     Weight history:     [facility]: 52.2kg (1/27/25), 60.6kg (11/16/24), 59.9kg (4/29/24), 54.4kg (10/7/23)   Per pt: estimate ~125lb/57kg, reports history of weight loss from 130lb to 109lb due to medical condition/decreased PO intake/fluid shift.   ** weight fluctuation in house expected with: fluid shift, inconsistent PO intake, scale accuracy. RD will continue to monitor wt trends as available/able.     IBW: 133lb, 98% IBW

## 2025-02-16 NOTE — DIETITIAN INITIAL EVALUATION ADULT - REASON INDICATOR FOR ASSESSMENT
Pt seen for consult for frailty assessment. Information obtained from pt, RN, electronic medical record. Chart reviewed, events noted. 
Unable to assess

## 2025-02-16 NOTE — PROGRESS NOTE ADULT - PROBLEM SELECTOR PLAN 1
- C/b hepatic encephalopathy and esophageal varices  - Paracentesis done today; 1.4L removed  - Pt appears clinically euvolemic on exam    Plan:  - Continue on Lasix 40mg and Spironolactone 100mg qD  - Continue on lactulose and rifaximin  - Low current suspicion for SBP; pt taking IV CTX 2g qD for bacteremia  - fu Transplant hepatology recs       > PETH level, Type and Screen, repeat TAJ and ASMA, IgG levels, Microsomal Ab  - fu abdominal US for ascites - mild-mod ascites       > Paracentesis tentatively planned with IR 2/13       > Fu cell count       > Negative for SBP       > Consider SBP prophylaxis  > pending transport to  for transplant eval - C/b hepatic encephalopathy and esophageal varices  - Paracentesis done today; 1.4L removed  - Pt appears clinically euvolemic on exam    Plan:  - Continue on Lasix 40mg and Spironolactone 100mg qD  - Continue on lactulose 10 mg BID and rifaximin  - Low current suspicion for SBP; pt taking IV CTX 2g qD for bacteremia  - fu Transplant hepatology recs       > PETH level, Type and Screen, repeat TAJ and ASMA, IgG levels, Microsomal Ab  - fu abdominal US for ascites - mild-mod ascites       > Paracentesis tentatively planned with IR 2/13       > Fu cell count       > Negative for SBP       > Consider SBP prophylaxis  > pending transport to  for transplant eval

## 2025-02-17 LAB
ALBUMIN SERPL ELPH-MCNC: 2.8 G/DL — LOW (ref 3.3–5)
ALP SERPL-CCNC: 282 U/L — HIGH (ref 40–120)
ALT FLD-CCNC: 36 U/L — SIGNIFICANT CHANGE UP (ref 10–45)
ANION GAP SERPL CALC-SCNC: 11 MMOL/L — SIGNIFICANT CHANGE UP (ref 5–17)
APTT BLD: 43.1 SEC — HIGH (ref 24.5–35.6)
AST SERPL-CCNC: 79 U/L — HIGH (ref 10–40)
BASOPHILS # BLD AUTO: 0.01 K/UL — SIGNIFICANT CHANGE UP (ref 0–0.2)
BASOPHILS NFR BLD AUTO: 0.1 % — SIGNIFICANT CHANGE UP (ref 0–2)
BILIRUB SERPL-MCNC: 12.5 MG/DL — HIGH (ref 0.2–1.2)
BUN SERPL-MCNC: 13 MG/DL — SIGNIFICANT CHANGE UP (ref 7–23)
CALCIUM SERPL-MCNC: 8.1 MG/DL — LOW (ref 8.4–10.5)
CHLORIDE SERPL-SCNC: 99 MMOL/L — SIGNIFICANT CHANGE UP (ref 96–108)
CO2 SERPL-SCNC: 19 MMOL/L — LOW (ref 22–31)
CREAT SERPL-MCNC: 0.34 MG/DL — LOW (ref 0.5–1.3)
EGFR: 117 ML/MIN/1.73M2 — SIGNIFICANT CHANGE UP
EGFR: 117 ML/MIN/1.73M2 — SIGNIFICANT CHANGE UP
EOSINOPHIL # BLD AUTO: 0.07 K/UL — SIGNIFICANT CHANGE UP (ref 0–0.5)
EOSINOPHIL NFR BLD AUTO: 0.7 % — SIGNIFICANT CHANGE UP (ref 0–6)
GLUCOSE SERPL-MCNC: 126 MG/DL — HIGH (ref 70–99)
HAV IGG SER QL IA: REACTIVE
HAV IGM SER-ACNC: SIGNIFICANT CHANGE UP
HBV CORE AB SER-ACNC: SIGNIFICANT CHANGE UP
HBV SURFACE AB SER-ACNC: 448.6 MIU/ML — SIGNIFICANT CHANGE UP
HBV SURFACE AB SER-ACNC: REACTIVE — SIGNIFICANT CHANGE UP
HCT VFR BLD CALC: 26.2 % — LOW (ref 34.5–45)
HGB BLD-MCNC: 8.5 G/DL — LOW (ref 11.5–15.5)
HIV 1+2 AB+HIV1 P24 AG SERPL QL IA: SIGNIFICANT CHANGE UP
IGA FLD-MCNC: 506 MG/DL — HIGH (ref 84–499)
IGG FLD-MCNC: 1484 MG/DL — SIGNIFICANT CHANGE UP (ref 610–1660)
IGG FLD-MCNC: 1526 MG/DL — SIGNIFICANT CHANGE UP (ref 610–1660)
IGG1 SER-MCNC: 924 MG/DL — SIGNIFICANT CHANGE UP (ref 240–1118)
IGG2 SER-MCNC: 404 MG/DL — SIGNIFICANT CHANGE UP (ref 124–549)
IGG3 SER-MCNC: 99.3 MG/DL — SIGNIFICANT CHANGE UP (ref 15–102)
IGG4 SER-MCNC: 62.2 MG/DL — SIGNIFICANT CHANGE UP (ref 1–123)
IGM SERPL-MCNC: 220 MG/DL — SIGNIFICANT CHANGE UP (ref 35–242)
IMM GRANULOCYTES NFR BLD AUTO: 1.7 % — HIGH (ref 0–0.9)
INR BLD: 2.16 RATIO — HIGH (ref 0.85–1.16)
LYMPHOCYTES # BLD AUTO: 1.23 K/UL — SIGNIFICANT CHANGE UP (ref 1–3.3)
LYMPHOCYTES # BLD AUTO: 12.6 % — LOW (ref 13–44)
MAGNESIUM SERPL-MCNC: 1.2 MG/DL — LOW (ref 1.6–2.6)
MCHC RBC-ENTMCNC: 30.7 PG — SIGNIFICANT CHANGE UP (ref 27–34)
MCHC RBC-ENTMCNC: 32.4 G/DL — SIGNIFICANT CHANGE UP (ref 32–36)
MCV RBC AUTO: 94.6 FL — SIGNIFICANT CHANGE UP (ref 80–100)
MONOCYTES # BLD AUTO: 1.01 K/UL — HIGH (ref 0–0.9)
MONOCYTES NFR BLD AUTO: 10.4 % — SIGNIFICANT CHANGE UP (ref 2–14)
NEUTROPHILS # BLD AUTO: 7.26 K/UL — SIGNIFICANT CHANGE UP (ref 1.8–7.4)
NEUTROPHILS NFR BLD AUTO: 74.5 % — SIGNIFICANT CHANGE UP (ref 43–77)
NRBC BLD AUTO-RTO: 0 /100 WBCS — SIGNIFICANT CHANGE UP (ref 0–0)
PETH 16:0/18:1: 72 NG/ML — HIGH
PETH 16:0/18:2: 28 NG/ML — HIGH
PETH COMMENTS: SIGNIFICANT CHANGE UP
PHOSPHATE SERPL-MCNC: 1.5 MG/DL — LOW (ref 2.5–4.5)
PLATELET # BLD AUTO: 71 K/UL — LOW (ref 150–400)
POTASSIUM SERPL-MCNC: 4.2 MMOL/L — SIGNIFICANT CHANGE UP (ref 3.5–5.3)
POTASSIUM SERPL-SCNC: 4.2 MMOL/L — SIGNIFICANT CHANGE UP (ref 3.5–5.3)
PROT SERPL-MCNC: 6.1 G/DL — SIGNIFICANT CHANGE UP (ref 6–8.3)
PROTHROM AB SERPL-ACNC: 24.4 SEC — HIGH (ref 9.9–13.4)
RBC # BLD: 2.77 M/UL — LOW (ref 3.8–5.2)
RBC # FLD: 24.1 % — HIGH (ref 10.3–14.5)
SODIUM SERPL-SCNC: 129 MMOL/L — LOW (ref 135–145)
STRONGYLOIDES AB SER-ACNC: NEGATIVE — SIGNIFICANT CHANGE UP
WBC # BLD: 9.75 K/UL — SIGNIFICANT CHANGE UP (ref 3.8–10.5)
WBC # FLD AUTO: 9.75 K/UL — SIGNIFICANT CHANGE UP (ref 3.8–10.5)

## 2025-02-17 PROCEDURE — 99232 SBSQ HOSP IP/OBS MODERATE 35: CPT | Mod: GC

## 2025-02-17 RX ORDER — SODIUM PHOSPHATE,DIBASIC DIHYD
30 POWDER (GRAM) MISCELLANEOUS ONCE
Refills: 0 | Status: COMPLETED | OUTPATIENT
Start: 2025-02-17 | End: 2025-02-17

## 2025-02-17 RX ORDER — MAGNESIUM SULFATE 500 MG/ML
2 SYRINGE (ML) INJECTION ONCE
Refills: 0 | Status: COMPLETED | OUTPATIENT
Start: 2025-02-17 | End: 2025-02-17

## 2025-02-17 RX ADMIN — Medication 100 MILLIGRAM(S): at 05:39

## 2025-02-17 RX ADMIN — Medication 500 MILLIGRAM(S): at 13:16

## 2025-02-17 RX ADMIN — Medication 40 MILLIGRAM(S): at 05:39

## 2025-02-17 RX ADMIN — Medication 1 GRAM(S): at 13:16

## 2025-02-17 RX ADMIN — HEPARIN SODIUM 5000 UNIT(S): 1000 INJECTION INTRAVENOUS; SUBCUTANEOUS at 21:17

## 2025-02-17 RX ADMIN — Medication 3 MILLIGRAM(S): at 21:17

## 2025-02-17 RX ADMIN — Medication 1 GRAM(S): at 01:02

## 2025-02-17 RX ADMIN — Medication 15 MILLILITER(S): at 21:18

## 2025-02-17 RX ADMIN — MIDODRINE HYDROCHLORIDE 15 MILLIGRAM(S): 5 TABLET ORAL at 05:39

## 2025-02-17 RX ADMIN — DEXTROMETHORPHAN HBR, GUAIFENESIN 100 MILLIGRAM(S): 200 LIQUID ORAL at 21:18

## 2025-02-17 RX ADMIN — MIDODRINE HYDROCHLORIDE 15 MILLIGRAM(S): 5 TABLET ORAL at 13:15

## 2025-02-17 RX ADMIN — HEPARIN SODIUM 5000 UNIT(S): 1000 INJECTION INTRAVENOUS; SUBCUTANEOUS at 13:16

## 2025-02-17 RX ADMIN — Medication 1 GRAM(S): at 07:44

## 2025-02-17 RX ADMIN — MIDODRINE HYDROCHLORIDE 15 MILLIGRAM(S): 5 TABLET ORAL at 17:14

## 2025-02-17 RX ADMIN — Medication 1 GRAM(S): at 17:14

## 2025-02-17 RX ADMIN — Medication 100 MILLIGRAM(S): at 13:15

## 2025-02-17 RX ADMIN — Medication 85 MILLIMOLE(S): at 10:19

## 2025-02-17 RX ADMIN — Medication 25 GRAM(S): at 10:19

## 2025-02-17 RX ADMIN — HEPARIN SODIUM 5000 UNIT(S): 1000 INJECTION INTRAVENOUS; SUBCUTANEOUS at 05:39

## 2025-02-17 RX ADMIN — Medication 15 MILLILITER(S): at 17:14

## 2025-02-17 RX ADMIN — Medication 1 APPLICATION(S): at 05:39

## 2025-02-17 RX ADMIN — FOLIC ACID 1 MILLIGRAM(S): 1 TABLET ORAL at 13:15

## 2025-02-17 RX ADMIN — DEXTROMETHORPHAN HBR, GUAIFENESIN 100 MILLIGRAM(S): 200 LIQUID ORAL at 05:38

## 2025-02-17 RX ADMIN — FUROSEMIDE 40 MILLIGRAM(S): 10 INJECTION INTRAMUSCULAR; INTRAVENOUS at 05:38

## 2025-02-17 NOTE — PROGRESS NOTE ADULT - SUBJECTIVE AND OBJECTIVE BOX
PROGRESS NOTE:     Patient is a 59y old  Female who presents with a chief complaint of Transfer from [place] for possible liver transplant (16 Feb 2025 15:34)      ---------------------------------------------------  Micheline Garcia  PGY-2, Internal Medicine  Available on [employer] Teams  Pager: [phone] (PAM), or [phone] (NS)  ---------------------------------------------------    INTERVAL / OVERNIGHT EVENTS:  No acute events overnight.     SUBJECTIVE:  Patient examined at bedside with no acute complaints.     BRIEF DAILY PLAN:  - f/u MR abdomen  - c/w cipro for SBP PPx  - MELD today 29, f/u remaining transplant workup, transfer to  when bed available    MEDICATIONS  (STANDING):  chlorhexidine 2% Cloths 1 Application(s) Topical <User Schedule>  ciprofloxacin     Tablet 500 milliGRAM(s) Oral daily  folic acid 1 milliGRAM(s) Oral daily  furosemide    Tablet 40 milliGRAM(s) Oral daily  heparin   Injectable 5000 Unit(s) SubCutaneous every 8 hours  lactulose Syrup 10 Gram(s) Oral two times a day  magnesium sulfate  IVPB 2 Gram(s) IV Intermittent once  melatonin 3 milliGRAM(s) Oral at bedtime  midodrine. 15 milliGRAM(s) Oral three times a day  pantoprazole    Tablet 40 milliGRAM(s) Oral before breakfast  rifAXIMin 550 milliGRAM(s) Oral two times a day  sodium phosphate 30 milliMole(s)/500 mL IVPB 30 milliMole(s) IV Intermittent once  spironolactone 100 milliGRAM(s) Oral daily  sucralfate suspension 1 Gram(s) Oral every 6 hours  thiamine 100 milliGRAM(s) Oral daily    MEDICATIONS  (PRN):  albuterol/ipratropium for Nebulization 3 milliLiter(s) Nebulizer every 6 hours PRN Shortness of Breath and/or Wheezing  guaiFENesin Oral Liquid (Sugar-Free) 100 milliGRAM(s) Oral every 6 hours PRN Cough      CAPILLARY BLOOD GLUCOSE      POCT Blood Glucose.: 111 mg/dL (16 Feb 2025 17:57)    I&O's Summary    16 Feb 2025 07:01  -  17 Feb 2025 07:00  --------------------------------------------------------  IN: 360 mL / OUT: 0 mL / NET: 360 mL        VITALS:   T(C): 36.7 (02-17-25 @ 05:29), Max: 36.9 (02-16-25 @ 11:02)  HR: 115 (02-17-25 @ 05:29) (108 - 115)  BP: 105/65 (02-17-25 @ 05:29) (99/63 - 105/65)  RR: 18 (02-17-25 @ 05:29) (18 - 18)  SpO2: 97% (02-17-25 @ 05:29) (95% - 97%)    GENERAL: Follows conversation appropriately. No acute distress. Jaundiced  EYES: Tracks me in room. Scleral ictus   HENT: Moist mucous membranes.  LUNGS: Clear to auscultation bilaterally. No accessory muscle use.  CARDIOVASCULAR: Regular rate and rhythm. No murmur.  ABDOMEN: Soft, non-tender, +distention. No palpable masses.  EXTREMITIES: No edema. Non-tender.  SKIN: No rashes or lesions. Jaundiced. Warm. PICC in place L upper arm  NEUROLOGIC: No focal neurological deficits  PSYCHIATRIC: Cooperative. Appropriate mood and affect.    LABS:                        8.5    9.75  )-----------( 71       ( 17 Feb 2025 06:58 )             26.2     02-17    129[L]  |  99  |  13  ----------------------------<  126[H]  4.2   |  19[L]  |  0.34[L]    Ca    8.1[L]      17 Feb 2025 06:58  Phos  1.5     02-17  Mg     1.2     02-17    TPro  6.1  /  Alb  2.8[L]  /  TBili  12.5[H]  /  DBili  x   /  AST  79[H]  /  ALT  36  /  AlkPhos  282[H]  02-17    PT/INR - ( 17 Feb 2025 06:58 )   PT: 24.4 sec;   INR: 2.16 ratio         PTT - ( 17 Feb 2025 06:58 )  PTT:43.1 sec      Urinalysis Basic - ( 17 Feb 2025 06:58 )    Color: x / Appearance: x / SG: x / pH: x  Gluc: 126 mg/dL / Ketone: x  / Bili: x / Urobili: x   Blood: x / Protein: x / Nitrite: x   Leuk Esterase: x / RBC: x / WBC x   Sq Epi: x / Non Sq Epi: x / Bacteria: x          RADIOLOGY & ADDITIONAL TESTS:  Results Reviewed:   Imaging Personally Reviewed:  Electrocardiogram Personally Reviewed:    COORDINATION OF CARE:  Care Discussed with Consultants/Other Providers [Y/N]:  Prior or Outpatient Records Reviewed [Y/N]:

## 2025-02-17 NOTE — PROGRESS NOTE ADULT - ASSESSMENT
58yo F w/ PMH of alcohol cirrhosis with recurrent ascites, s/p multiple paracentesis, s/p banding of esophageal varices, currently being treated for recent c. diff colitis and possible endocarditis. Pt is jaundiced, with scleral icterus and abdominal distension presenting for liver transplant evaluation from [facility].

## 2025-02-17 NOTE — PROGRESS NOTE ADULT - PROBLEM SELECTOR PLAN 3
"  Subjective:     Interval History: No acute events . Infant with Singh Lemli Opitz syndrome now s/p G tube on full feeds EBM 22kcal/oz     Scheduled Meds:   cholecalciferol (vitamin D3)  400 Units Per NG tube Daily     Continuous Infusions:  PRN Meds:    Nutritional Support: Enteral: Breast milk 22 KCal     Objective:     Vital Signs (Most Recent):  Temp: 99.5 °F (37.5 °C) (06/08/24 0800)  Pulse: 150 (06/08/24 0800)  Resp: 60 (06/08/24 0800)  BP: (!) 118/67 (06/08/24 0800)  SpO2: 96 % (06/07/24 2000) Vital Signs (24h Range):  Temp:  [98 °F (36.7 °C)-99.5 °F (37.5 °C)] 99.5 °F (37.5 °C)  Pulse:  [150-199] 150  Resp:  [18-62] 60  SpO2:  [96 %-99 %] 96 %  BP: (104-118)/(60-67) 118/67     Anthropometrics:  Head Circumference: 32.8 cm  Weight: 2600 g (5 lb 11.7 oz) <1 %ile (Z= -2.67) based on Ben (Boys, 22-50 Weeks) weight-for-age data using vitals from 2024.  Weight change: 0 g (0 lb)  Height: 50 cm (19.69") 17 %ile (Z= -0.94) based on Ben (Boys, 22-50 Weeks) Length-for-age data based on Length recorded on 2024.    Intake/Output - Last 3 Shifts         06/06 0700  06/07 0659 06/07 0700 06/08 0659 06/08 0700  06/09 0659    NG/ 400 50    Total Intake(mL/kg) 400 (153.8) 400 (153.8) 50 (19.2)    Urine (mL/kg/hr)       Emesis/NG output       Stool       Total Output       Net +400 +400 +50           Urine Occurrence 8 x 5 x 1 x    Stool Occurrence 4 x 4 x     Emesis Occurrence 1 x 2 x              Physical Exam  Vitals and nursing note reviewed.   Constitutional:       General: He is active.   HENT:      Head:      Comments: Microcephalic,  retrognathia, cleft palate     Ears:      Comments: Bilateral ears low set  Cardiovascular:      Rate and Rhythm: Normal rate and regular rhythm.      Pulses: Normal pulses.      Heart sounds: Normal heart sounds. No murmur heard.  Pulmonary:      Effort: Pulmonary effort is normal. No respiratory distress.      Breath sounds: Normal breath sounds.   Abdominal:      " "General: Bowel sounds are normal.      Palpations: Abdomen is soft.      Comments: Gtube intact with mild surround erythema.    Genitourinary:     Comments: Ambiguous genitalia with bifid scrotum, micropenis, and hypospadius, testes palpable high in canal.   Musculoskeletal:      Cervical back: Normal range of motion.      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 Syndactyly of 2nd to 4th toes on bilateral feet.   Skin:     General: Skin is warm and dry.      Capillary Refill: Capillary refill takes less than 2 seconds.      Coloration: Skin is pale.      Comments: Mottled, small birthmark to back of left thigh. Nevus simplex on glabella, nose and left eyelid.    Neurological:      Mental Status: He is alert.      Comments: Mild hypotonia to bilateral lower extremities      Ventilator Data (Last 24H):              No results for input(s): "PH", "PCO2", "PO2", "HCO3", "POCSATURATED", "BE" in the last 72 hours.     Lines/Drains:  Lines/Drains/Airways       Drain  Duration                  Gastrostomy/Enterostomy 05/30/24 0816 Gastrostomy tube w/ balloon LUQ 9 days                      Laboratory:  CBC:   Lab Results   Component Value Date    WBC 22.76 (H) 2024    RBC 2.93 (L) 2024    HGB 9.4 (L) 2024    HCT 26.8 (L) 2024    MCV 92 2024    MCH 32.1 2024    MCHC 35.1 2024    RDW 15.4 (H) 2024     (H) 2024    MPV 8.9 (L) 2024    GRAN 10.8 (H) 2024    GRAN 47.3 (H) 2024    LYMPH 8.5 2024    LYMPH 37.2 (L) 2024    MONO 2.7 (H) 2024    MONO 11.7 2024    EOS 0.6 2024    BASO 0.04 2024    EOSINOPHIL 2.7 2024    BASOPHIL 0.2 2024       Diagnostic Results:  No new study    " Pt tested positive for C. diff colitis at [facility]. Started on oral vancomycin.     Plan:   - S/p 2 weeks vancomycin

## 2025-02-17 NOTE — PROGRESS NOTE ADULT - SUBJECTIVE AND OBJECTIVE BOX
Hepatology Progress Note    Interval Events:   -Pt overall feeling well with no acute complaints  -MRI done but read pending     Allergies:  Zithromax (Unknown)      Hospital Medications:  albuterol/ipratropium for Nebulization 3 milliLiter(s) Nebulizer every 6 hours PRN  chlorhexidine 2% Cloths 1 Application(s) Topical <User Schedule>  ciprofloxacin     Tablet 500 milliGRAM(s) Oral daily  folic acid 1 milliGRAM(s) Oral daily  furosemide    Tablet 40 milliGRAM(s) Oral daily  guaiFENesin Oral Liquid (Sugar-Free) 100 milliGRAM(s) Oral every 6 hours PRN  heparin   Injectable 5000 Unit(s) SubCutaneous every 8 hours  lactulose Syrup 10 Gram(s) Oral two times a day  melatonin 3 milliGRAM(s) Oral at bedtime  midodrine. 15 milliGRAM(s) Oral three times a day  pantoprazole    Tablet 40 milliGRAM(s) Oral before breakfast  rifAXIMin 550 milliGRAM(s) Oral two times a day  spironolactone 100 milliGRAM(s) Oral daily  sucralfate suspension 1 Gram(s) Oral every 6 hours  thiamine 100 milliGRAM(s) Oral daily      ROS: 14 point ROS negative unless otherwise state in subjective    PHYSICAL EXAM:   Vital Signs:  Vital Signs Last 24 Hrs  T(C): 36.7 (17 Feb 2025 05:29), Max: 36.8 (16 Feb 2025 20:49)  T(F): 98.1 (17 Feb 2025 05:29), Max: 98.2 (16 Feb 2025 20:49)  HR: 115 (17 Feb 2025 05:29) (111 - 115)  BP: 105/65 (17 Feb 2025 05:29) (99/63 - 105/65)  BP(mean): --  RR: 18 (17 Feb 2025 05:29) (18 - 18)  SpO2: 97% (17 Feb 2025 05:29) (97% - 97%)    Parameters below as of 17 Feb 2025 05:29  Patient On (Oxygen Delivery Method): room air      GENERAL:  No acute distress  HEENT:  + scleral icterus  CHEST: no resp distress  HEART:  RRR  ABDOMEN:  Soft, non-tender, non-distended, normoactive bowel sound  EXTREMITIES:  No pedal edema  NEURO:  Alert and oriented x 3    LABS:                        8.5    9.75  )-----------( 71       ( 17 Feb 2025 06:58 )             26.2     Mean Cell Volume: 94.6 fl (02-17-25 @ 06:58)    02-17    129[L]  |  99  |  13  ----------------------------<  126[H]  4.2   |  19[L]  |  0.34[L]    Ca    8.1[L]      17 Feb 2025 06:58  Phos  1.5     02-17  Mg     1.2     02-17    TPro  6.1  /  Alb  2.8[L]  /  TBili  12.5[H]  /  DBili  x   /  AST  79[H]  /  ALT  36  /  AlkPhos  282[H]  02-17    LIVER FUNCTIONS - ( 17 Feb 2025 06:58 )  Alb: 2.8 g/dL / Pro: 6.1 g/dL / ALK PHOS: 282 U/L / ALT: 36 U/L / AST: 79 U/L / GGT: x           PT/INR - ( 17 Feb 2025 06:58 )   PT: 24.4 sec;   INR: 2.16 ratio         PTT - ( 17 Feb 2025 06:58 )  PTT:43.1 sec  Urinalysis Basic - ( 17 Feb 2025 06:58 )    Color: x / Appearance: x / SG: x / pH: x  Gluc: 126 mg/dL / Ketone: x  / Bili: x / Urobili: x   Blood: x / Protein: x / Nitrite: x   Leuk Esterase: x / RBC: x / WBC x   Sq Epi: x / Non Sq Epi: x / Bacteria: x      Imaging:    < from: US Abdomen Limited (02.12.25 @ 15:03) >  IMPRESSION:  *  Mild to moderate volume ascites as above.  *  Cirrhotic liver.      < end of copied text >

## 2025-02-17 NOTE — PROGRESS NOTE ADULT - PROBLEM SELECTOR PLAN 1
- C/b hepatic encephalopathy and esophageal varices  - Paracentesis done today; 1.4L removed  - Pt appears clinically euvolemic on exam    Plan:  - Continue on Lasix 40mg and Spironolactone 100mg qD  - Continue on lactulose 10 mg BID and rifaximin  - Low current suspicion for SBP; pt taking IV CTX 2g qD for bacteremia  - fu Transplant hepatology recs       > PETH level, Type and Screen, repeat TAJ and ASMA, IgG levels, Microsomal Ab  - fu abdominal US for ascites - mild-mod ascites       > Paracentesis tentatively planned with IR 2/13       > Fu cell count       > Negative for SBP       > Consider SBP prophylaxis  > pending transport to  for transplant eval

## 2025-02-17 NOTE — PROGRESS NOTE ADULT - ASSESSMENT
Carmen Ly is a 59 year old F w/ PMH HTN, anemia, EtOH associated cirrhosis c/b ascites/EV (s/p banding last 11/2024), AUD now sober for ~3 months, bleeding rectal varices vs hemorrhoidal bleeding s/p hemorrhoidectomy (11/2023) presenting as a transfer from [place] for evaluation of liver transplant currently undergoing expedited LT evaluation.     #Decompensated EtOH Associated Cirrhosis  #Bacteriodes and E coli Bacteremia   #Influenza  #C-diff colitis   Patient thought to have EtOH cirrhosis, w/ low MELD 18 as an outpatient, however w/ significant hyperbilirubinemia and elevated INR. Suspect decompensation likely in the setting of multiple active infections. Bacteroides and E coli bacteremia initially thought to be due to endocarditis but appears that repeat TTE shows no vegetation and likely false read at OSH. Suspect SBP may be source of bacteremia. Patient's underlying cirrhosis in the setting of EtOH use, however, outpatient labs suggestive of possible concomitant AIH w/ elevated TAJ and ASMA. Per patient, she has been sober for many months. Outpatient PETH however > 400 on 1/17/2024.  MELD: 29 (2/17) B+  HE: no overt evidence of HE. Continue with lactulose and rifaximin  Varices: EGD 11/5/2024 w/ small varices w/ blue bleb s/p 2 bands placed. Last EGD 1/31, no report available but per pt no high risk varices. Per outpatient notes unable to tolerate B, however retrialed on coreg w/ success as outpatient.  On Coreg 3.125 mg BID at home   Ascites: Hx significant ascites w/ prior hx LVP, s/p para 2/13 w/ 3 L fluid removed- neg for sbp. Continue with diuretics as noted below.   HCC: CT w/ IV contrast 1/27 w/o evidence of suspicious lesion. Awaiting results of MRI from 2/17  OLT: OLT eval opened 2/14, will plan for ad-hoc meeting on 2/18    Recommendations:  - Please obtain b/l breast US as part of transplant evaluation   - Continue lasix 40 mg, spironolactone 100 mg QD  - Cipro 500 mg daily for SBP ppx   - Follow up MRI Abdomen W/WO Contrast  - Vancomycin po per ID recs  - Follow up repeat PETH results  - Proceed with liver transplant process  - Folic acid, thiamine, multivitamin  - Psych, social work consults  - Nutrition, PT  - Plan for ad-hoc meeting on Tuesday 2/18    All recommendations are tentative until note is attested by attending.

## 2025-02-18 DIAGNOSIS — F33.1 MAJOR DEPRESSIVE DISORDER, RECURRENT, MODERATE: ICD-10-CM

## 2025-02-18 DIAGNOSIS — Z01.818 ENCOUNTER FOR OTHER PREPROCEDURAL EXAMINATION: ICD-10-CM

## 2025-02-18 DIAGNOSIS — F17.201 NICOTINE DEPENDENCE, UNSPECIFIED, IN REMISSION: ICD-10-CM

## 2025-02-18 LAB
ALBUMIN SERPL ELPH-MCNC: 2.8 G/DL — LOW (ref 3.3–5)
ALP SERPL-CCNC: 300 U/L — HIGH (ref 40–120)
ALT FLD-CCNC: 32 U/L — SIGNIFICANT CHANGE UP (ref 10–45)
ANA TITR SER: NEGATIVE — SIGNIFICANT CHANGE UP
ANA TITR SER: NEGATIVE — SIGNIFICANT CHANGE UP
ANION GAP SERPL CALC-SCNC: 12 MMOL/L — SIGNIFICANT CHANGE UP (ref 5–17)
ANION GAP SERPL CALC-SCNC: 13 MMOL/L — SIGNIFICANT CHANGE UP (ref 5–17)
APPEARANCE UR: ABNORMAL
APTT BLD: 47.6 SEC — HIGH (ref 24.5–35.6)
AST SERPL-CCNC: 87 U/L — HIGH (ref 10–40)
B ABORTUS IGG SER-ACNC: NEGATIVE — SIGNIFICANT CHANGE UP
BACTERIA # UR AUTO: NEGATIVE /HPF — SIGNIFICANT CHANGE UP
BASOPHILS # BLD AUTO: 0.04 K/UL — SIGNIFICANT CHANGE UP (ref 0–0.2)
BASOPHILS NFR BLD AUTO: 0.4 % — SIGNIFICANT CHANGE UP (ref 0–2)
BILIRUB SERPL-MCNC: 12.5 MG/DL — HIGH (ref 0.2–1.2)
BILIRUB UR-MCNC: ABNORMAL
BRUCELLA IGM SER-ACNC: NEGATIVE — SIGNIFICANT CHANGE UP
BUN SERPL-MCNC: 11 MG/DL — SIGNIFICANT CHANGE UP (ref 7–23)
BUN SERPL-MCNC: 11 MG/DL — SIGNIFICANT CHANGE UP (ref 7–23)
CALCIUM SERPL-MCNC: 8.1 MG/DL — LOW (ref 8.4–10.5)
CALCIUM SERPL-MCNC: 8.4 MG/DL — SIGNIFICANT CHANGE UP (ref 8.4–10.5)
CAST: 2 /LPF — SIGNIFICANT CHANGE UP (ref 0–4)
CHLORIDE SERPL-SCNC: 94 MMOL/L — LOW (ref 96–108)
CHLORIDE SERPL-SCNC: 96 MMOL/L — SIGNIFICANT CHANGE UP (ref 96–108)
CO2 SERPL-SCNC: 18 MMOL/L — LOW (ref 22–31)
CO2 SERPL-SCNC: 21 MMOL/L — LOW (ref 22–31)
COLOR SPEC: SIGNIFICANT CHANGE UP
CREAT ?TM UR-MCNC: 14 MG/DL — SIGNIFICANT CHANGE UP
CREAT SERPL-MCNC: 0.32 MG/DL — LOW (ref 0.5–1.3)
CREAT SERPL-MCNC: 0.36 MG/DL — LOW (ref 0.5–1.3)
CULTURE RESULTS: SIGNIFICANT CHANGE UP
DIFF PNL FLD: NEGATIVE — SIGNIFICANT CHANGE UP
EBV DNA SERPL NAA+PROBE-ACNC: 86 IU/ML — HIGH
EBVPCR LOG: 1.94 LOG10IU/ML — HIGH
EGFR: 115 ML/MIN/1.73M2 — SIGNIFICANT CHANGE UP
EGFR: 115 ML/MIN/1.73M2 — SIGNIFICANT CHANGE UP
EGFR: 119 ML/MIN/1.73M2 — SIGNIFICANT CHANGE UP
EGFR: 119 ML/MIN/1.73M2 — SIGNIFICANT CHANGE UP
EOSINOPHIL # BLD AUTO: 0.07 K/UL — SIGNIFICANT CHANGE UP (ref 0–0.5)
EOSINOPHIL NFR BLD AUTO: 0.7 % — SIGNIFICANT CHANGE UP (ref 0–6)
GLUCOSE SERPL-MCNC: 113 MG/DL — HIGH (ref 70–99)
GLUCOSE SERPL-MCNC: 147 MG/DL — HIGH (ref 70–99)
GLUCOSE UR QL: NEGATIVE MG/DL — SIGNIFICANT CHANGE UP
HCT VFR BLD CALC: 25.5 % — LOW (ref 34.5–45)
HGB BLD-MCNC: 8.5 G/DL — LOW (ref 11.5–15.5)
IMM GRANULOCYTES NFR BLD AUTO: 1.8 % — HIGH (ref 0–0.9)
INR BLD: 2.03 RATIO — HIGH (ref 0.85–1.16)
KETONES UR-MCNC: NEGATIVE MG/DL — SIGNIFICANT CHANGE UP
LEUKOCYTE ESTERASE UR-ACNC: ABNORMAL
LYMPHOCYTES # BLD AUTO: 1.47 K/UL — SIGNIFICANT CHANGE UP (ref 1–3.3)
LYMPHOCYTES # BLD AUTO: 14.7 % — SIGNIFICANT CHANGE UP (ref 13–44)
MAGNESIUM SERPL-MCNC: 1.3 MG/DL — LOW (ref 1.6–2.6)
MCHC RBC-ENTMCNC: 31.3 PG — SIGNIFICANT CHANGE UP (ref 27–34)
MCHC RBC-ENTMCNC: 33.3 G/DL — SIGNIFICANT CHANGE UP (ref 32–36)
MCV RBC AUTO: 93.8 FL — SIGNIFICANT CHANGE UP (ref 80–100)
MITOCHONDRIA AB SER-ACNC: ABNORMAL
MONOCYTES # BLD AUTO: 1.11 K/UL — HIGH (ref 0–0.9)
MONOCYTES NFR BLD AUTO: 11.1 % — SIGNIFICANT CHANGE UP (ref 2–14)
NEUTROPHILS # BLD AUTO: 7.1 K/UL — SIGNIFICANT CHANGE UP (ref 1.8–7.4)
NEUTROPHILS NFR BLD AUTO: 71.3 % — SIGNIFICANT CHANGE UP (ref 43–77)
NITRITE UR-MCNC: NEGATIVE — SIGNIFICANT CHANGE UP
NON-GYNECOLOGICAL CYTOLOGY STUDY: SIGNIFICANT CHANGE UP
NRBC BLD AUTO-RTO: 0 /100 WBCS — SIGNIFICANT CHANGE UP (ref 0–0)
OSMOLALITY UR: 333 MOS/KG — SIGNIFICANT CHANGE UP (ref 300–900)
PH UR: 7 — SIGNIFICANT CHANGE UP (ref 5–8)
PHOSPHATE SERPL-MCNC: 2.5 MG/DL — SIGNIFICANT CHANGE UP (ref 2.5–4.5)
PLATELET # BLD AUTO: 79 K/UL — LOW (ref 150–400)
POTASSIUM SERPL-MCNC: 3.7 MMOL/L — SIGNIFICANT CHANGE UP (ref 3.5–5.3)
POTASSIUM SERPL-MCNC: 3.7 MMOL/L — SIGNIFICANT CHANGE UP (ref 3.5–5.3)
POTASSIUM SERPL-SCNC: 3.7 MMOL/L — SIGNIFICANT CHANGE UP (ref 3.5–5.3)
POTASSIUM SERPL-SCNC: 3.7 MMOL/L — SIGNIFICANT CHANGE UP (ref 3.5–5.3)
POTASSIUM UR-SCNC: 28 MMOL/L — SIGNIFICANT CHANGE UP
PROT ?TM UR-MCNC: <7 MG/DL — SIGNIFICANT CHANGE UP (ref 0–12)
PROT SERPL-MCNC: 6.2 G/DL — SIGNIFICANT CHANGE UP (ref 6–8.3)
PROT UR-MCNC: NEGATIVE MG/DL — SIGNIFICANT CHANGE UP
PROT/CREAT UR-RTO: SIGNIFICANT CHANGE UP (ref 0–0.2)
PROTHROM AB SERPL-ACNC: 23 SEC — HIGH (ref 9.9–13.4)
RBC # BLD: 2.72 M/UL — LOW (ref 3.8–5.2)
RBC # FLD: 24.2 % — HIGH (ref 10.3–14.5)
RBC CASTS # UR COMP ASSIST: 1153 /HPF — HIGH (ref 0–4)
REVIEW: SIGNIFICANT CHANGE UP
SMOOTH MUSCLE AB SER-ACNC: SIGNIFICANT CHANGE UP
SODIUM SERPL-SCNC: 126 MMOL/L — LOW (ref 135–145)
SODIUM SERPL-SCNC: 128 MMOL/L — LOW (ref 135–145)
SODIUM UR-SCNC: 97 MMOL/L — SIGNIFICANT CHANGE UP
SP GR SPEC: 1.01 — SIGNIFICANT CHANGE UP (ref 1–1.03)
SPECIMEN SOURCE: SIGNIFICANT CHANGE UP
SQUAMOUS # UR AUTO: 1 /HPF — SIGNIFICANT CHANGE UP (ref 0–5)
UROBILINOGEN FLD QL: 0.2 MG/DL — SIGNIFICANT CHANGE UP (ref 0.2–1)
UUN UR-MCNC: 229 MG/DL — SIGNIFICANT CHANGE UP
WBC # BLD: 9.97 K/UL — SIGNIFICANT CHANGE UP (ref 3.8–10.5)
WBC # FLD AUTO: 9.97 K/UL — SIGNIFICANT CHANGE UP (ref 3.8–10.5)
WBC UR QL: 1 /HPF — SIGNIFICANT CHANGE UP (ref 0–5)

## 2025-02-18 PROCEDURE — 99233 SBSQ HOSP IP/OBS HIGH 50: CPT | Mod: GC

## 2025-02-18 PROCEDURE — 99233 SBSQ HOSP IP/OBS HIGH 50: CPT

## 2025-02-18 PROCEDURE — 99222 1ST HOSP IP/OBS MODERATE 55: CPT

## 2025-02-18 PROCEDURE — G0545: CPT

## 2025-02-18 PROCEDURE — 76705 ECHO EXAM OF ABDOMEN: CPT | Mod: 26

## 2025-02-18 PROCEDURE — 99232 SBSQ HOSP IP/OBS MODERATE 35: CPT | Mod: GC

## 2025-02-18 RX ORDER — MIRTAZAPINE 30 MG/1
7.5 TABLET, FILM COATED ORAL AT BEDTIME
Refills: 0 | Status: DISCONTINUED | OUTPATIENT
Start: 2025-02-18 | End: 2025-03-05

## 2025-02-18 RX ORDER — MAGNESIUM SULFATE 500 MG/ML
2 SYRINGE (ML) INJECTION ONCE
Refills: 0 | Status: COMPLETED | OUTPATIENT
Start: 2025-02-18 | End: 2025-02-18

## 2025-02-18 RX ORDER — ALBUMIN (HUMAN) 12.5 G/50ML
100 INJECTION, SOLUTION INTRAVENOUS EVERY 4 HOURS
Refills: 0 | Status: COMPLETED | OUTPATIENT
Start: 2025-02-18 | End: 2025-02-19

## 2025-02-18 RX ADMIN — MIDODRINE HYDROCHLORIDE 15 MILLIGRAM(S): 5 TABLET ORAL at 19:09

## 2025-02-18 RX ADMIN — Medication 15 MILLILITER(S): at 21:55

## 2025-02-18 RX ADMIN — ALBUMIN (HUMAN) 50 MILLILITER(S): 12.5 INJECTION, SOLUTION INTRAVENOUS at 22:30

## 2025-02-18 RX ADMIN — MIDODRINE HYDROCHLORIDE 15 MILLIGRAM(S): 5 TABLET ORAL at 06:08

## 2025-02-18 RX ADMIN — Medication 3 MILLIGRAM(S): at 21:55

## 2025-02-18 RX ADMIN — Medication 15 MILLILITER(S): at 19:08

## 2025-02-18 RX ADMIN — Medication 1 GRAM(S): at 00:33

## 2025-02-18 RX ADMIN — MIDODRINE HYDROCHLORIDE 15 MILLIGRAM(S): 5 TABLET ORAL at 13:54

## 2025-02-18 RX ADMIN — HEPARIN SODIUM 5000 UNIT(S): 1000 INJECTION INTRAVENOUS; SUBCUTANEOUS at 13:54

## 2025-02-18 RX ADMIN — MIRTAZAPINE 7.5 MILLIGRAM(S): 30 TABLET, FILM COATED ORAL at 21:55

## 2025-02-18 RX ADMIN — Medication 100 MILLIGRAM(S): at 06:07

## 2025-02-18 RX ADMIN — FUROSEMIDE 40 MILLIGRAM(S): 10 INJECTION INTRAMUSCULAR; INTRAVENOUS at 06:07

## 2025-02-18 RX ADMIN — Medication 500 MILLIGRAM(S): at 13:53

## 2025-02-18 RX ADMIN — Medication 1 GRAM(S): at 06:09

## 2025-02-18 RX ADMIN — FOLIC ACID 1 MILLIGRAM(S): 1 TABLET ORAL at 13:54

## 2025-02-18 RX ADMIN — Medication 1 APPLICATION(S): at 06:09

## 2025-02-18 RX ADMIN — HEPARIN SODIUM 5000 UNIT(S): 1000 INJECTION INTRAVENOUS; SUBCUTANEOUS at 06:09

## 2025-02-18 RX ADMIN — Medication 1 GRAM(S): at 19:08

## 2025-02-18 RX ADMIN — Medication 1 GRAM(S): at 13:53

## 2025-02-18 RX ADMIN — Medication 40 MILLIGRAM(S): at 06:08

## 2025-02-18 RX ADMIN — Medication 15 MILLILITER(S): at 00:33

## 2025-02-18 RX ADMIN — HEPARIN SODIUM 5000 UNIT(S): 1000 INJECTION INTRAVENOUS; SUBCUTANEOUS at 21:55

## 2025-02-18 RX ADMIN — Medication 27 GRAM(S): at 09:14

## 2025-02-18 RX ADMIN — Medication 15 MILLILITER(S): at 13:52

## 2025-02-18 RX ADMIN — LACTULOSE 10 GRAM(S): 10 SOLUTION ORAL at 19:08

## 2025-02-18 RX ADMIN — Medication 100 MILLIGRAM(S): at 13:54

## 2025-02-18 RX ADMIN — Medication 15 MILLILITER(S): at 09:14

## 2025-02-18 RX ADMIN — DEXTROMETHORPHAN HBR, GUAIFENESIN 100 MILLIGRAM(S): 200 LIQUID ORAL at 06:08

## 2025-02-18 NOTE — PROGRESS NOTE ADULT - SUBJECTIVE AND OBJECTIVE BOX
PROGRESS NOTE:     Patient is a 59y old  Female who presents with a chief complaint of Transfer from [place] for possible liver transplant (16 Feb 2025 15:34)    INTERVAL / OVERNIGHT EVENTS:  No acute events overnight.     SUBJECTIVE:  Patient examined at bedside with no acute complaints.     BRIEF DAILY PLAN:  - f/u MR abdomen  - c/w cipro for SBP PPx  - MELD today 29, f/u remaining transplant workup, transfer to  when bed available    Exam:  GENERAL: Follows conversation appropriately. No acute distress. Jaundiced  EYES: Tracks me in room. Scleral ictus   HENT: Moist mucous membranes.  LUNGS: Clear to auscultation bilaterally. No accessory muscle use.  CARDIOVASCULAR: Regular rate and rhythm. No murmur.  ABDOMEN: Soft, non-tender, +distention. No palpable masses.  EXTREMITIES: No edema. Non-tender.  SKIN: No rashes or lesions. Jaundiced. Warm. PICC in place L upper arm  NEUROLOGIC: No focal neurological deficits  PSYCHIATRIC: Cooperative. Appropriate mood and affect.    VS  T(C): 37.3 (02-18-25 @ 04:28), Max: 37.3 (02-18-25 @ 04:28)  HR: 118 (02-18-25 @ 04:28) (116 - 118)  BP: 114/74 (02-18-25 @ 04:28) (114/74 - 118/74)  RR: 18 (02-18-25 @ 04:28) (18 - 18)  SpO2: 95% (02-18-25 @ 04:28) (93% - 95%)    LABS (available at time of writing 02-18-25 @ 08:17):                         8.5    9.97  )-----------( 79       ( 18 Feb 2025 06:53 )             25.5     02-18    126[L]  |  96  |  11  ----------------------------<  113[H]  3.7   |  18[L]  |  0.32[L]    Ca    8.1[L]      18 Feb 2025 06:53  Phos  2.5     02-18  Mg     1.3     02-18    TPro  6.2  /  Alb  2.8[L]  /  TBili  12.5[H]  /  DBili  x   /  AST  87[H]  /  ALT  32  /  AlkPhos  300[H]  02-18    PT/INR - ( 18 Feb 2025 06:53 )   PT: 23.0 sec;   INR: 2.03 ratio         PTT - ( 18 Feb 2025 06:53 )  PTT:47.6 sec  Urinalysis Basic - ( 18 Feb 2025 06:53 )    Color: x / Appearance: x / SG: x / pH: x  Gluc: 113 mg/dL / Ketone: x  / Bili: x / Urobili: x   Blood: x / Protein: x / Nitrite: x   Leuk Esterase: x / RBC: x / WBC x   Sq Epi: x / Non Sq Epi: x / Bacteria: x          Culture - Fungal, Body Fluid (collected 02-13-25 @ 13:41)  Source: Peritoneal  Preliminary Report (02-17-25 @ 12:16):    No growth    Culture - Body Fluid with Gram Stain (collected 02-13-25 @ 13:41)  Source: Peritoneal  Gram Stain (02-13-25 @ 22:19):    polymorphonuclear leukocytes seen    No organisms seen    by cytocentrifuge  Preliminary Report (02-14-25 @ 16:46):    No growth        Medications:   albuterol/ipratropium for Nebulization 3 milliLiter(s) Nebulizer every 6 hours PRN  Biotene Dry Mouth Oral Rinse 15 milliLiter(s) Swish and Spit five times a day  chlorhexidine 2% Cloths 1 Application(s) Topical <User Schedule>  ciprofloxacin     Tablet 500 milliGRAM(s) Oral daily  folic acid 1 milliGRAM(s) Oral daily  furosemide    Tablet 40 milliGRAM(s) Oral daily  guaiFENesin Oral Liquid (Sugar-Free) 100 milliGRAM(s) Oral every 6 hours PRN  heparin   Injectable 5000 Unit(s) SubCutaneous every 8 hours  lactulose Syrup 10 Gram(s) Oral two times a day  magnesium sulfate  IVPB 2 Gram(s) IV Intermittent once  melatonin 3 milliGRAM(s) Oral at bedtime  midodrine. 15 milliGRAM(s) Oral three times a day  pantoprazole    Tablet 40 milliGRAM(s) Oral before breakfast  rifAXIMin 550 milliGRAM(s) Oral two times a day  spironolactone 100 milliGRAM(s) Oral daily  sucralfate suspension 1 Gram(s) Oral every 6 hours  thiamine 100 milliGRAM(s) Oral daily      RADIOLOGY, EKG & ADDITIONAL TESTS: Reviewed.

## 2025-02-18 NOTE — BH CONSULTATION LIAISON ASSESSMENT NOTE - NSBHCHARTREVIEWLAB_PSY_A_CORE FT
8.5    9.97  )-----------( 79       ( 18 Feb 2025 06:53 )             25.5     02-18    126[L]  |  96  |  11  ----------------------------<  113[H]  3.7   |  18[L]  |  0.32[L]    Ca    8.1[L]      18 Feb 2025 06:53  Phos  2.5     02-18  Mg     1.3     02-18    TPro  6.2  /  Alb  2.8[L]  /  TBili  12.5[H]  /  DBili  x   /  AST  87[H]  /  ALT  32  /  AlkPhos  300[H]  02-18

## 2025-02-18 NOTE — PROGRESS NOTE ADULT - ASSESSMENT
60yo F w/ PMH of alcohol cirrhosis with recurrent ascites, s/p multiple paracentesis, s/p banding of esophageal varices, currently being treated for recent c. diff colitis and possible endocarditis. Pt is jaundiced, with scleral icterus and abdominal distension presenting for liver transplant evaluation from [facility].

## 2025-02-18 NOTE — PROGRESS NOTE ADULT - ASSESSMENT
59 year old female PMH of alcohol cirrhosis with recurrent ascites, s/p multiple paracentesis, s/p banding of esophageal varices, currently being treated for recent c. diff colitis and possible endocarditis, presenting for possible liver transplant from [facility]. Pt initially admitted to [facility] c/o weakness, poor PO intake, and fever; hypotensive in ED. Met criteria for sepsis iso C. diff colitis and was treated with vasopressor support, flagyl, and vanc. Hospital course was complicated by AHRF and pt was intubated 1/28-2/2. Pt then treated for E. coli and Bacteroides bacteremia with TTE findings concerning for mitral vegetation; pt not a good candidate for ARACELI due to varices found on EGD from 1/31/24 decided to treat empirically for IE with 6 weeks of antibiotics s/p PICC line placement with negative blood cultures from 1/31/24. Pt d/c from [facility] on 2/7 and then presented to [facility] c/o SOB. Ultimately transferred to [facility] for hepatology transplant consult. On presentation today, pt endorses subjective nighttime fevers, chills, SOB and abdominal discomfort. Attributes fevers to recent flu dx (noted in chart to be flu positive 1 week prior to presentation at [facility]). Pt states she has not used alcohol in over a year. She began using alcohol in her 20s socially and her use gradually increased to include ~3-4 glasses of wine everyday when she was drinking the most. Has been to rehab and therapy multiple times. Pt first began smoking in her 20s ~1pack/day for many years, cannot recall specifics. Has not had a cigarette in several months. Pt is unable to ambulate on her own and has remained bedbound since intubation several months ago.     C. difficile toxin assay (January 27) positive, repeat Cdff 2/11 neg  Blood cultures (January 27) 2/4 E. coli, 1/4 Bacteroides   Blood cultures (January 31) no growth to date  Blood cultures (February 10, February 11) no growth to date  Paracentesis (February 4, February 13) Cell counts negative for SBP    CTA chest (January 27) Patent central airways. Smooth interlobular septal thickening at the lung apices and lung bases. No lung consolidation or mass. Mild bibasilar dependent atelectasis. No lymphadenopathy.    CT A/P (January 27) Thickening of the walls of the stomach and duodenum with submucosal edema likely reflective of a gastroenteritis. Thickening of the walls of the ascending colon despite underdistention likely due to portal colopathy. Colonic diverticulosis without evidence of diverticulitis. Cholelithiasis. Nonspecific gallbladder wall edema and pericholecystic fluid in the setting of cirrhosis.    TTE (1/27) small mobile vegetation attached to the posterior mitral valve leaflet.  TTE (February 12) no evidence of vegetations    Social history: born in [place].  Travel history includes travel to [facility], [facility] and [place], [place].  Lives primarily in [place] but has also had travel to [place] and [place].  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  States her mother had a history of tuberculosis but this was before patient was born.  Patient notes livestock exposure during her work as a .  Has sugar gliders as pets.    Antibiotic Course:  PO Vancomycin: 1/27 (2 doses) > 2/14 (1 dose)  Metronidazole: 1/27 > 2/13  Ceftriaxone: 1/27 > 2/13  Meropenem: 1/27    #C.diff colitis 1/27/25, diarrhea resolved now   - repeat Cdiff 2/11 negative, completed vanco po 125 mg bi ppx post discontinuation of antibitoics on 2./13-2/16  - in the next 6 months will need vanco po ppx with every exposure to broad spectrum antibiotics  - this includes at the time of perioperative ppx    #Polymicrobial bacteremia with E.coli and Bacteroides  Status post course of ceftriaxone and metronidazole as above    #Possible MV vegetation on TTE from OSH  E.coli and bacteroides atypical endocarditis organisms and polymicrobial is also atypical for endocarditis  Repeat transthoracic echocardiogram here without evidence of vegetation    #Preliver transplant evaluation  --COVID19 Nucleocapsid Antibody  -- COVID19 Eder Antibody pos  -- HAV IgG pos  HBVs Ab, HBVsAg, HBVc Ab pos >400 immune, neg, neg  -- HCV Ab neg  -- HSV 1 IgG /HSV 2 IgG pos. neg  -- EBV IgG os  -- CMV IgG pos  --VZV IgG pos  -- Measles IgG , Mumps IgG and Rubella IgG pos all  --Quantiferon Gold  -- HIV Ag/Ab by CMIANEG  --Syphilis ScreenNEG  --Strongyloides Ab negative NEG  -- Coccidiodes Ab  -- Brucella IgG   -- Q Fever Serologies    #Encounter to Vaccinate Patient  COVID19: Would benefit from COVID19 [id number] Vaccine Dose  Influenza: 10/8/24  Pneumococcal: Would benefit from PCV20  Immune to hep A, B, varicella and MMR  Shingles: Will require Shingrix  Tdap: Will require Tdap  RSV vaccine also recommended this season          Thank you for involving us in the care of this patient  Transplant ID will continue to follow  Please call or page with additional questions  Pager; #[phone]  Teams: from 8 am to 5 pm  Pauly Fenton MD

## 2025-02-18 NOTE — PROGRESS NOTE ADULT - SUBJECTIVE AND OBJECTIVE BOX
Follow Up:  Preliver transplant evaluation  with bacteremia and diarrhea, Cdiff colitis  Interval History:   Afebrile, WBC stable    Vital Signs Last 24 Hrs  T(C): 37.3 (18 Feb 2025 04:28), Max: 37.3 (18 Feb 2025 04:28)  T(F): 99.1 (18 Feb 2025 04:28), Max: 99.1 (18 Feb 2025 04:28)  HR: 118 (18 Feb 2025 04:28) (116 - 118)  BP: 114/74 (18 Feb 2025 04:28) (114/74 - 118/74)  BP(mean): --  RR: 18 (18 Feb 2025 04:28) (18 - 18)  SpO2: 95% (18 Feb 2025 04:28) (93% - 95%)    Parameters below as of 18 Feb 2025 04:28  Patient On (Oxygen Delivery Method): room air          PHYSICAL EXAMINATION:  General: Alert and Awake, NAD, jaundice  Cardiac: RRR, No M/R/G  Resp: CTAB, No Wh/Rh/Ra  Abdomen: NBS, moderate abdominal distension, No rigidity or guarding  MSK: No LE edema. No Calf tenderness  : No mccann  Skin: No rashes or lesions. Skin is warm and dry to the touch.   Neuro: Alert and Awake. CN 2-12 Grossly intact. Moves all four extremities spontaneously.  Psych: Calm, Pleasant, Cooperative

## 2025-02-18 NOTE — BH CONSULTATION LIAISON ASSESSMENT NOTE - SUMMARY
Carmen Ly is a 59 year old, domiciled, retired, , female, with PPHx of MDD, AUD, tobacco use disorder, with according to patient chart one inpatient psychiatric admission in 2019,with a PMHx of HTN, anemia, EtOH associated cirrhosis c/b ascites/EV (s/p banding last 11/2024), bleeding rectal varices vs hemorrhoidal bleeding s/p hemorrhoidectomy (11/2023) presenting as a transfer from [place] for evaluation of liver transplant currently undergoing expedited LT. Patient seen by transplant psychiatry for psychosocial clearance for liver transplant evaluation.    Patient on exam attested to a hx of alcohol use, drinking 3-5 glasses of wine for several years throughout her marriage, and post divorce, with the patient stating her last drink was a 1 year ago, of note patient PETH is positive during admission. She noted a hx of tobacco use, of which patient endorsed that she quit vaping and using nictoine containing products 5 months ago. Noted several past trials of antidepressants for her mood,  she denied any past psychiatric admission, or SA, of note upon chart review patient had voluntary admission in 2019, after ingesting 30 pills of Remeron at the time. At the time of exam patient had a moderate understanding regarding the transplant process, with patient identifying her current boyfriend as her support post transplant. Educated at bedside regarding the need for dual diagnosis program given patient hx of depression and alcohol use.     Plan  -SIPAT score of 54 patient deemed poor candidate with risk factors of alcohol use, and hx of tobacco use   -Serial PETH, UDS monitoring   -can consider starting Remeron 7.5mg Po QHS for sleep and mood  -Discussed RPP at bedside, patient would benefit from dual diagnosis program engagement

## 2025-02-18 NOTE — BH CONSULTATION LIAISON ASSESSMENT NOTE - CURRENT MEDICATION
MEDICATIONS  (STANDING):  Biotene Dry Mouth Oral Rinse 15 milliLiter(s) Swish and Spit five times a day  chlorhexidine 2% Cloths 1 Application(s) Topical <User Schedule>  ciprofloxacin     Tablet 500 milliGRAM(s) Oral daily  folic acid 1 milliGRAM(s) Oral daily  furosemide    Tablet 40 milliGRAM(s) Oral daily  heparin   Injectable 5000 Unit(s) SubCutaneous every 8 hours  lactulose Syrup 10 Gram(s) Oral two times a day  melatonin 3 milliGRAM(s) Oral at bedtime  midodrine. 15 milliGRAM(s) Oral three times a day  pantoprazole    Tablet 40 milliGRAM(s) Oral before breakfast  rifAXIMin 550 milliGRAM(s) Oral two times a day  spironolactone 100 milliGRAM(s) Oral daily  sucralfate suspension 1 Gram(s) Oral every 6 hours  thiamine 100 milliGRAM(s) Oral daily    MEDICATIONS  (PRN):  albuterol/ipratropium for Nebulization 3 milliLiter(s) Nebulizer every 6 hours PRN Shortness of Breath and/or Wheezing  guaiFENesin Oral Liquid (Sugar-Free) 100 milliGRAM(s) Oral every 6 hours PRN Cough

## 2025-02-18 NOTE — PROGRESS NOTE ADULT - ASSESSMENT
Carmen Ly is a 59 year old F w/ PMH HTN, anemia, EtOH associated cirrhosis c/b ascites/EV (s/p banding last 11/2024), AUD now sober for ~3 months, bleeding rectal varices vs hemorrhoidal bleeding s/p hemorrhoidectomy (11/2023) presenting as a transfer from [place] for evaluation of liver transplant currently undergoing expedited LT evaluation.     #Decompensated EtOH Associated Cirrhosis  #Bacteriodes and E coli Bacteremia   #Influenza  #C-diff colitis   Patient thought to have EtOH cirrhosis, w/ low MELD 18 as an outpatient, however w/ significant hyperbilirubinemia and elevated INR. Suspect decompensation likely in the setting of multiple active infections. Bacteroides and E coli bacteremia initially thought to be due to endocarditis but appears that repeat TTE shows no vegetation and likely false read at OSH. Suspect SBP may be source of bacteremia. Patient's underlying cirrhosis in the setting of EtOH use, however, outpatient labs suggestive of possible concomitant AIH w/ elevated TAJ and ASMA. Per patient, she has been sober for many months. Outpatient PETH however > 400 on 1/17/2024.  MELD: 30 (2/18) B+  HE: no overt evidence of HE. Continue with lactulose and rifaximin  Varices: EGD 11/5/2024 w/ small varices w/ blue bleb s/p 2 bands placed. Last EGD 1/31, no report available but per pt no high risk varices. Per outpatient notes unable to tolerate B, however retrialed on coreg w/ success as outpatient.  On Coreg 3.125 mg BID at home   Ascites: Hx significant ascites w/ prior hx LVP, s/p para 2/13 w/ 3 L fluid removed- neg for sbp. Continue with diuretics as noted below.   HCC: MRI 2/17 w/o evidence of suspicious hepatic lesion   OLT: OLT eval opened 2/14 though may have significant psych barriers, especially w/ + PETH and patient denying EtOH intake. Evaluated by psychiatry who determined patient is a poor candidate for OLT at this time, w/ likely need for RPP engagement.     #Worsening Hyponatremia  Likely hypervolemic hyponatremia in the setting of increased abd distension.     Recommendations:  - Please obtain b/l breast US as part of transplant evaluation   - Continue lasix 40 mg, spironolactone 100 mg QD  - Start albumin 100ml of 25% for 3 doses, will assess need daily.   - Cipro 500 mg daily for SBP ppx   - Vancomycin po per ID recs  - Will discuss need for RPP as per psych, especially given + PETH and pt reported hx of not drinking etoh prior to further transplant evaluation  - Folic acid, thiamine, multivitamin    Note incomplete until finalized by attending signature/attestation.    Holden Ahn  GI/Hepatology Fellow, PGY-4    MONDAY-FRIDAY 8AM-5PM:  Please message via [employer] or email [email or web address]     On Weekends/Holidays (All day) and Weekdays after 5 PM to 8 AM  For nonurgent consults please email:  Please email [email or web address]  For urgent consults:  Please contact on call GI team. See Amion schedule ([facility]), [employer] paging system ([facility]), or call hospital  ([facility])

## 2025-02-18 NOTE — BH CONSULTATION LIAISON ASSESSMENT NOTE - ATTENDING COMMENTS
Patient with PPHx of Severe AUD, unspecified depressive disorder with one prior SA via OD on Remeron with at least 3 prior inpatient RPP attempts. Patient guarded about EtOH use with last use over a year ago, however, recent PETH positivity. Patient reports brief trials of psychotherapy initiated during RPP and possible marriage counseling. States she self-medicated insomnia with EtOH and coped stressor of marital discord with alcohol with continued use after divorce. Reports acute detoxifications while in RPP but denies withdrawal seizures. Continued to consume EtOH after multiple episodes of variceal bleeds and being instructed to abstain from EtOH use by gastroenterologist. SIPAT deems patient to be a poor candidate from psychosocial standpoint given risk factors of severe AUD, active use despite complications, recurrent relapses despite RPP attempts, untreated comorbid psychiatric illness. Patient guarded about EtOH use and past psychiatric history during assessment. Score may improve when patient is more forthcoming with information. Dual diagnosis structured in-person RPP recommended, will trial Remeron in the interim if tolerated.

## 2025-02-18 NOTE — PROGRESS NOTE ADULT - SUBJECTIVE AND OBJECTIVE BOX
Dinah Flores is a 59 year old female being evaluated for a liver transplant. PMH: alcohol cirrhosis with recurrent ascites s/p banding of EV, recent c diff colitis and possible endocarditis.    Allergies  Zithromax (Unknown)    Home Medications:  Aldactone 100 mg oral tablet: 1 tab(s) orally once a day (10 Feb 2025 20:43)  ferrous sulfate 325 mg (65 mg elemental iron) oral delayed release tablet: 1 tab(s) orally once a day (10 Feb 2025 20:33)  folic acid 1 mg oral tablet: 1 tab(s) orally once a day (10 Feb 2025 20:42)  insulin glargine 100 units/mL subcutaneous solution: 15 unit(s) subcutaneous once a day (at bedtime) (10 Feb 2025 20:43)  lactulose 10 g/15 mL oral syrup: 15 milliliter(s) orally 3 times a day (10 Feb 2025 20:43)  Lasix 40 mg oral tablet: 1 tab(s) orally once a day (10 Feb 2025 20:42)  melatonin 3 mg oral tablet: 1 tab(s) orally once a day (at bedtime) (10 Feb 2025 20:43)  metroNIDAZOLE 500 mg/100 mL intravenous solution: 500 milligram(s) intravenously every 8 hours (10 Feb 2025 20:43)  midodrine 5 mg oral tablet: 3 tab(s) orally 3 times a day (10 Feb 2025 20:43)  pantoprazole 40 mg oral delayed release tablet: 1 tab(s) orally once a day (10 Feb 2025 20:43)  rifAXIMin 550 mg oral tablet: 1 tab(s) orally 2 times a day (10 Feb 2025 20:43)  sucralfate 1 g/10 mL oral suspension: 10 milliliter(s) orally every 6 hours (10 Feb 2025 20:43)  thiamine 100 mg oral tablet: 1 tab(s) orally once a day (10 Feb 2025 20:43)  traMADol 50 mg oral tablet: 1 tab(s) orally 2 times a day as needed for  moderate pain (10 Feb 2025 20:43)  vancomycin 250 mg oral capsule: 2 cap(s) orally every 6 hours (10 Feb 2025 20:43)    MEDICATIONS  (STANDING):  Biotene Dry Mouth Oral Rinse 15 milliLiter(s) Swish and Spit five times a day  chlorhexidine 2% Cloths 1 Application(s) Topical <User Schedule>  ciprofloxacin     Tablet 500 milliGRAM(s) Oral daily  folic acid 1 milliGRAM(s) Oral daily  furosemide    Tablet 40 milliGRAM(s) Oral daily  heparin   Injectable 5000 Unit(s) SubCutaneous every 8 hours  lactulose Syrup 10 Gram(s) Oral two times a day  melatonin 3 milliGRAM(s) Oral at bedtime  midodrine. 15 milliGRAM(s) Oral three times a day  pantoprazole    Tablet 40 milliGRAM(s) Oral before breakfast  rifAXIMin 550 milliGRAM(s) Oral two times a day  spironolactone 100 milliGRAM(s) Oral daily  sucralfate suspension 1 Gram(s) Oral every 6 hours  thiamine 100 milliGRAM(s) Oral daily    MEDICATIONS  (PRN):  albuterol/ipratropium for Nebulization 3 milliLiter(s) Nebulizer every 6 hours PRN Shortness of Breath and/or Wheezing  guaiFENesin Oral Liquid (Sugar-Free) 100 milliGRAM(s) Oral every 6 hours PRN Cough    The patient has no pharmacologic contraindications to transplant and is cleared by pharmacy.

## 2025-02-18 NOTE — PROGRESS NOTE ADULT - PROBLEM SELECTOR PLAN 3
Pt tested positive for C. diff colitis at [facility]. Started on oral vancomycin.     Plan:   - S/p 2 weeks vancomycin

## 2025-02-18 NOTE — BH CONSULTATION LIAISON ASSESSMENT NOTE - HPI (INCLUDE ILLNESS QUALITY, SEVERITY, DURATION, TIMING, CONTEXT, MODIFYING FACTORS, ASSOCIATED SIGNS AND SYMPTOMS)
Carmen Ly is a 59 year old, domiciled, retired, , female, with PPHx of MDD, AUD, tobacco use disorder, with according to patient chart one inpatient psychiatric admission in 2019,with a PMHx of HTN, anemia, EtOH associated cirrhosis c/b ascites/EV (s/p banding last 11/2024), bleeding rectal varices vs hemorrhoidal bleeding s/p hemorrhoidectomy (11/2023) presenting as a transfer from [place] for evaluation of liver transplant currently undergoing expedited LT. Patient seen by transplant psychiatry for psychosocial clearance for liver transplant evaluation.    Patient on exam was A/Ox4, calm, cooperative, noted that she was told about having "a fatty liver" several years ago, of which she was advised to abstain from alcohol at the time. Patient endorsed that she recently learned of her diagnosis of cirrhosis, and stated that she was told that alcohol had led to her liver damage. Patient noted that she began drinking alcohol at the age of 16 years old, drinking socially, stated her use increased when was  around 27 years ago, drinking at first 3 glasses of wine daily, with it increasing to 5 glasses of wine half way through her marriage. She attested to doing "several rehab programs", with the patient attesting to two inpatient RPP one at [facility] for 28 days and one in [place] for 45 days. She noted that her longest period of sobriety has been "at least a few months", with the patient stating her last drink was a year ago. Patient denied any past symptoms of withdrawal in the past. She attested tp tobacco use, smoking since age 18 years old, at her max patient was smoking 1PPD, of which she noted she reduced her use down to 5 cigarettes daily as she was vaping, noted her last use of cigarettes or vapes occurring according to patient 5 months ago, without any current NRT, patient noted past trials of nicotine patch in the past. She denied any other substance use on exam.    Patient attested to seeing a therapist in the past with her son, as well as for marital counseling several years. She noted seeing an NP 5 years ago for psychotropic medications regarding her mood. Patient attested to a past hx of low mood, hopelessness, with difficulty sleeping secondary to social stressors with her previous marriage. She attested to a past medication trial of Effexor 150mg, trazodone and Prozac, patient was unable to recall other medication trials, however stated her OBGYN tried the patient on several different antidepressants. Patient attested to past symptoms of passive SI in the past, denying any current ah/vh/hi/si, intent or plan on exam. Patient denied any overt symptoms of stacey, delusions, or paranoia on exam. Patient denied any past SA, or psychiatric admissions, however noted after being prompted by examiner that she did have an episode 7-8 years ago, where "I took some anti-anxiety pills, I should not have taken, so they pumped my stomach." In regards to transplant related education patient noted the need for medications post transplant, risk of rejection, and infection on exam. Patient identified her boyfriend as her support post transplant. Carmen Ly is a 59 year old, domiciled, retired, , female, with PPHx of MDD, AUD, tobacco use disorder, with according to patient chart one inpatient psychiatric admission in 2019,with a PMHx of HTN, anemia, EtOH associated cirrhosis c/b ascites/EV (s/p banding last 11/2024), bleeding rectal varices vs hemorrhoidal bleeding s/p hemorrhoidectomy (11/2023) presenting as a transfer from [place] for evaluation of liver transplant currently undergoing expedited LT. Patient seen by transplant psychiatry for psychosocial clearance for liver transplant evaluation.    Patient on exam was A/Ox4, calm, cooperative, noted that she was told about having "a fatty liver" several years ago, of which she was advised to abstain from alcohol at the time. Patient endorsed that she recently learned of her diagnosis of cirrhosis, and stated that she was told that alcohol had led to her liver damage. Patient noted that she began drinking alcohol at the age of 16 years old, drinking socially, stated her use increased when was  around 27 years ago, drinking at first 3 glasses of wine daily, with it increasing to 5 glasses of wine half way through her marriage. She attested to doing "several rehab programs", with the patient attesting to two inpatient RPP one at [facility] for 28 days and one in [place] for 45 days, as well as attending AA meetings. She noted that her longest period of sobriety has been "at least a few months", with the patient stating her last drink was a year ago. Patient denied any past symptoms of withdrawal in the past. She attested tp tobacco use, smoking since age 18 years old, at her max patient was smoking 1PPD, of which she noted she reduced her use down to 5 cigarettes daily as she was vaping, noted her last use of cigarettes or vapes occurring according to patient 5 months ago, without any current NRT, patient noted past trials of nicotine patch in the past. She denied any other substance use on exam.    Patient attested to seeing a therapist in the past with her son, as well as for marital counseling several years. She noted seeing an NP 5 years ago for psychotropic medications regarding her mood. Patient attested to a past hx of low mood, hopelessness, with difficulty sleeping secondary to social stressors with her previous marriage. She attested to a past medication trial of Effexor 150mg, trazodone and Prozac, patient was unable to recall other medication trials, however stated her OBGYN tried the patient on several different antidepressants. Patient attested to past symptoms of passive SI in the past, denying any current ah/vh/hi/si, intent or plan on exam. Patient denied any overt symptoms of stacey, delusions, or paranoia on exam. Patient denied any past SA, or psychiatric admissions, however noted after being prompted by examiner that she did have an episode 7-8 years ago, where "I took some anti-anxiety pills, I should not have taken, so they pumped my stomach." In regards to transplant related education patient noted the need for medications post transplant, risk of rejection, and infection on exam. Patient identified her boyfriend as her support post transplant.

## 2025-02-18 NOTE — BH CONSULTATION LIAISON ASSESSMENT NOTE - NSICDXBHSECONDARYDX_PSY_ALL_CORE
Alcoholic cirrhosis   K70.30  Pre-transplant evaluation for liver transplant   Z01.818  MDD (major depressive disorder), recurrent episode, moderate   F33.1  Mild tobacco use disorder, in early remission   F17.201

## 2025-02-18 NOTE — PROGRESS NOTE ADULT - SUBJECTIVE AND OBJECTIVE BOX
Patient is a 59y old  Female who presents with a chief complaint of Transfer from [place] for possible liver transplant (18 Feb 2025 12:56)      Interval Events:   - PETH positive- discussed w/ patient who denied any EtOH intake despite discussion  - Worsening Na,  -Patient reports worsening abdominal distension     ROS:   A 12-point ROS was performed and negative except as noted in HPI.    Hospital Medications:  albuterol/ipratropium for Nebulization 3 milliLiter(s) Nebulizer every 6 hours PRN  Biotene Dry Mouth Oral Rinse 15 milliLiter(s) Swish and Spit five times a day  chlorhexidine 2% Cloths 1 Application(s) Topical <User Schedule>  ciprofloxacin     Tablet 500 milliGRAM(s) Oral daily  folic acid 1 milliGRAM(s) Oral daily  furosemide    Tablet 40 milliGRAM(s) Oral daily  guaiFENesin Oral Liquid (Sugar-Free) 100 milliGRAM(s) Oral every 6 hours PRN  heparin   Injectable 5000 Unit(s) SubCutaneous every 8 hours  lactulose Syrup 10 Gram(s) Oral two times a day  melatonin 3 milliGRAM(s) Oral at bedtime  midodrine. 15 milliGRAM(s) Oral three times a day  pantoprazole    Tablet 40 milliGRAM(s) Oral before breakfast  rifAXIMin 550 milliGRAM(s) Oral two times a day  spironolactone 100 milliGRAM(s) Oral daily  sucralfate suspension 1 Gram(s) Oral every 6 hours  thiamine 100 milliGRAM(s) Oral daily      PHYSICAL EXAM:   Vital Signs:  Vital Signs Last 24 Hrs  T(C): 37.3 (18 Feb 2025 04:28), Max: 37.3 (18 Feb 2025 04:28)  T(F): 99.1 (18 Feb 2025 04:28), Max: 99.1 (18 Feb 2025 04:28)  HR: 117 (18 Feb 2025 14:30) (116 - 118)  BP: 112/69 (18 Feb 2025 14:30) (108/67 - 118/74)  BP(mean): --  RR: 18 (18 Feb 2025 14:30) (18 - 18)  SpO2: 97% (18 Feb 2025 14:30) (93% - 97%)    Parameters below as of 18 Feb 2025 14:30  Patient On (Oxygen Delivery Method): room air      Daily     Daily     GENERAL: no acute distress  NEURO: alert, no asterixis   HEENT: scleral icterus   CHEST: no respiratory distress, no accessory muscle use  CARDIAC: regular rate  ABDOMEN: distended abdomen, no rebound or guarding   EXTREMITIES: warm, well perfused, no edema  SKIN: jaundiced     LABS: reviewed                        8.5    9.97  )-----------( 79       ( 18 Feb 2025 06:53 )             25.5     02-18    128[L]  |  94[L]  |  11  ----------------------------<  147[H]  3.7   |  21[L]  |  0.36[L]    Ca    8.4      18 Feb 2025 16:42  Phos  2.5     02-18  Mg     1.3     02-18    TPro  6.2  /  Alb  2.8[L]  /  TBili  12.5[H]  /  DBili  x   /  AST  87[H]  /  ALT  32  /  AlkPhos  300[H]  02-18    LIVER FUNCTIONS - ( 18 Feb 2025 06:53 )  Alb: 2.8 g/dL / Pro: 6.2 g/dL / ALK PHOS: 300 U/L / ALT: 32 U/L / AST: 87 U/L / GGT: x             Interval Diagnostic Studies: see sunrise for full report    < from: MR Abdomen w/wo IV Cont (02.16.25 @ 20:30) >  PROCEDURE:  MRI ofthe abdomen was performed.  MRCP was performed.    FINDINGS:  LOWER CHEST: Within normal limits.    LIVER: Nodular contour consistent with cirrhosis. No suspicious hepatic   lesion.  BILE DUCTS: Normal caliber. No evidence of choledocholithiasis.  GALLBLADDER: 3 mm gallbladder neck polyp. Mild to moderate distention   with mild circumferential wall thickening.  SPLEEN: Splenomegaly measuring up to 14 cm in craniocaudal dimension.  PANCREAS: Within normal limits.  ADRENALS: Within normal limits.  KIDNEYS/URETERS: Within normal limits.    VISUALIZED PORTIONS:  BOWEL: Within normal limits.  PERITONEUM: Moderate volume ascites.  VESSELS: Within normal limits.  RETROPERITONEUM/LYMPH NODES: No lymphadenopathy.  ABDOMINAL WALL: Within normal limits.  BONES: Within normal limits.    IMPRESSION:  No suspicious hepatic lesion. No evidence of choledocholithiasis.    Mild to moderate gallbladder distention with mild circumferential wall   thickening suspicious for cholecystitis. Recommend clinical correlation.    Moderate volume ascites, cirrhosis, and splenomegaly.    < end of copied text >

## 2025-02-18 NOTE — CHART NOTE - NSCHARTNOTEFT_GEN_A_CORE
Gender: [ ] male  [X  ] female    Hand  Dynamometer (dominant hand): right [x] attempted   [  ] unable to attempt  attempt #1 (kg): 10  attempt #2 (kg): 10  attempt #3 (kg): 10    5 Chair Stands:  [  ] attempted   [x] unable to attempt  time to complete (seconds):     3-Position Balance:  [x ] attempted   [ ] unable to attempt  alao-fu-putb (0-10 seconds): 2 Seconds  semi tandem (0-10 seconds): SHANA  tandem (0-10 seconds): SHANA    Comments:    LIVER FRAILTY INDEX SCORE: 6.5    Based on suggested cut-offs of the Liver Frailty Index™, a patient with this Liver Frailty Index™ score is considered:   [ x] Frail  [  ] Pre Frail  [  ] Robust    This Liver Frailty Index™ score falls within the ** percentile of outpatients with cirrhosis who are listed for liver transplantation.

## 2025-02-18 NOTE — BH CONSULTATION LIAISON ASSESSMENT NOTE - NSBHCHARTREVIEWVS_PSY_A_CORE FT
Vital Signs Last 24 Hrs  T(C): 37.3 (18 Feb 2025 04:28), Max: 37.3 (18 Feb 2025 04:28)  T(F): 99.1 (18 Feb 2025 04:28), Max: 99.1 (18 Feb 2025 04:28)  HR: 117 (18 Feb 2025 14:30) (116 - 118)  BP: 112/69 (18 Feb 2025 14:30) (108/67 - 118/74)  BP(mean): --  RR: 18 (18 Feb 2025 14:30) (18 - 18)  SpO2: 97% (18 Feb 2025 14:30) (93% - 97%)    Parameters below as of 18 Feb 2025 14:30  Patient On (Oxygen Delivery Method): room air

## 2025-02-18 NOTE — BH CONSULTATION LIAISON ASSESSMENT NOTE - DESCRIPTION
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 Currently domiciled with her boyfriend, noted being  3 years ago, after a 24 year marriage.

## 2025-02-18 NOTE — BH CONSULTATION LIAISON ASSESSMENT NOTE - RISK ASSESSMENT
Risk factors: mood disorder, hx of SA, alcohol/substance use disorder, prior psych admission  Protective factors:  no current active SI/HI/I/P or urges to harm self/others,  responsibility to children/family, identifies reasons for living, future orientation, intact social supports, fear of death or pain, positive therapeutic relationships, residential stability, no access to firearms

## 2025-02-18 NOTE — BH CONSULTATION LIAISON ASSESSMENT NOTE - NSBHCHARTREVIEWINVESTIGATE_PSY_A_CORE FT
EKG 02/11    Ventricular Rate 120 BPM    Atrial Rate 120 BPM    P-R Interval 126 ms    QRS Duration 70 ms    Q-T Interval 284 ms    QTC Calculation(Bazett) 401 ms    P Axis 24 degrees    R Axis 0 degrees    T Axis 17 degrees    Diagnosis Line SINUS TACHYCARDIA  CANNOT RULE OUT ANTERIOR INFARCT , AGE UNDETERMINED  ABNORMAL ECG  NO PREVIOUS ECGS AVAILABLE  Confirmed by BRUNA TEJADA MD ([id number]) on 2/11/2025 4:06:22 PM

## 2025-02-19 LAB
ALBUMIN SERPL ELPH-MCNC: 3.5 G/DL — SIGNIFICANT CHANGE UP (ref 3.3–5)
ALP SERPL-CCNC: 268 U/L — HIGH (ref 40–120)
ALT FLD-CCNC: 27 U/L — SIGNIFICANT CHANGE UP (ref 10–45)
ANION GAP SERPL CALC-SCNC: 15 MMOL/L — SIGNIFICANT CHANGE UP (ref 5–17)
ANISOCYTOSIS BLD QL: SIGNIFICANT CHANGE UP
AST SERPL-CCNC: 77 U/L — HIGH (ref 10–40)
BASOPHILS # BLD AUTO: 0 K/UL — SIGNIFICANT CHANGE UP (ref 0–0.2)
BASOPHILS NFR BLD AUTO: 0 % — SIGNIFICANT CHANGE UP (ref 0–2)
BILIRUB SERPL-MCNC: 12.4 MG/DL — HIGH (ref 0.2–1.2)
BLD GP AB SCN SERPL QL: NEGATIVE — SIGNIFICANT CHANGE UP
BUN SERPL-MCNC: 8 MG/DL — SIGNIFICANT CHANGE UP (ref 7–23)
BURR CELLS BLD QL SMEAR: PRESENT — SIGNIFICANT CHANGE UP
C BURNET AB SER QL IA: REACTIVE — SIGNIFICANT CHANGE UP
C IMMITIS AB SER QL IA: NEGATIVE — SIGNIFICANT CHANGE UP
CALCIUM SERPL-MCNC: 8.7 MG/DL — SIGNIFICANT CHANGE UP (ref 8.4–10.5)
CHLORIDE SERPL-SCNC: 93 MMOL/L — LOW (ref 96–108)
CO2 SERPL-SCNC: 20 MMOL/L — LOW (ref 22–31)
CREAT SERPL-MCNC: 0.33 MG/DL — LOW (ref 0.5–1.3)
CYTOLOGY SPEC DOC CYTO: SIGNIFICANT CHANGE UP
EGFR: 118 ML/MIN/1.73M2 — SIGNIFICANT CHANGE UP
EGFR: 118 ML/MIN/1.73M2 — SIGNIFICANT CHANGE UP
EOSINOPHIL # BLD AUTO: 0 K/UL — SIGNIFICANT CHANGE UP (ref 0–0.5)
EOSINOPHIL NFR BLD AUTO: 0 % — SIGNIFICANT CHANGE UP (ref 0–6)
GLUCOSE SERPL-MCNC: 103 MG/DL — HIGH (ref 70–99)
HCT VFR BLD CALC: 24.2 % — LOW (ref 34.5–45)
HGB BLD-MCNC: 8 G/DL — LOW (ref 11.5–15.5)
LYMPHOCYTES # BLD AUTO: 0.82 K/UL — LOW (ref 1–3.3)
LYMPHOCYTES # BLD AUTO: 9.6 % — LOW (ref 13–44)
MACROCYTES BLD QL: SIGNIFICANT CHANGE UP
MAGNESIUM SERPL-MCNC: 1.4 MG/DL — LOW (ref 1.6–2.6)
MANUAL SMEAR VERIFICATION: SIGNIFICANT CHANGE UP
MCHC RBC-ENTMCNC: 30.8 PG — SIGNIFICANT CHANGE UP (ref 27–34)
MCHC RBC-ENTMCNC: 33.1 G/DL — SIGNIFICANT CHANGE UP (ref 32–36)
MCV RBC AUTO: 93.1 FL — SIGNIFICANT CHANGE UP (ref 80–100)
MONOCYTES # BLD AUTO: 0.67 K/UL — SIGNIFICANT CHANGE UP (ref 0–0.9)
MONOCYTES NFR BLD AUTO: 7.8 % — SIGNIFICANT CHANGE UP (ref 2–14)
MYELOCYTES NFR BLD: 1.7 % — HIGH (ref 0–0)
NEUTROPHILS # BLD AUTO: 6.95 K/UL — SIGNIFICANT CHANGE UP (ref 1.8–7.4)
NEUTROPHILS NFR BLD AUTO: 80 % — HIGH (ref 43–77)
NEUTS BAND # BLD: 0.9 % — SIGNIFICANT CHANGE UP (ref 0–8)
NEUTS BAND NFR BLD: 0.9 % — SIGNIFICANT CHANGE UP (ref 0–8)
PHOSPHATE SERPL-MCNC: 2 MG/DL — LOW (ref 2.5–4.5)
PLAT MORPH BLD: NORMAL — SIGNIFICANT CHANGE UP
PLATELET # BLD AUTO: 85 K/UL — LOW (ref 150–400)
POIKILOCYTOSIS BLD QL AUTO: SLIGHT — SIGNIFICANT CHANGE UP
POLYCHROMASIA BLD QL SMEAR: SLIGHT — SIGNIFICANT CHANGE UP
POTASSIUM SERPL-MCNC: 3.7 MMOL/L — SIGNIFICANT CHANGE UP (ref 3.5–5.3)
POTASSIUM SERPL-SCNC: 3.7 MMOL/L — SIGNIFICANT CHANGE UP (ref 3.5–5.3)
PROT SERPL-MCNC: 6.5 G/DL — SIGNIFICANT CHANGE UP (ref 6–8.3)
RBC # BLD: 2.6 M/UL — LOW (ref 3.8–5.2)
RBC # FLD: 23.4 % — HIGH (ref 10.3–14.5)
RBC BLD AUTO: ABNORMAL
RH IG SCN BLD-IMP: POSITIVE — SIGNIFICANT CHANGE UP
SCHISTOCYTES BLD QL AUTO: SLIGHT — SIGNIFICANT CHANGE UP
SODIUM SERPL-SCNC: 128 MMOL/L — LOW (ref 135–145)
TARGETS BLD QL SMEAR: SLIGHT — SIGNIFICANT CHANGE UP
WBC # BLD: 8.59 K/UL — SIGNIFICANT CHANGE UP (ref 3.8–10.5)
WBC # FLD AUTO: 8.59 K/UL — SIGNIFICANT CHANGE UP (ref 3.8–10.5)

## 2025-02-19 PROCEDURE — 99223 1ST HOSP IP/OBS HIGH 75: CPT

## 2025-02-19 PROCEDURE — 99232 SBSQ HOSP IP/OBS MODERATE 35: CPT | Mod: GC

## 2025-02-19 PROCEDURE — 76641 ULTRASOUND BREAST COMPLETE: CPT | Mod: 26,50

## 2025-02-19 PROCEDURE — 99233 SBSQ HOSP IP/OBS HIGH 50: CPT | Mod: GC

## 2025-02-19 RX ORDER — SODIUM PHOSPHATE,DIBASIC DIHYD
30 POWDER (GRAM) MISCELLANEOUS ONCE
Refills: 0 | Status: COMPLETED | OUTPATIENT
Start: 2025-02-19 | End: 2025-02-19

## 2025-02-19 RX ORDER — MAGNESIUM SULFATE 500 MG/ML
2 SYRINGE (ML) INJECTION ONCE
Refills: 0 | Status: COMPLETED | OUTPATIENT
Start: 2025-02-19 | End: 2025-02-19

## 2025-02-19 RX ORDER — ACETAMINOPHEN 500 MG/5ML
650 LIQUID (ML) ORAL ONCE
Refills: 0 | Status: COMPLETED | OUTPATIENT
Start: 2025-02-19 | End: 2025-02-19

## 2025-02-19 RX ADMIN — Medication 15 MILLILITER(S): at 18:07

## 2025-02-19 RX ADMIN — Medication 25 GRAM(S): at 11:24

## 2025-02-19 RX ADMIN — Medication 500 MILLIGRAM(S): at 13:56

## 2025-02-19 RX ADMIN — Medication 15 MILLILITER(S): at 01:12

## 2025-02-19 RX ADMIN — Medication 15 MILLILITER(S): at 23:12

## 2025-02-19 RX ADMIN — Medication 650 MILLIGRAM(S): at 15:50

## 2025-02-19 RX ADMIN — Medication 650 MILLIGRAM(S): at 16:50

## 2025-02-19 RX ADMIN — Medication 15 MILLILITER(S): at 21:25

## 2025-02-19 RX ADMIN — Medication 1 GRAM(S): at 11:24

## 2025-02-19 RX ADMIN — Medication 85 MILLIMOLE(S): at 11:24

## 2025-02-19 RX ADMIN — FOLIC ACID 1 MILLIGRAM(S): 1 TABLET ORAL at 11:24

## 2025-02-19 RX ADMIN — Medication 100 MILLIGRAM(S): at 05:35

## 2025-02-19 RX ADMIN — Medication 15 MILLILITER(S): at 11:23

## 2025-02-19 RX ADMIN — Medication 1 GRAM(S): at 01:14

## 2025-02-19 RX ADMIN — MIDODRINE HYDROCHLORIDE 15 MILLIGRAM(S): 5 TABLET ORAL at 11:23

## 2025-02-19 RX ADMIN — Medication 1 APPLICATION(S): at 06:55

## 2025-02-19 RX ADMIN — Medication 1 GRAM(S): at 05:34

## 2025-02-19 RX ADMIN — Medication 1 GRAM(S): at 23:12

## 2025-02-19 RX ADMIN — Medication 40 MILLIGRAM(S): at 05:34

## 2025-02-19 RX ADMIN — Medication 15 MILLILITER(S): at 09:44

## 2025-02-19 RX ADMIN — FUROSEMIDE 40 MILLIGRAM(S): 10 INJECTION INTRAMUSCULAR; INTRAVENOUS at 05:35

## 2025-02-19 RX ADMIN — ALBUMIN (HUMAN) 50 MILLILITER(S): 12.5 INJECTION, SOLUTION INTRAVENOUS at 05:43

## 2025-02-19 RX ADMIN — HEPARIN SODIUM 5000 UNIT(S): 1000 INJECTION INTRAVENOUS; SUBCUTANEOUS at 15:50

## 2025-02-19 RX ADMIN — MIRTAZAPINE 7.5 MILLIGRAM(S): 30 TABLET, FILM COATED ORAL at 21:25

## 2025-02-19 RX ADMIN — ALBUMIN (HUMAN) 50 MILLILITER(S): 12.5 INJECTION, SOLUTION INTRAVENOUS at 02:04

## 2025-02-19 RX ADMIN — MIDODRINE HYDROCHLORIDE 15 MILLIGRAM(S): 5 TABLET ORAL at 05:34

## 2025-02-19 RX ADMIN — MIDODRINE HYDROCHLORIDE 15 MILLIGRAM(S): 5 TABLET ORAL at 18:19

## 2025-02-19 RX ADMIN — Medication 100 MILLIGRAM(S): at 11:24

## 2025-02-19 RX ADMIN — Medication 1 GRAM(S): at 18:19

## 2025-02-19 NOTE — CONSULT NOTE ADULT - ASSESSMENT
59 year-old with liver disease, ascites, now with bacteremia with two organisms and concern for infective endocarditis, now on antibiotics with planned six week course.  No overt vegetation seen on TTE. ARACELI precluded by potential esophageal varices.  Continue empiric course of antibiotics for infective endocarditis per ID.  Would not pursue further ischemic evaluation until patient is deemed otherwise appropriate transplant candidate.    Discussed with Transplant Team on rounds.

## 2025-02-19 NOTE — CONSULT NOTE ADULT - SUBJECTIVE AND OBJECTIVE BOX
Patient seen and evaluated @ 9 am on [address]  Chief Complaint: bacteremia    HPI:  58yo F w/ PMH of alcohol cirrhosis with recurrent ascites, s/p multiple paracentesis, s/p banding of esophageal varices, currently being treated for recent c. diff colitis and possible endocarditis, presenting for possible liver transplant from [facility]. Pt initially admitted to [facility] c/o weakness, poor PO intake, and fever; hypotensive in ED. Met criteria for sepsis iso C. diff colitis and was treated with vasopressor support, flagyl, and vanc. Hospital course was complicated by AHRF and pt was intubated 1/28-2/2. Pt then treated for E. coli and Bacteroides bacteremia with TTE findings concerning for mitral vegetation; pt not a good candidate for ARACELI due to varices decided to treat empirically for IE with 6 weeks of antibiotics s/p PICC line placement. Pt d/c from [facility] on 2/7 and then presented to [place] c/o SOB. Ultimately transferred to [facility] for hepatology transplant consult. On presentation today, pt endorses subjective nighttime fevers, chills, SOB and abdominal discomfort. Attributes fevers to recent flu dx (noted in chart to be flu positive 1 week prior to presentation at [facility]). Pt states she has not used alcohol in over a year. She began using alcohol in her 20s socially and her use gradually increased to include ~3-4 glasses of wine everyday when she was drinking the most. Has been to rehab and therapy multiple times. Pt first began smoking in her 20s ~1pack/day for many years, cannot recall specifics. Has not had a cigarette in several months. Pt is unable to ambulate on her own and has remained bedbound since intubation several months ago.  (10 Feb 2025 20:23)    PMH:   Alcoholic cirrhosis    H/O esophageal varices    GERD (gastroesophageal reflux disease)    H/O bacteremia    History of bacteremia    H/O Clostridium difficile infection      PSH:   S/P hemorrhoidectomy      Medications:   albuterol/ipratropium for Nebulization 3 milliLiter(s) Nebulizer every 6 hours PRN  Biotene Dry Mouth Oral Rinse 15 milliLiter(s) Swish and Spit five times a day  chlorhexidine 2% Cloths 1 Application(s) Topical <User Schedule>  ciprofloxacin     Tablet 500 milliGRAM(s) Oral daily  folic acid 1 milliGRAM(s) Oral daily  furosemide    Tablet 40 milliGRAM(s) Oral daily  guaiFENesin Oral Liquid (Sugar-Free) 100 milliGRAM(s) Oral every 6 hours PRN  heparin   Injectable 5000 Unit(s) SubCutaneous every 8 hours  lactulose Syrup 10 Gram(s) Oral two times a day  melatonin 3 milliGRAM(s) Oral at bedtime  midodrine. 15 milliGRAM(s) Oral three times a day  mirtazapine 7.5 milliGRAM(s) Oral at bedtime  pantoprazole    Tablet 40 milliGRAM(s) Oral before breakfast  rifAXIMin 550 milliGRAM(s) Oral two times a day  spironolactone 100 milliGRAM(s) Oral daily  sucralfate suspension 1 Gram(s) Oral every 6 hours  thiamine 100 milliGRAM(s) Oral daily    Allergies:  Zithromax (Unknown)    FAMILY HISTORY:  FH: pancreatic cancer (Mother)    Heavy drinker of alcohol (Sibling)    Family history of stent (Sibling)      Social History:  Smoking:  Alcohol:  Drugs:    Review of Systems:    Constitutional: No fever, chills, fatigue, or changes in weight  HEENT: No blurry vision, eye pain, headache, runny nose, or sore throat  Respiratory: No shortness of breath, cough, or wheezing  Cardiovascular: No chest pain or palpitations  Gastrointestinal: No nausea, vomiting, diarrhea, or abdominal pain  Genitourinary: No dysuria or incontinence  Extremities: No lower extremity swelling  Neurologic: No focal findings  Lymphatic: No lymphadenopathy   Skin: No rash  Psychiatry: No anxiety or depression  10 point review of systems is otherwise negative except as mentioned above            Physical Exam:  T(C): 36.9 (02-19-25 @ 20:05), Max: 36.9 (02-19-25 @ 20:05)  HR: 109 (02-19-25 @ 20:05) (109 - 119)  BP: 107/65 (02-19-25 @ 20:05) (107/65 - 116/70)  RR: 19 (02-19-25 @ 20:05) (18 - 19)  SpO2: 95% (02-19-25 @ 20:05) (95% - 97%)  Wt(kg): --    02-19 @ 07:01  -  02-19 @ 23:16  --------------------------------------------------------  IN: 0 mL / OUT: 1 mL / NET: -1 mL      Daily     Daily     Appearance: Normal, well groomed, NAD  Eyes: PERRLA, EOMI, pink conjunctiva, no scleral icterus   HENT: Normal oral mucosa  Cardiovascular: RRR, S1, S2, no murmur, rub, or gallop; no edema; no JVD  Procedural Access Site: Clean, dry, intact, without hematoma  Respiratory: Clear to auscultation bilaterally  Gastrointestinal: Soft, non-tender, non-distended, BS+  Musculoskeletal: No clubbing or joint deformity   Neurologic: No focal weakness  Lymphatic: No lymphadenopathy  Psychiatry: AAOx3 with appropriate mood and affect  Skin: No rashes, ecchymoses, or cyanosis    Cardiovascular Diagnostic Testing:  ECG:    Echo:  < from: TTE W or WO Ultrasound Enhancing Agent (02.12.25 @ 08:22) >  _______________________________________________________________________________________     CONCLUSIONS:      1. Left ventricular systolic function is normal.   2. No significant valvular disease.   3. There is mild posterior calcification of the mitral valve annulus.   4. No echocardiographic evidence of vegetations.   5. No pericardial effusion seen.   6. Compared to the transthoracic echocardiogram performed on 1/27/2025, there is noevidence of vegetation on the mitral valve. The posterior mitral annulus is calcified.    ________________________________________________________________________________________    < end of copied text >      Stress Testing:    Cath:    Interpretation of Telemetry:    Imaging:    Labs:                        8.0    8.59  )-----------( 85       ( 19 Feb 2025 07:10 )             24.2     02-19    128[L]  |  93[L]  |  8   ----------------------------<  103[H]  3.7   |  20[L]  |  0.33[L]    Ca    8.7      19 Feb 2025 07:16  Phos  2.0     02-19  Mg     1.4     02-19    TPro  6.5  /  Alb  3.5  /  TBili  12.4[H]  /  DBili  x   /  AST  77[H]  /  ALT  27  /  AlkPhos  268[H]  02-19    PT/INR - ( 18 Feb 2025 06:53 )   PT: 23.0 sec;   INR: 2.03 ratio         PTT - ( 18 Feb 2025 06:53 )  PTT:47.6 sec      Thyroid Stimulating Hormone, Serum: 2.22 uIU/mL (02-15 @ 07:12)

## 2025-02-19 NOTE — PROGRESS NOTE ADULT - PROBLEM SELECTOR PLAN 3
Pt tested positive for C. diff colitis at [facility]. Started on oral vancomycin.     Plan:   - S/p 2 weeks vancomycin Pt tested positive for C. diff colitis at [facility]. Started on oral vancomycin.     Plan:   - S/p 2 weeks vancomycin  - Resolved

## 2025-02-19 NOTE — DISCHARGE NOTE NURSING/CASE MANAGEMENT/SOCIAL WORK - PATIENT PORTAL LINK FT
You can access the FollowMyHealth Patient Portal offered by [facility] by registering at the following website: [email or web address]. By joining [email or web address] FollowMyHealth portal, you will also be able to view your health information using other applications (apps) compatible with our system.

## 2025-02-19 NOTE — PROGRESS NOTE ADULT - ASSESSMENT
Carmen Ly is a 59 year old F w/ PMH HTN, anemia, EtOH associated cirrhosis c/b ascites/EV (s/p banding last 11/2024), AUD now sober for ~3 months, bleeding rectal varices vs hemorrhoidal bleeding s/p hemorrhoidectomy (11/2023) presenting as a transfer from [place] for evaluation of liver transplant currently undergoing expedited LT evaluation.     #Decompensated EtOH Associated Cirrhosis  #Bacteriodes and E coli Bacteremia   #Influenza  #C-diff colitis   Patient thought to have EtOH cirrhosis, w/ low MELD 18 as an outpatient, however w/ significant hyperbilirubinemia and elevated INR. Suspect decompensation likely in the setting of multiple active infections. Bacteroides and E coli bacteremia initially thought to be due to endocarditis but appears that repeat TTE shows no vegetation and likely false read at OSH. Suspect SBP may be source of bacteremia. Patient's underlying cirrhosis in the setting of EtOH use, however, outpatient labs suggestive of possible concomitant AIH w/ elevated TAJ and ASMA. Per patient, she has been sober for many months. Outpatient PETH however > 400 on 1/17/2024.  MELD: 27 (2/18) B+  HE: no overt evidence of HE. Continue with lactulose and rifaximin  Varices: EGD 11/5/2024 w/ small varices w/ blue bleb s/p 2 bands placed. Last EGD 1/31, no report available but per pt no high risk varices. Per outpatient notes unable to tolerate B, however retrialed on coreg w/ success as outpatient.  On Coreg 3.125 mg BID at home   Ascites: Hx significant ascites w/ prior hx LVP, s/p para 2/13 w/ 3 L fluid removed- neg for sbp. Continue with diuretics as noted below.   HCC: MRI 2/17 w/o evidence of suspicious hepatic lesion   OLT: OLT eval opened 2/14 though may have significant psych barriers, especially w/ + PETH and patient denying EtOH intake. Evaluated by psychiatry who determined patient is a poor candidate for OLT at this time, w/ likely need for RPP engagement. To be discussed in upcoming selection meeting 2/21    #Worsening Hyponatremia  Likely hypervolemic hyponatremia in the setting of increased abd distension, improved w/ albumin and diuresis.     Recommendations:  - Agree w/ paracentesis w/ IR  - Continue spironolactone 100 mg QD - change lasix to 40 mg IV QD, administer additional dose today  - Administer additional albumin 100ml of 25% for 3 doses, will assess need daily.   - Cipro 500 mg daily for SBP ppx   - Vancomycin po per ID recs  - Will discuss need for RPP as per psych, especially given + PETH and pt reported hx of not drinking etoh prior to further transplant evaluation  - Folic acid, thiamine, multivitamin    Note incomplete until finalized by attending signature/attestation.    Holden Ahn  GI/Hepatology Fellow, PGY-4    MONDAY-FRIDAY 8AM-5PM:  Please message via [employer] or email [email or web address]     On Weekends/Holidays (All day) and Weekdays after 5 PM to 8 AM  For nonurgent consults please email:  Please email [email or web address]  For urgent consults:  Please contact on call GI team. See Amion schedule ([facility]), [employer] paging system ([facility]), or call hospital  ([facility])

## 2025-02-19 NOTE — PROGRESS NOTE ADULT - SUBJECTIVE AND OBJECTIVE BOX
Patient is a 59y old  Female who presents with a chief complaint of Transfer from [place] for possible liver transplant (19 Feb 2025 12:06)      Interval Events:   Patient reports worsening abdominal pain and discomfort associated w/ her ascites.     ROS:   A 12-point ROS was performed and negative except as noted in HPI.    Hospital Medications:  albuterol/ipratropium for Nebulization 3 milliLiter(s) Nebulizer every 6 hours PRN  Biotene Dry Mouth Oral Rinse 15 milliLiter(s) Swish and Spit five times a day  chlorhexidine 2% Cloths 1 Application(s) Topical <User Schedule>  ciprofloxacin     Tablet 500 milliGRAM(s) Oral daily  folic acid 1 milliGRAM(s) Oral daily  furosemide    Tablet 40 milliGRAM(s) Oral daily  guaiFENesin Oral Liquid (Sugar-Free) 100 milliGRAM(s) Oral every 6 hours PRN  heparin   Injectable 5000 Unit(s) SubCutaneous every 8 hours  lactulose Syrup 10 Gram(s) Oral two times a day  melatonin 3 milliGRAM(s) Oral at bedtime  midodrine. 15 milliGRAM(s) Oral three times a day  mirtazapine 7.5 milliGRAM(s) Oral at bedtime  pantoprazole    Tablet 40 milliGRAM(s) Oral before breakfast  rifAXIMin 550 milliGRAM(s) Oral two times a day  spironolactone 100 milliGRAM(s) Oral daily  sucralfate suspension 1 Gram(s) Oral every 6 hours  thiamine 100 milliGRAM(s) Oral daily      PHYSICAL EXAM:   Vital Signs:  Vital Signs Last 24 Hrs  T(C): 36.8 (19 Feb 2025 11:08), Max: 36.8 (19 Feb 2025 11:08)  T(F): 98.2 (19 Feb 2025 11:08), Max: 98.2 (19 Feb 2025 11:08)  HR: 118 (19 Feb 2025 11:08) (117 - 119)  BP: 111/70 (19 Feb 2025 11:08) (111/70 - 116/70)  BP(mean): --  RR: 18 (19 Feb 2025 11:08) (18 - 18)  SpO2: 95% (19 Feb 2025 11:08) (95% - 97%)    Parameters below as of 19 Feb 2025 11:08  Patient On (Oxygen Delivery Method): room air      Daily     Daily     GENERAL: no acute distress  NEURO: alert, no asterixis   HEENT: scleral icterus   CHEST: no respiratory distress, no accessory muscle use  CARDIAC: regular rate  ABDOMEN: distended abdomen, mild TTP   EXTREMITIES: warm, well perfused, no edema  SKIN: jaundiced       LABS: reviewed                        8.0    8.59  )-----------( 85       ( 19 Feb 2025 07:10 )             24.2     02-19    128[L]  |  93[L]  |  8   ----------------------------<  103[H]  3.7   |  20[L]  |  0.33[L]    Ca    8.7      19 Feb 2025 07:16  Phos  2.0     02-19  Mg     1.4     02-19    TPro  6.5  /  Alb  3.5  /  TBili  12.4[H]  /  DBili  x   /  AST  77[H]  /  ALT  27  /  AlkPhos  268[H]  02-19    LIVER FUNCTIONS - ( 19 Feb 2025 07:16 )  Alb: 3.5 g/dL / Pro: 6.5 g/dL / ALK PHOS: 268 U/L / ALT: 27 U/L / AST: 77 U/L / GGT: x             Interval Diagnostic Studies: see sunrise for full report

## 2025-02-19 NOTE — CONSULT NOTE ADULT - SUBJECTIVE AND OBJECTIVE BOX
Interventional Radiology    Evaluate for Procedure: Therapeutic paracentesis, pt with PMH alcoholic cirrhosis hospitalized for transplant eval    HPI: 60yo F w/ PMH of alcohol cirrhosis with recurrent ascites, s/p multiple paracentesis, s/p banding of esophageal varices, currently being treated for recent c. diff colitis and possible endocarditis. Pt is jaundiced, with scleral icterus and abdominal distension presenting for liver transplant evaluation from [facility].     IR consulted for therapeutic paracentesis.    Allergies: Zithromax (Unknown)    Medications (Abx/Cardiac/Anticoagulation/Blood Products)    ciprofloxacin     Tablet: 500 milliGRAM(s) Oral (02-18 @ 13:53)  furosemide    Tablet: 40 milliGRAM(s) Oral (02-19 @ 05:35)  heparin   Injectable: 5000 Unit(s) SubCutaneous (02-18 @ 21:55)  midodrine.: 15 milliGRAM(s) Oral (02-19 @ 11:23)  rifAXIMin: 550 milliGRAM(s) Oral (02-19 @ 05:34)  spironolactone: 100 milliGRAM(s) Oral (02-19 @ 05:35)    Data:    T(C): 36.8  HR: 118  BP: 111/70  RR: 18  SpO2: 95%    -WBC 8.59 / HgB 8.0 / Hct 24.2 / Plt 85  -Na 128 / Cl 93 / BUN 8 / Glucose 103  -K 3.7 / CO2 20 / Cr 0.33  -ALT 27 / Alk Phos 268 / T.Bili 12.4  -INR 2.03 / PTT 47.6    Radiology: Reviewed.    Assessment/Plan: 60yo F w/ PMH of alcohol cirrhosis with recurrent ascites, s/p multiple paracentesis, s/p banding of esophageal varices, currently being treated for recent c. diff colitis and possible endocarditis. Pt is jaundiced, with scleral icterus and abdominal distension presenting for liver transplant evaluation from [facility].     IR consulted for therapeutic paracentesis. Of note, pt is baseline sinus tachycardic as per primary team, no contraindication for paracentesis.    - case reviewed and approved for 2/20/25  - please place IR procedure order under BRIGITTE Lopez  - STAT labs in AM (cbc,coags, bmp, T&S)  - please hold subQ heparin AM of procedure.  - does not need to be NPO  - d/w primary team

## 2025-02-19 NOTE — DISCHARGE NOTE NURSING/CASE MANAGEMENT/SOCIAL WORK - FINANCIAL ASSISTANCE
[facility] provides services at a reduced cost to those who are determined to be eligible through [facility]’s financial assistance program. Information regarding [facility]’s financial assistance program can be found by going to [email or web address] or by calling [phone].

## 2025-02-19 NOTE — PROGRESS NOTE ADULT - PROBLEM SELECTOR PLAN 1
- C/b hepatic encephalopathy and esophageal varices  - Paracentesis done today; 1.4L removed  - Pt appears clinically euvolemic on exam    Plan:  - Continue on Lasix 40mg and Spironolactone 100mg qD  - Continue on lactulose 10 mg BID and rifaximin  - Low current suspicion for SBP; pt taking IV CTX 2g qD for bacteremia  - fu Transplant hepatology recs       > PETH level, Type and Screen, repeat TAJ and ASMA, IgG levels, Microsomal Ab  - fu abdominal US for ascites - mild-mod ascites       > Paracentesis tentatively planned with IR 2/13       > Fu cell count       > Negative for SBP       > Consider SBP prophylaxis  > pending transport to  for transplant eval - C/b hepatic encephalopathy and esophageal varices  - Paracentesis done today; 1.4L removed  - Pt appears clinically euvolemic on exam    Plan:  - Continue on Lasix 40mg and Spironolactone 100mg qD  - Continue on lactulose 10 mg BID and rifaximin  - Low current suspicion for SBP; pt taking IV CTX 2g qD for bacteremia  - fu Transplant hepatology recs       > PETH level, Type and Screen, repeat TAJ and ASMA, IgG levels, Microsomal Ab  - fu abdominal US for ascites - mild-mod ascites       > Paracentesis tentatively planned with IR 2/13                > para tentatively planned for 2/20       > Fu cell count       > Negative for SBP       > Consider SBP prophylaxis  > pending transport to  for transplant eval

## 2025-02-19 NOTE — PROGRESS NOTE ADULT - ASSESSMENT
58yo F w/ PMH of alcohol cirrhosis with recurrent ascites, s/p multiple paracentesis, s/p banding of esophageal varices, currently being treated for recent c. diff colitis and possible endocarditis. Pt is jaundiced, with scleral icterus and abdominal distension presenting for liver transplant evaluation from [facility].

## 2025-02-19 NOTE — DISCHARGE NOTE NURSING/CASE MANAGEMENT/SOCIAL WORK - NSDCFUADDAPPT_GEN_ALL_CORE_FT
APPTS ARE READY TO BE MADE: [ ] YES    Best Family or Patient Contact (if needed):    Additional Information about above appointments (if needed):    1: ID for repeat blood cultures  2:   3:     Other comments or requests:

## 2025-02-19 NOTE — PROGRESS NOTE ADULT - SUBJECTIVE AND OBJECTIVE BOX
PROGRESS NOTE:     Patient is a 59y old  Female who presents with a chief complaint of Transfer from [place] for possible liver transplant (16 Feb 2025 15:34)    INTERVAL / OVERNIGHT EVENTS:  No acute events overnight.     SUBJECTIVE:  Patient examined at bedside with no acute complaints.     BRIEF DAILY PLAN:  - f/u MR abdomen  - c/w cipro for SBP PPx  - MELD today 29, f/u remaining transplant workup, transfer to  when bed available    Exam:  GENERAL: Follows conversation appropriately. No acute distress. Jaundiced  EYES: Tracks me in room. Scleral ictus   HENT: Moist mucous membranes.  LUNGS: Clear to auscultation bilaterally. No accessory muscle use.  CARDIOVASCULAR: Regular rate and rhythm. No murmur.  ABDOMEN: Soft, non-tender, +distention. No palpable masses.  EXTREMITIES: No edema. Non-tender.  SKIN: No rashes or lesions. Jaundiced. Warm. PICC in place L upper arm  NEUROLOGIC: No focal neurological deficits  PSYCHIATRIC: Cooperative. Appropriate mood and affect.    VS  T(C): 36.6 (02-19-25 @ 05:38), Max: 36.6 (02-19-25 @ 05:38)  HR: 119 (02-19-25 @ 05:38) (116 - 119)  BP: 116/70 (02-19-25 @ 05:38) (108/67 - 116/70)  RR: 18 (02-19-25 @ 05:38) (18 - 18)  SpO2: 97% (02-19-25 @ 05:38) (95% - 97%)    LABS (available at time of writing 02-19-25 @ 07:58):                         8.0    8.59  )-----------( 85       ( 19 Feb 2025 07:10 )             24.2     02-18    128[L]  |  94[L]  |  11  ----------------------------<  147[H]  3.7   |  21[L]  |  0.36[L]    Ca    8.4      18 Feb 2025 16:42  Phos  2.5     02-18  Mg     1.3     02-18    TPro  6.2  /  Alb  2.8[L]  /  TBili  12.5[H]  /  DBili  x   /  AST  87[H]  /  ALT  32  /  AlkPhos  300[H]  02-18    PT/INR - ( 18 Feb 2025 06:53 )   PT: 23.0 sec;   INR: 2.03 ratio         PTT - ( 18 Feb 2025 06:53 )  PTT:47.6 sec  Urinalysis Basic - ( 18 Feb 2025 16:42 )    Color: x / Appearance: x / SG: x / pH: x  Gluc: 147 mg/dL / Ketone: x  / Bili: x / Urobili: x   Blood: x / Protein: x / Nitrite: x   Leuk Esterase: x / RBC: x / WBC x   Sq Epi: x / Non Sq Epi: x / Bacteria: x            Medications:   albuterol/ipratropium for Nebulization 3 milliLiter(s) Nebulizer every 6 hours PRN  Biotene Dry Mouth Oral Rinse 15 milliLiter(s) Swish and Spit five times a day  chlorhexidine 2% Cloths 1 Application(s) Topical <User Schedule>  ciprofloxacin     Tablet 500 milliGRAM(s) Oral daily  folic acid 1 milliGRAM(s) Oral daily  furosemide    Tablet 40 milliGRAM(s) Oral daily  guaiFENesin Oral Liquid (Sugar-Free) 100 milliGRAM(s) Oral every 6 hours PRN  heparin   Injectable 5000 Unit(s) SubCutaneous every 8 hours  lactulose Syrup 10 Gram(s) Oral two times a day  melatonin 3 milliGRAM(s) Oral at bedtime  midodrine. 15 milliGRAM(s) Oral three times a day  mirtazapine 7.5 milliGRAM(s) Oral at bedtime  pantoprazole    Tablet 40 milliGRAM(s) Oral before breakfast  rifAXIMin 550 milliGRAM(s) Oral two times a day  spironolactone 100 milliGRAM(s) Oral daily  sucralfate suspension 1 Gram(s) Oral every 6 hours  thiamine 100 milliGRAM(s) Oral daily      RADIOLOGY, EKG & ADDITIONAL TESTS: Reviewed.    PROGRESS NOTE:     Patient is a 59y old  Female who presents with a chief complaint of Transfer from [place] for possible liver transplant (16 Feb 2025 15:34)    INTERVAL / OVERNIGHT EVENTS:  No acute events overnight.     SUBJECTIVE:  Patient examined at bedside with no acute complaints. Had nose bleed overnight and declined heparin in AM    BRIEF DAILY PLAN:  - f/u remaining transplant workup, transfer to  when bed available  - IR consult for therapeutic paracentesis    Exam:  GENERAL: Follows conversation appropriately. No acute distress  EYES: Tracks me in room. Scleral ictus   HENT: Moist mucous membranes.  LUNGS: Clear to auscultation bilaterally. No accessory muscle use.  CARDIOVASCULAR: Regular rate and rhythm. No murmur.  ABDOMEN: Soft, non-tender, +distention. No palpable masses.  EXTREMITIES: No edema. Non-tender.  SKIN: No rashes or lesions. Jaundiced. Warm. PICC in place L upper arm  NEUROLOGIC: No focal neurological deficits  PSYCHIATRIC: Cooperative. Appropriate mood and affect.    VS  T(C): 36.6 (02-19-25 @ 05:38), Max: 36.6 (02-19-25 @ 05:38)  HR: 119 (02-19-25 @ 05:38) (116 - 119)  BP: 116/70 (02-19-25 @ 05:38) (108/67 - 116/70)  RR: 18 (02-19-25 @ 05:38) (18 - 18)  SpO2: 97% (02-19-25 @ 05:38) (95% - 97%)    LABS (available at time of writing 02-19-25 @ 07:58):                         8.0    8.59  )-----------( 85       ( 19 Feb 2025 07:10 )             24.2     02-18    128[L]  |  94[L]  |  11  ----------------------------<  147[H]  3.7   |  21[L]  |  0.36[L]    Ca    8.4      18 Feb 2025 16:42  Phos  2.5     02-18  Mg     1.3     02-18    TPro  6.2  /  Alb  2.8[L]  /  TBili  12.5[H]  /  DBili  x   /  AST  87[H]  /  ALT  32  /  AlkPhos  300[H]  02-18    PT/INR - ( 18 Feb 2025 06:53 )   PT: 23.0 sec;   INR: 2.03 ratio         PTT - ( 18 Feb 2025 06:53 )  PTT:47.6 sec  Urinalysis Basic - ( 18 Feb 2025 16:42 )    Color: x / Appearance: x / SG: x / pH: x  Gluc: 147 mg/dL / Ketone: x  / Bili: x / Urobili: x   Blood: x / Protein: x / Nitrite: x   Leuk Esterase: x / RBC: x / WBC x   Sq Epi: x / Non Sq Epi: x / Bacteria: x            Medications:   albuterol/ipratropium for Nebulization 3 milliLiter(s) Nebulizer every 6 hours PRN  Biotene Dry Mouth Oral Rinse 15 milliLiter(s) Swish and Spit five times a day  chlorhexidine 2% Cloths 1 Application(s) Topical <User Schedule>  ciprofloxacin     Tablet 500 milliGRAM(s) Oral daily  folic acid 1 milliGRAM(s) Oral daily  furosemide    Tablet 40 milliGRAM(s) Oral daily  guaiFENesin Oral Liquid (Sugar-Free) 100 milliGRAM(s) Oral every 6 hours PRN  heparin   Injectable 5000 Unit(s) SubCutaneous every 8 hours  lactulose Syrup 10 Gram(s) Oral two times a day  melatonin 3 milliGRAM(s) Oral at bedtime  midodrine. 15 milliGRAM(s) Oral three times a day  mirtazapine 7.5 milliGRAM(s) Oral at bedtime  pantoprazole    Tablet 40 milliGRAM(s) Oral before breakfast  rifAXIMin 550 milliGRAM(s) Oral two times a day  spironolactone 100 milliGRAM(s) Oral daily  sucralfate suspension 1 Gram(s) Oral every 6 hours  thiamine 100 milliGRAM(s) Oral daily      RADIOLOGY, EKG & ADDITIONAL TESTS: Reviewed.

## 2025-02-20 LAB
ALBUMIN FLD-MCNC: 0.7 G/DL — SIGNIFICANT CHANGE UP
ALBUMIN SERPL ELPH-MCNC: 3.2 G/DL — LOW (ref 3.3–5)
ALP SERPL-CCNC: 264 U/L — HIGH (ref 40–120)
ALT FLD-CCNC: 30 U/L — SIGNIFICANT CHANGE UP (ref 10–45)
ANION GAP SERPL CALC-SCNC: 12 MMOL/L — SIGNIFICANT CHANGE UP (ref 5–17)
AST SERPL-CCNC: 110 U/L — HIGH (ref 10–40)
B PERT IGG+IGM PNL SER: ABNORMAL
BILIRUB SERPL-MCNC: 13 MG/DL — HIGH (ref 0.2–1.2)
BUN SERPL-MCNC: 7 MG/DL — SIGNIFICANT CHANGE UP (ref 7–23)
CALCIUM SERPL-MCNC: 8.5 MG/DL — SIGNIFICANT CHANGE UP (ref 8.4–10.5)
CHLORIDE SERPL-SCNC: 95 MMOL/L — LOW (ref 96–108)
CO2 SERPL-SCNC: 21 MMOL/L — LOW (ref 22–31)
COLOR FLD: YELLOW
CREAT SERPL-MCNC: 0.31 MG/DL — LOW (ref 0.5–1.3)
EGFR: 120 ML/MIN/1.73M2 — SIGNIFICANT CHANGE UP
EGFR: 120 ML/MIN/1.73M2 — SIGNIFICANT CHANGE UP
FLUID INTAKE SUBSTANCE CLASS: SIGNIFICANT CHANGE UP
GAMMA INTERFERON BACKGROUND BLD IA-ACNC: 0.03 IU/ML — SIGNIFICANT CHANGE UP
GLUCOSE FLD-MCNC: 143 MG/DL — SIGNIFICANT CHANGE UP
GLUCOSE SERPL-MCNC: 117 MG/DL — HIGH (ref 70–99)
GRAM STN FLD: SIGNIFICANT CHANGE UP
HCT VFR BLD CALC: 25 % — LOW (ref 34.5–45)
HGB BLD-MCNC: 8.3 G/DL — LOW (ref 11.5–15.5)
LDH SERPL L TO P-CCNC: 34 U/L — SIGNIFICANT CHANGE UP
LYMPHOCYTES # FLD: 18 % — SIGNIFICANT CHANGE UP
M TB IFN-G BLD-IMP: ABNORMAL
M TB IFN-G CD4+ BCKGRND COR BLD-ACNC: 0 IU/ML — SIGNIFICANT CHANGE UP
M TB IFN-G CD4+CD8+ BCKGRND COR BLD-ACNC: 0 IU/ML — SIGNIFICANT CHANGE UP
MAGNESIUM SERPL-MCNC: 1.4 MG/DL — LOW (ref 1.6–2.6)
MCHC RBC-ENTMCNC: 31.3 PG — SIGNIFICANT CHANGE UP (ref 27–34)
MCHC RBC-ENTMCNC: 33.2 G/DL — SIGNIFICANT CHANGE UP (ref 32–36)
MCV RBC AUTO: 94.3 FL — SIGNIFICANT CHANGE UP (ref 80–100)
MESOTHL CELL # FLD: SIGNIFICANT CHANGE UP
MONOS+MACROS # FLD: 62 % — SIGNIFICANT CHANGE UP
NEUTROPHILS-BODY FLUID: 20 % — SIGNIFICANT CHANGE UP
NRBC BLD AUTO-RTO: 0 /100 WBCS — SIGNIFICANT CHANGE UP (ref 0–0)
PHOSPHATE SERPL-MCNC: 3.2 MG/DL — SIGNIFICANT CHANGE UP (ref 2.5–4.5)
PLATELET # BLD AUTO: 97 K/UL — LOW (ref 150–400)
POTASSIUM SERPL-MCNC: 3.3 MMOL/L — LOW (ref 3.5–5.3)
POTASSIUM SERPL-SCNC: 3.3 MMOL/L — LOW (ref 3.5–5.3)
PROT FLD-MCNC: 1.2 G/DL — SIGNIFICANT CHANGE UP
PROT SERPL-MCNC: 6.3 G/DL — SIGNIFICANT CHANGE UP (ref 6–8.3)
QUANT TB PLUS MITOGEN MINUS NIL: 0.42 IU/ML — SIGNIFICANT CHANGE UP
RBC # BLD: 2.65 M/UL — LOW (ref 3.8–5.2)
RBC # FLD: 23.1 % — HIGH (ref 10.3–14.5)
RCV VOL RI: 2000 CELLS/UL — HIGH
SODIUM SERPL-SCNC: 128 MMOL/L — LOW (ref 135–145)
SPECIMEN SOURCE: SIGNIFICANT CHANGE UP
TOTAL NUCLEATED CELL COUNT, BODY FLUID: 562 CELLS/UL — SIGNIFICANT CHANGE UP
TUBE TYPE: SIGNIFICANT CHANGE UP
WBC # BLD: 6.78 K/UL — SIGNIFICANT CHANGE UP (ref 3.8–10.5)
WBC # FLD AUTO: 6.78 K/UL — SIGNIFICANT CHANGE UP (ref 3.8–10.5)

## 2025-02-20 PROCEDURE — 99231 SBSQ HOSP IP/OBS SF/LOW 25: CPT

## 2025-02-20 PROCEDURE — 99232 SBSQ HOSP IP/OBS MODERATE 35: CPT | Mod: GC

## 2025-02-20 PROCEDURE — G0545: CPT

## 2025-02-20 PROCEDURE — 99233 SBSQ HOSP IP/OBS HIGH 50: CPT

## 2025-02-20 PROCEDURE — 49083 ABD PARACENTESIS W/IMAGING: CPT

## 2025-02-20 PROCEDURE — 99233 SBSQ HOSP IP/OBS HIGH 50: CPT | Mod: GC

## 2025-02-20 RX ORDER — MAGNESIUM SULFATE 500 MG/ML
2 SYRINGE (ML) INJECTION ONCE
Refills: 0 | Status: COMPLETED | OUTPATIENT
Start: 2025-02-20 | End: 2025-02-20

## 2025-02-20 RX ORDER — FUROSEMIDE 10 MG/ML
80 INJECTION INTRAMUSCULAR; INTRAVENOUS DAILY
Refills: 0 | Status: DISCONTINUED | OUTPATIENT
Start: 2025-02-20 | End: 2025-03-05

## 2025-02-20 RX ORDER — ALBUMIN (HUMAN) 12.5 G/50ML
100 INJECTION, SOLUTION INTRAVENOUS EVERY 4 HOURS
Refills: 0 | Status: DISCONTINUED | OUTPATIENT
Start: 2025-02-20 | End: 2025-03-02

## 2025-02-20 RX ORDER — ACETAMINOPHEN 500 MG/5ML
650 LIQUID (ML) ORAL ONCE
Refills: 0 | Status: COMPLETED | OUTPATIENT
Start: 2025-02-20 | End: 2025-02-20

## 2025-02-20 RX ORDER — ALBUMIN (HUMAN) 12.5 G/50ML
50 INJECTION, SOLUTION INTRAVENOUS ONCE
Refills: 0 | Status: DISCONTINUED | OUTPATIENT
Start: 2025-02-20 | End: 2025-03-02

## 2025-02-20 RX ORDER — SPIRONOLACTONE 25 MG
150 TABLET ORAL DAILY
Refills: 0 | Status: DISCONTINUED | OUTPATIENT
Start: 2025-02-20 | End: 2025-03-05

## 2025-02-20 RX ADMIN — ALBUMIN (HUMAN) 50 MILLILITER(S): 12.5 INJECTION, SOLUTION INTRAVENOUS at 22:02

## 2025-02-20 RX ADMIN — Medication 650 MILLIGRAM(S): at 08:23

## 2025-02-20 RX ADMIN — Medication 15 MILLILITER(S): at 08:23

## 2025-02-20 RX ADMIN — Medication 25 GRAM(S): at 11:23

## 2025-02-20 RX ADMIN — HEPARIN SODIUM 5000 UNIT(S): 1000 INJECTION INTRAVENOUS; SUBCUTANEOUS at 21:54

## 2025-02-20 RX ADMIN — Medication 1 GRAM(S): at 11:24

## 2025-02-20 RX ADMIN — Medication 1 GRAM(S): at 18:04

## 2025-02-20 RX ADMIN — Medication 1 GRAM(S): at 05:21

## 2025-02-20 RX ADMIN — Medication 100 MILLIGRAM(S): at 05:19

## 2025-02-20 RX ADMIN — MIDODRINE HYDROCHLORIDE 15 MILLIGRAM(S): 5 TABLET ORAL at 05:19

## 2025-02-20 RX ADMIN — Medication 1 GRAM(S): at 12:30

## 2025-02-20 RX ADMIN — HEPARIN SODIUM 5000 UNIT(S): 1000 INJECTION INTRAVENOUS; SUBCUTANEOUS at 18:04

## 2025-02-20 RX ADMIN — Medication 1 APPLICATION(S): at 05:31

## 2025-02-20 RX ADMIN — MIRTAZAPINE 7.5 MILLIGRAM(S): 30 TABLET, FILM COATED ORAL at 22:03

## 2025-02-20 RX ADMIN — MIDODRINE HYDROCHLORIDE 15 MILLIGRAM(S): 5 TABLET ORAL at 11:25

## 2025-02-20 RX ADMIN — Medication 15 MILLILITER(S): at 20:12

## 2025-02-20 RX ADMIN — Medication 15 MILLILITER(S): at 11:24

## 2025-02-20 RX ADMIN — FOLIC ACID 1 MILLIGRAM(S): 1 TABLET ORAL at 11:24

## 2025-02-20 RX ADMIN — LACTULOSE 10 GRAM(S): 10 SOLUTION ORAL at 18:04

## 2025-02-20 RX ADMIN — MIDODRINE HYDROCHLORIDE 15 MILLIGRAM(S): 5 TABLET ORAL at 18:05

## 2025-02-20 RX ADMIN — Medication 100 MILLIGRAM(S): at 11:24

## 2025-02-20 RX ADMIN — Medication 40 MILLIEQUIVALENT(S): at 11:35

## 2025-02-20 RX ADMIN — Medication 500 MILLIGRAM(S): at 11:25

## 2025-02-20 RX ADMIN — Medication 1 GRAM(S): at 23:46

## 2025-02-20 RX ADMIN — FUROSEMIDE 40 MILLIGRAM(S): 10 INJECTION INTRAMUSCULAR; INTRAVENOUS at 05:21

## 2025-02-20 RX ADMIN — Medication 15 MILLILITER(S): at 23:46

## 2025-02-20 RX ADMIN — ALBUMIN (HUMAN) 50 MILLILITER(S): 12.5 INJECTION, SOLUTION INTRAVENOUS at 18:00

## 2025-02-20 RX ADMIN — Medication 15 MILLILITER(S): at 18:04

## 2025-02-20 RX ADMIN — Medication 40 MILLIGRAM(S): at 05:28

## 2025-02-20 RX ADMIN — Medication 3 MILLIGRAM(S): at 22:03

## 2025-02-20 NOTE — PROGRESS NOTE ADULT - PROBLEM SELECTOR PLAN 3
Pt tested positive for C. diff colitis at [facility]. Started on oral vancomycin.     Plan:   - S/p 2 weeks vancomycin  - Resolved

## 2025-02-20 NOTE — CHART NOTE - NSCHARTNOTEFT_GEN_A_CORE
Pathology returned on pap smear as below:     Final Diagnosis  NEGATIVE FOR INTRAEPITHELIAL LESION OR MALIGNANCY.  Atrophic changes.    Vera Smith PGY1

## 2025-02-20 NOTE — PROCEDURE NOTE - NSPROCDETAILS_GEN_ALL_CORE
location identified, sterile technique used, catheter introduced, fluid drained/Seldinger technique/sterile dressing applied

## 2025-02-20 NOTE — PROGRESS NOTE ADULT - SUBJECTIVE AND OBJECTIVE BOX
PROGRESS NOTE:     Patient is a 59y old  Female who presents with a chief complaint of Transfer from [place] for possible liver transplant (16 Feb 2025 15:34)    INTERVAL / OVERNIGHT EVENTS:  No acute events overnight.     SUBJECTIVE:  Patient examined at bedside with no acute complaints. Had nose bleed overnight and declined heparin in AM    BRIEF DAILY PLAN:  - f/u remaining transplant workup, transfer to  when bed available  - Para today    Exam:  GENERAL: Follows conversation appropriately. No acute distress  EYES: Tracks me in room. Scleral ictus   HENT: Moist mucous membranes.  LUNGS: Clear to auscultation bilaterally. No accessory muscle use.  CARDIOVASCULAR: Regular rate and rhythm. No murmur.  ABDOMEN: Soft, non-tender, +distention. No palpable masses.  EXTREMITIES: No edema. Non-tender.  SKIN: No rashes or lesions. Jaundiced. Warm. PICC in place L upper arm  NEUROLOGIC: No focal neurological deficits  PSYCHIATRIC: Cooperative. Appropriate mood and affect.    VS  T(C): 37.2 (02-20-25 @ 05:20), Max: 37.2 (02-20-25 @ 05:20)  HR: 125 (02-20-25 @ 05:20) (109 - 125)  BP: 107/71 (02-20-25 @ 05:20) (107/65 - 111/70)  RR: 18 (02-20-25 @ 05:20) (18 - 19)  SpO2: 95% (02-20-25 @ 05:20) (95% - 95%)    LABS (available at time of writing 02-20-25 @ 07:19):                         8.0    8.59  )-----------( 85       ( 19 Feb 2025 07:10 )             24.2     02-19    128[L]  |  93[L]  |  8   ----------------------------<  103[H]  3.7   |  20[L]  |  0.33[L]    Ca    8.7      19 Feb 2025 07:16  Phos  2.0     02-19  Mg     1.4     02-19    TPro  6.5  /  Alb  3.5  /  TBili  12.4[H]  /  DBili  x   /  AST  77[H]  /  ALT  27  /  AlkPhos  268[H]  02-19      Urinalysis Basic - ( 19 Feb 2025 07:16 )    Color: x / Appearance: x / SG: x / pH: x  Gluc: 103 mg/dL / Ketone: x  / Bili: x / Urobili: x   Blood: x / Protein: x / Nitrite: x   Leuk Esterase: x / RBC: x / WBC x   Sq Epi: x / Non Sq Epi: x / Bacteria: x            Medications:   albuterol/ipratropium for Nebulization 3 milliLiter(s) Nebulizer every 6 hours PRN  Biotene Dry Mouth Oral Rinse 15 milliLiter(s) Swish and Spit five times a day  chlorhexidine 2% Cloths 1 Application(s) Topical <User Schedule>  ciprofloxacin     Tablet 500 milliGRAM(s) Oral daily  folic acid 1 milliGRAM(s) Oral daily  furosemide    Tablet 40 milliGRAM(s) Oral daily  guaiFENesin Oral Liquid (Sugar-Free) 100 milliGRAM(s) Oral every 6 hours PRN  heparin   Injectable 5000 Unit(s) SubCutaneous every 8 hours  lactulose Syrup 10 Gram(s) Oral two times a day  melatonin 3 milliGRAM(s) Oral at bedtime  midodrine. 15 milliGRAM(s) Oral three times a day  mirtazapine 7.5 milliGRAM(s) Oral at bedtime  pantoprazole    Tablet 40 milliGRAM(s) Oral before breakfast  rifAXIMin 550 milliGRAM(s) Oral two times a day  spironolactone 100 milliGRAM(s) Oral daily  sucralfate suspension 1 Gram(s) Oral every 6 hours  thiamine 100 milliGRAM(s) Oral daily      RADIOLOGY, EKG & ADDITIONAL TESTS: Reviewed.

## 2025-02-20 NOTE — PRE PROCEDURE NOTE - PRE PROCEDURE EVALUATION
Interventional Radiology    HPI: 60yo F w/ PMH of alcohol cirrhosis with recurrent ascites, s/p multiple paracentesis, s/p banding of esophageal varices, currently being treated for recent c. diff colitis and possible endocarditis. Pt is jaundiced, with scleral icterus and abdominal distension presenting for liver transplant evaluation from [facility]. Patient presents to IR dx/tx paracentesis. Of note, pt is baseline sinus tachycardic as per primary team, no contraindication for paracentesis. Last paracentesis done on 2/13 with 3L removed, neg for SBP.    Allergies: Zithromax (Unknown)    Medications (Abx/Cardiac/Anticoagulation/Blood Products)    ciprofloxacin     Tablet: 500 milliGRAM(s) Oral (02-19 @ 13:56)  furosemide    Tablet: 40 milliGRAM(s) Oral (02-20 @ 05:21)  heparin   Injectable: 5000 Unit(s) SubCutaneous (02-19 @ 15:50)  midodrine.: 15 milliGRAM(s) Oral (02-20 @ 05:19)  rifAXIMin: 550 milliGRAM(s) Oral (02-20 @ 05:19)  spironolactone: 100 milliGRAM(s) Oral (02-20 @ 05:19)    Data:  59.9  T(C): 36.7  HR: 124  BP: 120/73  RR: 17  SpO2: 95%    Exam  General: No acute distress  Chest: Non labored breathing  Abdomen: distended    -WBC 6.78 / HgB 8.3 / Hct 25.0 / Plt 97  -Na 128 / Cl 95 / BUN 7 / Glucose 117  -K 3.3 / CO2 21 / Cr 0.31  -ALT 30 / Alk Phos 264 / T.Bili 13.0  -INR2.03    Imaging: from: US Abdomen Limited (02.18.25 @ 14:34)  FINDINGS/IMPRESSION:  Mild to moderate volume ascites with largest pocket measuring up to 10 cm   in depth as seen in the right upper quadrant.    Cirrhotic liver.    Small right pleural effusion.    Plan: 59y Female presents for dx/tx paracentesis  -Risks/Benefits/alternatives explained with the patient and/or healthcare proxy and witnessed informed consent obtained.    Interventional Radiology    HPI: 58yo F w/ PMH of alcohol cirrhosis with recurrent ascites, s/p multiple paracentesis, s/p banding of esophageal varices, currently being treated for recent c. diff colitis and possible endocarditis. Pt is jaundiced, with scleral icterus and abdominal distension presenting for liver transplant evaluation from [facility]. Patient presents to IR dx/tx paracentesis. Of note, pt is baseline sinus tachycardic as per primary team, no contraindication for paracentesis. Last paracentesis done on 2/13 with 3L removed, neg for SBP.    Allergies: Zithromax (Unknown)    Medications (Abx/Cardiac/Anticoagulation/Blood Products)    ciprofloxacin     Tablet: 500 milliGRAM(s) Oral (02-19 @ 13:56)  furosemide    Tablet: 40 milliGRAM(s) Oral (02-20 @ 05:21)  heparin   Injectable: 5000 Unit(s) SubCutaneous (02-19 @ 15:50)  midodrine.: 15 milliGRAM(s) Oral (02-20 @ 05:19)  rifAXIMin: 550 milliGRAM(s) Oral (02-20 @ 05:19)  spironolactone: 100 milliGRAM(s) Oral (02-20 @ 05:19)    Data:  59.9  T(C): 36.7  HR: 124  BP: 120/73  RR: 17  SpO2: 95%    Exam  General: No acute distress, jaundiced  Chest: Non labored breathing  Abdomen: distended    -WBC 6.78 / HgB 8.3 / Hct 25.0 / Plt 97  -Na 128 / Cl 95 / BUN 7 / Glucose 117  -K 3.3 / CO2 21 / Cr 0.31  -ALT 30 / Alk Phos 264 / T.Bili 13.0  -INR2.03    Imaging: from: US Abdomen Limited (02.18.25 @ 14:34)  FINDINGS/IMPRESSION:  Mild to moderate volume ascites with largest pocket measuring up to 10 cm   in depth as seen in the right upper quadrant.    Cirrhotic liver.    Small right pleural effusion.    Plan: 59y Female presents for dx/tx paracentesis  -Risks/Benefits/alternatives explained with the patient and/or healthcare proxy and witnessed informed consent obtained.

## 2025-02-20 NOTE — PROGRESS NOTE ADULT - ASSESSMENT
59 year old female PMH of alcohol cirrhosis with recurrent ascites, s/p multiple paracentesis, s/p banding of esophageal varices, currently being treated for recent c. diff colitis and possible endocarditis, presenting for possible liver transplant from [facility]. Pt initially admitted to [facility] c/o weakness, poor PO intake, and fever; hypotensive in ED. Met criteria for sepsis iso C. diff colitis and was treated with vasopressor support, flagyl, and vanc. Hospital course was complicated by AHRF and pt was intubated 1/28-2/2. Pt then treated for E. coli and Bacteroides bacteremia with TTE findings concerning for mitral vegetation; pt not a good candidate for ARACELI due to varices found on EGD from 1/31/24 decided to treat empirically for IE with 6 weeks of antibiotics s/p PICC line placement with negative blood cultures from 1/31/24. Pt d/c from [facility] on 2/7 and then presented to [place] c/o SOB. Ultimately transferred to [facility] for hepatology transplant consult. On presentation today, pt endorses subjective nighttime fevers, chills, SOB and abdominal discomfort. Attributes fevers to recent flu dx (noted in chart to be flu positive 1 week prior to presentation at [facility]). Pt states she has not used alcohol in over a year. She began using alcohol in her 20s socially and her use gradually increased to include ~3-4 glasses of wine everyday when she was drinking the most. Has been to rehab and therapy multiple times. Pt first began smoking in her 20s ~1pack/day for many years, cannot recall specifics. Has not had a cigarette in several months. Pt is unable to ambulate on her own and has remained bedbound since intubation several months ago.     C. difficile toxin assay (January 27) positive, repeat Cdff 2/11 neg  Blood cultures (January 27) 2/4 E. coli, 1/4 Bacteroides   Blood cultures (January 31) no growth to date  Blood cultures (February 10, February 11) no growth to date  Paracentesis (February 4, February 13) Cell counts negative for SBP    CTA chest (January 27) Patent central airways. Smooth interlobular septal thickening at the lung apices and lung bases. No lung consolidation or mass. Mild bibasilar dependent atelectasis. No lymphadenopathy.    CT A/P (January 27) Thickening of the walls of the stomach and duodenum with submucosal edema likely reflective of a gastroenteritis. Thickening of the walls of the ascending colon despite underdistention likely due to portal colopathy. Colonic diverticulosis without evidence of diverticulitis. Cholelithiasis. Nonspecific gallbladder wall edema and pericholecystic fluid in the setting of cirrhosis.    TTE (1/27) small mobile vegetation attached to the posterior mitral valve leaflet.  TTE (February 12) no evidence of vegetations    Social history: born in [place].  Travel history includes travel to [facility], [facility] and [place], [place], [place].  Lives primarily in [place] but has also had travel to [place] and [place].  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  States her mother had a history of tuberculosis but this was before patient was born.  Patient notes livestock exposure during her work as a .  Has sugar gliders as pets.    Antibiotic Course:  PO Vancomycin: 1/27 (2 doses) > 2/14 (1 dose)  Metronidazole: 1/27 > 2/13  Ceftriaxone: 1/27 > 2/13  Meropenem: 1/27    S/P LVP today - follow cell count and fluid cultures    #C.diff colitis 1/27/25, diarrhea resolved now   - repeat Cdiff 2/11 negative, completed vanco po 125 mg bi ppx post discontinuation of antibitoics on 2./13-2/16  - in the next 6 months will need vanco po ppx with every exposure to broad spectrum antibiotics  - this includes at the time of perioperative ppx    #Polymicrobial bacteremia with E.coli and Bacteroides  Status post course of ceftriaxone and metronidazole as above    #Possible MV vegetation on TTE from OSH  E.coli and bacteroides atypical endocarditis organisms and polymicrobial is also atypical for endocarditis  Repeat transthoracic echocardiogram here without evidence of vegetation    #Preliver transplant evaluation  --COVID19 Nucleocapsid Antibody  -- COVID19 Eder Antibody pos  -- HAV IgG pos  HBVs Ab, HBVsAg, HBVc Ab pos >400 immune, neg, neg  -- HCV Ab neg  -- HSV 1 IgG /HSV 2 IgG pos. neg  -- EBV IgG Pos  -- CMV IgG pos  --VZV IgG pos  -- Measles IgG , Mumps IgG and Rubella IgG pos all  --Follow up QuantiFeron-TB Gold  -- HIV Ag/Ab by CMIA- NEG  --Syphilis Screen - NEG  --Strongyloides Ab negative   -- Follow up Coccidiodes Ab  -- Brucella IgG - NEGATIVE  -- Q Fever Serologies REACTIVE, Would require repeat serology in 4 weeks to confirm if true infection.     #Encounter to Vaccinate Patient  COVID19: Would benefit from COVID19 [id number] Vaccine Dose  Influenza: 10/8/24  Pneumococcal: Would benefit from PCV20  Immune to hep A, B, varicella and MMR  Shingles: Will require Shingrix  Tdap: Will require Tdap  RSV vaccine also recommended this season       59 year old female PMH of alcohol cirrhosis with recurrent ascites, s/p multiple paracentesis, s/p banding of esophageal varices, currently being treated for recent c. diff colitis and possible endocarditis, presenting for possible liver transplant from [facility]. Pt initially admitted to [facility] c/o weakness, poor PO intake, and fever; hypotensive in ED. Met criteria for sepsis iso C. diff colitis and was treated with vasopressor support, flagyl, and vanc. Hospital course was complicated by AHRF and pt was intubated 1/28-2/2. Pt then treated for E. coli and Bacteroides bacteremia with TTE findings concerning for mitral vegetation; pt not a good candidate for ARACELI due to varices found on EGD from 1/31/24 decided to treat empirically for IE with 6 weeks of antibiotics s/p PICC line placement with negative blood cultures from 1/31/24. Pt d/c from [facility] on 2/7 and then presented to [place] c/o SOB. Ultimately transferred to [facility] for hepatology transplant consult. On presentation today, pt endorses subjective nighttime fevers, chills, SOB and abdominal discomfort. Attributes fevers to recent flu dx (noted in chart to be flu positive 1 week prior to presentation at [facility]). Pt states she has not used alcohol in over a year. She began using alcohol in her 20s socially and her use gradually increased to include ~3-4 glasses of wine everyday when she was drinking the most. Has been to rehab and therapy multiple times. Pt first began smoking in her 20s ~1pack/day for many years, cannot recall specifics. Has not had a cigarette in several months. Pt is unable to ambulate on her own and has remained bedbound since intubation several months ago.     C. difficile toxin assay (January 27) positive, repeat Cdff 2/11 neg  Blood cultures (January 27) 2/4 E. coli, 1/4 Bacteroides   Blood cultures (January 31) no growth to date  Blood cultures (February 10, February 11) no growth to date  Paracentesis (February 4, February 13) Cell counts negative for SBP    CTA chest (January 27) Patent central airways. Smooth interlobular septal thickening at the lung apices and lung bases. No lung consolidation or mass. Mild bibasilar dependent atelectasis. No lymphadenopathy.    CT A/P (January 27) Thickening of the walls of the stomach and duodenum with submucosal edema likely reflective of a gastroenteritis. Thickening of the walls of the ascending colon despite underdistention likely due to portal colopathy. Colonic diverticulosis without evidence of diverticulitis. Cholelithiasis. Nonspecific gallbladder wall edema and pericholecystic fluid in the setting of cirrhosis.    TTE (1/27) small mobile vegetation attached to the posterior mitral valve leaflet.  TTE (February 12) no evidence of vegetations    Social history: born in [place].  Travel history includes travel to [facility], [facility] and [place], [place], [place].  Lives primarily in [place] but has also had travel to [place] and [place].  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  States her mother had a history of tuberculosis but this was before patient was born.  Patient notes livestock exposure during her work as a .  Has sugar gliders as pets.    Antibiotic Course:  PO Vancomycin: 1/27 (2 doses) > 2/14 (1 dose)  Metronidazole: 1/27 > 2/13  Ceftriaxone: 1/27 > 2/13  Meropenem: 1/27    S/P LVP today - follow cell count and fluid cultures    #C.diff colitis 1/27/25, diarrhea resolved now   - repeat Cdiff 2/11 negative, completed vanco po 125 mg bi ppx post discontinuation of antibitoics on 2./13-2/16  - in the next 6 months will need vanco po ppx with every exposure to broad spectrum antibiotics  - this includes at the time of perioperative ppx    #Polymicrobial bacteremia with E.coli and Bacteroides  Status post course of ceftriaxone and metronidazole as above    #Possible MV vegetation on TTE from OSH  E.coli and bacteroides atypical endocarditis organisms and polymicrobial is also atypical for endocarditis  Repeat transthoracic echocardiogram here without evidence of vegetation    #Preliver transplant evaluation  --COVID19 Nucleocapsid Antibody  -- COVID19 Eder Antibody pos  -- HAV IgG pos  HBVs Ab, HBVsAg, HBVc Ab pos >400 immune, neg, neg  -- HCV Ab neg  -- HSV 1 IgG /HSV 2 IgG pos. neg  -- EBV IgG Pos  -- CMV IgG pos  --VZV IgG pos  -- Measles IgG , Mumps IgG and Rubella IgG pos all  --Follow up QuantiFeron-TB Gold  -- HIV Ag/Ab by CMIA- NEG  --Syphilis Screen - NEG  --Strongyloides Ab negative   -- Follow up Coccidiodes Ab  -- Brucella IgG - NEGATIVE  -- Q Fever Serologies REACTIVE, Would require repeat serology in 4 weeks to confirm if true infection. Reports last exposure to animals was 5 years ago, which makes acute infection less likely.    #Encounter to Vaccinate Patient  COVID19: Would benefit from COVID19 [id number] Vaccine Dose  Influenza: 10/8/24  Pneumococcal: Would benefit from PCV20  Immune to hep A, B, varicella and MMR  Shingles: Will require Shingrix  Tdap: Will require Tdap  RSV vaccine also recommended this season       59 year old female PMH of alcohol cirrhosis with recurrent ascites, s/p multiple paracentesis, s/p banding of esophageal varices, currently being treated for recent c. diff colitis and possible endocarditis, presenting for possible liver transplant from [facility]. Pt initially admitted to [facility] c/o weakness, poor PO intake, and fever; hypotensive in ED. Met criteria for sepsis iso C. diff colitis and was treated with vasopressor support, flagyl, and vanc. Hospital course was complicated by AHRF and pt was intubated 1/28-2/2. Pt then treated for E. coli and Bacteroides bacteremia with TTE findings concerning for mitral vegetation; pt not a good candidate for ARACELI due to varices found on EGD from 1/31/24 decided to treat empirically for IE with 6 weeks of antibiotics s/p PICC line placement with negative blood cultures from 1/31/24. Pt d/c from [facility] on 2/7 and then presented to [place] c/o SOB. Ultimately transferred to [facility] for hepatology transplant consult. On presentation today, pt endorses subjective nighttime fevers, chills, SOB and abdominal discomfort. Attributes fevers to recent flu dx (noted in chart to be flu positive 1 week prior to presentation at [facility]). Pt states she has not used alcohol in over a year. She began using alcohol in her 20s socially and her use gradually increased to include ~3-4 glasses of wine everyday when she was drinking the most. Has been to rehab and therapy multiple times. Pt first began smoking in her 20s ~1pack/day for many years, cannot recall specifics. Has not had a cigarette in several months. Pt is unable to ambulate on her own and has remained bedbound since intubation several months ago.     C. difficile toxin assay (January 27) positive, repeat Cdff 2/11 neg  Blood cultures (January 27) 2/4 E. coli, 1/4 Bacteroides   Blood cultures (January 31) no growth to date  Blood cultures (February 10, February 11) no growth to date  Paracentesis (February 4, February 13) Cell counts negative for SBP    CTA chest (January 27) Patent central airways. Smooth interlobular septal thickening at the lung apices and lung bases. No lung consolidation or mass. Mild bibasilar dependent atelectasis. No lymphadenopathy.    CT A/P (January 27) Thickening of the walls of the stomach and duodenum with submucosal edema likely reflective of a gastroenteritis. Thickening of the walls of the ascending colon despite underdistention likely due to portal colopathy. Colonic diverticulosis without evidence of diverticulitis. Cholelithiasis. Nonspecific gallbladder wall edema and pericholecystic fluid in the setting of cirrhosis.    TTE (1/27) small mobile vegetation attached to the posterior mitral valve leaflet.  TTE (February 12) no evidence of vegetations    Social history: born in [place].  Travel history includes travel to [facility], [facility] and [place], [place], [place].  Lives primarily in [place] but has also had travel to [place] and [place].  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  States her mother had a history of tuberculosis but this was before patient was born.  Patient notes livestock exposure during her work as a .  Has sugar gliders as pets.    Antibiotic Course:  PO Vancomycin: 1/27 (2 doses) > 2/14 (1 dose)  Metronidazole: 1/27 > 2/13  Ceftriaxone: 1/27 > 2/13  Meropenem: 1/27    S/P LVP today - follow cell count and fluid cultures    #C.diff colitis 1/27/25, diarrhea resolved now   - repeat Cdiff 2/11 negative, completed vanco po 125 mg bi ppx post discontinuation of antibitoics on 2./13-2/16  - in the next 6 months will need vanco po ppx with every exposure to broad spectrum antibiotics  - this includes at the time of perioperative ppx    #Polymicrobial bacteremia with E.coli and Bacteroides  Status post course of ceftriaxone and metronidazole as above    #Possible MV vegetation on TTE from OSH  E.coli and bacteroides atypical endocarditis organisms and polymicrobial is also atypical for endocarditis  Repeat transthoracic echocardiogram here without evidence of vegetation    #Preliver transplant evaluation  --COVID19 Nucleocapsid Antibody  -- COVID19 Eder Antibody pos  -- HAV IgG pos  HBVs Ab, HBVsAg, HBVc Ab pos >400 immune, neg, neg  -- HCV Ab neg  -- HSV 1 IgG /HSV 2 IgG pos. neg  -- EBV IgG Pos  -- CMV IgG pos  --VZV IgG pos  -- Measles IgG , Mumps IgG and Rubella IgG pos all  --Follow up QuantiFeron-TB Gold  -- HIV Ag/Ab by CMIA- NEG  --Syphilis Screen - NEG  --Strongyloides Ab negative   -- Follow up Coccidiodes Ab  -- Brucella IgG - NEGATIVE  -- Q Fever Serologies REACTIVE for  phase II igG with all others negative  in the correct setting, Positive Phase II igG (and higher than Phase I)  could  be indicative of acute infection or false positive; whereas in chronic or old exposure to Q fever, Phase I antibodies are higher than phase II and/or >1:1260  However, her last exposure to wild animals was  about 5 years ago, making acute infection unlikely and these results seemingly a false positive.. In addition , she has no symptoms of acute Q fever infection.     With all that said, it is reasonable to repeat serologies again (ordered)  If titers are still high or stable -->may then consider treatment while repeating in 3-4 weeks to assess for increases in titers that would confirm acuity and infection     - Reviewed prior concern for vegetation on TTE but repeat TTE here with no vegetations so still doubt Q fever related     Not a contraindication to proceed listing as I doubt this represent Q fever     #Encounter to Vaccinate Patient  COVID19: Would benefit from COVID19 [id number] Vaccine Dose  Influenza: 10/8/24  Pneumococcal: Would benefit from PCV20  Immune to hep A, B, varicella and MMR  Shingles: Will require Shingrix  Tdap: Will require Tdap  RSV vaccine also recommended this season       59 year old female PMH of alcohol cirrhosis with recurrent ascites, s/p multiple paracentesis, s/p banding of esophageal varices, currently being treated for recent c. diff colitis and possible endocarditis, presenting for possible liver transplant from [facility]. Pt initially admitted to [facility] c/o weakness, poor PO intake, and fever; hypotensive in ED. Met criteria for sepsis iso C. diff colitis and was treated with vasopressor support, flagyl, and vanc. Hospital course was complicated by AHRF and pt was intubated 1/28-2/2. Pt then treated for E. coli and Bacteroides bacteremia with TTE findings concerning for mitral vegetation; pt not a good candidate for ARACELI due to varices found on EGD from 1/31/24 decided to treat empirically for IE with 6 weeks of antibiotics s/p PICC line placement with negative blood cultures from 1/31/24. Pt d/c from [facility] on 2/7 and then presented to [place] c/o SOB. Ultimately transferred to [facility] for hepatology transplant consult. On presentation today, pt endorses subjective nighttime fevers, chills, SOB and abdominal discomfort. Attributes fevers to recent flu dx (noted in chart to be flu positive 1 week prior to presentation at [facility]). Pt states she has not used alcohol in over a year. She began using alcohol in her 20s socially and her use gradually increased to include ~3-4 glasses of wine everyday when she was drinking the most. Has been to rehab and therapy multiple times. Pt first began smoking in her 20s ~1pack/day for many years, cannot recall specifics. Has not had a cigarette in several months. Pt is unable to ambulate on her own and has remained bedbound since intubation several months ago.     C. difficile toxin assay (January 27) positive, repeat Cdff 2/11 neg  Blood cultures (January 27) 2/4 E. coli, 1/4 Bacteroides   Blood cultures (January 31) no growth to date  Blood cultures (February 10, February 11) no growth to date  Paracentesis (February 4, February 13) Cell counts negative for SBP    CTA chest (January 27) Patent central airways. Smooth interlobular septal thickening at the lung apices and lung bases. No lung consolidation or mass. Mild bibasilar dependent atelectasis. No lymphadenopathy.    CT A/P (January 27) Thickening of the walls of the stomach and duodenum with submucosal edema likely reflective of a gastroenteritis. Thickening of the walls of the ascending colon despite underdistention likely due to portal colopathy. Colonic diverticulosis without evidence of diverticulitis. Cholelithiasis. Nonspecific gallbladder wall edema and pericholecystic fluid in the setting of cirrhosis.    TTE (1/27) small mobile vegetation attached to the posterior mitral valve leaflet.  TTE (February 12) no evidence of vegetations    Social history: born in [place].  Travel history includes travel to [facility], [facility] and [place], [place], [place].  Lives primarily in [place] but has also had travel to [place] and [place].  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  States her mother had a history of tuberculosis but this was before patient was born.  Patient notes livestock exposure during her work as a .  Has sugar gliders as pets.    Antibiotic Course:  PO Vancomycin: 1/27 (2 doses) > 2/14 (1 dose)  Metronidazole: 1/27 > 2/13  Ceftriaxone: 1/27 > 2/13  Meropenem: 1/27    S/P LVP today - follow cell count and fluid cultures    #C.diff colitis 1/27/25, diarrhea resolved now   - repeat Cdiff 2/11 negative, completed vanco po 125 mg bi ppx post discontinuation of antibitoics on 2./13-2/16  - in the next 6 months will need vanco po ppx with every exposure to broad spectrum antibiotics  - this includes at the time of perioperative ppx    #Polymicrobial bacteremia with E.coli and Bacteroides  Status post course of ceftriaxone and metronidazole as above    #Possible MV vegetation on TTE from OSH  E.coli and bacteroides atypical endocarditis organisms and polymicrobial is also atypical for endocarditis  Repeat transthoracic echocardiogram here without evidence of vegetation    #Preliver transplant evaluation  --COVID19 Nucleocapsid Antibody  -- COVID19 Eder Antibody pos  -- HAV IgG pos  HBVs Ab, HBVsAg, HBVc Ab pos >400 immune, neg, neg  -- HCV Ab neg  -- HSV 1 IgG /HSV 2 IgG pos. neg  -- EBV IgG Pos  -- CMV IgG pos  --VZV IgG pos  -- Measles IgG , Mumps IgG and Rubella IgG pos all  --Follow up QuantiFeron-TB Gold  -- HIV Ag/Ab by CMIA- NEG  --Syphilis Screen - NEG  --Strongyloides Ab negative   -- Follow up Coccidiodes Ab  -- Brucella IgG - NEGATIVE  -- Q Fever Serologies REACTIVE for  phase II igG with all others negative  in the correct setting, Positive Phase II igG (and higher than Phase I)  could  be indicative of acute infection or false positive- Phase II IgM are most commonly positive as well  ; whereas in chronic or old Q fever, Phase I antibodies are higher than phase II and/or >1:1260  However, her last exposure to wild animals was  about 5 years ago, making acute infection unlikely and these results seemingly a false positive.. In addition , she has no symptoms of acute Q fever infection.     With all that said, it is reasonable to repeat serologies again (ordered)  If titers are still high or stable -->may then consider treatment while repeating in 3-4 weeks to assess for increases in titers that would confirm acuity and infection     - Reviewed prior concern for vegetation on TTE but repeat TTE here with no vegetations so still doubt Q fever related     Not a contraindication to proceed listing as I doubt this represent Q fever     #Encounter to Vaccinate Patient  COVID19: Would benefit from COVID19 [id number] Vaccine Dose  Influenza: 10/8/24  Pneumococcal: Would benefit from PCV20  Immune to hep A, B, varicella and MMR  Shingles: Will require Shingrix  Tdap: Will require Tdap  RSV vaccine also recommended this season

## 2025-02-20 NOTE — BH CONSULTATION LIAISON PROGRESS NOTE - NSBHMSETHTPROC_PSY_A_CORE
Linear Double O-Z Plasty Text: The defect edges were debeveled with a #15 scalpel blade.  Given the location of the defect, shape of the defect and the proximity to free margins a Double O-Z plasty (double transposition flap) was deemed most appropriate.  Using a sterile surgical marker, the appropriate transposition flaps were drawn incorporating the defect and placing the expected incisions within the relaxed skin tension lines where possible. The area thus outlined was incised deep to adipose tissue with a #15 scalpel blade.  The skin margins were undermined to an appropriate distance in all directions utilizing iris scissors.  Hemostasis was achieved with electrocautery.  The flaps were then transposed into place, one clockwise and the other counterclockwise, and anchored with interrupted buried subcutaneous sutures.

## 2025-02-20 NOTE — PROGRESS NOTE ADULT - PROBLEM SELECTOR PLAN 1
- C/b hepatic encephalopathy and esophageal varices  - Paracentesis done today; 1.4L removed  - Pt appears clinically euvolemic on exam    Plan:  - Continue on Lasix 40mg and Spironolactone 100mg qD  - Continue on lactulose 10 mg BID and rifaximin  - Low current suspicion for SBP; pt taking IV CTX 2g qD for bacteremia  - fu Transplant hepatology recs       > PETH level, Type and Screen, repeat TAJ and ASMA, IgG levels, Microsomal Ab  - fu abdominal US for ascites - mild-mod ascites       > Paracentesis tentatively planned with IR 2/13                > para tentatively planned for 2/20       > Fu cell count       > Negative for SBP       > Consider SBP prophylaxis  > pending transport to  for transplant eval

## 2025-02-20 NOTE — PROGRESS NOTE ADULT - ASSESSMENT
Carmen Ly is a 59 year old F w/ PMH HTN, anemia, EtOH associated cirrhosis c/b ascites/EV (s/p banding last 11/2024), AUD now sober for ~3 months, bleeding rectal varices vs hemorrhoidal bleeding s/p hemorrhoidectomy (11/2023) presenting as a transfer from [place] for evaluation of liver transplant currently undergoing expedited LT evaluation.     #Decompensated EtOH Associated Cirrhosis  Patient thought to have EtOH cirrhosis, w/ low MELD 18 as an outpatient, however w/ significant hyperbilirubinemia and elevated INR. Suspect decompensation likely in the setting of multiple active infections. Bacteroides and E coli bacteremia initially thought to be due to endocarditis but appears that repeat TTE shows no vegetation and likely false read at OSH. Suspect SBP may be source of bacteremia. Patient's underlying cirrhosis in the setting of EtOH use, however, outpatient labs suggestive of possible concomitant AIH w/ elevated TAJ and ASMA. Per patient, she has been sober for many months. Outpatient PETH however > 400 on 1/17/2024. Additionally, PETH + this admission despite denying recent EtOH intake.   MELD: 27 (2/20) B+  HE: no overt evidence of HE. Continue with lactulose and rifaximin  Varices: EGD 11/5/2024 w/ small varices w/ blue bleb s/p 2 bands placed. Last EGD 1/31, no report available but per pt no high risk varices. Per outpatient notes unable to tolerate B, however retrialed on coreg w/ success as outpatient.  On Coreg 3.125 mg BID at home   Ascites: Hx significant ascites w/ prior hx LVP, s/p para 2/13 w/ 3 L fluid removed- neg for sbp. Continue with diuretics as noted below.   HCC: MRI 2/17 w/o evidence of suspicious hepatic lesion   OLT: OLT eval opened 2/14 though may have significant psych barriers, especially w/ + PETH and patient denying EtOH intake. Evaluated by psychiatry who determined patient is a poor candidate for OLT at this time, w/ likely need for RPP engagement. To be discussed in upcoming selection meeting 2/21    #Worsening Hyponatremia  Likely hypervolemic hyponatremia in the setting of increased abd distension, improved w/ albumin and diuresis.    #Bacteriodes and E coli Bacteremia - Resolved  #Influenza - Resolved  #C-diff colitis  - Resolved     Recommendations:  - Increase diuresis as follows: lasix 80 mg, spironolactone 150 mg QD   - Continue albumin 100 ml of 25% Q8 hours for 3 more doses   - Cipro 500 mg daily for SBP ppx   - Patient to undergo dual RPP program on discharge  - Folic acid, thiamine, multivitamin  - To be discussed at liver selection meeting tomorrow 2/21    Note incomplete until finalized by attending signature/attestation.    Holden Ahn  GI/Hepatology Fellow, PGY-4    MONDAY-FRIDAY 8AM-5PM:  Please message via [employer] or email [email or web address]     On Weekends/Holidays (All day) and Weekdays after 5 PM to 8 AM  For nonurgent consults please email:  Please email [email or web address]  For urgent consults:  Please contact on call GI team. See Amion schedule ([facility]), [id number] paging system ([facility]), or call hospital  ([facility])

## 2025-02-20 NOTE — BH CONSULTATION LIAISON PROGRESS NOTE - NSBHCHARTREVIEWLAB_PSY_A_CORE FT
8.3    6.78  )-----------( 97       ( 20 Feb 2025 07:15 )             25.0     02-20    128[L]  |  95[L]  |  7   ----------------------------<  117[H]  3.3[L]   |  21[L]  |  0.31[L]    Ca    8.5      20 Feb 2025 07:13  Phos  3.2     02-20  Mg     1.4     02-20    TPro  6.3  /  Alb  3.2[L]  /  TBili  13.0[H]  /  DBili  x   /  AST  110[H]  /  ALT  30  /  AlkPhos  264[H]  02-20

## 2025-02-20 NOTE — BH CONSULTATION LIAISON PROGRESS NOTE - NSBHFUPINTERVALHXFT_PSY_A_CORE
Patient seen at bedside with attending, and patient boyfriend. Patient denied any side effects with Remeron initiation, however noted that her sleep quality has not improved due to symptoms of pain, as well as being awaken frequently. Patient continues to endorse that her last drink was a year, denying any recent use. When educated about positive PETH value, patient noted that "I was taking cough syrup for coughing, but I did not drink." Patient clarified psychiatric hx, noted a suicide attempt several years ago, taking "a handful of my antianxiety pills", when undergoing several social stressors of the patient stating "it was everything, I was in a divorce, my son was not taking to me, I have still have the letters I wrote then." Patient endorsed that she was admitted psychiatric in [place] for "3 weeks" where she was started on Effexor. Patient however declined establishing care post discharge from inpatient psychiatry, noting that her PCP continued the script of her Effexor, until patient noted it was discontinued during a hospitalization a year and half ago. Patient denied any ah/vh/hi/si, intent or plan. Educated at bedside regarding need for dual diagnosis program of which patient endorsed that she is amenable to enroll at this time.

## 2025-02-20 NOTE — PROGRESS NOTE ADULT - ASSESSMENT
60yo F w/ PMH of alcohol cirrhosis with recurrent ascites, s/p multiple paracentesis, s/p banding of esophageal varices, currently being treated for recent c. diff colitis and possible endocarditis. Pt is jaundiced, with scleral icterus and abdominal distension presenting for liver transplant evaluation from [facility].

## 2025-02-20 NOTE — PROCEDURE NOTE - ADDITIONAL PROCEDURE DETAILS
Successful RLQ paracentesis with 2300 ml of clear dark yellow color fluid drained with 5Fr drainer via ultrasound guidance. Images sent to PACS.  No albumin given     [ x ] Fluid sent for chemistry, Fluid sent for gram stain and culture     Full report to follow.
Technically successful paracentesis through LLQ window. 3L of clear yellow fluid was drained. Albumin 25% in 50 cc was given x 1. Full report to follow.
Technically successful paracentesis through LLQ window. 3L of clear yellow fluid was drained. Albumin 25% in 50 cc was given x 1. Full report to follow.

## 2025-02-20 NOTE — BH CONSULTATION LIAISON PROGRESS NOTE - NSBHFUPREASONCONS_PSY_A_CORE
REMINDERS:    1)   Please ALWAYS CALL/ computer message / see me before the next appointment with me if any problems or questions.  Also please call if any questions you do not find answered in this patient After visit summary / instructions / reminders.    NOTE  :  Please return to lab for FASTING bloodwork using order on file with lab for any day from  to by 3 days ahead of appointment with me.    2)   please call/computer message me if 2 puffs of Albuterol at about 30-60 mins ahead of exercise and or 2 puffs as close as 4 hours does NOT stop the cough asthma interfering with AEROBIC exercise.    3)  Please call Radiology Centralized Scheduling [phone] to schedule an appointment for  Screening [address] [place]  mammogram for on or after .   -  Would continue mammogram every year, and the order for the next year's mammogram is always entered by about 3 months after I receive your mammogram result, so that order does not  before 15 months since last mammogram.    4)  Regarding Covid pandemic Covid drug and ALL vaccines :     -  VERY GLAD you today received Flu shot AND BIVALENT ( they are changing it for the variants, so get EITHER Pfizer OR Moderna are both good ) Covid vaccine  -- and in general Covid vaccines can be given as soon as about 2-3 months since your LAST Covid vaccine --- WHEN there is another to get.   --- and then keep going with the NEXT Covid vaccineS, when ?? - based on keep listening to the national guidelines.   - your options are ANY pharmacy, [facility], or other locations.  NOTE :    for [facility] option, please call for appointment via [employer] by calling [employer] hotline [phone] or use the [employer] girish or [employer].     - CONTINUE to ALWAYS get a flu shot EARLY every year as soon as you hear on the news that flu shot is available ( end of AUG -- LABOR DAY / EARLY SEPT)  -- by scheduling nurse visit here for flu shot, at a pharmacy, or get one somewhere in the  [facility].    ----------------------     - KEEP AT ALL TIMES -- 4 HOME Covid antigen tests.    BECAUSE  -- I DO recommend treating every Covid infection with Covid drug Paxlovid for anyone who qualifies -- and YOU DO based on ASTHMA ........  SO YOU SHOULD BE treated if you are Covid POS with Paxlovid -- ( given to [facility] [place] or Molnupiravir only if Paxlovid is not in stock at pharmacy)  SO I advise AS OF NOW, IF you have any respiratory infection symptoms and or fever, please TEST for Covid ( home test or pharmacy test) by 1-2 days AND if POS, then PHONE me to get eprescribed to pharmacy which I will check has in stock 5 days of Paxlovid medication to suppress virus from replicating.  This treatment is VERY powerful but ONLY helps if you start by day 5 after 1st day of symptoms  NOTE : AND if you think you have Covid, then STOP your  Rosuvastatin, and restart 2-3 days after finishing 5 days of Paxlovid ( BUT OK to take all meds with the Molnupiravir Covid drug).   - AND also treat with :   - USE Albuterol 2 puffs 4x/day every day and every 4 hours if needed in the middle of the night til asthma symptoms are gone -- all cough, shortness of breath, wheezing, or chest tightness.   - would also use Albuterol 2 puffs 15-30 minutes ahead of exercise or before exposure to something that tends to trigger your asthma.  (Albuterol starts working in 15-20 minutes and lasts 4-6 hours).   - For ANY type of respiratory infection.  -- would treat with THIS DOSING of over the counter Mucinex DM that has mucous thinner Mucinex (Guaifenesin 1200 mg/tab) with cough suppressant DM (Dextromethorphan 60 mgs/tab) of ONE tab 2x/day ( morning and night) AND until all cough (even a dry cough) and / or thick mucous from nose/ throat, cough are gone.   - If ever feeling feverish/ chilled, or any headache, muscle/joint pain, body aches -- treat with Tylenol (or generic Acetominophen) 500 mg, 2 tabs 3x/day (as close as 4 hours) every day  as needed.   - Would sooth sore throat with over the counter LOZENGES several times/day like Cepacol or Riccola, or Halls (but not just a cough drop -- SO the lozenge part is buy with the HIGHEST dose of MENTHOL ).  ----------------------  Outpatient Current Medications as of as of 1/19/2023         Disp Refills Start End    norethindrone-ethinyl estradiol (Nortrel 1/35, 21,) 1-35 MG-MCG per tablet (Taking) 21 tablet 12 1/19/2023     Sig - Route: Take 1 tablet by mouth daily. - Oral    Class: Eprescribe    benzonatate (TESSALON PERLES) 200 MG capsule (Taking) 30 capsule 2 1/19/2023     Sig - Route: Take 1 capsule by mouth 3 times daily as needed for Cough. Keep on file til patient requests refill - Oral    Class: Eprescribe    betamethasone dipropionate (DIPROSONE) 0.05 % ointment (Taking) 45 g 1 1/19/2023     Sig: Apply thin layer to Dariers rash 2 times per day x1-2 weeks; and repeat when needed; Keep on file til patient requests refill    Class: Eprescribe    acyclovir (ZOVIRAX) 5 % ointment (Taking) 30 g 1 1/19/2023     Sig: Apply topically 5 times per day x4 days for recurrence; Keep on file til patient requests refill    Class: Eprescribe    venlafaxine XR (EFFEXOR XR) 75 MG 24 hr capsule (Taking) 30 capsule 9 1/19/2023     Sig - Route: Take 1 capsule by mouth every morning. - Oral    Class: Eprescribe    famotidine (PEPCID) 40 MG tablet (Taking) 30 tablet 9 1/19/2023     Sig - Route: Take 1 tablet by mouth daily. Continue to take with omeprazole. - Oral    Class: Eprescribe    metFORMIN (GLUCOPHAGE-XR) 500 MG 24 hr tablet (Taking) 120 tablet 9 1/19/2023     Sig - Route: Take 2 tablets by mouth in the morning and 2 tablets in the evening. Take with meals. - Oral    Class: Eprescribe    rosuvastatin (CRESTOR) 20 MG tablet (Taking) 30 tablet 9 1/19/2023     Sig - Route: Take 1 tablet by mouth daily. - Oral    Class: Eprescribe    omeprazole (PriLOSEC) 40 MG capsule (Taking) 30 capsule 9 1/19/2023     Sig -  Route: Take 1 capsule by mouth every morning. Continue taking famotidine. - Oral    Class: Eprescribe    montelukast (SINGULAIR) 10 MG tablet (Taking) 30 tablet 9 1/19/2023     Sig - Route: Take 1 tablet by mouth nightly. - Oral    Class: Eprescribe    Probiotic Product (VSL#3) capsule (Taking) 60 capsule 5 1/27/2017     Sig - Route: Take 1 capsule by mouth 2 times daily (before meals). - Oral    Class: Eprescribe    Cholecalciferol (VITAMIN D) 2000 UNITS tablet (Taking)   8/30/2016     Sig - Route: Take 2,000 Units by mouth daily. - Oral    Class: OTC    cetirizine (ZYRTEC ALLERGY) 10 MG tablet (Taking) 30 tablet 5 2/26/2016     Sig - Route: Take 1 tablet by mouth daily. - Oral    Class: OTC    LOPERAMIDE A-D 2 MG PO TABS (Taking) 0 0 2/5/2009     Sig - Route: 2 tabs every morning, and occasionally a 2nd dose - Oral    Class: OTC    albuterol 108 (90 Base) MCG/ACT inhaler 1 each 11 1/19/2023     Sig - Route: Inhale 2 puffs into the lungs 4 times daily as needed (cough / asthma). Keep on file til patient requests refill - Inhalation    Class: Eprescribe           transplant/med management

## 2025-02-20 NOTE — BH CONSULTATION LIAISON PROGRESS NOTE - NSBHCHARTREVIEWINVESTIGATE_PSY_A_CORE FT
EKG 02/11    Ventricular Rate 120 BPM    Atrial Rate 120 BPM    P-R Interval 126 ms    QRS Duration 70 ms    Q-T Interval 284 ms    QTC Calculation(Bazett) 401 ms    P Axis 24 degrees    R Axis 0 degrees    T Axis 17 degrees    Diagnosis Line SINUS TACHYCARDIA  CANNOT RULE OUT ANTERIOR INFARCT , AGE UNDETERMINED  ABNORMAL ECG  NO PREVIOUS ECGS AVAILABLE  Confirmed by BRUNA TEJADA MD ([id number]) on 2/11/2025 4:06:22 PM

## 2025-02-20 NOTE — BH CONSULTATION LIAISON PROGRESS NOTE - NSBHCHARTREVIEWVS_PSY_A_CORE FT
Vital Signs Last 24 Hrs  T(C): 36.7 (20 Feb 2025 08:26), Max: 37.2 (20 Feb 2025 05:20)  T(F): 98.1 (20 Feb 2025 08:26), Max: 99 (20 Feb 2025 05:20)  HR: 124 (20 Feb 2025 08:26) (109 - 125)  BP: 120/73 (20 Feb 2025 08:26) (107/65 - 120/73)  BP(mean): --  RR: 17 (20 Feb 2025 08:26) (17 - 19)  SpO2: 95% (20 Feb 2025 08:26) (95% - 95%)    Parameters below as of 20 Feb 2025 05:20  Patient On (Oxygen Delivery Method): room air

## 2025-02-20 NOTE — BH CONSULTATION LIAISON PROGRESS NOTE - NSBHASSESSMENTFT_PSY_ALL_CORE
Carmen Ly is a 59 year old, domiciled, retired, , female, with PPHx of MDD, AUD, tobacco use disorder, with according to patient chart one inpatient psychiatric admission in 2019,with a PMHx of HTN, anemia, EtOH associated cirrhosis c/b ascites/EV (s/p banding last 11/2024), bleeding rectal varices vs hemorrhoidal bleeding s/p hemorrhoidectomy (11/2023) presenting as a transfer from [place] for evaluation of liver transplant currently undergoing expedited LT. Patient seen by transplant psychiatry for psychosocial clearance for liver transplant evaluation.    2/20:Patient seen as f/u for ongoing transplant evaluation, patient denied any side effects with Remeron use, noted sleep quality has not improved secondary to pain and being awoken frequently. Patient continues to endorse her last drink was a year ago, despite positive PETH value, more forth coming regaridng psychiatric hx, including past suicide attempt and inpatient admission at [facility].      Plan  -SIPAT score of 52 (previous score of 54) patient deemed poor candidate with risk factors of alcohol use, and hx of tobacco use   -Serial PETH, UDS monitoring   -C/W Remeron 7.5mg Po QHS for sleep and mood  -Discussed RPP at bedside, patient would benefit from dual diagnosis program engagement   Carmen Ly is a 59 year old, domiciled, retired, , female, with PPHx of MDD, AUD, tobacco use disorder, with according to patient chart one inpatient psychiatric admission in 2019,with a PMHx of HTN, anemia, EtOH associated cirrhosis c/b ascites/EV (s/p banding last 11/2024), bleeding rectal varices vs hemorrhoidal bleeding s/p hemorrhoidectomy (11/2023) presenting as a transfer from [place] for evaluation of liver transplant currently undergoing expedited LT. Patient seen by transplant psychiatry for psychosocial clearance for liver transplant evaluation.    2/20:Patient seen as f/u for ongoing transplant evaluation, patient denied any side effects with Remeron use, noted sleep quality has not improved secondary to pain and being awoken frequently. Patient continues to endorse her last drink was a year ago, despite positive PETH value, more forth coming regarding psychiatric hx, including past suicide attempt and inpatient admission at [facility].  SIPAT testing performed again.    Plan  -SIPAT score of 52 (previous score of 54) patient deemed poor candidate with risk factors of alcohol use, and hx of tobacco use   -Serial PETH, UDS monitoring   -C/W Remeron 7.5mg Po QHS for sleep and mood  -Discussed RPP at bedside, patient would benefit from dual diagnosis program engagement

## 2025-02-20 NOTE — PROGRESS NOTE ADULT - SUBJECTIVE AND OBJECTIVE BOX
Patient is a 59y old  Female who presents with a chief complaint of Transfer from [place] for possible liver transplant (20 Feb 2025 10:33)      Interval Events:   - Patient reports improvement in abdominal pain after paracentesis  - Reports motivation to go to dual diagnosis program after discharge      ROS:   A 12-point ROS was performed and negative except as noted in HPI.    Hospital Medications:  albumin human 25% IVPB 50 milliLiter(s) IV Intermittent once PRN  albuterol/ipratropium for Nebulization 3 milliLiter(s) Nebulizer every 6 hours PRN  Biotene Dry Mouth Oral Rinse 15 milliLiter(s) Swish and Spit five times a day  chlorhexidine 2% Cloths 1 Application(s) Topical <User Schedule>  ciprofloxacin     Tablet 500 milliGRAM(s) Oral daily  folic acid 1 milliGRAM(s) Oral daily  furosemide    Tablet 40 milliGRAM(s) Oral daily  guaiFENesin Oral Liquid (Sugar-Free) 100 milliGRAM(s) Oral every 6 hours PRN  heparin   Injectable 5000 Unit(s) SubCutaneous every 8 hours  lactulose Syrup 10 Gram(s) Oral two times a day  melatonin 3 milliGRAM(s) Oral at bedtime  midodrine. 15 milliGRAM(s) Oral three times a day  mirtazapine 7.5 milliGRAM(s) Oral at bedtime  pantoprazole    Tablet 40 milliGRAM(s) Oral before breakfast  potassium chloride    Tablet ER 40 milliEquivalent(s) Oral daily  rifAXIMin 550 milliGRAM(s) Oral two times a day  sodium chloride 1 Gram(s) Oral daily  spironolactone 100 milliGRAM(s) Oral daily  sucralfate suspension 1 Gram(s) Oral every 6 hours  thiamine 100 milliGRAM(s) Oral daily      PHYSICAL EXAM:   Vital Signs:  Vital Signs Last 24 Hrs  T(C): 37.2 (20 Feb 2025 12:34), Max: 37.2 (20 Feb 2025 05:20)  T(F): 98.9 (20 Feb 2025 12:34), Max: 99 (20 Feb 2025 05:20)  HR: 125 (20 Feb 2025 12:34) (109 - 125)  BP: 102/64 (20 Feb 2025 12:34) (102/64 - 120/73)  BP(mean): --  RR: 18 (20 Feb 2025 12:34) (17 - 19)  SpO2: 96% (20 Feb 2025 12:34) (95% - 96%)    Parameters below as of 20 Feb 2025 12:34  Patient On (Oxygen Delivery Method): room air      Daily     Daily     GENERAL: no acute distress  NEURO: alert, no asterixis  HEENT: scleral icterus  CHEST: no respiratory distress, no accessory muscle use  CARDIAC: regular rate  ABDOMEN: soft, nondistended, nontender, no rebound or guarding  EXTREMITIES: warm, well perfused, no edema  SKIN: jaundiced     LABS: reviewed                        8.3    6.78  )-----------( 97       ( 20 Feb 2025 07:15 )             25.0     02-20    128[L]  |  95[L]  |  7   ----------------------------<  117[H]  3.3[L]   |  21[L]  |  0.31[L]    Ca    8.5      20 Feb 2025 07:13  Phos  3.2     02-20  Mg     1.4     02-20    TPro  6.3  /  Alb  3.2[L]  /  TBili  13.0[H]  /  DBili  x   /  AST  110[H]  /  ALT  30  /  AlkPhos  264[H]  02-20    LIVER FUNCTIONS - ( 20 Feb 2025 07:13 )  Alb: 3.2 g/dL / Pro: 6.3 g/dL / ALK PHOS: 264 U/L / ALT: 30 U/L / AST: 110 U/L / GGT: x             Interval Diagnostic Studies: see sunrise for full report

## 2025-02-20 NOTE — CHART NOTE - NSCHARTNOTEFT_GEN_A_CORE
Pathology from pap smear reviewed at this time as below:     Final Diagnosis  NEGATIVE FOR INTRAEPITHELIAL LESION OR MALIGNANCY.  Atrophic changes.    Vera Smith PGY1 Pathology from pap smear reviewed at this time as below:     Final Diagnosis  NEGATIVE FOR INTRAEPITHELIAL LESION OR MALIGNANCY.  Atrophic changes.    Vera Smith PGY1    Addendum    Reflex HPV added at this time.

## 2025-02-20 NOTE — BH CONSULTATION LIAISON PROGRESS NOTE - CURRENT MEDICATION
MEDICATIONS  (STANDING):  Biotene Dry Mouth Oral Rinse 15 milliLiter(s) Swish and Spit five times a day  chlorhexidine 2% Cloths 1 Application(s) Topical <User Schedule>  ciprofloxacin     Tablet 500 milliGRAM(s) Oral daily  folic acid 1 milliGRAM(s) Oral daily  furosemide    Tablet 40 milliGRAM(s) Oral daily  heparin   Injectable 5000 Unit(s) SubCutaneous every 8 hours  lactulose Syrup 10 Gram(s) Oral two times a day  melatonin 3 milliGRAM(s) Oral at bedtime  midodrine. 15 milliGRAM(s) Oral three times a day  mirtazapine 7.5 milliGRAM(s) Oral at bedtime  pantoprazole    Tablet 40 milliGRAM(s) Oral before breakfast  potassium chloride    Tablet ER 40 milliEquivalent(s) Oral daily  rifAXIMin 550 milliGRAM(s) Oral two times a day  sodium chloride 1 Gram(s) Oral daily  spironolactone 100 milliGRAM(s) Oral daily  sucralfate suspension 1 Gram(s) Oral every 6 hours  thiamine 100 milliGRAM(s) Oral daily    MEDICATIONS  (PRN):  albumin human 25% IVPB 50 milliLiter(s) IV Intermittent once PRN para albumin replacement  albuterol/ipratropium for Nebulization 3 milliLiter(s) Nebulizer every 6 hours PRN Shortness of Breath and/or Wheezing  guaiFENesin Oral Liquid (Sugar-Free) 100 milliGRAM(s) Oral every 6 hours PRN Cough

## 2025-02-20 NOTE — BH CONSULTATION LIAISON PROGRESS NOTE - ATTENDING COMMENTS
Patient with PPHx of Severe AUD, unspecified depressive disorder with one prior SA via OD on Remeron with at least 3 prior inpatient RPP attempts. Patient guarded about EtOH use with last use over a year ago, however, recent PETH positivity. Patient reports brief trials of psychotherapy initiated during RPP and possible marriage counseling. States she self-medicated insomnia with EtOH and coped stressor of marital discord with alcohol with continued use after divorce. Reports acute detoxifications while in RPP but denies withdrawal seizures. Continued to consume EtOH after multiple episodes of variceal bleeds and being instructed to abstain from EtOH use by gastroenterologist. SIPAT deems patient to be a poor candidate from psychosocial standpoint given risk factors of severe AUD, active use despite complications, recurrent relapses despite RPP attempts, untreated comorbid psychiatric illness. Patient guarded about EtOH use and past psychiatric history during assessment. Score may improve when patient is more forthcoming with information. Dual diagnosis structured in-person RPP recommended, will trial Remeron in the interim if tolerated.   ---- 2/20: Patient SIPAT with minimal improvement to 52, notes she was sick prior to presentation and taking cough suppressants to warrant a positive PETH. States longstanding history of ?Effexor use and was hospitalized for three weeks post intentional overdose of Remeron at [facility] but did not establish with psychiatrist. PCP managed medications. AUD counseling ongoing, patient would benefit from initiation of Naltrexone when medically feasible. RPP referral details to be provided by SHELIA.

## 2025-02-20 NOTE — PROGRESS NOTE ADULT - SUBJECTIVE AND OBJECTIVE BOX
Follow Up:      Interval History:    REVIEW OF SYSTEMS  [  ] ROS unobtainable because:    [  ] All other systems negative except as noted below    Constitutional:  [ ] fever [ ] chills  [ ] weight loss  [ ] weakness  Skin:  [ ] rash [ ] phlebitis	  Eyes: [ ] icterus [ ] pain  [ ] discharge	  ENMT: [ ] sore throat  [ ] thrush [ ] ulcers [ ] exudates  Respiratory: [ ] dyspnea [ ] hemoptysis [ ] cough [ ] sputum	  Cardiovascular:  [ ] chest pain [ ] palpitations [ ] edema	  Gastrointestinal:  [ ] nausea [ ] vomiting [ ] diarrhea [ ] constipation [ ] pain	  Genitourinary:  [ ] dysuria [ ] frequency [ ] hematuria [ ] discharge [ ] flank pain  [ ] incontinence  Musculoskeletal:  [ ] myalgias [ ] arthralgias [ ] arthritis  [ ] back pain  Neurological:  [ ] headache [ ] seizures  [ ] confusion/altered mental status    Allergies  Zithromax (Unknown)        ANTIMICROBIALS:  ciprofloxacin     Tablet 500 daily  rifAXIMin 550 two times a day      OTHER MEDS:  MEDICATIONS  (STANDING):  albuterol/ipratropium for Nebulization 3 every 6 hours PRN  furosemide    Tablet 40 daily  guaiFENesin Oral Liquid (Sugar-Free) 100 every 6 hours PRN  heparin   Injectable 5000 every 8 hours  lactulose Syrup 10 two times a day  melatonin 3 at bedtime  midodrine. 15 three times a day  mirtazapine 7.5 at bedtime  pantoprazole    Tablet 40 before breakfast  spironolactone 100 daily  sucralfate suspension 1 every 6 hours      Vital Signs Last 24 Hrs  T(C): 36.7 (20 Feb 2025 08:26), Max: 37.2 (20 Feb 2025 05:20)  T(F): 98.1 (20 Feb 2025 08:26), Max: 99 (20 Feb 2025 05:20)  HR: 124 (20 Feb 2025 08:26) (109 - 125)  BP: 120/73 (20 Feb 2025 08:26) (107/65 - 120/73)  BP(mean): --  RR: 17 (20 Feb 2025 08:26) (17 - 19)  SpO2: 95% (20 Feb 2025 08:26) (95% - 95%)    Parameters below as of 20 Feb 2025 05:20  Patient On (Oxygen Delivery Method): room air        PHYSICAL EXAMINATION:  General: Alert and Awake, NAD  HEENT: PERRL, EOMI  Neck: Supple  Cardiac: RRR, No M/R/G  Resp: CTAB, No Wh/Rh/Ra  Abdomen: NBS, NT/ND, No HSM, No rigidity or guarding  MSK: No LE edema. No Calf tenderness  : No mccann  Skin: No rashes or lesions. Skin is warm and dry to the touch.   Neuro: Alert and Awake. CN 2-12 Grossly intact. Moves all four extremities spontaneously.  Psych: Calm, Pleasant, Cooperative                          8.3    6.78  )-----------( 97       ( 20 Feb 2025 07:15 )             25.0       02-20    128[L]  |  95[L]  |  7   ----------------------------<  117[H]  3.3[L]   |  21[L]  |  0.31[L]    Ca    8.5      20 Feb 2025 07:13  Phos  3.2     02-20  Mg     1.4     02-20    TPro  6.3  /  Alb  3.2[L]  /  TBili  13.0[H]  /  DBili  x   /  AST  110[H]  /  ALT  30  /  AlkPhos  264[H]  02-20      Urinalysis Basic - ( 20 Feb 2025 07:13 )    Color: x / Appearance: x / SG: x / pH: x  Gluc: 117 mg/dL / Ketone: x  / Bili: x / Urobili: x   Blood: x / Protein: x / Nitrite: x   Leuk Esterase: x / RBC: x / WBC x   Sq Epi: x / Non Sq Epi: x / Bacteria: x        MICROBIOLOGY:  v  Peritoneal  02-13-25   No growth at 5 days  --    polymorphonuclear leukocytes seen  No organisms seen  by cytocentrifuge      .Blood BLOOD  02-11-25   No growth at 5 days  --  --      .Blood BLOOD  02-10-25   No growth at 5 days  --  --        CMV IgG Antibody: >10.00 U/mL (02-15-25 @ 07:12)  Toxoplasma IgG Screen: <3.00 IU/mL (02-15-25 @ 07:12)          RADIOLOGY:    <The imaging below has been reviewed and visualized by me independently. Findings as detailed in report below> Follow Up: Pre-transplant    Interval History:  Afebrile, no leukocytosis  S/P LVP today  Reports relief after LVP    REVIEW OF SYSTEMS  [  ] ROS unobtainable because:    [X  ] All other systems negative except as noted below    Constitutional:  [ ] fever [ ] chills  [ ] weight loss  [X ] weakness  Skin:  [ ] rash [ ] phlebitis	  Eyes: [ ] icterus [ ] pain  [ ] discharge	  ENMT: [ ] sore throat  [ ] thrush [ ] ulcers [ ] exudates  Respiratory: [ ] dyspnea [ ] hemoptysis [ ] cough [ ] sputum	  Cardiovascular:  [ ] chest pain [ ] palpitations [ ] edema	  Gastrointestinal:  [ ] nausea [ ] vomiting [ ] diarrhea [ ] constipation [ ] pain	  Genitourinary:  [ ] dysuria [ ] frequency [ ] hematuria [ ] discharge [ ] flank pain  [ ] incontinence  Musculoskeletal:  [ ] myalgias [ ] arthralgias [ ] arthritis  [ ] back pain  Neurological:  [ ] headache [ ] seizures  [ ] confusion/altered mental status    Allergies  Zithromax (Unknown)        ANTIMICROBIALS:  ciprofloxacin     Tablet 500 daily  rifAXIMin 550 two times a day      OTHER MEDS:  MEDICATIONS  (STANDING):  albuterol/ipratropium for Nebulization 3 every 6 hours PRN  furosemide    Tablet 40 daily  guaiFENesin Oral Liquid (Sugar-Free) 100 every 6 hours PRN  heparin   Injectable 5000 every 8 hours  lactulose Syrup 10 two times a day  melatonin 3 at bedtime  midodrine. 15 three times a day  mirtazapine 7.5 at bedtime  pantoprazole    Tablet 40 before breakfast  spironolactone 100 daily  sucralfate suspension 1 every 6 hours      Vital Signs Last 24 Hrs  T(C): 36.7 (20 Feb 2025 08:26), Max: 37.2 (20 Feb 2025 05:20)  T(F): 98.1 (20 Feb 2025 08:26), Max: 99 (20 Feb 2025 05:20)  HR: 124 (20 Feb 2025 08:26) (109 - 125)  BP: 120/73 (20 Feb 2025 08:26) (107/65 - 120/73)  BP(mean): --  RR: 17 (20 Feb 2025 08:26) (17 - 19)  SpO2: 95% (20 Feb 2025 08:26) (95% - 95%)    Parameters below as of 20 Feb 2025 05:20  Patient On (Oxygen Delivery Method): room air        PHYSICAL EXAMINATION:  General: Alert and Awake, NAD, jaundice  Cardiac: RRR, No M/R/G  Resp: CTAB, No Wh/Rh/Ra  Abdomen: NBS, mild abdominal distension, No rigidity or guarding  MSK: No LE edema. No Calf tenderness  : No mccann  Skin: No rashes or lesions. Skin is warm and dry to the touch.   Neuro: Alert and Awake. CN 2-12 Grossly intact. Moves all four extremities spontaneously.  Psych: Calm, Pleasant, Cooperative                            8.3    6.78  )-----------( 97       ( 20 Feb 2025 07:15 )             25.0       02-20    128[L]  |  95[L]  |  7   ----------------------------<  117[H]  3.3[L]   |  21[L]  |  0.31[L]    Ca    8.5      20 Feb 2025 07:13  Phos  3.2     02-20  Mg     1.4     02-20    TPro  6.3  /  Alb  3.2[L]  /  TBili  13.0[H]  /  DBili  x   /  AST  110[H]  /  ALT  30  /  AlkPhos  264[H]  02-20      Urinalysis Basic - ( 20 Feb 2025 07:13 )    Color: x / Appearance: x / SG: x / pH: x  Gluc: 117 mg/dL / Ketone: x  / Bili: x / Urobili: x   Blood: x / Protein: x / Nitrite: x   Leuk Esterase: x / RBC: x / WBC x   Sq Epi: x / Non Sq Epi: x / Bacteria: x        MICROBIOLOGY:  v  Peritoneal  02-13-25   No growth at 5 days  --    polymorphonuclear leukocytes seen  No organisms seen  by cytocentrifuge      .Blood BLOOD  02-11-25   No growth at 5 days  --  --      .Blood BLOOD  02-10-25   No growth at 5 days  --  --        CMV IgG Antibody: >10.00 U/mL (02-15-25 @ 07:12)  Toxoplasma IgG Screen: <3.00 IU/mL (02-15-25 @ 07:12)          RADIOLOGY:    <The imaging below has been reviewed and visualized by me independently. Findings as detailed in report below>        < from: US Breast Complete, Bilateral (02.19.25 @ 13:41) >  ACC: [id number] EXAM:  US BREAST COMPLETE BI   ORDERED BY:  KEEGAN IZAGUIRRE     PROCEDURE DATE:  02/19/2025          INTERPRETATION:  CLINICAL INDICATION: Bilateral breast ultrasound was   requested for pretransplant evaluation.    COMPARISON STUDIES: None.    RIGHT BREAST:  Sonographic evaluation of the right breast was performed in its entirety,   including each of the four quadrants and the retroareolar region, in   clockwise fashion. Images were obtained by  the technologist and   submitted for interpretation.    FINDINGS:  The breast parenchyma demonstrates a heterogeneous background echotexture   (mixed fatty and fibroglandular).    No suspicious solid mass.    Miscellaneous: Partially imaged right upper quadrant abdominal ascites.    LEFTBREAST:  Sonographic evaluation of the left breast was performed in its entirety,   including each of the four quadrants and the retroareolar region, in   clockwise fashion. Images were obtained by  the technologist and   submitted for interpretation.    FINDINGS:  The breast parenchyma demonstrates a heterogeneous background echotexture   (mixed fatty and fibroglandular).    No suspicious solid mass.    IMPRESSION:  No sonographic evidence of malignancy.    Partially imaged right upper quadrant abdominal ascites.    Routine annual mammography is recommended, when clinically feasible.    BI-RADS 2 - Benign Finding(s)  The patient will be notified of these results by telephone, and will also   be mailed a written summary in layman's terms.    --- End of Report ---        < end of copied text >

## 2025-02-21 ENCOUNTER — NON-APPOINTMENT (OUTPATIENT)
Age: 60
End: 2025-02-21

## 2025-02-21 LAB
ALBUMIN SERPL ELPH-MCNC: 3.7 G/DL — SIGNIFICANT CHANGE UP (ref 3.3–5)
ALP SERPL-CCNC: 241 U/L — HIGH (ref 40–120)
ALT FLD-CCNC: 26 U/L — SIGNIFICANT CHANGE UP (ref 10–45)
ANION GAP SERPL CALC-SCNC: 15 MMOL/L — SIGNIFICANT CHANGE UP (ref 5–17)
APTT BLD: 50.8 SEC — HIGH (ref 24.5–35.6)
AST SERPL-CCNC: 82 U/L — HIGH (ref 10–40)
BILIRUB SERPL-MCNC: 10.8 MG/DL — HIGH (ref 0.2–1.2)
BUN SERPL-MCNC: 9 MG/DL — SIGNIFICANT CHANGE UP (ref 7–23)
CALCIUM SERPL-MCNC: 8.8 MG/DL — SIGNIFICANT CHANGE UP (ref 8.4–10.5)
CHLORIDE SERPL-SCNC: 96 MMOL/L — SIGNIFICANT CHANGE UP (ref 96–108)
CO2 SERPL-SCNC: 19 MMOL/L — LOW (ref 22–31)
CREAT SERPL-MCNC: 0.31 MG/DL — LOW (ref 0.5–1.3)
EGFR: 120 ML/MIN/1.73M2 — SIGNIFICANT CHANGE UP
EGFR: 120 ML/MIN/1.73M2 — SIGNIFICANT CHANGE UP
GLUCOSE SERPL-MCNC: 181 MG/DL — HIGH (ref 70–99)
HCT VFR BLD CALC: 21.8 % — LOW (ref 34.5–45)
HGB BLD-MCNC: 7.1 G/DL — LOW (ref 11.5–15.5)
INR BLD: 2.35 RATIO — HIGH (ref 0.85–1.16)
MAGNESIUM SERPL-MCNC: 1.5 MG/DL — LOW (ref 1.6–2.6)
MCHC RBC-ENTMCNC: 30.9 PG — SIGNIFICANT CHANGE UP (ref 27–34)
MCHC RBC-ENTMCNC: 32.6 G/DL — SIGNIFICANT CHANGE UP (ref 32–36)
MCV RBC AUTO: 94.8 FL — SIGNIFICANT CHANGE UP (ref 80–100)
NRBC BLD AUTO-RTO: 0 /100 WBCS — SIGNIFICANT CHANGE UP (ref 0–0)
PHOSPHATE SERPL-MCNC: 1.7 MG/DL — LOW (ref 2.5–4.5)
PLATELET # BLD AUTO: 92 K/UL — LOW (ref 150–400)
POTASSIUM SERPL-MCNC: 3.6 MMOL/L — SIGNIFICANT CHANGE UP (ref 3.5–5.3)
POTASSIUM SERPL-SCNC: 3.6 MMOL/L — SIGNIFICANT CHANGE UP (ref 3.5–5.3)
PROT SERPL-MCNC: 6.6 G/DL — SIGNIFICANT CHANGE UP (ref 6–8.3)
PROTHROM AB SERPL-ACNC: 26.6 SEC — HIGH (ref 9.9–13.4)
RBC # BLD: 2.3 M/UL — LOW (ref 3.8–5.2)
RBC # FLD: 22.3 % — HIGH (ref 10.3–14.5)
SODIUM SERPL-SCNC: 130 MMOL/L — LOW (ref 135–145)
WBC # BLD: 6.27 K/UL — SIGNIFICANT CHANGE UP (ref 3.8–10.5)
WBC # FLD AUTO: 6.27 K/UL — SIGNIFICANT CHANGE UP (ref 3.8–10.5)

## 2025-02-21 PROCEDURE — 99232 SBSQ HOSP IP/OBS MODERATE 35: CPT | Mod: GC

## 2025-02-21 PROCEDURE — 99233 SBSQ HOSP IP/OBS HIGH 50: CPT | Mod: GC

## 2025-02-21 PROCEDURE — 99233 SBSQ HOSP IP/OBS HIGH 50: CPT

## 2025-02-21 RX ORDER — MAGNESIUM SULFATE 500 MG/ML
2 SYRINGE (ML) INJECTION ONCE
Refills: 0 | Status: COMPLETED | OUTPATIENT
Start: 2025-02-21 | End: 2025-02-21

## 2025-02-21 RX ORDER — SOD PHOS DI, MONO/K PHOS MONO 250 MG
1 TABLET ORAL ONCE
Refills: 0 | Status: COMPLETED | OUTPATIENT
Start: 2025-02-21 | End: 2025-02-21

## 2025-02-21 RX ORDER — ACETAMINOPHEN 500 MG/5ML
1000 LIQUID (ML) ORAL ONCE
Refills: 0 | Status: COMPLETED | OUTPATIENT
Start: 2025-02-21 | End: 2025-02-21

## 2025-02-21 RX ORDER — MAGNESIUM OXIDE 400 MG
400 TABLET ORAL
Refills: 0 | Status: DISCONTINUED | OUTPATIENT
Start: 2025-02-21 | End: 2025-03-05

## 2025-02-21 RX ADMIN — Medication 15 MILLILITER(S): at 08:15

## 2025-02-21 RX ADMIN — HEPARIN SODIUM 5000 UNIT(S): 1000 INJECTION INTRAVENOUS; SUBCUTANEOUS at 13:15

## 2025-02-21 RX ADMIN — Medication 15 MILLILITER(S): at 17:38

## 2025-02-21 RX ADMIN — Medication 15 MILLILITER(S): at 13:21

## 2025-02-21 RX ADMIN — Medication 1 GRAM(S): at 05:38

## 2025-02-21 RX ADMIN — Medication 25 GRAM(S): at 13:14

## 2025-02-21 RX ADMIN — Medication 150 MILLIGRAM(S): at 05:39

## 2025-02-21 RX ADMIN — Medication 1 GRAM(S): at 17:38

## 2025-02-21 RX ADMIN — HEPARIN SODIUM 5000 UNIT(S): 1000 INJECTION INTRAVENOUS; SUBCUTANEOUS at 05:10

## 2025-02-21 RX ADMIN — Medication 1000 MILLIGRAM(S): at 03:10

## 2025-02-21 RX ADMIN — FOLIC ACID 1 MILLIGRAM(S): 1 TABLET ORAL at 13:22

## 2025-02-21 RX ADMIN — MIDODRINE HYDROCHLORIDE 15 MILLIGRAM(S): 5 TABLET ORAL at 05:39

## 2025-02-21 RX ADMIN — Medication 3 MILLIGRAM(S): at 21:45

## 2025-02-21 RX ADMIN — MIRTAZAPINE 7.5 MILLIGRAM(S): 30 TABLET, FILM COATED ORAL at 21:45

## 2025-02-21 RX ADMIN — Medication 400 MILLIGRAM(S): at 13:22

## 2025-02-21 RX ADMIN — Medication 15 MILLILITER(S): at 23:15

## 2025-02-21 RX ADMIN — Medication 400 MILLIGRAM(S): at 02:43

## 2025-02-21 RX ADMIN — Medication 1 APPLICATION(S): at 05:12

## 2025-02-21 RX ADMIN — Medication 1 PACKET(S): at 12:24

## 2025-02-21 RX ADMIN — Medication 100 MILLIGRAM(S): at 13:24

## 2025-02-21 RX ADMIN — MIDODRINE HYDROCHLORIDE 15 MILLIGRAM(S): 5 TABLET ORAL at 17:39

## 2025-02-21 RX ADMIN — Medication 40 MILLIEQUIVALENT(S): at 13:10

## 2025-02-21 RX ADMIN — MIDODRINE HYDROCHLORIDE 15 MILLIGRAM(S): 5 TABLET ORAL at 12:22

## 2025-02-21 RX ADMIN — Medication 1 GRAM(S): at 12:23

## 2025-02-21 RX ADMIN — Medication 1 GRAM(S): at 13:23

## 2025-02-21 RX ADMIN — DEXTROMETHORPHAN HBR, GUAIFENESIN 100 MILLIGRAM(S): 200 LIQUID ORAL at 22:54

## 2025-02-21 RX ADMIN — HEPARIN SODIUM 5000 UNIT(S): 1000 INJECTION INTRAVENOUS; SUBCUTANEOUS at 21:17

## 2025-02-21 RX ADMIN — Medication 500 MILLIGRAM(S): at 13:22

## 2025-02-21 RX ADMIN — Medication 1 GRAM(S): at 23:16

## 2025-02-21 RX ADMIN — Medication 15 MILLILITER(S): at 20:08

## 2025-02-21 RX ADMIN — Medication 400 MILLIGRAM(S): at 17:39

## 2025-02-21 RX ADMIN — FUROSEMIDE 80 MILLIGRAM(S): 10 INJECTION INTRAMUSCULAR; INTRAVENOUS at 05:39

## 2025-02-21 RX ADMIN — Medication 40 MILLIGRAM(S): at 05:58

## 2025-02-21 NOTE — DIETITIAN NUTRITION RISK NOTIFICATION - TREATMENT: THE FOLLOWING DIET HAS BEEN RECOMMENDED
Diet, Consistent Carbohydrate/No Snacks:   1500mL Fluid Restriction ([id number])  Low Sodium (02-12-25 @ 07:30) [Active]      
Diet, Regular:   1500mL Fluid Restriction ([id number])  Low Sodium  Supplement Feeding Modality:  Oral  Ensure Plus High Protein Cans or Servings Per Day:  1       Frequency:  Daily  Ensure Max Cans or Servings Per Day:  1       Frequency:  Daily (02-21-25 @ 10:18) [Active]  Diet, Regular:   1500mL Fluid Restriction ([id number])  Low Sodium  Supplement Feeding Modality:  Oral  Ensure Max Cans or Servings Per Day:  2       Frequency:  Daily (02-16-25 @ 15:23) [Pending Verification By Attending]

## 2025-02-21 NOTE — PROGRESS NOTE ADULT - ASSESSMENT
Carmen Ly is a 59 year old F w/ PMH HTN, anemia, EtOH associated cirrhosis c/b ascites/EV (s/p banding last 11/2024), AUD now sober for ~3 months, bleeding rectal varices vs hemorrhoidal bleeding s/p hemorrhoidectomy (11/2023) presenting as a transfer from [place] for evaluation of liver transplant currently undergoing expedited LT evaluation.     #Decompensated EtOH Associated Cirrhosis  Patient thought to have EtOH cirrhosis, w/ low MELD 18 as an outpatient, however w/ significant hyperbilirubinemia and elevated INR. Suspect decompensation likely in the setting of multiple active infections. Bacteroides and E coli bacteremia initially thought to be due to endocarditis but appears that repeat TTE shows no vegetation and likely false read at OSH. Suspect SBP may be source of bacteremia. Patient's underlying cirrhosis in the setting of EtOH use, however, outpatient labs suggestive of possible concomitant AIH w/ elevated TAJ and ASMA. Per patient, she has been sober for many months. Outpatient PETH however > 400 on 1/17/2024. Additionally, PETH + this admission despite denying recent EtOH intake.   MELD: 28 (2/21) B+  HE: no overt evidence of HE. Continue with lactulose and rifaximin  Varices: EGD 11/5/2024 w/ small varices w/ blue bleb s/p 2 bands placed. Last EGD 1/31, no report available but per pt no high risk varices. Per outpatient notes unable to tolerate B, however retrialed on coreg w/ success as outpatient.  On Coreg 3.125 mg BID at home   Ascites: Hx significant ascites w/ prior hx LVP, s/p para 2/13 w/ 3 L fluid removed- neg for sbp. Continue with diuretics as noted below.   HCC: MRI 2/17 w/o evidence of suspicious hepatic lesion   OLT: OLT eval opened 2/14 though may have significant psych barriers, especially w/ + PETH and patient denying EtOH intake. Evaluated by psychiatry who determined patient is a poor candidate for OLT at this time, w/ likely need for RPP engagement. Being discussed at liver selection meeting today    #Worsening Hyponatremia  Likely hypervolemic hyponatremia in the setting of increased abd distension, improved w/ albumin and diuresis.    #Bacteriodes and E coli Bacteremia - Resolved  #Influenza - Resolved  #C-diff colitis  - Resolved     Recommendations:  - Continue diuresis as follows: lasix 80 mg, spironolactone 150 mg QD   - Cipro 500 mg daily for SBP ppx   - Patient to undergo dual RPP program on discharge  - Folic acid, thiamine, multivitamin  - Liver selection meeting 2/21 - ***    Note incomplete until finalized by attending signature/attestation.    Holden Ahn  GI/Hepatology Fellow, PGY-4    MONDAY-FRIDAY 8AM-5PM:  Please message via [employer] or email [email or web address]     On Weekends/Holidays (All day) and Weekdays after 5 PM to 8 AM  For nonurgent consults please email:  Please email [email or web address]  For urgent consults:  Please contact on call GI team. See Amion schedule ([facility]), [employer] paging system ([facility]), or call hospital  ([facility])        Carmen Ly is a 59 year old F w/ PMH HTN, anemia, EtOH associated cirrhosis c/b ascites/EV (s/p banding last 11/2024), AUD now sober for ~3 months, bleeding rectal varices vs hemorrhoidal bleeding s/p hemorrhoidectomy (11/2023) presenting as a transfer from [place] for evaluation of liver transplant currently undergoing expedited LT evaluation.     #Decompensated EtOH Associated Cirrhosis  Patient thought to have EtOH cirrhosis, w/ low MELD 18 as an outpatient, however w/ significant hyperbilirubinemia and elevated INR. Suspect decompensation likely in the setting of multiple active infections. Bacteroides and E coli bacteremia initially thought to be due to endocarditis but appears that repeat TTE shows no vegetation and likely false read at OSH. Suspect SBP may be source of bacteremia. Patient's underlying cirrhosis in the setting of EtOH use, however, outpatient labs suggestive of possible concomitant AIH w/ elevated TAJ and ASMA. Per patient, she has been sober for many months. Outpatient PETH however > 400 on 1/17/2024. Additionally, PETH + this admission despite denying recent EtOH intake.   MELD: 28 (2/21) B+  HE: no overt evidence of HE. Continue with lactulose and rifaximin  Varices: EGD 11/5/2024 w/ small varices w/ blue bleb s/p 2 bands placed. Last EGD 1/31, no report available but per pt no high risk varices. Per outpatient notes unable to tolerate B, however retrialed on coreg w/ success as outpatient.  On Coreg 3.125 mg BID at home   Ascites: Hx significant ascites w/ prior hx LVP, s/p para 2/13 w/ 3 L fluid removed- neg for sbp. Continue with diuretics as noted below.   HCC: MRI 2/17 w/o evidence of suspicious hepatic lesion   OLT: OLT eval opened 2/14 though may have significant psych barriers, especially w/ + PETH and patient denying EtOH intake. Evaluated by psychiatry who determined patient is a poor candidate for OLT at this time, w/ likely need for RPP engagement. Being discussed at liver selection meeting today    #Worsening Hyponatremia  Likely hypervolemic hyponatremia in the setting of increased abd distension, improved w/ albumin and diuresis.    #Bacteriodes and E coli Bacteremia - Resolved  #Influenza - Resolved  #C-diff colitis  - Resolved     Recommendations:  - Continue diuresis as follows: lasix 80 mg, spironolactone 150 mg QD   - Cipro 500 mg daily for SBP ppx   - Patient to undergo dual RPP program on discharge  - Folic acid, thiamine, multivitamin  - Liver selection meeting 2/21 - deferred listing for now in the setting of significant psychosocial issues. To follow up as an outpatient once patient engages in dual engagement RPP prior to reconsidering listing her for transplant     - recommend dental eval to complete work up     Note incomplete until finalized by attending signature/attestation.    Holden Ahn  GI/Hepatology Fellow, PGY-4    MONDAY-FRIDAY 8AM-5PM:  Please message via "[employer] or email [email or web address]     On Weekends/Holidays (All day) and Weekdays after 5 PM to 8 AM  For nonurgent consults please email:  Please email [email or web address]  For urgent consults:  Please contact on call GI team. See Amion schedule ([facility]), [employer] paging system ([facility]), or call hospital  ([facility])        Carmen Ly is a 59 year old F w/ PMH HTN, anemia, EtOH associated cirrhosis c/b ascites/EV (s/p banding last 11/2024), AUD now sober for ~3 months, bleeding rectal varices vs hemorrhoidal bleeding s/p hemorrhoidectomy (11/2023) presenting as a transfer from [place] for evaluation of liver transplant currently undergoing expedited LT evaluation.     #Decompensated EtOH Associated Cirrhosis  Patient thought to have EtOH cirrhosis, w/ low MELD 18 as an outpatient, however w/ significant hyperbilirubinemia and elevated INR. Suspect decompensation likely in the setting of multiple active infections. Bacteroides and E coli bacteremia initially thought to be due to endocarditis but appears that repeat TTE shows no vegetation and likely false read at OSH. Suspect SBP may be source of bacteremia. Patient's underlying cirrhosis in the setting of EtOH use, however, outpatient labs suggestive of possible concomitant AIH w/ elevated TAJ and ASMA. Per patient, she has been sober for many months. Outpatient PETH however > 400 on 1/17/2024. Additionally, PETH + this admission despite denying recent EtOH intake.   MELD: 28 (2/21) B+  HE: no overt evidence of HE. Continue with lactulose and rifaximin  Varices: EGD 11/5/2024 w/ small varices w/ blue bleb s/p 2 bands placed. Last EGD 1/31, no report available but per pt no high risk varices. Per outpatient notes unable to tolerate B, however retrialed on coreg w/ success as outpatient.  On Coreg 3.125 mg BID at home   Ascites: Hx significant ascites w/ prior hx LVP, s/p para 2/13 w/ 3 L fluid removed- neg for sbp. Continue with diuretics as noted below.   HCC: MRI 2/17 w/o evidence of suspicious hepatic lesion   OLT: OLT eval opened 2/14 though may have significant psych barriers, especially w/ + PETH and patient denying EtOH intake. Evaluated by psychiatry who determined patient is a poor candidate for OLT at this time, w/ likely need for RPP engagement. Being discussed at liver selection meeting today    #Worsening Hyponatremia  Likely hypervolemic hyponatremia in the setting of increased abd distension, improved w/ albumin and diuresis.    #Bacteriodes and E coli Bacteremia - Resolved  #Influenza - Resolved  #C-diff colitis  - Resolved     Recommendations:  - Continue diuresis as follows: lasix 80 mg, spironolactone 150 mg QD   - Cipro 500 mg daily for SBP ppx   - Patient to undergo dual RPP program on discharge  - Folic acid, thiamine, multivitamin  - Liver selection meeting 2/21 - deferred listing for now in the setting of significant psychosocial issues. To follow up as an outpatient once patient engages in dual engagement RPP prior to reconsidering listing her for transplant     - recommend dental eval to complete work up      - Recommend DSE as per cards for cardiac w/u prior to transplantation  - Once patient completes dental and DSE, no contraindications to discharge to outpatient, with follow up w/ Dr. Hampton for further hepatology care. Please contact hepatology fellow on day of discharge to facilitate appointment.   - Please call back if any questions     Note incomplete until finalized by attending signature/attestation.    Holden Ahn  GI/Hepatology Fellow, PGY-4    MONDAY-FRIDAY 8AM-5PM:  Please message via [employer] or email [email or web address]     On Weekends/Holidays (All day) and Weekdays after 5 PM to 8 AM  For nonurgent consults please email:  Please email [email or web address]  For urgent consults:  Please contact on call GI team. See Amion schedule ([facility]), [employer] paging system ([facility]), or call hospital  ([facility])

## 2025-02-21 NOTE — PROGRESS NOTE ADULT - SUBJECTIVE AND OBJECTIVE BOX
Patient is a 59y old  Female who presents with a chief complaint of Transfer from [place] for possible liver transplant (21 Feb 2025 11:28)      Interval Events:   - No acute events overnight  - Still mildly tachycardic, but no complaints     ROS:   A 12-point ROS was performed and negative except as noted in HPI.    Hospital Medications:  albumin human 25% IVPB 100 milliLiter(s) IV Intermittent every 4 hours  albumin human 25% IVPB 50 milliLiter(s) IV Intermittent once PRN  albuterol/ipratropium for Nebulization 3 milliLiter(s) Nebulizer every 6 hours PRN  Biotene Dry Mouth Oral Rinse 15 milliLiter(s) Swish and Spit five times a day  chlorhexidine 2% Cloths 1 Application(s) Topical <User Schedule>  ciprofloxacin     Tablet 500 milliGRAM(s) Oral daily  folic acid 1 milliGRAM(s) Oral daily  furosemide    Tablet 80 milliGRAM(s) Oral daily  guaiFENesin Oral Liquid (Sugar-Free) 100 milliGRAM(s) Oral every 6 hours PRN  heparin   Injectable 5000 Unit(s) SubCutaneous every 8 hours  lactulose Syrup 10 Gram(s) Oral two times a day  magnesium oxide 400 milliGRAM(s) Oral three times a day with meals  magnesium sulfate  IVPB 2 Gram(s) IV Intermittent once  melatonin 3 milliGRAM(s) Oral at bedtime  midodrine. 15 milliGRAM(s) Oral three times a day  mirtazapine 7.5 milliGRAM(s) Oral at bedtime  pantoprazole    Tablet 40 milliGRAM(s) Oral before breakfast  potassium chloride    Tablet ER 40 milliEquivalent(s) Oral daily  potassium phosphate / sodium phosphate Powder (PHOS-NaK) 1 Packet(s) Oral once  rifAXIMin 550 milliGRAM(s) Oral two times a day  sodium chloride 1 Gram(s) Oral daily  spironolactone 150 milliGRAM(s) Oral daily  sucralfate suspension 1 Gram(s) Oral every 6 hours  thiamine 100 milliGRAM(s) Oral daily      PHYSICAL EXAM:   Vital Signs:  Vital Signs Last 24 Hrs  T(C): 36.3 (21 Feb 2025 11:00), Max: 37.3 (20 Feb 2025 16:12)  T(F): 97.3 (21 Feb 2025 11:00), Max: 99.1 (20 Feb 2025 16:12)  HR: 113 (21 Feb 2025 11:00) (113 - 125)  BP: 109/69 (21 Feb 2025 11:00) (101/59 - 109/69)  BP(mean): --  RR: 18 (21 Feb 2025 11:00) (18 - 18)  SpO2: 98% (21 Feb 2025 11:00) (96% - 99%)    Parameters below as of 21 Feb 2025 11:00  Patient On (Oxygen Delivery Method): room air    Daily   GENERAL: no acute distress  NEURO: alert, no asterixis  HEENT: scleral icterus  CHEST: no respiratory distress, no accessory muscle use  CARDIAC: regular rate  ABDOMEN: soft, nondistended, nontender, no rebound or guarding  EXTREMITIES: warm, well perfused, no edema  SKIN: jaundiced       LABS: reviewed                        7.1    6.27  )-----------( 92       ( 21 Feb 2025 07:02 )             21.8     02-21    130[L]  |  96  |  9   ----------------------------<  181[H]  3.6   |  19[L]  |  0.31[L]    Ca    8.8      21 Feb 2025 06:57  Phos  1.7     02-21  Mg     1.5     02-21    TPro  6.6  /  Alb  3.7  /  TBili  10.8[H]  /  DBili  x   /  AST  82[H]  /  ALT  26  /  AlkPhos  241[H]  02-21    LIVER FUNCTIONS - ( 21 Feb 2025 06:57 )  Alb: 3.7 g/dL / Pro: 6.6 g/dL / ALK PHOS: 241 U/L / ALT: 26 U/L / AST: 82 U/L / GGT: x             Interval Diagnostic Studies: see sunrise for full report

## 2025-02-21 NOTE — CHART NOTE - NSCHARTNOTEFT_GEN_A_CORE
NUTRITION FOLLOW UP NOTE    PATIENT SEEN FOR: malnutrition follow up     SOURCE: [x] Patient  [x] Current Medical Record  [] RN  [x] Family/support person at bedside  [] Patient unavailable/inappropriate  [] Other:    CHART REVIEWED/EVENTS NOTED.  [x] Nutrition Status:  -Decompensated ETOH cirrhosis; eval opened   -MELD: 27 (2/20)   -Frail: 6.5 (2/18)   -S/p para: 2.3L removed (2/20)     Nutrition Focused Physical Exam: Completed [ x  ]    Muscle Wasting-   Clavicle [  severe ]  Shoulder[ severe  ]    Fat Wasting- Orbital [  severe ]  Triceps [ severe  ]  Buccal[  severe ]      DIET ORDER:   Diet, Regular:   1500mL Fluid Restriction ([id number])  Low Sodium  Supplement Feeding Modality:  Oral  Ensure Plus High Protein Cans or Servings Per Day:  1       Frequency:  Daily  Ensure Max Cans or Servings Per Day:  1       Frequency:  Daily (02-21-25)    (RD provided recommendations to Team regarding diet)     NUTRITION INTAKE/PROVISION:  [x] PO: previously on Consistent carbohydrate diet   -Good PO intake of meals  -Partner providing additional PO nutrition supplements and snacks for Pt (PO nutrition supplements fell off previous diet order)   -RD liberalized diet in setting of sever malnutrition       ANTHROPOMETRICS:  Weight (kg): 59.9 (20 Feb 2025 08:26)       NUTRITIONALLY PERTINENT MEDICATIONS:  MEDICATIONS  (STANDING):  albumin human 25% IVPB  ciprofloxacin     Tablet  folic acid  furosemide    Tablet  lactulose Syrup  magnesium oxide  magnesium sulfate  IVPB  midodrine.  pantoprazole    Tablet  potassium chloride    Tablet ER  potassium phosphate / sodium phosphate Powder (PHOS-NaK)  rifAXIMin  sodium chloride  spironolactone  sucralfate suspension  thiamine       NUTRITIONALLY PERTINENT LABS:  02-21 Na130 mmol/L[L] Glu 181 mg/dL[H] K+ 3.6 mmol/L Cr  0.31 mg/dL[L] BUN 9 mg/dL 02-21 Phos 1.7 mg/dL[L] 02-21 Alb 3.7 g/dL 02-15 Chol 108 mg/dL LDL --    HDL 15 mg/dL[L] Trig 91 mg/dL02-21 ALT 26 U/L AST 82 U/L[H] Alkaline Phosphatase 241 U/L[H]  02-11-25 @ 18:56 a1c 4.7  02-11-25 @ 07:17 a1c 4.6    A1C with Estimated Average Glucose Result: 4.7 % (02-11-25 @ 18:56)  A1C with Estimated Average Glucose Result: 4.6 % (02-11-25 @ 07:17)      NUTRITIONALLY PERTINENT MEDICATIONS/LABS:  [x] Reviewed  [x] Relevant notes on medications/labs:  -Albumin  -Lasix, Aldactone   -Hypomagnesemic; mag sulfate, mag-ox   -Hypophosphatemic; Phos-NaK  -Hyponatremic     EDEMA:  [x] Reviewed  [] Relevant notes:    GI/ I&O:  [x] Reviewed  [] Relevant notes:  [] Other:    SKIN:   [x] Pressure injury previously noted  -Sacrum: stage 1    ESTIMATED NEEDS:  [x] No change:  Energy:   1770-2065kcal/day (30-35 kcal/kg)  Protein:   70-89g/day (1.2-1.5 g/kg)  Fluid:   ml/day or [x] defer to team  Based on: 59kg    NUTRITION DIAGNOSIS:  [x] Prior Dx:  1. Moderate malnutrition --> advanced   2. Increased protein-energy needs  [x] New Dx:  P: Severe malnutrition in the context of chronic illness   E: related to inadequate protein-energy intake in the setting of decompensated ETOH cirrhosis   S: as evidenced by severe signs of muscle/fat loss     EDUCATION:  [x] Yes: reinforced importance of adequate protein-energy needs via meals and supplements     NUTRITION CARE PLAN:  1. Diet: regular, low sodium diet  2. Supplements: Ensure Plus High Protein x1 (calorically dense); Ensure Max x1 (protein-dense)   3. Multivitamin/mineral supplementation:  multivitamin, folic acid, thiamine, vitamin C   4: Malnutrition sticker placed     MONITORING AND EVALUATION:   RD remains available upon request and will follow up per protocol.    Kristina Ash, MS, RDN, CDN (Teams)   Available on MS TEAMS

## 2025-02-21 NOTE — PROGRESS NOTE ADULT - SUBJECTIVE AND OBJECTIVE BOX
HPI:  Patient seen and examined at bedside on [address] [place] (SURGE 5).    Review Of Systems:           Respiratory: No shortness of breath, cough, or wheezing  Cardiovascular: No chest pain or palpitations  10 point review of systems is otherwise negative except as mentioned above        Medications:  albumin human 25% IVPB 100 milliLiter(s) IV Intermittent every 4 hours  albumin human 25% IVPB 50 milliLiter(s) IV Intermittent once PRN  albuterol/ipratropium for Nebulization 3 milliLiter(s) Nebulizer every 6 hours PRN  Biotene Dry Mouth Oral Rinse 15 milliLiter(s) Swish and Spit five times a day  chlorhexidine 2% Cloths 1 Application(s) Topical <User Schedule>  ciprofloxacin     Tablet 500 milliGRAM(s) Oral daily  folic acid 1 milliGRAM(s) Oral daily  furosemide    Tablet 80 milliGRAM(s) Oral daily  guaiFENesin Oral Liquid (Sugar-Free) 100 milliGRAM(s) Oral every 6 hours PRN  heparin   Injectable 5000 Unit(s) SubCutaneous every 8 hours  lactulose Syrup 10 Gram(s) Oral two times a day  magnesium oxide 400 milliGRAM(s) Oral three times a day with meals  magnesium sulfate  IVPB 2 Gram(s) IV Intermittent once  melatonin 3 milliGRAM(s) Oral at bedtime  midodrine. 15 milliGRAM(s) Oral three times a day  mirtazapine 7.5 milliGRAM(s) Oral at bedtime  pantoprazole    Tablet 40 milliGRAM(s) Oral before breakfast  potassium chloride    Tablet ER 40 milliEquivalent(s) Oral daily  potassium phosphate / sodium phosphate Powder (PHOS-NaK) 1 Packet(s) Oral once  rifAXIMin 550 milliGRAM(s) Oral two times a day  sodium chloride 1 Gram(s) Oral daily  spironolactone 150 milliGRAM(s) Oral daily  sucralfate suspension 1 Gram(s) Oral every 6 hours  thiamine 100 milliGRAM(s) Oral daily    PAST MEDICAL & SURGICAL HISTORY:  Alcoholic cirrhosis      H/O esophageal varices      GERD (gastroesophageal reflux disease)      H/O bacteremia      History of bacteremia      H/O Clostridium difficile infection      S/P hemorrhoidectomy        Vitals:  T(C): 36.3 (02-21-25 @ 11:00), Max: 37.3 (02-20-25 @ 16:12)  HR: 113 (02-21-25 @ 11:00) (113 - 125)  BP: 109/69 (02-21-25 @ 11:00) (101/59 - 109/69)  BP(mean): --  RR: 18 (02-21-25 @ 11:00) (18 - 18)  SpO2: 98% (02-21-25 @ 11:00) (96% - 99%)  Wt(kg): --  Daily     Daily   I&O's Summary      Physical Exam:  Appearance: No acute distress; well appearing  Eyes: PERRL, EOMI, pink conjunctiva  HENT: Normal oral mucosa  Cardiovascular: RRR, S1, S2, no murmurs, rubs, or gallops; no edema; no JVD  Respiratory: Clear to auscultation bilaterally  Gastrointestinal: soft, non-tender, non-distended with normal bowel sounds  Musculoskeletal: No clubbing; no joint deformity   Neurologic: Non-focal  Lymphatic: No lymphadenopathy  Psychiatry: AAOx3, mood & affect appropriate  Skin: No rashes, ecchymoses, or cyanosis                          7.1    6.27  )-----------( 92       ( 21 Feb 2025 07:02 )             21.8     02-21    130[L]  |  96  |  9   ----------------------------<  181[H]  3.6   |  19[L]  |  0.31[L]    Ca    8.8      21 Feb 2025 06:57  Phos  1.7     02-21  Mg     1.5     02-21    TPro  6.6  /  Alb  3.7  /  TBili  10.8[H]  /  DBili  x   /  AST  82[H]  /  ALT  26  /  AlkPhos  241[H]  02-21    PT/INR - ( 21 Feb 2025 07:02 )   PT: 26.6 sec;   INR: 2.35 ratio         PTT - ( 21 Feb 2025 07:02 )  PTT:50.8 sec      Cardiovascular Diagnostic Testing:  ECG:    Echo:  < from: TTE W or WO Ultrasound Enhancing Agent (02.12.25 @ 08:22) >  _______________________________________________________________________________________     CONCLUSIONS:      1. Left ventricular systolic function is normal.   2. No significant valvular disease.   3. There is mild posterior calcification of the mitral valve annulus.   4. No echocardiographic evidence of vegetations.   5. No pericardial effusion seen.   6. Compared to the transthoracic echocardiogram performed on 1/27/2025, there is noevidence of vegetation on the mitral valve. The posterior mitral annulus is calcified.    ________________________________________________________________________________________    < end of copied text >      Stress Testing:    Cath:    Interpretation of Telemetry:    Imaging:

## 2025-02-21 NOTE — PROGRESS NOTE ADULT - PROBLEM SELECTOR PLAN 3
Pt tested positive for C. diff colitis at [facility]. Started on oral vancomycin.     Plan:   - S/p 2 weeks vancomycin  - Resolved

## 2025-02-21 NOTE — DIETITIAN NUTRITION RISK NOTIFICATION - MALNUTRITION EVALUATION AS DEMONSTRATED BY (ADULTS > 20 YEARS OF AGE)
Loss of subcutaneous fat.../Loss of muscle.../Fluid accumulation...
Loss of subcutaneous fat.../Loss of muscle...

## 2025-02-21 NOTE — CONSULT NOTE ADULT - SUBJECTIVE AND OBJECTIVE BOX
Patient is a 59y old  Female who presents with a chief complaint of Transfer from [place] for possible liver transplant (21 Feb 2025 11:56). Dental paged to provide clearance.       HPI:  60yo F w/ PMH of alcohol cirrhosis with recurrent ascites, s/p multiple paracentesis, s/p banding of esophageal varices, currently being treated for recent c. diff colitis and possible endocarditis, presenting for possible liver transplant from [facility]. Pt initially admitted to [facility] c/o weakness, poor PO intake, and fever; hypotensive in ED. Met criteria for sepsis iso C. diff colitis and was treated with vasopressor support, flagyl, and vanc. Hospital course was complicated by AHRF and pt was intubated 1/28-2/2. Pt then treated for E. coli and Bacteroides bacteremia with TTE findings concerning for mitral vegetation; pt not a good candidate for ARACELI due to varices decided to treat empirically for IE with 6 weeks of antibiotics s/p PICC line placement. Pt d/c from [facility] on 2/7 and then presented to [place] c/o SOB. Ultimately transferred to [facility] for hepatology transplant consult. On presentation today, pt endorses subjective nighttime fevers, chills, SOB and abdominal discomfort. Attributes fevers to recent flu dx (noted in chart to be flu positive 1 week prior to presentation at [facility]). Pt states she has not used alcohol in over a year. She began using alcohol in her 20s socially and her use gradually increased to include ~3-4 glasses of wine everyday when she was drinking the most. Has been to rehab and therapy multiple times. Pt first began smoking in her 20s ~1pack/day for many years, cannot recall specifics. Has not had a cigarette in several months. Pt is unable to ambulate on her own and has remained bedbound since intubation several months ago.  (10 Feb 2025 20:23)      PAST MEDICAL & SURGICAL HISTORY:  Alcoholic cirrhosis      H/O esophageal varices      GERD (gastroesophageal reflux disease)      H/O bacteremia      History of bacteremia      H/O Clostridium difficile infection      S/P hemorrhoidectomy          MEDICATIONS  (STANDING):  albumin human 25% IVPB 100 milliLiter(s) IV Intermittent every 4 hours  Biotene Dry Mouth Oral Rinse 15 milliLiter(s) Swish and Spit five times a day  chlorhexidine 2% Cloths 1 Application(s) Topical <User Schedule>  ciprofloxacin     Tablet 500 milliGRAM(s) Oral daily  folic acid 1 milliGRAM(s) Oral daily  furosemide    Tablet 80 milliGRAM(s) Oral daily  heparin   Injectable 5000 Unit(s) SubCutaneous every 8 hours  lactulose Syrup 10 Gram(s) Oral two times a day  magnesium oxide 400 milliGRAM(s) Oral three times a day with meals  melatonin 3 milliGRAM(s) Oral at bedtime  midodrine. 15 milliGRAM(s) Oral three times a day  mirtazapine 7.5 milliGRAM(s) Oral at bedtime  pantoprazole    Tablet 40 milliGRAM(s) Oral before breakfast  potassium chloride    Tablet ER 40 milliEquivalent(s) Oral daily  rifAXIMin 550 milliGRAM(s) Oral two times a day  sodium chloride 1 Gram(s) Oral daily  spironolactone 150 milliGRAM(s) Oral daily  sucralfate suspension 1 Gram(s) Oral every 6 hours  thiamine 100 milliGRAM(s) Oral daily    MEDICATIONS  (PRN):  albumin human 25% IVPB 50 milliLiter(s) IV Intermittent once PRN para albumin replacement  albuterol/ipratropium for Nebulization 3 milliLiter(s) Nebulizer every 6 hours PRN Shortness of Breath and/or Wheezing  guaiFENesin Oral Liquid (Sugar-Free) 100 milliGRAM(s) Oral every 6 hours PRN Cough      Allergies    Zithromax (Unknown)    Intolerances        FAMILY HISTORY:  FH: pancreatic cancer (Mother)    Heavy drinker of alcohol (Sibling)    Family history of stent (Sibling)        *SOCIAL HISTORY: (guardian or who pt came with), (smoking hx)    *Last Dental Visit: December 2024    Vital Signs Last 24 Hrs  T(C): 36.8 (21 Feb 2025 14:10), Max: 37.3 (20 Feb 2025 16:12)  T(F): 98.3 (21 Feb 2025 14:10), Max: 99.1 (20 Feb 2025 16:12)  HR: 116 (21 Feb 2025 14:10) (113 - 119)  BP: 105/61 (21 Feb 2025 14:10) (101/59 - 109/69)  BP(mean): --  RR: 18 (21 Feb 2025 14:10) (18 - 18)  SpO2: 95% (21 Feb 2025 14:10) (95% - 99%)    Parameters below as of 21 Feb 2025 14:10  Patient On (Oxygen Delivery Method): room air    LABS:                        7.1    6.27  )-----------( 92       ( 21 Feb 2025 07:02 )             21.8     02-21    130[L]  |  96  |  9   ----------------------------<  181[H]  3.6   |  19[L]  |  0.31[L]    Ca    8.8      21 Feb 2025 06:57  Phos  1.7     02-21  Mg     1.5     02-21    TPro  6.6  /  Alb  3.7  /  TBili  10.8[H]  /  DBili  x   /  AST  82[H]  /  ALT  26  /  AlkPhos  241[H]  02-21    WBC Count: 6.27 K/uL [3.80 - 10.50] (02-21 @ 07:02)  Platelet Count - Automated: 92 K/uL *L* [150 - 400] (02-21 @ 07:02)  INR: 2.35 ratio *H* [0.85 - 1.16] (02-21 @ 07:02)  Culture Results:   No growth to date (02-20 @ 09:43)  Culture Results:   Testing in progress (02-20 @ 09:43)  WBC Count: 6.78 K/uL [3.80 - 10.50] (02-20 @ 07:15)  Platelet Count - Automated: 97 K/uL *L* [150 - 400] (02-20 @ 07:15)  WBC Count: 8.59 K/uL [3.80 - 10.50] (02-19 @ 07:10)  Platelet Count - Automated: 85 K/uL *L* [150 - 400] (02-19 @ 07:10)    Urinalysis Basic - ( 21 Feb 2025 06:57 )    Color: x / Appearance: x / SG: x / pH: x  Gluc: 181 mg/dL / Ketone: x  / Bili: x / Urobili: x   Blood: x / Protein: x / Nitrite: x   Leuk Esterase: x / RBC: x / WBC x   Sq Epi: x / Non Sq Epi: x / Bacteria: x    Limited dental examination completed bedside:    EOE:               Facial bones and MOM grossly intact             (  - ) lymphadenopathy             (  - ) swelling             (  - ) asymmetry             (  - ) palpation        IOE:  permanent dentition: multiple restorations, worn down dentition, plaque accumulation           hard/soft palate:  ( -  ) palatal torus, no pathology noted           tongue/FOM: no pathology noted           labial/buccal mucosa: no pathology noted             ( - ) percussion           (  - ) palpation             (  - ) swelling            (  - ) abscess           (  - ) sinus tract    Patient reports tooth #2 extracted December 2024 due to infection. Site #2 socket closed and appears WNL.     Patient reports that she is treatment planned by her dentist to have 2 crowns re-done because they are old (fabricate when she was ~18 years old).     Patient denies any oral pain, discomfort, or sensitivity.     *DENTAL RADIOGRAPHS: No radiographs could not be captured as patient was not transportable due to fall risk.     RADIOLOGY & ADDITIONAL STUDIES: N/A    ASSESSMENT: No evidence of swelling, abscess or fistula. Clinically no evidence of acute dental infection. However, odontogenic infection cannot be fully ruled out without dental radiographs.    PROCEDURE:  Verbal consent given. Limited bedside examination.     RECOMMENDATIONS:  1) Patient may proceed with liver transplant from a dental standpoint.     2) Dental F/U with outpatient dentist for comprehensive dental care.     Queta Hollingsworth, [facility] #[id number]   Consulted with Dr. Eduardo

## 2025-02-21 NOTE — PROGRESS NOTE ADULT - PROBLEM SELECTOR PLAN 1
- C/b hepatic encephalopathy and esophageal varices  - Paracentesis done today; 1.4L removed  - Pt appears clinically euvolemic on exam    Plan:  - Continue on Lasix 40mg and Spironolactone 100mg qD  - Continue on lactulose 10 mg BID and rifaximin  - Low current suspicion for SBP; pt taking IV CTX 2g qD for bacteremia  - fu Transplant hepatology recs       > PETH level, Type and Screen, repeat TAJ and ASMA, IgG levels, Microsomal Ab  - fu abdominal US for ascites - mild-mod ascites       > Paracentesis tentatively planned with IR 2/13                > para tentatively planned for 2/20       > Fu cell count       > Negative for SBP       > Consider SBP prophylaxis  > pending transport to  for transplant eval  > WOODY vs RPP on DC

## 2025-02-21 NOTE — PROGRESS NOTE ADULT - ASSESSMENT
59 year-old with liver disease, ascites, now with bacteremia with two organisms and concern for infective endocarditis, now on antibiotics with planned six week course.  No overt vegetation seen on TTE. ARACELI precluded by potential esophageal varices.  Continue course of antibiotics per ID (low suspicion for infective endocarditis per discussion with primary team).  Would not pursue further ischemic evaluation until patient is deemed otherwise appropriate transplant candidate. If she is to be considered for transplant, evaluation will depend on clinical status.  DSE may be sufficient for patient with no major cardiovascular risk factors (other than age).     Discussed with Transplant Team on rounds.

## 2025-02-21 NOTE — PROGRESS NOTE ADULT - SUBJECTIVE AND OBJECTIVE BOX
PROGRESS NOTE:     Patient is a 59y old  Female who presents with a chief complaint of Transfer from [place] for possible liver transplant (16 Feb 2025 15:34)    INTERVAL / OVERNIGHT EVENTS:  No acute events overnight.     SUBJECTIVE:  Patient examined at bedside with no acute complaints. HAd 4 BM overnight.    BRIEF DAILY PLAN:  - fu liver transplant decision  - [facility] vs RPP    Exam:  GENERAL: Follows conversation appropriately. No acute distress  EYES: Tracks me in room. Scleral ictus   HENT: Moist mucous membranes.  LUNGS: Clear to auscultation bilaterally. No accessory muscle use.  CARDIOVASCULAR: Regular rate and rhythm. No murmur.  ABDOMEN: Soft, non-tender, nondistended. No palpable masses.  EXTREMITIES: No edema. Non-tender.  SKIN: No rashes or lesions. Jaundiced. Warm. PICC in place L upper arm  NEUROLOGIC: No focal neurological deficits  PSYCHIATRIC: Cooperative. Appropriate mood and affect.    VS  T(C): 36.6 (02-21-25 @ 04:58), Max: 37.3 (02-20-25 @ 16:12)  HR: 119 (02-21-25 @ 04:58) (115 - 125)  BP: 101/59 (02-21-25 @ 04:58) (101/59 - 107/61)  RR: 18 (02-21-25 @ 04:58) (18 - 18)  SpO2: 98% (02-21-25 @ 04:58) (96% - 99%)    LABS (available at time of writing 02-21-25 @ 11:29):                         7.1    6.27  )-----------( 92       ( 21 Feb 2025 07:02 )             21.8     02-21    130[L]  |  96  |  9   ----------------------------<  181[H]  3.6   |  19[L]  |  0.31[L]    Ca    8.8      21 Feb 2025 06:57  Phos  1.7     02-21  Mg     1.5     02-21    TPro  6.6  /  Alb  3.7  /  TBili  10.8[H]  /  DBili  x   /  AST  82[H]  /  ALT  26  /  AlkPhos  241[H]  02-21    PT/INR - ( 21 Feb 2025 07:02 )   PT: 26.6 sec;   INR: 2.35 ratio         PTT - ( 21 Feb 2025 07:02 )  PTT:50.8 sec  Urinalysis Basic - ( 21 Feb 2025 06:57 )    Color: x / Appearance: x / SG: x / pH: x  Gluc: 181 mg/dL / Ketone: x  / Bili: x / Urobili: x   Blood: x / Protein: x / Nitrite: x   Leuk Esterase: x / RBC: x / WBC x   Sq Epi: x / Non Sq Epi: x / Bacteria: x          Culture - Fungal, Body Fluid (collected 02-20-25 @ 09:43)  Source: Peritoneal Peritoneal Fluid  Preliminary Report (02-21-25 @ 08:51):    Testing in progress    Culture - Body Fluid with Gram Stain (collected 02-20-25 @ 09:43)  Source: Ascites Fl Ascites Fluid  Gram Stain (02-20-25 @ 23:58):    polymorphonuclear leukocytes seen    No organisms seen    by cytocentrifuge        Medications:   albumin human 25% IVPB 100 milliLiter(s) IV Intermittent every 4 hours  albumin human 25% IVPB 50 milliLiter(s) IV Intermittent once PRN  albuterol/ipratropium for Nebulization 3 milliLiter(s) Nebulizer every 6 hours PRN  Biotene Dry Mouth Oral Rinse 15 milliLiter(s) Swish and Spit five times a day  chlorhexidine 2% Cloths 1 Application(s) Topical <User Schedule>  ciprofloxacin     Tablet 500 milliGRAM(s) Oral daily  folic acid 1 milliGRAM(s) Oral daily  furosemide    Tablet 80 milliGRAM(s) Oral daily  guaiFENesin Oral Liquid (Sugar-Free) 100 milliGRAM(s) Oral every 6 hours PRN  heparin   Injectable 5000 Unit(s) SubCutaneous every 8 hours  lactulose Syrup 10 Gram(s) Oral two times a day  magnesium oxide 400 milliGRAM(s) Oral three times a day with meals  magnesium sulfate  IVPB 2 Gram(s) IV Intermittent once  melatonin 3 milliGRAM(s) Oral at bedtime  midodrine. 15 milliGRAM(s) Oral three times a day  mirtazapine 7.5 milliGRAM(s) Oral at bedtime  pantoprazole    Tablet 40 milliGRAM(s) Oral before breakfast  potassium chloride    Tablet ER 40 milliEquivalent(s) Oral daily  potassium phosphate / sodium phosphate Powder (PHOS-NaK) 1 Packet(s) Oral once  rifAXIMin 550 milliGRAM(s) Oral two times a day  sodium chloride 1 Gram(s) Oral daily  spironolactone 150 milliGRAM(s) Oral daily  sucralfate suspension 1 Gram(s) Oral every 6 hours  thiamine 100 milliGRAM(s) Oral daily      RADIOLOGY, EKG & ADDITIONAL TESTS: Reviewed.

## 2025-02-22 LAB
ADD ON TEST-SPECIMEN IN LAB: SIGNIFICANT CHANGE UP
ALBUMIN SERPL ELPH-MCNC: 3.6 G/DL — SIGNIFICANT CHANGE UP (ref 3.3–5)
ALP SERPL-CCNC: 313 U/L — HIGH (ref 40–120)
ALT FLD-CCNC: 31 U/L — SIGNIFICANT CHANGE UP (ref 10–45)
ANION GAP SERPL CALC-SCNC: 15 MMOL/L — SIGNIFICANT CHANGE UP (ref 5–17)
APPEARANCE UR: CLEAR — SIGNIFICANT CHANGE UP
AST SERPL-CCNC: 118 U/L — HIGH (ref 10–40)
BACTERIA # UR AUTO: NEGATIVE /HPF — SIGNIFICANT CHANGE UP
BILIRUB DIRECT SERPL-MCNC: 7 MG/DL — HIGH (ref 0–0.3)
BILIRUB INDIRECT FLD-MCNC: 5.3 MG/DL — HIGH (ref 0.2–1)
BILIRUB SERPL-MCNC: 12.3 MG/DL — HIGH (ref 0.2–1.2)
BILIRUB UR-MCNC: ABNORMAL
BUN SERPL-MCNC: 13 MG/DL — SIGNIFICANT CHANGE UP (ref 7–23)
CALCIUM SERPL-MCNC: 9.3 MG/DL — SIGNIFICANT CHANGE UP (ref 8.4–10.5)
CAST: 0 /LPF — SIGNIFICANT CHANGE UP (ref 0–4)
CHLORIDE SERPL-SCNC: 94 MMOL/L — LOW (ref 96–108)
CO2 SERPL-SCNC: 18 MMOL/L — LOW (ref 22–31)
COLOR SPEC: SIGNIFICANT CHANGE UP
CREAT SERPL-MCNC: 0.31 MG/DL — LOW (ref 0.5–1.3)
DIFF PNL FLD: NEGATIVE — SIGNIFICANT CHANGE UP
EGFR: 120 ML/MIN/1.73M2 — SIGNIFICANT CHANGE UP
EGFR: 120 ML/MIN/1.73M2 — SIGNIFICANT CHANGE UP
FLUAV AG NPH QL: SIGNIFICANT CHANGE UP
FLUBV AG NPH QL: SIGNIFICANT CHANGE UP
GLUCOSE SERPL-MCNC: 103 MG/DL — HIGH (ref 70–99)
GLUCOSE UR QL: NEGATIVE MG/DL — SIGNIFICANT CHANGE UP
HCT VFR BLD CALC: 24.8 % — LOW (ref 34.5–45)
HGB BLD-MCNC: 8.2 G/DL — LOW (ref 11.5–15.5)
KETONES UR-MCNC: NEGATIVE MG/DL — SIGNIFICANT CHANGE UP
LEUKOCYTE ESTERASE UR-ACNC: ABNORMAL
MAGNESIUM SERPL-MCNC: 1.5 MG/DL — LOW (ref 1.6–2.6)
MCHC RBC-ENTMCNC: 31.7 PG — SIGNIFICANT CHANGE UP (ref 27–34)
MCHC RBC-ENTMCNC: 33.1 G/DL — SIGNIFICANT CHANGE UP (ref 32–36)
MCV RBC AUTO: 95.8 FL — SIGNIFICANT CHANGE UP (ref 80–100)
NITRITE UR-MCNC: NEGATIVE — SIGNIFICANT CHANGE UP
NRBC BLD AUTO-RTO: 0 /100 WBCS — SIGNIFICANT CHANGE UP (ref 0–0)
PH UR: 6 — SIGNIFICANT CHANGE UP (ref 5–8)
PHOSPHATE SERPL-MCNC: 2.3 MG/DL — LOW (ref 2.5–4.5)
PLATELET # BLD AUTO: 131 K/UL — LOW (ref 150–400)
POTASSIUM SERPL-MCNC: 4.1 MMOL/L — SIGNIFICANT CHANGE UP (ref 3.5–5.3)
POTASSIUM SERPL-SCNC: 4.1 MMOL/L — SIGNIFICANT CHANGE UP (ref 3.5–5.3)
PROT SERPL-MCNC: 7 G/DL — SIGNIFICANT CHANGE UP (ref 6–8.3)
PROT UR-MCNC: NEGATIVE MG/DL — SIGNIFICANT CHANGE UP
RBC # BLD: 2.59 M/UL — LOW (ref 3.8–5.2)
RBC # FLD: 22 % — HIGH (ref 10.3–14.5)
RBC CASTS # UR COMP ASSIST: 16 /HPF — HIGH (ref 0–4)
REVIEW: SIGNIFICANT CHANGE UP
RSV RNA NPH QL NAA+NON-PROBE: SIGNIFICANT CHANGE UP
SARS-COV-2 RNA SPEC QL NAA+PROBE: SIGNIFICANT CHANGE UP
SODIUM SERPL-SCNC: 127 MMOL/L — LOW (ref 135–145)
SP GR SPEC: 1.01 — SIGNIFICANT CHANGE UP (ref 1–1.03)
SQUAMOUS # UR AUTO: 4 /HPF — SIGNIFICANT CHANGE UP (ref 0–5)
UROBILINOGEN FLD QL: 0.2 MG/DL — SIGNIFICANT CHANGE UP (ref 0.2–1)
WBC # BLD: 8.37 K/UL — SIGNIFICANT CHANGE UP (ref 3.8–10.5)
WBC # FLD AUTO: 8.37 K/UL — SIGNIFICANT CHANGE UP (ref 3.8–10.5)
WBC UR QL: 8 /HPF — HIGH (ref 0–5)

## 2025-02-22 PROCEDURE — 99233 SBSQ HOSP IP/OBS HIGH 50: CPT | Mod: GC

## 2025-02-22 RX ORDER — ACETAMINOPHEN 500 MG/5ML
1000 LIQUID (ML) ORAL ONCE
Refills: 0 | Status: COMPLETED | OUTPATIENT
Start: 2025-02-22 | End: 2025-02-22

## 2025-02-22 RX ORDER — DIPHENHYDRAMINE HCL 12.5MG/5ML
25 ELIXIR ORAL ONCE
Refills: 0 | Status: COMPLETED | OUTPATIENT
Start: 2025-02-22 | End: 2025-02-22

## 2025-02-22 RX ORDER — MAGNESIUM SULFATE 500 MG/ML
2 SYRINGE (ML) INJECTION ONCE
Refills: 0 | Status: DISCONTINUED | OUTPATIENT
Start: 2025-02-22 | End: 2025-02-22

## 2025-02-22 RX ORDER — ACETAMINOPHEN 500 MG/5ML
650 LIQUID (ML) ORAL ONCE
Refills: 0 | Status: COMPLETED | OUTPATIENT
Start: 2025-02-22 | End: 2025-02-22

## 2025-02-22 RX ORDER — VANCOMYCIN HCL IN 5 % DEXTROSE 1.5G/250ML
1000 PLASTIC BAG, INJECTION (ML) INTRAVENOUS EVERY 12 HOURS
Refills: 0 | Status: DISCONTINUED | OUTPATIENT
Start: 2025-02-22 | End: 2025-02-28

## 2025-02-22 RX ORDER — MAGNESIUM SULFATE 500 MG/ML
2 SYRINGE (ML) INJECTION ONCE
Refills: 0 | Status: COMPLETED | OUTPATIENT
Start: 2025-02-22 | End: 2025-02-22

## 2025-02-22 RX ORDER — CEFTRIAXONE 500 MG/1
2000 INJECTION, POWDER, FOR SOLUTION INTRAMUSCULAR; INTRAVENOUS EVERY 24 HOURS
Refills: 0 | Status: COMPLETED | OUTPATIENT
Start: 2025-02-22 | End: 2025-02-28

## 2025-02-22 RX ADMIN — MIRTAZAPINE 7.5 MILLIGRAM(S): 30 TABLET, FILM COATED ORAL at 21:46

## 2025-02-22 RX ADMIN — Medication 100 MILLIGRAM(S): at 13:00

## 2025-02-22 RX ADMIN — Medication 15 MILLILITER(S): at 13:14

## 2025-02-22 RX ADMIN — Medication 15 MILLILITER(S): at 08:19

## 2025-02-22 RX ADMIN — HEPARIN SODIUM 5000 UNIT(S): 1000 INJECTION INTRAVENOUS; SUBCUTANEOUS at 06:04

## 2025-02-22 RX ADMIN — Medication 1 APPLICATION(S): at 05:36

## 2025-02-22 RX ADMIN — Medication 15 MILLILITER(S): at 21:46

## 2025-02-22 RX ADMIN — Medication 3 MILLIGRAM(S): at 21:47

## 2025-02-22 RX ADMIN — Medication 25 MILLIGRAM(S): at 02:13

## 2025-02-22 RX ADMIN — Medication 1000 MILLIGRAM(S): at 15:15

## 2025-02-22 RX ADMIN — Medication 400 MILLIGRAM(S): at 18:09

## 2025-02-22 RX ADMIN — MIDODRINE HYDROCHLORIDE 15 MILLIGRAM(S): 5 TABLET ORAL at 05:42

## 2025-02-22 RX ADMIN — Medication 250 MILLIGRAM(S): at 18:08

## 2025-02-22 RX ADMIN — Medication 27 GRAM(S): at 10:53

## 2025-02-22 RX ADMIN — CEFTRIAXONE 100 MILLIGRAM(S): 500 INJECTION, POWDER, FOR SOLUTION INTRAMUSCULAR; INTRAVENOUS at 18:08

## 2025-02-22 RX ADMIN — HEPARIN SODIUM 5000 UNIT(S): 1000 INJECTION INTRAVENOUS; SUBCUTANEOUS at 15:15

## 2025-02-22 RX ADMIN — Medication 400 MILLIGRAM(S): at 08:19

## 2025-02-22 RX ADMIN — LACTULOSE 10 GRAM(S): 10 SOLUTION ORAL at 18:09

## 2025-02-22 RX ADMIN — Medication 1 GRAM(S): at 18:08

## 2025-02-22 RX ADMIN — LACTULOSE 10 GRAM(S): 10 SOLUTION ORAL at 06:04

## 2025-02-22 RX ADMIN — Medication 500 MILLIGRAM(S): at 13:00

## 2025-02-22 RX ADMIN — Medication 400 MILLIGRAM(S): at 13:01

## 2025-02-22 RX ADMIN — Medication 40 MILLIGRAM(S): at 06:05

## 2025-02-22 RX ADMIN — Medication 15 MILLILITER(S): at 18:08

## 2025-02-22 RX ADMIN — Medication 150 MILLIGRAM(S): at 06:05

## 2025-02-22 RX ADMIN — FUROSEMIDE 80 MILLIGRAM(S): 10 INJECTION INTRAMUSCULAR; INTRAVENOUS at 05:42

## 2025-02-22 RX ADMIN — Medication 1 GRAM(S): at 13:00

## 2025-02-22 RX ADMIN — Medication 40 MILLIEQUIVALENT(S): at 13:01

## 2025-02-22 RX ADMIN — HEPARIN SODIUM 5000 UNIT(S): 1000 INJECTION INTRAVENOUS; SUBCUTANEOUS at 21:46

## 2025-02-22 RX ADMIN — Medication 1 GRAM(S): at 05:42

## 2025-02-22 RX ADMIN — Medication 400 MILLIGRAM(S): at 13:00

## 2025-02-22 RX ADMIN — FOLIC ACID 1 MILLIGRAM(S): 1 TABLET ORAL at 13:00

## 2025-02-22 RX ADMIN — MIDODRINE HYDROCHLORIDE 15 MILLIGRAM(S): 5 TABLET ORAL at 13:01

## 2025-02-22 RX ADMIN — MIDODRINE HYDROCHLORIDE 15 MILLIGRAM(S): 5 TABLET ORAL at 18:09

## 2025-02-22 NOTE — PROGRESS NOTE ADULT - PROBLEM SELECTOR PLAN 3
Pt tested positive for C. diff colitis at [facility]. Started on oral vancomycin.     Plan:   - S/p 2 weeks vancomycin  - Resolved

## 2025-02-22 NOTE — PROGRESS NOTE ADULT - SUBJECTIVE AND OBJECTIVE BOX
Progress Note  Authored by:   Lora Stewart MD   Internal medicine PGY 1  02-10-25 (12d)    Patient is a 59y old  Female who presents with a chief complaint of Transfer from [place] for possible liver transplant (21 Feb 2025 14:41)      Subjective / Overnight Events :  - No acute events overnight.  - Pt seen and examined at bedside.     Additional ROS (if any):        MEDICATIONS  (STANDING):  albumin human 25% IVPB 100 milliLiter(s) IV Intermittent every 4 hours  Biotene Dry Mouth Oral Rinse 15 milliLiter(s) Swish and Spit five times a day  chlorhexidine 2% Cloths 1 Application(s) Topical <User Schedule>  ciprofloxacin     Tablet 500 milliGRAM(s) Oral daily  folic acid 1 milliGRAM(s) Oral daily  furosemide    Tablet 80 milliGRAM(s) Oral daily  heparin   Injectable 5000 Unit(s) SubCutaneous every 8 hours  lactulose Syrup 10 Gram(s) Oral two times a day  magnesium oxide 400 milliGRAM(s) Oral three times a day with meals  melatonin 3 milliGRAM(s) Oral at bedtime  midodrine. 15 milliGRAM(s) Oral three times a day  mirtazapine 7.5 milliGRAM(s) Oral at bedtime  pantoprazole    Tablet 40 milliGRAM(s) Oral before breakfast  potassium chloride    Tablet ER 40 milliEquivalent(s) Oral daily  rifAXIMin 550 milliGRAM(s) Oral two times a day  sodium chloride 1 Gram(s) Oral daily  spironolactone 150 milliGRAM(s) Oral daily  sucralfate suspension 1 Gram(s) Oral every 6 hours  thiamine 100 milliGRAM(s) Oral daily    MEDICATIONS  (PRN):  albumin human 25% IVPB 50 milliLiter(s) IV Intermittent once PRN para albumin replacement  albuterol/ipratropium for Nebulization 3 milliLiter(s) Nebulizer every 6 hours PRN Shortness of Breath and/or Wheezing  guaiFENesin Oral Liquid (Sugar-Free) 100 milliGRAM(s) Oral every 6 hours PRN Cough          PHYSICAL EXAM:  Vital Signs Last 24 Hrs  T(C): 36.8 (22 Feb 2025 05:12), Max: 37.4 (21 Feb 2025 20:20)  T(F): 98.3 (22 Feb 2025 05:12), Max: 99.4 (21 Feb 2025 20:20)  HR: 124 (22 Feb 2025 05:12) (113 - 126)  BP: 113/67 (22 Feb 2025 05:12) (105/61 - 113/67)  BP(mean): --  RR: 18 (22 Feb 2025 05:12) (18 - 18)  SpO2: 99% (22 Feb 2025 05:12) (95% - 99%)    Parameters below as of 22 Feb 2025 05:12  Patient On (Oxygen Delivery Method): room air        I&O's Summary        General: NAD, non-toxic appearing   HEENT: EOMi, no scleral icterus  CV: RRR, normal S1 and S2, no m/r/g  Lungs: normal respiratory effort. CTAB, no wheezes, rales, or rhonchi  Abd: soft, nontender, nondistended  Ext: no edema, 2+ peripheral pulses   Pysch: AOx4, appropriate affect   Neuro: grossly non-focal, moving all extremities spontaneously   Skin: no rashes or lesions     LABS:  CAPILLARY BLOOD GLUCOSE                                  8.2    8.37  )-----------( 131      ( 22 Feb 2025 07:16 )             24.8       WBC Trend: 8.37<--, 6.27<--, 6.78<--  Hb Trend: 8.2<--, 7.1<--, 8.3<--, 8.0<--, 8.5<--    02-22    127[L]  |  94[L]  |  13  ----------------------------<  103[H]  4.1   |  18[L]  |  0.31[L]    Ca    9.3      22 Feb 2025 07:03  Phos  2.3     02-22  Mg     1.5     02-22    TPro  6.6  /  Alb  3.7  /  TBili  10.8[H]  /  DBili  x   /  AST  82[H]  /  ALT  26  /  AlkPhos  241[H]  02-21    PT/INR - ( 21 Feb 2025 07:02 )   PT: 26.6 sec;   INR: 2.35 ratio         PTT - ( 21 Feb 2025 07:02 )  PTT:50.8 sec      Urinalysis Basic - ( 22 Feb 2025 07:03 )    Color: x / Appearance: x / SG: x / pH: x  Gluc: 103 mg/dL / Ketone: x  / Bili: x / Urobili: x   Blood: x / Protein: x / Nitrite: x   Leuk Esterase: x / RBC: x / WBC x   Sq Epi: x / Non Sq Epi: x / Bacteria: x        Culture - Fungal, Body Fluid (collected 20 Feb 2025 09:43)  Source: Peritoneal Peritoneal Fluid  Preliminary Report (21 Feb 2025 08:51):    Testing in progress    Culture - Body Fluid with Gram Stain (collected 20 Feb 2025 09:43)  Source: Ascites Fl Ascites Fluid  Gram Stain (20 Feb 2025 23:58):    polymorphonuclear leukocytes seen    No organisms seen    by cytocentrifuge  Preliminary Report (21 Feb 2025 13:07):    No growth to date          RADIOLOGY & ADDITIONAL TESTS: Reviewed   Progress Note  Authored by:   Lora Stewart MD   Internal medicine PGY 1  02-10-25 (12d)    Patient is a 59y old  Female who presents with a chief complaint of Transfer from [place] for possible liver transplant (21 Feb 2025 14:41)      Subjective / Overnight Events :  Pt feeling tired unable to sleep because she had bed issues. Pt has gotten a new bed and was feeling improved. Spiked fever today. States her bowel movements more solid.      MEDICATIONS  (STANDING):  albumin human 25% IVPB 100 milliLiter(s) IV Intermittent every 4 hours  Biotene Dry Mouth Oral Rinse 15 milliLiter(s) Swish and Spit five times a day  chlorhexidine 2% Cloths 1 Application(s) Topical <User Schedule>  ciprofloxacin     Tablet 500 milliGRAM(s) Oral daily  folic acid 1 milliGRAM(s) Oral daily  furosemide    Tablet 80 milliGRAM(s) Oral daily  heparin   Injectable 5000 Unit(s) SubCutaneous every 8 hours  lactulose Syrup 10 Gram(s) Oral two times a day  magnesium oxide 400 milliGRAM(s) Oral three times a day with meals  melatonin 3 milliGRAM(s) Oral at bedtime  midodrine. 15 milliGRAM(s) Oral three times a day  mirtazapine 7.5 milliGRAM(s) Oral at bedtime  pantoprazole    Tablet 40 milliGRAM(s) Oral before breakfast  potassium chloride    Tablet ER 40 milliEquivalent(s) Oral daily  rifAXIMin 550 milliGRAM(s) Oral two times a day  sodium chloride 1 Gram(s) Oral daily  spironolactone 150 milliGRAM(s) Oral daily  sucralfate suspension 1 Gram(s) Oral every 6 hours  thiamine 100 milliGRAM(s) Oral daily    MEDICATIONS  (PRN):  albumin human 25% IVPB 50 milliLiter(s) IV Intermittent once PRN para albumin replacement  albuterol/ipratropium for Nebulization 3 milliLiter(s) Nebulizer every 6 hours PRN Shortness of Breath and/or Wheezing  guaiFENesin Oral Liquid (Sugar-Free) 100 milliGRAM(s) Oral every 6 hours PRN Cough          PHYSICAL EXAM:  Vital Signs Last 24 Hrs  T(C): 36.8 (22 Feb 2025 05:12), Max: 37.4 (21 Feb 2025 20:20)  T(F): 98.3 (22 Feb 2025 05:12), Max: 99.4 (21 Feb 2025 20:20)  HR: 124 (22 Feb 2025 05:12) (113 - 126)  BP: 113/67 (22 Feb 2025 05:12) (105/61 - 113/67)  BP(mean): --  RR: 18 (22 Feb 2025 05:12) (18 - 18)  SpO2: 99% (22 Feb 2025 05:12) (95% - 99%)    Parameters below as of 22 Feb 2025 05:12  Patient On (Oxygen Delivery Method): room air        I&O's Summary        General: NAD, scleral icterus, jaundice  CV: RRR, normal S1 and S2, no m/r/g  Lungs: normal respiratory effort. CTAB, no wheezes, rales, or rhonchi  Abd: soft, nontender, nondistended  Ext: no edema, 2+ peripheral pulses       LABS:  CAPILLARY BLOOD GLUCOSE                                  8.2    8.37  )-----------( 131      ( 22 Feb 2025 07:16 )             24.8       WBC Trend: 8.37<--, 6.27<--, 6.78<--  Hb Trend: 8.2<--, 7.1<--, 8.3<--, 8.0<--, 8.5<--    02-22    127[L]  |  94[L]  |  13  ----------------------------<  103[H]  4.1   |  18[L]  |  0.31[L]    Ca    9.3      22 Feb 2025 07:03  Phos  2.3     02-22  Mg     1.5     02-22    TPro  6.6  /  Alb  3.7  /  TBili  10.8[H]  /  DBili  x   /  AST  82[H]  /  ALT  26  /  AlkPhos  241[H]  02-21    PT/INR - ( 21 Feb 2025 07:02 )   PT: 26.6 sec;   INR: 2.35 ratio         PTT - ( 21 Feb 2025 07:02 )  PTT:50.8 sec      Urinalysis Basic - ( 22 Feb 2025 07:03 )    Color: x / Appearance: x / SG: x / pH: x  Gluc: 103 mg/dL / Ketone: x  / Bili: x / Urobili: x   Blood: x / Protein: x / Nitrite: x   Leuk Esterase: x / RBC: x / WBC x   Sq Epi: x / Non Sq Epi: x / Bacteria: x        Culture - Fungal, Body Fluid (collected 20 Feb 2025 09:43)  Source: Peritoneal Peritoneal Fluid  Preliminary Report (21 Feb 2025 08:51):    Testing in progress    Culture - Body Fluid with Gram Stain (collected 20 Feb 2025 09:43)  Source: Ascites Fl Ascites Fluid  Gram Stain (20 Feb 2025 23:58):    polymorphonuclear leukocytes seen    No organisms seen    by cytocentrifuge  Preliminary Report (21 Feb 2025 13:07):    No growth to date          RADIOLOGY & ADDITIONAL TESTS: Reviewed

## 2025-02-22 NOTE — PROGRESS NOTE ADULT - ASSESSMENT
60yo F w/ PMH of alcohol cirrhosis with recurrent ascites, s/p multiple paracentesis, s/p banding of esophageal varices, currently being treated for recent c. diff colitis and possible endocarditis. Pt is jaundiced, with scleral icterus and abdominal distension presenting for liver transplant evaluation from [facility].

## 2025-02-23 DIAGNOSIS — R50.9 FEVER, UNSPECIFIED: ICD-10-CM

## 2025-02-23 LAB
ALBUMIN SERPL ELPH-MCNC: 3.6 G/DL — SIGNIFICANT CHANGE UP (ref 3.3–5)
ALP SERPL-CCNC: 315 U/L — HIGH (ref 40–120)
ALT FLD-CCNC: 30 U/L — SIGNIFICANT CHANGE UP (ref 10–45)
ANION GAP SERPL CALC-SCNC: 16 MMOL/L — SIGNIFICANT CHANGE UP (ref 5–17)
AST SERPL-CCNC: 97 U/L — HIGH (ref 10–40)
BASOPHILS # BLD AUTO: 0.01 K/UL — SIGNIFICANT CHANGE UP (ref 0–0.2)
BASOPHILS NFR BLD AUTO: 0.1 % — SIGNIFICANT CHANGE UP (ref 0–2)
BILIRUB SERPL-MCNC: 11.5 MG/DL — HIGH (ref 0.2–1.2)
BUN SERPL-MCNC: 11 MG/DL — SIGNIFICANT CHANGE UP (ref 7–23)
CALCIUM SERPL-MCNC: 9.4 MG/DL — SIGNIFICANT CHANGE UP (ref 8.4–10.5)
CHLORIDE SERPL-SCNC: 94 MMOL/L — LOW (ref 96–108)
CO2 SERPL-SCNC: 18 MMOL/L — LOW (ref 22–31)
CREAT SERPL-MCNC: 0.32 MG/DL — LOW (ref 0.5–1.3)
EGFR: 119 ML/MIN/1.73M2 — SIGNIFICANT CHANGE UP
EGFR: 119 ML/MIN/1.73M2 — SIGNIFICANT CHANGE UP
EOSINOPHIL # BLD AUTO: 0.04 K/UL — SIGNIFICANT CHANGE UP (ref 0–0.5)
EOSINOPHIL NFR BLD AUTO: 0.5 % — SIGNIFICANT CHANGE UP (ref 0–6)
GLUCOSE SERPL-MCNC: 98 MG/DL — SIGNIFICANT CHANGE UP (ref 70–99)
HCT VFR BLD CALC: 22.9 % — LOW (ref 34.5–45)
HGB BLD-MCNC: 7.6 G/DL — LOW (ref 11.5–15.5)
IMM GRANULOCYTES NFR BLD AUTO: 1.8 % — HIGH (ref 0–0.9)
LYMPHOCYTES # BLD AUTO: 0.98 K/UL — LOW (ref 1–3.3)
LYMPHOCYTES # BLD AUTO: 11.3 % — LOW (ref 13–44)
MAGNESIUM SERPL-MCNC: 1.5 MG/DL — LOW (ref 1.6–2.6)
MCHC RBC-ENTMCNC: 31.3 PG — SIGNIFICANT CHANGE UP (ref 27–34)
MCHC RBC-ENTMCNC: 33.2 G/DL — SIGNIFICANT CHANGE UP (ref 32–36)
MCV RBC AUTO: 94.2 FL — SIGNIFICANT CHANGE UP (ref 80–100)
MONOCYTES # BLD AUTO: 0.95 K/UL — HIGH (ref 0–0.9)
MONOCYTES NFR BLD AUTO: 10.9 % — SIGNIFICANT CHANGE UP (ref 2–14)
NEUTROPHILS # BLD AUTO: 6.56 K/UL — SIGNIFICANT CHANGE UP (ref 1.8–7.4)
NEUTROPHILS NFR BLD AUTO: 75.4 % — SIGNIFICANT CHANGE UP (ref 43–77)
NRBC BLD AUTO-RTO: 0 /100 WBCS — SIGNIFICANT CHANGE UP (ref 0–0)
PHOSPHATE SERPL-MCNC: 2.8 MG/DL — SIGNIFICANT CHANGE UP (ref 2.5–4.5)
PLATELET # BLD AUTO: 127 K/UL — LOW (ref 150–400)
POTASSIUM SERPL-MCNC: 4.1 MMOL/L — SIGNIFICANT CHANGE UP (ref 3.5–5.3)
POTASSIUM SERPL-SCNC: 4.1 MMOL/L — SIGNIFICANT CHANGE UP (ref 3.5–5.3)
PROT SERPL-MCNC: 7.3 G/DL — SIGNIFICANT CHANGE UP (ref 6–8.3)
RBC # BLD: 2.43 M/UL — LOW (ref 3.8–5.2)
RBC # FLD: 21.2 % — HIGH (ref 10.3–14.5)
SODIUM SERPL-SCNC: 128 MMOL/L — LOW (ref 135–145)
WBC # BLD: 8.7 K/UL — SIGNIFICANT CHANGE UP (ref 3.8–10.5)
WBC # FLD AUTO: 8.7 K/UL — SIGNIFICANT CHANGE UP (ref 3.8–10.5)

## 2025-02-23 PROCEDURE — 74177 CT ABD & PELVIS W/CONTRAST: CPT | Mod: 26

## 2025-02-23 PROCEDURE — 99233 SBSQ HOSP IP/OBS HIGH 50: CPT

## 2025-02-23 PROCEDURE — G0545: CPT

## 2025-02-23 PROCEDURE — 99233 SBSQ HOSP IP/OBS HIGH 50: CPT | Mod: GC

## 2025-02-23 RX ORDER — ACETAMINOPHEN 500 MG/5ML
650 LIQUID (ML) ORAL ONCE
Refills: 0 | Status: COMPLETED | OUTPATIENT
Start: 2025-02-23 | End: 2025-02-23

## 2025-02-23 RX ORDER — LIDOCAINE HYDROCHLORIDE 20 MG/ML
1 JELLY TOPICAL ONCE
Refills: 0 | Status: COMPLETED | OUTPATIENT
Start: 2025-02-23 | End: 2025-02-23

## 2025-02-23 RX ORDER — DIPHENHYDRAMINE HCL 12.5MG/5ML
25 ELIXIR ORAL ONCE
Refills: 0 | Status: COMPLETED | OUTPATIENT
Start: 2025-02-23 | End: 2025-02-23

## 2025-02-23 RX ORDER — MAGNESIUM SULFATE 500 MG/ML
2 SYRINGE (ML) INJECTION ONCE
Refills: 0 | Status: COMPLETED | OUTPATIENT
Start: 2025-02-23 | End: 2025-02-23

## 2025-02-23 RX ADMIN — Medication 1 GRAM(S): at 00:20

## 2025-02-23 RX ADMIN — HEPARIN SODIUM 5000 UNIT(S): 1000 INJECTION INTRAVENOUS; SUBCUTANEOUS at 13:47

## 2025-02-23 RX ADMIN — Medication 400 MILLIGRAM(S): at 13:46

## 2025-02-23 RX ADMIN — MIRTAZAPINE 7.5 MILLIGRAM(S): 30 TABLET, FILM COATED ORAL at 22:04

## 2025-02-23 RX ADMIN — Medication 150 MILLIGRAM(S): at 05:13

## 2025-02-23 RX ADMIN — Medication 500 MILLIGRAM(S): at 13:46

## 2025-02-23 RX ADMIN — Medication 1 GRAM(S): at 18:07

## 2025-02-23 RX ADMIN — Medication 15 MILLILITER(S): at 23:54

## 2025-02-23 RX ADMIN — Medication 15 MILLILITER(S): at 13:47

## 2025-02-23 RX ADMIN — Medication 3 MILLIGRAM(S): at 22:06

## 2025-02-23 RX ADMIN — HEPARIN SODIUM 5000 UNIT(S): 1000 INJECTION INTRAVENOUS; SUBCUTANEOUS at 05:13

## 2025-02-23 RX ADMIN — HEPARIN SODIUM 5000 UNIT(S): 1000 INJECTION INTRAVENOUS; SUBCUTANEOUS at 22:05

## 2025-02-23 RX ADMIN — Medication 25 MILLIGRAM(S): at 01:01

## 2025-02-23 RX ADMIN — LACTULOSE 10 GRAM(S): 10 SOLUTION ORAL at 05:13

## 2025-02-23 RX ADMIN — Medication 650 MILLIGRAM(S): at 11:10

## 2025-02-23 RX ADMIN — Medication 15 MILLILITER(S): at 08:22

## 2025-02-23 RX ADMIN — Medication 1 GRAM(S): at 13:46

## 2025-02-23 RX ADMIN — MIDODRINE HYDROCHLORIDE 15 MILLIGRAM(S): 5 TABLET ORAL at 05:13

## 2025-02-23 RX ADMIN — DEXTROMETHORPHAN HBR, GUAIFENESIN 100 MILLIGRAM(S): 200 LIQUID ORAL at 05:27

## 2025-02-23 RX ADMIN — Medication 400 MILLIGRAM(S): at 08:22

## 2025-02-23 RX ADMIN — MIDODRINE HYDROCHLORIDE 15 MILLIGRAM(S): 5 TABLET ORAL at 13:46

## 2025-02-23 RX ADMIN — Medication 25 GRAM(S): at 09:12

## 2025-02-23 RX ADMIN — Medication 40 MILLIGRAM(S): at 05:13

## 2025-02-23 RX ADMIN — FOLIC ACID 1 MILLIGRAM(S): 1 TABLET ORAL at 13:48

## 2025-02-23 RX ADMIN — Medication 1 GRAM(S): at 23:53

## 2025-02-23 RX ADMIN — Medication 100 MILLIGRAM(S): at 13:46

## 2025-02-23 RX ADMIN — Medication 650 MILLIGRAM(S): at 10:31

## 2025-02-23 RX ADMIN — Medication 15 MILLILITER(S): at 18:07

## 2025-02-23 RX ADMIN — Medication 250 MILLIGRAM(S): at 05:17

## 2025-02-23 RX ADMIN — Medication 15 MILLILITER(S): at 00:20

## 2025-02-23 RX ADMIN — LIDOCAINE HYDROCHLORIDE 1 PATCH: 20 JELLY TOPICAL at 10:31

## 2025-02-23 RX ADMIN — Medication 15 MILLILITER(S): at 22:04

## 2025-02-23 RX ADMIN — Medication 400 MILLIGRAM(S): at 18:07

## 2025-02-23 RX ADMIN — MIDODRINE HYDROCHLORIDE 15 MILLIGRAM(S): 5 TABLET ORAL at 18:08

## 2025-02-23 RX ADMIN — Medication 250 MILLIGRAM(S): at 18:45

## 2025-02-23 RX ADMIN — FUROSEMIDE 80 MILLIGRAM(S): 10 INJECTION INTRAMUSCULAR; INTRAVENOUS at 05:16

## 2025-02-23 RX ADMIN — CEFTRIAXONE 100 MILLIGRAM(S): 500 INJECTION, POWDER, FOR SOLUTION INTRAMUSCULAR; INTRAVENOUS at 18:08

## 2025-02-23 RX ADMIN — Medication 1 GRAM(S): at 13:47

## 2025-02-23 RX ADMIN — Medication 40 MILLIEQUIVALENT(S): at 13:47

## 2025-02-23 RX ADMIN — Medication 1 GRAM(S): at 05:13

## 2025-02-23 NOTE — PROGRESS NOTE ADULT - ASSESSMENT
59 year old female PMH of alcohol cirrhosis with recurrent ascites, s/p multiple paracentesis, s/p banding of esophageal varices, currently being treated for recent c. diff colitis and possible endocarditis, presenting for possible liver transplant from [facility]. Pt initially admitted to [facility] c/o weakness, poor PO intake, and fever; hypotensive in ED. Met criteria for sepsis iso C. diff colitis and was treated with vasopressor support, flagyl, and vanc. Hospital course was complicated by AHRF and pt was intubated 1/28-2/2. Pt then treated for E. coli and Bacteroides bacteremia with TTE findings concerning for mitral vegetation; pt not a good candidate for ARACELI due to varices found on EGD from 1/31/24 decided to treat empirically for IE with 6 weeks of antibiotics s/p PICC line placement with negative blood cultures from 1/31/24. Pt d/c from [facility] on 2/7 and then presented to [place] c/o SOB. Ultimately transferred to [facility] for hepatology transplant consult. On presentation today, pt endorses subjective nighttime fevers, chills, SOB and abdominal discomfort. Attributes fevers to recent flu dx (noted in chart to be flu positive 1 week prior to presentation at [facility]). Pt states she has not used alcohol in over a year. She began using alcohol in her 20s socially and her use gradually increased to include ~3-4 glasses of wine everyday when she was drinking the most. Has been to rehab and therapy multiple times. Pt first began smoking in her 20s ~1pack/day for many years, cannot recall specifics. Has not had a cigarette in several months. Pt is unable to ambulate on her own and has remained bedbound since intubation several months ago.     C. difficile toxin assay (January 27) positive, repeat Cdff 2/11 neg  Blood cultures (January 27) 2/4 E. coli, 1/4 Bacteroides   Blood cultures (January 31) no growth to date  Blood cultures (February 10, February 11) no growth to date  Paracentesis (February 4, February 13) Cell counts negative for SBP    CTA chest (January 27) Patent central airways. Smooth interlobular septal thickening at the lung apices and lung bases. No lung consolidation or mass. Mild bibasilar dependent atelectasis. No lymphadenopathy.    CT A/P (January 27) Thickening of the walls of the stomach and duodenum with submucosal edema likely reflective of a gastroenteritis. Thickening of the walls of the ascending colon despite underdistention likely due to portal colopathy. Colonic diverticulosis without evidence of diverticulitis. Cholelithiasis. Nonspecific gallbladder wall edema and pericholecystic fluid in the setting of cirrhosis.    TTE (1/27) small mobile vegetation attached to the posterior mitral valve leaflet.  TTE (February 12) no evidence of vegetations    Social history: born in [place].  Travel history includes travel to [facility], [place], [place], [place].  Lives primarily in [place] but has also had travel to [place] and [place].  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  States her mother had a history of tuberculosis but this was before patient was born.  Patient notes livestock exposure during her work as a .  Has sugar gliders as pets.    Antibiotic Course:  PO Vancomycin: 1/27 (2 doses) > 2/14 (1 dose)  Metronidazole: 1/27 > 2/13  Ceftriaxone: 1/27 > 2/13  Meropenem: 1/27  Restarted on antibiotics 2/22 Vancomycin plus Ceftriaxone    S/P LVP 2/21 - follow cell count and fluid cultures which are no growth to date    #C.diff colitis 1/27/25, diarrhea resolved now   - repeat Cdiff 2/11 negative, completed vanco po 125 mg bi ppx post discontinuation of antibitoics on 2./13-2/16  - in the next 6 months will need vanco po ppx with every exposure to broad spectrum antibiotics  - this includes at the time of perioperative ppx    #Polymicrobial bacteremia with E.coli and Bacteroides  Status post course of ceftriaxone and metronidazole as above from earlier stay    #Possible MV vegetation on TTE from OSH  E.coli and bacteroides atypical endocarditis organisms and polymicrobial is also atypical for endocarditis  Repeat transthoracic echocardiogram here without evidence of vegetation    #Preliver transplant evaluation  --COVID19 Nucleocapsid Antibody  -- COVID19 Eder Antibody pos  -- HAV IgG pos  HBVs Ab, HBVsAg, HBVc Ab pos >400 immune, neg, neg  -- HCV Ab neg  -- HSV 1 IgG /HSV 2 IgG pos. neg  -- EBV IgG Pos  -- CMV IgG pos  --VZV IgG pos  -- Measles IgG , Mumps IgG and Rubella IgG pos all  --Follow up QuantiFeron-TB Gold  -- HIV Ag/Ab by [facility]- NEG  --Syphilis Screen - NEG  --Strongyloides Ab negative   -- Follow up Coccidiodes Ab  -- Brucella IgG - NEGATIVE  -- Q Fever Serologies REACTIVE for  phase II igG with all others negative  in the correct setting, Positive Phase II igG (and higher than Phase I)  could  be indicative of acute infection or false positive- Phase II IgM are most commonly positive as well  ; whereas in chronic or old Q fever, Phase I antibodies are higher than phase II and/or >1:1260  However, her last exposure to wild animals was  about 5 years ago, making acute infection unlikely and these results seemingly a false positive.. In addition , she has no symptoms of acute Q fever infection.     With all that said, it is reasonable to repeat serologies again (ordered)  If titers are still high or stable -->may then consider treatment while repeating in 3-4 weeks to assess for increases in titers that would confirm acuity and infection     - Reviewed prior concern for vegetation on TTE but repeat TTE here with no vegetations so still doubt Q fever related     Not a contraindication to proceed listing as I doubt this represent Q fever     #Encounter to Vaccinate Patient  COVID19: Would benefit from COVID19 [id number] Vaccine Dose  Influenza: 10/8/24  Pneumococcal: Would benefit from PCV20  Immune to hep A, B, varicella and MMR  Shingles: Will require Shingrix  Tdap: Will require Tdap  RSV vaccine also recommended this season    2/23/25 additional studies:  Please send nasal MRSA swab  send Vanco trough level prior to fourth dose  if nasal swab is negative can discontinue the IV Vancomycin  Blood cultures sent 2/22 are NGTD  Ascites fluid culture sent 2/21 NGTD    Discussed with Medicine team    Roland Bhatt MD  Can be called via Teams  After 5pm/weekends [phone]

## 2025-02-23 NOTE — PROGRESS NOTE ADULT - SUBJECTIVE AND OBJECTIVE BOX
Patient is a 59y old  Female who presents with a chief complaint of Transfer from [place] for possible liver transplant (2025 14:41)      Subjective / Overnight Events :  Pt feeling tired unable to sleep because she had bed issues. Pt has gotten a new bed and was feeling improved. Spiked fever today. States her bowel movements more solid.    PHYSICAL EXAM:  General: NAD, scleral icterus, jaundice  CV: RRR, normal S1 and S2, no m/r/g  Lungs: normal respiratory effort. CTAB, no wheezes, rales, or rhonchi  Abd: soft, nontender, nondistended  Ext: no edema, 2+ peripheral pulses     VS  T(C): 36.8 (25 @ 05:02), Max: 38.3 (25 @ 12:53)  HR: 121 (25 @ 05:02) (96 - 121)  BP: 107/67 (25 @ 05:02) (100/63 - 116/67)  RR: 18 (25 @ 05:02) (18 - 18)  SpO2: 97% (25 @ 05:02) (96% - 97%)    LABS (available at time of writing 25 @ 07:51):                         7.6    8.70  )-----------( 127      ( 2025 07:18 )             22.9         127[L]  |  94[L]  |  13  ----------------------------<  103[H]  4.1   |  18[L]  |  0.31[L]    Ca    9.3      2025 07:03  Phos  2.3       Mg     1.5         TPro  7.0  /  Alb  3.6  /  TBili  12.3[H]  /  DBili  7.0[H]  /  AST  118[H]  /  ALT  31  /  AlkPhos  313[H]        Urinalysis Basic - ( 2025 13:26 )    Color: Dark Yellow / Appearance: Clear / S.011 / pH: x  Gluc: x / Ketone: Negative mg/dL  / Bili: Small / Urobili: 0.2 mg/dL   Blood: x / Protein: Negative mg/dL / Nitrite: Negative   Leuk Esterase: Moderate / RBC: 16 /HPF / WBC 8 /HPF   Sq Epi: x / Non Sq Epi: 4 /HPF / Bacteria: Negative /HPF          Culture - Fungal, Body Fluid (collected 25 @ 09:43)  Source: Peritoneal Peritoneal Fluid  Preliminary Report (25 @ 23:02):    Culture is being performed. Fungal cultures are held for 4 weeks.    Culture - Body Fluid with Gram Stain (collected 25 @ 09:43)  Source: Ascites Fl Ascites Fluid  Gram Stain (25 @ 23:58):    polymorphonuclear leukocytes seen    No organisms seen    by cytocentrifuge  Preliminary Report (25 @ 13:07):    No growth to date        Medications:   albumin human 25% IVPB 100 milliLiter(s) IV Intermittent every 4 hours  albumin human 25% IVPB 50 milliLiter(s) IV Intermittent once PRN  albuterol/ipratropium for Nebulization 3 milliLiter(s) Nebulizer every 6 hours PRN  Biotene Dry Mouth Oral Rinse 15 milliLiter(s) Swish and Spit five times a day  cefTRIAXone   IVPB 2000 milliGRAM(s) IV Intermittent every 24 hours  chlorhexidine 2% Cloths 1 Application(s) Topical <User Schedule>  ciprofloxacin     Tablet 500 milliGRAM(s) Oral daily  folic acid 1 milliGRAM(s) Oral daily  furosemide    Tablet 80 milliGRAM(s) Oral daily  guaiFENesin Oral Liquid (Sugar-Free) 100 milliGRAM(s) Oral every 6 hours PRN  heparin   Injectable 5000 Unit(s) SubCutaneous every 8 hours  lactulose Syrup 10 Gram(s) Oral two times a day  magnesium oxide 400 milliGRAM(s) Oral three times a day with meals  melatonin 3 milliGRAM(s) Oral at bedtime  midodrine. 15 milliGRAM(s) Oral three times a day  mirtazapine 7.5 milliGRAM(s) Oral at bedtime  pantoprazole    Tablet 40 milliGRAM(s) Oral before breakfast  potassium chloride    Tablet ER 40 milliEquivalent(s) Oral daily  rifAXIMin 550 milliGRAM(s) Oral two times a day  sodium chloride 1 Gram(s) Oral daily  spironolactone 150 milliGRAM(s) Oral daily  sucralfate suspension 1 Gram(s) Oral every 6 hours  thiamine 100 milliGRAM(s) Oral daily  vancomycin  IVPB 1000 milliGRAM(s) IV Intermittent every 12 hours      RADIOLOGY, EKG & ADDITIONAL TESTS: Reviewed.    Patient is a 59y old  Female who presents with a chief complaint of Transfer from [place] for possible liver transplant (2025 14:41)      Subjective / Overnight Events :  No acute overnight events, reports new LUQ discomfort    PHYSICAL EXAM:  General: NAD, scleral icterus, jaundice  CV: RRR, normal S1 and S2, no m/r/g  Lungs: normal respiratory effort. CTAB, no wheezes, rales, or rhonchi  Abd: soft, nontender, mildly distended  Ext: no edema, 2+ peripheral pulses     VS  T(C): 36.8 (25 @ 05:02), Max: 38.3 (25 @ 12:53)  HR: 121 (25 @ 05:02) (96 - 121)  BP: 107/67 (25 @ 05:02) (100/63 - 116/67)  RR: 18 (25 @ 05:02) (18 - 18)  SpO2: 97% (25 @ 05:02) (96% - 97%)    LABS (available at time of writing 25 @ 07:51):                         7.6    8.70  )-----------( 127      ( 2025 07:18 )             22.9         127[L]  |  94[L]  |  13  ----------------------------<  103[H]  4.1   |  18[L]  |  0.31[L]    Ca    9.3      2025 07:03  Phos  2.3       Mg     1.5         TPro  7.0  /  Alb  3.6  /  TBili  12.3[H]  /  DBili  7.0[H]  /  AST  118[H]  /  ALT  31  /  AlkPhos  313[H]        Urinalysis Basic - ( 2025 13:26 )    Color: Dark Yellow / Appearance: Clear / S.011 / pH: x  Gluc: x / Ketone: Negative mg/dL  / Bili: Small / Urobili: 0.2 mg/dL   Blood: x / Protein: Negative mg/dL / Nitrite: Negative   Leuk Esterase: Moderate / RBC: 16 /HPF / WBC 8 /HPF   Sq Epi: x / Non Sq Epi: 4 /HPF / Bacteria: Negative /HPF          Culture - Fungal, Body Fluid (collected 25 @ 09:43)  Source: Peritoneal Peritoneal Fluid  Preliminary Report (25 @ 23:02):    Culture is being performed. Fungal cultures are held for 4 weeks.    Culture - Body Fluid with Gram Stain (collected 25 @ 09:43)  Source: Ascites Fl Ascites Fluid  Gram Stain (25 @ 23:58):    polymorphonuclear leukocytes seen    No organisms seen    by cytocentrifuge  Preliminary Report (25 @ 13:07):    No growth to date        Medications:   albumin human 25% IVPB 100 milliLiter(s) IV Intermittent every 4 hours  albumin human 25% IVPB 50 milliLiter(s) IV Intermittent once PRN  albuterol/ipratropium for Nebulization 3 milliLiter(s) Nebulizer every 6 hours PRN  Biotene Dry Mouth Oral Rinse 15 milliLiter(s) Swish and Spit five times a day  cefTRIAXone   IVPB 2000 milliGRAM(s) IV Intermittent every 24 hours  chlorhexidine 2% Cloths 1 Application(s) Topical <User Schedule>  ciprofloxacin     Tablet 500 milliGRAM(s) Oral daily  folic acid 1 milliGRAM(s) Oral daily  furosemide    Tablet 80 milliGRAM(s) Oral daily  guaiFENesin Oral Liquid (Sugar-Free) 100 milliGRAM(s) Oral every 6 hours PRN  heparin   Injectable 5000 Unit(s) SubCutaneous every 8 hours  lactulose Syrup 10 Gram(s) Oral two times a day  magnesium oxide 400 milliGRAM(s) Oral three times a day with meals  melatonin 3 milliGRAM(s) Oral at bedtime  midodrine. 15 milliGRAM(s) Oral three times a day  mirtazapine 7.5 milliGRAM(s) Oral at bedtime  pantoprazole    Tablet 40 milliGRAM(s) Oral before breakfast  potassium chloride    Tablet ER 40 milliEquivalent(s) Oral daily  rifAXIMin 550 milliGRAM(s) Oral two times a day  sodium chloride 1 Gram(s) Oral daily  spironolactone 150 milliGRAM(s) Oral daily  sucralfate suspension 1 Gram(s) Oral every 6 hours  thiamine 100 milliGRAM(s) Oral daily  vancomycin  IVPB 1000 milliGRAM(s) IV Intermittent every 12 hours      RADIOLOGY, EKG & ADDITIONAL TESTS: Reviewed.

## 2025-02-23 NOTE — PROGRESS NOTE ADULT - PROBLEM SELECTOR PLAN 2
- C/b hepatic encephalopathy and esophageal varices  - Paracentesis done today; 1.4L removed  - Pt appears clinically euvolemic on exam    Plan:  - Continue on Lasix 40mg and Spironolactone 100mg qD  - Continue on lactulose 10 mg BID and rifaximin  - Low current suspicion for SBP; pt taking IV CTX 2g qD for bacteremia  - fu Transplant hepatology recs       > PETH level, Type and Screen, repeat TJA and ASMA, IgG levels, Microsomal Ab  - fu abdominal US for ascites - mild-mod ascites       > Paracentesis tentatively planned with IR 2/13                > para tentatively planned for 2/20       > Fu cell count       > Negative for SBP       > Consider SBP prophylaxis  > pending transport to  for transplant eval  > WOODY on dc

## 2025-02-23 NOTE — PROGRESS NOTE ADULT - SUBJECTIVE AND OBJECTIVE BOX
INFECTIOUS DISEASES FOLLOW UP-- Loretta Bhatt  [phone]    This is a follow up note for this  59yFemale with  Liver failure without hepatic coma    Developed fever on 2/22 and was started on empiric antibiotics with Ceftriaxone/Vanco    ROS:  CONSTITUTIONAL:  No fever, good appetite  CARDIOVASCULAR:  No chest pain or palpitations  RESPIRATORY:  No dyspnea  GASTROINTESTINAL:  No nausea, vomiting, diarrhea, or abdominal pain  GENITOURINARY:  No dysuria  NEUROLOGIC:  No headache,     Allergies    Zithromax (Unknown)    Intolerances        ANTIBIOTICS/RELEVANT:  antimicrobials  cefTRIAXone   IVPB 2000 milliGRAM(s) IV Intermittent every 24 hours  ciprofloxacin     Tablet 500 milliGRAM(s) Oral daily  rifAXIMin 550 milliGRAM(s) Oral two times a day  vancomycin  IVPB 1000 milliGRAM(s) IV Intermittent every 12 hours    immunologic:    OTHER:  albumin human 25% IVPB 50 milliLiter(s) IV Intermittent once PRN  albumin human 25% IVPB 100 milliLiter(s) IV Intermittent every 4 hours  albuterol/ipratropium for Nebulization 3 milliLiter(s) Nebulizer every 6 hours PRN  Biotene Dry Mouth Oral Rinse 15 milliLiter(s) Swish and Spit five times a day  chlorhexidine 2% Cloths 1 Application(s) Topical <User Schedule>  folic acid 1 milliGRAM(s) Oral daily  furosemide    Tablet 80 milliGRAM(s) Oral daily  guaiFENesin Oral Liquid (Sugar-Free) 100 milliGRAM(s) Oral every 6 hours PRN  heparin   Injectable 5000 Unit(s) SubCutaneous every 8 hours  lactulose Syrup 10 Gram(s) Oral two times a day  magnesium oxide 400 milliGRAM(s) Oral three times a day with meals  melatonin 3 milliGRAM(s) Oral at bedtime  midodrine. 15 milliGRAM(s) Oral three times a day  mirtazapine 7.5 milliGRAM(s) Oral at bedtime  pantoprazole    Tablet 40 milliGRAM(s) Oral before breakfast  potassium chloride    Tablet ER 40 milliEquivalent(s) Oral daily  sodium chloride 1 Gram(s) Oral daily  spironolactone 150 milliGRAM(s) Oral daily  sucralfate suspension 1 Gram(s) Oral every 6 hours  thiamine 100 milliGRAM(s) Oral daily      Objective:  Vital Signs Last 24 Hrs  T(C): 36.6 (23 Feb 2025 12:29), Max: 36.8 (23 Feb 2025 05:02)  T(F): 97.8 (23 Feb 2025 12:29), Max: 98.2 (23 Feb 2025 05:02)  HR: 106 (23 Feb 2025 12:29) (96 - 121)  BP: 100/66 (23 Feb 2025 12:29) (100/63 - 107/67)  BP(mean): --  RR: 18 (23 Feb 2025 12:29) (18 - 18)  SpO2: 96% (23 Feb 2025 12:29) (96% - 97%)    Parameters below as of 23 Feb 2025 12:29  Patient On (Oxygen Delivery Method): room air        PHYSICAL EXAM:  Constitutional:no acute distress  Eyes:CATRACHITO, EOMI  Ear/Nose/Throat: no oral lesions, 	  Respiratory: clear BL  Cardiovascular: S1S2  Gastrointestinal:soft, (+) BS, no tenderness, ascites  Extremities:no e/e/c  No Lymphadenopathy  IV sites not inflammed.    LABS:                        7.6    8.70  )-----------( 127      ( 23 Feb 2025 07:18 )             22.9     02-23    128[L]  |  94[L]  |  11  ----------------------------<  98  4.1   |  18[L]  |  0.32[L]    Ca    9.4      23 Feb 2025 07:15  Phos  2.8     02-23  Mg     1.5     02-23    TPro  7.3  /  Alb  3.6  /  TBili  11.5[H]  /  DBili  x   /  AST  97[H]  /  ALT  30  /  AlkPhos  315[H]  02-23      Urinalysis Basic - ( 23 Feb 2025 07:15 )    Color: x / Appearance: x / SG: x / pH: x  Gluc: 98 mg/dL / Ketone: x  / Bili: x / Urobili: x   Blood: x / Protein: x / Nitrite: x   Leuk Esterase: x / RBC: x / WBC x   Sq Epi: x / Non Sq Epi: x / Bacteria: x        MICROBIOLOGY:    FluA/FluB/RSV/COVID PCR (02.22.25 @ 13:25)    SARS-CoV-2 Result: NotDetec: This molecular assay uses polymerase chain reaction (PCR) to detect  influenza A virus, influenza B virus, respiratory syncytial virus (RSV),  and SARS-CoV-2 (COVID-19). Test results should be correlated with  clinical presentation, patient history, and epidemiology.   Influenza A Result: [facility]   Influenza B Result: [facility]   Resp Syn Virus Result: [facility]            RECENT CULTURES:  02-22 @ 13:15  .Blood Blood-Peripheral  --  --  --    No growth at 24 hours  --  02-22 @ 12:55  .Blood Blood-Peripheral  --  --  --    No growth at 24 hours  --  02-20 @ 09:43  Ascites Fl Ascites Fluid  --  --  --    No growth to date  --      RADIOLOGY & ADDITIONAL STUDIES:    < from: US Abdomen Limited (02.18.25 @ 14:34) >  FINDINGS/  IMPRESSION:  Mild to moderate volume ascites with largest pocket measuring up to 10 cm   in depth as seen in the right upper quadrant.    Cirrhotic liver.    Small right pleural effusion.    < end of copied text >   INFECTIOUS DISEASES FOLLOW UP-- Loretta Bhatt  [phone]    This is a follow up note for this  59yFemale with  Liver failure without hepatic coma    Developed fever on 2/22 and was started on empiric antibiotics with Ceftriaxone/Vanco    ROS:  CONSTITUTIONAL:  No fever, good appetite  CARDIOVASCULAR:  No chest pain or palpitations  RESPIRATORY:  No dyspnea  GASTROINTESTINAL:  No nausea, vomiting, diarrhea, or abdominal pain  GENITOURINARY:  No dysuria  NEUROLOGIC:  No headache,     Allergies    Zithromax (Unknown)    Intolerances        ANTIBIOTICS/RELEVANT:  antimicrobials  cefTRIAXone   IVPB 2000 milliGRAM(s) IV Intermittent every 24 hours  ciprofloxacin     Tablet 500 milliGRAM(s) Oral daily  rifAXIMin 550 milliGRAM(s) Oral two times a day  vancomycin  IVPB 1000 milliGRAM(s) IV Intermittent every 12 hours    immunologic:    OTHER:  albumin human 25% IVPB 50 milliLiter(s) IV Intermittent once PRN  albumin human 25% IVPB 100 milliLiter(s) IV Intermittent every 4 hours  albuterol/ipratropium for Nebulization 3 milliLiter(s) Nebulizer every 6 hours PRN  Biotene Dry Mouth Oral Rinse 15 milliLiter(s) Swish and Spit five times a day  chlorhexidine 2% Cloths 1 Application(s) Topical <User Schedule>  folic acid 1 milliGRAM(s) Oral daily  furosemide    Tablet 80 milliGRAM(s) Oral daily  guaiFENesin Oral Liquid (Sugar-Free) 100 milliGRAM(s) Oral every 6 hours PRN  heparin   Injectable 5000 Unit(s) SubCutaneous every 8 hours  lactulose Syrup 10 Gram(s) Oral two times a day  magnesium oxide 400 milliGRAM(s) Oral three times a day with meals  melatonin 3 milliGRAM(s) Oral at bedtime  midodrine. 15 milliGRAM(s) Oral three times a day  mirtazapine 7.5 milliGRAM(s) Oral at bedtime  pantoprazole    Tablet 40 milliGRAM(s) Oral before breakfast  potassium chloride    Tablet ER 40 milliEquivalent(s) Oral daily  sodium chloride 1 Gram(s) Oral daily  spironolactone 150 milliGRAM(s) Oral daily  sucralfate suspension 1 Gram(s) Oral every 6 hours  thiamine 100 milliGRAM(s) Oral daily      Objective:  Vital Signs Last 24 Hrs  T(C): 36.6 (23 Feb 2025 12:29), Max: 36.8 (23 Feb 2025 05:02)  T(F): 97.8 (23 Feb 2025 12:29), Max: 98.2 (23 Feb 2025 05:02)  HR: 106 (23 Feb 2025 12:29) (96 - 121)  BP: 100/66 (23 Feb 2025 12:29) (100/63 - 107/67)  BP(mean): --  RR: 18 (23 Feb 2025 12:29) (18 - 18)  SpO2: 96% (23 Feb 2025 12:29) (96% - 97%)    Parameters below as of 23 Feb 2025 12:29  Patient On (Oxygen Delivery Method): room air        PHYSICAL EXAM:  Constitutional:no acute distress,sarcopenic  Eyes:CATRACHITO, EOMI, icteric  Ear/Nose/Throat: no oral lesions, 	  Respiratory: clear BL  Cardiovascular: S1S2  Gastrointestinal:soft, (+) BS, no tenderness, ascites  Extremities:no e/e/c  No Lymphadenopathy  IV sites not inflammed.    LABS:                        7.6    8.70  )-----------( 127      ( 23 Feb 2025 07:18 )             22.9     02-23    128[L]  |  94[L]  |  11  ----------------------------<  98  4.1   |  18[L]  |  0.32[L]    Ca    9.4      23 Feb 2025 07:15  Phos  2.8     02-23  Mg     1.5     02-23    TPro  7.3  /  Alb  3.6  /  TBili  11.5[H]  /  DBili  x   /  AST  97[H]  /  ALT  30  /  AlkPhos  315[H]  02-23      Urinalysis Basic - ( 23 Feb 2025 07:15 )    Color: x / Appearance: x / SG: x / pH: x  Gluc: 98 mg/dL / Ketone: x  / Bili: x / Urobili: x   Blood: x / Protein: x / Nitrite: x   Leuk Esterase: x / RBC: x / WBC x   Sq Epi: x / Non Sq Epi: x / Bacteria: x        MICROBIOLOGY:    FluA/FluB/RSV/COVID PCR (02.22.25 @ 13:25)    SARS-CoV-2 Result: NotDete: This molecular assay uses polymerase chain reaction (PCR) to detect  influenza A virus, influenza B virus, respiratory syncytial virus (RSV),  and SARS-CoV-2 (COVID-19). Test results should be correlated with  clinical presentation, patient history, and epidemiology.   [facility]   [facility]flu[facility] B Result: [facility]   Resp Syn Virus Result: [facility]            RECENT CULTURES:  02-22 @ 13:15  .Blood Blood-Peripheral  --  --  --    No growth at 24 hours  --  02-22 @ 12:55  .Blood Blood-Peripheral  --  --  --    No growth at 24 hours  --  02-20 @ 09:43  Ascites Fl Ascites Fluid  --  --  --    No growth to date  --      RADIOLOGY & ADDITIONAL STUDIES:    < from: US Abdomen Limited (02.18.25 @ 14:34) >  FINDINGS/  IMPRESSION:  Mild to moderate volume ascites with largest pocket measuring up to 10 cm   in depth as seen in the right upper quadrant.    Cirrhotic liver.    Small right pleural effusion.    < end of copied text >

## 2025-02-23 NOTE — PROGRESS NOTE ADULT - PROBLEM SELECTOR PLAN 4
Pt tested positive for C. diff colitis at [facility]. Started on oral vancomycin.     Plan:   - S/p 2 weeks vancomycin  - Resolved

## 2025-02-23 NOTE — PROGRESS NOTE ADULT - PROBLEM SELECTOR PLAN 1
- Pt febrile in afternoon 2/22    Plan:  > fu BCx, UCx, CXR  > Fu TTE given prior concern for endocarditis   > cw vanc, ceftraixone  > fu CBC for white count - Pt febrile in afternoon 2/22    Plan:  > fu BCx, UCx  > Fu TTE given prior concern for endocarditis   > cw vanc, ceftraixone  > fu CBC for white count  > fu CT A/P for source control

## 2025-02-23 NOTE — PROGRESS NOTE ADULT - ASSESSMENT
58yo F w/ PMH of alcohol cirrhosis with recurrent ascites, s/p multiple paracentesis, s/p banding of esophageal varices, currently being treated for recent c. diff colitis and possible endocarditis. Pt is jaundiced, with scleral icterus and abdominal distension presenting for liver transplant evaluation from [facility].

## 2025-02-24 ENCOUNTER — RESULT REVIEW (OUTPATIENT)
Age: 60
End: 2025-02-24

## 2025-02-24 LAB
ANION GAP SERPL CALC-SCNC: 14 MMOL/L — SIGNIFICANT CHANGE UP (ref 5–17)
BLD GP AB SCN SERPL QL: NEGATIVE — SIGNIFICANT CHANGE UP
BUN SERPL-MCNC: 10 MG/DL — SIGNIFICANT CHANGE UP (ref 7–23)
C BURNET AB SER QL IA: REACTIVE — SIGNIFICANT CHANGE UP
CALCIUM SERPL-MCNC: 9.6 MG/DL — SIGNIFICANT CHANGE UP (ref 8.4–10.5)
CHLORIDE SERPL-SCNC: 94 MMOL/L — LOW (ref 96–108)
CO2 SERPL-SCNC: 17 MMOL/L — LOW (ref 22–31)
CREAT SERPL-MCNC: 0.35 MG/DL — LOW (ref 0.5–1.3)
EGFR: 116 ML/MIN/1.73M2 — SIGNIFICANT CHANGE UP
EGFR: 116 ML/MIN/1.73M2 — SIGNIFICANT CHANGE UP
GLUCOSE SERPL-MCNC: 95 MG/DL — SIGNIFICANT CHANGE UP (ref 70–99)
HCT VFR BLD CALC: 24.9 % — LOW (ref 34.5–45)
HGB BLD-MCNC: 8.2 G/DL — LOW (ref 11.5–15.5)
MAGNESIUM SERPL-MCNC: 1.7 MG/DL — SIGNIFICANT CHANGE UP (ref 1.6–2.6)
MCHC RBC-ENTMCNC: 31.7 PG — SIGNIFICANT CHANGE UP (ref 27–34)
MCHC RBC-ENTMCNC: 32.9 G/DL — SIGNIFICANT CHANGE UP (ref 32–36)
MCV RBC AUTO: 96.1 FL — SIGNIFICANT CHANGE UP (ref 80–100)
MRSA PCR RESULT.: SIGNIFICANT CHANGE UP
NRBC BLD AUTO-RTO: 0 /100 WBCS — SIGNIFICANT CHANGE UP (ref 0–0)
PHOSPHATE SERPL-MCNC: 3.3 MG/DL — SIGNIFICANT CHANGE UP (ref 2.5–4.5)
PLATELET # BLD AUTO: 141 K/UL — LOW (ref 150–400)
POTASSIUM SERPL-MCNC: 4.5 MMOL/L — SIGNIFICANT CHANGE UP (ref 3.5–5.3)
POTASSIUM SERPL-SCNC: 4.5 MMOL/L — SIGNIFICANT CHANGE UP (ref 3.5–5.3)
RBC # BLD: 2.59 M/UL — LOW (ref 3.8–5.2)
RBC # FLD: 20.9 % — HIGH (ref 10.3–14.5)
RH IG SCN BLD-IMP: POSITIVE — SIGNIFICANT CHANGE UP
S AUREUS DNA NOSE QL NAA+PROBE: SIGNIFICANT CHANGE UP
SODIUM SERPL-SCNC: 125 MMOL/L — LOW (ref 135–145)
VANCOMYCIN TROUGH SERPL-MCNC: 14 UG/ML — SIGNIFICANT CHANGE UP (ref 10–20)
WBC # BLD: 10.37 K/UL — SIGNIFICANT CHANGE UP (ref 3.8–10.5)
WBC # FLD AUTO: 10.37 K/UL — SIGNIFICANT CHANGE UP (ref 3.8–10.5)

## 2025-02-24 PROCEDURE — 93308 TTE F-UP OR LMTD: CPT | Mod: 26

## 2025-02-24 PROCEDURE — 99233 SBSQ HOSP IP/OBS HIGH 50: CPT | Mod: GC

## 2025-02-24 PROCEDURE — 93325 DOPPLER ECHO COLOR FLOW MAPG: CPT | Mod: 26

## 2025-02-24 PROCEDURE — 93321 DOPPLER ECHO F-UP/LMTD STD: CPT | Mod: 26

## 2025-02-24 PROCEDURE — 72158 MRI LUMBAR SPINE W/O & W/DYE: CPT | Mod: 26

## 2025-02-24 RX ORDER — LACTULOSE 10 G/15ML
10 SOLUTION ORAL DAILY
Refills: 0 | Status: DISCONTINUED | OUTPATIENT
Start: 2025-02-24 | End: 2025-02-25

## 2025-02-24 RX ORDER — VANCOMYCIN HCL IN 5 % DEXTROSE 1.5G/250ML
125 PLASTIC BAG, INJECTION (ML) INTRAVENOUS EVERY 6 HOURS
Refills: 0 | Status: DISCONTINUED | OUTPATIENT
Start: 2025-02-24 | End: 2025-03-05

## 2025-02-24 RX ORDER — METRONIDAZOLE 250 MG
500 TABLET ORAL ONCE
Refills: 0 | Status: COMPLETED | OUTPATIENT
Start: 2025-02-24 | End: 2025-02-24

## 2025-02-24 RX ORDER — METRONIDAZOLE 250 MG
TABLET ORAL
Refills: 0 | Status: COMPLETED | OUTPATIENT
Start: 2025-02-24 | End: 2025-03-02

## 2025-02-24 RX ORDER — METRONIDAZOLE 250 MG
500 TABLET ORAL EVERY 12 HOURS
Refills: 0 | Status: COMPLETED | OUTPATIENT
Start: 2025-02-25 | End: 2025-03-01

## 2025-02-24 RX ADMIN — Medication 1 GRAM(S): at 12:07

## 2025-02-24 RX ADMIN — Medication 1 GRAM(S): at 05:19

## 2025-02-24 RX ADMIN — Medication 400 MILLIGRAM(S): at 08:25

## 2025-02-24 RX ADMIN — Medication 15 MILLILITER(S): at 12:06

## 2025-02-24 RX ADMIN — Medication 250 MILLIGRAM(S): at 05:19

## 2025-02-24 RX ADMIN — HEPARIN SODIUM 5000 UNIT(S): 1000 INJECTION INTRAVENOUS; SUBCUTANEOUS at 05:18

## 2025-02-24 RX ADMIN — MIDODRINE HYDROCHLORIDE 15 MILLIGRAM(S): 5 TABLET ORAL at 16:45

## 2025-02-24 RX ADMIN — Medication 250 MILLIGRAM(S): at 19:10

## 2025-02-24 RX ADMIN — Medication 1 GRAM(S): at 19:09

## 2025-02-24 RX ADMIN — HEPARIN SODIUM 5000 UNIT(S): 1000 INJECTION INTRAVENOUS; SUBCUTANEOUS at 22:09

## 2025-02-24 RX ADMIN — Medication 100 MILLIGRAM(S): at 12:06

## 2025-02-24 RX ADMIN — MIDODRINE HYDROCHLORIDE 15 MILLIGRAM(S): 5 TABLET ORAL at 12:41

## 2025-02-24 RX ADMIN — LACTULOSE 10 GRAM(S): 10 SOLUTION ORAL at 05:19

## 2025-02-24 RX ADMIN — FUROSEMIDE 80 MILLIGRAM(S): 10 INJECTION INTRAMUSCULAR; INTRAVENOUS at 05:18

## 2025-02-24 RX ADMIN — Medication 1 APPLICATION(S): at 05:50

## 2025-02-24 RX ADMIN — Medication 1 GRAM(S): at 12:06

## 2025-02-24 RX ADMIN — Medication 3 MILLIGRAM(S): at 22:09

## 2025-02-24 RX ADMIN — MIDODRINE HYDROCHLORIDE 15 MILLIGRAM(S): 5 TABLET ORAL at 05:19

## 2025-02-24 RX ADMIN — HEPARIN SODIUM 5000 UNIT(S): 1000 INJECTION INTRAVENOUS; SUBCUTANEOUS at 14:40

## 2025-02-24 RX ADMIN — Medication 15 MILLILITER(S): at 08:25

## 2025-02-24 RX ADMIN — FOLIC ACID 1 MILLIGRAM(S): 1 TABLET ORAL at 12:06

## 2025-02-24 RX ADMIN — Medication 15 MILLILITER(S): at 22:08

## 2025-02-24 RX ADMIN — Medication 400 MILLIGRAM(S): at 16:45

## 2025-02-24 RX ADMIN — Medication 400 MILLIGRAM(S): at 12:06

## 2025-02-24 RX ADMIN — Medication 1 GRAM(S): at 22:08

## 2025-02-24 RX ADMIN — Medication 40 MILLIGRAM(S): at 05:20

## 2025-02-24 RX ADMIN — DEXTROMETHORPHAN HBR, GUAIFENESIN 100 MILLIGRAM(S): 200 LIQUID ORAL at 05:50

## 2025-02-24 RX ADMIN — Medication 125 MILLIGRAM(S): at 19:10

## 2025-02-24 RX ADMIN — MIRTAZAPINE 7.5 MILLIGRAM(S): 30 TABLET, FILM COATED ORAL at 22:09

## 2025-02-24 RX ADMIN — Medication 15 MILLILITER(S): at 16:44

## 2025-02-24 RX ADMIN — Medication 40 MILLIEQUIVALENT(S): at 12:06

## 2025-02-24 RX ADMIN — CEFTRIAXONE 100 MILLIGRAM(S): 500 INJECTION, POWDER, FOR SOLUTION INTRAMUSCULAR; INTRAVENOUS at 16:44

## 2025-02-24 RX ADMIN — Medication 125 MILLIGRAM(S): at 22:08

## 2025-02-24 RX ADMIN — Medication 150 MILLIGRAM(S): at 05:18

## 2025-02-24 RX ADMIN — Medication 100 MILLIGRAM(S): at 12:42

## 2025-02-24 NOTE — PROGRESS NOTE ADULT - ASSESSMENT
Ortho Note    Pt with back pain overnight  Denies CP, SOB, N/V, numbness/tingling     Vital Signs Last 24 Hrs  T(C): 36.8 (12-21-24 @ 08:20), Max: 36.9 (12-21-24 @ 05:51)  T(F): 98.3 (12-21-24 @ 08:20), Max: 98.5 (12-21-24 @ 05:51)  HR: 102 (12-21-24 @ 08:20) (96 - 102)  BP: 171/79 (12-21-24 @ 08:20) (170/78 - 171/79)  BP(mean): --  RR: 17 (12-21-24 @ 08:20) (17 - 18)  SpO2: 96% (12-21-24 @ 08:20) (95% - 96%)  I&O's Summary    RLE:  SILT L2-S1, symmetric  Motor 5/5 L2-S1, symmetric  Pulses 2+    LLE:  SILT L2-S1, symmetric  Motor 5/5 L2-S1, symmetric  Pulses 2+                          8.6    5.73  )-----------( 233      ( 21 Dec 2024 08:11 )             27.4     12-21    138  |  102  |  24[H]  ----------------------------<  109[H]  4.4   |  26  |  1.11    Ca    9.6      21 Dec 2024 08:11    TPro  6.5  /  Alb  3.6  /  TBili  0.3  /  DBili  x   /  AST  21  /  ALT  9[L]  /  AlkPhos  128[H]  12-20    80yF s/p OR T4-9 extension of PSF, laminectomy T8-9, exploration of fusion, WINIFRED  11-11 with residual symptoms pending OR Monday for WINIFRED, Exploration of fusion, extension of fusion to C7 (C7-T3)    - Admit to?Ortho/Medicine   - Med clearance   - Anesthesia clearance   -?Pre-op labs and imaging?-?CXR, EKG, COVID, CBC, BMP, T&S/ABO, PT/PTT/INR  -?NWB?RLE   - AC per primary   -?Analgesia PRN?per primary   -?B/l SCDs?   - Dispo: OR    Ortho Pager [phone] Ortho Note    Pt with back pain overnight  Denies CP, SOB, N/V, numbness/tingling     Vital Signs Last 24 Hrs  T(C): 36.8 (12-21-24 @ 08:20), Max: 36.9 (12-21-24 @ 05:51)  T(F): 98.3 (12-21-24 @ 08:20), Max: 98.5 (12-21-24 @ 05:51)  HR: 102 (12-21-24 @ 08:20) (96 - 102)  BP: 171/79 (12-21-24 @ 08:20) (170/78 - 171/79)  BP(mean): --  RR: 17 (12-21-24 @ 08:20) (17 - 18)  SpO2: 96% (12-21-24 @ 08:20) (95% - 96%)  I&O's Summary    RLE:  SILT L2-S1, symmetric  Motor 5/5 L2-S1, symmetric  Pulses 2+    LLE:  SILT L2-S1, symmetric  Motor 5/5 L2-S1, symmetric  Pulses 2+                          8.6    5.73  )-----------( 233      ( 21 Dec 2024 08:11 )             27.4     12-21    138  |  102  |  24[H]  ----------------------------<  109[H]  4.4   |  26  |  1.11    Ca    9.6      21 Dec 2024 08:11    TPro  6.5  /  Alb  3.6  /  TBili  0.3  /  DBili  x   /  AST  21  /  ALT  9[L]  /  AlkPhos  128[H]  12-20    80yF s/p OR T4-9 extension of PSF, laminectomy T8-9, exploration of fusion, WINIFRED  11-11 with residual symptoms pending OR Monday for WINIFRED, Exploration of fusion, extension of fusion to C7 (C7-T3)    - Admit to?Ortho  - Med clearance   - Anesthesia clearance   -?Pre-op labs and imaging?-?CXR, EKG, COVID, CBC, BMP, T&S/ABO, PT/PTT/INR  - AC held  -?Analgesia PRN  -?B/l SCDs?   - Dispo: OR    Ortho Pager [phone] 60yo F w/ PMH of alcohol cirrhosis with recurrent ascites, s/p multiple paracentesis, s/p banding of esophageal varices, currently being treated for recent c. diff colitis and possible endocarditis. Pt is jaundiced, with scleral icterus and abdominal distension presenting for liver transplant evaluation from [facility].  60yo F w/ PMH of alcohol cirrhosis with recurrent ascites, s/p multiple paracentesis, s/p banding of esophageal varices, and s/p tx for recent c. diff colitis and possible endocarditis. Pt is jaundiced, with scleral icterus and abdominal distension presenting for liver transplant evaluation from [facility].

## 2025-02-24 NOTE — CONSULT NOTE ADULT - PROVIDER SPECIALTY LIST ADULT
Infectious Disease
Intervent Radiology
Cardiology
Hepatology
Dental
Intervent Radiology
Intervent Radiology

## 2025-02-24 NOTE — PROGRESS NOTE ADULT - PROBLEM SELECTOR PLAN 5
Recently diagnosed per patient. Per prior d/c note taking 15U Lantus every night.     Plan:  - ENZO  - Lantus started on OSH, A1c 4.6, will dc insulin and monitor  - Monitor off insulin Recently diagnosed per patient. Per prior d/c note taking 15U Lantus every night.     Plan:  ENZO  Lantus started on OSH, A1c 4.6, will dc insulin and monitor  Monitor off insulin

## 2025-02-24 NOTE — PROGRESS NOTE ADULT - PROBLEM SELECTOR PLAN 4
Pt tested positive for C. diff colitis at [facility]. Started on oral vancomycin.     Plan:   - S/p 2 weeks vancomycin  - Resolved Pt tested positive for C. diff colitis at [facility]. Started on oral vancomycin.     Plan:   S/p 2 weeks oral vancomycin  Resolved

## 2025-02-24 NOTE — CONSULT NOTE ADULT - CONSULT REQUESTED DATE/TIME
18-Feb-2025 06:00
11-Feb-2025 12:19
12-Feb-2025 16:16
24-Feb-2025 10:13
21-Feb-2025 14:41
11-Feb-2025 17:35
19-Feb-2025 12:07

## 2025-02-24 NOTE — PROGRESS NOTE ADULT - PROBLEM SELECTOR PLAN 2
C/b history of hepatic encephalopathy and esophageal varices  - s/p Paracentesis 2/20 w/ 1.4L removed  - Pt appears clinically euvolemic on exam    Plan:  - Continue on Lasix 40mg and Spironolactone 100mg qD  - Continue on lactulose 10 mg BID and rifaximin  - Low current suspicion for SBP; pt taking IV CTX 2g qD for bacteremia  - fu Transplant hepatology recs       > PETH level, Type and Screen, repeat TAJ and ASMA, IgG levels, Microsomal Ab  - fu abdominal US for ascites - mild-mod ascites       > Paracentesis tentatively planned with IR 2/13                > para tentatively planned for 2/20       > Fu cell count       > Negative for SBP       > Consider SBP prophylaxis  > pending transport to  for transplant eval  > WOODY on dc C/b history of hepatic encephalopathy and esophageal varices  - Patient tx from [facility] for transplant evaluation -- PETH found to be elevated, further transplant workup being deferred iso elevated PETH -- outpatient RPP program for psychosocial issues  - s/p multiple paracentesis, most recent on 2/20 w/ 1.4L removed  - Transplant hepatology following    Plan:  Continue on Lasix 80mg and Spironolactone 150mg qd  Continue on lactulose 10 mg BID and rifaximin

## 2025-02-24 NOTE — PROGRESS NOTE ADULT - ASSESSMENT
59 year old female PMH of alcohol cirrhosis with recurrent ascites, s/p multiple paracentesis, s/p banding of esophageal varices, currently being treated for recent c. diff colitis and possible endocarditis, presenting for possible liver transplant from [facility]. Pt initially admitted to [facility] c/o weakness, poor PO intake, and fever; hypotensive in ED. Met criteria for sepsis iso C. diff colitis and was treated with vasopressor support, flagyl, and vanc. Hospital course was complicated by AHRF and pt was intubated 1/28-2/2. Pt then treated for E. coli and Bacteroides bacteremia with TTE findings concerning for mitral vegetation; pt not a good candidate for ARACELI due to varices found on EGD from 1/31/24 decided to treat empirically for IE with 6 weeks of antibiotics s/p PICC line placement with negative blood cultures from 1/31/24. Pt d/c from [facility] on 2/7 and then presented to [place] c/o SOB. Ultimately transferred to [facility] for hepatology transplant consult. On presentation today, pt endorses subjective nighttime fevers, chills, SOB and abdominal discomfort. Attributes fevers to recent flu dx (noted in chart to be flu positive 1 week prior to presentation at [facility]). Pt states she has not used alcohol in over a year. She began using alcohol in her 20s socially and her use gradually increased to include ~3-4 glasses of wine everyday when she was drinking the most. Has been to rehab and therapy multiple times. Pt first began smoking in her 20s ~1pack/day for many years, cannot recall specifics. Has not had a cigarette in several months. Pt is unable to ambulate on her own and has remained bedbound since intubation several months ago.     C. difficile toxin assay (January 27) positive, repeat Cdff 2/11 neg  Blood cultures (January 27) 2/4 E. coli, 1/4 Bacteroides   Blood cultures (January 31) no growth to date  Blood cultures (February 10, February 11) no growth to date  Paracentesis (February 4, February 13) Cell counts negative for SBP    CTA chest (January 27) Patent central airways. Smooth interlobular septal thickening at the lung apices and lung bases. No lung consolidation or mass. Mild bibasilar dependent atelectasis. No lymphadenopathy.    CT A/P (January 27) Thickening of the walls of the stomach and duodenum with submucosal edema likely reflective of a gastroenteritis. Thickening of the walls of the ascending colon despite underdistention likely due to portal colopathy. Colonic diverticulosis without evidence of diverticulitis. Cholelithiasis. Nonspecific gallbladder wall edema and pericholecystic fluid in the setting of cirrhosis.    TTE (1/27) small mobile vegetation attached to the posterior mitral valve leaflet.  TTE (February 12) no evidence of vegetations    Social history: born in [place].  Travel history includes travel to [facility], [facility] and [place], [place], [place].  Lives primarily in [place] but has also had travel to [place] and [place].  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  States her mother had a history of tuberculosis but this was before patient was born.  Patient notes livestock exposure during her work as a .  Has sugar gliders as pets.    Antibiotic Course:  PO Vancomycin: 1/27 (2 doses) > 2/14 (1 dose)  Metronidazole: 1/27 > 2/13  Ceftriaxone: 1/27 > 2/13  Meropenem: 1/27  Restarted on antibiotics 2/22 Vancomycin plus Ceftriaxone    S/P LVP 2/21 - follow cell count and fluid cultures which are no growth to date    #C.diff colitis 1/27/25, diarrhea resolved now   - repeat Cdiff 2/11 negative, completed vanco po 125 mg bi ppx post discontinuation of antibitoics on 2./13-2/16  - in the next 6 months will need vanco po ppx with every exposure to broad spectrum antibiotics  - this includes at the time of perioperative ppx    #Polymicrobial bacteremia with E.coli and Bacteroides  Status post course of ceftriaxone and metronidazole as above from earlier stay    #Possible MV vegetation on TTE from OSH  E.coli and bacteroides atypical endocarditis organisms and polymicrobial is also atypical for endocarditis  Repeat transthoracic echocardiogram here without evidence of vegetation    #Preliver transplant evaluation  --COVID19 Nucleocapsid Antibody  -- COVID19 Eder Antibody pos  -- HAV IgG pos  HBVs Ab, HBVsAg, HBVc Ab pos >400 immune, neg, neg  -- HCV Ab neg  -- HSV 1 IgG /HSV 2 IgG pos. neg  -- EBV IgG Pos  -- CMV IgG pos  --VZV IgG pos  -- Measles IgG , Mumps IgG and Rubella IgG pos all  --Follow up QuantiFeron-TB Gold  -- HIV Ag/Ab by CMIA- NEG  --Syphilis Screen - NEG  --Strongyloides Ab negative   -- Follow up Coccidiodes Ab  -- Brucella IgG - NEGATIVE  -- Q Fever Serologies REACTIVE for  phase II igG with all others negative  in the correct setting, Positive Phase II igG (and higher than Phase I)  could  be indicative of acute infection or false positive- Phase II IgM are most commonly positive as well  ; whereas in chronic or old Q fever, Phase I antibodies are higher than phase II and/or >1:1260  However, her last exposure to wild animals was  about 5 years ago, making acute infection unlikely and these results seemingly a false positive.. In addition , she has no symptoms of acute Q fever infection.     With all that said, it is reasonable to repeat serologies again (ordered)  If titers are still high or stable -->may then consider treatment while repeating in 3-4 weeks to assess for increases in titers that would confirm acuity and infection     - Reviewed prior concern for vegetation on TTE but repeat TTE here with no vegetations so still doubt Q fever related     Not a contraindication to proceed listing as I doubt this represent Q fever     #Encounter to Vaccinate Patient  COVID19: Would benefit from COVID19 [id number] Vaccine Dose  Influenza: 10/8/24  Pneumococcal: Would benefit from PCV20  Immune to hep A, B, varicella and MMR  Shingles: Will require Shingrix  Tdap: Will require Tdap  RSV vaccine also recommended this season    Additional studies:  Would send repeat BCX  She would benefit from spine MRI to evaluate possible source of abscess.   Would favor IR possible aspiration if not we can treat and repeat radiology next week .   Needs to restart po vanco as she is reporting loose stools and recent C-DIFF infection.  send Vanco trough level prior to fourth dose  MRSA/MSSA PCR  is negative   Can continue the IV Vancomycin  Blood cultures sent 2/22 are NGTD  Ascites fluid culture sent 2/21 NGTD   59 year old female PMH of alcohol cirrhosis with recurrent ascites, s/p multiple paracentesis, s/p banding of esophageal varices, currently being treated for recent c. diff colitis and possible endocarditis, presenting for possible liver transplant from [facility]. Pt initially admitted to [facility] c/o weakness, poor PO intake, and fever; hypotensive in ED. Met criteria for sepsis iso C. diff colitis and was treated with vasopressor support, flagyl, and vanc. Hospital course was complicated by AHRF and pt was intubated 1/28-2/2. Pt then treated for E. coli and Bacteroides bacteremia with TTE findings concerning for mitral vegetation; pt not a good candidate for ARACELI due to varices found on EGD from 1/31/24 decided to treat empirically for IE with 6 weeks of antibiotics s/p PICC line placement with negative blood cultures from 1/31/24. Pt d/c from [facility] on 2/7 and then presented to [place] c/o SOB. Ultimately transferred to [facility] for hepatology transplant consult. On presentation today, pt endorses subjective nighttime fevers, chills, SOB and abdominal discomfort. Attributes fevers to recent flu dx (noted in chart to be flu positive 1 week prior to presentation at [facility]). Pt states she has not used alcohol in over a year. She began using alcohol in her 20s socially and her use gradually increased to include ~3-4 glasses of wine everyday when she was drinking the most. Has been to rehab and therapy multiple times. Pt first began smoking in her 20s ~1pack/day for many years, cannot recall specifics. Has not had a cigarette in several months. Pt is unable to ambulate on her own and has remained bedbound since intubation several months ago.     C. difficile toxin assay (January 27) positive, repeat Cdff 2/11 neg  Blood cultures (January 27) 2/4 E. coli, 1/4 Bacteroides   Blood cultures (January 31) no growth to date  Blood cultures (February 10, February 11) no growth to date  Paracentesis (February 4, February 13) Cell counts negative for SBP    CTA chest (January 27) Patent central airways. Smooth interlobular septal thickening at the lung apices and lung bases. No lung consolidation or mass. Mild bibasilar dependent atelectasis. No lymphadenopathy.    CT A/P (January 27) Thickening of the walls of the stomach and duodenum with submucosal edema likely reflective of a gastroenteritis. Thickening of the walls of the ascending colon despite underdistention likely due to portal colopathy. Colonic diverticulosis without evidence of diverticulitis. Cholelithiasis. Nonspecific gallbladder wall edema and pericholecystic fluid in the setting of cirrhosis.    TTE (1/27) small mobile vegetation attached to the posterior mitral valve leaflet.  TTE (February 12) no evidence of vegetations    Social history: born in [place].  Travel history includes travel to [facility], [facility] and [place], [place], [place].  Lives primarily in [place] but has also had travel to [place] and [place].  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  States her mother had a history of tuberculosis but this was before patient was born.  Patient notes livestock exposure during her work as a .  Has sugar gliders as pets.    Antibiotic Course:  PO Vancomycin: 1/27 (2 doses) > 2/14 (1 dose)  Metronidazole: 1/27 > 2/13  Ceftriaxone: 1/27 > 2/13  Meropenem: 1/27  Restarted on antibiotics 2/22 Vancomycin plus Ceftriaxone    S/P LVP 2/21 - follow cell count and fluid cultures which are no growth to date    #C.diff colitis 1/27/25, diarrhea resolved now   - repeat Cdiff 2/11 negative, completed vanco po 125 mg bi ppx post discontinuation of antibitoics on 2./13-2/16  - in the next 6 months will need vanco po ppx with every exposure to broad spectrum antibiotics  - this includes at the time of perioperative ppx    #Polymicrobial bacteremia with E.coli and Bacteroides  Status post course of ceftriaxone and metronidazole as above from earlier stay    #Possible MV vegetation on TTE from OSH  E.coli and bacteroides atypical endocarditis organisms and polymicrobial is also atypical for endocarditis  Repeat transthoracic echocardiogram here without evidence of vegetation    #Preliver transplant evaluation  --COVID19 Nucleocapsid Antibody  -- COVID19 Eder Antibody pos  -- HAV IgG pos  HBVs Ab, HBVsAg, HBVc Ab pos >400 immune, neg, neg  -- HCV Ab neg  -- HSV 1 IgG /HSV 2 IgG pos. neg  -- EBV IgG Pos  -- CMV IgG pos  --VZV IgG pos  -- Measles IgG , Mumps IgG and Rubella IgG pos all  --Follow up QuantiFeron-TB Gold  -- HIV Ag/Ab by CMIA- NEG  --Syphilis Screen - NEG  --Strongyloides Ab negative   -- Follow up Coccidiodes Ab  -- Brucella IgG - NEGATIVE  -- Q Fever Serologies REACTIVE for  phase II igG with all others negative  in the correct setting, Positive Phase II igG (and higher than Phase I)  could  be indicative of acute infection or false positive- Phase II IgM are most commonly positive as well  ; whereas in chronic or old Q fever, Phase I antibodies are higher than phase II and/or >1:1260  However, her last exposure to wild animals was  about 5 years ago, making acute infection unlikely and these results seemingly a false positive.. In addition , she has no symptoms of acute Q fever infection.     With all that said, it is reasonable to repeat serologies again (ordered)  If titers are still high or stable -->may then consider treatment while repeating in 3-4 weeks to assess for increases in titers that would confirm acuity and infection     - Reviewed prior concern for vegetation on TTE but repeat TTE here with no vegetations so still doubt Q fever related     Not a contraindication to proceed listing as I doubt this represent Q fever     #Encounter to Vaccinate Patient  COVID19: Would benefit from COVID19 [id number] Vaccine Dose  Influenza: 10/8/24  Pneumococcal: Would benefit from PCV20  Immune to hep A, B, varicella and MMR  Shingles: Will require Shingrix  Tdap: Will require Tdap  RSV vaccine also recommended this season    Additional studies:  Would send repeat BCX  Recommend T/L spine MRI to evaluate for Vertebral OM or contiguous infection-  possible source of abscess.   Would favor IR possible aspiration if not we can treat and repeat radiology next week .   Needs to restart po vanco as she is reporting loose stools and recent C-DIFF infection.  send Vanco trough level prior to fourth dose  MRSA/MSSA PCR  is negative   Can continue the IV Vancomycin  Blood cultures sent 2/22 are NGTD  Ascites fluid culture sent 2/21 NGTD

## 2025-02-24 NOTE — PROGRESS NOTE ADULT - PROBLEM SELECTOR PLAN 1
- Pt febrile in afternoon 2/22    Plan:  > fu BCx, UCx  > Fu TTE given prior concern for endocarditis   > cw vanc, ceftraixone  > fu CBC for white count  > fu CT A/P for source control Patient febrile in afternoon 2/22  - CT abdomen on 2/23 with 3.7 cm rim-enhancing fluid collection anterior to the right psoas muscle in the pelvis as well as a more heterogeneous enhancing structure anterior and medial to this collection measuring 2.7 cm c/f bilobed abscess. IR prefers to defer intervention for now given significant surrounding vasculature. Will f/u with ID  - TTE 2/12 no evidence of mitral valve vegetation  - Bcx NGTD from 2/22    Plan:  Vanc, ceftriaxone, flagyl  Trend CBC

## 2025-02-24 NOTE — PROGRESS NOTE ADULT - PROBLEM SELECTOR PLAN 3
-iso e. coli and bacteroides bacteremia. Pt with evidence of mitral valve vegetation on TTE; not confirmed with ARACELI due to concerns regarding esophageal varices.   -Likely in setting of recent dental extraction in December  - Repeat TTE 2/12 w/o evidence of vegetations    Plan:   - Continue with empiric CTX 2g qD (for 6 weeks, ends 3/14/25)       > S/p 2 weeks CTX given no evidence of vegetation on rpt TTE  - Continue with Flagyl 500mg q8 (for 2 weeks, ends 2/13/25) for bacteroides bacteremia  - fu ID consult recs      > no vegetations seen on TTE  - Monitor off abx       > Will need rpt TTE in 2-4 weeks Suspicions of IE given E. coli and bacteroides bacteremia. Pt with evidence of mitral valve vegetation on TTE 1/2025; not confirmed with ARACELI due to concerns regarding esophageal varices.   -Likely in setting of recent dental extraction in December  - Repeat TTE 2/12 w/o evidence of vegetations    Plan:   Abx as above for possible SBP and IE given fever of unclear origin

## 2025-02-24 NOTE — PROGRESS NOTE ADULT - SUBJECTIVE AND OBJECTIVE BOX
Follow Up:      Interval History:    REVIEW OF SYSTEMS  [  ] ROS unobtainable because:    [  ] All other systems negative except as noted below    Constitutional:  [ ] fever [ ] chills  [ ] weight loss  [ ] weakness  Skin:  [ ] rash [ ] phlebitis	  Eyes: [ ] icterus [ ] pain  [ ] discharge	  ENMT: [ ] sore throat  [ ] thrush [ ] ulcers [ ] exudates  Respiratory: [ ] dyspnea [ ] hemoptysis [ ] cough [ ] sputum	  Cardiovascular:  [ ] chest pain [ ] palpitations [ ] edema	  Gastrointestinal:  [ ] nausea [ ] vomiting [ ] diarrhea [ ] constipation [ ] pain	  Genitourinary:  [ ] dysuria [ ] frequency [ ] hematuria [ ] discharge [ ] flank pain  [ ] incontinence  Musculoskeletal:  [ ] myalgias [ ] arthralgias [ ] arthritis  [ ] back pain  Neurological:  [ ] headache [ ] seizures  [ ] confusion/altered mental status    Allergies  Zithromax (Unknown)        ANTIMICROBIALS:  cefTRIAXone   IVPB 2000 every 24 hours  rifAXIMin 550 two times a day  vancomycin  IVPB 1000 every 12 hours      OTHER MEDS:  MEDICATIONS  (STANDING):  albuterol/ipratropium for Nebulization 3 every 6 hours PRN  furosemide    Tablet 80 daily  guaiFENesin Oral Liquid (Sugar-Free) 100 every 6 hours PRN  heparin   Injectable 5000 every 8 hours  lactulose Syrup 10 two times a day  melatonin 3 at bedtime  midodrine. 15 three times a day  mirtazapine 7.5 at bedtime  pantoprazole    Tablet 40 before breakfast  spironolactone 150 daily  sucralfate suspension 1 every 6 hours      Vital Signs Last 24 Hrs  T(C): 36.8 (24 Feb 2025 05:01), Max: 37.7 (23 Feb 2025 20:23)  T(F): 98.2 (24 Feb 2025 05:01), Max: 99.8 (23 Feb 2025 20:23)  HR: 121 (24 Feb 2025 05:01) (106 - 121)  BP: 95/62 (24 Feb 2025 05:01) (95/62 - 110/62)  BP(mean): --  RR: 18 (24 Feb 2025 05:01) (18 - 18)  SpO2: 96% (24 Feb 2025 05:01) (96% - 99%)    Parameters below as of 24 Feb 2025 05:01  Patient On (Oxygen Delivery Method): room air        PHYSICAL EXAMINATION:  General: Alert and Awake, NAD  HEENT: PERRL, EOMI  Neck: Supple  Cardiac: RRR, No M/R/G  Resp: CTAB, No Wh/Rh/Ra  Abdomen: NBS, NT/ND, No HSM, No rigidity or guarding  MSK: No LE edema. No Calf tenderness  : No mccann  Skin: No rashes or lesions. Skin is warm and dry to the touch.   Neuro: Alert and Awake. CN 2-12 Grossly intact. Moves all four extremities spontaneously.  Psych: Calm, Pleasant, Cooperative                          8.2    10.37 )-----------( 141      ( 24 Feb 2025 06:54 )             24.9       02-24    125[L]  |  94[L]  |  10  ----------------------------<  95  4.5   |  17[L]  |  0.35[L]    Ca    9.6      24 Feb 2025 06:53  Phos  3.3     02-24  Mg     1.7     02-24    TPro  7.3  /  Alb  3.6  /  TBili  11.5[H]  /  DBili  x   /  AST  97[H]  /  ALT  30  /  AlkPhos  315[H]  02-23      Urinalysis Basic - ( 24 Feb 2025 06:53 )    Color: x / Appearance: x / SG: x / pH: x  Gluc: 95 mg/dL / Ketone: x  / Bili: x / Urobili: x   Blood: x / Protein: x / Nitrite: x   Leuk Esterase: x / RBC: x / WBC x   Sq Epi: x / Non Sq Epi: x / Bacteria: x        MICROBIOLOGY:  Vancomycin Level, Trough: 14.0 ug/mL (02-24-25 @ 06:53)  v  .Blood Blood-Peripheral  02-22-25   No growth at 24 hours  --  --      .Blood Blood-Peripheral  02-22-25   No growth at 24 hours  --  --      Ascites Fl Ascites Fluid  02-20-25   No growth to date  --    polymorphonuclear leukocytes seen  No organisms seen  by cytocentrifuge      Peritoneal  02-13-25   No growth at 5 days  --    polymorphonuclear leukocytes seen  No organisms seen  by cytocentrifuge      .Blood BLOOD  02-11-25   No growth at 5 days  --  --      .Blood BLOOD  02-10-25   No growth at 5 days  --  --        CMV IgG Antibody: >10.00 U/mL (02-15-25 @ 07:12)  Toxoplasma IgG Screen: <3.00 IU/mL (02-15-25 @ 07:12)          RADIOLOGY:    <The imaging below has been reviewed and visualized by me independently. Findings as detailed in report below> Follow Up: Fever     Interval History:  CT A/P: Psoas muscle abscess  Reports having pelvic pain that comes and goes for the past couple of weeks    REVIEW OF SYSTEMS  CONSTITUTIONAL:  No fever, good appetite  CARDIOVASCULAR:  No chest pain or palpitations  RESPIRATORY:  No dyspnea  GASTROINTESTINAL:  No nausea, vomiting, diarrhea, or abdominal pain  GENITOURINARY:  No dysuria  NEUROLOGIC:  No headache, No dizziness    Allergies  Zithromax (Unknown)        ANTIMICROBIALS:  cefTRIAXone   IVPB 2000 every 24 hours  rifAXIMin 550 two times a day  vancomycin  IVPB 1000 every 12 hours      OTHER MEDS:  MEDICATIONS  (STANDING):  albuterol/ipratropium for Nebulization 3 every 6 hours PRN  furosemide    Tablet 80 daily  guaiFENesin Oral Liquid (Sugar-Free) 100 every 6 hours PRN  heparin   Injectable 5000 every 8 hours  lactulose Syrup 10 two times a day  melatonin 3 at bedtime  midodrine. 15 three times a day  mirtazapine 7.5 at bedtime  pantoprazole    Tablet 40 before breakfast  spironolactone 150 daily  sucralfate suspension 1 every 6 hours      Vital Signs Last 24 Hrs  T(C): 36.8 (24 Feb 2025 05:01), Max: 37.7 (23 Feb 2025 20:23)  T(F): 98.2 (24 Feb 2025 05:01), Max: 99.8 (23 Feb 2025 20:23)  HR: 121 (24 Feb 2025 05:01) (106 - 121)  BP: 95/62 (24 Feb 2025 05:01) (95/62 - 110/62)  BP(mean): --  RR: 18 (24 Feb 2025 05:01) (18 - 18)  SpO2: 96% (24 Feb 2025 05:01) (96% - 99%)    Parameters below as of 24 Feb 2025 05:01  Patient On (Oxygen Delivery Method): room air        PHYSICAL EXAMINATION:  General: Alert and Awake, NAD  HEENT: PERRL, EOMI, Icteric  Neck: Supple  Cardiac: RRR, No M/R/G  Resp: CTAB, No Wh/Rh/Ra  Abdomen: NBS, No rigidity or guarding  MSK: No LE edema. No Calf tenderness  : No Delaney  Skin: No rashes or lesions. Skin is warm and dry to the touch. Jaundice  Neuro: Alert and Awake. CN 2-12 Grossly intact. Moves all four extremities spontaneously.  Psych: Calm, Pleasant, Cooperative                          8.2    10.37 )-----------( 141      ( 24 Feb 2025 06:54 )             24.9       02-24    125[L]  |  94[L]  |  10  ----------------------------<  95  4.5   |  17[L]  |  0.35[L]    Ca    9.6      24 Feb 2025 06:53  Phos  3.3     02-24  Mg     1.7     02-24    TPro  7.3  /  Alb  3.6  /  TBili  11.5[H]  /  DBili  x   /  AST  97[H]  /  ALT  30  /  AlkPhos  315[H]  02-23      Urinalysis Basic - ( 24 Feb 2025 06:53 )    Color: x / Appearance: x / SG: x / pH: x  Gluc: 95 mg/dL / Ketone: x  / Bili: x / Urobili: x   Blood: x / Protein: x / Nitrite: x   Leuk Esterase: x / RBC: x / WBC x   Sq Epi: x / Non Sq Epi: x / Bacteria: x        MICROBIOLOGY:  Vancomycin Level, Trough: 14.0 ug/mL (02-24-25 @ 06:53)  v  .Blood Blood-Peripheral  02-22-25   No growth at 24 hours  --  --      .Blood Blood-Peripheral  02-22-25   No growth at 24 hours  --  --      Ascites Fl Ascites Fluid  02-20-25   No growth to date  --    polymorphonuclear leukocytes seen  No organisms seen  by cytocentrifuge      Peritoneal  02-13-25   No growth at 5 days  --    polymorphonuclear leukocytes seen  No organisms seen  by cytocentrifuge      .Blood BLOOD  02-11-25   No growth at 5 days  --  --      .Blood BLOOD  02-10-25   No growth at 5 days  --  --        CMV IgG Antibody: >10.00 U/mL (02-15-25 @ 07:12)  Toxoplasma IgG Screen: <3.00 IU/mL (02-15-25 @ 07:12)          RADIOLOGY:    <The imaging below has been reviewed and visualized by me independently. Findings as detailed in report below>        < from: CT Abdomen and Pelvis w/ IV Cont (02.23.25 @ 16:46) >  ACC: [id number] EXAM:  CT ABDOMEN AND PELVIS IC   ORDERED BY:  CESIA JUDD     PROCEDURE DATE:  02/23/2025          INTERPRETATION:  CLINICAL INFORMATION: New fevers and cirrhotic patient    COMPARISON: None.    CONTRAST/COMPLICATIONS:  IV Contrast: Omnipaque 350  80 cc administered   20 cc discarded  Oral Contrast: NONE  .    PROCEDURE:  CT of the Abdomen and Pelvis was performed.  Sagittal and coronal reformats were performed.    FINDINGS:  LOWER CHEST: Right basilar bandlike atelectasis    LIVER: Cirrhosis.  BILE DUCTS: Normal caliber.  GALLBLADDER: Cholelithiasis.  SPLEEN: Splenomegaly.  PANCREAS: Within normal limits.  ADRENALS: Left adrenal 1 cm adenoma  KIDNEYS/URETERS: Right kidney 2 mm nonobstructing stone    BLADDER: Within normal limits.  REPRODUCTIVE ORGANS: Uterus and adnexa within normal limits.    BOWEL: No bowel obstruction. Appendix is not visualized.  PERITONEUM/RETROPERITONEUM: Perihepatic and perisplenic ascites and small   amount of pelvic ascites. There is a 3.7 cm rim-enhancing fluid   collection anterior to the right psoas muscle in the pelvis best seen on   image [id number] as well as a more heterogeneous enhancing structure anterior   and medial to this collection measuring 2.7 cm which probably represents   abilobed abscess, partially retroperitoneal and partially intraperitoneal  VESSELS: Within normal limits.  LYMPH NODES: No lymphadenopathy.  ABDOMINAL WALL: Within normal limits.  BONES: Within normal limits.    IMPRESSION:  Bilobed right psoas muscleabscess as described above    Findings were discussed with Alessio Martinez 2/23/2025 6:08 PM by   Dr. Downey with read back confirmation.    --- End of Report ---      < end of copied text >

## 2025-02-24 NOTE — CONSULT NOTE ADULT - REASON FOR ADMISSION
Transfer from [place] for possible liver transplant
Transfer from [place] for possible liver transplant
Transfer from [place] for possible liver transplant
Transfer from [place] for possible liver transplant
Transfer from [place] for possible liver transplant
Transfer from [place] for possible liver transplant
Transfer from [place] for possible liver transplant

## 2025-02-24 NOTE — CONSULT NOTE ADULT - SUBJECTIVE AND OBJECTIVE BOX
Interventional Radiology    Evaluate for Procedure: right psoas collection drainage    HPI: 58yo F w/ PMH of alcohol cirrhosis with recurrent ascites, s/p multiple paracentesis, s/p banding of esophageal varices, currently being treated for recent c. diff colitis and possible endocarditis. Pt is jaundiced, with scleral icterus and abdominal distension presenting for liver transplant evaluation from [facility]. CT abd/pelv demonstrating right psoas abscess, IR consulted for drainage.     Allergies: Zithromax (Unknown)    Medications (Abx/Cardiac/Anticoagulation/Blood Products)  cefTRIAXone   IVPB: 100 mL/Hr IV Intermittent ( @ 18:08)  ciprofloxacin     Tablet: 500 milliGRAM(s) Oral ( @ 13:46)  furosemide    Tablet: 80 milliGRAM(s) Oral ( @ 05:18)  heparin   Injectable: 5000 Unit(s) SubCutaneous ( @ 05:18)  midodrine.: 15 milliGRAM(s) Oral ( @ 05:19)  rifAXIMin: 550 milliGRAM(s) Oral ( @ 05:18)  spironolactone: 150 milliGRAM(s) Oral ( @ 05:18)  vancomycin  IVPB: 250 mL/Hr IV Intermittent ( @ 05:19)    Data:    T(C): 36.8  HR: 121  BP: 95/62  RR: 18  SpO2: 96%    -WBC 10.37 / HgB 8.2 / Hct 24.9 / Plt 141  -Na 125 / Cl 94 / BUN 10 / Glucose 95  -K 4.5 / CO2 17 / Cr 0.35  -ALT -- / Alk Phos -- / T.Bili --  -INR 2.35 / PTT 50.8    Radiology:     Assessment/Plan: 58yo F w/ PMH of alcohol cirrhosis with recurrent ascites, s/p multiple paracentesis, s/p banding of esophageal varices, currently being treated for recent c. diff colitis and possible endocarditis. Pt is jaundiced, with scleral icterus and abdominal distension presenting for liver transplant evaluation from [facility]. CT abd/pelv demonstrating right psoas abscess, IR consulted for drainage.       **Consult in progress**  -Imaging and case reviewed with Dr. Chavez. Right psoas collection with adjacent to vasculature which poses a high bleeding risk in addition to elevated INR.   -Will discuss case further with primary team.   -If benefits of drainage out weigh bleeding risk; will need INR <1.5, oral contrast the night prior to aid in visualization of surrounding bowel.            -Will plan for   -Please place IR procedure order under ___  -Keep patient NPO after midnight  -STAT am labs  -Hold a/c x 24-48hrs prior to procedure and tentative plan for resumption ____ hrs post procedure.  -Maintain active T&S  -Above d/w primary team      Any questions or concerns regarding above please reach out to IR:   -Available on [employer] teams  -During working hours (7a-5p): call [phone]  -Emergent issues after 5pm: page: [phone]  -Non-emergent consults: Please place a [place] order "IR Consult" with an appropriate callback number  -Scheduling questions: [phone]  -Clinic/Outpatient [phone]   Interventional Radiology    Evaluate for Procedure: right psoas collection drainage    HPI: 60yo F w/ PMH of alcohol cirrhosis with recurrent ascites, s/p multiple paracentesis, s/p banding of esophageal varices, currently being treated for recent c. diff colitis and possible endocarditis. Pt is jaundiced, with scleral icterus and abdominal distension presenting for liver transplant evaluation from [facility]. CT abd/pelv demonstrating right psoas abscess, IR consulted for drainage.     Allergies: Zithromax (Unknown)    Medications (Abx/Cardiac/Anticoagulation/Blood Products)  cefTRIAXone   IVPB: 100 mL/Hr IV Intermittent ( @ 18:08)  ciprofloxacin     Tablet: 500 milliGRAM(s) Oral ( @ 13:46)  furosemide    Tablet: 80 milliGRAM(s) Oral ( @ 05:18)  heparin   Injectable: 5000 Unit(s) SubCutaneous ( @ 05:18)  midodrine.: 15 milliGRAM(s) Oral ( @ 05:19)  rifAXIMin: 550 milliGRAM(s) Oral ( @ 05:18)  spironolactone: 150 milliGRAM(s) Oral ( @ 05:18)  vancomycin  IVPB: 250 mL/Hr IV Intermittent ( @ 05:19)    Data:    T(C): 36.8  HR: 121  BP: 95/62  RR: 18  SpO2: 96%    -WBC 10.37 / HgB 8.2 / Hct 24.9 / Plt 141  -Na 125 / Cl 94 / BUN 10 / Glucose 95  -K 4.5 / CO2 17 / Cr 0.35  -ALT -- / Alk Phos -- / T.Bili --  -INR 2.35 / PTT 50.8    Radiology:     Assessment/Plan: 60yo F w/ PMH of alcohol cirrhosis with recurrent ascites, s/p multiple paracentesis, s/p banding of esophageal varices, currently being treated for recent c. diff colitis and possible endocarditis. Pt is jaundiced, with scleral icterus and abdominal distension presenting for liver transplant evaluation from [facility]. CT abd/pelv demonstrating right psoas abscess, IR consulted for drainage.       **Consult in progress**  -Imaging and case reviewed with Dr. Chavez. Right psoas collection with adjacent to vasculature which poses a high bleeding risk in addition to elevated INR.    -Will discuss case further with primary team.               -Will plan for   -Please place IR procedure order under ___  -Keep patient NPO after midnight  -STAT am labs  -Hold a/c x 24-48hrs prior to procedure and tentative plan for resumption ____ hrs post procedure.  -Maintain active T&S  -Above d/w primary team      Any questions or concerns regarding above please reach out to IR:   -Available on [employer] teams  -During working hours (7a-5p): call [phone]  -Emergent issues after 5pm: page: [phone]  -Non-emergent consults: Please place a [place] order "IR Consult" with an appropriate callback number  -Scheduling questions: [phone]  -Clinic/Outpatient [phone]   Interventional Radiology    Evaluate for Procedure: right psoas collection drainage    HPI: 60yo F w/ PMH of alcohol cirrhosis with recurrent ascites, s/p multiple paracentesis, s/p banding of esophageal varices, currently being treated for recent c. diff colitis and possible endocarditis. Pt is jaundiced, with scleral icterus and abdominal distension presenting for liver transplant evaluation from [facility]. CT abd/pelv demonstrating right psoas abscess, IR consulted for drainage.     Allergies: Zithromax (Unknown)    Medications (Abx/Cardiac/Anticoagulation/Blood Products)  cefTRIAXone   IVPB: 100 mL/Hr IV Intermittent ( @ 18:08)  ciprofloxacin     Tablet: 500 milliGRAM(s) Oral ( @ 13:46)  furosemide    Tablet: 80 milliGRAM(s) Oral ( @ 05:18)  heparin   Injectable: 5000 Unit(s) SubCutaneous ( @ 05:18)  midodrine.: 15 milliGRAM(s) Oral ( @ 05:19)  rifAXIMin: 550 milliGRAM(s) Oral ( @ 05:18)  spironolactone: 150 milliGRAM(s) Oral ( @ 05:18)  vancomycin  IVPB: 250 mL/Hr IV Intermittent ( @ 05:19)    Data:    T(C): 36.8  HR: 121  BP: 95/62  RR: 18  SpO2: 96%    -WBC 10.37 / HgB 8.2 / Hct 24.9 / Plt 141  -Na 125 / Cl 94 / BUN 10 / Glucose 95  -K 4.5 / CO2 17 / Cr 0.35  -ALT -- / Alk Phos -- / T.Bili --  -INR 2.35 / PTT 50.8    Radiology:     Assessment/Plan: 60yo F w/ PMH of alcohol cirrhosis with recurrent ascites, s/p multiple paracentesis, s/p banding of esophageal varices, currently being treated for recent c. diff colitis and possible endocarditis. Pt is jaundiced, with scleral icterus and abdominal distension presenting for liver transplant evaluation from [facility]. CT abd/pelv demonstrating right psoas abscess, IR consulted for drainage.       -Imaging and case reviewed with Dr. Chavez. Right psoas collection with adjacent to vasculature which poses a high bleeding risk in addition to elevated INR.    -Recommend continued conservative management per primary/ ID team.   -Should patient's clinical status change please reconsult IR for re-evaluation of drainage  -Case d/w primary team              -Will plan for   -Please place IR procedure order under ___  -Keep patient NPO after midnight  -STAT am labs  -Hold a/c x 24-48hrs prior to procedure and tentative plan for resumption ____ hrs post procedure.  -Maintain active T&S  -Above d/w primary team      Any questions or concerns regarding above please reach out to IR:   -Available on [employer] teams  -During working hours (7a-5p): call [phone]  -Emergent issues after 5pm: page: [phone]  -Non-emergent consults: Please place a [place] order "IR Consult" with an appropriate callback number  -Scheduling questions: [phone]  -Clinic/Outpatient [phone]   Interventional Radiology    Evaluate for Procedure: right psoas collection drainage    HPI: 60yo F w/ PMH of alcohol cirrhosis with recurrent ascites, s/p multiple paracentesis, s/p banding of esophageal varices, currently being treated for recent c. diff colitis and possible endocarditis. Pt is jaundiced, with scleral icterus and abdominal distension presenting for liver transplant evaluation from [facility]. CT abd/pelv demonstrating right psoas abscess, IR consulted for drainage.     Allergies: Zithromax (Unknown)    Medications (Abx/Cardiac/Anticoagulation/Blood Products)  cefTRIAXone   IVPB: 100 mL/Hr IV Intermittent ( @ 18:08)  ciprofloxacin     Tablet: 500 milliGRAM(s) Oral ( @ 13:46)  furosemide    Tablet: 80 milliGRAM(s) Oral ( @ 05:18)  heparin   Injectable: 5000 Unit(s) SubCutaneous ( @ 05:18)  midodrine.: 15 milliGRAM(s) Oral ( @ 05:19)  rifAXIMin: 550 milliGRAM(s) Oral ( @ 05:18)  spironolactone: 150 milliGRAM(s) Oral ( @ 05:18)  vancomycin  IVPB: 250 mL/Hr IV Intermittent ( @ 05:19)    Data:    T(C): 36.8  HR: 121  BP: 95/62  RR: 18  SpO2: 96%    -WBC 10.37 / HgB 8.2 / Hct 24.9 / Plt 141  -Na 125 / Cl 94 / BUN 10 / Glucose 95  -K 4.5 / CO2 17 / Cr 0.35  -ALT -- / Alk Phos -- / T.Bili --  -INR 2.35 / PTT 50.8    Radiology:     Assessment/Plan: 60yo F w/ PMH of alcohol cirrhosis with recurrent ascites, s/p multiple paracentesis, s/p banding of esophageal varices, currently being treated for recent c. diff colitis and possible endocarditis. Pt is jaundiced, with scleral icterus and abdominal distension presenting for liver transplant evaluation from [facility]. CT abd/pelv demonstrating right psoas abscess, IR consulted for drainage.       -Imaging and case reviewed with Dr. Chavez. Right psoas collection with adjacent to vasculature which poses a high bleeding risk in addition to elevated INR.    -Recommend continued conservative management per primary/ ID team.   -Should patient's clinical status change please reconsult IR for re-evaluation of drainage  -Case d/w primary team        Any questions or concerns regarding above please reach out to IR:   -Available on [employer] teams  -During working hours (7a-5p): call [phone]  -Emergent issues after 5pm: page: [phone]  -Non-emergent consults: Please place a [place] order "IR Consult" with an appropriate callback number  -Scheduling questions: [phone]  -Clinic/Outpatient [phone]

## 2025-02-24 NOTE — PROGRESS NOTE ADULT - SUBJECTIVE AND OBJECTIVE BOX
PROGRESS NOTE:     Patient is a 59y old  Female who presents with a chief complaint of Transfer from [place] for possible liver transplant.    SUBJECTIVE / OVERNIGHT EVENTS:    OVERNIGHT: No acute overnight events.  Patient was examined at bedside and feels well this morning. Denies fever, chills, chest pain, SOB, nausea, vomiting. ROS otherwise negative and pt is amenable to current treatment plan.      REVIEW OF SYSTEMS:    CONSTITUTIONAL:  No weakness, fevers, or chills  EYES/ENT:  No visual changes, vertigo, or throat pain   NECK:  No pain or stiffness  RESPIRATORY:  No SOB, cough, wheezing, or hemoptysis  CARDIOVASCULAR:  No chest pain or palpitations  GASTROINTESTINAL:  No abdominal pain, nausea, vomiting, or hematemesis; No diarrhea or constipation; No melena or hematochezia.  GENITOURINARY:  No dysuria, change in frequency, or hematuria  NEUROLOGICAL:  No numbness or weakness  SKIN:  No itching or rashes      MEDICATIONS  (STANDING):  albumin human 25% IVPB 100 milliLiter(s) IV Intermittent every 4 hours  Biotene Dry Mouth Oral Rinse 15 milliLiter(s) Swish and Spit five times a day  cefTRIAXone   IVPB 2000 milliGRAM(s) IV Intermittent every 24 hours  chlorhexidine 2% Cloths 1 Application(s) Topical <User Schedule>  ciprofloxacin     Tablet 500 milliGRAM(s) Oral daily  folic acid 1 milliGRAM(s) Oral daily  furosemide    Tablet 80 milliGRAM(s) Oral daily  heparin   Injectable 5000 Unit(s) SubCutaneous every 8 hours  lactulose Syrup 10 Gram(s) Oral two times a day  magnesium oxide 400 milliGRAM(s) Oral three times a day with meals  melatonin 3 milliGRAM(s) Oral at bedtime  midodrine. 15 milliGRAM(s) Oral three times a day  mirtazapine 7.5 milliGRAM(s) Oral at bedtime  pantoprazole    Tablet 40 milliGRAM(s) Oral before breakfast  potassium chloride    Tablet ER 40 milliEquivalent(s) Oral daily  rifAXIMin 550 milliGRAM(s) Oral two times a day  sodium chloride 1 Gram(s) Oral daily  spironolactone 150 milliGRAM(s) Oral daily  sucralfate suspension 1 Gram(s) Oral every 6 hours  thiamine 100 milliGRAM(s) Oral daily  vancomycin  IVPB 1000 milliGRAM(s) IV Intermittent every 12 hours    MEDICATIONS  (PRN):  albumin human 25% IVPB 50 milliLiter(s) IV Intermittent once PRN para albumin replacement  albuterol/ipratropium for Nebulization 3 milliLiter(s) Nebulizer every 6 hours PRN Shortness of Breath and/or Wheezing  guaiFENesin Oral Liquid (Sugar-Free) 100 milliGRAM(s) Oral every 6 hours PRN Cough      CAPILLARY BLOOD GLUCOSE        I&O's Summary    23 Feb 2025 07:01  -  24 Feb 2025 07:00  --------------------------------------------------------  IN: 1070 mL / OUT: 1 mL / NET: 1069 mL        PHYSICAL EXAM:  Vital Signs Last 24 Hrs  T(C): 36.8 (24 Feb 2025 05:01), Max: 37.7 (23 Feb 2025 20:23)  T(F): 98.2 (24 Feb 2025 05:01), Max: 99.8 (23 Feb 2025 20:23)  HR: 121 (24 Feb 2025 05:01) (106 - 121)  BP: 95/62 (24 Feb 2025 05:01) (95/62 - 110/62)  BP(mean): --  RR: 18 (24 Feb 2025 05:01) (18 - 18)  SpO2: 96% (24 Feb 2025 05:01) (96% - 99%)    Parameters below as of 24 Feb 2025 05:01  Patient On (Oxygen Delivery Method): room air        CONSTITUTIONAL: NAD; well-developed  HEENT: PERRL, clear conjunctiva  RESPIRATORY: Normal respiratory effort; lungs are clear to auscultation bilaterally; No crackles/rhonchi/wheezing  CARDIOVASCULAR: Regular rate and rhythm, normal S1 and S2, no murmur/rub/gallop; No lower extremity edema; Peripheral pulses are 2+ bilaterally  ABDOMEN: Nontender to palpation, normoactive bowel sounds, no rebound/guarding; No hepatosplenomegaly  MUSCULOSKELETAL: No clubbing or cyanosis of digits; no joint swelling or tenderness to palpation  EXTREMITY: Lower extremities non-tender to palpation; non-erythematous B/L  NEURO: A&Ox3; no focal deficits   PSYCH: Normal mood; affect appropriate    LABS:                        8.2    10.37 )-----------( 141      ( 24 Feb 2025 06:54 )             24.9     02-24    125[L]  |  94[L]  |  10  ----------------------------<  95  4.5   |  17[L]  |  0.35[L]    Ca    9.6      24 Feb 2025 06:53  Phos  3.3     02-24  Mg     1.7     02-24    TPro  7.3  /  Alb  3.6  /  TBili  11.5[H]  /  DBili  x   /  AST  97[H]  /  ALT  30  /  AlkPhos  315[H]  02-23          Urinalysis Basic - ( 24 Feb 2025 06:53 )    Color: x / Appearance: x / SG: x / pH: x  Gluc: 95 mg/dL / Ketone: x  / Bili: x / Urobili: x   Blood: x / Protein: x / Nitrite: x   Leuk Esterase: x / RBC: x / WBC x   Sq Epi: x / Non Sq Epi: x / Bacteria: x        Culture - Blood (collected 22 Feb 2025 13:15)  Source: .Blood Blood-Peripheral  Preliminary Report (23 Feb 2025 16:01):    No growth at 24 hours    Culture - Blood (collected 22 Feb 2025 12:55)  Source: .Blood Blood-Peripheral  Preliminary Report (23 Feb 2025 16:01):    No growth at 24 hours        RADIOLOGY & ADDITIONAL TESTS:    N/A PROGRESS NOTE:     Patient is a 59y old  Female who presents with a chief complaint of Transfer from [place] for possible liver transplant.    SUBJECTIVE / OVERNIGHT EVENTS:    OVERNIGHT: No acute overnight events.  Patient was examined at bedside and feels well this morning. Denies fever, chills, chest pain, SOB, nausea, vomiting. ROS otherwise negative and pt is amenable to current treatment plan.      REVIEW OF SYSTEMS:    CONSTITUTIONAL:  No weakness, fevers, or chills  EYES/ENT:  No visual changes, vertigo, or throat pain   NECK:  No pain or stiffness  RESPIRATORY:  No SOB, cough, wheezing, or hemoptysis  CARDIOVASCULAR:  No chest pain or palpitations  GASTROINTESTINAL:  No abdominal pain, nausea, vomiting, or hematemesis; No diarrhea or constipation; No melena or hematochezia.  GENITOURINARY:  No dysuria, change in frequency, or hematuria  NEUROLOGICAL:  No numbness or weakness  SKIN:  No itching or rashes      MEDICATIONS  (STANDING):  albumin human 25% IVPB 100 milliLiter(s) IV Intermittent every 4 hours  Biotene Dry Mouth Oral Rinse 15 milliLiter(s) Swish and Spit five times a day  cefTRIAXone   IVPB 2000 milliGRAM(s) IV Intermittent every 24 hours  chlorhexidine 2% Cloths 1 Application(s) Topical <User Schedule>  ciprofloxacin     Tablet 500 milliGRAM(s) Oral daily  folic acid 1 milliGRAM(s) Oral daily  furosemide    Tablet 80 milliGRAM(s) Oral daily  heparin   Injectable 5000 Unit(s) SubCutaneous every 8 hours  lactulose Syrup 10 Gram(s) Oral two times a day  magnesium oxide 400 milliGRAM(s) Oral three times a day with meals  melatonin 3 milliGRAM(s) Oral at bedtime  midodrine. 15 milliGRAM(s) Oral three times a day  mirtazapine 7.5 milliGRAM(s) Oral at bedtime  pantoprazole    Tablet 40 milliGRAM(s) Oral before breakfast  potassium chloride    Tablet ER 40 milliEquivalent(s) Oral daily  rifAXIMin 550 milliGRAM(s) Oral two times a day  sodium chloride 1 Gram(s) Oral daily  spironolactone 150 milliGRAM(s) Oral daily  sucralfate suspension 1 Gram(s) Oral every 6 hours  thiamine 100 milliGRAM(s) Oral daily  vancomycin  IVPB 1000 milliGRAM(s) IV Intermittent every 12 hours    MEDICATIONS  (PRN):  albumin human 25% IVPB 50 milliLiter(s) IV Intermittent once PRN para albumin replacement  albuterol/ipratropium for Nebulization 3 milliLiter(s) Nebulizer every 6 hours PRN Shortness of Breath and/or Wheezing  guaiFENesin Oral Liquid (Sugar-Free) 100 milliGRAM(s) Oral every 6 hours PRN Cough      CAPILLARY BLOOD GLUCOSE        I&O's Summary    23 Feb 2025 07:01  -  24 Feb 2025 07:00  --------------------------------------------------------  IN: 1070 mL / OUT: 1 mL / NET: 1069 mL        PHYSICAL EXAM:  Vital Signs Last 24 Hrs  T(C): 36.8 (24 Feb 2025 05:01), Max: 37.7 (23 Feb 2025 20:23)  T(F): 98.2 (24 Feb 2025 05:01), Max: 99.8 (23 Feb 2025 20:23)  HR: 121 (24 Feb 2025 05:01) (106 - 121)  BP: 95/62 (24 Feb 2025 05:01) (95/62 - 110/62)  BP(mean): --  RR: 18 (24 Feb 2025 05:01) (18 - 18)  SpO2: 96% (24 Feb 2025 05:01) (96% - 99%)    Parameters below as of 24 Feb 2025 05:01  Patient On (Oxygen Delivery Method): room air        CONSTITUTIONAL: NAD; jaundiced  HEENT: PERRLA, clear conjunctiva, EOMI, scleral icterus  RESPIRATORY: Normal respiratory effort; lungs are clear to auscultation bilaterally; No crackles/rhonchi/wheezing  CARDIOVASCULAR: Regular rate and rhythm, normal S1 and S2, no murmur/rub/gallop; No lower extremity edema; Peripheral pulses are 2+ bilaterally  ABDOMEN: Distended, Nontender to palpation, normoactive bowel sounds, no rebound/guarding; +Hepatosplenomegaly  MUSCULOSKELETAL: No clubbing or cyanosis of digits; no joint swelling or tenderness to palpation  EXTREMITY: Lower extremities non-tender to palpation; non-erythematous B/L  NEURO: A&Ox3; no focal deficits   PSYCH: Normal mood; affect appropriate    LABS:                        8.2    10.37 )-----------( 141      ( 24 Feb 2025 06:54 )             24.9     02-24    125[L]  |  94[L]  |  10  ----------------------------<  95  4.5   |  17[L]  |  0.35[L]    Ca    9.6      24 Feb 2025 06:53  Phos  3.3     02-24  Mg     1.7     02-24    TPro  7.3  /  Alb  3.6  /  TBili  11.5[H]  /  DBili  x   /  AST  97[H]  /  ALT  30  /  AlkPhos  315[H]  02-23          Urinalysis Basic - ( 24 Feb 2025 06:53 )    Color: x / Appearance: x / SG: x / pH: x  Gluc: 95 mg/dL / Ketone: x  / Bili: x / Urobili: x   Blood: x / Protein: x / Nitrite: x   Leuk Esterase: x / RBC: x / WBC x   Sq Epi: x / Non Sq Epi: x / Bacteria: x        Culture - Blood (collected 22 Feb 2025 13:15)  Source: .Blood Blood-Peripheral  Preliminary Report (23 Feb 2025 16:01):    No growth at 24 hours    Culture - Blood (collected 22 Feb 2025 12:55)  Source: .Blood Blood-Peripheral  Preliminary Report (23 Feb 2025 16:01):    No growth at 24 hours        RADIOLOGY & ADDITIONAL TESTS:    N/A

## 2025-02-25 LAB
ADD ON TEST-SPECIMEN IN LAB: SIGNIFICANT CHANGE UP
ANION GAP SERPL CALC-SCNC: 14 MMOL/L — SIGNIFICANT CHANGE UP (ref 5–17)
APTT BLD: 48.6 SEC — HIGH (ref 24.5–35.6)
BILIRUB DIRECT SERPL-MCNC: 5.8 MG/DL — HIGH (ref 0–0.3)
BILIRUB INDIRECT FLD-MCNC: 4 MG/DL — HIGH (ref 0.2–1)
BILIRUB SERPL-MCNC: 9.8 MG/DL — HIGH (ref 0.2–1.2)
BUN SERPL-MCNC: 22 MG/DL — SIGNIFICANT CHANGE UP (ref 7–23)
CALCIUM SERPL-MCNC: 9.5 MG/DL — SIGNIFICANT CHANGE UP (ref 8.4–10.5)
CHLORIDE SERPL-SCNC: 95 MMOL/L — LOW (ref 96–108)
CO2 SERPL-SCNC: 17 MMOL/L — LOW (ref 22–31)
CREAT SERPL-MCNC: 0.43 MG/DL — LOW (ref 0.5–1.3)
CULTURE RESULTS: SIGNIFICANT CHANGE UP
EGFR: 111 ML/MIN/1.73M2 — SIGNIFICANT CHANGE UP
EGFR: 111 ML/MIN/1.73M2 — SIGNIFICANT CHANGE UP
GLUCOSE SERPL-MCNC: 108 MG/DL — HIGH (ref 70–99)
HCT VFR BLD CALC: 23.9 % — LOW (ref 34.5–45)
HGB BLD-MCNC: 7.9 G/DL — LOW (ref 11.5–15.5)
INR BLD: 1.84 RATIO — HIGH (ref 0.85–1.16)
MAGNESIUM SERPL-MCNC: 1.5 MG/DL — LOW (ref 1.6–2.6)
MCHC RBC-ENTMCNC: 31.7 PG — SIGNIFICANT CHANGE UP (ref 27–34)
MCHC RBC-ENTMCNC: 33.1 G/DL — SIGNIFICANT CHANGE UP (ref 32–36)
MCV RBC AUTO: 96 FL — SIGNIFICANT CHANGE UP (ref 80–100)
NRBC BLD AUTO-RTO: 0 /100 WBCS — SIGNIFICANT CHANGE UP (ref 0–0)
PHOSPHATE SERPL-MCNC: 3.1 MG/DL — SIGNIFICANT CHANGE UP (ref 2.5–4.5)
PLATELET # BLD AUTO: 131 K/UL — LOW (ref 150–400)
POTASSIUM SERPL-MCNC: 4.6 MMOL/L — SIGNIFICANT CHANGE UP (ref 3.5–5.3)
POTASSIUM SERPL-SCNC: 4.6 MMOL/L — SIGNIFICANT CHANGE UP (ref 3.5–5.3)
PROTHROM AB SERPL-ACNC: 20.9 SEC — HIGH (ref 9.9–13.4)
RBC # BLD: 2.49 M/UL — LOW (ref 3.8–5.2)
RBC # FLD: 20.3 % — HIGH (ref 10.3–14.5)
SODIUM SERPL-SCNC: 126 MMOL/L — LOW (ref 135–145)
SPECIMEN SOURCE: SIGNIFICANT CHANGE UP
WBC # BLD: 9.84 K/UL — SIGNIFICANT CHANGE UP (ref 3.8–10.5)
WBC # FLD AUTO: 9.84 K/UL — SIGNIFICANT CHANGE UP (ref 3.8–10.5)

## 2025-02-25 PROCEDURE — 99233 SBSQ HOSP IP/OBS HIGH 50: CPT | Mod: GC

## 2025-02-25 PROCEDURE — 72157 MRI CHEST SPINE W/O & W/DYE: CPT | Mod: 26

## 2025-02-25 RX ORDER — HYDROXYZINE HYDROCHLORIDE 25 MG/1
25 TABLET, FILM COATED ORAL ONCE
Refills: 0 | Status: COMPLETED | OUTPATIENT
Start: 2025-02-25 | End: 2025-02-25

## 2025-02-25 RX ORDER — ACETAMINOPHEN 500 MG/5ML
1000 LIQUID (ML) ORAL ONCE
Refills: 0 | Status: COMPLETED | OUTPATIENT
Start: 2025-02-25 | End: 2025-02-25

## 2025-02-25 RX ADMIN — Medication 15 MILLILITER(S): at 08:45

## 2025-02-25 RX ADMIN — Medication 1 GRAM(S): at 23:55

## 2025-02-25 RX ADMIN — Medication 125 MILLIGRAM(S): at 17:00

## 2025-02-25 RX ADMIN — Medication 125 MILLIGRAM(S): at 12:25

## 2025-02-25 RX ADMIN — Medication 1 GRAM(S): at 17:00

## 2025-02-25 RX ADMIN — Medication 1 GRAM(S): at 11:43

## 2025-02-25 RX ADMIN — Medication 15 MILLILITER(S): at 17:00

## 2025-02-25 RX ADMIN — MIDODRINE HYDROCHLORIDE 15 MILLIGRAM(S): 5 TABLET ORAL at 16:59

## 2025-02-25 RX ADMIN — Medication 400 MILLIGRAM(S): at 11:42

## 2025-02-25 RX ADMIN — FUROSEMIDE 80 MILLIGRAM(S): 10 INJECTION INTRAMUSCULAR; INTRAVENOUS at 05:57

## 2025-02-25 RX ADMIN — Medication 100 MILLIGRAM(S): at 11:44

## 2025-02-25 RX ADMIN — Medication 40 MILLIGRAM(S): at 05:57

## 2025-02-25 RX ADMIN — Medication 150 MILLIGRAM(S): at 05:58

## 2025-02-25 RX ADMIN — Medication 15 MILLILITER(S): at 23:55

## 2025-02-25 RX ADMIN — FOLIC ACID 1 MILLIGRAM(S): 1 TABLET ORAL at 11:44

## 2025-02-25 RX ADMIN — Medication 1 GRAM(S): at 11:44

## 2025-02-25 RX ADMIN — CEFTRIAXONE 100 MILLIGRAM(S): 500 INJECTION, POWDER, FOR SOLUTION INTRAMUSCULAR; INTRAVENOUS at 17:00

## 2025-02-25 RX ADMIN — HYDROXYZINE HYDROCHLORIDE 25 MILLIGRAM(S): 25 TABLET, FILM COATED ORAL at 11:44

## 2025-02-25 RX ADMIN — HEPARIN SODIUM 5000 UNIT(S): 1000 INJECTION INTRAVENOUS; SUBCUTANEOUS at 06:00

## 2025-02-25 RX ADMIN — MIDODRINE HYDROCHLORIDE 15 MILLIGRAM(S): 5 TABLET ORAL at 11:43

## 2025-02-25 RX ADMIN — Medication 4 GRAM(S): at 21:25

## 2025-02-25 RX ADMIN — Medication 400 MILLIGRAM(S): at 17:01

## 2025-02-25 RX ADMIN — Medication 40 MILLIEQUIVALENT(S): at 11:43

## 2025-02-25 RX ADMIN — Medication 250 MILLIGRAM(S): at 06:00

## 2025-02-25 RX ADMIN — MIDODRINE HYDROCHLORIDE 15 MILLIGRAM(S): 5 TABLET ORAL at 05:58

## 2025-02-25 RX ADMIN — Medication 400 MILLIGRAM(S): at 01:34

## 2025-02-25 RX ADMIN — Medication 1 GRAM(S): at 06:00

## 2025-02-25 RX ADMIN — Medication 3 MILLIGRAM(S): at 21:09

## 2025-02-25 RX ADMIN — MIRTAZAPINE 7.5 MILLIGRAM(S): 30 TABLET, FILM COATED ORAL at 21:09

## 2025-02-25 RX ADMIN — Medication 15 MILLILITER(S): at 21:08

## 2025-02-25 RX ADMIN — Medication 100 MILLIGRAM(S): at 21:08

## 2025-02-25 RX ADMIN — Medication 125 MILLIGRAM(S): at 23:55

## 2025-02-25 RX ADMIN — Medication 100 MILLIGRAM(S): at 09:26

## 2025-02-25 RX ADMIN — Medication 250 MILLIGRAM(S): at 17:00

## 2025-02-25 RX ADMIN — Medication 15 MILLILITER(S): at 11:44

## 2025-02-25 RX ADMIN — HEPARIN SODIUM 5000 UNIT(S): 1000 INJECTION INTRAVENOUS; SUBCUTANEOUS at 14:04

## 2025-02-25 RX ADMIN — Medication 400 MILLIGRAM(S): at 08:44

## 2025-02-25 RX ADMIN — HEPARIN SODIUM 5000 UNIT(S): 1000 INJECTION INTRAVENOUS; SUBCUTANEOUS at 21:09

## 2025-02-25 RX ADMIN — Medication 1 APPLICATION(S): at 05:59

## 2025-02-25 NOTE — PROGRESS NOTE ADULT - ASSESSMENT
59 year old female PMH of alcohol cirrhosis with recurrent ascites, s/p multiple paracentesis, s/p banding of esophageal varices, currently being treated for recent c. diff colitis and possible endocarditis, presenting for possible liver transplant from [facility]. Pt initially admitted to [facility] c/o weakness, poor PO intake, and fever; hypotensive in ED. Met criteria for sepsis iso C. diff colitis and was treated with vasopressor support, flagyl, and vanc. Hospital course was complicated by AHRF and pt was intubated 1/28-2/2. Pt then treated for E. coli and Bacteroides bacteremia with TTE findings concerning for mitral vegetation; pt not a good candidate for ARACELI due to varices found on EGD from 1/31/24 decided to treat empirically for IE with 6 weeks of antibiotics s/p PICC line placement with negative blood cultures from 1/31/24. Pt d/c from [facility] on 2/7 and then presented to [place] c/o SOB. Ultimately transferred to [facility] for hepatology transplant consult. On presentation today, pt endorses subjective nighttime fevers, chills, SOB and abdominal discomfort. Attributes fevers to recent flu dx (noted in chart to be flu positive 1 week prior to presentation at [facility]). Pt states she has not used alcohol in over a year. She began using alcohol in her 20s socially and her use gradually increased to include ~3-4 glasses of wine everyday when she was drinking the most. Has been to rehab and therapy multiple times. Pt first began smoking in her 20s ~1pack/day for many years, cannot recall specifics. Has not had a cigarette in several months. Pt is unable to ambulate on her own and has remained bedbound since intubation several months ago.     C. difficile toxin assay (January 27) positive, repeat Cdff 2/11 neg  Blood cultures (January 27) 2/4 E. coli, 1/4 Bacteroides   Blood cultures (January 31) no growth to date  Blood cultures (February 10, February 11) no growth to date  Paracentesis (February 4, February 13) Cell counts negative for SBP    CTA chest (January 27) Patent central airways. Smooth interlobular septal thickening at the lung apices and lung bases. No lung consolidation or mass. Mild bibasilar dependent atelectasis. No lymphadenopathy.    CT A/P (January 27) Thickening of the walls of the stomach and duodenum with submucosal edema likely reflective of a gastroenteritis. Thickening of the walls of the ascending colon despite underdistention likely due to portal colopathy. Colonic diverticulosis without evidence of diverticulitis. Cholelithiasis. Nonspecific gallbladder wall edema and pericholecystic fluid in the setting of cirrhosis.    TTE (1/27) small mobile vegetation attached to the posterior mitral valve leaflet.  TTE (February 12) no evidence of vegetations    Social history: born in [place].  Travel history includes travel to [facility], [facility] and [place], [place], [place].  Lives primarily in [place] but has also had travel to [place] and [place].  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  States her mother had a history of tuberculosis but this was before patient was born.  Patient notes livestock exposure during her work as a .  Has sugar gliders as pets.    Antibiotic Course:  PO Vancomycin: 1/27 (2 doses) > 2/14 (1 dose)  Metronidazole: 1/27 > 2/13  Ceftriaxone: 1/27 > 2/13  Meropenem: 1/27  Restarted on antibiotics 2/22 Vancomycin plus Ceftriaxone    S/P LVP 2/21 - follow cell count and fluid cultures which are no growth to date    #C.diff colitis 1/27/25, diarrhea resolved now   - repeat Cdiff 2/11 negative, completed vanco po 125 mg bi ppx post discontinuation of antibitoics on 2./13-2/16  - in the next 6 months will need vanco po ppx with every exposure to broad spectrum antibiotics  - this includes at the time of perioperative ppx    #Polymicrobial bacteremia with E.coli and Bacteroides  Status post course of ceftriaxone and metronidazole as above from earlier stay    #Possible MV vegetation on TTE from OSH  E.coli and bacteroides atypical endocarditis organisms and polymicrobial is also atypical for endocarditis  Repeat transthoracic echocardiogram here without evidence of vegetation    #Preliver transplant evaluation  --COVID19 Nucleocapsid Antibody  -- COVID19 Eder Antibody pos  -- HAV IgG pos  HBVs Ab, HBVsAg, HBVc Ab pos >400 immune, neg, neg  -- HCV Ab neg  -- HSV 1 IgG /HSV 2 IgG pos. neg  -- EBV IgG Pos  -- CMV IgG pos  --VZV IgG pos  -- Measles IgG , Mumps IgG and Rubella IgG pos all  --Follow up QuantiFeron-TB Gold  -- HIV Ag/Ab by [facility]- NEG  --Syphilis Screen - NEG  --Strongyloides Ab negative   -- Follow up Coccidiodes Ab  -- Brucella IgG - NEGATIVE  -- Q Fever Serologies REACTIVE for  phase II igG with all others negative  in the correct setting, Positive Phase II igG (and higher than Phase I)  could  be indicative of acute infection or false positive- Phase II IgM are most commonly positive as well  ; whereas in chronic or old Q fever, Phase I antibodies are higher than phase II and/or >1:1260  However, her last exposure to wild animals was  about 5 years ago, making acute infection unlikely and these results seemingly a false positive.. In addition , she has no symptoms of acute Q fever infection.     With all that said, it is reasonable to repeat serologies again (ordered)  If titers are still high or stable -->may then consider treatment while repeating in 3-4 weeks to assess for increases in titers that would confirm acuity and infection   Repeat Q Fever Phase II IgG AB: 1:32 (02.20.25)     -Clearance for listing/transplant pending resolution of psoas muscle abscess, repeat radiology in 10 days.        #Encounter to Vaccinate Patient  COVID19: Would benefit from COVID19 [id number] Vaccine Dose  Influenza: 10/8/24  Pneumococcal: Would benefit from PCV20  Immune to hep A, B, varicella and MMR  Shingles: Will require Shingrix  Tdap: Will require Tdap  RSV vaccine also recommended this season    Additional studies:  Would send repeat BCX  Would favor IR possible aspiration if not we can treat and repeat radiology next week .   Continue po vanco as she is reporting loose stools and recent C-DIFF infection.  send Vanco trough level prior to fourth dose  MRSA/MSSA PCR  is negative   Continue the IV Vancomycin  Continue Ceftriaxone & Metronidazole   Blood cultures sent 2/22 are NGTD  Ascites fluid culture sent 2/21 NGTD

## 2025-02-25 NOTE — PROGRESS NOTE ADULT - PROBLEM SELECTOR PLAN 5
Recently diagnosed per patient. Per prior d/c note taking 15U Lantus every night.     Plan:  ENZO  Lantus started on OSH, A1c 4.6, will dc insulin and monitor  Monitor off insulin Suspicions of IE given E. coli and bacteroides bacteremia. Pt with evidence of mitral valve vegetation on TTE 1/2025; not confirmed with ARACELI due to concerns regarding esophageal varices.   -Likely in setting of recent dental extraction in December  - Repeat TTE 2/12 & 2/24 w/o evidence of vegetations    Plan:   Abx as above for psoas muscle abscess, unlikely IE

## 2025-02-25 NOTE — PROGRESS NOTE ADULT - SUBJECTIVE AND OBJECTIVE BOX
Follow Up:      Interval History:    REVIEW OF SYSTEMS  [  ] ROS unobtainable because:    [  ] All other systems negative except as noted below    Constitutional:  [ ] fever [ ] chills  [ ] weight loss  [ ] weakness  Skin:  [ ] rash [ ] phlebitis	  Eyes: [ ] icterus [ ] pain  [ ] discharge	  ENMT: [ ] sore throat  [ ] thrush [ ] ulcers [ ] exudates  Respiratory: [ ] dyspnea [ ] hemoptysis [ ] cough [ ] sputum	  Cardiovascular:  [ ] chest pain [ ] palpitations [ ] edema	  Gastrointestinal:  [ ] nausea [ ] vomiting [ ] diarrhea [ ] constipation [ ] pain	  Genitourinary:  [ ] dysuria [ ] frequency [ ] hematuria [ ] discharge [ ] flank pain  [ ] incontinence  Musculoskeletal:  [ ] myalgias [ ] arthralgias [ ] arthritis  [ ] back pain  Neurological:  [ ] headache [ ] seizures  [ ] confusion/altered mental status    Allergies  Zithromax (Unknown)        ANTIMICROBIALS:  cefTRIAXone   IVPB 2000 every 24 hours  metroNIDAZOLE  IVPB    metroNIDAZOLE  IVPB 500 every 12 hours  rifAXIMin 550 two times a day  vancomycin    Solution 125 every 6 hours  vancomycin  IVPB 1000 every 12 hours      OTHER MEDS:  MEDICATIONS  (STANDING):  albuterol/ipratropium for Nebulization 3 every 6 hours PRN  furosemide    Tablet 80 daily  guaiFENesin Oral Liquid (Sugar-Free) 100 every 6 hours PRN  heparin   Injectable 5000 every 8 hours  melatonin 3 at bedtime  midodrine. 15 three times a day  mirtazapine 7.5 at bedtime  pantoprazole    Tablet 40 before breakfast  spironolactone 150 daily  sucralfate suspension 1 every 6 hours      Vital Signs Last 24 Hrs  T(C): 36.7 (25 Feb 2025 05:00), Max: 36.8 (24 Feb 2025 20:40)  T(F): 98.1 (25 Feb 2025 05:00), Max: 98.3 (24 Feb 2025 20:40)  HR: 113 (25 Feb 2025 05:00) (103 - 113)  BP: 96/50 (25 Feb 2025 05:00) (96/50 - 103/60)  BP(mean): --  RR: 18 (25 Feb 2025 05:00) (17 - 18)  SpO2: 96% (25 Feb 2025 05:00) (96% - 96%)    Parameters below as of 25 Feb 2025 05:00  Patient On (Oxygen Delivery Method): room air        PHYSICAL EXAMINATION:  General: Alert and Awake, NAD  HEENT: PERRL, EOMI  Neck: Supple  Cardiac: RRR, No M/R/G  Resp: CTAB, No Wh/Rh/Ra  Abdomen: NBS, NT/ND, No HSM, No rigidity or guarding  MSK: No LE edema. No Calf tenderness  : No mccann  Skin: No rashes or lesions. Skin is warm and dry to the touch.   Neuro: Alert and Awake. CN 2-12 Grossly intact. Moves all four extremities spontaneously.  Psych: Calm, Pleasant, Cooperative                          7.9    9.84  )-----------( 131      ( 25 Feb 2025 07:40 )             23.9       02-25    126[L]  |  95[L]  |  22  ----------------------------<  108[H]  4.6   |  17[L]  |  0.43[L]    Ca    9.5      25 Feb 2025 07:38  Phos  3.1     02-25  Mg     1.5     02-25        Urinalysis Basic - ( 25 Feb 2025 07:38 )    Color: x / Appearance: x / SG: x / pH: x  Gluc: 108 mg/dL / Ketone: x  / Bili: x / Urobili: x   Blood: x / Protein: x / Nitrite: x   Leuk Esterase: x / RBC: x / WBC x   Sq Epi: x / Non Sq Epi: x / Bacteria: x        MICROBIOLOGY:  v  .Blood Blood-Peripheral  02-22-25   No growth at 48 Hours  --  --      .Blood Blood-Peripheral  02-22-25   No growth at 48 Hours  --  --      Ascites Fl Ascites Fluid  02-20-25   No growth at 5 days  --    polymorphonuclear leukocytes seen  No organisms seen  by cytocentrifuge      Peritoneal  02-13-25   No growth at 5 days  --    polymorphonuclear leukocytes seen  No organisms seen  by cytocentrifuge      .Blood BLOOD  02-11-25   No growth at 5 days  --  --      .Blood BLOOD  02-10-25   No growth at 5 days  --  --        CMV IgG Antibody: >10.00 U/mL (02-15-25 @ 07:12)  Toxoplasma IgG Screen: <3.00 IU/mL (02-15-25 @ 07:12)          RADIOLOGY:    <The imaging below has been reviewed and visualized by me independently. Findings as detailed in report below> Follow Up: Psoas muscle abscess     Interval History:  Afebrile,  no leukocytosis  MRI T/L - No OM, discitis   Complaining of diffuse abdominal pain, more distended today    REVIEW OF SYSTEMS  CONSTITUTIONAL:  No fever, good appetite  CARDIOVASCULAR:  No chest pain or palpitations  RESPIRATORY:  No dyspnea  GASTROINTESTINAL:  No nausea, vomiting, diarrhea  GENITOURINARY:  No dysuria  NEUROLOGIC:  No headache, No dizziness    Allergies  Zithromax (Unknown)        ANTIMICROBIALS:  cefTRIAXone   IVPB 2000 every 24 hours  metroNIDAZOLE  IVPB    metroNIDAZOLE  IVPB 500 every 12 hours  rifAXIMin 550 two times a day  vancomycin    Solution 125 every 6 hours  vancomycin  IVPB 1000 every 12 hours      OTHER MEDS:  MEDICATIONS  (STANDING):  albuterol/ipratropium for Nebulization 3 every 6 hours PRN  furosemide    Tablet 80 daily  guaiFENesin Oral Liquid (Sugar-Free) 100 every 6 hours PRN  heparin   Injectable 5000 every 8 hours  melatonin 3 at bedtime  midodrine. 15 three times a day  mirtazapine 7.5 at bedtime  pantoprazole    Tablet 40 before breakfast  spironolactone 150 daily  sucralfate suspension 1 every 6 hours      Vital Signs Last 24 Hrs  T(C): 36.7 (25 Feb 2025 05:00), Max: 36.8 (24 Feb 2025 20:40)  T(F): 98.1 (25 Feb 2025 05:00), Max: 98.3 (24 Feb 2025 20:40)  HR: 113 (25 Feb 2025 05:00) (103 - 113)  BP: 96/50 (25 Feb 2025 05:00) (96/50 - 103/60)  BP(mean): --  RR: 18 (25 Feb 2025 05:00) (17 - 18)  SpO2: 96% (25 Feb 2025 05:00) (96% - 96%)    Parameters below as of 25 Feb 2025 05:00  Patient On (Oxygen Delivery Method): room air        PHYSICAL EXAMINATION:  General: Alert and Awake, NAD  HEENT: PERRL, EOMI, Icteric  Neck: Supple  Cardiac: RRR, No M/R/G  Resp: CTAB, No Wh/Rh/Ra  Abdomen: NBS, No rigidity or guarding, distension  MSK: No LE edema. No Calf tenderness  : No Delaney  Skin: No rashes or lesions. Skin is warm and dry to the touch. Jaundice  Neuro: CN 2-12 Grossly intact. Moves all four extremities spontaneously.  Psych: Calm, Pleasant, Cooperative                          7.9    9.84  )-----------( 131      ( 25 Feb 2025 07:40 )             23.9       02-25    126[L]  |  95[L]  |  22  ----------------------------<  108[H]  4.6   |  17[L]  |  0.43[L]    Ca    9.5      25 Feb 2025 07:38  Phos  3.1     02-25  Mg     1.5     02-25        Urinalysis Basic - ( 25 Feb 2025 07:38 )    Color: x / Appearance: x / SG: x / pH: x  Gluc: 108 mg/dL / Ketone: x  / Bili: x / Urobili: x   Blood: x / Protein: x / Nitrite: x   Leuk Esterase: x / RBC: x / WBC x   Sq Epi: x / Non Sq Epi: x / Bacteria: x        MICROBIOLOGY:  v  .Blood Blood-Peripheral  02-22-25   No growth at 48 Hours  --  --      .Blood Blood-Peripheral  02-22-25   No growth at 48 Hours  --  --      Ascites Fl Ascites Fluid  02-20-25   No growth at 5 days  --    polymorphonuclear leukocytes seen  No organisms seen  by cytocentrifuge      Peritoneal  02-13-25   No growth at 5 days  --    polymorphonuclear leukocytes seen  No organisms seen  by cytocentrifuge      .Blood BLOOD  02-11-25   No growth at 5 days  --  --      .Blood BLOOD  02-10-25   No growth at 5 days  --  --        CMV IgG Antibody: >10.00 U/mL (02-15-25 @ 07:12)  Toxoplasma IgG Screen: <3.00 IU/mL (02-15-25 @ 07:12)          RADIOLOGY:    <The imaging below has been reviewed and visualized by me independently. Findings as detailed in report below>        < from: MR Thoracic Spine w/wo IV Cont (02.25.25 @ 07:43) >  ACC: [id number] EXAM:  MR SPINE THORACIC WAW IC   ORDERED BY:  SINDY RYAN     ACC: [id number] EXAM:  MR SPINE LUMBAR WAW IC   ORDERED BY:  SINDY RYAN     PROCEDURE DATE:  02/24/2025          INTERPRETATION:  MRI of the thoracic and lumbar spinewith contrast    CLINICAL INDICATION:  Right psoas abscess    TECHNIQUE: Multiplanar, multisequence MR images of the lumbar spine were   obtained before and after the intravenous administration of    COMPARISON: CT abdomen and pelvis 2/23/2025.    FINDINGS:    MRI THORACIC SPINE:    Vertebral body height, marrow signal homogeneity, and facet alignment are   maintained throughout the visualized spinal segments.    No intervertebral disc space narrowing.    No abnormal enhancement.    No spinal cord compression or abnormal intrinsic cord signal.    Mild multilevel degenerative changes without significant spinal canal   stenosis or neural foraminal narrowing.    MRI LUMBAR SPINE:    Vertebral body height, marrow signal homogeneity, and facet alignment are   maintained throughout the visualized spinal segments.    Disc space narrowing at L4-L5.    No prevertebral or paravertebral edema.    Redemonstration of right iliopsoas muscle abscess currently measuring 2.8   x 2.6 x 2.5 cm (maximal AP xTV x CC dimensions), similar to the prior CT   abdomen and pelvis, given differences in modality.    No marrow edema.    No abnormal enhancement of the lumbar spine and visualized sacrum and   iliac bones.    Mild multilevel degenerative changes resulting in mild multilevel spinal   canal stenosis or neural foraminal narrowing.    IMPRESSION:    MRI THORACIC SPINE:  No evidence for discitis/osteomyelitis.  No abnormal enhancement.  No abnormal intrinsic cord signal.  Mild degenerative changes.    MRI LUMBAR SPINE:  Redemonstration of right iliopsoas muscle abscess currently measuring 2.8   x 2.6 x 2.5 cm (maximal AP x TV x CC dimensions), similar to the prior CT   abdomen and pelvis, given differences in modality.    No evidence for discitis/osteomyelitis of the lumbar spine. No evidence   for osteomyelitis involving the visualized sacrum or iliac bones.    Mild degenerative changes.    --- End of Report ---          < end of copied text >

## 2025-02-25 NOTE — PROGRESS NOTE ADULT - PROBLEM SELECTOR PLAN 6
DVT prophylaxis: heparin 500mg Q8h  Diet: Consistent carb diet; 1500mL fluid restriction  Dispo: PT rec [facility] DVT prophylaxis: heparin 500mg Q8h  Diet: Regular diet; 1500mL fluid restriction      Dispo: PT rec [facility]

## 2025-02-25 NOTE — PROGRESS NOTE ADULT - PROBLEM SELECTOR PLAN 4
Pt tested positive for C. diff colitis at [facility]. Started on oral vancomycin  - Patient s/p 2 weeks oral vancomycin, with diarrhea initially resolving  - Patient redeveloped diarrhea 2/24. Recurrence of c. diff vs lactulose-induced diarrhea    Plan:   Restart PO vanc 125mg q6h (2/24 - )  Hold lactulose for now Recently diagnosed per patient. Per prior d/c note taking 15U Lantus every night  - A1c 4.6    Plan:  Monitor off insulin  Sugars well controlled

## 2025-02-25 NOTE — CHART NOTE - NSCHARTNOTEFT_GEN_A_CORE
case discussed between Dr. Lainez and Dr. Zelaya  - will plan to obtain further imaging in a few days to re-evaluate collection to determine risk vs benefit of drainage  - reconsult when imaging obtained

## 2025-02-25 NOTE — PROGRESS NOTE ADULT - PROBLEM SELECTOR PLAN 2
C/b history of hepatic encephalopathy and esophageal varices  - Patient tx from [facility] for transplant evaluation -- PETH found to be elevated, further transplant workup being deferred iso elevated PETH -- outpatient RPP program for psychosocial issues  - s/p multiple paracentesis, most recent on 2/20 w/ 1.4L removed  - Transplant hepatology following    Plan:  Continue on Lasix 80mg and Spironolactone 150mg qd  Continue rifaximin  Holding lactulose iso excess BMs, c/f c. diff(?) for now

## 2025-02-25 NOTE — PROGRESS NOTE ADULT - SUBJECTIVE AND OBJECTIVE BOX
PROGRESS NOTE:     Patient is a 59y old  Female who presents with a chief complaint of Transfer from [place] for possible liver transplant (24 Feb 2025 11:11)      SUBJECTIVE / OVERNIGHT EVENTS:    OVERNIGHT: No acute overnight events.      Patient was examined at bedside and feels well this morning. Denies fever, chills, chest pain, SOB, nausea, vomiting. ROS otherwise negative and pt is amenable to current treatment plan.      REVIEW OF SYSTEMS:    CONSTITUTIONAL:  No weakness, fevers, or chills  EYES/ENT:  No visual changes, vertigo, or throat pain   NECK:  No pain or stiffness  RESPIRATORY:  No SOB, cough, wheezing, or hemoptysis  CARDIOVASCULAR:  No chest pain or palpitations  GASTROINTESTINAL:  No abdominal pain, nausea, vomiting, or hematemesis; No diarrhea or constipation; No melena or hematochezia.  GENITOURINARY:  No dysuria, change in frequency, or hematuria  NEUROLOGICAL:  No numbness or weakness  SKIN:  No itching or rashes      MEDICATIONS  (STANDING):  albumin human 25% IVPB 100 milliLiter(s) IV Intermittent every 4 hours  Biotene Dry Mouth Oral Rinse 15 milliLiter(s) Swish and Spit five times a day  cefTRIAXone   IVPB 2000 milliGRAM(s) IV Intermittent every 24 hours  chlorhexidine 2% Cloths 1 Application(s) Topical <User Schedule>  folic acid 1 milliGRAM(s) Oral daily  furosemide    Tablet 80 milliGRAM(s) Oral daily  heparin   Injectable 5000 Unit(s) SubCutaneous every 8 hours  lactulose Syrup 10 Gram(s) Oral daily  magnesium oxide 400 milliGRAM(s) Oral three times a day with meals  melatonin 3 milliGRAM(s) Oral at bedtime  metroNIDAZOLE  IVPB      metroNIDAZOLE  IVPB 500 milliGRAM(s) IV Intermittent every 12 hours  midodrine. 15 milliGRAM(s) Oral three times a day  mirtazapine 7.5 milliGRAM(s) Oral at bedtime  pantoprazole    Tablet 40 milliGRAM(s) Oral before breakfast  potassium chloride    Tablet ER 40 milliEquivalent(s) Oral daily  rifAXIMin 550 milliGRAM(s) Oral two times a day  sodium chloride 1 Gram(s) Oral daily  spironolactone 150 milliGRAM(s) Oral daily  sucralfate suspension 1 Gram(s) Oral every 6 hours  thiamine 100 milliGRAM(s) Oral daily  vancomycin    Solution 125 milliGRAM(s) Oral every 6 hours  vancomycin  IVPB 1000 milliGRAM(s) IV Intermittent every 12 hours    MEDICATIONS  (PRN):  albumin human 25% IVPB 50 milliLiter(s) IV Intermittent once PRN para albumin replacement  albuterol/ipratropium for Nebulization 3 milliLiter(s) Nebulizer every 6 hours PRN Shortness of Breath and/or Wheezing  guaiFENesin Oral Liquid (Sugar-Free) 100 milliGRAM(s) Oral every 6 hours PRN Cough      CAPILLARY BLOOD GLUCOSE        I&O's Summary      PHYSICAL EXAM:  Vital Signs Last 24 Hrs  T(C): 36.7 (25 Feb 2025 05:00), Max: 36.8 (24 Feb 2025 20:40)  T(F): 98.1 (25 Feb 2025 05:00), Max: 98.3 (24 Feb 2025 20:40)  HR: 113 (25 Feb 2025 05:00) (103 - 114)  BP: 96/50 (25 Feb 2025 05:00) (96/50 - 109/55)  BP(mean): --  RR: 18 (25 Feb 2025 05:00) (17 - 18)  SpO2: 96% (25 Feb 2025 05:00) (96% - 97%)    Parameters below as of 25 Feb 2025 05:00  Patient On (Oxygen Delivery Method): room air        CONSTITUTIONAL: NAD; well-developed  HEENT: PERRL, clear conjunctiva  RESPIRATORY: Normal respiratory effort; lungs are clear to auscultation bilaterally; No crackles/rhonchi/wheezing  CARDIOVASCULAR: Regular rate and rhythm, normal S1 and S2, no murmur/rub/gallop; No lower extremity edema; Peripheral pulses are 2+ bilaterally  ABDOMEN: Nontender to palpation, normoactive bowel sounds, no rebound/guarding; No hepatosplenomegaly  MUSCULOSKELETAL: No clubbing or cyanosis of digits; no joint swelling or tenderness to palpation  EXTREMITY: Lower extremities non-tender to palpation; non-erythematous B/L  NEURO: A&Ox3; no focal deficits   PSYCH: Normal mood; affect appropriate    LABS:                        8.2    10.37 )-----------( 141      ( 24 Feb 2025 06:54 )             24.9     02-24    125[L]  |  94[L]  |  10  ----------------------------<  95  4.5   |  17[L]  |  0.35[L]    Ca    9.6      24 Feb 2025 06:53  Phos  3.3     02-24  Mg     1.7     02-24            Urinalysis Basic - ( 24 Feb 2025 06:53 )    Color: x / Appearance: x / SG: x / pH: x  Gluc: 95 mg/dL / Ketone: x  / Bili: x / Urobili: x   Blood: x / Protein: x / Nitrite: x   Leuk Esterase: x / RBC: x / WBC x   Sq Epi: x / Non Sq Epi: x / Bacteria: x        Culture - Blood (collected 22 Feb 2025 13:15)  Source: .Blood Blood-Peripheral  Preliminary Report (24 Feb 2025 16:01):    No growth at 48 Hours    Culture - Blood (collected 22 Feb 2025 12:55)  Source: .Blood Blood-Peripheral  Preliminary Report (24 Feb 2025 16:01):    No growth at 48 Hours        RADIOLOGY & ADDITIONAL TESTS:    N/A PROGRESS NOTE:     Patient is a 59y old  Female who presents with a chief complaint of Transfer from [place] for possible liver transplant.    SUBJECTIVE / OVERNIGHT EVENTS:    OVERNIGHT: Patient woke up around 1am last night with 10/10 pain. Patient was given tylenol and had bowel movement which helped relieve pain. Continues to have some generalized abdominal discomfort 4/10 in intensity  Patient was examined at bedside. Denies fever, chills, chest pain, SOB, nausea, vomiting. ROS otherwise negative and pt is amenable to current treatment plan.      REVIEW OF SYSTEMS:    CONSTITUTIONAL:  No weakness, fevers, or chills  EYES/ENT:  No visual changes, vertigo, or throat pain   NECK:  No pain or stiffness  RESPIRATORY:  No SOB, cough, wheezing, or hemoptysis  CARDIOVASCULAR:  No chest pain or palpitations  GASTROINTESTINAL:  No abdominal pain, nausea, vomiting, or hematemesis; No diarrhea or constipation; No melena or hematochezia.  GENITOURINARY:  No dysuria, change in frequency, or hematuria  NEUROLOGICAL:  No numbness or weakness  SKIN:  Generalized itchiness      MEDICATIONS  (STANDING):  albumin human 25% IVPB 100 milliLiter(s) IV Intermittent every 4 hours  Biotene Dry Mouth Oral Rinse 15 milliLiter(s) Swish and Spit five times a day  cefTRIAXone   IVPB 2000 milliGRAM(s) IV Intermittent every 24 hours  chlorhexidine 2% Cloths 1 Application(s) Topical <User Schedule>  folic acid 1 milliGRAM(s) Oral daily  furosemide    Tablet 80 milliGRAM(s) Oral daily  heparin   Injectable 5000 Unit(s) SubCutaneous every 8 hours  lactulose Syrup 10 Gram(s) Oral daily  magnesium oxide 400 milliGRAM(s) Oral three times a day with meals  melatonin 3 milliGRAM(s) Oral at bedtime  metroNIDAZOLE  IVPB      metroNIDAZOLE  IVPB 500 milliGRAM(s) IV Intermittent every 12 hours  midodrine. 15 milliGRAM(s) Oral three times a day  mirtazapine 7.5 milliGRAM(s) Oral at bedtime  pantoprazole    Tablet 40 milliGRAM(s) Oral before breakfast  potassium chloride    Tablet ER 40 milliEquivalent(s) Oral daily  rifAXIMin 550 milliGRAM(s) Oral two times a day  sodium chloride 1 Gram(s) Oral daily  spironolactone 150 milliGRAM(s) Oral daily  sucralfate suspension 1 Gram(s) Oral every 6 hours  thiamine 100 milliGRAM(s) Oral daily  vancomycin    Solution 125 milliGRAM(s) Oral every 6 hours  vancomycin  IVPB 1000 milliGRAM(s) IV Intermittent every 12 hours    MEDICATIONS  (PRN):  albumin human 25% IVPB 50 milliLiter(s) IV Intermittent once PRN para albumin replacement  albuterol/ipratropium for Nebulization 3 milliLiter(s) Nebulizer every 6 hours PRN Shortness of Breath and/or Wheezing  guaiFENesin Oral Liquid (Sugar-Free) 100 milliGRAM(s) Oral every 6 hours PRN Cough      CAPILLARY BLOOD GLUCOSE        I&O's Summary      PHYSICAL EXAM:  Vital Signs Last 24 Hrs  T(C): 36.7 (25 Feb 2025 05:00), Max: 36.8 (24 Feb 2025 20:40)  T(F): 98.1 (25 Feb 2025 05:00), Max: 98.3 (24 Feb 2025 20:40)  HR: 113 (25 Feb 2025 05:00) (103 - 114)  BP: 96/50 (25 Feb 2025 05:00) (96/50 - 109/55)  BP(mean): --  RR: 18 (25 Feb 2025 05:00) (17 - 18)  SpO2: 96% (25 Feb 2025 05:00) (96% - 97%)    Parameters below as of 25 Feb 2025 05:00  Patient On (Oxygen Delivery Method): room air      CONSTITUTIONAL: NAD; jaundiced  HEENT: PERRLA, clear conjunctiva, EOMI, scleral icterus  RESPIRATORY: Normal respiratory effort; lungs are clear to auscultation bilaterally; No crackles/rhonchi/wheezing  CARDIOVASCULAR: Regular rate and rhythm, normal S1 and S2, no murmur/rub/gallop; No lower extremity edema; Peripheral pulses are 2+ bilaterally  ABDOMEN: Distended, Nontender to palpation, normoactive bowel sounds, no rebound/guarding; +Hepatosplenomegaly  MUSCULOSKELETAL: No clubbing or cyanosis of digits; no joint swelling or tenderness to palpation  EXTREMITY: Lower extremities non-tender to palpation; non-erythematous B/L  NEURO: A&Ox3; no focal deficits   PSYCH: Normal mood; affect appropriate      LABS:                        8.2    10.37 )-----------( 141      ( 24 Feb 2025 06:54 )             24.9     02-24    125[L]  |  94[L]  |  10  ----------------------------<  95  4.5   |  17[L]  |  0.35[L]    Ca    9.6      24 Feb 2025 06:53  Phos  3.3     02-24  Mg     1.7     02-24            Urinalysis Basic - ( 24 Feb 2025 06:53 )    Color: x / Appearance: x / SG: x / pH: x  Gluc: 95 mg/dL / Ketone: x  / Bili: x / Urobili: x   Blood: x / Protein: x / Nitrite: x   Leuk Esterase: x / RBC: x / WBC x   Sq Epi: x / Non Sq Epi: x / Bacteria: x        Culture - Blood (collected 22 Feb 2025 13:15)  Source: .Blood Blood-Peripheral  Preliminary Report (24 Feb 2025 16:01):    No growth at 48 Hours    Culture - Blood (collected 22 Feb 2025 12:55)  Source: .Blood Blood-Peripheral  Preliminary Report (24 Feb 2025 16:01):    No growth at 48 Hours        RADIOLOGY & ADDITIONAL TESTS:    N/A

## 2025-02-25 NOTE — PROGRESS NOTE ADULT - REASON FOR ADMISSION
Transfer from [place] for possible liver transplant Transfer from [place] for liver transplant evaluation

## 2025-02-25 NOTE — PROGRESS NOTE ADULT - PROBLEM SELECTOR PLAN 1
Patient febrile in afternoon 2/22  CT abdomen on 2/23 with 3.7 cm rim-enhancing fluid collection anterior to the right psoas muscle in the pelvis as well as a more heterogeneous enhancing structure anterior and medial to this collection measuring 2.7 cm c/f bilobed abscess. IR prefers to defer intervention for now given significant surrounding vasculature. Will f/u with ID  - TTE 2/12 & 2/24 no evidence valve vegetations  - Bcx NGTD from 2/22  - MR TLS performed, f/u read    Plan:  Vanc, ceftriaxone, flagyl  Trend CBC Patient febrile in afternoon 2/22  CT abdomen on 2/23 with 3.7 cm rim-enhancing fluid collection anterior to the right psoas muscle in the pelvis as well as a more heterogeneous enhancing structure anterior and medial to this collection measuring 2.7 cm c/f bilobed abscess. IR prefers to defer intervention for now given significant surrounding vasculature. Will f/u with ID  - TTE 2/12 & 2/24 no evidence valve vegetations  - Bcx NGTD from 2/22  - MR TLSL: redemonstrates abscess, no evidence of extension of disease elsewhere or OM    Plan:  IR to assess for possible drainage  IV Vanc, ceftriaxone, flagyl Patient febrile in afternoon 2/22  CT abdomen on 2/23 with 3.7 cm rim-enhancing fluid collection anterior to the right psoas muscle in the pelvis as well as a more heterogeneous enhancing structure anterior and medial to this collection measuring 2.7 cm c/f bilobed abscess. IR prefers to defer intervention for now given significant surrounding vasculature. Will f/u with ID  - TTE 2/12 & 2/24 no evidence valve vegetations  - Bcx NGTD from 2/22  - MR TLSL: redemonstrates abscess, no evidence of extension of disease elsewhere or OM    Plan:  IR to assess for possible drainage -- f/u TEG for bleeding risk  IV Vanc, ceftriaxone, flagyl

## 2025-02-25 NOTE — PROGRESS NOTE ADULT - ASSESSMENT
60yo F w/ PMH of alcohol cirrhosis with recurrent ascites, s/p multiple paracentesis, s/p banding of esophageal varices, and s/p tx for recent c. diff colitis and possible endocarditis. Pt is jaundiced, with scleral icterus and abdominal distension presenting for liver transplant evaluation from [facility].  60yo F w/ PMH of alcohol cirrhosis with recurrent ascites, s/p multiple paracentesis, s/p banding of esophageal varices, and s/p tx for recent c. diff colitis and possible endocarditis, presenting as a transfer from [facility] for liver transplant evaluation. Patient not currently listed for transplant 2/2 positive PETH and requiring psychosocial clearance prior to possible transplant. Now found to have psoas muscle abscess, pending possible drainage with IR.

## 2025-02-25 NOTE — PROGRESS NOTE ADULT - PROBLEM SELECTOR PLAN 3
Suspicions of IE given E. coli and bacteroides bacteremia. Pt with evidence of mitral valve vegetation on TTE 1/2025; not confirmed with ARACELI due to concerns regarding esophageal varices.   -Likely in setting of recent dental extraction in December  - Repeat TTE 2/12 w/o evidence of vegetations    Plan:   Abx as above for possible SBP and IE given fever of unclear origin Pt tested positive for C. diff colitis at [facility]. Started on oral vancomycin  - Patient s/p 2 weeks oral vancomycin, with diarrhea initially resolving  - Patient redeveloped diarrhea 2/24. Recurrence of c. diff vs lactulose-induced diarrhea    Plan:   Restart PO vanc 125mg q6h (2/24 - )  Hold lactulose for now  Stool count, goal 3-4 BMs daily

## 2025-02-26 DIAGNOSIS — I95.9 HYPOTENSION, UNSPECIFIED: ICD-10-CM

## 2025-02-26 LAB
ALBUMIN SERPL ELPH-MCNC: 1.7 G/DL — LOW (ref 3.3–5)
ALBUMIN SERPL ELPH-MCNC: 3.6 G/DL — SIGNIFICANT CHANGE UP (ref 3.3–5)
ALP SERPL-CCNC: 166 U/L — HIGH (ref 40–120)
ALP SERPL-CCNC: 376 U/L — HIGH (ref 40–120)
ALT FLD-CCNC: 15 U/L — SIGNIFICANT CHANGE UP (ref 10–45)
ALT FLD-CCNC: 31 U/L — SIGNIFICANT CHANGE UP (ref 10–45)
ANION GAP SERPL CALC-SCNC: 13 MMOL/L — SIGNIFICANT CHANGE UP (ref 5–17)
ANION GAP SERPL CALC-SCNC: 9 MMOL/L — SIGNIFICANT CHANGE UP (ref 5–17)
APTT BLD: 54.5 SEC — HIGH (ref 24.5–35.6)
AST SERPL-CCNC: 38 U/L — SIGNIFICANT CHANGE UP (ref 10–40)
AST SERPL-CCNC: 91 U/L — HIGH (ref 10–40)
BILIRUB SERPL-MCNC: 4.3 MG/DL — HIGH (ref 0.2–1.2)
BILIRUB SERPL-MCNC: 9.4 MG/DL — HIGH (ref 0.2–1.2)
BUN SERPL-MCNC: 16 MG/DL — SIGNIFICANT CHANGE UP (ref 7–23)
BUN SERPL-MCNC: 9 MG/DL — SIGNIFICANT CHANGE UP (ref 7–23)
CALCIUM SERPL-MCNC: 4.5 MG/DL — CRITICAL LOW (ref 8.4–10.5)
CALCIUM SERPL-MCNC: 9.4 MG/DL — SIGNIFICANT CHANGE UP (ref 8.4–10.5)
CHLORIDE SERPL-SCNC: 93 MMOL/L — LOW (ref 96–108)
CHLORIDE SERPL-SCNC: <70 MMOL/L — LOW (ref 96–108)
CO2 SERPL-SCNC: 19 MMOL/L — LOW (ref 22–31)
CO2 SERPL-SCNC: <10 MMOL/L — CRITICAL LOW (ref 22–31)
CREAT SERPL-MCNC: 0.46 MG/DL — LOW (ref 0.5–1.3)
CREAT SERPL-MCNC: <0.3 MG/DL — LOW (ref 0.5–1.3)
EGFR: 109 ML/MIN/1.73M2 — SIGNIFICANT CHANGE UP
EGFR: 109 ML/MIN/1.73M2 — SIGNIFICANT CHANGE UP
EGFR: 121 ML/MIN/1.73M2 — SIGNIFICANT CHANGE UP
EGFR: 121 ML/MIN/1.73M2 — SIGNIFICANT CHANGE UP
GLUCOSE SERPL-MCNC: 101 MG/DL — HIGH (ref 70–99)
GLUCOSE SERPL-MCNC: 102 MG/DL — HIGH (ref 70–99)
HCT VFR BLD CALC: 15.5 % — CRITICAL LOW (ref 34.5–45)
HCT VFR BLD CALC: 25.7 % — LOW (ref 34.5–45)
HGB BLD-MCNC: 5 G/DL — CRITICAL LOW (ref 11.5–15.5)
HGB BLD-MCNC: 8.4 G/DL — LOW (ref 11.5–15.5)
INR BLD: 2.68 RATIO — HIGH (ref 0.85–1.16)
MAGNESIUM SERPL-MCNC: 0.7 MG/DL — CRITICAL LOW (ref 1.6–2.6)
MAGNESIUM SERPL-MCNC: 1.5 MG/DL — LOW (ref 1.6–2.6)
MCHC RBC-ENTMCNC: 30.8 PG — SIGNIFICANT CHANGE UP (ref 27–34)
MCHC RBC-ENTMCNC: 31.4 PG — SIGNIFICANT CHANGE UP (ref 27–34)
MCHC RBC-ENTMCNC: 32.3 G/DL — SIGNIFICANT CHANGE UP (ref 32–36)
MCHC RBC-ENTMCNC: 32.7 G/DL — SIGNIFICANT CHANGE UP (ref 32–36)
MCV RBC AUTO: 94.1 FL — SIGNIFICANT CHANGE UP (ref 80–100)
MCV RBC AUTO: 97.5 FL — SIGNIFICANT CHANGE UP (ref 80–100)
NRBC BLD AUTO-RTO: 0 /100 WBCS — SIGNIFICANT CHANGE UP (ref 0–0)
NRBC BLD AUTO-RTO: 0 /100 WBCS — SIGNIFICANT CHANGE UP (ref 0–0)
PHOSPHATE SERPL-MCNC: 2.2 MG/DL — LOW (ref 2.5–4.5)
PHOSPHATE SERPL-MCNC: 3.4 MG/DL — SIGNIFICANT CHANGE UP (ref 2.5–4.5)
PLATELET # BLD AUTO: 119 K/UL — LOW (ref 150–400)
PLATELET # BLD AUTO: 89 K/UL — LOW (ref 150–400)
POTASSIUM SERPL-MCNC: 2.3 MMOL/L — CRITICAL LOW (ref 3.5–5.3)
POTASSIUM SERPL-MCNC: 4 MMOL/L — SIGNIFICANT CHANGE UP (ref 3.5–5.3)
POTASSIUM SERPL-SCNC: 2.3 MMOL/L — CRITICAL LOW (ref 3.5–5.3)
POTASSIUM SERPL-SCNC: 4 MMOL/L — SIGNIFICANT CHANGE UP (ref 3.5–5.3)
PROT SERPL-MCNC: 3.9 G/DL — LOW (ref 6–8.3)
PROT SERPL-MCNC: 8.1 G/DL — SIGNIFICANT CHANGE UP (ref 6–8.3)
PROTHROM AB SERPL-ACNC: 30.3 SEC — HIGH (ref 9.9–13.4)
RBC # BLD: 1.59 M/UL — LOW (ref 3.8–5.2)
RBC # BLD: 2.73 M/UL — LOW (ref 3.8–5.2)
RBC # FLD: 19.9 % — HIGH (ref 10.3–14.5)
RBC # FLD: 20.3 % — HIGH (ref 10.3–14.5)
SODIUM SERPL-SCNC: 125 MMOL/L — LOW (ref 135–145)
SODIUM SERPL-SCNC: <85 MMOL/L — CRITICAL LOW (ref 135–145)
VANCOMYCIN TROUGH SERPL-MCNC: 20.3 UG/ML — HIGH (ref 10–20)
WBC # BLD: 5.09 K/UL — SIGNIFICANT CHANGE UP (ref 3.8–10.5)
WBC # BLD: 8.49 K/UL — SIGNIFICANT CHANGE UP (ref 3.8–10.5)
WBC # FLD AUTO: 5.09 K/UL — SIGNIFICANT CHANGE UP (ref 3.8–10.5)
WBC # FLD AUTO: 8.49 K/UL — SIGNIFICANT CHANGE UP (ref 3.8–10.5)

## 2025-02-26 PROCEDURE — 99233 SBSQ HOSP IP/OBS HIGH 50: CPT | Mod: GC

## 2025-02-26 RX ORDER — ACETAMINOPHEN 500 MG/5ML
1000 LIQUID (ML) ORAL ONCE
Refills: 0 | Status: COMPLETED | OUTPATIENT
Start: 2025-02-26 | End: 2025-02-26

## 2025-02-26 RX ORDER — URSODIOL 300 MG/1
300 CAPSULE ORAL EVERY 12 HOURS
Refills: 0 | Status: DISCONTINUED | OUTPATIENT
Start: 2025-02-26 | End: 2025-03-05

## 2025-02-26 RX ORDER — MAGNESIUM SULFATE 500 MG/ML
2 SYRINGE (ML) INJECTION ONCE
Refills: 0 | Status: COMPLETED | OUTPATIENT
Start: 2025-02-26 | End: 2025-02-26

## 2025-02-26 RX ORDER — HYDROXYZINE HYDROCHLORIDE 25 MG/1
25 TABLET, FILM COATED ORAL ONCE
Refills: 0 | Status: COMPLETED | OUTPATIENT
Start: 2025-02-26 | End: 2025-02-26

## 2025-02-26 RX ADMIN — Medication 1 APPLICATION(S): at 05:38

## 2025-02-26 RX ADMIN — Medication 15 MILLILITER(S): at 18:32

## 2025-02-26 RX ADMIN — Medication 25 GRAM(S): at 15:01

## 2025-02-26 RX ADMIN — CEFTRIAXONE 100 MILLIGRAM(S): 500 INJECTION, POWDER, FOR SOLUTION INTRAMUSCULAR; INTRAVENOUS at 18:28

## 2025-02-26 RX ADMIN — Medication 1 GRAM(S): at 05:37

## 2025-02-26 RX ADMIN — Medication 40 MILLIGRAM(S): at 05:54

## 2025-02-26 RX ADMIN — Medication 125 MILLIGRAM(S): at 05:37

## 2025-02-26 RX ADMIN — MIDODRINE HYDROCHLORIDE 15 MILLIGRAM(S): 5 TABLET ORAL at 18:28

## 2025-02-26 RX ADMIN — Medication 250 MILLIGRAM(S): at 05:39

## 2025-02-26 RX ADMIN — URSODIOL 300 MILLIGRAM(S): 300 CAPSULE ORAL at 19:19

## 2025-02-26 RX ADMIN — Medication 250 MILLIGRAM(S): at 19:46

## 2025-02-26 RX ADMIN — HYDROXYZINE HYDROCHLORIDE 25 MILLIGRAM(S): 25 TABLET, FILM COATED ORAL at 00:48

## 2025-02-26 RX ADMIN — Medication 100 MILLIGRAM(S): at 20:53

## 2025-02-26 RX ADMIN — HEPARIN SODIUM 5000 UNIT(S): 1000 INJECTION INTRAVENOUS; SUBCUTANEOUS at 21:03

## 2025-02-26 RX ADMIN — MIDODRINE HYDROCHLORIDE 15 MILLIGRAM(S): 5 TABLET ORAL at 05:38

## 2025-02-26 RX ADMIN — Medication 400 MILLIGRAM(S): at 04:41

## 2025-02-26 RX ADMIN — Medication 15 MILLILITER(S): at 23:16

## 2025-02-26 RX ADMIN — Medication 125 MILLIGRAM(S): at 23:25

## 2025-02-26 RX ADMIN — Medication 15 MILLILITER(S): at 11:37

## 2025-02-26 RX ADMIN — HEPARIN SODIUM 5000 UNIT(S): 1000 INJECTION INTRAVENOUS; SUBCUTANEOUS at 15:01

## 2025-02-26 RX ADMIN — Medication 125 MILLIGRAM(S): at 19:19

## 2025-02-26 RX ADMIN — MIDODRINE HYDROCHLORIDE 15 MILLIGRAM(S): 5 TABLET ORAL at 11:38

## 2025-02-26 RX ADMIN — Medication 1 GRAM(S): at 11:38

## 2025-02-26 RX ADMIN — FOLIC ACID 1 MILLIGRAM(S): 1 TABLET ORAL at 11:38

## 2025-02-26 RX ADMIN — FUROSEMIDE 80 MILLIGRAM(S): 10 INJECTION INTRAMUSCULAR; INTRAVENOUS at 05:49

## 2025-02-26 RX ADMIN — Medication 400 MILLIGRAM(S): at 09:14

## 2025-02-26 RX ADMIN — Medication 400 MILLIGRAM(S): at 18:28

## 2025-02-26 RX ADMIN — Medication 150 MILLIGRAM(S): at 05:38

## 2025-02-26 RX ADMIN — MIRTAZAPINE 7.5 MILLIGRAM(S): 30 TABLET, FILM COATED ORAL at 21:31

## 2025-02-26 RX ADMIN — Medication 3 MILLIGRAM(S): at 21:31

## 2025-02-26 RX ADMIN — Medication 125 MILLIGRAM(S): at 11:46

## 2025-02-26 RX ADMIN — Medication 100 MILLIGRAM(S): at 10:41

## 2025-02-26 RX ADMIN — Medication 1 GRAM(S): at 18:28

## 2025-02-26 RX ADMIN — Medication 15 MILLILITER(S): at 20:01

## 2025-02-26 RX ADMIN — Medication 15 MILLILITER(S): at 09:14

## 2025-02-26 RX ADMIN — Medication 40 MILLIEQUIVALENT(S): at 11:42

## 2025-02-26 RX ADMIN — Medication 1 GRAM(S): at 11:37

## 2025-02-26 RX ADMIN — Medication 1 GRAM(S): at 23:16

## 2025-02-26 RX ADMIN — Medication 100 MILLIGRAM(S): at 11:38

## 2025-02-26 RX ADMIN — Medication 400 MILLIGRAM(S): at 11:38

## 2025-02-26 NOTE — PROGRESS NOTE ADULT - PROBLEM SELECTOR PLAN 6
Suspicions of IE given E. coli and bacteroides bacteremia. Pt with evidence of mitral valve vegetation on TTE 1/2025; not confirmed with ARACELI due to concerns regarding esophageal varices.   -Likely in setting of recent dental extraction in December  - Repeat TTE 2/12 & 2/24 w/o evidence of vegetations    Plan:   Abx as above for psoas muscle abscess, unlikely IE

## 2025-02-26 NOTE — PROGRESS NOTE ADULT - PROBLEM SELECTOR PLAN 2
C/b history of hepatic encephalopathy and esophageal varices  - Patient tx from [facility] for transplant evaluation -- PETH found to be elevated, further transplant workup being deferred iso elevated PETH -- outpatient RPP program for psychosocial issues  - s/p multiple paracentesis, most recent on 2/20 w/ 1.4L removed  - Transplant hepatology following    Plan:  Continue on Lasix 80mg and Spironolactone 150mg qd  Continue rifaximin  Holding lactulose iso excess BMs, c/f c. diff(?) for now  F/u TEG for bleeding risk  PRN cholesytramine, atarax for generalized itch related to bile salt accumulation

## 2025-02-26 NOTE — PROGRESS NOTE ADULT - PROBLEM SELECTOR PLAN 3
Pt tested positive for C. diff colitis at [facility]. Started on oral vancomycin  - Patient s/p 2 weeks oral vancomycin, with diarrhea initially resolving  - Patient redeveloped diarrhea 2/24. Recurrence of c. diff vs lactulose-induced diarrhea    Plan:   Restart PO vanc 125mg q6h (2/24 - )  Hold lactulose for now  Stool count, goal 3-4 BMs daily

## 2025-02-26 NOTE — PROGRESS NOTE ADULT - SUBJECTIVE AND OBJECTIVE BOX
PROGRESS NOTE:     Patient is a 59y old  Female who presents with a chief complaint of Transfer from [place] for possible liver transplant.      SUBJECTIVE / OVERNIGHT EVENTS:    OVERNIGHT: No acute overnight events. Patient continues to have generalized itching. Given cholestyramine and atarax without significant improvement in symptoms  Patient was examined at bedside. Denies fever, chills, chest pain, SOB, nausea, vomiting. ROS otherwise negative and pt is amenable to current treatment plan.      REVIEW OF SYSTEMS:    CONSTITUTIONAL:  No weakness, fevers, or chills  EYES/ENT:  No visual changes, vertigo, or throat pain   NECK:  No pain or stiffness  RESPIRATORY:  No SOB, cough, wheezing, or hemoptysis  CARDIOVASCULAR:  No chest pain or palpitations  GASTROINTESTINAL:  No abdominal pain, nausea, vomiting, or hematemesis; No diarrhea or constipation; No melena or hematochezia.  GENITOURINARY:  No dysuria, change in frequency, or hematuria  NEUROLOGICAL:  No numbness or weakness  SKIN:  No itching or rashes      MEDICATIONS  (STANDING):  albumin human 25% IVPB 100 milliLiter(s) IV Intermittent every 4 hours  Biotene Dry Mouth Oral Rinse 15 milliLiter(s) Swish and Spit five times a day  cefTRIAXone   IVPB 2000 milliGRAM(s) IV Intermittent every 24 hours  chlorhexidine 2% Cloths 1 Application(s) Topical <User Schedule>  folic acid 1 milliGRAM(s) Oral daily  furosemide    Tablet 80 milliGRAM(s) Oral daily  heparin   Injectable 5000 Unit(s) SubCutaneous every 8 hours  magnesium oxide 400 milliGRAM(s) Oral three times a day with meals  melatonin 3 milliGRAM(s) Oral at bedtime  metroNIDAZOLE  IVPB      metroNIDAZOLE  IVPB 500 milliGRAM(s) IV Intermittent every 12 hours  midodrine. 15 milliGRAM(s) Oral three times a day  mirtazapine 7.5 milliGRAM(s) Oral at bedtime  pantoprazole    Tablet 40 milliGRAM(s) Oral before breakfast  potassium chloride    Tablet ER 40 milliEquivalent(s) Oral daily  rifAXIMin 550 milliGRAM(s) Oral two times a day  sodium chloride 1 Gram(s) Oral daily  spironolactone 150 milliGRAM(s) Oral daily  sucralfate suspension 1 Gram(s) Oral every 6 hours  thiamine 100 milliGRAM(s) Oral daily  vancomycin    Solution 125 milliGRAM(s) Oral every 6 hours  vancomycin  IVPB 1000 milliGRAM(s) IV Intermittent every 12 hours    MEDICATIONS  (PRN):  albumin human 25% IVPB 50 milliLiter(s) IV Intermittent once PRN para albumin replacement  albuterol/ipratropium for Nebulization 3 milliLiter(s) Nebulizer every 6 hours PRN Shortness of Breath and/or Wheezing  guaiFENesin Oral Liquid (Sugar-Free) 100 milliGRAM(s) Oral every 6 hours PRN Cough      CAPILLARY BLOOD GLUCOSE        I&O's Summary      PHYSICAL EXAM:  Vital Signs Last 24 Hrs  T(C): 36.7 (26 Feb 2025 05:34), Max: 37.2 (25 Feb 2025 21:25)  T(F): 98 (26 Feb 2025 05:34), Max: 98.9 (25 Feb 2025 21:25)  HR: 102 (26 Feb 2025 05:34) (102 - 111)  BP: 103/56 (26 Feb 2025 05:34) (101/59 - 103/63)  BP(mean): --  RR: 18 (26 Feb 2025 05:34) (18 - 18)  SpO2: 96% (26 Feb 2025 05:34) (94% - 97%)    Parameters below as of 26 Feb 2025 05:34  Patient On (Oxygen Delivery Method): room air      CONSTITUTIONAL: NAD; jaundiced  HEENT: PERRLA, clear conjunctiva, EOMI, scleral icterus  RESPIRATORY: Normal respiratory effort; lungs are clear to auscultation bilaterally; No crackles/rhonchi/wheezing  CARDIOVASCULAR: Regular rate and rhythm, normal S1 and S2, no murmur/rub/gallop; No lower extremity edema; Peripheral pulses are 2+ bilaterally  ABDOMEN: Distended, Nontender to palpation, normoactive bowel sounds, no rebound/guarding; +Hepatosplenomegaly  MUSCULOSKELETAL: No clubbing or cyanosis of digits; no joint swelling or tenderness to palpation  EXTREMITY: Lower extremities non-tender to palpation; non-erythematous B/L  NEURO: A&Ox3; no focal deficits   PSYCH: Normal mood; affect appropriate    LABS:                        7.9    9.84  )-----------( 131      ( 25 Feb 2025 07:40 )             23.9     02-25    126[L]  |  95[L]  |  22  ----------------------------<  108[H]  4.6   |  17[L]  |  0.43[L]    Ca    9.5      25 Feb 2025 07:38  Phos  3.1     02-25  Mg     1.5     02-25    TPro  x   /  Alb  x   /  TBili  9.8[H]  /  DBili  5.8[H]  /  AST  x   /  ALT  x   /  AlkPhos  x   02-25    PT/INR - ( 25 Feb 2025 07:40 )   PT: 20.9 sec;   INR: 1.84 ratio         PTT - ( 25 Feb 2025 07:40 )  PTT:48.6 sec      Urinalysis Basic - ( 25 Feb 2025 07:38 )    Color: x / Appearance: x / SG: x / pH: x  Gluc: 108 mg/dL / Ketone: x  / Bili: x / Urobili: x   Blood: x / Protein: x / Nitrite: x   Leuk Esterase: x / RBC: x / WBC x   Sq Epi: x / Non Sq Epi: x / Bacteria: x          RADIOLOGY & ADDITIONAL TESTS:    N/A

## 2025-02-26 NOTE — PROGRESS NOTE ADULT - PROBLEM SELECTOR PLAN 1
Patient febrile in afternoon 2/22  CT abdomen on 2/23 with 3.7 cm rim-enhancing fluid collection anterior to the right psoas muscle in the pelvis as well as a more heterogeneous enhancing structure anterior and medial to this collection measuring 2.7 cm c/f bilobed abscess. IR prefers to defer intervention for now given significant surrounding vasculature. Will f/u with ID  - TTE 2/12 & 2/24 no evidence valve vegetations  - Bcx NGTD from 2/22  - MR TLSL: redemonstrates abscess, no evidence of extension of disease elsewhere or OM  - IR consulted, high-risk of bleed for aspiration, plan to monitor on abx and rescan to assess at later date    Plan:  IV Vanc, ceftriaxone, flagyl

## 2025-02-26 NOTE — PROGRESS NOTE ADULT - ASSESSMENT
58yo F w/ PMH of alcohol cirrhosis with recurrent ascites, s/p multiple paracentesis, s/p banding of esophageal varices, and s/p tx for recent c. diff colitis and possible endocarditis, presenting as a transfer from [facility] for liver transplant evaluation. Patient not currently listed for transplant 2/2 positive PETH and requiring psychosocial clearance prior to possible transplant. Now found to have psoas muscle abscess, pending possible drainage with IR.

## 2025-02-26 NOTE — PROGRESS NOTE ADULT - PROBLEM SELECTOR PLAN 4
SBPs ~ while on midodrine  - Hold home metoprolol, sinus tachycardic workup outpatient done    Plan:  Continue midodrine 15mg TID, hold for SBP > 110  CTM BPs

## 2025-02-26 NOTE — PROGRESS NOTE ADULT - PROBLEM SELECTOR PLAN 5
Recently diagnosed per patient. Per prior d/c note taking 15U Lantus every night  - A1c 4.6    Plan:  Monitor off insulin  Sugars well controlled

## 2025-02-27 PROCEDURE — 99232 SBSQ HOSP IP/OBS MODERATE 35: CPT | Mod: GC

## 2025-02-27 PROCEDURE — 76705 ECHO EXAM OF ABDOMEN: CPT | Mod: 26

## 2025-02-27 RX ORDER — DIPHENHYDRAMINE HCL 12.5MG/5ML
25 ELIXIR ORAL ONCE
Refills: 0 | Status: COMPLETED | OUTPATIENT
Start: 2025-02-27 | End: 2025-02-27

## 2025-02-27 RX ORDER — METOPROLOL SUCCINATE 50 MG/1
25 TABLET, EXTENDED RELEASE ORAL DAILY
Refills: 0 | Status: DISCONTINUED | OUTPATIENT
Start: 2025-02-27 | End: 2025-02-27

## 2025-02-27 RX ORDER — ACETAMINOPHEN 500 MG/5ML
650 LIQUID (ML) ORAL ONCE
Refills: 0 | Status: COMPLETED | OUTPATIENT
Start: 2025-02-27 | End: 2025-02-27

## 2025-02-27 RX ADMIN — Medication 15 MILLILITER(S): at 12:14

## 2025-02-27 RX ADMIN — Medication 250 MILLIGRAM(S): at 17:30

## 2025-02-27 RX ADMIN — Medication 40 MILLIEQUIVALENT(S): at 12:14

## 2025-02-27 RX ADMIN — HEPARIN SODIUM 5000 UNIT(S): 1000 INJECTION INTRAVENOUS; SUBCUTANEOUS at 14:55

## 2025-02-27 RX ADMIN — FUROSEMIDE 80 MILLIGRAM(S): 10 INJECTION INTRAMUSCULAR; INTRAVENOUS at 05:46

## 2025-02-27 RX ADMIN — Medication 125 MILLIGRAM(S): at 17:31

## 2025-02-27 RX ADMIN — Medication 125 MILLIGRAM(S): at 12:14

## 2025-02-27 RX ADMIN — MIDODRINE HYDROCHLORIDE 15 MILLIGRAM(S): 5 TABLET ORAL at 17:31

## 2025-02-27 RX ADMIN — Medication 100 MILLIGRAM(S): at 12:15

## 2025-02-27 RX ADMIN — Medication 1 GRAM(S): at 17:31

## 2025-02-27 RX ADMIN — URSODIOL 300 MILLIGRAM(S): 300 CAPSULE ORAL at 17:31

## 2025-02-27 RX ADMIN — Medication 1 APPLICATION(S): at 05:24

## 2025-02-27 RX ADMIN — Medication 250 MILLIGRAM(S): at 05:45

## 2025-02-27 RX ADMIN — Medication 1 GRAM(S): at 12:15

## 2025-02-27 RX ADMIN — Medication 100 MILLIGRAM(S): at 09:51

## 2025-02-27 RX ADMIN — Medication 40 MILLIGRAM(S): at 05:55

## 2025-02-27 RX ADMIN — Medication 1 GRAM(S): at 12:21

## 2025-02-27 RX ADMIN — Medication 150 MILLIGRAM(S): at 05:47

## 2025-02-27 RX ADMIN — Medication 400 MILLIGRAM(S): at 09:54

## 2025-02-27 RX ADMIN — MIDODRINE HYDROCHLORIDE 15 MILLIGRAM(S): 5 TABLET ORAL at 12:15

## 2025-02-27 RX ADMIN — Medication 1 GRAM(S): at 22:28

## 2025-02-27 RX ADMIN — Medication 650 MILLIGRAM(S): at 02:50

## 2025-02-27 RX ADMIN — Medication 400 MILLIGRAM(S): at 12:15

## 2025-02-27 RX ADMIN — Medication 3 MILLIGRAM(S): at 22:27

## 2025-02-27 RX ADMIN — HEPARIN SODIUM 5000 UNIT(S): 1000 INJECTION INTRAVENOUS; SUBCUTANEOUS at 05:24

## 2025-02-27 RX ADMIN — Medication 100 MILLIGRAM(S): at 22:27

## 2025-02-27 RX ADMIN — HEPARIN SODIUM 5000 UNIT(S): 1000 INJECTION INTRAVENOUS; SUBCUTANEOUS at 22:28

## 2025-02-27 RX ADMIN — Medication 25 MILLIGRAM(S): at 01:50

## 2025-02-27 RX ADMIN — CEFTRIAXONE 100 MILLIGRAM(S): 500 INJECTION, POWDER, FOR SOLUTION INTRAMUSCULAR; INTRAVENOUS at 16:45

## 2025-02-27 RX ADMIN — Medication 400 MILLIGRAM(S): at 17:31

## 2025-02-27 RX ADMIN — Medication 125 MILLIGRAM(S): at 05:46

## 2025-02-27 RX ADMIN — Medication 15 MILLILITER(S): at 16:40

## 2025-02-27 RX ADMIN — MIRTAZAPINE 7.5 MILLIGRAM(S): 30 TABLET, FILM COATED ORAL at 22:27

## 2025-02-27 RX ADMIN — MIDODRINE HYDROCHLORIDE 15 MILLIGRAM(S): 5 TABLET ORAL at 05:47

## 2025-02-27 RX ADMIN — FOLIC ACID 1 MILLIGRAM(S): 1 TABLET ORAL at 12:14

## 2025-02-27 RX ADMIN — Medication 125 MILLIGRAM(S): at 22:28

## 2025-02-27 RX ADMIN — URSODIOL 300 MILLIGRAM(S): 300 CAPSULE ORAL at 05:46

## 2025-02-27 RX ADMIN — Medication 1 GRAM(S): at 05:46

## 2025-02-27 RX ADMIN — Medication 650 MILLIGRAM(S): at 03:50

## 2025-02-27 RX ADMIN — Medication 15 MILLILITER(S): at 09:54

## 2025-02-27 RX ADMIN — Medication 15 MILLILITER(S): at 22:28

## 2025-02-27 NOTE — PROGRESS NOTE ADULT - PROBLEM SELECTOR PLAN 3
Pt tested positive for C. diff colitis at [facility]. Started on oral vancomycin  - Patient s/p 2 weeks oral vancomycin, with diarrhea initially resolving  - Patient redeveloped diarrhea 2/24. Recurrence of c. diff vs lactulose-induced diarrhea    Plan:   Restart PO vanc 125mg q6h (2/24 - )  Hold lactulose for now  Stool count, goal 3-4 BMs daily

## 2025-02-27 NOTE — PROGRESS NOTE ADULT - PROBLEM SELECTOR PLAN 5
Recently diagnosed per patient. Per prior d/c note taking 15U Lantus every night  - A1c 4.6    Plan:  Monitor off insulin  Sugars well controlled

## 2025-02-27 NOTE — PROGRESS NOTE ADULT - ASSESSMENT
60yo F w/ PMH of alcohol cirrhosis with recurrent ascites, s/p multiple paracentesis, s/p banding of esophageal varices, and s/p tx for recent c. diff colitis and possible endocarditis, presenting as a transfer from [facility] for liver transplant evaluation. Patient not currently listed for transplant 2/2 positive PETH and requiring psychosocial clearance prior to possible transplant. Now found to have psoas muscle abscess, pending possible drainage with IR.

## 2025-02-27 NOTE — PROGRESS NOTE ADULT - PROBLEM SELECTOR PLAN 2
C/b history of hepatic encephalopathy and esophageal varices  - Patient tx from [facility] for transplant evaluation -- PETH found to be elevated, further transplant workup being deferred iso elevated PETH -- outpatient RPP program for psychosocial issues  - s/p multiple paracentesis, most recent on 2/20 w/ 1.4L removed  - Transplant hepatology following    Plan:  Continue on Lasix 80mg and Spironolactone 150mg qd  Continue rifaximin  Holding lactulose iso excess BMs, c/f c. diff(?) for now  F/u TEG for bleeding risk  PRN cholesytramine, atarax for generalized itch related to bile salt accumulation C/b history of hepatic encephalopathy and esophageal varices  - Patient tx from [facility] for transplant evaluation -- PETH found to be elevated, further transplant workup being deferred iso elevated PETH -- outpatient RPP program for psychosocial issues  - s/p multiple paracentesis, most recent on 2/20 w/ 1.4L removed  - Transplant hepatology following    Plan:  Continue on Lasix 80mg and Spironolactone 150mg qd  Continue rifaximin  Holding lactulose iso excess BMs, c/f c. diff(?) for now  F/u TEG for bleeding risk  Ursodiol 300mg qd, atarax for generalized itch related to bile salt accumulation

## 2025-02-27 NOTE — PROGRESS NOTE ADULT - SUBJECTIVE AND OBJECTIVE BOX
PROGRESS NOTE:     Patient is a 59y old  Female who presents with a chief complaint of Transfer from [place] for possible liver transplant.      SUBJECTIVE / OVERNIGHT EVENTS:    OVERNIGHT: No acute overnight events. Another episode of increased abdominal discomfort overnight. Given tylenol  Patient was examined at bedside. Denies fever, chills, chest pain, SOB, nausea, vomiting. ROS otherwise negative and pt is amenable to current treatment plan.      REVIEW OF SYSTEMS:    CONSTITUTIONAL:  No weakness, fevers, or chills  EYES/ENT:  No visual changes, vertigo, or throat pain   NECK:  No pain or stiffness  RESPIRATORY:  No SOB, cough, wheezing, or hemoptysis  CARDIOVASCULAR:  No chest pain or palpitations  GASTROINTESTINAL:  No abdominal pain, nausea, vomiting, or hematemesis; No diarrhea or constipation; No melena or hematochezia.  GENITOURINARY:  No dysuria, change in frequency, or hematuria  NEUROLOGICAL:  No numbness or weakness  SKIN:  No itching or rashes      MEDICATIONS  (STANDING):  albumin human 25% IVPB 100 milliLiter(s) IV Intermittent every 4 hours  Biotene Dry Mouth Oral Rinse 15 milliLiter(s) Swish and Spit five times a day  cefTRIAXone   IVPB 2000 milliGRAM(s) IV Intermittent every 24 hours  chlorhexidine 2% Cloths 1 Application(s) Topical <User Schedule>  folic acid 1 milliGRAM(s) Oral daily  furosemide    Tablet 80 milliGRAM(s) Oral daily  heparin   Injectable 5000 Unit(s) SubCutaneous every 8 hours  magnesium oxide 400 milliGRAM(s) Oral three times a day with meals  melatonin 3 milliGRAM(s) Oral at bedtime  metroNIDAZOLE  IVPB 500 milliGRAM(s) IV Intermittent every 12 hours  metroNIDAZOLE  IVPB      midodrine. 15 milliGRAM(s) Oral three times a day  mirtazapine 7.5 milliGRAM(s) Oral at bedtime  pantoprazole    Tablet 40 milliGRAM(s) Oral before breakfast  potassium chloride    Tablet ER 40 milliEquivalent(s) Oral daily  rifAXIMin 550 milliGRAM(s) Oral two times a day  sodium chloride 1 Gram(s) Oral daily  spironolactone 150 milliGRAM(s) Oral daily  sucralfate suspension 1 Gram(s) Oral every 6 hours  thiamine 100 milliGRAM(s) Oral daily  ursodiol Capsule 300 milliGRAM(s) Oral every 12 hours  vancomycin    Solution 125 milliGRAM(s) Oral every 6 hours  vancomycin  IVPB 1000 milliGRAM(s) IV Intermittent every 12 hours    MEDICATIONS  (PRN):  albumin human 25% IVPB 50 milliLiter(s) IV Intermittent once PRN para albumin replacement  albuterol/ipratropium for Nebulization 3 milliLiter(s) Nebulizer every 6 hours PRN Shortness of Breath and/or Wheezing  guaiFENesin Oral Liquid (Sugar-Free) 100 milliGRAM(s) Oral every 6 hours PRN Cough      CAPILLARY BLOOD GLUCOSE        I&O's Summary    26 Feb 2025 07:01  -  27 Feb 2025 07:00  --------------------------------------------------------  IN: 1130 mL / OUT: 5 mL / NET: 1125 mL        PHYSICAL EXAM:  Vital Signs Last 24 Hrs  T(C): 36.9 (27 Feb 2025 04:53), Max: 37.2 (26 Feb 2025 21:07)  T(F): 98.4 (27 Feb 2025 04:53), Max: 99 (26 Feb 2025 21:07)  HR: 106 (27 Feb 2025 04:53) (73 - 115)  BP: 104/61 (27 Feb 2025 04:53) (99/56 - 107/65)  BP(mean): --  RR: 18 (27 Feb 2025 04:53) (18 - 18)  SpO2: 96% (27 Feb 2025 04:53) (95% - 99%)    Parameters below as of 27 Feb 2025 04:53  Patient On (Oxygen Delivery Method): room air      CONSTITUTIONAL: NAD; jaundiced  HEENT: PERRLA, clear conjunctiva, EOMI, scleral icterus  RESPIRATORY: Normal respiratory effort; lungs are clear to auscultation bilaterally; No crackles/rhonchi/wheezing  CARDIOVASCULAR: Regular rate and rhythm, normal S1 and S2, no murmur/rub/gallop; No lower extremity edema; Peripheral pulses are 2+ bilaterally  ABDOMEN: Distended, Nontender to palpation, normoactive bowel sounds, no rebound/guarding; +Hepatosplenomegaly  MUSCULOSKELETAL: No clubbing or cyanosis of digits; no joint swelling or tenderness to palpation  EXTREMITY: Lower extremities non-tender to palpation; non-erythematous B/L  NEURO: A&Ox3; no focal deficits   PSYCH: Normal mood; affect appropriate    LABS:                        8.4    8.49  )-----------( 119      ( 26 Feb 2025 09:23 )             25.7     02-26    125[L]  |  93[L]  |  16  ----------------------------<  101[H]  4.0   |  19[L]  |  0.46[L]    Ca    9.4      26 Feb 2025 09:23  Phos  3.4     02-26  Mg     1.5     02-26    TPro  8.1  /  Alb  3.6  /  TBili  9.4[H]  /  DBili  x   /  AST  91[H]  /  ALT  31  /  AlkPhos  376[H]  02-26    PT/INR - ( 26 Feb 2025 07:14 )   PT: 30.3 sec;   INR: 2.68 ratio         PTT - ( 26 Feb 2025 07:14 )  PTT:54.5 sec      Urinalysis Basic - ( 26 Feb 2025 09:23 )    Color: x / Appearance: x / SG: x / pH: x  Gluc: 101 mg/dL / Ketone: x  / Bili: x / Urobili: x   Blood: x / Protein: x / Nitrite: x   Leuk Esterase: x / RBC: x / WBC x   Sq Epi: x / Non Sq Epi: x / Bacteria: x          RADIOLOGY & ADDITIONAL TESTS:    N/A

## 2025-02-27 NOTE — PROGRESS NOTE ADULT - PROBLEM SELECTOR PLAN 1
Patient febrile in afternoon 2/22  CT abdomen on 2/23 with 3.7 cm rim-enhancing fluid collection anterior to the right psoas muscle in the pelvis as well as a more heterogeneous enhancing structure anterior and medial to this collection measuring 2.7 cm c/f bilobed abscess. IR prefers to defer intervention for now given significant surrounding vasculature. Will f/u with ID  - TTE 2/12 & 2/24 no evidence valve vegetations  - Bcx NGTD from 2/22  - MR TLSL: redemonstrates abscess, no evidence of extension of disease elsewhere or OM  - IR consulted, high-risk of bleed for aspiration, plan to monitor on abx and rescan to assess at later date    Plan:  IV Vanc, ceftriaxone, flagyl Patient febrile in afternoon 2/22  CT abdomen on 2/23 with 3.7 cm rim-enhancing fluid collection anterior to the right psoas muscle in the pelvis as well as a more heterogeneous enhancing structure anterior and medial to this collection measuring 2.7 cm c/f bilobed abscess. IR prefers to defer intervention for now given significant surrounding vasculature. Will f/u with ID  - TTE 2/12 & 2/24 no evidence valve vegetations  - Bcx NGTD from 2/22  - MR TLS: redemonstrates abscess, no evidence of extension of disease elsewhere or OM  - IR consulted, high-risk of bleed for aspiration, plan to monitor on abx and rescan to assess at later date    Plan:  IV Vanc, ceftriaxone, flagyl

## 2025-02-28 LAB
ADD ON TEST-SPECIMEN IN LAB: SIGNIFICANT CHANGE UP
ALBUMIN SERPL ELPH-MCNC: 3.7 G/DL — SIGNIFICANT CHANGE UP (ref 3.3–5)
ALP SERPL-CCNC: 390 U/L — HIGH (ref 40–120)
ALT FLD-CCNC: 33 U/L — SIGNIFICANT CHANGE UP (ref 10–45)
ANION GAP SERPL CALC-SCNC: 18 MMOL/L — HIGH (ref 5–17)
APTT BLD: 50.9 SEC — HIGH (ref 24.5–35.6)
AST SERPL-CCNC: 104 U/L — HIGH (ref 10–40)
BASOPHILS # BLD AUTO: 0.04 K/UL — SIGNIFICANT CHANGE UP (ref 0–0.2)
BASOPHILS NFR BLD AUTO: 0.4 % — SIGNIFICANT CHANGE UP (ref 0–2)
BILIRUB SERPL-MCNC: 9.7 MG/DL — HIGH (ref 0.2–1.2)
BUN SERPL-MCNC: 23 MG/DL — SIGNIFICANT CHANGE UP (ref 7–23)
CALCIUM SERPL-MCNC: 9.8 MG/DL — SIGNIFICANT CHANGE UP (ref 8.4–10.5)
CHLORIDE SERPL-SCNC: 90 MMOL/L — LOW (ref 96–108)
CO2 SERPL-SCNC: 17 MMOL/L — LOW (ref 22–31)
CREAT SERPL-MCNC: 0.6 MG/DL — SIGNIFICANT CHANGE UP (ref 0.5–1.3)
EGFR: 102 ML/MIN/1.73M2 — SIGNIFICANT CHANGE UP
EGFR: 102 ML/MIN/1.73M2 — SIGNIFICANT CHANGE UP
EOSINOPHIL # BLD AUTO: 0.02 K/UL — SIGNIFICANT CHANGE UP (ref 0–0.5)
EOSINOPHIL NFR BLD AUTO: 0.2 % — SIGNIFICANT CHANGE UP (ref 0–6)
GLUCOSE SERPL-MCNC: 115 MG/DL — HIGH (ref 70–99)
HCT VFR BLD CALC: 26.8 % — LOW (ref 34.5–45)
HGB BLD-MCNC: 8.8 G/DL — LOW (ref 11.5–15.5)
HPV HIGH+LOW RISK DNA PNL CVX: SIGNIFICANT CHANGE UP
IMM GRANULOCYTES NFR BLD AUTO: 1.3 % — HIGH (ref 0–0.9)
INR BLD: 1.8 RATIO — HIGH (ref 0.85–1.16)
LYMPHOCYTES # BLD AUTO: 1.65 K/UL — SIGNIFICANT CHANGE UP (ref 1–3.3)
LYMPHOCYTES # BLD AUTO: 15.6 % — SIGNIFICANT CHANGE UP (ref 13–44)
LYSOSOMAL ACID LIPASE INTERPRETATION: SIGNIFICANT CHANGE UP
LYSOSOMAL ACID LIPASE, RESULT: 64 CD:384473389 — SIGNIFICANT CHANGE UP (ref 54–220)
MAGNESIUM SERPL-MCNC: 1.6 MG/DL — SIGNIFICANT CHANGE UP (ref 1.6–2.6)
MCHC RBC-ENTMCNC: 31.4 PG — SIGNIFICANT CHANGE UP (ref 27–34)
MCHC RBC-ENTMCNC: 32.8 G/DL — SIGNIFICANT CHANGE UP (ref 32–36)
MCV RBC AUTO: 95.7 FL — SIGNIFICANT CHANGE UP (ref 80–100)
MONOCYTES # BLD AUTO: 0.99 K/UL — HIGH (ref 0–0.9)
MONOCYTES NFR BLD AUTO: 9.3 % — SIGNIFICANT CHANGE UP (ref 2–14)
NEUTROPHILS # BLD AUTO: 7.76 K/UL — HIGH (ref 1.8–7.4)
NEUTROPHILS NFR BLD AUTO: 73.2 % — SIGNIFICANT CHANGE UP (ref 43–77)
NRBC BLD AUTO-RTO: 0 /100 WBCS — SIGNIFICANT CHANGE UP (ref 0–0)
PHOSPHATE SERPL-MCNC: 3.9 MG/DL — SIGNIFICANT CHANGE UP (ref 2.5–4.5)
PLATELET # BLD AUTO: 122 K/UL — LOW (ref 150–400)
POTASSIUM SERPL-MCNC: 5 MMOL/L — SIGNIFICANT CHANGE UP (ref 3.5–5.3)
POTASSIUM SERPL-SCNC: 5 MMOL/L — SIGNIFICANT CHANGE UP (ref 3.5–5.3)
PROT SERPL-MCNC: 8.4 G/DL — HIGH (ref 6–8.3)
PROTHROM AB SERPL-ACNC: 20.6 SEC — HIGH (ref 9.9–13.4)
RBC # BLD: 2.8 M/UL — LOW (ref 3.8–5.2)
RBC # FLD: 19.2 % — HIGH (ref 10.3–14.5)
SODIUM SERPL-SCNC: 125 MMOL/L — LOW (ref 135–145)
VANCOMYCIN TROUGH SERPL-MCNC: 24.3 UG/ML — HIGH (ref 10–20)
WBC # BLD: 11.21 K/UL — HIGH (ref 3.8–10.5)
WBC # FLD AUTO: 11.21 K/UL — HIGH (ref 3.8–10.5)

## 2025-02-28 PROCEDURE — 99232 SBSQ HOSP IP/OBS MODERATE 35: CPT | Mod: GC

## 2025-02-28 PROCEDURE — 99233 SBSQ HOSP IP/OBS HIGH 50: CPT

## 2025-02-28 RX ORDER — HYDROXYZINE HYDROCHLORIDE 25 MG/1
25 TABLET, FILM COATED ORAL ONCE
Refills: 0 | Status: COMPLETED | OUTPATIENT
Start: 2025-02-28 | End: 2025-02-28

## 2025-02-28 RX ORDER — ACETAMINOPHEN 500 MG/5ML
650 LIQUID (ML) ORAL ONCE
Refills: 0 | Status: DISCONTINUED | OUTPATIENT
Start: 2025-02-28 | End: 2025-02-28

## 2025-02-28 RX ORDER — ACETAMINOPHEN 500 MG/5ML
1000 LIQUID (ML) ORAL ONCE
Refills: 0 | Status: COMPLETED | OUTPATIENT
Start: 2025-02-28 | End: 2025-03-01

## 2025-02-28 RX ORDER — ACETAMINOPHEN 500 MG/5ML
1000 LIQUID (ML) ORAL ONCE
Refills: 0 | Status: COMPLETED | OUTPATIENT
Start: 2025-02-28 | End: 2025-02-28

## 2025-02-28 RX ORDER — CEFTRIAXONE 500 MG/1
2000 INJECTION, POWDER, FOR SOLUTION INTRAMUSCULAR; INTRAVENOUS EVERY 24 HOURS
Refills: 0 | Status: DISCONTINUED | OUTPATIENT
Start: 2025-02-28 | End: 2025-03-05

## 2025-02-28 RX ORDER — LACTULOSE 10 G/15ML
10 SOLUTION ORAL DAILY
Refills: 0 | Status: DISCONTINUED | OUTPATIENT
Start: 2025-02-28 | End: 2025-03-05

## 2025-02-28 RX ADMIN — Medication 100 MILLIGRAM(S): at 21:19

## 2025-02-28 RX ADMIN — LACTULOSE 10 GRAM(S): 10 SOLUTION ORAL at 11:38

## 2025-02-28 RX ADMIN — MIDODRINE HYDROCHLORIDE 15 MILLIGRAM(S): 5 TABLET ORAL at 17:03

## 2025-02-28 RX ADMIN — Medication 1 GRAM(S): at 05:29

## 2025-02-28 RX ADMIN — HEPARIN SODIUM 5000 UNIT(S): 1000 INJECTION INTRAVENOUS; SUBCUTANEOUS at 21:20

## 2025-02-28 RX ADMIN — Medication 125 MILLIGRAM(S): at 23:07

## 2025-02-28 RX ADMIN — FOLIC ACID 1 MILLIGRAM(S): 1 TABLET ORAL at 11:39

## 2025-02-28 RX ADMIN — FUROSEMIDE 80 MILLIGRAM(S): 10 INJECTION INTRAMUSCULAR; INTRAVENOUS at 05:30

## 2025-02-28 RX ADMIN — Medication 40 MILLIGRAM(S): at 05:30

## 2025-02-28 RX ADMIN — Medication 250 MILLIGRAM(S): at 05:28

## 2025-02-28 RX ADMIN — URSODIOL 300 MILLIGRAM(S): 300 CAPSULE ORAL at 17:03

## 2025-02-28 RX ADMIN — URSODIOL 300 MILLIGRAM(S): 300 CAPSULE ORAL at 05:29

## 2025-02-28 RX ADMIN — Medication 125 MILLIGRAM(S): at 05:29

## 2025-02-28 RX ADMIN — Medication 1 GRAM(S): at 11:39

## 2025-02-28 RX ADMIN — Medication 15 MILLILITER(S): at 11:38

## 2025-02-28 RX ADMIN — Medication 125 MILLIGRAM(S): at 11:38

## 2025-02-28 RX ADMIN — MIDODRINE HYDROCHLORIDE 15 MILLIGRAM(S): 5 TABLET ORAL at 05:29

## 2025-02-28 RX ADMIN — HEPARIN SODIUM 5000 UNIT(S): 1000 INJECTION INTRAVENOUS; SUBCUTANEOUS at 05:29

## 2025-02-28 RX ADMIN — MIDODRINE HYDROCHLORIDE 15 MILLIGRAM(S): 5 TABLET ORAL at 11:39

## 2025-02-28 RX ADMIN — Medication 15 MILLILITER(S): at 16:16

## 2025-02-28 RX ADMIN — Medication 15 MILLILITER(S): at 21:19

## 2025-02-28 RX ADMIN — Medication 15 MILLILITER(S): at 07:48

## 2025-02-28 RX ADMIN — Medication 3 MILLIGRAM(S): at 21:20

## 2025-02-28 RX ADMIN — Medication 1 APPLICATION(S): at 07:51

## 2025-02-28 RX ADMIN — Medication 400 MILLIGRAM(S): at 11:38

## 2025-02-28 RX ADMIN — HEPARIN SODIUM 5000 UNIT(S): 1000 INJECTION INTRAVENOUS; SUBCUTANEOUS at 13:02

## 2025-02-28 RX ADMIN — Medication 150 MILLIGRAM(S): at 05:29

## 2025-02-28 RX ADMIN — MIRTAZAPINE 7.5 MILLIGRAM(S): 30 TABLET, FILM COATED ORAL at 21:20

## 2025-02-28 RX ADMIN — Medication 400 MILLIGRAM(S): at 17:03

## 2025-02-28 RX ADMIN — Medication 400 MILLIGRAM(S): at 07:48

## 2025-02-28 RX ADMIN — Medication 100 MILLIGRAM(S): at 11:38

## 2025-02-28 RX ADMIN — Medication 1 GRAM(S): at 23:07

## 2025-02-28 RX ADMIN — Medication 1 GRAM(S): at 11:38

## 2025-02-28 RX ADMIN — Medication 125 MILLIGRAM(S): at 17:04

## 2025-02-28 RX ADMIN — Medication 1 GRAM(S): at 17:03

## 2025-02-28 RX ADMIN — CEFTRIAXONE 100 MILLIGRAM(S): 500 INJECTION, POWDER, FOR SOLUTION INTRAMUSCULAR; INTRAVENOUS at 16:16

## 2025-02-28 RX ADMIN — Medication 100 MILLIGRAM(S): at 08:41

## 2025-02-28 RX ADMIN — Medication 400 MILLIGRAM(S): at 00:20

## 2025-02-28 NOTE — PROGRESS NOTE ADULT - PROBLEM SELECTOR PLAN 1
Patient febrile in afternoon 2/22  CT abdomen on 2/23 with 3.7 cm rim-enhancing fluid collection anterior to the right psoas muscle in the pelvis as well as a more heterogeneous enhancing structure anterior and medial to this collection measuring 2.7 cm c/f bilobed abscess. IR prefers to defer intervention for now given significant surrounding vasculature. Will f/u with ID  - TTE 2/12 & 2/24 no evidence valve vegetations  - Bcx NGTD from 2/22  - MR TLS: redemonstrates abscess, no evidence of extension of disease elsewhere or OM  - IR consulted, high-risk of bleed for aspiration, plan to monitor on abx and rescan to assess at later date    Plan:  IV Vanc, ceftriaxone, flagyl Patient febrile in afternoon 2/22  - CT abdomen on 2/23 with 3.7 cm rim-enhancing fluid collection anterior to the right psoas muscle in the pelvis as well as a more heterogeneous enhancing structure anterior and medial to this collection measuring 2.7 cm c/f bilobed abscess. IR prefers to defer intervention for now given significant surrounding vasculature  - CT on 2/9 taken at [facility]: "nonspecific hypoattenuating lesion/structure in the right lower quadrant measuring up to 4.4x4.2 cm with peripheral wall enhancement may represent adnexal cyst."  - TTE 2/12 & 2/24 no evidence valve vegetations  - Bcx NGTD from 2/22  - MR TLS: redemonstrates abscess, no evidence of extension of disease elsewhere or OM  - IR consulted, high-risk of bleed for aspiration, plan to monitor on abx and rescan to assess at later date    Plan:  IV Vanc, ceftriaxone, flagyl

## 2025-02-28 NOTE — PROGRESS NOTE ADULT - PROBLEM SELECTOR PLAN 3
Pt tested positive for C. diff colitis at [facility]. Started on oral vancomycin  - Patient s/p 2 weeks oral vancomycin, with diarrhea initially resolving  - Patient redeveloped diarrhea 2/24. Recurrence of c. diff vs lactulose-induced diarrhea    Plan:   Restart PO vanc 125mg q6h (2/24 - )  Hold lactulose for now  Stool count, goal 3-4 BMs daily Pt tested positive for C. diff colitis at [facility]. Started on oral vancomycin  - Patient s/p 2 weeks oral vancomycin, with diarrhea initially resolving  - Patient redeveloped diarrhea 2/24. Recurrence of c. diff vs lactulose-induced diarrhea  - Diarrhea improved once lactulose held, likely lactulose-induced    Plan:   Restart PO vanc 125mg q6h (2/24 - )  Due to BMs slowing, will restart lactulose 10mg qd  Stool count, goal 3-4 BMs daily

## 2025-02-28 NOTE — PROGRESS NOTE ADULT - PROBLEM SELECTOR PLAN 2
C/b history of hepatic encephalopathy and esophageal varices  - Patient tx from [facility] for transplant evaluation -- PETH found to be elevated, further transplant workup being deferred iso elevated PETH -- outpatient RPP program for psychosocial issues  - s/p multiple paracentesis, most recent on 2/20 w/ 1.4L removed  - Transplant hepatology following    Plan:  Continue on Lasix 80mg and Spironolactone 150mg qd  Continue rifaximin  Holding lactulose iso excess BMs, c/f c. diff(?) for now  F/u TEG for bleeding risk  Ursodiol 300mg qd, atarax for generalized itch related to bile salt accumulation C/b history of hepatic encephalopathy and esophageal varices  - Patient tx from [facility] for transplant evaluation -- PETH found to be elevated, further transplant workup being deferred iso elevated PETH -- outpatient RPP program for psychosocial issues  - s/p multiple paracentesis, most recent on 2/20 w/ 1.4L removed  - Transplant hepatology following    Plan:  Continue on Lasix 80mg and Spironolactone 150mg qd  Continue rifaximin  Restart lactulose 10mg qd, titrate frequency for BM 3-4 daily  F/u TEG for bleeding risk  Ursodiol 300mg qd, atarax for generalized itch related to bile salt accumulation

## 2025-02-28 NOTE — PROGRESS NOTE ADULT - ASSESSMENT
59 year old female PMH of alcohol cirrhosis with recurrent ascites, s/p multiple paracentesis, s/p banding of esophageal varices, currently being treated for recent c. diff colitis and possible endocarditis, presenting for possible liver transplant from [facility]. Pt initially admitted to [facility] c/o weakness, poor PO intake, and fever; hypotensive in ED. Met criteria for sepsis iso C. diff colitis and was treated with vasopressor support, flagyl, and vanc. Hospital course was complicated by AHRF and pt was intubated 1/28-2/2. Pt then treated for E. coli and Bacteroides bacteremia with TTE findings concerning for mitral vegetation; pt not a good candidate for ARACELI due to varices found on EGD from 1/31/24 decided to treat empirically for IE with 6 weeks of antibiotics s/p PICC line placement with negative blood cultures from 1/31/24. Pt d/c from [facility] on 2/7 and then presented to [place] c/o SOB. Ultimately transferred to [facility] for hepatology transplant consult. On presentation today, pt endorses subjective nighttime fevers, chills, SOB and abdominal discomfort. Attributes fevers to recent flu dx (noted in chart to be flu positive 1 week prior to presentation at [facility]). Pt states she has not used alcohol in over a year. She began using alcohol in her 20s socially and her use gradually increased to include ~3-4 glasses of wine everyday when she was drinking the most. Has been to rehab and therapy multiple times. Pt first began smoking in her 20s ~1pack/day for many years, cannot recall specifics. Has not had a cigarette in several months. Pt is unable to ambulate on her own and has remained bedbound since intubation several months ago.     C. difficile toxin assay (January 27) positive, repeat Cdff 2/11 neg  Blood cultures (January 27) 2/4 E. coli, 1/4 Bacteroides   Blood cultures (January 31) no growth to date  Blood cultures (February 10, February 11) no growth to date  Paracentesis (February 4, February 13) Cell counts negative for SBP    CTA chest (January 27) Patent central airways. Smooth interlobular septal thickening at the lung apices and lung bases. No lung consolidation or mass. Mild bibasilar dependent atelectasis. No lymphadenopathy.    CT A/P (January 27) Thickening of the walls of the stomach and duodenum with submucosal edema likely reflective of a gastroenteritis. Thickening of the walls of the ascending colon despite underdistention likely due to portal colopathy. Colonic diverticulosis without evidence of diverticulitis. Cholelithiasis. Nonspecific gallbladder wall edema and pericholecystic fluid in the setting of cirrhosis.    TTE (1/27) small mobile vegetation attached to the posterior mitral valve leaflet.  TTE (February 12) no evidence of vegetations    Social history: born in [place].  Travel history includes travel to [facility], [facility] and [place], [place], [place].  Lives primarily in [place] but has also had travel to [place] and [place].  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  States her mother had a history of tuberculosis but this was before patient was born.  Patient notes livestock exposure during her work as a .  Has sugar gliders as pets.    Antibiotic Course:  PO Vancomycin: 1/27 (2 doses) > 2/14 (1 dose)  Metronidazole: 1/27 > 2/13  Ceftriaxone: 1/27 > 2/13  Meropenem: 1/27  Restarted on antibiotics 2/22 Vancomycin plus Ceftriaxone    S/P LVP 2/21 - follow cell count and fluid cultures which are no growth to date    #C.diff colitis 1/27/25, diarrhea resolved now   - repeat Cdiff 2/11 negative, completed vanco po 125 mg bi ppx post discontinuation of antibitoics on 2./13-2/16   -- then reinitiated on po ppx with treatment of psoas abscess- then diarrhea worsened and now back on treatment dose ? unclear if lactulose though- so would favor short 10 day course and then back to ppx dosing  - in the next 6 months will need vanco po ppx with every exposure to broad spectrum antibiotics  - this includes at the time of perioperative ppx    #Polymicrobial bacteremia with E.coli and Bacteroides  Status post course of ceftriaxone and metronidazole as above from earlier stay  likely now complicating with psoas abscess    # Psoas abscess on Ct A/P 2/21;   MRi spine negative for OM   on ceftriaxone (pleas eresume), vancomycin and flagyl empirically although lani[pect most likely seeding from bacteria abov  obtain vancomycin trough today- noted high at 20 on 2/26 and no level since, dose unchanged  Anticipate  Ct A/P next week to reassess size of the psoas abscess and determine duration of antibiotics,   noted WBC up today, low threshold to repeat BC and Imaging  Check     #Possible MV vegetation on TTE from OSH  E.coli and bacteroides atypical endocarditis organisms and polymicrobial is also atypical for endocarditis  Repeat transthoracic echocardiogram here without evidence of vegetation    #Preliver transplant evaluation  --COVID19 Nucleocapsid Antibody  -- COVID19 Eder Antibody pos  -- HAV IgG pos  HBVs Ab, HBVsAg, HBVc Ab pos >400 immune, neg, neg  -- HCV Ab neg  -- HSV 1 IgG /HSV 2 IgG pos. neg  -- EBV IgG Pos  -- CMV IgG pos  --VZV IgG pos  -- Measles IgG , Mumps IgG and Rubella IgG pos all  --Follow up QuantiFeron-TB Gold  -- HIV Ag/Ab by CMIA- NEG  --Syphilis Screen - NEG  --Strongyloides Ab negative   -- Follow up Coccidiodes Ab  -- Brucella IgG - NEGATIVE  -- Q Fever Serologies REACTIVE for  phase II igG with all others negative  in the correct setting, Positive Phase II igG (and higher than Phase I)  could  be indicative of acute infection or false positive- Phase II IgM are most commonly positive as well  ; whereas in chronic or old Q fever, Phase I antibodies are higher than phase II and/or >1:1260  However, her last exposure to wild animals was  about 5 years ago, making acute infection unlikely and these results seemingly a false positive.. In addition , she has no symptoms of acute Q fever infection.     With all that said, it is reasonable to repeat serologies again (ordered)  If titers are still high or stable -->may then consider treatment while repeating in 3-4 weeks to assess for increases in titers that would confirm acuity and infection   Repeat Q Fever Phase II IgG AB: 1:32 (02.20.25)  deemed false positive    -Clearance for listing/transplant pending resolution of psoas muscle abscess, repeat Ct A/P next week      #Encounter to Vaccinate Patient  COVID19: Would benefit from COVID19 [id number] Vaccine Dose  Influenza: 10/8/24  Pneumococcal: Would benefit from PCV20  Immune to hep A, B, varicella and MMR  Shingles: Will require Shingrix  Tdap: Will require Tdap  RSV vaccine also recommended this season      Thank you for involving us in the care of this patient  Transplant ID will continue to follow  Please call or page with additional questions  Pager; #[phone]  Teams: from 8 am to 5 pm  Pauly Fenton MD           59 year old female PMH of alcohol cirrhosis with recurrent ascites, s/p multiple paracentesis, s/p banding of esophageal varices, currently being treated for recent c. diff colitis and possible endocarditis, presenting for possible liver transplant from [facility]. Pt initially admitted to [facility] c/o weakness, poor PO intake, and fever; hypotensive in ED. Met criteria for sepsis iso C. diff colitis and was treated with vasopressor support, flagyl, and vanc. Hospital course was complicated by AHRF and pt was intubated 1/28-2/2. Pt then treated for E. coli and Bacteroides bacteremia with TTE findings concerning for mitral vegetation; pt not a good candidate for ARACELI due to varices found on EGD from 1/31/24 decided to treat empirically for IE with 6 weeks of antibiotics s/p PICC line placement with negative blood cultures from 1/31/24. Pt d/c from [facility] on 2/7 and then presented to [place] c/o SOB. Ultimately transferred to [facility] for hepatology transplant consult. On presentation today, pt endorses subjective nighttime fevers, chills, SOB and abdominal discomfort. Attributes fevers to recent flu dx (noted in chart to be flu positive 1 week prior to presentation at [facility]). Pt states she has not used alcohol in over a year. She began using alcohol in her 20s socially and her use gradually increased to include ~3-4 glasses of wine everyday when she was drinking the most. Has been to rehab and therapy multiple times. Pt first began smoking in her 20s ~1pack/day for many years, cannot recall specifics. Has not had a cigarette in several months. Pt is unable to ambulate on her own and has remained bedbound since intubation several months ago.     C. difficile toxin assay (January 27) positive, repeat Cdff 2/11 neg  Blood cultures (January 27) 2/4 E. coli, 1/4 Bacteroides   Blood cultures (January 31) no growth to date  Blood cultures (February 10, February 11) no growth to date  Paracentesis (February 4, February 13) Cell counts negative for SBP    CTA chest (January 27) Patent central airways. Smooth interlobular septal thickening at the lung apices and lung bases. No lung consolidation or mass. Mild bibasilar dependent atelectasis. No lymphadenopathy.    CT A/P (January 27) Thickening of the walls of the stomach and duodenum with submucosal edema likely reflective of a gastroenteritis. Thickening of the walls of the ascending colon despite underdistention likely due to portal colopathy. Colonic diverticulosis without evidence of diverticulitis. Cholelithiasis. Nonspecific gallbladder wall edema and pericholecystic fluid in the setting of cirrhosis.    TTE (1/27) small mobile vegetation attached to the posterior mitral valve leaflet.  TTE (February 12) no evidence of vegetations    Social history: born in [place].  Travel history includes travel to [facility], [facility] and [place], [place], [place].  Lives primarily in [place] but has also had travel to [place] and [place].  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  States her mother had a history of tuberculosis but this was before patient was born.  Patient notes livestock exposure during her work as a .  Has sugar gliders as pets.    Antibiotic Course:  PO Vancomycin: 1/27 (2 doses) > 2/14 (1 dose)  Metronidazole: 1/27 > 2/13  Ceftriaxone: 1/27 > 2/13  Meropenem: 1/27  Restarted on antibiotics 2/22 Vancomycin plus Ceftriaxone    S/P LVP 2/21 - follow cell count and fluid cultures which are no growth to date    #C.diff colitis 1/27/25, diarrhea resolved now   - repeat Cdiff 2/11 negative, completed vanco po 125 mg bi ppx post discontinuation of antibitoics on 2./13-2/16   -- then reinitiated on po ppx with treatment of psoas abscess- then diarrhea worsened and now back on treatment dose ? unclear if lactulose though- so would favor short 10 day course and then back to ppx dosing  - in the next 6 months will need vanco po ppx with every exposure to broad spectrum antibiotics  - this includes at the time of perioperative ppx    #Polymicrobial bacteremia with E.coli and Bacteroides  Status post course of ceftriaxone and metronidazole as above from earlier stay  likely now complicating with psoas abscess    # Psoas abscess on Ct A/P 2/21;   MRi spine negative for OM   on ceftriaxone (pleas eresume), vancomycin and flagyl empirically although lani[pect most likely seeding from bacteria abov  Vancomycin trough high- hold additional doses- BC negative,Abscess also suspected from Ecol/bacteroides Infection and less likley with MRSA invovlement. Will reassess with CT A/P on monday- If abdminal pain persists, would do this earlier  Of note, Ct A/P at [facility] revealed "enhancing lesion" thought to be "adnexa"-   noted WBC up today, low threshold to repeat BC and Imaging again      #Possible MV vegetation on TTE from OSH  E.coli and bacteroides atypical endocarditis organisms and polymicrobial is also atypical for endocarditis  Repeat transthoracic echocardiogram here without evidence of vegetation    #Preliver transplant evaluation  --COVID19 Nucleocapsid Antibody  -- COVID19 Eder Antibody pos  -- HAV IgG pos  HBVs Ab, HBVsAg, HBVc Ab pos >400 immune, neg, neg  -- HCV Ab neg  -- HSV 1 IgG /HSV 2 IgG pos. neg  -- EBV IgG Pos  -- CMV IgG pos  --VZV IgG pos  -- Measles IgG , Mumps IgG and Rubella IgG pos all  --Follow up QuantiFeron-TB Gold  -- HIV Ag/Ab by CMIA- NEG  --Syphilis Screen - NEG  --Strongyloides Ab negative   -- Follow up Coccidiodes Ab  -- Brucella IgG - NEGATIVE  -- Q Fever Serologies REACTIVE for  phase II igG with all others negative  in the correct setting, Positive Phase II igG (and higher than Phase I)  could  be indicative of acute infection or false positive- Phase II IgM are most commonly positive as well  ; whereas in chronic or old Q fever, Phase I antibodies are higher than phase II and/or >1:1260  However, her last exposure to wild animals was  about 5 years ago, making acute infection unlikely and these results seemingly a false positive.. In addition , she has no symptoms of acute Q fever infection.     With all that said, it is reasonable to repeat serologies again (ordered)  If titers are still high or stable -->may then consider treatment while repeating in 3-4 weeks to assess for increases in titers that would confirm acuity and infection   Repeat Q Fever Phase II IgG AB: 1:32 (02.20.25)  deemed false positive    -Clearance for listing/transplant pending resolution of psoas muscle abscess, repeat Ct A/P next week      #Encounter to Vaccinate Patient  COVID19: Would benefit from COVID19 [id number] Vaccine Dose  Influenza: 10/8/24  Pneumococcal: Would benefit from PCV20  Immune to hep A, B, varicella and MMR  Shingles: Will require Shingrix  Tdap: Will require Tdap  RSV vaccine also recommended this season      Thank you for involving us in the care of this patient  Transplant ID will continue to follow  Please call or page with additional questions  Pager; #[phone]  Teams: from 8 am to 5 pm  Pauly Fenton MD

## 2025-02-28 NOTE — PROGRESS NOTE ADULT - SUBJECTIVE AND OBJECTIVE BOX
PROGRESS NOTE:     Patient is a 59y old  Female who presents with a chief complaint of Transfer from [place] for possible liver transplant      SUBJECTIVE / OVERNIGHT EVENTS:    OVERNIGHT: Continue to have abdominal pain overnight  Patient was examined at bedside and feels well this morning. Denies fever, chills, chest pain, SOB, nausea, vomiting. ROS otherwise negative and pt is amenable to current treatment plan.      REVIEW OF SYSTEMS:    CONSTITUTIONAL:  No weakness, fevers, or chills  EYES/ENT:  No visual changes, vertigo, or throat pain   NECK:  No pain or stiffness  RESPIRATORY:  No SOB, cough, wheezing, or hemoptysis  CARDIOVASCULAR:  No chest pain or palpitations  GASTROINTESTINAL:  No abdominal pain, nausea, vomiting, or hematemesis; No diarrhea or constipation; No melena or hematochezia.  GENITOURINARY:  No dysuria, change in frequency, or hematuria  NEUROLOGICAL:  No numbness or weakness  SKIN:  No itching or rashes      MEDICATIONS  (STANDING):  albumin human 25% IVPB 100 milliLiter(s) IV Intermittent every 4 hours  Biotene Dry Mouth Oral Rinse 15 milliLiter(s) Swish and Spit five times a day  cefTRIAXone   IVPB 2000 milliGRAM(s) IV Intermittent every 24 hours  chlorhexidine 2% Cloths 1 Application(s) Topical <User Schedule>  folic acid 1 milliGRAM(s) Oral daily  furosemide    Tablet 80 milliGRAM(s) Oral daily  heparin   Injectable 5000 Unit(s) SubCutaneous every 8 hours  magnesium oxide 400 milliGRAM(s) Oral three times a day with meals  melatonin 3 milliGRAM(s) Oral at bedtime  metroNIDAZOLE  IVPB      metroNIDAZOLE  IVPB 500 milliGRAM(s) IV Intermittent every 12 hours  midodrine. 15 milliGRAM(s) Oral three times a day  mirtazapine 7.5 milliGRAM(s) Oral at bedtime  pantoprazole    Tablet 40 milliGRAM(s) Oral before breakfast  potassium chloride    Tablet ER 40 milliEquivalent(s) Oral daily  rifAXIMin 550 milliGRAM(s) Oral two times a day  sodium chloride 1 Gram(s) Oral daily  spironolactone 150 milliGRAM(s) Oral daily  sucralfate suspension 1 Gram(s) Oral every 6 hours  thiamine 100 milliGRAM(s) Oral daily  ursodiol Capsule 300 milliGRAM(s) Oral every 12 hours  vancomycin    Solution 125 milliGRAM(s) Oral every 6 hours  vancomycin  IVPB 1000 milliGRAM(s) IV Intermittent every 12 hours    MEDICATIONS  (PRN):  albumin human 25% IVPB 50 milliLiter(s) IV Intermittent once PRN para albumin replacement  albuterol/ipratropium for Nebulization 3 milliLiter(s) Nebulizer every 6 hours PRN Shortness of Breath and/or Wheezing  guaiFENesin Oral Liquid (Sugar-Free) 100 milliGRAM(s) Oral every 6 hours PRN Cough      CAPILLARY BLOOD GLUCOSE        I&O's Summary    27 Feb 2025 07:01  -  28 Feb 2025 07:00  --------------------------------------------------------  IN: 1120 mL / OUT: 0 mL / NET: 1120 mL        PHYSICAL EXAM:  Vital Signs Last 24 Hrs  T(C): 36.3 (28 Feb 2025 05:03), Max: 37.4 (27 Feb 2025 21:23)  T(F): 97.4 (28 Feb 2025 05:03), Max: 99.4 (27 Feb 2025 21:23)  HR: 114 (28 Feb 2025 05:03) (100 - 115)  BP: 110/68 (28 Feb 2025 05:03) (94/51 - 110/68)  BP(mean): --  RR: 18 (28 Feb 2025 05:03) (18 - 18)  SpO2: 100% (28 Feb 2025 05:03) (96% - 100%)    Parameters below as of 28 Feb 2025 05:03  Patient On (Oxygen Delivery Method): room air      CONSTITUTIONAL: NAD; jaundiced  HEENT: PERRLA, clear conjunctiva, EOMI, scleral icterus  RESPIRATORY: Normal respiratory effort; lungs are clear to auscultation bilaterally; No crackles/rhonchi/wheezing  CARDIOVASCULAR: Regular rate and rhythm, normal S1 and S2, no murmur/rub/gallop; No lower extremity edema; Peripheral pulses are 2+ bilaterally  ABDOMEN: Distended, Nontender to palpation, normoactive bowel sounds, no rebound/guarding; +Hepatosplenomegaly  MUSCULOSKELETAL: No clubbing or cyanosis of digits; no joint swelling or tenderness to palpation  EXTREMITY: Lower extremities non-tender to palpation; non-erythematous B/L  NEURO: A&Ox3; no focal deficits   PSYCH: Normal mood; affect appropriate  LABS:                        8.4    8.49  )-----------( 119      ( 26 Feb 2025 09:23 )             25.7     02-28    125[L]  |  90[L]  |  23  ----------------------------<  115[H]  5.0   |  17[L]  |  0.60    Ca    9.8      28 Feb 2025 07:02  Phos  3.9     02-28  Mg     1.6     02-28    TPro  8.4[H]  /  Alb  3.7  /  TBili  9.7[H]  /  DBili  x   /  AST  104[H]  /  ALT  33  /  AlkPhos  390[H]  02-28    PT/INR - ( 28 Feb 2025 07:02 )   PT: 20.6 sec;   INR: 1.80 ratio         PTT - ( 28 Feb 2025 07:02 )  PTT:50.9 sec      Urinalysis Basic - ( 28 Feb 2025 07:02 )    Color: x / Appearance: x / SG: x / pH: x  Gluc: 115 mg/dL / Ketone: x  / Bili: x / Urobili: x   Blood: x / Protein: x / Nitrite: x   Leuk Esterase: x / RBC: x / WBC x   Sq Epi: x / Non Sq Epi: x / Bacteria: x          RADIOLOGY & ADDITIONAL TESTS:    N/A

## 2025-02-28 NOTE — PROGRESS NOTE ADULT - ASSESSMENT
60yo F w/ PMH of alcohol cirrhosis with recurrent ascites, s/p multiple paracentesis, s/p banding of esophageal varices, and s/p tx for recent c. diff colitis and possible endocarditis, presenting as a transfer from [facility] for liver transplant evaluation. Patient not currently listed for transplant 2/2 positive PETH and requiring psychosocial clearance prior to possible transplant. Now found to have psoas muscle abscess, pending possible drainage with IR.

## 2025-02-28 NOTE — PROGRESS NOTE ADULT - PROBLEM SELECTOR PLAN 4
SBPs ~ while on midodrine  - Hold home metoprolol, sinus tachycardic workup outpatient done    Plan:  Continue midodrine 15mg TID, hold for SBP > 110  CTM BPs SBPs ~ while on midodrine  - Hold home metoprolol, sinus tachycardic workup outpatient done  - Per liver transplant team, can start carvedilol upon discharge    Plan:  Continue midodrine 15mg TID, hold for SBP > 110  CTM BPs

## 2025-03-01 LAB
ALBUMIN SERPL ELPH-MCNC: 3.6 G/DL — SIGNIFICANT CHANGE UP (ref 3.3–5)
ALP SERPL-CCNC: 354 U/L — HIGH (ref 40–120)
ALT FLD-CCNC: 28 U/L — SIGNIFICANT CHANGE UP (ref 10–45)
ANION GAP SERPL CALC-SCNC: 13 MMOL/L — SIGNIFICANT CHANGE UP (ref 5–17)
APTT BLD: 62.1 SEC — HIGH (ref 24.5–35.6)
AST SERPL-CCNC: 81 U/L — HIGH (ref 10–40)
BILIRUB SERPL-MCNC: 8.5 MG/DL — HIGH (ref 0.2–1.2)
BUN SERPL-MCNC: 24 MG/DL — HIGH (ref 7–23)
CALCIUM SERPL-MCNC: 9.9 MG/DL — SIGNIFICANT CHANGE UP (ref 8.4–10.5)
CHLORIDE SERPL-SCNC: 94 MMOL/L — LOW (ref 96–108)
CO2 SERPL-SCNC: 20 MMOL/L — LOW (ref 22–31)
CREAT SERPL-MCNC: 0.68 MG/DL — SIGNIFICANT CHANGE UP (ref 0.5–1.3)
EGFR: 99 ML/MIN/1.73M2 — SIGNIFICANT CHANGE UP
EGFR: 99 ML/MIN/1.73M2 — SIGNIFICANT CHANGE UP
GAS PNL BLDV: SIGNIFICANT CHANGE UP
GAS PNL BLDV: SIGNIFICANT CHANGE UP
GLUCOSE SERPL-MCNC: 77 MG/DL — SIGNIFICANT CHANGE UP (ref 70–99)
HCT VFR BLD CALC: 25.3 % — LOW (ref 34.5–45)
HGB BLD-MCNC: 8.4 G/DL — LOW (ref 11.5–15.5)
INR BLD: 1.72 RATIO — HIGH (ref 0.85–1.16)
LACTATE SERPL-SCNC: 3.1 MMOL/L — HIGH (ref 0.5–2)
LACTATE SERPL-SCNC: 3.2 MMOL/L — HIGH (ref 0.5–2)
MAGNESIUM SERPL-MCNC: 1.7 MG/DL — SIGNIFICANT CHANGE UP (ref 1.6–2.6)
MCHC RBC-ENTMCNC: 31.3 PG — SIGNIFICANT CHANGE UP (ref 27–34)
MCHC RBC-ENTMCNC: 33.2 G/DL — SIGNIFICANT CHANGE UP (ref 32–36)
MCV RBC AUTO: 94.4 FL — SIGNIFICANT CHANGE UP (ref 80–100)
NRBC BLD AUTO-RTO: 0 /100 WBCS — SIGNIFICANT CHANGE UP (ref 0–0)
PHOSPHATE SERPL-MCNC: 3.8 MG/DL — SIGNIFICANT CHANGE UP (ref 2.5–4.5)
PLATELET # BLD AUTO: 110 K/UL — LOW (ref 150–400)
POTASSIUM SERPL-MCNC: 5.1 MMOL/L — SIGNIFICANT CHANGE UP (ref 3.5–5.3)
POTASSIUM SERPL-SCNC: 5.1 MMOL/L — SIGNIFICANT CHANGE UP (ref 3.5–5.3)
PROT SERPL-MCNC: 8.2 G/DL — SIGNIFICANT CHANGE UP (ref 6–8.3)
PROTHROM AB SERPL-ACNC: 19.5 SEC — HIGH (ref 9.9–13.4)
RAPIDTEG MAXIMUM AMPLITUDE: 60.7 MM — SIGNIFICANT CHANGE UP (ref 52–70)
RBC # BLD: 2.68 M/UL — LOW (ref 3.8–5.2)
RBC # FLD: 19.1 % — HIGH (ref 10.3–14.5)
SODIUM SERPL-SCNC: 127 MMOL/L — LOW (ref 135–145)
TEG FUNCTIONAL FIBRINOGEN: 25.1 MM — SIGNIFICANT CHANGE UP (ref 15–32)
TEG LY30 (LYSIS): 2.8 % — HIGH (ref 0–2.6)
TEG REACTION TIME: 9.7 MIN — HIGH (ref 4.6–9.1)
WBC # BLD: 9.75 K/UL — SIGNIFICANT CHANGE UP (ref 3.8–10.5)
WBC # FLD AUTO: 9.75 K/UL — SIGNIFICANT CHANGE UP (ref 3.8–10.5)

## 2025-03-01 PROCEDURE — 99233 SBSQ HOSP IP/OBS HIGH 50: CPT

## 2025-03-01 RX ORDER — ACETAMINOPHEN 500 MG/5ML
1000 LIQUID (ML) ORAL ONCE
Refills: 0 | Status: COMPLETED | OUTPATIENT
Start: 2025-03-01 | End: 2025-03-01

## 2025-03-01 RX ORDER — SODIUM CHLORIDE 9 G/1000ML
500 INJECTION, SOLUTION INTRAVENOUS ONCE
Refills: 0 | Status: COMPLETED | OUTPATIENT
Start: 2025-03-01 | End: 2025-03-01

## 2025-03-01 RX ADMIN — SODIUM CHLORIDE 500 MILLILITER(S): 9 INJECTION, SOLUTION INTRAVENOUS at 12:28

## 2025-03-01 RX ADMIN — URSODIOL 300 MILLIGRAM(S): 300 CAPSULE ORAL at 18:37

## 2025-03-01 RX ADMIN — Medication 3 MILLIGRAM(S): at 21:09

## 2025-03-01 RX ADMIN — HYDROXYZINE HYDROCHLORIDE 25 MILLIGRAM(S): 25 TABLET, FILM COATED ORAL at 00:23

## 2025-03-01 RX ADMIN — Medication 125 MILLIGRAM(S): at 23:25

## 2025-03-01 RX ADMIN — Medication 400 MILLIGRAM(S): at 18:37

## 2025-03-01 RX ADMIN — Medication 400 MILLIGRAM(S): at 08:54

## 2025-03-01 RX ADMIN — CEFTRIAXONE 100 MILLIGRAM(S): 500 INJECTION, POWDER, FOR SOLUTION INTRAMUSCULAR; INTRAVENOUS at 18:54

## 2025-03-01 RX ADMIN — Medication 125 MILLIGRAM(S): at 05:18

## 2025-03-01 RX ADMIN — Medication 15 MILLILITER(S): at 23:09

## 2025-03-01 RX ADMIN — Medication 400 MILLIGRAM(S): at 12:07

## 2025-03-01 RX ADMIN — Medication 125 MILLIGRAM(S): at 12:08

## 2025-03-01 RX ADMIN — HEPARIN SODIUM 5000 UNIT(S): 1000 INJECTION INTRAVENOUS; SUBCUTANEOUS at 22:03

## 2025-03-01 RX ADMIN — Medication 150 MILLIGRAM(S): at 05:21

## 2025-03-01 RX ADMIN — Medication 100 MILLIGRAM(S): at 12:08

## 2025-03-01 RX ADMIN — HEPARIN SODIUM 5000 UNIT(S): 1000 INJECTION INTRAVENOUS; SUBCUTANEOUS at 15:49

## 2025-03-01 RX ADMIN — Medication 1000 MILLIGRAM(S): at 18:46

## 2025-03-01 RX ADMIN — Medication 15 MILLILITER(S): at 08:54

## 2025-03-01 RX ADMIN — Medication 400 MILLIGRAM(S): at 00:18

## 2025-03-01 RX ADMIN — Medication 15 MILLILITER(S): at 12:09

## 2025-03-01 RX ADMIN — Medication 100 MILLIGRAM(S): at 08:56

## 2025-03-01 RX ADMIN — MIDODRINE HYDROCHLORIDE 15 MILLIGRAM(S): 5 TABLET ORAL at 18:37

## 2025-03-01 RX ADMIN — Medication 1 GRAM(S): at 05:21

## 2025-03-01 RX ADMIN — URSODIOL 300 MILLIGRAM(S): 300 CAPSULE ORAL at 05:20

## 2025-03-01 RX ADMIN — Medication 1 GRAM(S): at 12:07

## 2025-03-01 RX ADMIN — Medication 15 MILLILITER(S): at 15:49

## 2025-03-01 RX ADMIN — MIRTAZAPINE 7.5 MILLIGRAM(S): 30 TABLET, FILM COATED ORAL at 21:09

## 2025-03-01 RX ADMIN — Medication 400 MILLIGRAM(S): at 18:31

## 2025-03-01 RX ADMIN — Medication 40 MILLIGRAM(S): at 05:20

## 2025-03-01 RX ADMIN — Medication 15 MILLILITER(S): at 20:18

## 2025-03-01 RX ADMIN — MIDODRINE HYDROCHLORIDE 15 MILLIGRAM(S): 5 TABLET ORAL at 12:08

## 2025-03-01 RX ADMIN — HEPARIN SODIUM 5000 UNIT(S): 1000 INJECTION INTRAVENOUS; SUBCUTANEOUS at 05:19

## 2025-03-01 RX ADMIN — SODIUM CHLORIDE 500 MILLILITER(S): 9 INJECTION, SOLUTION INTRAVENOUS at 21:25

## 2025-03-01 RX ADMIN — Medication 1 GRAM(S): at 18:36

## 2025-03-01 RX ADMIN — Medication 1 APPLICATION(S): at 05:33

## 2025-03-01 RX ADMIN — Medication 1 GRAM(S): at 23:25

## 2025-03-01 RX ADMIN — Medication 100 MILLIGRAM(S): at 20:18

## 2025-03-01 RX ADMIN — LACTULOSE 10 GRAM(S): 10 SOLUTION ORAL at 12:10

## 2025-03-01 RX ADMIN — Medication 1 GRAM(S): at 12:09

## 2025-03-01 RX ADMIN — FOLIC ACID 1 MILLIGRAM(S): 1 TABLET ORAL at 12:07

## 2025-03-01 RX ADMIN — MIDODRINE HYDROCHLORIDE 15 MILLIGRAM(S): 5 TABLET ORAL at 05:21

## 2025-03-01 RX ADMIN — Medication 125 MILLIGRAM(S): at 18:36

## 2025-03-01 RX ADMIN — FUROSEMIDE 80 MILLIGRAM(S): 10 INJECTION INTRAMUSCULAR; INTRAVENOUS at 05:19

## 2025-03-01 NOTE — PROVIDER CONTACT NOTE (CRITICAL VALUE NOTIFICATION) - SITUATION
Hg 5.0, Hct 15.5
Q fever antibodies, Q fever 2 IGG AB 1:32
Venous Lactate 2.8
Potassium 2.3, CO2 <10, Sodium <85, Mg 0.7, Calcium 4.5

## 2025-03-01 NOTE — PROGRESS NOTE ADULT - SUBJECTIVE AND OBJECTIVE BOX
****[facility] PGY-2*****    CHIEF COMPLAINT:    Overnight Events:  Interval Events:    OBJECTIVE:  ICU Vital Signs Last 24 Hrs  T(C): 36.9 (01 Mar 2025 04:59), Max: 37.1 (28 Feb 2025 16:26)  T(F): 98.5 (01 Mar 2025 04:59), Max: 98.7 (28 Feb 2025 16:26)  HR: 111 (01 Mar 2025 04:59) (100 - 111)  BP: 112/63 (01 Mar 2025 04:59) (93/60 - 115/42)  BP(mean): --  ABP: --  ABP(mean): --  RR: 18 (01 Mar 2025 04:59) (18 - 18)  SpO2: 96% (01 Mar 2025 04:59) (96% - 98%)    O2 Parameters below as of 01 Mar 2025 04:59  Patient On (Oxygen Delivery Method): room air              02-28 @ 07:01  -  03-01 @ 07:00  --------------------------------------------------------  IN: 480 mL / OUT: 0 mL / NET: 480 mL      CAPILLARY BLOOD GLUCOSE          PHYSICAL EXAM  General:   Head:    Eyes:   Neck:   Cardiac:   Lungs:   Abdomen:   Extremities:   Neuro  Psych:   Skin:  Etc:      HOSPITAL MEDICATIONS:  MEDICATIONS  (STANDING):  albumin human 25% IVPB 100 milliLiter(s) IV Intermittent every 4 hours  Biotene Dry Mouth Oral Rinse 15 milliLiter(s) Swish and Spit five times a day  cefTRIAXone   IVPB 2000 milliGRAM(s) IV Intermittent every 24 hours  chlorhexidine 2% Cloths 1 Application(s) Topical <User Schedule>  folic acid 1 milliGRAM(s) Oral daily  furosemide    Tablet 80 milliGRAM(s) Oral daily  heparin   Injectable 5000 Unit(s) SubCutaneous every 8 hours  lactulose Syrup 10 Gram(s) Oral daily  magnesium oxide 400 milliGRAM(s) Oral three times a day with meals  melatonin 3 milliGRAM(s) Oral at bedtime  metroNIDAZOLE  IVPB 500 milliGRAM(s) IV Intermittent every 12 hours  metroNIDAZOLE  IVPB      midodrine. 15 milliGRAM(s) Oral three times a day  mirtazapine 7.5 milliGRAM(s) Oral at bedtime  pantoprazole    Tablet 40 milliGRAM(s) Oral before breakfast  rifAXIMin 550 milliGRAM(s) Oral two times a day  sodium chloride 1 Gram(s) Oral daily  spironolactone 150 milliGRAM(s) Oral daily  sucralfate suspension 1 Gram(s) Oral every 6 hours  thiamine 100 milliGRAM(s) Oral daily  ursodiol Capsule 300 milliGRAM(s) Oral every 12 hours  vancomycin    Solution 125 milliGRAM(s) Oral every 6 hours    MEDICATIONS  (PRN):  albumin human 25% IVPB 50 milliLiter(s) IV Intermittent once PRN para albumin replacement  albuterol/ipratropium for Nebulization 3 milliLiter(s) Nebulizer every 6 hours PRN Shortness of Breath and/or Wheezing  guaiFENesin Oral Liquid (Sugar-Free) 100 milliGRAM(s) Oral every 6 hours PRN Cough      LABS:                        8.8    11.21 )-----------( 122      ( 28 Feb 2025 07:02 )             26.8     Hgb Trend: 8.8<--, 8.4<--, 5.0<--, 7.9<--, 8.2<--  02-28    125[L]  |  90[L]  |  23  ----------------------------<  115[H]  5.0   |  17[L]  |  0.60    Ca    9.8      28 Feb 2025 07:02  Phos  3.9     02-28  Mg     1.6     02-28    TPro  8.4[H]  /  Alb  3.7  /  TBili  9.7[H]  /  DBili  x   /  AST  104[H]  /  ALT  33  /  AlkPhos  390[H]  02-28    Creatinine Trend: 0.60<--, 0.46<--, <0.30<--, 0.43<--, 0.35<--, 0.32<--  PT/INR - ( 28 Feb 2025 07:02 )   PT: 20.6 sec;   INR: 1.80 ratio         PTT - ( 28 Feb 2025 07:02 )  PTT:50.9 sec  Urinalysis Basic - ( 28 Feb 2025 07:02 )    Color: x / Appearance: x / SG: x / pH: x  Gluc: 115 mg/dL / Ketone: x  / Bili: x / Urobili: x   Blood: x / Protein: x / Nitrite: x   Leuk Esterase: x / RBC: x / WBC x   Sq Epi: x / Non Sq Epi: x / Bacteria: x            MICROBIOLOGY:       RADIOLOGY:  [ ] Reviewed by me   ****[facility] PGY-2*****    CHIEF COMPLAINT: Sepsis    Overnight Events: NA  Interval Events: Patient reported feel well. She did endorse increased full feeling in her abd (as if it was reaccumulating).     They denied fevers/chills, shortness of breath/chest pain, abdomin pain, constipation/diarrehea, any issues with urination.       OBJECTIVE:  ICU Vital Signs Last 24 Hrs  T(C): 36.9 (01 Mar 2025 04:59), Max: 37.1 (28 Feb 2025 16:26)  T(F): 98.5 (01 Mar 2025 04:59), Max: 98.7 (28 Feb 2025 16:26)  HR: 111 (01 Mar 2025 04:59) (100 - 111)  BP: 112/63 (01 Mar 2025 04:59) (93/60 - 115/42)  BP(mean): --  ABP: --  ABP(mean): --  RR: 18 (01 Mar 2025 04:59) (18 - 18)  SpO2: 96% (01 Mar 2025 04:59) (96% - 98%)    O2 Parameters below as of 01 Mar 2025 04:59  Patient On (Oxygen Delivery Method): room air              02-28 @ 07:01  -  03-01 @ 07:00  --------------------------------------------------------  IN: 480 mL / OUT: 0 mL / NET: 480 mL      CAPILLARY BLOOD GLUCOSE          PHYSICAL EXAM  General: NAD  Head: NA  Eyes: Scleral icterus  Neck: NA  Cardiac: Normal R/R  Lungs: Clear lung fields  Abdomen: Abd mildly distended but soft and non tender  Extremities: no le edema  Neuro: Ao3  Psych: Positive affect  Skin: Jaundice throughout   Etc: NA    HOSPITAL MEDICATIONS:  MEDICATIONS  (STANDING):  albumin human 25% IVPB 100 milliLiter(s) IV Intermittent every 4 hours  Biotene Dry Mouth Oral Rinse 15 milliLiter(s) Swish and Spit five times a day  cefTRIAXone   IVPB 2000 milliGRAM(s) IV Intermittent every 24 hours  chlorhexidine 2% Cloths 1 Application(s) Topical <User Schedule>  folic acid 1 milliGRAM(s) Oral daily  furosemide    Tablet 80 milliGRAM(s) Oral daily  heparin   Injectable 5000 Unit(s) SubCutaneous every 8 hours  lactulose Syrup 10 Gram(s) Oral daily  magnesium oxide 400 milliGRAM(s) Oral three times a day with meals  melatonin 3 milliGRAM(s) Oral at bedtime  metroNIDAZOLE  IVPB 500 milliGRAM(s) IV Intermittent every 12 hours  metroNIDAZOLE  IVPB      midodrine. 15 milliGRAM(s) Oral three times a day  mirtazapine 7.5 milliGRAM(s) Oral at bedtime  pantoprazole    Tablet 40 milliGRAM(s) Oral before breakfast  rifAXIMin 550 milliGRAM(s) Oral two times a day  sodium chloride 1 Gram(s) Oral daily  spironolactone 150 milliGRAM(s) Oral daily  sucralfate suspension 1 Gram(s) Oral every 6 hours  thiamine 100 milliGRAM(s) Oral daily  ursodiol Capsule 300 milliGRAM(s) Oral every 12 hours  vancomycin    Solution 125 milliGRAM(s) Oral every 6 hours    MEDICATIONS  (PRN):  albumin human 25% IVPB 50 milliLiter(s) IV Intermittent once PRN para albumin replacement  albuterol/ipratropium for Nebulization 3 milliLiter(s) Nebulizer every 6 hours PRN Shortness of Breath and/or Wheezing  guaiFENesin Oral Liquid (Sugar-Free) 100 milliGRAM(s) Oral every 6 hours PRN Cough      LABS:                        8.8    11.21 )-----------( 122      ( 28 Feb 2025 07:02 )             26.8     Hgb Trend: 8.8<--, 8.4<--, 5.0<--, 7.9<--, 8.2<--  02-28    125[L]  |  90[L]  |  23  ----------------------------<  115[H]  5.0   |  17[L]  |  0.60    Ca    9.8      28 Feb 2025 07:02  Phos  3.9     02-28  Mg     1.6     02-28    TPro  8.4[H]  /  Alb  3.7  /  TBili  9.7[H]  /  DBili  x   /  AST  104[H]  /  ALT  33  /  AlkPhos  390[H]  02-28    Creatinine Trend: 0.60<--, 0.46<--, <0.30<--, 0.43<--, 0.35<--, 0.32<--  PT/INR - ( 28 Feb 2025 07:02 )   PT: 20.6 sec;   INR: 1.80 ratio         PTT - ( 28 Feb 2025 07:02 )  PTT:50.9 sec  Urinalysis Basic - ( 28 Feb 2025 07:02 )    Color: x / Appearance: x / SG: x / pH: x  Gluc: 115 mg/dL / Ketone: x  / Bili: x / Urobili: x   Blood: x / Protein: x / Nitrite: x   Leuk Esterase: x / RBC: x / WBC x   Sq Epi: x / Non Sq Epi: x / Bacteria: x            MICROBIOLOGY:       RADIOLOGY:  [ ] Reviewed by me

## 2025-03-01 NOTE — PROVIDER CONTACT NOTE (CRITICAL VALUE NOTIFICATION) - ACTION/TREATMENT ORDERED:
MD pike
Repeat CMP STAT, provider at bedside assessing patient
STAT CBC ordered for repeat, provider at bedside assessing patient
MD contacted and aware.

## 2025-03-01 NOTE — PROGRESS NOTE ADULT - PROBLEM SELECTOR PLAN 1
Patient febrile in afternoon 2/22  - CT abdomen on 2/23 with 3.7 cm rim-enhancing fluid collection anterior to the right psoas muscle in the pelvis as well as a more heterogeneous enhancing structure anterior and medial to this collection measuring 2.7 cm c/f bilobed abscess. IR prefers to defer intervention for now given significant surrounding vasculature  - CT on 2/9 taken at [facility]: "nonspecific hypoattenuating lesion/structure in the right lower quadrant measuring up to 4.4x4.2 cm with peripheral wall enhancement may represent adnexal cyst."  - TTE 2/12 & 2/24 no evidence valve vegetations  - Bcx NGTD from 2/22  - MR TLS: redemonstrates abscess, no evidence of extension of disease elsewhere or OM  - IR consulted, high-risk of bleed for aspiration, plan to monitor on abx and rescan to assess at later date    Plan:  IV Vanc, ceftriaxone, flagyl

## 2025-03-01 NOTE — PROGRESS NOTE ADULT - PROBLEM SELECTOR PLAN 2
C/b history of hepatic encephalopathy and esophageal varices  - Patient tx from [facility] for transplant evaluation -- PETH found to be elevated, further transplant workup being deferred iso elevated PETH -- outpatient RPP program for psychosocial issues  - s/p multiple paracentesis, most recent on 2/20 w/ 1.4L removed  - Transplant hepatology following    Plan:  Continue on Lasix 80mg and Spironolactone 150mg qd  Continue rifaximin  Restart lactulose 10mg qd, titrate frequency for BM 3-4 daily  F/u TEG for bleeding risk  Ursodiol 300mg qd, atarax for generalized itch related to bile salt accumulation

## 2025-03-01 NOTE — PROGRESS NOTE ADULT - PROBLEM SELECTOR PLAN 4
SBPs ~ while on midodrine  - Hold home metoprolol, sinus tachycardic workup outpatient done  - Per liver transplant team, can start carvedilol upon discharge    Plan:  Continue midodrine 15mg TID, hold for SBP > 110  CTM BPs

## 2025-03-01 NOTE — PROVIDER CONTACT NOTE (CRITICAL VALUE NOTIFICATION) - TEST AND RESULT REPORTED:
Hg 5.0, Hct 15.5
Q fever antibodies, Q fever 2 IGG AB 1:32
Q fever antibody screen- reactive
Potassium 2.3, CO2 <10, Sodium <85, Mg 0.7, Calcium 4.5
Venous Lactacte 2.8

## 2025-03-01 NOTE — PROGRESS NOTE ADULT - PROBLEM SELECTOR PLAN 3
Pt tested positive for C. diff colitis at [facility]. Started on oral vancomycin  - Patient s/p 2 weeks oral vancomycin, with diarrhea initially resolving  - Patient redeveloped diarrhea 2/24. Recurrence of c. diff vs lactulose-induced diarrhea  - Diarrhea improved once lactulose held, likely lactulose-induced    Plan:   Restart PO vanc 125mg q6h (2/24 - )  Due to BMs slowing, will restart lactulose 10mg qd  Stool count, goal 3-4 BMs daily

## 2025-03-01 NOTE — PROGRESS NOTE ADULT - ASSESSMENT
58yo F w/ PMH of alcohol cirrhosis with recurrent ascites, s/p multiple paracentesis, s/p banding of esophageal varices, and s/p tx for recent c. diff colitis and possible endocarditis, presenting as a transfer from [facility] for liver transplant evaluation. Patient not currently listed for transplant 2/2 positive PETH and requiring psychosocial clearance prior to possible transplant. Now found to have psoas muscle abscess, pending possible drainage with IR.

## 2025-03-01 NOTE — PROVIDER CONTACT NOTE (CRITICAL VALUE NOTIFICATION) - BACKGROUND
Patient admitted on 2/10/25 for liver failure without hepatic coma.
pt admitted for cirrhosis
Patient admitted with alcoholic liver cirrhosis, hx of cdiff, bacteremia.
Patient admitted with alcoholic liver cirrhosis, hx of bacteremia, cdiff

## 2025-03-01 NOTE — PROVIDER CONTACT NOTE (CRITICAL VALUE NOTIFICATION) - ASSESSMENT
Patient is A&O x 4
Patient is AxOx4, neurologically at baseline, no chest pain, no SOB, VSS. Labs likely not accurate.
Patient is AxOx4, all VSS, pt neurologically stable at baseline, pt denies SOB, no chest pain. Labs likely not accurate
vss

## 2025-03-02 LAB
ALBUMIN SERPL ELPH-MCNC: 3.4 G/DL — SIGNIFICANT CHANGE UP (ref 3.3–5)
ALP SERPL-CCNC: 314 U/L — HIGH (ref 40–120)
ALT FLD-CCNC: 25 U/L — SIGNIFICANT CHANGE UP (ref 10–45)
ANION GAP SERPL CALC-SCNC: 14 MMOL/L — SIGNIFICANT CHANGE UP (ref 5–17)
APTT BLD: 42.9 SEC — HIGH (ref 24.5–35.6)
AST SERPL-CCNC: 63 U/L — HIGH (ref 10–40)
BILIRUB SERPL-MCNC: 7.6 MG/DL — HIGH (ref 0.2–1.2)
BUN SERPL-MCNC: 22 MG/DL — SIGNIFICANT CHANGE UP (ref 7–23)
CALCIUM SERPL-MCNC: 9.5 MG/DL — SIGNIFICANT CHANGE UP (ref 8.4–10.5)
CHLORIDE SERPL-SCNC: 91 MMOL/L — LOW (ref 96–108)
CO2 SERPL-SCNC: 22 MMOL/L — SIGNIFICANT CHANGE UP (ref 22–31)
CREAT SERPL-MCNC: 0.65 MG/DL — SIGNIFICANT CHANGE UP (ref 0.5–1.3)
EGFR: 100 ML/MIN/1.73M2 — SIGNIFICANT CHANGE UP
EGFR: 100 ML/MIN/1.73M2 — SIGNIFICANT CHANGE UP
GLUCOSE SERPL-MCNC: 171 MG/DL — HIGH (ref 70–99)
HCT VFR BLD CALC: 24.5 % — LOW (ref 34.5–45)
HGB BLD-MCNC: 7.9 G/DL — LOW (ref 11.5–15.5)
INR BLD: 1.66 RATIO — HIGH (ref 0.85–1.16)
MAGNESIUM SERPL-MCNC: 1.5 MG/DL — LOW (ref 1.6–2.6)
MCHC RBC-ENTMCNC: 30.4 PG — SIGNIFICANT CHANGE UP (ref 27–34)
MCHC RBC-ENTMCNC: 32.2 G/DL — SIGNIFICANT CHANGE UP (ref 32–36)
MCV RBC AUTO: 94.2 FL — SIGNIFICANT CHANGE UP (ref 80–100)
NRBC BLD AUTO-RTO: 0 /100 WBCS — SIGNIFICANT CHANGE UP (ref 0–0)
PHOSPHATE SERPL-MCNC: 3.2 MG/DL — SIGNIFICANT CHANGE UP (ref 2.5–4.5)
PLATELET # BLD AUTO: 108 K/UL — LOW (ref 150–400)
POTASSIUM SERPL-MCNC: 3.8 MMOL/L — SIGNIFICANT CHANGE UP (ref 3.5–5.3)
POTASSIUM SERPL-SCNC: 3.8 MMOL/L — SIGNIFICANT CHANGE UP (ref 3.5–5.3)
PROT SERPL-MCNC: 7.7 G/DL — SIGNIFICANT CHANGE UP (ref 6–8.3)
PROTHROM AB SERPL-ACNC: 19 SEC — HIGH (ref 9.9–13.4)
RBC # BLD: 2.6 M/UL — LOW (ref 3.8–5.2)
RBC # FLD: 18.6 % — HIGH (ref 10.3–14.5)
SODIUM SERPL-SCNC: 127 MMOL/L — LOW (ref 135–145)
WBC # BLD: 8.01 K/UL — SIGNIFICANT CHANGE UP (ref 3.8–10.5)
WBC # FLD AUTO: 8.01 K/UL — SIGNIFICANT CHANGE UP (ref 3.8–10.5)

## 2025-03-02 PROCEDURE — 99232 SBSQ HOSP IP/OBS MODERATE 35: CPT | Mod: GC

## 2025-03-02 RX ORDER — ACETAMINOPHEN 500 MG/5ML
1000 LIQUID (ML) ORAL ONCE
Refills: 0 | Status: COMPLETED | OUTPATIENT
Start: 2025-03-02 | End: 2025-03-02

## 2025-03-02 RX ORDER — MAGNESIUM SULFATE 500 MG/ML
2 SYRINGE (ML) INJECTION ONCE
Refills: 0 | Status: COMPLETED | OUTPATIENT
Start: 2025-03-02 | End: 2025-03-02

## 2025-03-02 RX ORDER — METRONIDAZOLE 250 MG
500 TABLET ORAL EVERY 12 HOURS
Refills: 0 | Status: DISCONTINUED | OUTPATIENT
Start: 2025-03-02 | End: 2025-03-05

## 2025-03-02 RX ADMIN — Medication 400 MILLIGRAM(S): at 08:36

## 2025-03-02 RX ADMIN — CEFTRIAXONE 100 MILLIGRAM(S): 500 INJECTION, POWDER, FOR SOLUTION INTRAMUSCULAR; INTRAVENOUS at 20:22

## 2025-03-02 RX ADMIN — Medication 125 MILLIGRAM(S): at 18:05

## 2025-03-02 RX ADMIN — Medication 15 MILLILITER(S): at 21:29

## 2025-03-02 RX ADMIN — Medication 125 MILLIGRAM(S): at 23:47

## 2025-03-02 RX ADMIN — HEPARIN SODIUM 5000 UNIT(S): 1000 INJECTION INTRAVENOUS; SUBCUTANEOUS at 21:30

## 2025-03-02 RX ADMIN — Medication 125 MILLIGRAM(S): at 12:27

## 2025-03-02 RX ADMIN — FOLIC ACID 1 MILLIGRAM(S): 1 TABLET ORAL at 12:26

## 2025-03-02 RX ADMIN — Medication 100 MILLIGRAM(S): at 18:06

## 2025-03-02 RX ADMIN — Medication 1 GRAM(S): at 18:06

## 2025-03-02 RX ADMIN — Medication 1 GRAM(S): at 05:42

## 2025-03-02 RX ADMIN — MIDODRINE HYDROCHLORIDE 15 MILLIGRAM(S): 5 TABLET ORAL at 18:14

## 2025-03-02 RX ADMIN — Medication 1 GRAM(S): at 23:47

## 2025-03-02 RX ADMIN — MIRTAZAPINE 7.5 MILLIGRAM(S): 30 TABLET, FILM COATED ORAL at 21:29

## 2025-03-02 RX ADMIN — Medication 1 GRAM(S): at 12:27

## 2025-03-02 RX ADMIN — Medication 15 MILLILITER(S): at 23:47

## 2025-03-02 RX ADMIN — Medication 400 MILLIGRAM(S): at 18:04

## 2025-03-02 RX ADMIN — HEPARIN SODIUM 5000 UNIT(S): 1000 INJECTION INTRAVENOUS; SUBCUTANEOUS at 05:57

## 2025-03-02 RX ADMIN — Medication 1 APPLICATION(S): at 05:15

## 2025-03-02 RX ADMIN — Medication 1 GRAM(S): at 12:28

## 2025-03-02 RX ADMIN — Medication 15 MILLILITER(S): at 12:28

## 2025-03-02 RX ADMIN — Medication 3 MILLIGRAM(S): at 21:30

## 2025-03-02 RX ADMIN — Medication 15 MILLILITER(S): at 18:05

## 2025-03-02 RX ADMIN — Medication 25 GRAM(S): at 08:36

## 2025-03-02 RX ADMIN — FUROSEMIDE 80 MILLIGRAM(S): 10 INJECTION INTRAMUSCULAR; INTRAVENOUS at 05:43

## 2025-03-02 RX ADMIN — LACTULOSE 10 GRAM(S): 10 SOLUTION ORAL at 12:28

## 2025-03-02 RX ADMIN — Medication 15 MILLILITER(S): at 08:36

## 2025-03-02 RX ADMIN — Medication 400 MILLIGRAM(S): at 06:00

## 2025-03-02 RX ADMIN — URSODIOL 300 MILLIGRAM(S): 300 CAPSULE ORAL at 18:06

## 2025-03-02 RX ADMIN — MIDODRINE HYDROCHLORIDE 15 MILLIGRAM(S): 5 TABLET ORAL at 05:43

## 2025-03-02 RX ADMIN — HEPARIN SODIUM 5000 UNIT(S): 1000 INJECTION INTRAVENOUS; SUBCUTANEOUS at 14:54

## 2025-03-02 RX ADMIN — Medication 150 MILLIGRAM(S): at 05:43

## 2025-03-02 RX ADMIN — Medication 40 MILLIGRAM(S): at 05:44

## 2025-03-02 RX ADMIN — URSODIOL 300 MILLIGRAM(S): 300 CAPSULE ORAL at 05:43

## 2025-03-02 RX ADMIN — MIDODRINE HYDROCHLORIDE 15 MILLIGRAM(S): 5 TABLET ORAL at 12:26

## 2025-03-02 RX ADMIN — Medication 400 MILLIGRAM(S): at 12:26

## 2025-03-02 RX ADMIN — Medication 100 MILLIGRAM(S): at 12:27

## 2025-03-02 RX ADMIN — Medication 125 MILLIGRAM(S): at 05:42

## 2025-03-02 NOTE — PROGRESS NOTE ADULT - ASSESSMENT
58yo F w/ PMH of alcohol cirrhosis with recurrent ascites, s/p multiple paracentesis, s/p banding of esophageal varices, and s/p tx for recent c. diff colitis and possible endocarditis, presenting as a transfer from [facility] for liver transplant evaluation. Patient not currently listed for transplant 2/2 positive PETH and requiring psychosocial clearance prior to possible transplant. Now found to have psoas muscle abscess, pending possible drainage with IR.

## 2025-03-02 NOTE — PROGRESS NOTE ADULT - SUBJECTIVE AND OBJECTIVE BOX
PROGRESS NOTE:     Patient is a 59y old  Female who presents with a chief complaint of Transfer from [place] for possible liver transplant.      SUBJECTIVE / OVERNIGHT EVENTS:    OVERNIGHT: No acute overnight events.  Patient was examined at bedside and feels well this morning. Denies fever, chills, chest pain, SOB, nausea, vomiting. ROS otherwise negative and pt is amenable to current treatment plan.      REVIEW OF SYSTEMS:    CONSTITUTIONAL:  No weakness, fevers, or chills  EYES/ENT:  No visual changes, vertigo, or throat pain   NECK:  No pain or stiffness  RESPIRATORY:  No SOB, cough, wheezing, or hemoptysis  CARDIOVASCULAR:  No chest pain or palpitations  GASTROINTESTINAL:  No abdominal pain, nausea, vomiting, or hematemesis; No diarrhea or constipation; No melena or hematochezia.  GENITOURINARY:  No dysuria, change in frequency, or hematuria  NEUROLOGICAL:  No numbness or weakness  SKIN:  No itching or rashes      MEDICATIONS  (STANDING):  albumin human 25% IVPB 100 milliLiter(s) IV Intermittent every 4 hours  Biotene Dry Mouth Oral Rinse 15 milliLiter(s) Swish and Spit five times a day  cefTRIAXone   IVPB 2000 milliGRAM(s) IV Intermittent every 24 hours  chlorhexidine 2% Cloths 1 Application(s) Topical <User Schedule>  folic acid 1 milliGRAM(s) Oral daily  furosemide    Tablet 80 milliGRAM(s) Oral daily  heparin   Injectable 5000 Unit(s) SubCutaneous every 8 hours  lactulose Syrup 10 Gram(s) Oral daily  magnesium oxide 400 milliGRAM(s) Oral three times a day with meals  magnesium sulfate  IVPB 2 Gram(s) IV Intermittent once  melatonin 3 milliGRAM(s) Oral at bedtime  metroNIDAZOLE  IVPB 500 milliGRAM(s) IV Intermittent every 12 hours  midodrine. 15 milliGRAM(s) Oral three times a day  mirtazapine 7.5 milliGRAM(s) Oral at bedtime  pantoprazole    Tablet 40 milliGRAM(s) Oral before breakfast  rifAXIMin 550 milliGRAM(s) Oral two times a day  sodium chloride 1 Gram(s) Oral daily  spironolactone 150 milliGRAM(s) Oral daily  sucralfate suspension 1 Gram(s) Oral every 6 hours  thiamine 100 milliGRAM(s) Oral daily  ursodiol Capsule 300 milliGRAM(s) Oral every 12 hours  vancomycin    Solution 125 milliGRAM(s) Oral every 6 hours    MEDICATIONS  (PRN):  albumin human 25% IVPB 50 milliLiter(s) IV Intermittent once PRN para albumin replacement  albuterol/ipratropium for Nebulization 3 milliLiter(s) Nebulizer every 6 hours PRN Shortness of Breath and/or Wheezing  guaiFENesin Oral Liquid (Sugar-Free) 100 milliGRAM(s) Oral every 6 hours PRN Cough      CAPILLARY BLOOD GLUCOSE        I&O's Summary      PHYSICAL EXAM:  Vital Signs Last 24 Hrs  T(C): 36.6 (02 Mar 2025 04:57), Max: 37 (01 Mar 2025 18:30)  T(F): 97.9 (02 Mar 2025 04:57), Max: 98.6 (01 Mar 2025 18:30)  HR: 113 (02 Mar 2025 04:57) (64 - 120)  BP: 104/59 (02 Mar 2025 04:57) (104/59 - 115/68)  BP(mean): --  RR: 17 (02 Mar 2025 04:57) (17 - 18)  SpO2: 97% (02 Mar 2025 04:57) (96% - 100%)    Parameters below as of 02 Mar 2025 04:57  Patient On (Oxygen Delivery Method): room air      CONSTITUTIONAL: NAD; jaundiced  HEENT: PERRLA, clear conjunctiva, EOMI, scleral icterus  RESPIRATORY: Normal respiratory effort; lungs are clear to auscultation bilaterally; No crackles/rhonchi/wheezing  CARDIOVASCULAR: Regular rate and rhythm, normal S1 and S2, no murmur/rub/gallop; No lower extremity edema; Peripheral pulses are 2+ bilaterally  ABDOMEN: Distended, Nontender to palpation, normoactive bowel sounds, no rebound/guarding; +Hepatosplenomegaly  MUSCULOSKELETAL: No clubbing or cyanosis of digits; no joint swelling or tenderness to palpation  EXTREMITY: Lower extremities non-tender to palpation; non-erythematous B/L  NEURO: A&Ox3; no focal deficits   PSYCH: Normal mood; affect appropriate      LABS:                        7.9    8.01  )-----------( 108      ( 02 Mar 2025 06:45 )             24.5     03-02    127[L]  |  91[L]  |  22  ----------------------------<  171[H]  3.8   |  22  |  0.65    Ca    9.5      02 Mar 2025 06:41  Phos  3.2     03-02  Mg     1.5     03-02    TPro  7.7  /  Alb  3.4  /  TBili  7.6[H]  /  DBili  x   /  AST  63[H]  /  ALT  25  /  AlkPhos  314[H]  03-02    PT/INR - ( 02 Mar 2025 06:47 )   PT: 19.0 sec;   INR: 1.66 ratio         PTT - ( 02 Mar 2025 06:47 )  PTT:42.9 sec      Urinalysis Basic - ( 02 Mar 2025 06:41 )    Color: x / Appearance: x / SG: x / pH: x  Gluc: 171 mg/dL / Ketone: x  / Bili: x / Urobili: x   Blood: x / Protein: x / Nitrite: x   Leuk Esterase: x / RBC: x / WBC x   Sq Epi: x / Non Sq Epi: x / Bacteria: x          RADIOLOGY & ADDITIONAL TESTS:    N/A

## 2025-03-02 NOTE — PROGRESS NOTE ADULT - PROBLEM SELECTOR PLAN 3
Pt tested positive for C. diff colitis at [facility]. Started on oral vancomycin  - Patient s/p 2 weeks oral vancomycin, with diarrhea initially resolving  - Patient redeveloped diarrhea 2/24. Recurrence of c. diff vs lactulose-induced diarrhea  - Diarrhea improved once lactulose held, likely lactulose-induced    Plan:   Restart PO vanc 125mg q6h (2/24 - )  Lactulose 10mg qd  Stool count, goal 3-4 BMs daily

## 2025-03-02 NOTE — PROGRESS NOTE ADULT - PROBLEM SELECTOR PLAN 2
C/b history of hepatic encephalopathy and esophageal varices  - Patient tx from [facility] for transplant evaluation -- PETH found to be elevated, further transplant workup being deferred iso elevated PETH -- outpatient RPP program for psychosocial issues  - s/p multiple paracentesis, most recent on 2/20 w/ 1.4L removed  - Transplant hepatology following    Plan:  Continue on Lasix 80mg and Spironolactone 150mg qd  Continue rifaximin  Restart lactulose 10mg qd, titrate frequency for BM 3-4 daily  F/u TEG for bleeding risk  Ursodiol 300mg qd, atarax for generalized itch related to bile salt accumulation C/b history of hepatic encephalopathy and esophageal varices  - Patient tx from [facility] for transplant evaluation -- PETH found to be elevated, further transplant workup being deferred iso elevated PETH -- outpatient RPP program for psychosocial issues  - s/p multiple paracentesis, most recent on 2/20 w/ 1.4L removed  - Transplant hepatology following  - TEG with increased R time, increased LY30    Plan:  Continue on Lasix 80mg and Spironolactone 150mg qd  Continue rifaximin  Restart lactulose 10mg qd, titrate frequency for BM 3-4 daily  Ursodiol 300mg qd, atarax for generalized itch related to bile salt accumulation

## 2025-03-02 NOTE — PROGRESS NOTE ADULT - PROBLEM SELECTOR PLAN 1
Patient febrile in afternoon 2/22  - CT abdomen on 2/23 with 3.7 cm rim-enhancing fluid collection anterior to the right psoas muscle in the pelvis as well as a more heterogeneous enhancing structure anterior and medial to this collection measuring 2.7 cm c/f bilobed abscess. IR prefers to defer intervention for now given significant surrounding vasculature  - CT on 2/9 taken at [facility]: "nonspecific hypoattenuating lesion/structure in the right lower quadrant measuring up to 4.4x4.2 cm with peripheral wall enhancement may represent adnexal cyst."  - TTE 2/12 & 2/24 no evidence valve vegetations  - Bcx NGTD from 2/22  - MR TLS: redemonstrates abscess, no evidence of extension of disease elsewhere or OM  - IR consulted, high-risk of bleed for aspiration, plan to monitor on abx and rescan to assess at later date    Plan:  IV ceftriaxone, flagyl  Repeat CT planned for 3/3

## 2025-03-02 NOTE — PROGRESS NOTE ADULT - TIME BILLING
pretransplant cardiac evaluation prior to possible liver transplant
face-to-face encounter, review of extensive medical records in this and prior charts, laboratory findings, radiographic and microbiology results; documentation as noted above and discussion of diagnostic impressions and plan with the patient and team
- Ordering, reviewing, and interpreting labs, and imaging.  - Independently obtaining a review of systems and performing a physical exam  - Reviewing consultant documentation/recommendations in addition to discussing plan of care with consultants.  - Counselling and educating patient  regarding interpretation of aforementioned items and plan of care.
- Ordering, reviewing, and interpreting labs, testing, and imaging.  - Independently obtaining a review of systems and performing a physical exam  - Reviewing consultant documentation/recommendations in addition to discussing plan of care with consultants.  - Counselling and educating patient and family regarding interpretation of aforementioned items and plan of care.
Time-based billing (NON-critical care).     The necessity of the time spent during the encounter on this date of service was due to:     - Ordering, reviewing, and interpreting labs, testing, and imaging.  - Independently obtaining a review of systems and performing a physical exam  - Reviewing prior hospitalization and where necessary, outpatient records.  - Counselling and educating patient and family regarding interpretation of aforementioned items and plan of care.

## 2025-03-03 LAB
ALBUMIN SERPL ELPH-MCNC: 3.4 G/DL — SIGNIFICANT CHANGE UP (ref 3.3–5)
ALP SERPL-CCNC: 318 U/L — HIGH (ref 40–120)
ALT FLD-CCNC: 25 U/L — SIGNIFICANT CHANGE UP (ref 10–45)
ANION GAP SERPL CALC-SCNC: 13 MMOL/L — SIGNIFICANT CHANGE UP (ref 5–17)
APTT BLD: 85.8 SEC — HIGH (ref 24.5–35.6)
AST SERPL-CCNC: 70 U/L — HIGH (ref 10–40)
BILIRUB SERPL-MCNC: 7.6 MG/DL — HIGH (ref 0.2–1.2)
BUN SERPL-MCNC: 16 MG/DL — SIGNIFICANT CHANGE UP (ref 7–23)
CALCIUM SERPL-MCNC: 9.7 MG/DL — SIGNIFICANT CHANGE UP (ref 8.4–10.5)
CHLORIDE SERPL-SCNC: 92 MMOL/L — LOW (ref 96–108)
CO2 SERPL-SCNC: 24 MMOL/L — SIGNIFICANT CHANGE UP (ref 22–31)
CREAT SERPL-MCNC: 0.63 MG/DL — SIGNIFICANT CHANGE UP (ref 0.5–1.3)
EGFR: 101 ML/MIN/1.73M2 — SIGNIFICANT CHANGE UP
EGFR: 101 ML/MIN/1.73M2 — SIGNIFICANT CHANGE UP
GAS PNL BLDV: SIGNIFICANT CHANGE UP
GLUCOSE SERPL-MCNC: 104 MG/DL — HIGH (ref 70–99)
HCT VFR BLD CALC: 24.5 % — LOW (ref 34.5–45)
HGB BLD-MCNC: 8.2 G/DL — LOW (ref 11.5–15.5)
INR BLD: 1.75 RATIO — HIGH (ref 0.85–1.16)
MAGNESIUM SERPL-MCNC: 1.6 MG/DL — SIGNIFICANT CHANGE UP (ref 1.6–2.6)
MCHC RBC-ENTMCNC: 31.5 PG — SIGNIFICANT CHANGE UP (ref 27–34)
MCHC RBC-ENTMCNC: 33.5 G/DL — SIGNIFICANT CHANGE UP (ref 32–36)
MCV RBC AUTO: 94.2 FL — SIGNIFICANT CHANGE UP (ref 80–100)
NRBC BLD AUTO-RTO: 0 /100 WBCS — SIGNIFICANT CHANGE UP (ref 0–0)
PHOSPHATE SERPL-MCNC: 3.3 MG/DL — SIGNIFICANT CHANGE UP (ref 2.5–4.5)
PLATELET # BLD AUTO: 88 K/UL — LOW (ref 150–400)
POTASSIUM SERPL-MCNC: 3.7 MMOL/L — SIGNIFICANT CHANGE UP (ref 3.5–5.3)
POTASSIUM SERPL-SCNC: 3.7 MMOL/L — SIGNIFICANT CHANGE UP (ref 3.5–5.3)
PROT SERPL-MCNC: 7.8 G/DL — SIGNIFICANT CHANGE UP (ref 6–8.3)
PROTHROM AB SERPL-ACNC: 19.8 SEC — HIGH (ref 9.9–13.4)
RBC # BLD: 2.6 M/UL — LOW (ref 3.8–5.2)
RBC # FLD: 18.3 % — HIGH (ref 10.3–14.5)
SODIUM SERPL-SCNC: 129 MMOL/L — LOW (ref 135–145)
WBC # BLD: 8.24 K/UL — SIGNIFICANT CHANGE UP (ref 3.8–10.5)
WBC # FLD AUTO: 8.24 K/UL — SIGNIFICANT CHANGE UP (ref 3.8–10.5)

## 2025-03-03 PROCEDURE — 99232 SBSQ HOSP IP/OBS MODERATE 35: CPT | Mod: GC

## 2025-03-03 PROCEDURE — 74177 CT ABD & PELVIS W/CONTRAST: CPT | Mod: 26

## 2025-03-03 RX ORDER — HYDROXYZINE HYDROCHLORIDE 25 MG/1
25 TABLET, FILM COATED ORAL ONCE
Refills: 0 | Status: COMPLETED | OUTPATIENT
Start: 2025-03-03 | End: 2025-03-03

## 2025-03-03 RX ORDER — ACETAMINOPHEN 500 MG/5ML
650 LIQUID (ML) ORAL EVERY 8 HOURS
Refills: 0 | Status: DISCONTINUED | OUTPATIENT
Start: 2025-03-03 | End: 2025-03-05

## 2025-03-03 RX ORDER — ACETAMINOPHEN 500 MG/5ML
1000 LIQUID (ML) ORAL ONCE
Refills: 0 | Status: COMPLETED | OUTPATIENT
Start: 2025-03-03 | End: 2025-03-03

## 2025-03-03 RX ORDER — DIPHENHYDRAMINE HCL 12.5MG/5ML
25 ELIXIR ORAL ONCE
Refills: 0 | Status: DISCONTINUED | OUTPATIENT
Start: 2025-03-03 | End: 2025-03-03

## 2025-03-03 RX ADMIN — Medication 650 MILLIGRAM(S): at 20:18

## 2025-03-03 RX ADMIN — Medication 100 MILLIGRAM(S): at 17:46

## 2025-03-03 RX ADMIN — Medication 15 MILLILITER(S): at 17:51

## 2025-03-03 RX ADMIN — Medication 125 MILLIGRAM(S): at 12:36

## 2025-03-03 RX ADMIN — HEPARIN SODIUM 5000 UNIT(S): 1000 INJECTION INTRAVENOUS; SUBCUTANEOUS at 05:08

## 2025-03-03 RX ADMIN — Medication 650 MILLIGRAM(S): at 20:48

## 2025-03-03 RX ADMIN — Medication 1 GRAM(S): at 23:17

## 2025-03-03 RX ADMIN — LACTULOSE 10 GRAM(S): 10 SOLUTION ORAL at 12:37

## 2025-03-03 RX ADMIN — MIDODRINE HYDROCHLORIDE 15 MILLIGRAM(S): 5 TABLET ORAL at 12:38

## 2025-03-03 RX ADMIN — Medication 1 GRAM(S): at 12:38

## 2025-03-03 RX ADMIN — MIDODRINE HYDROCHLORIDE 15 MILLIGRAM(S): 5 TABLET ORAL at 17:49

## 2025-03-03 RX ADMIN — Medication 40 MILLIGRAM(S): at 05:08

## 2025-03-03 RX ADMIN — Medication 125 MILLIGRAM(S): at 17:48

## 2025-03-03 RX ADMIN — Medication 1 GRAM(S): at 12:37

## 2025-03-03 RX ADMIN — CEFTRIAXONE 100 MILLIGRAM(S): 500 INJECTION, POWDER, FOR SOLUTION INTRAMUSCULAR; INTRAVENOUS at 17:52

## 2025-03-03 RX ADMIN — MIRTAZAPINE 7.5 MILLIGRAM(S): 30 TABLET, FILM COATED ORAL at 21:17

## 2025-03-03 RX ADMIN — Medication 15 MILLILITER(S): at 20:18

## 2025-03-03 RX ADMIN — Medication 3 MILLIGRAM(S): at 21:17

## 2025-03-03 RX ADMIN — Medication 150 MILLIGRAM(S): at 05:07

## 2025-03-03 RX ADMIN — Medication 1 APPLICATION(S): at 05:53

## 2025-03-03 RX ADMIN — FOLIC ACID 1 MILLIGRAM(S): 1 TABLET ORAL at 12:38

## 2025-03-03 RX ADMIN — FUROSEMIDE 80 MILLIGRAM(S): 10 INJECTION INTRAMUSCULAR; INTRAVENOUS at 05:07

## 2025-03-03 RX ADMIN — HYDROXYZINE HYDROCHLORIDE 25 MILLIGRAM(S): 25 TABLET, FILM COATED ORAL at 22:31

## 2025-03-03 RX ADMIN — Medication 15 MILLILITER(S): at 08:16

## 2025-03-03 RX ADMIN — MIDODRINE HYDROCHLORIDE 15 MILLIGRAM(S): 5 TABLET ORAL at 05:06

## 2025-03-03 RX ADMIN — Medication 1 GRAM(S): at 05:06

## 2025-03-03 RX ADMIN — Medication 15 MILLILITER(S): at 12:37

## 2025-03-03 RX ADMIN — Medication 400 MILLIGRAM(S): at 17:47

## 2025-03-03 RX ADMIN — Medication 100 MILLIGRAM(S): at 12:37

## 2025-03-03 RX ADMIN — HEPARIN SODIUM 5000 UNIT(S): 1000 INJECTION INTRAVENOUS; SUBCUTANEOUS at 21:17

## 2025-03-03 RX ADMIN — URSODIOL 300 MILLIGRAM(S): 300 CAPSULE ORAL at 17:47

## 2025-03-03 RX ADMIN — Medication 400 MILLIGRAM(S): at 08:16

## 2025-03-03 RX ADMIN — HYDROXYZINE HYDROCHLORIDE 25 MILLIGRAM(S): 25 TABLET, FILM COATED ORAL at 01:53

## 2025-03-03 RX ADMIN — Medication 100 MILLIGRAM(S): at 05:05

## 2025-03-03 RX ADMIN — Medication 1000 MILLIGRAM(S): at 01:25

## 2025-03-03 RX ADMIN — Medication 125 MILLIGRAM(S): at 05:06

## 2025-03-03 RX ADMIN — Medication 400 MILLIGRAM(S): at 00:55

## 2025-03-03 RX ADMIN — HEPARIN SODIUM 5000 UNIT(S): 1000 INJECTION INTRAVENOUS; SUBCUTANEOUS at 12:36

## 2025-03-03 RX ADMIN — Medication 400 MILLIGRAM(S): at 12:37

## 2025-03-03 RX ADMIN — Medication 1 GRAM(S): at 17:48

## 2025-03-03 RX ADMIN — Medication 15 MILLILITER(S): at 23:17

## 2025-03-03 RX ADMIN — URSODIOL 300 MILLIGRAM(S): 300 CAPSULE ORAL at 05:07

## 2025-03-03 RX ADMIN — Medication 125 MILLIGRAM(S): at 23:17

## 2025-03-03 NOTE — PROGRESS NOTE ADULT - PROBLEM SELECTOR PLAN 3
Pt tested positive for C. diff colitis at [facility]. Started on oral vancomycin  - Patient s/p 2 weeks oral vancomycin, with diarrhea initially resolving  - Patient redeveloped diarrhea 2/24. Recurrence of c. diff vs lactulose-induced diarrhea  - Diarrhea improved once lactulose held, likely lactulose-induced    Plan:   Restart PO vanc 125mg q6h (2/24 - )  Lactulose 10mg qd  Stool count, goal 3-4 BMs daily

## 2025-03-03 NOTE — PROGRESS NOTE ADULT - PROBLEM SELECTOR PLAN 1
Patient febrile in afternoon 2/22  - CT abdomen on 2/23 with 3.7 cm rim-enhancing fluid collection anterior to the right psoas muscle in the pelvis as well as a more heterogeneous enhancing structure anterior and medial to this collection measuring 2.7 cm c/f bilobed abscess. IR prefers to defer intervention for now given significant surrounding vasculature  - CT on 2/9 taken at [facility]: "nonspecific hypoattenuating lesion/structure in the right lower quadrant measuring up to 4.4x4.2 cm with peripheral wall enhancement may represent adnexal cyst."  - TTE 2/12 & 2/24 no evidence valve vegetations  - Bcx NGTD from 2/22  - MR TLS: redemonstrates abscess, no evidence of extension of disease elsewhere or OM  - IR consulted, high-risk of bleed for aspiration, plan to monitor on abx and rescan to assess at later date    Plan:  IV ceftriaxone, flagyl  Repeat CT planned for 3/3 Patient febrile in afternoon 2/22  - CT abdomen on 2/23 with 3.7 cm rim-enhancing fluid collection anterior to the right psoas muscle in the pelvis as well as a more heterogeneous enhancing structure anterior and medial to this collection measuring 2.7 cm c/f bilobed abscess. IR prefers to defer intervention for now given significant surrounding vasculature  - CT on 2/9 taken at [facility]: "nonspecific hypoattenuating lesion/structure in the right lower quadrant measuring up to 4.4x4.2 cm with peripheral wall enhancement may represent adnexal cyst."  - TTE 2/12 & 2/24 no evidence valve vegetations  - Bcx NGTD from 2/22  - MR TLS: redemonstrates abscess, no evidence of extension of disease elsewhere or OM  - IR consulted, high-risk of bleed for aspiration, plan to monitor on abx and rescan to assess at later date    Plan:  IV ceftriaxone, flagyl  f/u repeat CT A/P with contrast

## 2025-03-03 NOTE — PROGRESS NOTE ADULT - PROBLEM SELECTOR PLAN 2
C/b history of hepatic encephalopathy and esophageal varices  - Patient tx from [facility] for transplant evaluation -- PETH found to be elevated, further transplant workup being deferred iso elevated PETH -- outpatient RPP program for psychosocial issues  - s/p multiple paracentesis, most recent on 2/20 w/ 1.4L removed  - Transplant hepatology following  - TEG with increased R time, increased LY30    Plan:  Continue on Lasix 80mg and Spironolactone 150mg qd  Continue rifaximin  Restart lactulose 10mg qd, titrate frequency for BM 3-4 daily  Ursodiol 300mg qd, atarax for generalized itch related to bile salt accumulation

## 2025-03-03 NOTE — PROGRESS NOTE ADULT - SUBJECTIVE AND OBJECTIVE BOX
PROGRESS NOTE:     Patient is a 59y old  Female who presents with a chief complaint of Transfer from [facility] for possible liver transplant.      SUBJECTIVE / OVERNIGHT EVENTS:    OVERNIGHT: No acute overnight events.  Patient was examined at bedside and feels well this morning. Denies fever, chills, chest pain, SOB, nausea, vomiting. ROS otherwise negative and pt is amenable to current treatment plan.      REVIEW OF SYSTEMS:    CONSTITUTIONAL:  No weakness, fevers, or chills  EYES/ENT:  No visual changes, vertigo, or throat pain   NECK:  No pain or stiffness  RESPIRATORY:  No SOB, cough, wheezing, or hemoptysis  CARDIOVASCULAR:  No chest pain or palpitations  GASTROINTESTINAL:  No abdominal pain, nausea, vomiting, or hematemesis; No diarrhea or constipation; No melena or hematochezia.  GENITOURINARY:  No dysuria, change in frequency, or hematuria  NEUROLOGICAL:  No numbness or weakness  SKIN:  No itching or rashes      MEDICATIONS  (STANDING):  Biotene Dry Mouth Oral Rinse 15 milliLiter(s) Swish and Spit five times a day  cefTRIAXone   IVPB 2000 milliGRAM(s) IV Intermittent every 24 hours  chlorhexidine 2% Cloths 1 Application(s) Topical <User Schedule>  folic acid 1 milliGRAM(s) Oral daily  furosemide    Tablet 80 milliGRAM(s) Oral daily  heparin   Injectable 5000 Unit(s) SubCutaneous every 8 hours  lactulose Syrup 10 Gram(s) Oral daily  magnesium oxide 400 milliGRAM(s) Oral three times a day with meals  melatonin 3 milliGRAM(s) Oral at bedtime  metroNIDAZOLE  IVPB 500 milliGRAM(s) IV Intermittent every 12 hours  midodrine. 15 milliGRAM(s) Oral three times a day  mirtazapine 7.5 milliGRAM(s) Oral at bedtime  pantoprazole    Tablet 40 milliGRAM(s) Oral before breakfast  rifAXIMin 550 milliGRAM(s) Oral two times a day  sodium chloride 1 Gram(s) Oral daily  spironolactone 150 milliGRAM(s) Oral daily  sucralfate suspension 1 Gram(s) Oral every 6 hours  thiamine 100 milliGRAM(s) Oral daily  ursodiol Capsule 300 milliGRAM(s) Oral every 12 hours  vancomycin    Solution 125 milliGRAM(s) Oral every 6 hours    MEDICATIONS  (PRN):  albuterol/ipratropium for Nebulization 3 milliLiter(s) Nebulizer every 6 hours PRN Shortness of Breath and/or Wheezing  guaiFENesin Oral Liquid (Sugar-Free) 100 milliGRAM(s) Oral every 6 hours PRN Cough      CAPILLARY BLOOD GLUCOSE        I&O's Summary    02 Mar 2025 07:01  -  03 Mar 2025 07:00  --------------------------------------------------------  IN: 50 mL / OUT: 0 mL / NET: 50 mL        PHYSICAL EXAM:  Vital Signs Last 24 Hrs  T(C): 36.8 (03 Mar 2025 04:41), Max: 36.8 (02 Mar 2025 18:10)  T(F): 98.2 (03 Mar 2025 04:41), Max: 98.3 (02 Mar 2025 21:37)  HR: 113 (03 Mar 2025 04:41) (104 - 113)  BP: 112/63 (03 Mar 2025 04:41) (101/52 - 117/58)  BP(mean): --  RR: 18 (03 Mar 2025 04:41) (18 - 18)  SpO2: 99% (03 Mar 2025 04:41) (97% - 99%)    Parameters below as of 03 Mar 2025 04:41  Patient On (Oxygen Delivery Method): room air      CONSTITUTIONAL: NAD; jaundiced  HEENT: PERRLA, clear conjunctiva, EOMI, scleral icterus  RESPIRATORY: Normal respiratory effort; lungs are clear to auscultation bilaterally; No crackles/rhonchi/wheezing  CARDIOVASCULAR: Regular rate and rhythm, normal S1 and S2, no murmur/rub/gallop; No lower extremity edema; Peripheral pulses are 2+ bilaterally  ABDOMEN: Distended, Nontender to palpation, normoactive bowel sounds, no rebound/guarding; +Hepatosplenomegaly  MUSCULOSKELETAL: No clubbing or cyanosis of digits; no joint swelling or tenderness to palpation  EXTREMITY: Lower extremities non-tender to palpation; non-erythematous B/L  NEURO: A&Ox3; no focal deficits   PSYCH: Normal mood; affect appropriate      LABS:                        7.9    8.01  )-----------( 108      ( 02 Mar 2025 06:45 )             24.5     03-03    129[L]  |  92[L]  |  16  ----------------------------<  104[H]  3.7   |  24  |  0.63    Ca    9.7      03 Mar 2025 06:56  Phos  3.3     03-03  Mg     1.6     03-03    TPro  7.8  /  Alb  3.4  /  TBili  7.6[H]  /  DBili  x   /  AST  70[H]  /  ALT  25  /  AlkPhos  318[H]  03-03    PT/INR - ( 03 Mar 2025 06:56 )   PT: 19.8 sec;   INR: 1.75 ratio         PTT - ( 03 Mar 2025 06:56 )  PTT:85.8 sec      Urinalysis Basic - ( 03 Mar 2025 06:56 )    Color: x / Appearance: x / SG: x / pH: x  Gluc: 104 mg/dL / Ketone: x  / Bili: x / Urobili: x   Blood: x / Protein: x / Nitrite: x   Leuk Esterase: x / RBC: x / WBC x   Sq Epi: x / Non Sq Epi: x / Bacteria: x          RADIOLOGY & ADDITIONAL TESTS:    N/A

## 2025-03-03 NOTE — PROGRESS NOTE ADULT - ASSESSMENT
60yo F w/ PMH of alcohol cirrhosis with recurrent ascites, s/p multiple paracentesis, s/p banding of esophageal varices, and s/p tx for recent c. diff colitis and possible endocarditis, presenting as a transfer from [facility] for liver transplant evaluation. Patient not currently listed for transplant 2/2 positive PETH and requiring psychosocial clearance prior to possible transplant. Now found to have psoas muscle abscess, pending possible drainage with IR.

## 2025-03-03 NOTE — PROGRESS NOTE ADULT - PROBLEM SELECTOR PLAN 6
Suspicions of IE given E. coli and bacteroides bacteremia. Pt with evidence of mitral valve vegetation on TTE 1/2025; not confirmed with ARACELI due to concerns regarding esophageal varices.   -Likely in setting of recent dental extraction in December  - Repeat TTE 2/12 & 2/24 w/o evidence of vegetations    Plan:   Abx as above for psoas muscle abscess, unlikely IE Suspicions of IE given E. coli and bacteroides bacteremia. Pt with evidence of mitral valve vegetation on TTE 1/2025; not confirmed with ARACELI due to concerns regarding esophageal varices.   - Likely in setting of recent dental extraction in December  - Repeat TTE 2/12 & 2/24 w/o evidence of vegetations    Plan:   Abx as above for psoas muscle abscess, unlikely IE

## 2025-03-04 LAB
ALBUMIN SERPL ELPH-MCNC: 3.3 G/DL — SIGNIFICANT CHANGE UP (ref 3.3–5)
ALP SERPL-CCNC: 360 U/L — HIGH (ref 40–120)
ALT FLD-CCNC: 26 U/L — SIGNIFICANT CHANGE UP (ref 10–45)
ANION GAP SERPL CALC-SCNC: 13 MMOL/L — SIGNIFICANT CHANGE UP (ref 5–17)
APTT BLD: 104.5 SEC — HIGH (ref 24.5–35.6)
AST SERPL-CCNC: 70 U/L — HIGH (ref 10–40)
BASOPHILS # BLD AUTO: 0.02 K/UL — SIGNIFICANT CHANGE UP (ref 0–0.2)
BASOPHILS NFR BLD AUTO: 0.2 % — SIGNIFICANT CHANGE UP (ref 0–2)
BILIRUB SERPL-MCNC: 7.3 MG/DL — HIGH (ref 0.2–1.2)
BUN SERPL-MCNC: 17 MG/DL — SIGNIFICANT CHANGE UP (ref 7–23)
CALCIUM SERPL-MCNC: 9.6 MG/DL — SIGNIFICANT CHANGE UP (ref 8.4–10.5)
CHLORIDE SERPL-SCNC: 91 MMOL/L — LOW (ref 96–108)
CO2 SERPL-SCNC: 23 MMOL/L — SIGNIFICANT CHANGE UP (ref 22–31)
CREAT SERPL-MCNC: 0.62 MG/DL — SIGNIFICANT CHANGE UP (ref 0.5–1.3)
EGFR: 101 ML/MIN/1.73M2 — SIGNIFICANT CHANGE UP
EGFR: 101 ML/MIN/1.73M2 — SIGNIFICANT CHANGE UP
EOSINOPHIL # BLD AUTO: 0.06 K/UL — SIGNIFICANT CHANGE UP (ref 0–0.5)
EOSINOPHIL NFR BLD AUTO: 0.6 % — SIGNIFICANT CHANGE UP (ref 0–6)
GLUCOSE SERPL-MCNC: 156 MG/DL — HIGH (ref 70–99)
HCT VFR BLD CALC: 26.7 % — LOW (ref 34.5–45)
HGB BLD-MCNC: 8.8 G/DL — LOW (ref 11.5–15.5)
IMM GRANULOCYTES NFR BLD AUTO: 1.7 % — HIGH (ref 0–0.9)
INR BLD: 2 RATIO — HIGH (ref 0.85–1.16)
LYMPHOCYTES # BLD AUTO: 1.82 K/UL — SIGNIFICANT CHANGE UP (ref 1–3.3)
LYMPHOCYTES # BLD AUTO: 16.9 % — SIGNIFICANT CHANGE UP (ref 13–44)
MAGNESIUM SERPL-MCNC: 1.4 MG/DL — LOW (ref 1.6–2.6)
MCHC RBC-ENTMCNC: 31.5 PG — SIGNIFICANT CHANGE UP (ref 27–34)
MCHC RBC-ENTMCNC: 33 G/DL — SIGNIFICANT CHANGE UP (ref 32–36)
MCV RBC AUTO: 95.7 FL — SIGNIFICANT CHANGE UP (ref 80–100)
MONOCYTES # BLD AUTO: 0.93 K/UL — HIGH (ref 0–0.9)
MONOCYTES NFR BLD AUTO: 8.7 % — SIGNIFICANT CHANGE UP (ref 2–14)
NEUTROPHILS # BLD AUTO: 7.74 K/UL — HIGH (ref 1.8–7.4)
NEUTROPHILS NFR BLD AUTO: 71.9 % — SIGNIFICANT CHANGE UP (ref 43–77)
NRBC BLD AUTO-RTO: 0 /100 WBCS — SIGNIFICANT CHANGE UP (ref 0–0)
PHOSPHATE SERPL-MCNC: 3.3 MG/DL — SIGNIFICANT CHANGE UP (ref 2.5–4.5)
PLATELET # BLD AUTO: 93 K/UL — LOW (ref 150–400)
POTASSIUM SERPL-MCNC: 3.6 MMOL/L — SIGNIFICANT CHANGE UP (ref 3.5–5.3)
POTASSIUM SERPL-SCNC: 3.6 MMOL/L — SIGNIFICANT CHANGE UP (ref 3.5–5.3)
PROT SERPL-MCNC: 7.8 G/DL — SIGNIFICANT CHANGE UP (ref 6–8.3)
PROTHROM AB SERPL-ACNC: 22.6 SEC — HIGH (ref 9.9–13.4)
RBC # BLD: 2.79 M/UL — LOW (ref 3.8–5.2)
RBC # FLD: 18.3 % — HIGH (ref 10.3–14.5)
SODIUM SERPL-SCNC: 127 MMOL/L — LOW (ref 135–145)
WBC # BLD: 10.75 K/UL — HIGH (ref 3.8–10.5)
WBC # FLD AUTO: 10.75 K/UL — HIGH (ref 3.8–10.5)

## 2025-03-04 PROCEDURE — 99232 SBSQ HOSP IP/OBS MODERATE 35: CPT | Mod: GC

## 2025-03-04 PROCEDURE — 99232 SBSQ HOSP IP/OBS MODERATE 35: CPT

## 2025-03-04 PROCEDURE — G0545: CPT

## 2025-03-04 RX ORDER — MAGNESIUM SULFATE 500 MG/ML
2 SYRINGE (ML) INJECTION ONCE
Refills: 0 | Status: COMPLETED | OUTPATIENT
Start: 2025-03-04 | End: 2025-03-04

## 2025-03-04 RX ORDER — HYDROXYZINE HYDROCHLORIDE 25 MG/1
25 TABLET, FILM COATED ORAL EVERY 8 HOURS
Refills: 0 | Status: DISCONTINUED | OUTPATIENT
Start: 2025-03-04 | End: 2025-03-05

## 2025-03-04 RX ADMIN — Medication 15 MILLILITER(S): at 23:30

## 2025-03-04 RX ADMIN — Medication 125 MILLIGRAM(S): at 23:30

## 2025-03-04 RX ADMIN — CEFTRIAXONE 100 MILLIGRAM(S): 500 INJECTION, POWDER, FOR SOLUTION INTRAMUSCULAR; INTRAVENOUS at 17:31

## 2025-03-04 RX ADMIN — HYDROXYZINE HYDROCHLORIDE 25 MILLIGRAM(S): 25 TABLET, FILM COATED ORAL at 21:27

## 2025-03-04 RX ADMIN — Medication 25 GRAM(S): at 08:02

## 2025-03-04 RX ADMIN — Medication 15 MILLILITER(S): at 12:26

## 2025-03-04 RX ADMIN — MIDODRINE HYDROCHLORIDE 15 MILLIGRAM(S): 5 TABLET ORAL at 17:26

## 2025-03-04 RX ADMIN — MIRTAZAPINE 7.5 MILLIGRAM(S): 30 TABLET, FILM COATED ORAL at 21:28

## 2025-03-04 RX ADMIN — Medication 400 MILLIGRAM(S): at 12:27

## 2025-03-04 RX ADMIN — URSODIOL 300 MILLIGRAM(S): 300 CAPSULE ORAL at 17:25

## 2025-03-04 RX ADMIN — MIDODRINE HYDROCHLORIDE 15 MILLIGRAM(S): 5 TABLET ORAL at 12:27

## 2025-03-04 RX ADMIN — Medication 15 MILLILITER(S): at 17:25

## 2025-03-04 RX ADMIN — Medication 650 MILLIGRAM(S): at 22:27

## 2025-03-04 RX ADMIN — Medication 150 MILLIGRAM(S): at 05:08

## 2025-03-04 RX ADMIN — Medication 1 GRAM(S): at 05:08

## 2025-03-04 RX ADMIN — MIDODRINE HYDROCHLORIDE 15 MILLIGRAM(S): 5 TABLET ORAL at 05:08

## 2025-03-04 RX ADMIN — LACTULOSE 10 GRAM(S): 10 SOLUTION ORAL at 12:26

## 2025-03-04 RX ADMIN — Medication 125 MILLIGRAM(S): at 05:08

## 2025-03-04 RX ADMIN — Medication 125 MILLIGRAM(S): at 17:27

## 2025-03-04 RX ADMIN — Medication 15 MILLILITER(S): at 21:26

## 2025-03-04 RX ADMIN — FOLIC ACID 1 MILLIGRAM(S): 1 TABLET ORAL at 12:27

## 2025-03-04 RX ADMIN — Medication 15 MILLILITER(S): at 08:00

## 2025-03-04 RX ADMIN — URSODIOL 300 MILLIGRAM(S): 300 CAPSULE ORAL at 05:07

## 2025-03-04 RX ADMIN — Medication 125 MILLIGRAM(S): at 12:26

## 2025-03-04 RX ADMIN — Medication 100 MILLIGRAM(S): at 12:27

## 2025-03-04 RX ADMIN — Medication 40 MILLIGRAM(S): at 05:09

## 2025-03-04 RX ADMIN — Medication 400 MILLIGRAM(S): at 08:00

## 2025-03-04 RX ADMIN — Medication 650 MILLIGRAM(S): at 21:27

## 2025-03-04 RX ADMIN — HEPARIN SODIUM 5000 UNIT(S): 1000 INJECTION INTRAVENOUS; SUBCUTANEOUS at 12:27

## 2025-03-04 RX ADMIN — Medication 100 MILLIGRAM(S): at 17:26

## 2025-03-04 RX ADMIN — Medication 3 MILLIGRAM(S): at 21:28

## 2025-03-04 RX ADMIN — Medication 1 GRAM(S): at 23:30

## 2025-03-04 RX ADMIN — Medication 1 GRAM(S): at 12:26

## 2025-03-04 RX ADMIN — Medication 100 MILLIGRAM(S): at 05:06

## 2025-03-04 RX ADMIN — Medication 1 GRAM(S): at 17:25

## 2025-03-04 RX ADMIN — FUROSEMIDE 80 MILLIGRAM(S): 10 INJECTION INTRAMUSCULAR; INTRAVENOUS at 05:07

## 2025-03-04 RX ADMIN — Medication 1 APPLICATION(S): at 06:22

## 2025-03-04 RX ADMIN — HEPARIN SODIUM 5000 UNIT(S): 1000 INJECTION INTRAVENOUS; SUBCUTANEOUS at 21:27

## 2025-03-04 RX ADMIN — Medication 400 MILLIGRAM(S): at 17:26

## 2025-03-04 RX ADMIN — Medication 1 GRAM(S): at 12:27

## 2025-03-04 RX ADMIN — HEPARIN SODIUM 5000 UNIT(S): 1000 INJECTION INTRAVENOUS; SUBCUTANEOUS at 05:07

## 2025-03-04 NOTE — CHART NOTE - NSCHARTNOTEFT_GEN_A_CORE
Pathology from pap smear reviewed at this time as below:     Final Diagnosis  NEGATIVE FOR INTRAEPITHELIAL LESION OR MALIGNANCY.  Atrophic changes.  HPV: Not detected    No GYN contraindications for liver transplant at this time.     Cristina Preston, PGY-1

## 2025-03-04 NOTE — PROVIDER CONTACT NOTE (OTHER) - REASON
Pt tachycardic hr 125, pt asymptomatic, pt refusing lactulose and heparin
pt has raised red soar on the R cheek inside her mouth
pt experiencing generalized itching
Patient tachycardic , vs otherwise stable
pt complaining of abdominal pain
pt complaining of generalized itching
Temp 101 orally

## 2025-03-04 NOTE — PROGRESS NOTE ADULT - NS ATTEND AMEND GEN_ALL_CORE FT
I independently performed the History, physical exam and medical decision making and discussed the case with Nurse Practitioner Pauly Tim. I agree with her assessment and plan and  have made pertinent additions as follows.    febrile over the weekend, CT repeated and noted new small psoas abscess  Likley non drainable but discuss with IR if feasible to aspiratte for microbiological diagnosis  Agree with ceftiraxone 2 g dialy and flagyl and iv vancomycin, goal 10-15  Needs vanco po ppx for cdiff recently  MRI T/L spine to ensure no contiguous souce of infection vertebral OM  BC x3  TTE  clearance for transplant will depend on resoution of this    Q fever considerations from our prior evaluation on friday    Thank you for involving ID in this consult,  We will continue following.   Please call or page with additional questions  Pager: [phone]  Teams: from 8 am to 5 pm  Pauly Fenton MD
I independently performed the History, physical exam and medical decision making and discussed the case with Nurse Practitioner Pauly Tim. I agree with her assessment and plan and  have made pertinent additions as follows.    febrile over the weekend, CT repeated and noted new small psoas abscess  Likley non drainable will not be aspirated for microbiological diagnosis  Agree with ceftiraxone 2 g daily and flagyl and iv vancomycin, goal 10-15  Needs vanco po ppx for cdiff recently  MRI T/L spine no clear vertebral OM  BC x3  TTE  clearance for transplant will depend on resolution of this  Anticipate repeat CT mid next week    Q fever serologies determined false positive     Thank you for involving ID in this consult,  We will continue following.   Please call or page with additional questions  Pager: [phone]  Teams: from 8 am to 5 pm  Pauly Fenton MD
59 year old female PMH of alcohol cirrhosis with recurrent ascites, s/p multiple paracentesis, s/p banding of esophageal varices, currently being treated for recent c. diff colitis and possible endocarditis, presenting for possible liver transplant from [facility]. Pt initially admitted to [facility] c/o weakness, poor PO intake, and fever; hypotensive in ED. Met criteria for sepsis iso C. diff colitis and was treated with vasopressor support, flagyl, and vanc. Hospital course was complicated by AHRF and pt was intubated 1/28-2/2. Pt then treated for E. coli and Bacteroides bacteremia with TTE findings concerning for mitral vegetation; pt not a good candidate for ARACELI due to varices found on EGD from 1/31/24 decided to treat empirically for IE with 6 weeks of antibiotics s/p PICC line placement with negative blood cultures from 1/31/24. Pt d/c from [facility] on 2/7 and then presented to [place] c/o SOB. Ultimately transferred to [facility] for hepatology transplant consult. On presentation today, pt endorses subjective nighttime fevers, chills, SOB and abdominal discomfort. Attributes fevers to recent flu dx (noted in chart to be flu positive 1 week prior to presentation at [facility]). Pt states she has not used alcohol in over a year. She began using alcohol in her 20s socially and her use gradually increased to include ~3-4 glasses of wine everyday when she was drinking the most. Has been to rehab and therapy multiple times. Pt first began smoking in her 20s ~1pack/day for many years, cannot recall specifics. Has not had a cigarette in several months. Pt is unable to ambulate on her own and has remained bedbound since intubation several months ago.     C. difficile toxin assay (January 27) positive, repeat Cdff 2/11 neg  Blood cultures (January 27) 2/4 E. coli, 1/4 Bacteroides   Blood cultures (January 31) no growth to date  Blood cultures (February 10, February 11) no growth to date  Paracentesis (February 4, February 13) Cell counts negative for SBP    CTA chest (January 27) Patent central airways. Smooth interlobular septal thickening at the lung apices and lung bases. No lung consolidation or mass. Mild bibasilar dependent atelectasis. No lymphadenopathy.    CT A/P (January 27) Thickening of the walls of the stomach and duodenum with submucosal edema likely reflective of a gastroenteritis. Thickening of the walls of the ascending colon despite underdistention likely due to portal colopathy. Colonic diverticulosis without evidence of diverticulitis. Cholelithiasis. Nonspecific gallbladder wall edema and pericholecystic fluid in the setting of cirrhosis.    TTE (1/27) small mobile vegetation attached to the posterior mitral valve leaflet.  TTE (February 12) no evidence of vegetations    Psoas abscess diagnosed on Ct A/P 2/21:  Ceftriaxone and Metronidazole restarted  CT A/P 3/3: Interval decreased size of right retroperitoneal fluid collection anterior to the psoas (2.1 x 1.8 cm).  MRI spine negative for OM   Continue ceftriaxone and flagyl while inpatient, at time of discharge can transition to Cefpodoxime 400 mg oral bid + Flagyl 500 mg oral bid and repeat CT A/P with IV contrast week of 3/17    Would continue PO Vancomycin 125 BID while on these antibiotics for c diff prevention    I will continue to follow. Please feel free to contact me with any further questions.    Humberto Zamora M.D.  [facility] Division of Infectious Disease  8AM-5PM Monday - Friday: Available on [employer] Teams  After Hours and Holidays (or if no response on [employer] Teams): Please contact the Infectious Diseases Office at [phone]
I independently performed the History, physical exam and medical deicion maing and discussed the case with Nurse Practitioner Pauly Tim. I agree wiht her assessment and plan and  have made pertinenet additions as follows.    diarrhea resolved, , distended  edited and added comments on Q fever serology positivity        Thank you for involving ID in this consult,  We will follow results peripherally and not continue seeing daily   Please call or page with additional questions  Pager: [phone]  Teams: from 8 am to 5 pm  Pauly Fenton MD

## 2025-03-04 NOTE — PROGRESS NOTE ADULT - PROBLEM SELECTOR PLAN 2
C/b history of hepatic encephalopathy and esophageal varices  - Patient tx from [facility] for transplant evaluation -- PETH found to be elevated, further transplant workup being deferred iso elevated PETH -- outpatient RPP program for psychosocial issues  - s/p multiple paracentesis, most recent on 2/20 w/ 1.4L removed  - Transplant hepatology following  - TEG with increased R time, increased LY30    Plan:  Continue on Lasix 80mg and Spironolactone 150mg qd  Continue rifaximin  Restart lactulose 10mg qd, titrate frequency for BM 3-4 daily  Ursodiol 300mg qd, atarax for generalized itch related to bile salt accumulation

## 2025-03-04 NOTE — PROVIDER CONTACT NOTE (OTHER) - RECOMMENDATIONS
Notify provider
notify provider
Notify PA
Notify provider
notify provider
Contact provider
Notify provider per policy

## 2025-03-04 NOTE — PROGRESS NOTE ADULT - ASSESSMENT
59 year old female PMH of alcohol cirrhosis with recurrent ascites, s/p multiple paracentesis, s/p banding of esophageal varices, currently being treated for recent c. diff colitis and possible endocarditis, presenting for possible liver transplant from [facility]. Pt initially admitted to [facility] c/o weakness, poor PO intake, and fever; hypotensive in ED. Met criteria for sepsis iso C. diff colitis and was treated with vasopressor support, flagyl, and vanc. Hospital course was complicated by AHRF and pt was intubated 1/28-2/2. Pt then treated for E. coli and Bacteroides bacteremia with TTE findings concerning for mitral vegetation; pt not a good candidate for ARACELI due to varices found on EGD from 1/31/24 decided to treat empirically for IE with 6 weeks of antibiotics s/p PICC line placement with negative blood cultures from 1/31/24. Pt d/c from [facility] on 2/7 and then presented to [place] c/o SOB. Ultimately transferred to [facility] for hepatology transplant consult. On presentation today, pt endorses subjective nighttime fevers, chills, SOB and abdominal discomfort. Attributes fevers to recent flu dx (noted in chart to be flu positive 1 week prior to presentation at [facility]). Pt states she has not used alcohol in over a year. She began using alcohol in her 20s socially and her use gradually increased to include ~3-4 glasses of wine everyday when she was drinking the most. Has been to rehab and therapy multiple times. Pt first began smoking in her 20s ~1pack/day for many years, cannot recall specifics. Has not had a cigarette in several months. Pt is unable to ambulate on her own and has remained bedbound since intubation several months ago.     C. difficile toxin assay (January 27) positive, repeat Cdff 2/11 neg  Blood cultures (January 27) 2/4 E. coli, 1/4 Bacteroides   Blood cultures (January 31) no growth to date  Blood cultures (February 10, February 11) no growth to date  Paracentesis (February 4, February 13) Cell counts negative for SBP    CTA chest (January 27) Patent central airways. Smooth interlobular septal thickening at the lung apices and lung bases. No lung consolidation or mass. Mild bibasilar dependent atelectasis. No lymphadenopathy.    CT A/P (January 27) Thickening of the walls of the stomach and duodenum with submucosal edema likely reflective of a gastroenteritis. Thickening of the walls of the ascending colon despite underdistention likely due to portal colopathy. Colonic diverticulosis without evidence of diverticulitis. Cholelithiasis. Nonspecific gallbladder wall edema and pericholecystic fluid in the setting of cirrhosis.    TTE (1/27) small mobile vegetation attached to the posterior mitral valve leaflet.  TTE (February 12) no evidence of vegetations    Social history: born in [place].  Travel history includes travel to [facility] and [place], [place], [place].  Lives primarily in [place] but has also had travel to [place] and [place].  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  States her mother had a history of tuberculosis but this was before patient was born.  Patient notes livestock exposure during her work as a .  Has sugar gliders as pets.    Antibiotic Course:  PO Vancomycin: 1/27 (2 doses) > 2/14 (1 dose)  Metronidazole: 1/27 > 2/13  Ceftriaxone: 1/27 > 2/13  Meropenem: 1/27  Restarted on antibiotics 2/22 Vancomycin plus Ceftriaxone    S/P LVP 2/21 - follow cell count and fluid cultures which are no growth to date    #C.diff colitis 1/27/25, diarrhea resolved now   - repeat Cdiff 2/11 negative, completed vanco po 125 mg bi ppx post discontinuation of antibitoics on 2./13-2/16   -- then reinitiated on po ppx with treatment of psoas abscess- then diarrhea worsened and now back on treatment dose ? unclear if lactulose though- so would favor short 10 day course and then back to ppx dosing  - in the next 6 months will need vanco po ppx with every exposure to broad spectrum antibiotics  - this includes at the time of perioperative ppx    #Polymicrobial bacteremia with E.coli and Bacteroides  Status post course of ceftriaxone and metronidazole as above from earlier stay  likely now complicating with psoas abscess    # Psoas abscess on Ct A/P 2/21;   -CT A/P 3/3: Interval decreased size of right retroperitoneal fluid collection anterior to the psoas (2.1 x 1.8 cm).  MRi spine negative for OM   Continue ceftriaxone and flagyl while inpatient, at time of discharge can transition to Cefpodoxime 400 mg oral bid + Flagyl 500 mg oral bid and repeat CT A/P with IV contrast week of 3/17    Of note, Ct A/P at [facility] [place] revealed "enhancing lesion" thought to be "adnexa"-   noted WBC up today, low threshold to repeat BC and Imaging again      #Possible MV vegetation on TTE from OSH  E.coli and bacteroides atypical endocarditis organisms and polymicrobial is also atypical for endocarditis  Repeat transthoracic echocardiogram here without evidence of vegetation    #Preliver transplant evaluation  --COVID19 Nucleocapsid Antibody  -- COVID19 Edre Antibody pos  -- HAV IgG pos  HBVs Ab, HBVsAg, HBVc Ab pos >400 immune, neg, neg  -- HCV Ab neg  -- HSV 1 IgG /HSV 2 IgG pos. neg  -- EBV IgG Pos  -- CMV IgG pos  --VZV IgG pos  -- Measles IgG , Mumps IgG and Rubella IgG pos all  --Follow up QuantiFeron-TB Gold  -- HIV Ag/Ab by [facility]- NEG  --Syphilis Screen - NEG  --Strongyloides Ab negative   -- Follow up Coccidiodes Ab  -- Brucella IgG - NEGATIVE  -- Q Fever Serologies REACTIVE for  phase II igG with all others negative  in the correct setting, Positive Phase II igG (and higher than Phase I)  could  be indicative of acute infection or false positive- Phase II IgM are most commonly positive as well  ; whereas in chronic or old Q fever, Phase I antibodies are higher than phase II and/or >1:1260  However, her last exposure to wild animals was  about 5 years ago, making acute infection unlikely and these results seemingly a false positive.. In addition , she has no symptoms of acute Q fever infection.     With all that said, it is reasonable to repeat serologies again (ordered)  If titers are still high or stable -->may then consider treatment while repeating in 3-4 weeks to assess for increases in titers that would confirm acuity and infection   Repeat Q Fever Phase II IgG AB: 1:32 (02.20.25)  deemed false positive    -Clearance for listing/transplant pending resolution of psoas muscle abscess, repeat Ct A/P week of 3/17      #Encounter to Vaccinate Patient  COVID19: Would benefit from COVID19 1854-1771 Vaccine Dose  Influenza: 10/8/24  Pneumococcal: Would benefit from PCV20  Immune to hep A, B, varicella and MMR  Shingles: Will require Shingrix  Tdap: Will require Tdap  RSV vaccine also recommended this season

## 2025-03-04 NOTE — PROGRESS NOTE ADULT - PROBLEM SELECTOR PLAN 6
Suspicions of IE given E. coli and bacteroides bacteremia. Pt with evidence of mitral valve vegetation on TTE 1/2025; not confirmed with ARACELI due to concerns regarding esophageal varices.   - Likely in setting of recent dental extraction in December  - Repeat TTE 2/12 & 2/24 w/o evidence of vegetations  - New raised, dark oral lesion noted by patient on 3/3    Plan:   Abx as above for psoas muscle abscess, unlikely IE

## 2025-03-04 NOTE — PROVIDER CONTACT NOTE (OTHER) - NAME OF MD/NP/PA/DO NOTIFIED:
Joseph Roberts
Joseph Roberts
Frank Cai via Teams
Joseph Roberts
Lora Stewart
Frank Cai
Joseph Roberts

## 2025-03-04 NOTE — PROGRESS NOTE ADULT - ASSESSMENT
58yo F w/ PMH of alcohol cirrhosis with recurrent ascites, s/p multiple paracentesis, s/p banding of esophageal varices, and s/p tx for recent c. diff colitis and possible endocarditis, presenting as a transfer from [facility] for liver transplant evaluation. Patient not currently listed for transplant 2/2 positive PETH and requiring psychosocial clearance prior to possible transplant. Now found to have psoas muscle abscess, pending possible drainage with IR.

## 2025-03-04 NOTE — PROGRESS NOTE ADULT - PROBLEM SELECTOR PLAN 3
Pt tested positive for C. diff colitis at [facility]. Started on oral vancomycin  - Patient s/p 2 weeks oral vancomycin, with diarrhea initially resolving  - Patient redeveloped diarrhea 2/24. Recurrence of c. diff vs lactulose-induced diarrhea  - Diarrhea improved once lactulose held, likely lactulose-induced    Plan:   Restart PO vanc 125mg q6h (2/24 - )  Lactulose 10mg qd  Stool count, goal 3-4 BMs daily

## 2025-03-04 NOTE — PROVIDER CONTACT NOTE (OTHER) - ACTION/TREATMENT ORDERED:
provider aware. PO Atarax given per provider order.
Infectious work up sent per order. Blood cultures drawn, UA, Flu panel, IV tylenol given
provider aware. IV tylenol given per provider aware
Provider aware, no action required
provider aware. No interventions at this time.
no action ordered
provider aware. PO atarax given per provider order.

## 2025-03-04 NOTE — PROVIDER CONTACT NOTE (OTHER) - SITUATION
Patient tachycardic , vs otherwise stable
Temp 101 orally
pt complaining of 8/10 abdominal pain
Pt tachycardic hr 125, pt asymptomatic, pt refusing lactulose and heparin
pt experiencing generalized itching
pt complaining of generalized itching
pt has raised red soar on the R cheek inside her mouth

## 2025-03-04 NOTE — PROGRESS NOTE ADULT - SUBJECTIVE AND OBJECTIVE BOX
Follow Up: Abscess    Interval History:  Afebrile, now with mouth sores  CT A/P - Interval decrease in size of abscess    REVIEW OF SYSTEMS  [  ] ROS unobtainable because:    [ x ] All other systems negative except as noted below    Constitutional:  [ ] fever [ ] chills  [ ] weight loss  [ ] weakness  Skin:  [ ] rash [ ] phlebitis	  Eyes: [ x] icterus [ ] pain  [ ] discharge	  ENMT: [ ] sore throat  [ ] thrush [ x] ulcers [ ] exudates  Respiratory: [ ] dyspnea [ ] hemoptysis [ ] cough [ ] sputum	  Cardiovascular:  [ ] chest pain [ ] palpitations [ ] edema	  Gastrointestinal:  [ ] nausea [ ] vomiting [ ] diarrhea [ ] constipation [ ] pain	  Genitourinary:  [ ] dysuria [ ] frequency [ ] hematuria [ ] discharge [ ] flank pain  [ ] incontinence  Musculoskeletal:  [ ] myalgias [ ] arthralgias [ ] arthritis  [ ] back pain  Neurological:  [ ] headache [ ] seizures  [ ] confusion/altered mental status    Allergies  Zithromax (Unknown)        ANTIMICROBIALS:  cefTRIAXone   IVPB 2000 every 24 hours  metroNIDAZOLE  IVPB 500 every 12 hours  rifAXIMin 550 two times a day  vancomycin    Solution 125 every 6 hours      OTHER MEDS:  MEDICATIONS  (STANDING):  acetaminophen     Tablet .. 650 every 8 hours PRN  albuterol/ipratropium for Nebulization 3 every 6 hours PRN  furosemide    Tablet 80 daily  guaiFENesin Oral Liquid (Sugar-Free) 100 every 6 hours PRN  heparin   Injectable 5000 every 8 hours  hydrOXYzine hydrochloride 25 every 8 hours PRN  lactulose Syrup 10 daily  melatonin 3 at bedtime  midodrine. 15 three times a day  mirtazapine 7.5 at bedtime  pantoprazole    Tablet 40 before breakfast  spironolactone 150 daily  sucralfate suspension 1 every 6 hours  ursodiol Capsule 300 every 12 hours      Vital Signs Last 24 Hrs  T(C): 36.6 (04 Mar 2025 04:50), Max: 36.8 (03 Mar 2025 20:44)  T(F): 97.9 (04 Mar 2025 04:50), Max: 98.3 (03 Mar 2025 20:44)  HR: 106 (04 Mar 2025 04:50) (106 - 124)  BP: 101/56 (04 Mar 2025 04:50) (98/52 - 101/64)  BP(mean): --  RR: 18 (04 Mar 2025 04:50) (18 - 18)  SpO2: 98% (04 Mar 2025 04:50) (98% - 99%)    Parameters below as of 04 Mar 2025 04:50  Patient On (Oxygen Delivery Method): room air        PHYSICAL EXAMINATION:  General: Alert and Awake, NAD  HEENT: PERRL, EOMI, Icteric, mouth & tongue sores  Neck: Supple  Cardiac: RRR, No M/R/G  Resp: CTAB, No Wh/Rh/Ra  Abdomen: NBS, No rigidity or guarding, distension  MSK: No LE edema. No Calf tenderness  : No Delaney  Skin: No rashes or lesions. Skin is warm and dry to the touch. Jaundice  Neuro: CN 2-12 Grossly intact. Moves all four extremities spontaneously.  Psych: Calm, Pleasant, Cooperative                            8.8    10.75 )-----------( 93       ( 04 Mar 2025 07:00 )             26.7       03-04    127[L]  |  91[L]  |  17  ----------------------------<  156[H]  3.6   |  23  |  0.62    Ca    9.6      04 Mar 2025 06:56  Phos  3.3     03-04  Mg     1.4     03-04    TPro  7.8  /  Alb  3.3  /  TBili  7.3[H]  /  DBili  x   /  AST  70[H]  /  ALT  26  /  AlkPhos  360[H]  03-04      Urinalysis Basic - ( 04 Mar 2025 06:56 )    Color: x / Appearance: x / SG: x / pH: x  Gluc: 156 mg/dL / Ketone: x  / Bili: x / Urobili: x   Blood: x / Protein: x / Nitrite: x   Leuk Esterase: x / RBC: x / WBC x   Sq Epi: x / Non Sq Epi: x / Bacteria: x        MICROBIOLOGY:  v  .Blood Blood-Peripheral  02-22-25   No growth at 5 days  --  --      .Blood Blood-Peripheral  02-22-25   No growth at 5 days  --  --      Ascites Fl Ascites Fluid  02-20-25   No growth at 5 days  --    polymorphonuclear leukocytes seen  No organisms seen  by cytocentrifuge      Peritoneal  02-13-25   No growth at 5 days  --    polymorphonuclear leukocytes seen  No organisms seen  by cytocentrifuge      .Blood BLOOD  02-11-25   No growth at 5 days  --  --      .Blood BLOOD  02-10-25   No growth at 5 days  --  --        CMV IgG Antibody: >10.00 U/mL (02-15-25 @ 07:12)  Toxoplasma IgG Screen: <3.00 IU/mL (02-15-25 @ 07:12)          RADIOLOGY:    <The imaging below has been reviewed and visualized by me independently. Findings as detailed in report below>      < from: CT Abdomen and Pelvis w/ IV Cont (03.03.25 @ 12:27) >    ACC: [id number] EXAM:  CT ABDOMEN AND PELVIS IC   ORDERED BY:  MALIA POSADA     PROCEDURE DATE:  03/03/2025          INTERPRETATION:  CLINICAL INFORMATION: Follow-up psoas abscess    COMPARISON: 2/23/2025    CONTRAST/COMPLICATIONS:  IV Contrast: Omnipaque 350  90 cc administered   10 cc discarded  Oral Contrast: NONE  .    PROCEDURE:  CT of the Abdomen and Pelvis was performed.  Sagittal and coronal reformats were performed.    FINDINGS:  LOWER CHEST: Clear lung bases.    LIVER: Cirrhosis.  BILE DUCTS: Stable dilatation.  GALLBLADDER: Cholelithiasis.  SPLEEN: Splenomegaly.  PANCREAS: Within normal limits.  ADRENALS: Nodular thickening. Stable left adrenal nodule.  KIDNEYS/URETERS: No hydronephrosis.    BLADDER: Within normal limits.  REPRODUCTIVE ORGANS: Uterus and adnexa within normal limits.    BOWEL: No bowel obstruction. Colonic diverticulosis. Appendix is not   visualized.  PERITONEUM/RETROPERITONEUM: Interval decreased size of right   retroperitoneal fluid collection anterior to the psoas measuring 2.1 x   1.8 cm (previously 3.7 cm).  VESSELS: Atherosclerotic changes. Dilated collateral vessels.  LYMPH NODES: No lymphadenopathy. Subcentimeter retroperitoneal lymph   nodes.  ABDOMINAL WALL: Within normal limits.  BONES: Degenerative changes.    IMPRESSION:    Interval decreased size of right retroperitoneal fluid collection   anterior to the psoas (2.1 x 1.8 cm).      --- End of Report ---            < end of copied text >

## 2025-03-04 NOTE — PROGRESS NOTE ADULT - SUBJECTIVE AND OBJECTIVE BOX
PROGRESS NOTE:     Patient is a 59y old  Female who presents with a chief complaint of Transfer from [place] for possible liver transplant.      SUBJECTIVE / OVERNIGHT EVENTS:    OVERNIGHT: No acute overnight events. Given atarax for itch.  Patient was examined at bedside and feels well this morning. Denies fever, chills, chest pain, SOB, nausea, vomiting. ROS otherwise negative and pt is amenable to current treatment plan.      REVIEW OF SYSTEMS:    CONSTITUTIONAL:  No weakness, fevers, or chills  EYES/ENT:  No visual changes, vertigo, or throat pain   NECK:  No pain or stiffness  RESPIRATORY:  No SOB, cough, wheezing, or hemoptysis  CARDIOVASCULAR:  No chest pain or palpitations  GASTROINTESTINAL:  No abdominal pain, nausea, vomiting, or hematemesis; No diarrhea or constipation; No melena or hematochezia.  GENITOURINARY:  No dysuria, change in frequency, or hematuria  NEUROLOGICAL:  No numbness or weakness  SKIN:  No itching or rashes      MEDICATIONS  (STANDING):  Biotene Dry Mouth Oral Rinse 15 milliLiter(s) Swish and Spit five times a day  cefTRIAXone   IVPB 2000 milliGRAM(s) IV Intermittent every 24 hours  chlorhexidine 2% Cloths 1 Application(s) Topical <User Schedule>  folic acid 1 milliGRAM(s) Oral daily  furosemide    Tablet 80 milliGRAM(s) Oral daily  heparin   Injectable 5000 Unit(s) SubCutaneous every 8 hours  lactulose Syrup 10 Gram(s) Oral daily  magnesium oxide 400 milliGRAM(s) Oral three times a day with meals  melatonin 3 milliGRAM(s) Oral at bedtime  metroNIDAZOLE  IVPB 500 milliGRAM(s) IV Intermittent every 12 hours  midodrine. 15 milliGRAM(s) Oral three times a day  mirtazapine 7.5 milliGRAM(s) Oral at bedtime  pantoprazole    Tablet 40 milliGRAM(s) Oral before breakfast  rifAXIMin 550 milliGRAM(s) Oral two times a day  sodium chloride 1 Gram(s) Oral daily  spironolactone 150 milliGRAM(s) Oral daily  sucralfate suspension 1 Gram(s) Oral every 6 hours  thiamine 100 milliGRAM(s) Oral daily  ursodiol Capsule 300 milliGRAM(s) Oral every 12 hours  vancomycin    Solution 125 milliGRAM(s) Oral every 6 hours    MEDICATIONS  (PRN):  acetaminophen     Tablet .. 650 milliGRAM(s) Oral every 8 hours PRN Mild Pain (1 - 3), Moderate Pain (4 - 6)  albuterol/ipratropium for Nebulization 3 milliLiter(s) Nebulizer every 6 hours PRN Shortness of Breath and/or Wheezing  guaiFENesin Oral Liquid (Sugar-Free) 100 milliGRAM(s) Oral every 6 hours PRN Cough      CAPILLARY BLOOD GLUCOSE        I&O's Summary    03 Mar 2025 07:01  -  04 Mar 2025 07:00  --------------------------------------------------------  IN: 240 mL / OUT: 6 mL / NET: 234 mL        PHYSICAL EXAM:  Vital Signs Last 24 Hrs  T(C): 36.6 (04 Mar 2025 04:50), Max: 36.8 (03 Mar 2025 20:44)  T(F): 97.9 (04 Mar 2025 04:50), Max: 98.3 (03 Mar 2025 20:44)  HR: 106 (04 Mar 2025 04:50) (106 - 124)  BP: 101/56 (04 Mar 2025 04:50) (98/52 - 101/64)  BP(mean): --  RR: 18 (04 Mar 2025 04:50) (18 - 18)  SpO2: 98% (04 Mar 2025 04:50) (98% - 99%)    Parameters below as of 04 Mar 2025 04:50  Patient On (Oxygen Delivery Method): room air      CONSTITUTIONAL: NAD; jaundiced  HEENT: PERRLA, clear conjunctiva, EOMI, scleral icterus. Patient with raised, dark lesion on inside of cheek  RESPIRATORY: Normal respiratory effort; lungs are clear to auscultation bilaterally; No crackles/rhonchi/wheezing  CARDIOVASCULAR: Regular rate and rhythm, normal S1 and S2, no murmur/rub/gallop; No lower extremity edema; Peripheral pulses are 2+ bilaterally  ABDOMEN: Distended, Nontender to palpation, normoactive bowel sounds, no rebound/guarding; +Hepatosplenomegaly  MUSCULOSKELETAL: No clubbing or cyanosis of digits; no joint swelling or tenderness to palpation  EXTREMITY: Lower extremities non-tender to palpation; non-erythematous B/L  NEURO: A&Ox3; no focal deficits   PSYCH: Normal mood; affect appropriate    LABS:                        8.8    10.75 )-----------( 93       ( 04 Mar 2025 07:00 )             26.7     03-03    129[L]  |  92[L]  |  16  ----------------------------<  104[H]  3.7   |  24  |  0.63    Ca    9.7      03 Mar 2025 06:56  Phos  3.3     03-03  Mg     1.6     03-03    TPro  7.8  /  Alb  3.4  /  TBili  7.6[H]  /  DBili  x   /  AST  70[H]  /  ALT  25  /  AlkPhos  318[H]  03-03    PT/INR - ( 04 Mar 2025 06:56 )   PT: 22.6 sec;   INR: 2.00 ratio         PTT - ( 04 Mar 2025 06:56 )  PTT:104.5 sec      Urinalysis Basic - ( 03 Mar 2025 06:56 )    Color: x / Appearance: x / SG: x / pH: x  Gluc: 104 mg/dL / Ketone: x  / Bili: x / Urobili: x   Blood: x / Protein: x / Nitrite: x   Leuk Esterase: x / RBC: x / WBC x   Sq Epi: x / Non Sq Epi: x / Bacteria: x          RADIOLOGY & ADDITIONAL TESTS:    N/A

## 2025-03-04 NOTE — PROVIDER CONTACT NOTE (OTHER) - ASSESSMENT
Pt aox4. Pt states she is asymptomatic. Pt denies chest pain or dizziness at this time. Pt denies shortness of breath. Per vitals, hr consistently >100
pt A&Ox4. pt complaining of generalize itching. pt skin shows redness and some scratches.
pt A&Ox4 on RA. Pt In bed itching hands and legs excessively
Patient is AxOx4, temp 101 orally, , (around baseline), /67, satting 96% on RA. Patient endorses chills and weakness, no SOB or chest pain. Patient denies palpitations
Pt Aox4. Pt denies chest pain, or difficulty breathing
pt A&Ox4. pt has a dark black/blue raised circular soar on the R cheek inside her mouth.  pt states she felt a bumped in her mouth and this was first time she noticed.  pt denies any pain or discomfort. VSS
pt A&O x4 on RA. pt denies Chest pain or SOB. pt in bed complaining of abdominal pain, abd distended. VSS

## 2025-03-04 NOTE — PROVIDER CONTACT NOTE (OTHER) - BACKGROUND
pt admitted for liver failure without hepatic coma
Patient admitted with alcoholic liver cirrhosis, hx of bacteremia.
pt admitted for liver failure without hepatic coma
59 Y f pmh alcohol cirrhosis with recurrent ascites sp multiple paracentesis presenting for transplant eval
pt admitted for Liver failure
pt admitted for liver failure
59Y f pmh alcohol cirrhosis with recurrent ascites sp multiple paracentesis presenting from [facility]. Per patient has known hx of tachycardia

## 2025-03-04 NOTE — PROGRESS NOTE ADULT - PROBLEM SELECTOR PLAN 1
Patient febrile in afternoon 2/22  - CT abdomen on 2/23 with 3.7 cm rim-enhancing fluid collection anterior to the right psoas muscle in the pelvis as well as a more heterogeneous enhancing structure anterior and medial to this collection measuring 2.7 cm c/f bilobed abscess. IR prefers to defer intervention for now given significant surrounding vasculature  - CT on 2/9 taken at [facility]: "nonspecific hypoattenuating lesion/structure in the right lower quadrant measuring up to 4.4x4.2 cm with peripheral wall enhancement may represent adnexal cyst."  - TTE 2/12 & 2/24 no evidence valve vegetations  - Bcx NGTD from 2/22  - MR TLS: redemonstrates abscess, no evidence of extension of disease elsewhere or OM  - IR consulted, high-risk of bleed for aspiration, plan to monitor on abx and rescan to assess at later date  - Repeat CT A/P on 3/3: Interval decrease in abscess size    Plan:  IV ceftriaxone, flagyl  ID to followup regarding treatment plan

## 2025-03-04 NOTE — PROVIDER CONTACT NOTE (OTHER) - DATE AND TIME:
18-Feb-2025 22:56
04-Mar-2025 06:08
03-Mar-2025 00:00
03-Mar-2025 01:35
22-Feb-2025 12:55
03-Mar-2025 21:13
20-Feb-2025 05:49

## 2025-03-05 VITALS
DIASTOLIC BLOOD PRESSURE: 63 MMHG | RESPIRATION RATE: 18 BRPM | TEMPERATURE: 98 F | HEART RATE: 97 BPM | SYSTOLIC BLOOD PRESSURE: 100 MMHG | OXYGEN SATURATION: 96 %

## 2025-03-05 LAB
ALBUMIN SERPL ELPH-MCNC: 3.3 G/DL — SIGNIFICANT CHANGE UP (ref 3.3–5)
ALP SERPL-CCNC: 312 U/L — HIGH (ref 40–120)
ALT FLD-CCNC: 24 U/L — SIGNIFICANT CHANGE UP (ref 10–45)
ANION GAP SERPL CALC-SCNC: 12 MMOL/L — SIGNIFICANT CHANGE UP (ref 5–17)
APTT BLD: 66.6 SEC — HIGH (ref 24.5–35.6)
AST SERPL-CCNC: 65 U/L — HIGH (ref 10–40)
BASOPHILS # BLD AUTO: 0.02 K/UL — SIGNIFICANT CHANGE UP (ref 0–0.2)
BASOPHILS NFR BLD AUTO: 0.2 % — SIGNIFICANT CHANGE UP (ref 0–2)
BILIRUB SERPL-MCNC: 7.3 MG/DL — HIGH (ref 0.2–1.2)
BUN SERPL-MCNC: 19 MG/DL — SIGNIFICANT CHANGE UP (ref 7–23)
CALCIUM SERPL-MCNC: 9.6 MG/DL — SIGNIFICANT CHANGE UP (ref 8.4–10.5)
CHLORIDE SERPL-SCNC: 91 MMOL/L — LOW (ref 96–108)
CO2 SERPL-SCNC: 25 MMOL/L — SIGNIFICANT CHANGE UP (ref 22–31)
CREAT SERPL-MCNC: 0.64 MG/DL — SIGNIFICANT CHANGE UP (ref 0.5–1.3)
EGFR: 100 ML/MIN/1.73M2 — SIGNIFICANT CHANGE UP
EGFR: 100 ML/MIN/1.73M2 — SIGNIFICANT CHANGE UP
EOSINOPHIL # BLD AUTO: 0.03 K/UL — SIGNIFICANT CHANGE UP (ref 0–0.5)
EOSINOPHIL NFR BLD AUTO: 0.4 % — SIGNIFICANT CHANGE UP (ref 0–6)
GLUCOSE SERPL-MCNC: 94 MG/DL — SIGNIFICANT CHANGE UP (ref 70–99)
HCT VFR BLD CALC: 25.4 % — LOW (ref 34.5–45)
HGB BLD-MCNC: 8.4 G/DL — LOW (ref 11.5–15.5)
IMM GRANULOCYTES NFR BLD AUTO: 1.5 % — HIGH (ref 0–0.9)
INR BLD: 1.8 RATIO — HIGH (ref 0.85–1.16)
LYMPHOCYTES # BLD AUTO: 1.46 K/UL — SIGNIFICANT CHANGE UP (ref 1–3.3)
LYMPHOCYTES # BLD AUTO: 17.9 % — SIGNIFICANT CHANGE UP (ref 13–44)
MAGNESIUM SERPL-MCNC: 1.6 MG/DL — SIGNIFICANT CHANGE UP (ref 1.6–2.6)
MCHC RBC-ENTMCNC: 30.8 PG — SIGNIFICANT CHANGE UP (ref 27–34)
MCHC RBC-ENTMCNC: 33.1 G/DL — SIGNIFICANT CHANGE UP (ref 32–36)
MCV RBC AUTO: 93 FL — SIGNIFICANT CHANGE UP (ref 80–100)
MONOCYTES # BLD AUTO: 0.68 K/UL — SIGNIFICANT CHANGE UP (ref 0–0.9)
MONOCYTES NFR BLD AUTO: 8.3 % — SIGNIFICANT CHANGE UP (ref 2–14)
NEUTROPHILS # BLD AUTO: 5.84 K/UL — SIGNIFICANT CHANGE UP (ref 1.8–7.4)
NEUTROPHILS NFR BLD AUTO: 71.7 % — SIGNIFICANT CHANGE UP (ref 43–77)
NRBC BLD AUTO-RTO: 0 /100 WBCS — SIGNIFICANT CHANGE UP (ref 0–0)
PHOSPHATE SERPL-MCNC: 4.1 MG/DL — SIGNIFICANT CHANGE UP (ref 2.5–4.5)
PLATELET # BLD AUTO: 83 K/UL — LOW (ref 150–400)
POTASSIUM SERPL-MCNC: 4 MMOL/L — SIGNIFICANT CHANGE UP (ref 3.5–5.3)
POTASSIUM SERPL-SCNC: 4 MMOL/L — SIGNIFICANT CHANGE UP (ref 3.5–5.3)
PROT SERPL-MCNC: 7.6 G/DL — SIGNIFICANT CHANGE UP (ref 6–8.3)
PROTHROM AB SERPL-ACNC: 20.4 SEC — HIGH (ref 9.9–13.4)
RBC # BLD: 2.73 M/UL — LOW (ref 3.8–5.2)
RBC # FLD: 17.9 % — HIGH (ref 10.3–14.5)
SODIUM SERPL-SCNC: 128 MMOL/L — LOW (ref 135–145)
WBC # BLD: 8.15 K/UL — SIGNIFICANT CHANGE UP (ref 3.8–10.5)
WBC # FLD AUTO: 8.15 K/UL — SIGNIFICANT CHANGE UP (ref 3.8–10.5)

## 2025-03-05 PROCEDURE — 99239 HOSP IP/OBS DSCHRG MGMT >30: CPT | Mod: GC

## 2025-03-05 RX ORDER — METRONIDAZOLE 250 MG
100 TABLET ORAL
Qty: 0 | Refills: 0 | DISCHARGE
Start: 2025-03-05

## 2025-03-05 RX ORDER — FUROSEMIDE 10 MG/ML
1 INJECTION INTRAMUSCULAR; INTRAVENOUS
Qty: 0 | Refills: 0 | DISCHARGE
Start: 2025-03-05

## 2025-03-05 RX ORDER — MAGNESIUM OXIDE 400 MG
1 TABLET ORAL
Qty: 0 | Refills: 0 | DISCHARGE
Start: 2025-03-05

## 2025-03-05 RX ORDER — LACTULOSE 10 G/15ML
15 SOLUTION ORAL
Qty: 0 | Refills: 0 | DISCHARGE
Start: 2025-03-05

## 2025-03-05 RX ORDER — MIRTAZAPINE 30 MG/1
1 TABLET, FILM COATED ORAL
Qty: 0 | Refills: 0 | DISCHARGE
Start: 2025-03-05

## 2025-03-05 RX ORDER — SPIRONOLACTONE 25 MG
6 TABLET ORAL
Qty: 0 | Refills: 0 | DISCHARGE
Start: 2025-03-05

## 2025-03-05 RX ORDER — URSODIOL 300 MG/1
1 CAPSULE ORAL
Qty: 0 | Refills: 0 | DISCHARGE
Start: 2025-03-05

## 2025-03-05 RX ORDER — HYDROXYZINE HYDROCHLORIDE 25 MG/1
1 TABLET, FILM COATED ORAL
Qty: 0 | Refills: 0 | DISCHARGE
Start: 2025-03-05

## 2025-03-05 RX ORDER — CARVEDILOL 3.12 MG/1
3.12 TABLET, FILM COATED ORAL EVERY 12 HOURS
Refills: 0 | Status: DISCONTINUED | OUTPATIENT
Start: 2025-03-05 | End: 2025-03-05

## 2025-03-05 RX ORDER — CARVEDILOL 3.12 MG/1
1 TABLET, FILM COATED ORAL
Qty: 0 | Refills: 0 | DISCHARGE
Start: 2025-03-05

## 2025-03-05 RX ORDER — ACETAMINOPHEN 500 MG/5ML
2 LIQUID (ML) ORAL
Qty: 0 | Refills: 0 | DISCHARGE
Start: 2025-03-05

## 2025-03-05 RX ORDER — MIDODRINE HYDROCHLORIDE 5 MG/1
3 TABLET ORAL
Qty: 0 | Refills: 0 | DISCHARGE
Start: 2025-03-05

## 2025-03-05 RX ADMIN — URSODIOL 300 MILLIGRAM(S): 300 CAPSULE ORAL at 05:20

## 2025-03-05 RX ADMIN — FOLIC ACID 1 MILLIGRAM(S): 1 TABLET ORAL at 11:56

## 2025-03-05 RX ADMIN — Medication 15 MILLILITER(S): at 12:00

## 2025-03-05 RX ADMIN — Medication 125 MILLIGRAM(S): at 11:59

## 2025-03-05 RX ADMIN — Medication 1 GRAM(S): at 05:19

## 2025-03-05 RX ADMIN — Medication 1 GRAM(S): at 11:57

## 2025-03-05 RX ADMIN — Medication 150 MILLIGRAM(S): at 05:21

## 2025-03-05 RX ADMIN — Medication 125 MILLIGRAM(S): at 05:19

## 2025-03-05 RX ADMIN — Medication 1 GRAM(S): at 12:00

## 2025-03-05 RX ADMIN — Medication 15 MILLILITER(S): at 08:13

## 2025-03-05 RX ADMIN — Medication 400 MILLIGRAM(S): at 08:13

## 2025-03-05 RX ADMIN — LACTULOSE 10 GRAM(S): 10 SOLUTION ORAL at 11:59

## 2025-03-05 RX ADMIN — Medication 40 MILLIGRAM(S): at 05:21

## 2025-03-05 RX ADMIN — FUROSEMIDE 80 MILLIGRAM(S): 10 INJECTION INTRAMUSCULAR; INTRAVENOUS at 05:19

## 2025-03-05 RX ADMIN — HEPARIN SODIUM 5000 UNIT(S): 1000 INJECTION INTRAVENOUS; SUBCUTANEOUS at 05:21

## 2025-03-05 RX ADMIN — MIDODRINE HYDROCHLORIDE 15 MILLIGRAM(S): 5 TABLET ORAL at 05:20

## 2025-03-05 RX ADMIN — CARVEDILOL 3.12 MILLIGRAM(S): 3.12 TABLET, FILM COATED ORAL at 11:56

## 2025-03-05 RX ADMIN — HEPARIN SODIUM 5000 UNIT(S): 1000 INJECTION INTRAVENOUS; SUBCUTANEOUS at 14:52

## 2025-03-05 RX ADMIN — Medication 400 MILLIGRAM(S): at 11:56

## 2025-03-05 RX ADMIN — MIDODRINE HYDROCHLORIDE 15 MILLIGRAM(S): 5 TABLET ORAL at 11:56

## 2025-03-05 RX ADMIN — Medication 100 MILLIGRAM(S): at 11:56

## 2025-03-05 RX ADMIN — Medication 100 MILLIGRAM(S): at 05:19

## 2025-03-05 RX ADMIN — Medication 1 APPLICATION(S): at 06:25

## 2025-03-05 NOTE — PROGRESS NOTE ADULT - PROBLEM SELECTOR PROBLEM 2
Alcoholic cirrhosis
Alcoholic cirrhosis
Endocarditis, suspected
Alcoholic cirrhosis
Endocarditis, suspected
Alcoholic cirrhosis
Endocarditis, suspected
Endocarditis, suspected
Alcoholic cirrhosis
Endocarditis, suspected
Alcoholic cirrhosis
Endocarditis, suspected
Alcoholic cirrhosis
Endocarditis, suspected
Endocarditis, suspected
Alcoholic cirrhosis
Endocarditis, suspected
Alcoholic cirrhosis

## 2025-03-05 NOTE — PROGRESS NOTE ADULT - ATTENDING COMMENTS
Dinah Flores is a 58y/o lady with a medical history of ALD/AUD (sober for a year per pt report but PETH is positive - low positivity from 2/12 but she has been in and out of the hospital/nursing home for the past 3weeks), former smoker (quit about a year ago), liver cirrhosis complicated by ascites and nonbleeding EV (s/p Banding in the past), recent C-diff colitis (completed treatment 2/14 with resolution of her symptoms) who was transferred to [facility] from [facility].     She had been admitted to [facility] from 1/26 to 2/7 for sepsis requiring pressor support 2/2 C diff colitis. Her hospitalization was complicated by hypoxic resp failure requiring intubation (1-28 - 2/2). She was also treated for E. coli and Bacteroides bacteremia thought to be due to endocarditis with TTE findings concerning for mitral vegetation; ARACELI was not completed due to her history of varices and as such she was treated empirically for IE with plans for a 6 week course. She was then discharged to a nursing facility where she did not do well and then re-presented to [facility] with SOB and then transferred to [facility] for ACLF. Eventually, transferred to [facility] for expedited OLT transplant evaluation. On arrival here, she is deconditioned but determined to get strong. Evaluation opened after TTE with no vegetations. Presumed etiology is likely SBP instead especially as it is polymicrobial. SHe has completed Metronidazole: 1/27 - 2/13, Ceftriaxone: 1/27- 2/13, & Meropenem: 1/27. NOw on Cipro for SBP ppx. And also completed Vancomycin for C-diff.   OLT evaluation ongoing - opened 2/14  MELD 29 B.     SHe needs aggressive PT and nutrition.  Please obtain US of the breast as she has not have a mammogram in about 4 years.  MRI completed - ?cholecystitis but she is asymptomatic. Moderate vol ascites.  Continue cipro for SBP ppx.   Lactulose and rifaximin for a goal to 2-3 BMS daily  Gentle diuresis as tolerated - 40/100.  Her PETH is  positive although she reports sobriety for a year. This is a red flag for her work up. I spoke with her about it but she continues to deny alcohol us ein the past year. T  She has a very support partner and this is relatively reassuring.        Rica Hampton MD/MPH  Transplant hepatology .
59F w/ PMH of alcohol cirrhosis c/b ascites s/p  paracentesis, esophageal varices s/p banding , recent complicated admission to [facility] for septic shock w/ AHRF  requiring pressors , intubation , course c/b   c. diff colitis , Bacteroides  bacteremia w/ probable endocarditis unable to obtain ARACELI due to varices ,  now s/p PICC  and planned for 6 week abx , d/c;ed to rehab 2/7/25  and Re-admitted to [facility] shortly afterward for SOB , t/f to [facility] for hepatology evaluation for transplant.     jaundiced, scleral icterus, thin     # Bacteroides Bacteremia, C. Diff colitis  - S/p CTX Flagyl and PO Vanco finished on 2/13, completed 2 weeks  - BC negative x2   - TTE 2/12: No echocardiographic evidence of vegetations. Compared to the transthoracic echocardiogram performed on 1/27/2025, there is no evidence of vegetation on the mitral valve. The posterior mitral annulus is calcified.  - This was a repeat TTE- ID does not think extended course is necessary  - Discussed with ID, pt will need OP follow up for TTE in 1 month and surveillance BC in 2-4 weeks on discharge  - Cipro for SBP ppx    # Hepatology evaluation for transplant now deferred  - Continue lasix 80 mg qd , spironolactone 150 mg QD,   - Follow up labs and testing requested by transplant ID and transplant hepatology, dental eval underway  - Folic acid, thiamine, multivitamin  - s/p para on 2/13 and 2/19 for ascites  - Lactulose titrate to 3 BM per day  - PETH level elevated  - Hepatology liver selection 2/21- transplant deferred, will need to complete OP RPP program for significant psychosocial issues      # Wheezing, resolved  - 2/12: CXR showing atelectasis- continue with incentive spirometer   - DUO neb prn    # Sinus tachycardia  - pt notes she has history of sinus tach to 120s, has been workup up outpt for this and was placed on metoprolol but stopped d/t low BP  - no noted pain, no history of DVT  - monitor for desat events, pain     OP follow ups ID for TTE in 1 month and surveillance BC in 2-4 weeks on discharge, hepatology, psych  Hepatology has discussed with pt deferred transplant    Time-based billing (NON-critical care).   51 minutes spent on total encounter. The necessity of the time spent during the encounter on this date of service was due to:   - Ordering, reviewing, and interpreting labs, and imaging.  - Independently obtaining a review of systems and performing a physical exam  - Reviewing consultant documentation/recommendations in addition to discussing plan of care with consultants.  - Counselling and educating patient  regarding interpretation of aforementioned items and plan of care.   - Cipro for SBP ppx
ALD/AUD  ?AIH   ACLF in the setting of Bacteroides and E coli bacteremia initially thought to be due to endocarditis but repeat TTE shows no vegetation. Etiology is likely SBP instead especially as it is polymicrobial. SHe has completed Metronidazole: 1/27 - 2/13, Ceftriaxone: 1/27- 2/13, & Meropenem: 1/27. NOw on Cipro for SBP ppx. And also being treated with Vancomycin for C-diff. Will verify with ID length of therapy as her symptoms have resolved/improved.  OLT evaluation ongoing.   MELD 27 B. No labs from today. Team advised to please obtain daily MELD labs and CBC.      Rica Hampton MD/MPH  Transplant hepatology
Dinah Flores is a 60y/o lady with a medical history of ALD/AUD (sober for a year per pt report but PETH is positive - low positivity from 2/12 but she has been in and out of the hospital/nursing home for the past 3weeks), former smoker (quit about a year ago), liver cirrhosis complicated by ascites and nonbleeding EV (s/p Banding in the past), recent C-diff colitis (completed treatment 2/14 with resolution of her symptoms) who was transferred to [facility] from [facility].     She had been admitted to [facility] from 1/26 to 2/7 for sepsis requiring pressor support 2/2 C diff colitis. Her hospitalization was complicated by hypoxic resp failure requiring intubation (1-28 - 2/2). She was also treated for E. coli and Bacteroides bacteremia thought to be due to endocarditis with TTE findings concerning for mitral vegetation; ARACELI was not completed due to her history of varices and as such she was treated empirically for IE with plans for a 6 week course. She was then discharged to a nursing facility where she did not do well and then re-presented to [facility] with SOB and then transferred to [facility] for ACLF. Eventually, transferred to [facility] for expedited OLT transplant evaluation. On arrival here, she is deconditioned but determined to get strong. Evaluation opened after TTE with no vegetations. Presumed etiology is likely SBP instead especially as it is polymicrobial. SHe has completed Metronidazole: 1/27 - 2/13, Ceftriaxone: 1/27- 2/13, & Meropenem: 1/27. NOw on Cipro for SBP ppx. And also completed Vancomycin for C-diff.   OLT evaluation ongoing - opened 2/14  MELD 29 B.     SHe needs aggressive PT and nutrition.  Please obtain US of the breast as she has not have a mammogram in about 4 years.  MRI completed - not the best quality. Awaiting read.  Continue cipro for SBP ppx.   Lactulose and rifaximin for a goal to 2-3 BMS daily  Gentle diuresis as tolerated - 40/100.  Her PETH is  positive although she reports sobriety for a year. This is a red flag for her work up. I will address this with her tomorrow as it resulted after my visit with her today.   She has a very support partner and this is relatively reassuring.        Rica Hampton MD/MPH  Transplant hepatology .
Dinah Flores is a 60y/o lady with a medical history of sHTN, ALD/AUD (sober for a year per pt report but PETH is positive - low positivity from 2/12 but she has been in and out of the hospital/nursing home for the past 3weeks), former smoker (quit about a year ago), liver cirrhosis complicated by ascites and nonbleeding EV (s/p Banding in the past), recent C-diff colitis (completed treatment 2/14 with resolution of her symptoms) who was transferred to [facility] from [facility].     She had been admitted to [facility] from 1/26 to 2/7 for sepsis requiring pressor support 2/2 C diff colitis. Her hospitalization was complicated by hypoxic resp failure requiring intubation (1-28 - 2/2). She was also treated for E. coli and Bacteroides bacteremia thought to be due to endocarditis with TTE findings concerning for mitral vegetation; ARACELI was not completed due to her history of varices and as such she was treated empirically for IE with plans for a 6 week course. She was then discharged to a nursing facility where she did not do well and then re-presented to [facility] with SOB and then transferred to [facility] for ACLF for expedited OLT transplant evaluation. On arrival here, she is deconditioned but determined to get strong. Evaluation opened after TTE with no vegetations on 2/14. Presumed etiology for her prior bacteremia is likely SBP instead especially as it is polymicrobial. SHe has completed Metronidazole: 1/27 - 2/13, Ceftriaxone: 1/27- 2/13, & Meropenem: 1/27. NOw on Cipro for SBP ppx. And also completed Vancomycin for C-diff.   OLT evaluation ongoing - opened 2/14  MELD 27 B (downtrending).     SHe needs aggressive PT and nutrition.   MRI completed - ?cholecystitis but she is asymptomatic. Moderate vol ascites.  Continue cipro for SBP ppx.   Lactulose and rifaximin for a goal to 2-3 BMS daily  Diuresis 80/150. Low salt diet.  Her PETH is  positive although she reports sobriety for a year. This is a red flag for her work up. I spoke with her about it but she continues to deny alcohol use in the past year. She reports taking a lot of cough drops recently but unlikely to be the cause of her elevated PETH.   She has a very support partner and this is relatively reassuring. Overall she is high risk for expedited transplantation. It would be ideal to follow her in the outpatient setting to see progress before transplantation. She tells me that whatever the outcome of our meeting is, she is determined to get therapy and get better.        Rica Hampton MD/MPH  Transplant hepatology .
60 yo F w/ PMH HTN, anemia, EtOH associated cirrhosis c/b ascites/EV (s/p banding last 11/2024), AUD now sober for ~3 months, bleeding rectal varices vs hemorrhoidal bleeding s/p hemorrhoidectomy (11/2023) presenting as a transfer from [place] for evaluation of liver transplant. Discussed with the patient, the pt is committed to following the recommendations and lifelong ETOH abstinence, would initiate inpatient liver transplant evaluation, the pt is accepted to be transfer to  under Dr. Olga Antoine - transplant hepatology service in .
59F w/ PMH of alcohol cirrhosis c/b ascites s/p  paracentesis, esophageal varices s/p banding , recent complicated admission to [facility] for septic shock w/ AHRF  requiring pressors , intubation , course c/b   c. diff colitis , Bacteroides  bacteremia w/ probable endocarditis unable to obtain ARACELI due to varices ,  now s/p PICC  and planned for 6 week abx , d/c;ed to rehab 2/7/25  and Re-admitted to [place] shortly afterward for SOB  , t/f to [facility] for hepatology evaluation reported MELD 29 on admission.     # Bacteroides Bacteremia, C. Diff colitits  - Pt on CTX Flagyl and PO Vanco. Flagyl to finish 2/13  - BC negative x2   - TTE 2/12: No echocardiographic evidence of vegetations. Compared to the transthoracic echocardiogram performed on 1/27/2025, there is no evidence of vegetation on the mitral valve. The posterior mitral annulus is calcified.  - Discussed with ID, may not need as long of a course depending on TTE, will follow up for recs  - pt with PICC line in place     # Hepatology evaluation for transplant   - Continue lasix 40 mg, spironolactone 100 mg QD,   - Repeat PETH level, Type and Screen, Would repeat TAJ and ASMA, IgG levels, Microsomal Ab  - Low sodium diet  - Repeat abd US   - Follow up infectious w/u BCx x2  - Would hold off on in patient transplantation evaluation in the setting of multiple active infections. Additionally, patient had attempted to AMA and has insisted that she would prefer liver evaluation to be performed as an outpatient as she has no intention in staying in the hospital for her liver disease.     # Wheezing   - CXR  - DUO neb    # sinus tachycardia  - pt notes she has history of sinus tach to 120s  - no noted pain, no history of DVT  - monitor for desat events, pain     Discussed with pt and resident. Pt in agreement with plan.     Time-based billing (NON-critical care).     53 minutes spent on total encounter. The necessity of the time spent during the encounter on this date of service was due to:     - Ordering, reviewing, and interpreting labs, testing, and imaging.  - Independently obtaining a review of systems and performing a physical exam  - Reviewing consultant documentation/recommendations in addition to discussing plan of care with consultants.  - Counselling and educating patient and family regarding interpretation of aforementioned items and plan of care.
59F w/ PMH of alcohol cirrhosis c/b ascites s/p  paracentesis, esophageal varices s/p banding , recent complicated admission to [facility] for septic shock w/ AHRF  requiring pressors , intubation , course c/b   c. diff colitis , Bacteroides  bacteremia w/ probable endocarditis unable to obtain ARACELI due to varices ,  now s/p PICC  and planned for 6 week abx , d/c;ed to rehab 2/7/25  and Re-admitted to [place] shortly afterward for SOB  , t/f to [facility] for hepatology evaluation reported MELD 29 on admission.     # Bacteroides Bacteremia, C. Diff colitits  - Pt on CTX Flagyl and PO Vanco. Flagyl to finish 2/13 to complete 2 weeks  - BC negative x2   - TTE 2/12: No echocardiographic evidence of vegetations. Compared to the transthoracic echocardiogram performed on 1/27/2025, there is no evidence of vegetation on the mitral valve. The posterior mitral annulus is calcified.  - Discussed with ID, pt will need OP follow up for TTE in 1 month and surveillance BC in 2-4 weeks   - pt with PICC line in place    # Hepatology evaluation for transplant   - Continue lasix 40 mg, spironolactone 100 mg QD,   - PETH level, Type and Screen, TAJ and ASMA, IgG levels, Microsomal Ab pending  - Low sodium diet  - Repeat abd US showing moderate ascities. PTT elevated on AM draw, repeat better, plan for paracentesis today    - Follow up infectious w/u BCx x2  - Hold off on in patient transplantation evaluation in the setting of multiple active infections. Additionally, patient had attempted to AMA and has insisted that she would prefer liver evaluation to be performed as an outpatient as she has no intention in staying in the hospital for her liver disease.     # Wheezing   - CXR showing atelectasis- continue with incentive spirometer   - DUO neb    # Sinus tachycardia  - pt notes she has history of sinus tach to 120s  - no noted pain, no history of DVT  - monitor for desat events, pain     Discussed with pt/ fiance and resident. Pt in agreement with plan.     Time-based billing (NON-critical care).     50 minutes spent on total encounter. The necessity of the time spent during the encounter on this date of service was due to:     - Ordering, reviewing, and interpreting labs and imaging.  - Independently obtaining a review of systems and performing a physical exam  - Reviewing consultant documentation/recommendations in addition to discussing plan of care with consultants.  - Counselling and educating patient and family regarding interpretation of aforementioned items and plan of care.
59F w/ PMH of alcohol cirrhosis c/b ascites s/p  paracentesis, esophageal varices s/p banding , recent complicated admission to [facility] for septic shock w/ AHRF  requiring pressors , intubation , course c/b   c. diff colitis , Bacteroides  bacteremia w/ probable endocarditis unable to obtain ARACELI due to varices ,  now s/p PICC  and planned for 6 week abx , d/c;ed to rehab 2/7/25  and Re-admitted to [place] shortly afterward for SOB  , t/f to [facility] for hepatology evaluation reported MELD 29 on admission.     # Bacteroides Bacteremia, C. Diff colitits  - Pt on CTX Flagyl and PO Vanco. Flagyl to finish 2/13 to complete 2 weeks  - ID added 5 doses of PO vanco for Ppx   - BC negative x2   - TTE 2/12: No echocardiographic evidence of vegetations. Compared to the transthoracic echocardiogram performed on 1/27/2025, there is no evidence of vegetation on the mitral valve. The posterior mitral annulus is calcified.  - Discussed with ID, pt will need OP follow up for TTE in 1 month and surveillance BC in 2-4 weeks on discharge   - pt with PICC line in place, can DC    # Hepatology evaluation for transplant   - Continue lasix 40 mg, spironolactone 100 mg QD,   - Follow up PETH level, Type and Screen, TAJ and ASMA, IgG levels, Microsomal Ab   - Low sodium diet  - Repeat abd US showing moderate ascities. PTT elevated on AM draw, repeat better, s/p para  - Follow up infectious w/u BCx x2  - Hepatology to take for transfer to their service once a bed becomes available    # Wheezing   - CXR showing atelectasis- continue with incentive spirometer   - DUO neb    # Sinus tachycardia  - pt notes she has history of sinus tach to 120s  - no noted pain, no history of DVT  - monitor for desat events, pain     Discussed with pt/ fiance and resident.   Pt to transfer to Hepatology service once a bed becomes available     Time-based billing (NON-critical care).   51 minutes spent on total encounter. The necessity of the time spent during the encounter on this date of service was due to:     - Ordering, reviewing, and interpreting labs and imaging.  - Independently obtaining a review of systems and performing a physical exam  - Reviewing consultant documentation/recommendations in addition to discussing plan of care with consultants.  - Counselling and educating patient and family regarding interpretation of aforementioned items and plan of care.
59F w/ PMH of alcohol cirrhosis c/b ascites s/p  paracentesis, esophageal varices s/p banding , recent complicated admission to [facility] for septic shock w/ AHRF  requiring pressors , intubation , course c/b   c. diff colitis , Bacteroides  bacteremia w/ probable endocarditis unable to obtain ARACELI due to varices ,  now s/p PICC  and planned for 6 week abx , d/c;ed to rehab 2/7/25  and Re-admitted to [place] shortly afterward for SOB  , t/f to [facility] for hepatology evaluation reported MELD 29 on admission.     # Bacteroides Bacteremia, C. Diff colitits  - Pt on CTX Flagyl and PO Vanco. Flagyl to finish 2/13 to complete 2 weeks  - ID added 5 doses of PO vanco for Ppx   - BC negative x2   - TTE 2/12: No echocardiographic evidence of vegetations. Compared to the transthoracic echocardiogram performed on 1/27/2025, there is no evidence of vegetation on the mitral valve. The posterior mitral annulus is calcified.  - Discussed with ID, pt will need OP follow up for TTE in 1 month and surveillance BC in 2-4 weeks on discharge       # Hepatology evaluation for transplant   - Continue lasix 40 mg, spironolactone 100 mg QD,   - Follow up PETH level, Type and Screen, TAJ and ASMA, IgG levels, Microsomal Ab   - Repeat abd US showing moderate ascities. PTT elevated on AM draw, repeat better, s/p para  - Hepatology to take for transfer to their service once a bed becomes available  - Had 5 bms today --> decrease lactulose    # Wheezing   - CXR showing atelectasis- continue with incentive spirometer   - DUO neb    # Sinus tachycardia  - pt notes she has history of sinus tach to 120s  - no noted pain, no history of DVT  - monitor for desat events, pain     Pt to transfer to Hepatology service once a bed becomes available
59F w/ PMH of alcohol cirrhosis c/b ascites s/p  paracentesis, esophageal varices s/p banding , recent complicated admission to [facility] for septic shock w/ AHRF  requiring pressors , intubation , course c/b   c. diff colitis , Bacteroides  bacteremia w/ probable endocarditis unable to obtain ARACELI due to varices ,  now s/p PICC  and planned for 6 week abx , d/c'ed to rehab 2/7/25  and Re-admitted to [place] shortly afterward for SOB , t/f to [facility] for hepatology evaluation for transplant.     jaundiced, scleral icterus, thin     # Bacteroides Bacteremia, C. Diff colitis  - S/p CTX Flagyl and PO Vanco finished on 2/13, completed 2 weeks  - BC negative x2   - TTE 2/12: No echocardiographic evidence of vegetations. Compared to the transthoracic echocardiogram performed on 1/27/2025, there is no evidence of vegetation on the mitral valve. The posterior mitral annulus is calcified.  - This was a repeat TTE- ID does not think extended course is necessary  - Discussed with ID, pt will need OP follow up for TTE in 1 month and surveillance BC in 2-4 weeks on discharge  - Cipro for SBP ppx    # Hepatology evaluation for transplant now deferred  - Continue lasix 80 mg qd , spironolactone 150 mg QD,   - Follow up labs and testing requested by transplant ID and transplant hepatology, dental eval underway  - Folic acid, thiamine, multivitamin  - s/p para on 2/13 and 2/19 for ascites  - Lactulose titrate to 3 BM per day  - PETH level elevated  - Hepatology liver selection meeting 2/21- transplant deferred, will need to complete OP RPP program for significant psychosocial issues      # Wheezing, resolved  - 2/12: CXR showing atelectasis- continue with incentive spirometer   - DUO neb prn    # Sinus tachycardia  - pt notes she has history of sinus tach to 120s, has been workup up outpt for this and was placed on metoprolol but stopped d/t low BP  - no noted pain, no history of DVT  - monitor for desat events, pain     OP follow ups ID for TTE in 1 month and surveillance BC in 2-4 weeks on discharge, hepatology, psych  Hepatology has discussed with pt deferred transplant    Time-based billing (NON-critical care).   51 minutes spent on total encounter. The necessity of the time spent during the encounter on this date of service was due to:   - Ordering, reviewing, and interpreting labs, and imaging.  - Independently obtaining a review of systems and performing a physical exam  - Reviewing consultant documentation/recommendations in addition to discussing plan of care with consultants.  - Counselling and educating patient  regarding interpretation of aforementioned items and plan of care.
59F w/ PMH of alcohol cirrhosis c/b ascites s/p  paracentesis, esophageal varices s/p banding , recent complicated admission to [facility] for septic shock w/ AHRF  requiring pressors , intubation , course c/b   c. diff colitis , Bacteroides  bacteremia w/ probable endocarditis unable to obtain ARACELI due to varices ,  now s/p PICC  and planned for 6 week abx , d/c'ed to rehab 2/7/25  and Re-admitted to [place] shortly afterward for SOB , t/f to [facility] for hepatology evaluation for transplant.     jaundiced, scleral icterus, thin     #Fever  -patient without constitutional symptoma or localizing symtpoms for fever  -unofficial POCUS without pocket for ascites.   -BCx, RVP  -if RVP negative, given cirrhotic, high risk of infection, previous septic shocik, would empirically cover until cultures return.   -repeat TTE    # Hepatology evaluation for transplant now deferred  - Continue lasix 80 mg qd , spironolactone 150 mg QD,   - Follow up labs and testing requested by transplant ID and transplant hepatology, dental eval underway  - Folic acid, thiamine, multivitamin  - s/p para on 2/13 and 2/19 for ascites  - Lactulose titrate to 3 BM per day  - PETH level elevated  - Hepatology liver selection meeting 2/21- transplant deferred, will need to complete OP RPP program for significant psychosocial issues      # Sinus tachycardia  - pt notes she has history of sinus tach to 120s, has been workup up outpt for this and was placed on metoprolol but stopped d/t low BP  - no noted pain, no history of DVT  - monitor for desat events, pain   -team to discuss with heaptology re: coreg for cirrhosis, ? varices  -if no coreg, can consider nadalol vs. restarting prior metoprolol if BP allows.
59F w/ PMH of alcohol cirrhosis c/b ascites s/p  paracentesis, esophageal varices s/p banding , recent complicated admission to [facility] for septic shock w/ AHRF  requiring pressors , intubation , course c/b   c. diff colitis , Bacteroides  bacteremia w/ probable endocarditis unable to obtain ARACELI due to varices ,  now s/p PICC  and planned for 6 week abx , d/c;ed to rehab 2/7/25  and Re-admitted to [place] shortly afterward for SOB , t/f to [facility] for hepatology evaluation reported MELD 29 on admission.     # Bacteroides Bacteremia, C. Diff colitis  - Pt on CTX Flagyl and PO Vanco. Flagyl to finish 2/13, completed 2 weeks  - BC negative x2   - TTE 2/12: No echocardiographic evidence of vegetations. Compared to the transthoracic echocardiogram performed on 1/27/2025, there is no evidence of vegetation on the mitral valve. The posterior mitral annulus is calcified.  - This was a repeat TTE- ID does not think extended course is necessary  - Discussed with ID, pt will need OP follow up for TTE in 1 month and surveillance BC in 2-4 weeks on discharge  - Cipro for SBP ppx    # Hepatology evaluation for transplant   - Continue lasix 40 mg hepatology , spironolactone 100 mg QD,   - Follow up labs and testing requested by transplant ID and transplant hepatology  - transplant work up underway  - albumin today as directed by hepatology, daily assessment  - s/p para on 2/13 and 2/19, follow up fluid studies  - Lactulose titrate to 3 BM per day  - PETH level elevated- hepatology discussed with pt  - Hepatology to take for transfer to their service once a bed becomes available,     # Wheezing, resolved  - 2/12: CXR showing atelectasis- continue with incentive spirometer   - DUO neb prn    # Sinus tachycardia  - pt notes she has history of sinus tach to 120s, has been workup up outpt for this and was placed on metoprolol but stopped d/t low BP  - no noted pain, no history of DVT  - monitor for desat events, pain       Pt to transfer to Hepatology service once a bed becomes available.    Transplant workup underway     OP follow ups ID, hepatology    Time-based billing (NON-critical care).   53 minutes spent on total encounter. The necessity of the time spent during the encounter on this date of service was due to:   - Ordering, reviewing, and interpreting labs, and imaging.  - Independently obtaining a review of systems and performing a physical exam  - Reviewing consultant documentation/recommendations in addition to discussing plan of care with consultants.  - Counselling and educating patient  regarding interpretation of aforementioned items and plan of care.
59F w/ PMH of alcohol cirrhosis c/b ascites s/p  paracentesis, esophageal varices s/p banding , recent complicated admission to [facility] for septic shock w/ AHRF  requiring pressors , intubation , course c/b   c. diff colitis , Bacteroides  bacteremia w/ probable endocarditis unable to obtain ARACELI due to varices ,  now s/p PICC  and planned for 6 week abx , d/c;ed to rehab 2/7/25  and Re-admitted to [place] shortly afterward for SOB  , t/f to [facility] for hepatology evaluation reported MELD 29 on admission.     # Bacteroides Bacteremia, C. Diff colitits  - Pt on CTX Flagyl and PO Vanco. Flagyl to finish 2/13 to complete 2 weeks  - ID added 5 doses of PO vanco for Ppx   - BC negative x2   - TTE 2/12: No echocardiographic evidence of vegetations. Compared to the transthoracic echocardiogram performed on 1/27/2025, there is no evidence of vegetation on the mitral valve. The posterior mitral annulus is calcified.  - Discussed with ID, pt will need OP follow up for TTE in 1 month and surveillance BC in 2-4 weeks on discharge       # Hepatology evaluation for transplant   - Continue lasix 40 mg, spironolactone 100 mg QD,   - Follow up PETH level, Type and Screen, TAJ and ASMA, IgG levels, Microsomal Ab   - Repeat abd US showing moderate ascities. PTT elevated on AM draw, repeat better, s/p para  - Hepatology to take for transfer to their service once a bed becomes available  - Had 5 bms today --> decrease lactulose    # Wheezing   - CXR showing atelectasis- continue with incentive spirometer   - DUO neb    # Sinus tachycardia  - pt notes she has history of sinus tach to 120s  - no noted pain, no history of DVT  - monitor for desat events, pain     Pt to transfer to Hepatology service once a bed becomes available
59F w/ PMH of alcohol cirrhosis c/b ascites s/p  paracentesis, esophageal varices s/p banding , recent complicated admission to [facility] for septic shock w/ AHRF  requiring pressors , intubation , course c/b   c. diff colitis , Bacteroides  bacteremia w/ probable endocarditis unable to obtain ARACELI due to varices ,  now s/p PICC  and planned for 6 week abx , d/c;ed to rehab 2/7/25  and Re-admitted to [place] shortly afterward for SOB  , t/f to [facility] for hepatology evaluation reported MELD 29 on admission.     # Bacteroides Bacteremia, C. Diff colitis  - Pt on CTX Flagyl and PO Vanco. Flagyl to finish 2/13, completed 2 weeks  - BC negative x2   - TTE 2/12: No echocardiographic evidence of vegetations. Compared to the transthoracic echocardiogram performed on 1/27/2025, there is no evidence of vegetation on the mitral valve. The posterior mitral annulus is calcified.  - Discussed with ID, pt will need OP follow up for TTE in 1 month and surveillance BC in 2-4 weeks on discharge  - Cipro for SBP ppx    # Hepatology evaluation for transplant   - Continue lasix 40 mg, spironolactone 100 mg QD,   - Follow up labs and testing requested by transplant ID and transplant hepatology, pending quant gold, coccidioides, brucella, Q fever  - s/p para on 2/13 Repeat abd US 2/18 for reaccumulation of fluid possible para  - Lactulose titrate to 3 BM per day  - Hepatology to take for transfer to their service once a bed becomes available, PETH level elevated- hepatology team to discuss with pt    # Wheezing   - 2/12: CXR showing atelectasis- continue with incentive spirometer   - DUO neb prn    # Sinus tachycardia  - pt notes she has history of sinus tach to 120s  - no noted pain, no history of DVT  - monitor for desat events, pain     Pt to transfer to Hepatology service once a bed becomes available .
59F w/ PMH of alcohol cirrhosis c/b ascites s/p  paracentesis, esophageal varices s/p banding , recent complicated admission to [facility] for septic shock w/ AHRF  requiring pressors , intubation , course c/b   c. diff colitis , Bacteroides  bacteremia w/ probable endocarditis unable to obtain ARACELI due to varices ,  now s/p PICC  and planned for 6 week abx , d/c;ed to rehab 2/7/25  and Re-admitted to [place] shortly afterward for SOB  , t/f to [facility] for hepatology evaluation reported MELD 29 on admission.     # Bacteroides Bacteremia, C. Diff colitis  - Pt on CTX Flagyl and PO Vanco. Flagyl to finish 2/13, completed 2 weeks  - BC negative x2   - TTE 2/12: No echocardiographic evidence of vegetations. Compared to the transthoracic echocardiogram performed on 1/27/2025, there is no evidence of vegetation on the mitral valve. The posterior mitral annulus is calcified.  - Discussed with ID, pt will need OP follow up for TTE in 1 month and surveillance BC in 2-4 weeks on discharge  - Cipro for SBP ppx    # Hepatology evaluation for transplant   - Continue lasix 40 mg, spironolactone 100 mg QD,   - Follow up labs and testing requested by transplant ID and transplant hepatology, pending quant gold, coccidioides, brucella, Q fever  - s/p para on 2/13 Repeat abd US 2/18 for reaccumulation of fluid possible para  - IR consulted 2/19 fo therapeutic para   - Lactulose titrate to 3 BM per day  - Hepatology to take for transfer to their service once a bed becomes available, PETH level elevated- hepatology team to discuss with pt    # Wheezing, resolved  - 2/12: CXR showing atelectasis- continue with incentive spirometer   - DUO neb prn    # Sinus tachycardia  - pt notes she has history of sinus tach to 120s, has been workup up outpt for this and was placed on metoprolol but stopped d/t low BP  - no noted pain, no history of DVT  - monitor for desat events, pain       Pt to transfer to Hepatology service once a bed becomes available .    Transplant workup underway     Time-based billing (NON-critical care).   51 minutes spent on total encounter. The necessity of the time spent during the encounter on this date of service was due to:   - Ordering, reviewing, and interpreting labs, and imaging.  - Independently obtaining a review of systems and performing a physical exam  - Reviewing consultant documentation/recommendations in addition to discussing plan of care with consultants.  - Counselling and educating patient  regarding interpretation of aforementioned items and plan of care.
59F w/ PMH of alcohol cirrhosis c/b ascites s/p  paracentesis, esophageal varices s/p banding , recent complicated admission to [facility] for septic shock w/ AHRF  requiring pressors , intubation , course c/b   c. diff colitis , Bacteroides  bacteremia w/ probable endocarditis unable to obtain ARACELI due to varices ,  now s/p PICC  and planned for 6 week abx , d/c;ed to rehab 2/7/25  and Re-admitted to [place] shortly afterward for SOB  , t/f to [facility] for hepatology evaluation reported MELD 29 on admission.     # Bacteroides Bacteremia, C. Diff colitits  - Pt on CTX Flagyl and PO Vanco. Flagyl to finish 2/13 to complete 2 weeks  - ID added 5 doses of PO vanco for Ppx   - BC negative x2   - TTE 2/12: No echocardiographic evidence of vegetations. Compared to the transthoracic echocardiogram performed on 1/27/2025, there is no evidence of vegetation on the mitral valve. The posterior mitral annulus is calcified.  - Discussed with ID, pt will need OP follow up for TTE in 1 month and surveillance BC in 2-4 weeks on discharge   - pt with PICC line in place, can DC    # Hepatology evaluation for transplant   - Continue lasix 40 mg, spironolactone 100 mg QD,   - Follow up PETH level, Type and Screen, TAJ and ASMA, IgG levels, Microsomal Ab   - Low sodium diet  - Repeat abd US showing moderate ascities. PTT elevated on AM draw, repeat better, s/p para  - Follow up infectious w/u BCx x2  - Hepatology to take for transfer to their service once a bed becomes available    # Wheezing   - CXR showing atelectasis- continue with incentive spirometer   - DUO neb    # Sinus tachycardia  - pt notes she has history of sinus tach to 120s  - no noted pain, no history of DVT  - monitor for desat events, pain     Pt to transfer to Hepatology service once a bed becomes available
59F w/ PMH of alcohol cirrhosis c/b ascites s/p  paracentesis, esophageal varices s/p banding , recent complicated admission to [facility] for septic shock w/ AHRF  requiring pressors , intubation , course c/b   c. diff colitis , Bacteroides  bacteremia w/ probable endocarditis unable to obtain ARACELI due to varices ,  now s/p PICC  and planned for 6 week abx , d/c'ed to rehab 2/7/25  and Re-admitted to [place] shortly afterward for SOB , t/f to [facility] for hepatology evaluation for transplant.     sympomatically improved from 2/22. no current compliant.s       #Fever  -unclear etiology.   -follow up BCx  -repeat TTE  -continue vanc/ceftriaxone, if spikes through, add flagyl as prior bacteroides  -follow up ID recs.     # Hepatology evaluation for transplant now deferred  - Continue lasix 80 mg qd , spironolactone 150 mg QD,   - Follow up labs and testing requested by transplant ID and transplant hepatology, dental eval underway  - Folic acid, thiamine, multivitamin  - s/p para on 2/13 and 2/19 for ascites  - Lactulose titrate to 3 BM per day  - PETH level elevated  - Hepatology liver selection meeting 2/21- transplant deferred, will need to complete OP RPP program for significant psychosocial issues      # Sinus tachycardia  - pt notes she has history of sinus tach to 120s, has been workup up outpt for this and was placed on metoprolol but stopped d/t low BP  - no noted pain, no history of DVT  - monitor for desat events, pain   -team to discuss with heaptology re: coreg for cirrhosis, ? varices  -if no coreg, can consider nadalol vs. restarting prior metoprolol if BP allows.
59F w/ PMH of alcohol cirrhosis c/b ascites s/p  paracentesis, esophageal varices s/p banding , recent complicated admission to [facility] for septic shock w/ AHRF  requiring pressors , intubation , course c/b   c. diff colitis , Bacteroides  bacteremia w/ probable endocarditis unable to obtain ARACELI due to varices ,  now s/p PICC  and planned for 6 week abx , d/c'ed to rehab 2/7/25  and Re-admitted to [place] shortly afterward for SOB , t/f to [facility] for hepatology evaluation for transplant.     #Fever  -unclear etiology.   -CTAP with psoas abscess. After discussion with IR, risk of bleeding due to the location is very high. Recommend repeat imaging  -repeat CTAP with decreased size of abscesse  -follow up BCx, so far negative  -repeat TTE without vegetation  -continue vanc/ceftriaxone, flagyl as well as PO vanco for c.diff ppx  -follow up ID recs on final abx regimen    # Hepatology evaluation for transplant now deferred  - Continue lasix 80 mg qd , spironolactone 150 mg QD,   - Follow up labs and testing requested by transplant ID and transplant hepatology, dental eval underway  - Folic acid, thiamine, multivitamin  - s/p para on 2/13 and 2/19 for ascites  - Lactulose titrate to 3 BM per day  - PETH level elevated  - Hepatology liver selection meeting 2/21- transplant deferred, will need to complete OP RPP program for significant psychosocial issues      # Sinus tachycardia  - pt notes she has history of sinus tach to 120s, has been workup up outpt for this and was placed on metoprolol but stopped d/t low BP  - no noted pain, no history of DVT  - monitor for desat events, pain       Claire Zelaya MD  Division of Hospital Medicine  Contact via [employer] Teams  Office: [phone]    I have personally and independently provided 51 minutes in patient encounter, reviewing tests and imaging, independently obtaining a history, performing a physical examination, discussing the plan with the patient, ordering medications/tests, documenting clinical information, and coordinating care. This excludes any time spent on teaching.
59F w/ PMH of alcohol cirrhosis c/b ascites s/p  paracentesis, esophageal varices s/p banding , recent complicated admission to [facility] for septic shock w/ AHRF  requiring pressors , intubation , course c/b   c. diff colitis , Bacteroides  bacteremia w/ probable endocarditis unable to obtain ARACELI due to varices ,  now s/p PICC  and planned for 6 week abx , d/c'ed to rehab 2/7/25  and Re-admitted to [place] shortly afterward for SOB , t/f to [facility] for hepatology evaluation for transplant.     #Fever  -unclear etiology.   -CTAP with psoas abscess. After discussion with IR, risk of bleeding due to the location is very high. Recommend repeat imaging  -follow up BCx  -repeat TTE without vegetation  -continue vanc/ceftriaxone, flagyl as well as PO vanco for c.diff ppx  -follow up ID recs.     # Hepatology evaluation for transplant now deferred  - Continue lasix 80 mg qd , spironolactone 150 mg QD,   - Follow up labs and testing requested by transplant ID and transplant hepatology, dental eval underway  - Folic acid, thiamine, multivitamin  - s/p para on 2/13 and 2/19 for ascites  - Lactulose titrate to 3 BM per day  - PETH level elevated  - Hepatology liver selection meeting 2/21- transplant deferred, will need to complete OP RPP program for significant psychosocial issues      # Sinus tachycardia  - pt notes she has history of sinus tach to 120s, has been workup up outpt for this and was placed on metoprolol but stopped d/t low BP  - no noted pain, no history of DVT  - monitor for desat events, pain   -team to discuss with heaptology re: coreg for cirrhosis, ? varices  -if no coreg, can consider nadalol vs. restarting prior metoprolol if BP allows.    updated significant other at bedside    Claire Zelaya MD  Division of Hospital Medicine  Contact via [employer] Teams  Office: [phone]    I have personally and independently provided 51 minutes in patient encounter, reviewing tests and imaging, independently obtaining a history, performing a physical examination, discussing the plan with the patient, ordering medications/tests, documenting clinical information, and coordinating care. This excludes any time spent on teaching.
59F w/ PMH of alcohol cirrhosis c/b ascites s/p  paracentesis, esophageal varices s/p banding , recent complicated admission to [facility] for septic shock w/ AHRF  requiring pressors , intubation , course c/b   c. diff colitis , Bacteroides  bacteremia w/ probable endocarditis unable to obtain ARACELI due to varices ,  now s/p PICC  and planned for 6 week abx , d/c;ed to rehab 2/7/25  and Re-admitted to [place] shortly afterward for SOB  , t/f to [facility] for hepatology evaluation reported MELD 29 on admission.     # Bacteroides Bacteremia, C. Diff colitits  - Pt on CTX Flagyl and PO Vanco  - Discussed with ID, may not need as long of a course depending on TTE, repeat pending  - pt with PICC line    # Hepatology evaluation  - Follow up with hepatology recommendations     Extended discussion with pt and partha Hatch of leaving against medical advice leading up to an including death from infection. Pt does not have abx set up at home needs ID evaluation and hepatology evaluation
59F w/ PMH of alcohol cirrhosis c/b ascites s/p  paracentesis, esophageal varices s/p banding , recent complicated admission to [facility] for septic shock w/ AHRF  requiring pressors , intubation , course c/b   c. diff colitis , Bacteroides  bacteremia w/ probable endocarditis unable to obtain ARACELI due to varices ,  now s/p PICC  and planned for 6 week abx , d/c'ed to rehab 2/7/25  and Re-admitted to [place] shortly afterward for SOB , t/f to [facility] for hepatology evaluation for transplant.     #Fever  -unclear etiology.   -CTAP with psoas abscess. After discussion with IR, risk of bleeding due to the location is very high. Recommend repeat imaging  -follow up BCx, so far negative  -repeat TTE without vegetation  -continue vanc/ceftriaxone, flagyl as well as PO vanco for c.diff ppx  -follow up ID recs.     # Hepatology evaluation for transplant now deferred  - Continue lasix 80 mg qd , spironolactone 150 mg QD,   - Follow up labs and testing requested by transplant ID and transplant hepatology, dental eval underway  - Folic acid, thiamine, multivitamin  - s/p para on 2/13 and 2/19 for ascites  - Lactulose titrate to 3 BM per day  - PETH level elevated  - Hepatology liver selection meeting 2/21- transplant deferred, will need to complete OP RPP program for significant psychosocial issues      # Sinus tachycardia  - pt notes she has history of sinus tach to 120s, has been workup up outpt for this and was placed on metoprolol but stopped d/t low BP  - no noted pain, no history of DVT  - monitor for desat events, pain     #elevated lactate on 3/1 : give fluids and f/u with repeat     I have personally and independently provided 51 minutes in patient encounter, reviewing tests and imaging, independently obtaining a history, performing a physical examination, discussing the plan with the patient, ordering medications/tests, documenting clinical information, and coordinating care. This excludes any time spent on teaching.
59F w/ PMH of alcohol cirrhosis c/b ascites s/p  paracentesis, esophageal varices s/p banding , recent complicated admission to [facility] for septic shock w/ AHRF  requiring pressors , intubation , course c/b   c. diff colitis , Bacteroides  bacteremia w/ probable endocarditis unable to obtain ARACELI due to varices ,  now s/p PICC  and planned for 6 week abx , d/c'ed to rehab 2/7/25  and Re-admitted to [place] shortly afterward for SOB , t/f to [facility] for hepatology evaluation for transplant.     #Fever  -unclear etiology.   -CTAP with psoas abscess. After discussion with IR, risk of bleeding due to the location is very high. Recommend repeat imaging  -follow up BCx  -repeat TTE without vegetation  -continue vanc/ceftriaxone, flagyl as well as PO vanco for c.diff ppx  -follow up ID recs.     # Hepatology evaluation for transplant now deferred  - Continue lasix 80 mg qd , spironolactone 150 mg QD,   - Follow up labs and testing requested by transplant ID and transplant hepatology, dental eval underway  - Folic acid, thiamine, multivitamin  - s/p para on 2/13 and 2/19 for ascites  - Lactulose titrate to 3 BM per day  - PETH level elevated  - Hepatology liver selection meeting 2/21- transplant deferred, will need to complete OP RPP program for significant psychosocial issues      # Sinus tachycardia  - pt notes she has history of sinus tach to 120s, has been workup up outpt for this and was placed on metoprolol but stopped d/t low BP  - no noted pain, no history of DVT  - monitor for desat events, pain   -team to discuss with heaptology re: coreg for cirrhosis, ? varices  -if no coreg, can consider nadalol vs. restarting prior metoprolol if BP allows.    updated significant other at bedside    Claire Zelaya MD  Division of Hospital Medicine  Contact via [employer] Teams  Office: [phone]    I have personally and independently provided 51 minutes in patient encounter, reviewing tests and imaging, independently obtaining a history, performing a physical examination, discussing the plan with the patient, ordering medications/tests, documenting clinical information, and coordinating care. This excludes any time spent on teaching.
59F w/ PMH of alcohol cirrhosis c/b ascites s/p  paracentesis, esophageal varices s/p banding , recent complicated admission to [facility] for septic shock w/ AHRF  requiring pressors , intubation , course c/b   c. diff colitis , Bacteroides  bacteremia w/ probable endocarditis unable to obtain ARACELI due to varices ,  now s/p PICC  and planned for 6 week abx , d/c'ed to rehab 2/7/25  and Re-admitted to [place] shortly afterward for SOB , t/f to [facility] for hepatology evaluation for transplant.     #Fever  -unclear etiology.   -CTAP with psoas abscess. After discussion with IR, risk of bleeding due to the location is very high. Recommend repeat imaging  -follow up BCx, so far negative  -repeat TTE without vegetation  -continue vanc/ceftriaxone, flagyl as well as PO vanco for c.diff ppx  -follow up ID recs.   -follow up repeat CTAP    # Hepatology evaluation for transplant now deferred  - Continue lasix 80 mg qd , spironolactone 150 mg QD,   - Follow up labs and testing requested by transplant ID and transplant hepatology, dental eval underway  - Folic acid, thiamine, multivitamin  - s/p para on 2/13 and 2/19 for ascites  - Lactulose titrate to 3 BM per day  - PETH level elevated  - Hepatology liver selection meeting 2/21- transplant deferred, will need to complete OP RPP program for significant psychosocial issues      # Sinus tachycardia  - pt notes she has history of sinus tach to 120s, has been workup up outpt for this and was placed on metoprolol but stopped d/t low BP  - no noted pain, no history of DVT  - monitor for desat events, pain     updated significant other at bedside    Claire Zelaya MD  Division of Hospital Medicine  Contact via [employer] Teams  Office: [phone]    I have personally and independently provided 51 minutes in patient encounter, reviewing tests and imaging, independently obtaining a history, performing a physical examination, discussing the plan with the patient, ordering medications/tests, documenting clinical information, and coordinating care. This excludes any time spent on teaching.
59F w/ PMH of alcohol cirrhosis c/b ascites s/p  paracentesis, esophageal varices s/p banding , recent complicated admission to [facility] for septic shock w/ AHRF  requiring pressors , intubation , course c/b   c. diff colitis , Bacteroides  bacteremia w/ probable endocarditis unable to obtain ARACELI due to varices ,  now s/p PICC  and planned for 6 week abx , d/c'ed to rehab 2/7/25  and Re-admitted to [place] shortly afterward for SOB , t/f to [facility] for hepatology evaluation for transplant.     # psoas abscess  -CTAP with psoas abscess. After discussion with IR, risk of bleeding due to the location is very high. Recommend repeat imaging  -repeat CTAP with decreased size of abscess  -follow up BCx, so far negative  -repeat TTE without vegetation  -appreciate ID recs: Cefpodoxime 400 mg oral bid + Flagyl 500 mg oral bid and repeat CT A/P with IV contrast week of 3/17. continue PO vancomycin while on abx for C.diff ppx    # Hepatology evaluation for transplant now deferred  - Continue lasix 80 mg qd , spironolactone 150 mg QD,   - Follow up labs and testing requested by transplant ID and transplant hepatology, dental eval underway  - Folic acid, thiamine, multivitamin  - s/p para on 2/13 and 2/19 for ascites  - Lactulose titrate to 3 BM per day  - PETH level elevated  - Hepatology liver selection meeting 2/21- transplant deferred, will need to complete OP RPP program for significant psychosocial issues      stable for discharge to [facility]  updated significant other at bedside  44 minutes spent    Claire Zelaya MD  Division of Hospital Medicine  Contact via Microsoft Teams  Office: [phone]
59F w/ PMH of alcohol cirrhosis c/b ascites s/p  paracentesis, esophageal varices s/p banding , recent complicated admission to [facility] for septic shock w/ AHRF  requiring pressors , intubation , course c/b   c. diff colitis , Bacteroides  bacteremia w/ probable endocarditis unable to obtain ARACELI due to varices ,  now s/p PICC  and planned for 6 week abx , d/c'ed to rehab 2/7/25  and Re-admitted to [place] shortly afterward for SOB , t/f to [facility] for hepatology evaluation for transplant.     #Fever  -unclear etiology.   -CTAP with psoas abscess. After discussion with IR, risk of bleeding due to the location is very high. Recommend repeat imaging to be done 3/3  -follow up BCx, so far negative  -repeat TTE without vegetation  -continue vanc/ceftriaxone, flagyl as well as PO vanco for c.diff ppx  -follow up ID recs.     # Hepatology evaluation for transplant now deferred  - Continue lasix 80 mg qd , spironolactone 150 mg QD,   - Follow up labs and testing requested by transplant ID and transplant hepatology, dental eval underway  - Folic acid, thiamine, multivitamin  - s/p para on 2/13 and 2/19 for ascites  - Lactulose titrate to 3 BM per day  - PETH level elevated  - Hepatology liver selection meeting 2/21- transplant deferred, will need to complete OP RPP program for significant psychosocial issues      # Sinus tachycardia  - pt notes she has history of sinus tach to 120s, has been workup up outpt for this and was placed on metoprolol but stopped d/t low BP  - no noted pain, no history of DVT  - monitor for desat events, pain     #elevated lactate on 3/1, still elevated, will need to trend- repeat scheduled for 3/3 AM
59F w/ PMH of alcohol cirrhosis c/b ascites s/p  paracentesis, esophageal varices s/p banding , recent complicated admission to [facility] for septic shock w/ AHRF  requiring pressors , intubation , course c/b   c. diff colitis , Bacteroides  bacteremia w/ probable endocarditis unable to obtain ARACELI due to varices ,  now s/p PICC  and planned for 6 week abx , d/c'ed to rehab 2/7/25  and Re-admitted to [place] shortly afterward for SOB , t/f to [facility] for hepatology evaluation for transplant.     #Fever  -unclear etiology.   -CTAP with psoas abscess. After discussion with IR, risk of bleeding due to the location is very high. Recommend repeat imaging  -follow up BCx  -repeat TTE without vegetation  -continue vanc/ceftriaxone, flagyl as well as PO vanco for c.diff ppx  -follow up ID recs.     # Hepatology evaluation for transplant now deferred  - Continue lasix 80 mg qd , spironolactone 150 mg QD,   - Follow up labs and testing requested by transplant ID and transplant hepatology, dental eval underway  - Folic acid, thiamine, multivitamin  - s/p para on 2/13 and 2/19 for ascites  - Lactulose titrate to 3 BM per day  - PETH level elevated  - Hepatology liver selection meeting 2/21- transplant deferred, will need to complete OP RPP program for significant psychosocial issues      # Sinus tachycardia  - pt notes she has history of sinus tach to 120s, has been workup up outpt for this and was placed on metoprolol but stopped d/t low BP  - no noted pain, no history of DVT  - monitor for desat events, pain   -team to discuss with heaptology re: coreg for cirrhosis, ? varices  -if no coreg, can consider nadalol vs. restarting prior metoprolol if BP allows.    updated significant other at bedside    Claire Zelaya MD  Division of Hospital Medicine  Contact via [employer] Teams  Office: [phone]    I have personally and independently provided 51 minutes in patient encounter, reviewing tests and imaging, independently obtaining a history, performing a physical examination, discussing the plan with the patient, ordering medications/tests, documenting clinical information, and coordinating care. This excludes any time spent on teaching.
59F w/ PMH of alcohol cirrhosis c/b ascites s/p  paracentesis, esophageal varices s/p banding , recent complicated admission to [facility] for septic shock w/ AHRF  requiring pressors , intubation , course c/b   c. diff colitis , Bacteroides  bacteremia w/ probable endocarditis unable to obtain ARACELI due to varices ,  now s/p PICC  and planned for 6 week abx , d/c'ed to rehab 2/7/25  and Re-admitted to [place] shortly afterward for SOB , t/f to [facility] for hepatology evaluation for transplant.     #Fever  -unclear etiology.   -CTAP with psoas abscess. After discussion with IR, risk of bleeding due to the location is very high. Recommend repeat imaging  -follow up BCx, so far negative  -repeat TTE without vegetation  -continue vanc/ceftriaxone, flagyl as well as PO vanco for c.diff ppx  -follow up ID recs.     # Hepatology evaluation for transplant now deferred  - Continue lasix 80 mg qd , spironolactone 150 mg QD,   - Follow up labs and testing requested by transplant ID and transplant hepatology, dental eval underway  - Folic acid, thiamine, multivitamin  - s/p para on 2/13 and 2/19 for ascites  - Lactulose titrate to 3 BM per day  - PETH level elevated  - Hepatology liver selection meeting 2/21- transplant deferred, will need to complete OP RPP program for significant psychosocial issues      # Sinus tachycardia  - pt notes she has history of sinus tach to 120s, has been workup up outpt for this and was placed on metoprolol but stopped d/t low BP  - no noted pain, no history of DVT  - monitor for desat events, pain     updated significant other at bedside    Claire Zelaya MD  Division of Hospital Medicine  Contact via Microsoft Teams  Office: [phone]    I have personally and independently provided 51 minutes in patient encounter, reviewing tests and imaging, independently obtaining a history, performing a physical examination, discussing the plan with the patient, ordering medications/tests, documenting clinical information, and coordinating care. This excludes any time spent on teaching.
59F w/ PMH of alcohol cirrhosis c/b ascites s/p  paracentesis, esophageal varices s/p banding , recent complicated admission to [facility] for septic shock w/ AHRF  requiring pressors , intubation , course c/b   c. diff colitis , Bacteroides  bacteremia w/ probable endocarditis unable to obtain ARACELI due to varices ,  now s/p PICC  and planned for 6 week abx , d/c'ed to rehab 2/7/25  and Re-admitted to [place] shortly afterward for SOB , t/f to [facility] for hepatology evaluation for transplant.     #Fever  -unclear etiology.   -CTAP with psoas abscess. After discussion with IR, risk of bleeding due to the location is very high. Recommend repeat imaging  -follow up BCx  -repeat TTE without vegetation  -continue vanc/ceftriaxone, flagyl as well as PO vanco for c.diff ppx  -follow up ID recs.     # Hepatology evaluation for transplant now deferred  - Continue lasix 80 mg qd , spironolactone 150 mg QD,   - Follow up labs and testing requested by transplant ID and transplant hepatology, dental eval underway  - Folic acid, thiamine, multivitamin  - s/p para on 2/13 and 2/19 for ascites  - Lactulose titrate to 3 BM per day  - PETH level elevated  - Hepatology liver selection meeting 2/21- transplant deferred, will need to complete OP RPP program for significant psychosocial issues      # Sinus tachycardia  - pt notes she has history of sinus tach to 120s, has been workup up outpt for this and was placed on metoprolol but stopped d/t low BP  - no noted pain, no history of DVT  - monitor for desat events, pain   -team to discuss with heaptology re: coreg for cirrhosis, ? varices  -if no coreg, can consider nadalol vs. restarting prior metoprolol if BP allows.    updated significant other at bedside    Claire Zelaya MD  Division of Hospital Medicine  Contact via [employer] Teams  Office: [phone]    I have personally and independently provided 51 minutes in patient encounter, reviewing tests and imaging, independently obtaining a history, performing a physical examination, discussing the plan with the patient, ordering medications/tests, documenting clinical information, and coordinating care. This excludes any time spent on teaching.

## 2025-03-05 NOTE — PROGRESS NOTE ADULT - PROBLEM SELECTOR PROBLEM 1
Alcoholic cirrhosis
Fever
Alcoholic cirrhosis
Fever
Alcoholic cirrhosis
Fever
Alcoholic cirrhosis
Fever
Alcoholic cirrhosis
Fever

## 2025-03-05 NOTE — PROGRESS NOTE ADULT - SUBJECTIVE AND OBJECTIVE BOX
PROGRESS NOTE:     Patient is a 59y old  Female who presents with a chief complaint of Transfer from [place] for possible liver transplant.      SUBJECTIVE / OVERNIGHT EVENTS:    OVERNIGHT: No acute overnight events.  Patient was examined at bedside and feels well this morning. Denies fever, chills, chest pain, SOB, nausea, vomiting. ROS otherwise negative and pt is amenable to current treatment plan.      REVIEW OF SYSTEMS:    CONSTITUTIONAL:  No weakness, fevers, or chills  EYES/ENT:  No visual changes, vertigo, or throat pain   NECK:  No pain or stiffness  RESPIRATORY:  No SOB, cough, wheezing, or hemoptysis  CARDIOVASCULAR:  No chest pain or palpitations  GASTROINTESTINAL:  No abdominal pain, nausea, vomiting, or hematemesis; No diarrhea or constipation; No melena or hematochezia.  GENITOURINARY:  No dysuria, change in frequency, or hematuria  NEUROLOGICAL:  No numbness or weakness  SKIN:  No itching or rashes      MEDICATIONS  (STANDING):  Biotene Dry Mouth Oral Rinse 15 milliLiter(s) Swish and Spit five times a day  cefTRIAXone   IVPB 2000 milliGRAM(s) IV Intermittent every 24 hours  chlorhexidine 2% Cloths 1 Application(s) Topical <User Schedule>  folic acid 1 milliGRAM(s) Oral daily  furosemide    Tablet 80 milliGRAM(s) Oral daily  heparin   Injectable 5000 Unit(s) SubCutaneous every 8 hours  lactulose Syrup 10 Gram(s) Oral daily  magnesium oxide 400 milliGRAM(s) Oral three times a day with meals  melatonin 3 milliGRAM(s) Oral at bedtime  metroNIDAZOLE  IVPB 500 milliGRAM(s) IV Intermittent every 12 hours  midodrine. 15 milliGRAM(s) Oral three times a day  mirtazapine 7.5 milliGRAM(s) Oral at bedtime  pantoprazole    Tablet 40 milliGRAM(s) Oral before breakfast  rifAXIMin 550 milliGRAM(s) Oral two times a day  sodium chloride 1 Gram(s) Oral daily  spironolactone 150 milliGRAM(s) Oral daily  sucralfate suspension 1 Gram(s) Oral every 6 hours  thiamine 100 milliGRAM(s) Oral daily  ursodiol Capsule 300 milliGRAM(s) Oral every 12 hours  vancomycin    Solution 125 milliGRAM(s) Oral every 6 hours    MEDICATIONS  (PRN):  acetaminophen     Tablet .. 650 milliGRAM(s) Oral every 8 hours PRN Mild Pain (1 - 3), Moderate Pain (4 - 6)  albuterol/ipratropium for Nebulization 3 milliLiter(s) Nebulizer every 6 hours PRN Shortness of Breath and/or Wheezing  guaiFENesin Oral Liquid (Sugar-Free) 100 milliGRAM(s) Oral every 6 hours PRN Cough  hydrOXYzine hydrochloride 25 milliGRAM(s) Oral every 8 hours PRN Itching      CAPILLARY BLOOD GLUCOSE        I&O's Summary    04 Mar 2025 07:01  -  05 Mar 2025 07:00  --------------------------------------------------------  IN: 480 mL / OUT: 3 mL / NET: 477 mL        PHYSICAL EXAM:  Vital Signs Last 24 Hrs  T(C): 36.6 (05 Mar 2025 04:38), Max: 36.9 (04 Mar 2025 14:18)  T(F): 97.9 (05 Mar 2025 04:38), Max: 98.5 (04 Mar 2025 14:18)  HR: 103 (05 Mar 2025 04:38) (103 - 107)  BP: 112/68 (05 Mar 2025 04:38) (107/70 - 112/68)  BP(mean): --  RR: 18 (05 Mar 2025 04:38) (18 - 18)  SpO2: 100% (05 Mar 2025 04:38) (96% - 100%)    Parameters below as of 05 Mar 2025 04:38  Patient On (Oxygen Delivery Method): room air      CONSTITUTIONAL: NAD; jaundiced  HEENT: PERRLA, clear conjunctiva, EOMI, scleral icterus. Patient with dark lesion on inside of cheek and on tongue  RESPIRATORY: Normal respiratory effort; lungs are clear to auscultation bilaterally; No crackles/rhonchi/wheezing  CARDIOVASCULAR: Regular rate and rhythm, normal S1 and S2, no murmur/rub/gallop; No lower extremity edema; Peripheral pulses are 2+ bilaterally  ABDOMEN: Distended, Nontender to palpation, normoactive bowel sounds, no rebound/guarding; +Hepatosplenomegaly  MUSCULOSKELETAL: No clubbing or cyanosis of digits; no joint swelling or tenderness to palpation  EXTREMITY: Lower extremities non-tender to palpation; non-erythematous B/L  NEURO: A&Ox3; no focal deficits   PSYCH: Normal mood; affect appropriate    LABS:                        8.4    8.15  )-----------( 83       ( 05 Mar 2025 06:30 )             25.4     03-05    128[L]  |  91[L]  |  19  ----------------------------<  94  4.0   |  25  |  0.64    Ca    9.6      05 Mar 2025 06:30  Phos  4.1     03-05  Mg     1.6     03-05    TPro  7.6  /  Alb  3.3  /  TBili  7.3[H]  /  DBili  x   /  AST  65[H]  /  ALT  24  /  AlkPhos  312[H]  03-05    PT/INR - ( 05 Mar 2025 06:30 )   PT: 20.4 sec;   INR: 1.80 ratio         PTT - ( 05 Mar 2025 06:30 )  PTT:66.6 sec      Urinalysis Basic - ( 05 Mar 2025 06:30 )    Color: x / Appearance: x / SG: x / pH: x  Gluc: 94 mg/dL / Ketone: x  / Bili: x / Urobili: x   Blood: x / Protein: x / Nitrite: x   Leuk Esterase: x / RBC: x / WBC x   Sq Epi: x / Non Sq Epi: x / Bacteria: x          RADIOLOGY & ADDITIONAL TESTS:    N/A PROGRESS NOTE:     Patient is a 59y old  Female who presents with a chief complaint of Transfer from [place] for possible liver transplant.      SUBJECTIVE / OVERNIGHT EVENTS:    OVERNIGHT: No acute overnight events.  Patient was examined at bedside and feels well this morning. Denies fever, chills, chest pain, SOB, nausea, vomiting. ROS otherwise negative and pt is amenable to current treatment plan.      REVIEW OF SYSTEMS:    CONSTITUTIONAL:  No weakness, fevers, or chills  EYES/ENT:  No visual changes, vertigo, or throat pain   NECK:  No pain or stiffness  RESPIRATORY:  No SOB, cough, wheezing, or hemoptysis  CARDIOVASCULAR:  No chest pain or palpitations  GASTROINTESTINAL:  No abdominal pain, nausea, vomiting, or hematemesis; No diarrhea or constipation; No melena or hematochezia.  GENITOURINARY:  No dysuria, change in frequency, or hematuria  NEUROLOGICAL:  No numbness or weakness  SKIN:  No itching or rashes      MEDICATIONS  (STANDING):  Biotene Dry Mouth Oral Rinse 15 milliLiter(s) Swish and Spit five times a day  cefTRIAXone   IVPB 2000 milliGRAM(s) IV Intermittent every 24 hours  chlorhexidine 2% Cloths 1 Application(s) Topical <User Schedule>  folic acid 1 milliGRAM(s) Oral daily  furosemide    Tablet 80 milliGRAM(s) Oral daily  heparin   Injectable 5000 Unit(s) SubCutaneous every 8 hours  lactulose Syrup 10 Gram(s) Oral daily  magnesium oxide 400 milliGRAM(s) Oral three times a day with meals  melatonin 3 milliGRAM(s) Oral at bedtime  metroNIDAZOLE  IVPB 500 milliGRAM(s) IV Intermittent every 12 hours  midodrine. 15 milliGRAM(s) Oral three times a day  mirtazapine 7.5 milliGRAM(s) Oral at bedtime  pantoprazole    Tablet 40 milliGRAM(s) Oral before breakfast  rifAXIMin 550 milliGRAM(s) Oral two times a day  sodium chloride 1 Gram(s) Oral daily  spironolactone 150 milliGRAM(s) Oral daily  sucralfate suspension 1 Gram(s) Oral every 6 hours  thiamine 100 milliGRAM(s) Oral daily  ursodiol Capsule 300 milliGRAM(s) Oral every 12 hours  vancomycin    Solution 125 milliGRAM(s) Oral every 6 hours    MEDICATIONS  (PRN):  acetaminophen     Tablet .. 650 milliGRAM(s) Oral every 8 hours PRN Mild Pain (1 - 3), Moderate Pain (4 - 6)  albuterol/ipratropium for Nebulization 3 milliLiter(s) Nebulizer every 6 hours PRN Shortness of Breath and/or Wheezing  guaiFENesin Oral Liquid (Sugar-Free) 100 milliGRAM(s) Oral every 6 hours PRN Cough  hydrOXYzine hydrochloride 25 milliGRAM(s) Oral every 8 hours PRN Itching      CAPILLARY BLOOD GLUCOSE        I&O's Summary    04 Mar 2025 07:01  -  05 Mar 2025 07:00  --------------------------------------------------------  IN: 480 mL / OUT: 3 mL / NET: 477 mL        PHYSICAL EXAM:  Vital Signs Last 24 Hrs  T(C): 36.6 (05 Mar 2025 04:38), Max: 36.9 (04 Mar 2025 14:18)  T(F): 97.9 (05 Mar 2025 04:38), Max: 98.5 (04 Mar 2025 14:18)  HR: 103 (05 Mar 2025 04:38) (103 - 107)  BP: 112/68 (05 Mar 2025 04:38) (107/70 - 112/68)  BP(mean): --  RR: 18 (05 Mar 2025 04:38) (18 - 18)  SpO2: 100% (05 Mar 2025 04:38) (96% - 100%)    Parameters below as of 05 Mar 2025 04:38  Patient On (Oxygen Delivery Method): room air      CONSTITUTIONAL: NAD; jaundiced  HEENT: PERRLA, clear conjunctiva, EOMI, scleral icterus. Patient with dark lesion on inside of cheek and on tongue (now resolved)  RESPIRATORY: Normal respiratory effort; lungs are clear to auscultation bilaterally; No crackles/rhonchi/wheezing  CARDIOVASCULAR: Regular rate and rhythm, normal S1 and S2, no murmur/rub/gallop; No lower extremity edema; Peripheral pulses are 2+ bilaterally  ABDOMEN: Distended, Nontender to palpation, normoactive bowel sounds, no rebound/guarding; +Hepatosplenomegaly  MUSCULOSKELETAL: No clubbing or cyanosis of digits; no joint swelling or tenderness to palpation  EXTREMITY: Lower extremities non-tender to palpation; non-erythematous B/L  NEURO: A&Ox3; no focal deficits   PSYCH: Normal mood; affect appropriate    LABS:                        8.4    8.15  )-----------( 83       ( 05 Mar 2025 06:30 )             25.4     03-05    128[L]  |  91[L]  |  19  ----------------------------<  94  4.0   |  25  |  0.64    Ca    9.6      05 Mar 2025 06:30  Phos  4.1     03-05  Mg     1.6     03-05    TPro  7.6  /  Alb  3.3  /  TBili  7.3[H]  /  DBili  x   /  AST  65[H]  /  ALT  24  /  AlkPhos  312[H]  03-05    PT/INR - ( 05 Mar 2025 06:30 )   PT: 20.4 sec;   INR: 1.80 ratio         PTT - ( 05 Mar 2025 06:30 )  PTT:66.6 sec      Urinalysis Basic - ( 05 Mar 2025 06:30 )    Color: x / Appearance: x / SG: x / pH: x  Gluc: 94 mg/dL / Ketone: x  / Bili: x / Urobili: x   Blood: x / Protein: x / Nitrite: x   Leuk Esterase: x / RBC: x / WBC x   Sq Epi: x / Non Sq Epi: x / Bacteria: x          RADIOLOGY & ADDITIONAL TESTS:    N/A

## 2025-03-05 NOTE — PROGRESS NOTE ADULT - ATTENDING SUPERVISION STATEMENT
Fellow
Resident
Fellow
Resident

## 2025-03-05 NOTE — PROGRESS NOTE ADULT - PROBLEM SELECTOR PLAN 2
C/b history of hepatic encephalopathy and esophageal varices  - Patient tx from [facility] for transplant evaluation -- PETH found to be elevated, further transplant workup being deferred iso elevated PETH -- outpatient RPP program for psychosocial issues  - s/p multiple paracentesis, most recent on 2/20 w/ 1.4L removed  - Transplant hepatology following  - TEG with increased R time, increased LY30    Plan:  Continue on Lasix 80mg and Spironolactone 150mg qd  Continue rifaximin  Restart lactulose 10mg qd, titrate frequency for BM 3-4 daily  Ursodiol 300mg qd, atarax for generalized itch related to bile salt accumulation C/b history of hepatic encephalopathy and esophageal varices  - Patient tx from [facility] for transplant evaluation -- PETH found to be elevated, further transplant workup being deferred iso elevated PETH -- outpatient RPP program for psychosocial issues  - s/p multiple paracentesis, most recent on 2/20 w/ 1.4L removed  - Transplant hepatology following  - TEG with increased R time, increased LY30    Plan:  Continue on Lasix 80mg and Spironolactone 150mg qd  Continue rifaximin  Restart lactulose 10mg qd, titrate frequency for BM 3-4 daily  Ursodiol 300mg qd, atarax for generalized itch related to bile salt accumulation  Carvedilol 3.125mg BID

## 2025-03-05 NOTE — PROGRESS NOTE ADULT - PROBLEM SELECTOR PLAN 7
DVT prophylaxis: heparin 500mg Q8h  Diet: Regular diet; 1500mL fluid restriction      Dispo: PT rec SARS

## 2025-03-05 NOTE — PROGRESS NOTE ADULT - NUTRITIONAL ASSESSMENT
This patient has been assessed with a concern for Malnutrition and has been determined to have a diagnosis/diagnoses of Severe protein-calorie malnutrition.    This patient is being managed with:   Diet Regular-  1500mL Fluid Restriction ([id number])  Low Sodium  Supplement Feeding Modality:  Oral  Ensure Plus High Protein Cans or Servings Per Day:  1       Frequency:  Daily  Ensure Max Cans or Servings Per Day:  1       Frequency:  Daily  Entered: Feb 21 2025 10:18AM    The following pending diet order is being considered for treatment of Severe protein-calorie malnutrition:  Diet Regular-  1500mL Fluid Restriction ([id number])  Low Sodium  Supplement Feeding Modality:  Oral  Ensure Max Cans or Servings Per Day:  2       Frequency:  Daily  Entered: Feb 16 2025  3:23PM  
This patient has been assessed with a concern for Malnutrition and has been determined to have a diagnosis/diagnoses of Severe protein-calorie malnutrition.    This patient is being managed with:   Diet Regular-  1500mL Fluid Restriction ([id number])  Low Sodium  Supplement Feeding Modality:  Oral  Ensure Plus High Protein Cans or Servings Per Day:  1       Frequency:  Daily  Ensure Max Cans or Servings Per Day:  1       Frequency:  Daily  Entered: Feb 21 2025 10:18AM    The following pending diet order is being considered for treatment of Severe protein-calorie malnutrition:  Diet Regular-  1500mL Fluid Restriction ([id number])  Low Sodium  Supplement Feeding Modality:  Oral  Ensure Max Cans or Servings Per Day:  2       Frequency:  Daily  Entered: Feb 16 2025  3:23PM  
This patient has been assessed with a concern for Malnutrition and has been determined to have a diagnosis/diagnoses of Severe protein-calorie malnutrition.    This patient is being managed with:   Diet Regular-  1500mL Fluid Restriction ([id number])  Low Sodium  Supplement Feeding Modality:  Oral  Ensure Plus High Protein Cans or Servings Per Day:  1       Frequency:  Daily  Ensure Max Cans or Servings Per Day:  1       Frequency:  Daily  Entered: Feb 21 2025 10:18AM    The following pending diet order is being considered for treatment of Severe protein-calorie malnutrition:  Diet Regular-  1500mL Fluid Restriction ([id number])  Low Sodium  Supplement Feeding Modality:  Oral  Ensure Max Cans or Servings Per Day:  2       Frequency:  Daily  Entered: Feb 16 2025  3:23PM  
This patient has been assessed with a concern for Malnutrition and has been determined to have a diagnosis/diagnoses of Severe protein-calorie malnutrition.    This patient is being managed with:   Diet Regular-  1500mL Fluid Restriction ([id number])  Low Sodium  Supplement Feeding Modality:  Oral  Ensure Plus High Protein Cans or Servings Per Day:  1       Frequency:  Daily  Ensure Max Cans or Servings Per Day:  1       Frequency:  Daily  Entered: Feb 21 2025 10:18AM    The following pending diet order is being considered for treatment of Severe protein-calorie malnutrition:  Diet Regular-  1500mL Fluid Restriction ([id number])  Low Sodium  Supplement Feeding Modality:  Oral  Ensure Max Cans or Servings Per Day:  2       Frequency:  Daily  Entered: Feb 16 2025  3:23PM  
This patient has been assessed with a concern for Malnutrition and has been determined to have a diagnosis/diagnoses of Severe protein-calorie malnutrition.    This patient is being managed with:   Diet Regular-  1500mL Fluid Restriction ([id number])  Low Sodium  Supplement Feeding Modality:  Oral  Ensure Plus High Protein Cans or Servings Per Day:  1       Frequency:  Daily  Ensure Max Cans or Servings Per Day:  1       Frequency:  Daily  Entered: Feb 21 2025 10:18AM    The following pending diet order is being considered for treatment of Severe protein-calorie malnutrition:  Diet Regular-  1500mL Fluid Restriction ([id number])  Low Sodium  Supplement Feeding Modality:  Oral  Ensure Max Cans or Servings Per Day:  2       Frequency:  Daily  Entered: Feb 16 2025  3:23PM  
This patient has been assessed with a concern for Malnutrition and has been determined to have a diagnosis/diagnoses of Moderate protein-calorie malnutrition.    This patient is being managed with:   Diet Consistent Carbohydrate/No Snacks-  1500mL Fluid Restriction ([id number])  Low Sodium  Entered: Feb 12 2025  7:30AM    The following pending diet order is being considered for treatment of Moderate protein-calorie malnutrition:  Diet Regular-  1500mL Fluid Restriction ([id number])  Low Sodium  Supplement Feeding Modality:  Oral  Ensure Max Cans or Servings Per Day:  2       Frequency:  Daily  Entered: Feb 16 2025  3:23PM  
This patient has been assessed with a concern for Malnutrition and has been determined to have a diagnosis/diagnoses of Severe protein-calorie malnutrition.    This patient is being managed with:   Diet Regular-  1500mL Fluid Restriction ([id number])  Low Sodium  Supplement Feeding Modality:  Oral  Ensure Plus High Protein Cans or Servings Per Day:  1       Frequency:  Daily  Ensure Max Cans or Servings Per Day:  1       Frequency:  Daily  Entered: Feb 21 2025 10:18AM    The following pending diet order is being considered for treatment of Severe protein-calorie malnutrition:  Diet Regular-  1500mL Fluid Restriction ([id number])  Low Sodium  Supplement Feeding Modality:  Oral  Ensure Max Cans or Servings Per Day:  2       Frequency:  Daily  Entered: Feb 16 2025  3:23PM  
This patient has been assessed with a concern for Malnutrition and has been determined to have a diagnosis/diagnoses of Severe protein-calorie malnutrition.    This patient is being managed with:   Diet Regular-  1500mL Fluid Restriction ([id number])  Low Sodium  Supplement Feeding Modality:  Oral  Ensure Plus High Protein Cans or Servings Per Day:  1       Frequency:  Daily  Ensure Max Cans or Servings Per Day:  1       Frequency:  Daily  Entered: Feb 21 2025 10:18AM    The following pending diet order is being considered for treatment of Severe protein-calorie malnutrition:  Diet Regular-  1500mL Fluid Restriction ([id number])  Low Sodium  Supplement Feeding Modality:  Oral  Ensure Max Cans or Servings Per Day:  2       Frequency:  Daily  Entered: Feb 16 2025  3:23PM  
This patient has been assessed with a concern for Malnutrition and has been determined to have a diagnosis/diagnoses of Severe protein-calorie malnutrition.    This patient is being managed with:   Diet Regular-  1500mL Fluid Restriction ([id number])  Low Sodium  Supplement Feeding Modality:  Oral  Ensure Plus High Protein Cans or Servings Per Day:  1       Frequency:  Daily  Ensure Max Cans or Servings Per Day:  1       Frequency:  Daily  Entered: Feb 21 2025 10:18AM    The following pending diet order is being considered for treatment of Severe protein-calorie malnutrition:  Diet Regular-  1500mL Fluid Restriction ([id number])  Low Sodium  Supplement Feeding Modality:  Oral  Ensure Max Cans or Servings Per Day:  2       Frequency:  Daily  Entered: Feb 16 2025  3:23PM  
This patient has been assessed with a concern for Malnutrition and has been determined to have a diagnosis/diagnoses of Severe protein-calorie malnutrition.    This patient is being managed with:   Diet Regular-  1500mL Fluid Restriction ([id number])  Low Sodium  Supplement Feeding Modality:  Oral  Ensure Plus High Protein Cans or Servings Per Day:  1       Frequency:  Daily  Ensure Max Cans or Servings Per Day:  1       Frequency:  Daily  Entered: Feb 21 2025 10:18AM    The following pending diet order is being considered for treatment of Severe protein-calorie malnutrition:  Diet Regular-  1500mL Fluid Restriction ([id number])  Low Sodium  Supplement Feeding Modality:  Oral  Ensure Max Cans or Servings Per Day:  2       Frequency:  Daily  Entered: Feb 16 2025  3:23PM  
This patient has been assessed with a concern for Malnutrition and has been determined to have a diagnosis/diagnoses of Severe protein-calorie malnutrition.    This patient is being managed with:   Diet Regular-  1500mL Fluid Restriction ([id number])  Low Sodium  Supplement Feeding Modality:  Oral  Ensure Plus High Protein Cans or Servings Per Day:  1       Frequency:  Daily  Ensure Max Cans or Servings Per Day:  1       Frequency:  Daily  Entered: Feb 21 2025 10:18AM    The following pending diet order is being considered for treatment of Severe protein-calorie malnutrition:  Diet Regular-  1500mL Fluid Restriction ([id number])  Low Sodium  Supplement Feeding Modality:  Oral  Ensure Max Cans or Servings Per Day:  2       Frequency:  Daily  Entered: Feb 16 2025  3:23PM  
This patient has been assessed with a concern for Malnutrition and has been determined to have a diagnosis/diagnoses of Moderate protein-calorie malnutrition.    This patient is being managed with:   Diet Consistent Carbohydrate/No Snacks-  1500mL Fluid Restriction ([id number])  Low Sodium  Entered: Feb 12 2025  7:30AM    The following pending diet order is being considered for treatment of Moderate protein-calorie malnutrition:  Diet Regular-  1500mL Fluid Restriction ([id number])  Low Sodium  Supplement Feeding Modality:  Oral  Ensure Max Cans or Servings Per Day:  2       Frequency:  Daily  Entered: Feb 16 2025  3:23PM  
This patient has been assessed with a concern for Malnutrition and has been determined to have a diagnosis/diagnoses of Severe protein-calorie malnutrition.    This patient is being managed with:   Diet Regular-  1500mL Fluid Restriction ([id number])  Low Sodium  Supplement Feeding Modality:  Oral  Ensure Plus High Protein Cans or Servings Per Day:  1       Frequency:  Daily  Ensure Max Cans or Servings Per Day:  1       Frequency:  Daily  Entered: Feb 21 2025 10:18AM    The following pending diet order is being considered for treatment of Severe protein-calorie malnutrition:  Diet Regular-  1500mL Fluid Restriction ([id number])  Low Sodium  Supplement Feeding Modality:  Oral  Ensure Max Cans or Servings Per Day:  2       Frequency:  Daily  Entered: Feb 16 2025  3:23PM  
This patient has been assessed with a concern for Malnutrition and has been determined to have a diagnosis/diagnoses of Moderate protein-calorie malnutrition.    This patient is being managed with:   Diet Consistent Carbohydrate/No Snacks-  1500mL Fluid Restriction ([id number])  Low Sodium  Entered: Feb 12 2025  7:30AM    The following pending diet order is being considered for treatment of Moderate protein-calorie malnutrition:  Diet Regular-  1500mL Fluid Restriction ([id number])  Low Sodium  Supplement Feeding Modality:  Oral  Ensure Max Cans or Servings Per Day:  2       Frequency:  Daily  Entered: Feb 16 2025  3:23PM  
This patient has been assessed with a concern for Malnutrition and has been determined to have a diagnosis/diagnoses of Moderate protein-calorie malnutrition.    This patient is being managed with:   Diet Consistent Carbohydrate/No Snacks-  1500mL Fluid Restriction ([id number])  Low Sodium  Entered: Feb 12 2025  7:30AM    The following pending diet order is being considered for treatment of Moderate protein-calorie malnutrition:  Diet Regular-  1500mL Fluid Restriction ([id number])  Low Sodium  Supplement Feeding Modality:  Oral  Ensure Max Cans or Servings Per Day:  2       Frequency:  Daily  Entered: Feb 16 2025  3:23PM  
This patient has been assessed with a concern for Malnutrition and has been determined to have a diagnosis/diagnoses of Severe protein-calorie malnutrition.    This patient is being managed with:   Diet Regular-  1500mL Fluid Restriction ([id number])  Low Sodium  Supplement Feeding Modality:  Oral  Ensure Plus High Protein Cans or Servings Per Day:  1       Frequency:  Daily  Ensure Max Cans or Servings Per Day:  1       Frequency:  Daily  Entered: Feb 21 2025 10:18AM    The following pending diet order is being considered for treatment of Severe protein-calorie malnutrition:  Diet Regular-  1500mL Fluid Restriction ([id number])  Low Sodium  Supplement Feeding Modality:  Oral  Ensure Max Cans or Servings Per Day:  2       Frequency:  Daily  Entered: Feb 16 2025  3:23PM  
This patient has been assessed with a concern for Malnutrition and has been determined to have a diagnosis/diagnoses of Severe protein-calorie malnutrition.    This patient is being managed with:   Diet Regular-  1500mL Fluid Restriction ([id number])  Low Sodium  Supplement Feeding Modality:  Oral  Ensure Plus High Protein Cans or Servings Per Day:  1       Frequency:  Daily  Ensure Max Cans or Servings Per Day:  1       Frequency:  Daily  Entered: Feb 21 2025 10:18AM    The following pending diet order is being considered for treatment of Severe protein-calorie malnutrition:  Diet Regular-  1500mL Fluid Restriction ([id number])  Low Sodium  Supplement Feeding Modality:  Oral  Ensure Max Cans or Servings Per Day:  2       Frequency:  Daily  Entered: Feb 16 2025  3:23PM

## 2025-03-05 NOTE — PROGRESS NOTE ADULT - PROBLEM SELECTOR PROBLEM 6
Endocarditis, suspected
Prophylactic measure
Endocarditis, suspected
Endocarditis, suspected
Prophylactic measure
Prophylactic measure
Endocarditis, suspected

## 2025-03-05 NOTE — PROGRESS NOTE ADULT - PROVIDER SPECIALTY LIST ADULT
Transplant Hepatology
Transplant ID
Hepatology
Internal Medicine
Transplant ID
Transplant ID
Infectious Disease
Internal Medicine
Pharmacy
Transplant Hepatology
Transplant ID
Internal Medicine
Internal Medicine
Transplant ID
Internal Medicine
Transplant ID
Cardiology
Internal Medicine

## 2025-03-07 DIAGNOSIS — K68.12 PSOAS MUSCLE ABSCESS: ICD-10-CM

## 2025-03-11 ENCOUNTER — NON-APPOINTMENT (OUTPATIENT)
Age: 60
End: 2025-03-11

## 2025-03-15 LAB
CULTURE RESULTS: SIGNIFICANT CHANGE UP
SPECIMEN SOURCE: SIGNIFICANT CHANGE UP

## 2025-03-16 RX ORDER — SPIRONOLACTONE 25 MG
1 TABLET ORAL
Refills: 0 | DISCHARGE

## 2025-03-16 RX ORDER — TRAMADOL HYDROCHLORIDE 50 MG/1
1 TABLET, FILM COATED ORAL
Refills: 0 | DISCHARGE

## 2025-03-16 RX ORDER — CIPROFLOXACIN HCL 250 MG
1 TABLET ORAL
Qty: 0 | Refills: 0 | DISCHARGE

## 2025-03-16 RX ORDER — METRONIDAZOLE 250 MG
500 TABLET ORAL
Refills: 0 | DISCHARGE

## 2025-03-16 RX ORDER — LACTULOSE 10 G/15ML
15 SOLUTION ORAL
Refills: 0 | DISCHARGE

## 2025-03-16 RX ORDER — FERROUS SULFATE 137(45) MG
1 TABLET, EXTENDED RELEASE ORAL
Refills: 0 | DISCHARGE

## 2025-03-16 RX ORDER — SUCRALFATE 1 G
10 TABLET ORAL
Refills: 0 | DISCHARGE

## 2025-03-16 RX ORDER — VANCOMYCIN HCL IN 5 % DEXTROSE 1.5G/250ML
2 PLASTIC BAG, INJECTION (ML) INTRAVENOUS
Refills: 0 | DISCHARGE

## 2025-03-16 RX ORDER — MELATONIN 5 MG
1 TABLET ORAL
Refills: 0 | DISCHARGE

## 2025-03-16 RX ORDER — INSULIN GLARGINE-YFGN 100 [IU]/ML
15 INJECTION, SOLUTION SUBCUTANEOUS
Refills: 0 | DISCHARGE

## 2025-03-16 RX ORDER — RIFAXIMIN 550 MG/1
1 TABLET ORAL
Refills: 0 | DISCHARGE

## 2025-03-16 RX ORDER — MIDODRINE HYDROCHLORIDE 5 MG/1
3 TABLET ORAL
Refills: 0 | DISCHARGE

## 2025-03-16 RX ORDER — CEFTRIAXONE 500 MG/1
2 INJECTION, POWDER, FOR SOLUTION INTRAMUSCULAR; INTRAVENOUS
Refills: 0 | DISCHARGE

## 2025-03-16 RX ORDER — CEFPODOXIME PROXETIL 200 MG/1
4 TABLET, FILM COATED ORAL
Qty: 0 | Refills: 0 | DISCHARGE

## 2025-03-16 RX ORDER — FOLIC ACID 1 MG/1
1 TABLET ORAL
Refills: 0 | DISCHARGE

## 2025-03-16 RX ORDER — FUROSEMIDE 10 MG/ML
1 INJECTION INTRAMUSCULAR; INTRAVENOUS
Refills: 0 | DISCHARGE

## 2025-03-16 RX ORDER — VANCOMYCIN HCL IN 5 % DEXTROSE 1.5G/250ML
5 PLASTIC BAG, INJECTION (ML) INTRAVENOUS
Qty: 0 | Refills: 0 | DISCHARGE

## 2025-03-17 PROBLEM — Z86.19 PERSONAL HISTORY OF OTHER INFECTIOUS AND PARASITIC DISEASES: Chronic | Status: ACTIVE | Noted: 2025-02-10

## 2025-03-17 PROBLEM — K70.30 ALCOHOLIC CIRRHOSIS OF LIVER WITHOUT ASCITES: Chronic | Status: ACTIVE | Noted: 2025-02-10

## 2025-03-17 PROBLEM — K21.9 GASTRO-ESOPHAGEAL REFLUX DISEASE WITHOUT ESOPHAGITIS: Chronic | Status: ACTIVE | Noted: 2025-02-10

## 2025-03-17 PROBLEM — Z87.19 PERSONAL HISTORY OF OTHER DISEASES OF THE DIGESTIVE SYSTEM: Chronic | Status: ACTIVE | Noted: 2025-02-10

## 2025-03-17 PROBLEM — Z87.898 PERSONAL HISTORY OF OTHER SPECIFIED CONDITIONS: Chronic | Status: ACTIVE | Noted: 2025-02-10

## 2025-03-18 ENCOUNTER — NON-APPOINTMENT (OUTPATIENT)
Age: 60
End: 2025-03-18

## 2025-03-19 ENCOUNTER — APPOINTMENT (OUTPATIENT)
Dept: INFECTIOUS DISEASE | Facility: CLINIC | Age: 60
End: 2025-03-19
Payer: MEDICAID

## 2025-03-19 VITALS
SYSTOLIC BLOOD PRESSURE: 102 MMHG | WEIGHT: 103 LBS | DIASTOLIC BLOOD PRESSURE: 65 MMHG | HEIGHT: 67 IN | BODY MASS INDEX: 16.17 KG/M2 | TEMPERATURE: 97.8 F | OXYGEN SATURATION: 99 % | HEART RATE: 97 BPM

## 2025-03-19 LAB
BASOPHILS # BLD AUTO: 0.04 K/UL
BASOPHILS NFR BLD AUTO: 0.5 %
EOSINOPHIL # BLD AUTO: 0.17 K/UL
EOSINOPHIL NFR BLD AUTO: 2.1 %
HCT VFR BLD CALC: 30.5 %
HGB BLD-MCNC: 10 G/DL
IMM GRANULOCYTES NFR BLD AUTO: 0.9 %
LYMPHOCYTES # BLD AUTO: 1.47 K/UL
LYMPHOCYTES NFR BLD AUTO: 18.5 %
MAN DIFF?: NORMAL
MCHC RBC-ENTMCNC: 29.1 PG
MCHC RBC-ENTMCNC: 32.8 G/DL
MCV RBC AUTO: 88.7 FL
MONOCYTES # BLD AUTO: 0.97 K/UL
MONOCYTES NFR BLD AUTO: 12.2 %
NEUTROPHILS # BLD AUTO: 5.24 K/UL
NEUTROPHILS NFR BLD AUTO: 65.8 %
PLATELET # BLD AUTO: 147 K/UL
RBC # BLD: 3.44 M/UL
RBC # FLD: 16.5 %
WBC # FLD AUTO: 7.96 K/UL

## 2025-03-19 PROCEDURE — 99213 OFFICE O/P EST LOW 20 MIN: CPT

## 2025-03-20 ENCOUNTER — APPOINTMENT (OUTPATIENT)
Dept: CT IMAGING | Facility: CLINIC | Age: 60
End: 2025-03-20
Payer: MEDICAID

## 2025-03-20 ENCOUNTER — OUTPATIENT (OUTPATIENT)
Dept: OUTPATIENT SERVICES | Facility: HOSPITAL | Age: 60
LOS: 1 days | End: 2025-03-20
Payer: MEDICAID

## 2025-03-20 DIAGNOSIS — Z98.891 HISTORY OF UTERINE SCAR FROM PREVIOUS SURGERY: Chronic | ICD-10-CM

## 2025-03-20 DIAGNOSIS — Z98.890 OTHER SPECIFIED POSTPROCEDURAL STATES: Chronic | ICD-10-CM

## 2025-03-20 DIAGNOSIS — K68.12 PSOAS MUSCLE ABSCESS: ICD-10-CM

## 2025-03-20 PROCEDURE — 74177 CT ABD & PELVIS W/CONTRAST: CPT | Mod: 26

## 2025-03-20 PROCEDURE — 74177 CT ABD & PELVIS W/CONTRAST: CPT

## 2025-03-21 ENCOUNTER — NON-APPOINTMENT (OUTPATIENT)
Age: 60
End: 2025-03-21

## 2025-03-21 DIAGNOSIS — K68.12 PSOAS MUSCLE ABSCESS: ICD-10-CM

## 2025-03-21 DIAGNOSIS — D64.9 ANEMIA, UNSPECIFIED: ICD-10-CM

## 2025-03-21 RX ORDER — CEFPODOXIME PROXETIL 200 MG/1
200 TABLET, FILM COATED ORAL
Qty: 56 | Refills: 0 | Status: ACTIVE | COMMUNITY
Start: 2025-03-21 | End: 1900-01-01

## 2025-03-21 RX ORDER — URSODIOL 300 MG/1
300 CAPSULE ORAL
Qty: 60 | Refills: 3 | Status: ACTIVE | COMMUNITY
Start: 2025-03-21 | End: 1900-01-01

## 2025-03-21 RX ORDER — METRONIDAZOLE 500 MG/1
500 TABLET ORAL
Qty: 28 | Refills: 0 | Status: ACTIVE | COMMUNITY
Start: 2025-03-21 | End: 1900-01-01

## 2025-03-21 RX ORDER — FERROUS SULFATE 324(65)MG
324 TABLET, DELAYED RELEASE (ENTERIC COATED) ORAL DAILY
Qty: 30 | Refills: 0 | Status: ACTIVE | COMMUNITY
Start: 2025-03-21 | End: 1900-01-01

## 2025-03-21 RX ORDER — HYDROXYZINE HYDROCHLORIDE 25 MG/1
1 TABLET, FILM COATED ORAL
Qty: 0 | Refills: 0 | DISCHARGE
Start: 2025-03-21

## 2025-03-21 RX ORDER — L.ACIDOPH/L.BULG/B.BIF/S.THERM 1B-250 MG
TABLET ORAL TWICE DAILY
Qty: 120 | Refills: 0 | Status: ACTIVE | COMMUNITY
Start: 2025-03-21 | End: 1900-01-01

## 2025-03-21 RX ORDER — HYDROXYZINE HYDROCHLORIDE 50 MG/1
50 TABLET ORAL 3 TIMES DAILY
Qty: 90 | Refills: 0 | Status: ACTIVE | COMMUNITY
Start: 2025-03-21 | End: 1900-01-01

## 2025-03-21 RX ORDER — RIFAXIMIN 550 MG/1
550 TABLET ORAL
Qty: 60 | Refills: 5 | Status: ACTIVE | COMMUNITY
Start: 2025-03-21 | End: 1900-01-01

## 2025-03-21 RX ORDER — CEFPODOXIME PROXETIL 200 MG/1
2 TABLET, FILM COATED ORAL
Qty: 0 | Refills: 0 | DISCHARGE
Start: 2025-03-21

## 2025-03-21 RX ORDER — MIDODRINE HYDROCHLORIDE 5 MG/1
5 TABLET ORAL 3 TIMES DAILY
Qty: 90 | Refills: 0 | Status: ACTIVE | COMMUNITY
Start: 2025-03-21 | End: 1900-01-01

## 2025-03-21 RX ORDER — METRONIDAZOLE 250 MG
1 TABLET ORAL
Refills: 0 | DISCHARGE
Start: 2025-03-21

## 2025-03-22 LAB
ALBUMIN SERPL ELPH-MCNC: 3.7 G/DL
ALP BLD-CCNC: 287 U/L
ALT SERPL-CCNC: 18 U/L
ANION GAP SERPL CALC-SCNC: 16 MMOL/L
AST SERPL-CCNC: 49 U/L
BILIRUB SERPL-MCNC: 5 MG/DL
BUN SERPL-MCNC: 28 MG/DL
CALCIUM SERPL-MCNC: 9.4 MG/DL
CHLORIDE SERPL-SCNC: 93 MMOL/L
CO2 SERPL-SCNC: 21 MMOL/L
CREAT SERPL-MCNC: 0.78 MG/DL
CULTURE RESULTS: SIGNIFICANT CHANGE UP
EGFRCR SERPLBLD CKD-EPI 2021: 86 ML/MIN/1.73M2
GLUCOSE SERPL-MCNC: 147 MG/DL
POTASSIUM SERPL-SCNC: 4.4 MMOL/L
PROT SERPL-MCNC: 8.1 G/DL
SODIUM SERPL-SCNC: 130 MMOL/L
SPECIMEN SOURCE: SIGNIFICANT CHANGE UP

## 2025-03-31 ENCOUNTER — NON-APPOINTMENT (OUTPATIENT)
Age: 60
End: 2025-03-31

## 2025-04-03 ENCOUNTER — APPOINTMENT (OUTPATIENT)
Dept: HEPATOLOGY | Facility: CLINIC | Age: 60
End: 2025-04-03
Payer: MEDICAID

## 2025-04-03 VITALS
HEART RATE: 105 BPM | SYSTOLIC BLOOD PRESSURE: 112 MMHG | OXYGEN SATURATION: 97 % | TEMPERATURE: 97.8 F | BODY MASS INDEX: 16.79 KG/M2 | WEIGHT: 107 LBS | RESPIRATION RATE: 16 BRPM | HEIGHT: 67 IN | DIASTOLIC BLOOD PRESSURE: 66 MMHG

## 2025-04-03 DIAGNOSIS — R79.89 OTHER SPECIFIED ABNORMAL FINDINGS OF BLOOD CHEMISTRY: ICD-10-CM

## 2025-04-03 DIAGNOSIS — R18.8 OTHER ASCITES: ICD-10-CM

## 2025-04-03 DIAGNOSIS — I85.00 ESOPHAGEAL VARICES W/OUT BLEEDING: ICD-10-CM

## 2025-04-03 DIAGNOSIS — F10.90 ALCOHOL USE, UNSPECIFIED, UNCOMPLICATED: ICD-10-CM

## 2025-04-03 DIAGNOSIS — K68.12 PSOAS MUSCLE ABSCESS: ICD-10-CM

## 2025-04-03 DIAGNOSIS — K76.6 PORTAL HYPERTENSION: ICD-10-CM

## 2025-04-03 DIAGNOSIS — K74.60 UNSPECIFIED CIRRHOSIS OF LIVER: ICD-10-CM

## 2025-04-03 PROCEDURE — 99214 OFFICE O/P EST MOD 30 MIN: CPT

## 2025-04-08 LAB
ALBUMIN SERPL ELPH-MCNC: 3.5 G/DL
ALP BLD-CCNC: 238 U/L
ALT SERPL-CCNC: 21 U/L
ANION GAP SERPL CALC-SCNC: 13 MMOL/L
AST SERPL-CCNC: 51 U/L
BILIRUB SERPL-MCNC: 3.9 MG/DL
BUN SERPL-MCNC: 24 MG/DL
CALCIUM SERPL-MCNC: 9.3 MG/DL
CHLORIDE SERPL-SCNC: 96 MMOL/L
CO2 SERPL-SCNC: 25 MMOL/L
CREAT SERPL-MCNC: 0.86 MG/DL
EGFRCR SERPLBLD CKD-EPI 2021: 77 ML/MIN/1.73M2
GLUCOSE SERPL-MCNC: 64 MG/DL
HCT VFR BLD CALC: 30.3 %
HGB BLD-MCNC: 10.3 G/DL
INR PPP: 1.22 RATIO
MCHC RBC-ENTMCNC: 28.2 PG
MCHC RBC-ENTMCNC: 34 G/DL
MCV RBC AUTO: 83 FL
PETH 16:0/18:1: NEGATIVE NG/ML
PETH 16:0/18:2: NEGATIVE NG/ML
PETH COMMENTS: NORMAL
PLATELET # BLD AUTO: 146 K/UL
POTASSIUM SERPL-SCNC: 4.2 MMOL/L
PROT SERPL-MCNC: 7.4 G/DL
PT BLD: 14.4 SEC
RBC # BLD: 3.65 M/UL
RBC # FLD: 17.3 %
SODIUM SERPL-SCNC: 134 MMOL/L
WBC # FLD AUTO: 9.1 K/UL

## 2025-04-09 RX ORDER — FUROSEMIDE 40 MG/1
40 TABLET ORAL
Qty: 30 | Refills: 3 | Status: ACTIVE | COMMUNITY
Start: 2025-04-09 | End: 1900-01-01

## 2025-04-10 ENCOUNTER — EMERGENCY (EMERGENCY)
Facility: HOSPITAL | Age: 60
LOS: 0 days | Discharge: HOME | End: 2025-04-10
Attending: HOSPITALIST
Payer: MEDICAID

## 2025-04-10 VITALS
TEMPERATURE: 98 F | OXYGEN SATURATION: 96 % | WEIGHT: 111.33 LBS | DIASTOLIC BLOOD PRESSURE: 72 MMHG | SYSTOLIC BLOOD PRESSURE: 100 MMHG | RESPIRATION RATE: 18 BRPM | HEART RATE: 102 BPM

## 2025-04-10 VITALS
RESPIRATION RATE: 16 BRPM | OXYGEN SATURATION: 100 % | SYSTOLIC BLOOD PRESSURE: 105 MMHG | HEART RATE: 97 BPM | DIASTOLIC BLOOD PRESSURE: 66 MMHG | TEMPERATURE: 98 F

## 2025-04-10 DIAGNOSIS — Z98.890 OTHER SPECIFIED POSTPROCEDURAL STATES: Chronic | ICD-10-CM

## 2025-04-10 DIAGNOSIS — K74.60 UNSPECIFIED CIRRHOSIS OF LIVER: ICD-10-CM

## 2025-04-10 DIAGNOSIS — R53.1 WEAKNESS: ICD-10-CM

## 2025-04-10 DIAGNOSIS — Z88.1 ALLERGY STATUS TO OTHER ANTIBIOTIC AGENTS: ICD-10-CM

## 2025-04-10 DIAGNOSIS — Z98.891 HISTORY OF UTERINE SCAR FROM PREVIOUS SURGERY: Chronic | ICD-10-CM

## 2025-04-10 LAB
ALBUMIN SERPL ELPH-MCNC: 2.7 G/DL — LOW (ref 3.3–5)
ALP SERPL-CCNC: 190 U/L — HIGH (ref 40–120)
ALPHA-1-FETOPROTEIN-L3: 8 %
ALPHA-1-FETOPROTEIN: 5.4 NG/ML
ALT FLD-CCNC: 22 U/L — SIGNIFICANT CHANGE UP (ref 12–78)
AMMONIA BLD-MCNC: 14 UMOL/L — SIGNIFICANT CHANGE UP (ref 11–32)
ANION GAP SERPL CALC-SCNC: 7 MMOL/L — SIGNIFICANT CHANGE UP (ref 5–17)
APPEARANCE UR: CLEAR — SIGNIFICANT CHANGE UP
APTT BLD: 37.1 SEC — HIGH (ref 24.5–35.6)
AST SERPL-CCNC: 44 U/L — HIGH (ref 15–37)
BACTERIA # UR AUTO: NEGATIVE /HPF — SIGNIFICANT CHANGE UP
BASOPHILS # BLD AUTO: 0.02 K/UL — SIGNIFICANT CHANGE UP (ref 0–0.2)
BASOPHILS NFR BLD AUTO: 0.3 % — SIGNIFICANT CHANGE UP (ref 0–2)
BILIRUB SERPL-MCNC: 3.4 MG/DL — HIGH (ref 0.2–1.2)
BILIRUB UR-MCNC: NEGATIVE — SIGNIFICANT CHANGE UP
BUN SERPL-MCNC: 19 MG/DL — SIGNIFICANT CHANGE UP (ref 7–23)
CALCIUM SERPL-MCNC: 9 MG/DL — SIGNIFICANT CHANGE UP (ref 8.5–10.1)
CAST: 2 /LPF — SIGNIFICANT CHANGE UP (ref 0–4)
CHLORIDE SERPL-SCNC: 94 MMOL/L — LOW (ref 96–108)
CO2 SERPL-SCNC: 27 MMOL/L — SIGNIFICANT CHANGE UP (ref 22–31)
COLOR SPEC: YELLOW — SIGNIFICANT CHANGE UP
CREAT SERPL-MCNC: 0.98 MG/DL — SIGNIFICANT CHANGE UP (ref 0.5–1.3)
DIFF PNL FLD: NEGATIVE — SIGNIFICANT CHANGE UP
EGFR: 66 ML/MIN/1.73M2 — SIGNIFICANT CHANGE UP
EGFR: 66 ML/MIN/1.73M2 — SIGNIFICANT CHANGE UP
EOSINOPHIL # BLD AUTO: 0.11 K/UL — SIGNIFICANT CHANGE UP (ref 0–0.5)
EOSINOPHIL NFR BLD AUTO: 1.7 % — SIGNIFICANT CHANGE UP (ref 0–6)
FLUAV AG NPH QL: SIGNIFICANT CHANGE UP
FLUBV AG NPH QL: SIGNIFICANT CHANGE UP
GLUCOSE SERPL-MCNC: 185 MG/DL — HIGH (ref 70–99)
GLUCOSE UR QL: NEGATIVE MG/DL — SIGNIFICANT CHANGE UP
HCT VFR BLD CALC: 30.3 % — LOW (ref 34.5–45)
HGB BLD-MCNC: 10.1 G/DL — LOW (ref 11.5–15.5)
IMM GRANULOCYTES # BLD AUTO: 0.02 K/UL — SIGNIFICANT CHANGE UP (ref 0–0.07)
IMM GRANULOCYTES NFR BLD AUTO: 0.3 % — SIGNIFICANT CHANGE UP (ref 0–0.9)
IMMATURE PLATELET FRACTION #: 1 K/UL — LOW (ref 4.7–11.1)
IMMATURE PLATELET FRACTION %: 0.8 % — LOW (ref 1.6–4.9)
INR BLD: 1.32 RATIO — HIGH (ref 0.85–1.16)
KETONES UR-MCNC: NEGATIVE MG/DL — SIGNIFICANT CHANGE UP
LACTATE SERPL-SCNC: 1.7 MMOL/L — SIGNIFICANT CHANGE UP (ref 0.7–2)
LEUKOCYTE ESTERASE UR-ACNC: ABNORMAL
LYMPHOCYTES # BLD AUTO: 1.42 K/UL — SIGNIFICANT CHANGE UP (ref 1–3.3)
LYMPHOCYTES NFR BLD AUTO: 21.4 % — SIGNIFICANT CHANGE UP (ref 13–44)
MCHC RBC-ENTMCNC: 26.9 PG — LOW (ref 27–34)
MCHC RBC-ENTMCNC: 33.3 G/DL — SIGNIFICANT CHANGE UP (ref 32–36)
MCV RBC AUTO: 80.6 FL — SIGNIFICANT CHANGE UP (ref 80–100)
MONOCYTES # BLD AUTO: 0.55 K/UL — SIGNIFICANT CHANGE UP (ref 0–0.9)
MONOCYTES NFR BLD AUTO: 8.3 % — SIGNIFICANT CHANGE UP (ref 2–14)
NEUTROPHILS # BLD AUTO: 4.51 K/UL — SIGNIFICANT CHANGE UP (ref 1.8–7.4)
NEUTROPHILS NFR BLD AUTO: 68 % — SIGNIFICANT CHANGE UP (ref 43–77)
NITRITE UR-MCNC: NEGATIVE — SIGNIFICANT CHANGE UP
NRBC # BLD AUTO: 0 K/UL — SIGNIFICANT CHANGE UP (ref 0–0)
NRBC # FLD: 0 K/UL — SIGNIFICANT CHANGE UP (ref 0–0)
NRBC BLD AUTO-RTO: 0 /100 WBCS — SIGNIFICANT CHANGE UP (ref 0–0)
PH UR: 6.5 — SIGNIFICANT CHANGE UP (ref 5–8)
PLATELET # BLD AUTO: 122 K/UL — LOW (ref 150–400)
PMV BLD: 8.7 FL — SIGNIFICANT CHANGE UP (ref 7–13)
POTASSIUM SERPL-MCNC: 3.2 MMOL/L — LOW (ref 3.5–5.3)
POTASSIUM SERPL-SCNC: 3.2 MMOL/L — LOW (ref 3.5–5.3)
PROT SERPL-MCNC: 7.7 GM/DL — SIGNIFICANT CHANGE UP (ref 6–8.3)
PROT UR-MCNC: NEGATIVE MG/DL — SIGNIFICANT CHANGE UP
PROTHROM AB SERPL-ACNC: 15.5 SEC — HIGH (ref 9.9–13.4)
RBC # BLD: 3.76 M/UL — LOW (ref 3.8–5.2)
RBC # FLD: 17.4 % — HIGH (ref 10.3–14.5)
RBC CASTS # UR COMP ASSIST: 0 /HPF — SIGNIFICANT CHANGE UP (ref 0–4)
RSV RNA NPH QL NAA+NON-PROBE: SIGNIFICANT CHANGE UP
SARS-COV-2 RNA SPEC QL NAA+PROBE: SIGNIFICANT CHANGE UP
SODIUM SERPL-SCNC: 128 MMOL/L — LOW (ref 135–145)
SOURCE RESPIRATORY: SIGNIFICANT CHANGE UP
SP GR SPEC: 1.01 — SIGNIFICANT CHANGE UP (ref 1–1.03)
SQUAMOUS # UR AUTO: 1 /HPF — SIGNIFICANT CHANGE UP (ref 0–5)
UROBILINOGEN FLD QL: 0.2 MG/DL — SIGNIFICANT CHANGE UP (ref 0.2–1)
WBC # BLD: 6.63 K/UL — SIGNIFICANT CHANGE UP (ref 3.8–10.5)
WBC # FLD AUTO: 6.63 K/UL — SIGNIFICANT CHANGE UP (ref 3.8–10.5)
WBC UR QL: 0 /HPF — SIGNIFICANT CHANGE UP (ref 0–5)

## 2025-04-10 PROCEDURE — 93010 ELECTROCARDIOGRAM REPORT: CPT

## 2025-04-10 PROCEDURE — 99285 EMERGENCY DEPT VISIT HI MDM: CPT

## 2025-04-10 PROCEDURE — 80053 COMPREHEN METABOLIC PANEL: CPT

## 2025-04-10 PROCEDURE — 36415 COLL VENOUS BLD VENIPUNCTURE: CPT

## 2025-04-10 PROCEDURE — 71045 X-RAY EXAM CHEST 1 VIEW: CPT

## 2025-04-10 PROCEDURE — 85025 COMPLETE CBC W/AUTO DIFF WBC: CPT

## 2025-04-10 PROCEDURE — 71045 X-RAY EXAM CHEST 1 VIEW: CPT | Mod: 26

## 2025-04-10 PROCEDURE — 87040 BLOOD CULTURE FOR BACTERIA: CPT

## 2025-04-10 PROCEDURE — 81001 URINALYSIS AUTO W/SCOPE: CPT

## 2025-04-10 PROCEDURE — 85610 PROTHROMBIN TIME: CPT

## 2025-04-10 PROCEDURE — 82140 ASSAY OF AMMONIA: CPT

## 2025-04-10 PROCEDURE — 99285 EMERGENCY DEPT VISIT HI MDM: CPT | Mod: 25

## 2025-04-10 PROCEDURE — 36000 PLACE NEEDLE IN VEIN: CPT

## 2025-04-10 PROCEDURE — 85730 THROMBOPLASTIN TIME PARTIAL: CPT

## 2025-04-10 PROCEDURE — 83605 ASSAY OF LACTIC ACID: CPT

## 2025-04-10 PROCEDURE — 0241U: CPT

## 2025-04-10 PROCEDURE — 93005 ELECTROCARDIOGRAM TRACING: CPT

## 2025-04-10 RX ORDER — SODIUM CHLORIDE 9 G/1000ML
1550 INJECTION, SOLUTION INTRAVENOUS ONCE
Refills: 0 | Status: COMPLETED | OUTPATIENT
Start: 2025-04-10 | End: 2025-04-10

## 2025-04-10 RX ADMIN — SODIUM CHLORIDE 1550 MILLILITER(S): 9 INJECTION, SOLUTION INTRAVENOUS at 01:38

## 2025-04-10 RX ADMIN — Medication 40 MILLIEQUIVALENT(S): at 03:37

## 2025-04-10 NOTE — ED ADULT TRIAGE NOTE - CHIEF COMPLAINT QUOTE
pt accompanied by son to ED for c/o weakness, left leg tingling, headaches, fatigue for the past month. additionally endorses vomiting. pt is a&ox4, speaking in full and complete sentences without difficulty. endorses recent changes in her medications, and c/o "not feeling right." pt is on waitlist for liver transplant. -BEFAST. recently d/c from hospital 2w ago.

## 2025-04-10 NOTE — ED PROVIDER NOTE - PATIENT PORTAL LINK FT
You can access the FollowMyHealth Patient Portal offered by [facility] by registering at the following website: [email or web address]. By joining [facility]’s FollowMyHealth portal, you will also be able to view your health information using other applications (apps) compatible with our system.

## 2025-04-10 NOTE — ED PROVIDER NOTE - NSICDXPASTSURGICALHX_GEN_ALL_CORE_FT
PAST SURGICAL HISTORY:  H/O basal cell carcinoma excision     H/O:      History of ear, nose, and throat (ENT) surgery     S/P hemorrhoidectomy

## 2025-04-10 NOTE — ED PROVIDER NOTE - NSICDXFAMILYHX_GEN_ALL_CORE_FT
FAMILY HISTORY:  Father  Still living? Unknown  Family history of CVA, Age at diagnosis: Age Unknown  Family history of heart attack, Age at diagnosis: Age Unknown    Mother  Still living? Unknown  FH: pancreatic cancer, Age at diagnosis: Age Unknown  FH: pancreatic cancer, Age at diagnosis: Age Unknown    Sibling  Still living? No  Family history of stent, Age at diagnosis: Age Unknown  Heavy drinker of alcohol, Age at diagnosis: Age Unknown

## 2025-04-10 NOTE — ED PROVIDER NOTE - PHYSICAL EXAMINATION
***GEN - NAD; frail appearing; A+O x3 ***HEAD - NC/AT ***EYES/NOSE - scleral icterus, PERRL, EOMI, mucous membranes moist, no discharge ***THROAT: Oral cavity and pharynx normal. No inflammation, swelling, exudate, or lesions.  ***NECK: Neck supple, non-tender  ***PULMONARY - CTA b/l, symmetric breath sounds. ***CARDIAC -s1s2, RRR, no M,G,R  ***ABDOMEN - +BS, ND, NT, soft  ***BACK - no CVA tenderness, Normal  spine ***EXTREMITIES - symmetric pulses, 2+ dp, capillary refill < 2 seconds, strength 5/5 on right LE, 4-5/5 on left LE ***SKIN - no rash or bruising   ***NEUROLOGIC - alert, CN 2-12 intact, no focal deficits

## 2025-04-10 NOTE — ED ADULT NURSE NOTE - OBJECTIVE STATEMENT
Pt is a 60 y/o female, alert and oriented x3, presenting to ED c/o see chief complaint quote. Pt reports, "Since Monday, I've been more weak, especially on my left side. I've also been nauseous with an episode of vomiting, experiencing headaches, and fatigue. I am awaiting a liver transplant, so a lot of my symptoms come from that. I was intubated a few months ago from complications with my liver." Pt reports having a fall yesterday, but denies any head trauma. Pt is speaking in complete sentences with breathing even and unlabored on room air at 100%. Pt denies blood in stool/vomit, dysuria, fevers. Pt placed on cardiac monitor and STAT EKG obtained.  20G IV placed on RIGHT forearm and labs sent.

## 2025-04-10 NOTE — ED PROVIDER NOTE - NSICDXPASTMEDICALHX_GEN_ALL_CORE_FT
PAST MEDICAL HISTORY:  2019 novel coronavirus disease (COVID-19)     Alcohol abuse     Alcoholic cirrhosis     Anemia     Depression     GERD (gastroesophageal reflux disease)     H/O bacteremia     H/O Clostridium difficile infection     H/O esophageal varices     H/O vertigo     Hemorrhoids     History of bacteremia     History of basal cell carcinoma excision     Squamous cell skin cancer     Withdrawal seizures

## 2025-04-10 NOTE — ED ADULT NURSE NOTE - NSFALLRISKINTERV_ED_ALL_ED

## 2025-04-10 NOTE — ED PROVIDER NOTE - OBJECTIVE STATEMENT
59-year-old female   With history of liver cirrhosis secondary to alcohol use, now abstaining greater than 1 year and on the liver transplant list,  right  iliopsoas muscle abscess which has been showing improvement/smaller size on recent CT imaging (3/20/25) presents with worsening weakness of left lower extremity for past few days with tingling to her lower extremity and foot.  Denies any back pain.  Patient has had a prolonged hospital stay for about 2 months at [place] recently and has been home rehabilitating with visiting nurse services and physical therapy.  Patient and  report generalized weight loss and weakness and debility since hospital stay and she has been working with PT at home to regain her strength.  A few days ago she was walking with her walker and the wind blew and knocked her over.  Did hit her knees but was assisted up and has been able to ambulate with her walker since that event.  Patient notes that her left side has been a bit weaker than her right side but still able to participate in physical therapy activities and walk with her walker.  Patient does have a history of sciatica nerve pain and lumbar radiculopathy as well as spinal stenosis.  Was also recently found to have a iliopsoas muscle abscess on the right which has been shrinking in size on most recent CT obtained 3/20/2025.   Patient denies any urinary retention or fecal incontinence or numbness tingling in the perennial area.    Patient denies any fever, nausea vomiting or diarrhea.  Reports her abdomen has been soft and nondistended.  She is taking all of her medications as prescribed.  at bedside denies any confusion or change in mental status.

## 2025-04-10 NOTE — ED PROVIDER NOTE - NSFOLLOWUPINSTRUCTIONS_ED_ALL_ED_FT
Please return for any new or worsening symptoms as we discussed if you develop any difficulty urinating or incontinence of stool or numbness or tingling please return to the emergency department.  Please follow-up with your physical therapist and visiting nurse as well as your liver transplant team tomorrow to discuss our findings in the emergency department tonight.

## 2025-04-10 NOTE — ED PROVIDER NOTE - CLINICAL SUMMARY MEDICAL DECISION MAKING FREE TEXT BOX
59-year-old female with worsening weakness of left lower extremity for past few days with tingling to her lower extremity and foot.  Denies any back pain.  Patient has had a prolonged hospital stay for about 2 months at [place] recently and has been home rehabilitating with visiting nurse services and physical therapy.  Patient and  report generalized weight loss and weakness and debility since hospital stay and she has been working with PT at home to regain her strength.  A few days ago she was walking with her walker and the wind blew and knocked her over.  Did hit her knees but was assisted up and has been able to ambulate with her walker since that event.  Patient notes that her left side has been a bit weaker than her right side but still able to participate in physical therapy activities and walk with her walker.  Patient does have a history of sciatica nerve pain and lumbar radiculopathy as well as spinal stenosis.  Was also recently found to have a iliopsoas muscle abscess on the right which has been shrinking in size on most recent CT obtained 3/20/2025.  .  Patient denies any urinary retention or fecal incontinence or numbness tingling in the perennial area. Labs obtained.  Pain medication offered however declined.   Labs reviewed and discussed with patient and spouse at bedside.  Potassium supplemented. Did discuss recommendations for repeat CT imaging and admission for MRI of the lumbar spine tomorrow morning.  Patient declined repeat CT imaging and did not wish to be admitted for MRI  in the morning.   States she will be evaluated by her visiting nurse and physical therapist at home tomorrow and be in contact with her transplant team and will return if she develops any worsening or different symptoms.  Also does not wish to be started on any pain new medications.  she wishes to be discharged home.  Will discharge home with spouse at bedside who is comfortable caring for her at home as he has been.

## 2025-04-13 ENCOUNTER — INPATIENT (INPATIENT)
Facility: HOSPITAL | Age: 60
LOS: 2 days | Discharge: HOME HEALTH | DRG: 861 | End: 2025-04-16
Attending: HOSPITALIST | Admitting: HOSPITALIST
Payer: MEDICAID

## 2025-04-13 VITALS
WEIGHT: 105.16 LBS | DIASTOLIC BLOOD PRESSURE: 56 MMHG | TEMPERATURE: 98 F | SYSTOLIC BLOOD PRESSURE: 102 MMHG | RESPIRATION RATE: 18 BRPM | HEART RATE: 125 BPM | OXYGEN SATURATION: 97 %

## 2025-04-13 DIAGNOSIS — Z29.9 ENCOUNTER FOR PROPHYLACTIC MEASURES, UNSPECIFIED: ICD-10-CM

## 2025-04-13 DIAGNOSIS — A04.72 ENTEROCOLITIS DUE TO CLOSTRIDIUM DIFFICILE, NOT SPECIFIED AS RECURRENT: ICD-10-CM

## 2025-04-13 DIAGNOSIS — R41.82 ALTERED MENTAL STATUS, UNSPECIFIED: ICD-10-CM

## 2025-04-13 DIAGNOSIS — Z98.891 HISTORY OF UTERINE SCAR FROM PREVIOUS SURGERY: Chronic | ICD-10-CM

## 2025-04-13 DIAGNOSIS — Z98.890 OTHER SPECIFIED POSTPROCEDURAL STATES: Chronic | ICD-10-CM

## 2025-04-13 DIAGNOSIS — K68.12 PSOAS MUSCLE ABSCESS: ICD-10-CM

## 2025-04-13 DIAGNOSIS — K70.30 ALCOHOLIC CIRRHOSIS OF LIVER WITHOUT ASCITES: ICD-10-CM

## 2025-04-13 DIAGNOSIS — I95.9 HYPOTENSION, UNSPECIFIED: ICD-10-CM

## 2025-04-13 DIAGNOSIS — N39.0 URINARY TRACT INFECTION, SITE NOT SPECIFIED: ICD-10-CM

## 2025-04-13 LAB
ALBUMIN SERPL ELPH-MCNC: 3 G/DL — LOW (ref 3.3–5)
ALP SERPL-CCNC: 174 U/L — HIGH (ref 40–120)
ALT FLD-CCNC: 26 U/L — SIGNIFICANT CHANGE UP (ref 12–78)
ANION GAP SERPL CALC-SCNC: 6 MMOL/L — SIGNIFICANT CHANGE UP (ref 5–17)
APPEARANCE UR: CLEAR — SIGNIFICANT CHANGE UP
APTT BLD: 37.3 SEC — HIGH (ref 24.5–35.6)
AST SERPL-CCNC: 73 U/L — HIGH (ref 15–37)
BACTERIA # UR AUTO: NEGATIVE /HPF — SIGNIFICANT CHANGE UP
BASOPHILS # BLD AUTO: 0.02 K/UL — SIGNIFICANT CHANGE UP (ref 0–0.2)
BASOPHILS NFR BLD AUTO: 0.3 % — SIGNIFICANT CHANGE UP (ref 0–2)
BILIRUB SERPL-MCNC: 5.3 MG/DL — HIGH (ref 0.2–1.2)
BILIRUB UR-MCNC: ABNORMAL
BLD GP AB SCN SERPL QL: SIGNIFICANT CHANGE UP
BUN SERPL-MCNC: 25 MG/DL — HIGH (ref 7–23)
CALCIUM SERPL-MCNC: 10.2 MG/DL — HIGH (ref 8.5–10.1)
CAST: 25 /LPF — HIGH (ref 0–4)
CHLORIDE SERPL-SCNC: 99 MMOL/L — SIGNIFICANT CHANGE UP (ref 96–108)
CO2 SERPL-SCNC: 25 MMOL/L — SIGNIFICANT CHANGE UP (ref 22–31)
COLOR SPEC: ABNORMAL
CREAT SERPL-MCNC: 1.19 MG/DL — SIGNIFICANT CHANGE UP (ref 0.5–1.3)
DIFF PNL FLD: NEGATIVE — SIGNIFICANT CHANGE UP
EGFR: 52 ML/MIN/1.73M2 — LOW
EGFR: 52 ML/MIN/1.73M2 — LOW
EOSINOPHIL # BLD AUTO: 0.01 K/UL — SIGNIFICANT CHANGE UP (ref 0–0.5)
EOSINOPHIL NFR BLD AUTO: 0.1 % — SIGNIFICANT CHANGE UP (ref 0–6)
EPI CELLS # UR: PRESENT
ETHANOL SERPL-MCNC: <10 MG/DL — SIGNIFICANT CHANGE UP (ref 0–10)
ETHANOL SERPL-MCNC: <10 MG/DL — SIGNIFICANT CHANGE UP (ref 0–10)
GLUCOSE SERPL-MCNC: 128 MG/DL — HIGH (ref 70–99)
GLUCOSE UR QL: NEGATIVE MG/DL — SIGNIFICANT CHANGE UP
GRAN CASTS # UR COMP ASSIST: SIGNIFICANT CHANGE UP
HCT VFR BLD CALC: 33 % — LOW (ref 34.5–45)
HGB BLD-MCNC: 10.9 G/DL — LOW (ref 11.5–15.5)
HYALINE CASTS # UR AUTO: PRESENT
IMM GRANULOCYTES # BLD AUTO: 0.04 K/UL — SIGNIFICANT CHANGE UP (ref 0–0.07)
IMM GRANULOCYTES NFR BLD AUTO: 0.5 % — SIGNIFICANT CHANGE UP (ref 0–0.9)
INR BLD: 1.33 RATIO — HIGH (ref 0.85–1.16)
KETONES UR-MCNC: ABNORMAL MG/DL
LACTATE SERPL-SCNC: 1.9 MMOL/L — SIGNIFICANT CHANGE UP (ref 0.7–2)
LEUKOCYTE ESTERASE UR-ACNC: ABNORMAL
LYMPHOCYTES # BLD AUTO: 1.41 K/UL — SIGNIFICANT CHANGE UP (ref 1–3.3)
LYMPHOCYTES NFR BLD AUTO: 19.2 % — SIGNIFICANT CHANGE UP (ref 13–44)
MCHC RBC-ENTMCNC: 26.1 PG — LOW (ref 27–34)
MCHC RBC-ENTMCNC: 33 G/DL — SIGNIFICANT CHANGE UP (ref 32–36)
MCV RBC AUTO: 79.1 FL — LOW (ref 80–100)
MONOCYTES # BLD AUTO: 0.63 K/UL — SIGNIFICANT CHANGE UP (ref 0–0.9)
MONOCYTES NFR BLD AUTO: 8.6 % — SIGNIFICANT CHANGE UP (ref 2–14)
NEUTROPHILS # BLD AUTO: 5.23 K/UL — SIGNIFICANT CHANGE UP (ref 1.8–7.4)
NEUTROPHILS NFR BLD AUTO: 71.3 % — SIGNIFICANT CHANGE UP (ref 43–77)
NITRITE UR-MCNC: POSITIVE
NRBC # BLD AUTO: 0 K/UL — SIGNIFICANT CHANGE UP (ref 0–0)
NRBC # FLD: 0 K/UL — SIGNIFICANT CHANGE UP (ref 0–0)
NRBC BLD AUTO-RTO: 0 /100 WBCS — SIGNIFICANT CHANGE UP (ref 0–0)
PH UR: 5 — SIGNIFICANT CHANGE UP (ref 5–8)
PLATELET # BLD AUTO: 134 K/UL — LOW (ref 150–400)
PMV BLD: 8.9 FL — SIGNIFICANT CHANGE UP (ref 7–13)
POTASSIUM SERPL-MCNC: 3.7 MMOL/L — SIGNIFICANT CHANGE UP (ref 3.5–5.3)
POTASSIUM SERPL-SCNC: 3.7 MMOL/L — SIGNIFICANT CHANGE UP (ref 3.5–5.3)
PROT SERPL-MCNC: 8.2 GM/DL — SIGNIFICANT CHANGE UP (ref 6–8.3)
PROT UR-MCNC: SIGNIFICANT CHANGE UP MG/DL
PROTHROM AB SERPL-ACNC: 15.7 SEC — HIGH (ref 9.9–13.4)
RBC # BLD: 4.17 M/UL — SIGNIFICANT CHANGE UP (ref 3.8–5.2)
RBC # FLD: 17.2 % — HIGH (ref 10.3–14.5)
RBC CASTS # UR COMP ASSIST: 3 /HPF — SIGNIFICANT CHANGE UP (ref 0–4)
SODIUM SERPL-SCNC: 130 MMOL/L — LOW (ref 135–145)
SP GR SPEC: 1.02 — SIGNIFICANT CHANGE UP (ref 1–1.03)
SQUAMOUS # UR AUTO: 7 /HPF — HIGH (ref 0–5)
TROPONIN I, HIGH SENSITIVITY RESULT: 13.96 NG/L — SIGNIFICANT CHANGE UP
UROBILINOGEN FLD QL: 0.2 MG/DL — SIGNIFICANT CHANGE UP (ref 0.2–1)
WBC # BLD: 7.34 K/UL — SIGNIFICANT CHANGE UP (ref 3.8–10.5)
WBC # FLD AUTO: 7.34 K/UL — SIGNIFICANT CHANGE UP (ref 3.8–10.5)
WBC UR QL: 1 /HPF — SIGNIFICANT CHANGE UP (ref 0–5)

## 2025-04-13 PROCEDURE — 82607 VITAMIN B-12: CPT

## 2025-04-13 PROCEDURE — 80053 COMPREHEN METABOLIC PANEL: CPT

## 2025-04-13 PROCEDURE — 70450 CT HEAD/BRAIN W/O DYE: CPT | Mod: 26,59

## 2025-04-13 PROCEDURE — 84100 ASSAY OF PHOSPHORUS: CPT

## 2025-04-13 PROCEDURE — 95714 VEEG EA 12-26 HR UNMNTR: CPT

## 2025-04-13 PROCEDURE — 70496 CT ANGIOGRAPHY HEAD: CPT | Mod: 26

## 2025-04-13 PROCEDURE — 70498 CT ANGIOGRAPHY NECK: CPT | Mod: 26

## 2025-04-13 PROCEDURE — 80076 HEPATIC FUNCTION PANEL: CPT

## 2025-04-13 PROCEDURE — 0042T: CPT

## 2025-04-13 PROCEDURE — 85610 PROTHROMBIN TIME: CPT

## 2025-04-13 PROCEDURE — 84443 ASSAY THYROID STIM HORMONE: CPT

## 2025-04-13 PROCEDURE — 71045 X-RAY EXAM CHEST 1 VIEW: CPT | Mod: 26

## 2025-04-13 PROCEDURE — 97162 PT EVAL MOD COMPLEX 30 MIN: CPT | Mod: GP

## 2025-04-13 PROCEDURE — 82140 ASSAY OF AMMONIA: CPT

## 2025-04-13 PROCEDURE — 80048 BASIC METABOLIC PNL TOTAL CA: CPT

## 2025-04-13 PROCEDURE — 36415 COLL VENOUS BLD VENIPUNCTURE: CPT

## 2025-04-13 PROCEDURE — 85027 COMPLETE CBC AUTOMATED: CPT

## 2025-04-13 PROCEDURE — 80321 ALCOHOLS BIOMARKERS 1OR 2: CPT

## 2025-04-13 PROCEDURE — 93005 ELECTROCARDIOGRAM TRACING: CPT

## 2025-04-13 PROCEDURE — 99285 EMERGENCY DEPT VISIT HI MDM: CPT

## 2025-04-13 PROCEDURE — 70551 MRI BRAIN STEM W/O DYE: CPT | Mod: MC

## 2025-04-13 PROCEDURE — 83735 ASSAY OF MAGNESIUM: CPT

## 2025-04-13 PROCEDURE — 80307 DRUG TEST PRSMV CHEM ANLYZR: CPT

## 2025-04-13 PROCEDURE — 82746 ASSAY OF FOLIC ACID SERUM: CPT

## 2025-04-13 PROCEDURE — 99222 1ST HOSP IP/OBS MODERATE 55: CPT

## 2025-04-13 PROCEDURE — 82247 BILIRUBIN TOTAL: CPT

## 2025-04-13 PROCEDURE — 95700 EEG CONT REC W/VID EEG TECH: CPT

## 2025-04-13 PROCEDURE — 93010 ELECTROCARDIOGRAM REPORT: CPT

## 2025-04-13 PROCEDURE — 97116 GAIT TRAINING THERAPY: CPT | Mod: GP

## 2025-04-13 RX ORDER — SPIRONOLACTONE 25 MG
1 TABLET ORAL
Refills: 0 | DISCHARGE

## 2025-04-13 RX ORDER — VANCOMYCIN HCL IN 5 % DEXTROSE 1.5G/250ML
125 PLASTIC BAG, INJECTION (ML) INTRAVENOUS EVERY 12 HOURS
Refills: 0 | Status: DISCONTINUED | OUTPATIENT
Start: 2025-04-13 | End: 2025-04-15

## 2025-04-13 RX ORDER — SPIRONOLACTONE 25 MG
100 TABLET ORAL AT BEDTIME
Refills: 0 | Status: DISCONTINUED | OUTPATIENT
Start: 2025-04-13 | End: 2025-04-16

## 2025-04-13 RX ORDER — CARVEDILOL 3.12 MG/1
3.12 TABLET, FILM COATED ORAL EVERY 12 HOURS
Refills: 0 | Status: DISCONTINUED | OUTPATIENT
Start: 2025-04-13 | End: 2025-04-16

## 2025-04-13 RX ORDER — MAGNESIUM, ALUMINUM HYDROXIDE 200-200 MG
30 TABLET,CHEWABLE ORAL EVERY 4 HOURS
Refills: 0 | Status: DISCONTINUED | OUTPATIENT
Start: 2025-04-13 | End: 2025-04-16

## 2025-04-13 RX ORDER — MELATONIN 5 MG
3 TABLET ORAL AT BEDTIME
Refills: 0 | Status: DISCONTINUED | OUTPATIENT
Start: 2025-04-13 | End: 2025-04-16

## 2025-04-13 RX ORDER — FOLIC ACID 1 MG/1
1 TABLET ORAL DAILY
Refills: 0 | Status: DISCONTINUED | OUTPATIENT
Start: 2025-04-13 | End: 2025-04-16

## 2025-04-13 RX ORDER — LACTULOSE 10 G/15ML
10 SOLUTION ORAL
Refills: 0 | Status: DISCONTINUED | OUTPATIENT
Start: 2025-04-13 | End: 2025-04-15

## 2025-04-13 RX ORDER — CEFTRIAXONE 500 MG/1
1000 INJECTION, POWDER, FOR SOLUTION INTRAMUSCULAR; INTRAVENOUS ONCE
Refills: 0 | Status: DISCONTINUED | OUTPATIENT
Start: 2025-04-13 | End: 2025-04-13

## 2025-04-13 RX ORDER — HYDROXYZINE HYDROCHLORIDE 25 MG/1
50 TABLET, FILM COATED ORAL EVERY 8 HOURS
Refills: 0 | Status: DISCONTINUED | OUTPATIENT
Start: 2025-04-13 | End: 2025-04-16

## 2025-04-13 RX ORDER — ACETAMINOPHEN 500 MG/5ML
650 LIQUID (ML) ORAL EVERY 6 HOURS
Refills: 0 | Status: DISCONTINUED | OUTPATIENT
Start: 2025-04-13 | End: 2025-04-16

## 2025-04-13 RX ORDER — URSODIOL 300 MG/1
300 CAPSULE ORAL EVERY 12 HOURS
Refills: 0 | Status: DISCONTINUED | OUTPATIENT
Start: 2025-04-13 | End: 2025-04-16

## 2025-04-13 RX ORDER — MAGNESIUM OXIDE 400 MG
400 TABLET ORAL
Refills: 0 | Status: DISCONTINUED | OUTPATIENT
Start: 2025-04-13 | End: 2025-04-16

## 2025-04-13 RX ORDER — ONDANSETRON HCL/PF 4 MG/2 ML
4 VIAL (ML) INJECTION ONCE
Refills: 0 | Status: DISCONTINUED | OUTPATIENT
Start: 2025-04-13 | End: 2025-04-16

## 2025-04-13 RX ORDER — FUROSEMIDE 10 MG/ML
40 INJECTION INTRAMUSCULAR; INTRAVENOUS DAILY
Refills: 0 | Status: DISCONTINUED | OUTPATIENT
Start: 2025-04-13 | End: 2025-04-16

## 2025-04-13 RX ORDER — CEFTRIAXONE 500 MG/1
1000 INJECTION, POWDER, FOR SOLUTION INTRAMUSCULAR; INTRAVENOUS ONCE
Refills: 0 | Status: COMPLETED | OUTPATIENT
Start: 2025-04-13 | End: 2025-04-13

## 2025-04-13 RX ORDER — MEROPENEM 1 G/30ML
1000 INJECTION INTRAVENOUS EVERY 8 HOURS
Refills: 0 | Status: DISCONTINUED | OUTPATIENT
Start: 2025-04-13 | End: 2025-04-13

## 2025-04-13 RX ORDER — MEROPENEM 1 G/30ML
1000 INJECTION INTRAVENOUS EVERY 12 HOURS
Refills: 0 | Status: DISCONTINUED | OUTPATIENT
Start: 2025-04-13 | End: 2025-04-15

## 2025-04-13 RX ORDER — MIDODRINE HYDROCHLORIDE 5 MG/1
15 TABLET ORAL EVERY 8 HOURS
Refills: 0 | Status: DISCONTINUED | OUTPATIENT
Start: 2025-04-13 | End: 2025-04-16

## 2025-04-13 RX ORDER — IBUPROFEN 200 MG
400 TABLET ORAL ONCE
Refills: 0 | Status: DISCONTINUED | OUTPATIENT
Start: 2025-04-13 | End: 2025-04-16

## 2025-04-13 RX ORDER — LACTOBACILLUS ACIDOPHILUS/PECT 75 MM-100
2 CAPSULE ORAL
Refills: 0 | DISCHARGE

## 2025-04-13 RX ORDER — HYDROXYZINE HYDROCHLORIDE 25 MG/1
1 TABLET, FILM COATED ORAL
Refills: 0 | DISCHARGE

## 2025-04-13 RX ADMIN — CEFTRIAXONE 1000 MILLIGRAM(S): 500 INJECTION, POWDER, FOR SOLUTION INTRAMUSCULAR; INTRAVENOUS at 17:55

## 2025-04-13 RX ADMIN — Medication 1000 MILLILITER(S): at 17:55

## 2025-04-13 RX ADMIN — Medication 110 MILLILITER(S): at 21:59

## 2025-04-13 NOTE — H&P ADULT - PROBLEM SELECTOR PLAN 2
#Esophageal varices  -Patient on transplant list.   -Continue on Lasix 80mg and Spironolactone 150mg qd  -Continue rifaximin  -Restart lactulose 10mg qd, titrate frequency for BM 3-4 daily  -Ursodiol 300mg qd, atarax for generalized itch related to bile salt accumulation. Started on meropenem  F/u cultures  ID consult.

## 2025-04-13 NOTE — ED ADULT NURSE NOTE - NSFALLRISKINTERV_ED_ALL_ED
Assistance OOB with selected safe patient handling equipment if applicable/Assistance with ambulation/Communicate fall risk and risk factors to all staff, patient, and family/Monitor gait and stability/Monitor for mental status changes and reorient to person, place, and time, as needed/Provide patient with walking aids/Provide visual cue: yellow wristband, yellow gown, etc/Reinforce activity limits and safety measures with patient and family/Toileting schedule using arm’s reach rule for commode and bathroom/Use of alarms - bed, stretcher, chair and/or video monitoring/Call bell, personal items and telephone in reach/Instruct patient to call for assistance before getting out of bed/chair/stretcher/Non-slip footwear applied when patient is off stretcher/[place] to call system/Physically safe environment - no spills, clutter or unnecessary equipment/Purposeful Proactive Rounding/Room/bathroom lighting operational, light cord in reach

## 2025-04-13 NOTE — H&P ADULT - PROBLEM SELECTOR PLAN 1
Possible polypharmacy? vs UTI?  -CT imaging noted above neg for acute infarct.  -Ammonia and alcohol levels wnl.   -Pt had been on cephalosporin for psoas ms abscess.   -Possible UTI while on abx? will cover with meropenem.  -F/u cultures to de-escalate abx.   -No other obvious reversible causes of pts behaviour on CBC/CMP  -F/u TSH, B12, Folate, RPR  -IV hydration  -ID consult.   -Neuro consult. Possible polypharmacy? vs UTI?  -CT imaging noted above neg for acute infarct.  -Ammonia and alcohol levels wnl.   -Pt had been on cephalosporin for psoas ms abscess.   -Possible UTI while on abx? will cover with meropenem.  -F/u cultures to de-escalate abx.   -No other obvious reversible causes of pts behaviour on CBC/CMP  -F/u TSH, B12, Folate, RPR, Utox, PETH  -IV hydration  -ID consult.   -Neuro consult. Possible polypharmacy? vs UTI?  -CT imaging noted above neg for acute infarct.  -Ammonia and alcohol levels wnl.   -Pt had been on cephalosporin for psoas ms abscess.   -Possible UTI while on abx? will cover with meropenem.  -F/u cultures to de-escalate abx.   -No other obvious reversible causes of pts behaviour on CBC/CMP  -Hold her mirtazapine for now.   -F/u TSH, B12, Folate, RPR, Utox, PETH  -IV hydration  -ID consult.   -Neuro consult.

## 2025-04-13 NOTE — ED PROVIDER NOTE - COVID-19  TEST TYPE
June 24, 2020        Chandra Anita  [address]  [facility] [address]    This letter provides a written record that you were tested for COVID-19 on 6/22/20.       Your result was negative. This means that we didn t find the virus that causes COVID-19 in your sample. A test may show negative when you do actually have the virus. This can happen when the virus is in the early stages of infection, before you feel illness symptoms.    If you have symptoms   Stay home and away from others (self-isolate) until you meet ALL of the guidelines below:    You ve had no fever--and no medicine that reduces fever--for 3 full days (72 hours). And      Your other symptoms have gotten better. For example, your cough or breathing has improved. And     At least 10 days have passed since your symptoms started.    During this time:    Stay home. Don t go to work, school or anywhere else.     Stay in your own room, including for meals. Use your own bathroom if you can.    Stay away from others in your home. No hugging, kissing or shaking hands. No visitors.    Clean  high touch  surfaces often (doorknobs, counters, handles, etc.). Use a household cleaning spray or wipes. You can find a full list on the EPA website at www.epa.gov/pesticide-registration/list-n-disinfectants-use-against-sars-cov-2.    Cover your mouth and nose with a mask, tissue or washcloth to avoid spreading germs.    Wash your hands and face often with soap and water.    Going back to work  Check with your employer for any guidelines to follow for going back to work.    Employers: This document serves as formal notice that your employee tested negative for COVID-19, as of the testing date shown above.    
MOLECULAR PCR

## 2025-04-13 NOTE — ED ADULT TRIAGE NOTE - CHIEF COMPLAINT QUOTE
bib spouse with c/o worsening weakness and ams. Pt. sustained multiple falls including last one today at 11am. patient. is in liver failure and on liver transplant wait list. c/o neck pain. -LOC, - head strike, not on blood thinners, multiple abrasions noted to upper and lower extremities.

## 2025-04-13 NOTE — ED PROVIDER NOTE - OBJECTIVE STATEMENT
59F BIB boyfriend/HCP Holden for AMS, weakness, and fall. Holden says he got home today (they are currently living in a hotel) and the pt was on the floor outside the bathroom. pt does not know how she fell or remember falling. Pt was well last night. She has end stage liver disease and is awaiting placement on the transplant list. pt is usually weak and needs to hold onto things to walk but today she couldn't even stand up. pt AOx3 but boyfriend notes she has repetitive actions today- she kept organizing the same 5 cards in her purse over and over. In ED pt keeps lifting up her shirt and looking at her chest several times a minute though she is unclear why she is doing this. Pt denies fever/chills/AP/CP/SOB/N/V/D/dysuria. pt and Holden note her BP is normally around 100-105 and HR is normally 125. PMD Stephan, Hepatology Nicole

## 2025-04-13 NOTE — ED PROVIDER NOTE - CLINICAL SUMMARY MEDICAL DECISION MAKING FREE TEXT BOX
58 y/o female with cirrohsis awaiting liver transplant c/o right sided weakness and confusion. Right sided weakness x starting today. LKW 4AM. Patient states she feel confused and describes right leg numbness and right neck and shoulder numbness. She was found on floor around 11 AM by sig other.     Patient evaluted by writer at 340 pm. Stroke note called for focal numbness < 24 hours. NIH 1. Not tnk candidate  no facial droop, upper and lower ext 5/5 strength, sensation grossly intact, KAREN/FTN normal, no drift, no slurred speech    stroke workup  labs, urine, culture, cxr, ekg  ddx also includes encephalopathy 2/2 liver disease , will check ammonia.

## 2025-04-13 NOTE — H&P ADULT - NSHPPHYSICALEXAM_GEN_ALL_CORE
T(C): 36.8 (04-13-25 @ 20:57), Max: 37.1 (04-13-25 @ 15:45)  HR: 101 (04-13-25 @ 20:57) (101 - 125)  BP: 105/63 (04-13-25 @ 20:57) (101/62 - 133/62)  RR: 18 (04-13-25 @ 20:57) (17 - 19)  SpO2: 97% (04-13-25 @ 20:57) (97% - 100%)    General: chronically ill, jaundiced.   HEENT: non-traumatic, perrla, scleral icterus.   Cardio: s1s2 regular rate and rhythm  Lungs: comfortable breathing, clear to auscultation  Abdomen: Soft, non-tender, non-distended  Neuro: AOx4  Ext: Pulses +2

## 2025-04-13 NOTE — H&P ADULT - PROBLEM SELECTOR PLAN 6
DVT ppx: HepsubQ  Fall and aspiration precautions.  Turn and reposition q2h to prevent bedsores  Skin checks as per RN -Chronic  -Continue midodrine 15mg TID, hold for SBP > 110

## 2025-04-13 NOTE — H&P ADULT - HISTORY OF PRESENT ILLNESS
59-year-old female with a past medical history of alcoholic cirrhosis, esophageal varices status post-banding, probable endocarditis, and C. difficile colitis presents to the emergency department via her boyfriend and HCP, Holden, for evaluation of a fall, weakness, and confusion. The patient reports being in her usual state of health until two days ago, when she began experiencing confusion, which she describes as repetitive actions, such as repeatedly counting items. Earlier today, while sitting on her bed counting her medications, she slid off the bed and fell to the floor. Her boyfriend later found her on the floor surrounded by pills. Holden prepares her medications and reports that she ran out of magnesium, spironolactone, and mirtazapine before their intended time. The patient is unaware of her medication regimen and takes whatever is placed in her pillbox. The patient denies fever, chills, abdominal pain, chest pain, shortness of breath, nausea, vomiting, diarrhea, or dysuria.  Of note patient with prolonged hospitalization at [facility] from 2/10-3/5/25 for initial complaints of weakness, poor PO intake and fever. Was admitted for sepsis. ID treated bacteremia and suspected endocarditis with ceftriaxone, metronidazole, and vancomycin for diarrhea 2/2 C. diff. Pt had 2 neg TTE and endocarditis was considered less likely. During admission, a bilobed psoas abscess was found near vasculature and managed with IV antibiotics. Follow-up imaging showed improvement, and the patient was discharged on PO antibiotics. Pt had recurrence of diarrhea, likely medication-induced, resolved with adjusted lactulose. Due to long term antibiotics for psoas abscess was also continued on PO vanc for C. diff prophylaxis.     In ED 98, , /63  CBC: WBC: 7, H/H 10/33, Plt 134  CMP: Na 130, BUN/Cr 25/1.19, Albumin 3, T. bili 5.3, Alk phos 174, AST/ALT 73/26  Lactate: 1.9, Ammonia 13, Alcohol <10  Trop: 13  UA: Orange, Trace ketones, pos leuks, pos nitrites.   CT PERFUSION: Technical limitations: None. Core infarction: 0 ml. Penumbra / tissue at risk for active ischemia: 0 ml  CTA NECK: No evidence of significant stenosis or occlusion.  CTA HEAD: No evidence of significant stenosis or occlusion.

## 2025-04-13 NOTE — H&P ADULT - PROBLEM SELECTOR PLAN 5
-Chronic  -Continue midodrine 15mg TID, hold for SBP > 110 Hx of C. diff   On PO vanc 125 BID for prophylaxis.  Unsure if patient completed course or if patient is chronically supposed to be on prophylaxis due to on going abx for psoas muscle abscess.  -Will continue PO vanc for prophylaxis for now.

## 2025-04-13 NOTE — H&P ADULT - PROBLEM SELECTOR PLAN 4
Hx of C. diff   On PO vanc 125 BID for prophylaxis. PTA - PO cefpodoxime and flagyl - hold  Started on meropenem

## 2025-04-13 NOTE — ED PROVIDER NOTE - CARE PLAN
Principal Discharge DX:	AMS (altered mental status)  Secondary Diagnosis:	Acute UTI  Secondary Diagnosis:	Generalized muscle weakness  Secondary Diagnosis:	Paresthesia  Secondary Diagnosis:	Fall   1

## 2025-04-13 NOTE — ED ADULT NURSE REASSESSMENT NOTE - NS ED NURSE REASSESS COMMENT FT1
As per family member pt is acting more confuse. and HR in 120's at baseline. MD Paige made aware. MD paige to come evaluate pt. Pt ok to go upstairs.

## 2025-04-13 NOTE — H&P ADULT - NSHPLABSRESULTS_GEN_ALL_CORE
LABS:  cret                        10.9   7.34  )-----------( 134      ( 13 Apr 2025 15:47 )             33.0     04-13    130[L]  |  99  |  25[H]  ----------------------------<  128[H]  3.7   |  25  |  1.19    Ca    10.2[H]      13 Apr 2025 15:47    TPro  8.2  /  Alb  3.0[L]  /  TBili  5.3[H]  /  DBili  x   /  AST  73[H]  /  ALT  26  /  AlkPhos  174[H]  04-13    PT/INR - ( 13 Apr 2025 15:47 )   PT: 15.7 sec;   INR: 1.33 ratio       PTT - ( 13 Apr 2025 15:47 )  PTT:37.3 sec    Urinalysis with Rflx Culture (collected 04-13-25 @ 15:27)    < from: CT Brain Perfusion Maps Stroke (04.13.25 @ 15:58) >    IMPRESSION:    CT PERFUSION:  Technical limitations: None.    Core infarction: 0 ml  Penumbra / tissue at risk for active ischemia: 0 ml    CTA NECK:    No evidence of significant stenosis or occlusion.    CTA HEAD:    No evidence of significant stenosis or occlusion.        --- End of Report ---    < end of copied text >

## 2025-04-13 NOTE — PATIENT PROFILE ADULT - FALL HARM RISK - HARM RISK INTERVENTIONS
Use of alarms - bed, chair and/or voice tab/Visual Cue: Yellow wristband and red socks/Bed in lowest position, wheels locked, appropriate side rails in place/Call bell, personal items and telephone in reach/Instruct patient to call for assistance before getting out of bed or chair/Non-slip footwear when patient is out of bed/[place] to call system/Physically safe environment - no spills, clutter or unnecessary equipment/Purposeful Proactive Rounding/Room/bathroom lighting operational, light cord in reach

## 2025-04-13 NOTE — H&P ADULT - PROBLEM SELECTOR PLAN 3
PO cefpodoxime and flagyl #Esophageal varices  -Patient on transplant list.   -Liver enzymes elevated but appear to be baseline for patient.   -Continue on Lasix 40mg and Spironolactone 1o0mg qd  -Continue rifaximin  -Continue lactulose 10mg qd, titrate frequency for BM 3-4 daily  -Ursodiol 300mg qd, atarax for generalized itch related to bile salt accumulation.

## 2025-04-13 NOTE — ED ADULT NURSE NOTE - OBJECTIVE STATEMENT
59y female presented to the ED with complaints of AMS. pt A&OX4 in room. Pt with complaints of right sided neck pain, and left leg on and off tingling, and weakness. Pt with multiple falls. -AC 59y female presented to the ED with complaints of AMS. pt A&OX4 in room. Pt with complaints of right sided neck pain, and left leg on and off tingling, and weakness. Pt with multiple falls. -AC 's on cardiac monitor.

## 2025-04-14 ENCOUNTER — TRANSCRIPTION ENCOUNTER (OUTPATIENT)
Age: 60
End: 2025-04-14

## 2025-04-14 LAB
ALBUMIN SERPL ELPH-MCNC: 2.6 G/DL — LOW (ref 3.3–5)
ALP SERPL-CCNC: 138 U/L — HIGH (ref 40–120)
ALT FLD-CCNC: 27 U/L — SIGNIFICANT CHANGE UP (ref 12–78)
AMMONIA BLD-MCNC: 62 UMOL/L — HIGH (ref 11–32)
ANION GAP SERPL CALC-SCNC: 9 MMOL/L — SIGNIFICANT CHANGE UP (ref 5–17)
AST SERPL-CCNC: 80 U/L — HIGH (ref 15–37)
BILIRUB SERPL-MCNC: 3.7 MG/DL — HIGH (ref 0.2–1.2)
BUN SERPL-MCNC: 19 MG/DL — SIGNIFICANT CHANGE UP (ref 7–23)
CALCIUM SERPL-MCNC: 9 MG/DL — SIGNIFICANT CHANGE UP (ref 8.5–10.1)
CHLORIDE SERPL-SCNC: 107 MMOL/L — SIGNIFICANT CHANGE UP (ref 96–108)
CO2 SERPL-SCNC: 21 MMOL/L — LOW (ref 22–31)
CREAT SERPL-MCNC: 0.81 MG/DL — SIGNIFICANT CHANGE UP (ref 0.5–1.3)
CULTURE RESULTS: SIGNIFICANT CHANGE UP
EGFR: 83 ML/MIN/1.73M2 — SIGNIFICANT CHANGE UP
EGFR: 83 ML/MIN/1.73M2 — SIGNIFICANT CHANGE UP
FOLATE SERPL-MCNC: >20 NG/ML — SIGNIFICANT CHANGE UP
GLUCOSE SERPL-MCNC: 94 MG/DL — SIGNIFICANT CHANGE UP (ref 70–99)
HCT VFR BLD CALC: 27.8 % — LOW (ref 34.5–45)
HGB BLD-MCNC: 9.2 G/DL — LOW (ref 11.5–15.5)
MAGNESIUM SERPL-MCNC: 1.6 MG/DL — SIGNIFICANT CHANGE UP (ref 1.6–2.6)
MCHC RBC-ENTMCNC: 26.1 PG — LOW (ref 27–34)
MCHC RBC-ENTMCNC: 33.1 G/DL — SIGNIFICANT CHANGE UP (ref 32–36)
MCV RBC AUTO: 79 FL — LOW (ref 80–100)
NRBC # BLD AUTO: 0 K/UL — SIGNIFICANT CHANGE UP (ref 0–0)
NRBC # FLD: 0 K/UL — SIGNIFICANT CHANGE UP (ref 0–0)
NRBC BLD AUTO-RTO: 0 /100 WBCS — SIGNIFICANT CHANGE UP (ref 0–0)
PHOSPHATE SERPL-MCNC: 4 MG/DL — SIGNIFICANT CHANGE UP (ref 2.5–4.5)
PLATELET # BLD AUTO: 105 K/UL — LOW (ref 150–400)
PMV BLD: 8.6 FL — SIGNIFICANT CHANGE UP (ref 7–13)
POTASSIUM SERPL-MCNC: 3.8 MMOL/L — SIGNIFICANT CHANGE UP (ref 3.5–5.3)
POTASSIUM SERPL-SCNC: 3.8 MMOL/L — SIGNIFICANT CHANGE UP (ref 3.5–5.3)
PROT SERPL-MCNC: 6.6 GM/DL — SIGNIFICANT CHANGE UP (ref 6–8.3)
RBC # BLD: 3.52 M/UL — LOW (ref 3.8–5.2)
RBC # FLD: 17.7 % — HIGH (ref 10.3–14.5)
SODIUM SERPL-SCNC: 137 MMOL/L — SIGNIFICANT CHANGE UP (ref 135–145)
SPECIMEN SOURCE: SIGNIFICANT CHANGE UP
TSH SERPL-MCNC: 2.43 UU/ML — SIGNIFICANT CHANGE UP (ref 0.34–4.82)
VIT B12 SERPL-MCNC: 1526 PG/ML — HIGH (ref 232–1245)
WBC # BLD: 6.43 K/UL — SIGNIFICANT CHANGE UP (ref 3.8–10.5)
WBC # FLD AUTO: 6.43 K/UL — SIGNIFICANT CHANGE UP (ref 3.8–10.5)

## 2025-04-14 PROCEDURE — 70551 MRI BRAIN STEM W/O DYE: CPT | Mod: 26

## 2025-04-14 PROCEDURE — 93010 ELECTROCARDIOGRAM REPORT: CPT

## 2025-04-14 PROCEDURE — 90792 PSYCH DIAG EVAL W/MED SRVCS: CPT

## 2025-04-14 PROCEDURE — 99233 SBSQ HOSP IP/OBS HIGH 50: CPT

## 2025-04-14 PROCEDURE — 99223 1ST HOSP IP/OBS HIGH 75: CPT

## 2025-04-14 RX ORDER — HEPARIN SODIUM 1000 [USP'U]/ML
5000 INJECTION INTRAVENOUS; SUBCUTANEOUS EVERY 12 HOURS
Refills: 0 | Status: DISCONTINUED | OUTPATIENT
Start: 2025-04-14 | End: 2025-04-16

## 2025-04-14 RX ADMIN — MEROPENEM 1000 MILLIGRAM(S): 1 INJECTION INTRAVENOUS at 22:49

## 2025-04-14 RX ADMIN — FUROSEMIDE 40 MILLIGRAM(S): 10 INJECTION INTRAMUSCULAR; INTRAVENOUS at 09:14

## 2025-04-14 RX ADMIN — LACTULOSE 10 GRAM(S): 10 SOLUTION ORAL at 22:49

## 2025-04-14 RX ADMIN — MEROPENEM 1000 MILLIGRAM(S): 1 INJECTION INTRAVENOUS at 09:13

## 2025-04-14 RX ADMIN — Medication 40 MILLIGRAM(S): at 05:53

## 2025-04-14 RX ADMIN — CARVEDILOL 3.12 MILLIGRAM(S): 3.12 TABLET, FILM COATED ORAL at 09:17

## 2025-04-14 RX ADMIN — HYDROXYZINE HYDROCHLORIDE 50 MILLIGRAM(S): 25 TABLET, FILM COATED ORAL at 22:49

## 2025-04-14 RX ADMIN — Medication 400 MILLIGRAM(S): at 09:13

## 2025-04-14 RX ADMIN — HEPARIN SODIUM 5000 UNIT(S): 1000 INJECTION INTRAVENOUS; SUBCUTANEOUS at 22:49

## 2025-04-14 RX ADMIN — LACTULOSE 10 GRAM(S): 10 SOLUTION ORAL at 09:13

## 2025-04-14 RX ADMIN — Medication 400 MILLIGRAM(S): at 13:04

## 2025-04-14 RX ADMIN — HYDROXYZINE HYDROCHLORIDE 50 MILLIGRAM(S): 25 TABLET, FILM COATED ORAL at 05:54

## 2025-04-14 RX ADMIN — HYDROXYZINE HYDROCHLORIDE 50 MILLIGRAM(S): 25 TABLET, FILM COATED ORAL at 13:04

## 2025-04-14 RX ADMIN — Medication 125 MILLIGRAM(S): at 22:49

## 2025-04-14 RX ADMIN — FOLIC ACID 1 MILLIGRAM(S): 1 TABLET ORAL at 09:14

## 2025-04-14 RX ADMIN — Medication 100 MILLIGRAM(S): at 09:14

## 2025-04-14 RX ADMIN — MIDODRINE HYDROCHLORIDE 15 MILLIGRAM(S): 5 TABLET ORAL at 22:50

## 2025-04-14 RX ADMIN — Medication 125 MILLIGRAM(S): at 09:18

## 2025-04-14 RX ADMIN — MIDODRINE HYDROCHLORIDE 15 MILLIGRAM(S): 5 TABLET ORAL at 13:11

## 2025-04-14 RX ADMIN — URSODIOL 300 MILLIGRAM(S): 300 CAPSULE ORAL at 09:14

## 2025-04-14 RX ADMIN — MIDODRINE HYDROCHLORIDE 15 MILLIGRAM(S): 5 TABLET ORAL at 00:54

## 2025-04-14 RX ADMIN — Medication 400 MILLIGRAM(S): at 17:07

## 2025-04-14 RX ADMIN — URSODIOL 300 MILLIGRAM(S): 300 CAPSULE ORAL at 22:50

## 2025-04-14 NOTE — BH CONSULTATION LIAISON ASSESSMENT NOTE - SUMMARY
Carmen Ly is a 59 year old, domiciled, retired, , female, with PPHx of MDD, AUD, tobacco use disorder, with according to patient chart one inpatient psychiatric admission in 2019,with a PMHx of HTN, anemia, EtOH associated cirrhosis c/b ascites/EV (s/p banding last 11/2024), bleeding rectal varices vs hemorrhoidal bleeding s/p hemorrhoidectomy (11/2023) presenting as a transfer from [place] for evaluation of liver transplant currently undergoing expedited LT. Patient seen by transplant psychiatry for psychosocial clearance for liver transplant evaluation.    Patient on exam attested to a hx of alcohol use, drinking 3-5 glasses of wine for several years throughout her marriage, and post divorce, with the patient stating her last drink was a 1 year ago, of note patient PETH is positive during admission. She noted a hx of tobacco use, of which patient endorsed that she quit vaping and using nictoine containing products 5 months ago. Noted several past trials of antidepressants for her mood,  she denied any past psychiatric admission, or SA, of note upon chart review patient had voluntary admission in 2019, after ingesting 30 pills of Remeron at the time. At the time of exam patient had a moderate understanding regarding the transplant process, with patient identifying her current boyfriend as her support post transplant. Educated at bedside regarding the need for dual diagnosis program given patient hx of depression and alcohol use.     Patient is not imminent risk to harm self and is at high acute risk is low.    Patient does not need inpatient psychiatry admission.    Plan:    The current presentation is related to liver failure and patient present delirious in the light of that medical condition.    Treatment is to treat underlying condition and try to lower ammonia level is much as possible.    Would continue mirtazapine 7.5 mg at bedtime for depression and insomnia.    No need for further psychotropic medication adjustment.    Patient can be referred to psychiatrist in community depending on where she lives.

## 2025-04-14 NOTE — DIETITIAN INITIAL EVALUATION ADULT - OTHER INFO
59-year-old female with a past medical history of alcoholic cirrhosis, esophageal varices status post-banding, probable endocarditis, and C. difficile colitis presents to the emergency department via her boyfriend and HCP, Holden, for evaluation of a fall, weakness, and confusion. The patient reports being in her usual state of health until two days ago, when she began experiencing confusion, which she describes as repetitive actions, such as repeatedly counting items. Earlier today, while sitting on her bed counting her medications, she slid off the bed and fell to the floor. Her boyfriend later found her on the floor surrounded by pills. Holden prepares her medications and reports that she ran out of magnesium, spironolactone, and mirtazapine before their intended time.  Admit for AMS, UTI, cirrhosis     Known to nutr services and dx'd w/ mal on multiple admissions; still meets criteria. Obtained information from both boyfriend and pt at bed-side. Reports continued fair appetite since admit (x 1 day). Reports UBW of ~127# x 2 mo ago ; bed scale wt of 109# taken by RD on 4/14/25. Wt hx as per EMR: 114.6# (bed scale wt taken by RD on 1/28/25); 135# (taken by RD on 11/17/24 - 1+ edema doc'd with noted ascites); 123# on 11/6/24 (taken by RD); 128.5# 10/4/24 (standing wt taken by RN); 136# on 11/13/23 (taken by RD). Unintentional wt loss of 19.5# / 15.2% wt loss x 6 mo (since October 2024); severe & clinically significant. NFPE reveals mild-mod muscle/ fat wasting - appears thin and frail. C/w regular diet as tolerated; add ensure + HP shake BID (350kcal, 20g protein) to optimize nutritional needs - pt is receptive. Encourage protein-rich foods, maximize food preferences. See below for other recommendations.

## 2025-04-14 NOTE — DISCHARGE NOTE NURSING/CASE MANAGEMENT/SOCIAL WORK - FINANCIAL ASSISTANCE
[facility] provides services at a reduced cost to those who are determined to be eligible through [facility]’s financial assistance program. Information regarding [facility]’s financial assistance program can be found by going to [email or web address] or by calling [phone].

## 2025-04-14 NOTE — DISCHARGE NOTE NURSING/CASE MANAGEMENT/SOCIAL WORK - NSDCVIVACCINE_GEN_ALL_CORE_FT
influenza, injectable, quadrivalent, preservative free; 13-Oct-2023 11:44; Kristina Renee (RN); [employer]; [id number] (Exp. Date: 13-Jun-2024); IntraMuscular; Deltoid Left.; 0.5 milliLiter(s); VIS (VIS Published: 06-Aug-2021, VIS Presented: 13-Oct-2023);   Influenza, split virus, trivalent, preservative free; 08-Oct-2024 13:53; Ector Canales (RN); [employer]; [id number] (Exp. Date: 30-Jun-2025); IntraMuscular; Deltoid Left.; 0.5 milliLiter(s); VIS (VIS Published: 06-Aug-2021, VIS Presented: 08-Oct-2024);

## 2025-04-14 NOTE — DIETITIAN INITIAL EVALUATION ADULT - PROBLEM SELECTOR PLAN 5
Hx of C. diff   On PO vanc 125 BID for prophylaxis.  Unsure if patient completed course or if patient is chronically supposed to be on prophylaxis due to on going abx for psoas muscle abscess.  -Will continue PO vanc for prophylaxis for now.

## 2025-04-14 NOTE — DIETITIAN INITIAL EVALUATION ADULT - PROBLEM SELECTOR PLAN 3
#Esophageal varices  -Patient on transplant list.   -Liver enzymes elevated but appear to be baseline for patient.   -Continue on Lasix 40mg and Spironolactone 1o0mg qd  -Continue rifaximin  -Continue lactulose 10mg qd, titrate frequency for BM 3-4 daily  -Ursodiol 300mg qd, atarax for generalized itch related to bile salt accumulation.

## 2025-04-14 NOTE — BH CONSULTATION LIAISON ASSESSMENT NOTE - NSBHCHARTREVIEWVS_PSY_A_CORE FT
Vital Signs Last 24 Hrs  T(C): 36.8 (14 Apr 2025 08:45), Max: 36.8 (13 Apr 2025 20:57)  T(F): 98.2 (14 Apr 2025 08:45), Max: 98.2 (13 Apr 2025 20:57)  HR: 103 (14 Apr 2025 12:41) (101 - 124)  BP: 100/61 (14 Apr 2025 12:41) (100/61 - 133/62)  BP(mean): 76 (13 Apr 2025 20:04) (75 - 83)  RR: 18 (14 Apr 2025 12:41) (17 - 19)  SpO2: 95% (14 Apr 2025 12:41) (95% - 100%)    Parameters below as of 14 Apr 2025 12:41  Patient On (Oxygen Delivery Method): room air

## 2025-04-14 NOTE — DIETITIAN INITIAL EVALUATION ADULT - NSICDXPASTMEDICALHX_GEN_ALL_CORE_FT
PAST MEDICAL HISTORY:  2019 novel coronavirus disease (COVID-19)     Alcohol abuse     Alcoholic cirrhosis     Anemia     Depression     GERD (gastroesophageal reflux disease)     H/O bacteremia     H/O Clostridium difficile infection     H/O esophageal varices     H/O vertigo     Hemorrhoids     History of bacteremia     History of basal cell carcinoma excision     Squamous cell skin cancer     Withdrawal seizures      Statement Selected

## 2025-04-14 NOTE — DIETITIAN INITIAL EVALUATION ADULT - PERTINENT LABORATORY DATA
04-14    137  |  107  |  19  ----------------------------<  94  3.8   |  21[L]  |  0.81    Ca    9.0      14 Apr 2025 07:39  Phos  4.0     04-14  Mg     1.6     04-14    TPro  6.6  /  Alb  2.6[L]  /  TBili  3.7[H]  /  DBili  x   /  AST  80[H]  /  ALT  27  /  AlkPhos  138[H]  04-14  POCT Blood Glucose.: 124 mg/dL (04-13-25 @ 16:09)  A1C with Estimated Average Glucose Result: 4.7 % (02-11-25 @ 18:56)  A1C with Estimated Average Glucose Result: 4.6 % (02-11-25 @ 07:17)  A1C with Estimated Average Glucose Result: 4.7 % (01-29-25 @ 01:12)

## 2025-04-14 NOTE — DIETITIAN INITIAL EVALUATION ADULT - ORAL INTAKE PTA/DIET HISTORY
Obtained information from both boyfriend and pt at bed-side. Lives at home w/  & both cook/grocery shop w/o difficulty. W/ fluctuating PO intake overtime due to recurrent hospitalizations x 6 mo. Appetite improving and consumes ~2 meals/day w/ snacks; consumes 1 protein shake/day (lean body protein). Likely consuming <75% of ENN x 6 mo. Noted w/ pmh of etoh abuse as per EMR. Follows lactose-free diet at home (is lactose intolerant).

## 2025-04-14 NOTE — DISCHARGE NOTE NURSING/CASE MANAGEMENT/SOCIAL WORK - NSDCPEFALRISK_GEN_ALL_CORE
For information on Fall & Injury Prevention, visit: [email or web address] OR  [email or web address] OR  https://www.cdc.gov/steadi/patient.html

## 2025-04-14 NOTE — CONSULT NOTE ADULT - ASSESSMENT
59 F with PMHx of alcoholic cirrhosis, esophageal varices, depression, treated for bacteremia and suspected endocarditis in recent past, is currently on liver transplant wait list, presented to ED on 4/13/2025 via her boyfriend/HCP Holden for evaluation of unusual repetitive behavior, generalized weakness and fall. Patient was evaluated for code stroke - telestroke, right sided numbness and confusion, NIHSS–1, CT head no acute infarct, CTA no LVO, CTP no core infarct.  Patient was not deemed a TNK candidate as was out of 4.5-hour window.  She was not a thrombectomy candidate as there was no LVO.      # Encephalopathy most likely toxic metabolic, elevated total bili and AST.     # Rule out possibility of seizure, less likely CVA    – Recommend EEG monitoring 24 hours  – Obtain ammonia, TFTs and B12 level  – MRI brain    D/W pt, her HCP Holden and Dr. Almodovar

## 2025-04-14 NOTE — CONSULT NOTE ADULT - ASSESSMENT
59-year-old female with a past medical history of alcoholic cirrhosis, esophageal varices status post-banding, probable endocarditis, and C. difficile colitis was admitted on 4/13 via her boyfriend and HCP, Holden, for evaluation of a fall, weakness, and confusion. The patient reports being in her usual state of health until two days ago, when she began experiencing confusion, which she describes as repetitive actions, such as repeatedly counting items. Earlier, while sitting on her bed counting her medications, she slid off the bed and fell to the floor. Her boyfriend later found her on the floor surrounded by pills. Holden prepares her medications and reports that she ran out of magnesium, spironolactone, and mirtazapine before their intended time. The patient is unaware of her medication regimen and takes whatever is placed in her pillbox. The patient denies fever, chills, abdominal pain, chest pain, shortness of breath, nausea, vomiting, diarrhea, or dysuria.    #Probable SBE  #CDAD  #Metabolic encephalopathy   -ID treated bacteremia and suspected endocarditis, psoas abscess with ceftriaxone, metronidazole, and vancomycin for diarrhea 2/2 C. diff.   -no clinical signs of sepsis  -no fever or leukocytosis  -obtain BC x 2 59-year-old female with a past medical history of alcoholic cirrhosis, esophageal varices status post-banding, probable endocarditis, and C. difficile colitis was admitted on 4/13 via her boyfriend and HCP, Holden, for evaluation of a fall, weakness, and confusion. The patient reports being in her usual state of health until two days ago, when she began experiencing confusion, which she describes as repetitive actions, such as repeatedly counting items. Earlier, while sitting on her bed counting her medications, she slid off the bed and fell to the floor. Her boyfriend later found her on the floor surrounded by pills. Holden prepares her medications and reports that she ran out of magnesium, spironolactone, and mirtazapine before their intended time. The patient is unaware of her medication regimen and takes whatever is placed in her pillbox. The patient denies fever, chills, abdominal pain, chest pain, shortness of breath, nausea, vomiting, diarrhea, or dysuria.    #Metabolic encephalopathy ?reason  #Alcoholic cirrhosis  #Prior CDAD  #Recent SBE s/p IV abx therapy   -had recent bacteremia and suspected endocarditis, psoas abscess with ceftriaxone, metronidazole, and vancomycin for diarrhea 2/2 C. diff --> completed abx therapy; PICC newton was removed PTA  -no clinical signs of sepsis  -no fever or leukocytosis  -BC x 2 and urine c/s collected   -agree with meropenem IV and vancomycin PO pending further workup and culture results  -reason for abx use and side effects reviewed with patient; monitor BMP  -old chart reviewed to assess prior cultures  -monitor temps  -f/u CBC  -supportive care  2. Other issues:   -care per medicine    d/w Dr. Almodovar

## 2025-04-14 NOTE — DIETITIAN INITIAL EVALUATION ADULT - PROBLEM SELECTOR PLAN 1
Possible polypharmacy? vs UTI?  -CT imaging noted above neg for acute infarct.  -Ammonia and alcohol levels wnl.   -Pt had been on cephalosporin for psoas ms abscess.   -Possible UTI while on abx? will cover with meropenem.  -F/u cultures to de-escalate abx.   -No other obvious reversible causes of pts behaviour on CBC/CMP  -Hold her mirtazapine for now.   -F/u TSH, B12, Folate, RPR, Utox, PETH  -IV hydration  -ID consult.   -Neuro consult.

## 2025-04-14 NOTE — PROGRESS NOTE ADULT - ASSESSMENT

## 2025-04-14 NOTE — BH CONSULTATION LIAISON ASSESSMENT NOTE - CURRENT MEDICATION
MEDICATIONS  (STANDING):  carvedilol 3.125 milliGRAM(s) Oral every 12 hours  folic acid 1 milliGRAM(s) Oral daily  furosemide    Tablet 40 milliGRAM(s) Oral daily  heparin   Injectable 5000 Unit(s) SubCutaneous every 12 hours  hydrOXYzine hydrochloride 50 milliGRAM(s) Oral every 8 hours  lactulose Syrup 10 Gram(s) Oral two times a day  magnesium oxide 400 milliGRAM(s) Oral three times a day with meals  meropenem Injectable 1000 milliGRAM(s) IV Push every 12 hours  pantoprazole    Tablet 40 milliGRAM(s) Oral before breakfast  rifAXIMin 550 milliGRAM(s) Oral two times a day  sodium chloride 0.9%. 1000 milliLiter(s) (110 mL/Hr) IV Continuous <Continuous>  spironolactone 100 milliGRAM(s) Oral at bedtime  thiamine 100 milliGRAM(s) Oral daily  ursodiol Capsule 300 milliGRAM(s) Oral every 12 hours  vancomycin    Solution 125 milliGRAM(s) Oral every 12 hours    MEDICATIONS  (PRN):  acetaminophen     Tablet .. 650 milliGRAM(s) Oral every 6 hours PRN Temp greater or equal to 38C (100.4F), Mild Pain (1 - 3)  aluminum hydroxide/magnesium hydroxide/simethicone Suspension 30 milliLiter(s) Oral every 4 hours PRN Dyspepsia  ibuprofen  Tablet. 400 milliGRAM(s) Oral once PRN Temp greater or equal to 38C (100.4F), Mild Pain (1 - 3)  melatonin 3 milliGRAM(s) Oral at bedtime PRN Insomnia  midodrine 15 milliGRAM(s) Oral every 8 hours PRN SBP < 110  ondansetron Injectable 4 milliGRAM(s) IV Push once PRN Nausea and/or Vomiting

## 2025-04-14 NOTE — DIETITIAN INITIAL EVALUATION ADULT - PERTINENT MEDS FT
MEDICATIONS  (STANDING):  carvedilol 3.125 milliGRAM(s) Oral every 12 hours  folic acid 1 milliGRAM(s) Oral daily  furosemide    Tablet 40 milliGRAM(s) Oral daily  hydrOXYzine hydrochloride 50 milliGRAM(s) Oral every 8 hours  lactulose Syrup 10 Gram(s) Oral two times a day  magnesium oxide 400 milliGRAM(s) Oral three times a day with meals  meropenem Injectable 1000 milliGRAM(s) IV Push every 12 hours  pantoprazole    Tablet 40 milliGRAM(s) Oral before breakfast  rifAXIMin 550 milliGRAM(s) Oral two times a day  sodium chloride 0.9%. 1000 milliLiter(s) (110 mL/Hr) IV Continuous <Continuous>  spironolactone 100 milliGRAM(s) Oral at bedtime  thiamine 100 milliGRAM(s) Oral daily  ursodiol Capsule 300 milliGRAM(s) Oral every 12 hours  vancomycin    Solution 125 milliGRAM(s) Oral every 12 hours    MEDICATIONS  (PRN):  acetaminophen     Tablet .. 650 milliGRAM(s) Oral every 6 hours PRN Temp greater or equal to 38C (100.4F), Mild Pain (1 - 3)  aluminum hydroxide/magnesium hydroxide/simethicone Suspension 30 milliLiter(s) Oral every 4 hours PRN Dyspepsia  ibuprofen  Tablet. 400 milliGRAM(s) Oral once PRN Temp greater or equal to 38C (100.4F), Mild Pain (1 - 3)  melatonin 3 milliGRAM(s) Oral at bedtime PRN Insomnia  midodrine 15 milliGRAM(s) Oral every 8 hours PRN SBP < 110  ondansetron Injectable 4 milliGRAM(s) IV Push once PRN Nausea and/or Vomiting

## 2025-04-14 NOTE — BH CONSULTATION LIAISON ASSESSMENT NOTE - NSBHCHARTREVIEWINVESTIGATE_PSY_A_CORE FT
EKG 02/11    Ventricular Rate 120 BPM    Atrial Rate 120 BPM    P-R Interval 126 ms    QRS Duration 70 ms    Q-T Interval 284 ms    QTC Calculation(Bazett) 401 ms    P Axis 24 degrees    R Axis 0 degrees    T Axis 17 degrees    Diagnosis Line SINUS TACHYCARDIA  CANNOT RULE OUT ANTERIOR INFARCT , AGE UNDETERMINED  ABNORMAL ECG  NO PREVIOUS ECGS AVAILABLE  Confirmed by BRUNA TEJADA MD ([id number]) on 2/11/2025 4:06:22 PM

## 2025-04-14 NOTE — CONSULT NOTE ADULT - SUBJECTIVE AND OBJECTIVE BOX
CC: 59y old  Female who presents with a chief complaint of confusion      HPI:  59 female with PMHx of alcoholic cirrhosis, esophageal varices, S/P banding, depression, prolonged hospitalization at [facility] Feb through 2025 treated for bacteremia and suspected endocarditis, is currently on liver transplant waitlist, presented to ED on 2025 via her boyfriend/HCP Holden for evaluation of unusual repetitive behavior, generalized weakness and fall.  As per Holden, he found her on the floor, she was too weak to get up, in addition while talking to her she started doing some repetitive activities i.e. counting items, taking out her cards and putting them in order, seemed confused, but was verbalizing normally.  Holden is not sure how long she was on the floor for but was alert when he was talking to her.      Patient was evaluated for code stroke - telestroke consulted for possible right sided numbness and confusion, NIHSS–1, CT head revealed no acute infarct, CTA no LVO, CTP no core infarct.  Patient was not deemed a TNK candidate as was out of 4.5-hour window.  She was not a thrombectomy candidate as there was no LVO.    On exam today, patient seems appropriate, is answering questions, denies headaches, seems aware of the incident but cannot recall why she was counting items.      PAST MEDICAL & SURGICAL HISTORY:  Alcohol abuse  Depression  Withdrawal seizures  History of basal cell carcinoma excision  Squamous cell skin cancer  Hemorrhoids  2019 novel coronavirus disease (COVID-19)  Anemia  H/O vertigo  Alcoholic cirrhosis  H/O esophageal varices  GERD (gastroesophageal reflux disease)  H/O bacteremia  History of bacteremia  H/O Clostridium difficile infection  History of ear, nose, and throat (ENT) surgery  H/O basal cell carcinoma excision  H/O:   S/P hemorrhoidectomy          FAMILY HISTORY:  FH: pancreatic cancer (Mother)  Family history of heart attack (Father)  Family history of CVA (Father)  FH: pancreatic cancer (Mother)  Heavy drinker of alcohol (Sibling)  Family history of stent (Sibling)        Social Hx:  Nonsmoker, no drug or alcohol use      MEDICATIONS  (STANDING):  carvedilol 3.125 milliGRAM(s) Oral every 12 hours  folic acid 1 milliGRAM(s) Oral daily  furosemide    Tablet 40 milliGRAM(s) Oral daily  heparin   Injectable 5000 Unit(s) SubCutaneous every 12 hours  hydrOXYzine hydrochloride 50 milliGRAM(s) Oral every 8 hours  lactulose Syrup 10 Gram(s) Oral two times a day  magnesium oxide 400 milliGRAM(s) Oral three times a day with meals  meropenem Injectable 1000 milliGRAM(s) IV Push every 12 hours  pantoprazole    Tablet 40 milliGRAM(s) Oral before breakfast  rifAXIMin 550 milliGRAM(s) Oral two times a day  sodium chloride 0.9%. 1000 milliLiter(s) (110 mL/Hr) IV Continuous <Continuous>  spironolactone 100 milliGRAM(s) Oral at bedtime  thiamine 100 milliGRAM(s) Oral daily  ursodiol Capsule 300 milliGRAM(s) Oral every 12 hours  vancomycin    Solution 125 milliGRAM(s) Oral every 12 hours       Allergies    Zithromax (Unknown)    Intolerances    morphine (Nausea)      ROS: Pertinent positives in HPI, all other ROS were reviewed and are negative.      Vital Signs Last 24 Hrs  T(C): 37.1 (2025 16:11), Max: 37.1 (2025 16:11)  T(F): 98.7 (2025 16:11), Max: 98.7 (2025 16:11)  HR: 102 (2025 16:11) (101 - 124)  BP: 98/59 (2025 16:11) (98/59 - 109/71)  BP(mean): 76 (2025 20:04) (76 - 76)  RR: 18 (2025 16:11) (17 - 18)  SpO2: 97% (2025 16:11) (95% - 100%)    Parameters below as of 2025 16:11  Patient On (Oxygen Delivery Method): room air      Gen exam:  Normocephalic, awake and alert.  HEENT: PERRLA, EOMI, mild icterus  Neck: Supple.  Respiratory: Breath sounds are clear bilaterally  Cardiovascular: S1 and S2, regular   Extremities:  no edema  Vascular: Caritid Bruit - no  Musculoskeletal: no abnormal movements  Skin: No rashes      Neurological exam:  HF: A x O x 3. Appropriately interactive, normal affect. Speech fluent, No Aphasia or paraphasic errors. Naming /repetition intact   CN: LJ, EOMI, VFF, facial sensation normal, no NLFD, tongue midline,   Motor: No pronator drift, Strength 5/5 in all 4 ext, normal bulk and tone,   Sens: Intact to light touch   Reflexes: Symmetric and normal, downgoing toes b/l  Coord:  No FNFA,  Gait/Balance: Not tested    NIHSS: 0          Labs:       137  |  107  |  19  ----------------------------<  94  3.8   |  21[L]  |  0.81    Ca    9.0      2025 07:39  Phos  4.0       Mg     1.6         TPro  6.6  /  Alb  2.6[L]  /  TBili  3.7[H]  /  DBili  x   /  AST  80[H]  /  ALT  27  /  AlkPhos  138[H]                                9.2    6.43  )-----------( 105      ( 2025 07:39 )             27.8       Radiology:  < from: CT Angio Neck Stroke Protocol w/ IV Cont (25 @ 15:58) >    CT PERFUSION:  Technical limitations: None.    Core infarction: 0 ml  Penumbra / tissue at risk for active ischemia: 0 ml    CTA NECK:    No evidence of significant stenosis or occlusion.    CTA HEAD:    No evidence of significant stenosis or occlusion.      
Patient is a 59y old  Female who presents with a chief complaint of abx management     HPI:  59-year-old female with a past medical history of alcoholic cirrhosis, esophageal varices status post-banding, probable endocarditis, and C. difficile colitis was admitted on 4/13 via her boyfriend and HCP, Holden, for evaluation of a fall, weakness, and confusion. The patient reports being in her usual state of health until two days ago, when she began experiencing confusion, which she describes as repetitive actions, such as repeatedly counting items. Earlier, while sitting on her bed counting her medications, she slid off the bed and fell to the floor. Her boyfriend later found her on the floor surrounded by pills. Holden prepares her medications and reports that she ran out of magnesium, spironolactone, and mirtazapine before their intended time. The patient is unaware of her medication regimen and takes whatever is placed in her pillbox. The patient denies fever, chills, abdominal pain, chest pain, shortness of breath, nausea, vomiting, diarrhea, or dysuria.    Of note patient with prolonged hospitalization at [facility] from 2/10-3/5/25 for initial complaints of weakness, poor PO intake and fever. Was admitted for sepsis. ID treated bacteremia and suspected endocarditis with ceftriaxone, metronidazole, and vancomycin for diarrhea 2/2 C. diff. Pt had 2 neg TTE and endocarditis was considered less likely. During admission, a bilobed psoas abscess was found near vasculature and managed with IV antibiotics. Follow-up imaging showed improvement, and the patient was discharged on PO antibiotics. Pt had recurrence of diarrhea, likely medication-induced, resolved with adjusted lactulose. Due to long term antibiotics for psoas abscess was also continued on PO vanc for C. diff prophylaxis.     PMH: as above  PSH: as above  Meds: per reconciliation sheet, noted below  MEDICATIONS  (STANDING):  carvedilol 3.125 milliGRAM(s) Oral every 12 hours  folic acid 1 milliGRAM(s) Oral daily  furosemide    Tablet 40 milliGRAM(s) Oral daily  hydrOXYzine hydrochloride 50 milliGRAM(s) Oral every 8 hours  lactulose Syrup 10 Gram(s) Oral two times a day  magnesium oxide 400 milliGRAM(s) Oral three times a day with meals  meropenem Injectable 1000 milliGRAM(s) IV Push every 12 hours  pantoprazole    Tablet 40 milliGRAM(s) Oral before breakfast  rifAXIMin 550 milliGRAM(s) Oral two times a day  sodium chloride 0.9%. 1000 milliLiter(s) (110 mL/Hr) IV Continuous <Continuous>  spironolactone 100 milliGRAM(s) Oral at bedtime  thiamine 100 milliGRAM(s) Oral daily  ursodiol Capsule 300 milliGRAM(s) Oral every 12 hours  vancomycin    Solution 125 milliGRAM(s) Oral every 12 hours    MEDICATIONS  (PRN):  acetaminophen     Tablet .. 650 milliGRAM(s) Oral every 6 hours PRN Temp greater or equal to 38C (100.4F), Mild Pain (1 - 3)  aluminum hydroxide/magnesium hydroxide/simethicone Suspension 30 milliLiter(s) Oral every 4 hours PRN Dyspepsia  ibuprofen  Tablet. 400 milliGRAM(s) Oral once PRN Temp greater or equal to 38C (100.4F), Mild Pain (1 - 3)  melatonin 3 milliGRAM(s) Oral at bedtime PRN Insomnia  midodrine 15 milliGRAM(s) Oral every 8 hours PRN SBP < 110  ondansetron Injectable 4 milliGRAM(s) IV Push once PRN Nausea and/or Vomiting    Allergies    Zithromax (Unknown)    Intolerances    morphine (Nausea)    Social: no smoking, no illegal drugs; no recent travel, no exposure to TB  FAMILY HISTORY:  FH: pancreatic cancer (Mother)  Family history of heart attack (Father)  Family history of CVA (Father)  FH: pancreatic cancer (Mother)    Heavy drinker of alcohol (Sibling)    Family history of stent (Sibling)      no history of premature cardiovascular disease in first degree relatives    ROS: the patient is confused, limited, denies pain  All other systems reviewed and are negative    Vital Signs Last 24 Hrs  T(C): 36.8 (14 Apr 2025 08:45), Max: 37.1 (13 Apr 2025 15:45)  T(F): 98.2 (14 Apr 2025 08:45), Max: 98.7 (13 Apr 2025 15:45)  HR: 108 (14 Apr 2025 08:45) (101 - 125)  BP: 109/71 (14 Apr 2025 08:45) (101/62 - 133/62)  BP(mean): 76 (13 Apr 2025 20:04) (66 - 95)  RR: 18 (14 Apr 2025 08:45) (17 - 19)  SpO2: 98% (14 Apr 2025 08:45) (95% - 100%)    Parameters below as of 14 Apr 2025 08:45  Patient On (Oxygen Delivery Method): room air      Daily Height in cm: 170.18 (13 Apr 2025 20:57)    Daily     PE:    Constitutional:  No acute distress  HEENT: NC/AT, EOMI, PERRLA, conjunctivae clear; ears and nose atraumatic; pharynx benign  Neck: supple; thyroid not palpable  Back: no tenderness  Respiratory: respiratory effort normal; clear to auscultation  Cardiovascular: S1S2 regular, no murmurs  Abdomen: soft, not tender, not distended, positive BS; no liver or spleen organomegaly  Genitourinary: no suprapubic tenderness  Lymphatic: no LN palpable  Musculoskeletal: no muscle tenderness, no joint swelling or tenderness  Extremities: no pedal edema  Neurological/ Psychiatric: Alert, judgement and insight impaired; moving all extremities  Skin: no rashes; no palpable lesions    Labs: all available labs reviewed                        9.2    6.43  )-----------( 105      ( 14 Apr 2025 07:39 )             27.8     04-14    137  |  107  |  19  ----------------------------<  94  3.8   |  21[L]  |  0.81    Ca    9.0      14 Apr 2025 07:39  Phos  4.0     04-14  Mg     1.6     04-14    TPro  6.6  /  Alb  2.6[L]  /  TBili  3.7[H]  /  DBili  x   /  AST  80[H]  /  ALT  27  /  AlkPhos  138[H]  04-14     LIVER FUNCTIONS - ( 14 Apr 2025 07:39 )  Alb: 2.6 g/dL / Pro: 6.6 gm/dL / ALK PHOS: 138 U/L / ALT: 27 U/L / AST: 80 U/L / GGT: x           Urinalysis with Rflx Culture (04-13 @ 15:27)  Protein, Urine: Trace mg/dL  Urine Appearance: Clear  Urine Microscopic-Add On (NC) (04-13 @ 15:27)  White Blood Cell - Urine: 1 /HPF  Red Blood Cell - Urine: 3 /HPF    Urinalysis with Rflx Culture (collected 13 Apr 2025 15:27)    Culture - Blood (collected 10 Apr 2025 01:37)  Source: Blood None  Preliminary Report (14 Apr 2025 04:01):    No growth at 4 days    Culture - Blood (collected 10 Apr 2025 01:37)  Source: Blood None  Preliminary Report (14 Apr 2025 04:01):    No growth at 4 days    Urinalysis with Rflx Culture (collected 10 Apr 2025 01:35)      Radiology: all available radiological tests reviewed    Advanced directives addressed: full resuscitation

## 2025-04-14 NOTE — DIETITIAN INITIAL EVALUATION ADULT - ADD RECOMMEND
1) C/w regular diet as tolerated  2) Add ensure + HP shake BID (350kcal, 20g protein)  3) Encourage protein-rich foods, maximize food preferences  4) Monitor bowel movements, if no BM for >3 days, consider implementing bowel regimen.  5) Encourage protein-rich foods, maximize food preferences  6) MVI w/ minerals daily to ensure 100% RDA met  7) Consider adding thiamine 100 mg daily 2/2 poor PO intake/ malnutrition  8) Monitor lytes/ min and replete prn.  9) Confirm goals of care regarding nutrition support  RD will continue to monitor PO intake, labs, hydration, and wt prn.

## 2025-04-14 NOTE — DISCHARGE NOTE NURSING/CASE MANAGEMENT/SOCIAL WORK - PATIENT PORTAL LINK FT
You can access the FollowMyHealth Patient Portal offered by [facility] by registering at the following website: [email or web address]. By joining [employer]’s FollowMyHealth portal, you will also be able to view your health information using other applications (apps) compatible with our system.

## 2025-04-15 ENCOUNTER — TRANSCRIPTION ENCOUNTER (OUTPATIENT)
Age: 60
End: 2025-04-15

## 2025-04-15 LAB
ADD ON TEST-SPECIMEN IN LAB: SIGNIFICANT CHANGE UP
ALBUMIN SERPL ELPH-MCNC: 2.2 G/DL — LOW (ref 3.3–5)
ALP SERPL-CCNC: 155 U/L — HIGH (ref 40–120)
ALT FLD-CCNC: 23 U/L — SIGNIFICANT CHANGE UP (ref 12–78)
AMPHET UR-MCNC: NEGATIVE — SIGNIFICANT CHANGE UP
ANION GAP SERPL CALC-SCNC: 9 MMOL/L — SIGNIFICANT CHANGE UP (ref 5–17)
AST SERPL-CCNC: 65 U/L — HIGH (ref 15–37)
BARBITURATES UR SCN-MCNC: NEGATIVE — SIGNIFICANT CHANGE UP
BENZODIAZ UR-MCNC: NEGATIVE — SIGNIFICANT CHANGE UP
BILIRUB DIRECT SERPL-MCNC: 1.9 MG/DL — HIGH (ref 0–0.3)
BILIRUB INDIRECT FLD-MCNC: 0.7 MG/DL — SIGNIFICANT CHANGE UP (ref 0.2–1)
BILIRUB SERPL-MCNC: 2.6 MG/DL — HIGH (ref 0.2–1.2)
BILIRUB SERPL-MCNC: 2.6 MG/DL — HIGH (ref 0.2–1.2)
BUN SERPL-MCNC: 18 MG/DL — SIGNIFICANT CHANGE UP (ref 7–23)
CALCIUM SERPL-MCNC: 8.7 MG/DL — SIGNIFICANT CHANGE UP (ref 8.5–10.1)
CHLORIDE SERPL-SCNC: 104 MMOL/L — SIGNIFICANT CHANGE UP (ref 96–108)
CO2 SERPL-SCNC: 22 MMOL/L — SIGNIFICANT CHANGE UP (ref 22–31)
COCAINE METAB.OTHER UR-MCNC: NEGATIVE — SIGNIFICANT CHANGE UP
CREAT SERPL-MCNC: 0.84 MG/DL — SIGNIFICANT CHANGE UP (ref 0.5–1.3)
CULTURE RESULTS: SIGNIFICANT CHANGE UP
CULTURE RESULTS: SIGNIFICANT CHANGE UP
EGFR: 79 ML/MIN/1.73M2 — SIGNIFICANT CHANGE UP
EGFR: 79 ML/MIN/1.73M2 — SIGNIFICANT CHANGE UP
FENTANYL UR QL SCN: NEGATIVE — SIGNIFICANT CHANGE UP
GLUCOSE SERPL-MCNC: 222 MG/DL — HIGH (ref 70–99)
HCT VFR BLD CALC: 25.8 % — LOW (ref 34.5–45)
HGB BLD-MCNC: 8.5 G/DL — LOW (ref 11.5–15.5)
IMMATURE PLATELET FRACTION #: 0.7 K/UL — LOW (ref 4.7–11.1)
IMMATURE PLATELET FRACTION %: 0.7 % — LOW (ref 1.6–4.9)
INR BLD: 1.35 RATIO — HIGH (ref 0.85–1.16)
MCHC RBC-ENTMCNC: 26.5 PG — LOW (ref 27–34)
MCHC RBC-ENTMCNC: 32.9 G/DL — SIGNIFICANT CHANGE UP (ref 32–36)
MCV RBC AUTO: 80.4 FL — SIGNIFICANT CHANGE UP (ref 80–100)
MELD SCORE WITH DIALYSIS: 28 POINTS — SIGNIFICANT CHANGE UP
MELD SCORE WITHOUT DIALYSIS: 15 POINTS — SIGNIFICANT CHANGE UP
METHADONE UR-MCNC: NEGATIVE — SIGNIFICANT CHANGE UP
NRBC # BLD AUTO: 0 K/UL — SIGNIFICANT CHANGE UP (ref 0–0)
NRBC # FLD: 0 K/UL — SIGNIFICANT CHANGE UP (ref 0–0)
NRBC BLD AUTO-RTO: 0 /100 WBCS — SIGNIFICANT CHANGE UP (ref 0–0)
OPIATES UR-MCNC: NEGATIVE — SIGNIFICANT CHANGE UP
PCP SPEC-MCNC: SIGNIFICANT CHANGE UP
PCP UR-MCNC: NEGATIVE — SIGNIFICANT CHANGE UP
PLATELET # BLD AUTO: 107 K/UL — LOW (ref 150–400)
PMV BLD: 8.8 FL — SIGNIFICANT CHANGE UP (ref 7–13)
POTASSIUM SERPL-MCNC: 3.1 MMOL/L — LOW (ref 3.5–5.3)
POTASSIUM SERPL-SCNC: 3.1 MMOL/L — LOW (ref 3.5–5.3)
PROT SERPL-MCNC: 6 GM/DL — SIGNIFICANT CHANGE UP (ref 6–8.3)
PROTHROM AB SERPL-ACNC: 15.9 SEC — HIGH (ref 9.9–13.4)
RBC # BLD: 3.21 M/UL — LOW (ref 3.8–5.2)
RBC # FLD: 17.8 % — HIGH (ref 10.3–14.5)
SODIUM SERPL-SCNC: 135 MMOL/L — SIGNIFICANT CHANGE UP (ref 135–145)
SPECIMEN SOURCE: SIGNIFICANT CHANGE UP
SPECIMEN SOURCE: SIGNIFICANT CHANGE UP
THC UR QL: NEGATIVE — SIGNIFICANT CHANGE UP
TSH SERPL-MCNC: 1.91 UU/ML — SIGNIFICANT CHANGE UP (ref 0.34–4.82)
VIT B12 SERPL-MCNC: 1475 PG/ML — HIGH (ref 232–1245)
WBC # BLD: 4.08 K/UL — SIGNIFICANT CHANGE UP (ref 3.8–10.5)
WBC # FLD AUTO: 4.08 K/UL — SIGNIFICANT CHANGE UP (ref 3.8–10.5)

## 2025-04-15 PROCEDURE — 99233 SBSQ HOSP IP/OBS HIGH 50: CPT

## 2025-04-15 PROCEDURE — 99232 SBSQ HOSP IP/OBS MODERATE 35: CPT

## 2025-04-15 RX ORDER — LACTULOSE 10 G/15ML
10 SOLUTION ORAL THREE TIMES A DAY
Refills: 0 | Status: DISCONTINUED | OUTPATIENT
Start: 2025-04-15 | End: 2025-04-16

## 2025-04-15 RX ORDER — METRONIDAZOLE 250 MG
2 TABLET ORAL
Refills: 0 | DISCHARGE

## 2025-04-15 RX ORDER — LIDOCAINE HYDROCHLORIDE 20 MG/ML
1 JELLY TOPICAL EVERY 24 HOURS
Refills: 0 | Status: DISCONTINUED | OUTPATIENT
Start: 2025-04-15 | End: 2025-04-16

## 2025-04-15 RX ORDER — MIRTAZAPINE 30 MG/1
7.5 TABLET, FILM COATED ORAL AT BEDTIME
Refills: 0 | Status: DISCONTINUED | OUTPATIENT
Start: 2025-04-15 | End: 2025-04-16

## 2025-04-15 RX ADMIN — Medication 400 MILLIGRAM(S): at 17:21

## 2025-04-15 RX ADMIN — FUROSEMIDE 40 MILLIGRAM(S): 10 INJECTION INTRAMUSCULAR; INTRAVENOUS at 09:26

## 2025-04-15 RX ADMIN — HEPARIN SODIUM 5000 UNIT(S): 1000 INJECTION INTRAVENOUS; SUBCUTANEOUS at 22:45

## 2025-04-15 RX ADMIN — URSODIOL 300 MILLIGRAM(S): 300 CAPSULE ORAL at 09:24

## 2025-04-15 RX ADMIN — Medication 400 MILLIGRAM(S): at 09:25

## 2025-04-15 RX ADMIN — HEPARIN SODIUM 5000 UNIT(S): 1000 INJECTION INTRAVENOUS; SUBCUTANEOUS at 09:25

## 2025-04-15 RX ADMIN — Medication 400 MILLIGRAM(S): at 11:55

## 2025-04-15 RX ADMIN — MIRTAZAPINE 7.5 MILLIGRAM(S): 30 TABLET, FILM COATED ORAL at 22:47

## 2025-04-15 RX ADMIN — HYDROXYZINE HYDROCHLORIDE 50 MILLIGRAM(S): 25 TABLET, FILM COATED ORAL at 13:04

## 2025-04-15 RX ADMIN — Medication 125 MILLIGRAM(S): at 09:24

## 2025-04-15 RX ADMIN — Medication 40 MILLIGRAM(S): at 05:37

## 2025-04-15 RX ADMIN — MIDODRINE HYDROCHLORIDE 15 MILLIGRAM(S): 5 TABLET ORAL at 11:55

## 2025-04-15 RX ADMIN — MEROPENEM 1000 MILLIGRAM(S): 1 INJECTION INTRAVENOUS at 09:24

## 2025-04-15 RX ADMIN — HYDROXYZINE HYDROCHLORIDE 50 MILLIGRAM(S): 25 TABLET, FILM COATED ORAL at 22:47

## 2025-04-15 RX ADMIN — URSODIOL 300 MILLIGRAM(S): 300 CAPSULE ORAL at 22:46

## 2025-04-15 RX ADMIN — HYDROXYZINE HYDROCHLORIDE 50 MILLIGRAM(S): 25 TABLET, FILM COATED ORAL at 05:36

## 2025-04-15 RX ADMIN — CARVEDILOL 3.12 MILLIGRAM(S): 3.12 TABLET, FILM COATED ORAL at 09:26

## 2025-04-15 RX ADMIN — LACTULOSE 10 GRAM(S): 10 SOLUTION ORAL at 13:04

## 2025-04-15 RX ADMIN — Medication 100 MILLIGRAM(S): at 09:25

## 2025-04-15 RX ADMIN — FOLIC ACID 1 MILLIGRAM(S): 1 TABLET ORAL at 09:26

## 2025-04-15 RX ADMIN — MIDODRINE HYDROCHLORIDE 15 MILLIGRAM(S): 5 TABLET ORAL at 22:46

## 2025-04-15 RX ADMIN — LACTULOSE 10 GRAM(S): 10 SOLUTION ORAL at 22:45

## 2025-04-15 NOTE — DISCHARGE NOTE PROVIDER - NSDCFUSCHEDAPPT_GEN_ALL_CORE_FT
[facility]  HEPATOLOGY [address]   Scheduled Appointment: 04/23/2025    Humberto Zamora  Laceys [facility]tr[facility] Physician [facility]  INFDISEASE [address]  Scheduled Appointment: 05/21/2025

## 2025-04-15 NOTE — DISCHARGE NOTE PROVIDER - NSDCMRMEDTOKEN_GEN_ALL_CORE_FT
acetaminophen 325 mg oral tablet: 2 tab(s) orally every 8 hours As needed Mild Pain (1 - 3), Moderate Pain (4 - 6)  carvedilol 3.125 mg oral tablet: 1 tab(s) orally every 12 hours  Centrum Women&#x27;s oral tablet: 1 tab(s) orally once a day  ferrous sulfate 325 mg (65 mg elemental iron) oral delayed release tablet: 1 tab(s) orally once a day  folic acid 1 mg oral tablet: 1 tab(s) orally once a day  furosemide 40 mg oral tablet: 1 tab(s) orally once a day  hydrOXYzine hydrochloride 50 mg oral tablet: 1 tab(s) orally every 8 hours  lactobacillus acidophilus oral capsule: 2 cap(s) orally 2 times a day  lactulose 10 g/15 mL oral syrup: 15 milliliter(s) orally 2 times a day  magnesium oxide 400 mg oral tablet: 1 tab(s) orally 3 times a day (with meals)  melatonin 3 mg oral tablet: 1 tab(s) orally once a day (at bedtime)  midodrine 5 mg oral tablet: 3 tab(s) orally every 8 hours  mirtazapine 7.5 mg oral tablet: 1 tab(s) orally once a day (at bedtime)  pantoprazole 40 mg oral delayed release tablet: 1 tab(s) orally once a day (before a meal)  rifAXIMin 550 mg oral tablet: 1 tab(s) orally 2 times a day  spironolactone 100 mg oral tablet: 1 tab(s) orally once a day  thiamine 100 mg oral tablet: 1 tab(s) orally once a day  ursodiol 300 mg oral capsule: 1 cap(s) orally every 12 hours   acetaminophen 325 mg oral tablet: 2 tab(s) orally every 8 hours As needed Mild Pain (1 - 3), Moderate Pain (4 - 6)  carvedilol 3.125 mg oral tablet: 1 tab(s) orally every 12 hours  Centrum Women&#x27;s oral tablet: 1 tab(s) orally once a day  ferrous sulfate 325 mg (65 mg elemental iron) oral delayed release tablet: 1 tab(s) orally once a day  folic acid 1 mg oral tablet: 1 tab(s) orally once a day  furosemide 40 mg oral tablet: 1 tab(s) orally once a day  hydrOXYzine hydrochloride 50 mg oral tablet: 1 tab(s) orally every 8 hours  lactobacillus acidophilus oral capsule: 2 cap(s) orally 2 times a day  lactulose 10 g/15 mL oral syrup: 15 milliliter(s) orally 3 times a day Target to have 3-4 bowel movements every 24 hours  magnesium oxide 400 mg oral tablet: 1 tab(s) orally 3 times a day (with meals)  melatonin 3 mg oral tablet: 1 tab(s) orally once a day (at bedtime)  midodrine 5 mg oral tablet: 3 tab(s) orally every 8 hours  mirtazapine 7.5 mg oral tablet: 1 tab(s) orally once a day (at bedtime)  pantoprazole 40 mg oral delayed release tablet: 1 tab(s) orally once a day (before a meal)  rifAXIMin 550 mg oral tablet: 1 tab(s) orally 2 times a day  spironolactone 100 mg oral tablet: 1 tab(s) orally once a day  thiamine 100 mg oral tablet: 1 tab(s) orally once a day  ursodiol 300 mg oral capsule: 1 cap(s) orally every 12 hours

## 2025-04-15 NOTE — PHYSICAL THERAPY INITIAL EVALUATION ADULT - SITTING BALANCE: DYNAMIC
Vital signs noted, see flowsheet.  General: Well nourished/developed. In no acute distress, well appearing and non-toxic.  HEENT: Head normocephalic/atraumatic.  Moist mucous membranes. Auricles/tragi/mastoid non-tender b/l.  TM and ear canal normal b/l without erythema or bulging.  Conjunctiva and sclera clear b/l, EOMI, no ocular redness, discharge or swelling. + CLEAR NASAL DISCHARGE, no sinus tenderness to palpation, no nasal inflammation.  Mouth and pharynx with normal mucosa, no erythema, exudate or vesicles. Uvula midline.   Neck: Soft and supple, full ROM without pain.  Cardiac: Regular rate and rhythm. +S1/S2. Peripheral pulses 2+ and symmetric b/l. No LE edema.  Respiratory: Speaking in full sentences, no evidence of respiratory distress. Lungs clear to ascultation b/l, no wheezes/rhonchi/rales/stridor.   Abdomen: Soft, non-tender, non-distended. No guarding or rebound tenderness. No suprapubic tenderness.  Back: Spine midline and non-tender. No CVAT.  MSK: No muscle or joint tenderness to palpation.  Skin: Intact without rashes/lesions, abrasion, laceration, ecchymosis, cyanosis or jaundice.   Lymph: No cervical lymphadenopathy  Neuro: Awake, alert and oriented to person/place/time/situation. Moves all extremities spontaneously and symmetrically.  No focal deficits or facial droop.    Psych: Non-agitated, cooperative and appropriate. No anxiety. Vital signs noted, see flowsheet.  General: Well nourished/developed. In no acute distress, well appearing and non-toxic.  HEENT: Head normocephalic/atraumatic.  Moist mucous membranes. Auricles/tragi/mastoid non-tender b/l.  TM and ear canal normal b/l without erythema or bulging.  Conjunctiva and sclera clear b/l, EOMI, no ocular redness, discharge or swelling. + CLEAR NASAL DISCHARGE, no sinus tenderness to palpation, no nasal inflammation.  Mouth and pharynx with normal mucosa, no erythema, exudate or vesicles. Uvula midline. No PTA. No drooling. No Abdoul's angina.  Neck: Soft and supple, full ROM without pain.  Cardiac: Regular rate and rhythm. +S1/S2. Peripheral pulses 2+ and symmetric b/l. No LE edema.  Respiratory: Speaking in full sentences, no evidence of respiratory distress. Lungs clear to ascultation b/l, no wheezes/rhonchi/rales/stridor.   Abdomen: Soft, non-tender, non-distended. No guarding or rebound tenderness. No suprapubic tenderness.  Back: Spine midline and non-tender. No CVAT.  MSK: No muscle or joint tenderness to palpation.  Skin: Intact without rashes/lesions, abrasion, laceration, ecchymosis, cyanosis or jaundice.   Lymph: No cervical lymphadenopathy  Neuro: Awake, alert and oriented to person/place/time/situation. Moves all extremities spontaneously and symmetrically.  No focal deficits or facial droop.    Psych: Non-agitated, cooperative and appropriate. No anxiety. good balance

## 2025-04-15 NOTE — PHYSICAL THERAPY INITIAL EVALUATION ADULT - MODALITIES TREATMENT COMMENTS
Left semi-supine in bed, +chair alarm, all lines intact and RN aware. Educ pt on importance of OOB>chair daily, as well as, keeping up with home exercise program. Rec'd c good understanding.

## 2025-04-15 NOTE — DISCHARGE NOTE PROVIDER - NSDCCAREPROVSEEN_GEN_ALL_CORE_FT
Jem, Sree Alford, Ammy Ronquillo, Jua nFrancisco Barrientos, Lidia Lopez, Giovanny Levine, Gamaliel Paige, Don De Leon, Chon Posada, Christopher

## 2025-04-15 NOTE — DISCHARGE NOTE PROVIDER - HOSPITAL COURSE
#Discharge: do not delete    T(C): 37.2 (04-15-25 @ 11:59), Max: 37.2 (04-15-25 @ 11:59)  HR: 93 (04-15-25 @ 11:59) (88 - 108)  BP: 94/58 (04-15-25 @ 16:03) (93/60 - 106/64)  RR: 18 (04-15-25 @ 11:59) (17 - 18)  SpO2: 98% (04-15-25 @ 11:59) (94% - 98%)    Hospital course (by problem):     #AMS (altered mental status).   #Possible polypharmacy? vs UTI?. given history suspect this is not metabolic but possibly polypharmacy or psychiatry. PE does not match history, and pt alert & oriented otherwise.   - CT imaging noted above neg for acute infarct.  - Ammonia alcohol , TSH , b12, folate levels wnl  -Pt had been on cephalosporin for psoas ms abscess but per pt/history course completed   - f/u RPR, Utox, PETH  - s/p hydration  - ID consult appreciated > cultures neg DC abx . per chart review and pt history, had extensive abx course and was extended by 2 weeks on 3/21 so course completed.   - MRI brain: no stroke or bleed,  new T1 shortening in the bilateral globi pallidi and anterior midbrain which can be seen with manganese deposition in the setting of hepatic encephalopathy  - Neuro consult appreciated   - psych consult appreciated > resume remeron     #asymptomatic bacteruria   - unimpressive UA and no other sx  - f/u blood cx: NGTD   - DC meropenem  - ID as above      #Alcoholic cirrhosis.   #Esophageal varices  - Patient on transplant list.   - Liver enzymes elevated but appear to be baseline for patient.   - Continue on Lasix 40mg and Spironolactone 100mg qd  - Continue rifaximin  - Continue lactulose 10mg qd, titrate frequency for BM 3-4 daily > ammonia initially 13 now 63, will increase lactulose to TID as this may be contributing   - Ursodiol 300mg qd, atarax for generalized itch related to bile salt accumulation.      # Psoas muscle abscess.   - per pt/hx , completed course of abx  - c/w lucas while pending cultures   - ID as above, recs appreciated       # C. difficile colitis   # Hx of C. diff   On PO vanc 125 BID for prophylaxis.  Unsure if patient completed course or if patient is chronically supposed to be on prophylaxis due to on going abx for psoas muscle abscess.  - DC  PO vanc as abx DC     #thrombocytopenia  - chronic , stable      #anemia  - baseline fluctuates 7-8  - suspect lower due to dilution from IVF  - stable  - no signs/sx bleeding     # chronic Hypotension.   -Continue midodrine 15mg TID, hold for SBP > 110      Patient was discharged to: home        T(C): 36.7 (04-16-25 @ 11:54), Max: 37 (04-15-25 @ 16:03)  HR: 105 (04-16-25 @ 11:54) (95 - 105)  BP: 113/66 (04-16-25 @ 11:54) (93/57 - 116/76)  RR: 18 (04-16-25 @ 11:54) (16 - 18)  SpO2: 100% (04-16-25 @ 11:54) (93% - 100%)  General: AAOx3; NAD  Head: AT/NC  Eyes: EOMI  CVS: RRR, S1&S2, No murmur, No edema  Respiratory: Lungs CTA B/L; Normal Respiratory Effort  Abdomen/GI: Soft, non-tender, non-distended  Extremities: No cyanosis, No clubbing, No edema  Neuro: AAOx3,, non-focal, reports numbness to RLE however sensation intact on exam, per patient. strength 4/5 LE b/l.   Psych: Appropriate, Cooperative, No depression, No anxiety. flat affect   Skin: Clean, Dry and Intact, bruising on b/l knees   Hospital course (by problem):     #AMS (altered mental status).   #Possible polypharmacy? vs UTI?. given history suspect this is not metabolic but possibly polypharmacy or psychiatry. PE does not match history, and pt alert & oriented otherwise.   - CT imaging noted above neg for acute infarct.  - Ammonia alcohol , TSH , b12, folate levels wnl  -Pt had been on cephalosporin for psoas ms abscess but per pt/history course completed   - f/u RPR, Utox, PETH  - s/p hydration  - ID consult appreciated > cultures neg DC abx . per chart review and pt history, had extensive abx course and was extended by 2 weeks on 3/21 so course completed.   - MRI brain: no stroke or bleed,  new T1 shortening in the bilateral globi pallidi and anterior midbrain which can be seen with manganese deposition in the setting of hepatic encephalopathy  - Neuro consult appreciated   - psych consult appreciated > resume remeron     #asymptomatic bacteruria   - unimpressive UA and no other sx  - f/u blood cx: NGTD   - DC meropenem  - ID as above      #Alcoholic cirrhosis.   #Esophageal varices  - Patient on transplant list.   - Liver enzymes elevated but appear to be baseline for patient.   - Continue on Lasix 40mg and Spironolactone 100mg qd  - Continue rifaximin  - Continue lactulose 10mg qd, titrate frequency for BM 3-4 daily > ammonia initially 13 now 63, will increase lactulose to TID as this may be contributing   - Ursodiol 300mg qd, atarax for generalized itch related to bile salt accumulation.      # Psoas muscle abscess.   - per pt/hx , completed course of abx  - c/w lucas while pending cultures   - ID as above, recs appreciated       # C. difficile colitis   # Hx of C. diff   On PO vanc 125 BID for prophylaxis.  Unsure if patient completed course or if patient is chronically supposed to be on prophylaxis due to on going abx for psoas muscle abscess.  - DC  PO vanc as abx DC     #thrombocytopenia  - chronic , stable      #anemia  - baseline fluctuates 7-8  - suspect lower due to dilution from IVF  - stable  - no signs/sx bleeding     # chronic Hypotension.   -Continue midodrine 15mg TID, hold for SBP > 110      Patient was discharged to: home

## 2025-04-15 NOTE — PROGRESS NOTE ADULT - NUTRITIONAL ASSESSMENT
This patient has been assessed with a concern for Malnutrition and has been determined to have a diagnosis/diagnoses of Moderate protein-calorie malnutrition.    This patient is being managed with:   Diet Regular-  Entered: Apr 13 2025  6:19PM  
This patient has been assessed with a concern for Malnutrition and has been determined to have a diagnosis/diagnoses of Moderate protein-calorie malnutrition.    This patient is being managed with:   Diet Regular-  Entered: Apr 13 2025  6:19PM

## 2025-04-15 NOTE — PROGRESS NOTE ADULT - ASSESSMENT
Late entry note    Called to come to room 9 immediately, heart tones down and not coming back up. When I got to the room, heart tones still down in the 70s and a stat section was called.  NICU and anesthesia paged and patient prepared to be taken to OR #AMS (altered mental status).   #Possible polypharmacy? vs UTI?. given history suspect this is not metabolic but possibly polypharmacy or psychiatry. PE does not match history, and pt alert & oriented otherwise.   - CT imaging noted above neg for acute infarct.  - Ammonia alcohol , TSH , b12, folate levels wnl  -Pt had been on cephalosporin for psoas ms abscess but per pt/history course completed   - f/u RPR, Utox, PETH  - s/p hydration  - ID consult appreciated > cultures neg DC abx . per chart review and pt history, had extensive abx course x 2 weeks on 3/21 so course completed.   - MRI brain: no stroke or bleed,  new T1 shortening in the bilateral globi pallidi and anterior midbrain which can be seen with manganese deposition in the setting of hepatic encephalopathy  - Neuro consult appreciated   - psych consult appreciated, ok to resume remeron     #asymptomatic bacteruria   - unimpressive UA and no other sx  - f/u blood cx: NGTD   - DC meropenem  - ID as above        #Alcoholic cirrhosis.   #Esophageal varices  - Patient on transplant list.   - Liver enzymes elevated but appear to be baseline for patient.   - Continue on Lasix 40mg and Spironolactone 100mg qd  - Continue rifaximin  - Continue lactulose 10mg qd, titrate frequency for BM 3-4 daily > ammonia initially 13 now 63, will increase lactulose to TID as this may be contributing   - Ursodiol 300mg qd, atarax for generalized itch related to bile salt accumulation.      # Psoas muscle abscess.   - per pt/hx , completed course of abx  - c/w lucas while pending cultures   - ID as above, recs appreciated       # C. difficile colitis   # Hx of C. diff   On PO vanc 125 BID for prophylaxis.  Unsure if patient completed course or if patient is chronically supposed to be on prophylaxis due to on going abx for psoas muscle abscess.  - DC  PO vanc as abx DC     #thrombocytopenia  - chronic , stable      # chronic Hypotension.   -Continue midodrine 15mg TID, hold for SBP > 110    #dispo - monitoring ammonia levels on increase lactulose. PT eval. per chart review pt transplant deferred due to high risk relapse with alcohol. if ammonia stable tomorrow and pt can ambulate, DC home. D/w neuro #AMS (altered mental status).   #Possible polypharmacy? vs UTI?. given history suspect this is not metabolic but possibly polypharmacy or psychiatry. PE does not match history, and pt alert & oriented otherwise.   - CT imaging noted above neg for acute infarct.  - Ammonia alcohol , TSH , b12, folate levels wnl  -Pt had been on cephalosporin for psoas ms abscess but per pt/history course completed   - f/u RPR, Utox, PETH  - s/p hydration  - ID consult appreciated > cultures neg DC abx . per chart review and pt history, had extensive abx course x 2 weeks on 3/21 so course completed.   - MRI brain: no stroke or bleed,  new T1 shortening in the bilateral globi pallidi and anterior midbrain which can be seen with manganese deposition in the setting of hepatic encephalopathy  - Neuro consult appreciated   - psych consult appreciated, ok to resume remeron     #asymptomatic bacteruria   - unimpressive UA and no other sx  - f/u blood cx: NGTD   - DC meropenem  - ID as above        #Alcoholic cirrhosis.   #Esophageal varices  - Patient on transplant list.   - Liver enzymes elevated but appear to be baseline for patient.   - Continue on Lasix 40mg and Spironolactone 100mg qd  - Continue rifaximin  - Continue lactulose 10mg qd, titrate frequency for BM 3-4 daily > ammonia initially 13 now 63, will increase lactulose to TID as this may be contributing   - Ursodiol 300mg qd, atarax for generalized itch related to bile salt accumulation.      # Psoas muscle abscess.   - per pt/hx , completed course of abx  - c/w lucas while pending cultures   - ID as above, recs appreciated       # C. difficile colitis   # Hx of C. diff   On PO vanc 125 BID for prophylaxis.  Unsure if patient completed course or if patient is chronically supposed to be on prophylaxis due to on going abx for psoas muscle abscess.  - DC  PO vanc as abx DC     #thrombocytopenia  - chronic , stable      #anemia  - baseline fluctuates 7-8  - suspect lower due to dilution from IVF  - stable  - no signs/sx bleeding     # chronic Hypotension.   -Continue midodrine 15mg TID, hold for SBP > 110    #dispo - monitoring ammonia levels on increase lactulose. PT eval. per chart review pt transplant deferred due to high risk relapse with alcohol. if ammonia stable tomorrow and pt can ambulate, DC home. D/w neuro

## 2025-04-15 NOTE — DISCHARGE NOTE PROVIDER - CARE PROVIDER_API CALL
Micaela Grijalva  Physician Assistant Services  [address] [place], [place] [id number]  Phone: [phone]  Fax: [phone]  Established Patient  Follow Up Time: 1 week

## 2025-04-15 NOTE — PROGRESS NOTE ADULT - TIME BILLING
Time spent coordinating the patient's care. This includes reviewing documentation pertinent to this admission, results and imaging. Further tests, medications, and procedures have been ordered as indicated. Laboratory results and the plan of care were communicated to the patient and family.  Discussed care plan with nursing and will discuss plan and care with appropriate consultant(s).
Time spent coordinating the patient's care. This includes reviewing documentation pertinent to this admission, results and imaging. Further tests, medications, and procedures have been ordered as indicated. Laboratory results and the plan of care were communicated to the patient and family.  Discussed care plan with nursing and will discuss plan and care with appropriate consultant(s).

## 2025-04-15 NOTE — DISCHARGE NOTE PROVIDER - NSDCCPCAREPLAN_GEN_ALL_CORE_FT
PRINCIPAL DISCHARGE DIAGNOSIS  Diagnosis: AMS (altered mental status)  Assessment and Plan of Treatment: You presented with altered mental status. your work up was negative for infection. your thyroid levels are normal. MRI of the brain was consistent with liver disease, which may be contributing to your confusion. Neurology cleared you. Infectious disease cleared you.   Please continue *********      SECONDARY DISCHARGE DIAGNOSES  Diagnosis: Acute UTI  Assessment and Plan of Treatment:     Diagnosis: Generalized muscle weakness  Assessment and Plan of Treatment:     Diagnosis: Paresthesia  Assessment and Plan of Treatment:     Diagnosis: Fall  Assessment and Plan of Treatment:      PRINCIPAL DISCHARGE DIAGNOSIS  Diagnosis: AMS (altered mental status)  Assessment and Plan of Treatment: You presented with altered mental status. your work up was negative for infection. your thyroid levels are normal. MRI of the brain was consistent with liver disease, which may be contributing to your confusion. Neurology cleared you. Infectious disease cleared you.   Continue to titrate lactulose to allow for adequate ammonia clearance from your blody since your liver is unable to. The target is to have 3-4 bowel movements a day. Adjust dose/frequency to allow for that.  Follow up with your liver transplant team and continue the outpatient programs as stipulated by your liver transplant team.      SECONDARY DISCHARGE DIAGNOSES  Diagnosis: Generalized muscle weakness  Assessment and Plan of Treatment:     Diagnosis: Fall  Assessment and Plan of Treatment:

## 2025-04-15 NOTE — PROGRESS NOTE ADULT - ASSESSMENT
59 F with PMHx of alcoholic cirrhosis, esophageal varices, depression, treated for bacteremia and suspected endocarditis in recent past, is currently on liver transplant wait list, presented to ED on 4/13/2025 via her boyfriend/HCP [facility] for evaluation of unusual repetitive behavior, generalized weakness and fall. Patient was evaluated for code stroke - telestroke, right sided numbness and confusion, NIHSS–1, CT head no acute infarct, CTA no LVO, CTP no core infarct.  Patient was not deemed a TNK candidate as was out of 4.5-hour window.  She was not a thrombectomy candidate as there was no LVO.      # Encephalopathy most likely hepatic, elevated NH3, total bili and AST, MRI brain findings consistent with hepatic encephalopathy     # Rule out possibility of seizure, 24-hour EEG monitoring preliminary report shows no seizures.    – Will continue to monitor for seizures overnight and follow-up with EEG reading in a.m.  Management of hepatic dysfunction    – Discussed with and Dr. Cesar Almodovar

## 2025-04-15 NOTE — PHYSICAL THERAPY INITIAL EVALUATION ADULT - IMPAIRED TRANSFERS: SIT/STAND, REHAB EVAL
c/o lightheadedness upon standing, subsides with inc time/impaired balance/impaired postural control/decreased strength

## 2025-04-15 NOTE — PHYSICAL THERAPY INITIAL EVALUATION ADULT - PERTINENT HX OF CURRENT PROBLEM, REHAB EVAL
Pt is a 59y old F with "PMHx of alcoholic cirrhosis, esophageal varices, depression, treated for bacteremia and suspected endocarditis in recent past, is currently on liver transplant wait list, presented to ED on 4/13/2025 via her boyfriend/HCP [facility] for evaluation of unusual repetitive behavior, generalized weakness and fall. Patient was evaluated for code stroke - telestroke, right sided numbness and confusion, NIHSS–1, CT head no acute infarct, CTA no LVO, CTP no core infarct."

## 2025-04-15 NOTE — PHYSICAL THERAPY INITIAL EVALUATION ADULT - ADDITIONAL COMMENTS
Pt is currently between apartments, residing at a hotel. Pt was rec'g home PT services 2x per week. Pt ambulates with a rolling walker or rollator independently.

## 2025-04-16 VITALS
TEMPERATURE: 99 F | DIASTOLIC BLOOD PRESSURE: 70 MMHG | RESPIRATION RATE: 18 BRPM | OXYGEN SATURATION: 94 % | HEART RATE: 105 BPM | SYSTOLIC BLOOD PRESSURE: 104 MMHG

## 2025-04-16 LAB
ALBUMIN SERPL ELPH-MCNC: 2.5 G/DL — LOW (ref 3.3–5)
ALP SERPL-CCNC: 201 U/L — HIGH (ref 40–120)
ALT FLD-CCNC: 23 U/L — SIGNIFICANT CHANGE UP (ref 12–78)
AMMONIA BLD-MCNC: 34 UMOL/L — HIGH (ref 11–32)
ANION GAP SERPL CALC-SCNC: 5 MMOL/L — SIGNIFICANT CHANGE UP (ref 5–17)
AST SERPL-CCNC: 54 U/L — HIGH (ref 15–37)
BILIRUB SERPL-MCNC: 2.7 MG/DL — HIGH (ref 0.2–1.2)
BUN SERPL-MCNC: 15 MG/DL — SIGNIFICANT CHANGE UP (ref 7–23)
CALCIUM SERPL-MCNC: 9.3 MG/DL — SIGNIFICANT CHANGE UP (ref 8.5–10.1)
CHLORIDE SERPL-SCNC: 103 MMOL/L — SIGNIFICANT CHANGE UP (ref 96–108)
CO2 SERPL-SCNC: 28 MMOL/L — SIGNIFICANT CHANGE UP (ref 22–31)
CREAT SERPL-MCNC: 0.62 MG/DL — SIGNIFICANT CHANGE UP (ref 0.5–1.3)
EGFR: 101 ML/MIN/1.73M2 — SIGNIFICANT CHANGE UP
EGFR: 101 ML/MIN/1.73M2 — SIGNIFICANT CHANGE UP
GLUCOSE SERPL-MCNC: 102 MG/DL — HIGH (ref 70–99)
HCT VFR BLD CALC: 28.6 % — LOW (ref 34.5–45)
HGB BLD-MCNC: 9.5 G/DL — LOW (ref 11.5–15.5)
IMMATURE PLATELET FRACTION #: 0.6 K/UL — LOW (ref 4.7–11.1)
IMMATURE PLATELET FRACTION %: 0.6 % — LOW (ref 1.6–4.9)
MCHC RBC-ENTMCNC: 26.6 PG — LOW (ref 27–34)
MCHC RBC-ENTMCNC: 33.2 G/DL — SIGNIFICANT CHANGE UP (ref 32–36)
MCV RBC AUTO: 80.1 FL — SIGNIFICANT CHANGE UP (ref 80–100)
NRBC # BLD AUTO: 0 K/UL — SIGNIFICANT CHANGE UP (ref 0–0)
NRBC # FLD: 0 K/UL — SIGNIFICANT CHANGE UP (ref 0–0)
NRBC BLD AUTO-RTO: 0 /100 WBCS — SIGNIFICANT CHANGE UP (ref 0–0)
PLATELET # BLD AUTO: 97 K/UL — LOW (ref 150–400)
PMV BLD: 8.7 FL — SIGNIFICANT CHANGE UP (ref 7–13)
POTASSIUM SERPL-MCNC: 3.6 MMOL/L — SIGNIFICANT CHANGE UP (ref 3.5–5.3)
POTASSIUM SERPL-SCNC: 3.6 MMOL/L — SIGNIFICANT CHANGE UP (ref 3.5–5.3)
PROT SERPL-MCNC: 6.5 GM/DL — SIGNIFICANT CHANGE UP (ref 6–8.3)
RBC # BLD: 3.57 M/UL — LOW (ref 3.8–5.2)
RBC # FLD: 17.9 % — HIGH (ref 10.3–14.5)
SODIUM SERPL-SCNC: 136 MMOL/L — SIGNIFICANT CHANGE UP (ref 135–145)
WBC # BLD: 3.76 K/UL — LOW (ref 3.8–10.5)
WBC # FLD AUTO: 3.76 K/UL — LOW (ref 3.8–10.5)

## 2025-04-16 PROCEDURE — 99232 SBSQ HOSP IP/OBS MODERATE 35: CPT

## 2025-04-16 PROCEDURE — 99239 HOSP IP/OBS DSCHRG MGMT >30: CPT

## 2025-04-16 RX ORDER — LACTULOSE 10 G/15ML
15 SOLUTION ORAL
Qty: 1350 | Refills: 0
Start: 2025-04-16 | End: 2025-05-15

## 2025-04-16 RX ADMIN — URSODIOL 300 MILLIGRAM(S): 300 CAPSULE ORAL at 11:08

## 2025-04-16 RX ADMIN — HYDROXYZINE HYDROCHLORIDE 50 MILLIGRAM(S): 25 TABLET, FILM COATED ORAL at 05:22

## 2025-04-16 RX ADMIN — HEPARIN SODIUM 5000 UNIT(S): 1000 INJECTION INTRAVENOUS; SUBCUTANEOUS at 11:08

## 2025-04-16 RX ADMIN — Medication 100 MILLIGRAM(S): at 11:16

## 2025-04-16 RX ADMIN — FUROSEMIDE 40 MILLIGRAM(S): 10 INJECTION INTRAMUSCULAR; INTRAVENOUS at 11:10

## 2025-04-16 RX ADMIN — Medication 400 MILLIGRAM(S): at 15:29

## 2025-04-16 RX ADMIN — LACTULOSE 10 GRAM(S): 10 SOLUTION ORAL at 15:29

## 2025-04-16 RX ADMIN — LACTULOSE 10 GRAM(S): 10 SOLUTION ORAL at 05:23

## 2025-04-16 RX ADMIN — Medication 40 MILLIGRAM(S): at 05:23

## 2025-04-16 RX ADMIN — CARVEDILOL 3.12 MILLIGRAM(S): 3.12 TABLET, FILM COATED ORAL at 11:15

## 2025-04-16 RX ADMIN — Medication 400 MILLIGRAM(S): at 08:09

## 2025-04-16 RX ADMIN — FOLIC ACID 1 MILLIGRAM(S): 1 TABLET ORAL at 11:09

## 2025-04-16 RX ADMIN — HYDROXYZINE HYDROCHLORIDE 50 MILLIGRAM(S): 25 TABLET, FILM COATED ORAL at 15:30

## 2025-04-16 NOTE — CHART NOTE - NSCHARTNOTEFT_GEN_A_CORE
Discussion with: Case was discussed with Mariana Higgins on 2025 via audio only consult at 5:45 pm     HISTORY OF PRESENT ILLNESS:  Patient is a 60 y/o F with a PMHx of cirrhosis, awaiting liver transplant, who p/w an acute onset of right sided numbness and confusion, LKW at 4 AM. At present patient has numbness in her right lower extremity and also has numbness by the right neck and shoulder. She has a current NIHSS of 1.     PMH/PSH is PAST MEDICAL & SURGICAL HISTORY:  Alcohol abuse      Depression      Withdrawal seizures      History of basal cell carcinoma excision      Squamous cell skin cancer      Hemorrhoids      2019 novel coronavirus disease (COVID-19)      Anemia      H/O vertigo      Alcoholic cirrhosis      H/O esophageal varices      GERD (gastroesophageal reflux disease)      H/O bacteremia      History of bacteremia      H/O Clostridium difficile infection      History of ear, nose, and throat (ENT) surgery      H/O basal cell carcinoma excision      H/O:       S/P hemorrhoidectomy    Pre-stroke MRS=    *Modified Garza Score   0 - No symptoms.      CT HEAD IMAGING RESULTS:  CT Head:  VENTRICLES AND SULCI: There is mild prominence of cortical sulci,   fissures and ventricles related to involutional changes.  INTRA-AXIAL: No mass effect, acute hemorrhage, or midline shift.  EXTRA-AXIAL: No mass or fluid collection. Basal cisterns are normal in   appearance.    VISUALIZED SINUSES:  Clear.  TYMPANOMASTOID CAVITIES:  Clear.  VISUALIZED ORBITS: Normal.  CALVARIUM: Intact.        IMPRESSION:    No significant interval change    No acute intracranial hemorrhage, mass effect or midline shift.    Mild white matter small vessel ischemic disease.  CT PERFUSION:    TECHNICAL LIMITATIONS: None.    CBF<30%/CORE INFARCTION: 0 mL  TMAX>6s/UNDERPERFUSED TISSUE: 0 mL  MISMATCH VOLUME/TISSUE AT RISK: 0 mL  MISMATCH RATIO: None.      CTA NECK:    AORTIC ARCH AND VISUALIZED GREAT VESSELS: Within normal limits.    RIGHT:  COMMON CAROTID ARTERY: No significant stenosis to the carotid bifurcation.  INTERNAL CAROTID ARTERY: No significant stenosis based on NASCET criteria.  VERTEBRAL ARTERY: Normal in course and caliber to the intracranial   circulation.    LEFT:  COMMON CAROTID ARTERY: No significant stenosis to the carotid bifurcation.  INTERNAL CAROTID ARTERY: No significant stenosis based on NASCET   criteria. Mild to moderate atherosclerotic calcified plaque left proximal   ICA resulting in mild stenosis.  VERTEBRAL ARTERY: Normal in course and caliber to the intracranial   circulation.    VISUALIZED LUNGS: Clear.    MISCELLANEOUS: Straightening of cervical lordosis. Mild retrolisthesis C5   on C6. Multilevel cervical degenerative changes.    CAROTID STENOSIS REFERENCE: Percent (%) stenosis is expressed in terms of   NASCET Criteria. (NASCET = 100x1-(N/D)). N=greatest narrowing. D=normal   distal diameter - MILD = <50% stenosis. - MODERATE = 50-69% stenosis. -   SEVERE = 70-89% stenosis. - HAIRLINE/CRITICAL = 90-99% stenosis. -   OCCLUDED = 100% stenosis.      CTA HEAD:    INTERNAL CAROTID ARTERIES: Bilateral petrous, precavernous, cavernous,   and supraclinoid regions are patent without significant stenosis.    [place]: No aneurysm identified. Tiny aneurysms can be beyond   the resolution of CTA technique.    ANTERIOR CEREBRAL ARTERIES: No significant stenosis or occlusion.  MIDDLE CEREBRAL ARTERIES: No significant stenosis or occlusion.  POSTERIOR CEREBRAL ARTERIES: No significant stenosis or occlusion.    DISTAL VERTEBRAL / BASILAR ARTERIES: No significant stenosis or occlusion.    VENOUS STRUCTURES: No occlusive disease.          IMPRESSION:    CT PERFUSION:  Technical limitations: None.    Core infarction: 0 ml  Penumbra / tissue at risk for active ischemia: 0 ml    CTA NECK:    No evidence of significant stenosis or occlusion.    CTA HEAD:    No evidence of significant stenosis or occlusion.      Labs:  CAPILLARY BLOOD GLUCOSE      POCT Blood Glucose.: 124 mg/dL (2025 16:09)    Platelet Count - Automated: 134 K/uL (25 @ 15:47)    PT/INR - ( 2025 15:47 )   PT: 15.7 sec;   INR: 1.33 ratio         PTT - ( 2025 15:47 )  PTT:37.3 sec  NEUROLOGIC EXAMINATION deferred – interprofessional audio discussion only     Inclusion Criteria:  Clearly defined time onset within 4.5 hours of treatment Yes [] No []    Ischemic stroke with a measurable deficit on NIHSS or a non-measurable deficit that is deemed disabling?  Yes [] no []  or  Unclear time of onset wake-up with stroke symptoms yes [] no[]  If yes:  [] Treatment can be initiated within 4.5 hrs. from symptom recognition  [] MRI can be obtained acutely from the emergency department  [] Not an endovascular stroke therapy candidate  [] Baseline functional independence.  Pre-stroke mRS of 0-1  [] NIHSS less than 26  [] DW-MRI with diffusion-positive. FLAIR – negative lesions indicating timing of stroke onset less than 4.5 hrs.  [] MRI mismatch between abnormal signal on DW-MRI and no visible signal change on FLAIR        Review thrombolytic CONTRAINDICATIONS  [] None    ABSOLUTE EXCLUSION CRITERIA:  [x] Patient outside of the appropriate time window for IV thrombolysis  [] Evidence of intracranial hemorrhage on pretreatment CT scan (CT must be read by an attending radiologist or attending neurologist)  [] Head CT findings suggesting an established acute cerebral infarction as evidenced by esha hypodensity, regardless of size.  [] Clinical presentation consistent with subarachnoid hemorrhage, even with normal CT scan  [] Active internal bleeding  [] Known bleeding diathesis (including but not limited to platelet count < 100,000, use of oral anticoagulants with INR>1.7, use of full dose low molecular       weight heparin within the last 24 hours, use of unfractionated heparin AND a prolonged PTT (> 40sec), use of direct thrombin inhibitor (e.g. dabigatran) or oral direct Factor Xa inhibitor (e.g. rivaroxaban, apixaban) within 48 hours) with any degree of abnormality on any coagulation test; any DOAC taken within 3 hours of presentation, regardless of coagulation test results  [] Systolic pressure >= 185 mmHg or diastolic pressure 110 mmHg on repeated measurements at the time treatment is to begin  [] Care team unable to determine eligibility for IV thrombolysis  [] Patient, family, or surrogate declines and understands risks and benefits of treatment.  [] For wake-up Protocol patients: DW-MRI lesions larger than one-third of the MCA territory    RELATIVE EXCLUSION CRITERIA  [] Isolated neurological deficits (except for aphasia or hemianopsia)  [] stroke severity too mild (non-disabling)  [] Seizure at onset with post-ictal residual neurological impairment  [] Gastrointestinal, genitourinary or respiratory hemorrhage within 21 days   [] Major surgery within 14 days   [] Blood glucose level < 50 or > 400 mG/dL  [] CPR with chest compressions or minor surgery (including liver and kidney biopsy, thoracocentesis, lumbar puncture) within 10 days   [] Arterial puncture at non-compressible site within 7 days   [] Evidence of acute trauma (fracture)   Significant head trauma or prior stroke in the previous 3 months  History of previous intracranial hemorrhage  Intracranial or intraspinal surgery within past 3 months[] Diabetic hemorrhagic retinopathy or ophthalmic bleeding  [] Pregnancy or peripartum; no nursing post- treatment  [] Post-myocardial infarction pericarditis  [] Peritoneal dialysis or hemodialysis or severe hepatic disease   [] Known bacterial endocarditis   [] Life expectancy less than 6 months or sever co-morbid illness   [] Known cerebral vascular malformation, untreated intracranial aneurysm or brain tumor (may consider IV alteplase in patients with CNS lesions that have a very low likelihood of hemorrhage, such as small un-ruptured aneurysms or benign tumors with low vascularity.  [] Social/Rastafarian reason  [] Other ____________________    ASSESSMENT: Patient is a 60 y/o F with a PMHx of cirrhosis, awaiting liver transplant, who p/w an acute onset of right sided numbness and confusion, LKW at 4 AM. At present patient has numbness in her right lower extremity and also has numbness by the right neck and shoulder, NIHSS of 1. CTH read no acute infract. CTA read no LVO. CTP read no perfusion deficits. Patient was not a TNK candidate at the time of consult as she was out of the 4.5 hour window. She was not a neuro IR thrombectomy candidate as there was no LVO on vessel imaging.    RECOMMENDATIONS:  [] obtain MRI brain  [] consider gen neuro consult
EEG preliminary read (not final) on the initial recording hour(s) = x 1     No seizures recorded.  Normal recording so far.   Final report to follow tomorrow morning after completion of study.    Sree Parish DO  Epilepsy Fellow, PGY-5    -------------------------------------------------------------------------------------------------------  [facility] EEG Reading Room Ph#: [phone]  Epilepsy Answering Service after 5PM and before 8:30AM: Ph#: [phone]
Dietitian Brief Note:      *Current Status: Pt referred for the [employer] program via SW referral and RN consult. However, [facility] consult placed on 4/14 -  still pending discharge, pt NOT leaving today on 4/16 as d/w bed-side nurse. Will provide [facility] education prior to discharge.        Viri Salmeron MS, RDN, CDN [phone]

## 2025-04-16 NOTE — PROGRESS NOTE ADULT - SUBJECTIVE AND OBJECTIVE BOX
CHIEF COMPLAINT:    SUBJECTIVE/SIGNIFICANT INTERVAL EVENTS/OVERNIGHT EVENTS:    Review of Systems: 14 Point review of systems reviewed and reported as negative unless otherwise stated in HPI    FROM H&P:  59-year-old female with a past medical history of alcoholic cirrhosis, esophageal varices status post-banding, probable endocarditis, and C. difficile colitis presents to the emergency department via her boyfriend and HCP, Holden, for evaluation of a fall, weakness, and confusion. The patient reports being in her usual state of health until two days ago, when she began experiencing confusion, which she describes as repetitive actions, such as repeatedly counting items. Earlier today, while sitting on her bed counting her medications, she slid off the bed and fell to the floor. Her boyfriend later found her on the floor surrounded by pills. Holden prepares her medications and reports that she ran out of magnesium, spironolactone, and mirtazapine before their intended time. The patient is unaware of her medication regimen and takes whatever is placed in her pillbox. The patient denies fever, chills, abdominal pain, chest pain, shortness of breath, nausea, vomiting, diarrhea, or dysuria.    4/14: pt and boyfriend/HCP at bedside. pt reports "shes confused" however A&Ox4 . boyfriend states she can answer also questions appropriately but per boyfriend more fixated on organization and definitely less ambulatory for past week. boyfriend assists with pill box and admits she may have confused some of pills as some boxes were empty , he lives with her but normally she takes pills herself, but given confusion she made have made errors     PHYSICAL EXAM:    T(C): 36.8 (04-14-25 @ 08:45), Max: 37.1 (04-13-25 @ 15:45)  HR: 103 (04-14-25 @ 12:41) (101 - 124)  BP: 100/61 (04-14-25 @ 12:41) (100/61 - 133/62)  RR: 18 (04-14-25 @ 12:41) (17 - 19)  SpO2: 95% (04-14-25 @ 12:41) (95% - 100%)    General: AAOx3; NAD  Head: AT/NC  Eyes: EOMI  CVS: RRR, S1&S2, No murmur, No edema  Respiratory: Lungs CTA B/L; Normal Respiratory Effort  Abdomen/GI: Soft, non-tender, non-distended  Extremities: No cyanosis, No clubbing, No edema  Neuro: AAOx3,, non-focal, reports numbness to RLE however sensation intact on exam, per patient. strength 4/5 LE b/l.   Psych: Appropriate, Cooperative, No depression, No anxiety. flat affect   Skin: Clean, Dry and Intact, bruising on b/l knees       LABS:                          9.2    6.43  )-----------( 105      ( 14 Apr 2025 07:39 )             27.8     04-14    137  |  107  |  19  ----------------------------<  94  3.8   |  21[L]  |  0.81    Ca    9.0      14 Apr 2025 07:39  Phos  4.0     04-14  Mg     1.6     04-14    TPro  6.6  /  Alb  2.6[L]  /  TBili  3.7[H]  /  DBili  x   /  AST  80[H]  /  ALT  27  /  AlkPhos  138[H]  04-14    SARS-CoV-2: NotDetec (27 Jan 2025 06:00)    CAPILLARY BLOOD GLUCOSE      POCT Blood Glucose.: 124 mg/dL (13 Apr 2025 16:09)      Urinalysis with Rflx Culture (collected 13 Apr 2025 15:27)          RADIOLOGY:      EKG:      ECHO:      PROCEDURES:        I personally reviewed labs, imaging, ekg, orders and vitals.    Discussed case with:  []RN  []CM/SW  []Patient  []Family  []Specialist:        MEDICATIONS  (STANDING):  carvedilol 3.125 milliGRAM(s) Oral every 12 hours  folic acid 1 milliGRAM(s) Oral daily  furosemide    Tablet 40 milliGRAM(s) Oral daily  hydrOXYzine hydrochloride 50 milliGRAM(s) Oral every 8 hours  lactulose Syrup 10 Gram(s) Oral two times a day  magnesium oxide 400 milliGRAM(s) Oral three times a day with meals  meropenem Injectable 1000 milliGRAM(s) IV Push every 12 hours  pantoprazole    Tablet 40 milliGRAM(s) Oral before breakfast  rifAXIMin 550 milliGRAM(s) Oral two times a day  sodium chloride 0.9%. 1000 milliLiter(s) (110 mL/Hr) IV Continuous <Continuous>  spironolactone 100 milliGRAM(s) Oral at bedtime  thiamine 100 milliGRAM(s) Oral daily  ursodiol Capsule 300 milliGRAM(s) Oral every 12 hours  vancomycin    Solution 125 milliGRAM(s) Oral every 12 hours    MEDICATIONS  (PRN):  acetaminophen     Tablet .. 650 milliGRAM(s) Oral every 6 hours PRN Temp greater or equal to 38C (100.4F), Mild Pain (1 - 3)  aluminum hydroxide/magnesium hydroxide/simethicone Suspension 30 milliLiter(s) Oral every 4 hours PRN Dyspepsia  ibuprofen  Tablet. 400 milliGRAM(s) Oral once PRN Temp greater or equal to 38C (100.4F), Mild Pain (1 - 3)  melatonin 3 milliGRAM(s) Oral at bedtime PRN Insomnia  midodrine 15 milliGRAM(s) Oral every 8 hours PRN SBP < 110  ondansetron Injectable 4 milliGRAM(s) IV Push once PRN Nausea and/or Vomiting    
Patient seen lying in bed, looks much perkier today, has no complaints.  24 hr EEG started, preliminary read (not final) on the initial recording hour(s) = x 1, No seizures recorded.    MRI brain reveals bilateral globi pallidi and anterior midbrain which can be seen with manganese deposition in the setting of   hepatic encephalopathy.     Ammonia level elevated–62  B12 and TSH normal    ROS as above, other ROS negative      MEDICATIONS  (STANDING):  carvedilol 3.125 milliGRAM(s) Oral every 12 hours  folic acid 1 milliGRAM(s) Oral daily  furosemide    Tablet 40 milliGRAM(s) Oral daily  heparin   Injectable 5000 Unit(s) SubCutaneous every 12 hours  hydrOXYzine hydrochloride 50 milliGRAM(s) Oral every 8 hours  lactulose Syrup 10 Gram(s) Oral three times a day  lidocaine   4% Patch 1 Patch Transdermal every 24 hours  magnesium oxide 400 milliGRAM(s) Oral three times a day with meals  mirtazapine 7.5 milliGRAM(s) Oral at bedtime  pantoprazole    Tablet 40 milliGRAM(s) Oral before breakfast  rifAXIMin 550 milliGRAM(s) Oral two times a day  sodium chloride 0.9%. 1000 milliLiter(s) (110 mL/Hr) IV Continuous <Continuous>  spironolactone 100 milliGRAM(s) Oral at bedtime  thiamine 100 milliGRAM(s) Oral daily  ursodiol Capsule 300 milliGRAM(s) Oral every 12 hours      Vital Signs Last 24 Hrs  T(C): 37.2 (15 Apr 2025 11:59), Max: 37.2 (15 Apr 2025 11:59)  T(F): 99 (15 Apr 2025 11:59), Max: 99 (15 Apr 2025 11:59)  HR: 93 (15 Apr 2025 11:59) (88 - 108)  BP: 94/58 (15 Apr 2025 16:03) (93/60 - 106/64)  BP(mean): --  RR: 18 (15 Apr 2025 11:59) (17 - 18)  SpO2: 98% (15 Apr 2025 11:59) (94% - 98%)    Parameters below as of 15 Apr 2025 11:59  Patient On (Oxygen Delivery Method): room air    Neurological exam:  HF: A x O x 3. Appropriately interactive, normal affect. Speech fluent, No Aphasia or paraphasic errors. Naming /repetition intact   CN: LJ, EOMI, VFF, facial sensation normal, no NLFD, tongue midline,   Motor: No pronator drift, Strength 5/5 in all 4 ext, normal bulk and tone,   Sens: Intact to light touch   Reflexes: Symmetric and normal, downgoing toes b/l  Coord:  No FNFA,  Gait/Balance: Not tested    NIHSS: 0  Ammonia, Serum: 62 umol/L (04.14.25 @ 21:46)   Thyroid Stimulating Hormone, Serum: 1.91 uU/mL (04.15.25 @ 07:13)   Vitamin B12, Serum: 1475 pg/mL (04.15.25 @ 07:13)                         8.5    4.08  )-----------( 107      ( 15 Apr 2025 07:13 )             25.8     04-15    135  |  104  |  18  ----------------------------<  222[H]  3.1[L]   |  22  |  0.84    Ca    8.7      15 Apr 2025 07:13  Phos  4.0     04-14  Mg     1.6     04-14    TPro  6.0  /  Alb  2.2[L]  /  TBili  2.6[H]  /  DBili  1.9[H]  /  AST  65[H]  /  ALT  23  /  AlkPhos  155[H]  04-15          Radiology report:  - < from: MR Head No Cont (04.14.25 @ 21:17) >  IMPRESSION: New T1 shortening in the bilateral globi pallidi and anterior   midbrain which can be seen with manganese deposition in the setting of   hepatic encephalopathy. Correlate with serum ammonia levels and LFTs.   Correlate for liver cirrhosis.    < from: CT Angio Neck Stroke Protocol w/ IV Cont (04.13.25 @ 15:58) >    CT PERFUSION:  Technical limitations: None.    Core infarction: 0 ml  Penumbra / tissue at risk for active ischemia: 0 ml    CTA NECK:    No evidence of significant stenosis or occlusion.    CTA HEAD:    No evidence of significant stenosis or occlusion.      No acute intracranial hemorrhage or evidence of acute ischemia.      
CHIEF COMPLAINT:    SUBJECTIVE/SIGNIFICANT INTERVAL EVENTS/OVERNIGHT EVENTS:    Review of Systems: 14 Point review of systems reviewed and reported as negative unless otherwise stated in HPI    FROM H&P:  59-year-old female with a past medical history of alcoholic cirrhosis, esophageal varices status post-banding, probable endocarditis, and C. difficile colitis presents to the emergency department via her boyfriend and HCP, Holden, for evaluation of a fall, weakness, and confusion. The patient reports being in her usual state of health until two days ago, when she began experiencing confusion, which she describes as repetitive actions, such as repeatedly counting items. Earlier today, while sitting on her bed counting her medications, she slid off the bed and fell to the floor. Her boyfriend later found her on the floor surrounded by pills. Holden prepares her medications and reports that she ran out of magnesium, spironolactone, and mirtazapine before their intended time. The patient is unaware of her medication regimen and takes whatever is placed in her pillbox. The patient denies fever, chills, abdominal pain, chest pain, shortness of breath, nausea, vomiting, diarrhea, or dysuria.    4/14: pt and boyfriend/HCP at bedside. pt reports "shes confused" however A&Ox4 . boyfriend states she can answer also questions appropriately but per boyfriend more fixated on organization and definitely less ambulatory for past week. boyfriend assists with pill box and admits she may have confused some of pills as some boxes were empty , he lives with her but normally she takes pills herself, but given confusion she made have made errors     4/15: pt reports feeling better, feels more "clear" , having some R sided pain near her liver. still feels weak     PHYSICAL EXAM:    T(C): 36.8 (04-14-25 @ 08:45), Max: 37.1 (04-13-25 @ 15:45)  HR: 103 (04-14-25 @ 12:41) (101 - 124)  BP: 100/61 (04-14-25 @ 12:41) (100/61 - 133/62)  RR: 18 (04-14-25 @ 12:41) (17 - 19)  SpO2: 95% (04-14-25 @ 12:41) (95% - 100%)    General: AAOx3; NAD  Head: AT/NC  Eyes: EOMI  CVS: RRR, S1&S2, No murmur, No edema  Respiratory: Lungs CTA B/L; Normal Respiratory Effort  Abdomen/GI: Soft, non-tender, non-distended  Extremities: No cyanosis, No clubbing, No edema  Neuro: AAOx3,, non-focal, reports numbness to RLE however sensation intact on exam, per patient. strength 4/5 LE b/l.   Psych: Appropriate, Cooperative, No depression, No anxiety. flat affect   Skin: Clean, Dry and Intact, bruising on b/l knees       LABS:                          9.2    6.43  )-----------( 105      ( 14 Apr 2025 07:39 )             27.8     04-14    137  |  107  |  19  ----------------------------<  94  3.8   |  21[L]  |  0.81    Ca    9.0      14 Apr 2025 07:39  Phos  4.0     04-14  Mg     1.6     04-14    TPro  6.6  /  Alb  2.6[L]  /  TBili  3.7[H]  /  DBili  x   /  AST  80[H]  /  ALT  27  /  AlkPhos  138[H]  04-14    SARS-CoV-2: NotDetec (27 Jan 2025 06:00)    CAPILLARY BLOOD GLUCOSE      POCT Blood Glucose.: 124 mg/dL (13 Apr 2025 16:09)      Urinalysis with Rflx Culture (collected 13 Apr 2025 15:27)          RADIOLOGY:      EKG:      ECHO:      PROCEDURES:        I personally reviewed labs, imaging, ekg, orders and vitals.    Discussed case with:  []RN  []CM/SW  []Patient  []Family  []Specialist:        MEDICATIONS  (STANDING):  carvedilol 3.125 milliGRAM(s) Oral every 12 hours  folic acid 1 milliGRAM(s) Oral daily  furosemide    Tablet 40 milliGRAM(s) Oral daily  hydrOXYzine hydrochloride 50 milliGRAM(s) Oral every 8 hours  lactulose Syrup 10 Gram(s) Oral two times a day  magnesium oxide 400 milliGRAM(s) Oral three times a day with meals  meropenem Injectable 1000 milliGRAM(s) IV Push every 12 hours  pantoprazole    Tablet 40 milliGRAM(s) Oral before breakfast  rifAXIMin 550 milliGRAM(s) Oral two times a day  sodium chloride 0.9%. 1000 milliLiter(s) (110 mL/Hr) IV Continuous <Continuous>  spironolactone 100 milliGRAM(s) Oral at bedtime  thiamine 100 milliGRAM(s) Oral daily  ursodiol Capsule 300 milliGRAM(s) Oral every 12 hours  vancomycin    Solution 125 milliGRAM(s) Oral every 12 hours    MEDICATIONS  (PRN):  acetaminophen     Tablet .. 650 milliGRAM(s) Oral every 6 hours PRN Temp greater or equal to 38C (100.4F), Mild Pain (1 - 3)  aluminum hydroxide/magnesium hydroxide/simethicone Suspension 30 milliLiter(s) Oral every 4 hours PRN Dyspepsia  ibuprofen  Tablet. 400 milliGRAM(s) Oral once PRN Temp greater or equal to 38C (100.4F), Mild Pain (1 - 3)  melatonin 3 milliGRAM(s) Oral at bedtime PRN Insomnia  midodrine 15 milliGRAM(s) Oral every 8 hours PRN SBP < 110  ondansetron Injectable 4 milliGRAM(s) IV Push once PRN Nausea and/or Vomiting    
Patient seen lying in bed, looks much perkier today, has no complaints.      24 hr  video-EEG - Normal,  No epileptiform abnormalities or seizures..    MRI brain reveals bilateral globi pallidi and anterior midbrain which can be seen with manganese deposition in the setting of   hepatic encephalopathy.     Ammonia level 34, lowered from 62  B12 and TSH normal      ROS as above, other ROS negative:     MEDICATIONS  (STANDING):  carvedilol 3.125 milliGRAM(s) Oral every 12 hours  folic acid 1 milliGRAM(s) Oral daily  furosemide    Tablet 40 milliGRAM(s) Oral daily  heparin   Injectable 5000 Unit(s) SubCutaneous every 12 hours  hydrOXYzine hydrochloride 50 milliGRAM(s) Oral every 8 hours  lactulose Syrup 10 Gram(s) Oral three times a day  lidocaine   4% Patch 1 Patch Transdermal every 24 hours  magnesium oxide 400 milliGRAM(s) Oral three times a day with meals  mirtazapine 7.5 milliGRAM(s) Oral at bedtime  pantoprazole    Tablet 40 milliGRAM(s) Oral before breakfast  rifAXIMin 550 milliGRAM(s) Oral two times a day  sodium chloride 0.9%. 1000 milliLiter(s) (110 mL/Hr) IV Continuous <Continuous>  spironolactone 100 milliGRAM(s) Oral at bedtime  thiamine 100 milliGRAM(s) Oral daily  ursodiol Capsule 300 milliGRAM(s) Oral every 12 hours      Vital Signs Last 24 Hrs  T(C): 37 (16 Apr 2025 16:27), Max: 37 (16 Apr 2025 16:27)  T(F): 98.6 (16 Apr 2025 16:27), Max: 98.6 (16 Apr 2025 16:27)  HR: 105 (16 Apr 2025 16:27) (95 - 105)  BP: 104/70 (16 Apr 2025 16:27) (96/66 - 116/76)  BP(mean): --  RR: 18 (16 Apr 2025 16:27) (18 - 18)  SpO2: 94% (16 Apr 2025 16:27) (93% - 100%)    Parameters below as of 16 Apr 2025 16:27  Patient On (Oxygen Delivery Method): room air      Neurological exam:  HF: A x O x 3. Appropriately interactive, normal affect. Speech fluent, No Aphasia or paraphasic errors. Naming /repetition intact   CN: LJ, EOMI, VFF, facial sensation normal, no NLFD, tongue midline,   Motor: No pronator drift, Strength 5/5 in all 4 ext, normal bulk and tone,   Sens: Intact to light touch   Reflexes: Symmetric and normal, downgoing toes b/l  Coord:  No FNFA,  Gait/Balance: Not tested      Ammonia, Serum: 34 umol/L (04.16.25 @ 07:39) Ammonia, Serum: 62 umol/L (04.14.25 @ 21:46)   Thyroid Stimulating Hormone, Serum: 1.91 uU/mL (04.15.25 @ 07:13)   Vitamin B12, Serum: 1475 pg/mL (04.15.25 @ 07:13)                           9.5    3.76  )-----------( 97       ( 16 Apr 2025 07:39 )             28.6     04-16    136  |  103  |  15  ----------------------------<  102[H]  3.6   |  28  |  0.62    Ca    9.3      16 Apr 2025 07:39    TPro  6.5  /  Alb  2.5[L]  /  TBili  2.7[H]  /  DBili  x   /  AST  54[H]  /  ALT  23  /  AlkPhos  201[H]  04-16          Radiology report:  - - < from: MR Head No Cont (04.14.25 @ 21:17) >  MPRESSION: New T1 shortening in the bilateral globi pallidi and anterior   midbrain which can be seen with manganese deposition in the setting of   hepatic encephalopathy. Correlate with serum ammonia levels and LFTs.   Correlate for liver cirrhosis.      4/15-4/16/25- 24 hr EEG  EEG Classification / Summary:  Normal EEG in the awake, drowsy, and asleep states.   No focal or epileptiform abnormalities are captured.   No seizures.    Clinical Impression:  Normal video-EEG.  No epileptiform abnormalities or seizures.

## 2025-04-16 NOTE — PROGRESS NOTE ADULT - ASSESSMENT
59 F with PMHx of alcoholic cirrhosis, esophageal varices, depression, treated for bacteremia and suspected endocarditis in recent past, is currently on liver transplant wait list, presented to ED on 4/13/2025 via her boyfriend/HCP Holden for evaluation of unusual repetitive behavior, generalized weakness and fall. Patient was evaluated for code stroke - telestroke, right sided numbness and confusion, NIHSS–1, CT head no acute infarct, CTA no LVO, CTP no core infarct.  Patient was not deemed a TNK candidate as was out of 4.5-hour window.  She was not a thrombectomy candidate as there was no LVO.      # Encephalopathy most likely hepatic, elevated NH3, total bili and AST, MRI brain findings consistent with hepatic encephalopathy ; Ammonia level 34, lowered from 62    # Less likely seizure,  24 hr  video-EEG - Normal,  No epileptiform abnormalities or seizures..    - D/W pt, follow up with neuro as OP if any concern for seizures.  - Management of hepatic dysfunction    – Discussed with Dr. Posada

## 2025-04-16 NOTE — EEG REPORT - NS EEG TEXT BOX
ABRAHAN GOODWIN [id number]     Study Date: 4/15/25 13:39 - 4/16/25 10:02  Duration: 19 hr 21 min  --------------------------------------------------------------------------------------------------  History:  CC/ HPI Patient is a 59y old  Female who presents with a chief complaint of confusion (14 Apr 2025 13:10)    MEDICATIONS  (STANDING):  carvedilol 3.125 milliGRAM(s) Oral every 12 hours  folic acid 1 milliGRAM(s) Oral daily  furosemide    Tablet 40 milliGRAM(s) Oral daily  heparin   Injectable 5000 Unit(s) SubCutaneous every 12 hours  hydrOXYzine hydrochloride 50 milliGRAM(s) Oral every 8 hours  lactulose Syrup 10 Gram(s) Oral three times a day  lidocaine   4% Patch 1 Patch Transdermal every 24 hours  magnesium oxide 400 milliGRAM(s) Oral three times a day with meals  mirtazapine 7.5 milliGRAM(s) Oral at bedtime  pantoprazole    Tablet 40 milliGRAM(s) Oral before breakfast  rifAXIMin 550 milliGRAM(s) Oral two times a day  sodium chloride 0.9%. 1000 milliLiter(s) (110 mL/Hr) IV Continuous <Continuous>  spironolactone 100 milliGRAM(s) Oral at bedtime  thiamine 100 milliGRAM(s) Oral daily  ursodiol Capsule 300 milliGRAM(s) Oral every 12 hours    --------------------------------------------------------------------------------------------------  Study Interpretation:    [[[Abbreviation Key:  PDR=alpha rhythm/posterior dominant rhythm. A-P=anterior posterior.  Amplitude: ‘very low’:<20; ‘low’:20-49; ‘medium’:; ‘high’:>150uV.  Persistence for periodic/rhythmic patterns (% of epoch) ‘rare’:<1%; ‘occasional’:1-10%; ‘frequent’:10-50%; ‘abundant’:50-90%; ‘continuous’:>90%.  Persistence for sporadic discharges: ‘rare’:<1/hr; ‘occasional’:1/min-1/hr; ‘frequent’:>1/min; ‘abundant’:>1/10 sec.  RPP=rhythmic and periodic patterns; GRDA=generalized rhythmic delta activity; FIRDA=frontal intermittent GRDA; LRDA=lateralized rhythmic delta activity; TIRDA=temporal intermittent rhythmic delta activity;  LPD=PLED=lateralized periodic discharges; GPD=generalized periodic discharges; BIPDs =bilateral independent periodic discharges; Mf=multifocal; SIRPDs=stimulus induced rhythmic, periodic, or ictal appearing discharges; BIRDs=brief potentially ictal rhythmic discharges >4 Hz, lasting .5-10s; PFA (paroxysmal bursts >13 Hz or =8 Hz <10s).  Modifiers: +F=with fast component; +S=with spike component; +R=with rhythmic component.  S-B=burst suppression pattern.  Max=maximal. N1-drowsy; N2-stage II sleep; N3-slow wave sleep. SSS/BETS=small sharp spikes/benign epileptiform transients of sleep. HV=hyperventilation; PS=photic stimulation]]]    Daily EEG Visual Analysis    FINDINGS:      Background:  Continuity: Continuous  Symmetry: Symmetric  Posterior dominant rhythm (PDR): 9 Hz, reactive to eye closure. Symmetric low-amplitude frontal beta in wakefulness.  Voltage: Normal  Anterior-Posterior Gradient: Present  Other background findings: None  Breach: Absent    Background Slowing:  Generalized slowing: None  Focal slowing: None    State Changes:   Drowsiness is characterized by fragmentation, attenuation, and slowing of the background activity.  Stage 2 sleep is characterized by symmetric K complexes and sleep spindles.   Slow-wave sleep is characterized by diffuse delta activity.    Interictal Findings:  None    Electrographic and Electroclinical seizures:  None    Other Clinical Events:  None    Activation Procedures:   Hyperventilation is not performed.    Photic stimulation is not performed.     Artifacts:  Intermittent myogenic and movement artifacts are present. P4 electrode is disconnected for part of the study.    Single-lead EKG: Regular rhythm    EEG Classification / Summary:  Normal EEG in the awake, drowsy, and asleep states.   No focal or epileptiform abnormalities are captured.   No seizures.    Clinical Impression:  Normal video-EEG.  No epileptiform abnormalities or seizures.          -------------------------------------------------------------------------------------------------------    Ammy Alford MD  Attending Physician, [facility]    -------------------------------------------------------------------------------------------------------    To reach EEG technologist:  Please use the pager number for the appropriate hospital or contact the .  At [facility] - Pager #: [phone]    To reach EEG-reading physician:  EEG Reading Room Phone #: [phone]  Epilepsy Answering Service after 5PM and before 8:30AM: Phone #: [phone]

## 2025-04-17 ENCOUNTER — NON-APPOINTMENT (OUTPATIENT)
Age: 60
End: 2025-04-17

## 2025-04-17 LAB
PETH 16:0/18:1: NEGATIVE NG/ML — SIGNIFICANT CHANGE UP
PETH 16:0/18:2: NEGATIVE NG/ML — SIGNIFICANT CHANGE UP
PETH COMMENTS: SIGNIFICANT CHANGE UP

## 2025-04-21 ENCOUNTER — LABORATORY RESULT (OUTPATIENT)
Age: 60
End: 2025-04-21

## 2025-04-22 ENCOUNTER — RX RENEWAL (OUTPATIENT)
Age: 60
End: 2025-04-22

## 2025-04-22 RX ORDER — FERROUS SULFATE 324(65)MG
324 (65 FE) TABLET, DELAYED RELEASE (ENTERIC COATED) ORAL
Qty: 30 | Refills: 0 | Status: ACTIVE | COMMUNITY
Start: 2025-04-22 | End: 1900-01-01

## 2025-04-23 ENCOUNTER — NON-APPOINTMENT (OUTPATIENT)
Age: 60
End: 2025-04-23

## 2025-04-23 ENCOUNTER — APPOINTMENT (OUTPATIENT)
Dept: HEPATOLOGY | Facility: CLINIC | Age: 60
End: 2025-04-23
Payer: MEDICAID

## 2025-04-23 VITALS
WEIGHT: 112 LBS | OXYGEN SATURATION: 97 % | HEART RATE: 78 BPM | BODY MASS INDEX: 17.58 KG/M2 | DIASTOLIC BLOOD PRESSURE: 66 MMHG | HEIGHT: 67 IN | TEMPERATURE: 98 F | SYSTOLIC BLOOD PRESSURE: 105 MMHG

## 2025-04-23 DIAGNOSIS — K76.82 HEPATIC ENCEPHALOPATHY: ICD-10-CM

## 2025-04-23 DIAGNOSIS — K70.30 ALCOHOLIC CIRRHOSIS OF LIVER WITHOUT ASCITES: ICD-10-CM

## 2025-04-23 DIAGNOSIS — Z88.5 ALLERGY STATUS TO NARCOTIC AGENT: ICD-10-CM

## 2025-04-23 DIAGNOSIS — F10.20 ALCOHOL DEPENDENCE, UNCOMPLICATED: ICD-10-CM

## 2025-04-23 DIAGNOSIS — K74.60 UNSPECIFIED CIRRHOSIS OF LIVER: ICD-10-CM

## 2025-04-23 DIAGNOSIS — I95.89 OTHER HYPOTENSION: ICD-10-CM

## 2025-04-23 DIAGNOSIS — D69.6 THROMBOCYTOPENIA, UNSPECIFIED: ICD-10-CM

## 2025-04-23 DIAGNOSIS — I85.00 ESOPHAGEAL VARICES W/OUT BLEEDING: ICD-10-CM

## 2025-04-23 DIAGNOSIS — Z88.1 ALLERGY STATUS TO OTHER ANTIBIOTIC AGENTS: ICD-10-CM

## 2025-04-23 DIAGNOSIS — D64.9 ANEMIA, UNSPECIFIED: ICD-10-CM

## 2025-04-23 DIAGNOSIS — K68.12 PSOAS MUSCLE ABSCESS: ICD-10-CM

## 2025-04-23 DIAGNOSIS — F17.200 NICOTINE DEPENDENCE, UNSPECIFIED, UNCOMPLICATED: ICD-10-CM

## 2025-04-23 DIAGNOSIS — G92.8 OTHER TOXIC ENCEPHALOPATHY: ICD-10-CM

## 2025-04-23 DIAGNOSIS — I85.00 ESOPHAGEAL VARICES WITHOUT BLEEDING: ICD-10-CM

## 2025-04-23 DIAGNOSIS — Z79.899 OTHER LONG TERM (CURRENT) DRUG THERAPY: ICD-10-CM

## 2025-04-23 DIAGNOSIS — E44.0 MODERATE PROTEIN-CALORIE MALNUTRITION: ICD-10-CM

## 2025-04-23 DIAGNOSIS — Z86.16 PERSONAL HISTORY OF COVID-19: ICD-10-CM

## 2025-04-23 DIAGNOSIS — T36.8X5A ADVERSE EFFECT OF OTHER SYSTEMIC ANTIBIOTICS, INITIAL ENCOUNTER: ICD-10-CM

## 2025-04-23 DIAGNOSIS — R79.89 OTHER SPECIFIED ABNORMAL FINDINGS OF BLOOD CHEMISTRY: ICD-10-CM

## 2025-04-23 DIAGNOSIS — F05 DELIRIUM DUE TO KNOWN PHYSIOLOGICAL CONDITION: ICD-10-CM

## 2025-04-23 DIAGNOSIS — E87.8 OTHER DISORDERS OF ELECTROLYTE AND FLUID BALANCE, NOT ELSEWHERE CLASSIFIED: ICD-10-CM

## 2025-04-23 DIAGNOSIS — A04.72 ENTEROCOLITIS DUE TO CLOSTRIDIUM DIFFICILE, NOT SPECIFIED AS RECURRENT: ICD-10-CM

## 2025-04-23 DIAGNOSIS — K21.9 GASTRO-ESOPHAGEAL REFLUX DISEASE WITHOUT ESOPHAGITIS: ICD-10-CM

## 2025-04-23 DIAGNOSIS — F32.A DEPRESSION, UNSPECIFIED: ICD-10-CM

## 2025-04-23 PROCEDURE — 99214 OFFICE O/P EST MOD 30 MIN: CPT

## 2025-04-23 RX ORDER — SPIRONOLACTONE 100 MG/1
100 TABLET ORAL DAILY
Qty: 90 | Refills: 1 | Status: ACTIVE | COMMUNITY
Start: 2025-04-23 | End: 1900-01-01

## 2025-04-23 RX ORDER — MIRTAZAPINE 7.5 MG/1
7.5 TABLET, FILM COATED ORAL
Qty: 30 | Refills: 3 | Status: ACTIVE | COMMUNITY
Start: 2025-04-23 | End: 1900-01-01

## 2025-04-23 RX ORDER — MAGNESIUM OXIDE 241.3 MG/1000MG
400 TABLET ORAL DAILY
Qty: 30 | Refills: 3 | Status: ACTIVE | COMMUNITY
Start: 2025-04-23 | End: 1900-01-01

## 2025-04-23 RX ORDER — PANTOPRAZOLE SODIUM 40 MG/1
40 GRANULE, DELAYED RELEASE ORAL
Qty: 30 | Refills: 0 | Status: ACTIVE | COMMUNITY
Start: 2025-04-23 | End: 1900-01-01

## 2025-04-25 ENCOUNTER — RX RENEWAL (OUTPATIENT)
Age: 60
End: 2025-04-25

## 2025-04-29 NOTE — PROCEDURE NOTE - NSTOLERANCE_GEN_A_CORE
Patient tolerated procedure well.
c/w home statin
c/w home statin
Patient tolerated procedure well.
c/w home statin
Patient tolerated procedure well.

## 2025-05-01 RX ORDER — FOLIC ACID 1 MG/1
1 TABLET ORAL DAILY
Qty: 90 | Refills: 3 | Status: ACTIVE | COMMUNITY
Start: 2025-04-23 | End: 1900-01-01

## 2025-05-08 NOTE — DIETITIAN INITIAL EVALUATION ADULT - NSICDXPASTSURGICALHX_GEN_ALL_CORE_FT
50 PAST SURGICAL HISTORY:  H/O basal cell carcinoma excision     H/O:      History of ear, nose, and throat (ENT) surgery

## 2025-05-14 ENCOUNTER — RX RENEWAL (OUTPATIENT)
Age: 60
End: 2025-05-14

## 2025-05-15 RX ORDER — AMPICILLIN TRIHYDRATE 500 MG
25 MCG CAPSULE ORAL
Qty: 30 | Refills: 2 | Status: ACTIVE | COMMUNITY
Start: 2025-05-15 | End: 1900-01-01

## 2025-05-21 ENCOUNTER — APPOINTMENT (OUTPATIENT)
Dept: HEPATOLOGY | Facility: CLINIC | Age: 60
End: 2025-05-21
Payer: MEDICAID

## 2025-05-21 ENCOUNTER — APPOINTMENT (OUTPATIENT)
Dept: INFECTIOUS DISEASE | Facility: CLINIC | Age: 60
End: 2025-05-21
Payer: MEDICAID

## 2025-05-21 VITALS
OXYGEN SATURATION: 97 % | WEIGHT: 112 LBS | HEART RATE: 90 BPM | BODY MASS INDEX: 17.58 KG/M2 | HEIGHT: 67 IN | DIASTOLIC BLOOD PRESSURE: 74 MMHG | TEMPERATURE: 98.3 F | SYSTOLIC BLOOD PRESSURE: 122 MMHG | RESPIRATION RATE: 16 BRPM

## 2025-05-21 DIAGNOSIS — I85.00 ESOPHAGEAL VARICES W/OUT BLEEDING: ICD-10-CM

## 2025-05-21 PROCEDURE — 99213 OFFICE O/P EST LOW 20 MIN: CPT

## 2025-05-21 PROCEDURE — 99214 OFFICE O/P EST MOD 30 MIN: CPT

## 2025-05-23 ENCOUNTER — NON-APPOINTMENT (OUTPATIENT)
Age: 60
End: 2025-05-23

## 2025-05-23 LAB
IGNF NEG CNTRL BLD: NORMAL
M TB IFN-G BLD-IMP: NEGATIVE
MITOGEN IGNF BCKGRD COR BLD-ACNC: 0
MITOGEN IGNF BCKGRD COR BLD-ACNC: 3
MITOGEN IGNF.SPOT COUNT BLD: NORMAL

## 2025-05-28 ENCOUNTER — RESULT REVIEW (OUTPATIENT)
Age: 60
End: 2025-05-28

## 2025-05-30 ENCOUNTER — APPOINTMENT (OUTPATIENT)
Dept: CARDIOLOGY | Facility: CLINIC | Age: 60
End: 2025-05-30
Payer: MEDICAID

## 2025-05-30 VITALS
HEART RATE: 110 BPM | HEIGHT: 67 IN | DIASTOLIC BLOOD PRESSURE: 64 MMHG | OXYGEN SATURATION: 95 % | WEIGHT: 117 LBS | BODY MASS INDEX: 18.36 KG/M2 | SYSTOLIC BLOOD PRESSURE: 110 MMHG

## 2025-05-30 DIAGNOSIS — Z13.6 ENCOUNTER FOR SCREENING FOR CARDIOVASCULAR DISORDERS: ICD-10-CM

## 2025-05-30 DIAGNOSIS — Z01.810 ENCOUNTER FOR PREPROCEDURAL CARDIOVASCULAR EXAMINATION: ICD-10-CM

## 2025-05-30 DIAGNOSIS — R00.0 TACHYCARDIA, UNSPECIFIED: ICD-10-CM

## 2025-05-30 DIAGNOSIS — K74.60 UNSPECIFIED CIRRHOSIS OF LIVER: ICD-10-CM

## 2025-05-30 PROCEDURE — 93000 ELECTROCARDIOGRAM COMPLETE: CPT

## 2025-05-30 PROCEDURE — G2211 COMPLEX E/M VISIT ADD ON: CPT | Mod: NC

## 2025-05-30 PROCEDURE — 99214 OFFICE O/P EST MOD 30 MIN: CPT

## 2025-05-30 RX ORDER — L. ACIDOPHILUS/L.BULGARICUS 1MM CELL
TABLET ORAL TWICE DAILY
Refills: 0 | Status: ACTIVE | COMMUNITY
Start: 2025-05-30

## 2025-05-30 RX ORDER — GLUCOSAMINE HCL/CHONDROITIN SU 500-400 MG
3 CAPSULE ORAL DAILY
Refills: 0 | Status: ACTIVE | COMMUNITY
Start: 2025-05-30

## 2025-05-30 RX ORDER — LACTULOSE 10 G/15ML
10 SOLUTION ORAL EVERY 8 HOURS
Refills: 0 | Status: ACTIVE | COMMUNITY
Start: 2025-05-30

## 2025-06-02 ENCOUNTER — OUTPATIENT (OUTPATIENT)
Dept: OUTPATIENT SERVICES | Facility: HOSPITAL | Age: 60
LOS: 1 days | End: 2025-06-02
Payer: MEDICAID

## 2025-06-02 ENCOUNTER — APPOINTMENT (OUTPATIENT)
Dept: HEPATOLOGY | Facility: HOSPITAL | Age: 60
End: 2025-06-02

## 2025-06-02 ENCOUNTER — TRANSCRIPTION ENCOUNTER (OUTPATIENT)
Age: 60
End: 2025-06-02

## 2025-06-02 VITALS
HEART RATE: 76 BPM | RESPIRATION RATE: 18 BRPM | OXYGEN SATURATION: 98 % | DIASTOLIC BLOOD PRESSURE: 68 MMHG | SYSTOLIC BLOOD PRESSURE: 116 MMHG

## 2025-06-02 VITALS — HEIGHT: 67 IN | WEIGHT: 115.96 LBS | RESPIRATION RATE: 18 BRPM | TEMPERATURE: 97 F

## 2025-06-02 DIAGNOSIS — R18.8 OTHER ASCITES: ICD-10-CM

## 2025-06-02 DIAGNOSIS — Z98.890 OTHER SPECIFIED POSTPROCEDURAL STATES: Chronic | ICD-10-CM

## 2025-06-02 DIAGNOSIS — K74.60 UNSPECIFIED CIRRHOSIS OF LIVER: ICD-10-CM

## 2025-06-02 DIAGNOSIS — Z98.891 HISTORY OF UTERINE SCAR FROM PREVIOUS SURGERY: Chronic | ICD-10-CM

## 2025-06-02 PROCEDURE — 43235 EGD DIAGNOSTIC BRUSH WASH: CPT

## 2025-06-02 RX ADMIN — Medication 30 MILLILITER(S): at 13:40

## 2025-06-02 NOTE — PACU DISCHARGE NOTE - MENTAL STATUS: PATIENT PARTICIPATION
Rx for Amoxil 500 mg # 20; take BID for infection  Continue use of Flonase NSS; 1 spray @ lateral nostril once a day for now;   may use Astelin nasl spray 1 spray @ nostril BID prn allergy sx   Pt. encouraged to follow up with ophthalmologist ( [phone]) re: OD epiphora/watering; r/o lacrimal duct problem  Consider sinus CT pending course  Gentle middle ear insufflation techniques encouraged; politzerization attempted  RTC prn          Awake

## 2025-06-02 NOTE — ASU DISCHARGE PLAN (ADULT/PEDIATRIC) - FINANCIAL ASSISTANCE
[facility] provides services at a reduced cost to those who are determined to be eligible through [facility]’s financial assistance program. Information regarding [facility]’s financial assistance program can be found by going to [email or web address] or by calling [phone].

## 2025-06-02 NOTE — PRE PROCEDURE NOTE - PRE PROCEDURE EVALUATION
Attending Physician: Raudel Bejarano                        Procedure: EGD  Indication for Procedure: Cirrhosis, Hx of varices with bleeding    ________________________________________________________  PAST MEDICAL & SURGICAL HISTORY:  Alcohol abuse      Depression      Withdrawal seizures      History of basal cell carcinoma excision      Squamous cell skin cancer      Hemorrhoids      2019 novel coronavirus disease (COVID-19)      Anemia      H/O vertigo      Alcoholic cirrhosis      H/O esophageal varices      GERD (gastroesophageal reflux disease)      H/O bacteremia      History of bacteremia      H/O Clostridium difficile infection      History of ear, nose, and throat (ENT) surgery      H/O basal cell carcinoma excision      H/O:       S/P hemorrhoidectomy        ALLERGIES:  Zithromax (Unknown)  morphine (Nausea)    HOME MEDICATIONS:  acetaminophen 325 mg oral tablet: 2 tab(s) orally every 8 hours As needed Mild Pain (1 - 3), Moderate Pain (4 - 6)  carvedilol 3.125 mg oral tablet: 1 tab(s) orally every 12 hours  Centrum Women&#x27;s oral tablet: 1 tab(s) orally once a day  ferrous sulfate 325 mg (65 mg elemental iron) oral delayed release tablet: 1 tab(s) orally once a day  folic acid 1 mg oral tablet: 1 tab(s) orally once a day  furosemide 40 mg oral tablet: 1 tab(s) orally once a day  hydrOXYzine hydrochloride 50 mg oral tablet: 1 tab(s) orally every 8 hours  lactobacillus acidophilus oral capsule: 2 cap(s) orally 2 times a day  magnesium oxide 400 mg oral tablet: 1 tab(s) orally 3 times a day (with meals)  melatonin 3 mg oral tablet: 1 tab(s) orally once a day (at bedtime)  midodrine 5 mg oral tablet: 3 tab(s) orally every 8 hours  mirtazapine 7.5 mg oral tablet: 1 tab(s) orally once a day (at bedtime)  pantoprazole 40 mg oral delayed release tablet: 1 tab(s) orally once a day (before a meal)  rifAXIMin 550 mg oral tablet: 1 tab(s) orally 2 times a day  spironolactone 100 mg oral tablet: 1 tab(s) orally once a day  thiamine 100 mg oral tablet: 1 tab(s) orally once a day  ursodiol 300 mg oral capsule: 1 cap(s) orally every 12 hours    AICD/PPM: [ ] yes   [ ] no    PERTINENT LAB DATA:                      PHYSICAL EXAMINATION:    T(C): --  HR: --  BP: --  RR: --  SpO2: --    Constitutional: NAD  HEENT: PERRLA, EOMI,    Neck:  No JVD  Respiratory: CTAB/L  Cardiovascular: S1 and S2  Gastrointestinal: BS+, soft, NT/ND  Extremities: No peripheral edema  Neurological: A/O x 3, no focal deficits  Psychiatric: Normal mood, normal affect  Skin: No rashes    ASA Class: I [ ]  II [ ]  III [X ]  IV [ ]    COMMENTS:    The patient is a suitable candidate for the planned procedure unless box checked [ ]  No, explain:

## 2025-06-03 ENCOUNTER — RX RENEWAL (OUTPATIENT)
Age: 60
End: 2025-06-03

## 2025-06-04 ENCOUNTER — APPOINTMENT (OUTPATIENT)
Dept: CT IMAGING | Facility: CLINIC | Age: 60
End: 2025-06-04
Payer: MEDICAID

## 2025-06-04 ENCOUNTER — OUTPATIENT (OUTPATIENT)
Dept: OUTPATIENT SERVICES | Facility: HOSPITAL | Age: 60
LOS: 1 days | End: 2025-06-04
Payer: MEDICAID

## 2025-06-04 DIAGNOSIS — K68.12 PSOAS MUSCLE ABSCESS: ICD-10-CM

## 2025-06-04 DIAGNOSIS — Z98.890 OTHER SPECIFIED POSTPROCEDURAL STATES: Chronic | ICD-10-CM

## 2025-06-04 DIAGNOSIS — Z98.891 HISTORY OF UTERINE SCAR FROM PREVIOUS SURGERY: Chronic | ICD-10-CM

## 2025-06-04 PROCEDURE — 74177 CT ABD & PELVIS W/CONTRAST: CPT | Mod: 26

## 2025-06-04 PROCEDURE — 74177 CT ABD & PELVIS W/CONTRAST: CPT

## 2025-06-06 ENCOUNTER — OUTPATIENT (OUTPATIENT)
Dept: OUTPATIENT SERVICES | Facility: HOSPITAL | Age: 60
LOS: 1 days | End: 2025-06-06
Payer: MEDICAID

## 2025-06-06 ENCOUNTER — APPOINTMENT (OUTPATIENT)
Dept: CT IMAGING | Facility: CLINIC | Age: 60
End: 2025-06-06
Payer: MEDICAID

## 2025-06-06 DIAGNOSIS — Z00.8 ENCOUNTER FOR OTHER GENERAL EXAMINATION: ICD-10-CM

## 2025-06-06 DIAGNOSIS — Z98.890 OTHER SPECIFIED POSTPROCEDURAL STATES: Chronic | ICD-10-CM

## 2025-06-06 DIAGNOSIS — Z01.810 ENCOUNTER FOR PREPROCEDURAL CARDIOVASCULAR EXAMINATION: ICD-10-CM

## 2025-06-06 DIAGNOSIS — Z98.891 HISTORY OF UTERINE SCAR FROM PREVIOUS SURGERY: Chronic | ICD-10-CM

## 2025-06-06 PROCEDURE — 75574 CT ANGIO HRT W/3D IMAGE: CPT

## 2025-06-06 PROCEDURE — 75574 CT ANGIO HRT W/3D IMAGE: CPT | Mod: 26

## 2025-06-06 NOTE — PROCEDURE NOTE - ATTENDING PROVIDER
Dr. Chavez
Patient c/o lump on the  right  side of buttock started a week ago and its getting worsen. Patient stated he feels feverish yesterday.

## 2025-06-11 ENCOUNTER — NON-APPOINTMENT (OUTPATIENT)
Age: 60
End: 2025-06-11

## 2025-06-11 NOTE — PATIENT PROFILE ADULT - NSPROPTRIGHTBILLOFRIGHTS_GEN_A_NUR
Etta Leong Patient Age: 65 year old  MESSAGE: Interpreting service used: No    Insurance on file confirmed with caller: Yes    IM/FP- Medication Question-     Name of the Pharmacy: [facility]     Name of the medication: AMBIEN    Question about: PT STATES PHARMACY WILL NOT FILL UNTIL OFFICE CONTACTS THEM         Select provider's home site [place]- Connect call to [facility] queue- Route message to provider's clinical support pool    Message read back to caller for accuracy: Yes       ALLERGIES:  Demerol, Iodinated diagnostic agents, Methocarbamol, Morphine, and Hydrocodone-acetaminophen  Current Outpatient Medications   Medication Sig Dispense Refill    zolpidem (AMBIEN) 10 MG tablet Take 1 tablet by mouth nightly as needed for Sleep. 30 tablet 0    HYDROcodone-acetaminophen (NORCO) 5-325 MG per tablet Take 1 tablet by mouth every 6 hours as needed for Pain. 10 tablet 0    traZODone (DESYREL) 100 MG tablet TAKE 2 TABLETS BY MOUTH NIGHTLY 60 tablet 0    tiZANidine (ZANAFLEX) 4 MG tablet Take 1 tablet by mouth every 6 hours as needed (muscle pains). 30 tablet 3    fluoxetine (PROzac) 40 MG capsule Take 1 capsule by mouth once daily 30 capsule 8    atorvastatin (LIPITOR) 10 MG tablet Take 1 tablet by mouth once daily 30 tablet 11    lisinopril-hydroCHLOROthiazide (ZESTORETIC) 10-12.5 MG per tablet Take 1 tablet by mouth daily. 30 tablet 3    flecainide (TAMBOCOR) 50 MG tablet Take 1 tablet by mouth in the morning and 1 tablet in the evening. 60 tablet 11    rivaroxaban (Xarelto) 20 MG Tab Take 1 tablet by mouth daily. 30 tablet 11    verapamil 120 MG 24 hr capsule Take 1 capsule by mouth daily. 30 capsule 11    omeprazole (PrilOSEC) 20 MG capsule Take 20 mg by mouth daily.      naLOXone (NARCAN) 4 MG/0.1ML nasal liquid Spray the content of 1 device into 1 nostril. Call 911. May repeat with 2nd device in alternate nostril if no response in 2-3 minutes. 2 each 0    aspirin 81 MG chewable tablet Chew 1 tablet by  mouth daily.       No current facility-administered medications for this visit.     PHARMACY to use: SEE BELOW           Pharmacy preference(s) on file:   [facility] [address] [place] [address] [address] [facility] [address]  [address] [facility] [address]  [facility] [address]  Phone: [phone] Fax: [phone]      CALL BACK INFO: Ok to leave response (including medical information) on answering machine      PCP: Stephan Davenport MD         INS: Payor: [employer] / Plan: MEDICARE VALUE PLANPPO / Product Type: MEDADV   PATIENT ADDRESS:  [address] [place] [id number]     patient

## 2025-06-16 ENCOUNTER — NON-APPOINTMENT (OUTPATIENT)
Age: 60
End: 2025-06-16

## 2025-06-16 PROBLEM — R93.1 ELEVATED CORONARY ARTERY CALCIUM SCORE: Status: ACTIVE | Noted: 2025-06-16

## 2025-06-16 RX ORDER — PRAVASTATIN SODIUM 10 MG/1
10 TABLET ORAL
Qty: 90 | Refills: 1 | Status: ACTIVE | COMMUNITY
Start: 2025-06-16 | End: 1900-01-01

## 2025-06-17 ENCOUNTER — APPOINTMENT (OUTPATIENT)
Dept: CARDIOLOGY | Facility: CLINIC | Age: 60
End: 2025-06-17
Payer: MEDICAID

## 2025-06-17 PROCEDURE — 93351 STRESS TTE COMPLETE: CPT

## 2025-06-17 PROCEDURE — 93320 DOPPLER ECHO COMPLETE: CPT

## 2025-06-25 NOTE — ASU PATIENT PROFILE, ADULT - HEALTH/HEALTHCARE ANXIETIES, PROFILE
Chief Complaint   Patient presents with    Follow-up     Fibroscan     Vitals:    06/25/25 1444   BP: (!) 116/52   Pulse: 62   Temp: 97.4 °F (36.3 °C)   SpO2: 95%     Have you been to the ER, urgent care clinic since your last visit?  Hospitalized since your last visit?   NO    Have you seen or consulted any other health care providers outside our system since your last visit?   NO    Have you had a mammogram?”   YES - Where: [facility] to request most recent records if not in the chart    No breast cancer screening on file       “Have you had a colorectal cancer screening such as a colonoscopy/FIT/Cologuard?    YES - Type: Colonoscopy - Where: [facility] Nurse/MA[facility]ICIO to request most recent records if not in the chart     No colonoscopy on file  No cologuard on file  No FIT/FOBT on file   No flexible sigmoidoscopy on file           and has no complaints.    The patient is not experiencing the following symptoms which are commonly seen in this liver disorder:   fatigue, or pain in the right side over the liver.     The patient completes all daily activities without any functional limitations.      ASSESSMENT AND PLAN:  Metabolic Associated Steatotic Liver Disease (MASLD)     The diagnosis is based upon imaging, Fiboscan CAP score, features of metabolic syndrome, and   serologic studies that are negative for other causes of chronic liver disease.     OLIVERA Fibrosure was performed in 3/2015.  Results were 0.16 suggesting no fibrosis, 0.56 mild steatosis.    Ultrasound elastography was performed 3/2025 which demonstrated a score of 8.45 which correlates with stage 2 fibrosis. Steatosis was also present.    Assessment of liver fibrosis was performed with Fibroscan in the office today.  The result was 9.0 kPa which correlates with stage 2 septal fibrosis. The CAP score of 360 suggests hepatic steatosis.    Have performed laboratory testing to monitor liver function and degree of liver injury. This included BMP, hepatic panel, CBC with platelet count and INR. Laboratory testing from 3/07/2025 reviewed in detail. Follow-up testing ordered today.    The liver transaminases are elevated. The ALP is normal. The liver function is normal. The platelet count is normal.     If the patient looses 20% of current body weight, which is 56 pounds, down to a weight of of 229 pounds, steatosis should resolve. Once all steatosis has resolved inflammation will resolve. Fibrosis will gradually resolve and the liver could eventually be normal.     The Fibroscan can be repeated every 6-12 months as clinically indicated to assess for fibrosis progression and/or regression.    Counseling for diet and weight loss in patients with confirmed or suspected SLD, MASLD or MASH  The patient was counseled regarding diet and exercise to achieve weight loss. The best diet for  n/a

## 2025-07-01 NOTE — DIETITIAN NUTRITION RISK NOTIFICATION - MALNUTRITION EVALUATION AS DEMONSTRATED BY (ADULTS > 20 YEARS OF AGE)
Progress Note    Date:7/1/2025         Patient Name:Tash Cee       YOB: 1985       Age:39 y.o.    Subjective/HPI     Chief Complaint   Patient presents with   • Hypertension     3 month follow up       HPI State son the zepbound x 7 months- lost weight. State s was seen in ER for cervical LN ha- states   was concerned about side effects of Z but sx resolved with Ab course. Wants labs ordered- due only in nov.   Sleep, mood and anxiety are WNL. BP is ok on low dose toprol-- will refill as well.  States on wellbutrin and prozac for a while for Mood and anxiety sx are well controlled.    Review of Systems   Review of Systems   Constitutional:  Negative for chills and fever.   HENT:  Negative for congestion, ear pain, hearing loss, nosebleeds, sore throat and tinnitus.    Eyes:  Negative for photophobia, pain, discharge and redness.   Respiratory:  Negative for cough, shortness of breath and stridor.    Cardiovascular:  Negative for chest pain, palpitations and leg swelling.   Gastrointestinal:  Negative for abdominal pain, blood in stool, constipation, diarrhea, nausea and vomiting.   Endocrine: Negative for polydipsia.   Genitourinary:  Negative for dysuria, flank pain, frequency, hematuria and urgency.   Musculoskeletal:  Negative for back pain, myalgias and neck pain.   Skin:  Negative for rash.   Allergic/Immunologic: Negative for environmental allergies.   Neurological:  Negative for dizziness, tremors, seizures, weakness and headaches.   Hematological:  Does not bruise/bleed easily.   Psychiatric/Behavioral:  Negative for hallucinations and suicidal ideas. The patient is not nervous/anxious.      Medications     Current Outpatient Medications   Medication Sig Dispense Refill   • rosuvastatin (CRESTOR) 5 MG tablet Take 1 tablet by mouth daily 90 tablet 1   • metoprolol succinate (TOPROL XL) 25 MG extended release tablet TAKE 1 TABLET BY MOUTH EVERY DAY 90 tablet 0   • buPROPion 
Inadequate energy intake.../Loss of subcutaneous fat.../Loss of muscle...

## 2025-07-02 ENCOUNTER — APPOINTMENT (OUTPATIENT)
Dept: HEPATOLOGY | Facility: CLINIC | Age: 60
End: 2025-07-02
Payer: MEDICAID

## 2025-07-02 VITALS
HEART RATE: 86 BPM | DIASTOLIC BLOOD PRESSURE: 74 MMHG | SYSTOLIC BLOOD PRESSURE: 120 MMHG | OXYGEN SATURATION: 97 % | BODY MASS INDEX: 19.3 KG/M2 | TEMPERATURE: 98.7 F | WEIGHT: 123 LBS | HEIGHT: 67 IN | RESPIRATION RATE: 16 BRPM

## 2025-07-02 PROCEDURE — 99214 OFFICE O/P EST MOD 30 MIN: CPT

## 2025-07-03 RX ORDER — GABAPENTIN 100 MG/1
100 CAPSULE ORAL 3 TIMES DAILY
Qty: 90 | Refills: 0 | Status: ACTIVE | COMMUNITY
Start: 2025-07-03 | End: 1900-01-01

## 2025-07-04 PROBLEM — Z01.818 PRE-TRANSPLANT EVALUATION FOR LIVER TRANSPLANT: Status: ACTIVE | Noted: 2025-06-12

## 2025-07-08 NOTE — PATIENT PROFILE ADULT - RESOURCE/ENVIRONMENTAL CONCERNS
Detail Level: Simple Include Pregnancy/Lactation Warning?: Add Automatically Based on Childbearing Potential and Patient Age Azithromycin Counseling:  I discussed with the patient the risks of azithromycin including but not limited to GI upset, allergic reaction, drug rash, diarrhea, and yeast infections. Azithromycin Pregnancy And Lactation Text: This medication is considered safe during pregnancy and is also secreted in breast milk. Bactrim Counseling:  I discussed with the patient the risks of sulfa antibiotics including but not limited to GI upset, allergic reaction, drug rash, diarrhea, dizziness, photosensitivity, and yeast infections.  Rarely, more serious reactions can occur including but not limited to aplastic anemia, agranulocytosis, methemoglobinemia, blood dyscrasias, liver or kidney failure, lung infiltrates or desquamative/blistering drug rashes. Bactrim Pregnancy And Lactation Text: This medication is Pregnancy Category D and is known to cause fetal risk.  It is also excreted in breast milk. Cephalexin Counseling: I counseled the patient regarding use of cephalexin as an antibiotic for prophylactic and/or therapeutic purposes. Cephalexin (commonly prescribed under brand name Keflex) is a cephalosporin antibiotic which is active against numerous classes of bacteria, including most skin bacteria. Side effects may include nausea, diarrhea, gastrointestinal upset, rash, hives, yeast infections, and in rare cases, hepatitis, kidney disease, seizures, fever, confusion, neurologic symptoms, and others. Patients with severe allergies to penicillin medications are cautioned that there is about a 10% incidence of cross-reactivity with cephalosporins. When possible, patients with penicillin allergies should use alternatives to cephalosporins for antibiotic therapy. Cephalexin Pregnancy And Lactation Text: This medication is Pregnancy Category B and considered safe during pregnancy.  It is also excreted in breast milk but can be used safely for shorter doses. Clindamycin Counseling: I counseled the patient regarding use of clindamycin as an antibiotic for prophylactic and/or therapeutic purposes. Clindamycin is active against numerous classes of bacteria, including skin bacteria. Side effects may include nausea, diarrhea, gastrointestinal upset, rash, hives, yeast infections, and in rare cases, colitis. Clindamycin Pregnancy And Lactation Text: This medication can be used in pregnancy if certain situations. Clindamycin is also present in breast milk. Doxycycline Counseling:  Patient counseled regarding possible photosensitivity and increased risk for sunburn.  Patient instructed to avoid sunlight, if possible.  When exposed to sunlight, patients should wear protective clothing, sunglasses, and sunscreen.  The patient was instructed to call the office immediately if the following severe adverse effects occur:  hearing changes, easy bruising/bleeding, severe headache, or vision changes.  The patient verbalized understanding of the proper use and possible adverse effects of doxycycline.  All of the patient's questions and concerns were addressed. Doxycycline Pregnancy And Lactation Text: This medication is Pregnancy Category D and not consider safe during pregnancy. It is also excreted in breast milk but is considered safe for shorter treatment courses. Erythromycin Counseling:  I discussed with the patient the risks of erythromycin including but not limited to GI upset, allergic reaction, drug rash, diarrhea, increase in liver enzymes, and yeast infections. Erythromycin Pregnancy And Lactation Text: This medication is Pregnancy Category B and is considered safe during pregnancy. It is also excreted in breast milk. Metronidazole Counseling:  I discussed with the patient the risks of metronidazole including but not limited to seizures, nausea/vomiting, a metallic taste in the mouth, nausea/vomiting and severe allergy. Metronidazole Pregnancy And Lactation Text: This medication is Pregnancy Category B and considered safe during pregnancy.  It is also excreted in breast milk. Minocycline Counseling: Patient advised regarding possible photosensitivity and discoloration of the teeth, skin, lips, tongue and gums.  Patient instructed to avoid sunlight, if possible.  When exposed to sunlight, patients should wear protective clothing, sunglasses, and sunscreen.  The patient was instructed to call the office immediately if the following severe adverse effects occur:  hearing changes, easy bruising/bleeding, severe headache, or vision changes.  The patient verbalized understanding of the proper use and possible adverse effects of minocycline.  All of the patient's questions and concerns were addressed. Minocycline Pregnancy And Lactation Text: This medication is Pregnancy Category D and not consider safe during pregnancy. It is also excreted in breast milk. Quinolones Counseling:  I discussed with the patient the risks of fluoroquinolones including but not limited to GI upset, allergic reaction, drug rash, diarrhea, dizziness, photosensitivity, yeast infections, liver function test abnormalities, tendonitis/tendon rupture. Quinolones Pregnancy And Lactation Text: This medication is Pregnancy Category C and it isn't know if it is safe during pregnancy. It is also excreted in breast milk. Rifampin Counseling: I discussed with the patient the risks of rifampin including but not limited to liver damage, kidney damage, red-orange body fluids, nausea/vomiting and severe allergy. Rifampin Pregnancy And Lactation Text: This medication is Pregnancy Category C and it isn't know if it is safe during pregnancy. It is also excreted in breast milk and should not be used if you are breast feeding. Sarecycline Counseling: Patient advised regarding possible photosensitivity and discoloration of the teeth, skin, lips, tongue and gums.  Patient instructed to avoid sunlight, if possible.  When exposed to sunlight, patients should wear protective clothing, sunglasses, and sunscreen.  The patient was instructed to call the office immediately if the following severe adverse effects occur:  hearing changes, easy bruising/bleeding, severe headache, or vision changes.  The patient verbalized understanding of the proper use and possible adverse effects of sarecycline.  All of the patient's questions and concerns were addressed. Tetracycline Counseling: Patient counseled regarding possible photosensitivity and increased risk for sunburn.  Patient instructed to avoid sunlight, if possible.  When exposed to sunlight, patients should wear protective clothing, sunglasses, and sunscreen.  The patient was instructed to call the office immediately if the following severe adverse effects occur:  hearing changes, easy bruising/bleeding, severe headache, or vision changes.  The patient verbalized understanding of the proper use and possible adverse effects of tetracycline.  All of the patient's questions and concerns were addressed. Patient understands to avoid pregnancy while on therapy due to potential birth defects. Fluconazole Counseling:  Patient counseled regarding adverse effects of fluconazole including but not limited to headache, diarrhea, nausea, upset stomach, liver function test abnormalities, taste disturbance, and stomach pain.  There is a rare possibility of liver failure that can occur when taking fluconazole.  The patient understands that monitoring of LFTs and kidney function test may be required, especially at baseline. The patient verbalized understanding of the proper use and possible adverse effects of fluconazole.  All of the patient's questions and concerns were addressed. Griseofulvin Counseling:  I discussed with the patient the risks of griseofulvin including but not limited to photosensitivity, cytopenia, liver damage, nausea/vomiting and severe allergy.  The patient understands that this medication is best absorbed when taken with a fatty meal (e.g., ice cream or french fries). Griseofulvin Pregnancy And Lactation Text: This medication is Pregnancy Category X and is known to cause serious birth defects. It is unknown if this medication is excreted in breast milk but breast feeding should be avoided. Itraconazole Counseling:  I discussed with the patient the risks of itraconazole including but not limited to liver damage, nausea/vomiting, neuropathy, and severe allergy.  The patient understands that this medication is best absorbed when taken with acidic beverages such as non-diet cola or ginger ale.  The patient understands that monitoring is required including baseline LFTs and repeat LFTs at intervals.  The patient understands that they are to contact us or the primary physician if concerning signs are noted. Ketoconazole Counseling:   Patient counseled regarding improving absorption with orange juice.  Adverse effects include but are not limited to breast enlargement, headache, diarrhea, nausea, upset stomach, liver function test abnormalities, taste disturbance, and stomach pain.  There is a rare possibility of liver failure that can occur when taking ketoconazole. The patient understands that monitoring of LFTs may be required, especially at baseline. The patient verbalized understanding of the proper use and possible adverse effects of ketoconazole.  All of the patient's questions and concerns were addressed. Ketoconazole Pregnancy And Lactation Text: This medication is Pregnancy Category C and it isn't know if it is safe during pregnancy. It is also excreted in breast milk and breast feeding isn't recommended. Terbinafine Counseling: Patient counseling regarding adverse effects of terbinafine including but not limited to headache, diarrhea, rash, upset stomach, liver function test abnormalities, itching, taste/smell disturbance, nausea, abdominal pain, and flatulence.  There is a rare possibility of liver failure that can occur when taking terbinafine.  The patient understands that a baseline LFT and kidney function test may be required. The patient verbalized understanding of the proper use and possible adverse effects of terbinafine.  All of the patient's questions and concerns were addressed. Terbinafine Pregnancy And Lactation Text: This medication is Pregnancy Category B and is considered safe during pregnancy. It is also excreted in breast milk and breast feeding isn't recommended. Cimetidine Counseling:  I discussed with the patient the risks of Cimetidine including but not limited to gynecomastia, headache, diarrhea, nausea, drowsiness, arrhythmias, pancreatitis, skin rashes, psychosis, bone marrow suppression and kidney toxicity. Doxepin Counseling:  Patient advised that the medication is sedating and not to drive a car after taking this medication. Patient informed of potential adverse effects including but not limited to dry mouth, urinary retention, and blurry vision.  The patient verbalized understanding of the proper use and possible adverse effects of doxepin.  All of the patient's questions and concerns were addressed. Doxepin Pregnancy And Lactation Text: This medication is Pregnancy Category C and it isn't known if it is safe during pregnancy. It is also excreted in breast milk and breast feeding isn't recommended. Hydroxyzine Counseling: Patient advised that the medication is sedating and not to drive a car after taking this medication.  Patient informed of potential adverse effects including but not limited to dry mouth, urinary retention, and blurry vision.  The patient verbalized understanding of the proper use and possible adverse effects of hydroxyzine.  All of the patient's questions and concerns were addressed. Hydroxyzine Pregnancy And Lactation Text: This medication is not safe during pregnancy and should not be taken. It is also excreted in breast milk and breast feeding isn't recommended. Albendazole Counseling:  I discussed with the patient the risks of albendazole including but not limited to cytopenia, kidney damage, nausea/vomiting and severe allergy.  The patient understands that this medication is being used in an off-label manner. Albendazole Pregnancy And Lactation Text: This medication is Pregnancy Category C and it isn't known if it is safe during pregnancy. It is also excreted in breast milk. Ivermectin Counseling:  Patient instructed to take medication on an empty stomach with a full glass of water.  Patient informed of potential adverse effects including but not limited to nausea, diarrhea, dizziness, itching, and swelling of the extremities or lymph nodes.  The patient verbalized understanding of the proper use and possible adverse effects of ivermectin.  All of the patient's questions and concerns were addressed. Adbry Counseling: I discussed with the patient the risks of tralokinumab including but not limited to eye infection and irritation, cold sores, injection site reactions, worsening of asthma, allergic reactions and increased risk of parasitic infection.  Live vaccines should be avoided while taking tralokinumab. The patient understands that monitoring is required and they must alert us or the primary physician if symptoms of infection or other concerning signs are noted. Adbry Pregnancy And Lactation Text: It is unknown if this medication will adversely affect pregnancy or breast feeding. Bimzelx Counseling:  I discussed with the patient the risks of Bimzelx including but not limited to depression, immunosuppression, allergic reactions and infections.  The patient understands that monitoring is required including a PPD at baseline and must alert us or the primary physician if symptoms of infection or other concerning signs are noted. Bimzelx Pregnancy And Lactation Text: This medication crosses the placenta and the safety is uncertain during pregnancy. It is unknown if this medication is present in breast milk. Cimzia Counseling:  I discussed with the patient the risks of Cimzia including but not limited to immunosuppression, allergic reactions and infections.  The patient understands that monitoring is required including a PPD at baseline and must alert us or the primary physician if symptoms of infection or other concerning signs are noted. Cimzia Pregnancy And Lactation Text: This medication crosses the placenta but can be considered safe in certain situations. Cimzia may be excreted in breast milk. Cosentyx Counseling:  I discussed with the patient the risks of Cosentyx including but not limited to worsening of Crohn's disease, immunosuppression, allergic reactions and infections.  The patient understands that monitoring is required including a PPD at baseline and must alert us or the primary physician if symptoms of infection or other concerning signs are noted. Cosentyx Pregnancy And Lactation Text: This medication is Pregnancy Category B and is considered safe during pregnancy. It is unknown if this medication is excreted in breast milk. Dupixent Counseling: I discussed with the patient the risks of dupilumab including but not limited to eye inflammation and irritation, cold sores, injection site reactions, allergic reactions and increased risk of parasitic infection. The patient understands that monitoring is required and they must alert us or the primary physician if symptoms of infection or other concerning signs are noted. Dupixent Pregnancy And Lactation Text: This medication likely crosses the placenta but the risk for the fetus is uncertain. This medication is excreted in breast milk. Ebglyss Counseling: I discussed with the patient the risks of lebrikizumab including but not limited to eye inflammation and irritation, cold sores, injection site reactions, allergic reactions and increased risk of parasitic infection. The patient understands that monitoring is required and they must alert us or the primary physician if symptoms of infection or other concerning signs are noted. Ebglyss Pregnancy And Lactation Text: This medication likely crosses the placenta but the risk for the fetus is uncertain. It is unknown if this medication is excreted in breast milk. Enbrel Counseling:  I discussed with the patient the risks of etanercept including but not limited to myelosuppression, immunosuppression, autoimmune hepatitis, demyelinating diseases, lymphoma, and infections.  The patient understands that monitoring is required including a PPD at baseline and must alert us or the primary physician if symptoms of infection or other concerning signs are noted. Humira Counseling:  I discussed with the patient the risks of adalimumab including but not limited to myelosuppression, immunosuppression, autoimmune hepatitis, demyelinating diseases, lymphoma, and serious infections.  The patient understands that monitoring is required including a PPD at baseline and must alert us or the primary physician if symptoms of infection or other concerning signs are noted. Hyrimoz Counseling:  I discussed with the patient the risks of adalimumab including but not limited to myelosuppression, immunosuppression, autoimmune hepatitis, demyelinating diseases, lymphoma, and serious infections.  The patient understands that monitoring is required including a PPD at baseline and must alert us or the primary physician if symptoms of infection or other concerning signs are noted. Ilumya Counseling: I discussed with the patient the risks of tildrakizumab including but not limited to immunosuppression, malignancy, posterior leukoencephalopathy syndrome, and serious infections.  The patient understands that monitoring is required including a PPD at baseline and must alert us or the primary physician if symptoms of infection or other concerning signs are noted. Ilumya Pregnancy And Lactation Text: The risk during pregnancy and breastfeeding is uncertain with this medication. Infliximab Counseling:  I discussed with the patient the risks of infliximab including but not limited to myelosuppression, immunosuppression, autoimmune hepatitis, demyelinating diseases, lymphoma, and serious infections.  The patient understands that monitoring is required including a PPD at baseline and must alert us or the primary physician if symptoms of infection or other concerning signs are noted. Nemluvio Counseling: I discussed with the patient the risks of nemolizumab including but not limited to headache, gastrointestinal complaints, nasopharyngitis, musculoskeletal complaints, injection site reactions, and allergic reactions. The patient understands that monitoring is required and they must alert us or the primary physician if any side effects are noted. Nemluvio Pregnancy And Lactation Text: It is not known if Nemluvio causes fetal harm or is present in breast milk. Please proceed with caution if patients who are pregnant or breastfeeding. Rituxan Counseling:  I discussed with the patient the risks of Rituxan infusions. Side effects can include infusion reactions, severe drug rashes including mucocutaneous reactions, reactivation of latent hepatitis and other infections and rarely progressive multifocal leukoencephalopathy.  All of the patient's questions and concerns were addressed. Rituxan Pregnancy And Lactation Text: This medication is Pregnancy Category C and it isn't know if it is safe during pregnancy. It is unknown if this medication is excreted in breast milk but similar antibodies are known to be excreted. Siliq Counseling:  I discussed with the patient the risks of Siliq including but not limited to new or worsening depression, suicidal thoughts and behavior, immunosuppression, malignancy, posterior leukoencephalopathy syndrome, and serious infections.  The patient understands that monitoring is required including a PPD at baseline and must alert us or the primary physician if symptoms of infection or other concerning signs are noted. There is also a special program designed to monitor depression which is required with Siliq. Simlandi Counseling:  I discussed with the patient the risks of adalimumab including but not limited to myelosuppression, immunosuppression, autoimmune hepatitis, demyelinating diseases, lymphoma, and serious infections.  The patient understands that monitoring is required including a PPD at baseline and must alert us or the primary physician if symptoms of infection or other concerning signs are noted. Simponi Counseling:  I discussed with the patient the risks of golimumab including but not limited to myelosuppression, immunosuppression, autoimmune hepatitis, demyelinating diseases, lymphoma, and serious infections.  The patient understands that monitoring is required including a PPD at baseline and must alert us or the primary physician if symptoms of infection or other concerning signs are noted. Skyrizi Counseling: I discussed with the patient the risks of risankizumab-rzaa including but not limited to immunosuppression, and serious infections.  The patient understands that monitoring is required including a PPD at baseline and must alert us or the primary physician if symptoms of infection or other concerning signs are noted. Spevigo Counseling: I discussed with the patient the risks of Spevigo including but not limited to fatigue, nasuea, vomiting, headache, pruritus, urinary tract infection, an infusion related reactions.  The patient understands that monitoring is required including screening for tuberculosis at baseline and yearly screening thereafter while continuing Spevigo therapy. They should contact us if symptoms of infection or other concerning signs are noted. Spevigo Pregnancy And Lactation Text: The risk during pregnancy and breastfeeding is uncertain with this medication. This medication does cross the placenta. It is unknown if this medication is found in breast milk. Stelara Counseling:  I discussed with the patient the risks of ustekinumab including but not limited to immunosuppression, malignancy, posterior leukoencephalopathy syndrome, and serious infections.  The patient understands that monitoring is required including a PPD at baseline and must alert us or the primary physician if symptoms of infection or other concerning signs are noted. Taltz Counseling: I discussed with the patient the risks of ixekizumab including but not limited to immunosuppression, serious infections, worsening of inflammatory bowel disease and drug reactions.  The patient understands that monitoring is required including a PPD at baseline and must alert us or the primary physician if symptoms of infection or other concerning signs are noted. Tremfya Counseling: I discussed with the patient the risks of guselkumab including but not limited to immunosuppression, serious infections, and drug reactions.  The patient understands that monitoring is required including a PPD at baseline and must alert us or the primary physician if symptoms of infection or other concerning signs are noted. Xolair Counseling:  Patient informed of potential adverse effects including but not limited to fever, muscle aches, rash and allergic reactions.  The patient verbalized understanding of the proper use and possible adverse effects of Xolair.  All of the patient's questions and concerns were addressed. Xolair Pregnancy And Lactation Text: This medication is Pregnancy Category B and is considered safe during pregnancy. This medication is excreted in breast milk. Erivedge Counseling- I discussed with the patient the risks of Erivedge including but not limited to nausea, vomiting, diarrhea, constipation, weight loss, changes in the sense of taste, decreased appetite, muscle spasms, and hair loss.  The patient verbalized understanding of the proper use and possible adverse effects of Erivedge.  All of the patient's questions and concerns were addressed. Erivedge Pregnancy And Lactation Text: This medication is Pregnancy Category X and is absolutely contraindicated during pregnancy. It is unknown if it is excreted in breast milk. Libtayo Counseling- I discussed with the patient the risks of Libtayo including but not limited to nausea, vomiting, diarrhea, and bone or muscle pain.  The patient verbalized understanding of the proper use and possible adverse effects of Libtayo.  All of the patient's questions and concerns were addressed. Libtayo Pregnancy And Lactation Text: This medication is contraindicated in pregnancy and when breast feeding. Odomzo Counseling- I discussed with the patient the risks of Odomzo including but not limited to nausea, vomiting, diarrhea, constipation, weight loss, changes in the sense of taste, decreased appetite, muscle spasms, and hair loss.  The patient verbalized understanding of the proper use and possible adverse effects of Odomzo.  All of the patient's questions and concerns were addressed. Dutasteride Male Counseling: Dutasteride Counseling:  I discussed with the patient the risks of use of dutasteride including but not limited to decreased libido, decreased ejaculate volume, and gynecomastia. Women who can become pregnant should not handle medication.  All of the patient's questions and concerns were addressed. Dutasteride Female Counseling: Dutasteride Counseling:  I discussed with the patient the risks of use of dutasteride including but not limited to decreased libido and sexual dysfunction. Explained the teratogenic nature of the medication and stressed the importance of not getting pregnant during treatment. All of the patient's questions and concerns were addressed. Dutasteride Pregnancy And Lactation Text: This medication is absolutely contraindicated in women, especially during pregnancy and breast feeding. Feminization of male fetuses is possible if taking while pregnant. Finasteride Male Counseling: Finasteride Counseling:  I discussed with the patient the risks of use of finasteride including but not limited to decreased libido, decreased ejaculate volume, gynecomastia, and depression. Women should not handle medication.  All of the patient's questions and concerns were addressed. Finasteride Female Counseling: Finasteride Counseling:  I discussed with the patient the risks of use of finasteride including but not limited to decreased libido and sexual dysfunction. Explained the teratogenic nature of the medication and stressed the importance of not getting pregnant during treatment. All of the patient's questions and concerns were addressed. Finasteride Pregnancy And Lactation Text: This medication is absolutely contraindicated during pregnancy. It is unknown if it is excreted in breast milk. Birth Control Pills Counseling: Birth Control Pill Counseling: I discussed with the patient the potential side effects of OCPs including but not limited to increased risk of stroke, heart attack, thrombophlebitis, deep venous thrombosis, hepatic adenomas, breast changes, GI upset, headaches, and depression.  The patient verbalized understanding of the proper use and possible adverse effects of OCPs. All of the patient's questions and concerns were addressed. Birth Control Pills Pregnancy And Lactation Text: This medication should be avoided if pregnant and for the first 30 days post-partum. Spironolactone Counseling: Patient advised regarding risks of diarrhea, abdominal pain, hyperkalemia, birth defects (for female patients), liver toxicity and renal toxicity. The patient may need blood work to monitor liver and kidney function and potassium levels while on therapy. The patient verbalized understanding of the proper use and possible adverse effects of spironolactone.  All of the patient's questions and concerns were addressed. Spironolactone Pregnancy And Lactation Text: This medication can cause feminization of the male fetus and should be avoided during pregnancy. The active metabolite is also found in breast milk. Azathioprine Counseling:  I discussed with the patient the risks of azathioprine including but not limited to myelosuppression, immunosuppression, hepatotoxicity, lymphoma, and infections.  The patient understands that monitoring is required including baseline LFTs, Creatinine, possible TPMP genotyping and weekly CBCs for the first month and then every 2 weeks thereafter.  The patient verbalized understanding of the proper use and possible adverse effects of azathioprine.  All of the patient's questions and concerns were addressed. Azathioprine Pregnancy And Lactation Text: This medication is Pregnancy Category D and isn't considered safe during pregnancy. It is unknown if this medication is excreted in breast milk. Cellcept Counseling:  I discussed with the patient the risks of mycophenolate mofetil including but not limited to infection/immunosuppression, GI upset, hypokalemia, hypercholesterolemia, bone marrow suppression, lymphoproliferative disorders, malignancy, GI ulceration/bleed/perforation, colitis, interstitial lung disease, kidney failure, progressive multifocal leukoencephalopathy, and birth defects.  The patient understands that monitoring is required including a baseline creatinine and regular CBC testing. In addition, patient must alert us immediately if symptoms of infection or other concerning signs are noted. Cyclophosphamide Counseling:  I discussed with the patient the risks of cyclophosphamide including but not limited to hair loss, hormonal abnormalities, decreased fertility, abdominal pain, diarrhea, nausea and vomiting, bone marrow suppression and infection. The patient understands that monitoring is required while taking this medication. Cyclophosphamide Pregnancy And Lactation Text: This medication is Pregnancy Category D and it isn't considered safe during pregnancy. This medication is excreted in breast milk. Cyclosporine Counseling:  I discussed with the patient the risks of cyclosporine including but not limited to hypertension, gingival hyperplasia,myelosuppression, immunosuppression, liver damage, kidney damage, neurotoxicity, lymphoma, and serious infections. The patient understands that monitoring is required including baseline blood pressure, CBC, CMP, lipid panel and uric acid, and then 1-2 times monthly CMP and blood pressure. Cyclosporine Pregnancy And Lactation Text: This medication is Pregnancy Category C and it isn't know if it is safe during pregnancy. This medication is excreted in breast milk. Methotrexate Counseling:  Patient counseled regarding adverse effects of methotrexate including but not limited to nausea, vomiting, abnormalities in liver function tests. Patients may develop mouth sores, rash, diarrhea, and abnormalities in blood counts. The patient understands that monitoring is required including LFT's and blood counts.  There is a rare possibility of scarring of the liver and lung problems that can occur when taking methotrexate. Persistent nausea, loss of appetite, pale stools, dark urine, cough, and shortness of breath should be reported immediately. Patient advised to discontinue methotrexate treatment at least three months before attempting to become pregnant.  I discussed the need for folate supplements while taking methotrexate.  These supplements can decrease side effects during methotrexate treatment. The patient verbalized understanding of the proper use and possible adverse effects of methotrexate.  All of the patient's questions and concerns were addressed. Methotrexate Pregnancy And Lactation Text: This medication is Pregnancy Category X and is known to cause fetal harm. This medication is excreted in breast milk. Prednisone Counseling:  I discussed with the patient the risks of prolonged use of prednisone including but not limited to weight gain, insomnia, osteoporosis, mood changes, diabetes, susceptibility to infection, glaucoma and high blood pressure.  In cases where prednisone use is prolonged, patients should be monitored with blood pressure checks, serum glucose levels and an eye exam.  Additionally, the patient may need to be placed on GI prophylaxis, PCP prophylaxis, and calcium and vitamin D supplementation and/or a bisphosphonate.  The patient verbalized understanding of the proper use and the possible adverse effects of prednisone.  All of the patient's questions and concerns were addressed. Cibinqo Counseling: I discussed with the patient the risks of Cibinqo therapy including but not limited to common cold, nausea, headache, cold sores, increased blood CPK levels, dizziness, UTIs, fatigue, acne, and vomitting. Live vaccines should be avoided.  This medication has been linked to serious infections; higher rate of mortality; malignancy and lymphoproliferative disorders; major adverse cardiovascular events; thrombosis; thrombocytopenia and lymphopenia; lipid elevations; and retinal detachment. Cibinqo Pregnancy And Lactation Text: It is unknown if this medication will adversely affect pregnancy or breast feeding.  You should not take this medication if you are currently pregnant or planning a pregnancy or while breastfeeding. Litfulo Counseling: I discussed with the patient the risks of Litfulo therapy including but not limited to upper respiratory tract infections, shingles, cold sores, and nausea. Live vaccines should be avoided.  This medication has been linked to serious infections; higher rate of mortality; malignancy and lymphoproliferative disorders; major adverse cardiovascular events; thrombosis; gastrointestinal perforations; neutropenia; lymphopenia; anemia; liver enzyme elevations; and lipid elevations. Litfulo Pregnancy And Lactation Text: Based on animal studies, Lifulo may cause embryo-fetal harm when administered to pregnant women.  The medication should not be used in pregnancy.  Breastfeeding is not recommended during treatment. Olumiant Counseling: I discussed with the patient the risks of Olumiant therapy including but not limited to upper respiratory tract infections, shingles, cold sores, and nausea. Live vaccines should be avoided.  This medication has been linked to serious infections; higher rate of mortality; malignancy and lymphoproliferative disorders; major adverse cardiovascular events; thrombosis; gastrointestinal perforations; neutropenia; lymphopenia; anemia; liver enzyme elevations; and lipid elevations. Olumiant Pregnancy And Lactation Text: Based on animal studies, Olumiant may cause embryo-fetal harm when administered to pregnant women.  The medication should not be used in pregnancy.  Breastfeeding is not recommended during treatment. Rinvoq Counseling: I discussed with the patient the risks of Rinvoq therapy including but not limited to upper respiratory tract infections, shingles, cold sores, bronchitis, nausea, cough, fever, acne, and headache. Live vaccines should be avoided.  This medication has been linked to serious infections; higher rate of mortality; malignancy and lymphoproliferative disorders; major adverse cardiovascular events; thrombosis; thrombocytopenia, anemia, and neutropenia; lipid elevations; liver enzyme elevations; and gastrointestinal perforations. Rinvoq Pregnancy And Lactation Text: Based on animal studies, Rinvoq may cause embryo-fetal harm when administered to pregnant women.  The medication should not be used in pregnancy.  Breastfeeding is not recommended during treatment and for 6 days after the last dose. Xeljanz Counseling: I discussed with the patient the risks of Xeljanz therapy including increased risk of infection, liver issues, headache, diarrhea, or cold symptoms. Live vaccines should be avoided. They were instructed to call if they have any problems. Xelmaryamz Pregnancy And Lactation Text: This medication is Pregnancy Category D and is not considered safe during pregnancy.  The risk during breast feeding is also uncertain. Sotyktu Counseling:  I discussed the most common side effects of Sotyktu including: common cold, sore throat, sinus infections, cold sores, canker sores, folliculitis, and acne.  I also discussed more serious side effects of Sotyktu including but not limited to: serious allergic reactions; increased risk for infections such as TB; cancers such as lymphomas; rhabdomyolysis and elevated CPK; and elevated triglycerides and liver enzymes.  none Sotyktu Pregnancy And Lactation Text: There is insufficient data to evaluate whether or not Sotyktu is safe to use during pregnancy.   It is not known if Sotyktu passes into breast milk and whether or not it is safe to use when breastfeeding.   Acitretin Counseling:  I discussed with the patient the risks of acitretin including but not limited to hair loss, dry lips/skin/eyes, liver damage, hyperlipidemia, depression/suicidal ideation, photosensitivity.  Serious rare side effects can include but are not limited to pancreatitis, pseudotumor cerebri, bony changes, clot formation/stroke/heart attack.  Patient understands that alcohol is contraindicated since it can result in liver toxicity and significantly prolong the elimination of the drug by many years. Acitretin Pregnancy And Lactation Text: This medication is Pregnancy Category X and should not be given to women who are pregnant or may become pregnant in the future. This medication is excreted in breast milk. Bexarotene Counseling:  I discussed with the patient the risks of bexarotene including but not limited to hair loss, dry lips/skin/eyes, liver abnormalities, hyperlipidemia, pancreatitis, depression/suicidal ideation, photosensitivity, drug rash/allergic reactions, hypothyroidism, anemia, leukopenia, infection, cataracts, and teratogenicity.  Patient understands that they will need regular blood tests to check lipid profile, liver function tests, white blood cell count, thyroid function tests and pregnancy test if applicable. Bexarotene Pregnancy And Lactation Text: This medication is Pregnancy Category X and should not be given to women who are pregnant or may become pregnant. This medication should not be used if you are breast feeding. Isotretinoin Counseling: Patient should get monthly blood tests, not donate blood, not drive at night if vision affected, not share medication, and not undergo elective surgery for 6 months after tx completed. Side effects reviewed, pt to contact office should one occur. Isotretinoin Pregnancy And Lactation Text: This medication is Pregnancy Category X and is considered extremely dangerous during pregnancy. It is unknown if it is excreted in breast milk. High Dose Vitamin A Counseling: Side effects reviewed, pt to contact office should one occur. High Dose Vitamin A Pregnancy And Lactation Text: High dose vitamin A therapy is contraindicated during pregnancy and breast feeding. Ozempic Counseling: I reviewed the possible side effects including: thyroid tumors, kidney disease, gallbladder disease, abdominal pain, constipation, diarrhea, nausea, vomiting and pancreatitis. Do not take this medication if you have a history or family history of multiple endocrine neoplasia syndrome type 2. Side effects reviewed, pt to contact office should one occur. Oxempic Pregnancy And Lactation Text: The fetal risk of this medication is unknown and taking while pregnant is not recommended. It is unknown if this medication is present in breast milk. Saxenda Counseling: I reviewed the possible side effects including: thyroid tumors, kidney disease, gallbladder disease, abdominal pain, constipation, diarrhea, nausea, vomiting and pancreatitis. Do not take this medication if you have a history or family history of multiple endocrine neoplasia syndrome type 2. Side effects reviewed, pt to contact office should one occur. Semaglutide Counseling: I reviewed the possible side effects including: thyroid tumors, kidney disease, gallbladder disease, abdominal pain, constipation, diarrhea, nausea, vomiting and pancreatitis. Do not take this medication if you have a history or family history of multiple endocrine neoplasia syndrome type 2. Side effects reviewed, pt to contact office should one occur. Wegovy Counseling: I reviewed the possible side effects including: thyroid tumors, kidney disease, gallbladder disease, abdominal pain, constipation, diarrhea, nausea, vomiting and pancreatitis. Do not take this medication if you have a history or family history of multiple endocrine neoplasia syndrome type 2. Side effects reviewed, pt to contact office should one occur. Zepbound Counseling: I reviewed the possible side effects including: thyroid tumors, kidney disease, gallbladder disease, abdominal pain, constipation, diarrhea, nausea, vomiting and pancreatitis. Do not take this medication if you have a history or family history of multiple endocrine neoplasia syndrome type 2. Side effects reviewed, pt to contact office should one occur. Aklief counseling:  Patient advised to apply a pea-sized amount only at bedtime and wait 30 minutes after washing their face before applying.  If too drying, patient may add a non-comedogenic moisturizer.  The most commonly reported side effects including irritation, redness, scaling, dryness, stinging, burning, itching, and increased risk of sunburn.  The patient verbalized understanding of the proper use and possible adverse effects of retinoids.  All of the patient's questions and concerns were addressed. Aklief Pregnancy And Lactation Text: It is unknown if this medication is safe to use during pregnancy.  It is unknown if this medication is excreted in breast milk.  Breastfeeding women should use the topical cream on the smallest area of the skin for the shortest time needed while breastfeeding.  Do not apply to nipple and areola. Amzeeq Counseling: Amzeeq is a topical antibiotic foam which contains minocycline.  The most commonly reported side effect of Amzeeq is headache.  The medication is flammable and should not be applied near a fire, flame, or while smoking.  Sun precautions is recommended to prevent sunburn. Amzeeq Pregnancy And Lactation Text: It is not recommended to use the product if you are pregnant. Azelaic Acid Counseling: Patient counseled that medicine may cause skin irritation and to avoid applying near the eyes.  In the event of skin irritation, the patient was advised to reduce the amount of the drug applied or use it less frequently.   The patient verbalized understanding of the proper use and possible adverse effects of azelaic acid.  All of the patient's questions and concerns were addressed. Azelaic Acid Pregnancy And Lactation Text: This medication is considered safe during pregnancy and breast feeding. Benzoyl Peroxide Counseling: Patient counseled that medicine may cause skin irritation and bleach clothing.  In the event of skin irritation, the patient was advised to reduce the amount of the drug applied or use it less frequently.   The patient verbalized understanding of the proper use and possible adverse effects of benzoyl peroxide.  All of the patient's questions and concerns were addressed. Benzoyl Peroxide Pregnancy And Lactation Text: This medication is Pregnancy Category C. It is unknown if benzoyl peroxide is excreted in breast milk. Carac Counseling:  I discussed with the patient the risks of Carac including but not limited to erythema, scaling, itching, weeping, crusting, and pain. Carac Pregnancy And Lactation Text: This medication is Pregnancy Category X and contraindicated in pregnancy and in women who may become pregnant. It is unknown if this medication is excreted in breast milk. Calcipotriene Counseling:  I discussed with the patient the risks of calcipotriene including but not limited to erythema, scaling, itching, and irritation. Calcipotriene Pregnancy And Lactation Text: The use of this medication during pregnancy or lactation is not recommended as there is insufficient data. Cantharidin Counseling:  I discussed with the patient the risks of Cantharidin including but not limited to pain, redness, burning, itching, and blistering. Cantharidin Pregnancy And Lactation Text: This medication has not been proven safe during pregnancy. It is unknown if this medication is excreted in breast milk. 5-Fu Counseling: 5-Fluorouracil Counseling:  I discussed with the patient the risks of 5-fluorouracil including but not limited to erythema, scaling, itching, weeping, crusting, and pain. Drysol Counseling:  I discussed with the patient the risks of drysol/aluminum chloride including but not limited to skin rash, itching, irritation, burning. Elidel Counseling: Patient may experience a mild burning sensation during topical application. Elidel is not approved in children less than 2 years of age. There have been case reports of hematologic and skin malignancies in patients using topical calcineurin inhibitors although causality is questionable. Elidel Pregnancy And Lactation Text: This medication is Pregnancy Category C. It is unknown if this medication is excreted in breast milk. Eucrisa Counseling: Patient may experience a mild burning sensation during topical application. Eucrisa is not approved in children less than 3 months of age. Eucrisa Pregnancy And Lactation Text: This medication has not been assigned a Pregnancy Risk Category but animal studies failed to show danger with the topical medication. It is unknown if the medication is excreted in breast milk. Hydroquinone Counseling:  Patient advised that medication may result in skin irritation, lightening (hypopigmentation), dryness, and burning.  In the event of skin irritation, the patient was advised to reduce the amount of the drug applied or use it less frequently.  Rarely, spots that are treated with hydroquinone can become darker (pseudoochronosis).  Should this occur, patient instructed to stop medication and call the office. The patient verbalized understanding of the proper use and possible adverse effects of hydroquinone.  All of the patient's questions and concerns were addressed. Imiquimod Counseling:  I discussed with the patient the risks of imiquimod including but not limited to erythema, scaling, itching, weeping, crusting, and pain.  Patient understands that the inflammatory response to imiquimod is variable from person to person and was educated regarded proper titration schedule.  If flu-like symptoms develop, patient knows to discontinue the medication and contact us. Klisyri Counseling:  I discussed with the patient the risks of Klisyri including but not limited to erythema, scaling, itching, weeping, crusting, and pain. Klisyri Pregnancy And Lactation Text: It is unknown if this medication can harm a developing fetus or if it is excreted in breast milk. Latisse Counseling: Lattise Counseling: I reviewed the possible side-effects of Latisse including itching, eye irritation, discoloration and exacerbating glaucoma. I also discussed application methods. Latisse Pregnancy And Lactation Text: It is not recommended to use Latisse if you are pregnant or trying to become pregnant. Minoxidil Counseling: Minoxidil is a topical medication which can increase blood flow where it is applied. It is uncertain how this medication increases hair growth. Side effects are uncommon and include stinging and allergic reactions. Mirvaso Counseling: Mirvaso is a topical medication which can decrease superficial blood flow where applied. Side effects are uncommon and include stinging, redness and allergic reactions. Mirvaso Pregnancy And Lactation Text: This medication has not been assigned a Pregnancy Risk Category. It is unknown if the medication is excreted in breast milk. Opzelura Counseling:  I discussed with the patient the risks of Opzelura including but not limited to nasopharngitis, bronchitis, ear infection, eosinophila, hives, diarrhea, folliculitis, tonsillitis, and rhinorrhea.  Taken orally, this medication has been linked to serious infections; higher rate of mortality; malignancy and lymphoproliferative disorders; major adverse cardiovascular events; thrombosis; thrombocytopenia, anemia, and neutropenia; and lipid elevations. Opzelura Pregnancy And Lactation Text: There is insufficient data to evaluate drug-associated risk for major birth defects, miscarriage, or other adverse maternal or fetal outcomes.  There is a pregnancy registry that monitors pregnancy outcomes in pregnant persons exposed to the medication during pregnancy.  It is unknown if this medication is excreted in breast milk.  Do not breastfeed during treatment and for about 4 weeks after the last dose. Picato Counseling:  I discussed with the patient the risks of Picato including but not limited to erythema, scaling, itching, weeping, crusting, and pain. Protopic Counseling: Patient may experience a mild burning sensation during topical application. Protopic is not approved in children less than 2 years of age. There have been case reports of hematologic and skin malignancies in patients using topical calcineurin inhibitors although causality is questionable. Protopic Pregnancy And Lactation Text: This medication is Pregnancy Category C. It is unknown if this medication is excreted in breast milk when applied topically. Qbrexza Counseling:  I discussed with the patient the risks of Qbrexza including but not limited to headache, mydriasis, blurred vision, dry eyes, nasal dryness, dry mouth, dry throat, dry skin, urinary hesitation, and constipation.  Local skin reactions including erythema, burning, stinging, and itching can also occur. Qbrexza Pregnancy And Lactation Text: There is no available data on Qbrexza use in pregnant women.  There is no available data on Qbrexza use in lactation. Rhofade Counseling: Rhofade is a topical medication which can decrease superficial blood flow where applied. Side effects are uncommon and include stinging, redness and allergic reactions. Over the Counter Salicylic Acid Counseling: Over the counter salicylic acid preparations can be used effectively to treat warts at home. There are two types of application: liquid and plaster. Liquid preparations are applied like nail polish and the plaster applications are applied like a bandage (you may need to apply duct tape over the plaster to keep it in place). Dead and macerated skin should be removed regularly with a nail file or nail clippers for best results. Over The Counter Salicylic Acid Pregnancy And Lactation Text: It is not recommended to use high dose OTC salicylic acid while pregnant. Lower dose topical preparations are considered safe. Solaraze Counseling:  I discussed with the patient the risks of Solaraze including but not limited to erythema, scaling, itching, weeping, crusting, and pain. Solaraze Pregnancy And Lactation Text: This medication is Pregnancy Category B and is considered safe. There is some data to suggest avoiding during the third trimester. It is unknown if this medication is excreted in breast milk. Soolantra Counseling: I discussed with the patients the risks of topial Soolantra. This is a medicine which decreases the number of mites and inflammation in the skin. You experience burning, stinging, eye irritation or allergic reactions.  Please call our office if you develop any problems from using this medication. Soolantra Pregnancy And Lactation Text: This medication is Pregnancy Category C. This medication is considered safe during breast feeding. Topical Retinoid counseling:  Patient advised to apply a pea-sized amount only at bedtime and wait 30 minutes after washing their face before applying.  If too drying, patient may add a non-comedogenic moisturizer. The patient verbalized understanding of the proper use and possible adverse effects of retinoids.  All of the patient's questions and concerns were addressed. Tazorac Counseling:  Patient advised that medication is irritating and drying.  Patient may need to apply sparingly and wash off after an hour before eventually leaving it on overnight.  The patient verbalized understanding of the proper use and possible adverse effects of tazorac.  All of the patient's questions and concerns were addressed. Tazorac Pregnancy And Lactation Text: This medication is not safe during pregnancy. It is unknown if this medication is excreted in breast milk. Topical Clindamycin Counseling: Patient counseled that this medication may cause skin irritation or allergic reactions.  In the event of skin irritation, the patient was advised to reduce the amount of the drug applied or use it less frequently.   The patient verbalized understanding of the proper use and possible adverse effects of clindamycin.  All of the patient's questions and concerns were addressed. Topical Clindamycin Pregnancy And Lactation Text: This medication is Pregnancy Category B and is considered safe during pregnancy. It is unknown if it is excreted in breast milk. Topical Ketoconazole Counseling: Patient counseled that this medication may cause skin irritation or allergic reactions.  In the event of skin irritation, the patient was advised to reduce the amount of the drug applied or use it less frequently.   The patient verbalized understanding of the proper use and possible adverse effects of ketoconazole.  All of the patient's questions and concerns were addressed. Topical Metronidazole Counseling: Metronidazole is a topical antibiotic medication. You may experience burning, stinging, redness, or allergic reactions.  Please call our office if you develop any problems from using this medication. Topical Metronidazole Pregnancy And Lactation Text: This medication is Pregnancy Category B and considered safe during pregnancy.  It is also considered safe to use while breastfeeding. Topical Steroids Counseling: I discussed with the patient that prolonged use of topical steroids can result in the increased appearance of superficial blood vessels (telangiectasias), lightening (hypopigmentation) and thinning of the skin (atrophy).  Patient understands to avoid using high potency steroids in skin folds, the groin or the face.  The patient verbalized understanding of the proper use and possible adverse effects of topical steroids.  All of the patient's questions and concerns were addressed. Topical Steroids Applications Pregnancy And Lactation Text: Most topical steroids are considered safe to use during pregnancy and lactation.  Any topical steroid applied to the breast or nipple should be washed off before breastfeeding. Topical Sulfur Applications Counseling: Topical Sulfur Counseling: Patient counseled that this medication may cause skin irritation or allergic reactions.  In the event of skin irritation, the patient was advised to reduce the amount of the drug applied or use it less frequently.   The patient verbalized understanding of the proper use and possible adverse effects of topical sulfur application.  All of the patient's questions and concerns were addressed. Topical Sulfur Applications Pregnancy And Lactation Text: This medication is considered safe during pregnancy and breast feeding secondary to limited systemic absorption. Wartpeel Counseling:  I discussed with the patient the risks of Wartpeel including but not limited to erythema, scaling, itching, weeping, crusting, and pain. Winlevi Counseling:  I discussed with the patient the risks of topical clascoterone including but not limited to erythema, scaling, itching, and stinging. Patient voiced their understanding. Winlevi Pregnancy And Lactation Text: This medication is considered safe during pregnancy and breastfeeding. VTAMA Counseling: I discussed with the patient that VTAMA is not for use in the eyes, mouth or mouth. They should call the office if they develop any signs of allergic reactions to VTAMA. The patient verbalized understanding of the proper use and possible adverse effects of VTAMA.  All of the patient's questions and concerns were addressed. Vtama Pregnancy And Lactation Text: It is unknown if this medication can cause problems during pregnancy and breastfeeding. Zoryve Counseling:  I discussed with the patient that Zoryve is not for use in the eyes, mouth or vagina. The most commonly reported side effects include diarrhea, headache, insomnia, application site pain, upper respiratory tract infections, and urinary tract infections.  All of the patient's questions and concerns were addressed. Zyclara Counseling:  I discussed with the patient the risks of imiquimod including but not limited to erythema, scaling, itching, weeping, crusting, and pain.  Patient understands that the inflammatory response to imiquimod is variable from person to person and was educated regarded proper titration schedule.  If flu-like symptoms develop, patient knows to discontinue the medication and contact us. Opioid Counseling: I discussed with the patient the potential side effects of opioids including but not limited to addiction, altered mental status, and depression. I stressed avoiding alcohol, benzodiazepines, muscle relaxants and sleep aids unless specifically okayed by a physician. The patient verbalized understanding of the proper use and possible adverse effects of opioids. All of the patient's questions and concerns were addressed. They were instructed to flush the remaining pills down the toilet if they did not need them for pain. Opioid Pregnancy And Lactation Text: These medications can lead to premature delivery and should be avoided during pregnancy. These medications are also present in breast milk in small amounts. Arava Counseling:  Patient counseled regarding adverse effects of Arava including but not limited to nausea, vomiting, abnormalities in liver function tests. Patients may develop mouth sores, rash, diarrhea, and abnormalities in blood counts. The patient understands that monitoring is required including LFTs and blood counts.  There is a rare possibility of scarring of the liver and lung problems that can occur when taking methotrexate. Persistent nausea, loss of appetite, pale stools, dark urine, cough, and shortness of breath should be reported immediately. Patient advised to discontinue Arava treatment and consult with a physician prior to attempting conception. The patient will have to undergo a treatment to eliminate Arava from the body prior to conception. Clofazimine Counseling:  I discussed with the patient the risks of clofazimine including but not limited to skin and eye pigmentation, liver damage, nausea/vomiting, gastrointestinal bleeding and allergy. Clofazimine Pregnancy And Lactation Text: This medication is Pregnancy Category C and isn't considered safe during pregnancy. It is excreted in breast milk. Colchicine Counseling:  Patient counseled regarding adverse effects including but not limited to stomach upset (nausea, vomiting, stomach pain, or diarrhea).  Patient instructed to limit alcohol consumption while taking this medication.  Colchicine may reduce blood counts especially with prolonged use.  The patient understands that monitoring of kidney function and blood counts may be required, especially at baseline. The patient verbalized understanding of the proper use and possible adverse effects of colchicine.  All of the patient's questions and concerns were addressed. Dapsone Counseling: I discussed with the patient the risks of dapsone including but not limited to hemolytic anemia, agranulocytosis, rashes, methemoglobinemia, kidney failure, peripheral neuropathy, headaches, GI upset, and liver toxicity.  Patients who start dapsone require monitoring including baseline LFTs and weekly CBCs for the first month, then every month thereafter.  The patient verbalized understanding of the proper use and possible adverse effects of dapsone.  All of the patient's questions and concerns were addressed. Dapsone Pregnancy And Lactation Text: This medication is Pregnancy Category C and is not considered safe during pregnancy or breast feeding. Gabapentin Counseling: I discussed with the patient the risks of gabapentin including but not limited to dizziness, somnolence, fatigue and ataxia. Glycopyrrolate Counseling:  I discussed with the patient the risks of glycopyrrolate including but not limited to skin rash, drowsiness, dry mouth, difficulty urinating, and blurred vision. Glycopyrrolate Pregnancy And Lactation Text: This medication is Pregnancy Category B and is considered safe during pregnancy. It is unknown if it is excreted breast milk. Hydroxychloroquine Counseling:  I discussed with the patient that a baseline ophthalmologic exam is needed at the start of therapy and every year thereafter while on therapy. A CBC may also be warranted for monitoring.  The side effects of this medication were discussed with the patient, including but not limited to agranulocytosis, aplastic anemia, seizures, rashes, retinopathy, and liver toxicity. Patient instructed to call the office should any adverse effect occur.  The patient verbalized understanding of the proper use and possible adverse effects of Plaquenil.  All the patient's questions and concerns were addressed. Hydroxychloroquine Pregnancy And Lactation Text: This medication has been shown to cause fetal harm but it isn't assigned a Pregnancy Risk Category. There are small amounts excreted in breast milk. Low Dose Naltrexone Counseling- I discussed with the patient the potential risks and side effects of low dose naltrexone including but not limited to: more vivid dreams, headaches, nausea, vomiting, abdominal pain, fatigue, dizziness, and anxiety. Low Dose Naltrexone Pregnancy And Lactation Text: Naltrexone is pregnancy category C.  There have been no adequate and well-controlled studies in pregnant women.  It should be used in pregnancy only if the potential benefit justifies the potential risk to the fetus.   Limited data indicates that naltrexone is minimally excreted into breastmilk. Niacinamide Counseling: I recommended taking niacin or niacinamide, also know as vitamin B3, twice daily. Recent evidence suggests that taking vitamin B3 (500 mg twice daily) can reduce the risk of actinic keratoses and non-melanoma skin cancers. Side effects of vitamin B3 include flushing and headache. Niacinamide Pregnancy And Lactation Text: These medications are considered safe during pregnancy. Nsaids Counseling: NSAID Counseling: I discussed with the patient that NSAIDs should be taken with food. Prolonged use of NSAIDs can result in the development of stomach ulcers.  Patient advised to stop taking NSAIDs if abdominal pain occurs.  The patient verbalized understanding of the proper use and possible adverse effects of NSAIDs.  All of the patient's questions and concerns were addressed. Nsaids Pregnancy And Lactation Text: These medications are considered safe up to 30 weeks gestation. It is excreted in breast milk. Olanzapine Counseling- I discussed with the patient the common side effects of olanzapine including but are not limited to: lack of energy, dry mouth, increased appetite, sleepiness, tremor, constipation, dizziness, changes in behavior, or restlessness.  Explained that teenagers are more likely to experience headaches, abdominal pain, pain in the arms or legs, tiredness, and sleepiness.  Serious side effects include but are not limited: increased risk of death in elderly patients who are confused, have memory loss, or dementia-related psychosis; hyperglycemia; increased cholesterol and triglycerides; and weight gain. Olanzapine Pregnancy And Lactation Text: This medication is pregnancy category C.   There are no adequate and well controlled trials with olanzapine in pregnant females.  Olanzapine should be used during pregnancy only if the potential benefit justifies the potential risk to the fetus.   In a study in lactating healthy women, olanzapine was excreted in breast milk.  It is recommended that women taking olanzapine should not breast feed. Oral Minoxidil Counseling- I discussed with the patient the risks of oral minoxidil including but not limited to shortness of breath, swelling of the feet or ankles, dizziness, lightheadedness, unwanted hair growth and allergic reaction.  The patient verbalized understanding of the proper use and possible adverse effects of oral minoxidil.  All of the patient's questions and concerns were addressed. Oral Minoxidil Pregnancy And Lactation Text: This medication should only be used when clearly needed if you are pregnant, attempting to become pregnant or breast feeding. Otezla Counseling: The side effects of Otezla were discussed with the patient, including but not limited to worsening or new depression, weight loss, diarrhea, nausea, upper respiratory tract infection, and headache. Patient instructed to call the office should any adverse effect occur.  The patient verbalized understanding of the proper use and possible adverse effects of Otezla.  All the patient's questions and concerns were addressed. Otezla Pregnancy And Lactation Text: This medication is Pregnancy Category C and it isn't known if it is safe during pregnancy. It is unknown if it is excreted in breast milk. Oxybutynin Counseling:  I discussed with the patient the risks of oxybutynin including but not limited to skin rash, drowsiness, dry mouth, difficulty urinating, and blurred vision. Propranolol Counseling:  I discussed with the patient the risks of propranolol including but not limited to low heart rate, low blood pressure, low blood sugar, restlessness and increased cold sensitivity. They should call the office if they experience any of these side effects. Propranolol Pregnancy And Lactation Text: This medication is Pregnancy Category C and it isn't known if it is safe during pregnancy. It is excreted in breast milk. SSKI Counseling:  I discussed with the patient the risks of SSKI including but not limited to thyroid abnormalities, metallic taste, GI upset, fever, headache, acne, arthralgias, paraesthesias, lymphadenopathy, easy bleeding, arrhythmias, and allergic reaction. Sski Pregnancy And Lactation Text: This medication is Pregnancy Category D and isn't considered safe during pregnancy. It is excreted in breast milk. Thalidomide Counseling: I discussed with the patient the risks of thalidomide including but not limited to birth defects, anxiety, weakness, chest pain, dizziness, cough and severe allergy. Tranexamic Acid Counseling:  Patient advised of the small risk of bleeding problems with tranexamic acid. They were also instructed to call if they developed any nausea, vomiting or diarrhea. All of the patient's questions and concerns were addressed. Tranexamic Acid Pregnancy And Lactation Text: It is unknown if this medication is safe during pregnancy or breast feeding. Valtrex Counseling: I discussed with the patient the risks of valacyclovir including but not limited to kidney damage, nausea, vomiting and severe allergy.  The patient understands that if the infection seems to be worsening or is not improving, they are to call. Valtrex Pregnancy And Lactation Text: this medication is Pregnancy Category B and is considered safe during pregnancy. This medication is not directly found in breast milk but it's metabolite acyclovir is present. Use Enhanced Medication Counseling?: No

## 2025-07-13 ENCOUNTER — EMERGENCY (EMERGENCY)
Facility: HOSPITAL | Age: 60
LOS: 0 days | Discharge: ANOTHER HOSPITAL | End: 2025-07-14
Attending: STUDENT IN AN ORGANIZED HEALTH CARE EDUCATION/TRAINING PROGRAM
Payer: MEDICAID

## 2025-07-13 VITALS
HEIGHT: 67 IN | OXYGEN SATURATION: 97 % | HEART RATE: 147 BPM | RESPIRATION RATE: 18 BRPM | SYSTOLIC BLOOD PRESSURE: 152 MMHG | TEMPERATURE: 100 F | DIASTOLIC BLOOD PRESSURE: 81 MMHG

## 2025-07-13 DIAGNOSIS — K70.30 ALCOHOLIC CIRRHOSIS OF LIVER WITHOUT ASCITES: ICD-10-CM

## 2025-07-13 DIAGNOSIS — R00.0 TACHYCARDIA, UNSPECIFIED: ICD-10-CM

## 2025-07-13 DIAGNOSIS — Z98.890 OTHER SPECIFIED POSTPROCEDURAL STATES: Chronic | ICD-10-CM

## 2025-07-13 DIAGNOSIS — N93.9 ABNORMAL UTERINE AND VAGINAL BLEEDING, UNSPECIFIED: ICD-10-CM

## 2025-07-13 DIAGNOSIS — Z98.891 HISTORY OF UTERINE SCAR FROM PREVIOUS SURGERY: Chronic | ICD-10-CM

## 2025-07-13 DIAGNOSIS — R50.9 FEVER, UNSPECIFIED: ICD-10-CM

## 2025-07-13 DIAGNOSIS — R10.31 RIGHT LOWER QUADRANT PAIN: ICD-10-CM

## 2025-07-13 DIAGNOSIS — I85.00 ESOPHAGEAL VARICES WITHOUT BLEEDING: ICD-10-CM

## 2025-07-13 DIAGNOSIS — R74.02 ELEVATION OF LEVELS OF LACTIC ACID DEHYDROGENASE [LDH]: ICD-10-CM

## 2025-07-13 DIAGNOSIS — Z88.1 ALLERGY STATUS TO OTHER ANTIBIOTIC AGENTS: ICD-10-CM

## 2025-07-13 LAB
ALBUMIN SERPL ELPH-MCNC: 2.9 G/DL — LOW (ref 3.3–5)
ALP SERPL-CCNC: 200 U/L — HIGH (ref 40–120)
ALT FLD-CCNC: 13 U/L — SIGNIFICANT CHANGE UP (ref 12–78)
ANION GAP SERPL CALC-SCNC: 19 MMOL/L — HIGH (ref 5–17)
ANISOCYTOSIS BLD QL: ABNORMAL
APPEARANCE UR: ABNORMAL
APTT BLD: 32 SEC — SIGNIFICANT CHANGE UP (ref 26.1–36.8)
AST SERPL-CCNC: 25 U/L — SIGNIFICANT CHANGE UP (ref 15–37)
BACTERIA # UR AUTO: ABNORMAL /HPF
BASOPHILS # BLD AUTO: 0.02 K/UL — SIGNIFICANT CHANGE UP (ref 0–0.2)
BASOPHILS NFR BLD AUTO: 0.3 % — SIGNIFICANT CHANGE UP (ref 0–2)
BILIRUB SERPL-MCNC: 3.8 MG/DL — HIGH (ref 0.2–1.2)
BILIRUB UR-MCNC: ABNORMAL
BLD GP AB SCN SERPL QL: SIGNIFICANT CHANGE UP
BUN SERPL-MCNC: 8 MG/DL — SIGNIFICANT CHANGE UP (ref 7–23)
CALCIUM SERPL-MCNC: 8.9 MG/DL — SIGNIFICANT CHANGE UP (ref 8.5–10.1)
CAST: 49 /LPF — HIGH (ref 0–4)
CHLORIDE SERPL-SCNC: 101 MMOL/L — SIGNIFICANT CHANGE UP (ref 96–108)
CO2 SERPL-SCNC: 17 MMOL/L — LOW (ref 22–31)
COLOR SPEC: SIGNIFICANT CHANGE UP
CREAT SERPL-MCNC: 1.21 MG/DL — SIGNIFICANT CHANGE UP (ref 0.5–1.3)
DIFF PNL FLD: ABNORMAL
EGFR: 51 ML/MIN/1.73M2 — LOW
EGFR: 51 ML/MIN/1.73M2 — LOW
EOSINOPHIL # BLD AUTO: 0.01 K/UL — SIGNIFICANT CHANGE UP (ref 0–0.5)
EOSINOPHIL NFR BLD AUTO: 0.2 % — SIGNIFICANT CHANGE UP (ref 0–6)
EPI CELLS # UR: PRESENT
GLUCOSE SERPL-MCNC: 135 MG/DL — HIGH (ref 70–99)
GLUCOSE UR QL: NEGATIVE MG/DL — SIGNIFICANT CHANGE UP
GRAN CASTS # UR COMP ASSIST: SIGNIFICANT CHANGE UP
HCT VFR BLD CALC: 37.7 % — SIGNIFICANT CHANGE UP (ref 34.5–45)
HGB BLD-MCNC: 12 G/DL — SIGNIFICANT CHANGE UP (ref 11.5–15.5)
HYALINE CASTS # UR AUTO: SIGNIFICANT CHANGE UP
IMM GRANULOCYTES # BLD AUTO: 0.03 K/UL — SIGNIFICANT CHANGE UP (ref 0–0.07)
IMM GRANULOCYTES NFR BLD AUTO: 0.5 % — SIGNIFICANT CHANGE UP (ref 0–0.9)
INR BLD: 1.68 RATIO — HIGH (ref 0.85–1.16)
KETONES UR QL: ABNORMAL MG/DL
LACTATE SERPL-SCNC: 3.5 MMOL/L — HIGH (ref 0.7–2)
LACTATE SERPL-SCNC: 8.6 MMOL/L — CRITICAL HIGH (ref 0.7–2)
LEUKOCYTE ESTERASE UR-ACNC: ABNORMAL
LYMPHOCYTES # BLD AUTO: 0.66 K/UL — LOW (ref 1–3.3)
LYMPHOCYTES NFR BLD AUTO: 11.5 % — LOW (ref 13–44)
MANUAL SMEAR VERIFICATION: SIGNIFICANT CHANGE UP
MCHC RBC-ENTMCNC: 25.5 PG — LOW (ref 27–34)
MCHC RBC-ENTMCNC: 31.8 G/DL — LOW (ref 32–36)
MCV RBC AUTO: 80.2 FL — SIGNIFICANT CHANGE UP (ref 80–100)
MICROCYTES BLD QL: SLIGHT — SIGNIFICANT CHANGE UP
MONOCYTES # BLD AUTO: 0.16 K/UL — SIGNIFICANT CHANGE UP (ref 0–0.9)
MONOCYTES NFR BLD AUTO: 2.8 % — SIGNIFICANT CHANGE UP (ref 2–14)
NEUTROPHILS # BLD AUTO: 4.88 K/UL — SIGNIFICANT CHANGE UP (ref 1.8–7.4)
NEUTROPHILS NFR BLD AUTO: 84.7 % — HIGH (ref 43–77)
NITRITE UR-MCNC: NEGATIVE — SIGNIFICANT CHANGE UP
NRBC # BLD AUTO: 0 K/UL — SIGNIFICANT CHANGE UP (ref 0–0)
NRBC # FLD: 0 K/UL — SIGNIFICANT CHANGE UP (ref 0–0)
NRBC BLD AUTO-RTO: 0 /100 WBCS — SIGNIFICANT CHANGE UP (ref 0–0)
OVALOCYTES BLD QL SMEAR: SLIGHT — SIGNIFICANT CHANGE UP
PH UR: 5.5 — SIGNIFICANT CHANGE UP (ref 5–8)
PLAT MORPH BLD: NORMAL — SIGNIFICANT CHANGE UP
PLATELET # BLD AUTO: 222 K/UL — SIGNIFICANT CHANGE UP (ref 150–400)
PMV BLD: 9.4 FL — SIGNIFICANT CHANGE UP (ref 7–13)
POLYCHROMASIA BLD QL SMEAR: SLIGHT — SIGNIFICANT CHANGE UP
POTASSIUM SERPL-MCNC: 3.1 MMOL/L — LOW (ref 3.5–5.3)
POTASSIUM SERPL-SCNC: 3.1 MMOL/L — LOW (ref 3.5–5.3)
PROT SERPL-MCNC: 7.5 GM/DL — SIGNIFICANT CHANGE UP (ref 6–8.3)
PROT UR-MCNC: 30 MG/DL
PROTHROM AB SERPL-ACNC: 19.2 SEC — HIGH (ref 9.9–13.4)
RBC # BLD: 4.7 M/UL — SIGNIFICANT CHANGE UP (ref 3.8–5.2)
RBC # FLD: 23 % — HIGH (ref 10.3–14.5)
RBC BLD AUTO: ABNORMAL
RBC CASTS # UR COMP ASSIST: 194 /HPF — HIGH (ref 0–4)
SODIUM SERPL-SCNC: 137 MMOL/L — SIGNIFICANT CHANGE UP (ref 135–145)
SP GR SPEC: 1.02 — SIGNIFICANT CHANGE UP (ref 1–1.03)
SQUAMOUS # UR AUTO: 3 /HPF — SIGNIFICANT CHANGE UP (ref 0–5)
UROBILINOGEN FLD QL: 1 MG/DL — SIGNIFICANT CHANGE UP (ref 0.2–1)
WBC # BLD: 5.76 K/UL — SIGNIFICANT CHANGE UP (ref 3.8–10.5)
WBC # FLD AUTO: 5.76 K/UL — SIGNIFICANT CHANGE UP (ref 3.8–10.5)
WBC MORPHOLOGY: NORMAL — SIGNIFICANT CHANGE UP
WBC UR QL: 9 /HPF — HIGH (ref 0–5)

## 2025-07-13 PROCEDURE — 81001 URINALYSIS AUTO W/SCOPE: CPT

## 2025-07-13 PROCEDURE — 80053 COMPREHEN METABOLIC PANEL: CPT

## 2025-07-13 PROCEDURE — 85730 THROMBOPLASTIN TIME PARTIAL: CPT

## 2025-07-13 PROCEDURE — 87077 CULTURE AEROBIC IDENTIFY: CPT

## 2025-07-13 PROCEDURE — 93005 ELECTROCARDIOGRAM TRACING: CPT

## 2025-07-13 PROCEDURE — 99285 EMERGENCY DEPT VISIT HI MDM: CPT

## 2025-07-13 PROCEDURE — 87040 BLOOD CULTURE FOR BACTERIA: CPT

## 2025-07-13 PROCEDURE — 74177 CT ABD & PELVIS W/CONTRAST: CPT

## 2025-07-13 PROCEDURE — 87086 URINE CULTURE/COLONY COUNT: CPT

## 2025-07-13 PROCEDURE — 85025 COMPLETE CBC W/AUTO DIFF WBC: CPT

## 2025-07-13 PROCEDURE — 87186 SC STD MICRODIL/AGAR DIL: CPT

## 2025-07-13 PROCEDURE — 86850 RBC ANTIBODY SCREEN: CPT

## 2025-07-13 PROCEDURE — 36415 COLL VENOUS BLD VENIPUNCTURE: CPT

## 2025-07-13 PROCEDURE — 74177 CT ABD & PELVIS W/CONTRAST: CPT | Mod: 26

## 2025-07-13 PROCEDURE — 86900 BLOOD TYPING SEROLOGIC ABO: CPT

## 2025-07-13 PROCEDURE — 83605 ASSAY OF LACTIC ACID: CPT

## 2025-07-13 PROCEDURE — 86901 BLOOD TYPING SEROLOGIC RH(D): CPT

## 2025-07-13 PROCEDURE — 85610 PROTHROMBIN TIME: CPT

## 2025-07-13 PROCEDURE — 96375 TX/PRO/DX INJ NEW DRUG ADDON: CPT

## 2025-07-13 PROCEDURE — 87150 DNA/RNA AMPLIFIED PROBE: CPT

## 2025-07-13 PROCEDURE — 96365 THER/PROPH/DIAG IV INF INIT: CPT | Mod: XU

## 2025-07-13 PROCEDURE — 76830 TRANSVAGINAL US NON-OB: CPT

## 2025-07-13 PROCEDURE — 96376 TX/PRO/DX INJ SAME DRUG ADON: CPT

## 2025-07-13 PROCEDURE — 76830 TRANSVAGINAL US NON-OB: CPT | Mod: 26

## 2025-07-13 PROCEDURE — 93010 ELECTROCARDIOGRAM REPORT: CPT

## 2025-07-13 PROCEDURE — 99285 EMERGENCY DEPT VISIT HI MDM: CPT | Mod: 25

## 2025-07-13 RX ORDER — ACETAMINOPHEN 500 MG/5ML
1000 LIQUID (ML) ORAL ONCE
Refills: 0 | Status: COMPLETED | OUTPATIENT
Start: 2025-07-13 | End: 2025-07-13

## 2025-07-13 RX ORDER — PIPERACILLIN-TAZO-DEXTROSE,ISO 3.375G/5
3.38 IV SOLUTION, PIGGYBACK PREMIX FROZEN(ML) INTRAVENOUS ONCE
Refills: 0 | Status: DISCONTINUED | OUTPATIENT
Start: 2025-07-13 | End: 2025-07-14

## 2025-07-13 RX ORDER — ONDANSETRON HCL/PF 4 MG/2 ML
4 VIAL (ML) INJECTION ONCE
Refills: 0 | Status: COMPLETED | OUTPATIENT
Start: 2025-07-13 | End: 2025-07-13

## 2025-07-13 RX ORDER — VANCOMYCIN HCL IN 5 % DEXTROSE 1.5G/250ML
750 PLASTIC BAG, INJECTION (ML) INTRAVENOUS ONCE
Refills: 0 | Status: COMPLETED | OUTPATIENT
Start: 2025-07-13 | End: 2025-07-13

## 2025-07-13 RX ORDER — HYDROMORPHONE/SOD CHLOR,ISO/PF 2 MG/10 ML
0.5 SYRINGE (ML) INJECTION ONCE
Refills: 0 | Status: DISCONTINUED | OUTPATIENT
Start: 2025-07-13 | End: 2025-07-13

## 2025-07-13 RX ORDER — VANCOMYCIN HCL IN 5 % DEXTROSE 1.5G/250ML
PLASTIC BAG, INJECTION (ML) INTRAVENOUS
Refills: 0 | Status: DISCONTINUED | OUTPATIENT
Start: 2025-07-13 | End: 2025-07-13

## 2025-07-13 RX ORDER — PIPERACILLIN-TAZO-DEXTROSE,ISO 3.375G/5
3.38 IV SOLUTION, PIGGYBACK PREMIX FROZEN(ML) INTRAVENOUS ONCE
Refills: 0 | Status: COMPLETED | OUTPATIENT
Start: 2025-07-13 | End: 2025-07-13

## 2025-07-13 RX ADMIN — Medication 250 MILLIGRAM(S): at 20:11

## 2025-07-13 RX ADMIN — Medication 0.5 MILLIGRAM(S): at 19:22

## 2025-07-13 RX ADMIN — Medication 1700 MILLILITER(S): at 18:27

## 2025-07-13 RX ADMIN — Medication 1000 MILLIGRAM(S): at 19:57

## 2025-07-13 RX ADMIN — Medication 400 MILLIGRAM(S): at 19:22

## 2025-07-13 RX ADMIN — Medication 200 GRAM(S): at 19:30

## 2025-07-13 NOTE — CONSULT NOTE ADULT - SUBJECTIVE AND OBJECTIVE BOX
HPI:  59 Y/F with PMHx of alcoholic cirrhosis, esophageal varices s/p banding, C diff colitis, ? infective endocarditis and bilateral psoas abscess managed conservatively ( [facility] 2/10-3/5/2025), was on prolong  abx course, abscess were found be resolved on subsequent imaging presented to ED c/o right lower quadrant pain and fever for 1 day associated with nausea. Patient has a  h/o intermittent abdominal pain and distention, multiple paracentesis in past.  She is having follow up and being evaluated  with Hepatologist @ [facility]  regarding  liver transplant. Denies headache, chest pain, SOB. last BM yesterday and passing gas.         PAST MEDICAL & SURGICAL HISTORY:  Alcohol abuse      Depression      Withdrawal seizures      History of basal cell carcinoma excision      Squamous cell skin cancer      Hemorrhoids      2019 novel coronavirus disease (COVID-19)      Anemia      H/O vertigo      Alcoholic cirrhosis      H/O esophageal varices      GERD (gastroesophageal reflux disease)      H/O bacteremia      History of bacteremia      H/O Clostridium difficile infection      History of ear, nose, and throat (ENT) surgery      H/O basal cell carcinoma excision      H/O:       S/P hemorrhoidectomy          MEDICATIONS  (STANDING):  piperacillin/tazobactam IVPB.. 3.375 Gram(s) IV Intermittent once    MEDICATIONS  (PRN):      Allergies    Zithromax (Unknown)    Intolerances    morphine (Nausea)      SOCIAL HISTORY:    FAMILY HISTORY:  FH: pancreatic cancer (Mother)    Family history of heart attack (Father)    Family history of CVA (Father)    FH: pancreatic cancer (Mother)    Heavy drinker of alcohol (Sibling)    Family history of stent (Sibling)        Physical Exam:  General: Mild distress  HEENT: Icteric  Pulmonary: normal resp effort  Cardiovascular: Tachyacrdiac  Abdominal: soft, distended, tenderness + RLQ, no rebound tenderness  Extremities: WWP, normal strength, no clubbing/cyanosis/edema  Neuro: A/O x 3, CNs II-XII grossly intact,    Vital Signs Last 24 Hrs  T(C): 39.7 (2025 18:14), Max: 39.7 (2025 18:14)  T(F): 103.4 (2025 18:14), Max: 103.4 (2025 18:14)  HR: 147 (2025 17:19) (147 - 147)  BP: 152/81 (2025 17:19) (152/81 - 152/81)  BP(mean): 93 (2025 17:19) (93 - 93)  RR: 18 (2025 17:19) (18 - 18)  SpO2: 97% (2025 17:19) (97% - 97%)    Parameters below as of 2025 17:19  Patient On (Oxygen Delivery Method): room air      I&O's Summary    LABS:                        12.0   5.76  )-----------( 222      ( 2025 18:01 )             37.7     07-    137  |  101  |  8   ----------------------------<  135[H]  3.1[L]   |  17[L]  |  1.21    Ca    8.9      2025 18:01    TPro  7.5  /  Alb  2.9[L]  /  TBili  3.8[H]  /  DBili  x   /  AST  25  /  ALT  13  /  AlkPhos  200[H]  07-13    PT/INR - ( 2025 18:01 )   PT: 19.2 sec;   INR: 1.68 ratio         PTT - ( 2025 18:01 )  PTT:32.0 sec  Urinalysis Basic - ( 2025 19:10 )    Color: Dark Yellow / Appearance: Cloudy / S.018 / pH: x  Gluc: x / Ketone: x  / Bili: Small / Urobili: 1.0 mg/dL   Blood: x / Protein: 30 mg/dL / Nitrite: Negative   Leuk Esterase: Small / RBC: 194 /HPF / WBC 9 /HPF   Sq Epi: x / Non Sq Epi: 3 /HPF / Bacteria: Moderate /HPF        LIVER FUNCTIONS - ( 2025 18:01 )  Alb: 2.9 g/dL / Pro: 7.5 gm/dL / ALK PHOS: 200 U/L / ALT: 13 U/L / AST: 25 U/L / GGT: x           RADIOLOGY & ADDITIONAL STUDIES:  < from: CT Abdomen and Pelvis w/ IV Cont (25 @ 19:21) >  FINDINGS:  LOWER CHEST: Within normal limits.    LIVER: Cirrhosis.  BILE DUCTS: Normal caliber.  GALLBLADDER: Cholelithiasis.  SPLEEN: Splenomegaly.  PANCREAS: Within normal limits.  ADRENALS: 1.4 cm left adrenal nodule.  KIDNEYS/URETERS: Within normal limits.    BLADDER: Within normal limits.  REPRODUCTIVE ORGANS: Uterus and adnexa within normal limits.    BOWEL: No bowel obstruction. Appendix is not visualized, and stat there   is a 2 cm complex collection which may be contiguous with the psoas   abscess. Suspected thickening of the right colon.  PERITONEUM/RETROPERITONEUM: Small new pelvic ascites and peritoneal   enhancement,?peritonitis. No pneumoperitoneum. 4.4 x 3.3 cm abscess in   the right iliac is muscle, apparently recurrent from priors dating back   to 2025 but new from most recent scan on .  VESSELS: Extensive varices especially in the right adnexa, anterior   peritoneum, probably esophageal. No venous thrombosis.  LYMPH NODES: No lymphadenopathy.  ABDOMINAL WALL: Within normal limits.  BONES: Within normal limits.    IMPRESSION:  Recurrent iliopsoas intramuscular abscess, possibly contiguous with a   small complex collection in the right lower quadrant. Normal appendix is   not identified,  ?Perforated appendicitis.    New complex pelvic ascites and peritoneal enhancement,? Peritonitis.    Probably reactive thickening of the right colon.    Advanced cirrhosis and portal hypertension.        --- End of Report ---    < end of copied text >                    < from: US Transvaginal (25 @ 19:20) >  INTERPRETATION:  CLINICAL INFORMATION: Vaginal bleeding. Postmenopausal.   History of cirrhosis.    LMP: Postmenopausal    COMPARISON: Pelvic ultrasound 2018, CT abdomen/pelvis 2025    TECHNIQUE:  Endovaginal and transabdominal pelvic sonogram.    FINDINGS:  Uterus: 4.9 cm x 2.4 cm x 3.1 cm. Within normal limits.  Endometrium: 2 mm. Within normal limits.    Ovaries could not bevisualized.    Fluid: Small to moderate pelvic ascites.    IMPRESSION:  No acute findings. Endometrium within normal limits measuring 2 mm in   thickness.    Small to moderate pelvic ascites.        < end of copied text >

## 2025-07-13 NOTE — ED ADULT NURSE REASSESSMENT NOTE - NS ED NURSE REASSESS COMMENT FT1
pt attempted to urinate with bed pan. unable to urinate at this time. will attempt again when she returns from CT and US. transport at bedside to take to CT and US.

## 2025-07-13 NOTE — CONSULT NOTE ADULT - ASSESSMENT
59 Y/F with PMHx of alcoholic cirrhosis, esophageal varices s/p banding, C diff colitis, ? infective endocarditis and bilateral psoas abscess managed conservatively ( [facility] 2/10-3/5/2025), presented with right lower quadrant pain and fever for 1 day    Febrile up to 103.4 F, Tachycardiac, LA: 8.6, No leukocytosis    CT A/P 7/13: Recurrent iliopsoas intramuscular abscess, possibly contiguous with a small complex collection in the right lower quadrant. Normal appendix is not identified,?Perforated appendicitis.  New complex pelvic ascites and peritoneal enhancement,? Peritonitis .Probably reactive thickening of the right colon. Advanced cirrhosis and portal hypertension.    MELD Score: 19    Recommendations:  - C/w resuscitation and trend LA  - IV abx  - As patient is already being evaluated for liver transplant at [facility], we recommend transfer to the transplant center for further intervention

## 2025-07-13 NOTE — ED ADULT TRIAGE NOTE - CHIEF COMPLAINT QUOTE
pt presents to the ED for vaginal bleeding. was hospitalized for sepsis in March. pt reports multiple clots and has soaked through a towel. pt noted to be pale and shaking in triage. denies recent trauma. hx of cirrhosis - pt was tbd on liver transplant.

## 2025-07-13 NOTE — ED ADULT NURSE REASSESSMENT NOTE - NS ED NURSE REASSESS COMMENT FT1
received pt from day RN Aneta. pt resting comfortably at this time. no signs of distress noted. no further complaints or discomforts reported at this time. safety and comfort maintained.

## 2025-07-14 ENCOUNTER — INPATIENT (INPATIENT)
Facility: HOSPITAL | Age: 60
LOS: 21 days | Discharge: NURSING, REHAB OR LONG-TERM CARE | DRG: 871 | End: 2025-08-05
Attending: HOSPITALIST | Admitting: HOSPITALIST
Payer: MEDICAID

## 2025-07-14 VITALS
RESPIRATION RATE: 19 BRPM | HEART RATE: 115 BPM | TEMPERATURE: 98 F | DIASTOLIC BLOOD PRESSURE: 70 MMHG | OXYGEN SATURATION: 95 % | SYSTOLIC BLOOD PRESSURE: 120 MMHG

## 2025-07-14 VITALS
OXYGEN SATURATION: 93 % | WEIGHT: 121.92 LBS | SYSTOLIC BLOOD PRESSURE: 103 MMHG | TEMPERATURE: 98 F | DIASTOLIC BLOOD PRESSURE: 64 MMHG | HEIGHT: 67 IN | RESPIRATION RATE: 20 BRPM | HEART RATE: 117 BPM

## 2025-07-14 DIAGNOSIS — Z29.9 ENCOUNTER FOR PROPHYLACTIC MEASURES, UNSPECIFIED: ICD-10-CM

## 2025-07-14 DIAGNOSIS — N93.9 ABNORMAL UTERINE AND VAGINAL BLEEDING, UNSPECIFIED: ICD-10-CM

## 2025-07-14 DIAGNOSIS — Z98.890 OTHER SPECIFIED POSTPROCEDURAL STATES: Chronic | ICD-10-CM

## 2025-07-14 DIAGNOSIS — K68.12 PSOAS MUSCLE ABSCESS: ICD-10-CM

## 2025-07-14 DIAGNOSIS — A41.9 SEPSIS, UNSPECIFIED ORGANISM: ICD-10-CM

## 2025-07-14 DIAGNOSIS — Z98.891 HISTORY OF UTERINE SCAR FROM PREVIOUS SURGERY: Chronic | ICD-10-CM

## 2025-07-14 DIAGNOSIS — K74.60 UNSPECIFIED CIRRHOSIS OF LIVER: ICD-10-CM

## 2025-07-14 LAB
ALBUMIN SERPL ELPH-MCNC: 3.1 G/DL — LOW (ref 3.3–5)
ALP SERPL-CCNC: 173 U/L — HIGH (ref 40–120)
ALT FLD-CCNC: 10 U/L — SIGNIFICANT CHANGE UP (ref 10–45)
ANION GAP SERPL CALC-SCNC: 20 MMOL/L — HIGH (ref 5–17)
ANISOCYTOSIS BLD QL: SLIGHT — SIGNIFICANT CHANGE UP
APTT BLD: 35.4 SEC — SIGNIFICANT CHANGE UP (ref 26.1–36.8)
AST SERPL-CCNC: 25 U/L — SIGNIFICANT CHANGE UP (ref 10–40)
BASOPHILS # BLD AUTO: 0.06 K/UL — SIGNIFICANT CHANGE UP (ref 0–0.2)
BASOPHILS # BLD MANUAL: 0 K/UL — SIGNIFICANT CHANGE UP (ref 0–0.2)
BASOPHILS NFR BLD AUTO: 0.5 % — SIGNIFICANT CHANGE UP (ref 0–2)
BASOPHILS NFR BLD MANUAL: 0 % — SIGNIFICANT CHANGE UP (ref 0–2)
BILIRUB SERPL-MCNC: 4.3 MG/DL — HIGH (ref 0.2–1.2)
BUN SERPL-MCNC: 10 MG/DL — SIGNIFICANT CHANGE UP (ref 7–23)
BURR CELLS BLD QL SMEAR: SLIGHT — SIGNIFICANT CHANGE UP
CALCIUM SERPL-MCNC: 8.4 MG/DL — SIGNIFICANT CHANGE UP (ref 8.4–10.5)
CHLORIDE SERPL-SCNC: 97 MMOL/L — SIGNIFICANT CHANGE UP (ref 96–108)
CO2 SERPL-SCNC: 15 MMOL/L — LOW (ref 22–31)
CREAT SERPL-MCNC: 0.7 MG/DL — SIGNIFICANT CHANGE UP (ref 0.5–1.3)
CULTURE RESULTS: SIGNIFICANT CHANGE UP
EGFR: 98 ML/MIN/1.73M2 — SIGNIFICANT CHANGE UP
EGFR: 98 ML/MIN/1.73M2 — SIGNIFICANT CHANGE UP
ELLIPTOCYTES BLD QL SMEAR: SLIGHT — SIGNIFICANT CHANGE UP
EOSINOPHIL # BLD AUTO: 0.04 K/UL — SIGNIFICANT CHANGE UP (ref 0–0.5)
EOSINOPHIL # BLD MANUAL: 0 K/UL — SIGNIFICANT CHANGE UP (ref 0–0.5)
EOSINOPHIL NFR BLD AUTO: 0.3 % — SIGNIFICANT CHANGE UP (ref 0–6)
EOSINOPHIL NFR BLD MANUAL: 0 % — SIGNIFICANT CHANGE UP (ref 0–6)
GAS PNL BLDV: SIGNIFICANT CHANGE UP
GIANT PLATELETS BLD QL SMEAR: PRESENT
GLUCOSE SERPL-MCNC: 142 MG/DL — HIGH (ref 70–99)
GRAM STN FLD: ABNORMAL
HCT VFR BLD CALC: 33.6 % — LOW (ref 34.5–45)
HGB BLD-MCNC: 10.9 G/DL — LOW (ref 11.5–15.5)
IMM GRANULOCYTES # BLD AUTO: 0.08 K/UL — HIGH (ref 0–0.07)
IMM GRANULOCYTES NFR BLD AUTO: 0.6 % — SIGNIFICANT CHANGE UP (ref 0–0.9)
INR BLD: 1.97 RATIO — HIGH (ref 0.85–1.16)
K OXYTOCA DNA BLD POS QL NAA+NON-PROBE: SIGNIFICANT CHANGE UP
LACTATE SERPL-SCNC: 3.4 MMOL/L — HIGH (ref 0.5–2)
LYMPHOCYTES # BLD AUTO: 0.83 K/UL — LOW (ref 1–3.3)
LYMPHOCYTES # BLD MANUAL: 0.83 K/UL — LOW (ref 1–3.3)
LYMPHOCYTES NFR BLD AUTO: 6.4 % — LOW (ref 13–44)
LYMPHOCYTES NFR BLD MANUAL: 6.4 % — LOW (ref 13–44)
MACROCYTES BLD QL: ABNORMAL
MANUAL METAMYELOCYTE #: 0.1 K/UL — HIGH (ref 0–0)
MANUAL NEUTROPHIL BANDS #: 0.93 K/UL — HIGH (ref 0–0.84)
MCHC RBC-ENTMCNC: 25.7 PG — LOW (ref 27–34)
MCHC RBC-ENTMCNC: 32.4 G/DL — SIGNIFICANT CHANGE UP (ref 32–36)
MCV RBC AUTO: 79.2 FL — LOW (ref 80–100)
METAMYELOCYTES # FLD: 0.8 % — HIGH (ref 0–0)
METAMYELOCYTES NFR BLD: 0.8 % — HIGH (ref 0–0)
METHOD TYPE: SIGNIFICANT CHANGE UP
MONOCYTES # BLD AUTO: 0.38 K/UL — SIGNIFICANT CHANGE UP (ref 0–0.9)
MONOCYTES # BLD MANUAL: 0.62 K/UL — SIGNIFICANT CHANGE UP (ref 0–0.9)
MONOCYTES NFR BLD AUTO: 2.9 % — SIGNIFICANT CHANGE UP (ref 2–14)
MONOCYTES NFR BLD MANUAL: 4.8 % — SIGNIFICANT CHANGE UP (ref 2–14)
NEUTROPHILS # BLD AUTO: 11.58 K/UL — HIGH (ref 1.8–7.4)
NEUTROPHILS # BLD MANUAL: 10.48 K/UL — HIGH (ref 1.8–7.4)
NEUTROPHILS NFR BLD AUTO: 89.3 % — HIGH (ref 43–77)
NEUTROPHILS NFR BLD MANUAL: 80.8 % — HIGH (ref 43–77)
NEUTS BAND # BLD: 7.2 % — SIGNIFICANT CHANGE UP (ref 0–8)
NEUTS BAND NFR BLD: 7.2 % — SIGNIFICANT CHANGE UP (ref 0–8)
NRBC # BLD AUTO: 0 K/UL — SIGNIFICANT CHANGE UP (ref 0–0)
NRBC # FLD: 0 K/UL — SIGNIFICANT CHANGE UP (ref 0–0)
NRBC BLD AUTO-RTO: 0 /100 WBCS — SIGNIFICANT CHANGE UP (ref 0–0)
PLAT MORPH BLD: NORMAL — SIGNIFICANT CHANGE UP
PLATELET # BLD AUTO: 146 K/UL — LOW (ref 150–400)
PMV BLD: 9.2 FL — SIGNIFICANT CHANGE UP (ref 7–13)
POLYCHROMASIA BLD QL SMEAR: SLIGHT — SIGNIFICANT CHANGE UP
POTASSIUM SERPL-MCNC: 3.3 MMOL/L — LOW (ref 3.5–5.3)
POTASSIUM SERPL-SCNC: 3.3 MMOL/L — LOW (ref 3.5–5.3)
PROT SERPL-MCNC: 6.9 G/DL — SIGNIFICANT CHANGE UP (ref 6–8.3)
PROTHROM AB SERPL-ACNC: 22.5 SEC — HIGH (ref 9.9–13.4)
RBC # BLD: 4.24 M/UL — SIGNIFICANT CHANGE UP (ref 3.8–5.2)
RBC # FLD: 22.9 % — HIGH (ref 10.3–14.5)
RBC BLD AUTO: ABNORMAL
SCHISTOCYTES BLD QL AUTO: SLIGHT — SIGNIFICANT CHANGE UP
SODIUM SERPL-SCNC: 132 MMOL/L — LOW (ref 135–145)
SPECIMEN SOURCE: SIGNIFICANT CHANGE UP
SPECIMEN SOURCE: SIGNIFICANT CHANGE UP
TARGETS BLD QL SMEAR: SLIGHT — SIGNIFICANT CHANGE UP
WBC # BLD: 12.97 K/UL — HIGH (ref 3.8–10.5)
WBC # FLD AUTO: 12.97 K/UL — HIGH (ref 3.8–10.5)

## 2025-07-14 PROCEDURE — 80053 COMPREHEN METABOLIC PANEL: CPT

## 2025-07-14 PROCEDURE — 93005 ELECTROCARDIOGRAM TRACING: CPT

## 2025-07-14 PROCEDURE — 99223 1ST HOSP IP/OBS HIGH 75: CPT | Mod: GC

## 2025-07-14 PROCEDURE — 85610 PROTHROMBIN TIME: CPT

## 2025-07-14 PROCEDURE — 82947 ASSAY GLUCOSE BLOOD QUANT: CPT

## 2025-07-14 PROCEDURE — 85730 THROMBOPLASTIN TIME PARTIAL: CPT

## 2025-07-14 PROCEDURE — 93010 ELECTROCARDIOGRAM REPORT: CPT | Mod: 77

## 2025-07-14 PROCEDURE — 99285 EMERGENCY DEPT VISIT HI MDM: CPT

## 2025-07-14 PROCEDURE — 82435 ASSAY OF BLOOD CHLORIDE: CPT

## 2025-07-14 PROCEDURE — 84295 ASSAY OF SERUM SODIUM: CPT

## 2025-07-14 PROCEDURE — 85025 COMPLETE CBC W/AUTO DIFF WBC: CPT

## 2025-07-14 PROCEDURE — 86901 BLOOD TYPING SEROLOGIC RH(D): CPT

## 2025-07-14 PROCEDURE — 82330 ASSAY OF CALCIUM: CPT

## 2025-07-14 PROCEDURE — 93010 ELECTROCARDIOGRAM REPORT: CPT

## 2025-07-14 PROCEDURE — 83605 ASSAY OF LACTIC ACID: CPT

## 2025-07-14 PROCEDURE — 86850 RBC ANTIBODY SCREEN: CPT

## 2025-07-14 PROCEDURE — 86900 BLOOD TYPING SEROLOGIC ABO: CPT

## 2025-07-14 PROCEDURE — 99223 1ST HOSP IP/OBS HIGH 75: CPT

## 2025-07-14 PROCEDURE — 87040 BLOOD CULTURE FOR BACTERIA: CPT

## 2025-07-14 PROCEDURE — 85014 HEMATOCRIT: CPT

## 2025-07-14 PROCEDURE — 99232 SBSQ HOSP IP/OBS MODERATE 35: CPT | Mod: GC

## 2025-07-14 PROCEDURE — 84132 ASSAY OF SERUM POTASSIUM: CPT

## 2025-07-14 PROCEDURE — 85018 HEMOGLOBIN: CPT

## 2025-07-14 PROCEDURE — 82803 BLOOD GASES ANY COMBINATION: CPT

## 2025-07-14 RX ORDER — LACTULOSE 10 G/15ML
10 SOLUTION ORAL THREE TIMES A DAY
Refills: 0 | Status: DISCONTINUED | OUTPATIENT
Start: 2025-07-14 | End: 2025-07-14

## 2025-07-14 RX ORDER — PIPERACILLIN-TAZO-DEXTROSE,ISO 3.375G/5
3.38 IV SOLUTION, PIGGYBACK PREMIX FROZEN(ML) INTRAVENOUS ONCE
Refills: 0 | Status: COMPLETED | OUTPATIENT
Start: 2025-07-14 | End: 2025-07-14

## 2025-07-14 RX ORDER — MIDODRINE HYDROCHLORIDE 5 MG/1
15 TABLET ORAL EVERY 8 HOURS
Refills: 0 | Status: DISCONTINUED | OUTPATIENT
Start: 2025-07-14 | End: 2025-08-05

## 2025-07-14 RX ORDER — MIRTAZAPINE 30 MG/1
7.5 TABLET, FILM COATED ORAL AT BEDTIME
Refills: 0 | Status: DISCONTINUED | OUTPATIENT
Start: 2025-07-14 | End: 2025-08-05

## 2025-07-14 RX ORDER — MELATONIN 5 MG
3 TABLET ORAL AT BEDTIME
Refills: 0 | Status: DISCONTINUED | OUTPATIENT
Start: 2025-07-14 | End: 2025-08-05

## 2025-07-14 RX ORDER — CARVEDILOL 3.12 MG/1
3.12 TABLET, FILM COATED ORAL EVERY 12 HOURS
Refills: 0 | Status: DISCONTINUED | OUTPATIENT
Start: 2025-07-14 | End: 2025-07-19

## 2025-07-14 RX ORDER — ACETAMINOPHEN 500 MG/5ML
1000 LIQUID (ML) ORAL ONCE
Refills: 0 | Status: COMPLETED | OUTPATIENT
Start: 2025-07-14 | End: 2025-07-14

## 2025-07-14 RX ORDER — MAGNESIUM OXIDE 400 MG
400 TABLET ORAL
Refills: 0 | Status: DISCONTINUED | OUTPATIENT
Start: 2025-07-14 | End: 2025-08-05

## 2025-07-14 RX ORDER — FOLIC ACID 1 MG/1
1 TABLET ORAL DAILY
Refills: 0 | Status: DISCONTINUED | OUTPATIENT
Start: 2025-07-14 | End: 2025-08-05

## 2025-07-14 RX ORDER — HYDROMORPHONE/SOD CHLOR,ISO/PF 2 MG/10 ML
0.5 SYRINGE (ML) INJECTION ONCE
Refills: 0 | Status: DISCONTINUED | OUTPATIENT
Start: 2025-07-14 | End: 2025-07-14

## 2025-07-14 RX ORDER — SODIUM CHLORIDE 9 G/1000ML
500 INJECTION, SOLUTION INTRAVENOUS ONCE
Refills: 0 | Status: COMPLETED | OUTPATIENT
Start: 2025-07-14 | End: 2025-07-14

## 2025-07-14 RX ORDER — PIPERACILLIN-TAZO-DEXTROSE,ISO 3.375G/5
3.38 IV SOLUTION, PIGGYBACK PREMIX FROZEN(ML) INTRAVENOUS EVERY 8 HOURS
Refills: 0 | Status: COMPLETED | OUTPATIENT
Start: 2025-07-14 | End: 2025-07-21

## 2025-07-14 RX ORDER — LACTULOSE 10 G/15ML
10 SOLUTION ORAL THREE TIMES A DAY
Refills: 0 | Status: DISCONTINUED | OUTPATIENT
Start: 2025-07-14 | End: 2025-07-26

## 2025-07-14 RX ORDER — VANCOMYCIN HCL IN 5 % DEXTROSE 1.5G/250ML
125 PLASTIC BAG, INJECTION (ML) INTRAVENOUS DAILY
Refills: 0 | Status: DISCONTINUED | OUTPATIENT
Start: 2025-07-14 | End: 2025-07-14

## 2025-07-14 RX ORDER — HYDROXYZINE HYDROCHLORIDE 25 MG/1
50 TABLET, FILM COATED ORAL EVERY 8 HOURS
Refills: 0 | Status: DISCONTINUED | OUTPATIENT
Start: 2025-07-14 | End: 2025-08-05

## 2025-07-14 RX ORDER — URSODIOL 300 MG/1
300 CAPSULE ORAL EVERY 12 HOURS
Refills: 0 | Status: DISCONTINUED | OUTPATIENT
Start: 2025-07-14 | End: 2025-08-05

## 2025-07-14 RX ORDER — VANCOMYCIN HCL IN 5 % DEXTROSE 1.5G/250ML
125 PLASTIC BAG, INJECTION (ML) INTRAVENOUS EVERY 12 HOURS
Refills: 0 | Status: DISCONTINUED | OUTPATIENT
Start: 2025-07-14 | End: 2025-08-05

## 2025-07-14 RX ORDER — ENOXAPARIN SODIUM 100 MG/ML
40 INJECTION SUBCUTANEOUS EVERY 24 HOURS
Refills: 0 | Status: COMPLETED | OUTPATIENT
Start: 2025-07-14 | End: 2025-07-20

## 2025-07-14 RX ADMIN — Medication 25 GRAM(S): at 10:10

## 2025-07-14 RX ADMIN — Medication 20 MILLIEQUIVALENT(S): at 08:33

## 2025-07-14 RX ADMIN — Medication 400 MILLIGRAM(S): at 19:08

## 2025-07-14 RX ADMIN — SODIUM CHLORIDE 500 MILLILITER(S): 9 INJECTION, SOLUTION INTRAVENOUS at 06:26

## 2025-07-14 RX ADMIN — FOLIC ACID 1 MILLIGRAM(S): 1 TABLET ORAL at 19:09

## 2025-07-14 RX ADMIN — Medication 100 MILLIGRAM(S): at 19:08

## 2025-07-14 RX ADMIN — ENOXAPARIN SODIUM 40 MILLIGRAM(S): 100 INJECTION SUBCUTANEOUS at 19:09

## 2025-07-14 RX ADMIN — HYDROXYZINE HYDROCHLORIDE 50 MILLIGRAM(S): 25 TABLET, FILM COATED ORAL at 21:21

## 2025-07-14 RX ADMIN — LACTULOSE 10 GRAM(S): 10 SOLUTION ORAL at 10:15

## 2025-07-14 RX ADMIN — Medication 20 MILLIEQUIVALENT(S): at 12:05

## 2025-07-14 RX ADMIN — Medication 0.5 MILLIGRAM(S): at 02:58

## 2025-07-14 RX ADMIN — URSODIOL 300 MILLIGRAM(S): 300 CAPSULE ORAL at 19:07

## 2025-07-14 RX ADMIN — MIRTAZAPINE 7.5 MILLIGRAM(S): 30 TABLET, FILM COATED ORAL at 21:21

## 2025-07-14 RX ADMIN — Medication 3 MILLIGRAM(S): at 21:21

## 2025-07-14 RX ADMIN — Medication 400 MILLIGRAM(S): at 13:17

## 2025-07-14 RX ADMIN — Medication 25 GRAM(S): at 17:34

## 2025-07-14 RX ADMIN — Medication 500 MILLILITER(S): at 02:58

## 2025-07-14 RX ADMIN — Medication 125 MILLIGRAM(S): at 19:10

## 2025-07-14 RX ADMIN — Medication 200 GRAM(S): at 05:18

## 2025-07-14 NOTE — ED PROVIDER NOTE - CLINICAL SUMMARY MEDICAL DECISION MAKING FREE TEXT BOX
60F with end stage cirrhosis, on transplant list, who presents with abdominal pain, vaginal bleeding, tachycardia, rectal temp 103F, vomiting. Concern for sepsis with unknown source at this time. Will treat with antibiotics, IV fluids, 1 dose ofirmev for fever (unable to have nsaids). Will obtain labs, lactate, blood cultures, UA, CT a/p, and transvaginal US. Will plan for admission.

## 2025-07-14 NOTE — ED PROVIDER NOTE - PROGRESS NOTE DETAILS
Patient noted to have elevated lactate of 8. will repeat after IVF.     CT reviewed concerning for recurrence of iliopsoas muscle abscess on the right. Also concern for perforated appendicitis vs peritonitis     Consult placed  to surgical resident who will evaluate patient in the ED.      Case discussed with  Surgical resident and Dr. Perez who is state unlikely to be perforated appendicitis or peritonitis at this time.  No surgical intervention from their standpoint.  They do recommend that patient be transferred to [facility] as her hepatologist is there to continue further workup for her abscess.    Case discussed with Dr. Dugan, hepatology at [facility] via transfer center  who accepts patient transfer from ER to ER to be further worked up by her hepatologist, .      Patient agreeable with plan for transfer at this time.     On reevaluation, patient resting comfortably no acute distress.  Heart rate has improved, in the low 100s.  Vitals are stable.  Repeat lactic 3.5.  Will order additional 500 NS IV fluid. Abdomen soft, with some tenderness diffusely, no rebound or guarding.

## 2025-07-14 NOTE — ED PROVIDER NOTE - OBJECTIVE STATEMENT
Patient is a 60-year-old female with past medical history of cirrhosis, recent admission for sepsis to [facility] earlier this year for 3 months who presents to the emergency department for vaginal bleeding and abdominal pain.  Patient states she had acute onset of suprapubic pain earlier this evening followed by passing of blood clots.  She states she used two hand towels that were not fully soaked. She denies any history of this in the past.  She also reports diffuse abdominal pain with an episode of vomiting prior to arrival to the ED.  States she felt warm today however did not check her temperature.  Denies any urinary symptoms, diarrhea, bloody stool, hematemesis.

## 2025-07-14 NOTE — ED ADULT NURSE NOTE - CHPI ED NUR SYMPTOMS NEG
SUBJECTIVE       Eliana Bethea is a 76 year old  male with a PMH significant for:     Patient Active Problem List   Diagnosis     Aphakia     Aneurysm of abdominal aorta (H)     Benign hypertension     Cardiac pacemaker  present     Health Care Home     Gout, chronic     Complete colonoscopy     Depressive disorder, not elsewhere classified      knee replacement     Male erectile disorder     History of shoulder replacement     Chronic atrial fibrillation (H)     Anticoagulation adequate with anticoagulant therapy     S/P carotid endarterectomy     Carpal tunnel syndrome on right     Single vessel coronary artery disease     Anemia     S/P hernia repair     Liver cell damage     Alcohol dependence (H)     Chronic systolic congestive heart failure (H)     Diverticulosis of large intestine     He presents with concerns of diverticulosis.  He has pain from this and gets Norco every month.  He needs a refill of his pain medication.  He has had two episodes of pain this month.  He had EMS called two times because people thought that he was having a heart attack from getting sweaty.  He is here today for refills of his pain medication.  He is frustrated that his CT and US results from [facility] are not available.  He does not have a follow up scheduled from [facility]. Dr. Corley and this patient have been dealing with this for many years.       We discussed the result of his aortic aneurysm.  He understands that it has gotten bigger and will need screening every 2 years.     PMH, Medications and Allergies were reviewed and updated as needed.        REVIEW OF SYSTEMS     Pertinent review, per HPI.        OBJECTIVE     Filed Vitals:    01/24/17 0917   BP: 153/73   Pulse: 61   Temp: 97.4  F (36.3  C)   TempSrc: Oral   Weight: 271 lb 6.4 oz (123.106 kg)     Body mass index is 33.92 kg/(m^2).    Constitutional: Well appearing, no acute distress.  Abdomen; obese.  No masses visualized.    No results found for this or any previous  visit (from the past 24 hour(s)).        ASSESSMENT AND PLAN     (K57.30) Diverticulosis of large intestine without hemorrhage  (primary encounter diagnosis)  Comment: patient needs to follow up with [facility].  Will have our referral desk see if they can track down US and CT ordered by [facility] earlier this month, as the patient would like to review it with Dr. Corley.  Pain refill given - [facility] and [place] one prescriber.   Plan: HYDROcodone-acetaminophen (NORCO) 5-325 MG per         Tablet - 31 given       RTC 4 weeks or sooner if develops new or worsening symptoms.      Betty Mcrae MD  PGY-3 [facility]         no vomiting

## 2025-07-14 NOTE — ED ADULT NURSE NOTE - OBJECTIVE STATEMENT
61 y/o pt presents to the ED complaining of vaginal bleeding, pt states she has bled through multiple pads and towels. pt states sge was hospitalized in march due to sepsis, pt states she has some pain in her abdomen. pt has a PMHx of cirrhosis. pt is a/o x4 with no other complaints at this time. safety and comfort in place.

## 2025-07-14 NOTE — ED PROVIDER NOTE - PHYSICAL EXAMINATION
Constitutional: NAD AAOx3  Eyes: EOMI, pupils equal  Head: Normocephalic atraumatic  Mouth: no airway obstruction  Cardiac: regular rate   Resp: Lungs CTAB  GI: Abd soft, nondistended, diffuse abdominal tenderness, no rebound or guarding  Neuro: CN2-12 intact  Skin: No rashes  : no active bleeding, small amount of blood on pad

## 2025-07-14 NOTE — ED ADULT NURSE NOTE - NSFALLRISKINTERV_ED_ALL_ED

## 2025-07-15 DIAGNOSIS — R78.81 BACTEREMIA: ICD-10-CM

## 2025-07-15 LAB
A1C WITH ESTIMATED AVERAGE GLUCOSE RESULT: 5 % — SIGNIFICANT CHANGE UP (ref 4–5.6)
ALBUMIN SERPL ELPH-MCNC: 2.7 G/DL — LOW (ref 3.3–5)
ALP SERPL-CCNC: 145 U/L — HIGH (ref 40–120)
ALT FLD-CCNC: 10 U/L — SIGNIFICANT CHANGE UP (ref 10–45)
ANION GAP SERPL CALC-SCNC: 16 MMOL/L — SIGNIFICANT CHANGE UP (ref 5–17)
APTT BLD: 37.2 SEC — HIGH (ref 26.1–36.8)
AST SERPL-CCNC: 23 U/L — SIGNIFICANT CHANGE UP (ref 10–40)
BILIRUB SERPL-MCNC: 3.5 MG/DL — HIGH (ref 0.2–1.2)
BUN SERPL-MCNC: 12 MG/DL — SIGNIFICANT CHANGE UP (ref 7–23)
CALCIUM SERPL-MCNC: 8.6 MG/DL — SIGNIFICANT CHANGE UP (ref 8.4–10.5)
CHLORIDE SERPL-SCNC: 99 MMOL/L — SIGNIFICANT CHANGE UP (ref 96–108)
CHOLEST SERPL-MCNC: 154 MG/DL — SIGNIFICANT CHANGE UP
CO2 SERPL-SCNC: 17 MMOL/L — LOW (ref 22–31)
CREAT SERPL-MCNC: 0.69 MG/DL — SIGNIFICANT CHANGE UP (ref 0.5–1.3)
EGFR: 98 ML/MIN/1.73M2 — SIGNIFICANT CHANGE UP
EGFR: 98 ML/MIN/1.73M2 — SIGNIFICANT CHANGE UP
ERYTHROCYTE [SEDIMENTATION RATE] IN BLOOD: 95 MM/HR — HIGH (ref 0–20)
ESTIMATED AVERAGE GLUCOSE: 97 MG/DL — SIGNIFICANT CHANGE UP (ref 68–114)
FERRITIN SERPL-MCNC: 108 NG/ML — SIGNIFICANT CHANGE UP (ref 13–330)
FOLATE SERPL-MCNC: >20 NG/ML — SIGNIFICANT CHANGE UP
GLUCOSE SERPL-MCNC: 115 MG/DL — HIGH (ref 70–99)
GRAM STN FLD: ABNORMAL
HCT VFR BLD CALC: 30.1 % — LOW (ref 34.5–45)
HDLC SERPL-MCNC: 14 MG/DL — LOW
HGB BLD-MCNC: 10 G/DL — LOW (ref 11.5–15.5)
INR BLD: 2.37 RATIO — HIGH (ref 0.85–1.16)
IRON SATN MFR SERPL: 19 UG/DL — LOW (ref 30–160)
IRON SATN MFR SERPL: 7 % — LOW (ref 14–50)
LDLC SERPL-MCNC: 114 MG/DL — HIGH
LIPID PNL WITH DIRECT LDL SERPL: 114 MG/DL — HIGH
MAGNESIUM SERPL-MCNC: 1.4 MG/DL — LOW (ref 1.6–2.6)
MCHC RBC-ENTMCNC: 25.8 PG — LOW (ref 27–34)
MCHC RBC-ENTMCNC: 33.2 G/DL — SIGNIFICANT CHANGE UP (ref 32–36)
MCV RBC AUTO: 77.6 FL — LOW (ref 80–100)
NONHDLC SERPL-MCNC: 140 MG/DL — HIGH
NRBC # BLD AUTO: 0 K/UL — SIGNIFICANT CHANGE UP (ref 0–0)
NRBC # FLD: 0 K/UL — SIGNIFICANT CHANGE UP (ref 0–0)
NRBC BLD AUTO-RTO: 0 /100 WBCS — SIGNIFICANT CHANGE UP (ref 0–0)
PHOSPHATE SERPL-MCNC: 2.2 MG/DL — LOW (ref 2.5–4.5)
PLATELET # BLD AUTO: 137 K/UL — LOW (ref 150–400)
PMV BLD: 9.1 FL — SIGNIFICANT CHANGE UP (ref 7–13)
POTASSIUM SERPL-MCNC: 3.4 MMOL/L — LOW (ref 3.5–5.3)
POTASSIUM SERPL-SCNC: 3.4 MMOL/L — LOW (ref 3.5–5.3)
PROT SERPL-MCNC: 6.3 G/DL — SIGNIFICANT CHANGE UP (ref 6–8.3)
PROTHROM AB SERPL-ACNC: 26.8 SEC — HIGH (ref 9.9–13.4)
RBC # BLD: 3.88 M/UL — SIGNIFICANT CHANGE UP (ref 3.8–5.2)
RBC # FLD: 23.1 % — HIGH (ref 10.3–14.5)
SODIUM SERPL-SCNC: 132 MMOL/L — LOW (ref 135–145)
T4 AB SER-ACNC: 7.7 UG/DL — SIGNIFICANT CHANGE UP (ref 4.6–12)
TIBC SERPL-MCNC: 268 UG/DL — SIGNIFICANT CHANGE UP (ref 220–430)
TRIGL SERPL-MCNC: 145 MG/DL — SIGNIFICANT CHANGE UP
TSH SERPL-MCNC: 1.69 UIU/ML — SIGNIFICANT CHANGE UP (ref 0.27–4.2)
UIBC SERPL-MCNC: 249 UG/DL — SIGNIFICANT CHANGE UP (ref 110–370)
VIT B12 SERPL-MCNC: >2000 PG/ML — HIGH (ref 232–1245)
WBC # BLD: 10.29 K/UL — SIGNIFICANT CHANGE UP (ref 3.8–10.5)
WBC # FLD AUTO: 10.29 K/UL — SIGNIFICANT CHANGE UP (ref 3.8–10.5)

## 2025-07-15 PROCEDURE — 99233 SBSQ HOSP IP/OBS HIGH 50: CPT | Mod: GC

## 2025-07-15 PROCEDURE — 99232 SBSQ HOSP IP/OBS MODERATE 35: CPT

## 2025-07-15 PROCEDURE — 99232 SBSQ HOSP IP/OBS MODERATE 35: CPT | Mod: GC

## 2025-07-15 RX ORDER — SOD PHOS DI, MONO/K PHOS MONO 250 MG
2 TABLET ORAL ONCE
Refills: 0 | Status: COMPLETED | OUTPATIENT
Start: 2025-07-15 | End: 2025-07-15

## 2025-07-15 RX ORDER — SPIRONOLACTONE 25 MG
1 TABLET ORAL
Refills: 0 | DISCHARGE

## 2025-07-15 RX ORDER — ACETAMINOPHEN 500 MG/5ML
1000 LIQUID (ML) ORAL ONCE
Refills: 0 | Status: COMPLETED | OUTPATIENT
Start: 2025-07-15 | End: 2025-07-15

## 2025-07-15 RX ORDER — MAGNESIUM SULFATE 500 MG/ML
1 SYRINGE (ML) INJECTION ONCE
Refills: 0 | Status: COMPLETED | OUTPATIENT
Start: 2025-07-15 | End: 2025-07-15

## 2025-07-15 RX ORDER — ALBUMIN (HUMAN) 12.5 G/50ML
100 INJECTION, SOLUTION INTRAVENOUS EVERY 8 HOURS
Refills: 0 | Status: COMPLETED | OUTPATIENT
Start: 2025-07-15 | End: 2025-07-15

## 2025-07-15 RX ORDER — HYDROMORPHONE/SOD CHLOR,ISO/PF 2 MG/10 ML
0.2 SYRINGE (ML) INJECTION
Refills: 0 | Status: DISCONTINUED | OUTPATIENT
Start: 2025-07-15 | End: 2025-07-20

## 2025-07-15 RX ORDER — GABAPENTIN 400 MG/1
1 CAPSULE ORAL
Refills: 0 | DISCHARGE

## 2025-07-15 RX ADMIN — Medication 25 GRAM(S): at 10:29

## 2025-07-15 RX ADMIN — Medication 1000 MILLIGRAM(S): at 09:30

## 2025-07-15 RX ADMIN — Medication 40 MILLIEQUIVALENT(S): at 10:29

## 2025-07-15 RX ADMIN — Medication 100 GRAM(S): at 10:29

## 2025-07-15 RX ADMIN — Medication 400 MILLIGRAM(S): at 08:51

## 2025-07-15 RX ADMIN — Medication 25 GRAM(S): at 01:21

## 2025-07-15 RX ADMIN — Medication 400 MILLIGRAM(S): at 14:21

## 2025-07-15 RX ADMIN — ALBUMIN (HUMAN) 50 MILLILITER(S): 12.5 INJECTION, SOLUTION INTRAVENOUS at 14:21

## 2025-07-15 RX ADMIN — URSODIOL 300 MILLIGRAM(S): 300 CAPSULE ORAL at 05:41

## 2025-07-15 RX ADMIN — Medication 400 MILLIGRAM(S): at 10:29

## 2025-07-15 RX ADMIN — URSODIOL 300 MILLIGRAM(S): 300 CAPSULE ORAL at 18:22

## 2025-07-15 RX ADMIN — Medication 40 MILLIEQUIVALENT(S): at 15:54

## 2025-07-15 RX ADMIN — Medication 400 MILLIGRAM(S): at 18:25

## 2025-07-15 RX ADMIN — Medication 100 MILLIGRAM(S): at 12:54

## 2025-07-15 RX ADMIN — HYDROXYZINE HYDROCHLORIDE 50 MILLIGRAM(S): 25 TABLET, FILM COATED ORAL at 21:21

## 2025-07-15 RX ADMIN — Medication 2 PACKET(S): at 12:54

## 2025-07-15 RX ADMIN — Medication 0.2 MILLIGRAM(S): at 18:50

## 2025-07-15 RX ADMIN — ENOXAPARIN SODIUM 40 MILLIGRAM(S): 100 INJECTION SUBCUTANEOUS at 18:22

## 2025-07-15 RX ADMIN — Medication 125 MILLIGRAM(S): at 18:22

## 2025-07-15 RX ADMIN — HYDROXYZINE HYDROCHLORIDE 50 MILLIGRAM(S): 25 TABLET, FILM COATED ORAL at 05:41

## 2025-07-15 RX ADMIN — ALBUMIN (HUMAN) 50 MILLILITER(S): 12.5 INJECTION, SOLUTION INTRAVENOUS at 21:22

## 2025-07-15 RX ADMIN — CARVEDILOL 3.12 MILLIGRAM(S): 3.12 TABLET, FILM COATED ORAL at 18:23

## 2025-07-15 RX ADMIN — MIRTAZAPINE 7.5 MILLIGRAM(S): 30 TABLET, FILM COATED ORAL at 21:21

## 2025-07-15 RX ADMIN — Medication 25 GRAM(S): at 18:00

## 2025-07-15 RX ADMIN — Medication 0.2 MILLIGRAM(S): at 19:20

## 2025-07-15 RX ADMIN — Medication 40 MILLIGRAM(S): at 05:41

## 2025-07-15 RX ADMIN — Medication 125 MILLIGRAM(S): at 05:41

## 2025-07-15 RX ADMIN — FOLIC ACID 1 MILLIGRAM(S): 1 TABLET ORAL at 12:54

## 2025-07-15 RX ADMIN — HYDROXYZINE HYDROCHLORIDE 50 MILLIGRAM(S): 25 TABLET, FILM COATED ORAL at 14:20

## 2025-07-15 RX ADMIN — Medication 3 MILLIGRAM(S): at 21:21

## 2025-07-16 LAB
-  AMPICILLIN/SULBACTAM: SIGNIFICANT CHANGE UP
-  AMPICILLIN: SIGNIFICANT CHANGE UP
-  AZTREONAM: SIGNIFICANT CHANGE UP
-  CEFAZOLIN: SIGNIFICANT CHANGE UP
-  CEFEPIME: SIGNIFICANT CHANGE UP
-  CEFOXITIN: SIGNIFICANT CHANGE UP
-  CEFTRIAXONE: SIGNIFICANT CHANGE UP
-  CIPROFLOXACIN: SIGNIFICANT CHANGE UP
-  ERTAPENEM: SIGNIFICANT CHANGE UP
-  GENTAMICIN: SIGNIFICANT CHANGE UP
-  IMIPENEM: SIGNIFICANT CHANGE UP
-  LEVOFLOXACIN: SIGNIFICANT CHANGE UP
-  MEROPENEM: SIGNIFICANT CHANGE UP
-  PIPERACILLIN/TAZOBACTAM: SIGNIFICANT CHANGE UP
-  TIGECYCLINE: SIGNIFICANT CHANGE UP
-  TOBRAMYCIN: SIGNIFICANT CHANGE UP
-  TRIMETHOPRIM/SULFAMETHOXAZOLE: SIGNIFICANT CHANGE UP
ANION GAP SERPL CALC-SCNC: 15 MMOL/L — SIGNIFICANT CHANGE UP (ref 5–17)
BILIRUB SERPL-MCNC: 3.5 MG/DL — HIGH (ref 0.2–1.2)
BUN SERPL-MCNC: 12 MG/DL — SIGNIFICANT CHANGE UP (ref 7–23)
CALCIUM SERPL-MCNC: 9.2 MG/DL — SIGNIFICANT CHANGE UP (ref 8.4–10.5)
CHLORIDE SERPL-SCNC: 99 MMOL/L — SIGNIFICANT CHANGE UP (ref 96–108)
CO2 SERPL-SCNC: 16 MMOL/L — LOW (ref 22–31)
CREAT SERPL-MCNC: 0.67 MG/DL — SIGNIFICANT CHANGE UP (ref 0.5–1.3)
CULTURE RESULTS: ABNORMAL
CULTURE RESULTS: ABNORMAL
EGFR: 99 ML/MIN/1.73M2 — SIGNIFICANT CHANGE UP
EGFR: 99 ML/MIN/1.73M2 — SIGNIFICANT CHANGE UP
GLUCOSE SERPL-MCNC: 104 MG/DL — HIGH (ref 70–99)
HCT VFR BLD CALC: 30.7 % — LOW (ref 34.5–45)
HGB BLD-MCNC: 10 G/DL — LOW (ref 11.5–15.5)
INR BLD: 2.02 RATIO — HIGH (ref 0.85–1.16)
LACTATE SERPL-SCNC: 2.1 MMOL/L — HIGH (ref 0.5–2)
MAGNESIUM SERPL-MCNC: 1.8 MG/DL — SIGNIFICANT CHANGE UP (ref 1.6–2.6)
MCHC RBC-ENTMCNC: 25.7 PG — LOW (ref 27–34)
MCHC RBC-ENTMCNC: 32.6 G/DL — SIGNIFICANT CHANGE UP (ref 32–36)
MCV RBC AUTO: 78.9 FL — LOW (ref 80–100)
MELD SCORE WITH DIALYSIS: 34 POINTS — SIGNIFICANT CHANGE UP
MELD SCORE WITHOUT DIALYSIS: 24 POINTS — SIGNIFICANT CHANGE UP
METHOD TYPE: SIGNIFICANT CHANGE UP
NRBC # BLD AUTO: 0 K/UL — SIGNIFICANT CHANGE UP (ref 0–0)
NRBC # FLD: 0 K/UL — SIGNIFICANT CHANGE UP (ref 0–0)
NRBC BLD AUTO-RTO: 0 /100 WBCS — SIGNIFICANT CHANGE UP (ref 0–0)
ORGANISM # SPEC MICROSCOPIC CNT: ABNORMAL
ORGANISM # SPEC MICROSCOPIC CNT: ABNORMAL
ORGANISM # SPEC MICROSCOPIC CNT: SIGNIFICANT CHANGE UP
PHOSPHATE SERPL-MCNC: 1.8 MG/DL — LOW (ref 2.5–4.5)
PLATELET # BLD AUTO: 126 K/UL — LOW (ref 150–400)
PMV BLD: 8.6 FL — SIGNIFICANT CHANGE UP (ref 7–13)
POTASSIUM SERPL-MCNC: 4.2 MMOL/L — SIGNIFICANT CHANGE UP (ref 3.5–5.3)
POTASSIUM SERPL-SCNC: 4.2 MMOL/L — SIGNIFICANT CHANGE UP (ref 3.5–5.3)
PROTHROM AB SERPL-ACNC: 22.9 SEC — HIGH (ref 9.9–13.4)
RBC # BLD: 3.89 M/UL — SIGNIFICANT CHANGE UP (ref 3.8–5.2)
RBC # FLD: 24.1 % — HIGH (ref 10.3–14.5)
SODIUM SERPL-SCNC: 130 MMOL/L — LOW (ref 135–145)
SPECIMEN SOURCE: SIGNIFICANT CHANGE UP
SPECIMEN SOURCE: SIGNIFICANT CHANGE UP
WBC # BLD: 8.14 K/UL — SIGNIFICANT CHANGE UP (ref 3.8–10.5)
WBC # FLD AUTO: 8.14 K/UL — SIGNIFICANT CHANGE UP (ref 3.8–10.5)

## 2025-07-16 PROCEDURE — 99232 SBSQ HOSP IP/OBS MODERATE 35: CPT

## 2025-07-16 PROCEDURE — 99233 SBSQ HOSP IP/OBS HIGH 50: CPT | Mod: GC

## 2025-07-16 PROCEDURE — 99232 SBSQ HOSP IP/OBS MODERATE 35: CPT | Mod: GC

## 2025-07-16 RX ORDER — POTASSIUM PHOSPHATE, MONOBASIC POTASSIUM PHOSPHATE, DIBASIC INJECTION, 236; 224 MG/ML; MG/ML
30 SOLUTION, CONCENTRATE INTRAVENOUS ONCE
Refills: 0 | Status: COMPLETED | OUTPATIENT
Start: 2025-07-16 | End: 2025-07-16

## 2025-07-16 RX ADMIN — Medication 400 MILLIGRAM(S): at 13:22

## 2025-07-16 RX ADMIN — ENOXAPARIN SODIUM 40 MILLIGRAM(S): 100 INJECTION SUBCUTANEOUS at 18:08

## 2025-07-16 RX ADMIN — Medication 25 GRAM(S): at 18:17

## 2025-07-16 RX ADMIN — Medication 0.2 MILLIGRAM(S): at 20:57

## 2025-07-16 RX ADMIN — Medication 0.2 MILLIGRAM(S): at 13:45

## 2025-07-16 RX ADMIN — Medication 400 MILLIGRAM(S): at 18:08

## 2025-07-16 RX ADMIN — Medication 25 GRAM(S): at 00:38

## 2025-07-16 RX ADMIN — HYDROXYZINE HYDROCHLORIDE 50 MILLIGRAM(S): 25 TABLET, FILM COATED ORAL at 05:46

## 2025-07-16 RX ADMIN — Medication 0.2 MILLIGRAM(S): at 07:18

## 2025-07-16 RX ADMIN — FOLIC ACID 1 MILLIGRAM(S): 1 TABLET ORAL at 13:22

## 2025-07-16 RX ADMIN — Medication 125 MILLIGRAM(S): at 05:46

## 2025-07-16 RX ADMIN — Medication 3 MILLIGRAM(S): at 21:21

## 2025-07-16 RX ADMIN — MIRTAZAPINE 7.5 MILLIGRAM(S): 30 TABLET, FILM COATED ORAL at 21:26

## 2025-07-16 RX ADMIN — Medication 0.2 MILLIGRAM(S): at 00:38

## 2025-07-16 RX ADMIN — URSODIOL 300 MILLIGRAM(S): 300 CAPSULE ORAL at 18:08

## 2025-07-16 RX ADMIN — URSODIOL 300 MILLIGRAM(S): 300 CAPSULE ORAL at 05:46

## 2025-07-16 RX ADMIN — Medication 0.2 MILLIGRAM(S): at 20:27

## 2025-07-16 RX ADMIN — Medication 25 GRAM(S): at 10:05

## 2025-07-16 RX ADMIN — POTASSIUM PHOSPHATE, MONOBASIC POTASSIUM PHOSPHATE, DIBASIC INJECTION, 83.33 MILLIMOLE(S): 236; 224 SOLUTION, CONCENTRATE INTRAVENOUS at 15:52

## 2025-07-16 RX ADMIN — HYDROXYZINE HYDROCHLORIDE 50 MILLIGRAM(S): 25 TABLET, FILM COATED ORAL at 13:22

## 2025-07-16 RX ADMIN — Medication 0.2 MILLIGRAM(S): at 07:48

## 2025-07-16 RX ADMIN — Medication 125 MILLIGRAM(S): at 18:07

## 2025-07-16 RX ADMIN — Medication 0.2 MILLIGRAM(S): at 13:17

## 2025-07-16 RX ADMIN — HYDROXYZINE HYDROCHLORIDE 50 MILLIGRAM(S): 25 TABLET, FILM COATED ORAL at 21:21

## 2025-07-16 RX ADMIN — Medication 40 MILLIGRAM(S): at 05:47

## 2025-07-16 RX ADMIN — CARVEDILOL 3.12 MILLIGRAM(S): 3.12 TABLET, FILM COATED ORAL at 18:07

## 2025-07-16 RX ADMIN — Medication 100 MILLIGRAM(S): at 13:22

## 2025-07-16 RX ADMIN — Medication 400 MILLIGRAM(S): at 10:11

## 2025-07-17 DIAGNOSIS — E87.1 HYPO-OSMOLALITY AND HYPONATREMIA: ICD-10-CM

## 2025-07-17 LAB
ALBUMIN SERPL ELPH-MCNC: 2.8 G/DL — LOW (ref 3.3–5)
ALP SERPL-CCNC: 119 U/L — SIGNIFICANT CHANGE UP (ref 40–120)
ALT FLD-CCNC: 8 U/L — LOW (ref 10–45)
ANION GAP SERPL CALC-SCNC: 14 MMOL/L — SIGNIFICANT CHANGE UP (ref 5–17)
APPEARANCE UR: CLEAR — SIGNIFICANT CHANGE UP
AST SERPL-CCNC: 22 U/L — SIGNIFICANT CHANGE UP (ref 10–40)
BACTERIA # UR AUTO: NEGATIVE /HPF — SIGNIFICANT CHANGE UP
BILIRUB DIRECT SERPL-MCNC: 2.5 MG/DL — HIGH (ref 0–0.3)
BILIRUB INDIRECT FLD-MCNC: 1.4 MG/DL — HIGH (ref 0.2–1)
BILIRUB SERPL-MCNC: 3.9 MG/DL — HIGH (ref 0.2–1.2)
BILIRUB SERPL-MCNC: 3.9 MG/DL — HIGH (ref 0.2–1.2)
BILIRUB UR-MCNC: ABNORMAL
BUN SERPL-MCNC: 11 MG/DL — SIGNIFICANT CHANGE UP (ref 7–23)
CALCIUM SERPL-MCNC: 8.6 MG/DL — SIGNIFICANT CHANGE UP (ref 8.4–10.5)
CAST: 0 /LPF — SIGNIFICANT CHANGE UP (ref 0–4)
CHLORIDE SERPL-SCNC: 98 MMOL/L — SIGNIFICANT CHANGE UP (ref 96–108)
CO2 SERPL-SCNC: 16 MMOL/L — LOW (ref 22–31)
COLOR SPEC: SIGNIFICANT CHANGE UP
CREAT SERPL-MCNC: 0.64 MG/DL — SIGNIFICANT CHANGE UP (ref 0.5–1.3)
DIFF PNL FLD: ABNORMAL
EGFR: 100 ML/MIN/1.73M2 — SIGNIFICANT CHANGE UP
EGFR: 100 ML/MIN/1.73M2 — SIGNIFICANT CHANGE UP
GLUCOSE SERPL-MCNC: 121 MG/DL — HIGH (ref 70–99)
GLUCOSE UR QL: NEGATIVE MG/DL — SIGNIFICANT CHANGE UP
HCT VFR BLD CALC: 31.4 % — LOW (ref 34.5–45)
HGB BLD-MCNC: 10.2 G/DL — LOW (ref 11.5–15.5)
INR BLD: 1.86 RATIO — HIGH (ref 0.85–1.16)
KETONES UR QL: ABNORMAL MG/DL
LACTATE SERPL-SCNC: 1.6 MMOL/L — SIGNIFICANT CHANGE UP (ref 0.5–2)
LEUKOCYTE ESTERASE UR-ACNC: ABNORMAL
MAGNESIUM SERPL-MCNC: 1.5 MG/DL — LOW (ref 1.6–2.6)
MCHC RBC-ENTMCNC: 25.2 PG — LOW (ref 27–34)
MCHC RBC-ENTMCNC: 32.5 G/DL — SIGNIFICANT CHANGE UP (ref 32–36)
MCV RBC AUTO: 77.7 FL — LOW (ref 80–100)
NITRITE UR-MCNC: NEGATIVE — SIGNIFICANT CHANGE UP
NRBC # BLD AUTO: 0 K/UL — SIGNIFICANT CHANGE UP (ref 0–0)
NRBC # FLD: 0 K/UL — SIGNIFICANT CHANGE UP (ref 0–0)
NRBC BLD AUTO-RTO: 0 /100 WBCS — SIGNIFICANT CHANGE UP (ref 0–0)
OSMOLALITY UR: 632 MOS/KG — SIGNIFICANT CHANGE UP (ref 300–900)
PH UR: 6 — SIGNIFICANT CHANGE UP (ref 5–8)
PHOSPHATE SERPL-MCNC: 2.8 MG/DL — SIGNIFICANT CHANGE UP (ref 2.5–4.5)
PLATELET # BLD AUTO: 134 K/UL — LOW (ref 150–400)
PMV BLD: 8.6 FL — SIGNIFICANT CHANGE UP (ref 7–13)
POTASSIUM SERPL-MCNC: 4 MMOL/L — SIGNIFICANT CHANGE UP (ref 3.5–5.3)
POTASSIUM SERPL-SCNC: 4 MMOL/L — SIGNIFICANT CHANGE UP (ref 3.5–5.3)
PROT SERPL-MCNC: 5.7 G/DL — LOW (ref 6–8.3)
PROT UR-MCNC: SIGNIFICANT CHANGE UP MG/DL
PROTHROM AB SERPL-ACNC: 21.1 SEC — HIGH (ref 9.9–13.4)
RBC # BLD: 4.04 M/UL — SIGNIFICANT CHANGE UP (ref 3.8–5.2)
RBC # FLD: 23.9 % — HIGH (ref 10.3–14.5)
RBC CASTS # UR COMP ASSIST: 9 /HPF — HIGH (ref 0–4)
SODIUM SERPL-SCNC: 128 MMOL/L — LOW (ref 135–145)
SODIUM UR-SCNC: 8 MMOL/L — SIGNIFICANT CHANGE UP
SP GR SPEC: 1.03 — SIGNIFICANT CHANGE UP (ref 1–1.03)
SQUAMOUS # UR AUTO: 2 /HPF — SIGNIFICANT CHANGE UP (ref 0–5)
UROBILINOGEN FLD QL: 1 MG/DL — SIGNIFICANT CHANGE UP (ref 0.2–1)
WBC # BLD: 8.82 K/UL — SIGNIFICANT CHANGE UP (ref 3.8–10.5)
WBC # FLD AUTO: 8.82 K/UL — SIGNIFICANT CHANGE UP (ref 3.8–10.5)
WBC UR QL: 1 /HPF — SIGNIFICANT CHANGE UP (ref 0–5)

## 2025-07-17 PROCEDURE — 99232 SBSQ HOSP IP/OBS MODERATE 35: CPT | Mod: GC

## 2025-07-17 PROCEDURE — 99233 SBSQ HOSP IP/OBS HIGH 50: CPT | Mod: GC

## 2025-07-17 PROCEDURE — 99232 SBSQ HOSP IP/OBS MODERATE 35: CPT

## 2025-07-17 RX ORDER — DOCOSANOL 100 MG/G
1 CREAM TOPICAL ONCE
Refills: 0 | Status: COMPLETED | OUTPATIENT
Start: 2025-07-17 | End: 2025-07-17

## 2025-07-17 RX ORDER — MAGNESIUM SULFATE 500 MG/ML
2 SYRINGE (ML) INJECTION ONCE
Refills: 0 | Status: COMPLETED | OUTPATIENT
Start: 2025-07-17 | End: 2025-07-17

## 2025-07-17 RX ADMIN — Medication 3 MILLIGRAM(S): at 21:18

## 2025-07-17 RX ADMIN — HYDROXYZINE HYDROCHLORIDE 50 MILLIGRAM(S): 25 TABLET, FILM COATED ORAL at 14:02

## 2025-07-17 RX ADMIN — Medication 0.2 MILLIGRAM(S): at 21:42

## 2025-07-17 RX ADMIN — Medication 25 GRAM(S): at 15:36

## 2025-07-17 RX ADMIN — Medication 0.2 MILLIGRAM(S): at 02:27

## 2025-07-17 RX ADMIN — Medication 40 MILLIGRAM(S): at 05:25

## 2025-07-17 RX ADMIN — Medication 400 MILLIGRAM(S): at 18:41

## 2025-07-17 RX ADMIN — Medication 25 GRAM(S): at 09:39

## 2025-07-17 RX ADMIN — Medication 0.2 MILLIGRAM(S): at 08:41

## 2025-07-17 RX ADMIN — Medication 0.2 MILLIGRAM(S): at 02:57

## 2025-07-17 RX ADMIN — Medication 100 MILLIGRAM(S): at 12:54

## 2025-07-17 RX ADMIN — Medication 0.2 MILLIGRAM(S): at 20:42

## 2025-07-17 RX ADMIN — URSODIOL 300 MILLIGRAM(S): 300 CAPSULE ORAL at 18:40

## 2025-07-17 RX ADMIN — ENOXAPARIN SODIUM 40 MILLIGRAM(S): 100 INJECTION SUBCUTANEOUS at 18:39

## 2025-07-17 RX ADMIN — DOCOSANOL 1 APPLICATION(S): 100 CREAM TOPICAL at 14:13

## 2025-07-17 RX ADMIN — Medication 25 GRAM(S): at 00:35

## 2025-07-17 RX ADMIN — Medication 0.2 MILLIGRAM(S): at 14:43

## 2025-07-17 RX ADMIN — FOLIC ACID 1 MILLIGRAM(S): 1 TABLET ORAL at 12:54

## 2025-07-17 RX ADMIN — LACTULOSE 10 GRAM(S): 10 SOLUTION ORAL at 05:25

## 2025-07-17 RX ADMIN — MIRTAZAPINE 7.5 MILLIGRAM(S): 30 TABLET, FILM COATED ORAL at 21:19

## 2025-07-17 RX ADMIN — Medication 125 MILLIGRAM(S): at 05:24

## 2025-07-17 RX ADMIN — Medication 400 MILLIGRAM(S): at 08:41

## 2025-07-17 RX ADMIN — Medication 25 GRAM(S): at 18:39

## 2025-07-17 RX ADMIN — Medication 125 MILLIGRAM(S): at 18:40

## 2025-07-17 RX ADMIN — URSODIOL 300 MILLIGRAM(S): 300 CAPSULE ORAL at 05:24

## 2025-07-17 RX ADMIN — HYDROXYZINE HYDROCHLORIDE 50 MILLIGRAM(S): 25 TABLET, FILM COATED ORAL at 05:24

## 2025-07-17 RX ADMIN — CARVEDILOL 3.12 MILLIGRAM(S): 3.12 TABLET, FILM COATED ORAL at 18:39

## 2025-07-17 RX ADMIN — Medication 400 MILLIGRAM(S): at 12:56

## 2025-07-17 RX ADMIN — CARVEDILOL 3.12 MILLIGRAM(S): 3.12 TABLET, FILM COATED ORAL at 05:24

## 2025-07-17 RX ADMIN — HYDROXYZINE HYDROCHLORIDE 50 MILLIGRAM(S): 25 TABLET, FILM COATED ORAL at 21:18

## 2025-07-18 LAB
ADD ON TEST-SPECIMEN IN LAB: SIGNIFICANT CHANGE UP
ALBUMIN SERPL ELPH-MCNC: 2.8 G/DL — LOW (ref 3.3–5)
ALP SERPL-CCNC: 121 U/L — HIGH (ref 40–120)
ALT FLD-CCNC: 7 U/L — LOW (ref 10–45)
ANION GAP SERPL CALC-SCNC: 13 MMOL/L — SIGNIFICANT CHANGE UP (ref 5–17)
AST SERPL-CCNC: 22 U/L — SIGNIFICANT CHANGE UP (ref 10–40)
BASOPHILS # BLD AUTO: 0.07 K/UL — SIGNIFICANT CHANGE UP (ref 0–0.2)
BASOPHILS NFR BLD AUTO: 0.6 % — SIGNIFICANT CHANGE UP (ref 0–2)
BILIRUB DIRECT SERPL-MCNC: 3 MG/DL — HIGH (ref 0–0.3)
BILIRUB INDIRECT FLD-MCNC: 1.4 MG/DL — HIGH (ref 0.2–1)
BILIRUB SERPL-MCNC: 4.4 MG/DL — HIGH (ref 0.2–1.2)
BUN SERPL-MCNC: 10 MG/DL — SIGNIFICANT CHANGE UP (ref 7–23)
CALCIUM SERPL-MCNC: 8.8 MG/DL — SIGNIFICANT CHANGE UP (ref 8.4–10.5)
CHLORIDE SERPL-SCNC: 96 MMOL/L — SIGNIFICANT CHANGE UP (ref 96–108)
CO2 SERPL-SCNC: 17 MMOL/L — LOW (ref 22–31)
CREAT SERPL-MCNC: 0.58 MG/DL — SIGNIFICANT CHANGE UP (ref 0.5–1.3)
EGFR: 102 ML/MIN/1.73M2 — SIGNIFICANT CHANGE UP
EGFR: 102 ML/MIN/1.73M2 — SIGNIFICANT CHANGE UP
EOSINOPHIL # BLD AUTO: 0.13 K/UL — SIGNIFICANT CHANGE UP (ref 0–0.5)
EOSINOPHIL NFR BLD AUTO: 1.1 % — SIGNIFICANT CHANGE UP (ref 0–6)
GLUCOSE SERPL-MCNC: 174 MG/DL — HIGH (ref 70–99)
HCT VFR BLD CALC: 29.8 % — LOW (ref 34.5–45)
HCT VFR BLD CALC: 29.8 % — LOW (ref 34.5–45)
HGB BLD-MCNC: 9.9 G/DL — LOW (ref 11.5–15.5)
HGB BLD-MCNC: 9.9 G/DL — LOW (ref 11.5–15.5)
IMM GRANULOCYTES # BLD AUTO: 0.43 K/UL — HIGH (ref 0–0.07)
IMM GRANULOCYTES NFR BLD AUTO: 3.7 % — HIGH (ref 0–0.9)
INR BLD: 1.88 RATIO — HIGH (ref 0.85–1.16)
LYMPHOCYTES # BLD AUTO: 1.04 K/UL — SIGNIFICANT CHANGE UP (ref 1–3.3)
LYMPHOCYTES NFR BLD AUTO: 9 % — LOW (ref 13–44)
MAGNESIUM SERPL-MCNC: 1.7 MG/DL — SIGNIFICANT CHANGE UP (ref 1.6–2.6)
MCHC RBC-ENTMCNC: 25.4 PG — LOW (ref 27–34)
MCHC RBC-ENTMCNC: 25.4 PG — LOW (ref 27–34)
MCHC RBC-ENTMCNC: 33.2 G/DL — SIGNIFICANT CHANGE UP (ref 32–36)
MCHC RBC-ENTMCNC: 33.2 G/DL — SIGNIFICANT CHANGE UP (ref 32–36)
MCV RBC AUTO: 76.6 FL — LOW (ref 80–100)
MCV RBC AUTO: 76.6 FL — LOW (ref 80–100)
MONOCYTES # BLD AUTO: 1.12 K/UL — HIGH (ref 0–0.9)
MONOCYTES NFR BLD AUTO: 9.7 % — SIGNIFICANT CHANGE UP (ref 2–14)
NEUTROPHILS # BLD AUTO: 8.72 K/UL — HIGH (ref 1.8–7.4)
NEUTROPHILS NFR BLD AUTO: 75.9 % — SIGNIFICANT CHANGE UP (ref 43–77)
NRBC # BLD AUTO: 0 K/UL — SIGNIFICANT CHANGE UP (ref 0–0)
NRBC # FLD: 0 K/UL — SIGNIFICANT CHANGE UP (ref 0–0)
NRBC BLD AUTO-RTO: 0 /100 WBCS — SIGNIFICANT CHANGE UP (ref 0–0)
NRBC BLD AUTO-RTO: 0 /100 WBCS — SIGNIFICANT CHANGE UP (ref 0–0)
OSMOLALITY SERPL: 271 MOSMOL/KG — LOW (ref 280–301)
PETH 16:0/18:1: NEGATIVE NG/ML — SIGNIFICANT CHANGE UP
PETH 16:0/18:2: NEGATIVE NG/ML — SIGNIFICANT CHANGE UP
PETH COMMENTS: SIGNIFICANT CHANGE UP
PHOSPHATE SERPL-MCNC: 2.3 MG/DL — LOW (ref 2.5–4.5)
PLATELET # BLD AUTO: 145 K/UL — LOW (ref 150–400)
PLATELET # BLD AUTO: 145 K/UL — LOW (ref 150–400)
PMV BLD: 9.8 FL — SIGNIFICANT CHANGE UP (ref 7–13)
POTASSIUM SERPL-MCNC: 3.4 MMOL/L — LOW (ref 3.5–5.3)
POTASSIUM SERPL-SCNC: 3.4 MMOL/L — LOW (ref 3.5–5.3)
PROT SERPL-MCNC: 5.9 G/DL — LOW (ref 6–8.3)
PROTHROM AB SERPL-ACNC: 21.3 SEC — HIGH (ref 9.9–13.4)
RBC # BLD: 3.89 M/UL — SIGNIFICANT CHANGE UP (ref 3.8–5.2)
RBC # BLD: 3.89 M/UL — SIGNIFICANT CHANGE UP (ref 3.8–5.2)
RBC # FLD: 23.3 % — HIGH (ref 10.3–14.5)
RBC # FLD: 23.3 % — HIGH (ref 10.3–14.5)
SODIUM SERPL-SCNC: 126 MMOL/L — LOW (ref 135–145)
WBC # BLD: 11.31 K/UL — HIGH (ref 3.8–10.5)
WBC # BLD: 11.31 K/UL — HIGH (ref 3.8–10.5)
WBC # FLD AUTO: 11.31 K/UL — HIGH (ref 3.8–10.5)
WBC # FLD AUTO: 11.31 K/UL — HIGH (ref 3.8–10.5)

## 2025-07-18 PROCEDURE — 99232 SBSQ HOSP IP/OBS MODERATE 35: CPT | Mod: GC

## 2025-07-18 PROCEDURE — 99233 SBSQ HOSP IP/OBS HIGH 50: CPT | Mod: GC

## 2025-07-18 PROCEDURE — 99233 SBSQ HOSP IP/OBS HIGH 50: CPT

## 2025-07-18 RX ADMIN — MIRTAZAPINE 7.5 MILLIGRAM(S): 30 TABLET, FILM COATED ORAL at 22:27

## 2025-07-18 RX ADMIN — Medication 25 GRAM(S): at 08:58

## 2025-07-18 RX ADMIN — URSODIOL 300 MILLIGRAM(S): 300 CAPSULE ORAL at 17:17

## 2025-07-18 RX ADMIN — Medication 3 MILLIGRAM(S): at 22:27

## 2025-07-18 RX ADMIN — Medication 25 GRAM(S): at 01:46

## 2025-07-18 RX ADMIN — Medication 0.2 MILLIGRAM(S): at 10:20

## 2025-07-18 RX ADMIN — Medication 0.2 MILLIGRAM(S): at 16:28

## 2025-07-18 RX ADMIN — MIDODRINE HYDROCHLORIDE 15 MILLIGRAM(S): 5 TABLET ORAL at 06:06

## 2025-07-18 RX ADMIN — Medication 40 MILLIEQUIVALENT(S): at 13:23

## 2025-07-18 RX ADMIN — Medication 0.2 MILLIGRAM(S): at 23:20

## 2025-07-18 RX ADMIN — Medication 0.2 MILLIGRAM(S): at 09:38

## 2025-07-18 RX ADMIN — Medication 0.2 MILLIGRAM(S): at 22:25

## 2025-07-18 RX ADMIN — Medication 40 MILLIGRAM(S): at 06:06

## 2025-07-18 RX ADMIN — LACTULOSE 10 GRAM(S): 10 SOLUTION ORAL at 22:27

## 2025-07-18 RX ADMIN — Medication 125 MILLIGRAM(S): at 17:17

## 2025-07-18 RX ADMIN — Medication 125 MILLIGRAM(S): at 06:05

## 2025-07-18 RX ADMIN — Medication 40 MILLIEQUIVALENT(S): at 08:59

## 2025-07-18 RX ADMIN — Medication 0.2 MILLIGRAM(S): at 15:59

## 2025-07-18 RX ADMIN — Medication 25 GRAM(S): at 17:16

## 2025-07-18 RX ADMIN — URSODIOL 300 MILLIGRAM(S): 300 CAPSULE ORAL at 06:06

## 2025-07-18 RX ADMIN — ENOXAPARIN SODIUM 40 MILLIGRAM(S): 100 INJECTION SUBCUTANEOUS at 14:00

## 2025-07-18 RX ADMIN — Medication 400 MILLIGRAM(S): at 17:18

## 2025-07-18 RX ADMIN — FOLIC ACID 1 MILLIGRAM(S): 1 TABLET ORAL at 09:01

## 2025-07-18 RX ADMIN — Medication 400 MILLIGRAM(S): at 13:23

## 2025-07-18 RX ADMIN — HYDROXYZINE HYDROCHLORIDE 50 MILLIGRAM(S): 25 TABLET, FILM COATED ORAL at 22:28

## 2025-07-18 RX ADMIN — HYDROXYZINE HYDROCHLORIDE 50 MILLIGRAM(S): 25 TABLET, FILM COATED ORAL at 13:22

## 2025-07-18 RX ADMIN — HYDROXYZINE HYDROCHLORIDE 50 MILLIGRAM(S): 25 TABLET, FILM COATED ORAL at 06:05

## 2025-07-18 RX ADMIN — Medication 100 MILLIGRAM(S): at 09:00

## 2025-07-18 RX ADMIN — Medication 400 MILLIGRAM(S): at 08:59

## 2025-07-19 LAB
ALBUMIN SERPL ELPH-MCNC: 2.9 G/DL — LOW (ref 3.3–5)
ALP SERPL-CCNC: 135 U/L — HIGH (ref 40–120)
ALT FLD-CCNC: 9 U/L — LOW (ref 10–45)
ANION GAP SERPL CALC-SCNC: 16 MMOL/L — SIGNIFICANT CHANGE UP (ref 5–17)
ANISOCYTOSIS BLD QL: SLIGHT — SIGNIFICANT CHANGE UP
AST SERPL-CCNC: 22 U/L — SIGNIFICANT CHANGE UP (ref 10–40)
BASOPHILS # BLD AUTO: 0.08 K/UL — SIGNIFICANT CHANGE UP (ref 0–0.2)
BASOPHILS # BLD MANUAL: 0.11 K/UL — SIGNIFICANT CHANGE UP (ref 0–0.2)
BASOPHILS NFR BLD AUTO: 0.7 % — SIGNIFICANT CHANGE UP (ref 0–2)
BASOPHILS NFR BLD MANUAL: 0.9 % — SIGNIFICANT CHANGE UP (ref 0–2)
BILIRUB SERPL-MCNC: 3.8 MG/DL — HIGH (ref 0.2–1.2)
BUN SERPL-MCNC: 10 MG/DL — SIGNIFICANT CHANGE UP (ref 7–23)
BURR CELLS BLD QL SMEAR: ABNORMAL
CALCIUM SERPL-MCNC: 9.3 MG/DL — SIGNIFICANT CHANGE UP (ref 8.4–10.5)
CHLORIDE SERPL-SCNC: 98 MMOL/L — SIGNIFICANT CHANGE UP (ref 96–108)
CO2 SERPL-SCNC: 16 MMOL/L — LOW (ref 22–31)
CREAT SERPL-MCNC: 0.56 MG/DL — SIGNIFICANT CHANGE UP (ref 0.5–1.3)
CULTURE RESULTS: SIGNIFICANT CHANGE UP
CULTURE RESULTS: SIGNIFICANT CHANGE UP
DACRYOCYTES BLD QL SMEAR: SLIGHT — SIGNIFICANT CHANGE UP
EGFR: 103 ML/MIN/1.73M2 — SIGNIFICANT CHANGE UP
EGFR: 103 ML/MIN/1.73M2 — SIGNIFICANT CHANGE UP
ELLIPTOCYTES BLD QL SMEAR: ABNORMAL
EOSINOPHIL # BLD AUTO: 0.17 K/UL — SIGNIFICANT CHANGE UP (ref 0–0.5)
EOSINOPHIL # BLD MANUAL: 0.11 K/UL — SIGNIFICANT CHANGE UP (ref 0–0.5)
EOSINOPHIL NFR BLD AUTO: 1.4 % — SIGNIFICANT CHANGE UP (ref 0–6)
EOSINOPHIL NFR BLD MANUAL: 0.9 % — SIGNIFICANT CHANGE UP (ref 0–6)
GLUCOSE SERPL-MCNC: 156 MG/DL — HIGH (ref 70–99)
HCT VFR BLD CALC: 31 % — LOW (ref 34.5–45)
HGB BLD-MCNC: 10.1 G/DL — LOW (ref 11.5–15.5)
IMM GRANULOCYTES # BLD AUTO: 0.62 K/UL — HIGH (ref 0–0.07)
IMM GRANULOCYTES NFR BLD AUTO: 5.2 % — HIGH (ref 0–0.9)
INR BLD: 1.63 RATIO — HIGH (ref 0.85–1.16)
LACTATE SERPL-SCNC: 2.3 MMOL/L — HIGH (ref 0.5–2)
LYMPHOCYTES # BLD AUTO: 1.05 K/UL — SIGNIFICANT CHANGE UP (ref 1–3.3)
LYMPHOCYTES # BLD MANUAL: 1.03 K/UL — SIGNIFICANT CHANGE UP (ref 1–3.3)
LYMPHOCYTES NFR BLD AUTO: 8.9 % — LOW (ref 13–44)
LYMPHOCYTES NFR BLD MANUAL: 8.7 % — LOW (ref 13–44)
MAGNESIUM SERPL-MCNC: 1.7 MG/DL — SIGNIFICANT CHANGE UP (ref 1.6–2.6)
MANUAL NRBC #: 0.24 K/UL — HIGH (ref 0–0)
MCHC RBC-ENTMCNC: 25.2 PG — LOW (ref 27–34)
MCHC RBC-ENTMCNC: 32.6 G/DL — SIGNIFICANT CHANGE UP (ref 32–36)
MCV RBC AUTO: 77.3 FL — LOW (ref 80–100)
MONOCYTES # BLD AUTO: 1.18 K/UL — HIGH (ref 0–0.9)
MONOCYTES # BLD MANUAL: 0.62 K/UL — SIGNIFICANT CHANGE UP (ref 0–0.9)
MONOCYTES NFR BLD AUTO: 10 % — SIGNIFICANT CHANGE UP (ref 2–14)
MONOCYTES NFR BLD MANUAL: 5.2 % — SIGNIFICANT CHANGE UP (ref 2–14)
NEUTROPHILS # BLD AUTO: 8.75 K/UL — HIGH (ref 1.8–7.4)
NEUTROPHILS # BLD MANUAL: 9.99 K/UL — HIGH (ref 1.8–7.4)
NEUTROPHILS NFR BLD AUTO: 73.8 % — SIGNIFICANT CHANGE UP (ref 43–77)
NEUTROPHILS NFR BLD MANUAL: 84.3 % — HIGH (ref 43–77)
NRBC # BLD AUTO: 0 K/UL — SIGNIFICANT CHANGE UP (ref 0–0)
NRBC # BLD: 2 /100 WBCS — HIGH (ref 0–0)
NRBC # FLD: 0 K/UL — SIGNIFICANT CHANGE UP (ref 0–0)
NRBC BLD AUTO-RTO: 0 /100 WBCS — SIGNIFICANT CHANGE UP (ref 0–0)
NRBC BLD-RTO: 2 /100 WBCS — HIGH (ref 0–0)
PHOSPHATE SERPL-MCNC: 2.6 MG/DL — SIGNIFICANT CHANGE UP (ref 2.5–4.5)
PLAT MORPH BLD: NORMAL — SIGNIFICANT CHANGE UP
PLATELET # BLD AUTO: 151 K/UL — SIGNIFICANT CHANGE UP (ref 150–400)
PMV BLD: 8.9 FL — SIGNIFICANT CHANGE UP (ref 7–13)
POIKILOCYTOSIS BLD QL AUTO: ABNORMAL
POLYCHROMASIA BLD QL SMEAR: SLIGHT — SIGNIFICANT CHANGE UP
POTASSIUM SERPL-MCNC: 4.1 MMOL/L — SIGNIFICANT CHANGE UP (ref 3.5–5.3)
POTASSIUM SERPL-SCNC: 4.1 MMOL/L — SIGNIFICANT CHANGE UP (ref 3.5–5.3)
PROT SERPL-MCNC: 5.9 G/DL — LOW (ref 6–8.3)
PROTHROM AB SERPL-ACNC: 18.7 SEC — HIGH (ref 9.9–13.4)
RBC # BLD: 4.01 M/UL — SIGNIFICANT CHANGE UP (ref 3.8–5.2)
RBC # FLD: 23.9 % — HIGH (ref 10.3–14.5)
RBC BLD AUTO: ABNORMAL
SCHISTOCYTES BLD QL AUTO: SLIGHT — SIGNIFICANT CHANGE UP
SODIUM SERPL-SCNC: 130 MMOL/L — LOW (ref 135–145)
SPECIMEN SOURCE: SIGNIFICANT CHANGE UP
SPECIMEN SOURCE: SIGNIFICANT CHANGE UP
WBC # BLD: 11.85 K/UL — HIGH (ref 3.8–10.5)
WBC # FLD AUTO: 11.85 K/UL — HIGH (ref 3.8–10.5)

## 2025-07-19 PROCEDURE — 84132 ASSAY OF SERUM POTASSIUM: CPT

## 2025-07-19 PROCEDURE — 97530 THERAPEUTIC ACTIVITIES: CPT

## 2025-07-19 PROCEDURE — 84436 ASSAY OF TOTAL THYROXINE: CPT

## 2025-07-19 PROCEDURE — 80076 HEPATIC FUNCTION PANEL: CPT

## 2025-07-19 PROCEDURE — 86900 BLOOD TYPING SEROLOGIC ABO: CPT

## 2025-07-19 PROCEDURE — 83540 ASSAY OF IRON: CPT

## 2025-07-19 PROCEDURE — 82330 ASSAY OF CALCIUM: CPT

## 2025-07-19 PROCEDURE — 80048 BASIC METABOLIC PNL TOTAL CA: CPT

## 2025-07-19 PROCEDURE — 85652 RBC SED RATE AUTOMATED: CPT

## 2025-07-19 PROCEDURE — 85025 COMPLETE CBC W/AUTO DIFF WBC: CPT

## 2025-07-19 PROCEDURE — 85027 COMPLETE CBC AUTOMATED: CPT

## 2025-07-19 PROCEDURE — G0480: CPT

## 2025-07-19 PROCEDURE — 82435 ASSAY OF BLOOD CHLORIDE: CPT

## 2025-07-19 PROCEDURE — 83735 ASSAY OF MAGNESIUM: CPT

## 2025-07-19 PROCEDURE — 83930 ASSAY OF BLOOD OSMOLALITY: CPT

## 2025-07-19 PROCEDURE — 84443 ASSAY THYROID STIM HORMONE: CPT

## 2025-07-19 PROCEDURE — P9047: CPT

## 2025-07-19 PROCEDURE — 99233 SBSQ HOSP IP/OBS HIGH 50: CPT | Mod: GC

## 2025-07-19 PROCEDURE — 81001 URINALYSIS AUTO W/SCOPE: CPT

## 2025-07-19 PROCEDURE — 87040 BLOOD CULTURE FOR BACTERIA: CPT

## 2025-07-19 PROCEDURE — 74177 CT ABD & PELVIS W/CONTRAST: CPT | Mod: 26

## 2025-07-19 PROCEDURE — 82247 BILIRUBIN TOTAL: CPT

## 2025-07-19 PROCEDURE — 74177 CT ABD & PELVIS W/CONTRAST: CPT

## 2025-07-19 PROCEDURE — 83935 ASSAY OF URINE OSMOLALITY: CPT

## 2025-07-19 PROCEDURE — 99233 SBSQ HOSP IP/OBS HIGH 50: CPT

## 2025-07-19 PROCEDURE — 82746 ASSAY OF FOLIC ACID SERUM: CPT

## 2025-07-19 PROCEDURE — 84300 ASSAY OF URINE SODIUM: CPT

## 2025-07-19 PROCEDURE — 82248 BILIRUBIN DIRECT: CPT

## 2025-07-19 PROCEDURE — 99232 SBSQ HOSP IP/OBS MODERATE 35: CPT

## 2025-07-19 PROCEDURE — 83036 HEMOGLOBIN GLYCOSYLATED A1C: CPT

## 2025-07-19 PROCEDURE — 85730 THROMBOPLASTIN TIME PARTIAL: CPT

## 2025-07-19 PROCEDURE — 86901 BLOOD TYPING SEROLOGIC RH(D): CPT

## 2025-07-19 PROCEDURE — 82607 VITAMIN B-12: CPT

## 2025-07-19 PROCEDURE — 85610 PROTHROMBIN TIME: CPT

## 2025-07-19 PROCEDURE — 82947 ASSAY GLUCOSE BLOOD QUANT: CPT

## 2025-07-19 PROCEDURE — 97116 GAIT TRAINING THERAPY: CPT

## 2025-07-19 PROCEDURE — 82803 BLOOD GASES ANY COMBINATION: CPT

## 2025-07-19 PROCEDURE — 83605 ASSAY OF LACTIC ACID: CPT

## 2025-07-19 PROCEDURE — 82728 ASSAY OF FERRITIN: CPT

## 2025-07-19 PROCEDURE — 85014 HEMATOCRIT: CPT

## 2025-07-19 PROCEDURE — 83550 IRON BINDING TEST: CPT

## 2025-07-19 PROCEDURE — 80053 COMPREHEN METABOLIC PANEL: CPT

## 2025-07-19 PROCEDURE — 85018 HEMOGLOBIN: CPT

## 2025-07-19 PROCEDURE — 86850 RBC ANTIBODY SCREEN: CPT

## 2025-07-19 PROCEDURE — 93005 ELECTROCARDIOGRAM TRACING: CPT

## 2025-07-19 PROCEDURE — 97162 PT EVAL MOD COMPLEX 30 MIN: CPT

## 2025-07-19 PROCEDURE — 80061 LIPID PANEL: CPT

## 2025-07-19 PROCEDURE — 84295 ASSAY OF SERUM SODIUM: CPT

## 2025-07-19 PROCEDURE — 84100 ASSAY OF PHOSPHORUS: CPT

## 2025-07-19 RX ORDER — ALBUMIN (HUMAN) 12.5 G/50ML
100 INJECTION, SOLUTION INTRAVENOUS ONCE
Refills: 0 | Status: COMPLETED | OUTPATIENT
Start: 2025-07-19 | End: 2025-07-19

## 2025-07-19 RX ORDER — ALBUMIN (HUMAN) 12.5 G/50ML
100 INJECTION, SOLUTION INTRAVENOUS EVERY 12 HOURS
Refills: 0 | Status: DISCONTINUED | OUTPATIENT
Start: 2025-07-19 | End: 2025-07-19

## 2025-07-19 RX ORDER — FUROSEMIDE 10 MG/ML
40 INJECTION INTRAMUSCULAR; INTRAVENOUS
Refills: 0 | Status: DISCONTINUED | OUTPATIENT
Start: 2025-07-19 | End: 2025-08-02

## 2025-07-19 RX ORDER — ALBUMIN (HUMAN) 12.5 G/50ML
100 INJECTION, SOLUTION INTRAVENOUS EVERY 12 HOURS
Refills: 0 | Status: COMPLETED | OUTPATIENT
Start: 2025-07-19 | End: 2025-07-20

## 2025-07-19 RX ORDER — MAGNESIUM SULFATE 500 MG/ML
1 SYRINGE (ML) INJECTION ONCE
Refills: 0 | Status: COMPLETED | OUTPATIENT
Start: 2025-07-19 | End: 2025-07-19

## 2025-07-19 RX ADMIN — Medication 400 MILLIGRAM(S): at 09:28

## 2025-07-19 RX ADMIN — ENOXAPARIN SODIUM 40 MILLIGRAM(S): 100 INJECTION SUBCUTANEOUS at 13:55

## 2025-07-19 RX ADMIN — URSODIOL 300 MILLIGRAM(S): 300 CAPSULE ORAL at 05:18

## 2025-07-19 RX ADMIN — FUROSEMIDE 40 MILLIGRAM(S): 10 INJECTION INTRAMUSCULAR; INTRAVENOUS at 18:15

## 2025-07-19 RX ADMIN — ALBUMIN (HUMAN) 50 MILLILITER(S): 12.5 INJECTION, SOLUTION INTRAVENOUS at 10:24

## 2025-07-19 RX ADMIN — Medication 400 MILLIGRAM(S): at 13:55

## 2025-07-19 RX ADMIN — Medication 25 GRAM(S): at 18:17

## 2025-07-19 RX ADMIN — Medication 125 MILLIGRAM(S): at 18:28

## 2025-07-19 RX ADMIN — Medication 0.2 MILLIGRAM(S): at 10:37

## 2025-07-19 RX ADMIN — HYDROXYZINE HYDROCHLORIDE 50 MILLIGRAM(S): 25 TABLET, FILM COATED ORAL at 13:55

## 2025-07-19 RX ADMIN — Medication 0.2 MILLIGRAM(S): at 05:16

## 2025-07-19 RX ADMIN — Medication 25 GRAM(S): at 01:36

## 2025-07-19 RX ADMIN — MIRTAZAPINE 7.5 MILLIGRAM(S): 30 TABLET, FILM COATED ORAL at 21:02

## 2025-07-19 RX ADMIN — Medication 125 MILLIGRAM(S): at 05:18

## 2025-07-19 RX ADMIN — Medication 25 GRAM(S): at 09:28

## 2025-07-19 RX ADMIN — Medication 0.2 MILLIGRAM(S): at 22:59

## 2025-07-19 RX ADMIN — Medication 100 GRAM(S): at 13:49

## 2025-07-19 RX ADMIN — Medication 0.2 MILLIGRAM(S): at 10:52

## 2025-07-19 RX ADMIN — CARVEDILOL 3.12 MILLIGRAM(S): 3.12 TABLET, FILM COATED ORAL at 05:18

## 2025-07-19 RX ADMIN — FOLIC ACID 1 MILLIGRAM(S): 1 TABLET ORAL at 13:54

## 2025-07-19 RX ADMIN — Medication 400 MILLIGRAM(S): at 18:32

## 2025-07-19 RX ADMIN — HYDROXYZINE HYDROCHLORIDE 50 MILLIGRAM(S): 25 TABLET, FILM COATED ORAL at 21:02

## 2025-07-19 RX ADMIN — Medication 100 MILLIGRAM(S): at 13:55

## 2025-07-19 RX ADMIN — Medication 40 MILLIGRAM(S): at 05:19

## 2025-07-19 RX ADMIN — URSODIOL 300 MILLIGRAM(S): 300 CAPSULE ORAL at 18:28

## 2025-07-19 RX ADMIN — ALBUMIN (HUMAN) 50 MILLILITER(S): 12.5 INJECTION, SOLUTION INTRAVENOUS at 18:21

## 2025-07-19 RX ADMIN — Medication 0.2 MILLIGRAM(S): at 04:36

## 2025-07-19 RX ADMIN — HYDROXYZINE HYDROCHLORIDE 50 MILLIGRAM(S): 25 TABLET, FILM COATED ORAL at 05:18

## 2025-07-19 RX ADMIN — Medication 3 MILLIGRAM(S): at 21:03

## 2025-07-19 RX ADMIN — Medication 0.2 MILLIGRAM(S): at 17:10

## 2025-07-19 RX ADMIN — Medication 0.2 MILLIGRAM(S): at 16:55

## 2025-07-20 LAB
ALBUMIN SERPL ELPH-MCNC: 3.6 G/DL — SIGNIFICANT CHANGE UP (ref 3.3–5)
ALP SERPL-CCNC: 112 U/L — SIGNIFICANT CHANGE UP (ref 40–120)
ALT FLD-CCNC: 7 U/L — LOW (ref 10–45)
ANION GAP SERPL CALC-SCNC: 17 MMOL/L — SIGNIFICANT CHANGE UP (ref 5–17)
AST SERPL-CCNC: 19 U/L — SIGNIFICANT CHANGE UP (ref 10–40)
BILIRUB SERPL-MCNC: 3.9 MG/DL — HIGH (ref 0.2–1.2)
BUN SERPL-MCNC: 10 MG/DL — SIGNIFICANT CHANGE UP (ref 7–23)
CALCIUM SERPL-MCNC: 9.1 MG/DL — SIGNIFICANT CHANGE UP (ref 8.4–10.5)
CHLORIDE SERPL-SCNC: 96 MMOL/L — SIGNIFICANT CHANGE UP (ref 96–108)
CO2 SERPL-SCNC: 16 MMOL/L — LOW (ref 22–31)
CREAT SERPL-MCNC: 0.68 MG/DL — SIGNIFICANT CHANGE UP (ref 0.5–1.3)
EGFR: 98 ML/MIN/1.73M2 — SIGNIFICANT CHANGE UP
EGFR: 98 ML/MIN/1.73M2 — SIGNIFICANT CHANGE UP
GLUCOSE SERPL-MCNC: 162 MG/DL — HIGH (ref 70–99)
HCT VFR BLD CALC: 27.7 % — LOW (ref 34.5–45)
HGB BLD-MCNC: 9.2 G/DL — LOW (ref 11.5–15.5)
MAGNESIUM SERPL-MCNC: 1.5 MG/DL — LOW (ref 1.6–2.6)
MCHC RBC-ENTMCNC: 25.6 PG — LOW (ref 27–34)
MCHC RBC-ENTMCNC: 33.2 G/DL — SIGNIFICANT CHANGE UP (ref 32–36)
MCV RBC AUTO: 76.9 FL — LOW (ref 80–100)
NRBC # BLD AUTO: 0 K/UL — SIGNIFICANT CHANGE UP (ref 0–0)
NRBC # FLD: 0 K/UL — SIGNIFICANT CHANGE UP (ref 0–0)
NRBC BLD AUTO-RTO: 0 /100 WBCS — SIGNIFICANT CHANGE UP (ref 0–0)
PHOSPHATE SERPL-MCNC: 3.3 MG/DL — SIGNIFICANT CHANGE UP (ref 2.5–4.5)
PLATELET # BLD AUTO: 130 K/UL — LOW (ref 150–400)
PMV BLD: 8.9 FL — SIGNIFICANT CHANGE UP (ref 7–13)
POTASSIUM SERPL-MCNC: 2.8 MMOL/L — CRITICAL LOW (ref 3.5–5.3)
POTASSIUM SERPL-SCNC: 2.8 MMOL/L — CRITICAL LOW (ref 3.5–5.3)
PROT SERPL-MCNC: 6.3 G/DL — SIGNIFICANT CHANGE UP (ref 6–8.3)
RBC # BLD: 3.6 M/UL — LOW (ref 3.8–5.2)
RBC # FLD: 23.7 % — HIGH (ref 10.3–14.5)
SODIUM SERPL-SCNC: 129 MMOL/L — LOW (ref 135–145)
WBC # BLD: 9.9 K/UL — SIGNIFICANT CHANGE UP (ref 3.8–10.5)
WBC # FLD AUTO: 9.9 K/UL — SIGNIFICANT CHANGE UP (ref 3.8–10.5)

## 2025-07-20 PROCEDURE — 99233 SBSQ HOSP IP/OBS HIGH 50: CPT | Mod: GC

## 2025-07-20 RX ORDER — ALBUMIN (HUMAN) 12.5 G/50ML
100 INJECTION, SOLUTION INTRAVENOUS EVERY 12 HOURS
Refills: 0 | Status: COMPLETED | OUTPATIENT
Start: 2025-07-20 | End: 2025-07-21

## 2025-07-20 RX ORDER — HYDROMORPHONE/SOD CHLOR,ISO/PF 2 MG/10 ML
0.4 SYRINGE (ML) INJECTION EVERY 6 HOURS
Refills: 0 | Status: DISCONTINUED | OUTPATIENT
Start: 2025-07-20 | End: 2025-07-22

## 2025-07-20 RX ORDER — HYDROMORPHONE/SOD CHLOR,ISO/PF 2 MG/10 ML
0.2 SYRINGE (ML) INJECTION EVERY 4 HOURS
Refills: 0 | Status: DISCONTINUED | OUTPATIENT
Start: 2025-07-20 | End: 2025-07-23

## 2025-07-20 RX ORDER — SPIRONOLACTONE 25 MG
50 TABLET ORAL DAILY
Refills: 0 | Status: DISCONTINUED | OUTPATIENT
Start: 2025-07-20 | End: 2025-08-05

## 2025-07-20 RX ORDER — MAGNESIUM SULFATE 500 MG/ML
2 SYRINGE (ML) INJECTION ONCE
Refills: 0 | Status: COMPLETED | OUTPATIENT
Start: 2025-07-20 | End: 2025-07-20

## 2025-07-20 RX ADMIN — Medication 0.4 MILLIGRAM(S): at 22:07

## 2025-07-20 RX ADMIN — ALBUMIN (HUMAN) 50 MILLILITER(S): 12.5 INJECTION, SOLUTION INTRAVENOUS at 05:08

## 2025-07-20 RX ADMIN — Medication 0.2 MILLIGRAM(S): at 10:25

## 2025-07-20 RX ADMIN — Medication 0.4 MILLIGRAM(S): at 23:07

## 2025-07-20 RX ADMIN — Medication 0.4 MILLIGRAM(S): at 17:00

## 2025-07-20 RX ADMIN — ENOXAPARIN SODIUM 40 MILLIGRAM(S): 100 INJECTION SUBCUTANEOUS at 13:02

## 2025-07-20 RX ADMIN — FUROSEMIDE 40 MILLIGRAM(S): 10 INJECTION INTRAMUSCULAR; INTRAVENOUS at 05:09

## 2025-07-20 RX ADMIN — Medication 40 MILLIEQUIVALENT(S): at 13:01

## 2025-07-20 RX ADMIN — Medication 125 MILLIGRAM(S): at 17:21

## 2025-07-20 RX ADMIN — Medication 400 MILLIGRAM(S): at 09:00

## 2025-07-20 RX ADMIN — MIRTAZAPINE 7.5 MILLIGRAM(S): 30 TABLET, FILM COATED ORAL at 22:01

## 2025-07-20 RX ADMIN — Medication 400 MILLIGRAM(S): at 12:44

## 2025-07-20 RX ADMIN — Medication 0.4 MILLIGRAM(S): at 15:58

## 2025-07-20 RX ADMIN — Medication 25 GRAM(S): at 17:20

## 2025-07-20 RX ADMIN — ALBUMIN (HUMAN) 50 MILLILITER(S): 12.5 INJECTION, SOLUTION INTRAVENOUS at 12:55

## 2025-07-20 RX ADMIN — Medication 125 MILLIGRAM(S): at 05:09

## 2025-07-20 RX ADMIN — URSODIOL 300 MILLIGRAM(S): 300 CAPSULE ORAL at 05:09

## 2025-07-20 RX ADMIN — Medication 25 GRAM(S): at 08:59

## 2025-07-20 RX ADMIN — Medication 100 MILLIEQUIVALENT(S): at 10:30

## 2025-07-20 RX ADMIN — Medication 25 GRAM(S): at 00:07

## 2025-07-20 RX ADMIN — Medication 0.2 MILLIGRAM(S): at 10:46

## 2025-07-20 RX ADMIN — HYDROXYZINE HYDROCHLORIDE 50 MILLIGRAM(S): 25 TABLET, FILM COATED ORAL at 05:09

## 2025-07-20 RX ADMIN — Medication 40 MILLIEQUIVALENT(S): at 10:30

## 2025-07-20 RX ADMIN — Medication 3 MILLIGRAM(S): at 22:01

## 2025-07-20 RX ADMIN — HYDROXYZINE HYDROCHLORIDE 50 MILLIGRAM(S): 25 TABLET, FILM COATED ORAL at 13:01

## 2025-07-20 RX ADMIN — Medication 40 MILLIGRAM(S): at 05:09

## 2025-07-20 RX ADMIN — Medication 100 MILLIEQUIVALENT(S): at 11:30

## 2025-07-20 RX ADMIN — Medication 100 MILLIGRAM(S): at 12:44

## 2025-07-20 RX ADMIN — Medication 400 MILLIGRAM(S): at 17:21

## 2025-07-20 RX ADMIN — Medication 25 GRAM(S): at 10:25

## 2025-07-20 RX ADMIN — FOLIC ACID 1 MILLIGRAM(S): 1 TABLET ORAL at 12:44

## 2025-07-20 RX ADMIN — FUROSEMIDE 40 MILLIGRAM(S): 10 INJECTION INTRAMUSCULAR; INTRAVENOUS at 13:01

## 2025-07-20 RX ADMIN — Medication 100 MILLIEQUIVALENT(S): at 12:43

## 2025-07-20 RX ADMIN — HYDROXYZINE HYDROCHLORIDE 50 MILLIGRAM(S): 25 TABLET, FILM COATED ORAL at 22:00

## 2025-07-20 RX ADMIN — Medication 0.2 MILLIGRAM(S): at 05:08

## 2025-07-20 RX ADMIN — URSODIOL 300 MILLIGRAM(S): 300 CAPSULE ORAL at 17:21

## 2025-07-21 ENCOUNTER — RESULT REVIEW (OUTPATIENT)
Age: 60
End: 2025-07-21

## 2025-07-21 ENCOUNTER — TRANSCRIPTION ENCOUNTER (OUTPATIENT)
Age: 60
End: 2025-07-21

## 2025-07-21 LAB
ALBUMIN FLD-MCNC: 1.6 G/DL — SIGNIFICANT CHANGE UP
ALBUMIN SERPL ELPH-MCNC: 3.6 G/DL — SIGNIFICANT CHANGE UP (ref 3.3–5)
ALP SERPL-CCNC: 116 U/L — SIGNIFICANT CHANGE UP (ref 40–120)
ALT FLD-CCNC: 8 U/L — LOW (ref 10–45)
ANION GAP SERPL CALC-SCNC: 18 MMOL/L — HIGH (ref 5–17)
APTT BLD: 36.6 SEC — SIGNIFICANT CHANGE UP (ref 26.1–36.8)
AST SERPL-CCNC: 22 U/L — SIGNIFICANT CHANGE UP (ref 10–40)
B PERT IGG+IGM PNL SER: ABNORMAL
BILIRUB SERPL-MCNC: 4.4 MG/DL — HIGH (ref 0.2–1.2)
BUN SERPL-MCNC: 8 MG/DL — SIGNIFICANT CHANGE UP (ref 7–23)
CALCIUM SERPL-MCNC: 8.9 MG/DL — SIGNIFICANT CHANGE UP (ref 8.4–10.5)
CHLORIDE SERPL-SCNC: 92 MMOL/L — LOW (ref 96–108)
CO2 SERPL-SCNC: 18 MMOL/L — LOW (ref 22–31)
COLOR FLD: YELLOW
CREAT SERPL-MCNC: 0.6 MG/DL — SIGNIFICANT CHANGE UP (ref 0.5–1.3)
CULTURE RESULTS: SIGNIFICANT CHANGE UP
CULTURE RESULTS: SIGNIFICANT CHANGE UP
EGFR: 101 ML/MIN/1.73M2 — SIGNIFICANT CHANGE UP
EGFR: 101 ML/MIN/1.73M2 — SIGNIFICANT CHANGE UP
FLUID INTAKE SUBSTANCE CLASS: SIGNIFICANT CHANGE UP
GLUCOSE FLD-MCNC: 168 MG/DL — SIGNIFICANT CHANGE UP
GLUCOSE SERPL-MCNC: 176 MG/DL — HIGH (ref 70–99)
HCT VFR BLD CALC: 27.7 % — LOW (ref 34.5–45)
HGB BLD-MCNC: 9.2 G/DL — LOW (ref 11.5–15.5)
INR BLD: 1.97 RATIO — HIGH (ref 0.85–1.16)
LDH SERPL L TO P-CCNC: 165 U/L — SIGNIFICANT CHANGE UP
LYMPHOCYTES # FLD: 35 % — SIGNIFICANT CHANGE UP
MAGNESIUM SERPL-MCNC: 1.4 MG/DL — LOW (ref 1.6–2.6)
MCHC RBC-ENTMCNC: 25.8 PG — LOW (ref 27–34)
MCHC RBC-ENTMCNC: 33.2 G/DL — SIGNIFICANT CHANGE UP (ref 32–36)
MCV RBC AUTO: 77.6 FL — LOW (ref 80–100)
MESOTHL CELL # FLD: SIGNIFICANT CHANGE UP
MONOS+MACROS # FLD: 23 % — SIGNIFICANT CHANGE UP
NEUTROPHILS-BODY FLUID: 42 % — HIGH
NRBC # BLD AUTO: 0 K/UL — SIGNIFICANT CHANGE UP (ref 0–0)
NRBC # FLD: 0 K/UL — SIGNIFICANT CHANGE UP (ref 0–0)
NRBC BLD AUTO-RTO: 0 /100 WBCS — SIGNIFICANT CHANGE UP (ref 0–0)
PHOSPHATE SERPL-MCNC: 2.3 MG/DL — LOW (ref 2.5–4.5)
PLATELET # BLD AUTO: 105 K/UL — LOW (ref 150–400)
PMV BLD: 8.7 FL — SIGNIFICANT CHANGE UP (ref 7–13)
POTASSIUM SERPL-MCNC: 3 MMOL/L — LOW (ref 3.5–5.3)
POTASSIUM SERPL-SCNC: 3 MMOL/L — LOW (ref 3.5–5.3)
PROT FLD-MCNC: 2.3 G/DL — SIGNIFICANT CHANGE UP
PROT SERPL-MCNC: 6.5 G/DL — SIGNIFICANT CHANGE UP (ref 6–8.3)
PROTHROM AB SERPL-ACNC: 22.5 SEC — HIGH (ref 9.9–13.4)
RBC # BLD: 3.57 M/UL — LOW (ref 3.8–5.2)
RBC # FLD: 24.1 % — HIGH (ref 10.3–14.5)
RCV VOL RI: <2000 CELLS/UL — HIGH
SODIUM SERPL-SCNC: 128 MMOL/L — LOW (ref 135–145)
SPECIMEN SOURCE: SIGNIFICANT CHANGE UP
SPECIMEN SOURCE: SIGNIFICANT CHANGE UP
TOTAL NUCLEATED CELL COUNT, BODY FLUID: 2163 CELLS/UL — SIGNIFICANT CHANGE UP
TUBE TYPE: SIGNIFICANT CHANGE UP
WBC # BLD: 9.87 K/UL — SIGNIFICANT CHANGE UP (ref 3.8–10.5)
WBC # FLD AUTO: 9.87 K/UL — SIGNIFICANT CHANGE UP (ref 3.8–10.5)
WBC COUNT.: 2157 CELLS/UL — HIGH

## 2025-07-21 PROCEDURE — 99233 SBSQ HOSP IP/OBS HIGH 50: CPT | Mod: GC

## 2025-07-21 PROCEDURE — 88305 TISSUE EXAM BY PATHOLOGIST: CPT | Mod: 26

## 2025-07-21 PROCEDURE — 49083 ABD PARACENTESIS W/IMAGING: CPT

## 2025-07-21 PROCEDURE — 88112 CYTOPATH CELL ENHANCE TECH: CPT | Mod: 26

## 2025-07-21 PROCEDURE — 99232 SBSQ HOSP IP/OBS MODERATE 35: CPT | Mod: GC

## 2025-07-21 RX ORDER — ALBUMIN (HUMAN) 12.5 G/50ML
50 INJECTION, SOLUTION INTRAVENOUS ONCE
Refills: 0 | Status: COMPLETED | OUTPATIENT
Start: 2025-07-21 | End: 2025-07-21

## 2025-07-21 RX ORDER — B1/B2/B3/B5/B6/B12/VIT C/FOLIC 500-0.5 MG
1 TABLET ORAL DAILY
Refills: 0 | Status: DISCONTINUED | OUTPATIENT
Start: 2025-07-21 | End: 2025-08-05

## 2025-07-21 RX ADMIN — ALBUMIN (HUMAN) 50 MILLILITER(S): 12.5 INJECTION, SOLUTION INTRAVENOUS at 17:16

## 2025-07-21 RX ADMIN — Medication 40 MILLIGRAM(S): at 05:05

## 2025-07-21 RX ADMIN — Medication 400 MILLIGRAM(S): at 11:59

## 2025-07-21 RX ADMIN — LACTULOSE 10 GRAM(S): 10 SOLUTION ORAL at 13:58

## 2025-07-21 RX ADMIN — Medication 100 MILLIGRAM(S): at 09:02

## 2025-07-21 RX ADMIN — ALBUMIN (HUMAN) 50 MILLILITER(S): 12.5 INJECTION, SOLUTION INTRAVENOUS at 05:11

## 2025-07-21 RX ADMIN — Medication 400 MILLIGRAM(S): at 09:02

## 2025-07-21 RX ADMIN — Medication 125 MILLIGRAM(S): at 17:22

## 2025-07-21 RX ADMIN — URSODIOL 300 MILLIGRAM(S): 300 CAPSULE ORAL at 17:22

## 2025-07-21 RX ADMIN — HYDROXYZINE HYDROCHLORIDE 50 MILLIGRAM(S): 25 TABLET, FILM COATED ORAL at 21:16

## 2025-07-21 RX ADMIN — Medication 3 MILLIGRAM(S): at 21:17

## 2025-07-21 RX ADMIN — Medication 100 MILLIEQUIVALENT(S): at 09:44

## 2025-07-21 RX ADMIN — Medication 0.4 MILLIGRAM(S): at 11:58

## 2025-07-21 RX ADMIN — Medication 40 MILLIEQUIVALENT(S): at 09:01

## 2025-07-21 RX ADMIN — HYDROXYZINE HYDROCHLORIDE 50 MILLIGRAM(S): 25 TABLET, FILM COATED ORAL at 05:05

## 2025-07-21 RX ADMIN — ALBUMIN (HUMAN) 300 MILLILITER(S): 12.5 INJECTION, SOLUTION INTRAVENOUS at 16:19

## 2025-07-21 RX ADMIN — Medication 0.4 MILLIGRAM(S): at 04:52

## 2025-07-21 RX ADMIN — LACTULOSE 10 GRAM(S): 10 SOLUTION ORAL at 21:16

## 2025-07-21 RX ADMIN — Medication 400 MILLIGRAM(S): at 17:22

## 2025-07-21 RX ADMIN — FUROSEMIDE 40 MILLIGRAM(S): 10 INJECTION INTRAMUSCULAR; INTRAVENOUS at 05:10

## 2025-07-21 RX ADMIN — URSODIOL 300 MILLIGRAM(S): 300 CAPSULE ORAL at 05:06

## 2025-07-21 RX ADMIN — Medication 0.4 MILLIGRAM(S): at 21:21

## 2025-07-21 RX ADMIN — Medication 0.4 MILLIGRAM(S): at 21:40

## 2025-07-21 RX ADMIN — HYDROXYZINE HYDROCHLORIDE 50 MILLIGRAM(S): 25 TABLET, FILM COATED ORAL at 13:58

## 2025-07-21 RX ADMIN — Medication 100 MILLIEQUIVALENT(S): at 11:00

## 2025-07-21 RX ADMIN — Medication 125 MILLIGRAM(S): at 05:06

## 2025-07-21 RX ADMIN — MIRTAZAPINE 7.5 MILLIGRAM(S): 30 TABLET, FILM COATED ORAL at 21:17

## 2025-07-21 RX ADMIN — Medication 0.4 MILLIGRAM(S): at 13:38

## 2025-07-21 RX ADMIN — Medication 100 MILLIEQUIVALENT(S): at 09:03

## 2025-07-21 RX ADMIN — Medication 50 MILLIGRAM(S): at 05:10

## 2025-07-21 RX ADMIN — Medication 25 GRAM(S): at 01:15

## 2025-07-21 RX ADMIN — Medication 0.2 MILLIGRAM(S): at 18:00

## 2025-07-21 RX ADMIN — FUROSEMIDE 40 MILLIGRAM(S): 10 INJECTION INTRAMUSCULAR; INTRAVENOUS at 13:58

## 2025-07-21 RX ADMIN — Medication 0.2 MILLIGRAM(S): at 17:16

## 2025-07-22 LAB
ADD ON TEST-SPECIMEN IN LAB: SIGNIFICANT CHANGE UP
ALBUMIN SERPL ELPH-MCNC: 4 G/DL — SIGNIFICANT CHANGE UP (ref 3.3–5)
ALP SERPL-CCNC: 114 U/L — SIGNIFICANT CHANGE UP (ref 40–120)
ALT FLD-CCNC: 8 U/L — LOW (ref 10–45)
ANION GAP SERPL CALC-SCNC: 20 MMOL/L — HIGH (ref 5–17)
AST SERPL-CCNC: 21 U/L — SIGNIFICANT CHANGE UP (ref 10–40)
BASOPHILS # BLD AUTO: 0.04 K/UL — SIGNIFICANT CHANGE UP (ref 0–0.2)
BASOPHILS NFR BLD AUTO: 0.3 % — SIGNIFICANT CHANGE UP (ref 0–2)
BILIRUB SERPL-MCNC: 5.3 MG/DL — HIGH (ref 0.2–1.2)
BUN SERPL-MCNC: 9 MG/DL — SIGNIFICANT CHANGE UP (ref 7–23)
CALCIUM SERPL-MCNC: 9.4 MG/DL — SIGNIFICANT CHANGE UP (ref 8.4–10.5)
CHLORIDE SERPL-SCNC: 92 MMOL/L — LOW (ref 96–108)
CO2 SERPL-SCNC: 20 MMOL/L — LOW (ref 22–31)
CREAT SERPL-MCNC: 0.57 MG/DL — SIGNIFICANT CHANGE UP (ref 0.5–1.3)
EGFR: 103 ML/MIN/1.73M2 — SIGNIFICANT CHANGE UP
EGFR: 103 ML/MIN/1.73M2 — SIGNIFICANT CHANGE UP
EOSINOPHIL # BLD AUTO: 0.05 K/UL — SIGNIFICANT CHANGE UP (ref 0–0.5)
EOSINOPHIL NFR BLD AUTO: 0.4 % — SIGNIFICANT CHANGE UP (ref 0–6)
GLUCOSE SERPL-MCNC: 144 MG/DL — HIGH (ref 70–99)
GRAM STN FLD: SIGNIFICANT CHANGE UP
HCT VFR BLD CALC: 30.1 % — LOW (ref 34.5–45)
HGB BLD-MCNC: 10 G/DL — LOW (ref 11.5–15.5)
IMM GRANULOCYTES # BLD AUTO: 0.2 K/UL — HIGH (ref 0–0.07)
IMM GRANULOCYTES NFR BLD AUTO: 1.7 % — HIGH (ref 0–0.9)
INR BLD: 1.89 RATIO — HIGH (ref 0.85–1.16)
LYMPHOCYTES # BLD AUTO: 1.06 K/UL — SIGNIFICANT CHANGE UP (ref 1–3.3)
LYMPHOCYTES NFR BLD AUTO: 8.8 % — LOW (ref 13–44)
MAGNESIUM SERPL-MCNC: 1.3 MG/DL — LOW (ref 1.6–2.6)
MCHC RBC-ENTMCNC: 26 PG — LOW (ref 27–34)
MCHC RBC-ENTMCNC: 33.2 G/DL — SIGNIFICANT CHANGE UP (ref 32–36)
MCV RBC AUTO: 78.4 FL — LOW (ref 80–100)
MONOCYTES # BLD AUTO: 1.15 K/UL — HIGH (ref 0–0.9)
MONOCYTES NFR BLD AUTO: 9.5 % — SIGNIFICANT CHANGE UP (ref 2–14)
NEUTROPHILS # BLD AUTO: 9.61 K/UL — HIGH (ref 1.8–7.4)
NEUTROPHILS NFR BLD AUTO: 79.3 % — HIGH (ref 43–77)
NRBC # BLD AUTO: 0 K/UL — SIGNIFICANT CHANGE UP (ref 0–0)
NRBC # FLD: 0 K/UL — SIGNIFICANT CHANGE UP (ref 0–0)
NRBC BLD AUTO-RTO: 0 /100 WBCS — SIGNIFICANT CHANGE UP (ref 0–0)
PHOSPHATE SERPL-MCNC: 3 MG/DL — SIGNIFICANT CHANGE UP (ref 2.5–4.5)
PLATELET # BLD AUTO: 104 K/UL — LOW (ref 150–400)
PMV BLD: 9.3 FL — SIGNIFICANT CHANGE UP (ref 7–13)
POTASSIUM SERPL-MCNC: 3.1 MMOL/L — LOW (ref 3.5–5.3)
POTASSIUM SERPL-SCNC: 3.1 MMOL/L — LOW (ref 3.5–5.3)
PROT SERPL-MCNC: 6.7 G/DL — SIGNIFICANT CHANGE UP (ref 6–8.3)
PROTHROM AB SERPL-ACNC: 21.4 SEC — HIGH (ref 9.9–13.4)
RBC # BLD: 3.84 M/UL — SIGNIFICANT CHANGE UP (ref 3.8–5.2)
RBC # FLD: 23.9 % — HIGH (ref 10.3–14.5)
SODIUM SERPL-SCNC: 132 MMOL/L — LOW (ref 135–145)
SPECIMEN SOURCE: SIGNIFICANT CHANGE UP
WBC # BLD: 12.11 K/UL — HIGH (ref 3.8–10.5)
WBC # FLD AUTO: 12.11 K/UL — HIGH (ref 3.8–10.5)

## 2025-07-22 PROCEDURE — 99232 SBSQ HOSP IP/OBS MODERATE 35: CPT | Mod: GC

## 2025-07-22 PROCEDURE — 99223 1ST HOSP IP/OBS HIGH 75: CPT

## 2025-07-22 PROCEDURE — 99233 SBSQ HOSP IP/OBS HIGH 50: CPT | Mod: GC

## 2025-07-22 RX ORDER — HYDROMORPHONE/SOD CHLOR,ISO/PF 2 MG/10 ML
2 SYRINGE (ML) INJECTION EVERY 12 HOURS
Refills: 0 | Status: DISCONTINUED | OUTPATIENT
Start: 2025-07-22 | End: 2025-07-24

## 2025-07-22 RX ORDER — MAGNESIUM SULFATE 500 MG/ML
2 SYRINGE (ML) INJECTION ONCE
Refills: 0 | Status: COMPLETED | OUTPATIENT
Start: 2025-07-22 | End: 2025-07-22

## 2025-07-22 RX ORDER — PIPERACILLIN-TAZO-DEXTROSE,ISO 3.375G/5
3.38 IV SOLUTION, PIGGYBACK PREMIX FROZEN(ML) INTRAVENOUS EVERY 8 HOURS
Refills: 0 | Status: DISCONTINUED | OUTPATIENT
Start: 2025-07-22 | End: 2025-07-22

## 2025-07-22 RX ORDER — HYDROMORPHONE/SOD CHLOR,ISO/PF 2 MG/10 ML
1 SYRINGE (ML) INJECTION EVERY 6 HOURS
Refills: 0 | Status: DISCONTINUED | OUTPATIENT
Start: 2025-07-22 | End: 2025-07-29

## 2025-07-22 RX ORDER — ACETAMINOPHEN 500 MG/5ML
650 LIQUID (ML) ORAL EVERY 8 HOURS
Refills: 0 | Status: DISCONTINUED | OUTPATIENT
Start: 2025-07-22 | End: 2025-08-05

## 2025-07-22 RX ORDER — LIDOCAINE HYDROCHLORIDE 20 MG/ML
1 JELLY TOPICAL EVERY 24 HOURS
Refills: 0 | Status: DISCONTINUED | OUTPATIENT
Start: 2025-07-22 | End: 2025-08-05

## 2025-07-22 RX ORDER — PIPERACILLIN-TAZO-DEXTROSE,ISO 3.375G/5
3.38 IV SOLUTION, PIGGYBACK PREMIX FROZEN(ML) INTRAVENOUS EVERY 8 HOURS
Refills: 0 | Status: DISCONTINUED | OUTPATIENT
Start: 2025-07-22 | End: 2025-08-05

## 2025-07-22 RX ORDER — HYDROMORPHONE/SOD CHLOR,ISO/PF 2 MG/10 ML
4 SYRINGE (ML) INJECTION EVERY 12 HOURS
Refills: 0 | Status: DISCONTINUED | OUTPATIENT
Start: 2025-07-22 | End: 2025-07-22

## 2025-07-22 RX ADMIN — Medication 25 GRAM(S): at 08:37

## 2025-07-22 RX ADMIN — HYDROXYZINE HYDROCHLORIDE 50 MILLIGRAM(S): 25 TABLET, FILM COATED ORAL at 21:30

## 2025-07-22 RX ADMIN — Medication 1 MILLIGRAM(S): at 12:38

## 2025-07-22 RX ADMIN — FOLIC ACID 1 MILLIGRAM(S): 1 TABLET ORAL at 08:38

## 2025-07-22 RX ADMIN — Medication 40 MILLIEQUIVALENT(S): at 10:43

## 2025-07-22 RX ADMIN — Medication 25 GRAM(S): at 21:30

## 2025-07-22 RX ADMIN — Medication 1 TABLET(S): at 08:38

## 2025-07-22 RX ADMIN — LACTULOSE 10 GRAM(S): 10 SOLUTION ORAL at 13:47

## 2025-07-22 RX ADMIN — Medication 400 MILLIGRAM(S): at 13:46

## 2025-07-22 RX ADMIN — Medication 40 MILLIEQUIVALENT(S): at 17:49

## 2025-07-22 RX ADMIN — Medication 400 MILLIGRAM(S): at 17:51

## 2025-07-22 RX ADMIN — FUROSEMIDE 40 MILLIGRAM(S): 10 INJECTION INTRAMUSCULAR; INTRAVENOUS at 13:47

## 2025-07-22 RX ADMIN — Medication 40 MILLIGRAM(S): at 05:15

## 2025-07-22 RX ADMIN — Medication 50 MILLIGRAM(S): at 05:14

## 2025-07-22 RX ADMIN — Medication 25 GRAM(S): at 10:43

## 2025-07-22 RX ADMIN — Medication 0.2 MILLIGRAM(S): at 01:34

## 2025-07-22 RX ADMIN — HYDROXYZINE HYDROCHLORIDE 50 MILLIGRAM(S): 25 TABLET, FILM COATED ORAL at 13:47

## 2025-07-22 RX ADMIN — Medication 100 MILLIGRAM(S): at 08:39

## 2025-07-22 RX ADMIN — URSODIOL 300 MILLIGRAM(S): 300 CAPSULE ORAL at 17:49

## 2025-07-22 RX ADMIN — HYDROXYZINE HYDROCHLORIDE 50 MILLIGRAM(S): 25 TABLET, FILM COATED ORAL at 05:15

## 2025-07-22 RX ADMIN — Medication 3 MILLIGRAM(S): at 21:30

## 2025-07-22 RX ADMIN — Medication 125 MILLIGRAM(S): at 05:14

## 2025-07-22 RX ADMIN — Medication 40 MILLIEQUIVALENT(S): at 13:48

## 2025-07-22 RX ADMIN — Medication 125 MILLIGRAM(S): at 17:48

## 2025-07-22 RX ADMIN — MIRTAZAPINE 7.5 MILLIGRAM(S): 30 TABLET, FILM COATED ORAL at 21:30

## 2025-07-22 RX ADMIN — URSODIOL 300 MILLIGRAM(S): 300 CAPSULE ORAL at 05:15

## 2025-07-22 RX ADMIN — Medication 400 MILLIGRAM(S): at 08:38

## 2025-07-22 RX ADMIN — FUROSEMIDE 40 MILLIGRAM(S): 10 INJECTION INTRAMUSCULAR; INTRAVENOUS at 05:15

## 2025-07-22 RX ADMIN — Medication 1 MILLIGRAM(S): at 13:00

## 2025-07-22 RX ADMIN — Medication 0.2 MILLIGRAM(S): at 01:50

## 2025-07-22 RX ADMIN — Medication 25 GRAM(S): at 13:46

## 2025-07-22 RX ADMIN — Medication 1 MILLIGRAM(S): at 17:46

## 2025-07-23 DIAGNOSIS — K65.9 PERITONITIS, UNSPECIFIED: ICD-10-CM

## 2025-07-23 LAB
ALBUMIN SERPL ELPH-MCNC: 3.8 G/DL — SIGNIFICANT CHANGE UP (ref 3.3–5)
ALP SERPL-CCNC: 127 U/L — HIGH (ref 40–120)
ALT FLD-CCNC: 7 U/L — LOW (ref 10–45)
ANION GAP SERPL CALC-SCNC: 16 MMOL/L — SIGNIFICANT CHANGE UP (ref 5–17)
APTT BLD: 34.4 SEC — SIGNIFICANT CHANGE UP (ref 26.1–36.8)
AST SERPL-CCNC: 21 U/L — SIGNIFICANT CHANGE UP (ref 10–40)
BILIRUB SERPL-MCNC: 5.6 MG/DL — HIGH (ref 0.2–1.2)
BUN SERPL-MCNC: 12 MG/DL — SIGNIFICANT CHANGE UP (ref 7–23)
CALCIUM SERPL-MCNC: 9.7 MG/DL — SIGNIFICANT CHANGE UP (ref 8.4–10.5)
CHLORIDE SERPL-SCNC: 95 MMOL/L — LOW (ref 96–108)
CO2 SERPL-SCNC: 20 MMOL/L — LOW (ref 22–31)
CREAT SERPL-MCNC: 0.62 MG/DL — SIGNIFICANT CHANGE UP (ref 0.5–1.3)
EGFR: 101 ML/MIN/1.73M2 — SIGNIFICANT CHANGE UP
EGFR: 101 ML/MIN/1.73M2 — SIGNIFICANT CHANGE UP
GLUCOSE SERPL-MCNC: 130 MG/DL — HIGH (ref 70–99)
HCT VFR BLD CALC: 30 % — LOW (ref 34.5–45)
HGB BLD-MCNC: 9.9 G/DL — LOW (ref 11.5–15.5)
IMMATURE PLATELET FRACTION #: 1.4 K/UL — LOW (ref 4.7–11.1)
IMMATURE PLATELET FRACTION %: 1.4 % — LOW (ref 1.6–4.9)
INR BLD: 1.87 RATIO — HIGH (ref 0.85–1.16)
LACTATE SERPL-SCNC: 2.2 MMOL/L — HIGH (ref 0.5–2)
MAGNESIUM SERPL-MCNC: 1.6 MG/DL — SIGNIFICANT CHANGE UP (ref 1.6–2.6)
MCHC RBC-ENTMCNC: 26.1 PG — LOW (ref 27–34)
MCHC RBC-ENTMCNC: 33 G/DL — SIGNIFICANT CHANGE UP (ref 32–36)
MCV RBC AUTO: 79.2 FL — LOW (ref 80–100)
NON-GYNECOLOGICAL CYTOLOGY STUDY: SIGNIFICANT CHANGE UP
NRBC # BLD AUTO: 0 K/UL — SIGNIFICANT CHANGE UP (ref 0–0)
NRBC # FLD: 0 K/UL — SIGNIFICANT CHANGE UP (ref 0–0)
NRBC BLD AUTO-RTO: 0 /100 WBCS — SIGNIFICANT CHANGE UP (ref 0–0)
PHOSPHATE SERPL-MCNC: 2.8 MG/DL — SIGNIFICANT CHANGE UP (ref 2.5–4.5)
PLATELET # BLD AUTO: 97 K/UL — LOW (ref 150–400)
PMV BLD: 9.3 FL — SIGNIFICANT CHANGE UP (ref 7–13)
POTASSIUM SERPL-MCNC: 4.1 MMOL/L — SIGNIFICANT CHANGE UP (ref 3.5–5.3)
POTASSIUM SERPL-SCNC: 4.1 MMOL/L — SIGNIFICANT CHANGE UP (ref 3.5–5.3)
PROCALCITONIN SERPL-MCNC: 0.56 NG/ML — HIGH (ref 0.02–0.1)
PROT SERPL-MCNC: 6.8 G/DL — SIGNIFICANT CHANGE UP (ref 6–8.3)
PROTHROM AB SERPL-ACNC: 21.4 SEC — HIGH (ref 9.9–13.4)
RBC # BLD: 3.79 M/UL — LOW (ref 3.8–5.2)
RBC # FLD: 24 % — HIGH (ref 10.3–14.5)
SODIUM SERPL-SCNC: 131 MMOL/L — LOW (ref 135–145)
WBC # BLD: 12.08 K/UL — HIGH (ref 3.8–10.5)
WBC # FLD AUTO: 12.08 K/UL — HIGH (ref 3.8–10.5)

## 2025-07-23 PROCEDURE — 76700 US EXAM ABDOM COMPLETE: CPT | Mod: 26

## 2025-07-23 PROCEDURE — 88112 CYTOPATH CELL ENHANCE TECH: CPT

## 2025-07-23 PROCEDURE — 83935 ASSAY OF URINE OSMOLALITY: CPT

## 2025-07-23 PROCEDURE — C1729: CPT

## 2025-07-23 PROCEDURE — 93005 ELECTROCARDIOGRAM TRACING: CPT

## 2025-07-23 PROCEDURE — 82330 ASSAY OF CALCIUM: CPT

## 2025-07-23 PROCEDURE — 36415 COLL VENOUS BLD VENIPUNCTURE: CPT

## 2025-07-23 PROCEDURE — 85014 HEMATOCRIT: CPT

## 2025-07-23 PROCEDURE — 84157 ASSAY OF PROTEIN OTHER: CPT

## 2025-07-23 PROCEDURE — 87102 FUNGUS ISOLATION CULTURE: CPT

## 2025-07-23 PROCEDURE — 86901 BLOOD TYPING SEROLOGIC RH(D): CPT

## 2025-07-23 PROCEDURE — 83550 IRON BINDING TEST: CPT

## 2025-07-23 PROCEDURE — P9047: CPT

## 2025-07-23 PROCEDURE — 80076 HEPATIC FUNCTION PANEL: CPT

## 2025-07-23 PROCEDURE — 99233 SBSQ HOSP IP/OBS HIGH 50: CPT | Mod: GC

## 2025-07-23 PROCEDURE — 85610 PROTHROMBIN TIME: CPT

## 2025-07-23 PROCEDURE — 89051 BODY FLUID CELL COUNT: CPT

## 2025-07-23 PROCEDURE — 97116 GAIT TRAINING THERAPY: CPT

## 2025-07-23 PROCEDURE — 80053 COMPREHEN METABOLIC PANEL: CPT

## 2025-07-23 PROCEDURE — 81001 URINALYSIS AUTO W/SCOPE: CPT

## 2025-07-23 PROCEDURE — 87449 NOS EACH ORGANISM AG IA: CPT

## 2025-07-23 PROCEDURE — 83615 LACTATE (LD) (LDH) ENZYME: CPT

## 2025-07-23 PROCEDURE — 84100 ASSAY OF PHOSPHORUS: CPT

## 2025-07-23 PROCEDURE — 84300 ASSAY OF URINE SODIUM: CPT

## 2025-07-23 PROCEDURE — 82607 VITAMIN B-12: CPT

## 2025-07-23 PROCEDURE — 85018 HEMOGLOBIN: CPT

## 2025-07-23 PROCEDURE — 84443 ASSAY THYROID STIM HORMONE: CPT

## 2025-07-23 PROCEDURE — 87070 CULTURE OTHR SPECIMN AEROBIC: CPT

## 2025-07-23 PROCEDURE — 83735 ASSAY OF MAGNESIUM: CPT

## 2025-07-23 PROCEDURE — 82247 BILIRUBIN TOTAL: CPT

## 2025-07-23 PROCEDURE — 82728 ASSAY OF FERRITIN: CPT

## 2025-07-23 PROCEDURE — 83540 ASSAY OF IRON: CPT

## 2025-07-23 PROCEDURE — 85652 RBC SED RATE AUTOMATED: CPT

## 2025-07-23 PROCEDURE — 88305 TISSUE EXAM BY PATHOLOGIST: CPT

## 2025-07-23 PROCEDURE — 82746 ASSAY OF FOLIC ACID SERUM: CPT

## 2025-07-23 PROCEDURE — 83036 HEMOGLOBIN GLYCOSYLATED A1C: CPT

## 2025-07-23 PROCEDURE — 80061 LIPID PANEL: CPT

## 2025-07-23 PROCEDURE — 82945 GLUCOSE OTHER FLUID: CPT

## 2025-07-23 PROCEDURE — 86900 BLOOD TYPING SEROLOGIC ABO: CPT

## 2025-07-23 PROCEDURE — 83930 ASSAY OF BLOOD OSMOLALITY: CPT

## 2025-07-23 PROCEDURE — 84436 ASSAY OF TOTAL THYROXINE: CPT

## 2025-07-23 PROCEDURE — 83605 ASSAY OF LACTIC ACID: CPT

## 2025-07-23 PROCEDURE — 82947 ASSAY GLUCOSE BLOOD QUANT: CPT

## 2025-07-23 PROCEDURE — 74177 CT ABD & PELVIS W/CONTRAST: CPT

## 2025-07-23 PROCEDURE — 84132 ASSAY OF SERUM POTASSIUM: CPT

## 2025-07-23 PROCEDURE — 97162 PT EVAL MOD COMPLEX 30 MIN: CPT

## 2025-07-23 PROCEDURE — 80048 BASIC METABOLIC PNL TOTAL CA: CPT

## 2025-07-23 PROCEDURE — 82042 OTHER SOURCE ALBUMIN QUAN EA: CPT

## 2025-07-23 PROCEDURE — 82803 BLOOD GASES ANY COMBINATION: CPT

## 2025-07-23 PROCEDURE — 85027 COMPLETE CBC AUTOMATED: CPT

## 2025-07-23 PROCEDURE — 82248 BILIRUBIN DIRECT: CPT

## 2025-07-23 PROCEDURE — 87103 BLOOD FUNGUS CULTURE: CPT

## 2025-07-23 PROCEDURE — 85730 THROMBOPLASTIN TIME PARTIAL: CPT

## 2025-07-23 PROCEDURE — G0480: CPT

## 2025-07-23 PROCEDURE — 76700 US EXAM ABDOM COMPLETE: CPT

## 2025-07-23 PROCEDURE — 84145 PROCALCITONIN (PCT): CPT

## 2025-07-23 PROCEDURE — 82435 ASSAY OF BLOOD CHLORIDE: CPT

## 2025-07-23 PROCEDURE — 84295 ASSAY OF SERUM SODIUM: CPT

## 2025-07-23 PROCEDURE — 87040 BLOOD CULTURE FOR BACTERIA: CPT

## 2025-07-23 PROCEDURE — 99232 SBSQ HOSP IP/OBS MODERATE 35: CPT | Mod: GC

## 2025-07-23 PROCEDURE — 97530 THERAPEUTIC ACTIVITIES: CPT

## 2025-07-23 PROCEDURE — 86850 RBC ANTIBODY SCREEN: CPT

## 2025-07-23 PROCEDURE — 99233 SBSQ HOSP IP/OBS HIGH 50: CPT

## 2025-07-23 PROCEDURE — 85025 COMPLETE CBC W/AUTO DIFF WBC: CPT

## 2025-07-23 RX ORDER — FLUCONAZOLE 150 MG
TABLET ORAL
Refills: 0 | Status: DISCONTINUED | OUTPATIENT
Start: 2025-07-23 | End: 2025-07-30

## 2025-07-23 RX ORDER — FLUCONAZOLE 150 MG
400 TABLET ORAL EVERY 24 HOURS
Refills: 0 | Status: DISCONTINUED | OUTPATIENT
Start: 2025-07-24 | End: 2025-07-30

## 2025-07-23 RX ORDER — FLUCONAZOLE 150 MG
400 TABLET ORAL ONCE
Refills: 0 | Status: COMPLETED | OUTPATIENT
Start: 2025-07-23 | End: 2025-07-23

## 2025-07-23 RX ORDER — MAGNESIUM SULFATE 500 MG/ML
1 SYRINGE (ML) INJECTION ONCE
Refills: 0 | Status: COMPLETED | OUTPATIENT
Start: 2025-07-23 | End: 2025-07-23

## 2025-07-23 RX ADMIN — LACTULOSE 10 GRAM(S): 10 SOLUTION ORAL at 21:20

## 2025-07-23 RX ADMIN — Medication 50 MILLIGRAM(S): at 05:39

## 2025-07-23 RX ADMIN — Medication 0.2 MILLIGRAM(S): at 21:35

## 2025-07-23 RX ADMIN — Medication 25 GRAM(S): at 21:59

## 2025-07-23 RX ADMIN — FOLIC ACID 1 MILLIGRAM(S): 1 TABLET ORAL at 09:10

## 2025-07-23 RX ADMIN — LACTULOSE 10 GRAM(S): 10 SOLUTION ORAL at 05:41

## 2025-07-23 RX ADMIN — Medication 40 MILLIGRAM(S): at 05:40

## 2025-07-23 RX ADMIN — Medication 25 GRAM(S): at 13:18

## 2025-07-23 RX ADMIN — Medication 1 MILLIGRAM(S): at 00:03

## 2025-07-23 RX ADMIN — Medication 25 GRAM(S): at 05:40

## 2025-07-23 RX ADMIN — FUROSEMIDE 40 MILLIGRAM(S): 10 INJECTION INTRAMUSCULAR; INTRAVENOUS at 13:18

## 2025-07-23 RX ADMIN — Medication 100 GRAM(S): at 10:13

## 2025-07-23 RX ADMIN — Medication 100 MILLIGRAM(S): at 09:10

## 2025-07-23 RX ADMIN — Medication 2 MILLIGRAM(S): at 18:01

## 2025-07-23 RX ADMIN — HYDROXYZINE HYDROCHLORIDE 50 MILLIGRAM(S): 25 TABLET, FILM COATED ORAL at 21:21

## 2025-07-23 RX ADMIN — Medication 1 MILLIGRAM(S): at 09:40

## 2025-07-23 RX ADMIN — Medication 3 MILLIGRAM(S): at 21:20

## 2025-07-23 RX ADMIN — Medication 100 MILLIGRAM(S): at 19:46

## 2025-07-23 RX ADMIN — Medication 0.2 MILLIGRAM(S): at 16:57

## 2025-07-23 RX ADMIN — Medication 125 MILLIGRAM(S): at 06:10

## 2025-07-23 RX ADMIN — Medication 1 TABLET(S): at 09:10

## 2025-07-23 RX ADMIN — MIRTAZAPINE 7.5 MILLIGRAM(S): 30 TABLET, FILM COATED ORAL at 21:20

## 2025-07-23 RX ADMIN — Medication 2 MILLIGRAM(S): at 17:01

## 2025-07-23 RX ADMIN — Medication 0.2 MILLIGRAM(S): at 21:19

## 2025-07-23 RX ADMIN — HYDROXYZINE HYDROCHLORIDE 50 MILLIGRAM(S): 25 TABLET, FILM COATED ORAL at 05:39

## 2025-07-23 RX ADMIN — Medication 125 MILLIGRAM(S): at 17:01

## 2025-07-23 RX ADMIN — FUROSEMIDE 40 MILLIGRAM(S): 10 INJECTION INTRAMUSCULAR; INTRAVENOUS at 05:41

## 2025-07-23 RX ADMIN — Medication 1 MILLIGRAM(S): at 09:10

## 2025-07-23 RX ADMIN — Medication 0.2 MILLIGRAM(S): at 16:22

## 2025-07-23 RX ADMIN — Medication 2 MILLIGRAM(S): at 06:10

## 2025-07-23 RX ADMIN — URSODIOL 300 MILLIGRAM(S): 300 CAPSULE ORAL at 05:39

## 2025-07-23 RX ADMIN — Medication 400 MILLIGRAM(S): at 09:10

## 2025-07-23 RX ADMIN — URSODIOL 300 MILLIGRAM(S): 300 CAPSULE ORAL at 17:01

## 2025-07-23 RX ADMIN — Medication 400 MILLIGRAM(S): at 17:02

## 2025-07-23 RX ADMIN — Medication 2 MILLIGRAM(S): at 07:10

## 2025-07-23 NOTE — DIETITIAN INITIAL EVALUATION ADULT - WEIGHT (LBS)
Subjective:      Patient ID: Emeka Miramontes is a 33 y.o. female.    Chief Complaint: Back Pain (Pt states she's been having back pain [id number]. She said the pain moves from lower back to the mid back )      HPI  Patient is here today with new complaint-  Mid to lower back pain x 3 wks.  No trauma  Worse with movement, improved but not resolved with rest.  Works as a .  Denies prolonged periods of sitting or standing    No NT no weakness  No incontinence  No injury no heavy lifting     HTN- uncontrolled today and at prior visits.  Patient currently taking 10 mg of amlodipine daily.  She was on valsartan as well in the past but has had some difficulties with insurance coverage.  We will add olmesartan today      Review of Systems   Constitutional:  Negative for activity change, appetite change, chills, diaphoresis, fatigue, fever and unexpected weight change.   Eyes:  Negative for visual disturbance.   Respiratory:  Negative for cough, choking, chest tightness, shortness of breath and wheezing.    Cardiovascular:  Negative for chest pain, palpitations and leg swelling.   Gastrointestinal:  Negative for abdominal distention, abdominal pain, blood in stool, constipation, diarrhea, nausea, vomiting and reflux.   Genitourinary:  Negative for bladder incontinence, decreased urine volume, difficulty urinating, dysuria, enuresis, flank pain, frequency, hematuria, nocturia, pelvic pain, urgency, vaginal bleeding, vaginal discharge, vaginal pain and vaginal dryness.   Musculoskeletal:  Positive for back pain. Negative for arthralgias, leg pain and myalgias.   Neurological:  Negative for dizziness, vertigo, tremors, syncope, weakness, light-headedness, numbness and headaches.   Psychiatric/Behavioral:  Negative for agitation, behavioral problems, confusion, decreased concentration, dysphoric mood, hallucinations, self-injury, sleep disturbance and suicidal ideas. The patient is not nervous/anxious and is not  hyperactive.        Medication List with Changes/Refills   New Medications    DICLOFENAC (VOLTAREN) 50 MG EC TABLET    Take 1 tablet (50 mg total) by mouth 2 (two) times daily.    OLMESARTAN (BENICAR) 20 MG TABLET    Take 1 tablet (20 mg total) by mouth once daily.    TIZANIDINE (ZANAFLEX) 4 MG TABLET    Take 1 tablet (4 mg total) by mouth every 6 (six) hours as needed (muscle spasm).   Current Medications    ALBUTEROL (PROVENTIL/VENTOLIN HFA) 90 MCG/ACTUATION INHALER    Inhale 2 puffs into the lungs every 6 (six) hours as needed for Wheezing.    ALBUTEROL INHL    Inhale into the lungs.    ALBUTEROL-IPRATROPIUM (DUO-NEB) 2.5 MG-0.5 MG/3 ML NEBULIZER SOLUTION    Take 3 mLs by nebulization every 6 (six) hours as needed for Wheezing. Rescue    AMLODIPINE (NORVASC) 10 MG TABLET    Take 1 tablet (10 mg total) by mouth once daily.    ATOGEPANT (QULIPTA) 60 MG TAB    Take 1 tablet by mouth once daily.    BUDESONIDE-FORMOTEROL 160-4.5 MCG (SYMBICORT) 160-4.5 MCG/ACTUATION HFAA    Inhale 2 puffs into the lungs every 12 (twelve) hours. Controller    DUPILUMAB (DUPIXENT PEN) 300 MG/2 ML PNIJ    Inject 2 mLs (300 mg total) into the skin every 14 (fourteen) days.    EPINEPHRINE (EPIPEN 2-RUDI) 0.3 MG/0.3 ML ATIN    Inject underneath skin according to directions on packaging once as needed for severe allergic reaction, then call 911.    ESCITALOPRAM OXALATE (LEXAPRO) 20 MG TABLET    Take 1 tablet (20 mg total) by mouth once daily. for 14 days    FLUTICASONE PROPIONATE (FLONASE) 50 MCG/ACTUATION NASAL SPRAY    2 sprays (100 mcg total) by Each Nostril route 2 (two) times daily.    GABAPENTIN (NEURONTIN) 300 MG CAPSULE    Take 300 mg by mouth 2 (two) times daily as needed.    LORAZEPAM (ATIVAN) 0.5 MG TABLET    Take 1 tablet (0.5 mg total) by mouth daily as needed for Anxiety.    METFORMIN (GLUCOPHAGE-XR) 500 MG ER 24HR TABLET    Take 2 tablets (1,000 mg total) by mouth daily with breakfast.    MONTELUKAST (SINGULAIR) 10 MG TABLET     "Take 1 tablet (10 mg total) by mouth every evening.    MULTIVITAMIN-CA-IRON-MINERALS 18-0.4 MG TAB    Take 1 tablet by mouth once daily.    ONDANSETRON (ZOFRAN) 4 MG TABLET        PANTOPRAZOLE (PROTONIX) 40 MG TABLET    Take 1 tablet (40 mg total) by mouth every morning.    RIMEGEPANT (NURTEC) 75 MG ODT    DISSOLVE ONE TABLET BY MOUTH EVERY DAY AT ONSET OF MIGRAINE. NO MORE THAT 3 DAYS/WEEK   Discontinued Medications    VALSARTAN (DIOVAN) 160 MG TABLET    Take 1 tablet (160 mg total) by mouth once daily.        Objective:     Vitals:    07/23/25 1403   BP: (!) 140/100   Pulse: 80   Temp: 98 °F (36.7 °C)   TempSrc: Tympanic   Weight: 95.7 kg (210 lb 15.7 oz)   Height: 5' 4.5" (1.638 m)        Physical Exam  Constitutional:       General: She is not in acute distress.     Appearance: Normal appearance. She is normal weight. She is not ill-appearing, toxic-appearing or diaphoretic.   HENT:      Head: Normocephalic and atraumatic.      Right Ear: External ear normal.      Left Ear: External ear normal.      Nose: Nose normal.   Eyes:      Conjunctiva/sclera: Conjunctivae normal.      Pupils: Pupils are equal, round, and reactive to light.   Cardiovascular:      Rate and Rhythm: Normal rate and regular rhythm.      Pulses: Normal pulses.      Heart sounds: Normal heart sounds. No murmur heard.     No friction rub. No gallop.   Pulmonary:      Effort: Pulmonary effort is normal.      Breath sounds: Normal breath sounds.   Abdominal:      General: Abdomen is flat. Bowel sounds are normal. There is no distension.      Palpations: Abdomen is soft. There is no mass.      Tenderness: There is no abdominal tenderness. There is no right CVA tenderness, left CVA tenderness, guarding or rebound.      Hernia: No hernia is present.   Musculoskeletal:      Cervical back: Normal range of motion and neck supple.        Back:       Comments: Tender to palpation as marked in image.  No paraspinal muscle pain.  She has full range of " motion.  Normal gait observed   Skin:     General: Skin is warm and dry.      Capillary Refill: Capillary refill takes less than 2 seconds.   Neurological:      Mental Status: She is alert and oriented to person, place, and time.   Psychiatric:         Mood and Affect: Mood normal.         Behavior: Behavior normal.            Assessment & Plan:     1. Primary hypertension  -     olmesartan (BENICAR) 20 MG tablet; Take 1 tablet (20 mg total) by mouth once daily.  Dispense: 30 tablet; Refill: 1    2. Mid back pain  -     X-Ray Thoracic Spine AP Lateral; Future; Expected date: 07/23/2025  -     diclofenac (VOLTAREN) 50 MG EC tablet; Take 1 tablet (50 mg total) by mouth 2 (two) times daily.  Dispense: 60 tablet; Refill: 0  -     tiZANidine (ZANAFLEX) 4 MG tablet; Take 1 tablet (4 mg total) by mouth every 6 (six) hours as needed (muscle spasm).  Dispense: 30 tablet; Refill: 0       Sedation precautions given for tizanidine.  Take diclofenac with food.  Avoid other NSAIDs.      -Patient instructed that our office calls back on all labs and imaging within 1 week of receiving the results. Patient instructed to reach out to our office if they have not heard from us so that we can request the results from the lab/imaging center           Fatemeh Biswas PA-C   130

## 2025-07-24 LAB
ALBUMIN SERPL ELPH-MCNC: 3.4 G/DL — SIGNIFICANT CHANGE UP (ref 3.3–5)
ALP SERPL-CCNC: 116 U/L — SIGNIFICANT CHANGE UP (ref 40–120)
ALT FLD-CCNC: 9 U/L — LOW (ref 10–45)
ANION GAP SERPL CALC-SCNC: 14 MMOL/L — SIGNIFICANT CHANGE UP (ref 5–17)
AST SERPL-CCNC: 19 U/L — SIGNIFICANT CHANGE UP (ref 10–40)
BILIRUB SERPL-MCNC: 5.2 MG/DL — HIGH (ref 0.2–1.2)
BUN SERPL-MCNC: 13 MG/DL — SIGNIFICANT CHANGE UP (ref 7–23)
CALCIUM SERPL-MCNC: 9.2 MG/DL — SIGNIFICANT CHANGE UP (ref 8.4–10.5)
CHLORIDE SERPL-SCNC: 91 MMOL/L — LOW (ref 96–108)
CO2 SERPL-SCNC: 22 MMOL/L — SIGNIFICANT CHANGE UP (ref 22–31)
CREAT SERPL-MCNC: 0.65 MG/DL — SIGNIFICANT CHANGE UP (ref 0.5–1.3)
EGFR: 99 ML/MIN/1.73M2 — SIGNIFICANT CHANGE UP
EGFR: 99 ML/MIN/1.73M2 — SIGNIFICANT CHANGE UP
GLUCOSE SERPL-MCNC: 144 MG/DL — HIGH (ref 70–99)
HCT VFR BLD CALC: 27.9 % — LOW (ref 34.5–45)
HGB BLD-MCNC: 9.1 G/DL — LOW (ref 11.5–15.5)
IMMATURE PLATELET FRACTION #: 1.8 K/UL — LOW (ref 4.7–11.1)
IMMATURE PLATELET FRACTION %: 1.7 % — SIGNIFICANT CHANGE UP (ref 1.6–4.9)
INR BLD: 1.99 RATIO — HIGH (ref 0.85–1.16)
MAGNESIUM SERPL-MCNC: 1.5 MG/DL — LOW (ref 1.6–2.6)
MCHC RBC-ENTMCNC: 25.6 PG — LOW (ref 27–34)
MCHC RBC-ENTMCNC: 32.6 G/DL — SIGNIFICANT CHANGE UP (ref 32–36)
MCV RBC AUTO: 78.4 FL — LOW (ref 80–100)
NRBC # BLD AUTO: 0 K/UL — SIGNIFICANT CHANGE UP (ref 0–0)
NRBC # FLD: 0 K/UL — SIGNIFICANT CHANGE UP (ref 0–0)
NRBC BLD AUTO-RTO: 0 /100 WBCS — SIGNIFICANT CHANGE UP (ref 0–0)
PHOSPHATE SERPL-MCNC: 3.1 MG/DL — SIGNIFICANT CHANGE UP (ref 2.5–4.5)
PLATELET # BLD AUTO: 108 K/UL — LOW (ref 150–400)
PMV BLD: 9.2 FL — SIGNIFICANT CHANGE UP (ref 7–13)
POTASSIUM SERPL-MCNC: 3.2 MMOL/L — LOW (ref 3.5–5.3)
POTASSIUM SERPL-SCNC: 3.2 MMOL/L — LOW (ref 3.5–5.3)
PROT SERPL-MCNC: 6.5 G/DL — SIGNIFICANT CHANGE UP (ref 6–8.3)
PROTHROM AB SERPL-ACNC: 22.7 SEC — HIGH (ref 9.9–13.4)
RBC # BLD: 3.56 M/UL — LOW (ref 3.8–5.2)
RBC # FLD: 23.8 % — HIGH (ref 10.3–14.5)
SODIUM SERPL-SCNC: 127 MMOL/L — LOW (ref 135–145)
WBC # BLD: 10.62 K/UL — HIGH (ref 3.8–10.5)
WBC # FLD AUTO: 10.62 K/UL — HIGH (ref 3.8–10.5)

## 2025-07-24 PROCEDURE — 99233 SBSQ HOSP IP/OBS HIGH 50: CPT | Mod: GC

## 2025-07-24 PROCEDURE — 99232 SBSQ HOSP IP/OBS MODERATE 35: CPT | Mod: GC

## 2025-07-24 PROCEDURE — 99232 SBSQ HOSP IP/OBS MODERATE 35: CPT

## 2025-07-24 RX ORDER — MAGNESIUM SULFATE 500 MG/ML
2 SYRINGE (ML) INJECTION ONCE
Refills: 0 | Status: COMPLETED | OUTPATIENT
Start: 2025-07-24 | End: 2025-07-24

## 2025-07-24 RX ORDER — HYDROMORPHONE/SOD CHLOR,ISO/PF 2 MG/10 ML
2 SYRINGE (ML) INJECTION EVERY 8 HOURS
Refills: 0 | Status: DISCONTINUED | OUTPATIENT
Start: 2025-07-24 | End: 2025-07-27

## 2025-07-24 RX ADMIN — Medication 100 MILLIEQUIVALENT(S): at 11:13

## 2025-07-24 RX ADMIN — Medication 400 MILLIGRAM(S): at 17:08

## 2025-07-24 RX ADMIN — Medication 400 MILLIGRAM(S): at 08:48

## 2025-07-24 RX ADMIN — Medication 100 MILLIEQUIVALENT(S): at 08:45

## 2025-07-24 RX ADMIN — HYDROXYZINE HYDROCHLORIDE 50 MILLIGRAM(S): 25 TABLET, FILM COATED ORAL at 21:09

## 2025-07-24 RX ADMIN — LACTULOSE 10 GRAM(S): 10 SOLUTION ORAL at 21:09

## 2025-07-24 RX ADMIN — Medication 25 GRAM(S): at 09:03

## 2025-07-24 RX ADMIN — Medication 1 MILLIGRAM(S): at 19:28

## 2025-07-24 RX ADMIN — Medication 1 MILLIGRAM(S): at 03:54

## 2025-07-24 RX ADMIN — URSODIOL 300 MILLIGRAM(S): 300 CAPSULE ORAL at 05:19

## 2025-07-24 RX ADMIN — Medication 400 MILLIGRAM(S): at 13:10

## 2025-07-24 RX ADMIN — Medication 1 MILLIGRAM(S): at 09:56

## 2025-07-24 RX ADMIN — Medication 650 MILLIGRAM(S): at 21:09

## 2025-07-24 RX ADMIN — Medication 125 MILLIGRAM(S): at 17:07

## 2025-07-24 RX ADMIN — Medication 40 MILLIGRAM(S): at 08:48

## 2025-07-24 RX ADMIN — Medication 2 MILLIGRAM(S): at 14:12

## 2025-07-24 RX ADMIN — Medication 2 MILLIGRAM(S): at 05:18

## 2025-07-24 RX ADMIN — Medication 25 GRAM(S): at 21:09

## 2025-07-24 RX ADMIN — Medication 1 MILLIGRAM(S): at 09:21

## 2025-07-24 RX ADMIN — MIRTAZAPINE 7.5 MILLIGRAM(S): 30 TABLET, FILM COATED ORAL at 21:09

## 2025-07-24 RX ADMIN — Medication 650 MILLIGRAM(S): at 22:09

## 2025-07-24 RX ADMIN — URSODIOL 300 MILLIGRAM(S): 300 CAPSULE ORAL at 17:07

## 2025-07-24 RX ADMIN — Medication 100 MILLIGRAM(S): at 08:48

## 2025-07-24 RX ADMIN — Medication 1 MILLIGRAM(S): at 03:33

## 2025-07-24 RX ADMIN — LACTULOSE 10 GRAM(S): 10 SOLUTION ORAL at 13:09

## 2025-07-24 RX ADMIN — HYDROXYZINE HYDROCHLORIDE 50 MILLIGRAM(S): 25 TABLET, FILM COATED ORAL at 05:19

## 2025-07-24 RX ADMIN — Medication 100 MILLIGRAM(S): at 17:06

## 2025-07-24 RX ADMIN — Medication 100 MILLIEQUIVALENT(S): at 10:13

## 2025-07-24 RX ADMIN — FUROSEMIDE 40 MILLIGRAM(S): 10 INJECTION INTRAMUSCULAR; INTRAVENOUS at 05:18

## 2025-07-24 RX ADMIN — Medication 50 MILLIGRAM(S): at 05:19

## 2025-07-24 RX ADMIN — Medication 40 MILLIEQUIVALENT(S): at 13:10

## 2025-07-24 RX ADMIN — Medication 1 MILLIGRAM(S): at 20:30

## 2025-07-24 RX ADMIN — LACTULOSE 10 GRAM(S): 10 SOLUTION ORAL at 05:20

## 2025-07-24 RX ADMIN — FOLIC ACID 1 MILLIGRAM(S): 1 TABLET ORAL at 08:48

## 2025-07-24 RX ADMIN — Medication 1 TABLET(S): at 08:48

## 2025-07-24 RX ADMIN — Medication 25 GRAM(S): at 05:26

## 2025-07-24 RX ADMIN — HYDROXYZINE HYDROCHLORIDE 50 MILLIGRAM(S): 25 TABLET, FILM COATED ORAL at 13:10

## 2025-07-24 RX ADMIN — Medication 2 MILLIGRAM(S): at 06:30

## 2025-07-24 RX ADMIN — FUROSEMIDE 40 MILLIGRAM(S): 10 INJECTION INTRAMUSCULAR; INTRAVENOUS at 13:09

## 2025-07-24 RX ADMIN — Medication 2 MILLIGRAM(S): at 13:07

## 2025-07-24 RX ADMIN — Medication 40 MILLIEQUIVALENT(S): at 09:04

## 2025-07-24 RX ADMIN — Medication 125 MILLIGRAM(S): at 05:18

## 2025-07-24 RX ADMIN — Medication 25 GRAM(S): at 13:08

## 2025-07-25 LAB
ALBUMIN SERPL ELPH-MCNC: 3.5 G/DL — SIGNIFICANT CHANGE UP (ref 3.3–5)
ALP SERPL-CCNC: 129 U/L — HIGH (ref 40–120)
ALT FLD-CCNC: 6 U/L — LOW (ref 10–45)
ANION GAP SERPL CALC-SCNC: 16 MMOL/L — SIGNIFICANT CHANGE UP (ref 5–17)
APTT BLD: 35.3 SEC — SIGNIFICANT CHANGE UP (ref 26.1–36.8)
AST SERPL-CCNC: 19 U/L — SIGNIFICANT CHANGE UP (ref 10–40)
BILIRUB SERPL-MCNC: 4.6 MG/DL — HIGH (ref 0.2–1.2)
BUN SERPL-MCNC: 13 MG/DL — SIGNIFICANT CHANGE UP (ref 7–23)
CALCIUM SERPL-MCNC: 9.5 MG/DL — SIGNIFICANT CHANGE UP (ref 8.4–10.5)
CHLORIDE SERPL-SCNC: 93 MMOL/L — LOW (ref 96–108)
CO2 SERPL-SCNC: 18 MMOL/L — LOW (ref 22–31)
CREAT SERPL-MCNC: 0.69 MG/DL — SIGNIFICANT CHANGE UP (ref 0.5–1.3)
EGFR: 98 ML/MIN/1.73M2 — SIGNIFICANT CHANGE UP
EGFR: 98 ML/MIN/1.73M2 — SIGNIFICANT CHANGE UP
FUNGITELL: 63 PG/ML — SIGNIFICANT CHANGE UP
GLUCOSE SERPL-MCNC: 176 MG/DL — HIGH (ref 70–99)
HCT VFR BLD CALC: 27.5 % — LOW (ref 34.5–45)
HGB BLD-MCNC: 8.9 G/DL — LOW (ref 11.5–15.5)
INR BLD: 1.82 RATIO — HIGH (ref 0.85–1.16)
MAGNESIUM SERPL-MCNC: 1.6 MG/DL — SIGNIFICANT CHANGE UP (ref 1.6–2.6)
MCHC RBC-ENTMCNC: 25.9 PG — LOW (ref 27–34)
MCHC RBC-ENTMCNC: 32.4 G/DL — SIGNIFICANT CHANGE UP (ref 32–36)
MCV RBC AUTO: 79.9 FL — LOW (ref 80–100)
NRBC # BLD AUTO: 0 K/UL — SIGNIFICANT CHANGE UP (ref 0–0)
NRBC # FLD: 0 K/UL — SIGNIFICANT CHANGE UP (ref 0–0)
NRBC BLD AUTO-RTO: 0 /100 WBCS — SIGNIFICANT CHANGE UP (ref 0–0)
PHOSPHATE SERPL-MCNC: 3.1 MG/DL — SIGNIFICANT CHANGE UP (ref 2.5–4.5)
PLATELET # BLD AUTO: 104 K/UL — LOW (ref 150–400)
PMV BLD: 9.3 FL — SIGNIFICANT CHANGE UP (ref 7–13)
POTASSIUM SERPL-MCNC: 4.2 MMOL/L — SIGNIFICANT CHANGE UP (ref 3.5–5.3)
POTASSIUM SERPL-SCNC: 4.2 MMOL/L — SIGNIFICANT CHANGE UP (ref 3.5–5.3)
PROT SERPL-MCNC: 6.5 G/DL — SIGNIFICANT CHANGE UP (ref 6–8.3)
PROTHROM AB SERPL-ACNC: 20.8 SEC — HIGH (ref 9.9–13.4)
RBC # BLD: 3.44 M/UL — LOW (ref 3.8–5.2)
RBC # FLD: 24.3 % — HIGH (ref 10.3–14.5)
SODIUM SERPL-SCNC: 127 MMOL/L — LOW (ref 135–145)
WBC # BLD: 10.33 K/UL — SIGNIFICANT CHANGE UP (ref 3.8–10.5)
WBC # FLD AUTO: 10.33 K/UL — SIGNIFICANT CHANGE UP (ref 3.8–10.5)

## 2025-07-25 PROCEDURE — 99233 SBSQ HOSP IP/OBS HIGH 50: CPT | Mod: GC

## 2025-07-25 PROCEDURE — 99232 SBSQ HOSP IP/OBS MODERATE 35: CPT

## 2025-07-25 PROCEDURE — 99232 SBSQ HOSP IP/OBS MODERATE 35: CPT | Mod: GC

## 2025-07-25 RX ORDER — MAGNESIUM SULFATE 500 MG/ML
2 SYRINGE (ML) INJECTION ONCE
Refills: 0 | Status: COMPLETED | OUTPATIENT
Start: 2025-07-25 | End: 2025-07-25

## 2025-07-25 RX ADMIN — Medication 40 MILLIGRAM(S): at 06:01

## 2025-07-25 RX ADMIN — Medication 100 MILLIGRAM(S): at 12:01

## 2025-07-25 RX ADMIN — Medication 1 MILLIGRAM(S): at 12:58

## 2025-07-25 RX ADMIN — Medication 2 MILLIGRAM(S): at 18:42

## 2025-07-25 RX ADMIN — Medication 25 GRAM(S): at 21:20

## 2025-07-25 RX ADMIN — Medication 2 MILLIGRAM(S): at 22:24

## 2025-07-25 RX ADMIN — Medication 400 MILLIGRAM(S): at 18:26

## 2025-07-25 RX ADMIN — FOLIC ACID 1 MILLIGRAM(S): 1 TABLET ORAL at 12:01

## 2025-07-25 RX ADMIN — HYDROXYZINE HYDROCHLORIDE 50 MILLIGRAM(S): 25 TABLET, FILM COATED ORAL at 14:46

## 2025-07-25 RX ADMIN — Medication 50 MILLIGRAM(S): at 05:33

## 2025-07-25 RX ADMIN — LACTULOSE 10 GRAM(S): 10 SOLUTION ORAL at 14:49

## 2025-07-25 RX ADMIN — Medication 125 MILLIGRAM(S): at 18:25

## 2025-07-25 RX ADMIN — Medication 3 MILLIGRAM(S): at 21:23

## 2025-07-25 RX ADMIN — HYDROXYZINE HYDROCHLORIDE 50 MILLIGRAM(S): 25 TABLET, FILM COATED ORAL at 21:23

## 2025-07-25 RX ADMIN — Medication 1 MILLIGRAM(S): at 12:46

## 2025-07-25 RX ADMIN — FUROSEMIDE 40 MILLIGRAM(S): 10 INJECTION INTRAMUSCULAR; INTRAVENOUS at 05:33

## 2025-07-25 RX ADMIN — Medication 1 TABLET(S): at 12:00

## 2025-07-25 RX ADMIN — Medication 1 MILLIGRAM(S): at 19:59

## 2025-07-25 RX ADMIN — Medication 25 GRAM(S): at 12:01

## 2025-07-25 RX ADMIN — Medication 2 MILLIGRAM(S): at 05:31

## 2025-07-25 RX ADMIN — LACTULOSE 10 GRAM(S): 10 SOLUTION ORAL at 05:34

## 2025-07-25 RX ADMIN — MIRTAZAPINE 7.5 MILLIGRAM(S): 30 TABLET, FILM COATED ORAL at 21:23

## 2025-07-25 RX ADMIN — Medication 25 GRAM(S): at 05:32

## 2025-07-25 RX ADMIN — Medication 2 MILLIGRAM(S): at 21:24

## 2025-07-25 RX ADMIN — Medication 125 MILLIGRAM(S): at 05:32

## 2025-07-25 RX ADMIN — Medication 1 MILLIGRAM(S): at 19:29

## 2025-07-25 RX ADMIN — FUROSEMIDE 40 MILLIGRAM(S): 10 INJECTION INTRAMUSCULAR; INTRAVENOUS at 14:49

## 2025-07-25 RX ADMIN — Medication 400 MILLIGRAM(S): at 12:00

## 2025-07-25 RX ADMIN — Medication 25 GRAM(S): at 14:12

## 2025-07-25 RX ADMIN — Medication 400 MILLIGRAM(S): at 09:24

## 2025-07-25 RX ADMIN — Medication 2 MILLIGRAM(S): at 14:48

## 2025-07-25 RX ADMIN — URSODIOL 300 MILLIGRAM(S): 300 CAPSULE ORAL at 18:26

## 2025-07-25 RX ADMIN — URSODIOL 300 MILLIGRAM(S): 300 CAPSULE ORAL at 05:31

## 2025-07-25 RX ADMIN — Medication 100 MILLIGRAM(S): at 18:26

## 2025-07-25 RX ADMIN — HYDROXYZINE HYDROCHLORIDE 50 MILLIGRAM(S): 25 TABLET, FILM COATED ORAL at 05:32

## 2025-07-25 RX ADMIN — LACTULOSE 10 GRAM(S): 10 SOLUTION ORAL at 21:22

## 2025-07-25 RX ADMIN — Medication 2 MILLIGRAM(S): at 05:58

## 2025-07-26 LAB
ALBUMIN SERPL ELPH-MCNC: 3.8 G/DL — SIGNIFICANT CHANGE UP (ref 3.3–5)
ALP SERPL-CCNC: 164 U/L — HIGH (ref 40–120)
ALT FLD-CCNC: 9 U/L — LOW (ref 10–45)
ANION GAP SERPL CALC-SCNC: 20 MMOL/L — HIGH (ref 5–17)
AST SERPL-CCNC: 26 U/L — SIGNIFICANT CHANGE UP (ref 10–40)
BILIRUB SERPL-MCNC: 4.4 MG/DL — HIGH (ref 0.2–1.2)
BUN SERPL-MCNC: 11 MG/DL — SIGNIFICANT CHANGE UP (ref 7–23)
CALCIUM SERPL-MCNC: 10.2 MG/DL — SIGNIFICANT CHANGE UP (ref 8.4–10.5)
CHLORIDE SERPL-SCNC: 89 MMOL/L — LOW (ref 96–108)
CO2 SERPL-SCNC: 20 MMOL/L — LOW (ref 22–31)
CREAT SERPL-MCNC: 0.68 MG/DL — SIGNIFICANT CHANGE UP (ref 0.5–1.3)
CULTURE RESULTS: SIGNIFICANT CHANGE UP
EGFR: 98 ML/MIN/1.73M2 — SIGNIFICANT CHANGE UP
EGFR: 98 ML/MIN/1.73M2 — SIGNIFICANT CHANGE UP
GLUCOSE SERPL-MCNC: 114 MG/DL — HIGH (ref 70–99)
HCT VFR BLD CALC: 31.5 % — LOW (ref 34.5–45)
HGB BLD-MCNC: 10.2 G/DL — LOW (ref 11.5–15.5)
INR BLD: 1.71 RATIO — HIGH (ref 0.85–1.16)
MAGNESIUM SERPL-MCNC: 1.8 MG/DL — SIGNIFICANT CHANGE UP (ref 1.6–2.6)
MCHC RBC-ENTMCNC: 26.2 PG — LOW (ref 27–34)
MCHC RBC-ENTMCNC: 32.4 G/DL — SIGNIFICANT CHANGE UP (ref 32–36)
MCV RBC AUTO: 81 FL — SIGNIFICANT CHANGE UP (ref 80–100)
NRBC # BLD AUTO: 0 K/UL — SIGNIFICANT CHANGE UP (ref 0–0)
NRBC # FLD: 0 K/UL — SIGNIFICANT CHANGE UP (ref 0–0)
NRBC BLD AUTO-RTO: 0 /100 WBCS — SIGNIFICANT CHANGE UP (ref 0–0)
PHOSPHATE SERPL-MCNC: 4 MG/DL — SIGNIFICANT CHANGE UP (ref 2.5–4.5)
PLATELET # BLD AUTO: 144 K/UL — LOW (ref 150–400)
PMV BLD: 9.2 FL — SIGNIFICANT CHANGE UP (ref 7–13)
POTASSIUM SERPL-MCNC: 3.7 MMOL/L — SIGNIFICANT CHANGE UP (ref 3.5–5.3)
POTASSIUM SERPL-SCNC: 3.7 MMOL/L — SIGNIFICANT CHANGE UP (ref 3.5–5.3)
PROT SERPL-MCNC: 7.2 G/DL — SIGNIFICANT CHANGE UP (ref 6–8.3)
PROTHROM AB SERPL-ACNC: 19.6 SEC — HIGH (ref 9.9–13.4)
RBC # BLD: 3.89 M/UL — SIGNIFICANT CHANGE UP (ref 3.8–5.2)
RBC # FLD: 24.7 % — HIGH (ref 10.3–14.5)
SODIUM SERPL-SCNC: 129 MMOL/L — LOW (ref 135–145)
SPECIMEN SOURCE: SIGNIFICANT CHANGE UP
WBC # BLD: 10.35 K/UL — SIGNIFICANT CHANGE UP (ref 3.8–10.5)
WBC # FLD AUTO: 10.35 K/UL — SIGNIFICANT CHANGE UP (ref 3.8–10.5)

## 2025-07-26 PROCEDURE — 99233 SBSQ HOSP IP/OBS HIGH 50: CPT | Mod: GC

## 2025-07-26 RX ORDER — POLYETHYLENE GLYCOL 3350 17 G/17G
17 POWDER, FOR SOLUTION ORAL EVERY 12 HOURS
Refills: 0 | Status: DISCONTINUED | OUTPATIENT
Start: 2025-07-26 | End: 2025-08-05

## 2025-07-26 RX ADMIN — Medication 1 MILLIGRAM(S): at 20:15

## 2025-07-26 RX ADMIN — FUROSEMIDE 40 MILLIGRAM(S): 10 INJECTION INTRAMUSCULAR; INTRAVENOUS at 05:14

## 2025-07-26 RX ADMIN — Medication 2 MILLIGRAM(S): at 13:53

## 2025-07-26 RX ADMIN — HYDROXYZINE HYDROCHLORIDE 50 MILLIGRAM(S): 25 TABLET, FILM COATED ORAL at 05:14

## 2025-07-26 RX ADMIN — Medication 1 TABLET(S): at 13:53

## 2025-07-26 RX ADMIN — Medication 2 MILLIGRAM(S): at 06:13

## 2025-07-26 RX ADMIN — Medication 25 GRAM(S): at 13:54

## 2025-07-26 RX ADMIN — LACTULOSE 10 GRAM(S): 10 SOLUTION ORAL at 13:55

## 2025-07-26 RX ADMIN — Medication 2 MILLIGRAM(S): at 05:13

## 2025-07-26 RX ADMIN — Medication 400 MILLIGRAM(S): at 17:44

## 2025-07-26 RX ADMIN — Medication 50 MILLIGRAM(S): at 05:13

## 2025-07-26 RX ADMIN — FUROSEMIDE 40 MILLIGRAM(S): 10 INJECTION INTRAMUSCULAR; INTRAVENOUS at 13:54

## 2025-07-26 RX ADMIN — Medication 40 MILLIGRAM(S): at 05:15

## 2025-07-26 RX ADMIN — Medication 2 MILLIGRAM(S): at 19:27

## 2025-07-26 RX ADMIN — HYDROXYZINE HYDROCHLORIDE 50 MILLIGRAM(S): 25 TABLET, FILM COATED ORAL at 13:53

## 2025-07-26 RX ADMIN — Medication 1 MILLIGRAM(S): at 12:31

## 2025-07-26 RX ADMIN — Medication 100 MILLIGRAM(S): at 18:56

## 2025-07-26 RX ADMIN — Medication 25 GRAM(S): at 05:14

## 2025-07-26 RX ADMIN — Medication 1 MILLIGRAM(S): at 10:23

## 2025-07-26 RX ADMIN — URSODIOL 300 MILLIGRAM(S): 300 CAPSULE ORAL at 05:13

## 2025-07-26 RX ADMIN — LACTULOSE 10 GRAM(S): 10 SOLUTION ORAL at 05:14

## 2025-07-26 RX ADMIN — Medication 25 GRAM(S): at 22:20

## 2025-07-26 RX ADMIN — Medication 125 MILLIGRAM(S): at 17:44

## 2025-07-26 RX ADMIN — Medication 400 MILLIGRAM(S): at 13:54

## 2025-07-26 RX ADMIN — Medication 2 MILLIGRAM(S): at 22:49

## 2025-07-26 RX ADMIN — FOLIC ACID 1 MILLIGRAM(S): 1 TABLET ORAL at 13:53

## 2025-07-26 RX ADMIN — Medication 3 MILLIGRAM(S): at 22:19

## 2025-07-26 RX ADMIN — HYDROXYZINE HYDROCHLORIDE 50 MILLIGRAM(S): 25 TABLET, FILM COATED ORAL at 22:20

## 2025-07-26 RX ADMIN — Medication 1 MILLIGRAM(S): at 19:44

## 2025-07-26 RX ADMIN — URSODIOL 300 MILLIGRAM(S): 300 CAPSULE ORAL at 17:45

## 2025-07-26 RX ADMIN — MIRTAZAPINE 7.5 MILLIGRAM(S): 30 TABLET, FILM COATED ORAL at 22:20

## 2025-07-26 RX ADMIN — Medication 2 MILLIGRAM(S): at 22:19

## 2025-07-26 RX ADMIN — Medication 125 MILLIGRAM(S): at 05:14

## 2025-07-26 RX ADMIN — Medication 100 MILLIGRAM(S): at 13:54

## 2025-07-26 RX ADMIN — Medication 400 MILLIGRAM(S): at 10:22

## 2025-07-27 LAB
ALBUMIN SERPL ELPH-MCNC: 3.6 G/DL — SIGNIFICANT CHANGE UP (ref 3.3–5)
ALP SERPL-CCNC: 173 U/L — HIGH (ref 40–120)
ALT FLD-CCNC: 11 U/L — SIGNIFICANT CHANGE UP (ref 10–45)
ANION GAP SERPL CALC-SCNC: 18 MMOL/L — HIGH (ref 5–17)
AST SERPL-CCNC: 25 U/L — SIGNIFICANT CHANGE UP (ref 10–40)
BILIRUB SERPL-MCNC: 4.3 MG/DL — HIGH (ref 0.2–1.2)
BUN SERPL-MCNC: 13 MG/DL — SIGNIFICANT CHANGE UP (ref 7–23)
CALCIUM SERPL-MCNC: 10.3 MG/DL — SIGNIFICANT CHANGE UP (ref 8.4–10.5)
CHLORIDE SERPL-SCNC: 90 MMOL/L — LOW (ref 96–108)
CO2 SERPL-SCNC: 23 MMOL/L — SIGNIFICANT CHANGE UP (ref 22–31)
CREAT SERPL-MCNC: 0.71 MG/DL — SIGNIFICANT CHANGE UP (ref 0.5–1.3)
EGFR: 96 ML/MIN/1.73M2 — SIGNIFICANT CHANGE UP
EGFR: 96 ML/MIN/1.73M2 — SIGNIFICANT CHANGE UP
GLUCOSE SERPL-MCNC: 149 MG/DL — HIGH (ref 70–99)
HCT VFR BLD CALC: 28.2 % — LOW (ref 34.5–45)
HGB BLD-MCNC: 9.1 G/DL — LOW (ref 11.5–15.5)
MAGNESIUM SERPL-MCNC: 1.5 MG/DL — LOW (ref 1.6–2.6)
MCHC RBC-ENTMCNC: 25.9 PG — LOW (ref 27–34)
MCHC RBC-ENTMCNC: 32.3 G/DL — SIGNIFICANT CHANGE UP (ref 32–36)
MCV RBC AUTO: 80.3 FL — SIGNIFICANT CHANGE UP (ref 80–100)
NRBC # BLD AUTO: 0 K/UL — SIGNIFICANT CHANGE UP (ref 0–0)
NRBC # FLD: 0 K/UL — SIGNIFICANT CHANGE UP (ref 0–0)
NRBC BLD AUTO-RTO: 0 /100 WBCS — SIGNIFICANT CHANGE UP (ref 0–0)
PHOSPHATE SERPL-MCNC: 3.3 MG/DL — SIGNIFICANT CHANGE UP (ref 2.5–4.5)
PLATELET # BLD AUTO: 120 K/UL — LOW (ref 150–400)
PMV BLD: 9.7 FL — SIGNIFICANT CHANGE UP (ref 7–13)
POTASSIUM SERPL-MCNC: 4.1 MMOL/L — SIGNIFICANT CHANGE UP (ref 3.5–5.3)
POTASSIUM SERPL-SCNC: 4.1 MMOL/L — SIGNIFICANT CHANGE UP (ref 3.5–5.3)
PROT SERPL-MCNC: 7 G/DL — SIGNIFICANT CHANGE UP (ref 6–8.3)
RBC # BLD: 3.51 M/UL — LOW (ref 3.8–5.2)
RBC # FLD: 24.8 % — HIGH (ref 10.3–14.5)
SODIUM SERPL-SCNC: 131 MMOL/L — LOW (ref 135–145)
WBC # BLD: 7.96 K/UL — SIGNIFICANT CHANGE UP (ref 3.8–10.5)
WBC # FLD AUTO: 7.96 K/UL — SIGNIFICANT CHANGE UP (ref 3.8–10.5)

## 2025-07-27 PROCEDURE — 99233 SBSQ HOSP IP/OBS HIGH 50: CPT | Mod: GC

## 2025-07-27 RX ORDER — MAGNESIUM SULFATE 500 MG/ML
2 SYRINGE (ML) INJECTION ONCE
Refills: 0 | Status: COMPLETED | OUTPATIENT
Start: 2025-07-27 | End: 2025-07-27

## 2025-07-27 RX ADMIN — Medication 125 MILLIGRAM(S): at 17:10

## 2025-07-27 RX ADMIN — Medication 1 MILLIGRAM(S): at 09:39

## 2025-07-27 RX ADMIN — Medication 1 MILLIGRAM(S): at 21:55

## 2025-07-27 RX ADMIN — Medication 25 GRAM(S): at 21:25

## 2025-07-27 RX ADMIN — Medication 40 MILLIGRAM(S): at 06:42

## 2025-07-27 RX ADMIN — MIDODRINE HYDROCHLORIDE 15 MILLIGRAM(S): 5 TABLET ORAL at 05:18

## 2025-07-27 RX ADMIN — HYDROXYZINE HYDROCHLORIDE 50 MILLIGRAM(S): 25 TABLET, FILM COATED ORAL at 06:42

## 2025-07-27 RX ADMIN — MIRTAZAPINE 7.5 MILLIGRAM(S): 30 TABLET, FILM COATED ORAL at 21:25

## 2025-07-27 RX ADMIN — Medication 400 MILLIGRAM(S): at 09:04

## 2025-07-27 RX ADMIN — Medication 100 MILLIGRAM(S): at 13:07

## 2025-07-27 RX ADMIN — Medication 1 TABLET(S): at 09:04

## 2025-07-27 RX ADMIN — Medication 100 MILLIGRAM(S): at 17:10

## 2025-07-27 RX ADMIN — Medication 400 MILLIGRAM(S): at 13:06

## 2025-07-27 RX ADMIN — Medication 25 GRAM(S): at 13:06

## 2025-07-27 RX ADMIN — HYDROXYZINE HYDROCHLORIDE 50 MILLIGRAM(S): 25 TABLET, FILM COATED ORAL at 13:06

## 2025-07-27 RX ADMIN — Medication 25 GRAM(S): at 05:18

## 2025-07-27 RX ADMIN — Medication 2 MILLIGRAM(S): at 07:12

## 2025-07-27 RX ADMIN — Medication 2 MILLIGRAM(S): at 06:42

## 2025-07-27 RX ADMIN — HYDROXYZINE HYDROCHLORIDE 50 MILLIGRAM(S): 25 TABLET, FILM COATED ORAL at 21:25

## 2025-07-27 RX ADMIN — FOLIC ACID 1 MILLIGRAM(S): 1 TABLET ORAL at 09:04

## 2025-07-27 RX ADMIN — Medication 1 MILLIGRAM(S): at 21:25

## 2025-07-27 RX ADMIN — Medication 125 MILLIGRAM(S): at 05:11

## 2025-07-27 RX ADMIN — POLYETHYLENE GLYCOL 3350 17 GRAM(S): 17 POWDER, FOR SOLUTION ORAL at 05:19

## 2025-07-27 RX ADMIN — Medication 1 MILLIGRAM(S): at 09:03

## 2025-07-27 RX ADMIN — FUROSEMIDE 40 MILLIGRAM(S): 10 INJECTION INTRAMUSCULAR; INTRAVENOUS at 06:43

## 2025-07-27 RX ADMIN — Medication 50 MILLIGRAM(S): at 06:42

## 2025-07-27 RX ADMIN — URSODIOL 300 MILLIGRAM(S): 300 CAPSULE ORAL at 17:11

## 2025-07-27 RX ADMIN — Medication 25 GRAM(S): at 11:04

## 2025-07-27 RX ADMIN — FUROSEMIDE 40 MILLIGRAM(S): 10 INJECTION INTRAMUSCULAR; INTRAVENOUS at 13:06

## 2025-07-27 RX ADMIN — Medication 400 MILLIGRAM(S): at 17:11

## 2025-07-27 RX ADMIN — URSODIOL 300 MILLIGRAM(S): 300 CAPSULE ORAL at 06:42

## 2025-07-28 LAB
ALBUMIN SERPL ELPH-MCNC: 3.4 G/DL — SIGNIFICANT CHANGE UP (ref 3.3–5)
ALP SERPL-CCNC: 175 U/L — HIGH (ref 40–120)
ALT FLD-CCNC: 10 U/L — SIGNIFICANT CHANGE UP (ref 10–45)
ANION GAP SERPL CALC-SCNC: 13 MMOL/L — SIGNIFICANT CHANGE UP (ref 5–17)
APTT BLD: 34.5 SEC — SIGNIFICANT CHANGE UP (ref 26.1–36.8)
AST SERPL-CCNC: 23 U/L — SIGNIFICANT CHANGE UP (ref 10–40)
BILIRUB SERPL-MCNC: 3.8 MG/DL — HIGH (ref 0.2–1.2)
BUN SERPL-MCNC: 12 MG/DL — SIGNIFICANT CHANGE UP (ref 7–23)
CALCIUM SERPL-MCNC: 10.1 MG/DL — SIGNIFICANT CHANGE UP (ref 8.4–10.5)
CHLORIDE SERPL-SCNC: 90 MMOL/L — LOW (ref 96–108)
CO2 SERPL-SCNC: 28 MMOL/L — SIGNIFICANT CHANGE UP (ref 22–31)
CREAT SERPL-MCNC: 0.75 MG/DL — SIGNIFICANT CHANGE UP (ref 0.5–1.3)
CULTURE RESULTS: SIGNIFICANT CHANGE UP
CULTURE RESULTS: SIGNIFICANT CHANGE UP
EGFR: 90 ML/MIN/1.73M2 — SIGNIFICANT CHANGE UP
EGFR: 90 ML/MIN/1.73M2 — SIGNIFICANT CHANGE UP
GLUCOSE SERPL-MCNC: 85 MG/DL — SIGNIFICANT CHANGE UP (ref 70–99)
HCT VFR BLD CALC: 27.3 % — LOW (ref 34.5–45)
HGB BLD-MCNC: 9.1 G/DL — LOW (ref 11.5–15.5)
INR BLD: 1.58 RATIO — HIGH (ref 0.85–1.16)
MAGNESIUM SERPL-MCNC: 1.6 MG/DL — SIGNIFICANT CHANGE UP (ref 1.6–2.6)
MCHC RBC-ENTMCNC: 26.5 PG — LOW (ref 27–34)
MCHC RBC-ENTMCNC: 33.3 G/DL — SIGNIFICANT CHANGE UP (ref 32–36)
MCV RBC AUTO: 79.6 FL — LOW (ref 80–100)
NRBC # BLD AUTO: 0 K/UL — SIGNIFICANT CHANGE UP (ref 0–0)
NRBC # FLD: 0 K/UL — SIGNIFICANT CHANGE UP (ref 0–0)
NRBC BLD AUTO-RTO: 0 /100 WBCS — SIGNIFICANT CHANGE UP (ref 0–0)
PHOSPHATE SERPL-MCNC: 3.4 MG/DL — SIGNIFICANT CHANGE UP (ref 2.5–4.5)
PLATELET # BLD AUTO: 111 K/UL — LOW (ref 150–400)
PMV BLD: 9.3 FL — SIGNIFICANT CHANGE UP (ref 7–13)
POTASSIUM SERPL-MCNC: 4.1 MMOL/L — SIGNIFICANT CHANGE UP (ref 3.5–5.3)
POTASSIUM SERPL-SCNC: 4.1 MMOL/L — SIGNIFICANT CHANGE UP (ref 3.5–5.3)
PROT SERPL-MCNC: 6.8 G/DL — SIGNIFICANT CHANGE UP (ref 6–8.3)
PROTHROM AB SERPL-ACNC: 18.1 SEC — HIGH (ref 9.9–13.4)
RBC # BLD: 3.43 M/UL — LOW (ref 3.8–5.2)
RBC # FLD: 24.8 % — HIGH (ref 10.3–14.5)
SODIUM SERPL-SCNC: 131 MMOL/L — LOW (ref 135–145)
SPECIMEN SOURCE: SIGNIFICANT CHANGE UP
SPECIMEN SOURCE: SIGNIFICANT CHANGE UP
WBC # BLD: 6.78 K/UL — SIGNIFICANT CHANGE UP (ref 3.8–10.5)
WBC # FLD AUTO: 6.78 K/UL — SIGNIFICANT CHANGE UP (ref 3.8–10.5)

## 2025-07-28 PROCEDURE — 74177 CT ABD & PELVIS W/CONTRAST: CPT

## 2025-07-28 PROCEDURE — 83615 LACTATE (LD) (LDH) ENZYME: CPT

## 2025-07-28 PROCEDURE — 83605 ASSAY OF LACTIC ACID: CPT

## 2025-07-28 PROCEDURE — 97116 GAIT TRAINING THERAPY: CPT

## 2025-07-28 PROCEDURE — 82947 ASSAY GLUCOSE BLOOD QUANT: CPT

## 2025-07-28 PROCEDURE — 87205 SMEAR GRAM STAIN: CPT

## 2025-07-28 PROCEDURE — 99232 SBSQ HOSP IP/OBS MODERATE 35: CPT | Mod: GC

## 2025-07-28 PROCEDURE — 83930 ASSAY OF BLOOD OSMOLALITY: CPT

## 2025-07-28 PROCEDURE — 84443 ASSAY THYROID STIM HORMONE: CPT

## 2025-07-28 PROCEDURE — 86850 RBC ANTIBODY SCREEN: CPT

## 2025-07-28 PROCEDURE — 88112 CYTOPATH CELL ENHANCE TECH: CPT

## 2025-07-28 PROCEDURE — 83550 IRON BINDING TEST: CPT

## 2025-07-28 PROCEDURE — 89051 BODY FLUID CELL COUNT: CPT

## 2025-07-28 PROCEDURE — 82248 BILIRUBIN DIRECT: CPT

## 2025-07-28 PROCEDURE — 85025 COMPLETE CBC W/AUTO DIFF WBC: CPT

## 2025-07-28 PROCEDURE — 86901 BLOOD TYPING SEROLOGIC RH(D): CPT

## 2025-07-28 PROCEDURE — 88305 TISSUE EXAM BY PATHOLOGIST: CPT

## 2025-07-28 PROCEDURE — 93005 ELECTROCARDIOGRAM TRACING: CPT

## 2025-07-28 PROCEDURE — 99233 SBSQ HOSP IP/OBS HIGH 50: CPT

## 2025-07-28 PROCEDURE — G0480: CPT

## 2025-07-28 PROCEDURE — 83935 ASSAY OF URINE OSMOLALITY: CPT

## 2025-07-28 PROCEDURE — 82330 ASSAY OF CALCIUM: CPT

## 2025-07-28 PROCEDURE — 83735 ASSAY OF MAGNESIUM: CPT

## 2025-07-28 PROCEDURE — 87015 SPECIMEN INFECT AGNT CONCNTJ: CPT

## 2025-07-28 PROCEDURE — 85027 COMPLETE CBC AUTOMATED: CPT

## 2025-07-28 PROCEDURE — 85018 HEMOGLOBIN: CPT

## 2025-07-28 PROCEDURE — 82945 GLUCOSE OTHER FLUID: CPT

## 2025-07-28 PROCEDURE — 84145 PROCALCITONIN (PCT): CPT

## 2025-07-28 PROCEDURE — 80053 COMPREHEN METABOLIC PANEL: CPT

## 2025-07-28 PROCEDURE — P9047: CPT

## 2025-07-28 PROCEDURE — 82803 BLOOD GASES ANY COMBINATION: CPT

## 2025-07-28 PROCEDURE — 36415 COLL VENOUS BLD VENIPUNCTURE: CPT

## 2025-07-28 PROCEDURE — 82435 ASSAY OF BLOOD CHLORIDE: CPT

## 2025-07-28 PROCEDURE — 85730 THROMBOPLASTIN TIME PARTIAL: CPT

## 2025-07-28 PROCEDURE — 82042 OTHER SOURCE ALBUMIN QUAN EA: CPT

## 2025-07-28 PROCEDURE — 83036 HEMOGLOBIN GLYCOSYLATED A1C: CPT

## 2025-07-28 PROCEDURE — 83540 ASSAY OF IRON: CPT

## 2025-07-28 PROCEDURE — 87102 FUNGUS ISOLATION CULTURE: CPT

## 2025-07-28 PROCEDURE — 87040 BLOOD CULTURE FOR BACTERIA: CPT

## 2025-07-28 PROCEDURE — 80061 LIPID PANEL: CPT

## 2025-07-28 PROCEDURE — 82607 VITAMIN B-12: CPT

## 2025-07-28 PROCEDURE — 84157 ASSAY OF PROTEIN OTHER: CPT

## 2025-07-28 PROCEDURE — 87075 CULTR BACTERIA EXCEPT BLOOD: CPT

## 2025-07-28 PROCEDURE — 84300 ASSAY OF URINE SODIUM: CPT

## 2025-07-28 PROCEDURE — 82247 BILIRUBIN TOTAL: CPT

## 2025-07-28 PROCEDURE — 76700 US EXAM ABDOM COMPLETE: CPT

## 2025-07-28 PROCEDURE — 82728 ASSAY OF FERRITIN: CPT

## 2025-07-28 PROCEDURE — 84436 ASSAY OF TOTAL THYROXINE: CPT

## 2025-07-28 PROCEDURE — 87103 BLOOD FUNGUS CULTURE: CPT

## 2025-07-28 PROCEDURE — 74177 CT ABD & PELVIS W/CONTRAST: CPT | Mod: 26

## 2025-07-28 PROCEDURE — C1729: CPT

## 2025-07-28 PROCEDURE — 86900 BLOOD TYPING SEROLOGIC ABO: CPT

## 2025-07-28 PROCEDURE — 85014 HEMATOCRIT: CPT

## 2025-07-28 PROCEDURE — 82746 ASSAY OF FOLIC ACID SERUM: CPT

## 2025-07-28 PROCEDURE — 81001 URINALYSIS AUTO W/SCOPE: CPT

## 2025-07-28 PROCEDURE — 85610 PROTHROMBIN TIME: CPT

## 2025-07-28 PROCEDURE — 80076 HEPATIC FUNCTION PANEL: CPT

## 2025-07-28 PROCEDURE — 87449 NOS EACH ORGANISM AG IA: CPT

## 2025-07-28 PROCEDURE — 85652 RBC SED RATE AUTOMATED: CPT

## 2025-07-28 PROCEDURE — 97530 THERAPEUTIC ACTIVITIES: CPT

## 2025-07-28 PROCEDURE — 87070 CULTURE OTHR SPECIMN AEROBIC: CPT

## 2025-07-28 PROCEDURE — 84295 ASSAY OF SERUM SODIUM: CPT

## 2025-07-28 PROCEDURE — 84132 ASSAY OF SERUM POTASSIUM: CPT

## 2025-07-28 PROCEDURE — 84100 ASSAY OF PHOSPHORUS: CPT

## 2025-07-28 PROCEDURE — 97162 PT EVAL MOD COMPLEX 30 MIN: CPT

## 2025-07-28 PROCEDURE — 80048 BASIC METABOLIC PNL TOTAL CA: CPT

## 2025-07-28 RX ORDER — FLUTICASONE FUROATE, UMECLIDINIUM BROMIDE AND VILANTEROL TRIFENATATE 100; 62.5; 25 UG/1; UG/1; UG/1
1 POWDER RESPIRATORY (INHALATION)
Refills: 0 | DISCHARGE

## 2025-07-28 RX ORDER — MAGNESIUM SULFATE 500 MG/ML
1 SYRINGE (ML) INJECTION ONCE
Refills: 0 | Status: COMPLETED | OUTPATIENT
Start: 2025-07-28 | End: 2025-07-28

## 2025-07-28 RX ADMIN — Medication 400 MILLIGRAM(S): at 13:30

## 2025-07-28 RX ADMIN — HYDROXYZINE HYDROCHLORIDE 50 MILLIGRAM(S): 25 TABLET, FILM COATED ORAL at 13:30

## 2025-07-28 RX ADMIN — Medication 1 TABLET(S): at 09:22

## 2025-07-28 RX ADMIN — FOLIC ACID 1 MILLIGRAM(S): 1 TABLET ORAL at 09:21

## 2025-07-28 RX ADMIN — Medication 25 GRAM(S): at 05:31

## 2025-07-28 RX ADMIN — Medication 1 MILLIGRAM(S): at 20:30

## 2025-07-28 RX ADMIN — Medication 100 MILLIGRAM(S): at 17:41

## 2025-07-28 RX ADMIN — Medication 25 GRAM(S): at 23:15

## 2025-07-28 RX ADMIN — Medication 100 MILLIGRAM(S): at 09:22

## 2025-07-28 RX ADMIN — HYDROXYZINE HYDROCHLORIDE 50 MILLIGRAM(S): 25 TABLET, FILM COATED ORAL at 05:32

## 2025-07-28 RX ADMIN — HYDROXYZINE HYDROCHLORIDE 50 MILLIGRAM(S): 25 TABLET, FILM COATED ORAL at 23:15

## 2025-07-28 RX ADMIN — MIRTAZAPINE 7.5 MILLIGRAM(S): 30 TABLET, FILM COATED ORAL at 23:14

## 2025-07-28 RX ADMIN — Medication 40 MILLIGRAM(S): at 05:48

## 2025-07-28 RX ADMIN — Medication 25 GRAM(S): at 13:28

## 2025-07-28 RX ADMIN — Medication 3 MILLIGRAM(S): at 23:15

## 2025-07-28 RX ADMIN — Medication 125 MILLIGRAM(S): at 05:32

## 2025-07-28 RX ADMIN — MIDODRINE HYDROCHLORIDE 15 MILLIGRAM(S): 5 TABLET ORAL at 05:40

## 2025-07-28 RX ADMIN — Medication 50 MILLIGRAM(S): at 05:32

## 2025-07-28 RX ADMIN — URSODIOL 300 MILLIGRAM(S): 300 CAPSULE ORAL at 17:42

## 2025-07-28 RX ADMIN — POLYETHYLENE GLYCOL 3350 17 GRAM(S): 17 POWDER, FOR SOLUTION ORAL at 05:40

## 2025-07-28 RX ADMIN — FUROSEMIDE 40 MILLIGRAM(S): 10 INJECTION INTRAMUSCULAR; INTRAVENOUS at 05:40

## 2025-07-28 RX ADMIN — Medication 400 MILLIGRAM(S): at 09:22

## 2025-07-28 RX ADMIN — Medication 125 MILLIGRAM(S): at 17:42

## 2025-07-28 RX ADMIN — Medication 1 MILLIGRAM(S): at 19:29

## 2025-07-28 RX ADMIN — Medication 100 GRAM(S): at 12:28

## 2025-07-28 RX ADMIN — FUROSEMIDE 40 MILLIGRAM(S): 10 INJECTION INTRAMUSCULAR; INTRAVENOUS at 13:30

## 2025-07-28 RX ADMIN — URSODIOL 300 MILLIGRAM(S): 300 CAPSULE ORAL at 05:32

## 2025-07-28 RX ADMIN — Medication 400 MILLIGRAM(S): at 17:42

## 2025-07-29 LAB
ALBUMIN SERPL ELPH-MCNC: 3.2 G/DL — LOW (ref 3.3–5)
ALP SERPL-CCNC: 171 U/L — HIGH (ref 40–120)
ALT FLD-CCNC: 8 U/L — LOW (ref 10–45)
ANION GAP SERPL CALC-SCNC: 13 MMOL/L — SIGNIFICANT CHANGE UP (ref 5–17)
AST SERPL-CCNC: 23 U/L — SIGNIFICANT CHANGE UP (ref 10–40)
BASOPHILS # BLD AUTO: 0.05 K/UL — SIGNIFICANT CHANGE UP (ref 0–0.2)
BASOPHILS # BLD MANUAL: 0.05 K/UL — SIGNIFICANT CHANGE UP (ref 0–0.2)
BASOPHILS NFR BLD AUTO: 0.7 % — SIGNIFICANT CHANGE UP (ref 0–2)
BASOPHILS NFR BLD MANUAL: 0.8 % — SIGNIFICANT CHANGE UP (ref 0–2)
BILIRUB SERPL-MCNC: 3.5 MG/DL — HIGH (ref 0.2–1.2)
BUN SERPL-MCNC: 14 MG/DL — SIGNIFICANT CHANGE UP (ref 7–23)
CALCIUM SERPL-MCNC: 9.9 MG/DL — SIGNIFICANT CHANGE UP (ref 8.4–10.5)
CHLORIDE SERPL-SCNC: 91 MMOL/L — LOW (ref 96–108)
CO2 SERPL-SCNC: 26 MMOL/L — SIGNIFICANT CHANGE UP (ref 22–31)
CREAT SERPL-MCNC: 0.86 MG/DL — SIGNIFICANT CHANGE UP (ref 0.5–1.3)
EGFR: 76 ML/MIN/1.73M2 — SIGNIFICANT CHANGE UP
EGFR: 76 ML/MIN/1.73M2 — SIGNIFICANT CHANGE UP
EOSINOPHIL # BLD AUTO: 0.1 K/UL — SIGNIFICANT CHANGE UP (ref 0–0.5)
EOSINOPHIL # BLD MANUAL: 0.23 K/UL — SIGNIFICANT CHANGE UP (ref 0–0.5)
EOSINOPHIL NFR BLD AUTO: 1.5 % — SIGNIFICANT CHANGE UP (ref 0–6)
EOSINOPHIL NFR BLD MANUAL: 3.4 % — SIGNIFICANT CHANGE UP (ref 0–6)
GLUCOSE SERPL-MCNC: 81 MG/DL — SIGNIFICANT CHANGE UP (ref 70–99)
HCT VFR BLD CALC: 26.8 % — LOW (ref 34.5–45)
HGB BLD-MCNC: 8.8 G/DL — LOW (ref 11.5–15.5)
IMM GRANULOCYTES # BLD AUTO: 0.04 K/UL — SIGNIFICANT CHANGE UP (ref 0–0.07)
IMM GRANULOCYTES NFR BLD AUTO: 0.6 % — SIGNIFICANT CHANGE UP (ref 0–0.9)
LYMPHOCYTES # BLD AUTO: 1.36 K/UL — SIGNIFICANT CHANGE UP (ref 1–3.3)
LYMPHOCYTES # BLD MANUAL: 1.14 K/UL — SIGNIFICANT CHANGE UP (ref 1–3.3)
LYMPHOCYTES NFR BLD AUTO: 20.2 % — SIGNIFICANT CHANGE UP (ref 13–44)
LYMPHOCYTES NFR BLD MANUAL: 16.9 % — SIGNIFICANT CHANGE UP (ref 13–44)
MAGNESIUM SERPL-MCNC: 1.7 MG/DL — SIGNIFICANT CHANGE UP (ref 1.6–2.6)
MCHC RBC-ENTMCNC: 26.3 PG — LOW (ref 27–34)
MCHC RBC-ENTMCNC: 32.8 G/DL — SIGNIFICANT CHANGE UP (ref 32–36)
MCV RBC AUTO: 80 FL — SIGNIFICANT CHANGE UP (ref 80–100)
MONOCYTES # BLD AUTO: 0.88 K/UL — SIGNIFICANT CHANGE UP (ref 0–0.9)
MONOCYTES # BLD MANUAL: 0.23 K/UL — SIGNIFICANT CHANGE UP (ref 0–0.9)
MONOCYTES NFR BLD AUTO: 13.1 % — SIGNIFICANT CHANGE UP (ref 2–14)
MONOCYTES NFR BLD MANUAL: 3.4 % — SIGNIFICANT CHANGE UP (ref 2–14)
NEUTROPHILS # BLD AUTO: 4.31 K/UL — SIGNIFICANT CHANGE UP (ref 1.8–7.4)
NEUTROPHILS # BLD MANUAL: 5.09 K/UL — SIGNIFICANT CHANGE UP (ref 1.8–7.4)
NEUTROPHILS NFR BLD AUTO: 63.9 % — SIGNIFICANT CHANGE UP (ref 43–77)
NEUTROPHILS NFR BLD MANUAL: 75.5 % — SIGNIFICANT CHANGE UP (ref 43–77)
NRBC # BLD AUTO: 0 K/UL — SIGNIFICANT CHANGE UP (ref 0–0)
NRBC # FLD: 0 K/UL — SIGNIFICANT CHANGE UP (ref 0–0)
NRBC BLD AUTO-RTO: 0 /100 WBCS — SIGNIFICANT CHANGE UP (ref 0–0)
PHOSPHATE SERPL-MCNC: 3.6 MG/DL — SIGNIFICANT CHANGE UP (ref 2.5–4.5)
PLAT MORPH BLD: NORMAL — SIGNIFICANT CHANGE UP
PLATELET # BLD AUTO: 117 K/UL — LOW (ref 150–400)
PMV BLD: 9.3 FL — SIGNIFICANT CHANGE UP (ref 7–13)
POTASSIUM SERPL-MCNC: 3.7 MMOL/L — SIGNIFICANT CHANGE UP (ref 3.5–5.3)
POTASSIUM SERPL-SCNC: 3.7 MMOL/L — SIGNIFICANT CHANGE UP (ref 3.5–5.3)
PROT SERPL-MCNC: 6.8 G/DL — SIGNIFICANT CHANGE UP (ref 6–8.3)
RBC # BLD: 3.35 M/UL — LOW (ref 3.8–5.2)
RBC # FLD: 25.1 % — HIGH (ref 10.3–14.5)
RBC BLD AUTO: SIGNIFICANT CHANGE UP
SODIUM SERPL-SCNC: 130 MMOL/L — LOW (ref 135–145)
WBC # BLD: 6.74 K/UL — SIGNIFICANT CHANGE UP (ref 3.8–10.5)
WBC # FLD AUTO: 6.74 K/UL — SIGNIFICANT CHANGE UP (ref 3.8–10.5)

## 2025-07-29 PROCEDURE — 99233 SBSQ HOSP IP/OBS HIGH 50: CPT

## 2025-07-29 PROCEDURE — 99232 SBSQ HOSP IP/OBS MODERATE 35: CPT | Mod: GC

## 2025-07-29 RX ORDER — HYDROMORPHONE/SOD CHLOR,ISO/PF 2 MG/10 ML
1 SYRINGE (ML) INJECTION EVERY 6 HOURS
Refills: 0 | Status: DISCONTINUED | OUTPATIENT
Start: 2025-07-29 | End: 2025-08-01

## 2025-07-29 RX ADMIN — Medication 3 MILLIGRAM(S): at 22:48

## 2025-07-29 RX ADMIN — MIRTAZAPINE 7.5 MILLIGRAM(S): 30 TABLET, FILM COATED ORAL at 22:48

## 2025-07-29 RX ADMIN — HYDROXYZINE HYDROCHLORIDE 50 MILLIGRAM(S): 25 TABLET, FILM COATED ORAL at 05:38

## 2025-07-29 RX ADMIN — Medication 1 MILLIGRAM(S): at 02:50

## 2025-07-29 RX ADMIN — Medication 1 TABLET(S): at 11:25

## 2025-07-29 RX ADMIN — Medication 1 MILLIGRAM(S): at 09:12

## 2025-07-29 RX ADMIN — Medication 100 MILLIGRAM(S): at 18:24

## 2025-07-29 RX ADMIN — Medication 1 MILLIGRAM(S): at 10:12

## 2025-07-29 RX ADMIN — Medication 50 MILLIGRAM(S): at 05:39

## 2025-07-29 RX ADMIN — POLYETHYLENE GLYCOL 3350 17 GRAM(S): 17 POWDER, FOR SOLUTION ORAL at 05:39

## 2025-07-29 RX ADMIN — Medication 400 MILLIGRAM(S): at 17:18

## 2025-07-29 RX ADMIN — HYDROXYZINE HYDROCHLORIDE 50 MILLIGRAM(S): 25 TABLET, FILM COATED ORAL at 13:26

## 2025-07-29 RX ADMIN — Medication 1 MILLIGRAM(S): at 01:53

## 2025-07-29 RX ADMIN — MIDODRINE HYDROCHLORIDE 15 MILLIGRAM(S): 5 TABLET ORAL at 01:14

## 2025-07-29 RX ADMIN — Medication 25 GRAM(S): at 22:49

## 2025-07-29 RX ADMIN — FOLIC ACID 1 MILLIGRAM(S): 1 TABLET ORAL at 11:26

## 2025-07-29 RX ADMIN — Medication 25 GRAM(S): at 05:39

## 2025-07-29 RX ADMIN — FUROSEMIDE 40 MILLIGRAM(S): 10 INJECTION INTRAMUSCULAR; INTRAVENOUS at 05:39

## 2025-07-29 RX ADMIN — Medication 100 MILLIGRAM(S): at 11:26

## 2025-07-29 RX ADMIN — Medication 650 MILLIGRAM(S): at 14:27

## 2025-07-29 RX ADMIN — Medication 400 MILLIGRAM(S): at 09:12

## 2025-07-29 RX ADMIN — Medication 25 GRAM(S): at 13:28

## 2025-07-29 RX ADMIN — URSODIOL 300 MILLIGRAM(S): 300 CAPSULE ORAL at 05:38

## 2025-07-29 RX ADMIN — Medication 400 MILLIGRAM(S): at 13:25

## 2025-07-29 RX ADMIN — Medication 125 MILLIGRAM(S): at 17:18

## 2025-07-29 RX ADMIN — Medication 1 MILLIGRAM(S): at 21:18

## 2025-07-29 RX ADMIN — FUROSEMIDE 40 MILLIGRAM(S): 10 INJECTION INTRAMUSCULAR; INTRAVENOUS at 17:19

## 2025-07-29 RX ADMIN — URSODIOL 300 MILLIGRAM(S): 300 CAPSULE ORAL at 17:18

## 2025-07-29 RX ADMIN — Medication 1 MILLIGRAM(S): at 20:18

## 2025-07-29 RX ADMIN — Medication 40 MILLIGRAM(S): at 05:38

## 2025-07-29 RX ADMIN — POLYETHYLENE GLYCOL 3350 17 GRAM(S): 17 POWDER, FOR SOLUTION ORAL at 17:19

## 2025-07-29 RX ADMIN — HYDROXYZINE HYDROCHLORIDE 50 MILLIGRAM(S): 25 TABLET, FILM COATED ORAL at 22:48

## 2025-07-29 RX ADMIN — Medication 650 MILLIGRAM(S): at 13:27

## 2025-07-29 RX ADMIN — Medication 125 MILLIGRAM(S): at 05:38

## 2025-07-30 LAB
ALBUMIN FLD-MCNC: 1.7 G/DL — SIGNIFICANT CHANGE UP
ALBUMIN SERPL ELPH-MCNC: 3.2 G/DL — LOW (ref 3.3–5)
ALP SERPL-CCNC: 174 U/L — HIGH (ref 40–120)
ALT FLD-CCNC: 9 U/L — LOW (ref 10–45)
ANION GAP SERPL CALC-SCNC: 11 MMOL/L — SIGNIFICANT CHANGE UP (ref 5–17)
APTT BLD: 34.4 SEC — SIGNIFICANT CHANGE UP (ref 26.1–36.8)
AST SERPL-CCNC: 23 U/L — SIGNIFICANT CHANGE UP (ref 10–40)
B PERT IGG+IGM PNL SER: ABNORMAL
BASOPHILS # BLD AUTO: 0.04 K/UL — SIGNIFICANT CHANGE UP (ref 0–0.2)
BASOPHILS %.: 1 % — SIGNIFICANT CHANGE UP
BASOPHILS NFR BLD AUTO: 0.6 % — SIGNIFICANT CHANGE UP (ref 0–2)
BILIRUB SERPL-MCNC: 3.7 MG/DL — HIGH (ref 0.2–1.2)
BUN SERPL-MCNC: 15 MG/DL — SIGNIFICANT CHANGE UP (ref 7–23)
CALCIUM SERPL-MCNC: 10 MG/DL — SIGNIFICANT CHANGE UP (ref 8.4–10.5)
CHLORIDE SERPL-SCNC: 91 MMOL/L — LOW (ref 96–108)
CO2 SERPL-SCNC: 28 MMOL/L — SIGNIFICANT CHANGE UP (ref 22–31)
COLOR FLD: YELLOW
CREAT SERPL-MCNC: 0.86 MG/DL — SIGNIFICANT CHANGE UP (ref 0.5–1.3)
EGFR: 76 ML/MIN/1.73M2 — SIGNIFICANT CHANGE UP
EGFR: 76 ML/MIN/1.73M2 — SIGNIFICANT CHANGE UP
EOSINOPHIL # BLD AUTO: 0.13 K/UL — SIGNIFICANT CHANGE UP (ref 0–0.5)
EOSINOPHIL NFR BLD AUTO: 2 % — SIGNIFICANT CHANGE UP (ref 0–6)
FLUID INTAKE SUBSTANCE CLASS: SIGNIFICANT CHANGE UP
GLUCOSE FLD-MCNC: 104 MG/DL — SIGNIFICANT CHANGE UP
GLUCOSE SERPL-MCNC: 94 MG/DL — SIGNIFICANT CHANGE UP (ref 70–99)
HCT VFR BLD CALC: 28.1 % — LOW (ref 34.5–45)
HGB BLD-MCNC: 9.1 G/DL — LOW (ref 11.5–15.5)
IMM GRANULOCYTES # BLD AUTO: 0.06 K/UL — SIGNIFICANT CHANGE UP (ref 0–0.07)
IMM GRANULOCYTES NFR BLD AUTO: 0.9 % — SIGNIFICANT CHANGE UP (ref 0–0.9)
INR BLD: 1.88 RATIO — HIGH (ref 0.85–1.16)
LDH SERPL L TO P-CCNC: 67 U/L — SIGNIFICANT CHANGE UP
LYMPHOCYTES # BLD AUTO: 1.15 K/UL — SIGNIFICANT CHANGE UP (ref 1–3.3)
LYMPHOCYTES # FLD: 67 % — SIGNIFICANT CHANGE UP
LYMPHOCYTES NFR BLD AUTO: 18.1 % — SIGNIFICANT CHANGE UP (ref 13–44)
MAGNESIUM SERPL-MCNC: 1.4 MG/DL — LOW (ref 1.6–2.6)
MCHC RBC-ENTMCNC: 25.8 PG — LOW (ref 27–34)
MCHC RBC-ENTMCNC: 32.4 G/DL — SIGNIFICANT CHANGE UP (ref 32–36)
MCV RBC AUTO: 79.6 FL — LOW (ref 80–100)
MONOCYTES # BLD AUTO: 0.67 K/UL — SIGNIFICANT CHANGE UP (ref 0–0.9)
MONOCYTES NFR BLD AUTO: 10.5 % — SIGNIFICANT CHANGE UP (ref 2–14)
MONOS+MACROS # FLD: 21 % — SIGNIFICANT CHANGE UP
NEUTROPHILS # BLD AUTO: 4.32 K/UL — SIGNIFICANT CHANGE UP (ref 1.8–7.4)
NEUTROPHILS NFR BLD AUTO: 67.9 % — SIGNIFICANT CHANGE UP (ref 43–77)
NEUTROPHILS-BODY FLUID: 11 % — SIGNIFICANT CHANGE UP
NRBC # BLD AUTO: 0 K/UL — SIGNIFICANT CHANGE UP (ref 0–0)
NRBC # FLD: 0 K/UL — SIGNIFICANT CHANGE UP (ref 0–0)
NRBC BLD AUTO-RTO: 0 /100 WBCS — SIGNIFICANT CHANGE UP (ref 0–0)
PHOSPHATE SERPL-MCNC: 3.7 MG/DL — SIGNIFICANT CHANGE UP (ref 2.5–4.5)
PLATELET # BLD AUTO: 123 K/UL — LOW (ref 150–400)
PMV BLD: 9.3 FL — SIGNIFICANT CHANGE UP (ref 7–13)
POTASSIUM SERPL-MCNC: 3.6 MMOL/L — SIGNIFICANT CHANGE UP (ref 3.5–5.3)
POTASSIUM SERPL-SCNC: 3.6 MMOL/L — SIGNIFICANT CHANGE UP (ref 3.5–5.3)
PROT FLD-MCNC: 2.9 G/DL — SIGNIFICANT CHANGE UP
PROT SERPL-MCNC: 6.7 G/DL — SIGNIFICANT CHANGE UP (ref 6–8.3)
PROTHROM AB SERPL-ACNC: 21.5 SEC — HIGH (ref 9.9–13.4)
RBC # BLD: 3.53 M/UL — LOW (ref 3.8–5.2)
RBC # FLD: 24.8 % — HIGH (ref 10.3–14.5)
RCV VOL RI: 2000 CELLS/UL — HIGH
SODIUM SERPL-SCNC: 130 MMOL/L — LOW (ref 135–145)
TOTAL NUCLEATED CELL COUNT, BODY FLUID: 687 CELLS/UL — SIGNIFICANT CHANGE UP
TUBE TYPE: SIGNIFICANT CHANGE UP
WBC # BLD: 6.37 K/UL — SIGNIFICANT CHANGE UP (ref 3.8–10.5)
WBC # FLD AUTO: 6.37 K/UL — SIGNIFICANT CHANGE UP (ref 3.8–10.5)
WBC COUNT.: 680 CELLS/UL — HIGH

## 2025-07-30 PROCEDURE — 49083 ABD PARACENTESIS W/IMAGING: CPT

## 2025-07-30 PROCEDURE — 99233 SBSQ HOSP IP/OBS HIGH 50: CPT | Mod: GC

## 2025-07-30 PROCEDURE — 99233 SBSQ HOSP IP/OBS HIGH 50: CPT

## 2025-07-30 RX ORDER — MAGNESIUM SULFATE 500 MG/ML
2 SYRINGE (ML) INJECTION ONCE
Refills: 0 | Status: COMPLETED | OUTPATIENT
Start: 2025-07-30 | End: 2025-07-30

## 2025-07-30 RX ORDER — ALBUMIN (HUMAN) 12.5 G/50ML
50 INJECTION, SOLUTION INTRAVENOUS ONCE
Refills: 0 | Status: DISCONTINUED | OUTPATIENT
Start: 2025-07-30 | End: 2025-08-05

## 2025-07-30 RX ORDER — ENOXAPARIN SODIUM 100 MG/ML
40 INJECTION SUBCUTANEOUS EVERY 24 HOURS
Refills: 0 | Status: DISCONTINUED | OUTPATIENT
Start: 2025-07-30 | End: 2025-08-01

## 2025-07-30 RX ADMIN — Medication 1 MILLIGRAM(S): at 19:31

## 2025-07-30 RX ADMIN — HYDROXYZINE HYDROCHLORIDE 50 MILLIGRAM(S): 25 TABLET, FILM COATED ORAL at 05:20

## 2025-07-30 RX ADMIN — HYDROXYZINE HYDROCHLORIDE 50 MILLIGRAM(S): 25 TABLET, FILM COATED ORAL at 14:29

## 2025-07-30 RX ADMIN — Medication 1 MILLIGRAM(S): at 12:46

## 2025-07-30 RX ADMIN — FUROSEMIDE 40 MILLIGRAM(S): 10 INJECTION INTRAMUSCULAR; INTRAVENOUS at 05:21

## 2025-07-30 RX ADMIN — Medication 100 MILLIGRAM(S): at 11:45

## 2025-07-30 RX ADMIN — Medication 650 MILLIGRAM(S): at 14:29

## 2025-07-30 RX ADMIN — Medication 3 MILLIGRAM(S): at 21:58

## 2025-07-30 RX ADMIN — Medication 25 GRAM(S): at 06:24

## 2025-07-30 RX ADMIN — Medication 125 MILLIGRAM(S): at 05:21

## 2025-07-30 RX ADMIN — Medication 1 MILLIGRAM(S): at 11:46

## 2025-07-30 RX ADMIN — Medication 40 MILLIGRAM(S): at 05:20

## 2025-07-30 RX ADMIN — Medication 25 GRAM(S): at 05:20

## 2025-07-30 RX ADMIN — Medication 1 MILLIGRAM(S): at 22:17

## 2025-07-30 RX ADMIN — Medication 25 GRAM(S): at 14:28

## 2025-07-30 RX ADMIN — HYDROXYZINE HYDROCHLORIDE 50 MILLIGRAM(S): 25 TABLET, FILM COATED ORAL at 21:58

## 2025-07-30 RX ADMIN — FUROSEMIDE 40 MILLIGRAM(S): 10 INJECTION INTRAMUSCULAR; INTRAVENOUS at 18:02

## 2025-07-30 RX ADMIN — MIDODRINE HYDROCHLORIDE 15 MILLIGRAM(S): 5 TABLET ORAL at 20:42

## 2025-07-30 RX ADMIN — URSODIOL 300 MILLIGRAM(S): 300 CAPSULE ORAL at 05:20

## 2025-07-30 RX ADMIN — MIDODRINE HYDROCHLORIDE 15 MILLIGRAM(S): 5 TABLET ORAL at 01:32

## 2025-07-30 RX ADMIN — Medication 25 GRAM(S): at 21:58

## 2025-07-30 RX ADMIN — Medication 1 MILLIGRAM(S): at 05:40

## 2025-07-30 RX ADMIN — Medication 40 MILLIEQUIVALENT(S): at 08:58

## 2025-07-30 RX ADMIN — FOLIC ACID 1 MILLIGRAM(S): 1 TABLET ORAL at 11:45

## 2025-07-30 RX ADMIN — Medication 1 TABLET(S): at 11:46

## 2025-07-30 RX ADMIN — Medication 50 MILLIGRAM(S): at 05:20

## 2025-07-30 RX ADMIN — MIRTAZAPINE 7.5 MILLIGRAM(S): 30 TABLET, FILM COATED ORAL at 21:58

## 2025-07-30 RX ADMIN — Medication 650 MILLIGRAM(S): at 15:29

## 2025-07-30 RX ADMIN — POLYETHYLENE GLYCOL 3350 17 GRAM(S): 17 POWDER, FOR SOLUTION ORAL at 18:02

## 2025-07-30 RX ADMIN — Medication 400 MILLIGRAM(S): at 08:58

## 2025-07-30 RX ADMIN — POLYETHYLENE GLYCOL 3350 17 GRAM(S): 17 POWDER, FOR SOLUTION ORAL at 05:21

## 2025-07-30 RX ADMIN — ENOXAPARIN SODIUM 40 MILLIGRAM(S): 100 INJECTION SUBCUTANEOUS at 18:03

## 2025-07-30 RX ADMIN — Medication 400 MILLIGRAM(S): at 14:28

## 2025-07-30 RX ADMIN — Medication 1 MILLIGRAM(S): at 04:42

## 2025-07-30 RX ADMIN — URSODIOL 300 MILLIGRAM(S): 300 CAPSULE ORAL at 18:02

## 2025-07-30 RX ADMIN — Medication 400 MILLIGRAM(S): at 18:01

## 2025-07-30 RX ADMIN — MIDODRINE HYDROCHLORIDE 15 MILLIGRAM(S): 5 TABLET ORAL at 12:41

## 2025-07-30 RX ADMIN — Medication 125 MILLIGRAM(S): at 18:02

## 2025-07-31 LAB
ALBUMIN SERPL ELPH-MCNC: 3.4 G/DL — SIGNIFICANT CHANGE UP (ref 3.3–5)
ALP SERPL-CCNC: 175 U/L — HIGH (ref 40–120)
ALT FLD-CCNC: 6 U/L — LOW (ref 10–45)
ANION GAP SERPL CALC-SCNC: 16 MMOL/L — SIGNIFICANT CHANGE UP (ref 5–17)
AST SERPL-CCNC: 24 U/L — SIGNIFICANT CHANGE UP (ref 10–40)
BASOPHILS # BLD AUTO: 0.06 K/UL — SIGNIFICANT CHANGE UP (ref 0–0.2)
BASOPHILS NFR BLD AUTO: 1 % — SIGNIFICANT CHANGE UP (ref 0–2)
BILIRUB SERPL-MCNC: 3.7 MG/DL — HIGH (ref 0.2–1.2)
BUN SERPL-MCNC: 15 MG/DL — SIGNIFICANT CHANGE UP (ref 7–23)
CALCIUM SERPL-MCNC: 10.6 MG/DL — HIGH (ref 8.4–10.5)
CHLORIDE SERPL-SCNC: 91 MMOL/L — LOW (ref 96–108)
CO2 SERPL-SCNC: 25 MMOL/L — SIGNIFICANT CHANGE UP (ref 22–31)
CREAT SERPL-MCNC: 0.97 MG/DL — SIGNIFICANT CHANGE UP (ref 0.5–1.3)
EGFR: 66 ML/MIN/1.73M2 — SIGNIFICANT CHANGE UP
EGFR: 66 ML/MIN/1.73M2 — SIGNIFICANT CHANGE UP
EOSINOPHIL # BLD AUTO: 0.13 K/UL — SIGNIFICANT CHANGE UP (ref 0–0.5)
EOSINOPHIL NFR BLD AUTO: 2.2 % — SIGNIFICANT CHANGE UP (ref 0–6)
GLUCOSE SERPL-MCNC: 86 MG/DL — SIGNIFICANT CHANGE UP (ref 70–99)
GRAM STN FLD: SIGNIFICANT CHANGE UP
HCT VFR BLD CALC: 29.4 % — LOW (ref 34.5–45)
HGB BLD-MCNC: 9.6 G/DL — LOW (ref 11.5–15.5)
IMM GRANULOCYTES # BLD AUTO: 0.03 K/UL — SIGNIFICANT CHANGE UP (ref 0–0.07)
IMM GRANULOCYTES NFR BLD AUTO: 0.5 % — SIGNIFICANT CHANGE UP (ref 0–0.9)
LYMPHOCYTES # BLD AUTO: 1.23 K/UL — SIGNIFICANT CHANGE UP (ref 1–3.3)
LYMPHOCYTES NFR BLD AUTO: 21.1 % — SIGNIFICANT CHANGE UP (ref 13–44)
MAGNESIUM SERPL-MCNC: 1.7 MG/DL — SIGNIFICANT CHANGE UP (ref 1.6–2.6)
MCHC RBC-ENTMCNC: 26.4 PG — LOW (ref 27–34)
MCHC RBC-ENTMCNC: 32.7 G/DL — SIGNIFICANT CHANGE UP (ref 32–36)
MCV RBC AUTO: 81 FL — SIGNIFICANT CHANGE UP (ref 80–100)
MONOCYTES # BLD AUTO: 0.57 K/UL — SIGNIFICANT CHANGE UP (ref 0–0.9)
MONOCYTES NFR BLD AUTO: 9.8 % — SIGNIFICANT CHANGE UP (ref 2–14)
NEUTROPHILS # BLD AUTO: 3.8 K/UL — SIGNIFICANT CHANGE UP (ref 1.8–7.4)
NEUTROPHILS NFR BLD AUTO: 65.4 % — SIGNIFICANT CHANGE UP (ref 43–77)
NRBC # BLD AUTO: 0 K/UL — SIGNIFICANT CHANGE UP (ref 0–0)
NRBC # FLD: 0 K/UL — SIGNIFICANT CHANGE UP (ref 0–0)
NRBC BLD AUTO-RTO: 0 /100 WBCS — SIGNIFICANT CHANGE UP (ref 0–0)
PHOSPHATE SERPL-MCNC: 3.6 MG/DL — SIGNIFICANT CHANGE UP (ref 2.5–4.5)
PLATELET # BLD AUTO: 117 K/UL — LOW (ref 150–400)
PMV BLD: 9.4 FL — SIGNIFICANT CHANGE UP (ref 7–13)
POTASSIUM SERPL-MCNC: 3.9 MMOL/L — SIGNIFICANT CHANGE UP (ref 3.5–5.3)
POTASSIUM SERPL-SCNC: 3.9 MMOL/L — SIGNIFICANT CHANGE UP (ref 3.5–5.3)
PROT SERPL-MCNC: 7.1 G/DL — SIGNIFICANT CHANGE UP (ref 6–8.3)
RBC # BLD: 3.63 M/UL — LOW (ref 3.8–5.2)
RBC # FLD: 24.9 % — HIGH (ref 10.3–14.5)
SODIUM SERPL-SCNC: 132 MMOL/L — LOW (ref 135–145)
SPECIMEN SOURCE: SIGNIFICANT CHANGE UP
WBC # BLD: 5.82 K/UL — SIGNIFICANT CHANGE UP (ref 3.8–10.5)
WBC # FLD AUTO: 5.82 K/UL — SIGNIFICANT CHANGE UP (ref 3.8–10.5)

## 2025-07-31 PROCEDURE — 99232 SBSQ HOSP IP/OBS MODERATE 35: CPT | Mod: GC

## 2025-07-31 PROCEDURE — 99233 SBSQ HOSP IP/OBS HIGH 50: CPT

## 2025-07-31 RX ADMIN — HYDROXYZINE HYDROCHLORIDE 50 MILLIGRAM(S): 25 TABLET, FILM COATED ORAL at 12:40

## 2025-07-31 RX ADMIN — Medication 0.4 MILLIGRAM(S): at 07:35

## 2025-07-31 RX ADMIN — Medication 25 GRAM(S): at 21:16

## 2025-07-31 RX ADMIN — URSODIOL 300 MILLIGRAM(S): 300 CAPSULE ORAL at 17:47

## 2025-07-31 RX ADMIN — FUROSEMIDE 40 MILLIGRAM(S): 10 INJECTION INTRAMUSCULAR; INTRAVENOUS at 12:40

## 2025-07-31 RX ADMIN — Medication 400 MILLIGRAM(S): at 17:48

## 2025-07-31 RX ADMIN — Medication 1 MILLIGRAM(S): at 12:45

## 2025-07-31 RX ADMIN — Medication 1 MILLIGRAM(S): at 21:28

## 2025-07-31 RX ADMIN — Medication 1000 MILLIGRAM(S): at 07:34

## 2025-07-31 RX ADMIN — Medication 100 MILLIGRAM(S): at 12:40

## 2025-07-31 RX ADMIN — Medication 1 MILLIGRAM(S): at 04:42

## 2025-07-31 RX ADMIN — Medication 50 MILLIGRAM(S): at 06:15

## 2025-07-31 RX ADMIN — FUROSEMIDE 40 MILLIGRAM(S): 10 INJECTION INTRAMUSCULAR; INTRAVENOUS at 06:15

## 2025-07-31 RX ADMIN — HYDROXYZINE HYDROCHLORIDE 50 MILLIGRAM(S): 25 TABLET, FILM COATED ORAL at 21:16

## 2025-07-31 RX ADMIN — Medication 25 GRAM(S): at 12:40

## 2025-07-31 RX ADMIN — MIRTAZAPINE 7.5 MILLIGRAM(S): 30 TABLET, FILM COATED ORAL at 21:16

## 2025-07-31 RX ADMIN — Medication 400 MILLIGRAM(S): at 12:40

## 2025-07-31 RX ADMIN — Medication 125 MILLIGRAM(S): at 17:48

## 2025-07-31 RX ADMIN — Medication 25 GRAM(S): at 06:10

## 2025-07-31 RX ADMIN — Medication 1 MILLIGRAM(S): at 07:35

## 2025-07-31 RX ADMIN — Medication 1 MILLIGRAM(S): at 05:42

## 2025-07-31 RX ADMIN — MIDODRINE HYDROCHLORIDE 15 MILLIGRAM(S): 5 TABLET ORAL at 04:43

## 2025-07-31 RX ADMIN — URSODIOL 300 MILLIGRAM(S): 300 CAPSULE ORAL at 06:15

## 2025-07-31 RX ADMIN — Medication 125 MILLIGRAM(S): at 06:15

## 2025-07-31 RX ADMIN — HYDROXYZINE HYDROCHLORIDE 50 MILLIGRAM(S): 25 TABLET, FILM COATED ORAL at 06:15

## 2025-07-31 RX ADMIN — POLYETHYLENE GLYCOL 3350 17 GRAM(S): 17 POWDER, FOR SOLUTION ORAL at 06:16

## 2025-07-31 RX ADMIN — Medication 3 MILLIGRAM(S): at 21:16

## 2025-07-31 RX ADMIN — Medication 40 MILLIGRAM(S): at 06:15

## 2025-07-31 RX ADMIN — Medication 1 MILLIGRAM(S): at 18:19

## 2025-07-31 RX ADMIN — Medication 1 MILLIGRAM(S): at 17:47

## 2025-07-31 RX ADMIN — FOLIC ACID 1 MILLIGRAM(S): 1 TABLET ORAL at 12:41

## 2025-07-31 RX ADMIN — Medication 1 TABLET(S): at 12:41

## 2025-08-01 LAB
ALBUMIN SERPL ELPH-MCNC: 3.4 G/DL — SIGNIFICANT CHANGE UP (ref 3.3–5)
ALP SERPL-CCNC: 171 U/L — HIGH (ref 40–120)
ALT FLD-CCNC: 10 U/L — SIGNIFICANT CHANGE UP (ref 10–45)
ANION GAP SERPL CALC-SCNC: 16 MMOL/L — SIGNIFICANT CHANGE UP (ref 5–17)
APTT BLD: 37 SEC — HIGH (ref 26.1–36.8)
AST SERPL-CCNC: 30 U/L — SIGNIFICANT CHANGE UP (ref 10–40)
BASOPHILS # BLD AUTO: 0.05 K/UL — SIGNIFICANT CHANGE UP (ref 0–0.2)
BASOPHILS NFR BLD AUTO: 0.8 % — SIGNIFICANT CHANGE UP (ref 0–2)
BILIRUB SERPL-MCNC: 3.4 MG/DL — HIGH (ref 0.2–1.2)
BUN SERPL-MCNC: 14 MG/DL — SIGNIFICANT CHANGE UP (ref 7–23)
CALCIUM SERPL-MCNC: 11 MG/DL — HIGH (ref 8.4–10.5)
CHLORIDE SERPL-SCNC: 89 MMOL/L — LOW (ref 96–108)
CO2 SERPL-SCNC: 25 MMOL/L — SIGNIFICANT CHANGE UP (ref 22–31)
CREAT SERPL-MCNC: 1.02 MG/DL — SIGNIFICANT CHANGE UP (ref 0.5–1.3)
EGFR: 62 ML/MIN/1.73M2 — SIGNIFICANT CHANGE UP
EGFR: 62 ML/MIN/1.73M2 — SIGNIFICANT CHANGE UP
EOSINOPHIL # BLD AUTO: 0.15 K/UL — SIGNIFICANT CHANGE UP (ref 0–0.5)
EOSINOPHIL NFR BLD AUTO: 2.4 % — SIGNIFICANT CHANGE UP (ref 0–6)
GLUCOSE SERPL-MCNC: 82 MG/DL — SIGNIFICANT CHANGE UP (ref 70–99)
HCT VFR BLD CALC: 30.2 % — LOW (ref 34.5–45)
HGB BLD-MCNC: 9.9 G/DL — LOW (ref 11.5–15.5)
IMM GRANULOCYTES # BLD AUTO: 0.04 K/UL — SIGNIFICANT CHANGE UP (ref 0–0.07)
IMM GRANULOCYTES NFR BLD AUTO: 0.6 % — SIGNIFICANT CHANGE UP (ref 0–0.9)
INR BLD: 1.6 RATIO — HIGH (ref 0.85–1.16)
LYMPHOCYTES # BLD AUTO: 1.31 K/UL — SIGNIFICANT CHANGE UP (ref 1–3.3)
LYMPHOCYTES NFR BLD AUTO: 21.2 % — SIGNIFICANT CHANGE UP (ref 13–44)
MAGNESIUM SERPL-MCNC: 1.6 MG/DL — SIGNIFICANT CHANGE UP (ref 1.6–2.6)
MCHC RBC-ENTMCNC: 26.5 PG — LOW (ref 27–34)
MCHC RBC-ENTMCNC: 32.8 G/DL — SIGNIFICANT CHANGE UP (ref 32–36)
MCV RBC AUTO: 81 FL — SIGNIFICANT CHANGE UP (ref 80–100)
MONOCYTES # BLD AUTO: 0.74 K/UL — SIGNIFICANT CHANGE UP (ref 0–0.9)
MONOCYTES NFR BLD AUTO: 12 % — SIGNIFICANT CHANGE UP (ref 2–14)
NEUTROPHILS # BLD AUTO: 3.89 K/UL — SIGNIFICANT CHANGE UP (ref 1.8–7.4)
NEUTROPHILS NFR BLD AUTO: 63 % — SIGNIFICANT CHANGE UP (ref 43–77)
NRBC # BLD AUTO: 0 K/UL — SIGNIFICANT CHANGE UP (ref 0–0)
NRBC # FLD: 0 K/UL — SIGNIFICANT CHANGE UP (ref 0–0)
NRBC BLD AUTO-RTO: 0 /100 WBCS — SIGNIFICANT CHANGE UP (ref 0–0)
PHOSPHATE SERPL-MCNC: 3.5 MG/DL — SIGNIFICANT CHANGE UP (ref 2.5–4.5)
PLATELET # BLD AUTO: 111 K/UL — LOW (ref 150–400)
PMV BLD: 9 FL — SIGNIFICANT CHANGE UP (ref 7–13)
POTASSIUM SERPL-MCNC: 3.9 MMOL/L — SIGNIFICANT CHANGE UP (ref 3.5–5.3)
POTASSIUM SERPL-SCNC: 3.9 MMOL/L — SIGNIFICANT CHANGE UP (ref 3.5–5.3)
PROT SERPL-MCNC: 7.2 G/DL — SIGNIFICANT CHANGE UP (ref 6–8.3)
PROTHROM AB SERPL-ACNC: 18.3 SEC — HIGH (ref 9.9–13.4)
RBC # BLD: 3.73 M/UL — LOW (ref 3.8–5.2)
RBC # FLD: 24.2 % — HIGH (ref 10.3–14.5)
SODIUM SERPL-SCNC: 130 MMOL/L — LOW (ref 135–145)
WBC # BLD: 6.18 K/UL — SIGNIFICANT CHANGE UP (ref 3.8–10.5)
WBC # FLD AUTO: 6.18 K/UL — SIGNIFICANT CHANGE UP (ref 3.8–10.5)

## 2025-08-01 PROCEDURE — 99232 SBSQ HOSP IP/OBS MODERATE 35: CPT | Mod: GC

## 2025-08-01 RX ORDER — MAGNESIUM SULFATE 500 MG/ML
1 SYRINGE (ML) INJECTION ONCE
Refills: 0 | Status: COMPLETED | OUTPATIENT
Start: 2025-08-01 | End: 2025-08-01

## 2025-08-01 RX ORDER — HYDROMORPHONE/SOD CHLOR,ISO/PF 2 MG/10 ML
2 SYRINGE (ML) INJECTION EVERY 4 HOURS
Refills: 0 | Status: DISCONTINUED | OUTPATIENT
Start: 2025-08-01 | End: 2025-08-05

## 2025-08-01 RX ORDER — ERTAPENEM SODIUM 1 G/1
1 INJECTION, POWDER, LYOPHILIZED, FOR SOLUTION INTRAMUSCULAR; INTRAVENOUS
Qty: 21 | Refills: 0
Start: 2025-08-01 | End: 2025-08-21

## 2025-08-01 RX ADMIN — Medication 100 GRAM(S): at 12:27

## 2025-08-01 RX ADMIN — Medication 2 MILLIGRAM(S): at 23:00

## 2025-08-01 RX ADMIN — Medication 3 MILLIGRAM(S): at 21:36

## 2025-08-01 RX ADMIN — Medication 25 GRAM(S): at 12:27

## 2025-08-01 RX ADMIN — LIDOCAINE HYDROCHLORIDE 1 PATCH: 20 JELLY TOPICAL at 21:37

## 2025-08-01 RX ADMIN — URSODIOL 300 MILLIGRAM(S): 300 CAPSULE ORAL at 17:44

## 2025-08-01 RX ADMIN — HYDROXYZINE HYDROCHLORIDE 50 MILLIGRAM(S): 25 TABLET, FILM COATED ORAL at 12:28

## 2025-08-01 RX ADMIN — Medication 1 MILLIGRAM(S): at 03:24

## 2025-08-01 RX ADMIN — Medication 25 GRAM(S): at 05:36

## 2025-08-01 RX ADMIN — Medication 40 MILLIGRAM(S): at 05:37

## 2025-08-01 RX ADMIN — Medication 100 MILLIGRAM(S): at 12:28

## 2025-08-01 RX ADMIN — Medication 50 MILLIGRAM(S): at 05:37

## 2025-08-01 RX ADMIN — Medication 125 MILLIGRAM(S): at 05:36

## 2025-08-01 RX ADMIN — FUROSEMIDE 40 MILLIGRAM(S): 10 INJECTION INTRAMUSCULAR; INTRAVENOUS at 12:28

## 2025-08-01 RX ADMIN — URSODIOL 300 MILLIGRAM(S): 300 CAPSULE ORAL at 05:37

## 2025-08-01 RX ADMIN — Medication 1 TABLET(S): at 12:28

## 2025-08-01 RX ADMIN — MIRTAZAPINE 7.5 MILLIGRAM(S): 30 TABLET, FILM COATED ORAL at 21:36

## 2025-08-01 RX ADMIN — Medication 125 MILLIGRAM(S): at 17:44

## 2025-08-01 RX ADMIN — HYDROXYZINE HYDROCHLORIDE 50 MILLIGRAM(S): 25 TABLET, FILM COATED ORAL at 05:37

## 2025-08-01 RX ADMIN — Medication 400 MILLIGRAM(S): at 07:20

## 2025-08-01 RX ADMIN — FUROSEMIDE 40 MILLIGRAM(S): 10 INJECTION INTRAMUSCULAR; INTRAVENOUS at 05:36

## 2025-08-01 RX ADMIN — Medication 2 MILLIGRAM(S): at 22:31

## 2025-08-01 RX ADMIN — Medication 400 MILLIGRAM(S): at 12:28

## 2025-08-01 RX ADMIN — Medication 400 MILLIGRAM(S): at 17:44

## 2025-08-01 RX ADMIN — POLYETHYLENE GLYCOL 3350 17 GRAM(S): 17 POWDER, FOR SOLUTION ORAL at 05:37

## 2025-08-01 RX ADMIN — Medication 1 MILLIGRAM(S): at 00:25

## 2025-08-01 RX ADMIN — Medication 25 GRAM(S): at 21:34

## 2025-08-01 RX ADMIN — HYDROXYZINE HYDROCHLORIDE 50 MILLIGRAM(S): 25 TABLET, FILM COATED ORAL at 21:35

## 2025-08-01 RX ADMIN — POLYETHYLENE GLYCOL 3350 17 GRAM(S): 17 POWDER, FOR SOLUTION ORAL at 17:44

## 2025-08-01 RX ADMIN — FOLIC ACID 1 MILLIGRAM(S): 1 TABLET ORAL at 12:28

## 2025-08-02 LAB
ALBUMIN SERPL ELPH-MCNC: 3.8 G/DL — SIGNIFICANT CHANGE UP (ref 3.3–5)
ALP SERPL-CCNC: 190 U/L — HIGH (ref 40–120)
ALT FLD-CCNC: 11 U/L — SIGNIFICANT CHANGE UP (ref 10–45)
ANION GAP SERPL CALC-SCNC: 16 MMOL/L — SIGNIFICANT CHANGE UP (ref 5–17)
AST SERPL-CCNC: 34 U/L — SIGNIFICANT CHANGE UP (ref 10–40)
BASOPHILS # BLD AUTO: 0.04 K/UL — SIGNIFICANT CHANGE UP (ref 0–0.2)
BASOPHILS NFR BLD AUTO: 0.6 % — SIGNIFICANT CHANGE UP (ref 0–2)
BILIRUB SERPL-MCNC: 3.4 MG/DL — HIGH (ref 0.2–1.2)
BUN SERPL-MCNC: 15 MG/DL — SIGNIFICANT CHANGE UP (ref 7–23)
CALCIUM SERPL-MCNC: 11.4 MG/DL — HIGH (ref 8.4–10.5)
CHLORIDE SERPL-SCNC: 90 MMOL/L — LOW (ref 96–108)
CO2 SERPL-SCNC: 28 MMOL/L — SIGNIFICANT CHANGE UP (ref 22–31)
CREAT SERPL-MCNC: 1.16 MG/DL — SIGNIFICANT CHANGE UP (ref 0.5–1.3)
EGFR: 53 ML/MIN/1.73M2 — LOW
EGFR: 53 ML/MIN/1.73M2 — LOW
EOSINOPHIL # BLD AUTO: 0.1 K/UL — SIGNIFICANT CHANGE UP (ref 0–0.5)
EOSINOPHIL NFR BLD AUTO: 1.5 % — SIGNIFICANT CHANGE UP (ref 0–6)
GLUCOSE SERPL-MCNC: 113 MG/DL — HIGH (ref 70–99)
HCT VFR BLD CALC: 32.5 % — LOW (ref 34.5–45)
HGB BLD-MCNC: 10.5 G/DL — LOW (ref 11.5–15.5)
IMM GRANULOCYTES # BLD AUTO: 0.04 K/UL — SIGNIFICANT CHANGE UP (ref 0–0.07)
IMM GRANULOCYTES NFR BLD AUTO: 0.6 % — SIGNIFICANT CHANGE UP (ref 0–0.9)
IMMATURE PLATELET FRACTION #: 1.7 K/UL — LOW (ref 4.7–11.1)
IMMATURE PLATELET FRACTION %: 1.6 % — SIGNIFICANT CHANGE UP (ref 1.6–4.9)
LYMPHOCYTES # BLD AUTO: 1.45 K/UL — SIGNIFICANT CHANGE UP (ref 1–3.3)
LYMPHOCYTES NFR BLD AUTO: 21.1 % — SIGNIFICANT CHANGE UP (ref 13–44)
MAGNESIUM SERPL-MCNC: 1.9 MG/DL — SIGNIFICANT CHANGE UP (ref 1.6–2.6)
MCHC RBC-ENTMCNC: 26.8 PG — LOW (ref 27–34)
MCHC RBC-ENTMCNC: 32.3 G/DL — SIGNIFICANT CHANGE UP (ref 32–36)
MCV RBC AUTO: 82.9 FL — SIGNIFICANT CHANGE UP (ref 80–100)
MONOCYTES # BLD AUTO: 0.85 K/UL — SIGNIFICANT CHANGE UP (ref 0–0.9)
MONOCYTES NFR BLD AUTO: 12.4 % — SIGNIFICANT CHANGE UP (ref 2–14)
NEUTROPHILS # BLD AUTO: 4.4 K/UL — SIGNIFICANT CHANGE UP (ref 1.8–7.4)
NEUTROPHILS NFR BLD AUTO: 63.8 % — SIGNIFICANT CHANGE UP (ref 43–77)
NRBC # BLD AUTO: 0 K/UL — SIGNIFICANT CHANGE UP (ref 0–0)
NRBC # FLD: 0 K/UL — SIGNIFICANT CHANGE UP (ref 0–0)
NRBC BLD AUTO-RTO: 0 /100 WBCS — SIGNIFICANT CHANGE UP (ref 0–0)
PHOSPHATE SERPL-MCNC: 3.7 MG/DL — SIGNIFICANT CHANGE UP (ref 2.5–4.5)
PLATELET # BLD AUTO: 106 K/UL — LOW (ref 150–400)
PMV BLD: 9 FL — SIGNIFICANT CHANGE UP (ref 7–13)
POTASSIUM SERPL-MCNC: 3.7 MMOL/L — SIGNIFICANT CHANGE UP (ref 3.5–5.3)
POTASSIUM SERPL-SCNC: 3.7 MMOL/L — SIGNIFICANT CHANGE UP (ref 3.5–5.3)
PROT SERPL-MCNC: 7.8 G/DL — SIGNIFICANT CHANGE UP (ref 6–8.3)
RBC # BLD: 3.92 M/UL — SIGNIFICANT CHANGE UP (ref 3.8–5.2)
RBC # FLD: 24 % — HIGH (ref 10.3–14.5)
SODIUM SERPL-SCNC: 134 MMOL/L — LOW (ref 135–145)
WBC # BLD: 6.88 K/UL — SIGNIFICANT CHANGE UP (ref 3.8–10.5)
WBC # FLD AUTO: 6.88 K/UL — SIGNIFICANT CHANGE UP (ref 3.8–10.5)

## 2025-08-02 PROCEDURE — 99233 SBSQ HOSP IP/OBS HIGH 50: CPT | Mod: GC

## 2025-08-02 RX ORDER — FUROSEMIDE 10 MG/ML
40 INJECTION INTRAMUSCULAR; INTRAVENOUS
Refills: 0 | Status: DISCONTINUED | OUTPATIENT
Start: 2025-08-02 | End: 2025-08-05

## 2025-08-02 RX ORDER — ENOXAPARIN SODIUM 100 MG/ML
40 INJECTION SUBCUTANEOUS EVERY 24 HOURS
Refills: 0 | Status: DISCONTINUED | OUTPATIENT
Start: 2025-08-02 | End: 2025-08-04

## 2025-08-02 RX ADMIN — Medication 50 MILLIGRAM(S): at 05:32

## 2025-08-02 RX ADMIN — Medication 2 MILLIGRAM(S): at 05:31

## 2025-08-02 RX ADMIN — Medication 3 MILLIGRAM(S): at 21:25

## 2025-08-02 RX ADMIN — URSODIOL 300 MILLIGRAM(S): 300 CAPSULE ORAL at 17:08

## 2025-08-02 RX ADMIN — MIRTAZAPINE 7.5 MILLIGRAM(S): 30 TABLET, FILM COATED ORAL at 21:25

## 2025-08-02 RX ADMIN — LIDOCAINE HYDROCHLORIDE 1 PATCH: 20 JELLY TOPICAL at 21:28

## 2025-08-02 RX ADMIN — POLYETHYLENE GLYCOL 3350 17 GRAM(S): 17 POWDER, FOR SOLUTION ORAL at 05:36

## 2025-08-02 RX ADMIN — Medication 2 MILLIGRAM(S): at 15:51

## 2025-08-02 RX ADMIN — Medication 2 MILLIGRAM(S): at 22:00

## 2025-08-02 RX ADMIN — Medication 2 MILLIGRAM(S): at 21:24

## 2025-08-02 RX ADMIN — Medication 125 MILLIGRAM(S): at 17:08

## 2025-08-02 RX ADMIN — FUROSEMIDE 40 MILLIGRAM(S): 10 INJECTION INTRAMUSCULAR; INTRAVENOUS at 13:12

## 2025-08-02 RX ADMIN — URSODIOL 300 MILLIGRAM(S): 300 CAPSULE ORAL at 05:32

## 2025-08-02 RX ADMIN — MIDODRINE HYDROCHLORIDE 15 MILLIGRAM(S): 5 TABLET ORAL at 00:30

## 2025-08-02 RX ADMIN — Medication 2 MILLIGRAM(S): at 07:00

## 2025-08-02 RX ADMIN — Medication 100 MILLIGRAM(S): at 08:15

## 2025-08-02 RX ADMIN — Medication 125 MILLIGRAM(S): at 05:37

## 2025-08-02 RX ADMIN — Medication 1 TABLET(S): at 08:15

## 2025-08-02 RX ADMIN — ENOXAPARIN SODIUM 40 MILLIGRAM(S): 100 INJECTION SUBCUTANEOUS at 11:38

## 2025-08-02 RX ADMIN — HYDROXYZINE HYDROCHLORIDE 50 MILLIGRAM(S): 25 TABLET, FILM COATED ORAL at 13:10

## 2025-08-02 RX ADMIN — Medication 25 GRAM(S): at 05:36

## 2025-08-02 RX ADMIN — FOLIC ACID 1 MILLIGRAM(S): 1 TABLET ORAL at 08:14

## 2025-08-02 RX ADMIN — LIDOCAINE HYDROCHLORIDE 1 PATCH: 20 JELLY TOPICAL at 06:59

## 2025-08-02 RX ADMIN — FUROSEMIDE 40 MILLIGRAM(S): 10 INJECTION INTRAMUSCULAR; INTRAVENOUS at 05:42

## 2025-08-02 RX ADMIN — Medication 40 MILLIGRAM(S): at 05:36

## 2025-08-02 RX ADMIN — Medication 25 GRAM(S): at 21:45

## 2025-08-02 RX ADMIN — LIDOCAINE HYDROCHLORIDE 1 PATCH: 20 JELLY TOPICAL at 10:47

## 2025-08-02 RX ADMIN — Medication 2 MILLIGRAM(S): at 09:52

## 2025-08-02 RX ADMIN — Medication 2 MILLIGRAM(S): at 10:47

## 2025-08-02 RX ADMIN — Medication 400 MILLIGRAM(S): at 17:08

## 2025-08-02 RX ADMIN — HYDROXYZINE HYDROCHLORIDE 50 MILLIGRAM(S): 25 TABLET, FILM COATED ORAL at 05:38

## 2025-08-02 RX ADMIN — Medication 400 MILLIGRAM(S): at 11:39

## 2025-08-02 RX ADMIN — Medication 2 MILLIGRAM(S): at 16:14

## 2025-08-02 RX ADMIN — HYDROXYZINE HYDROCHLORIDE 50 MILLIGRAM(S): 25 TABLET, FILM COATED ORAL at 21:24

## 2025-08-02 RX ADMIN — Medication 400 MILLIGRAM(S): at 08:14

## 2025-08-02 RX ADMIN — Medication 25 GRAM(S): at 13:10

## 2025-08-03 LAB
ALBUMIN SERPL ELPH-MCNC: 3.4 G/DL — SIGNIFICANT CHANGE UP (ref 3.3–5)
ALP SERPL-CCNC: 173 U/L — HIGH (ref 40–120)
ALT FLD-CCNC: 10 U/L — SIGNIFICANT CHANGE UP (ref 10–45)
ANION GAP SERPL CALC-SCNC: 12 MMOL/L — SIGNIFICANT CHANGE UP (ref 5–17)
AST SERPL-CCNC: 33 U/L — SIGNIFICANT CHANGE UP (ref 10–40)
BASOPHILS # BLD AUTO: 0.06 K/UL — SIGNIFICANT CHANGE UP (ref 0–0.2)
BASOPHILS NFR BLD AUTO: 0.9 % — SIGNIFICANT CHANGE UP (ref 0–2)
BILIRUB SERPL-MCNC: 3.2 MG/DL — HIGH (ref 0.2–1.2)
BUN SERPL-MCNC: 16 MG/DL — SIGNIFICANT CHANGE UP (ref 7–23)
CALCIUM SERPL-MCNC: 11.6 MG/DL — HIGH (ref 8.4–10.5)
CHLORIDE SERPL-SCNC: 91 MMOL/L — LOW (ref 96–108)
CO2 SERPL-SCNC: 28 MMOL/L — SIGNIFICANT CHANGE UP (ref 22–31)
CREAT SERPL-MCNC: 1.05 MG/DL — SIGNIFICANT CHANGE UP (ref 0.5–1.3)
EGFR: 60 ML/MIN/1.73M2 — SIGNIFICANT CHANGE UP
EGFR: 60 ML/MIN/1.73M2 — SIGNIFICANT CHANGE UP
EOSINOPHIL # BLD AUTO: 0.11 K/UL — SIGNIFICANT CHANGE UP (ref 0–0.5)
EOSINOPHIL NFR BLD AUTO: 1.7 % — SIGNIFICANT CHANGE UP (ref 0–6)
GLUCOSE SERPL-MCNC: 100 MG/DL — HIGH (ref 70–99)
HCT VFR BLD CALC: 30.4 % — LOW (ref 34.5–45)
HGB BLD-MCNC: 9.8 G/DL — LOW (ref 11.5–15.5)
IMM GRANULOCYTES # BLD AUTO: 0.04 K/UL — SIGNIFICANT CHANGE UP (ref 0–0.07)
IMM GRANULOCYTES NFR BLD AUTO: 0.6 % — SIGNIFICANT CHANGE UP (ref 0–0.9)
IMMATURE PLATELET FRACTION #: 1.5 K/UL — LOW (ref 4.7–11.1)
IMMATURE PLATELET FRACTION %: 1.6 % — SIGNIFICANT CHANGE UP (ref 1.6–4.9)
LYMPHOCYTES # BLD AUTO: 1.18 K/UL — SIGNIFICANT CHANGE UP (ref 1–3.3)
LYMPHOCYTES NFR BLD AUTO: 18.3 % — SIGNIFICANT CHANGE UP (ref 13–44)
MAGNESIUM SERPL-MCNC: 1.7 MG/DL — SIGNIFICANT CHANGE UP (ref 1.6–2.6)
MCHC RBC-ENTMCNC: 26.6 PG — LOW (ref 27–34)
MCHC RBC-ENTMCNC: 32.2 G/DL — SIGNIFICANT CHANGE UP (ref 32–36)
MCV RBC AUTO: 82.6 FL — SIGNIFICANT CHANGE UP (ref 80–100)
MONOCYTES # BLD AUTO: 0.74 K/UL — SIGNIFICANT CHANGE UP (ref 0–0.9)
MONOCYTES NFR BLD AUTO: 11.5 % — SIGNIFICANT CHANGE UP (ref 2–14)
NEUTROPHILS # BLD AUTO: 4.33 K/UL — SIGNIFICANT CHANGE UP (ref 1.8–7.4)
NEUTROPHILS NFR BLD AUTO: 67 % — SIGNIFICANT CHANGE UP (ref 43–77)
NRBC # BLD AUTO: 0 K/UL — SIGNIFICANT CHANGE UP (ref 0–0)
NRBC # FLD: 0 K/UL — SIGNIFICANT CHANGE UP (ref 0–0)
NRBC BLD AUTO-RTO: 0 /100 WBCS — SIGNIFICANT CHANGE UP (ref 0–0)
PHOSPHATE SERPL-MCNC: 3.5 MG/DL — SIGNIFICANT CHANGE UP (ref 2.5–4.5)
PLATELET # BLD AUTO: 91 K/UL — LOW (ref 150–400)
PMV BLD: 9.6 FL — SIGNIFICANT CHANGE UP (ref 7–13)
POTASSIUM SERPL-MCNC: 4.3 MMOL/L — SIGNIFICANT CHANGE UP (ref 3.5–5.3)
POTASSIUM SERPL-SCNC: 4.3 MMOL/L — SIGNIFICANT CHANGE UP (ref 3.5–5.3)
PROT SERPL-MCNC: 7.2 G/DL — SIGNIFICANT CHANGE UP (ref 6–8.3)
RBC # BLD: 3.68 M/UL — LOW (ref 3.8–5.2)
RBC # FLD: 23.6 % — HIGH (ref 10.3–14.5)
SODIUM SERPL-SCNC: 131 MMOL/L — LOW (ref 135–145)
WBC # BLD: 6.46 K/UL — SIGNIFICANT CHANGE UP (ref 3.8–10.5)
WBC # FLD AUTO: 6.46 K/UL — SIGNIFICANT CHANGE UP (ref 3.8–10.5)

## 2025-08-03 PROCEDURE — 71045 X-RAY EXAM CHEST 1 VIEW: CPT | Mod: 26

## 2025-08-03 PROCEDURE — 99232 SBSQ HOSP IP/OBS MODERATE 35: CPT | Mod: GC

## 2025-08-03 RX ADMIN — HYDROXYZINE HYDROCHLORIDE 50 MILLIGRAM(S): 25 TABLET, FILM COATED ORAL at 05:38

## 2025-08-03 RX ADMIN — Medication 2 MILLIGRAM(S): at 05:39

## 2025-08-03 RX ADMIN — POLYETHYLENE GLYCOL 3350 17 GRAM(S): 17 POWDER, FOR SOLUTION ORAL at 18:39

## 2025-08-03 RX ADMIN — POLYETHYLENE GLYCOL 3350 17 GRAM(S): 17 POWDER, FOR SOLUTION ORAL at 05:38

## 2025-08-03 RX ADMIN — Medication 50 MILLIGRAM(S): at 05:38

## 2025-08-03 RX ADMIN — Medication 125 MILLIGRAM(S): at 18:39

## 2025-08-03 RX ADMIN — FOLIC ACID 1 MILLIGRAM(S): 1 TABLET ORAL at 12:41

## 2025-08-03 RX ADMIN — Medication 40 MILLIGRAM(S): at 05:38

## 2025-08-03 RX ADMIN — Medication 400 MILLIGRAM(S): at 12:42

## 2025-08-03 RX ADMIN — FUROSEMIDE 40 MILLIGRAM(S): 10 INJECTION INTRAMUSCULAR; INTRAVENOUS at 05:38

## 2025-08-03 RX ADMIN — Medication 100 MILLIGRAM(S): at 12:42

## 2025-08-03 RX ADMIN — Medication 25 GRAM(S): at 22:35

## 2025-08-03 RX ADMIN — FUROSEMIDE 40 MILLIGRAM(S): 10 INJECTION INTRAMUSCULAR; INTRAVENOUS at 14:28

## 2025-08-03 RX ADMIN — Medication 25 GRAM(S): at 14:40

## 2025-08-03 RX ADMIN — Medication 125 MILLIGRAM(S): at 05:40

## 2025-08-03 RX ADMIN — Medication 3 MILLIGRAM(S): at 22:04

## 2025-08-03 RX ADMIN — LIDOCAINE HYDROCHLORIDE 1 PATCH: 20 JELLY TOPICAL at 22:06

## 2025-08-03 RX ADMIN — HYDROXYZINE HYDROCHLORIDE 50 MILLIGRAM(S): 25 TABLET, FILM COATED ORAL at 22:04

## 2025-08-03 RX ADMIN — Medication 400 MILLIGRAM(S): at 08:40

## 2025-08-03 RX ADMIN — Medication 2 MILLIGRAM(S): at 06:10

## 2025-08-03 RX ADMIN — MIRTAZAPINE 7.5 MILLIGRAM(S): 30 TABLET, FILM COATED ORAL at 22:04

## 2025-08-03 RX ADMIN — URSODIOL 300 MILLIGRAM(S): 300 CAPSULE ORAL at 05:39

## 2025-08-03 RX ADMIN — Medication 2 MILLIGRAM(S): at 20:03

## 2025-08-03 RX ADMIN — Medication 2 MILLIGRAM(S): at 19:33

## 2025-08-03 RX ADMIN — URSODIOL 300 MILLIGRAM(S): 300 CAPSULE ORAL at 18:40

## 2025-08-03 RX ADMIN — HYDROXYZINE HYDROCHLORIDE 50 MILLIGRAM(S): 25 TABLET, FILM COATED ORAL at 14:27

## 2025-08-03 RX ADMIN — Medication 400 MILLIGRAM(S): at 18:40

## 2025-08-03 RX ADMIN — Medication 25 GRAM(S): at 05:40

## 2025-08-03 RX ADMIN — Medication 1 TABLET(S): at 12:40

## 2025-08-03 RX ADMIN — LIDOCAINE HYDROCHLORIDE 1 PATCH: 20 JELLY TOPICAL at 07:02

## 2025-08-04 LAB
ALBUMIN SERPL ELPH-MCNC: 3.4 G/DL — SIGNIFICANT CHANGE UP (ref 3.3–5)
ALP SERPL-CCNC: 183 U/L — HIGH (ref 40–120)
ALT FLD-CCNC: 11 U/L — SIGNIFICANT CHANGE UP (ref 10–45)
ANION GAP SERPL CALC-SCNC: 14 MMOL/L — SIGNIFICANT CHANGE UP (ref 5–17)
APTT BLD: 36.7 SEC — SIGNIFICANT CHANGE UP (ref 26.1–36.8)
AST SERPL-CCNC: 34 U/L — SIGNIFICANT CHANGE UP (ref 10–40)
BASOPHILS # BLD AUTO: 0.02 K/UL — SIGNIFICANT CHANGE UP (ref 0–0.2)
BASOPHILS NFR BLD AUTO: 0.4 % — SIGNIFICANT CHANGE UP (ref 0–2)
BILIRUB SERPL-MCNC: 3 MG/DL — HIGH (ref 0.2–1.2)
BUN SERPL-MCNC: 17 MG/DL — SIGNIFICANT CHANGE UP (ref 7–23)
CALCIUM SERPL-MCNC: 11.6 MG/DL — HIGH (ref 8.4–10.5)
CHLORIDE SERPL-SCNC: 91 MMOL/L — LOW (ref 96–108)
CO2 SERPL-SCNC: 27 MMOL/L — SIGNIFICANT CHANGE UP (ref 22–31)
CREAT SERPL-MCNC: 1.18 MG/DL — SIGNIFICANT CHANGE UP (ref 0.5–1.3)
CULTURE RESULTS: SIGNIFICANT CHANGE UP
D DIMER BLD IA.RAPID-MCNC: 1444 NG/ML DDU — HIGH
EGFR: 52 ML/MIN/1.73M2 — LOW
EGFR: 52 ML/MIN/1.73M2 — LOW
EOSINOPHIL # BLD AUTO: 0.12 K/UL — SIGNIFICANT CHANGE UP (ref 0–0.5)
EOSINOPHIL NFR BLD AUTO: 2.1 % — SIGNIFICANT CHANGE UP (ref 0–6)
FIBRINOGEN PPP-MCNC: 253 MG/DL — SIGNIFICANT CHANGE UP (ref 200–445)
GLUCOSE SERPL-MCNC: 119 MG/DL — HIGH (ref 70–99)
HCT VFR BLD CALC: 28.8 % — LOW (ref 34.5–45)
HCT VFR BLD CALC: 30.1 % — LOW (ref 34.5–45)
HGB BLD-MCNC: 9.2 G/DL — LOW (ref 11.5–15.5)
HGB BLD-MCNC: 9.8 G/DL — LOW (ref 11.5–15.5)
IMM GRANULOCYTES # BLD AUTO: 0.03 K/UL — SIGNIFICANT CHANGE UP (ref 0–0.07)
IMM GRANULOCYTES NFR BLD AUTO: 0.5 % — SIGNIFICANT CHANGE UP (ref 0–0.9)
IMMATURE PLATELET FRACTION #: 1.1 K/UL — LOW (ref 4.7–11.1)
IMMATURE PLATELET FRACTION #: 1.1 K/UL — LOW (ref 4.7–11.1)
IMMATURE PLATELET FRACTION %: 1.4 % — LOW (ref 1.6–4.9)
IMMATURE PLATELET FRACTION %: 1.4 % — LOW (ref 1.6–4.9)
INR BLD: 1.61 RATIO — HIGH (ref 0.85–1.16)
LYMPHOCYTES # BLD AUTO: 1.03 K/UL — SIGNIFICANT CHANGE UP (ref 1–3.3)
LYMPHOCYTES NFR BLD AUTO: 18.3 % — SIGNIFICANT CHANGE UP (ref 13–44)
MAGNESIUM SERPL-MCNC: 1.6 MG/DL — SIGNIFICANT CHANGE UP (ref 1.6–2.6)
MCHC RBC-ENTMCNC: 26.8 PG — LOW (ref 27–34)
MCHC RBC-ENTMCNC: 26.9 PG — LOW (ref 27–34)
MCHC RBC-ENTMCNC: 31.9 G/DL — LOW (ref 32–36)
MCHC RBC-ENTMCNC: 32.6 G/DL — SIGNIFICANT CHANGE UP (ref 32–36)
MCV RBC AUTO: 82.5 FL — SIGNIFICANT CHANGE UP (ref 80–100)
MCV RBC AUTO: 84.2 FL — SIGNIFICANT CHANGE UP (ref 80–100)
MONOCYTES # BLD AUTO: 0.8 K/UL — SIGNIFICANT CHANGE UP (ref 0–0.9)
MONOCYTES NFR BLD AUTO: 14.2 % — HIGH (ref 2–14)
MRSA PCR RESULT.: SIGNIFICANT CHANGE UP
NEUTROPHILS # BLD AUTO: 3.63 K/UL — SIGNIFICANT CHANGE UP (ref 1.8–7.4)
NEUTROPHILS NFR BLD AUTO: 64.5 % — SIGNIFICANT CHANGE UP (ref 43–77)
NRBC # BLD AUTO: 0 K/UL — SIGNIFICANT CHANGE UP (ref 0–0)
NRBC # BLD AUTO: 0 K/UL — SIGNIFICANT CHANGE UP (ref 0–0)
NRBC # FLD: 0 K/UL — SIGNIFICANT CHANGE UP (ref 0–0)
NRBC # FLD: 0 K/UL — SIGNIFICANT CHANGE UP (ref 0–0)
NRBC BLD AUTO-RTO: 0 /100 WBCS — SIGNIFICANT CHANGE UP (ref 0–0)
NRBC BLD AUTO-RTO: 0 /100 WBCS — SIGNIFICANT CHANGE UP (ref 0–0)
PHOSPHATE SERPL-MCNC: 3.2 MG/DL — SIGNIFICANT CHANGE UP (ref 2.5–4.5)
PLATELET # BLD AUTO: 76 K/UL — LOW (ref 150–400)
PLATELET # BLD AUTO: 81 K/UL — LOW (ref 150–400)
PMV BLD: 10.1 FL — SIGNIFICANT CHANGE UP (ref 7–13)
PMV BLD: 10.9 FL — SIGNIFICANT CHANGE UP (ref 7–13)
POTASSIUM SERPL-MCNC: 3.6 MMOL/L — SIGNIFICANT CHANGE UP (ref 3.5–5.3)
POTASSIUM SERPL-SCNC: 3.6 MMOL/L — SIGNIFICANT CHANGE UP (ref 3.5–5.3)
PROT SERPL-MCNC: 7.4 G/DL — SIGNIFICANT CHANGE UP (ref 6–8.3)
PROTHROM AB SERPL-ACNC: 18.3 SEC — HIGH (ref 9.9–13.4)
RBC # BLD: 3.42 M/UL — LOW (ref 3.8–5.2)
RBC # BLD: 3.65 M/UL — LOW (ref 3.8–5.2)
RBC # FLD: 23.2 % — HIGH (ref 10.3–14.5)
RBC # FLD: 23.2 % — HIGH (ref 10.3–14.5)
S AUREUS DNA NOSE QL NAA+PROBE: SIGNIFICANT CHANGE UP
SARS-COV-2 RNA SPEC QL NAA+PROBE: SIGNIFICANT CHANGE UP
SODIUM SERPL-SCNC: 132 MMOL/L — LOW (ref 135–145)
SPECIMEN SOURCE: SIGNIFICANT CHANGE UP
WBC # BLD: 5.63 K/UL — SIGNIFICANT CHANGE UP (ref 3.8–10.5)
WBC # BLD: 5.83 K/UL — SIGNIFICANT CHANGE UP (ref 3.8–10.5)
WBC # FLD AUTO: 5.63 K/UL — SIGNIFICANT CHANGE UP (ref 3.8–10.5)
WBC # FLD AUTO: 5.83 K/UL — SIGNIFICANT CHANGE UP (ref 3.8–10.5)

## 2025-08-04 PROCEDURE — 99232 SBSQ HOSP IP/OBS MODERATE 35: CPT | Mod: GC

## 2025-08-04 RX ORDER — FUROSEMIDE 10 MG/ML
1 INJECTION INTRAMUSCULAR; INTRAVENOUS
Refills: 0 | DISCHARGE

## 2025-08-04 RX ORDER — FUROSEMIDE 10 MG/ML
1 INJECTION INTRAMUSCULAR; INTRAVENOUS
Qty: 0 | Refills: 0 | DISCHARGE
Start: 2025-08-04

## 2025-08-04 RX ORDER — ACETAMINOPHEN 500 MG/5ML
650 LIQUID (ML) ORAL ONCE
Refills: 0 | Status: COMPLETED | OUTPATIENT
Start: 2025-08-04 | End: 2025-08-04

## 2025-08-04 RX ADMIN — HYDROXYZINE HYDROCHLORIDE 50 MILLIGRAM(S): 25 TABLET, FILM COATED ORAL at 06:16

## 2025-08-04 RX ADMIN — Medication 2 MILLIGRAM(S): at 06:16

## 2025-08-04 RX ADMIN — Medication 100 MILLIGRAM(S): at 08:31

## 2025-08-04 RX ADMIN — MIDODRINE HYDROCHLORIDE 15 MILLIGRAM(S): 5 TABLET ORAL at 13:18

## 2025-08-04 RX ADMIN — Medication 25 GRAM(S): at 21:46

## 2025-08-04 RX ADMIN — URSODIOL 300 MILLIGRAM(S): 300 CAPSULE ORAL at 17:01

## 2025-08-04 RX ADMIN — Medication 50 MILLIGRAM(S): at 06:16

## 2025-08-04 RX ADMIN — Medication 650 MILLIGRAM(S): at 09:14

## 2025-08-04 RX ADMIN — Medication 125 MILLIGRAM(S): at 06:17

## 2025-08-04 RX ADMIN — MIDODRINE HYDROCHLORIDE 15 MILLIGRAM(S): 5 TABLET ORAL at 21:53

## 2025-08-04 RX ADMIN — FOLIC ACID 1 MILLIGRAM(S): 1 TABLET ORAL at 08:31

## 2025-08-04 RX ADMIN — HYDROXYZINE HYDROCHLORIDE 50 MILLIGRAM(S): 25 TABLET, FILM COATED ORAL at 13:10

## 2025-08-04 RX ADMIN — LIDOCAINE HYDROCHLORIDE 1 PATCH: 20 JELLY TOPICAL at 07:15

## 2025-08-04 RX ADMIN — POLYETHYLENE GLYCOL 3350 17 GRAM(S): 17 POWDER, FOR SOLUTION ORAL at 06:16

## 2025-08-04 RX ADMIN — Medication 400 MILLIGRAM(S): at 13:10

## 2025-08-04 RX ADMIN — Medication 25 GRAM(S): at 06:18

## 2025-08-04 RX ADMIN — FUROSEMIDE 40 MILLIGRAM(S): 10 INJECTION INTRAMUSCULAR; INTRAVENOUS at 13:10

## 2025-08-04 RX ADMIN — Medication 1 TABLET(S): at 08:31

## 2025-08-04 RX ADMIN — Medication 2 MILLIGRAM(S): at 20:35

## 2025-08-04 RX ADMIN — Medication 1 APPLICATION(S): at 09:14

## 2025-08-04 RX ADMIN — HYDROXYZINE HYDROCHLORIDE 50 MILLIGRAM(S): 25 TABLET, FILM COATED ORAL at 21:46

## 2025-08-04 RX ADMIN — LIDOCAINE HYDROCHLORIDE 1 PATCH: 20 JELLY TOPICAL at 09:25

## 2025-08-04 RX ADMIN — Medication 2 MILLIGRAM(S): at 06:53

## 2025-08-04 RX ADMIN — Medication 400 MILLIGRAM(S): at 08:31

## 2025-08-04 RX ADMIN — Medication 2 MILLIGRAM(S): at 21:00

## 2025-08-04 RX ADMIN — MIRTAZAPINE 7.5 MILLIGRAM(S): 30 TABLET, FILM COATED ORAL at 21:46

## 2025-08-04 RX ADMIN — Medication 40 MILLIGRAM(S): at 06:17

## 2025-08-04 RX ADMIN — LIDOCAINE HYDROCHLORIDE 1 PATCH: 20 JELLY TOPICAL at 21:55

## 2025-08-04 RX ADMIN — Medication 650 MILLIGRAM(S): at 10:14

## 2025-08-04 RX ADMIN — URSODIOL 300 MILLIGRAM(S): 300 CAPSULE ORAL at 06:16

## 2025-08-04 RX ADMIN — FUROSEMIDE 40 MILLIGRAM(S): 10 INJECTION INTRAMUSCULAR; INTRAVENOUS at 06:17

## 2025-08-04 RX ADMIN — Medication 25 GRAM(S): at 14:34

## 2025-08-04 RX ADMIN — POLYETHYLENE GLYCOL 3350 17 GRAM(S): 17 POWDER, FOR SOLUTION ORAL at 17:01

## 2025-08-04 RX ADMIN — Medication 125 MILLIGRAM(S): at 17:01

## 2025-08-04 RX ADMIN — Medication 400 MILLIGRAM(S): at 17:01

## 2025-08-04 RX ADMIN — Medication 3 MILLIGRAM(S): at 21:45

## 2025-08-05 ENCOUNTER — TRANSCRIPTION ENCOUNTER (OUTPATIENT)
Age: 60
End: 2025-08-05

## 2025-08-05 VITALS
RESPIRATION RATE: 14 BRPM | TEMPERATURE: 98 F | DIASTOLIC BLOOD PRESSURE: 72 MMHG | OXYGEN SATURATION: 97 % | SYSTOLIC BLOOD PRESSURE: 103 MMHG | HEART RATE: 88 BPM

## 2025-08-05 PROCEDURE — 85379 FIBRIN DEGRADATION QUANT: CPT

## 2025-08-05 PROCEDURE — C1894: CPT

## 2025-08-05 PROCEDURE — 82945 GLUCOSE OTHER FLUID: CPT

## 2025-08-05 PROCEDURE — 84436 ASSAY OF TOTAL THYROXINE: CPT

## 2025-08-05 PROCEDURE — C1751: CPT

## 2025-08-05 PROCEDURE — P9047: CPT

## 2025-08-05 PROCEDURE — 82746 ASSAY OF FOLIC ACID SERUM: CPT

## 2025-08-05 PROCEDURE — 83930 ASSAY OF BLOOD OSMOLALITY: CPT

## 2025-08-05 PROCEDURE — 85610 PROTHROMBIN TIME: CPT

## 2025-08-05 PROCEDURE — 80053 COMPREHEN METABOLIC PANEL: CPT

## 2025-08-05 PROCEDURE — 85018 HEMOGLOBIN: CPT

## 2025-08-05 PROCEDURE — 82042 OTHER SOURCE ALBUMIN QUAN EA: CPT

## 2025-08-05 PROCEDURE — 83550 IRON BINDING TEST: CPT

## 2025-08-05 PROCEDURE — 83615 LACTATE (LD) (LDH) ENZYME: CPT

## 2025-08-05 PROCEDURE — 84145 PROCALCITONIN (PCT): CPT

## 2025-08-05 PROCEDURE — 97530 THERAPEUTIC ACTIVITIES: CPT

## 2025-08-05 PROCEDURE — 87102 FUNGUS ISOLATION CULTURE: CPT

## 2025-08-05 PROCEDURE — 83036 HEMOGLOBIN GLYCOSYLATED A1C: CPT

## 2025-08-05 PROCEDURE — 80048 BASIC METABOLIC PNL TOTAL CA: CPT

## 2025-08-05 PROCEDURE — 82247 BILIRUBIN TOTAL: CPT

## 2025-08-05 PROCEDURE — G0480: CPT

## 2025-08-05 PROCEDURE — 85014 HEMATOCRIT: CPT

## 2025-08-05 PROCEDURE — 85025 COMPLETE CBC W/AUTO DIFF WBC: CPT

## 2025-08-05 PROCEDURE — 87070 CULTURE OTHR SPECIMN AEROBIC: CPT

## 2025-08-05 PROCEDURE — 74177 CT ABD & PELVIS W/CONTRAST: CPT

## 2025-08-05 PROCEDURE — 99238 HOSP IP/OBS DSCHRG MGMT 30/<: CPT | Mod: GC

## 2025-08-05 PROCEDURE — 93005 ELECTROCARDIOGRAM TRACING: CPT

## 2025-08-05 PROCEDURE — 83540 ASSAY OF IRON: CPT

## 2025-08-05 PROCEDURE — 83935 ASSAY OF URINE OSMOLALITY: CPT

## 2025-08-05 PROCEDURE — 83605 ASSAY OF LACTIC ACID: CPT

## 2025-08-05 PROCEDURE — 76700 US EXAM ABDOM COMPLETE: CPT

## 2025-08-05 PROCEDURE — 82607 VITAMIN B-12: CPT

## 2025-08-05 PROCEDURE — 80076 HEPATIC FUNCTION PANEL: CPT

## 2025-08-05 PROCEDURE — 82803 BLOOD GASES ANY COMBINATION: CPT

## 2025-08-05 PROCEDURE — 36415 COLL VENOUS BLD VENIPUNCTURE: CPT

## 2025-08-05 PROCEDURE — 85652 RBC SED RATE AUTOMATED: CPT

## 2025-08-05 PROCEDURE — 84295 ASSAY OF SERUM SODIUM: CPT

## 2025-08-05 PROCEDURE — 87635 SARS-COV-2 COVID-19 AMP PRB: CPT

## 2025-08-05 PROCEDURE — 71045 X-RAY EXAM CHEST 1 VIEW: CPT

## 2025-08-05 PROCEDURE — 87449 NOS EACH ORGANISM AG IA: CPT

## 2025-08-05 PROCEDURE — 86901 BLOOD TYPING SEROLOGIC RH(D): CPT

## 2025-08-05 PROCEDURE — 82330 ASSAY OF CALCIUM: CPT

## 2025-08-05 PROCEDURE — 87641 MR-STAPH DNA AMP PROBE: CPT

## 2025-08-05 PROCEDURE — 86850 RBC ANTIBODY SCREEN: CPT

## 2025-08-05 PROCEDURE — 86900 BLOOD TYPING SEROLOGIC ABO: CPT

## 2025-08-05 PROCEDURE — 82248 BILIRUBIN DIRECT: CPT

## 2025-08-05 PROCEDURE — 87205 SMEAR GRAM STAIN: CPT

## 2025-08-05 PROCEDURE — 80061 LIPID PANEL: CPT

## 2025-08-05 PROCEDURE — 84443 ASSAY THYROID STIM HORMONE: CPT

## 2025-08-05 PROCEDURE — 82435 ASSAY OF BLOOD CHLORIDE: CPT

## 2025-08-05 PROCEDURE — 84132 ASSAY OF SERUM POTASSIUM: CPT

## 2025-08-05 PROCEDURE — 87040 BLOOD CULTURE FOR BACTERIA: CPT

## 2025-08-05 PROCEDURE — 49083 ABD PARACENTESIS W/IMAGING: CPT

## 2025-08-05 PROCEDURE — 87103 BLOOD FUNGUS CULTURE: CPT

## 2025-08-05 PROCEDURE — 82728 ASSAY OF FERRITIN: CPT

## 2025-08-05 PROCEDURE — 85730 THROMBOPLASTIN TIME PARTIAL: CPT

## 2025-08-05 PROCEDURE — 88305 TISSUE EXAM BY PATHOLOGIST: CPT

## 2025-08-05 PROCEDURE — 85384 FIBRINOGEN ACTIVITY: CPT

## 2025-08-05 PROCEDURE — 88112 CYTOPATH CELL ENHANCE TECH: CPT

## 2025-08-05 PROCEDURE — 87640 STAPH A DNA AMP PROBE: CPT

## 2025-08-05 PROCEDURE — 89051 BODY FLUID CELL COUNT: CPT

## 2025-08-05 PROCEDURE — 97116 GAIT TRAINING THERAPY: CPT

## 2025-08-05 PROCEDURE — 81001 URINALYSIS AUTO W/SCOPE: CPT

## 2025-08-05 PROCEDURE — 97110 THERAPEUTIC EXERCISES: CPT

## 2025-08-05 PROCEDURE — 96374 THER/PROPH/DIAG INJ IV PUSH: CPT

## 2025-08-05 PROCEDURE — 82947 ASSAY GLUCOSE BLOOD QUANT: CPT

## 2025-08-05 PROCEDURE — 85027 COMPLETE CBC AUTOMATED: CPT

## 2025-08-05 PROCEDURE — 84100 ASSAY OF PHOSPHORUS: CPT

## 2025-08-05 PROCEDURE — 87015 SPECIMEN INFECT AGNT CONCNTJ: CPT

## 2025-08-05 PROCEDURE — 36569 INSJ PICC 5 YR+ W/O IMAGING: CPT

## 2025-08-05 PROCEDURE — 99285 EMERGENCY DEPT VISIT HI MDM: CPT

## 2025-08-05 PROCEDURE — 83735 ASSAY OF MAGNESIUM: CPT

## 2025-08-05 PROCEDURE — 84300 ASSAY OF URINE SODIUM: CPT

## 2025-08-05 PROCEDURE — 87075 CULTR BACTERIA EXCEPT BLOOD: CPT

## 2025-08-05 PROCEDURE — 84157 ASSAY OF PROTEIN OTHER: CPT

## 2025-08-05 PROCEDURE — 97162 PT EVAL MOD COMPLEX 30 MIN: CPT

## 2025-08-05 PROCEDURE — C1729: CPT

## 2025-08-05 RX ADMIN — FUROSEMIDE 40 MILLIGRAM(S): 10 INJECTION INTRAMUSCULAR; INTRAVENOUS at 06:33

## 2025-08-05 RX ADMIN — Medication 2 MILLIGRAM(S): at 06:33

## 2025-08-05 RX ADMIN — Medication 25 GRAM(S): at 06:36

## 2025-08-05 RX ADMIN — Medication 1 APPLICATION(S): at 09:08

## 2025-08-05 RX ADMIN — Medication 50 MILLIGRAM(S): at 06:33

## 2025-08-05 RX ADMIN — LIDOCAINE HYDROCHLORIDE 1 PATCH: 20 JELLY TOPICAL at 07:02

## 2025-08-05 RX ADMIN — Medication 40 MILLIGRAM(S): at 06:34

## 2025-08-05 RX ADMIN — Medication 125 MILLIGRAM(S): at 06:34

## 2025-08-05 RX ADMIN — URSODIOL 300 MILLIGRAM(S): 300 CAPSULE ORAL at 06:33

## 2025-08-05 RX ADMIN — Medication 400 MILLIGRAM(S): at 09:06

## 2025-08-05 RX ADMIN — Medication 2 MILLIGRAM(S): at 07:03

## 2025-08-05 RX ADMIN — HYDROXYZINE HYDROCHLORIDE 50 MILLIGRAM(S): 25 TABLET, FILM COATED ORAL at 06:33

## 2025-08-15 ENCOUNTER — APPOINTMENT (OUTPATIENT)
Dept: HEPATOLOGY | Facility: HOSPITAL | Age: 60
End: 2025-08-15

## 2025-08-15 ENCOUNTER — APPOINTMENT (OUTPATIENT)
Dept: NEUROLOGY | Facility: CLINIC | Age: 60
End: 2025-08-15
Payer: MEDICAID

## 2025-08-15 VITALS
BODY MASS INDEX: 17.74 KG/M2 | WEIGHT: 113 LBS | SYSTOLIC BLOOD PRESSURE: 102 MMHG | HEIGHT: 67 IN | DIASTOLIC BLOOD PRESSURE: 54 MMHG

## 2025-08-15 DIAGNOSIS — G62.9 POLYNEUROPATHY, UNSPECIFIED: ICD-10-CM

## 2025-08-15 PROCEDURE — 99203 OFFICE O/P NEW LOW 30 MIN: CPT

## 2025-08-18 ENCOUNTER — APPOINTMENT (OUTPATIENT)
Dept: INFECTIOUS DISEASE | Facility: CLINIC | Age: 60
End: 2025-08-18
Payer: MEDICAID

## 2025-08-18 ENCOUNTER — LABORATORY RESULT (OUTPATIENT)
Age: 60
End: 2025-08-18

## 2025-08-18 DIAGNOSIS — K68.12 PSOAS MUSCLE ABSCESS: ICD-10-CM

## 2025-08-18 PROCEDURE — 99213 OFFICE O/P EST LOW 20 MIN: CPT | Mod: 93

## 2025-08-19 ENCOUNTER — NON-APPOINTMENT (OUTPATIENT)
Age: 60
End: 2025-08-19

## 2025-08-19 LAB
ESTIMATED AVERAGE GLUCOSE: 103 MG/DL
FOLATE SERPL-MCNC: >20 NG/ML
HBA1C MFR BLD HPLC: 5.2 %
TSH SERPL-ACNC: 5.23 UIU/ML
VIT B12 SERPL-MCNC: 1216 PG/ML

## 2025-08-20 LAB
ALBUMIN MFR SERPL ELPH: 48 %
ALBUMIN SERPL-MCNC: 3.4 G/DL
ALBUMIN/GLOB SERPL: 0.9 RATIO
ALPHA1 GLOB MFR SERPL ELPH: 4.3 %
ALPHA1 GLOB SERPL ELPH-MCNC: 0.3 G/DL
ALPHA2 GLOB MFR SERPL ELPH: 7 %
ALPHA2 GLOB SERPL ELPH-MCNC: 0.5 G/DL
ANA TITR SER: ABNORMAL
ANA TITR SER: ABNORMAL
B-GLOBULIN MFR SERPL ELPH: 12.7 %
B-GLOBULIN SERPL ELPH-MCNC: 0.9 G/DL
CULTURE RESULTS: SIGNIFICANT CHANGE UP
GAMMA GLOB FLD ELPH-MCNC: 2 G/DL
GAMMA GLOB MFR SERPL ELPH: 28 %
INTERPRETATION SERPL IEP-IMP: NORMAL
M PROTEIN MFR SERPL ELPH: 7.8 %
MONOCLON BAND OBS SERPL: 0.5 G/DL
PROT SERPL-MCNC: 7 G/DL
PROT SERPL-MCNC: 7 G/DL
SPECIMEN SOURCE: SIGNIFICANT CHANGE UP

## 2025-08-21 ENCOUNTER — APPOINTMENT (OUTPATIENT)
Dept: HEPATOLOGY | Facility: CLINIC | Age: 60
End: 2025-08-21

## 2025-08-21 VITALS
TEMPERATURE: 97.1 F | SYSTOLIC BLOOD PRESSURE: 97 MMHG | HEART RATE: 92 BPM | OXYGEN SATURATION: 98 % | WEIGHT: 114 LBS | DIASTOLIC BLOOD PRESSURE: 61 MMHG | BODY MASS INDEX: 17.85 KG/M2

## 2025-08-21 LAB — VIT B1 SERPL-MCNC: 132.2 NMOL/L

## 2025-08-21 PROCEDURE — 99214 OFFICE O/P EST MOD 30 MIN: CPT

## 2025-08-22 ENCOUNTER — OUTPATIENT (OUTPATIENT)
Dept: OUTPATIENT SERVICES | Facility: HOSPITAL | Age: 60
LOS: 1 days | End: 2025-08-22
Payer: MEDICAID

## 2025-08-22 ENCOUNTER — APPOINTMENT (OUTPATIENT)
Dept: CT IMAGING | Facility: CLINIC | Age: 60
End: 2025-08-22
Payer: MEDICAID

## 2025-08-22 DIAGNOSIS — Z98.890 OTHER SPECIFIED POSTPROCEDURAL STATES: Chronic | ICD-10-CM

## 2025-08-22 DIAGNOSIS — Z98.891 HISTORY OF UTERINE SCAR FROM PREVIOUS SURGERY: Chronic | ICD-10-CM

## 2025-08-22 PROCEDURE — 74177 CT ABD & PELVIS W/CONTRAST: CPT

## 2025-08-22 PROCEDURE — 74177 CT ABD & PELVIS W/CONTRAST: CPT | Mod: 26

## 2025-08-23 LAB — VIT B6 SERPL-MCNC: 6.9 UG/L

## 2025-08-25 ENCOUNTER — NON-APPOINTMENT (OUTPATIENT)
Age: 60
End: 2025-08-25

## 2025-08-25 DIAGNOSIS — D47.2 MONOCLONAL GAMMOPATHY: ICD-10-CM

## 2025-08-25 DIAGNOSIS — R76.8 OTHER SPECIFIED ABNORMAL IMMUNOLOGICAL FINDINGS IN SERUM: ICD-10-CM

## 2025-08-26 ENCOUNTER — NON-APPOINTMENT (OUTPATIENT)
Age: 60
End: 2025-08-26

## 2025-08-27 LAB
CULTURE RESULTS: SIGNIFICANT CHANGE UP
SPECIMEN SOURCE: SIGNIFICANT CHANGE UP

## 2025-09-03 ENCOUNTER — NON-APPOINTMENT (OUTPATIENT)
Age: 60
End: 2025-09-03

## 2025-09-08 ENCOUNTER — NON-APPOINTMENT (OUTPATIENT)
Age: 60
End: 2025-09-08

## 2025-09-09 ENCOUNTER — NON-APPOINTMENT (OUTPATIENT)
Age: 60
End: 2025-09-09

## 2025-09-10 ENCOUNTER — NON-APPOINTMENT (OUTPATIENT)
Age: 60
End: 2025-09-10

## 2025-09-18 ENCOUNTER — APPOINTMENT (OUTPATIENT)
Dept: ULTRASOUND IMAGING | Facility: CLINIC | Age: 60
End: 2025-09-18

## 2025-09-25 PROBLEM — K83.8 COMMON BILE DUCT DILATION: Status: ACTIVE | Noted: 2025-09-25

## (undated) DEVICE — BITE BLOCK ADULT 20 X 27MM (GREEN)

## (undated) DEVICE — VENODYNE/SCD SLEEVE CALF MEDIUM

## (undated) DEVICE — SUCTION YANKAUER NO CONTROL VENT

## (undated) DEVICE — SENSOR O2 FINGER ADULT

## (undated) DEVICE — SOL BAG NS 0.9% 1000ML

## (undated) DEVICE — SYR LUER SLIP TIP 50CC

## (undated) DEVICE — DENTURE CUP PINK

## (undated) DEVICE — TUBING ALARIS PUMP MODULE NON-DEHP

## (undated) DEVICE — SYR ALLIANCE II INFLATION 60ML

## (undated) DEVICE — PACK IV START WITH CHG

## (undated) DEVICE — GOWN IMPERV XL

## (undated) DEVICE — SOL IRR BAG H2O 1000ML

## (undated) DEVICE — TUBING IV EXTENSION MACRO W CLAVE 7"

## (undated) DEVICE — DRSG CURITY GAUZE SPONGE 4 X 4" 12-PLY NON-STERILE

## (undated) DEVICE — CATH IV SAFE BC 20G X 1.16" (PINK)

## (undated) DEVICE — SOL INJ NS 0.9% 500ML 2 PORT

## (undated) DEVICE — FORCEP RADIAL JAW 4 W NDL 2.4MM 2.8MM 240CM ORANGE DISP

## (undated) DEVICE — CATH IV SAFE BC 22G X 1" (BLUE)

## (undated) DEVICE — SOL IRR BAG NS 0.9% 1000ML

## (undated) DEVICE — BALLOON US ENDO

## (undated) DEVICE — FOLEY HOLDER STATLOCK 2 WAY ADULT

## (undated) DEVICE — SYR SLIP 10CC

## (undated) DEVICE — DRSG 2X2

## (undated) DEVICE — MASK PROCEDURE EARLOOP LVL 2 50/BX

## (undated) DEVICE — TUBING SUCTION CONN 6FT STERILE

## (undated) DEVICE — SYR IV FLUSH SALINE 10ML 30/TY

## (undated) DEVICE — TUBING IV SET GRAVITY 3Y 100" MACRO

## (undated) DEVICE — UNDERPAD LINEN SAVER 23 X 36"

## (undated) DEVICE — TUBING SUCTION 20FT

## (undated) DEVICE — WARMING BLANKET FULL ADULT